# Patient Record
Sex: MALE | Race: WHITE | NOT HISPANIC OR LATINO | Employment: OTHER | URBAN - METROPOLITAN AREA
[De-identification: names, ages, dates, MRNs, and addresses within clinical notes are randomized per-mention and may not be internally consistent; named-entity substitution may affect disease eponyms.]

---

## 2017-01-10 ENCOUNTER — ALLSCRIPTS OFFICE VISIT (OUTPATIENT)
Dept: OTHER | Facility: OTHER | Age: 72
End: 2017-01-10

## 2017-01-10 DIAGNOSIS — E11.9 TYPE 2 DIABETES MELLITUS WITHOUT COMPLICATIONS (HCC): ICD-10-CM

## 2017-01-10 DIAGNOSIS — E78.5 HYPERLIPIDEMIA: ICD-10-CM

## 2017-01-10 DIAGNOSIS — I10 ESSENTIAL (PRIMARY) HYPERTENSION: ICD-10-CM

## 2017-02-07 ENCOUNTER — GENERIC CONVERSION - ENCOUNTER (OUTPATIENT)
Dept: OTHER | Facility: OTHER | Age: 72
End: 2017-02-07

## 2017-03-24 ENCOUNTER — ALLSCRIPTS OFFICE VISIT (OUTPATIENT)
Dept: OTHER | Facility: OTHER | Age: 72
End: 2017-03-24

## 2017-03-24 LAB — HBA1C MFR BLD HPLC: 6.9 %

## 2017-07-21 ENCOUNTER — ALLSCRIPTS OFFICE VISIT (OUTPATIENT)
Dept: OTHER | Facility: OTHER | Age: 72
End: 2017-07-21

## 2017-07-21 DIAGNOSIS — Z00.00 ENCOUNTER FOR GENERAL ADULT MEDICAL EXAMINATION WITHOUT ABNORMAL FINDINGS: ICD-10-CM

## 2017-07-21 DIAGNOSIS — Z86.19 PERSONAL HISTORY OF OTHER INFECTIOUS AND PARASITIC DISEASES: ICD-10-CM

## 2017-07-21 DIAGNOSIS — E78.5 HYPERLIPIDEMIA: ICD-10-CM

## 2017-07-21 DIAGNOSIS — E11.9 TYPE 2 DIABETES MELLITUS WITHOUT COMPLICATIONS (HCC): ICD-10-CM

## 2017-07-21 DIAGNOSIS — R53.83 OTHER FATIGUE: ICD-10-CM

## 2017-07-21 LAB — HBA1C MFR BLD HPLC: 7 %

## 2017-07-24 ENCOUNTER — GENERIC CONVERSION - ENCOUNTER (OUTPATIENT)
Dept: OTHER | Facility: OTHER | Age: 72
End: 2017-07-24

## 2017-07-24 LAB
BASOPHILS # BLD AUTO: 0 X10E3/UL (ref 0–0.2)
BASOPHILS # BLD AUTO: 1 %
DEPRECATED RDW RBC AUTO: 13.6 % (ref 12.3–15.4)
EOSINOPHIL # BLD AUTO: 0.1 X10E3/UL (ref 0–0.4)
EOSINOPHIL # BLD AUTO: 1 %
HCT VFR BLD AUTO: 45.8 % (ref 37.5–51)
HGB BLD-MCNC: 16.2 G/DL (ref 12.6–17.7)
IMM.GRANULOCYTES (CD4/8) (HISTORICAL): 0 %
IMM.GRANULOCYTES (CD4/8) (HISTORICAL): 0 X10E3/UL (ref 0–0.1)
LYMPHOCYTES # BLD AUTO: 1.4 X10E3/UL (ref 0.7–3.1)
LYMPHOCYTES # BLD AUTO: 30 %
MCH RBC QN AUTO: 31.3 PG (ref 26.6–33)
MCHC RBC AUTO-ENTMCNC: 35.4 G/DL (ref 31.5–35.7)
MCV RBC AUTO: 88 FL (ref 79–97)
MONOCYTES # BLD AUTO: 0.4 X10E3/UL (ref 0.1–0.9)
MONOCYTES (HISTORICAL): 9 %
NEUTROPHILS # BLD AUTO: 2.9 X10E3/UL (ref 1.4–7)
NEUTROPHILS # BLD AUTO: 59 %
PLATELET # BLD AUTO: 204 X10E3/UL (ref 150–379)
RBC (HISTORICAL): 5.18 X10E6/UL (ref 4.14–5.8)
WBC # BLD AUTO: 4.9 X10E3/UL (ref 3.4–10.8)

## 2017-07-25 LAB
A/G RATIO (HISTORICAL): 1.6 (ref 1.2–2.2)
ALBUMIN SERPL BCP-MCNC: 4.4 G/DL (ref 3.5–4.8)
ALP SERPL-CCNC: 77 IU/L (ref 39–117)
ALT SERPL W P-5'-P-CCNC: 18 IU/L (ref 0–44)
AMYLASE (HISTORICAL): 33 U/L (ref 31–124)
AST SERPL W P-5'-P-CCNC: 19 IU/L (ref 0–40)
BILIRUB SERPL-MCNC: 0.3 MG/DL (ref 0–1.2)
BUN SERPL-MCNC: 16 MG/DL (ref 8–27)
BUN/CREA RATIO (HISTORICAL): 16 (ref 10–24)
CALCIUM SERPL-MCNC: 9.5 MG/DL (ref 8.6–10.2)
CHLORIDE SERPL-SCNC: 104 MMOL/L (ref 96–106)
CHOLEST SERPL-MCNC: 162 MG/DL (ref 100–199)
CHOLEST/HDLC SERPL: 3.9 RATIO UNITS (ref 0–5)
CO2 SERPL-SCNC: 22 MMOL/L (ref 18–29)
CREAT SERPL-MCNC: 1.02 MG/DL (ref 0.76–1.27)
EGFR AFRICAN AMERICAN (HISTORICAL): 83 ML/MIN/1.73
EGFR-AMERICAN CALC (HISTORICAL): 72 ML/MIN/1.73
GLUCOSE SERPL-MCNC: 106 MG/DL (ref 65–99)
HDLC SERPL-MCNC: 42 MG/DL
INTERPRETATION (HISTORICAL): NORMAL
LDLC SERPL CALC-MCNC: 108 MG/DL (ref 0–99)
LIPASE SERPL-CCNC: 18 U/L (ref 0–59)
LYME IGG/IGM AB (HISTORICAL): <0.91 ISR (ref 0–0.9)
LYME IGM (HISTORICAL): <0.8 INDEX (ref 0–0.79)
POTASSIUM SERPL-SCNC: 4.2 MMOL/L (ref 3.5–5.2)
PROSTATE SPECIFIC ANTIGEN (HISTORICAL): 2.6 NG/ML (ref 0–4)
SODIUM SERPL-SCNC: 144 MMOL/L (ref 134–144)
TOT. GLOBULIN, SERUM (HISTORICAL): 2.8 G/DL (ref 1.5–4.5)
TOTAL PROTEIN (HISTORICAL): 7.2 G/DL (ref 6–8.5)
TRIGL SERPL-MCNC: 58 MG/DL (ref 0–149)
VLDLC SERPL CALC-MCNC: 12 MG/DL (ref 5–40)

## 2017-08-01 ENCOUNTER — GENERIC CONVERSION - ENCOUNTER (OUTPATIENT)
Dept: OTHER | Facility: OTHER | Age: 72
End: 2017-08-01

## 2017-11-01 ENCOUNTER — ALLSCRIPTS OFFICE VISIT (OUTPATIENT)
Dept: OTHER | Facility: OTHER | Age: 72
End: 2017-11-01

## 2017-11-01 ENCOUNTER — GENERIC CONVERSION - ENCOUNTER (OUTPATIENT)
Dept: OTHER | Facility: OTHER | Age: 72
End: 2017-11-01

## 2017-11-01 DIAGNOSIS — R31.9 HEMATURIA: ICD-10-CM

## 2017-11-01 LAB
BILIRUB UR QL STRIP: NORMAL
CLARITY UR: NORMAL
COLOR UR: YELLOW
GLUCOSE (HISTORICAL): NORMAL
HGB UR QL STRIP.AUTO: 50
KETONES UR STRIP-MCNC: NORMAL MG/DL
LEUKOCYTE ESTERASE UR QL STRIP: NORMAL
NITRITE UR QL STRIP: NORMAL
PH UR STRIP.AUTO: 5 [PH]
PROT UR STRIP-MCNC: NORMAL MG/DL
SP GR UR STRIP.AUTO: 1.02
UROBILINOGEN UR QL STRIP.AUTO: NORMAL

## 2017-11-02 LAB — MICROALBUM.,U,RANDOM (HISTORICAL): 10.5 UG/ML

## 2017-11-06 ENCOUNTER — GENERIC CONVERSION - ENCOUNTER (OUTPATIENT)
Dept: OTHER | Facility: OTHER | Age: 72
End: 2017-11-06

## 2017-11-06 LAB
CLINICIAN PROVIDIED ICD 9 OR 10 (HISTORICAL): NORMAL
CULTURE RESULT (HISTORICAL): NORMAL
DIAGNOSIS (HISTORICAL): NORMAL
ELECTRONICALLY SIGNED BY (HISTORICAL): NORMAL
GROSS DESCRIPTION (HISTORICAL): NORMAL
MISCELLANEOUS LAB TEST RESULT (HISTORICAL): NORMAL
PERFORMED BY (HISTORICAL): NORMAL
RECOMMENDATION (HISTORICAL): NORMAL
SOURCE (HISTORICAL): NORMAL

## 2017-11-07 NOTE — PROGRESS NOTES
Assessment  1  Type 2 diabetes mellitus (250 00) (E11 9)   2  Benign essential hypertension (401 1) (I10)   3  Hematuria (599 70) (R31 9)   4  Body mass index 31 0-31 9, adult (V85 31) (Z68 31)   5  Need for influenza vaccination (V04 81) (Z23)    Plan  Benign essential hypertension    · Enalapril Maleate 10 MG Oral Tablet; decrease to 1/2  tablet daily  Benign prostatic hypertrophy with lower urinary tract symptoms (LUTS)    · Tamsulosin HCl - 0 4 MG Oral Capsule; take 1 capsule twice a day  Benign prostatic hypertrophy with lower urinary tract symptoms (LUTS), Fatigue    · (1) URINE CULTURE; Source:Urine, Clean Catch; Status: In Progress - Specimen/Data  Collected;   Done: 73DOG5614 12:00AM  Body mass index 31 0-31 9, adult    · Urine Dip Automated- POC; Status:Complete;   Done: 13GYB2551 09:55AM  Hematuria    · (1) URINE CYTOLOGY; Status:Active; Requested for:01Nov2017;   : Clean catch  Hyperlipidemia    · Simvastatin 40 MG Oral Tablet; TAKE 1 TABLET AT BEDTIME  Need for influenza vaccination    · Fluzone High-Dose 0 5 ML Intramuscular Suspension Prefilled Syringe  Type 2 diabetes mellitus    · (1) HEMOGLOBIN A1C; Status:Complete;   Done: 95SXC7575 09:48AM   · (LC) Microalbumin, Random Urine; Status:Resulted - Requires Verification;   Done:  93FKG4273 12:00AM  Type 2 diabetes mellitus with hyperglycemia    · HumaLOG Mix 75/25 KwikPen (75-25) 100 UNIT/ML Subcutaneous  Suspension Pen-injector; INJECT 40 UNITS UNDER THE SKIN IN THE MORNING  AND 50 UNITS IN THE EVENING    Discussion/Summary  Possible side effects of new medications were reviewed with the patient/guardian today  The treatment plan was reviewed with the patient/guardian  The patient/guardian understands and agrees with the treatment plan      Chief Complaint  pt here for Dm and flu shot  tc/cma      Advance Directives  Advance Directive [facility]:   The patient is not in agreement to receive the Advance Care Planning service--    NO - Patient does not have an advance health care directive  Summary of Advance Directive Conversation  pt was given 5 wishes to read and review  History of Present Illness  urol--Dr Jony Martin      Active Problems  1  Benign colon polyp (211 3) (K63 5)   2  Benign essential hypertension (401 1) (I10)   3  Benign prostatic hypertrophy with lower urinary tract symptoms (LUTS) (600 01) (N40 1)   4  Body mass index 31 0-31 9, adult (V85 31) (Z68 31)   5  Ceruminosis (380 4) (H61 20)   6  Fatigue (780 79) (R53 83)   7  History of Lyme disease (V12 09) (Z86 19)   8  Hypercholesterolemia (272 0) (E78 00)   9  Hyperlipidemia (272 4) (E78 5)   10  Internal derangement of knee, unspecified laterality (717 9) (M23 90)   11  Knee pain, bilateral (719 46) (M25 561,M25 562)   12  Knee pain, left (719 46) (M25 562)   13  Knee pain, right (719 46) (M25 561)   14  Long-term insulin use (V58 67) (Z79 4)   15  Long-term insulin use in type 2 diabetes (250 00,V58 67) (E11 9,Z79 4)   16  Medicare annual wellness visit, subsequent (V70 0) (Z00 00)   17  Need for influenza vaccination (V04 81) (Z23)   18  Poorly controlled type 2 diabetes mellitus (250 00) (E11 65)   19  Primary osteoarthritis of both knees (715 16) (M17 0)   20  Screening for AAA (abdominal aortic aneurysm) (V81 2) (Z13 6)   21  Screening for depression (V79 0) (Z13 89)   22  Screening for genitourinary condition (V81 6) (Z13 89)   23  Screening for neurological condition (V80 09) (Z13 89)   24  Type 2 diabetes mellitus (250 00) (E11 9)   25  Type 2 diabetes mellitus with hyperglycemia (250 00) (E11 65)   26  Vitamin D deficiency (268 9) (E55 9)    Past Medical History  1  History of arthritis (V13 4) (Z87 39)   2  History of Lyme disease (V12 09) (Z86 19)   3  History of viral gastroenteritis (V12 09) (Z86 19)   4  Personal history of kidney stones (V13 01) (Z87 442)   5  History of Rupture, bladder, spontaneous (596 6) (N32 89)    Surgical History  1   History of Anesthesia Lower Abdomen For Cystolithotomy    Family History  Mother    1  Family history of diabetes mellitus (V18 0) (Z83 3)   2  Family history of malignant neoplasm (V16 9) (Z80 9)  Father    3  Family history of malignant neoplasm of prostate (V16 42) (Z80 45)    Social History   · Being A Social Drinker   · Denied: History of Caffeine Use   · Former smoker (V15 82) (P84 544)    Current Meds   1  BD Pen Needle Mini U/F 31G X 5 MM Miscellaneous; use twice a day; Therapy: 88BUX2266 to (Last Rx:69Wve2420)  Requested for: 30Oct2017 Ordered   2  Enalapril Maleate 10 MG Oral Tablet; decrease to 1/2  tablet daily; Therapy: 71ODP6689 to  Requested for: 69PYP1504 Recorded   3  HumaLOG Mix 75/25 KwikPen (75-25) 100 UNIT/ML Subcutaneous Suspension   Pen-injector; INJECT 40 UNITS UNDER THE SKIN IN THE MORNING AND   50 UNITS IN THE EVENING; Therapy: 17CLG3899 to (Evaluate:49Ytl0268)  Requested for: 94Qtc6460; Last   Rx:66Byv6023 Ordered   4  OneTouch Ultra Blue In [employer]; use as directed TID testing; Therapy: 86FWH9303 to (Last Rx:24Mar2017)  Requested for: 24Mar2017 Ordered   5  Simvastatin 40 MG Oral Tablet; TAKE 1 TABLET AT BEDTIME; Therapy: 45YSN1451 to (Last Rx:23Vie9583)  Requested for: 24Oct2017 Ordered   6  Tamsulosin HCl - 0 4 MG Oral Capsule; take 1 capsule twice a day; Therapy: 79TUU7862 to (Last Oskar Olson)  Requested for: 74JIJ8752 Ordered    Allergies  1   AMOXICILLIN    Vitals  Vital Signs    Recorded: 92KKF5096 09:41AM   Temperature 97 9 F, Temporal   Heart Rate 72, R Radial   Pulse Quality Normal, R Radial   Respiration Quality Normal   Respiration 16   Systolic 393, RUE, Sitting   Diastolic 58, RUE, Sitting   Height 5 ft 7 in   Weight 201 lb    BMI Calculated 31 48   BSA Calculated 2 03     Results/Data  Urine Dip Automated- POC 17SIO0873 09:55AM Dickwilly Barley     Test Name Result Flag Reference   Color Yellow     Clarity Transparent     Leukocytes neg     Nitrite neg     Blood 50     Bilirubin neg Urobilinogen norm     Protein neg     Ph 5     Specific Gravity 1 020     Ketone neg     Glucose norm       Falls Risk Assessment (Dx Z13 89 Screen for Neurologic Disorder) 87BOR8211 09:43AM User, Ahs     Test Name Result Flag Reference   Falls Risk      No falls in the past year     ([facility]) Microalbumin, Random Urine 15DWC8709 12:00AM Nona Bradshaw     Test Name Result Flag Reference   Microalbumin, Urine 10 5 ug/mL  Not Estab  Health Management  Benign colon polyp   COLONOSCOPY; every 5 years; Last 45PGW1719; Next Due: 97ZJH9226; Active    Future Appointments    Date/Time Provider Specialty Site   02/07/2018 09:30 AM LEXUS Hidalgo   Family Medicine [address]     Signatures   Electronically signed by : LEXUS Bhardwaj ; Nov 6 2017  7:45AM EST                       (Author)

## 2017-12-04 ENCOUNTER — GENERIC CONVERSION - ENCOUNTER (OUTPATIENT)
Dept: OTHER | Facility: OTHER | Age: 72
End: 2017-12-04

## 2017-12-04 ENCOUNTER — GENERIC CONVERSION - ENCOUNTER (OUTPATIENT)
Dept: FAMILY MEDICINE CLINIC | Facility: CLINIC | Age: 72
End: 2017-12-04

## 2017-12-04 LAB
LEFT EYE DIABETIC RETINOPATHY: NORMAL
RIGHT EYE DIABETIC RETINOPATHY: NORMAL

## 2018-01-09 NOTE — RESULT NOTES
Verified Results  (1) COMPREHENSIVE METABOLIC PANEL 75OUL4205 04:20OD Vanessa Vanessa Order Number: [id number]     Test Name Result Flag Reference   GLUCOSE,RANDM 112 mg/dL     If the patient is fasting, the ADA then defines impaired fasting glucose as > 100 mg/dL and diabetes as > or equal to 123 mg/dL  SODIUM 137 mmol/L  136-145   POTASSIUM 4 1 mmol/L  3 5-5 3   CHLORIDE 103 mmol/L  100-108   CARBON DIOXIDE 26 mmol/L  21-32   ANION GAP (CALC) 8 mmol/L  4-13   BLOOD UREA NITROGEN 13 mg/dL  5-25   CREATININE 0 98 mg/dL  0 60-1 30   Standardized to IDMS reference method   CALCIUM 8 8 mg/dL  8 3-10 1   BILI, TOTAL 0 69 mg/dL  0 20-1 00   ALK PHOSPHATAS 78 U/L     ALT (SGPT) 31 U/L  12-78   AST(SGOT) 19 U/L  5-45   ALBUMIN 3 8 g/dL  3 5-5 0   TOTAL PROTEIN 7 5 g/dL  6 4-8 2   eGFR Non-African American      >60 0 ml/min/1 73sq m   - Patient Instructions: This is a fasting blood test  Water,black tea or black  coffee only after 9:00pm the night before test Drink 2 glasses of water the morning of test - Patient Instructions: This bloodwork is non-fasting  Please drink two glasses of   water morning of bloodwork  National Kidney Disease Education Program recommendations are as follows:  GFR calculation is accurate only with a steady state creatinine  Chronic Kidney disease less than 60 ml/min/1 73 sq  meters  Kidney failure less than 15 ml/min/1 73 sq  meters  (1) AMYLASE 68WBN0139 10:31AM [facility] Order Number: [id number]     Test Name Result Flag Reference   AMYLASE 25 IU/L     - Patient Instructions: This is a fasting blood test  Water,black tea or black  coffee only after 9:00pm the night before test Drink 2 glasses of water the morning of test - Patient Instructions: This bloodwork is non-fasting  Please drink two glasses of   water morning of bloodwork       (1) CBC/PLT/DIFF 64Lpr3959 10:31AM [facility]    Order Number: [id number] Test Name Result Flag Reference   WBC COUNT 5 40 Thousand/uL  4 31-10 16   RBC COUNT 5 03 Million/uL  3 88-5 62   HEMOGLOBIN 15 6 g/dL  12 0-17 0   HEMATOCRIT 45 7 %  36 5-49 3   MCV 91 fL  82-98   MCH 31 0 pg  26 8-34 3   MCHC 34 1 g/dL  31 4-37 4   RDW 13 1 %  11 6-15 1   MPV 9 6 fL  8 9-12 7   PLATELET COUNT 935 Thousands/uL  149-390   nRBC AUTOMATED 0 /100 WBCs     NEUTROPHILS RELATIVE PERCENT 62 %  43-75   LYMPHOCYTES RELATIVE PERCENT 26 %  14-44   MONOCYTES RELATIVE PERCENT 9 %  4-12   EOSINOPHILS RELATIVE PERCENT 2 %  0-6   BASOPHILS RELATIVE PERCENT 1 %  0-1   NEUTROPHILS ABSOLUTE COUNT 3 36 Thousands/?L  1 85-7 62   LYMPHOCYTES ABSOLUTE COUNT 1 41 Thousands/?L  0 60-4 47   MONOCYTES ABSOLUTE COUNT 0 49 Thousand/?L  0 17-1 22   EOSINOPHILS ABSOLUTE COUNT 0 09 Thousand/?L  0 00-0 61   BASOPHILS ABSOLUTE COUNT 0 03 Thousands/?L  0 00-0 10   - Patient Instructions: This bloodwork is non-fasting  Please drink two glasses of water morning of bloodwork  - Patient Instructions: This bloodwork is non-fasting  Please drink two glasses of water morning of bloodwork  (1) HEMOGLOBIN A1C 56Vvz7392 10:31AM Nadya Bone   TW Order Number: [id number]     Test Name Result Flag Reference   HEMOGLOBIN A1C 6 9 % H 4 2-6 3   EST  AVG  GLUCOSE 151 mg/dl       (1) LIPASE 60Ogz2778 10:31AM [facility] Number: [id number]     Test Name Result Flag Reference   LIPASE 78 u/L     - Patient Instructions: This is a fasting blood test  Water,black tea or black  coffee only after 9:00pm the night before test Drink 2 glasses of water the morning of test - Patient Instructions: This bloodwork is non-fasting  Please drink two glasses of   water morning of bloodwork       (1) LIPID PANEL, FASTING 71Saw2671 10:31AM Nadya Bone   TW Order Number: [id number]     Test Name Result Flag Reference   CHOLESTEROL 156 mg/dL     HDL,DIRECT 42 mg/dL  40-60   Specimen collection should occur prior to Metamizole administration due to the potential for falsely depressed results  LDL CHOLESTEROL CALCULATED 99 mg/dL  0-100   - Patient Instructions: This is a fasting blood test  Water,black tea or black  coffee only after 9:00pm the night before test   Drink 2 glasses of water the morning of test     - Patient Instructions: This is a fasting blood test  Water,black tea or black  coffee only after 9:00pm the night before test Drink 2 glasses of water the morning of test - Patient Instructions: This bloodwork is non-fasting  Please drink two glasses of   water morning of bloodwork  Triglyceride:         Normal              <150 mg/dl       Borderline High    150-199 mg/dl       High               200-499 mg/dl       Very High          >499 mg/dl  Cholesterol:         Desirable        <200 mg/dl      Borderline High  200-239 mg/dl      High             >239 mg/dl  HDL Cholesterol:        High    >59 mg/dL      Low     <41 mg/dL  LDL CALCULATED:    This screening LDL is a calculated result  It does not have the accuracy of the Direct Measured LDL in the monitoring of patients with hyperlipidemia and/or statin therapy  Direct Measure LDL (SMZ278) must be ordered separately in these patients  TRIGLYCERIDES 74 mg/dL  <=150   Specimen collection should occur prior to N-Acetylcysteine or Metamizole administration due to the potential for falsely depressed results  (1) TSH 88Tks1637 10:31AM Tayo Valentine    Order Number: [id number]     Test Name Result Flag Reference   TSH 2 220 uIU/mL  0 358-3 740   - Patient Instructions: This bloodwork is non-fasting  Please drink two glasses of water morning of bloodwork  - Patient Instructions: This is a fasting blood test  Water,black tea or black  coffee only after 9:00pm the night before test Drink 2 glasses of water the morning of test - Patient Instructions: This bloodwork is non-fasting  Please drink two glasses of   water morning of bloodwork  Patients undergoing fluorescein dye angiography may retain small amounts of fluorescein in the body for 48-72 hours post procedure  Samples containing fluorescein can produce falsely depressed TSH values  If the patient had this procedure,a specimen should be resubmitted post fluorescein clearance       (1) MICROALBUMIN CREATININE RATIO, RANDOM URINE 32Cku0562 10:31AM Roopa Doyle     Test Name Result Flag Reference   MICROALBUMIN/ CREAT R 26 mg/g creatinine  0-30   MICROALBUMIN,URINE 29 3 mg/L H 0 0-20 0   CREATININE URINE 112 0 mg/dL         Discussion/Summary   please call with further questions/concerns-Best Regards-Dr AMRIO

## 2018-01-10 NOTE — RESULT NOTES
Verified Results  (LC) Microalbumin, Random Urine 69MAV5260 12:00AM Peggi [place]     Test Name Result Flag Reference   Microalbumin, Urine 10 5 ug/mL  Not Estab  (1) URINE CULTURE 71NIA6902 12:00AM Peggi [place]     Test Name Result Flag Reference   Urine Culture,Comprehensive Final report     Result 1      No growth in 36 - 48 hours  No growth in 36 - 48 hours  [facility]) Urine Cytology 32MNL4303 12:00AM Lisha Sheridan   No  of containers  Marissa Cruz 01 Other (Miscellaneous)     Test Name Result Flag Reference   Source: URINE     URINE   Clinician provided ICD10: R31 9     R31 9   DIAGNOSIS:      URINE  NEGATIVE FOR MALIGNANT CELLS  REACTIVE TRANSITIONAL CELLS ARE PRESENT  CELLULAR DEGENERATION IS PRESENT  URINE  NEGATIVE FOR MALIGNANT CELLS  REACTIVE TRANSITIONAL CELLS ARE PRESENT  CELLULAR DEGENERATION IS PRESENT  Recommendation:      Suggest follow up as clinically appropriate  Suggest follow up as clinically appropriate     Signed out by:      Larissa Francois DO, FCAP, Pathologist   Larissa Francois DO, FCAP, Pathologist   Performed by:      Joie Stevenson, Cytotechnologist   Joie Stevenson Cytotechnologist   Gross description:      50CC, YELLOW, FIXED FLD  /LCS   50CC, YELLOW, FIXED FLD  /LCS

## 2018-01-11 NOTE — RESULT NOTES
Verified Results  (1) HEMOGLOBIN A1C 59OQF7375 11:20AM Rosa De Leon Order Number: [id number]      5 7-6 4% impaired fasting glucose  >=6 5% diagnosis of diabetes    Falsely low levels are seen in conditions linked to short RBC life span-  hemolytic anemia, and splenomegaly  Falsely elevated levels are seen in situations where there is an increased production of RBC- receipt of erythropoietin or blood transfusions  Adopted from ADA-Clinical Practice Recommendations     Test Name Result Flag Reference   HEMOGLOBIN A1C 7 2 % H 4 0-5 6   EST  AVG   GLUCOSE 160 mg/dl         Discussion/Summary   good results

## 2018-01-13 VITALS
HEART RATE: 80 BPM | HEIGHT: 67 IN | WEIGHT: 200 LBS | RESPIRATION RATE: 16 BRPM | SYSTOLIC BLOOD PRESSURE: 130 MMHG | OXYGEN SATURATION: 99 % | DIASTOLIC BLOOD PRESSURE: 58 MMHG | BODY MASS INDEX: 31.39 KG/M2 | TEMPERATURE: 97.8 F

## 2018-01-13 VITALS
SYSTOLIC BLOOD PRESSURE: 112 MMHG | BODY MASS INDEX: 31.55 KG/M2 | WEIGHT: 201 LBS | TEMPERATURE: 97.9 F | RESPIRATION RATE: 16 BRPM | HEART RATE: 72 BPM | HEIGHT: 67 IN | DIASTOLIC BLOOD PRESSURE: 58 MMHG

## 2018-01-14 VITALS
WEIGHT: 199 LBS | DIASTOLIC BLOOD PRESSURE: 58 MMHG | HEIGHT: 67 IN | BODY MASS INDEX: 31.23 KG/M2 | TEMPERATURE: 98.4 F | HEART RATE: 62 BPM | RESPIRATION RATE: 16 BRPM | SYSTOLIC BLOOD PRESSURE: 120 MMHG

## 2018-01-14 NOTE — RESULT NOTES
Verified Results  (1) AMYLASE 45Ixy0909 09:27AM Dickie Barley     Test Name Result Flag Reference   Amylase, Serum 33 U/L       (1) COMPREHENSIVE METABOLIC PANEL 96KNU0169 67:39JK Dickie Barley     Test Name Result Flag Reference   Glucose, Serum 106 mg/dL H 65-99   BUN 16 mg/dL  8-27   Creatinine, Serum 1 02 mg/dL  0 76-1 27   BUN/Creatinine Ratio 16  10-24   Sodium, Serum 144 mmol/L  134-144   Potassium, Serum 4 2 mmol/L  3 5-5 2   Chloride, Serum 104 mmol/L     Carbon Dioxide, Total 22 mmol/L  18-29   Calcium, Serum 9 5 mg/dL  8 6-10 2   Protein, Total, Serum 7 2 g/dL  6 0-8 5   Albumin, Serum 4 4 g/dL  3 5-4 8   Globulin, Total 2 8 g/dL  1 5-4 5   A/G Ratio 1 6  1 2-2 2   Bilirubin, Total 0 3 mg/dL  0 0-1 2   Alkaline Phosphatase, S 77 IU/L     AST (SGOT) 19 IU/L  0-40   ALT (SGPT) 18 IU/L  0-44   eGFR If NonAfricn Am 72 mL/min/1 73  >59   eGFR If Africn Am 83 mL/min/1 73  >59     (1) LIPASE [id number] 09:27AM Dickie Barley     Test Name Result Flag Reference   Lipase, Serum 18 U/L  0-59     (1) LIPID PANEL, FASTING 80FSL6381 09:27AM Dickie Barley     Test Name Result Flag Reference   Cholesterol, Total 162 mg/dL  100-199   Triglycerides 58 mg/dL  0-149   HDL Cholesterol 42 mg/dL  >39   VLDL Cholesterol Jasson 12 mg/dL  5-40   LDL Cholesterol Calc 108 mg/dL H 0-99   T  Chol/HDL Ratio 3 9 ratio units  0 0-5 0   T  Chol/HDL Ratio                                                             Men  Women                                               1/2 Avg  Risk  3 4    3 3                                                   Avg Risk  5 0    4 4                                                2X Avg  Risk  9 6    7 1                                                3X Avg  Risk 23 4   11 0     (1) CBC/PLT/DIFF 69SEF3336 09:27AM Dickie Barley     Test Name Result Flag Reference   WBC 4 9 x10E3/uL  3 4-10 8   RBC 5 18 x10E6/uL  4 14-5 80   Hemoglobin 16 2 g/dL  12 6-17 7   Hematocrit 45 8 % 37  5-51 0   MCV 88 fL  79-97   MCH 31 3 pg  26 6-33 0   MCHC 35 4 g/dL  31 5-35 7   RDW 13 6 %  12 3-15 4   Platelets 484 Z30F8/TJ  150-379   Neutrophils 59 %     Lymphs 30 %     Monocytes 9 %     Eos 1 %     Basos 1 %     Neutrophils (Absolute) 2 9 x10E3/uL  1 4-7 0   Lymphs (Absolute) 1 4 x10E3/uL  0 7-3 1   Monocytes(Absolute) 0 4 x10E3/uL  0 1-0 9   Eos (Absolute) 0 1 x10E3/uL  0 0-0 4   Baso (Absolute) 0 0 x10E3/uL  0 0-0 2   Immature Granulocytes 0 %     Immature Grans (Abs) 0 0 x10E3/uL  0 0-0 1     (LC) Lyme Ab/Western Blot Reflex [id number] 09:27AM [facility]     Test Name Result Flag Reference   Lyme IgG/IgM Ab <0 91 ISR  0 00-0 90   Negative         <0 91                                                 Equivocal  0 91 - 1 09                                                 Positive         >1 09   Lyme Disease Ab, Quant, IgM <0 80 index  0 00-0 79   Negative         <0 80                                                 Equivocal  0 80 - 1 19                                                 Positive         >1 19                  IgM levels may peak at 3-6 weeks post infection, then                  gradually decline  [facility]) Cardiovascular Risk Assessment 44Drg6925 09:27AM [facility]     Test Name Result Flag Reference   Interpretation Note     -------------------------------  CARDIOVASCULAR REPORT:  -------------------------------  Current available clinical information suggests the  patient's risk is at least LOW  One major CHD risk factor is  present (age over 39)  If the patient has CHD or a CHD risk  equivalent, the risk category is high  If patient does not  have CHD or a CHD risk equivalent, consider use of the  Pooled Cohort Equations to estimate 10-year CVD risk, as  individuals with greater than 7 5% risk may warrant more  intensive therapy  The calculator can be found at:  http://tools  cardiosource org/ODDCL-Jbch-Dnovfbzfl/  -  Insulin resistance, obesity, excessive alcohol use, smoking,  thyroid disease, nephrotic syndrome, liver disease, and  certain medications are all causes of secondary  dyslipidemia  Consider evaluation if clinically indicated  -  Therapeutic lifestyle changes are always valuable to achieve  optimal blood lipid status (diet, exercise, weight  management)  -------------------------------  LIPID MANAGEMENT  Select one patient risk category based upon medical history  and clinical judgment  Additional risk factors such as  personal or family history of premature CHD, smoking, and  hypertension modify a patient's goals of therapy  In CVD  prevention, the intensity of therapy should be adjusted to  the level of patient risk  MODERATE intensity statin therapy  generally results in an average LDL-C reduction of 30% to  less than 50% from the untreated baseline  Examples include  (daily doses): atorvastatin 10-20 mg, rosuvastatin 5-10 mg,  simvastatin 20-40 mg, pravastatin 40-80 mg, lovastatin 40  mg  HIGH intensity statin therapy generally results in an  average LDL-C reduction of 50% or more from the untreated  baseline  Examples include (daily doses): atorvastatin 40-80  mg and rosuvastatin 20 mg   -------------------------------  LOW RISK ASSESSMENT AND TREATMENT SUGGESTIONS  -------------------------------  LDL-C is acceptable, 108 mg/dL  Non-HDL Cholesterol is  optimal, 120 mg/dL  -  Considerations for use of statin therapy include family  history of premature atherosclerotic disease, elevated  coronary artery calcium score, ankle-brachial index < 0 9,  elevated CRP, or elevated 10-year CVD risk   -------------------------------  INTERMEDIATE RISK ASSESSMENT AND TREATMENT SUGGESTIONS  -------------------------------  LDL-C is acceptable, 108 mg/dL  Non-HDL Cholesterol is  optimal, 120 mg/dL  -  Consider measurement of LDL particle number or Apo B to  adjudicate need for further LDL lowering therapy  Consider  beginning or increasing statin   Factors that may influence  statin use include family history of premature  atherosclerotic disease, elevated coronary artery calcium  score, ankle-brachial index < 0 9, elevated CRP, or elevated  10-year CVD risk  If statin cannot be tolerated or  increased, alternatives include use of an intestinal agent  (ezetimibe or bile acid sequestrant) or niacin   -------------------------------  HIGH RISK ASSESSMENT AND TREATMENT SUGGESTIONS  -------------------------------  LDL-C is borderline high, 108 mg/dL  Non-HDL Cholesterol is  normal, 120 mg/dL  -  Begin statin  If statin already in use, consider increasing  dose to achieve at least a 50% LDL reduction from baseline  Moderate or high intensity statin is preferred  If statin  cannot be tolerated or increased, alternatives include use  of an intestinal agent (ezetimibe or bile acid sequestrant)  or niacin   -------------------------------  DISCLAIMER  These assessments and treatment suggestions are provided as  a convenience in support of the physician-patient  relationship and are not intended to replace the physician's  clinical judgment  They are derived from the national  guidelines in addition to other evidence and expert opinion  The clinician should consider this information within the  context of clinical opinion and the individual patient  SEE GUIDANCE FOR CARDIOVASCULAR REPORT: National Heart,  Lung, and Blood Tavernier's Third Report of the NCEP Expert  Panel on Detection, Evaluation and Treatment of High Blood  Cholesterol in Adults (ATP III) (2002  NIH publication  151600); Marquita et al  Diabetes Care 2008; 31(4):811-82;  Nettie et al  Clin Chem 2009; 55(3):407-419; Win Crenshaw et  al  2013 ACC/AHA guideline on the treatment of blood  cholesterol to reduce atherosclerotic cardiovascular risk in  adults: a report of the [employer] of  Cardiology/American Heart Association Task Force on Practice  Guidelines  Circulation 1216;472(JCIAT 2):S1? S45  PDF Image   (1) PSA (SCREEN) (Dx V76 44 Screen for Prostate Cancer) 93WBV0688 09:27AM Tayo Valentine     Test Name Result Flag Reference   Prostate Specific Ag, Serum 2 6 ng/mL  0 0-4 0   Roche ECLIA methodology  According to the American Urological Association, Serum PSA should  decrease and remain at undetectable levels after radical  prostatectomy  The AUA defines biochemical recurrence as an initial  PSA value 0 2 ng/mL or greater followed by a subsequent confirmatory  PSA value 0 2 ng/mL or greater  Values obtained with different assay methods or kits cannot be used  interchangeably  Results cannot be interpreted as absolute evidence  of the presence or absence of malignant disease

## 2018-01-15 VITALS
HEART RATE: 114 BPM | RESPIRATION RATE: 16 BRPM | TEMPERATURE: 99.5 F | WEIGHT: 191 LBS | SYSTOLIC BLOOD PRESSURE: 132 MMHG | BODY MASS INDEX: 29.98 KG/M2 | OXYGEN SATURATION: 97 % | HEIGHT: 67 IN | DIASTOLIC BLOOD PRESSURE: 62 MMHG

## 2018-01-16 NOTE — RESULT NOTES
Verified Results  (1) HEMOGLOBIN A1C 39PHK3325 09:48AM Azar Blind     Test Name Result Flag Reference   HEMOGLOBIN A1C 7 0         Plan  Type 2 diabetes mellitus    · (1) HEMOGLOBIN A1C; Status:Complete;   Done: 12JKP6479 09:48AM   · (LC) Microalbumin, Random Urine; Status:Active;  Requested for:01Nov2017;

## 2018-01-17 NOTE — RESULT NOTES
Verified Results  Urine Dip Automated- POC [id number] 09:55AM Raymond Ordoñez     Test Name Result Flag Reference   Color Yellow     Clarity Transparent     Leukocytes neg     Nitrite neg     Blood 50     Bilirubin neg     Urobilinogen norm     Protein neg     Ph 5     Specific Gravity 1 020     Ketone neg     Glucose norm       (1) HEMOGLOBIN A1C [id number] 09:48AM Ed Sheridan     Test Name Result Flag Reference   HEMOGLOBIN A1C 7 0         Plan  Benign prostatic hypertrophy with lower urinary tract symptoms (LUTS), Fatigue    · (1) URINE CULTURE; Source:Urine, Clean Catch; Status:Active; Requested  for:01Nov2017; Body mass index 31 0-31 9, adult    · Urine Dip Automated- POC; Status:Complete;   Done: [id number] 09:55AM  Hematuria    · (1) URINE CYTOLOGY; Status:Active; Requested for:01Nov2017;   : Clean catch  Need for influenza vaccination    · Fluzone High-Dose 0 5 ML Intramuscular Suspension Prefilled Syringe  Type 2 diabetes mellitus    · (1) HEMOGLOBIN A1C; Status:Complete;   Done: [id number] 09:48AM   · (LC) Microalbumin, Random Urine; Status:Active;  Requested for:01Nov2017;

## 2018-01-28 DIAGNOSIS — Z79.4 TYPE 2 DIABETES MELLITUS WITH DIABETIC POLYNEUROPATHY, WITH LONG-TERM CURRENT USE OF INSULIN (HCC): Primary | ICD-10-CM

## 2018-01-28 DIAGNOSIS — E11.42 TYPE 2 DIABETES MELLITUS WITH DIABETIC POLYNEUROPATHY, WITH LONG-TERM CURRENT USE OF INSULIN (HCC): Primary | ICD-10-CM

## 2018-01-29 RX ORDER — PEN NEEDLE, DIABETIC 31 GX5/16"
NEEDLE, DISPOSABLE MISCELLANEOUS
Qty: 180 EACH | Refills: 0 | Status: SHIPPED | OUTPATIENT
Start: 2018-01-29 | End: 2018-07-06 | Stop reason: SDUPTHER

## 2018-02-14 ENCOUNTER — OFFICE VISIT (OUTPATIENT)
Dept: FAMILY MEDICINE CLINIC | Facility: CLINIC | Age: 73
End: 2018-02-14
Payer: MEDICARE

## 2018-02-14 VITALS
TEMPERATURE: 98 F | RESPIRATION RATE: 16 BRPM | HEIGHT: 68 IN | DIASTOLIC BLOOD PRESSURE: 72 MMHG | WEIGHT: 203.6 LBS | SYSTOLIC BLOOD PRESSURE: 142 MMHG | HEART RATE: 60 BPM | BODY MASS INDEX: 30.86 KG/M2

## 2018-02-14 DIAGNOSIS — I10 ESSENTIAL HYPERTENSION: ICD-10-CM

## 2018-02-14 DIAGNOSIS — E78.01 FAMILIAL HYPERCHOLESTEROLEMIA: ICD-10-CM

## 2018-02-14 DIAGNOSIS — S40.811A ABRASION OF RIGHT UPPER EXTREMITY, INITIAL ENCOUNTER: ICD-10-CM

## 2018-02-14 DIAGNOSIS — E13.9 DIABETES 1.5, MANAGED AS TYPE 2 (HCC): Primary | ICD-10-CM

## 2018-02-14 LAB — SL AMB POCT HEMOGLOBIN AIC: 6.8

## 2018-02-14 PROCEDURE — 90715 TDAP VACCINE 7 YRS/> IM: CPT

## 2018-02-14 PROCEDURE — 83036 HEMOGLOBIN GLYCOSYLATED A1C: CPT | Performed by: FAMILY MEDICINE

## 2018-02-14 PROCEDURE — 99214 OFFICE O/P EST MOD 30 MIN: CPT | Performed by: FAMILY MEDICINE

## 2018-02-14 PROCEDURE — 90471 IMMUNIZATION ADMIN: CPT

## 2018-02-15 RX ORDER — ENALAPRIL MALEATE 10 MG/1
TABLET ORAL
COMMUNITY
Start: 2011-03-21 | End: 2018-07-06 | Stop reason: SDUPTHER

## 2018-02-15 RX ORDER — INSULIN LISPRO 100 [IU]/ML
INJECTION, SUSPENSION SUBCUTANEOUS
COMMUNITY
Start: 2018-01-08 | End: 2018-05-12 | Stop reason: SDUPTHER

## 2018-02-15 RX ORDER — TAMSULOSIN HYDROCHLORIDE 0.4 MG/1
1 CAPSULE ORAL 2 TIMES DAILY
COMMUNITY
Start: 2015-09-08 | End: 2018-07-06 | Stop reason: SDUPTHER

## 2018-02-16 NOTE — PROGRESS NOTES
Assessment/Plan:           Diagnoses and all orders for this visit:    Diabetes 1 5, managed as type 2 ([facility] )  Comments:  controlled  cont current meds  Yearly eye check  T/C podiatry eval   Orders:  -     POCT hemoglobin A1c  -     Diabetic foot exam; Future  -     Ambulatory referral to Podiatry; Future    Abrasion of right upper extremity, initial encounter  Comments:  tetanus given; topical abx  Orders:  -     Tdap vaccine greater than or equal to 8yo IM    Essential hypertension  Comments:  BP acceptable range-cont current meds, low sodium, caffeine free diet rec  Familial hypercholesterolemia  Comments:  cont current meds plus lo chol diet, exercise as tolerated  Other orders  -     HUMALOG MIX 75/25 KWIKPEN (75-25) 100 UNIT/ML SUPN;   -     enalapril (VASOTEC) 10 mg tablet; Take by mouth  -     tamsulosin (FLOMAX) 0 4 mg; Take 1 capsule by mouth 2 (two) times a day            Subjective:      Patient ID: Everardo Kirk is a 74 y o  male  Chief Complaint   Patient presents with    Diabetes     f/u       Diabetes   He presents for his follow-up diabetic visit  He has type 2 diabetes mellitus  His disease course has been stable  There are no hypoglycemic associated symptoms  Associated symptoms include fatigue and weakness  Pertinent negatives for diabetes include no chest pain  There are no hypoglycemic complications  Symptoms are stable  Risk factors for coronary artery disease include family history, male sex, hypertension, diabetes mellitus and dyslipidemia  Current diabetic treatment includes diet and insulin injections  He is compliant with treatment most of the time  He is following a generally healthy diet  He has not had a previous visit with a dietitian  He participates in exercise three times a week  His home blood glucose trend is increasing steadily  An ACE inhibitor/angiotensin II receptor blocker is being taken  He does not see a podiatrist Eye exam is current         The following portions of the patient's history were reviewed and updated as appropriate: allergies, current medications, past family history, past medical history, past social history, past surgical history and problem list      Review of Systems   Constitutional: Positive for fatigue  Respiratory: Negative  Cardiovascular: Negative  Negative for chest pain  Endocrine: Negative  Neurological: Positive for weakness  Psychiatric/Behavioral: Negative  Objective:    /72 (BP Location: Left arm, Patient Position: Sitting, Cuff Size: Standard)   Pulse 60   Temp 98 °F (36 7 °C)   Resp 16   Ht 5' 7 5" (1 715 m)   Wt 92 4 kg (203 lb 9 6 oz)   BMI 31 42 kg/m²        Physical Exam   Constitutional: He is oriented to person, place, and time  He appears well-developed and well-nourished  No distress  Overweight, chronically ill   Eyes: Conjunctivae are normal    Neck: Normal range of motion  Neck supple  Cardiovascular: Normal rate and regular rhythm  Pulmonary/Chest: Effort normal and breath sounds normal    Abdominal: Soft  Bowel sounds are normal  There is no tenderness  Neurological: He is alert and oriented to person, place, and time  No cranial nerve deficit  Skin: Skin is warm and dry  Deep abrasions w scabbed areas right lateral hand/forearm/elbow  Psychiatric: He has a normal mood and affect  Labs;  Labs in chart were reviewed    Foot exam-+callused areas, +onychyomycosis  Sensation intact to Lt, ROM wnl feet/toes,   D pedis pulses intact +2    Matthias Goldmann, MD

## 2018-02-16 NOTE — PATIENT INSTRUCTIONS
Diabetes in the Older Adult   WHAT YOU NEED TO KNOW:   What do I need to know if I am an older adult with diabetes? Older adults with diabetes are at risk for heart disease, stroke, kidney disease, blindness, and nerve damage  You may also be at risk for any of the following:  · Poor nutrition or low blood sugar levels    · Confusion or problems with memory, attention, or learning new things    · Trouble controlling urination or frequent urinary tract infections    · Trouble with coordination or balance    · Falls and injuries    · Pain    · Depression    · Open sores on your legs or feet  What are the ABCs of diabetes? The ABCs stand for certain things you can do to manage or prevent problems caused by diabetes:  · A  stands for A1c test   This test shows the average amount of sugar in your blood over the past 2 to 3 months  High levels of sugar in your blood can cause damage to your heart, blood vessels, kidneys, feet, and eyes  Most older adults with diabetes should have an A1c level less than 7 5  Ask your healthcare provider if this A1c goal is right for you  Your provider can help you make changes if your A1c is too high  · B  stands for blood pressure   High blood pressure can increase your risk for a heart attack, stroke, or kidney disease  Most older adults with diabetes should have a systolic blood pressure (first number) of 140  Your diastolic blood pressure (second number) should be below 90  Ask your healthcare provider if these blood pressure goals are right for you  · C  stands for cholesterol   High levels of cholesterol can block your arteries and cause a heart attack or stroke  Ask your healthcare provider what your cholesterol levels should be  · S  stands for stop smoking   Nicotine and other chemicals in cigarettes and cigars can cause lung damage and make it more difficult to manage your diabetes  What can I do to manage the ABCs and prevent problems caused by diabetes?    · Check your blood sugar levels as directed  Your healthcare provider will tell you when and how often to check during the day  Your healthcare provider will also tell you what your blood sugar levels should be before and after a meal  You may need to check for ketones in your urine or blood if your level is higher than directed  Write down your results and show them to your healthcare provider  Your provider may use the results to make changes to your medicine, food, or exercise schedules  Ask your healthcare provider for more information about how to treat a high or low blood sugar level  · Follow your meal plan as directed  A dietitian will help you make a meal plan to keep your blood sugar level steady and make sure you get enough nutrition  Do not skip meals  Your blood sugar level may drop too low if you have taken diabetes medicine and do not eat  Ask your healthcare provider about programs in your community that can deliver the meals to your home  · Try to be active for 30 to 60 minutes most days of the week  Exercise can help keep your blood sugar level steady, decrease your risk of heart disease, and help you lose weight  It can also help improve your balance and decrease your risk for falls  Work with your healthcare provider to create an exercise plan  Ask a family member or friend to exercise with you  Start slow and exercise for 5 to 10 minutes at a time  Examples of activities include walking or swimming  Include muscle strengthening activities 2 to 3 days each week  Include balance training 2 to 3 times each week  Activities that help increase balance include yoga and cristi chi      · Maintain a healthy weight  Ask your healthcare provider how much you should weigh  A healthy weight can help you control your diabetes and prevent heart disease  Ask your provider to help you create a weight loss plan if you are overweight  Together you can set manageable weight loss goals  · Do not smoke  Ask your healthcare provider for information if you currently smoke and need help to quit  Do not use e-cigarettes or smokeless tobacco in place of cigarettes or to help you quit  They still contain nicotine  · Manage stress  Stress may increase your blood sugar level  Deep breathing, muscle relaxation, and music may help you relax  Ask your healthcare provider for more information about these practices  What else can I do to manage my diabetes? · Check your feet every day for sores  Look at your whole foot, including the bottom, and between and under your toes  Check for wounds, corns, and calluses  Use a mirror to see the bottom of your feet  The skin on your feet may be shiny, tight, dry, or darker than normal  Your feet may also be cold and pale  Feel your feet by running your hands along the tops, bottoms, sides, and between your toes  Redness, swelling, and warmth are signs of blood flow problems that can lead to a foot ulcer  Do not try to remove corns or calluses yourself  · Wear medical alert identification  Wear medical alert jewelry or carry a card that says you have diabetes  Ask your healthcare provider where to get these items  · Ask about vaccines  You have a higher risk for serious illness if you get the flu, pneumonia, or hepatitis  Ask your healthcare provider if you should get a flu, pneumonia, shingles, or hepatitis B vaccine, and when to get the vaccine  · Keep all appointments  You may need to return to have your A1c checked every 3 months  You will need to return at least once each year to have your feet checked  You will need an eye exam once a year to check for retinopathy  You will also need urine tests every year to check for kidney problems  You may need tests to monitor for heart disease  Write down your questions so you remember to ask them during your visits  · Get help from family and friends    You may need help checking your blood sugar level, giving insulin injections, or preparing your meals  Ask your family and friends to help you with these tasks  Talk to your healthcare provider if you do not have someone at home to help you  A healthcare provider can come to your home to help you with these tasks  Call 911 for any of the following:   · You have any of the following signs of a stroke:      ¨ Numbness or drooping on one side of your face     ¨ Weakness in an arm or leg    ¨ Confusion or difficulty speaking    ¨ Dizziness, a severe headache, or vision loss    · You have any of the following signs of a heart attack:      ¨ Squeezing, pressure, or pain in your chest that lasts longer than 5 minutes or returns    ¨ Discomfort or pain in your back, neck, jaw, stomach, or arm     ¨ Trouble breathing    ¨ Nausea or vomiting    ¨ Lightheadedness or a sudden cold sweat, especially with chest pain or trouble breathing  When should I seek immediate care? · You have severe abdominal pain, or the pain spreads to your back  You may also be vomiting  · You have trouble staying awake or focusing  · You are shaking or sweating  · You have blurred or double vision  · Your breath has a fruity, sweet smell  · Your breathing is deep and labored, or rapid and shallow  · Your heartbeat is fast and weak  · You fall and get hurt  When should I contact my healthcare provider? · You are vomiting or have diarrhea  · You have an upset stomach and cannot eat the foods on your meal plan  · You feel weak or more tired than usual      · You feel dizzy, have headaches, or are easily irritated  · Your skin is red, warm, dry, or swollen  · You have a wound that does not heal      · You have numbness in your arms or legs  · You have trouble coping with your illness, or you feel anxious or depressed  · You have problems with your memory  · You have changes in your vision       · You have questions or concerns about your condition or care  CARE AGREEMENT:   You have the right to help plan your care  Learn about your health condition and how it may be treated  Discuss treatment options with your caregivers to decide what care you want to receive  You always have the right to refuse treatment  The above information is an  only  It is not intended as medical advice for individual conditions or treatments  Talk to your doctor, nurse or pharmacist before following any medical regimen to see if it is safe and effective for you  © 2017 [employer]  Information is for End User's use only and may not be sold, redistributed or otherwise used for commercial purposes  All illustrations and images included in CareNotes® are the copyrighted property of [employer]  or [employer]

## 2018-02-21 ENCOUNTER — TELEPHONE (OUTPATIENT)
Dept: FAMILY MEDICINE CLINIC | Facility: CLINIC | Age: 73
End: 2018-02-21

## 2018-02-21 NOTE — TELEPHONE ENCOUNTER
I called patient and left message on machine for call back to answer Medicare questions [facility]14 visit with Dr Bubba Watson

## 2018-03-03 DIAGNOSIS — N40.0 BENIGN PROSTATIC HYPERPLASIA, UNSPECIFIED WHETHER LOWER URINARY TRACT SYMPTOMS PRESENT: Primary | ICD-10-CM

## 2018-03-03 RX ORDER — TAMSULOSIN HYDROCHLORIDE 0.4 MG/1
CAPSULE ORAL
Qty: 180 CAPSULE | Refills: 3 | Status: SHIPPED | OUTPATIENT
Start: 2018-03-03 | End: 2018-07-06 | Stop reason: SDUPTHER

## 2018-03-21 DIAGNOSIS — N40.0 BENIGN PROSTATIC HYPERPLASIA, UNSPECIFIED WHETHER LOWER URINARY TRACT SYMPTOMS PRESENT: Primary | ICD-10-CM

## 2018-03-21 RX ORDER — TAMSULOSIN HYDROCHLORIDE 0.4 MG/1
CAPSULE ORAL
Qty: 180 CAPSULE | Refills: 0 | Status: SHIPPED | OUTPATIENT
Start: 2018-03-21 | End: 2018-07-06 | Stop reason: SDUPTHER

## 2018-05-12 DIAGNOSIS — E11.9 TYPE 2 DIABETES MELLITUS WITHOUT COMPLICATION, WITH LONG-TERM CURRENT USE OF INSULIN (HCC): Primary | ICD-10-CM

## 2018-05-12 DIAGNOSIS — Z79.4 TYPE 2 DIABETES MELLITUS WITHOUT COMPLICATION, WITH LONG-TERM CURRENT USE OF INSULIN (HCC): Primary | ICD-10-CM

## 2018-05-13 RX ORDER — INSULIN LISPRO 100 [IU]/ML
INJECTION, SUSPENSION SUBCUTANEOUS
Qty: 90 ML | Refills: 3 | Status: SHIPPED | OUTPATIENT
Start: 2018-05-13 | End: 2018-12-17 | Stop reason: SDUPTHER

## 2018-07-06 ENCOUNTER — TRANSCRIBE ORDERS (OUTPATIENT)
Dept: LAB | Facility: CLINIC | Age: 73
End: 2018-07-06

## 2018-07-06 ENCOUNTER — APPOINTMENT (OUTPATIENT)
Dept: LAB | Facility: CLINIC | Age: 73
End: 2018-07-06
Payer: MEDICARE

## 2018-07-06 ENCOUNTER — OFFICE VISIT (OUTPATIENT)
Dept: FAMILY MEDICINE CLINIC | Facility: CLINIC | Age: 73
End: 2018-07-06
Payer: MEDICARE

## 2018-07-06 VITALS
SYSTOLIC BLOOD PRESSURE: 130 MMHG | TEMPERATURE: 98 F | RESPIRATION RATE: 14 BRPM | BODY MASS INDEX: 30.8 KG/M2 | HEIGHT: 68 IN | DIASTOLIC BLOOD PRESSURE: 60 MMHG | HEART RATE: 88 BPM | WEIGHT: 203.2 LBS

## 2018-07-06 DIAGNOSIS — E78.5 HYPERLIPIDEMIA, UNSPECIFIED HYPERLIPIDEMIA TYPE: ICD-10-CM

## 2018-07-06 DIAGNOSIS — I10 ESSENTIAL HYPERTENSION: ICD-10-CM

## 2018-07-06 DIAGNOSIS — E11.42 TYPE 2 DIABETES MELLITUS WITH DIABETIC POLYNEUROPATHY, WITH LONG-TERM CURRENT USE OF INSULIN (HCC): ICD-10-CM

## 2018-07-06 DIAGNOSIS — Z00.00 MEDICARE ANNUAL WELLNESS VISIT, SUBSEQUENT: Primary | ICD-10-CM

## 2018-07-06 DIAGNOSIS — R31.9 HEMATURIA, UNSPECIFIED TYPE: ICD-10-CM

## 2018-07-06 DIAGNOSIS — E13.9 DIABETES 1.5, MANAGED AS TYPE 2 (HCC): ICD-10-CM

## 2018-07-06 DIAGNOSIS — R26.9 GAIT DIFFICULTY: ICD-10-CM

## 2018-07-06 DIAGNOSIS — Z79.4 TYPE 2 DIABETES MELLITUS WITH DIABETIC POLYNEUROPATHY, WITH LONG-TERM CURRENT USE OF INSULIN (HCC): ICD-10-CM

## 2018-07-06 LAB
ALBUMIN SERPL BCP-MCNC: 4.1 G/DL (ref 3.5–5)
ALP SERPL-CCNC: 80 U/L (ref 46–116)
ALT SERPL W P-5'-P-CCNC: 32 U/L (ref 12–78)
AMYLASE SERPL-CCNC: 29 IU/L (ref 25–115)
ANION GAP SERPL CALCULATED.3IONS-SCNC: 6 MMOL/L (ref 4–13)
AST SERPL W P-5'-P-CCNC: 22 U/L (ref 5–45)
BILIRUB SERPL-MCNC: 0.62 MG/DL (ref 0.2–1)
BUN SERPL-MCNC: 12 MG/DL (ref 5–25)
CALCIUM SERPL-MCNC: 8.9 MG/DL (ref 8.3–10.1)
CHLORIDE SERPL-SCNC: 107 MMOL/L (ref 100–108)
CHOLEST SERPL-MCNC: 135 MG/DL (ref 50–200)
CO2 SERPL-SCNC: 26 MMOL/L (ref 21–32)
CREAT SERPL-MCNC: 1.1 MG/DL (ref 0.6–1.3)
ERYTHROCYTE [DISTWIDTH] IN BLOOD BY AUTOMATED COUNT: 13 % (ref 11.6–15.1)
GFR SERPL CREATININE-BSD FRML MDRD: 66 ML/MIN/1.73SQ M
GLUCOSE P FAST SERPL-MCNC: 83 MG/DL (ref 65–99)
HCT VFR BLD AUTO: 47.5 % (ref 36.5–49.3)
HDLC SERPL-MCNC: 42 MG/DL (ref 40–60)
HGB BLD-MCNC: 15.4 G/DL (ref 12–17)
LDLC SERPL CALC-MCNC: 78 MG/DL (ref 0–100)
LIPASE SERPL-CCNC: 74 U/L (ref 73–393)
MCH RBC QN AUTO: 29.7 PG (ref 26.8–34.3)
MCHC RBC AUTO-ENTMCNC: 32.4 G/DL (ref 31.4–37.4)
MCV RBC AUTO: 92 FL (ref 82–98)
NONHDLC SERPL-MCNC: 93 MG/DL
PLATELET # BLD AUTO: 246 THOUSANDS/UL (ref 149–390)
PMV BLD AUTO: 9.3 FL (ref 8.9–12.7)
POTASSIUM SERPL-SCNC: 4.1 MMOL/L (ref 3.5–5.3)
PROT SERPL-MCNC: 7.9 G/DL (ref 6.4–8.2)
RBC # BLD AUTO: 5.18 MILLION/UL (ref 3.88–5.62)
SL AMB  POCT GLUCOSE, UA: ABNORMAL
SL AMB LEUKOCYTE ESTERASE,UA: ABNORMAL
SL AMB POCT BILIRUBIN,UA: ABNORMAL
SL AMB POCT BLOOD,UA: 50
SL AMB POCT CLARITY,UA: ABNORMAL
SL AMB POCT COLOR,UA: YELLOW
SL AMB POCT HEMOGLOBIN AIC: 6.9
SL AMB POCT KETONES,UA: ABNORMAL
SL AMB POCT NITRITE,UA: ABNORMAL
SL AMB POCT PH,UA: 6
SL AMB POCT SPECIFIC GRAVITY,UA: 1.01
SL AMB POCT URINE PROTEIN: ABNORMAL
SL AMB POCT UROBILINOGEN: ABNORMAL
SODIUM SERPL-SCNC: 139 MMOL/L (ref 136–145)
TRIGL SERPL-MCNC: 73 MG/DL
TSH SERPL DL<=0.05 MIU/L-ACNC: 2.94 UIU/ML (ref 0.36–3.74)
WBC # BLD AUTO: 5.85 THOUSAND/UL (ref 4.31–10.16)

## 2018-07-06 PROCEDURE — 84443 ASSAY THYROID STIM HORMONE: CPT

## 2018-07-06 PROCEDURE — 36415 COLL VENOUS BLD VENIPUNCTURE: CPT

## 2018-07-06 PROCEDURE — 80061 LIPID PANEL: CPT

## 2018-07-06 PROCEDURE — 83690 ASSAY OF LIPASE: CPT

## 2018-07-06 PROCEDURE — 81003 URINALYSIS AUTO W/O SCOPE: CPT | Performed by: FAMILY MEDICINE

## 2018-07-06 PROCEDURE — 82150 ASSAY OF AMYLASE: CPT

## 2018-07-06 PROCEDURE — 83036 HEMOGLOBIN GLYCOSYLATED A1C: CPT | Performed by: FAMILY MEDICINE

## 2018-07-06 PROCEDURE — G0439 PPPS, SUBSEQ VISIT: HCPCS | Performed by: FAMILY MEDICINE

## 2018-07-06 PROCEDURE — 85027 COMPLETE CBC AUTOMATED: CPT

## 2018-07-06 PROCEDURE — 99214 OFFICE O/P EST MOD 30 MIN: CPT | Performed by: FAMILY MEDICINE

## 2018-07-06 PROCEDURE — 80053 COMPREHEN METABOLIC PANEL: CPT

## 2018-07-06 RX ORDER — ENALAPRIL MALEATE 5 MG/1
5 TABLET ORAL DAILY
Qty: 90 TABLET | Refills: 3 | Status: SHIPPED | OUTPATIENT
Start: 2018-07-06 | End: 2019-04-25 | Stop reason: SDUPTHER

## 2018-07-06 RX ORDER — LATANOPROST 50 UG/ML
1 SOLUTION/ DROPS OPHTHALMIC
Status: ON HOLD | COMMUNITY
Start: 2018-05-21 | End: 2022-08-02 | Stop reason: CLARIF

## 2018-07-09 NOTE — PROGRESS NOTES
HPI:  Malorie Guerra is a 74 y o  male here for his Subsequent Wellness Visit  Patient Active Problem List   Diagnosis    Bladder stones    Hematuria    Benign colon polyp    Benign essential hypertension    Benign prostatic hyperplasia with lower urinary tract symptoms    Hyperlipidemia    Long-term insulin use in type 2 diabetes ([address] )    Vitamin D deficiency     Past Medical History:   Diagnosis Date    Anesthesia complication 5313    after colonoscopy , pt was awake but could not control his body and kept falling     Arthritis     Bladder calculi     BPH (benign prostatic hyperplasia)     Diabetes mellitus ([address] )     Hearing disorder of both ears     wears bilateral hearing aids    Hyperlipidemia     controlled and maintained d/t diabetes    Hypertension     Kidney stones     Last Assessed:4/3/2017    Lyme disease     Last Assessed:6/29/2015    Rupture, bladder, spontaneous     Last Assessed:4/3/2017     Past Surgical History:   Procedure Laterality Date    BLADDER REPAIR N/A 12/10/2016    Procedure: REPAIR BLADDER/cysto insertion stent;  Surgeon: Cody Mata MD;  Location: [address] [place] [address];  Service:     COLONOSCOPY      CYSTOLITHOTOMY      ANESTHESIA   lower abdomen  ;  Last Assessed:4/3/2017    OTHER SURGICAL HISTORY      thermal dilation of the prostate x 2 ( in the office)    TONSILLECTOMY      TRANSURETHRAL RESECTION OF PROSTATE N/A 12/8/2016    Procedure:  Cysto, Laser Bladder stone,fulgaration of prostates tissue ;  Surgeon: Cody Mata MD;  Location: [facility];  Service:      Family History   Problem Relation Age of Onset    Cancer Mother         breast    Diabetes Mother     Cancer Father         prostate w/ bone mets    Prostate cancer Father     Alzheimer's disease Sister      History   Smoking Status    Former Smoker    Types: Cigars    Quit date: 1983   Smokeless Tobacco    Never Used     History   Alcohol Use    Yes     Comment: occ      History   Drug Use No       Current Outpatient Prescriptions   Medication Sig Dispense Refill    HUMALOG MIX 75/25 KWIKPEN (75-25) 100 UNIT/ML SUPN INJECT SUBCUTANEOUSLY 40  UNITS IN THE MORNING AND 50 UNITS IN THE EVENING 90 mL 3    Insulin Pen Needle (B-D UF III MINI PEN NEEDLES) 31G X 5 MM MISC Use twice daily with insulin pen as directed 180 each 3    simvastatin (ZOCOR) 40 mg tablet Take 40 mg by mouth every morning      tamsulosin (FLOMAX) 0 4 mg Take 0 4 mg by mouth 2 (two) times a day      Vitamin D, Cholecalciferol, 1000 UNITS CAPS Take by mouth 2 times a week      enalapril (VASOTEC) 5 mg tablet Take 1 tablet (5 mg total) by mouth daily 90 tablet 3    latanoprost (XALATAN) 0 005 % ophthalmic solution       pantoprazole (PROTONIX) 40 mg tablet Take 1 tablet by mouth 2 (two) times a day before meals for 30 days 60 tablet 0     No current facility-administered medications for this visit        Allergies   Allergen Reactions    Amoxicillin Rash     Immunization History   Administered Date(s) Administered    Influenza Split High Dose Preservative Free IM 10/11/2013, 09/23/2016, 11/01/2017    Influenza TIV (IM) 11/17/2000, 11/16/2001, 12/06/2002, 10/30/2003, 10/18/2005, 10/20/2006, 10/17/2007, 10/28/2008, 09/15/2009, 09/11/2010, 09/19/2011, 09/26/2012    Pneumococcal Conjugate 13-Valent 08/05/2015    Pneumococcal Polysaccharide PPV23 11/16/2001, 09/25/2012    Tdap 02/14/2018    Zoster 09/04/2015       Patient Care Team:  Jodi Cummins MD as PCP - MD Shen Day MD Zachery Roulette, MD      Medicare Screening Tests and Risk Assessments:  AWV Clinical     ISAR:   Previous hospitalizations?:  No       Once in a Lifetime Medicare Screening:   EKG performed?:  No    AAA screening performed? (if performed, please add date to Health Maintenance):  No       Medicare Screening Tests and Risk Assessment:   AAA Risk Assessment    Age over 72 (males only):  Yes    Osteoporosis Risk Assessment None indicated:  Yes    HIV Risk Assessment    None indicated:  Yes        Drug and Alcohol Use:   Tobacco use    Cigarettes:  never smoker    Tobacco use duration    Tobacco Cessation Readiness    Alcohol use    Alcohol use:  occasional use    Concern about alcohol use:  No    Alcohol Treatment Readiness   Illicit Drug Use    Drug use:  never    Drug type:  no sedative use       Diet & Exercise:   Diet   What is your diet?:  Regular   How many servings a day of the following:   Fruits and Vegetables:  3-4 Meat:  1-2   Whole Grains:  1 Simple Carbs:  1   Dairy:  1 Soda:  0   Coffee:  0 Tea:  0   Exercise    Do you currently exercise?:  currently not exercising       Cognitive Impairment Screening:   Depression screening preformed:  No    Cognitive Impairment Screening    Do you have difficulty learning or retaining new information?:  No Do you have difficulty handling new tasks?:  No   Do you have difficulty with reasoning?:  No Do you have difficulty with spatial ability and orientation?:  No   Do you have difficulty with language?:  No Do you have difficulty with behavior?:  No       Functional Ability/Level of Safety:   Hearing    Hearing difficulties:  No Bilateral:  normal   Left:  normal Right:  normal   Hearing Impairment Assessment    Hearing status:  No impairment   Current Activities    Status:  unlimited ADL's, unlimited IADL's, unlimited social activities   Help needed with the folllowing:    Using the phone:  No Transportation:  No   Shopping:  No Preparing Meals:  No   Doing Housework:  No Doing Laundry:  No   Managing Medications:  No Managing Money:  No   ADL    Feeding:  Independant   Oral hygiene and Facial grooming:  Independant   Bathing:  Independant   Upper Body Dressing:  Independant   Lower Body Dressing:  Independant   Toileting:  Independant   Bed Mobility:  Independant   Fall Risk   Have you fallen in the last 12 months?:  No Are you unsteady on your feet?:  No    Are you taking any medications that may cause fatigue or dizziness?:  No   Do you have any chronic conditions that may contribute to a fall?:  Diabetes Do you rush to the bathroom potentially risking a fall?:  No   Injury History   Polypharmacy:  Yes Antidepressant Use:  No   Sedative Use:  No Antihypertensive Use:  No   Previous Fall:  No    Deconditioning:  No Visual Impairment:  No   Cogitive Impairment:  No Mmobility Impairment:  No   Postural Hypotension:  No Urinary Incontinence:  No       Home Safety:   Are there hazards in your environment?:  No   If you fell, would you need help to get back up from the ground?:  Yes Do you have problems or concerns getting in/out of a bed, chair, tub, or toilet?:  No   Do you feel unsteady when walking?:  No Is your activity limited by pain?:  Yes   Do you have handrails and grab-bars in the home?:  No Are emergency numbers kept by the phone and regularly updated?:  Yes   Are you and/or family members aware of the dangers of smoking in bed?:  Yes Are firearms stored securely?:  Yes   Do you have working smoke alarms and fire extinguisher?:  Yes Do all household members know how to use them?:  Yes   Have you left the stove on unsupervised?:  No    Home Safety Risk Factors   Unfamilar with surroundings:  No Uneven floors:  No   Stairs or handrail saftey risk:  No Loose rugs:  No   Household clutter:  No Poor household lighting:  No   No grab bars in bathroom:  No Further evaluation needed:  No       Advanced Directives:   Advanced Directives    Living Will:  Yes Durable POA for healthcare:   Yes   Advanced directive:  Yes    Patient's End of Life Decisions        Urinary Incontinence:   Do you have urinary incontinence?:  No Do you have incomplete emptying?:  No   Do you urinate frequently?:  No Do you have urinary urgency?:  No   Do you have urinary hesitancy?:  No Do you have dysuria (painful and/or difficult urination)?:  No   Do you have nocturia (waking up to urinate)?:  Yes Do you strain when urinating (have to push to urinate)?:  No   Do you have a weak stream when urinating?:  No Do you have intermittent streaming when urinating?:  No   Do you dribble urine after finishing?:  No        Glaucoma:            Provider Screening     Preventative Screening/Counseling:   Cardiovascular Screening/Counseling:   (Labs Q5 years, EKG optional one-time)   General:  Risks and Benefits Discussed Counseling:  Healthy Diet, Healthy Weight, Improve Cholesterol, Improve Blood Pressure, Improve Exercise Tolerance Due for: Lab Panel/Analytes:  Lipid Panel         Diabetes Screening/Counseling:   (2 tests/year if Pre-Diabetes or 1 test/year if no Diabetes)   General:  Risks and Benefits Discussed Counseling:  Healthy Diet, Healthy Weight, Improve Physical Activity Due for: Labs:  Blood Glucose         Colorectal Cancer Screening/Counseling:   (FOBT Q1 yr; Flex Sig Q4 yrs or Q10 yrs after Screening Colonoscopy; Screening Colonoscpy Q2 yrs High Risk or Q10 yrs Low Risk; Barium Enema Q2 yrs High Risk or Q4 yrs Low Risk)   General:  Risks and Benefits Discussed Counseling:  high fiber diet          Prostate Cancer Screening/Counseling:   (Annual)    General:  Risks and Benefits Discussed          Breast Cancer Screening/Counseling:   (Baseline Age 28 - 43; Annual Age 36+)         Cervical Cancer Screening/Counseling:   (Annual for High Risk or Childbearing Age with Abnormal Pap in Last 3 yrs;  Every 2 all others)         Osteoporosis Screening/Counseling:   (Every 2 Yrs if at risk or more if medically necessary)   General:  Risks and Benefits Discussed Counseling:  Calcium and Vitamin D Intake, Regular Weightbearing Exercise          AAA Screening/Counseling:   (Once per Lifetime with risk factors)     Age over 72 (males only):  Yes    General:  Risks and Benefits Discussed           Glaucoma Screening/Counseling:   (Annual)   General:  Risks and Benefits Discussed, Screening Current          HIV Screening/Counseling: (Voluntary; Once annually for high risk OR 3 times for Pregnancy at diagnosis of IUP; 3rd trimester; and at Labor   General:  Screening Not Indicated           Hepatitis C Screening:             Immunizations:   Influenza (annual):  Risks & Benefits Discussed, Influenza Recommended Annually   Pneumococcal (Once in a Lifetime):  Risks & Benefits Discussed, Lifetime Vaccine Completed   Zostavax (Medicare D Coverage, Pt >66 yo):  Zostavax Vaccine UTD   TD (Non-Medicare Wellness  Visit required):  Risks & Benefits Discussed, Td Vaccine UTD   Tdap (Non-Medicare Wellness Visit required): Tdap Vaccine UTD       Other Preventative Couseling (Non-Medicare Wellness Visit Required):   fall prevention education provided, car/seat belt/driving safety reviewed, Skin self-exam counseling given, Increased physical activity counseling given       Referrals (Non-Medicare Wellness Visit Required):       Medical Equipment/Suppliers:   glucose monitor           No exam data present  Reviewed Updated [facility] Prior Wellness Visits:   Last Medicare wellness visit information was reviewed, patient interviewed , no change since last AWVyes  Last Medicare wellness visit information was reviewed, patient interviewed and updates made to the record today yes    Assessment and Plan:  1  Medicare annual wellness visit, subsequent     2  Diabetes 1 5, managed as type 2 ([facility] )  Diabetic foot exam    POCT hemoglobin A1c    POCT urine dip auto non-scope    enalapril (VASOTEC) 5 mg tablet    CBC and Platelet    Comprehensive metabolic panel   3  Type 2 diabetes mellitus with diabetic polyneuropathy, with long-term current use of insulin ([facility])  Insulin Pen Needle (B-D UF III MINI PEN NEEDLES) 31G X 5 MM MISC   4  Essential hypertension  enalapril (VASOTEC) 5 mg tablet    CBC and Platelet   5   Hyperlipidemia, unspecified hyperlipidemia type  Lipid panel    TSH, 3rd generation    CBC and Platelet    Comprehensive metabolic panel    Amylase    Lipase 6  Hematuria, unspecified type      hx bladder stones/rupture  Urology w/u in chart  7  Gait difficulty      form for handicap placard xompleted during visit         Health Maintenance Due   Topic Date Due    CRC Screening: Colonoscopy  1945    ABDOMINAL AORTIC ANEURYSM (AAA) SCREEN  07/20/2008    GLAUCOMA SCREENING 72 + YR  07/20/2010    Diabetic Foot Exam  03/24/2018

## 2018-07-09 NOTE — PROGRESS NOTES
Assessment/Plan:     Diagnoses and all orders for this visit:    Diabetes 1 5, managed as type 2 ([facility] )  -     Diabetic foot exam; Future  -     POCT hemoglobin A1c  -     POCT urine dip auto non-scope  -     enalapril (VASOTEC) 5 mg tablet; Take 1 tablet (5 mg total) by mouth daily  -     CBC and Platelet; Future  -     Comprehensive metabolic panel; Future    Type 2 diabetes mellitus with diabetic polyneuropathy, with long-term current use of insulin ([facility])  -     Insulin Pen Needle (B-D UF III MINI PEN NEEDLES) 31G X 5 MM MISC; Use twice daily with insulin pen as directed    Essential hypertension  -     enalapril (VASOTEC) 5 mg tablet; Take 1 tablet (5 mg total) by mouth daily  -     CBC and Platelet; Future    Hyperlipidemia, unspecified hyperlipidemia type  -     Lipid panel; Future  -     TSH, 3rd generation; Future  -     CBC and Platelet; Future  -     Comprehensive metabolic panel; Future  -     Amylase; Future  -     Lipase; Future    Other orders  -     latanoprost (XALATAN) 0 005 % ophthalmic solution;             Subjective:      Patient ID: Buddy Lacey is a 74 y o  male  Chief Complaint   Patient presents with    Diabetes     need handicap placard  Medicare Wellness Visit       78-year-old patient in for follow-up on diabetes as well as for blood pressure and cholesterol check  Hemoglobin A1c equal to 6 9% in office today, urine shows positive blood which patient had before  Has seen urologist for history of bladder stones and rupture  Denies any dysuria or difficulty with urination  No change in bowel  Appetite good  Due for other labs -is fasting today and will have drawn after visit  BP within normal   Requesting completion of handicap placard form which he brought into office today  Diabetes   Associated symptoms include fatigue and weakness         The following portions of the patient's history were reviewed and updated as appropriate: allergies, current medications, past family history, past medical history, past social history, past surgical history and problem list      Review of Systems   Constitutional: Positive for fatigue  Negative for fever  Respiratory: Negative  Cardiovascular: Negative  Gastrointestinal: Negative  Endocrine:        DM   Musculoskeletal: Positive for arthralgias and myalgias  Skin: Negative for rash  Allergic/Immunologic: Positive for environmental allergies  Neurological: Positive for weakness  Psychiatric/Behavioral: Positive for sleep disturbance  Objective:    /60 (BP Location: Left arm, Patient Position: Sitting, Cuff Size: Standard)   Pulse 88   Temp 98 °F (36 7 °C)   Resp 14   Ht 5' 7 5" (1 715 m)   Wt 92 2 kg (203 lb 3 2 oz)   BMI 31 36 kg/m²        Physical Exam   Constitutional: He is oriented to person, place, and time  Overweight, chronically ill   HENT:   Mouth/Throat: No oropharyngeal exudate  Cardiovascular: Normal rate and regular rhythm  Pulmonary/Chest: Effort normal and breath sounds normal    Abdominal: Soft  Bowel sounds are normal  There is no tenderness  Musculoskeletal: He exhibits tenderness  Neurological: He is alert and oriented to person, place, and time  No cranial nerve deficit  Psychiatric: He has a normal mood and affect  Nursing note and vitals reviewed  Labs;  Labs in chart were reviewed        Renzo Freeman MD

## 2018-11-14 ENCOUNTER — APPOINTMENT (OUTPATIENT)
Dept: LAB | Facility: CLINIC | Age: 73
End: 2018-11-14
Payer: MEDICARE

## 2018-11-14 ENCOUNTER — TRANSCRIBE ORDERS (OUTPATIENT)
Dept: LAB | Facility: CLINIC | Age: 73
End: 2018-11-14

## 2018-11-14 DIAGNOSIS — R97.20 ELEVATED PROSTATE SPECIFIC ANTIGEN (PSA): Primary | ICD-10-CM

## 2018-11-14 LAB — PSA SERPL-MCNC: 1.7 NG/ML (ref 0–4)

## 2018-11-14 PROCEDURE — 84153 ASSAY OF PSA TOTAL: CPT

## 2018-12-08 DIAGNOSIS — E78.5 HYPERLIPIDEMIA, UNSPECIFIED HYPERLIPIDEMIA TYPE: Primary | ICD-10-CM

## 2018-12-08 RX ORDER — SIMVASTATIN 40 MG
TABLET ORAL
Qty: 90 TABLET | Refills: 3 | Status: SHIPPED | OUTPATIENT
Start: 2018-12-08 | End: 2019-12-12 | Stop reason: SDUPTHER

## 2018-12-17 ENCOUNTER — OFFICE VISIT (OUTPATIENT)
Dept: FAMILY MEDICINE CLINIC | Facility: CLINIC | Age: 73
End: 2018-12-17
Payer: MEDICARE

## 2018-12-17 VITALS
HEIGHT: 68 IN | TEMPERATURE: 97.9 F | HEART RATE: 64 BPM | RESPIRATION RATE: 16 BRPM | SYSTOLIC BLOOD PRESSURE: 124 MMHG | WEIGHT: 205.2 LBS | BODY MASS INDEX: 31.1 KG/M2 | DIASTOLIC BLOOD PRESSURE: 60 MMHG

## 2018-12-17 DIAGNOSIS — Z79.4 TYPE 2 DIABETES MELLITUS WITHOUT COMPLICATION, WITH LONG-TERM CURRENT USE OF INSULIN (HCC): Primary | ICD-10-CM

## 2018-12-17 DIAGNOSIS — Z23 NEED FOR INFLUENZA VACCINATION: ICD-10-CM

## 2018-12-17 DIAGNOSIS — E11.9 TYPE 2 DIABETES MELLITUS WITHOUT COMPLICATION, WITH LONG-TERM CURRENT USE OF INSULIN (HCC): Primary | ICD-10-CM

## 2018-12-17 DIAGNOSIS — I10 BENIGN ESSENTIAL HYPERTENSION: ICD-10-CM

## 2018-12-17 DIAGNOSIS — E78.5 HYPERLIPIDEMIA, UNSPECIFIED HYPERLIPIDEMIA TYPE: ICD-10-CM

## 2018-12-17 LAB
SL AMB  POCT GLUCOSE, UA: ABNORMAL
SL AMB LEUKOCYTE ESTERASE,UA: ABNORMAL
SL AMB POCT BILIRUBIN,UA: ABNORMAL
SL AMB POCT BLOOD,UA: 50
SL AMB POCT CLARITY,UA: ABNORMAL
SL AMB POCT COLOR,UA: YELLOW
SL AMB POCT HEMOGLOBIN AIC: 7.2
SL AMB POCT KETONES,UA: ABNORMAL
SL AMB POCT NITRITE,UA: ABNORMAL
SL AMB POCT PH,UA: 5
SL AMB POCT SPECIFIC GRAVITY,UA: 1.02
SL AMB POCT URINE PROTEIN: ABNORMAL
SL AMB POCT UROBILINOGEN: ABNORMAL

## 2018-12-17 PROCEDURE — 90662 IIV NO PRSV INCREASED AG IM: CPT

## 2018-12-17 PROCEDURE — G0008 ADMIN INFLUENZA VIRUS VAC: HCPCS | Performed by: FAMILY MEDICINE

## 2018-12-17 PROCEDURE — 83036 HEMOGLOBIN GLYCOSYLATED A1C: CPT | Performed by: FAMILY MEDICINE

## 2018-12-17 PROCEDURE — 81003 URINALYSIS AUTO W/O SCOPE: CPT | Performed by: FAMILY MEDICINE

## 2018-12-17 PROCEDURE — 99214 OFFICE O/P EST MOD 30 MIN: CPT | Performed by: FAMILY MEDICINE

## 2018-12-17 RX ORDER — INSULIN LISPRO 100 [IU]/ML
INJECTION, SUSPENSION SUBCUTANEOUS
Qty: 90 ML | Refills: 3 | Status: SHIPPED | OUTPATIENT
Start: 2018-12-17 | End: 2019-04-22 | Stop reason: SDUPTHER

## 2019-01-06 NOTE — PROGRESS NOTES
Assessment/Plan:   Diagnoses and all orders for this visit:    Type 2 diabetes mellitus without complication, with long-term current use of insulin ([facility])  Comments:  controleed  last [id number]=8 9%  cont t monitor on current meds  Orders:  -     POCT urine dip auto non-scope  -     POCT hemoglobin A1c  -     Diabetic foot exam; Future  -     Insulin Lispro Prot & Lispro (HUMALOG MIX 75/25 KWIKPEN) (75-25) 100 units/mL injection pen; Inject SC 40 units in the morning and 50 units in the evening  -     HEMOGLOBIN A1C W/ EAG ESTIMATION; Future  -     Comprehensive metabolic panel; Future  -     CBC and Platelet; Future    Need for influenza vaccination  Comments:  hi dose flu vaccine given  Orders:  -     Cancel: SYRINGE/SINGLE-DOSE VIAL: influenza vaccine, [id number], quadrivalent, 0 5 mL, preservative-free (AFLURIA, FLUARIX, FLULAVAL, FLUZONE)  -     Cancel: SYRINGE/SINGLE-DOSE VIAL: influenza vaccine, [id number], quadrivalent, 0 5 mL, preservative-free (AFLURIA, FLUARIX, FLULAVAL, FLUZONE)  -     PREFERRED: influenza vaccine, [id number], high-dose, PF 0 5 mL, for patients 65 yr+ (FLUZONE HIGH-DOSE)    Hyperlipidemia, unspecified hyperlipidemia type  Comments:  cont statin, cont low chol diet and periodic checks of lipid profile and thyroid fxn  Orders:  -     HEMOGLOBIN A1C W/ EAG ESTIMATION; Future  -     Comprehensive metabolic panel; Future  -     CBC and Platelet; Future  -     TSH, 3rd generation with Free T4 reflex; Future  -     Lipid panel; Future  -     Amylase; Future  -     Lipase; Future    Benign essential hypertension  Comments:  BP controlled on low dose ACE Inhibitor  Subjective:      Patient ID: Hay Connors is a 74 y o  male  Chief Complaint   Patient presents with    Diabetes    Immunizations     flu    Hypertension    Hyperlipidemia       Diabetes   He presents for his follow-up diabetic visit  He has type 2 diabetes mellitus  His disease course has been stable   Associated symptoms include fatigue  Symptoms are stable  There are no diabetic complications  Risk factors for coronary artery disease include diabetes mellitus, dyslipidemia, family history, male sex, obesity, sedentary lifestyle, stress and hypertension  Current diabetic treatment includes diet and insulin injections  He is compliant with treatment most of the time  He is following a diabetic and low fat/cholesterol diet  He participates in exercise intermittently  An ACE inhibitor/angiotensin II receptor blocker is being taken  He sees a podiatrist Eye exam is current  Hypertension   This is a chronic problem  The current episode started more than 1 year ago  The problem is controlled  Associated symptoms include anxiety and malaise/fatigue  Risk factors for coronary artery disease include diabetes mellitus, dyslipidemia, male gender and sedentary lifestyle  Past treatments include ACE inhibitors  The current treatment provides moderate improvement  Compliance problems include exercise and psychosocial issues  Hyperlipidemia   This is a chronic problem  The current episode started more than 1 year ago  The problem is controlled  Recent lipid tests were reviewed and are variable  Exacerbating diseases include diabetes  Current antihyperlipidemic treatment includes statins  The current treatment provides moderate improvement of lipids  Compliance problems include adherence to exercise and psychosocial issues  Risk factors for coronary artery disease include diabetes mellitus, dyslipidemia, family history, hypertension, male sex and a sedentary lifestyle  The following portions of the patient's history were reviewed and updated as appropriate: allergies, current medications, past family history, past medical history, past social history, past surgical history and problem list      Review of Systems   Constitutional: Positive for fatigue and malaise/fatigue  Respiratory: Negative  Cardiovascular: Negative  Gastrointestinal:        Occ GERD   Endocrine:        DM   Musculoskeletal: Positive for arthralgias  Skin: Positive for rash  Neurological: Negative  Psychiatric/Behavioral: Positive for sleep disturbance  Objective:    /60 (BP Location: Left arm, Patient Position: Sitting, Cuff Size: Large)   Pulse 64   Temp 97 9 °F (36 6 °C)   Resp 16   Ht 5' 7 5" (1 715 m)   Wt 93 1 kg (205 lb 3 2 oz)   BMI 31 66 kg/m²        Physical Exam   Constitutional: He is oriented to person, place, and time  OW, NAD   HENT:   Mouth/Throat: No oropharyngeal exudate  Eyes: Conjunctivae are normal  No scleral icterus  Neck: Neck supple  Cardiovascular: Normal rate, regular rhythm and intact distal pulses  Pulses are no weak pulses  Pulses:       Dorsalis pedis pulses are 2+ on the right side, and 2+ on the left side  Posterior tibial pulses are 2+ on the right side, and 2+ on the left side  Pulmonary/Chest: Effort normal and breath sounds normal  No respiratory distress  Abdominal: Soft  Bowel sounds are normal  There is no tenderness  Musculoskeletal: He exhibits tenderness  Feet:   Right Foot:   Skin Integrity: Positive for callus and dry skin  Negative for ulcer, skin breakdown, erythema or warmth  Left Foot:   Skin Integrity: Positive for callus and dry skin  Negative for ulcer, skin breakdown, erythema or warmth  Neurological: He is alert and oriented to person, place, and time  No cranial nerve deficit  Skin: Skin is warm and dry  Psychiatric: He has a normal mood and affect  Nursing note and vitals reviewed      Right Foot/Ankle   Right Foot Inspection  Skin Exam: skin normal, skin intact, dry skin, callus and callus no warmth, no erythema, no maceration, no abnormal color, no pre-ulcer and no ulcer                          Toe Exam: ROM and strength within normal limits  Sensory       Monofilament testing: intact  Vascular  Capillary refills: < 3 seconds  The right DP pulse is 2+  The right PT pulse is 2+  Left Foot/Ankle  Left Foot Inspection  Skin Exam: skin normal, skin intact, dry skin and callusno warmth, no erythema, no maceration, normal color, no pre-ulcer and no ulcer                         Toe Exam: ROM and strength within normal limits                   Sensory       Monofilament: intact  Vascular  Capillary refills: < 3 seconds  The left DP pulse is 2+  The left PT pulse is 2+  Assign Risk Category:  No deformity present; No loss of protective sensation;  No weak pulses       Risk: 0      Oscar Stahl MD

## 2019-02-16 DIAGNOSIS — N40.0 BENIGN PROSTATIC HYPERPLASIA, UNSPECIFIED WHETHER LOWER URINARY TRACT SYMPTOMS PRESENT: Primary | ICD-10-CM

## 2019-02-16 RX ORDER — TAMSULOSIN HYDROCHLORIDE 0.4 MG/1
CAPSULE ORAL
Qty: 180 CAPSULE | Refills: 1 | Status: ON HOLD | OUTPATIENT
Start: 2019-02-16 | End: 2022-08-02 | Stop reason: CLARIF

## 2019-04-09 ENCOUNTER — APPOINTMENT (OUTPATIENT)
Dept: LAB | Facility: CLINIC | Age: 74
End: 2019-04-09
Payer: MEDICARE

## 2019-04-09 ENCOUNTER — TRANSCRIBE ORDERS (OUTPATIENT)
Dept: LAB | Facility: CLINIC | Age: 74
End: 2019-04-09

## 2019-04-09 DIAGNOSIS — Z79.4 TYPE 2 DIABETES MELLITUS WITHOUT COMPLICATION, WITH LONG-TERM CURRENT USE OF INSULIN (HCC): ICD-10-CM

## 2019-04-09 DIAGNOSIS — E78.5 HYPERLIPIDEMIA, UNSPECIFIED HYPERLIPIDEMIA TYPE: ICD-10-CM

## 2019-04-09 DIAGNOSIS — E11.9 TYPE 2 DIABETES MELLITUS WITHOUT COMPLICATION, WITH LONG-TERM CURRENT USE OF INSULIN (HCC): ICD-10-CM

## 2019-04-09 LAB
ALBUMIN SERPL BCP-MCNC: 3.7 G/DL (ref 3.5–5)
ALP SERPL-CCNC: 77 U/L (ref 46–116)
ALT SERPL W P-5'-P-CCNC: 29 U/L (ref 12–78)
AMYLASE SERPL-CCNC: 24 IU/L (ref 25–115)
ANION GAP SERPL CALCULATED.3IONS-SCNC: 6 MMOL/L (ref 4–13)
AST SERPL W P-5'-P-CCNC: 20 U/L (ref 5–45)
BILIRUB SERPL-MCNC: 0.68 MG/DL (ref 0.2–1)
BUN SERPL-MCNC: 15 MG/DL (ref 5–25)
CALCIUM SERPL-MCNC: 8 MG/DL (ref 8.3–10.1)
CHLORIDE SERPL-SCNC: 111 MMOL/L (ref 100–108)
CHOLEST SERPL-MCNC: 134 MG/DL (ref 50–200)
CO2 SERPL-SCNC: 23 MMOL/L (ref 21–32)
CREAT SERPL-MCNC: 1.09 MG/DL (ref 0.6–1.3)
ERYTHROCYTE [DISTWIDTH] IN BLOOD BY AUTOMATED COUNT: 12.7 % (ref 11.6–15.1)
EST. AVERAGE GLUCOSE BLD GHB EST-MCNC: 148 MG/DL
GFR SERPL CREATININE-BSD FRML MDRD: 66 ML/MIN/1.73SQ M
GLUCOSE P FAST SERPL-MCNC: 136 MG/DL (ref 65–99)
HBA1C MFR BLD: 6.8 % (ref 4.2–6.3)
HCT VFR BLD AUTO: 43.7 % (ref 36.5–49.3)
HDLC SERPL-MCNC: 39 MG/DL (ref 40–60)
HGB BLD-MCNC: 14.7 G/DL (ref 12–17)
LDLC SERPL CALC-MCNC: 85 MG/DL (ref 0–100)
MCH RBC QN AUTO: 30.3 PG (ref 26.8–34.3)
MCHC RBC AUTO-ENTMCNC: 33.6 G/DL (ref 31.4–37.4)
MCV RBC AUTO: 90 FL (ref 82–98)
NONHDLC SERPL-MCNC: 95 MG/DL
PLATELET # BLD AUTO: 223 THOUSANDS/UL (ref 149–390)
PMV BLD AUTO: 9.2 FL (ref 8.9–12.7)
POTASSIUM SERPL-SCNC: 4.1 MMOL/L (ref 3.5–5.3)
PROT SERPL-MCNC: 7.1 G/DL (ref 6.4–8.2)
RBC # BLD AUTO: 4.85 MILLION/UL (ref 3.88–5.62)
SODIUM SERPL-SCNC: 140 MMOL/L (ref 136–145)
TRIGL SERPL-MCNC: 48 MG/DL
TSH SERPL DL<=0.05 MIU/L-ACNC: 1.79 UIU/ML (ref 0.36–3.74)
WBC # BLD AUTO: 5.22 THOUSAND/UL (ref 4.31–10.16)

## 2019-04-09 PROCEDURE — 84443 ASSAY THYROID STIM HORMONE: CPT

## 2019-04-09 PROCEDURE — 80053 COMPREHEN METABOLIC PANEL: CPT

## 2019-04-09 PROCEDURE — 85027 COMPLETE CBC AUTOMATED: CPT

## 2019-04-09 PROCEDURE — 82150 ASSAY OF AMYLASE: CPT

## 2019-04-09 PROCEDURE — 80061 LIPID PANEL: CPT

## 2019-04-09 PROCEDURE — 83036 HEMOGLOBIN GLYCOSYLATED A1C: CPT

## 2019-04-09 PROCEDURE — 36415 COLL VENOUS BLD VENIPUNCTURE: CPT

## 2019-04-15 LAB — SEVERITY (EYE EXAM): NORMAL

## 2019-04-22 ENCOUNTER — OFFICE VISIT (OUTPATIENT)
Dept: FAMILY MEDICINE CLINIC | Facility: CLINIC | Age: 74
End: 2019-04-22
Payer: MEDICARE

## 2019-04-22 VITALS
BODY MASS INDEX: 30.98 KG/M2 | HEIGHT: 68 IN | RESPIRATION RATE: 12 BRPM | TEMPERATURE: 98.5 F | DIASTOLIC BLOOD PRESSURE: 58 MMHG | SYSTOLIC BLOOD PRESSURE: 120 MMHG | HEART RATE: 76 BPM | WEIGHT: 204.4 LBS

## 2019-04-22 DIAGNOSIS — Z79.4 TYPE 2 DIABETES MELLITUS WITH HYPERGLYCEMIA, WITH LONG-TERM CURRENT USE OF INSULIN (HCC): Primary | ICD-10-CM

## 2019-04-22 DIAGNOSIS — I10 BENIGN ESSENTIAL HYPERTENSION: ICD-10-CM

## 2019-04-22 DIAGNOSIS — E11.65 TYPE 2 DIABETES MELLITUS WITH HYPERGLYCEMIA, WITH LONG-TERM CURRENT USE OF INSULIN (HCC): Primary | ICD-10-CM

## 2019-04-22 DIAGNOSIS — E78.01 FAMILIAL HYPERCHOLESTEROLEMIA: ICD-10-CM

## 2019-04-22 LAB
SL AMB  POCT GLUCOSE, UA: NORMAL
SL AMB LEUKOCYTE ESTERASE,UA: NORMAL
SL AMB POCT BILIRUBIN,UA: NORMAL
SL AMB POCT BLOOD,UA: NORMAL
SL AMB POCT CLARITY,UA: CLEAR
SL AMB POCT COLOR,UA: YELLOW
SL AMB POCT KETONES,UA: NORMAL
SL AMB POCT NITRITE,UA: NORMAL
SL AMB POCT PH,UA: 6
SL AMB POCT SPECIFIC GRAVITY,UA: 1
SL AMB POCT URINE PROTEIN: NORMAL
SL AMB POCT UROBILINOGEN: NORMAL

## 2019-04-22 PROCEDURE — 99214 OFFICE O/P EST MOD 30 MIN: CPT | Performed by: FAMILY MEDICINE

## 2019-04-22 PROCEDURE — 81003 URINALYSIS AUTO W/O SCOPE: CPT | Performed by: FAMILY MEDICINE

## 2019-04-22 RX ORDER — INSULIN LISPRO 100 [IU]/ML
INJECTION, SUSPENSION SUBCUTANEOUS
Qty: 90 ML | Refills: 3 | Status: SHIPPED | OUTPATIENT
Start: 2019-04-22 | End: 2019-08-05 | Stop reason: SDUPTHER

## 2019-04-23 LAB
ALBUMIN/CREAT UR: <8.4 MG/G CREAT (ref 0–30)
CREAT UR-MCNC: 35.9 MG/DL
MICROALBUMIN UR-MCNC: <3 UG/ML

## 2019-04-25 DIAGNOSIS — I10 ESSENTIAL HYPERTENSION: ICD-10-CM

## 2019-04-25 DIAGNOSIS — E13.9 DIABETES 1.5, MANAGED AS TYPE 2 (HCC): ICD-10-CM

## 2019-04-25 RX ORDER — ENALAPRIL MALEATE 5 MG/1
TABLET ORAL
Qty: 90 TABLET | Refills: 3 | Status: SHIPPED | OUTPATIENT
Start: 2019-04-25 | End: 2020-01-03 | Stop reason: SDUPTHER

## 2019-07-15 NOTE — PROGRESS NOTES
Chief Complaint   Patient presents with    Diabetes    Results     bw        Patient ID: Liberty Castro is a 74 y o  male  73 yo pt in for follow-up on chronic conditions  BP wnl  UA wnl in office today  Labs overall good except low end HDL  YsG0J=5 8%  Eye and foot care UTD  No acute c/o  Immun  UTD  No acute c/o  Past Medical History:   Diagnosis Date    Anesthesia complication 8146    after colonoscopy , pt was awake but could not control his body and kept falling     Arthritis     Bladder calculi     BPH (benign prostatic hyperplasia)     Diabetes mellitus ([address] )     Hearing disorder of both ears     wears bilateral hearing aids    Hyperlipidemia     controlled and maintained d/t diabetes    Hypertension     Kidney stones     Last Assessed:4/3/2017    Lyme disease     Last Assessed:6/29/2015    Rupture, bladder, spontaneous     Last Assessed:4/3/2017       Past Surgical History:   Procedure Laterality Date    BLADDER REPAIR N/A 12/10/2016    Procedure: REPAIR BLADDER/cysto insertion stent;  Surgeon: Cara Vanessa MD;  Location: [address] [place] [address];  Service:     COLONOSCOPY      CYSTOLITHOTOMY      ANESTHESIA   lower abdomen  ;  Last Assessed:4/3/2017    OTHER SURGICAL HISTORY      thermal dilation of the prostate x 2 ( in the office)   [address] RESECTION OF PROSTATE N/A 12/8/2016    Procedure:  Cysto, Laser Bladder stone,fulgaration of prostates tissue ;  Surgeon: Cara Vanessa MD;  Location: [facility];  Service:        Patient Active Problem List   Diagnosis    Bladder stones    Hematuria    Benign colon polyp    Benign essential hypertension    Benign prostatic hyperplasia with lower urinary tract symptoms    Hyperlipidemia    Long-term insulin use in type 2 diabetes ([address] )    Vitamin D deficiency       Family History   Problem Relation Age of Onset    Cancer Mother         breast    Diabetes Mother     Cancer Father         prostate w/ bone mets    Prostate cancer Father     Alzheimer's disease Sister        Immunization History   Administered Date(s) Administered    Influenza Split High Dose Preservative Free IM 10/11/2013, 09/23/2016, 11/01/2017    Influenza TIV (IM) 11/17/2000, 11/16/2001, 12/06/2002, 10/30/2003, 10/18/2005, 10/20/2006, 10/17/2007, 10/28/2008, 09/15/2009, 09/11/2010, 09/19/2011, 09/26/2012    Influenza, high dose seasonal 0 5 mL 12/17/2018    Pneumococcal Conjugate 13-Valent 08/05/2015    Pneumococcal Polysaccharide PPV23 11/16/2001, 09/25/2012    Tdap 02/14/2018    Zoster 09/04/2015       Allergies   Allergen Reactions    Amoxicillin Rash       Current Outpatient Medications   Medication Sig Dispense Refill    Insulin Lispro Prot & Lispro (HUMALOG MIX 75/25 KWIKPEN) (75-25) 100 units/mL injection pen Inject SC 40 units in the morning and 50 units in the evening 90 mL 3    Insulin Pen Needle (B-D UF III MINI PEN NEEDLES) 31G X 5 MM MISC Use twice daily with insulin pen as directed 180 each 3    latanoprost (XALATAN) 0 005 % ophthalmic solution       simvastatin (ZOCOR) 40 mg tablet TAKE 1 TABLET BY MOUTH AT  BEDTIME 90 tablet 3    tamsulosin (FLOMAX) 0 4 mg TAKE 1 CAPSULE BY MOUTH TWO TIMES DAILY 180 capsule 1    Vitamin D, Cholecalciferol, 1000 UNITS CAPS Take by mouth 2 times a week      enalapril (VASOTEC) 5 mg tablet TAKE 1 TABLET BY MOUTH  DAILY 90 tablet 3    glucose blood test strip Use as instructed 1-2x daily 100 each 6    pantoprazole (PROTONIX) 40 mg tablet Take 1 tablet by mouth 2 (two) times a day before meals for 30 days 60 tablet 0     No current facility-administered medications for this visit          Social History     Socioeconomic History    Marital status: /Civil Union     Spouse name: None    Number of children: None    Years of education: None    Highest education level: None   Occupational History    None   Social Needs    Financial resource strain: None    Food insecurity: Worry: None     Inability: None    Transportation needs:     Medical: None     Non-medical: None   Tobacco Use    Smoking status: Former Smoker     Types: Cigars     Last attempt to quit: 1983     Years since quittin 5    Smokeless tobacco: Never Used   Substance and Sexual Activity    Alcohol use: Yes     Comment: occ    Drug use: No    Sexual activity: None   Lifestyle    Physical activity:     Days per week: None     Minutes per session: None    Stress: None   Relationships    Social connections:     Talks on phone: None     Gets together: None     Attends Sabianist service: None     Active member of club or organization: None     Attends meetings of clubs or organizations: None     Relationship status: None    Intimate partner violence:     Fear of current or ex partner: None     Emotionally abused: None     Physically abused: None     Forced sexual activity: None   Other Topics Concern    None   Social History Narrative    None       Review of Systems   Constitutional: Positive for fatigue  Negative for fever  Respiratory: Negative  Cardiovascular: Negative  Endocrine:        DM   Musculoskeletal: Positive for arthralgias  Objective:    /58 (BP Location: Left arm, Patient Position: Sitting, Cuff Size: Large)   Pulse 76   Temp 98 5 °F (36 9 °C)   Resp 12   Ht 5' 7 5" (1 715 m)   Wt 92 7 kg (204 lb 6 4 oz)   BMI 31 54 kg/m²        Physical Exam   Constitutional: He is oriented to person, place, and time  OW, NAD   Eyes: Conjunctivae are normal  No scleral icterus  Neck: Neck supple  Cardiovascular: Normal rate and regular rhythm  Pulmonary/Chest: Effort normal and breath sounds normal  No respiratory distress  Abdominal: Soft  Bowel sounds are normal  There is no tenderness  Neurological: He is alert and oriented to person, place, and time  No cranial nerve deficit  Skin: Skin is warm and dry  Psychiatric: He has a normal mood and affect     Nursing note and vitals reviewed  Assessment/Plan:     Diagnoses and all orders for this visit:    Type 2 diabetes mellitus with hyperglycemia, with long-term current use of insulin ([facility] )  Comments:  WwG8A=2 8%-cont current mgt  Yearly eye exams, reg  foot care  Orders:  -     Cancel: Microalbumin / creatinine urine ratio  -     Microalbumin / creatinine urine ratio  -     POCT urine dip auto non-scope  -     Insulin Pen Needle (B-D UF III MINI PEN NEEDLES) 31G X 5 MM MISC; Use twice daily with insulin pen as directed  -     Insulin Lispro Prot & Lispro (HUMALOG MIX 75/25 KWIKPEN) (75-25) 100 units/mL injection pen; Inject SC 40 units in the morning and 50 units in the evening  -     glucose blood test strip; Use as instructed 1-2x daily  -     Lancets    Familial hypercholesterolemia  Comments:  cont  sttin and low chol diet  Benign essential hypertension  Comments:  BP wnl-cont  same  avoid nsaid use, limit salt in diet                Rudy Hurt MD

## 2019-07-18 ENCOUNTER — OFFICE VISIT (OUTPATIENT)
Dept: FAMILY MEDICINE CLINIC | Facility: CLINIC | Age: 74
End: 2019-07-18
Payer: MEDICARE

## 2019-07-18 VITALS
SYSTOLIC BLOOD PRESSURE: 150 MMHG | WEIGHT: 200 LBS | HEIGHT: 68 IN | DIASTOLIC BLOOD PRESSURE: 60 MMHG | HEART RATE: 94 BPM | RESPIRATION RATE: 12 BRPM | BODY MASS INDEX: 30.31 KG/M2 | TEMPERATURE: 99.5 F

## 2019-07-18 DIAGNOSIS — J01.00 ACUTE NON-RECURRENT MAXILLARY SINUSITIS: Primary | ICD-10-CM

## 2019-07-18 PROCEDURE — 99213 OFFICE O/P EST LOW 20 MIN: CPT | Performed by: NURSE PRACTITIONER

## 2019-07-18 RX ORDER — CIPROFLOXACIN 500 MG/1
500 TABLET, FILM COATED ORAL EVERY 12 HOURS SCHEDULED
Qty: 14 TABLET | Refills: 0 | Status: SHIPPED | OUTPATIENT
Start: 2019-07-18 | End: 2019-07-25

## 2019-07-18 NOTE — PROGRESS NOTES
Assessment:      Acute left maxillary sinusitis      Plan:     The case discussed with patient using patient centered shared decision making  The patient was counseled regarding instructions for management,-- risk factor reductions,-- prognosis,-- impressions,-- risks and benefits of treatment options,-- importance of compliance with treatment  I have reviewed the instructions with the patient, answering all questions to his satisfaction  1  flonase, saline, claritan  2  cipro  3  Nasal saline rinses as needed for congestion  4  Follow-up  in 1 week if symptoms worsen or persist       Subjective:       Karen Jara is a 74 y o  male who presents for evaluation of possible sinus infection  Symptoms include left ear pressure/pain, achiness, facial pain, low grade fever, nasal congestion, sinus pressure and tooth pain  Onset of symptoms was 1 week ago, gradually worsening since that time  He is not drinking much  Past history is significant for history of recent sinusitis  Patient is a non-smoker  Denies sick contacts  Not using his saline or flonase  The following portions of the patient's history were reviewed and updated as appropriate: allergies, current medications, past family history, past medical history, past social history, past surgical history and problem list     Review of Systems  Pertinent items are noted in HPI        Objective:      /60 (BP Location: Left arm, Patient Position: Sitting, Cuff Size: Large)   Pulse 94   Temp 99 5 °F (37 5 °C)   Resp 12   Ht 5' 7 5" (1 715 m)   Wt 90 7 kg (200 lb)   BMI 30 86 kg/m²   General appearance: alert and oriented, in no acute distress  Head: Normocephalic, without obvious abnormality, atraumatic, sinuses tender to percussion  Ears: abnormal TM right ear - dull and abnormal TM left ear - dull  Nose: no discharge, turbinates red, edematous, inflamed  Throat: lips, mucosa, and tongue normal; teeth and gums normal  Lungs: clear to auscultation bilaterally  Heart: regular rate and rhythm, S1, S2 normal, no murmur, click, rub or gallop  Lymph nodes: Cervical, supraclavicular, and axillary nodes normal

## 2019-08-03 ENCOUNTER — OFFICE VISIT (OUTPATIENT)
Dept: URGENT CARE | Facility: CLINIC | Age: 74
End: 2019-08-03
Payer: MEDICARE

## 2019-08-03 VITALS
WEIGHT: 200 LBS | SYSTOLIC BLOOD PRESSURE: 156 MMHG | DIASTOLIC BLOOD PRESSURE: 76 MMHG | HEART RATE: 86 BPM | TEMPERATURE: 99.1 F | HEIGHT: 68 IN | RESPIRATION RATE: 18 BRPM | BODY MASS INDEX: 30.31 KG/M2 | OXYGEN SATURATION: 97 %

## 2019-08-03 DIAGNOSIS — J01.90 ACUTE NON-RECURRENT SINUSITIS, UNSPECIFIED LOCATION: Primary | ICD-10-CM

## 2019-08-03 PROCEDURE — 99213 OFFICE O/P EST LOW 20 MIN: CPT | Performed by: PHYSICIAN ASSISTANT

## 2019-08-03 RX ORDER — BENZONATATE 200 MG/1
200 CAPSULE ORAL 3 TIMES DAILY PRN
Qty: 20 CAPSULE | Refills: 0 | Status: SHIPPED | OUTPATIENT
Start: 2019-08-03 | End: 2019-08-10 | Stop reason: SDUPTHER

## 2019-08-03 RX ORDER — FLUTICASONE PROPIONATE 50 MCG
2 SPRAY, SUSPENSION (ML) NASAL DAILY
Qty: 16 G | Refills: 0 | Status: SHIPPED | OUTPATIENT
Start: 2019-08-03 | End: 2019-08-15 | Stop reason: HOSPADM

## 2019-08-03 RX ORDER — SULFAMETHOXAZOLE AND TRIMETHOPRIM 800; 160 MG/1; MG/1
1 TABLET ORAL EVERY 12 HOURS SCHEDULED
Qty: 20 TABLET | Refills: 0 | Status: SHIPPED | OUTPATIENT
Start: 2019-08-03 | End: 2019-08-13

## 2019-08-03 NOTE — PATIENT INSTRUCTIONS

## 2019-08-03 NOTE — PROGRESS NOTES
[facility]        NAME: Karen Jara is a 66 y o  male  : 1945    MRN: [id number]  DATE: August 10, 2019  TIME: 10:11 AM    Assessment and Plan   Acute non-recurrent sinusitis, unspecified location [J01 90]  1  Acute non-recurrent sinusitis, unspecified location  fluticasone (FLONASE) 50 mcg/act nasal spray    sulfamethoxazole-trimethoprim (BACTRIM DS) 800-160 mg per tablet    DISCONTINUED: benzonatate (TESSALON) 200 MG capsule         Patient Instructions     Discussed condition with pt  He has acute sinusitis which I will treat with combination of Bactrim DS as well as Flonase and Tessalon Perles and recommended hydration, rest, discussed OTC cold meds, and observation  Follow up with PCP in 3-5 days  Proceed to  ER if symptoms worsen  Chief Complaint     Chief Complaint   Patient presents with    Cold Like Symptoms     c/o that his sinus issues has been an on going for the last 2 weeks- clartin that he has been using caused nose bleeds         History of Present Illness       Pt presents with 2 week history of sinus congestion, dry cough, HA, sinus pressure  Denies ST, fever, chills, N/V/D  He has used saline and Claritin  He used to take Flonase which was very helpful  Has allergies but no asthma  Does not smoke  Review of Systems   Review of Systems   Constitutional: Negative  HENT: Positive for congestion and sinus pressure  Negative for sore throat  Respiratory: Positive for cough  Cardiovascular: Negative  Gastrointestinal: Negative  Genitourinary: Negative  Neurological: Positive for headaches           Current Medications       Current Outpatient Medications:     benzonatate (TESSALON) 200 MG capsule, Take 1 capsule (200 mg total) by mouth 3 (three) times a day as needed for cough, Disp: 20 capsule, Rfl: 0    doxycycline monohydrate (MONODOX) 100 mg capsule, Take 1 capsule (100 mg total) by mouth 2 (two) times a day for 10 days Take with food (non-dairy)  , Disp: 20 capsule, Rfl: 0    enalapril (VASOTEC) 5 mg tablet, TAKE 1 TABLET BY MOUTH  DAILY, Disp: 90 tablet, Rfl: 3    fluticasone (FLONASE) 50 mcg/act nasal spray, 2 sprays into each nostril daily, Disp: 16 g, Rfl: 0    glucose blood test strip, Use as instructed 1-2x daily, Disp: 100 each, Rfl: 6    Insulin Lispro Prot & Lispro (HUMALOG MIX 75/25 KWIKPEN) (75-25) 100 units/mL injection pen, Inject SC 40 units in the morning and 50 units in the evening, Disp: 10 pen, Rfl: 2    Insulin Pen Needle (B-D UF III MINI PEN NEEDLES) 31G X 5 MM MISC, Use twice daily with insulin pen as directed, Disp: 180 each, Rfl: 3    latanoprost (XALATAN) 0 005 % ophthalmic solution, , Disp: , Rfl:     pantoprazole (PROTONIX) 40 mg tablet, Take 1 tablet by mouth 2 (two) times a day before meals for 30 days, Disp: 60 tablet, Rfl: 0    predniSONE 10 mg tablet, 6-5-4-3-2-1 taper with food  , Disp: 21 tablet, Rfl: 0    simvastatin (ZOCOR) 40 mg tablet, TAKE 1 TABLET BY MOUTH AT  BEDTIME, Disp: 90 tablet, Rfl: 3    sulfamethoxazole-trimethoprim (BACTRIM DS) 800-160 mg per tablet, Take 1 tablet by mouth every 12 (twelve) hours for 10 days Take with food  , Disp: 20 tablet, Rfl: 0    tamsulosin (FLOMAX) 0 4 mg, TAKE 1 CAPSULE BY MOUTH TWO TIMES DAILY, Disp: 180 capsule, Rfl: 1    Vitamin D, Cholecalciferol, 1000 UNITS CAPS, Take by mouth 2 times a week, Disp: , Rfl:     Current Allergies     Allergies as of 08/03/2019 - Reviewed 08/03/2019   Allergen Reaction Noted    Amoxicillin Rash 10/28/2016            The following portions of the patient's history were reviewed and updated as appropriate: allergies, current medications, past family history, past medical history, past social history, past surgical history and problem list      Past Medical History:   Diagnosis Date    Anesthesia complication 4554    after colonoscopy , pt was awake but could not control his body and kept falling     Arthritis     Bladder calculi     BPH (benign prostatic hyperplasia)     Diabetes mellitus ([address] )     Hearing disorder of both ears     wears bilateral hearing aids    Hyperlipidemia     controlled and maintained d/t diabetes    Hypertension     Kidney stones     Last Assessed:4/3/2017    Lyme disease     Last Assessed:6/29/2015    Rupture, bladder, spontaneous     Last Assessed:4/3/2017       Past Surgical History:   Procedure Laterality Date    BLADDER REPAIR N/A 12/10/2016    Procedure: REPAIR BLADDER/cysto insertion stent;  Surgeon: Gabriel Ball MD;  Location: [address] [place] [address];  Service:     COLONOSCOPY      CYSTOLITHOTOMY      ANESTHESIA   lower abdomen  ; Last Assessed:4/3/2017    OTHER SURGICAL HISTORY      thermal dilation of the prostate x 2 ( in the office)    TONSILLECTOMY      TRANSURETHRAL RESECTION OF PROSTATE N/A 12/8/2016    Procedure:  Cysto, Laser Bladder stone,fulgaration of prostates tissue ;  Surgeon: Gabriel Ball MD;  Location: [facility];  Service:        Family History   Problem Relation Age of Onset    Cancer Mother         breast    Diabetes Mother     Cancer Father         prostate w/ bone mets    Prostate cancer Father     Alzheimer's disease Sister          Medications have been verified  Objective   /76 (BP Location: Left arm, Patient Position: Sitting, Cuff Size: Standard)   Pulse 86   Temp 99 1 °F (37 3 °C) (Oral)   Resp 18   Ht 5' 7 5" (1 715 m)   Wt 90 7 kg (200 lb)   SpO2 97%   BMI 30 86 kg/m²        Physical Exam     Physical Exam   Constitutional: He is oriented to person, place, and time  He appears well-developed and well-nourished  No distress  HENT:   Right Ear: Hearing, tympanic membrane, external ear and ear canal normal    Left Ear: Hearing, tympanic membrane, external ear and ear canal normal    Nose: Mucosal edema (B/L boggy erythematous turbinates) present  Mouth/Throat: Mucous membranes are normal  Posterior oropharyngeal erythema (PND) present   No oropharyngeal exudate  Neck: Neck supple  Cardiovascular: Normal rate, regular rhythm and normal heart sounds  Pulmonary/Chest: Effort normal and breath sounds normal    Lymphadenopathy:     He has no cervical adenopathy  Neurological: He is alert and oriented to person, place, and time  Psychiatric: He has a normal mood and affect  Vitals reviewed

## 2019-08-05 DIAGNOSIS — E11.65 TYPE 2 DIABETES MELLITUS WITH HYPERGLYCEMIA, WITH LONG-TERM CURRENT USE OF INSULIN (HCC): ICD-10-CM

## 2019-08-05 DIAGNOSIS — Z79.4 TYPE 2 DIABETES MELLITUS WITH HYPERGLYCEMIA, WITH LONG-TERM CURRENT USE OF INSULIN (HCC): ICD-10-CM

## 2019-08-05 RX ORDER — INSULIN LISPRO 100 [IU]/ML
INJECTION, SUSPENSION SUBCUTANEOUS
Qty: 10 PEN | Refills: 2 | Status: SHIPPED | OUTPATIENT
Start: 2019-08-05 | End: 2019-08-19 | Stop reason: SDUPTHER

## 2019-08-10 ENCOUNTER — OFFICE VISIT (OUTPATIENT)
Dept: URGENT CARE | Facility: CLINIC | Age: 74
End: 2019-08-10
Payer: MEDICARE

## 2019-08-10 VITALS
HEART RATE: 86 BPM | DIASTOLIC BLOOD PRESSURE: 67 MMHG | TEMPERATURE: 100 F | BODY MASS INDEX: 30.61 KG/M2 | WEIGHT: 195 LBS | RESPIRATION RATE: 16 BRPM | OXYGEN SATURATION: 97 % | SYSTOLIC BLOOD PRESSURE: 141 MMHG | HEIGHT: 67 IN

## 2019-08-10 DIAGNOSIS — J01.90 ACUTE NON-RECURRENT SINUSITIS, UNSPECIFIED LOCATION: Primary | ICD-10-CM

## 2019-08-10 PROCEDURE — 99213 OFFICE O/P EST LOW 20 MIN: CPT | Performed by: PHYSICIAN ASSISTANT

## 2019-08-10 RX ORDER — BENZONATATE 200 MG/1
200 CAPSULE ORAL 3 TIMES DAILY PRN
Qty: 20 CAPSULE | Refills: 0 | Status: SHIPPED | OUTPATIENT
Start: 2019-08-10 | End: 2019-08-31

## 2019-08-10 RX ORDER — DOXYCYCLINE 100 MG/1
100 CAPSULE ORAL 2 TIMES DAILY
Qty: 20 CAPSULE | Refills: 0 | Status: SHIPPED | OUTPATIENT
Start: 2019-08-10 | End: 2019-08-19 | Stop reason: ALTCHOICE

## 2019-08-10 RX ORDER — PREDNISONE 10 MG/1
TABLET ORAL
Qty: 21 TABLET | Refills: 0 | Status: SHIPPED | OUTPATIENT
Start: 2019-08-10 | End: 2019-08-16

## 2019-08-10 NOTE — PROGRESS NOTES
[facility]        NAME: Brayden Gaitan is a 66 y o  male  : 1945    MRN: [id number]  DATE: August 15, 2019  TIME: 9:01 AM    Assessment and Plan   Acute non-recurrent sinusitis, unspecified location [J01 90]  1  Acute non-recurrent sinusitis, unspecified location  doxycycline monohydrate (MONODOX) 100 mg capsule    predniSONE 10 mg tablet    benzonatate (TESSALON) 200 MG capsule         Patient Instructions     Discussed condition with pt  He has persistent sinusitis despite Bactrim DS and symptomatic management  I will switch him to Doxycycline, add an oral Prednisone taper, and refill his Tessalon Perles  To continue with hydration, rest, and conservative management  I instructed him to stop the Flonase due to the nosebleeds  Follow up with PCP in 3-5 days  Proceed to  ER if symptoms worsen  Chief Complaint     Chief Complaint   Patient presents with    Cold Like Symptoms     cough, nasal stuffiness, clogged ears, nose bleed this am         History of Present Illness       Pt presents for reevaluation from previous visit on 8/3/2019  He was diagnosed with acute sinusitis and placed on combination of Bactrim DS, Tessalon Perles, and Flonase  He has been compliant but reports his condition is not improving  Still has congestion, PND, now has worsening cough, and intermittent nosebleeds  His ears also feel clogged  Review of Systems   Review of Systems   Constitutional: Negative  HENT: Positive for congestion, ear pain (Clogged), nosebleeds and postnasal drip  Negative for sore throat  Respiratory: Positive for cough  Cardiovascular: Negative  Gastrointestinal: Negative  Genitourinary: Negative            Current Medications       Current Outpatient Medications:     benzonatate (TESSALON) 200 MG capsule, Take 1 capsule (200 mg total) by mouth 3 (three) times a day as needed for cough, Disp: 20 capsule, Rfl: 0    enalapril (VASOTEC) 5 mg tablet, TAKE 1 TABLET BY MOUTH DAILY, Disp: 90 tablet, Rfl: 3    glucose blood test strip, Use as instructed 1-2x daily, Disp: 100 each, Rfl: 6    Insulin Lispro Prot & Lispro (HUMALOG MIX 75/25 KWIKPEN) (75-25) 100 units/mL injection pen, Inject SC 40 units in the morning and 50 units in the evening, Disp: 10 pen, Rfl: 2    Insulin Pen Needle (B-D UF III MINI PEN NEEDLES) 31G X 5 MM MISC, Use twice daily with insulin pen as directed, Disp: 180 each, Rfl: 3    latanoprost (XALATAN) 0 005 % ophthalmic solution, , Disp: , Rfl:     simvastatin (ZOCOR) 40 mg tablet, TAKE 1 TABLET BY MOUTH AT  BEDTIME, Disp: 90 tablet, Rfl: 3    tamsulosin (FLOMAX) 0 4 mg, TAKE 1 CAPSULE BY MOUTH TWO TIMES DAILY, Disp: 180 capsule, Rfl: 1    Vitamin D, Cholecalciferol, 1000 UNITS CAPS, Take by mouth 2 times a week, Disp: , Rfl:     doxycycline monohydrate (MONODOX) 100 mg capsule, Take 1 capsule (100 mg total) by mouth 2 (two) times a day for 10 days Take with food (non-dairy)  , Disp: 20 capsule, Rfl: 0    pantoprazole (PROTONIX) 40 mg tablet, Take 1 tablet by mouth 2 (two) times a day before meals for 30 days, Disp: 60 tablet, Rfl: 0    predniSONE 10 mg tablet, 6-5-4-3-2-1 taper with food  , Disp: 21 tablet, Rfl: 0    Current Allergies     Allergies as of 08/10/2019 - Reviewed 08/10/2019   Allergen Reaction Noted    Amoxicillin Rash 10/28/2016            The following portions of the patient's history were reviewed and updated as appropriate: allergies, current medications, past family history, past medical history, past social history, past surgical history and problem list      Past Medical History:   Diagnosis Date    Anesthesia complication 6770    after colonoscopy , pt was awake but could not control his body and kept falling     Arthritis     Bladder calculi     BPH (benign prostatic hyperplasia)     Diabetes mellitus ([address] )     Hearing disorder of both ears     wears bilateral hearing aids    Hyperlipidemia     controlled and maintained d/t diabetes    Hypertension     Kidney stones     Last Assessed:4/3/2017    Lyme disease     Last Assessed:6/29/2015    Rupture, bladder, spontaneous     Last Assessed:4/3/2017       Past Surgical History:   Procedure Laterality Date    BLADDER REPAIR N/A 12/10/2016    Procedure: REPAIR BLADDER/cysto insertion stent;  Surgeon: Campos Leonardo MD;  Location: [address] [place] [address];  Service:     COLONOSCOPY      CYSTOLITHOTOMY      ANESTHESIA   lower abdomen  ; Last Assessed:4/3/2017    OTHER SURGICAL HISTORY      thermal dilation of the prostate x 2 ( in the office)    TONSILLECTOMY      TRANSURETHRAL RESECTION OF PROSTATE N/A 12/8/2016    Procedure:  Cysto, Laser Bladder stone,fulgaration of prostates tissue ;  Surgeon: Campos Leonardo MD;  Location: [facility];  Service:        Family History   Problem Relation Age of Onset    Cancer Mother         breast    Diabetes Mother     Cancer Father         prostate w/ bone mets    Prostate cancer Father     Alzheimer's disease Sister          Medications have been verified  Objective   /67   Pulse 86   Temp 100 °F (37 8 °C)   Resp 16   Ht 5' 6 75" (1 695 m)   Wt 88 5 kg (195 lb)   SpO2 97%   BMI 30 77 kg/m²        Physical Exam     Physical Exam   Constitutional: He is oriented to person, place, and time  He appears well-developed and well-nourished  No distress  HENT:   Right Ear: Hearing, external ear and ear canal normal    Left Ear: Hearing, external ear and ear canal normal    Nose: Mucosal edema (B/L boggy turbinates) present  No epistaxis  Mouth/Throat: Mucous membranes are normal  Posterior oropharyngeal erythema (PND) present  No oropharyngeal exudate  B/L dull TMs   Neck: Neck supple  Cardiovascular: Normal rate, regular rhythm and normal heart sounds  Pulmonary/Chest: Effort normal and breath sounds normal    Lymphadenopathy:     He has no cervical adenopathy  Neurological: He is alert and oriented to person, place, and time  Psychiatric: He has a normal mood and affect  Vitals reviewed

## 2019-08-19 ENCOUNTER — APPOINTMENT (OUTPATIENT)
Dept: LAB | Facility: CLINIC | Age: 74
End: 2019-08-19
Payer: MEDICARE

## 2019-08-19 ENCOUNTER — OFFICE VISIT (OUTPATIENT)
Dept: FAMILY MEDICINE CLINIC | Facility: CLINIC | Age: 74
End: 2019-08-19
Payer: MEDICARE

## 2019-08-19 VITALS
TEMPERATURE: 98.7 F | DIASTOLIC BLOOD PRESSURE: 58 MMHG | HEIGHT: 68 IN | RESPIRATION RATE: 16 BRPM | BODY MASS INDEX: 29.4 KG/M2 | SYSTOLIC BLOOD PRESSURE: 116 MMHG | WEIGHT: 194 LBS | HEART RATE: 100 BPM
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
## 2019-08-19 DIAGNOSIS — Z79.4 TYPE 2 DIABETES MELLITUS WITH HYPERGLYCEMIA, WITH LONG-TERM CURRENT USE OF INSULIN (HCC): ICD-10-CM

## 2019-08-19 DIAGNOSIS — J01.00 ACUTE NON-RECURRENT MAXILLARY SINUSITIS: ICD-10-CM

## 2019-08-19 DIAGNOSIS — J01.00 ACUTE NON-RECURRENT MAXILLARY SINUSITIS: Primary | ICD-10-CM

## 2019-08-19 DIAGNOSIS — I10 BENIGN ESSENTIAL HYPERTENSION: ICD-10-CM

## 2019-08-19 DIAGNOSIS — E78.01 FAMILIAL HYPERCHOLESTEROLEMIA: ICD-10-CM

## 2019-08-19 DIAGNOSIS — E11.65 TYPE 2 DIABETES MELLITUS WITH HYPERGLYCEMIA, WITH LONG-TERM CURRENT USE OF INSULIN (HCC): ICD-10-CM

## 2019-08-19 LAB
BASOPHILS # BLD AUTO: 0.05 THOUSANDS/ΜL (ref 0–0.1)
BASOPHILS NFR BLD AUTO: 1 % (ref 0–1)
CHOLEST SERPL-MCNC: 131 MG/DL (ref 50–200)
EOSINOPHIL # BLD AUTO: 0.1 THOUSAND/ΜL (ref 0–0.61)
EOSINOPHIL NFR BLD AUTO: 1 % (ref 0–6)
ERYTHROCYTE [DISTWIDTH] IN BLOOD BY AUTOMATED COUNT: 12.3 % (ref 11.6–15.1)
EST. AVERAGE GLUCOSE BLD GHB EST-MCNC: 160 MG/DL
HBA1C MFR BLD: 7.2 % (ref 4.2–6.3)
HCT VFR BLD AUTO: 41.5 % (ref 36.5–49.3)
HDLC SERPL-MCNC: 41 MG/DL (ref 40–60)
HGB BLD-MCNC: 13.7 G/DL (ref 12–17)
IMM GRANULOCYTES # BLD AUTO: 0.06 THOUSAND/UL (ref 0–0.2)
IMM GRANULOCYTES NFR BLD AUTO: 1 % (ref 0–2)
LDLC SERPL CALC-MCNC: 80 MG/DL (ref 0–100)
LIPASE SERPL-CCNC: 71 U/L (ref 73–393)
LYMPHOCYTES # BLD AUTO: 1.17 THOUSANDS/ΜL (ref 0.6–4.47)
LYMPHOCYTES NFR BLD AUTO: 14 % (ref 14–44)
MAGNESIUM SERPL-MCNC: 2.5 MG/DL (ref 1.6–2.6)
MCH RBC QN AUTO: 29.9 PG (ref 26.8–34.3)
MCHC RBC AUTO-ENTMCNC: 33 G/DL (ref 31.4–37.4)
MCV RBC AUTO: 91 FL (ref 82–98)
MONOCYTES # BLD AUTO: 0.62 THOUSAND/ΜL (ref 0.17–1.22)
MONOCYTES NFR BLD AUTO: 8 % (ref 4–12)
NEUTROPHILS # BLD AUTO: 6.18 THOUSANDS/ΜL (ref 1.85–7.62)
NEUTS SEG NFR BLD AUTO: 75 % (ref 43–75)
NRBC BLD AUTO-RTO: 0 /100 WBCS
PLATELET # BLD AUTO: 274 THOUSANDS/UL (ref 149–390)
PMV BLD AUTO: 8.8 FL (ref 8.9–12.7)
RBC # BLD AUTO: 4.58 MILLION/UL (ref 3.88–5.62)
TRIGL SERPL-MCNC: 51 MG/DL
WBC # BLD AUTO: 8.18 THOUSAND/UL (ref 4.31–10.16)

## 2019-08-19 PROCEDURE — 85025 COMPLETE CBC W/AUTO DIFF WBC: CPT

## 2019-08-19 PROCEDURE — 86753 PROTOZOA ANTIBODY NOS: CPT

## 2019-08-19 PROCEDURE — 83036 HEMOGLOBIN GLYCOSYLATED A1C: CPT

## 2019-08-19 PROCEDURE — 83690 ASSAY OF LIPASE: CPT

## 2019-08-19 PROCEDURE — 86618 LYME DISEASE ANTIBODY: CPT

## 2019-08-19 PROCEDURE — 86617 LYME DISEASE ANTIBODY: CPT

## 2019-08-19 PROCEDURE — 36415 COLL VENOUS BLD VENIPUNCTURE: CPT

## 2019-08-19 PROCEDURE — 83735 ASSAY OF MAGNESIUM: CPT

## 2019-08-19 PROCEDURE — 99214 OFFICE O/P EST MOD 30 MIN: CPT | Performed by: FAMILY MEDICINE

## 2019-08-19 PROCEDURE — 80061 LIPID PANEL: CPT

## 2019-08-19 RX ORDER — INSULIN LISPRO 100 [IU]/ML
INJECTION, SUSPENSION SUBCUTANEOUS
Qty: 10 PEN | Refills: 2 | Status: SHIPPED | OUTPATIENT
Start: 2019-08-19 | End: 2019-10-02

## 2019-08-19 RX ORDER — DOXYCYCLINE HYCLATE 100 MG
100 TABLET ORAL 2 TIMES DAILY
Qty: 28 TABLET | Refills: 0 | Status: SHIPPED | OUTPATIENT
Start: 2019-08-19 | End: 2019-08-31

## 2019-08-22 LAB
B MICROTI IGG TITR SER: NORMAL {TITER}
B MICROTI IGM TITR SER: NORMAL {TITER}

## 2019-08-23 LAB — MISCELLANEOUS LAB TEST RESULT: NORMAL

## 2019-08-28 NOTE — PROGRESS NOTES
Assessment/Plan:     Diagnoses and all orders for this visit:    Acute non-recurrent maxillary sinusitis  Comments:  rx doxycycine, otc nasal saline, vicks vapor,tylenol, coricidin HBP or mucinex prn  Orders:  -     doxycycline hyclate (VIBRA-TABS) 100 mg tablet; Take 1 tablet (100 mg total) by mouth 2 (two) times a day for 14 days  -     CBC and differential; Future    Type 2 diabetes mellitus with hyperglycemia, with long-term current use of insulin (HCC)  Comments:  labs as ordered-sched dm visit once completed  Orders:  -     Hemoglobin A1C; Future  -     CBC and differential; Future  -     Comprehensive metabolic panel; Future  -     Insulin Lispro Prot & Lispro (HUMALOG MIX 75/25 KWIKPEN) (75-25) 100 units/mL injection pen; Inject SC 40 units in the morning and 50 units in the evening  -     Insulin Pen Needle (B-D UF III MINI PEN NEEDLES) 31G X 5 MM MISC; Use twice daily with insulin pen as directed    Benign essential hypertension  Comments:  BP wnl-cont  same  Orders:  -     Magnesium; Future  -     CBC and differential; Future  -     Comprehensive metabolic panel; Future    Familial hypercholesterolemia  Comments:  check lipid prof and thyroid fxn,  chol diet  Orders:  -     Lipid Panel with Direct LDL reflex; Future  -     CBC and differential; Future  -     Comprehensive metabolic panel; Future    Tick bite, sequela  Comments:  rx doxy for above sxs will cover if r/t tick, if lab + will need abx  extended  Orders:  -     Lyme Antibody Profile with reflex to WB; Future  -     Babesia microti antibody, IgG & Igm; Future            Subjective:      Patient ID: Madeleine Barth is a 66 y o  male  Chief Complaint   Patient presents with    Cough    Headache     top of head    Earache     both ears clogged, bloody nose once a day       Cough   This is a new problem  The problem has been gradually worsening  The cough is non-productive   Associated symptoms include ear congestion, ear pain, headaches, nasal congestion, postnasal drip and rhinorrhea  Pertinent negatives include no fever, hemoptysis or rash  He has tried rest and OTC cough suppressant for the symptoms  His past medical history is significant for bronchitis and environmental allergies  Headache    Associated symptoms include coughing, ear pain and rhinorrhea  Pertinent negatives include no fever  Earache    Associated symptoms include coughing, headaches and rhinorrhea  Pertinent negatives include no rash  Hx past tick bites  No rash or neck stiffness    The following portions of the patient's history were reviewed and updated as appropriate: allergies, current medications, past family history, past medical history, past social history, past surgical history and problem list      Review of Systems   Constitutional: Negative for fever  HENT: Positive for ear pain, postnasal drip and rhinorrhea  Respiratory: Positive for cough  Negative for hemoptysis  Skin: Negative for rash  Allergic/Immunologic: Positive for environmental allergies  Neurological: Positive for headaches  Objective:    /58 (BP Location: Left arm, Patient Position: Sitting, Cuff Size: Large)   Pulse 100   Temp 98 7 °F (37 1 °C)   Resp 16   Ht 5' 7 5" (1 715 m)   Wt 88 kg (194 lb)   BMI 29 94 kg/m²        Physical Exam   Constitutional: He is oriented to person, place, and time  He appears well-developed and well-nourished  Acutely ill, looks tired   HENT:   Mouth/Throat: No oropharyngeal exudate  Nasal bogginess, pngtt  B/l facial tenderness   Eyes: Conjunctivae are normal  Right eye exhibits no discharge  Left eye exhibits no discharge  No scleral icterus  Neck: Neck supple  Cardiovascular: Normal rate and regular rhythm  Pulmonary/Chest: Effort normal and breath sounds normal  No respiratory distress  No localized w/r/r  occ coarse rhonchi cleared w cough   Musculoskeletal: Normal range of motion     Neurological: He is alert and oriented to person, place, and time  No cranial nerve deficit  Nursing note and vitals reviewed            Jacques Dean MD

## 2019-08-30 ENCOUNTER — TELEPHONE (OUTPATIENT)
Dept: FAMILY MEDICINE CLINIC | Facility: CLINIC | Age: 74
End: 2019-08-30

## 2019-08-30 NOTE — TELEPHONE ENCOUNTER
Dr Venita Dee pt finsh his abx on Friday and pt still has a bad cough and pt left side hurts   pls advised what to do pt could not get appt until 9/9

## 2019-08-30 NOTE — TELEPHONE ENCOUNTER
Will need re-eval for possible chest -xray-please see if can be added on tomorrow-otherwise [facility]   thanks

## 2019-08-31 ENCOUNTER — OFFICE VISIT (OUTPATIENT)
Dept: FAMILY MEDICINE CLINIC | Facility: CLINIC | Age: 74
End: 2019-08-31
Payer: MEDICARE

## 2019-08-31 VITALS
RESPIRATION RATE: 20 BRPM | HEART RATE: 98 BPM | TEMPERATURE: 98.3 F | SYSTOLIC BLOOD PRESSURE: 130 MMHG | WEIGHT: 196.4 LBS | BODY MASS INDEX: 29.77 KG/M2 | HEIGHT: 68 IN | DIASTOLIC BLOOD PRESSURE: 56 MMHG

## 2019-08-31 DIAGNOSIS — J01.00 ACUTE NON-RECURRENT MAXILLARY SINUSITIS: Primary | ICD-10-CM

## 2019-08-31 DIAGNOSIS — R05.9 COUGH: ICD-10-CM

## 2019-08-31 PROCEDURE — 99213 OFFICE O/P EST LOW 20 MIN: CPT | Performed by: FAMILY MEDICINE

## 2019-08-31 RX ORDER — BENZONATATE 200 MG/1
200 CAPSULE ORAL 3 TIMES DAILY PRN
Qty: 15 CAPSULE | Refills: 0 | Status: SHIPPED | OUTPATIENT
Start: 2019-08-31 | End: 2019-10-02

## 2019-08-31 RX ORDER — PROMETHAZINE HYDROCHLORIDE AND CODEINE PHOSPHATE 6.25; 1 MG/5ML; MG/5ML
5 SYRUP ORAL
Qty: 120 ML | Refills: 0 | Status: SHIPPED | OUTPATIENT
Start: 2019-08-31 | End: 2019-10-02

## 2019-08-31 RX ORDER — LEVOFLOXACIN 500 MG/1
500 TABLET, FILM COATED ORAL EVERY 24 HOURS
Qty: 7 TABLET | Refills: 0 | Status: SHIPPED | OUTPATIENT
Start: 2019-08-31 | End: 2019-09-07

## 2019-08-31 NOTE — PROGRESS NOTES
Chief Complaint   Patient presents with    Cough     chest cold , pain in rib area    Earache     loss of hearing         Patient ID: Montrell Leavitt is a 66 y o  male  HPI  Pt is seeing for f/u sinus infection -  Was seeing 12 days ago  -  On Doxy -  Feeling minimally better-  Cough is not controlled, worsening at night, has hearing aids, more ears pressure recently  -  No SOB or wheezing, no COPD or Asthma, has DM -  FBG this am 135     The following portions of the patient's history were reviewed and updated as appropriate: allergies, current medications, past family history, past medical history, past social history, past surgical history and problem list     Review of Systems   Constitutional: Negative for activity change, appetite change, fatigue, fever and unexpected weight change  HENT: Positive for congestion, hearing loss, postnasal drip, sinus pressure and sinus pain  Negative for ear pain and sore throat  Respiratory: Positive for cough  Negative for shortness of breath and wheezing  Gastrointestinal: Negative          Current Outpatient Medications   Medication Sig Dispense Refill    doxycycline hyclate (VIBRA-TABS) 100 mg tablet Take 1 tablet (100 mg total) by mouth 2 (two) times a day for 14 days 28 tablet 0    enalapril (VASOTEC) 5 mg tablet TAKE 1 TABLET BY MOUTH  DAILY 90 tablet 3    glucose blood test strip Use as instructed 1-2x daily 100 each 6    Insulin Lispro Prot & Lispro (HUMALOG MIX 75/25 KWIKPEN) (75-25) 100 units/mL injection pen Inject SC 40 units in the morning and 50 units in the evening 10 pen 2    Insulin Pen Needle (B-D UF III MINI PEN NEEDLES) 31G X 5 MM MISC Use twice daily with insulin pen as directed 180 each 3    latanoprost (XALATAN) 0 005 % ophthalmic solution       simvastatin (ZOCOR) 40 mg tablet TAKE 1 TABLET BY MOUTH AT  BEDTIME 90 tablet 3    tamsulosin (FLOMAX) 0 4 mg TAKE 1 CAPSULE BY MOUTH TWO TIMES DAILY 180 capsule 1    Vitamin D, Cholecalciferol, 1000 UNITS CAPS Take by mouth 2 times a week      pantoprazole (PROTONIX) 40 mg tablet Take 1 tablet by mouth 2 (two) times a day before meals for 30 days 60 tablet 0     No current facility-administered medications for this visit  Objective:    /56 (BP Location: Right arm, Patient Position: Sitting, Cuff Size: Large)   Pulse 98   Temp 98 3 °F (36 8 °C)   Resp 20   Ht 5' 7 5" (1 715 m)   Wt 89 1 kg (196 lb 6 4 oz)   BMI 30 31 kg/m²        Physical Exam   Constitutional: No distress  HENT:   Right Ear: Tympanic membrane and ear canal normal  Decreased hearing (hearing aids b/l ) is noted  Left Ear: Tympanic membrane and ear canal normal  Decreased hearing is noted  Mouth/Throat: No posterior oropharyngeal edema or posterior oropharyngeal erythema  Pulmonary/Chest: No accessory muscle usage  No respiratory distress  He has no decreased breath sounds  He has no wheezes  He has no rhonchi  He has no rales  Assessment/Plan:         Diagnoses and all orders for this visit:    Acute non-recurrent maxillary sinusitis  -     levofloxacin (LEVAQUIN) 500 mg tablet; Take 1 tablet (500 mg total) by mouth every 24 hours for 7 days    Cough  -     promethazine-codeine (PHENERGAN WITH CODEINE) 6 25-10 mg/5 mL syrup; Take 5 mL by mouth daily at bedtime as needed for cough  -     benzonatate (TESSALON) 200 MG capsule;  Take 1 capsule (200 mg total) by mouth 3 (three) times a day as needed for cough      will d/c Doxycycline     rto prn                       Zenon Arellano MD

## 2019-09-14 ENCOUNTER — HOSPITAL ENCOUNTER (EMERGENCY)
Facility: HOSPITAL | Age: 74
Discharge: HOME | End: 2019-09-14
Attending: EMERGENCY MEDICINE | Admitting: EMERGENCY MEDICINE
Payer: MEDICARE

## 2019-09-14 ENCOUNTER — APPOINTMENT (EMERGENCY)
Dept: RADIOLOGY | Facility: HOSPITAL | Age: 74
End: 2019-09-14
Payer: MEDICARE

## 2019-09-14 VITALS
HEART RATE: 84 BPM | BODY MASS INDEX: 29.04 KG/M2 | SYSTOLIC BLOOD PRESSURE: 140 MMHG | TEMPERATURE: 100 F | OXYGEN SATURATION: 98 % | DIASTOLIC BLOOD PRESSURE: 93 MMHG | WEIGHT: 191.6 LBS | HEIGHT: 68 IN | RESPIRATION RATE: 20 BRPM

## 2019-09-14 DIAGNOSIS — S05.02XA ABRASION OF LEFT CORNEA, INITIAL ENCOUNTER: ICD-10-CM

## 2019-09-14 DIAGNOSIS — R05.9 COUGH: Primary | ICD-10-CM

## 2019-09-14 PROCEDURE — 71045 X-RAY EXAM CHEST 1 VIEW: CPT

## 2019-09-14 PROCEDURE — 99283 EMERGENCY DEPT VISIT LOW MDM: CPT

## 2019-09-14 RX ORDER — TETRACAINE HYDROCHLORIDE 5 MG/ML
1 SOLUTION OPHTHALMIC ONCE
Status: COMPLETED | OUTPATIENT
Start: 2019-09-14 | End: 2019-09-14

## 2019-09-14 RX ORDER — TOBRAMYCIN 3 MG/ML
1 SOLUTION/ DROPS OPHTHALMIC ONCE
Status: COMPLETED | OUTPATIENT
Start: 2019-09-14 | End: 2019-09-14

## 2019-09-14 RX ADMIN — TETRACAINE HYDROCHLORIDE 1 DROP: 5 SOLUTION OPHTHALMIC at 09:30

## 2019-09-14 RX ADMIN — TOBRAMYCIN 1 DROP: 3 SOLUTION/ DROPS OPHTHALMIC at 10:03

## 2019-09-14 RX ADMIN — FLUORESCEIN SODIUM 1 STRIP: 1 STRIP OPHTHALMIC at 09:30

## 2019-09-14 NOTE — ED PROVIDER NOTES
History  Chief Complaint   Patient presents with    Foreign Body in Eye     pt c/o something in his left eye x couple days  & a cough that "wont go away"  cough since july     Cough     58-year-old male presents with complaints of left eye irritation feels he might have something in it for the last couple of days and a cough that will not go away since July  No fevers no chills temp here today is 100  Left eye seems to be red and irritated  History provided by:  Patient   used: No        Prior to Admission Medications   Prescriptions Last Dose Informant Patient Reported? Taking?    Insulin Lispro Prot & Lispro (HUMALOG MIX 75/25 KWIKPEN) (75-25) 100 units/mL injection pen 9/13/2019 at Unknown time  No Yes   Sig: Inject SC 40 units in the morning and 50 units in the evening   Insulin Pen Needle (B-D UF III MINI PEN NEEDLES) 31G X 5 MM MISC   No No   Sig: Use twice daily with insulin pen as directed   Vitamin D, Cholecalciferol, 1000 UNITS CAPS Past Week at Unknown time  Yes Yes   Sig: Take by mouth 2 times a week   benzonatate (TESSALON) 200 MG capsule Not Taking at Unknown time  No No   Sig: Take 1 capsule (200 mg total) by mouth 3 (three) times a day as needed for cough   Patient not taking: Reported on 9/14/2019   enalapril (VASOTEC) 5 mg tablet 9/13/2019 at Unknown time  No Yes   Sig: TAKE 1 TABLET BY MOUTH  DAILY   glucose blood test strip   No No   Sig: Use as instructed 1-2x daily   latanoprost (XALATAN) 0 005 % ophthalmic solution 9/13/2019 at Unknown time  Yes Yes   pantoprazole (PROTONIX) 40 mg tablet   No No   Sig: Take 1 tablet by mouth 2 (two) times a day before meals for 30 days   promethazine-codeine (PHENERGAN WITH CODEINE) 6 25-10 mg/5 mL syrup Past Week at Unknown time  No Yes   Sig: Take 5 mL by mouth daily at bedtime as needed for cough   simvastatin (ZOCOR) 40 mg tablet 9/13/2019 at Unknown time  No Yes   Sig: TAKE 1 TABLET BY MOUTH AT  BEDTIME   tamsulosin (FLOMAX) 0 4 mg 2019 at Unknown time  No Yes   Sig: TAKE 1 CAPSULE BY MOUTH TWO TIMES DAILY      Facility-Administered Medications: None       Past Medical History:   Diagnosis Date    Anesthesia complication 8381    after colonoscopy , pt was awake but could not control his body and kept falling     Arthritis     Bladder calculi     BPH (benign prostatic hyperplasia)     Diabetes mellitus ([address] )     Hearing disorder of both ears     wears bilateral hearing aids    Hyperlipidemia     controlled and maintained d/t diabetes    Hypertension     Kidney stones     Last Assessed:4/3/2017    Lyme disease     Last Assessed:2015    Rupture, bladder, spontaneous     Last Assessed:4/3/2017       Past Surgical History:   Procedure Laterality Date    BLADDER REPAIR N/A 12/10/2016    Procedure: REPAIR BLADDER/cysto insertion stent;  Surgeon: Arben Alfred MD;  Location: [address] [place] [address];  Service:     COLONOSCOPY      CYSTOLITHOTOMY      ANESTHESIA   lower abdomen  ; Last Assessed:4/3/2017    OTHER SURGICAL HISTORY      thermal dilation of the prostate x 2 ( in the office)    TONSILLECTOMY      TRANSURETHRAL RESECTION OF PROSTATE N/A 2016    Procedure:  Cysto, Laser Bladder stone,fulgaration of prostates tissue ;  Surgeon: Arben Alfred MD;  Location: [facility];  Service:        Family History   Problem Relation Age of Onset    Cancer Mother         breast    Diabetes Mother     Cancer Father         prostate w/ bone mets    Prostate cancer Father     Alzheimer's disease Sister      I have reviewed and agree with the history as documented  Social History     Tobacco Use    Smoking status: Former Smoker     Types: Cigars     Last attempt to quit: 1983     Years since quittin 7    Smokeless tobacco: Never Used   Substance Use Topics    Alcohol use: Yes     Comment: occ    Drug use: No        Review of Systems   Constitutional: Negative for activity change, chills, diaphoresis and fever     HENT: Negative for congestion, ear pain, nosebleeds, sore throat, trouble swallowing and voice change  Eyes: Positive for pain, discharge and redness  Respiratory: Positive for cough  Negative for apnea, choking, shortness of breath, wheezing and stridor  Cardiovascular: Negative for chest pain and palpitations  Gastrointestinal: Negative for abdominal distention, abdominal pain, constipation, diarrhea, nausea and vomiting  Endocrine: Negative for polydipsia  Genitourinary: Negative for difficulty urinating, dysuria, flank pain, frequency, hematuria and urgency  Musculoskeletal: Negative for back pain, gait problem, joint swelling, myalgias, neck pain and neck stiffness  Skin: Negative for pallor and rash  Neurological: Negative for dizziness, tremors, syncope, speech difficulty, weakness, numbness and headaches  Hematological: Negative for adenopathy  Psychiatric/Behavioral: Negative for confusion, hallucinations, self-injury and suicidal ideas  The patient is not nervous/anxious  Physical Exam  Physical Exam   Constitutional: He is oriented to person, place, and time  He appears well-developed and well-nourished  No distress  HENT:   Head: Normocephalic and atraumatic  Right Ear: External ear normal    Left Ear: External ear normal    Nose: Nose normal    Mouth/Throat: Oropharynx is clear and moist    Eyes: Pupils are equal, round, and reactive to light  Conjunctivae are normal  Right eye exhibits no discharge  Left eye exhibits discharge  Left eye injected slight discharge  Slight tearing  Patient does have small foreign body with some fluorescein uptake the 2 o'clock position in the left eye  This was removed with a cotton swab and Alcaine  Neck: Normal range of motion  Neck supple  Cardiovascular: Normal rate, regular rhythm, normal heart sounds and intact distal pulses  Pulmonary/Chest: Effort normal and breath sounds normal    Abdominal: Soft   Bowel sounds are normal  Musculoskeletal: Normal range of motion  Neurological: He is alert and oriented to person, place, and time  He has normal reflexes  Skin: Skin is warm and dry  He is not diaphoretic  Psychiatric: He has a normal mood and affect  Nursing note and vitals reviewed  Vital Signs  ED Triage Vitals [09/14/19 0917]   Temperature Pulse Respirations Blood Pressure SpO2   100 °F (37 8 °C) 84 20 140/93 98 %      Temp Source Heart Rate Source Patient Position - Orthostatic VS BP Location FiO2 (%)   Tympanic Monitor Sitting Right arm --      Pain Score       No Pain           Vitals:    09/14/19 0917   BP: 140/93   Pulse: 84   Patient Position - Orthostatic VS: Sitting         Visual Acuity  Visual Acuity      Most Recent Value   Visual acuity R eye is  20/100   Visual acuity Left eye is  20/100   Visual acuity in both eyes is  20/40   Wearing corrective eyewear/lenses? Yes          ED Medications  Medications   tobramycin (TOBREX) 0 3 % ophthalmic solution 1 drop (has no administration in time range)   fluorescein sodium sterile ophthalmic strip 1 strip (1 strip Left Eye Given 9/14/19 0930)   tetracaine 0 5 % ophthalmic solution 1 drop (1 drop Left Eye Given 9/14/19 0930)       Diagnostic Studies  Results Reviewed     None                 XR chest 1 view portable    (Results Pending)              Procedures  Procedures       ED Course                               MDM    Disposition  Final diagnoses:   Cough   Abrasion of left cornea, initial encounter     Time reflects when diagnosis was documented in both MDM as applicable and the Disposition within this note     Time User Action Codes Description Comment    9/14/2019  9:59 AM Alexis MACIEL Add [R05] Cough     9/14/2019  9:59 AM Maribeth Dover Add [S05  02XA] Abrasion of left cornea, initial encounter       ED Disposition     ED Disposition Condition Date/Time Comment    Discharge Stable Sat Sep 14, 2019  9:59 AM Campos Maier discharge to home/self care             Follow-up Information     Follow up With Specialties Details Why Pito Alfaro MD [facility] Medicine Schedule an appointment as soon as possible for a visit  For wound re-check [id number] [facility] [address] [phone]            Patient's Medications   Discharge Prescriptions    No medications on file     No discharge procedures on file      ED Provider  Electronically Signed by           Diaz Lancaster DO  09/14/19 1000

## 2019-09-16 ENCOUNTER — VBI (OUTPATIENT)
Dept: ADMINISTRATIVE | Facility: OTHER | Age: 74
End: 2019-09-16

## 2019-09-16 NOTE — TELEPHONE ENCOUNTER
Patient went to ED on 9/14 for foreign body in eye and cough = patient nedds a f/u appt    2nd attempt - patient went to Dr Manuela Villegas this morning for a f/u appt

## 2019-10-02 ENCOUNTER — OFFICE VISIT (OUTPATIENT)
Dept: FAMILY MEDICINE CLINIC | Facility: CLINIC | Age: 74
End: 2019-10-02
Payer: MEDICARE

## 2019-10-02 VITALS
BODY MASS INDEX: 28.79 KG/M2 | DIASTOLIC BLOOD PRESSURE: 60 MMHG | WEIGHT: 190 LBS | SYSTOLIC BLOOD PRESSURE: 120 MMHG | HEART RATE: 80 BPM | TEMPERATURE: 98.8 F | RESPIRATION RATE: 18 BRPM | HEIGHT: 68 IN

## 2019-10-02 DIAGNOSIS — K63.5 BENIGN COLON POLYP: ICD-10-CM

## 2019-10-02 DIAGNOSIS — E11.9 TYPE 2 DIABETES MELLITUS WITHOUT COMPLICATION, WITH LONG-TERM CURRENT USE OF INSULIN (HCC): Primary | ICD-10-CM

## 2019-10-02 DIAGNOSIS — R05.9 COUGH IN ADULT: ICD-10-CM

## 2019-10-02 DIAGNOSIS — Z79.4 TYPE 2 DIABETES MELLITUS WITHOUT COMPLICATION, WITH LONG-TERM CURRENT USE OF INSULIN (HCC): Primary | ICD-10-CM

## 2019-10-02 DIAGNOSIS — Z00.00 ROUTINE MEDICAL EXAM: ICD-10-CM

## 2019-10-02 DIAGNOSIS — I10 BENIGN ESSENTIAL HYPERTENSION: ICD-10-CM

## 2019-10-02 DIAGNOSIS — Z23 NEED FOR INFLUENZA VACCINATION: ICD-10-CM

## 2019-10-02 PROCEDURE — G0439 PPPS, SUBSEQ VISIT: HCPCS | Performed by: FAMILY MEDICINE

## 2019-10-02 PROCEDURE — G0008 ADMIN INFLUENZA VIRUS VAC: HCPCS | Performed by: FAMILY MEDICINE

## 2019-10-02 PROCEDURE — 99214 OFFICE O/P EST MOD 30 MIN: CPT | Performed by: FAMILY MEDICINE

## 2019-10-02 PROCEDURE — 90662 IIV NO PRSV INCREASED AG IM: CPT | Performed by: FAMILY MEDICINE

## 2019-10-02 RX ORDER — INSULIN LISPRO 100 [IU]/ML
45 INJECTION, SUSPENSION SUBCUTANEOUS 2 TIMES DAILY WITH MEALS
Qty: 27 PEN | Refills: 3 | Status: SHIPPED | OUTPATIENT
Start: 2019-10-02 | End: 2020-05-22 | Stop reason: SDUPTHER

## 2019-10-02 RX ORDER — MONTELUKAST SODIUM 10 MG/1
10 TABLET ORAL
Qty: 30 TABLET | Refills: 0 | Status: SHIPPED | OUTPATIENT
Start: 2019-10-02 | End: 2020-06-04

## 2019-10-02 NOTE — PROGRESS NOTES
Assessment and Plan:     Problem List Items Addressed This Visit        Digestive    Benign colon polyp    Relevant Orders    Ambulatory referral to Gastroenterology       Endocrine    Long-term insulin use in type 2 diabetes ([facility] ) - Primary    Relevant Medications    HUMALOG MIX 75/25 KWIKPEN (75-25) 100 units/mL injection pen    Other Relevant Orders    Comprehensive metabolic panel    Lipid panel    Hemoglobin A1C    Microalbumin / creatinine urine ratio       Cardiovascular and Mediastinum    Benign essential hypertension      Other Visit Diagnoses     Need for influenza vaccination        Relevant Orders    influenza vaccine, [id number], high-dose, PF 0 5 mL (FLUZONE HIGH-DOSE) (Completed)    Routine medical exam        Cough in adult        Relevant Medications    montelukast (SINGULAIR) 10 mg tablet           Preventive health issues were discussed with patient, and age appropriate screening tests were ordered as noted in patient's After Visit Summary  Personalized health advice and appropriate referrals for health education or preventive services given if needed, as noted in patient's After Visit Summary       History of Present Illness:     Patient presents for Medicare Annual Wellness visit    Patient Care Team:  Nas Oliveira MD as PCP - MD Elizabeth Harding MD Idolina Backer, MD     Problem List:     Patient Active Problem List   Diagnosis    Bladder stones    Benign colon polyp    Benign essential hypertension    Benign prostatic hyperplasia with lower urinary tract symptoms    Hyperlipidemia    Long-term insulin use in type 2 diabetes ([facility] )    Vitamin D deficiency      Past Medical and Surgical History:     Past Medical History:   Diagnosis Date    Anesthesia complication 0378    after colonoscopy , pt was awake but could not control his body and kept falling     Arthritis     Bladder calculi     BPH (benign prostatic hyperplasia)     Diabetes mellitus ([facility] )     Hearing disorder of both ears     wears bilateral hearing aids    Hyperlipidemia     controlled and maintained d/t diabetes    Hypertension     Kidney stones     Last Assessed:4/3/2017    Lyme disease     Last Assessed:2015    Rupture, bladder, spontaneous     Last Assessed:4/3/2017     Past Surgical History:   Procedure Laterality Date    BLADDER REPAIR N/A 12/10/2016    Procedure: REPAIR BLADDER/cysto insertion stent;  Surgeon: Monique Anderson MD;  Location: [address] [place] [address];  Service:     COLONOSCOPY      CYSTOLITHOTOMY      ANESTHESIA   lower abdomen  ;  Last Assessed:4/3/2017    OTHER SURGICAL HISTORY      thermal dilation of the prostate x 2 ( in the office)    TONSILLECTOMY      TRANSURETHRAL RESECTION OF PROSTATE N/A 2016    Procedure:  Cysto, Laser Bladder stone,fulgaration of prostates tissue ;  Surgeon: Monique Anderson MD;  Location: [facility];  Service:       Family History:     Family History   Problem Relation Age of Onset   Jhonny Flower Cancer Mother         breast    Diabetes Mother     Cancer Father         prostate w/ bone mets    Prostate cancer Father     Alzheimer's disease Sister       Social History:     Social History     Socioeconomic History    Marital status: /Civil Union     Spouse name: None    Number of children: None    Years of education: None    Highest education level: None   Occupational History    None   Social Needs    Financial resource strain: None    Food insecurity:     Worry: None     Inability: None    Transportation needs:     Medical: None     Non-medical: None   Tobacco Use    Smoking status: Former Smoker     Types: Cigars     Last attempt to quit:      Years since quittin 7    Smokeless tobacco: Never Used   Substance and Sexual Activity    Alcohol use: Yes     Comment: occ    Drug use: No    Sexual activity: None   Lifestyle    Physical activity:     Days per week: None     Minutes per session: None    Stress: None Relationships    Social connections:     Talks on phone: None     Gets together: None     Attends [employer] service: None     Active member of club or organization: None     Attends meetings of clubs or organizations: None     Relationship status: None    Intimate partner violence:     Fear of current or ex partner: None     Emotionally abused: None     Physically abused: None     Forced sexual activity: None   Other Topics Concern    None   Social History Narrative    None       Medications and Allergies:     Current Outpatient Medications   Medication Sig Dispense Refill    enalapril (VASOTEC) 5 mg tablet TAKE 1 TABLET BY MOUTH  DAILY 90 tablet 3    glucose blood test strip Use as instructed 1-2x daily 100 each 6    Insulin Pen Needle (B-D UF III MINI PEN NEEDLES) 31G X 5 MM MISC Use twice daily with insulin pen as directed 180 each 3    latanoprost (XALATAN) 0 005 % ophthalmic solution       simvastatin (ZOCOR) 40 mg tablet TAKE 1 TABLET BY MOUTH AT  BEDTIME 90 tablet 3    tamsulosin (FLOMAX) 0 4 mg TAKE 1 CAPSULE BY MOUTH TWO TIMES DAILY 180 capsule 1    Vitamin D, Cholecalciferol, 1000 UNITS CAPS Take by mouth 2 times a week      HUMALOG MIX 75/25 KWIKPEN (75-25) 100 units/mL injection pen Inject 45 Units under the skin 2 (two) times a day with meals 27 pen 3    montelukast (SINGULAIR) 10 mg tablet Take 1 tablet (10 mg total) by mouth daily at bedtime 30 tablet 0     No current facility-administered medications for this visit        Allergies   Allergen Reactions    Amoxicillin Rash      Immunizations:     Immunization History   Administered Date(s) Administered    Influenza Split High Dose Preservative Free IM 10/11/2013, 09/23/2016, 11/01/2017    Influenza TIV (IM) 11/17/2000, 11/16/2001, 12/06/2002, 10/30/2003, 10/18/2005, 10/20/2006, 10/17/2007, 10/28/2008, 09/15/2009, 09/11/2010, 09/19/2011, 09/26/2012    Influenza, high dose seasonal 0 5 mL 12/17/2018, 10/02/2019    Pneumococcal Conjugate 13-Valent 08/05/2015    Pneumococcal Polysaccharide PPV23 11/16/2001, 09/25/2012    Tdap 02/14/2018    Zoster 09/04/2015      Health Maintenance:         Topic Date Due    CRC Screening: Colonoscopy  11/11/2023         Topic Date Due    INFLUENZA VACCINE  07/01/2019      Medicare Health Risk Assessment:     /60 (BP Location: Right arm, Patient Position: Sitting, Cuff Size: Standard)   Pulse 80   Temp 98 8 °F (37 1 °C) (Tympanic)   Resp 18   Ht 5' 8" (1 727 m)   Wt 86 2 kg (190 lb)   BMI 28 89 kg/m²      Osmar Resendiz is here for his Subsequent Wellness visit  Health Risk Assessment:   Patient rates overall health as very good  Patient feels that their physical health rating is slightly better  Eyesight was rated as same  Hearing was rated as slightly worse  Patient feels that their emotional and mental health rating is same  Pain experienced in the last 7 days has been none  Patient states that he has experienced no weight loss or gain in last 6 months  Depression Screening:   PHQ-2 Score: 0      Fall Risk Screening: In the past year, patient has experienced: no history of falling in past year      Home Safety:  Patient does not have trouble with stairs inside or outside of their home  Patient has working smoke alarms and has working carbon monoxide detector  Home safety hazards include: none  Nutrition:   Current diet is Diabetic, Limited junk food and No Added Salt  Medications:   Patient is not currently taking any over-the-counter supplements  Patient is able to manage medications  Activities of Daily Living (ADLs)/Instrumental Activities of Daily Living (IADLs):   Walk and transfer into and out of bed and chair?: Yes  Dress and groom yourself?: Yes    Bathe or shower yourself?: Yes    Feed yourself?  Yes  Do your laundry/housekeeping?: Yes  Manage your money, pay your bills and track your expenses?: Yes  Make your own meals?: Yes    Do your own shopping?: Yes    Previous Hospitalizations:   Any hospitalizations or ED visits within the last 12 months?: Yes    How many hospitalizations have you had in the last year?: 1-2    Hospitalization Comments: ED visit 2 wks ago for cough and L eye issues     Advance Care Planning:   Living will: No    Durable POA for healthcare: No    Advanced directive: No    Advanced directive counseling given: Yes      Cognitive Screening:   Provider or family/friend/caregiver concerned regarding cognition?: No    PREVENTIVE SCREENINGS      Cardiovascular Screening:    General: Screening Not Indicated, History Lipid Disorder and Screening Current      Diabetes Screening:     General: Screening Not Indicated, History Diabetes and Screening Current      Colorectal Cancer Screening:       Due for: Colonoscopy - High Risk      Prostate Cancer Screening:    General: Screening Current      Osteoporosis Screening:    General: Screening Not Indicated      Abdominal Aortic Aneurysm (AAA) Screening:    Risk factors include: tobacco use        General: Screening Not Indicated      Lung Cancer Screening:     General: Screening Not Indicated      Hepatitis C Screening:    General: Screening Not Indicated      Jacqueline Poritllo MD

## 2019-10-02 NOTE — PROGRESS NOTES
Chief Complaint   Patient presents with    Medicare Wellness Visit   multiple issues      Patient ID: Erik Serna is a 66 y o  male  HPI  Pt is seeing for f/u DM2, HTN -  Stable, taking meds as Rx - has dry cough x 6 wks + after episode of sinus infection -  CXR on 9/14/19 is normal  -  No Sob or wheezing, no h/o Asthma  -  Hg AIC 7 2 in 08/19    The following portions of the patient's history were reviewed and updated as appropriate: allergies, current medications, past family history, past medical history, past social history, past surgical history and problem list     Review of Systems   Constitutional: Negative  HENT: Negative  Respiratory: Positive for cough  Negative for chest tightness, shortness of breath and wheezing  Cardiovascular: Negative  Gastrointestinal: Negative  Genitourinary: Negative  Musculoskeletal: Negative  Skin: Negative  Neurological: Negative  Psychiatric/Behavioral: Negative  Current Outpatient Medications   Medication Sig Dispense Refill    enalapril (VASOTEC) 5 mg tablet TAKE 1 TABLET BY MOUTH  DAILY 90 tablet 3    glucose blood test strip Use as instructed 1-2x daily 100 each 6    Insulin Pen Needle (B-D UF III MINI PEN NEEDLES) 31G X 5 MM MISC Use twice daily with insulin pen as directed 180 each 3    latanoprost (XALATAN) 0 005 % ophthalmic solution       simvastatin (ZOCOR) 40 mg tablet TAKE 1 TABLET BY MOUTH AT  BEDTIME 90 tablet 3    tamsulosin (FLOMAX) 0 4 mg TAKE 1 CAPSULE BY MOUTH TWO TIMES DAILY 180 capsule 1    Vitamin D, Cholecalciferol, 1000 UNITS CAPS Take by mouth 2 times a week      HUMALOG MIX 75/25 KWIKPEN (75-25) 100 units/mL injection pen Inject 45 Units under the skin 2 (two) times a day with meals 27 pen 3     No current facility-administered medications for this visit          Objective:    /60 (BP Location: Right arm, Patient Position: Sitting, Cuff Size: Standard)   Pulse 80   Temp 98 8 °F (37 1 °C) (Tympanic)   Resp 18   Ht 5' 8" (1 727 m)   Wt 86 2 kg (190 lb)   BMI 28 89 kg/m²        Physical Exam   Constitutional: He is oriented to person, place, and time  No distress  HENT:   Mouth/Throat: No oropharyngeal exudate  Cardiovascular: Normal rate and regular rhythm  Exam reveals no gallop  No murmur heard  Pulmonary/Chest: Effort normal and breath sounds normal  No respiratory distress  He has no wheezes  He has no rales  Musculoskeletal: He exhibits no edema  Neurological: He is alert and oriented to person, place, and time  No cranial nerve deficit  Psychiatric: He has a normal mood and affect  His behavior is normal          Labs in chart were reviewed  Assessment/Plan:         Diagnoses and all orders for this visit:    Type 2 diabetes mellitus without complication, with long-term current use of insulin (HCC)  -     HUMALOG MIX 75/25 KWIKPEN (75-25) 100 units/mL injection pen; Inject 45 Units under the skin 2 (two) times a day with meals  -     Comprehensive metabolic panel; Future  -     Lipid panel; Future  -     Hemoglobin A1C; Future  -     Microalbumin / creatinine urine ratio; Future  Cont meds  Stable       Discussed general issues about diabetes pathophysiology and management  Counseling at today's visit: discussed the need for weight loss, focused on the need to adhere to the prescribed ADA diet, discussed the advantages of a diet low in carbohydrates and discussed DASH diet  Addressed ADA diet  Encouraged aerobic exercise  Need for influenza vaccination  -     influenza vaccine, [id number], high-dose, PF 0 5 mL (FLUZONE HIGH-DOSE)    Benign essential hypertension    Benign colon polyp  -     Ambulatory referral to Gastroenterology; Future    Routine medical exam    Cough in adult  -     montelukast (SINGULAIR) 10 mg tablet; Take 1 tablet (10 mg total) by mouth daily at bedtime            BMI Counseling: Body mass index is 28 89 kg/m²  Discussed the patient's BMI with him  The BMI is above normal  Nutrition recommendations include reducing portion sizes, decreasing overall calorie intake, 3-5 servings of fruits/vegetables daily, reducing fast food intake, consuming healthier snacks and decreasing soda and/or juice intake  Exercise recommendations include exercising 3-5 times per week           rto in 3 m for DM check       Marina Liu MD

## 2019-10-02 NOTE — PATIENT INSTRUCTIONS

## 2019-10-17 ENCOUNTER — TELEPHONE (OUTPATIENT)
Dept: FAMILY MEDICINE CLINIC | Facility: CLINIC | Age: 74
End: 2019-10-17

## 2019-10-17 NOTE — TELEPHONE ENCOUNTER
Dr Ana Eldridge    Patient has been taking montelukast med for past 2 weeks and  cough is 75%  Better  ISAIAS

## 2019-12-12 ENCOUNTER — APPOINTMENT (OUTPATIENT)
Dept: LAB | Facility: CLINIC | Age: 74
End: 2019-12-12
Payer: MEDICARE

## 2019-12-12 DIAGNOSIS — Z79.4 TYPE 2 DIABETES MELLITUS WITHOUT COMPLICATION, WITH LONG-TERM CURRENT USE OF INSULIN (HCC): ICD-10-CM

## 2019-12-12 DIAGNOSIS — E78.5 HYPERLIPIDEMIA, UNSPECIFIED HYPERLIPIDEMIA TYPE: ICD-10-CM

## 2019-12-12 DIAGNOSIS — R97.20 ABNORMAL PROSTATE SPECIFIC ANTIGEN (PSA): Primary | ICD-10-CM

## 2019-12-12 DIAGNOSIS — E11.9 TYPE 2 DIABETES MELLITUS WITHOUT COMPLICATION, WITH LONG-TERM CURRENT USE OF INSULIN (HCC): ICD-10-CM

## 2019-12-12 LAB
ALBUMIN SERPL BCP-MCNC: 4 G/DL (ref 3.5–5)
ALP SERPL-CCNC: 88 U/L (ref 46–116)
ALT SERPL W P-5'-P-CCNC: 26 U/L (ref 12–78)
ANION GAP SERPL CALCULATED.3IONS-SCNC: 5 MMOL/L (ref 4–13)
AST SERPL W P-5'-P-CCNC: 14 U/L (ref 5–45)
BILIRUB SERPL-MCNC: 0.64 MG/DL (ref 0.2–1)
BUN SERPL-MCNC: 14 MG/DL (ref 5–25)
CALCIUM SERPL-MCNC: 9.3 MG/DL (ref 8.3–10.1)
CHLORIDE SERPL-SCNC: 105 MMOL/L (ref 100–108)
CHOLEST SERPL-MCNC: 150 MG/DL (ref 50–200)
CO2 SERPL-SCNC: 29 MMOL/L (ref 21–32)
CREAT SERPL-MCNC: 1.05 MG/DL (ref 0.6–1.3)
CREAT UR-MCNC: 101 MG/DL
EST. AVERAGE GLUCOSE BLD GHB EST-MCNC: 169 MG/DL
GFR SERPL CREATININE-BSD FRML MDRD: 69 ML/MIN/1.73SQ M
GLUCOSE P FAST SERPL-MCNC: 105 MG/DL (ref 65–99)
HBA1C MFR BLD: 7.5 % (ref 4.2–6.3)
HDLC SERPL-MCNC: 43 MG/DL
LDLC SERPL CALC-MCNC: 96 MG/DL (ref 0–100)
MICROALBUMIN UR-MCNC: 13.8 MG/L (ref 0–20)
MICROALBUMIN/CREAT 24H UR: 14 MG/G CREATININE (ref 0–30)
NONHDLC SERPL-MCNC: 107 MG/DL
POTASSIUM SERPL-SCNC: 4.3 MMOL/L (ref 3.5–5.3)
PROT SERPL-MCNC: 8 G/DL (ref 6.4–8.2)
PSA SERPL-MCNC: 2.1 NG/ML (ref 0–4)
SODIUM SERPL-SCNC: 139 MMOL/L (ref 136–145)
TRIGL SERPL-MCNC: 56 MG/DL

## 2019-12-12 PROCEDURE — 82570 ASSAY OF URINE CREATININE: CPT

## 2019-12-12 PROCEDURE — 36415 COLL VENOUS BLD VENIPUNCTURE: CPT

## 2019-12-12 PROCEDURE — 82043 UR ALBUMIN QUANTITATIVE: CPT

## 2019-12-12 PROCEDURE — 80053 COMPREHEN METABOLIC PANEL: CPT

## 2019-12-12 PROCEDURE — 83036 HEMOGLOBIN GLYCOSYLATED A1C: CPT

## 2019-12-12 PROCEDURE — 80061 LIPID PANEL: CPT

## 2019-12-12 PROCEDURE — 84153 ASSAY OF PSA TOTAL: CPT

## 2019-12-12 RX ORDER — SIMVASTATIN 40 MG
TABLET ORAL
Qty: 90 TABLET | Refills: 3 | Status: SHIPPED | OUTPATIENT
Start: 2019-12-12 | End: 2020-03-26 | Stop reason: SDUPTHER

## 2019-12-31 RX ORDER — TOBRAMYCIN AND DEXAMETHASONE 3; 1 MG/ML; MG/ML
SUSPENSION/ DROPS OPHTHALMIC
Refills: 1 | COMMUNITY
Start: 2019-10-24 | End: 2020-12-07

## 2020-01-03 ENCOUNTER — OFFICE VISIT (OUTPATIENT)
Dept: FAMILY MEDICINE CLINIC | Facility: CLINIC | Age: 75
End: 2020-01-03
Payer: MEDICARE

## 2020-01-03 VITALS
TEMPERATURE: 98.4 F | RESPIRATION RATE: 16 BRPM | HEART RATE: 72 BPM | BODY MASS INDEX: 29.7 KG/M2 | DIASTOLIC BLOOD PRESSURE: 70 MMHG | WEIGHT: 196 LBS | HEIGHT: 68 IN | SYSTOLIC BLOOD PRESSURE: 144 MMHG

## 2020-01-03 DIAGNOSIS — E11.65 TYPE 2 DIABETES MELLITUS WITH HYPERGLYCEMIA, WITH LONG-TERM CURRENT USE OF INSULIN (HCC): Primary | ICD-10-CM

## 2020-01-03 DIAGNOSIS — Z79.4 TYPE 2 DIABETES MELLITUS WITH HYPERGLYCEMIA, WITH LONG-TERM CURRENT USE OF INSULIN (HCC): Primary | ICD-10-CM

## 2020-01-03 DIAGNOSIS — E78.01 FAMILIAL HYPERCHOLESTEROLEMIA: ICD-10-CM

## 2020-01-03 DIAGNOSIS — I10 BENIGN ESSENTIAL HYPERTENSION: ICD-10-CM

## 2020-01-03 PROCEDURE — 99214 OFFICE O/P EST MOD 30 MIN: CPT | Performed by: FAMILY MEDICINE

## 2020-01-03 RX ORDER — ENALAPRIL MALEATE 10 MG/1
10 TABLET ORAL DAILY
Qty: 90 TABLET | Refills: 1
Start: 2020-01-03 | End: 2020-03-26 | Stop reason: SDUPTHER

## 2020-01-03 RX ORDER — CYCLOSPORINE 0.5 MG/ML
1 EMULSION OPHTHALMIC 2 TIMES DAILY
COMMUNITY
End: 2021-09-07

## 2020-01-03 RX ORDER — BACITRACIN 500 [USP'U]/G
OINTMENT OPHTHALMIC EVERY 4 HOURS
COMMUNITY
End: 2021-06-07

## 2020-01-03 NOTE — PROGRESS NOTES
Chief Complaint   Patient presents with    Follow-up     chronic conditions        Patient ID: Jose E Smalls is a 66 y o  male  HPI  Pt is seeing for f/u DM2, HTN, HLD  -  All stable, does not check BP at home, was on Enalapril 10 mg in the past -  Now on 5 mg - BG is controlled at home recent Hg AIC 7 5     The following portions of the patient's history were reviewed and updated as appropriate: allergies, current medications, past family history, past medical history, past social history, past surgical history and problem list     Review of Systems   Constitutional: Negative  Eyes: Positive for pain (under ophthalmologist care )  Respiratory: Negative  Cardiovascular: Negative  Gastrointestinal: Negative  Genitourinary: Negative  Musculoskeletal: Negative  Skin: Negative  Neurological: Negative          Current Outpatient Medications   Medication Sig Dispense Refill    bacitracin ophthalmic ointment every 4 (four) hours      cycloSPORINE (RESTASIS) 0 05 % ophthalmic emulsion 1 drop 2 (two) times a day      enalapril (VASOTEC) 5 mg tablet TAKE 1 TABLET BY MOUTH  DAILY 90 tablet 3    glucose blood test strip Use as instructed 1-2x daily 100 each 6    HUMALOG MIX 75/25 KWIKPEN (75-25) 100 units/mL injection pen Inject 45 Units under the skin 2 (two) times a day with meals 27 pen 3    Insulin Pen Needle (B-D UF III MINI PEN NEEDLES) 31G X 5 MM MISC Use twice daily with insulin pen as directed 180 each 3    latanoprost (XALATAN) 0 005 % ophthalmic solution       montelukast (SINGULAIR) 10 mg tablet Take 1 tablet (10 mg total) by mouth daily at bedtime 30 tablet 0    simvastatin (ZOCOR) 40 mg tablet TAKE 1 TABLET BY MOUTH AT  BEDTIME 90 tablet 3    tamsulosin (FLOMAX) 0 4 mg TAKE 1 CAPSULE BY MOUTH TWO TIMES DAILY 180 capsule 1    tobramycin-dexamethasone (TOBRADEX) ophthalmic suspension INSTILL 1 DROP INTO LEFT EYE 4 TIMES DAILY AS DIRECTED  1    Vitamin D, Cholecalciferol, 1000 UNITS CAPS Take by mouth 2 times a week       No current facility-administered medications for this visit  Objective:    /70 (BP Location: Left arm, Patient Position: Sitting, Cuff Size: Standard)   Pulse 72   Temp 98 4 °F (36 9 °C) (Tympanic)   Resp 16   Ht 5' 8" (1 727 m)   Wt 88 9 kg (196 lb)   BMI 29 80 kg/m²        Physical Exam   Constitutional: He is oriented to person, place, and time  No distress  Cardiovascular: Normal rate and regular rhythm  Exam reveals no gallop  No murmur heard  Pulmonary/Chest: Effort normal and breath sounds normal  No respiratory distress  He has no wheezes  He has no rales  Musculoskeletal: He exhibits no edema  Neurological: He is alert and oriented to person, place, and time  No cranial nerve deficit  Psychiatric: He has a normal mood and affect  His behavior is normal          Labs in chart were reviewed    Recent Results (from the past 672 hour(s))   Comprehensive metabolic panel    Collection Time: 12/12/19 10:23 AM   Result Value Ref Range    Sodium 139 136 - 145 mmol/L    Potassium 4 3 3 5 - 5 3 mmol/L    Chloride 105 100 - 108 mmol/L    CO2 29 21 - 32 mmol/L    ANION GAP 5 4 - 13 mmol/L    BUN 14 5 - 25 mg/dL    Creatinine 1 05 0 60 - 1 30 mg/dL    Glucose, Fasting 105 (H) 65 - 99 mg/dL    Calcium 9 3 8 3 - 10 1 mg/dL    AST 14 5 - 45 U/L    ALT 26 12 - 78 U/L    Alkaline Phosphatase 88 46 - 116 U/L    Total Protein 8 0 6 4 - 8 2 g/dL    Albumin 4 0 3 5 - 5 0 g/dL    Total Bilirubin 0 64 0 20 - 1 00 mg/dL    eGFR 69 ml/min/1 73sq m   Lipid panel    Collection Time: 12/12/19 10:23 AM   Result Value Ref Range    Cholesterol 150 50 - 200 mg/dL    Triglycerides 56 <=150 mg/dL    HDL, Direct 43 >=40 mg/dL    LDL Calculated 96 0 - 100 mg/dL    Non-HDL-Chol (CHOL-HDL) 107 mg/dl   Hemoglobin A1C    Collection Time: 12/12/19 10:23 AM   Result Value Ref Range    Hemoglobin A1C 7 5 (H) 4 2 - 6 3 %     mg/dl   Microalbumin / creatinine urine ratio    Collection Time: 12/12/19 10:23 AM   Result Value Ref Range    Creatinine, Ur 101 0 mg/dL    Microalbum  ,U,Random 13 8 0 0 - 20 0 mg/L    Microalb Creat Ratio 14 0 - 30 mg/g creatinine   PSA Total, Diagnostic    Collection Time: 12/12/19 10:23 AM   Result Value Ref Range    PSA, Diagnostic 2 1 0 0 - 4 0 ng/mL       Assessment/Plan:         Diagnoses and all orders for this visit:    Type 2 diabetes mellitus with hyperglycemia, with long-term current use of insulin ([facility] )  -     Cancel: Ambulatory referral to Ophthalmology; Future  -     Ambulatory referral to Ophthalmology; Future    Benign essential hypertension  -   Will increase med dose from 5 mg/day to   enalapril (VASOTEC) 10 mg tablet;  Take 1 tablet (10 mg total) by mouth daily    Familial hypercholesterolemia      All above stable except for BP is not controlled   Other orders  -     tobramycin-dexamethasone (TOBRADEX) ophthalmic suspension; INSTILL 1 DROP INTO LEFT EYE 4 TIMES DAILY AS DIRECTED  -     bacitracin ophthalmic ointment; every 4 (four) hours  -     cycloSPORINE (RESTASIS) 0 05 % ophthalmic emulsion; 1 drop 2 (two) times a day        rto in 3 m                   Faby Walter MD

## 2020-01-03 NOTE — PATIENT INSTRUCTIONS
Recent Results (from the past 672 hour(s))   Comprehensive metabolic panel    Collection Time: 12/12/19 10:23 AM   Result Value Ref Range    Sodium 139 136 - 145 mmol/L    Potassium 4 3 3 5 - 5 3 mmol/L    Chloride 105 100 - 108 mmol/L    CO2 29 21 - 32 mmol/L    ANION GAP 5 4 - 13 mmol/L    BUN 14 5 - 25 mg/dL    Creatinine 1 05 0 60 - 1 30 mg/dL    Glucose, Fasting 105 (H) 65 - 99 mg/dL    Calcium 9 3 8 3 - 10 1 mg/dL    AST 14 5 - 45 U/L    ALT 26 12 - 78 U/L    Alkaline Phosphatase 88 46 - 116 U/L    Total Protein 8 0 6 4 - 8 2 g/dL    Albumin 4 0 3 5 - 5 0 g/dL    Total Bilirubin 0 64 0 20 - 1 00 mg/dL    eGFR 69 ml/min/1 73sq m   Lipid panel    Collection Time: 12/12/19 10:23 AM   Result Value Ref Range    Cholesterol 150 50 - 200 mg/dL    Triglycerides 56 <=150 mg/dL    HDL, Direct 43 >=40 mg/dL    LDL Calculated 96 0 - 100 mg/dL    Non-HDL-Chol (CHOL-HDL) 107 mg/dl   Hemoglobin A1C    Collection Time: 12/12/19 10:23 AM   Result Value Ref Range    Hemoglobin A1C 7 5 (H) 4 2 - 6 3 %     mg/dl   Microalbumin / creatinine urine ratio    Collection Time: 12/12/19 10:23 AM   Result Value Ref Range    Creatinine, Ur 101 0 mg/dL    Microalbum  ,U,Random 13 8 0 0 - 20 0 mg/L    Microalb Creat Ratio 14 0 - 30 mg/g creatinine   PSA Total, Diagnostic    Collection Time: 12/12/19 10:23 AM   Result Value Ref Range    PSA, Diagnostic 2 1 0 0 - 4 0 ng/mL

## 2020-03-26 DIAGNOSIS — I10 BENIGN ESSENTIAL HYPERTENSION: ICD-10-CM

## 2020-03-26 DIAGNOSIS — E11.65 TYPE 2 DIABETES MELLITUS WITH HYPERGLYCEMIA, WITH LONG-TERM CURRENT USE OF INSULIN (HCC): ICD-10-CM

## 2020-03-26 DIAGNOSIS — Z79.4 TYPE 2 DIABETES MELLITUS WITH HYPERGLYCEMIA, WITH LONG-TERM CURRENT USE OF INSULIN (HCC): ICD-10-CM

## 2020-03-26 DIAGNOSIS — E78.5 HYPERLIPIDEMIA, UNSPECIFIED HYPERLIPIDEMIA TYPE: ICD-10-CM

## 2020-03-26 NOTE — TELEPHONE ENCOUNTER
Dr Alisa Cedeno:    Patient needs a 90 day refill of enalapril, simvastatin and pen needles sent to [facility]

## 2020-03-27 RX ORDER — SIMVASTATIN 40 MG
40 TABLET ORAL
Qty: 90 TABLET | Refills: 3 | Status: SHIPPED | OUTPATIENT
Start: 2020-03-27 | End: 2021-03-11

## 2020-03-27 RX ORDER — ENALAPRIL MALEATE 10 MG/1
10 TABLET ORAL DAILY
Qty: 90 TABLET | Refills: 0
Start: 2020-03-27 | End: 2020-04-06 | Stop reason: SDUPTHER

## 2020-04-06 DIAGNOSIS — I10 BENIGN ESSENTIAL HYPERTENSION: ICD-10-CM

## 2020-04-06 RX ORDER — ENALAPRIL MALEATE 10 MG/1
10 TABLET ORAL DAILY
Qty: 90 TABLET | Refills: 0
Start: 2020-04-06 | End: 2020-05-22 | Stop reason: SDUPTHER

## 2020-05-22 ENCOUNTER — OFFICE VISIT (OUTPATIENT)
Dept: FAMILY MEDICINE CLINIC | Facility: CLINIC | Age: 75
End: 2020-05-22
Payer: MEDICARE

## 2020-05-22 VITALS
HEIGHT: 68 IN | DIASTOLIC BLOOD PRESSURE: 64 MMHG | TEMPERATURE: 99.2 F | BODY MASS INDEX: 29.55 KG/M2 | RESPIRATION RATE: 18 BRPM | SYSTOLIC BLOOD PRESSURE: 120 MMHG | WEIGHT: 195 LBS | HEART RATE: 78 BPM

## 2020-05-22 DIAGNOSIS — E78.5 HYPERLIPIDEMIA, UNSPECIFIED HYPERLIPIDEMIA TYPE: ICD-10-CM

## 2020-05-22 DIAGNOSIS — E11.9 TYPE 2 DIABETES MELLITUS WITHOUT COMPLICATION, WITH LONG-TERM CURRENT USE OF INSULIN (HCC): Primary | ICD-10-CM

## 2020-05-22 DIAGNOSIS — I10 BENIGN ESSENTIAL HYPERTENSION: ICD-10-CM

## 2020-05-22 DIAGNOSIS — Z79.4 TYPE 2 DIABETES MELLITUS WITHOUT COMPLICATION, WITH LONG-TERM CURRENT USE OF INSULIN (HCC): Primary | ICD-10-CM

## 2020-05-22 LAB — SL AMB POCT HEMOGLOBIN AIC: 7.5 (ref ?–6.5)

## 2020-05-22 PROCEDURE — 3008F BODY MASS INDEX DOCD: CPT | Performed by: FAMILY MEDICINE

## 2020-05-22 PROCEDURE — 99214 OFFICE O/P EST MOD 30 MIN: CPT | Performed by: FAMILY MEDICINE

## 2020-05-22 PROCEDURE — 3078F DIAST BP <80 MM HG: CPT | Performed by: FAMILY MEDICINE

## 2020-05-22 PROCEDURE — 1160F RVW MEDS BY RX/DR IN RCRD: CPT | Performed by: FAMILY MEDICINE

## 2020-05-22 PROCEDURE — 3074F SYST BP LT 130 MM HG: CPT | Performed by: FAMILY MEDICINE

## 2020-05-22 PROCEDURE — 83036 HEMOGLOBIN GLYCOSYLATED A1C: CPT | Performed by: FAMILY MEDICINE

## 2020-05-22 PROCEDURE — 4010F ACE/ARB THERAPY RXD/TAKEN: CPT | Performed by: FAMILY MEDICINE

## 2020-05-22 PROCEDURE — 4040F PNEUMOC VAC/ADMIN/RCVD: CPT | Performed by: FAMILY MEDICINE

## 2020-05-22 PROCEDURE — 3051F HG A1C>EQUAL 7.0%<8.0%: CPT | Performed by: FAMILY MEDICINE

## 2020-05-22 PROCEDURE — 1036F TOBACCO NON-USER: CPT | Performed by: FAMILY MEDICINE

## 2020-05-22 RX ORDER — ENALAPRIL MALEATE 10 MG/1
10 TABLET ORAL DAILY
Qty: 90 TABLET | Refills: 1 | Status: SHIPPED | OUTPATIENT
Start: 2020-05-22 | End: 2020-09-26

## 2020-05-22 RX ORDER — INSULIN LISPRO 100 [IU]/ML
45 INJECTION, SUSPENSION SUBCUTANEOUS 2 TIMES DAILY WITH MEALS
Qty: 27 PEN | Refills: 3 | Status: SHIPPED | OUTPATIENT
Start: 2020-05-22 | End: 2020-12-07 | Stop reason: SDUPTHER

## 2020-06-04 ENCOUNTER — OFFICE VISIT (OUTPATIENT)
Dept: FAMILY MEDICINE CLINIC | Facility: CLINIC | Age: 75
End: 2020-06-04
Payer: MEDICARE

## 2020-06-04 VITALS
SYSTOLIC BLOOD PRESSURE: 110 MMHG | TEMPERATURE: 98.3 F | HEIGHT: 68 IN | BODY MASS INDEX: 29.55 KG/M2 | HEART RATE: 74 BPM | WEIGHT: 195 LBS | DIASTOLIC BLOOD PRESSURE: 58 MMHG | RESPIRATION RATE: 16 BRPM | OXYGEN SATURATION: 98 %

## 2020-06-04 DIAGNOSIS — R51.9 SINUS HEADACHE: ICD-10-CM

## 2020-06-04 DIAGNOSIS — R42 DIZZINESS: Primary | ICD-10-CM

## 2020-06-04 PROCEDURE — 3074F SYST BP LT 130 MM HG: CPT | Performed by: FAMILY MEDICINE

## 2020-06-04 PROCEDURE — 1036F TOBACCO NON-USER: CPT | Performed by: FAMILY MEDICINE

## 2020-06-04 PROCEDURE — 3008F BODY MASS INDEX DOCD: CPT | Performed by: FAMILY MEDICINE

## 2020-06-04 PROCEDURE — 1160F RVW MEDS BY RX/DR IN RCRD: CPT | Performed by: FAMILY MEDICINE

## 2020-06-04 PROCEDURE — 3078F DIAST BP <80 MM HG: CPT | Performed by: FAMILY MEDICINE

## 2020-06-04 PROCEDURE — 99214 OFFICE O/P EST MOD 30 MIN: CPT | Performed by: FAMILY MEDICINE

## 2020-06-04 PROCEDURE — 3051F HG A1C>EQUAL 7.0%<8.0%: CPT | Performed by: FAMILY MEDICINE

## 2020-06-04 PROCEDURE — 4040F PNEUMOC VAC/ADMIN/RCVD: CPT | Performed by: FAMILY MEDICINE

## 2020-06-04 RX ORDER — MECLIZINE HCL 12.5 MG/1
12.5 TABLET ORAL EVERY 8 HOURS SCHEDULED
Qty: 30 TABLET | Refills: 0 | Status: SHIPPED | OUTPATIENT
Start: 2020-06-04 | End: 2021-02-08

## 2020-06-04 RX ORDER — AZITHROMYCIN 250 MG/1
TABLET, FILM COATED ORAL
Qty: 6 TABLET | Refills: 0 | Status: SHIPPED | OUTPATIENT
Start: 2020-06-04 | End: 2020-06-09

## 2020-06-05 ENCOUNTER — TELEPHONE (OUTPATIENT)
Dept: FAMILY MEDICINE CLINIC | Facility: CLINIC | Age: 75
End: 2020-06-05

## 2020-06-05 DIAGNOSIS — R51.9 SINUS HEADACHE: Primary | ICD-10-CM

## 2020-06-05 RX ORDER — LEVOFLOXACIN 500 MG/1
500 TABLET, FILM COATED ORAL EVERY 24 HOURS
Qty: 7 TABLET | Refills: 0 | Status: SHIPPED | OUTPATIENT
Start: 2020-06-05 | End: 2020-06-12

## 2020-09-03 DIAGNOSIS — Z79.4 TYPE 2 DIABETES MELLITUS WITHOUT COMPLICATION, WITH LONG-TERM CURRENT USE OF INSULIN (HCC): ICD-10-CM

## 2020-09-03 DIAGNOSIS — E11.9 TYPE 2 DIABETES MELLITUS WITHOUT COMPLICATION, WITH LONG-TERM CURRENT USE OF INSULIN (HCC): ICD-10-CM

## 2020-09-04 RX ORDER — PEN NEEDLE, DIABETIC 31 GX5/16"
NEEDLE, DISPOSABLE MISCELLANEOUS
Qty: 180 EACH | Refills: 0 | Status: SHIPPED | OUTPATIENT
Start: 2020-09-04 | End: 2020-10-13 | Stop reason: SDUPTHER

## 2020-09-26 DIAGNOSIS — I10 BENIGN ESSENTIAL HYPERTENSION: ICD-10-CM

## 2020-09-26 RX ORDER — ENALAPRIL MALEATE 10 MG/1
TABLET ORAL
Qty: 90 TABLET | Refills: 3 | Status: SHIPPED | OUTPATIENT
Start: 2020-09-26 | End: 2020-10-13 | Stop reason: SDUPTHER

## 2020-10-13 DIAGNOSIS — E11.9 TYPE 2 DIABETES MELLITUS WITHOUT COMPLICATION, WITH LONG-TERM CURRENT USE OF INSULIN (HCC): ICD-10-CM

## 2020-10-13 DIAGNOSIS — I10 BENIGN ESSENTIAL HYPERTENSION: ICD-10-CM

## 2020-10-13 DIAGNOSIS — Z79.4 TYPE 2 DIABETES MELLITUS WITHOUT COMPLICATION, WITH LONG-TERM CURRENT USE OF INSULIN (HCC): ICD-10-CM

## 2020-10-14 RX ORDER — ENALAPRIL MALEATE 10 MG/1
10 TABLET ORAL DAILY
Qty: 90 TABLET | Refills: 0 | Status: SHIPPED | OUTPATIENT
Start: 2020-10-14 | End: 2020-12-07 | Stop reason: SDUPTHER

## 2020-10-14 RX ORDER — PEN NEEDLE, DIABETIC 31 GX5/16"
NEEDLE, DISPOSABLE MISCELLANEOUS
Qty: 180 EACH | Refills: 0 | Status: SHIPPED | OUTPATIENT
Start: 2020-10-14 | End: 2020-12-07 | Stop reason: SDUPTHER

## 2020-11-09 ENCOUNTER — CLINICAL SUPPORT (OUTPATIENT)
Dept: FAMILY MEDICINE CLINIC | Facility: CLINIC | Age: 75
End: 2020-11-09
Payer: MEDICARE

## 2020-11-09 DIAGNOSIS — Z23 NEED FOR INFLUENZA VACCINATION: Primary | ICD-10-CM

## 2020-11-09 PROCEDURE — G0008 ADMIN INFLUENZA VIRUS VAC: HCPCS

## 2020-11-09 PROCEDURE — 90662 IIV NO PRSV INCREASED AG IM: CPT

## 2020-11-20 ENCOUNTER — LAB (OUTPATIENT)
Dept: LAB | Facility: CLINIC | Age: 75
End: 2020-11-20
Payer: MEDICARE

## 2020-11-20 DIAGNOSIS — Z79.4 TYPE 2 DIABETES MELLITUS WITHOUT COMPLICATION, WITH LONG-TERM CURRENT USE OF INSULIN (HCC): ICD-10-CM

## 2020-11-20 DIAGNOSIS — E11.9 TYPE 2 DIABETES MELLITUS WITHOUT COMPLICATION, WITH LONG-TERM CURRENT USE OF INSULIN (HCC): ICD-10-CM

## 2020-11-20 LAB
ALBUMIN SERPL BCP-MCNC: 3.6 G/DL (ref 3.5–5)
ALP SERPL-CCNC: 66 U/L (ref 46–116)
ALT SERPL W P-5'-P-CCNC: 20 U/L (ref 12–78)
ANION GAP SERPL CALCULATED.3IONS-SCNC: 4 MMOL/L (ref 4–13)
AST SERPL W P-5'-P-CCNC: 11 U/L (ref 5–45)
BILIRUB SERPL-MCNC: 0.44 MG/DL (ref 0.2–1)
BUN SERPL-MCNC: 15 MG/DL (ref 5–25)
CALCIUM SERPL-MCNC: 9.2 MG/DL (ref 8.3–10.1)
CHLORIDE SERPL-SCNC: 106 MMOL/L (ref 100–108)
CHOLEST SERPL-MCNC: 144 MG/DL (ref 50–200)
CO2 SERPL-SCNC: 27 MMOL/L (ref 21–32)
CREAT SERPL-MCNC: 1.04 MG/DL (ref 0.6–1.3)
CREAT UR-MCNC: 77.8 MG/DL
EST. AVERAGE GLUCOSE BLD GHB EST-MCNC: 148 MG/DL
GFR SERPL CREATININE-BSD FRML MDRD: 69 ML/MIN/1.73SQ M
GLUCOSE P FAST SERPL-MCNC: 109 MG/DL (ref 65–99)
HBA1C MFR BLD: 6.8 %
HDLC SERPL-MCNC: 43 MG/DL
LDLC SERPL CALC-MCNC: 83 MG/DL (ref 0–100)
MICROALBUMIN UR-MCNC: 28.2 MG/L (ref 0–20)
MICROALBUMIN/CREAT 24H UR: 36 MG/G CREATININE (ref 0–30)
NONHDLC SERPL-MCNC: 101 MG/DL
POTASSIUM SERPL-SCNC: 4.2 MMOL/L (ref 3.5–5.3)
PROT SERPL-MCNC: 7.7 G/DL (ref 6.4–8.2)
SODIUM SERPL-SCNC: 137 MMOL/L (ref 136–145)
TRIGL SERPL-MCNC: 89 MG/DL

## 2020-11-20 PROCEDURE — 36415 COLL VENOUS BLD VENIPUNCTURE: CPT

## 2020-11-20 PROCEDURE — 83036 HEMOGLOBIN GLYCOSYLATED A1C: CPT

## 2020-11-20 PROCEDURE — 82043 UR ALBUMIN QUANTITATIVE: CPT

## 2020-11-20 PROCEDURE — 80053 COMPREHEN METABOLIC PANEL: CPT

## 2020-11-20 PROCEDURE — 80061 LIPID PANEL: CPT

## 2020-11-20 PROCEDURE — 82570 ASSAY OF URINE CREATININE: CPT

## 2020-12-06 DIAGNOSIS — Z79.4 TYPE 2 DIABETES MELLITUS WITHOUT COMPLICATION, WITH LONG-TERM CURRENT USE OF INSULIN (HCC): ICD-10-CM

## 2020-12-06 DIAGNOSIS — E11.9 TYPE 2 DIABETES MELLITUS WITHOUT COMPLICATION, WITH LONG-TERM CURRENT USE OF INSULIN (HCC): ICD-10-CM

## 2020-12-07 ENCOUNTER — OFFICE VISIT (OUTPATIENT)
Dept: FAMILY MEDICINE CLINIC | Facility: CLINIC | Age: 75
End: 2020-12-07
Payer: MEDICARE

## 2020-12-07 ENCOUNTER — APPOINTMENT (OUTPATIENT)
Dept: RADIOLOGY | Facility: CLINIC | Age: 75
End: 2020-12-07
Payer: MEDICARE

## 2020-12-07 VITALS
RESPIRATION RATE: 16 BRPM | WEIGHT: 190 LBS | DIASTOLIC BLOOD PRESSURE: 60 MMHG | BODY MASS INDEX: 28.79 KG/M2 | SYSTOLIC BLOOD PRESSURE: 124 MMHG | HEIGHT: 68 IN | TEMPERATURE: 98.4 F | HEART RATE: 76 BPM

## 2020-12-07 DIAGNOSIS — E78.5 HYPERLIPIDEMIA, UNSPECIFIED HYPERLIPIDEMIA TYPE: ICD-10-CM

## 2020-12-07 DIAGNOSIS — I10 BENIGN ESSENTIAL HYPERTENSION: ICD-10-CM

## 2020-12-07 DIAGNOSIS — Z00.00 MEDICARE ANNUAL WELLNESS VISIT, SUBSEQUENT: ICD-10-CM

## 2020-12-07 DIAGNOSIS — M79.672 FOOT PAIN, LEFT: ICD-10-CM

## 2020-12-07 DIAGNOSIS — Z79.4 TYPE 2 DIABETES MELLITUS WITH MICROALBUMINURIA, WITH LONG-TERM CURRENT USE OF INSULIN (HCC): Primary | ICD-10-CM

## 2020-12-07 DIAGNOSIS — E11.29 TYPE 2 DIABETES MELLITUS WITH MICROALBUMINURIA, WITH LONG-TERM CURRENT USE OF INSULIN (HCC): Primary | ICD-10-CM

## 2020-12-07 DIAGNOSIS — L03.114 CELLULITIS OF LEFT UPPER EXTREMITY: ICD-10-CM

## 2020-12-07 DIAGNOSIS — R80.9 TYPE 2 DIABETES MELLITUS WITH MICROALBUMINURIA, WITH LONG-TERM CURRENT USE OF INSULIN (HCC): Primary | ICD-10-CM

## 2020-12-07 PROCEDURE — 73630 X-RAY EXAM OF FOOT: CPT

## 2020-12-07 PROCEDURE — G0439 PPPS, SUBSEQ VISIT: HCPCS | Performed by: FAMILY MEDICINE

## 2020-12-07 PROCEDURE — 99214 OFFICE O/P EST MOD 30 MIN: CPT | Performed by: FAMILY MEDICINE

## 2020-12-07 RX ORDER — INSULIN LISPRO 100 [IU]/ML
INJECTION, SUSPENSION SUBCUTANEOUS
Qty: 90 ML | Refills: 3 | Status: SHIPPED | OUTPATIENT
Start: 2020-12-07 | End: 2021-09-07 | Stop reason: SDUPTHER

## 2020-12-07 RX ORDER — PEN NEEDLE, DIABETIC 31 GX5/16"
NEEDLE, DISPOSABLE MISCELLANEOUS
Qty: 180 EACH | Refills: 3 | Status: SHIPPED | OUTPATIENT
Start: 2020-12-07 | End: 2021-09-07 | Stop reason: SDUPTHER

## 2020-12-07 RX ORDER — INSULIN LISPRO 100 [IU]/ML
45 INJECTION, SUSPENSION SUBCUTANEOUS 2 TIMES DAILY WITH MEALS
Qty: 27 PEN | Refills: 3 | Status: SHIPPED | OUTPATIENT
Start: 2020-12-07 | End: 2021-06-07 | Stop reason: SDUPTHER

## 2020-12-07 RX ORDER — SULFAMETHOXAZOLE AND TRIMETHOPRIM 800; 160 MG/1; MG/1
1 TABLET ORAL EVERY 12 HOURS SCHEDULED
Qty: 14 TABLET | Refills: 0 | Status: SHIPPED | OUTPATIENT
Start: 2020-12-07 | End: 2020-12-14

## 2020-12-07 RX ORDER — ENALAPRIL MALEATE 10 MG/1
10 TABLET ORAL DAILY
Qty: 90 TABLET | Refills: 3 | Status: SHIPPED | OUTPATIENT
Start: 2020-12-07 | End: 2021-09-07 | Stop reason: SDUPTHER

## 2020-12-09 ENCOUNTER — TELEPHONE (OUTPATIENT)
Dept: FAMILY MEDICINE CLINIC | Facility: CLINIC | Age: 75
End: 2020-12-09

## 2020-12-10 ENCOUNTER — TELEPHONE (OUTPATIENT)
Dept: FAMILY MEDICINE CLINIC | Facility: CLINIC | Age: 75
End: 2020-12-10

## 2020-12-29 ENCOUNTER — TRANSCRIBE ORDERS (OUTPATIENT)
Dept: LAB | Facility: CLINIC | Age: 75
End: 2020-12-29

## 2020-12-29 ENCOUNTER — LAB (OUTPATIENT)
Dept: LAB | Facility: CLINIC | Age: 75
End: 2020-12-29
Payer: MEDICARE

## 2020-12-29 DIAGNOSIS — R97.20 ABNORMAL PSA: ICD-10-CM

## 2020-12-29 DIAGNOSIS — R97.20 ABNORMAL PSA: Primary | ICD-10-CM

## 2020-12-29 LAB — PSA SERPL-MCNC: 2.8 NG/ML (ref 0–4)

## 2020-12-29 PROCEDURE — 84153 ASSAY OF PSA TOTAL: CPT

## 2021-01-04 ENCOUNTER — OFFICE VISIT (OUTPATIENT)
Dept: PODIATRY | Facility: CLINIC | Age: 76
End: 2021-01-04
Payer: MEDICARE

## 2021-01-04 VITALS
DIASTOLIC BLOOD PRESSURE: 72 MMHG | RESPIRATION RATE: 17 BRPM | HEIGHT: 68 IN | SYSTOLIC BLOOD PRESSURE: 147 MMHG | BODY MASS INDEX: 28.79 KG/M2 | HEART RATE: 80 BPM | WEIGHT: 190 LBS

## 2021-01-04 DIAGNOSIS — E11.42 DIABETIC POLYNEUROPATHY ASSOCIATED WITH TYPE 2 DIABETES MELLITUS (HCC): ICD-10-CM

## 2021-01-04 DIAGNOSIS — M76.822 POSTERIOR TIBIAL TENDINITIS OF LEFT LOWER EXTREMITY: Primary | ICD-10-CM

## 2021-01-04 DIAGNOSIS — M79.672 LEFT FOOT PAIN: ICD-10-CM

## 2021-01-04 PROCEDURE — 2028F FOOT EXAM PERFORMED: CPT | Performed by: PODIATRIST

## 2021-01-04 PROCEDURE — 99202 OFFICE O/P NEW SF 15 MIN: CPT | Performed by: PODIATRIST

## 2021-01-04 PROCEDURE — 29540 STRAPPING ANKLE &/FOOT: CPT | Performed by: PODIATRIST

## 2021-01-04 PROCEDURE — 20551 NJX 1 TENDON ORIGIN/INSJ: CPT | Performed by: PODIATRIST

## 2021-01-04 NOTE — PROGRESS NOTES
Assessment/Plan:  Posterior tibial tendinitis left foot  Left foot pain  Pain upon ambulation  Diabetic neuropathy  Pes planus  Plan  Foot exam performed  Patient educated on condition  Diabetic foot exam performed  Patient educated on care of the diabetic foot  Left foot trigger point injection done  1 25 cc of Kenalog 10 injected into left posterior tibial tendon insertion area  In addition left arch bound a low dye arch strapping fashion         Diagnoses and all orders for this visit:    Posterior tibial tendinitis of left lower extremity    Left foot pain    Diabetic polyneuropathy associated with type 2 diabetes mellitus ([facility] [address] )          Subjective:  Patient is diabetic  He has pain in his left ankle  This has been ongoing for months  He believes this is due to the fact he is limping  He has right knee pain              Allergies   Allergen Reactions    Amoxicillin Rash         Current Outpatient Medications:     bacitracin ophthalmic ointment, every 4 (four) hours, Disp: , Rfl:     cycloSPORINE (RESTASIS) 0 05 % ophthalmic emulsion, 1 drop 2 (two) times a day, Disp: , Rfl:     enalapril (VASOTEC) 10 mg tablet, Take 1 tablet (10 mg total) by mouth daily, Disp: 90 tablet, Rfl: 3    glucose blood test strip, 1 each by Other route 3 (three) times a day, Disp: 300 each, Rfl: 3    HumaLOG Mix 75/25 KwikPen (75-25) 100 units/mL injection pen, INJECT SUBCUTANEOUSLY 45  UNITS TWICE DAILY WITH  MEALS, Disp: 90 mL, Rfl: 3    HumaLOG Mix 75/25 KwikPen (75-25) 100 units/mL injection pen, Inject 45 Units under the skin 2 (two) times a day with meals, Disp: 27 pen, Rfl: 3    Insulin Pen Needle (B-D UF III MINI PEN NEEDLES) 31G X 5 MM MISC, Use twice daily with insulin pen as directed, Disp: 180 each, Rfl: 3    latanoprost (XALATAN) 0 005 % ophthalmic solution, , Disp: , Rfl:     meclizine (ANTIVERT) 12 5 MG tablet, Take 1 tablet (12 5 mg total) by mouth every 8 (eight) hours, Disp: 30 tablet, Rfl: 0    simvastatin (ZOCOR) 40 mg tablet, Take 1 tablet (40 mg total) by mouth daily at bedtime, Disp: 90 tablet, Rfl: 3    tamsulosin (FLOMAX) 0 4 mg, TAKE 1 CAPSULE BY MOUTH TWO TIMES DAILY, Disp: 180 capsule, Rfl: 1    Vitamin D, Cholecalciferol, 1000 UNITS CAPS, Take by mouth 2 times a week, Disp: , Rfl:     Patient Active Problem List   Diagnosis    Bladder stones    Benign colon polyp    Benign essential hypertension    Benign prostatic hyperplasia with lower urinary tract symptoms    Hyperlipidemia    Type 2 diabetes mellitus with microalbuminuria, with long-term current use of insulin ([facility])    Vitamin D deficiency          Patient ID: Henry Orozco is a 66 y o  male  HPI    The following portions of the patient's history were reviewed and updated as appropriate: He  has a past medical history of Anesthesia complication (6821), Arthritis, Bladder calculi, BPH (benign prostatic hyperplasia), Diabetes mellitus ([facility] [address] ), Hearing disorder of both ears, Hyperlipidemia, Hypertension, Kidney stones, Lyme disease, and Rupture, bladder, spontaneous  He   Patient Active Problem List    Diagnosis Date Noted    Bladder stones 12/15/2016    Type 2 diabetes mellitus with microalbuminuria, with long-term current use of insulin ([facility] 03/16/2016    Benign colon polyp 04/11/2013    Benign prostatic hyperplasia with lower urinary tract symptoms 01/11/2013    Benign essential hypertension 01/03/2013    Hyperlipidemia 09/05/2012    Vitamin D deficiency 09/05/2012     He  has a past surgical history that includes Other surgical history; Colonoscopy; Tonsillectomy; Bladder repair (N/A, 12/10/2016); Transurethral resection of prostate (N/A, 12/8/2016); and Cystolithotomy  His family history includes Alzheimer's disease in his sister; Cancer in his father and mother; Diabetes in his mother; Prostate cancer in his father  He  reports that he quit smoking about 38 years ago  His smoking use included cigars   He has never used smokeless tobacco  He reports current alcohol use  He reports that he does not use drugs  Current Outpatient Medications   Medication Sig Dispense Refill    bacitracin ophthalmic ointment every 4 (four) hours      cycloSPORINE (RESTASIS) 0 05 % ophthalmic emulsion 1 drop 2 (two) times a day      enalapril (VASOTEC) 10 mg tablet Take 1 tablet (10 mg total) by mouth daily 90 tablet 3    glucose blood test strip 1 each by Other route 3 (three) times a day 300 each 3    HumaLOG Mix 75/25 KwikPen (75-25) 100 units/mL injection pen INJECT SUBCUTANEOUSLY 45  UNITS TWICE DAILY WITH  MEALS 90 mL 3    HumaLOG Mix 75/25 KwikPen (75-25) 100 units/mL injection pen Inject 45 Units under the skin 2 (two) times a day with meals 27 pen 3    Insulin Pen Needle (B-D UF III MINI PEN NEEDLES) 31G X 5 MM MISC Use twice daily with insulin pen as directed 180 each 3    latanoprost (XALATAN) 0 005 % ophthalmic solution       meclizine (ANTIVERT) 12 5 MG tablet Take 1 tablet (12 5 mg total) by mouth every 8 (eight) hours 30 tablet 0    simvastatin (ZOCOR) 40 mg tablet Take 1 tablet (40 mg total) by mouth daily at bedtime 90 tablet 3    tamsulosin (FLOMAX) 0 4 mg TAKE 1 CAPSULE BY MOUTH TWO TIMES DAILY 180 capsule 1    Vitamin D, Cholecalciferol, 1000 UNITS CAPS Take by mouth 2 times a week       No current facility-administered medications for this visit        Current Outpatient Medications on File Prior to Visit   Medication Sig    bacitracin ophthalmic ointment every 4 (four) hours    cycloSPORINE (RESTASIS) 0 05 % ophthalmic emulsion 1 drop 2 (two) times a day    enalapril (VASOTEC) 10 mg tablet Take 1 tablet (10 mg total) by mouth daily    glucose blood test strip 1 each by Other route 3 (three) times a day    HumaLOG Mix 75/25 KwikPen (75-25) 100 units/mL injection pen INJECT SUBCUTANEOUSLY 45  UNITS TWICE DAILY WITH  MEALS    HumaLOG Mix 75/25 KwikPen (75-25) 100 units/mL injection pen Inject 45 Units under the skin 2 (two) times a day with meals    Insulin Pen Needle (B-D UF III MINI PEN NEEDLES) 31G X 5 MM MISC Use twice daily with insulin pen as directed    latanoprost (XALATAN) 0 005 % ophthalmic solution     meclizine (ANTIVERT) 12 5 MG tablet Take 1 tablet (12 5 mg total) by mouth every 8 (eight) hours    simvastatin (ZOCOR) 40 mg tablet Take 1 tablet (40 mg total) by mouth daily at bedtime    tamsulosin (FLOMAX) 0 4 mg TAKE 1 CAPSULE BY MOUTH TWO TIMES DAILY    Vitamin D, Cholecalciferol, 1000 UNITS CAPS Take by mouth 2 times a week     No current facility-administered medications on file prior to visit  He is allergic to amoxicillin       Vitals:    01/04/21 1537   BP: 147/72   Pulse: 80   Resp: 17       Review of Systems      Objective:  Patient's shoes and socks removed  Foot Exam    General  General Appearance: appears stated age and healthy   Orientation: alert and oriented to person, place, and time   Affect: appropriate   Gait: antalgic   Assistance: cane use       Right Foot/Ankle     Inspection and Palpation  Ecchymosis: none  Swelling: dorsum   Arch: pes cavus  Hammertoes: fifth toe  Hallux valgus: no  Hallux limitus: yes  Skin Exam: callus and maceration; Neurovascular  Dorsalis pedis: 2+  Posterior tibial: 2+  Saphenous nerve sensation: diminished  Tibial nerve sensation: diminished  Superficial peroneal nerve sensation: diminished  Deep peroneal nerve sensation: diminished  Sural nerve sensation: diminished      Left Foot/Ankle      Inspection and Palpation  Ecchymosis: none  Tenderness: bony tenderness and metatarsals   Swelling: dorsum   Arch: pes cavus  Hammertoes: fifth toe  Hallux valgus: no  Hallux limitus: yes  Skin Exam: callus and maceration;      Neurovascular  Dorsalis pedis: 2+  Posterior tibial: 2+  Saphenous nerve sensation: diminished  Tibial nerve sensation: diminished  Superficial peroneal nerve sensation: diminished  Deep peroneal nerve sensation: diminished  Sural nerve sensation: diminished        Physical Exam  Vitals signs and nursing note reviewed  Cardiovascular:      Pulses: no weak pulses          Dorsalis pedis pulses are 2+ on the right side and 2+ on the left side  Posterior tibial pulses are 2+ on the right side and 2+ on the left side  Musculoskeletal:         General: Deformity present  Right foot: No bunion  Left foot: Bony tenderness present  No bunion  Comments: Pain with palpation posterior tibial tendon insertion  Negative evidence of rupture  Tendon test 5/5   Feet:      Right foot:      Skin integrity: Skin breakdown and callus present  Left foot:      Skin integrity: Skin breakdown and callus present  Patient's shoes and socks removed  Right Foot/Ankle   Right Foot Inspection  Skin Exam: callus, maceration and callus                              Vascular    The right DP pulse is 2+  The right PT pulse is 2+  Right Toe  - Comprehensive Exam  Ecchymosis: none  Arch: pes cavus  Hammertoes: fifth toe  Hallux valgus: no  Hallux limitus: yes  Swelling: dorsum         Left Foot/Ankle  Left Foot Inspection  Skin Exam: maceration and callus                                           Vascular    The left DP pulse is 2+  The left PT pulse is 2+  Left Toe  - Comprehensive Exam  Ecchymosis: none  Arch: pes cavus  Hammertoes: fifth toe  Hallux valgus: no  Hallux limitus: yes  Swelling: dorsum   Tenderness: bony tenderness and metatarsals       Assign Risk Category:  Deformity present; Loss of protective sensation;  No weak pulses       Risk: 1

## 2021-02-08 ENCOUNTER — OFFICE VISIT (OUTPATIENT)
Dept: FAMILY MEDICINE CLINIC | Facility: CLINIC | Age: 76
End: 2021-02-08
Payer: MEDICARE

## 2021-02-08 VITALS
TEMPERATURE: 99.8 F | BODY MASS INDEX: 29.4 KG/M2 | RESPIRATION RATE: 18 BRPM | WEIGHT: 194 LBS | HEART RATE: 88 BPM | HEIGHT: 68 IN | SYSTOLIC BLOOD PRESSURE: 144 MMHG | DIASTOLIC BLOOD PRESSURE: 60 MMHG

## 2021-02-08 DIAGNOSIS — M25.561 CHRONIC PAIN OF RIGHT KNEE: Primary | ICD-10-CM

## 2021-02-08 DIAGNOSIS — G89.29 CHRONIC PAIN OF RIGHT KNEE: Primary | ICD-10-CM

## 2021-02-08 DIAGNOSIS — M79.671 FOOT PAIN, RIGHT: ICD-10-CM

## 2021-02-08 PROCEDURE — 99214 OFFICE O/P EST MOD 30 MIN: CPT | Performed by: FAMILY MEDICINE

## 2021-02-08 RX ORDER — MELOXICAM 15 MG/1
15 TABLET ORAL DAILY
Qty: 30 TABLET | Refills: 0 | Status: SHIPPED | OUTPATIENT
Start: 2021-02-08 | End: 2021-09-07

## 2021-02-08 NOTE — PROGRESS NOTES
Chief Complaint   Patient presents with    right ankle and knee pain        Patient ID: Andrew Bruce is a 66 y o  male  HPI  Pt is seeing for worsening of R knee pain and stiffness over last 6 months -  Was Dx with severe OA in the past -  Was offered knee replacement  -  Declined  -  using a cane - also noticed R foot pain x 2 wks -  No swelling, no injury     The following portions of the patient's history were reviewed and updated as appropriate: allergies, current medications, past family history, past medical history, past social history, past surgical history and problem list     Review of Systems   Constitutional: Negative  Respiratory: Negative  Cardiovascular: Negative  Gastrointestinal: Negative  Genitourinary: Negative  Neurological: Negative          Current Outpatient Medications   Medication Sig Dispense Refill    bacitracin ophthalmic ointment every 4 (four) hours      cycloSPORINE (RESTASIS) 0 05 % ophthalmic emulsion 1 drop 2 (two) times a day      enalapril (VASOTEC) 10 mg tablet Take 1 tablet (10 mg total) by mouth daily 90 tablet 3    glucose blood test strip 1 each by Other route 3 (three) times a day 300 each 3    HumaLOG Mix 75/25 KwikPen (75-25) 100 units/mL injection pen INJECT SUBCUTANEOUSLY 45  UNITS TWICE DAILY WITH  MEALS 90 mL 3    HumaLOG Mix 75/25 KwikPen (75-25) 100 units/mL injection pen Inject 45 Units under the skin 2 (two) times a day with meals 27 pen 3    Insulin Pen Needle (B-D UF III MINI PEN NEEDLES) 31G X 5 MM MISC Use twice daily with insulin pen as directed 180 each 3    latanoprost (XALATAN) 0 005 % ophthalmic solution       simvastatin (ZOCOR) 40 mg tablet Take 1 tablet (40 mg total) by mouth daily at bedtime 90 tablet 3    tamsulosin (FLOMAX) 0 4 mg TAKE 1 CAPSULE BY MOUTH TWO TIMES DAILY 180 capsule 1    Vitamin D, Cholecalciferol, 1000 UNITS CAPS Take by mouth 2 times a week       No current facility-administered medications for this visit  Objective:    /60   Pulse 88   Temp 99 8 °F (37 7 °C) (Tympanic)   Resp 18   Ht 5' 8" (1 727 m)   Wt 88 kg (194 lb)   BMI 29 50 kg/m²        Physical Exam  Constitutional:       Appearance: He is not ill-appearing  Musculoskeletal:      Right knee: He exhibits decreased range of motion and deformity (DJD changes )  He exhibits no swelling and no effusion  Right lower leg: No edema  Left lower leg: No edema  Right foot: Normal range of motion  No swelling or deformity  Feet:    Neurological:      Mental Status: He is alert  Assessment/Plan:         Diagnoses and all orders for this visit:    Chronic pain of right knee  -     Ambulatory referral to Orthopedic Surgery; Future    Foot pain, right  -     Ambulatory referral to Orthopedic Surgery; Future  -     meloxicam (MOBIC) 15 mg tablet;  Take 1 tablet (15 mg total) by mouth daily            rto prn                 Jodi Brand MD

## 2021-03-01 ENCOUNTER — OFFICE VISIT (OUTPATIENT)
Dept: OBGYN CLINIC | Facility: CLINIC | Age: 76
End: 2021-03-01
Payer: MEDICARE

## 2021-03-01 ENCOUNTER — APPOINTMENT (OUTPATIENT)
Dept: RADIOLOGY | Facility: CLINIC | Age: 76
End: 2021-03-01
Payer: MEDICARE

## 2021-03-01 VITALS
HEART RATE: 70 BPM | DIASTOLIC BLOOD PRESSURE: 61 MMHG | WEIGHT: 193.6 LBS | BODY MASS INDEX: 29.34 KG/M2 | SYSTOLIC BLOOD PRESSURE: 134 MMHG | HEIGHT: 68 IN

## 2021-03-01 DIAGNOSIS — E11.29 TYPE 2 DIABETES MELLITUS WITH MICROALBUMINURIA, WITH LONG-TERM CURRENT USE OF INSULIN (HCC): ICD-10-CM

## 2021-03-01 DIAGNOSIS — Z79.4 TYPE 2 DIABETES MELLITUS WITH MICROALBUMINURIA, WITH LONG-TERM CURRENT USE OF INSULIN (HCC): ICD-10-CM

## 2021-03-01 DIAGNOSIS — R80.9 TYPE 2 DIABETES MELLITUS WITH MICROALBUMINURIA, WITH LONG-TERM CURRENT USE OF INSULIN (HCC): ICD-10-CM

## 2021-03-01 DIAGNOSIS — Z01.89 ENCOUNTER FOR LOWER EXTREMITY COMPARISON IMAGING STUDY: ICD-10-CM

## 2021-03-01 DIAGNOSIS — M25.561 RIGHT KNEE PAIN, UNSPECIFIED CHRONICITY: ICD-10-CM

## 2021-03-01 DIAGNOSIS — M25.561 RIGHT KNEE PAIN, UNSPECIFIED CHRONICITY: Primary | ICD-10-CM

## 2021-03-01 DIAGNOSIS — M17.11 PRIMARY OSTEOARTHRITIS OF RIGHT KNEE: ICD-10-CM

## 2021-03-01 PROCEDURE — 99204 OFFICE O/P NEW MOD 45 MIN: CPT | Performed by: ORTHOPAEDIC SURGERY

## 2021-03-01 PROCEDURE — 73562 X-RAY EXAM OF KNEE 3: CPT

## 2021-03-01 PROCEDURE — 73560 X-RAY EXAM OF KNEE 1 OR 2: CPT

## 2021-03-01 NOTE — PROGRESS NOTES
Assessment/Plan:  1  Right knee pain, unspecified chronicity  XR knee 3 vw right non injury   2  Encounter for lower extremity comparison imaging study  XR knee 1 or 2 vw left   3  Primary osteoarthritis of right knee  Ambulatory referral to Physical Therapy    Injection procedure prior authorization   4  Type 2 diabetes mellitus with microalbuminuria, with long-term current use of insulin ([facility] [address] )         Scribe Attestation    I,:  Gildardo Trevizo Call am acting as a scribe while in the presence of the attending physician :       I,:  Rubén Henry MD personally performed the services described in this documentation    as scribed in my presence :             Mackenzie Cobb has severe osteoarthritis of the right knee that is significantly limiting the knee joint function  We discussed treatment options moving forward such as physical therapy his joint lubricating medications  Mackenzie Cobb is a type 2 diabetic therefore cortisone injection is a risk factor for hyperglycemia  Mackenzie Cobb would like to try the hyaluronic acid injections as well as physical therapy  A prescription and orders were provided for both  Our office will contact him once the Euflexxa injections arrive  I explained that this is 3 injections of 1 per week for 3 weeks  He verbally stated he understood  Ultimately, Mackenzie Cobb may require knee joint replacement  He would like to avoid surgery at all cost     Subjective:   Juana De Oliveira is a 66 y o  male who presents to the office today for evaluation of his right knee  Mackenzie Cobb states approximately 2 months ago he is prescribed Bactrim for infection on his left hand  Upon taking the medication he states all of his joints aching swollen and stiff  He discontinued infection after 4 days  He states all of his joints return to fairly normal function next occluding his right knee  He states he has a known history of osteoarthritis in the knee    His chief complaint is of persistent stiffness and weakness in the right knee   Attempting to flex the knee causes pain posteriorly that will radiate up into the thigh  He notes spasm into his lateral lower leg intermittently  This can be quite painful as well  Walking has been some challenging  He must take stairs 1 at a time  He has not found any alleviating factors  He denies distal paresthesias  Pertinent history includes a history of type 2 diabetes  He states his last A1c reading was 6 8  Review of Systems   Constitutional: Negative for chills, fever and unexpected weight change  HENT: Negative for hearing loss, nosebleeds and sore throat  Eyes: Negative for pain, redness and visual disturbance  Respiratory: Negative for cough, shortness of breath and wheezing  Cardiovascular: Negative for chest pain, palpitations and leg swelling  Gastrointestinal: Negative for abdominal pain, nausea and vomiting  Endocrine: Negative for polyphagia and polyuria  Genitourinary: Negative for dysuria and hematuria  Musculoskeletal: Positive for arthralgias, gait problem, joint swelling and myalgias  See HPI   Skin: Negative for rash and wound  Neurological: Negative for dizziness, numbness and headaches  Psychiatric/Behavioral: Negative for decreased concentration and suicidal ideas  The patient is not nervous/anxious            Past Medical History:   Diagnosis Date    Anesthesia complication 2079    after colonoscopy , pt was awake but could not control his body and kept falling     Arthritis     Bladder calculi     BPH (benign prostatic hyperplasia)     Diabetes mellitus ([address] )     Hearing disorder of both ears     wears bilateral hearing aids    Hyperlipidemia     controlled and maintained d/t diabetes    Hypertension     Kidney stones     Last Assessed:4/3/2017    Lyme disease     Last Assessed:6/29/2015    Rupture, bladder, spontaneous     Last Assessed:4/3/2017       Past Surgical History:   Procedure Laterality Date    BLADDER REPAIR N/A 12/10/2016    Procedure: REPAIR BLADDER/cysto insertion stent;  Surgeon: Matt De Los Santos MD;  Location: [facility];  Service:     COLONOSCOPY      CYSTOLITHOTOMY      ANESTHESIA   lower abdomen  ;  Last Assessed:4/3/2017    OTHER SURGICAL HISTORY      thermal dilation of the prostate x 2 ( in the office)    TONSILLECTOMY      TRANSURETHRAL RESECTION OF PROSTATE N/A 2016    Procedure:  Cysto, Laser Bladder stone,fulgaration of prostates tissue ;  Surgeon: Matt De Los Santos MD;  Location: [facility];  Service:        Family History   Problem Relation Age of Onset    Cancer Mother         breast    Diabetes Mother     Cancer Father         prostate w/ bone mets    Prostate cancer Father     Alzheimer's disease Sister        Social History     Occupational History    Not on file   Tobacco Use    Smoking status: Former Smoker     Types: Cigars     Quit date:      Years since quittin 1    Smokeless tobacco: Never Used   Substance and Sexual Activity    Alcohol use: Yes     Comment: occ    Drug use: No    Sexual activity: Not on file         Current Outpatient Medications:     bacitracin ophthalmic ointment, every 4 (four) hours, Disp: , Rfl:     cycloSPORINE (RESTASIS) 0 05 % ophthalmic emulsion, 1 drop 2 (two) times a day, Disp: , Rfl:     enalapril (VASOTEC) 10 mg tablet, Take 1 tablet (10 mg total) by mouth daily, Disp: 90 tablet, Rfl: 3    glucose blood test strip, 1 each by Other route 3 (three) times a day, Disp: 300 each, Rfl: 3    HumaLOG Mix 75/25 KwikPen (75-25) 100 units/mL injection pen, INJECT SUBCUTANEOUSLY 45  UNITS TWICE DAILY WITH  MEALS, Disp: 90 mL, Rfl: 3    HumaLOG Mix 75/25 KwikPen (75-25) 100 units/mL injection pen, Inject 45 Units under the skin 2 (two) times a day with meals, Disp: 27 pen, Rfl: 3    Insulin Pen Needle (B-D UF III MINI PEN NEEDLES) 31G X 5 MM MISC, Use twice daily with insulin pen as directed, Disp: 180 each, Rfl: 3    latanoprost (XALATAN) 0 005 % ophthalmic solution, , Disp: , Rfl:     meloxicam (MOBIC) 15 mg tablet, Take 1 tablet (15 mg total) by mouth daily, Disp: 30 tablet, Rfl: 0    simvastatin (ZOCOR) 40 mg tablet, Take 1 tablet (40 mg total) by mouth daily at bedtime, Disp: 90 tablet, Rfl: 3    tamsulosin (FLOMAX) 0 4 mg, TAKE 1 CAPSULE BY MOUTH TWO TIMES DAILY, Disp: 180 capsule, Rfl: 1    Vitamin D, Cholecalciferol, 1000 UNITS CAPS, Take by mouth 2 times a week, Disp: , Rfl:     Allergies   Allergen Reactions    Amoxicillin Rash       Objective:  Vitals:    03/01/21 0952   BP: 134/61   Pulse: 70       Right Knee Exam     Tenderness   The patient is experiencing tenderness in the lateral joint line (ITB band)  Range of Motion   Extension: -10   Flexion: 80     Tests   Varus: positive Valgus: negative    Other   Erythema: absent  Sensation: normal  Swelling: mild  Effusion: no effusion present    Comments:  Valgus posture            Observations     Right Knee   Negative for effusion  Physical Exam  Vitals signs reviewed  Constitutional:       Appearance: He is well-developed  HENT:      Head: Normocephalic and atraumatic  Eyes:      General:         Right eye: No discharge  Left eye: No discharge  Conjunctiva/sclera: Conjunctivae normal    Neck:      Musculoskeletal: Normal range of motion and neck supple  Cardiovascular:      Rate and Rhythm: Regular rhythm  Pulmonary:      Effort: Pulmonary effort is normal  No respiratory distress  Musculoskeletal:      Right knee: He exhibits no effusion  Skin:     General: Skin is warm and dry  Neurological:      Mental Status: He is alert and oriented to person, place, and time     Psychiatric:         Behavior: Behavior normal          I have personally reviewed pertinent films in PACS and my interpretation is as follows:  Xrays of the right knee demonstrate severe lateral compartment ostial arthritis as well as moderate to severe patellofemoral osteoarthritis

## 2021-03-10 ENCOUNTER — OFFICE VISIT (OUTPATIENT)
Dept: OBGYN CLINIC | Facility: CLINIC | Age: 76
End: 2021-03-10
Payer: MEDICARE

## 2021-03-10 DIAGNOSIS — M17.11 PRIMARY OSTEOARTHRITIS OF RIGHT KNEE: Primary | ICD-10-CM

## 2021-03-10 PROCEDURE — 20610 DRAIN/INJ JOINT/BURSA W/O US: CPT | Performed by: ORTHOPAEDIC SURGERY

## 2021-03-10 RX ORDER — HYALURONATE SODIUM 10 MG/ML
20 SYRINGE (ML) INTRAARTICULAR
Status: COMPLETED | OUTPATIENT
Start: 2021-03-10 | End: 2021-03-10

## 2021-03-10 RX ADMIN — Medication 20 MG: at 11:36

## 2021-03-10 NOTE — PROGRESS NOTES
Assessment/Plan:  1  Primary osteoarthritis of right knee         Follow-up 1 week  Subjective:   Darius Lemus is a 66 y o  male who presents today for right knee euflexxa #1       Review of Systems      Past Medical History:   Diagnosis Date    Anesthesia complication 6048    after colonoscopy , pt was awake but could not control his body and kept falling     Arthritis     Bladder calculi     BPH (benign prostatic hyperplasia)     Diabetes mellitus ([address] )     Hearing disorder of both ears     wears bilateral hearing aids    Hyperlipidemia     controlled and maintained d/t diabetes    Hypertension     Kidney stones     Last Assessed:4/3/2017    Lyme disease     Last Assessed:2015    Rupture, bladder, spontaneous     Last Assessed:4/3/2017       Past Surgical History:   Procedure Laterality Date    BLADDER REPAIR N/A 12/10/2016    Procedure: REPAIR BLADDER/cysto insertion stent;  Surgeon: Elizabeth Guzmán MD;  Location: [address] [place] [address];  Service:     COLONOSCOPY      CYSTOLITHOTOMY      ANESTHESIA   lower abdomen  ;  Last Assessed:4/3/2017    OTHER SURGICAL HISTORY      thermal dilation of the prostate x 2 ( in the office)    TONSILLECTOMY      TRANSURETHRAL RESECTION OF PROSTATE N/A 2016    Procedure:  Cysto, Laser Bladder stone,fulgaration of prostates tissue ;  Surgeon: Elizabeth Guzmán MD;  Location: [facility];  Service:        Family History   Problem Relation Age of Onset    Cancer Mother         breast    Diabetes Mother     Cancer Father         prostate w/ bone mets    Prostate cancer Father     Alzheimer's disease Sister        Social History     Occupational History    Not on file   Tobacco Use    Smoking status: Former Smoker     Types: Cigars     Quit date:      Years since quittin 2    Smokeless tobacco: Never Used   Substance and Sexual Activity    Alcohol use: Yes     Comment: occ    Drug use: No    Sexual activity: Not on file         Current Outpatient Medications:     bacitracin ophthalmic ointment, every 4 (four) hours, Disp: , Rfl:     cycloSPORINE (RESTASIS) 0 05 % ophthalmic emulsion, 1 drop 2 (two) times a day, Disp: , Rfl:     enalapril (VASOTEC) 10 mg tablet, Take 1 tablet (10 mg total) by mouth daily, Disp: 90 tablet, Rfl: 3    glucose blood test strip, 1 each by Other route 3 (three) times a day, Disp: 300 each, Rfl: 3    HumaLOG Mix 75/25 KwikPen (75-25) 100 units/mL injection pen, INJECT SUBCUTANEOUSLY 45  UNITS TWICE DAILY WITH  MEALS, Disp: 90 mL, Rfl: 3    HumaLOG Mix 75/25 KwikPen (75-25) 100 units/mL injection pen, Inject 45 Units under the skin 2 (two) times a day with meals, Disp: 27 pen, Rfl: 3    Insulin Pen Needle (B-D UF III MINI PEN NEEDLES) 31G X 5 MM MISC, Use twice daily with insulin pen as directed, Disp: 180 each, Rfl: 3    latanoprost (XALATAN) 0 005 % ophthalmic solution, , Disp: , Rfl:     meloxicam (MOBIC) 15 mg tablet, Take 1 tablet (15 mg total) by mouth daily, Disp: 30 tablet, Rfl: 0    simvastatin (ZOCOR) 40 mg tablet, Take 1 tablet (40 mg total) by mouth daily at bedtime, Disp: 90 tablet, Rfl: 3    tamsulosin (FLOMAX) 0 4 mg, TAKE 1 CAPSULE BY MOUTH TWO TIMES DAILY, Disp: 180 capsule, Rfl: 1    Vitamin D, Cholecalciferol, 1000 UNITS CAPS, Take by mouth 2 times a week, Disp: , Rfl:     Allergies   Allergen Reactions    Amoxicillin Rash       Objective: There were no vitals filed for this visit      Ortho Exam    Physical Exam    Large joint arthrocentesis: R knee  Universal Protocol:  Consent given by: patient  Timeout called at: 3/10/2021 11:35 AM   Site marked: the operative site was marked  Supporting Documentation  Indications: pain   Procedure Details  Location: knee - R knee  Preparation: Patient was prepped and draped in the usual sterile fashion (Alcohol prep)  Needle size: 22 G  Ultrasound guidance: no  Approach: anterolateral  Medications administered: 20 mg Sodium Hyaluronate 20 MG/2ML    Patient tolerance: patient tolerated the procedure well with no immediate complications  Dressing:  Sterile dressing applied

## 2021-03-11 DIAGNOSIS — E78.5 HYPERLIPIDEMIA, UNSPECIFIED HYPERLIPIDEMIA TYPE: ICD-10-CM

## 2021-03-11 RX ORDER — SIMVASTATIN 40 MG
TABLET ORAL
Qty: 90 TABLET | Refills: 3 | Status: SHIPPED | OUTPATIENT
Start: 2021-03-11 | End: 2021-09-07 | Stop reason: SDUPTHER

## 2021-03-17 ENCOUNTER — OFFICE VISIT (OUTPATIENT)
Dept: OBGYN CLINIC | Facility: CLINIC | Age: 76
End: 2021-03-17
Payer: MEDICARE

## 2021-03-17 DIAGNOSIS — M17.11 PRIMARY OSTEOARTHRITIS OF RIGHT KNEE: Primary | ICD-10-CM

## 2021-03-17 PROCEDURE — 20610 DRAIN/INJ JOINT/BURSA W/O US: CPT | Performed by: ORTHOPAEDIC SURGERY

## 2021-03-17 RX ORDER — HYALURONATE SODIUM 10 MG/ML
20 SYRINGE (ML) INTRAARTICULAR
Status: COMPLETED | OUTPATIENT
Start: 2021-03-17 | End: 2021-03-17

## 2021-03-17 RX ADMIN — Medication 20 MG: at 11:24

## 2021-03-17 NOTE — PROGRESS NOTES
Assessment/Plan:  1  Primary osteoarthritis of right knee         Follow-up 1 week  Subjective:   Young Schilder is a 66 y o  male who presents today for euflexxa #2 right knee  Review of Systems      Past Medical History:   Diagnosis Date    Anesthesia complication 57    after colonoscopy , pt was awake but could not control his body and kept falling     Arthritis     Bladder calculi     BPH (benign prostatic hyperplasia)     Diabetes mellitus ([address] )     Hearing disorder of both ears     wears bilateral hearing aids    Hyperlipidemia     controlled and maintained d/t diabetes    Hypertension     Kidney stones     Last Assessed:4/3/2017    Lyme disease     Last Assessed:2015    Rupture, bladder, spontaneous     Last Assessed:4/3/2017       Past Surgical History:   Procedure Laterality Date    BLADDER REPAIR N/A 12/10/2016    Procedure: REPAIR BLADDER/cysto insertion stent;  Surgeon: Laura Wilkins MD;  Location: [address] [place] [address];  Service:     COLONOSCOPY      CYSTOLITHOTOMY      ANESTHESIA   lower abdomen  ;  Last Assessed:4/3/2017    OTHER SURGICAL HISTORY      thermal dilation of the prostate x 2 ( in the office)    TONSILLECTOMY      TRANSURETHRAL RESECTION OF PROSTATE N/A 2016    Procedure:  Cysto, Laser Bladder stone,fulgaration of prostates tissue ;  Surgeon: Laura Wilkins MD;  Location: [facility];  Service:        Family History   Problem Relation Age of Onset    Cancer Mother         breast    Diabetes Mother     Cancer Father         prostate w/ bone mets    Prostate cancer Father     Alzheimer's disease Sister        Social History     Occupational History    Not on file   Tobacco Use    Smoking status: Former Smoker     Types: Cigars     Quit date:      Years since quittin 2    Smokeless tobacco: Never Used   Substance and Sexual Activity    Alcohol use: Yes     Comment: occ    Drug use: No    Sexual activity: Not on file         Current Outpatient Medications:     bacitracin ophthalmic ointment, every 4 (four) hours, Disp: , Rfl:     cycloSPORINE (RESTASIS) 0 05 % ophthalmic emulsion, 1 drop 2 (two) times a day, Disp: , Rfl:     enalapril (VASOTEC) 10 mg tablet, Take 1 tablet (10 mg total) by mouth daily, Disp: 90 tablet, Rfl: 3    glucose blood test strip, 1 each by Other route 3 (three) times a day, Disp: 300 each, Rfl: 3    HumaLOG Mix 75/25 KwikPen (75-25) 100 units/mL injection pen, INJECT SUBCUTANEOUSLY 45  UNITS TWICE DAILY WITH  MEALS, Disp: 90 mL, Rfl: 3    HumaLOG Mix 75/25 KwikPen (75-25) 100 units/mL injection pen, Inject 45 Units under the skin 2 (two) times a day with meals, Disp: 27 pen, Rfl: 3    Insulin Pen Needle (B-D UF III MINI PEN NEEDLES) 31G X 5 MM MISC, Use twice daily with insulin pen as directed, Disp: 180 each, Rfl: 3    latanoprost (XALATAN) 0 005 % ophthalmic solution, , Disp: , Rfl:     meloxicam (MOBIC) 15 mg tablet, Take 1 tablet (15 mg total) by mouth daily, Disp: 30 tablet, Rfl: 0    simvastatin (ZOCOR) 40 mg tablet, TAKE 1 TABLET BY MOUTH  DAILY AT BEDTIME, Disp: 90 tablet, Rfl: 3    tamsulosin (FLOMAX) 0 4 mg, TAKE 1 CAPSULE BY MOUTH TWO TIMES DAILY, Disp: 180 capsule, Rfl: 1    Vitamin D, Cholecalciferol, 1000 UNITS CAPS, Take by mouth 2 times a week, Disp: , Rfl:     Allergies   Allergen Reactions    Amoxicillin Rash       Objective: There were no vitals filed for this visit      Ortho Exam    Physical Exam    Large joint arthrocentesis: R knee  Universal Protocol:  Consent given by: patient  Timeout called at: 3/17/2021 11:23 AM   Site marked: the operative site was marked  Supporting Documentation  Indications: pain   Procedure Details  Location: knee - R knee  Preparation: Patient was prepped and draped in the usual sterile fashion (Alcohol prep)  Needle size: 22 G  Ultrasound guidance: no  Approach: anterolateral  Medications administered: 20 mg Sodium Hyaluronate 20 MG/2ML    Patient tolerance: patient tolerated the procedure well with no immediate complications  Dressing:  Sterile dressing applied

## 2021-03-24 ENCOUNTER — OFFICE VISIT (OUTPATIENT)
Dept: OBGYN CLINIC | Facility: CLINIC | Age: 76
End: 2021-03-24
Payer: MEDICARE

## 2021-03-24 DIAGNOSIS — M17.11 PRIMARY OSTEOARTHRITIS OF RIGHT KNEE: Primary | ICD-10-CM

## 2021-03-24 PROCEDURE — 20610 DRAIN/INJ JOINT/BURSA W/O US: CPT | Performed by: ORTHOPAEDIC SURGERY

## 2021-03-24 RX ORDER — HYALURONATE SODIUM 10 MG/ML
20 SYRINGE (ML) INTRAARTICULAR
Status: COMPLETED | OUTPATIENT
Start: 2021-03-24 | End: 2021-03-24

## 2021-03-24 RX ADMIN — Medication 20 MG: at 11:20

## 2021-03-24 NOTE — PROGRESS NOTES
Assessment/Plan:  1  Primary osteoarthritis of right knee         Follow-up prn  Subjective:   Shirley De is a 66 y o  male who presents today for euflexxa #3 right knee  Review of Systems      Past Medical History:   Diagnosis Date    Anesthesia complication 913    after colonoscopy , pt was awake but could not control his body and kept falling     Arthritis     Bladder calculi     BPH (benign prostatic hyperplasia)     Diabetes mellitus ([address] )     Hearing disorder of both ears     wears bilateral hearing aids    Hyperlipidemia     controlled and maintained d/t diabetes    Hypertension     Kidney stones     Last Assessed:4/3/2017    Lyme disease     Last Assessed:2015    Rupture, bladder, spontaneous     Last Assessed:4/3/2017       Past Surgical History:   Procedure Laterality Date    BLADDER REPAIR N/A 12/10/2016    Procedure: REPAIR BLADDER/cysto insertion stent;  Surgeon: Jeannette Brito MD;  Location: [address] [place] [address];  Service:     COLONOSCOPY      CYSTOLITHOTOMY      ANESTHESIA   lower abdomen  ;  Last Assessed:4/3/2017    OTHER SURGICAL HISTORY      thermal dilation of the prostate x 2 ( in the office)    TONSILLECTOMY      TRANSURETHRAL RESECTION OF PROSTATE N/A 2016    Procedure:  Cysto, Laser Bladder stone,fulgaration of prostates tissue ;  Surgeon: Jeannette Brito MD;  Location: [facility];  Service:        Family History   Problem Relation Age of Onset    Cancer Mother         breast    Diabetes Mother     Cancer Father         prostate w/ bone mets    Prostate cancer Father     Alzheimer's disease Sister        Social History     Occupational History    Not on file   Tobacco Use    Smoking status: Former Smoker     Types: Cigars     Quit date:      Years since quittin 2    Smokeless tobacco: Never Used   Substance and Sexual Activity    Alcohol use: Yes     Comment: occ    Drug use: No    Sexual activity: Not on file         Current Outpatient Medications:     bacitracin ophthalmic ointment, every 4 (four) hours, Disp: , Rfl:     cycloSPORINE (RESTASIS) 0 05 % ophthalmic emulsion, 1 drop 2 (two) times a day, Disp: , Rfl:     enalapril (VASOTEC) 10 mg tablet, Take 1 tablet (10 mg total) by mouth daily, Disp: 90 tablet, Rfl: 3    glucose blood test strip, 1 each by Other route 3 (three) times a day, Disp: 300 each, Rfl: 3    HumaLOG Mix 75/25 KwikPen (75-25) 100 units/mL injection pen, INJECT SUBCUTANEOUSLY 45  UNITS TWICE DAILY WITH  MEALS, Disp: 90 mL, Rfl: 3    HumaLOG Mix 75/25 KwikPen (75-25) 100 units/mL injection pen, Inject 45 Units under the skin 2 (two) times a day with meals, Disp: 27 pen, Rfl: 3    Insulin Pen Needle (B-D UF III MINI PEN NEEDLES) 31G X 5 MM MISC, Use twice daily with insulin pen as directed, Disp: 180 each, Rfl: 3    latanoprost (XALATAN) 0 005 % ophthalmic solution, , Disp: , Rfl:     meloxicam (MOBIC) 15 mg tablet, Take 1 tablet (15 mg total) by mouth daily, Disp: 30 tablet, Rfl: 0    simvastatin (ZOCOR) 40 mg tablet, TAKE 1 TABLET BY MOUTH  DAILY AT BEDTIME, Disp: 90 tablet, Rfl: 3    tamsulosin (FLOMAX) 0 4 mg, TAKE 1 CAPSULE BY MOUTH TWO TIMES DAILY, Disp: 180 capsule, Rfl: 1    Vitamin D, Cholecalciferol, 1000 UNITS CAPS, Take by mouth 2 times a week, Disp: , Rfl:     Allergies   Allergen Reactions    Amoxicillin Rash       Objective: There were no vitals filed for this visit      Ortho Exam    Physical Exam    Large joint arthrocentesis: R knee  Universal Protocol:  Risks and benefits: risks, benefits and alternatives were discussed  Consent given by: patient  Timeout called at: 3/24/2021 11:18 AM   Site marked: the operative site was marked  Supporting Documentation  Indications: pain   Procedure Details  Location: knee - R knee  Preparation: Patient was prepped and draped in the usual sterile fashion (Alcohol prep)  Needle size: 22 G  Ultrasound guidance: no  Approach: anterolateral  Medications administered: 20 mg Sodium Hyaluronate 20 MG/2ML    Patient tolerance: patient tolerated the procedure well with no immediate complications  Dressing:  Sterile dressing applied    Injection given by Ruiz ALVARES under direct supervision of Princess Philly VARGAS

## 2021-05-06 ENCOUNTER — IMMUNIZATIONS (OUTPATIENT)
Dept: FAMILY MEDICINE CLINIC | Facility: HOSPITAL | Age: 76
End: 2021-05-06

## 2021-05-06 DIAGNOSIS — Z23 ENCOUNTER FOR IMMUNIZATION: Primary | ICD-10-CM

## 2021-05-06 PROCEDURE — 91300 SARS-COV-2 / COVID-19 MRNA VACCINE (PFIZER-BIONTECH) 30 MCG: CPT

## 2021-05-06 PROCEDURE — 0001A SARS-COV-2 / COVID-19 MRNA VACCINE (PFIZER-BIONTECH) 30 MCG: CPT

## 2021-05-22 ENCOUNTER — HOSPITAL ENCOUNTER (EMERGENCY)
Facility: HOSPITAL | Age: 76
Discharge: HOME | End: 2021-05-22
Attending: EMERGENCY MEDICINE
Payer: MEDICARE

## 2021-05-22 VITALS
OXYGEN SATURATION: 98 % | WEIGHT: 191 LBS | HEART RATE: 86 BPM | BODY MASS INDEX: 29.04 KG/M2 | SYSTOLIC BLOOD PRESSURE: 154 MMHG | TEMPERATURE: 99.5 F | RESPIRATION RATE: 18 BRPM | DIASTOLIC BLOOD PRESSURE: 70 MMHG

## 2021-05-22 DIAGNOSIS — S05.00XA CORNEAL ABRASION: Primary | ICD-10-CM

## 2021-05-22 PROCEDURE — 99283 EMERGENCY DEPT VISIT LOW MDM: CPT

## 2021-05-22 PROCEDURE — 99284 EMERGENCY DEPT VISIT MOD MDM: CPT | Performed by: PHYSICIAN ASSISTANT

## 2021-05-22 RX ORDER — TETRACAINE HYDROCHLORIDE 5 MG/ML
1 SOLUTION OPHTHALMIC ONCE
Status: COMPLETED | OUTPATIENT
Start: 2021-05-22 | End: 2021-05-22

## 2021-05-22 RX ORDER — ERYTHROMYCIN 5 MG/G
OINTMENT OPHTHALMIC
Qty: 1 G | Refills: 0 | Status: SHIPPED | OUTPATIENT
Start: 2021-05-22 | End: 2021-06-07

## 2021-05-22 RX ADMIN — FLUORESCEIN SODIUM 1 STRIP: 1 STRIP OPHTHALMIC at 14:00

## 2021-05-22 RX ADMIN — TETRACAINE HYDROCHLORIDE 1 DROP: 5 SOLUTION OPHTHALMIC at 14:00

## 2021-05-22 NOTE — ED PROVIDER NOTES
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 as well  He states that 2 days ago he was rubbing his eyes after which he had pain with blinking  Had some yellow drainage from eye  He has foreign body sensation  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 Taking?    HumaLOG Mix 75/25 KwikPen (75-25) 100 units/mL injection pen 2021 at 0830  No Yes   Sig: INJECT SUBCUTANEOUSLY 45  UNITS TWICE DAILY WITH  MEALS   HumaLOG Mix 75/25 KwikPen (75-25) 100 units/mL injection pen   No No   Sig: Inject 45 Units under the skin 2 (two) times a day with meals   Insulin Pen Needle (B-D UF III MINI PEN NEEDLES) 31G X 5 MM MISC   No No   Sig: Use twice daily with insulin pen as directed   Vitamin D, Cholecalciferol, 1000 UNITS CAPS 2021 at Unknown time Self Yes Yes   Sig: Take by mouth 2 times a week   bacitracin ophthalmic ointment 2021 at 1200 Self Yes Yes   Sig: every 4 (four) hours   cycloSPORINE (RESTASIS) 0 05 % ophthalmic emulsion 2021 at 0830 Self Yes Yes   Sig: 1 drop 2 (two) times a day   enalapril (VASOTEC) 10 mg tablet 2021 at Unknown time  No Yes   Sig: Take 1 tablet (10 mg total) by mouth daily   glucose blood test strip   No No   Si each by Other route 3 (three) times a day   latanoprost (XALATAN) 0 005 % ophthalmic solution 2021 at Unknown time Self Yes Yes   Sig: Administer 1 drop to both eyes daily at bedtime    meloxicam (MOBIC) 15 mg tablet Unknown at Unknown time  No No   Sig: Take 1 tablet (15 mg total) by mouth daily   simvastatin (ZOCOR) 40 mg tablet 2021 at Unknown time  No Yes   Sig: TAKE 1 TABLET BY MOUTH  DAILY AT BEDTIME   tamsulosin (FLOMAX) 0 4 mg 2021 at 0830 Self No Yes   Sig: TAKE 1 CAPSULE BY MOUTH TWO TIMES DAILY      Facility-Administered Medications: None       Past Medical History:   Diagnosis Date    Anesthesia complication 5543    after colonoscopy , pt was awake but could not control his body and kept falling     Arthritis     Bladder calculi     BPH (benign prostatic hyperplasia)     Diabetes mellitus ([address] )     Hearing disorder of both ears     wears bilateral hearing aids    Hyperlipidemia     controlled and maintained d/t diabetes    Hypertension     Kidney stones     Last Assessed:4/3/2017    Lyme disease     Last Assessed:2015    Rupture, bladder, spontaneous     Last Assessed:4/3/2017       Past Surgical History:   Procedure Laterality Date    BLADDER REPAIR N/A 12/10/2016    Procedure: REPAIR BLADDER/cysto insertion stent;  Surgeon: Denice Lima MD;  Location: [address] [place] [address];  Service:     COLONOSCOPY      CYSTOLITHOTOMY      ANESTHESIA   lower abdomen  ;  Last Assessed:4/3/2017    OTHER SURGICAL HISTORY      thermal dilation of the prostate x 2 ( in the office)    TONSILLECTOMY      TRANSURETHRAL RESECTION OF PROSTATE N/A 2016    Procedure:  Cysto, Laser Bladder stone,fulgaration of prostates tissue ;  Surgeon: Denice Lima MD;  Location: [address] [place] [address];  Service:        Family History Problem Relation Age of Onset    Cancer Mother         breast    Diabetes Mother     Cancer Father         prostate w/ bone mets    Prostate cancer Father     Alzheimer's disease Sister      I have reviewed and agree with the history as documented  E-Cigarette/Vaping    E-Cigarette Use Never User      E-Cigarette/Vaping Substances     Social History     Tobacco Use    Smoking status: Former Smoker     Types: Cigars     Quit date:      Years since quittin 4    Smokeless tobacco: Never Used   Substance Use Topics    Alcohol use: Yes     Frequency: Monthly or less     Drinks per session: 1 or 2     Binge frequency: Never    Drug use: No       Review of Systems   Constitutional: Negative for chills and fever  HENT: Negative for sneezing and sore throat  Eyes: Positive for pain, discharge and redness  Negative for photophobia, itching and visual disturbance  Respiratory: Negative for cough and shortness of breath  Cardiovascular: Negative for chest pain, palpitations and leg swelling  Gastrointestinal: Negative for abdominal pain, constipation, diarrhea, nausea and vomiting  Musculoskeletal: Negative for back pain, gait problem, joint swelling and myalgias  Skin: Negative for color change, pallor, rash and wound  Neurological: Negative for dizziness, syncope, weakness, light-headedness, numbness and headaches  All other systems reviewed and are negative  Physical Exam  Physical Exam  Vitals signs and nursing note reviewed  Constitutional:       General: He is not in acute distress  Appearance: Normal appearance  He is well-developed and normal weight  He is not diaphoretic  HENT:      Head: Normocephalic and atraumatic  Nose: Nose normal    Eyes:      General: Lids are normal  Lids are everted, no foreign bodies appreciated  Vision grossly intact  Gaze aligned appropriately  Extraocular Movements: Extraocular movements intact        Conjunctiva/sclera: Conjunctivae normal      Neck:      Musculoskeletal: Normal range of motion  Cardiovascular:      Rate and Rhythm: Normal rate and regular rhythm  Pulses: Normal pulses  Heart sounds: Normal heart sounds  No murmur  No friction rub  No gallop  Pulmonary:      Effort: Pulmonary effort is normal  No respiratory distress  Breath sounds: Normal breath sounds  No stridor  No wheezing or rales  Comments: Sp02 is 98% indicating adequate oxygenation on room air  Musculoskeletal: Normal range of motion  Skin:     General: Skin is warm and dry  Capillary Refill: Capillary refill takes less than 2 seconds  Coloration: Skin is not pale  Findings: No erythema or rash  Neurological:      Mental Status: He is alert  Mental status is at baseline  Vital Signs  ED Triage Vitals [05/22/21 1348]   Temperature Pulse Respirations Blood Pressure SpO2   99 5 °F (37 5 °C) 86 18 154/70 98 %      Temp Source Heart Rate Source Patient Position - Orthostatic VS BP Location FiO2 (%)   Tympanic Monitor Sitting Left arm --      Pain Score       5           Vitals:    05/22/21 1348   BP: 154/70   Pulse: 86   Patient Position - Orthostatic VS: Sitting         Visual Acuity  Visual Acuity      Most Recent Value   Visual acuity R eye is  20/40   Visual acuity Left eye is  20/50   Unable to obtain visual acuity due to  pt wear prescription glasses he doesn't have them with him          ED Medications  Medications   tetracaine 0 5 % ophthalmic solution 1 drop (1 drop Left Eye Given by Other 5/22/21 1400)   fluorescein sodium sterile ophthalmic strip 1 strip (1 strip Right Eye Given by Other 5/22/21 1400)       Diagnostic Studies  Results Reviewed     None                 No orders to display              Procedures  Procedures         ED Course                                           MDM  Number of Diagnoses or Management Options  Diagnosis management comments:  Will treat with erythromycin ointment  Advised follow up ophthalmologist  Gave patient proper education regarding diagnosis  Answered all questions  Return to ED for any worsening of symptoms otherwise follow up with primary care physician for re-evaluation  Discussed plan with patient who verbalized understanding and agreed to plan  Amount and/or Complexity of Data Reviewed  Review and summarize past medical records: yes  Discuss the patient with other providers: yes        Disposition  Final diagnoses:   Corneal abrasion     Time reflects when diagnosis was documented in both MDM as applicable and the Disposition within this note     Time User Action Codes Description Comment    5/22/2021  2:25 PM Pinkie Sprain Add [S05 00XA] Corneal abrasion       ED Disposition     ED Disposition Condition Date/Time Comment    Discharge Stable Sat May 22, 2021  2:25 PM [address] discharge to home/self care              Follow-up Information     Follow up With Specialties Details Why Contact Info Additional Information    Ophthalmologist  Call in 2 days To make follow-up appointment for corneal abrasion, eye pain      [address] [place] [address] Emergency Department Emergency Medicine Go to  As needed [address] [address] Emergency Department, [facility], [address] [place] [address], [id number]          Discharge Medication List as of 5/22/2021  2:28 PM      START taking these medications    Details   erythromycin (ILOTYCIN) ophthalmic ointment Place a 1/2 inch ribbon of ointment into the lower eyelid q4h or up to 6 times a day x1 week, Normal         CONTINUE these medications which have NOT CHANGED    Details   bacitracin ophthalmic ointment every 4 (four) hours, Historical Med      cycloSPORINE (RESTASIS) 0 05 % ophthalmic emulsion 1 drop 2 (two) times a day, Historical Med      enalapril (VASOTEC) 10 mg tablet Take 1 tablet (10 mg total) by mouth daily, Starting Mon 12/7/2020, Normal      !! HumaLOG Mix 75/25 KwikPen (75-25) 100 units/mL injection pen INJECT SUBCUTANEOUSLY 45  UNITS TWICE DAILY WITH  MEALS, Normal      latanoprost (XALATAN) 0 005 % ophthalmic solution Administer 1 drop to both eyes daily at bedtime , Starting Mon 5/21/2018, Historical Med      simvastatin (ZOCOR) 40 mg tablet TAKE 1 TABLET BY MOUTH  DAILY AT BEDTIME, Normal      tamsulosin (FLOMAX) 0 4 mg TAKE 1 CAPSULE BY MOUTH TWO TIMES DAILY, Normal      Vitamin D, Cholecalciferol, 1000 UNITS CAPS Take by mouth 2 times a week, Historical Med      glucose blood test strip 1 each by Other route 3 (three) times a day, Starting Fri 5/22/2020, Normal      !! HumaLOG Mix 75/25 KwikPen (75-25) 100 units/mL injection pen Inject 45 Units under the skin 2 (two) times a day with meals, Starting Mon 12/7/2020, Normal      Insulin Pen Needle (B-D UF III MINI PEN NEEDLES) 31G X 5 MM MISC Use twice daily with insulin pen as directed, Normal      meloxicam (MOBIC) 15 mg tablet Take 1 tablet (15 mg total) by mouth daily, Starting Mon 2/8/2021, Normal       !! - Potential duplicate medications found  Please discuss with provider  No discharge procedures on file      PDMP Review     None          ED Provider  Electronically Signed by           Santos Parkinson PA-C  05/22/21 7260

## 2021-05-29 ENCOUNTER — IMMUNIZATIONS (OUTPATIENT)
Dept: FAMILY MEDICINE CLINIC | Facility: HOSPITAL | Age: 76
End: 2021-05-29

## 2021-05-29 DIAGNOSIS — Z23 ENCOUNTER FOR IMMUNIZATION: Primary | ICD-10-CM

## 2021-05-29 PROCEDURE — 91300 SARS-COV-2 / COVID-19 MRNA VACCINE (PFIZER-BIONTECH) 30 MCG: CPT

## 2021-05-29 PROCEDURE — 0002A SARS-COV-2 / COVID-19 MRNA VACCINE (PFIZER-BIONTECH) 30 MCG: CPT

## 2021-06-02 ENCOUNTER — APPOINTMENT (OUTPATIENT)
Dept: LAB | Facility: CLINIC | Age: 76
End: 2021-06-02
Payer: MEDICARE

## 2021-06-02 DIAGNOSIS — E11.29 TYPE 2 DIABETES MELLITUS WITH MICROALBUMINURIA, WITH LONG-TERM CURRENT USE OF INSULIN (HCC): ICD-10-CM

## 2021-06-02 DIAGNOSIS — R80.9 TYPE 2 DIABETES MELLITUS WITH MICROALBUMINURIA, WITH LONG-TERM CURRENT USE OF INSULIN (HCC): ICD-10-CM

## 2021-06-02 DIAGNOSIS — Z79.4 TYPE 2 DIABETES MELLITUS WITH MICROALBUMINURIA, WITH LONG-TERM CURRENT USE OF INSULIN (HCC): ICD-10-CM

## 2021-06-02 LAB
ALBUMIN SERPL BCP-MCNC: 3.1 G/DL (ref 3.5–5)
ALP SERPL-CCNC: 76 U/L (ref 46–116)
ALT SERPL W P-5'-P-CCNC: 19 U/L (ref 12–78)
ANION GAP SERPL CALCULATED.3IONS-SCNC: 6 MMOL/L (ref 4–13)
AST SERPL W P-5'-P-CCNC: 12 U/L (ref 5–45)
BILIRUB SERPL-MCNC: 0.53 MG/DL (ref 0.2–1)
BUN SERPL-MCNC: 21 MG/DL (ref 5–25)
CALCIUM ALBUM COR SERPL-MCNC: 9.9 MG/DL (ref 8.3–10.1)
CALCIUM SERPL-MCNC: 9.2 MG/DL (ref 8.3–10.1)
CHLORIDE SERPL-SCNC: 106 MMOL/L (ref 100–108)
CHOLEST SERPL-MCNC: 133 MG/DL (ref 50–200)
CO2 SERPL-SCNC: 26 MMOL/L (ref 21–32)
CREAT SERPL-MCNC: 1.15 MG/DL (ref 0.6–1.3)
EST. AVERAGE GLUCOSE BLD GHB EST-MCNC: 169 MG/DL
GFR SERPL CREATININE-BSD FRML MDRD: 61 ML/MIN/1.73SQ M
GLUCOSE P FAST SERPL-MCNC: 105 MG/DL (ref 65–99)
HBA1C MFR BLD: 7.5 %
HDLC SERPL-MCNC: 48 MG/DL
LDLC SERPL CALC-MCNC: 71 MG/DL (ref 0–100)
NONHDLC SERPL-MCNC: 85 MG/DL
POTASSIUM SERPL-SCNC: 4.4 MMOL/L (ref 3.5–5.3)
PROT SERPL-MCNC: 7.5 G/DL (ref 6.4–8.2)
SODIUM SERPL-SCNC: 138 MMOL/L (ref 136–145)
TRIGL SERPL-MCNC: 69 MG/DL

## 2021-06-02 PROCEDURE — 80053 COMPREHEN METABOLIC PANEL: CPT

## 2021-06-02 PROCEDURE — 36415 COLL VENOUS BLD VENIPUNCTURE: CPT

## 2021-06-02 PROCEDURE — 83036 HEMOGLOBIN GLYCOSYLATED A1C: CPT

## 2021-06-02 PROCEDURE — 80061 LIPID PANEL: CPT

## 2021-06-07 ENCOUNTER — OFFICE VISIT (OUTPATIENT)
Dept: FAMILY MEDICINE CLINIC | Facility: CLINIC | Age: 76
End: 2021-06-07
Payer: MEDICARE

## 2021-06-07 VITALS
WEIGHT: 191 LBS | HEART RATE: 66 BPM | RESPIRATION RATE: 14 BRPM | BODY MASS INDEX: 28.95 KG/M2 | HEIGHT: 68 IN | TEMPERATURE: 98.7 F | DIASTOLIC BLOOD PRESSURE: 70 MMHG | SYSTOLIC BLOOD PRESSURE: 110 MMHG

## 2021-06-07 DIAGNOSIS — Z79.4 TYPE 2 DIABETES MELLITUS WITH MICROALBUMINURIA, WITH LONG-TERM CURRENT USE OF INSULIN (HCC): Primary | ICD-10-CM

## 2021-06-07 DIAGNOSIS — E11.29 TYPE 2 DIABETES MELLITUS WITH MICROALBUMINURIA, WITH LONG-TERM CURRENT USE OF INSULIN (HCC): Primary | ICD-10-CM

## 2021-06-07 DIAGNOSIS — M17.11 PRIMARY OSTEOARTHRITIS OF RIGHT KNEE: ICD-10-CM

## 2021-06-07 DIAGNOSIS — G89.29 CHRONIC PAIN OF RIGHT KNEE: ICD-10-CM

## 2021-06-07 DIAGNOSIS — E78.5 HYPERLIPIDEMIA, UNSPECIFIED HYPERLIPIDEMIA TYPE: ICD-10-CM

## 2021-06-07 DIAGNOSIS — I10 BENIGN ESSENTIAL HYPERTENSION: ICD-10-CM

## 2021-06-07 DIAGNOSIS — M25.561 CHRONIC PAIN OF RIGHT KNEE: ICD-10-CM

## 2021-06-07 DIAGNOSIS — R80.9 TYPE 2 DIABETES MELLITUS WITH MICROALBUMINURIA, WITH LONG-TERM CURRENT USE OF INSULIN (HCC): Primary | ICD-10-CM

## 2021-06-07 PROCEDURE — 99214 OFFICE O/P EST MOD 30 MIN: CPT | Performed by: FAMILY MEDICINE

## 2021-06-07 NOTE — PROGRESS NOTES
Chief Complaint   Patient presents with    Diabetes    Follow-up     chronic conditions         Patient ID: Val Johnson is a 66 y o  male  HPI  Pt is seeing for f/u DM2, HTN, HLD -  All stable  - taking meds as Rx -  Under ortho care for R knee OA related  pain     The following portions of the patient's history were reviewed and updated as appropriate: allergies, current medications, past family history, past medical history, past social history, past surgical history and problem list     Review of Systems   Constitutional: Negative  HENT: Positive for hearing loss  Negative for congestion, rhinorrhea and sinus pressure  Respiratory: Negative  Cardiovascular: Negative  Gastrointestinal: Negative  Genitourinary: Negative  Musculoskeletal: Positive for arthralgias and gait problem  Skin: Negative  Psychiatric/Behavioral: Negative          Current Outpatient Medications   Medication Sig Dispense Refill    cycloSPORINE (RESTASIS) 0 05 % ophthalmic emulsion 1 drop 2 (two) times a day      enalapril (VASOTEC) 10 mg tablet Take 1 tablet (10 mg total) by mouth daily 90 tablet 3    glucose blood test strip 1 each by Other route 3 (three) times a day 300 each 3    HumaLOG Mix 75/25 KwikPen (75-25) 100 units/mL injection pen INJECT SUBCUTANEOUSLY 45  UNITS TWICE DAILY WITH  MEALS 90 mL 3    Insulin Pen Needle (B-D UF III MINI PEN NEEDLES) 31G X 5 MM MISC Use twice daily with insulin pen as directed 180 each 3    latanoprost (XALATAN) 0 005 % ophthalmic solution Administer 1 drop to both eyes daily at bedtime       meloxicam (MOBIC) 15 mg tablet Take 1 tablet (15 mg total) by mouth daily 30 tablet 0    simvastatin (ZOCOR) 40 mg tablet TAKE 1 TABLET BY MOUTH  DAILY AT BEDTIME 90 tablet 3    tamsulosin (FLOMAX) 0 4 mg TAKE 1 CAPSULE BY MOUTH TWO TIMES DAILY 180 capsule 1    Vitamin D, Cholecalciferol, 1000 UNITS CAPS Take by mouth 2 times a week       No current facility-administered medications for this visit  Objective:    /70 (BP Location: Left arm, Patient Position: Sitting, Cuff Size: Standard)   Pulse 66   Temp 98 7 °F (37 1 °C) (Temporal)   Resp 14   Ht 5' 8" (1 727 m)   Wt 86 6 kg (191 lb)   BMI 29 04 kg/m²        Physical Exam  Constitutional:       Appearance: He is not ill-appearing  Cardiovascular:      Rate and Rhythm: Normal rate and regular rhythm  Heart sounds: No murmur  Pulmonary:      Effort: Pulmonary effort is normal  No respiratory distress  Breath sounds: No wheezing, rhonchi or rales  Musculoskeletal:      Right lower leg: No edema  Left lower leg: No edema  Comments: Using a cane    Neurological:      General: No focal deficit present  Mental Status: He is alert and oriented to person, place, and time  Psychiatric:         Mood and Affect: Mood normal            Labs in chart were reviewed  Recent Results (from the past 672 hour(s))   Comprehensive metabolic panel    Collection Time: 06/02/21  9:21 AM   Result Value Ref Range    Sodium 138 136 - 145 mmol/L    Potassium 4 4 3 5 - 5 3 mmol/L    Chloride 106 100 - 108 mmol/L    CO2 26 21 - 32 mmol/L    ANION GAP 6 4 - 13 mmol/L    BUN 21 5 - 25 mg/dL    Creatinine 1 15 0 60 - 1 30 mg/dL    Glucose, Fasting 105 (H) 65 - 99 mg/dL    Calcium 9 2 8 3 - 10 1 mg/dL    Corrected Calcium 9 9 8 3 - 10 1 mg/dL    AST 12 5 - 45 U/L    ALT 19 12 - 78 U/L    Alkaline Phosphatase 76 46 - 116 U/L    Total Protein 7 5 6 4 - 8 2 g/dL    Albumin 3 1 (L) 3 5 - 5 0 g/dL    Total Bilirubin 0 53 0 20 - 1 00 mg/dL    eGFR 61 ml/min/1 73sq m   Hemoglobin A1C    Collection Time: 06/02/21  9:21 AM   Result Value Ref Range    Hemoglobin A1C 7 5 (H) Normal 3 8-5 6%; PreDiabetic 5 7-6 4%;  Diabetic >=6 5%; Glycemic control for adults with diabetes <7 0% %     mg/dl   Lipid panel    Collection Time: 06/02/21  9:21 AM   Result Value Ref Range    Cholesterol 133 50 - 200 mg/dL    Triglycerides 69 <=150 mg/dL    HDL, Direct 48 >=40 mg/dL    LDL Calculated 71 0 - 100 mg/dL    Non-HDL-Chol (CHOL-HDL) 85 mg/dl       Assessment/Plan:         Diagnoses and all orders for this visit:    Type 2 diabetes mellitus with microalbuminuria, with long-term current use of insulin (HCC)  Stable, cont meds  Chronic pain of right knee  F/u with ortho  Benign essential hypertension  Stable  Cont med  Hyperlipidemia, unspecified hyperlipidemia type  Stable  Cont med  Primary osteoarthritis of right knee            BMI Counseling: Body mass index is 29 04 kg/m²  Discussed the patient's BMI with him  The BMI is above normal  Nutrition recommendations include reducing portion sizes, decreasing overall calorie intake, 3-5 servings of fruits/vegetables daily, reducing fast food intake, consuming healthier snacks and decreasing soda and/or juice intake  Exercise recommendations include exercising 3-5 times per week           rto in 3 m         Sarah Sahni MD

## 2021-07-20 ENCOUNTER — APPOINTMENT (OUTPATIENT)
Dept: LAB | Facility: CLINIC | Age: 76
End: 2021-07-20
Payer: MEDICARE

## 2021-07-20 DIAGNOSIS — R97.20 ABNORMAL PSA: ICD-10-CM

## 2021-07-20 PROCEDURE — 84153 ASSAY OF PSA TOTAL: CPT

## 2021-07-21 LAB — PSA SERPL-MCNC: 3.2 NG/ML (ref 0–4)

## 2021-07-27 ENCOUNTER — OFFICE VISIT (OUTPATIENT)
Dept: PODIATRY | Facility: CLINIC | Age: 76
End: 2021-07-27
Payer: MEDICARE

## 2021-07-27 VITALS
RESPIRATION RATE: 117 BRPM | HEART RATE: 76 BPM | HEIGHT: 68 IN | SYSTOLIC BLOOD PRESSURE: 124 MMHG | WEIGHT: 191 LBS | DIASTOLIC BLOOD PRESSURE: 72 MMHG | BODY MASS INDEX: 28.95 KG/M2

## 2021-07-27 DIAGNOSIS — M76.822 POSTERIOR TIBIAL TENDINITIS OF LEFT LOWER EXTREMITY: Primary | ICD-10-CM

## 2021-07-27 DIAGNOSIS — M79.672 LEFT FOOT PAIN: ICD-10-CM

## 2021-07-27 DIAGNOSIS — M21.961 ACQUIRED DEFORMITY OF RIGHT FOOT: ICD-10-CM

## 2021-07-27 PROCEDURE — 29540 STRAPPING ANKLE &/FOOT: CPT | Performed by: PODIATRIST

## 2021-07-27 PROCEDURE — 20551 NJX 1 TENDON ORIGIN/INSJ: CPT | Performed by: PODIATRIST

## 2021-07-27 NOTE — PROGRESS NOTES
Assessment/Plan:Current posterior tibial tendinitis right foot  Right foot pain  Foot deformity  Pronation syndrome  Diabetic neuropathy  Plan  Foot exam performed  Patient educated on condition  Today, 1 25 cc of Kenalog 10 injected into posterior tibial tendon insertion  This was done without pain or complication  Patient declines physical therapy  In addition we applied low dye arch strapping to right foot  Patient will return aleida Contreras Diagnoses and all orders for this visit:    Posterior tibial tendinitis of left lower extremity    Left foot pain    Acquired deformity of right foot          Subjective:   Patient has return of pain in his right foot    No history of trauma    Allergies   Allergen Reactions    Amoxicillin Rash         Current Outpatient Medications:     cycloSPORINE (RESTASIS) 0 05 % ophthalmic emulsion, 1 drop 2 (two) times a day, Disp: , Rfl:     enalapril (VASOTEC) 10 mg tablet, Take 1 tablet (10 mg total) by mouth daily, Disp: 90 tablet, Rfl: 3    glucose blood test strip, 1 each by Other route 3 (three) times a day, Disp: 300 each, Rfl: 3    HumaLOG Mix 75/25 KwikPen (75-25) 100 units/mL injection pen, INJECT SUBCUTANEOUSLY 45  UNITS TWICE DAILY WITH  MEALS, Disp: 90 mL, Rfl: 3    Insulin Pen Needle (B-D UF III MINI PEN NEEDLES) 31G X 5 MM MISC, Use twice daily with insulin pen as directed, Disp: 180 each, Rfl: 3    latanoprost (XALATAN) 0 005 % ophthalmic solution, Administer 1 drop to both eyes daily at bedtime , Disp: , Rfl:     meloxicam (MOBIC) 15 mg tablet, Take 1 tablet (15 mg total) by mouth daily, Disp: 30 tablet, Rfl: 0    simvastatin (ZOCOR) 40 mg tablet, TAKE 1 TABLET BY MOUTH  DAILY AT BEDTIME, Disp: 90 tablet, Rfl: 3    tamsulosin (FLOMAX) 0 4 mg, TAKE 1 CAPSULE BY MOUTH TWO TIMES DAILY, Disp: 180 capsule, Rfl: 1    Vitamin D, Cholecalciferol, 1000 UNITS CAPS, Take by mouth 2 times a week, Disp: , Rfl:     Patient Active Problem List   Diagnosis  Bladder stones    Benign colon polyp    Benign essential hypertension    Benign prostatic hyperplasia with lower urinary tract symptoms    Hyperlipidemia    Type 2 diabetes mellitus with microalbuminuria, with long-term current use of insulin (HCC)    Vitamin D deficiency    Chronic pain of right knee    Primary osteoarthritis of right knee          Patient ID: Cintia Haas is a 64 y o  male  HPI    The following portions of the patient's history were reviewed and updated as appropriate:     family history includes Alzheimer's disease in his sister; Cancer in his father and mother; Diabetes in his mother; Prostate cancer in his father  reports that he quit smoking about 38 years ago  His smoking use included cigars  He has never used smokeless tobacco  He reports current alcohol use  He reports that he does not use drugs  Vitals:    07/27/21 0856   BP: 124/72   Pulse: 76   Resp: (!) 117       Review of Systems      Objective:  Patient's shoes and socks removed     Foot ExamPhysical Exam      General  General Appearance: appears stated age and healthy   Orientation: alert and oriented to person, place, and time   Affect: appropriate   Gait: antalgic   Assistance: cane use         Right Foot/Ankle      Inspection and Palpation  Ecchymosis: none  Swelling: dorsum   Arch: pes cavus  Hammertoes: fifth toe  Hallux valgus: no  Hallux limitus: yes  Skin Exam: callus and maceration;      Neurovascular  Dorsalis pedis: 2+  Posterior tibial: 2+  Saphenous nerve sensation: diminished  Tibial nerve sensation: diminished  Superficial peroneal nerve sensation: diminished  Deep peroneal nerve sensation: diminished  Sural nerve sensation: diminished        Left Foot/Ankle       Inspection and Palpation  Ecchymosis: none  Tenderness: bony tenderness and metatarsals   Swelling: dorsum   Arch: pes cavus  Hammertoes: fifth toe  Hallux valgus: no  Hallux limitus: yes  Skin Exam: callus and maceration;    Neurovascular  Dorsalis pedis: 2+  Posterior tibial: 2+  Saphenous nerve sensation: diminished  Tibial nerve sensation: diminished  Superficial peroneal nerve sensation: diminished  Deep peroneal nerve sensation: diminished  Sural nerve sensation: diminished           Physical Exam  Vitals signs and nursing note reviewed  Cardiovascular:      Pulses: no weak pulses          Dorsalis pedis pulses are 2+ on the right side and 2+ on the left side  Posterior tibial pulses are 2+ on the right side and 2+ on the left side  Musculoskeletal:         General: Deformity present  Right foot: No bunion  Left foot: Bony tenderness present  No bunion  Comments: Pain with palpation posterior tibial tendon insertion  Negative evidence of rupture  Tendon test 5/5   Feet:      Right foot:      Skin integrity: Skin breakdown and callus present  Left foot:      Skin integrity: Skin breakdown and callus present       Patient's shoes and socks removed  Right Foot/Ankle   Right Foot Inspection  Skin Exam: callus, maceration and callus                                 Vascular     The right DP pulse is 2+  The right PT pulse is 2+  Right Toe  - Comprehensive Exam  Ecchymosis: none  Arch: pes cavus  Hammertoes: fifth toe  Hallux valgus: no  Hallux limitus: yes  Swelling: dorsum            Left Foot/Ankle  Left Foot Inspection  Skin Exam: maceration and callus                                             Vascular     The left DP pulse is 2+  The left PT pulse is 2+  Left Toe  - Comprehensive Exam  Ecchymosis: none  Arch: pes cavus  Hammertoes: fifth toe  Hallux valgus: no  Hallux limitus: yes  Swelling: dorsum   Tenderness: bony tenderness and metatarsals         Assign Risk Category:  Deformity present; Loss of protective sensation;  No weak pulses       Risk: 1

## 2021-09-07 ENCOUNTER — OFFICE VISIT (OUTPATIENT)
Dept: FAMILY MEDICINE CLINIC | Facility: CLINIC | Age: 76
End: 2021-09-07
Payer: MEDICARE

## 2021-09-07 VITALS
HEIGHT: 68 IN | WEIGHT: 196 LBS | HEART RATE: 75 BPM | TEMPERATURE: 99 F | DIASTOLIC BLOOD PRESSURE: 70 MMHG | OXYGEN SATURATION: 99 % | RESPIRATION RATE: 17 BRPM | BODY MASS INDEX: 29.7 KG/M2 | SYSTOLIC BLOOD PRESSURE: 130 MMHG

## 2021-09-07 DIAGNOSIS — I10 BENIGN ESSENTIAL HYPERTENSION: ICD-10-CM

## 2021-09-07 DIAGNOSIS — E11.29 TYPE 2 DIABETES MELLITUS WITH MICROALBUMINURIA, WITH LONG-TERM CURRENT USE OF INSULIN (HCC): Primary | ICD-10-CM

## 2021-09-07 DIAGNOSIS — E78.5 HYPERLIPIDEMIA, UNSPECIFIED HYPERLIPIDEMIA TYPE: ICD-10-CM

## 2021-09-07 DIAGNOSIS — Z79.4 TYPE 2 DIABETES MELLITUS WITH MICROALBUMINURIA, WITH LONG-TERM CURRENT USE OF INSULIN (HCC): Primary | ICD-10-CM

## 2021-09-07 DIAGNOSIS — R80.9 TYPE 2 DIABETES MELLITUS WITH MICROALBUMINURIA, WITH LONG-TERM CURRENT USE OF INSULIN (HCC): Primary | ICD-10-CM

## 2021-09-07 PROBLEM — R97.20 ELEVATED PSA: Status: ACTIVE | Noted: 2021-09-07

## 2021-09-07 LAB — SL AMB POCT HEMOGLOBIN AIC: 7.1 (ref ?–6.5)

## 2021-09-07 PROCEDURE — 83036 HEMOGLOBIN GLYCOSYLATED A1C: CPT | Performed by: FAMILY MEDICINE

## 2021-09-07 PROCEDURE — 99214 OFFICE O/P EST MOD 30 MIN: CPT | Performed by: FAMILY MEDICINE

## 2021-09-07 RX ORDER — INSULIN LISPRO 100 [IU]/ML
45 INJECTION, SUSPENSION SUBCUTANEOUS 2 TIMES DAILY WITH MEALS
Qty: 90 ML | Refills: 3 | Status: SHIPPED | OUTPATIENT
Start: 2021-09-07 | End: 2021-09-07 | Stop reason: SDUPTHER

## 2021-09-07 RX ORDER — PEN NEEDLE, DIABETIC 31 GX5/16"
NEEDLE, DISPOSABLE MISCELLANEOUS
Qty: 180 EACH | Refills: 3 | Status: SHIPPED | OUTPATIENT
Start: 2021-09-07 | End: 2021-09-07 | Stop reason: SDUPTHER

## 2021-09-07 RX ORDER — ENALAPRIL MALEATE 10 MG/1
10 TABLET ORAL DAILY
Qty: 90 TABLET | Refills: 3 | Status: SHIPPED | OUTPATIENT
Start: 2021-09-07 | End: 2021-09-07 | Stop reason: SDUPTHER

## 2021-09-07 RX ORDER — SIMVASTATIN 40 MG
40 TABLET ORAL
Qty: 90 TABLET | Refills: 3 | Status: SHIPPED | OUTPATIENT
Start: 2021-09-07 | End: 2021-09-07 | Stop reason: SDUPTHER

## 2021-09-07 NOTE — PROGRESS NOTES
Chief Complaint   Patient presents with    Diabetes    HLD    Hypertension        Patient ID: Hilda Tabares is a 64 y o  male  HPI  Pt is seeing for f/u DM2, HLD, HTN -  All stable  -  Taking meds as Rx     The following portions of the patient's history were reviewed and updated as appropriate: allergies, current medications, past family history, past medical history, past social history, past surgical history and problem list     Review of Systems   Constitutional: Negative  Respiratory: Negative  Cardiovascular: Negative  Gastrointestinal: Negative  Genitourinary: Negative  Musculoskeletal: Positive for arthralgias and gait problem (using a cane )  Skin: Negative  Psychiatric/Behavioral: Negative  Current Outpatient Medications   Medication Sig Dispense Refill    glucose blood test strip 1 each by Other route 3 (three) times a day 300 each 3    HumaLOG Mix 75/25 KwikPen (75-25) 100 units/mL injection pen INJECT SUBCUTANEOUSLY 45  UNITS TWICE DAILY WITH  MEALS 90 mL 3    Insulin Pen Needle (B-D UF III MINI PEN NEEDLES) 31G X 5 MM MISC Use twice daily with insulin pen as directed 180 each 3    latanoprost (XALATAN) 0 005 % ophthalmic solution Administer 1 drop to both eyes daily at bedtime       simvastatin (ZOCOR) 40 mg tablet TAKE 1 TABLET BY MOUTH  DAILY AT BEDTIME 90 tablet 3    tamsulosin (FLOMAX) 0 4 mg TAKE 1 CAPSULE BY MOUTH TWO TIMES DAILY 180 capsule 1    Vitamin D, Cholecalciferol, 1000 UNITS CAPS Take by mouth 2 times a week      cycloSPORINE (RESTASIS) 0 05 % ophthalmic emulsion 1 drop 2 (two) times a day (Patient not taking: Reported on 9/7/2021)      enalapril (VASOTEC) 10 mg tablet Take 1 tablet (10 mg total) by mouth daily  90 tablet 3    meloxicam (MOBIC) 15 mg tablet Take 1 tablet (15 mg total) by mouth daily (Patient not taking: Reported on 9/7/2021) 30 tablet 0     No current facility-administered medications for this visit         Objective:    BP 130/70 (BP Location: Left arm, Patient Position: Sitting, Cuff Size: Standard)   Pulse 75   Temp 99 °F (37 2 °C) (Temporal)   Resp 17   Ht 5' 8" (1 727 m)   Wt 88 9 kg (196 lb)   SpO2 99%   BMI 29 80 kg/m²        Physical Exam  Constitutional:       Appearance: He is not ill-appearing  Cardiovascular:      Rate and Rhythm: Normal rate and regular rhythm  Heart sounds: No murmur heard  Pulmonary:      Effort: Pulmonary effort is normal  No respiratory distress  Breath sounds: No wheezing, rhonchi or rales  Musculoskeletal:      Right lower leg: No edema  Left lower leg: No edema  Comments: Using a cane    Neurological:      General: No focal deficit present  Mental Status: He is alert and oriented to person, place, and time  Cranial Nerves: No cranial nerve deficit  Psychiatric:         Mood and Affect: Mood normal            Labs in chart were reviewed  Recent Results (from the past 672 hour(s))   POCT hemoglobin A1c    Collection Time: 09/07/21  9:47 AM   Result Value Ref Range    Hemoglobin A1C 7 1 (A) 6 5       Assessment/Plan:         Diagnoses and all orders for this visit:    Type 2 diabetes mellitus with microalbuminuria, with long-term current use of insulin ([facility])  -     POCT hemoglobin A1c  -     Insulin Pen Needle (B-D UF III MINI PEN NEEDLES) 31G X 5 MM MISC; Use twice daily with insulin pen as directed    Hyperlipidemia, unspecified hyperlipidemia type  -     simvastatin (ZOCOR) 40 mg tablet; Take 1 tablet (40 mg total) by mouth daily at bedtime    Benign essential hypertension  -     enalapril (VASOTEC) 10 mg tablet; Take 1 tablet (10 mg total) by mouth daily    Type 2 diabetes mellitus without complication, with long-term current use of insulin ([facility])  -     HumaLOG Mix 75/25 KwikPen (75-25) 100 units/mL injection pen;  Inject 45 Units under the skin 2 (two) times a day with meals              rto in 3 m               Mamta Charles MD

## 2021-09-30 ENCOUNTER — CLINICAL SUPPORT (OUTPATIENT)
Dept: FAMILY MEDICINE CLINIC | Facility: CLINIC | Age: 76
End: 2021-09-30
Payer: MEDICARE

## 2021-09-30 DIAGNOSIS — Z23 NEED FOR INFLUENZA VACCINATION: Primary | ICD-10-CM

## 2021-09-30 PROCEDURE — G0008 ADMIN INFLUENZA VIRUS VAC: HCPCS

## 2021-09-30 PROCEDURE — 90662 IIV NO PRSV INCREASED AG IM: CPT

## 2021-10-08 LAB
LEFT EYE DIABETIC RETINOPATHY: NORMAL
RIGHT EYE DIABETIC RETINOPATHY: NORMAL

## 2021-10-11 ENCOUNTER — TRANSCRIBE ORDERS (OUTPATIENT)
Dept: LAB | Facility: CLINIC | Age: 76
End: 2021-10-11

## 2021-10-11 ENCOUNTER — APPOINTMENT (OUTPATIENT)
Dept: LAB | Facility: CLINIC | Age: 76
End: 2021-10-11
Payer: MEDICARE

## 2021-10-11 DIAGNOSIS — R97.20 ELEVATED PROSTATE SPECIFIC ANTIGEN (PSA): ICD-10-CM

## 2021-10-11 DIAGNOSIS — R97.20 ELEVATED PROSTATE SPECIFIC ANTIGEN (PSA): Primary | ICD-10-CM

## 2021-10-11 LAB
BUN SERPL-MCNC: 20 MG/DL (ref 5–25)
CREAT SERPL-MCNC: 1.34 MG/DL (ref 0.6–1.3)
GFR SERPL CREATININE-BSD FRML MDRD: 50 ML/MIN/1.73SQ M

## 2021-10-11 PROCEDURE — 84520 ASSAY OF UREA NITROGEN: CPT

## 2021-10-11 PROCEDURE — 82565 ASSAY OF CREATININE: CPT

## 2021-10-11 PROCEDURE — 36415 COLL VENOUS BLD VENIPUNCTURE: CPT

## 2021-12-03 ENCOUNTER — APPOINTMENT (OUTPATIENT)
Dept: LAB | Facility: CLINIC | Age: 76
End: 2021-12-03
Payer: MEDICARE

## 2021-12-03 ENCOUNTER — IMMUNIZATIONS (OUTPATIENT)
Dept: FAMILY MEDICINE CLINIC | Facility: HOSPITAL | Age: 76
End: 2021-12-03

## 2021-12-03 DIAGNOSIS — R80.9 TYPE 2 DIABETES MELLITUS WITH MICROALBUMINURIA, WITH LONG-TERM CURRENT USE OF INSULIN (HCC): ICD-10-CM

## 2021-12-03 DIAGNOSIS — Z23 ENCOUNTER FOR IMMUNIZATION: Primary | ICD-10-CM

## 2021-12-03 DIAGNOSIS — Z79.4 TYPE 2 DIABETES MELLITUS WITH MICROALBUMINURIA, WITH LONG-TERM CURRENT USE OF INSULIN (HCC): ICD-10-CM

## 2021-12-03 DIAGNOSIS — E11.29 TYPE 2 DIABETES MELLITUS WITH MICROALBUMINURIA, WITH LONG-TERM CURRENT USE OF INSULIN (HCC): ICD-10-CM

## 2021-12-03 LAB
ALBUMIN SERPL BCP-MCNC: 3.2 G/DL (ref 3.5–5)
ALP SERPL-CCNC: 82 U/L (ref 46–116)
ALT SERPL W P-5'-P-CCNC: 17 U/L (ref 12–78)
ANION GAP SERPL CALCULATED.3IONS-SCNC: 8 MMOL/L (ref 4–13)
AST SERPL W P-5'-P-CCNC: 10 U/L (ref 5–45)
BILIRUB SERPL-MCNC: 0.7 MG/DL (ref 0.2–1)
BUN SERPL-MCNC: 23 MG/DL (ref 5–25)
CALCIUM ALBUM COR SERPL-MCNC: 9.5 MG/DL (ref 8.3–10.1)
CALCIUM SERPL-MCNC: 8.9 MG/DL (ref 8.3–10.1)
CHLORIDE SERPL-SCNC: 105 MMOL/L (ref 100–108)
CHOLEST SERPL-MCNC: 153 MG/DL
CO2 SERPL-SCNC: 24 MMOL/L (ref 21–32)
CREAT SERPL-MCNC: 1.34 MG/DL (ref 0.6–1.3)
CREAT UR-MCNC: 92.4 MG/DL
EST. AVERAGE GLUCOSE BLD GHB EST-MCNC: 160 MG/DL
GFR SERPL CREATININE-BSD FRML MDRD: 50 ML/MIN/1.73SQ M
GLUCOSE P FAST SERPL-MCNC: 129 MG/DL (ref 65–99)
HBA1C MFR BLD: 7.2 %
HDLC SERPL-MCNC: 38 MG/DL
LDLC SERPL CALC-MCNC: 100 MG/DL (ref 0–100)
MICROALBUMIN UR-MCNC: 121 MG/L (ref 0–20)
MICROALBUMIN/CREAT 24H UR: 131 MG/G CREATININE (ref 0–30)
NONHDLC SERPL-MCNC: 115 MG/DL
POTASSIUM SERPL-SCNC: 4.7 MMOL/L (ref 3.5–5.3)
PROT SERPL-MCNC: 7.6 G/DL (ref 6.4–8.2)
SODIUM SERPL-SCNC: 137 MMOL/L (ref 136–145)
TRIGL SERPL-MCNC: 75 MG/DL

## 2021-12-03 PROCEDURE — 82043 UR ALBUMIN QUANTITATIVE: CPT

## 2021-12-03 PROCEDURE — 91306 COVID-19 MODERNA VACC 0.25 ML BOOSTER: CPT

## 2021-12-03 PROCEDURE — 83036 HEMOGLOBIN GLYCOSYLATED A1C: CPT

## 2021-12-03 PROCEDURE — 82570 ASSAY OF URINE CREATININE: CPT

## 2021-12-03 PROCEDURE — 80061 LIPID PANEL: CPT

## 2021-12-03 PROCEDURE — 36415 COLL VENOUS BLD VENIPUNCTURE: CPT

## 2021-12-03 PROCEDURE — 80053 COMPREHEN METABOLIC PANEL: CPT

## 2021-12-03 PROCEDURE — 0064A COVID-19 MODERNA VACC 0.25 ML BOOSTER: CPT

## 2021-12-08 ENCOUNTER — HOSPITAL ENCOUNTER (OUTPATIENT)
Dept: RADIOLOGY | Facility: HOSPITAL | Age: 76
Discharge: HOME | End: 2021-12-08
Attending: SPECIALIST
Payer: MEDICARE

## 2021-12-08 DIAGNOSIS — R97.20 ELEVATED PSA: ICD-10-CM

## 2021-12-08 PROCEDURE — A9585 GADOBUTROL INJECTION: HCPCS | Performed by: SPECIALIST

## 2021-12-08 PROCEDURE — 72197 MRI PELVIS W/O & W/DYE: CPT

## 2021-12-08 PROCEDURE — 76377 3D RENDER W/INTRP POSTPROCES: CPT

## 2021-12-08 RX ADMIN — GADOBUTROL 9 ML: 604.72 INJECTION INTRAVENOUS at 09:48

## 2021-12-14 ENCOUNTER — OFFICE VISIT (OUTPATIENT)
Dept: FAMILY MEDICINE CLINIC | Facility: CLINIC | Age: 76
End: 2021-12-14
Payer: MEDICARE

## 2021-12-14 VITALS
BODY MASS INDEX: 29.55 KG/M2 | TEMPERATURE: 97.8 F | HEART RATE: 80 BPM | WEIGHT: 195 LBS | DIASTOLIC BLOOD PRESSURE: 60 MMHG | SYSTOLIC BLOOD PRESSURE: 124 MMHG | RESPIRATION RATE: 16 BRPM | HEIGHT: 68 IN

## 2021-12-14 DIAGNOSIS — E78.5 HYPERLIPIDEMIA, UNSPECIFIED HYPERLIPIDEMIA TYPE: ICD-10-CM

## 2021-12-14 DIAGNOSIS — I10 BENIGN ESSENTIAL HYPERTENSION: ICD-10-CM

## 2021-12-14 DIAGNOSIS — Z00.00 MEDICARE ANNUAL WELLNESS VISIT, SUBSEQUENT: ICD-10-CM

## 2021-12-14 DIAGNOSIS — R80.9 TYPE 2 DIABETES MELLITUS WITH MICROALBUMINURIA, WITH LONG-TERM CURRENT USE OF INSULIN (HCC): Primary | ICD-10-CM

## 2021-12-14 DIAGNOSIS — E11.29 TYPE 2 DIABETES MELLITUS WITH MICROALBUMINURIA, WITH LONG-TERM CURRENT USE OF INSULIN (HCC): Primary | ICD-10-CM

## 2021-12-14 DIAGNOSIS — Z79.4 TYPE 2 DIABETES MELLITUS WITH MICROALBUMINURIA, WITH LONG-TERM CURRENT USE OF INSULIN (HCC): Primary | ICD-10-CM

## 2021-12-14 PROCEDURE — 99214 OFFICE O/P EST MOD 30 MIN: CPT | Performed by: FAMILY MEDICINE

## 2021-12-14 PROCEDURE — G0439 PPPS, SUBSEQ VISIT: HCPCS | Performed by: FAMILY MEDICINE

## 2021-12-14 PROCEDURE — 1123F ACP DISCUSS/DSCN MKR DOCD: CPT | Performed by: FAMILY MEDICINE

## 2022-01-01 ENCOUNTER — APPOINTMENT (INPATIENT)
Dept: RADIOLOGY | Facility: HOSPITAL | Age: 77
End: 2022-01-01

## 2022-01-01 ENCOUNTER — APPOINTMENT (INPATIENT)
Dept: DIALYSIS | Facility: HOSPITAL | Age: 77
End: 2022-01-01
Attending: INTERNAL MEDICINE

## 2022-01-01 ENCOUNTER — APPOINTMENT (OUTPATIENT)
Dept: PERIOP | Facility: HOSPITAL | Age: 77
End: 2022-01-01

## 2022-01-01 ENCOUNTER — APPOINTMENT (INPATIENT)
Dept: DIALYSIS | Facility: HOSPITAL | Age: 77
End: 2022-01-01

## 2022-01-01 ENCOUNTER — APPOINTMENT (OUTPATIENT)
Dept: PERIOP | Facility: HOSPITAL | Age: 77
End: 2022-01-01
Attending: ANESTHESIOLOGY

## 2022-01-01 ENCOUNTER — APPOINTMENT (EMERGENCY)
Dept: RADIOLOGY | Facility: HOSPITAL | Age: 77
End: 2022-01-01

## 2022-01-01 ENCOUNTER — HOSPITAL ENCOUNTER (INPATIENT)
Facility: HOSPITAL | Age: 77
LOS: 15 days | End: 2023-01-09
Attending: EMERGENCY MEDICINE | Admitting: ANESTHESIOLOGY

## 2022-01-01 ENCOUNTER — APPOINTMENT (INPATIENT)
Dept: RADIOLOGY | Facility: HOSPITAL | Age: 77
End: 2022-01-01
Attending: RADIOLOGY

## 2022-01-01 DIAGNOSIS — J96.01 ACUTE RESPIRATORY FAILURE WITH HYPOXIA (HCC): ICD-10-CM

## 2022-01-01 DIAGNOSIS — N18.6 ESRD ON DIALYSIS (HCC): ICD-10-CM

## 2022-01-01 DIAGNOSIS — Z99.2 ESRD ON DIALYSIS (HCC): ICD-10-CM

## 2022-01-01 DIAGNOSIS — I77.82 ANCA-ASSOCIATED VASCULITIS: ICD-10-CM

## 2022-01-01 DIAGNOSIS — E87.1 HYPONATREMIA: ICD-10-CM

## 2022-01-01 DIAGNOSIS — J18.9 PNEUMONIA: ICD-10-CM

## 2022-01-01 DIAGNOSIS — I50.9 CHF (CONGESTIVE HEART FAILURE) (HCC): ICD-10-CM

## 2022-01-01 DIAGNOSIS — R65.10 SIRS (SYSTEMIC INFLAMMATORY RESPONSE SYNDROME) (HCC): ICD-10-CM

## 2022-01-01 DIAGNOSIS — R06.02 SOB (SHORTNESS OF BREATH): ICD-10-CM

## 2022-01-01 DIAGNOSIS — J94.8 HYDROPNEUMOTHORAX: ICD-10-CM

## 2022-01-01 DIAGNOSIS — R06.03 RESPIRATORY DISTRESS: Primary | ICD-10-CM

## 2022-01-01 DIAGNOSIS — R04.89 DIFFUSE PULMONARY ALVEOLAR HEMORRHAGE: ICD-10-CM

## 2022-01-01 DIAGNOSIS — I10 BENIGN ESSENTIAL HYPERTENSION: Primary | ICD-10-CM

## 2022-01-01 DIAGNOSIS — E83.51 HYPOCALCEMIA: ICD-10-CM

## 2022-01-01 DIAGNOSIS — R09.02 HYPOXIA: ICD-10-CM

## 2022-01-01 LAB
4HR DELTA HS TROPONIN: 1 NG/L
ABO GROUP BLD BPU: NORMAL
ABO GROUP BLD: NORMAL
ABO GROUP BLD: NORMAL
ALBUMIN SERPL BCP-MCNC: 1.9 G/DL (ref 3.5–5)
ALP SERPL-CCNC: 112 U/L (ref 46–116)
ALT SERPL W P-5'-P-CCNC: 14 U/L (ref 12–78)
ANION GAP SERPL CALCULATED.3IONS-SCNC: 10 MMOL/L (ref 4–13)
ANION GAP SERPL CALCULATED.3IONS-SCNC: 11 MMOL/L (ref 4–13)
ANION GAP SERPL CALCULATED.3IONS-SCNC: 11 MMOL/L (ref 4–13)
ANION GAP SERPL CALCULATED.3IONS-SCNC: 12 MMOL/L (ref 4–13)
ANION GAP SERPL CALCULATED.3IONS-SCNC: 13 MMOL/L (ref 4–13)
ANION GAP SERPL CALCULATED.3IONS-SCNC: 15 MMOL/L (ref 4–13)
ANION GAP SERPL CALCULATED.3IONS-SCNC: 9 MMOL/L (ref 4–13)
ANISOCYTOSIS BLD QL SMEAR: PRESENT
APTT PPP: 32 SECONDS (ref 23–37)
ARTERIAL PATENCY WRIST A: YES
AST SERPL W P-5'-P-CCNC: 10 U/L (ref 5–45)
ATRIAL RATE: 98 BPM
BACTERIA BLD CULT: NORMAL
BACTERIA BLD CULT: NORMAL
BACTERIA BRONCH AEROBE CULT: ABNORMAL
BACTERIA BRONCH AEROBE CULT: ABNORMAL
BACTERIA SPT RESP CULT: NORMAL
BASE EXCESS BLDA CALC-SCNC: -0.5 MMOL/L
BASE EXCESS BLDA CALC-SCNC: -0.6 MMOL/L
BASE EXCESS BLDA CALC-SCNC: -1.3 MMOL/L
BASE EXCESS BLDA CALC-SCNC: 0.5 MMOL/L
BASE EXCESS BLDA CALC-SCNC: 1.9 MMOL/L
BASOPHILS # BLD AUTO: 0 THOUSANDS/ÂΜL (ref 0–0.1)
BASOPHILS # BLD AUTO: 0.02 THOUSANDS/ÂΜL (ref 0–0.1)
BASOPHILS # BLD AUTO: 0.02 THOUSANDS/ÂΜL (ref 0–0.1)
BASOPHILS # BLD AUTO: 0.03 THOUSANDS/ÂΜL (ref 0–0.1)
BASOPHILS # BLD MANUAL: 0 THOUSAND/UL (ref 0–0.1)
BASOPHILS NFR BLD AUTO: 0 % (ref 0–1)
BASOPHILS NFR MAR MANUAL: 0 % (ref 0–1)
BILIRUB SERPL-MCNC: 0.37 MG/DL (ref 0.2–1)
BLD GP AB SCN SERPL QL: NEGATIVE
BLD GP AB SCN SERPL QL: NEGATIVE
BODY TEMPERATURE: 97.4 DEGREES FEHRENHEIT
BODY TEMPERATURE: 98 DEGREES FEHRENHEIT
BODY TEMPERATURE: 98.4 DEGREES FEHRENHEIT
BPU ID: NORMAL
BUN SERPL-MCNC: 107 MG/DL (ref 5–25)
BUN SERPL-MCNC: 53 MG/DL (ref 5–25)
BUN SERPL-MCNC: 57 MG/DL (ref 5–25)
BUN SERPL-MCNC: 60 MG/DL (ref 5–25)
BUN SERPL-MCNC: 71 MG/DL (ref 5–25)
BUN SERPL-MCNC: 80 MG/DL (ref 5–25)
BUN SERPL-MCNC: 83 MG/DL (ref 5–25)
BUN SERPL-MCNC: 86 MG/DL (ref 5–25)
BUN SERPL-MCNC: 89 MG/DL (ref 5–25)
CA-I BLD-SCNC: 0.99 MMOL/L (ref 1.12–1.32)
CALCIUM ALBUM COR SERPL-MCNC: 9.8 MG/DL (ref 8.3–10.1)
CALCIUM SERPL-MCNC: 7.1 MG/DL (ref 8.3–10.1)
CALCIUM SERPL-MCNC: 7.2 MG/DL (ref 8.3–10.1)
CALCIUM SERPL-MCNC: 7.2 MG/DL (ref 8.3–10.1)
CALCIUM SERPL-MCNC: 7.3 MG/DL (ref 8.3–10.1)
CALCIUM SERPL-MCNC: 7.4 MG/DL (ref 8.3–10.1)
CALCIUM SERPL-MCNC: 7.6 MG/DL (ref 8.3–10.1)
CALCIUM SERPL-MCNC: 7.6 MG/DL (ref 8.3–10.1)
CALCIUM SERPL-MCNC: 7.7 MG/DL (ref 8.3–10.1)
CALCIUM SERPL-MCNC: 8.1 MG/DL (ref 8.3–10.1)
CARDIAC TROPONIN I PNL SERPL HS: 12 NG/L
CARDIAC TROPONIN I PNL SERPL HS: 13 NG/L
CHLORIDE SERPL-SCNC: 85 MMOL/L (ref 96–108)
CHLORIDE SERPL-SCNC: 93 MMOL/L (ref 96–108)
CHLORIDE SERPL-SCNC: 93 MMOL/L (ref 96–108)
CHLORIDE SERPL-SCNC: 94 MMOL/L (ref 96–108)
CHLORIDE SERPL-SCNC: 95 MMOL/L (ref 96–108)
CHLORIDE SERPL-SCNC: 95 MMOL/L (ref 96–108)
CHLORIDE SERPL-SCNC: 96 MMOL/L (ref 96–108)
CHLORIDE SERPL-SCNC: 97 MMOL/L (ref 96–108)
CHLORIDE SERPL-SCNC: 97 MMOL/L (ref 96–108)
CO2 SERPL-SCNC: 22 MMOL/L (ref 21–32)
CO2 SERPL-SCNC: 24 MMOL/L (ref 21–32)
CO2 SERPL-SCNC: 24 MMOL/L (ref 21–32)
CO2 SERPL-SCNC: 25 MMOL/L (ref 21–32)
CO2 SERPL-SCNC: 26 MMOL/L (ref 21–32)
CO2 SERPL-SCNC: 27 MMOL/L (ref 21–32)
CO2 SERPL-SCNC: 28 MMOL/L (ref 21–32)
CREAT SERPL-MCNC: 3.58 MG/DL (ref 0.6–1.3)
CREAT SERPL-MCNC: 3.63 MG/DL (ref 0.6–1.3)
CREAT SERPL-MCNC: 3.96 MG/DL (ref 0.6–1.3)
CREAT SERPL-MCNC: 4.77 MG/DL (ref 0.6–1.3)
CREAT SERPL-MCNC: 5.02 MG/DL (ref 0.6–1.3)
CREAT SERPL-MCNC: 5.37 MG/DL (ref 0.6–1.3)
CREAT SERPL-MCNC: 5.55 MG/DL (ref 0.6–1.3)
CREAT SERPL-MCNC: 5.65 MG/DL (ref 0.6–1.3)
CREAT SERPL-MCNC: 6.5 MG/DL (ref 0.6–1.3)
CROSSMATCH: NORMAL
CRP SERPL QL: 157.4 MG/L
CRP SERPL QL: 189.1 MG/L
EOSINOPHIL # BLD AUTO: 0 THOUSAND/ÂΜL (ref 0–0.61)
EOSINOPHIL # BLD MANUAL: 0 THOUSAND/UL (ref 0–0.4)
EOSINOPHIL NFR BLD AUTO: 0 % (ref 0–6)
EOSINOPHIL NFR BLD MANUAL: 0 % (ref 0–6)
ERYTHROCYTE [DISTWIDTH] IN BLOOD BY AUTOMATED COUNT: 14.4 % (ref 11.6–15.1)
ERYTHROCYTE [DISTWIDTH] IN BLOOD BY AUTOMATED COUNT: 14.5 % (ref 11.6–15.1)
ERYTHROCYTE [DISTWIDTH] IN BLOOD BY AUTOMATED COUNT: 14.6 % (ref 11.6–15.1)
ERYTHROCYTE [DISTWIDTH] IN BLOOD BY AUTOMATED COUNT: 14.7 % (ref 11.6–15.1)
ERYTHROCYTE [DISTWIDTH] IN BLOOD BY AUTOMATED COUNT: 14.7 % (ref 11.6–15.1)
ERYTHROCYTE [DISTWIDTH] IN BLOOD BY AUTOMATED COUNT: 14.8 % (ref 11.6–15.1)
ERYTHROCYTE [DISTWIDTH] IN BLOOD BY AUTOMATED COUNT: 14.9 % (ref 11.6–15.1)
FIBRINOGEN PPP-MCNC: 395 MG/DL (ref 227–495)
FLUAV RNA RESP QL NAA+PROBE: NEGATIVE
FLUBV RNA RESP QL NAA+PROBE: NEGATIVE
GAMMA INTERFERON BACKGROUND BLD IA-ACNC: 0.05 IU/ML
GFR SERPL CREATININE-BSD FRML MDRD: 10 ML/MIN/1.73SQ M
GFR SERPL CREATININE-BSD FRML MDRD: 10 ML/MIN/1.73SQ M
GFR SERPL CREATININE-BSD FRML MDRD: 13 ML/MIN/1.73SQ M
GFR SERPL CREATININE-BSD FRML MDRD: 14 ML/MIN/1.73SQ M
GFR SERPL CREATININE-BSD FRML MDRD: 15 ML/MIN/1.73SQ M
GFR SERPL CREATININE-BSD FRML MDRD: 7 ML/MIN/1.73SQ M
GFR SERPL CREATININE-BSD FRML MDRD: 8 ML/MIN/1.73SQ M
GFR SERPL CREATININE-BSD FRML MDRD: 8 ML/MIN/1.73SQ M
GFR SERPL CREATININE-BSD FRML MDRD: 9 ML/MIN/1.73SQ M
GLUCOSE SERPL-MCNC: 118 MG/DL (ref 65–140)
GLUCOSE SERPL-MCNC: 128 MG/DL (ref 65–140)
GLUCOSE SERPL-MCNC: 131 MG/DL (ref 65–140)
GLUCOSE SERPL-MCNC: 137 MG/DL (ref 65–140)
GLUCOSE SERPL-MCNC: 143 MG/DL (ref 65–140)
GLUCOSE SERPL-MCNC: 146 MG/DL (ref 65–140)
GLUCOSE SERPL-MCNC: 148 MG/DL (ref 65–140)
GLUCOSE SERPL-MCNC: 155 MG/DL (ref 65–140)
GLUCOSE SERPL-MCNC: 156 MG/DL (ref 65–140)
GLUCOSE SERPL-MCNC: 159 MG/DL (ref 65–140)
GLUCOSE SERPL-MCNC: 160 MG/DL (ref 65–140)
GLUCOSE SERPL-MCNC: 163 MG/DL (ref 65–140)
GLUCOSE SERPL-MCNC: 164 MG/DL (ref 65–140)
GLUCOSE SERPL-MCNC: 167 MG/DL (ref 65–140)
GLUCOSE SERPL-MCNC: 168 MG/DL (ref 65–140)
GLUCOSE SERPL-MCNC: 176 MG/DL (ref 65–140)
GLUCOSE SERPL-MCNC: 179 MG/DL (ref 65–140)
GLUCOSE SERPL-MCNC: 182 MG/DL (ref 65–140)
GLUCOSE SERPL-MCNC: 193 MG/DL (ref 65–140)
GLUCOSE SERPL-MCNC: 194 MG/DL (ref 65–140)
GLUCOSE SERPL-MCNC: 197 MG/DL (ref 65–140)
GLUCOSE SERPL-MCNC: 207 MG/DL (ref 65–140)
GLUCOSE SERPL-MCNC: 219 MG/DL (ref 65–140)
GLUCOSE SERPL-MCNC: 225 MG/DL (ref 65–140)
GLUCOSE SERPL-MCNC: 232 MG/DL (ref 65–140)
GLUCOSE SERPL-MCNC: 239 MG/DL (ref 65–140)
GLUCOSE SERPL-MCNC: 254 MG/DL (ref 65–140)
GLUCOSE SERPL-MCNC: 255 MG/DL (ref 65–140)
GLUCOSE SERPL-MCNC: 288 MG/DL (ref 65–140)
GLUCOSE SERPL-MCNC: 344 MG/DL (ref 65–140)
GLUCOSE SERPL-MCNC: 365 MG/DL (ref 65–140)
GRAM STN SPEC: ABNORMAL
GRAM STN SPEC: ABNORMAL
GRAM STN SPEC: NORMAL
GRAM STN SPEC: NORMAL
HBV CORE AB SER QL: NORMAL
HBV CORE IGM SER QL: NORMAL
HBV SURFACE AG SER QL: NORMAL
HCO3 BLDA-SCNC: 25.6 MMOL/L (ref 22–28)
HCO3 BLDA-SCNC: 26.2 MMOL/L (ref 22–28)
HCO3 BLDA-SCNC: 26.5 MMOL/L (ref 22–28)
HCO3 BLDA-SCNC: 26.7 MMOL/L (ref 22–28)
HCO3 BLDA-SCNC: 29.4 MMOL/L (ref 22–28)
HCT VFR BLD AUTO: 21.3 % (ref 36.5–49.3)
HCT VFR BLD AUTO: 23.5 % (ref 36.5–49.3)
HCT VFR BLD AUTO: 24.7 % (ref 36.5–49.3)
HCT VFR BLD AUTO: 25.5 % (ref 36.5–49.3)
HCT VFR BLD AUTO: 27.8 % (ref 36.5–49.3)
HCT VFR BLD AUTO: 28.9 % (ref 36.5–49.3)
HCT VFR BLD AUTO: 29.6 % (ref 36.5–49.3)
HCV AB SER QL: NORMAL
HGB BLD-MCNC: 6.7 G/DL (ref 12–17)
HGB BLD-MCNC: 7.1 G/DL (ref 12–17)
HGB BLD-MCNC: 7.7 G/DL (ref 12–17)
HGB BLD-MCNC: 7.9 G/DL (ref 12–17)
HGB BLD-MCNC: 9 G/DL (ref 12–17)
HGB BLD-MCNC: 9.3 G/DL (ref 12–17)
HGB BLD-MCNC: 9.3 G/DL (ref 12–17)
HOROWITZ INDEX BLDA+IHG-RTO: 100 MM[HG]
HOROWITZ INDEX BLDA+IHG-RTO: 75 MM[HG]
I-TIME: 0.8
I-TIME: 0.8
IMM GRANULOCYTES # BLD AUTO: 0.05 THOUSAND/UL (ref 0–0.2)
IMM GRANULOCYTES # BLD AUTO: 0.07 THOUSAND/UL (ref 0–0.2)
IMM GRANULOCYTES # BLD AUTO: 0.22 THOUSAND/UL (ref 0–0.2)
IMM GRANULOCYTES # BLD AUTO: 0.4 THOUSAND/UL (ref 0–0.2)
IMM GRANULOCYTES NFR BLD AUTO: 1 % (ref 0–2)
IMM GRANULOCYTES NFR BLD AUTO: 2 % (ref 0–2)
INR PPP: 1.33 (ref 0.84–1.19)
INR PPP: 1.35 (ref 0.84–1.19)
LACTATE SERPL-SCNC: 2.2 MMOL/L (ref 0.5–2)
LDH SERPL-CCNC: 266 U/L (ref 81–234)
LIPASE SERPL-CCNC: 51 U/L (ref 73–393)
LYMPHOCYTES # BLD AUTO: 0.12 THOUSANDS/ÂΜL (ref 0.6–4.47)
LYMPHOCYTES # BLD AUTO: 0.14 THOUSANDS/ÂΜL (ref 0.6–4.47)
LYMPHOCYTES # BLD AUTO: 0.16 THOUSAND/UL (ref 0.6–4.47)
LYMPHOCYTES # BLD AUTO: 0.16 THOUSANDS/ÂΜL (ref 0.6–4.47)
LYMPHOCYTES # BLD AUTO: 0.3 THOUSANDS/ÂΜL (ref 0.6–4.47)
LYMPHOCYTES # BLD AUTO: 2 % (ref 14–44)
LYMPHOCYTES NFR BLD AUTO: 1 % (ref 14–44)
LYMPHOCYTES NFR BLD AUTO: 1 % (ref 14–44)
LYMPHOCYTES NFR BLD AUTO: 2 %
LYMPHOCYTES NFR BLD AUTO: 2 % (ref 14–44)
LYMPHOCYTES NFR BLD AUTO: 3 % (ref 14–44)
M TB IFN-G BLD-IMP: ABNORMAL
M TB IFN-G CD4+ BCKGRND COR BLD-ACNC: 0 IU/ML
M TB IFN-G CD4+ BCKGRND COR BLD-ACNC: 0.01 IU/ML
MACROPHAGES NFR FLD: 3 %
MAGNESIUM SERPL-MCNC: 2.1 MG/DL (ref 1.6–2.6)
MAGNESIUM SERPL-MCNC: 2.1 MG/DL (ref 1.6–2.6)
MAGNESIUM SERPL-MCNC: 2.2 MG/DL (ref 1.6–2.6)
MAGNESIUM SERPL-MCNC: 2.2 MG/DL (ref 1.6–2.6)
MAGNESIUM SERPL-MCNC: 2.3 MG/DL (ref 1.6–2.6)
MAGNESIUM SERPL-MCNC: 2.4 MG/DL (ref 1.6–2.6)
MCH RBC QN AUTO: 27.3 PG (ref 26.8–34.3)
MCH RBC QN AUTO: 27.6 PG (ref 26.8–34.3)
MCH RBC QN AUTO: 27.6 PG (ref 26.8–34.3)
MCH RBC QN AUTO: 27.9 PG (ref 26.8–34.3)
MCH RBC QN AUTO: 28.2 PG (ref 26.8–34.3)
MCH RBC QN AUTO: 28.2 PG (ref 26.8–34.3)
MCH RBC QN AUTO: 28.5 PG (ref 26.8–34.3)
MCHC RBC AUTO-ENTMCNC: 30.2 G/DL (ref 31.4–37.4)
MCHC RBC AUTO-ENTMCNC: 31 G/DL (ref 31.4–37.4)
MCHC RBC AUTO-ENTMCNC: 31 G/DL (ref 31.4–37.4)
MCHC RBC AUTO-ENTMCNC: 31.2 G/DL (ref 31.4–37.4)
MCHC RBC AUTO-ENTMCNC: 31.4 G/DL (ref 31.4–37.4)
MCHC RBC AUTO-ENTMCNC: 32.2 G/DL (ref 31.4–37.4)
MCHC RBC AUTO-ENTMCNC: 32.4 G/DL (ref 31.4–37.4)
MCV RBC AUTO: 88 FL (ref 82–98)
MCV RBC AUTO: 88 FL (ref 82–98)
MCV RBC AUTO: 89 FL (ref 82–98)
MCV RBC AUTO: 89 FL (ref 82–98)
MCV RBC AUTO: 90 FL (ref 82–98)
MITOGEN IGNF BCKGRD COR BLD-ACNC: <0.1 IU/ML
MONOCYTES # BLD AUTO: 0.16 THOUSAND/UL (ref 0–1.22)
MONOCYTES # BLD AUTO: 0.16 THOUSAND/ÂΜL (ref 0.17–1.22)
MONOCYTES # BLD AUTO: 0.43 THOUSAND/ÂΜL (ref 0.17–1.22)
MONOCYTES # BLD AUTO: 0.43 THOUSAND/ÂΜL (ref 0.17–1.22)
MONOCYTES # BLD AUTO: 0.68 THOUSAND/ÂΜL (ref 0.17–1.22)
MONOCYTES NFR BLD AUTO: 2 % (ref 4–12)
MONOCYTES NFR BLD AUTO: 2 % (ref 4–12)
MONOCYTES NFR BLD AUTO: 3 % (ref 4–12)
MONOCYTES NFR BLD AUTO: 6 % (ref 4–12)
MONOCYTES NFR BLD: 2 % (ref 4–12)
MRSA NOSE QL CULT: NORMAL
NEUTROPHILS # BLD AUTO: 11.05 THOUSANDS/ÂΜL (ref 1.85–7.62)
NEUTROPHILS # BLD AUTO: 14.5 THOUSANDS/ÂΜL (ref 1.85–7.62)
NEUTROPHILS # BLD AUTO: 20.51 THOUSANDS/ÂΜL (ref 1.85–7.62)
NEUTROPHILS # BLD AUTO: 8.13 THOUSANDS/ÂΜL (ref 1.85–7.62)
NEUTROPHILS # BLD MANUAL: 7.86 THOUSAND/UL (ref 1.85–7.62)
NEUTS BAND NFR BLD MANUAL: 9 % (ref 0–8)
NEUTS SEG NFR BLD AUTO: 87 % (ref 43–75)
NEUTS SEG NFR BLD AUTO: 90 % (ref 43–75)
NEUTS SEG NFR BLD AUTO: 95 %
NEUTS SEG NFR BLD AUTO: 95 % (ref 43–75)
NRBC BLD AUTO-RTO: 0 /100 WBCS
NT-PROBNP SERPL-MCNC: ABNORMAL PG/ML
O2 CT BLDA-SCNC: 11.5 ML/DL (ref 16–23)
O2 CT BLDA-SCNC: 12.2 ML/DL (ref 16–23)
O2 CT BLDA-SCNC: 13.4 ML/DL (ref 16–23)
O2 CT BLDA-SCNC: 13.8 ML/DL (ref 16–23)
O2 CT BLDA-SCNC: 8.4 ML/DL (ref 16–23)
OXYHGB MFR BLDA: 45.1 % (ref 94–97)
OXYHGB MFR BLDA: 90 % (ref 94–97)
OXYHGB MFR BLDA: 93.2 % (ref 94–97)
OXYHGB MFR BLDA: 95 % (ref 94–97)
OXYHGB MFR BLDA: 97.8 % (ref 94–97)
P AXIS: 40 DEGREES
P JIROVECII AG SPEC QL IF: NORMAL
PCO2 BLDA: 47.7 MM HG (ref 36–44)
PCO2 BLDA: 48.4 MM HG (ref 36–44)
PCO2 BLDA: 56.1 MM HG (ref 36–44)
PCO2 BLDA: 62.1 MM HG (ref 36–44)
PCO2 BLDA: 63.9 MM HG (ref 36–44)
PCO2 TEMP ADJ BLDA: 46.9 MM HG (ref 36–44)
PCO2 TEMP ADJ BLDA: 47.1 MM HG (ref 36–44)
PCO2 TEMP ADJ BLDA: 55.9 MM HG (ref 36–44)
PCO2 TEMP ADJ BLDA: 61.8 MM HG (ref 36–44)
PCO2 TEMP ADJ BLDA: 63.6 MM HG (ref 36–44)
PEEP RESPIRATORY: 10 CM[H2O]
PEEP RESPIRATORY: 6 CM[H2O]
PH BLD: 7.25 [PH] (ref 7.35–7.45)
PH BLD: 7.28 [PH] (ref 7.35–7.45)
PH BLD: 7.29 [PH] (ref 7.35–7.45)
PH BLD: 7.35 [PH] (ref 7.35–7.45)
PH BLD: 7.36 [PH] (ref 7.35–7.45)
PH BLDA: 7.25 [PH] (ref 7.35–7.45)
PH BLDA: 7.28 [PH] (ref 7.35–7.45)
PH BLDA: 7.29 [PH] (ref 7.35–7.45)
PH BLDA: 7.35 [PH] (ref 7.35–7.45)
PH BLDA: 7.35 [PH] (ref 7.35–7.45)
PHOSPHATE SERPL-MCNC: 5.8 MG/DL (ref 2.3–4.1)
PHOSPHATE SERPL-MCNC: 5.9 MG/DL (ref 2.3–4.1)
PHOSPHATE SERPL-MCNC: 6 MG/DL (ref 2.3–4.1)
PHOSPHATE SERPL-MCNC: 6.6 MG/DL (ref 2.3–4.1)
PHOSPHATE SERPL-MCNC: 8 MG/DL (ref 2.3–4.1)
PLATELET # BLD AUTO: 244 THOUSANDS/UL (ref 149–390)
PLATELET # BLD AUTO: 269 THOUSANDS/UL (ref 149–390)
PLATELET # BLD AUTO: 275 THOUSANDS/UL (ref 149–390)
PLATELET # BLD AUTO: 290 THOUSANDS/UL (ref 149–390)
PLATELET # BLD AUTO: 297 THOUSANDS/UL (ref 149–390)
PLATELET # BLD AUTO: 312 THOUSANDS/UL (ref 149–390)
PLATELET # BLD AUTO: 406 THOUSANDS/UL (ref 149–390)
PLATELET BLD QL SMEAR: ADEQUATE
PMV BLD AUTO: 9.2 FL (ref 8.9–12.7)
PMV BLD AUTO: 9.2 FL (ref 8.9–12.7)
PMV BLD AUTO: 9.3 FL (ref 8.9–12.7)
PMV BLD AUTO: 9.4 FL (ref 8.9–12.7)
PMV BLD AUTO: 9.5 FL (ref 8.9–12.7)
PMV BLD AUTO: 9.6 FL (ref 8.9–12.7)
PMV BLD AUTO: 9.7 FL (ref 8.9–12.7)
PO2 BLD: 118.7 MM HG (ref 75–129)
PO2 BLD: 26.6 MM HG (ref 75–129)
PO2 BLD: 65.3 MM HG (ref 75–129)
PO2 BLD: 76.7 MM HG (ref 75–129)
PO2 BLD: 89.6 MM HG (ref 75–129)
PO2 BLDA: 123 MM HG (ref 75–129)
PO2 BLDA: 27.2 MM HG (ref 75–129)
PO2 BLDA: 65.7 MM HG (ref 75–129)
PO2 BLDA: 77.2 MM HG (ref 75–129)
PO2 BLDA: 90.2 MM HG (ref 75–129)
POTASSIUM SERPL-SCNC: 4.4 MMOL/L (ref 3.5–5.3)
POTASSIUM SERPL-SCNC: 4.5 MMOL/L (ref 3.5–5.3)
POTASSIUM SERPL-SCNC: 4.8 MMOL/L (ref 3.5–5.3)
POTASSIUM SERPL-SCNC: 4.8 MMOL/L (ref 3.5–5.3)
POTASSIUM SERPL-SCNC: 5.1 MMOL/L (ref 3.5–5.3)
POTASSIUM SERPL-SCNC: 5.3 MMOL/L (ref 3.5–5.3)
POTASSIUM SERPL-SCNC: 5.4 MMOL/L (ref 3.5–5.3)
POTASSIUM SERPL-SCNC: 5.5 MMOL/L (ref 3.5–5.3)
POTASSIUM SERPL-SCNC: 5.8 MMOL/L (ref 3.5–5.3)
PR INTERVAL: 192 MS
PRESSURE CONTROL: 28
PROCALCITONIN SERPL-MCNC: 1.79 NG/ML
PROCALCITONIN SERPL-MCNC: 10.77 NG/ML
PROCALCITONIN SERPL-MCNC: 2.77 NG/ML
PROCALCITONIN SERPL-MCNC: 2.78 NG/ML
PROCALCITONIN SERPL-MCNC: 2.89 NG/ML
PROT SERPL-MCNC: 7.5 G/DL (ref 6.4–8.4)
PROTHROMBIN TIME: 16.6 SECONDS (ref 11.6–14.5)
PROTHROMBIN TIME: 16.8 SECONDS (ref 11.6–14.5)
QRS AXIS: 39 DEGREES
QRSD INTERVAL: 90 MS
QT INTERVAL: 272 MS
QTC INTERVAL: 347 MS
RBC # BLD AUTO: 2.39 MILLION/UL (ref 3.88–5.62)
RBC # BLD AUTO: 2.6 MILLION/UL (ref 3.88–5.62)
RBC # BLD AUTO: 2.79 MILLION/UL (ref 3.88–5.62)
RBC # BLD AUTO: 2.83 MILLION/UL (ref 3.88–5.62)
RBC # BLD AUTO: 3.16 MILLION/UL (ref 3.88–5.62)
RBC # BLD AUTO: 3.3 MILLION/UL (ref 3.88–5.62)
RBC # BLD AUTO: 3.3 MILLION/UL (ref 3.88–5.62)
RBC MORPH BLD: PRESENT
RH BLD: POSITIVE
RH BLD: POSITIVE
RSV RNA RESP QL NAA+PROBE: NEGATIVE
SARS-COV-2 RNA RESP QL NAA+PROBE: NEGATIVE
SODIUM SERPL-SCNC: 124 MMOL/L (ref 135–147)
SODIUM SERPL-SCNC: 129 MMOL/L (ref 135–147)
SODIUM SERPL-SCNC: 130 MMOL/L (ref 135–147)
SODIUM SERPL-SCNC: 131 MMOL/L (ref 135–147)
SODIUM SERPL-SCNC: 132 MMOL/L (ref 135–147)
SODIUM SERPL-SCNC: 134 MMOL/L (ref 135–147)
SODIUM SERPL-SCNC: 136 MMOL/L (ref 135–147)
SPECIMEN EXPIRATION DATE: NORMAL
SPECIMEN EXPIRATION DATE: NORMAL
SPECIMEN SOURCE: ABNORMAL
T WAVE AXIS: 266 DEGREES
TOTAL CELLS COUNTED SPEC: 100
UNIT DISPENSE STATUS: NORMAL
UNIT PRODUCT CODE: NORMAL
UNIT PRODUCT VOLUME: 350 ML
UNIT RH: NORMAL
VANCOMYCIN SERPL-MCNC: 23.8 UG/ML (ref 10–20)
VANCOMYCIN SERPL-MCNC: 27.9 UG/ML (ref 10–20)
VENT AC: 14
VENT- AC: AC
VENTRICULAR RATE: 98 BPM
VT SETTING VENT: 400 ML
WBC # BLD AUTO: 12.12 THOUSAND/UL (ref 4.31–10.16)
WBC # BLD AUTO: 15.29 THOUSAND/UL (ref 4.31–10.16)
WBC # BLD AUTO: 21.51 THOUSAND/UL (ref 4.31–10.16)
WBC # BLD AUTO: 22.55 THOUSAND/UL (ref 4.31–10.16)
WBC # BLD AUTO: 6.49 THOUSAND/UL (ref 4.31–10.16)
WBC # BLD AUTO: 8.19 THOUSAND/UL (ref 4.31–10.16)
WBC # BLD AUTO: 8.5 THOUSAND/UL (ref 4.31–10.16)

## 2022-01-01 PROCEDURE — 5A1D70Z PERFORMANCE OF URINARY FILTRATION, INTERMITTENT, LESS THAN 6 HOURS PER DAY: ICD-10-PCS | Performed by: INTERNAL MEDICINE

## 2022-01-01 PROCEDURE — 5A1955Z RESPIRATORY VENTILATION, GREATER THAN 96 CONSECUTIVE HOURS: ICD-10-PCS | Performed by: ANESTHESIOLOGY

## 2022-01-01 PROCEDURE — 0BH17EZ INSERTION OF ENDOTRACHEAL AIRWAY INTO TRACHEA, VIA NATURAL OR ARTIFICIAL OPENING: ICD-10-PCS | Performed by: ANESTHESIOLOGY

## 2022-01-01 PROCEDURE — 0B958ZZ DRAINAGE OF RIGHT MIDDLE LOBE BRONCHUS, VIA NATURAL OR ARTIFICIAL OPENING ENDOSCOPIC: ICD-10-PCS | Performed by: ANESTHESIOLOGY

## 2022-01-01 PROCEDURE — 30233N1 TRANSFUSION OF NONAUTOLOGOUS RED BLOOD CELLS INTO PERIPHERAL VEIN, PERCUTANEOUS APPROACH: ICD-10-PCS | Performed by: ANESTHESIOLOGY

## 2022-01-01 PROCEDURE — 30233L1 TRANSFUSION OF NONAUTOLOGOUS FRESH PLASMA INTO PERIPHERAL VEIN, PERCUTANEOUS APPROACH: ICD-10-PCS | Performed by: ANESTHESIOLOGY

## 2022-01-01 RX ORDER — METHYLPREDNISOLONE SODIUM SUCCINATE 125 MG/2ML
80 INJECTION, POWDER, LYOPHILIZED, FOR SOLUTION INTRAMUSCULAR; INTRAVENOUS DAILY
Status: DISCONTINUED | OUTPATIENT
Start: 2022-01-01 | End: 2023-01-01

## 2022-01-01 RX ORDER — CALCIUM ACETATE 667 MG/1
667 CAPSULE ORAL
Qty: 60 CAPSULE | Refills: 1 | Status: SHIPPED | OUTPATIENT
Start: 2022-01-01 | End: 2023-01-01

## 2022-01-01 RX ORDER — VECURONIUM BROMIDE 1 MG/ML
10 INJECTION, POWDER, LYOPHILIZED, FOR SOLUTION INTRAVENOUS ONCE
Status: COMPLETED | OUTPATIENT
Start: 2022-01-01 | End: 2022-01-01

## 2022-01-01 RX ORDER — CLONIDINE HYDROCHLORIDE 0.1 MG/1
0.1 TABLET ORAL EVERY 12 HOURS SCHEDULED
Status: DISCONTINUED | OUTPATIENT
Start: 2022-01-01 | End: 2022-01-01

## 2022-01-01 RX ORDER — ONDANSETRON 2 MG/ML
4 INJECTION INTRAMUSCULAR; INTRAVENOUS EVERY 6 HOURS PRN
Status: DISCONTINUED | OUTPATIENT
Start: 2022-01-01 | End: 2023-01-01 | Stop reason: HOSPADM

## 2022-01-01 RX ORDER — FENTANYL CITRATE 50 UG/ML
50 INJECTION, SOLUTION INTRAMUSCULAR; INTRAVENOUS
Status: DISCONTINUED | OUTPATIENT
Start: 2022-01-01 | End: 2023-01-01

## 2022-01-01 RX ORDER — METHYLPREDNISOLONE SODIUM SUCCINATE 125 MG/2ML
80 INJECTION, POWDER, LYOPHILIZED, FOR SOLUTION INTRAMUSCULAR; INTRAVENOUS DAILY
Status: DISCONTINUED | OUTPATIENT
Start: 2022-01-01 | End: 2022-01-01

## 2022-01-01 RX ORDER — LEVALBUTEROL INHALATION SOLUTION 0.63 MG/3ML
0.63 SOLUTION RESPIRATORY (INHALATION)
Status: DISCONTINUED | OUTPATIENT
Start: 2022-01-01 | End: 2022-01-01

## 2022-01-01 RX ORDER — NOREPINEPHRINE BITARTRATE 1 MG/ML
INJECTION, SOLUTION INTRAVENOUS
Status: COMPLETED
Start: 2022-01-01 | End: 2022-01-01

## 2022-01-01 RX ORDER — PROPOFOL 10 MG/ML
INJECTION, EMULSION INTRAVENOUS
Status: COMPLETED
Start: 2022-01-01 | End: 2022-01-01

## 2022-01-01 RX ORDER — PREDNISONE 20 MG/1
40 TABLET ORAL DAILY
Status: DISCONTINUED | OUTPATIENT
Start: 2022-01-01 | End: 2022-01-01

## 2022-01-01 RX ORDER — CEFEPIME HYDROCHLORIDE 1 G/50ML
1000 INJECTION, SOLUTION INTRAVENOUS EVERY 24 HOURS
Status: COMPLETED | OUTPATIENT
Start: 2022-01-01 | End: 2022-01-01

## 2022-01-01 RX ORDER — CALCIUM GLUCONATE 20 MG/ML
1 INJECTION, SOLUTION INTRAVENOUS ONCE
Status: COMPLETED | OUTPATIENT
Start: 2022-01-01 | End: 2022-01-01

## 2022-01-01 RX ORDER — PRAVASTATIN SODIUM 80 MG/1
80 TABLET ORAL
Status: DISCONTINUED | OUTPATIENT
Start: 2022-01-01 | End: 2023-01-01

## 2022-01-01 RX ORDER — ALBUMIN (HUMAN) 12.5 G/50ML
25 SOLUTION INTRAVENOUS ONCE
Status: COMPLETED | OUTPATIENT
Start: 2022-01-01 | End: 2022-01-01

## 2022-01-01 RX ORDER — HEPARIN SODIUM 5000 [USP'U]/ML
5000 INJECTION, SOLUTION INTRAVENOUS; SUBCUTANEOUS EVERY 8 HOURS SCHEDULED
Status: DISCONTINUED | OUTPATIENT
Start: 2022-01-01 | End: 2022-01-01

## 2022-01-01 RX ORDER — LOSARTAN POTASSIUM 50 MG/1
50 TABLET ORAL 2 TIMES DAILY
Qty: 60 TABLET | Refills: 1 | Status: SHIPPED | OUTPATIENT
Start: 2022-01-01 | End: 2022-01-01

## 2022-01-01 RX ORDER — SENNOSIDES 8.8 MG/5ML
8.8 LIQUID ORAL 2 TIMES DAILY
Status: DISCONTINUED | OUTPATIENT
Start: 2022-01-01 | End: 2022-01-01

## 2022-01-01 RX ORDER — FINASTERIDE 5 MG/1
5 TABLET, FILM COATED ORAL DAILY
Status: DISCONTINUED | OUTPATIENT
Start: 2022-01-01 | End: 2022-01-01

## 2022-01-01 RX ORDER — PROPOFOL 10 MG/ML
5-50 INJECTION, EMULSION INTRAVENOUS
Status: DISCONTINUED | OUTPATIENT
Start: 2022-01-01 | End: 2023-01-01

## 2022-01-01 RX ORDER — PROPOFOL 10 MG/ML
5-50 INJECTION, EMULSION INTRAVENOUS
Status: DISCONTINUED | OUTPATIENT
Start: 2022-01-01 | End: 2022-01-01

## 2022-01-01 RX ORDER — PROPOFOL 10 MG/ML
50 INJECTION, EMULSION INTRAVENOUS ONCE
Status: COMPLETED | OUTPATIENT
Start: 2022-01-01 | End: 2022-01-01

## 2022-01-01 RX ORDER — LANOLIN ALCOHOL/MO/W.PET/CERES
2 CREAM (GRAM) TOPICAL 2 TIMES DAILY WITH MEALS
Status: DISCONTINUED | OUTPATIENT
Start: 2022-01-01 | End: 2023-01-01

## 2022-01-01 RX ORDER — INSULIN GLARGINE 100 [IU]/ML
15 INJECTION, SOLUTION SUBCUTANEOUS EVERY MORNING
Status: DISCONTINUED | OUTPATIENT
Start: 2022-01-01 | End: 2023-01-01

## 2022-01-01 RX ORDER — CEFEPIME HYDROCHLORIDE 2 G/50ML
2000 INJECTION, SOLUTION INTRAVENOUS ONCE
Status: COMPLETED | OUTPATIENT
Start: 2022-01-01 | End: 2022-01-01

## 2022-01-01 RX ORDER — MIDAZOLAM HYDROCHLORIDE 2 MG/2ML
2 INJECTION, SOLUTION INTRAMUSCULAR; INTRAVENOUS ONCE
Status: CANCELLED | OUTPATIENT
Start: 2022-01-01 | End: 2022-01-01

## 2022-01-01 RX ORDER — MIDAZOLAM HYDROCHLORIDE 2 MG/2ML
2 INJECTION, SOLUTION INTRAMUSCULAR; INTRAVENOUS ONCE
Status: COMPLETED | OUTPATIENT
Start: 2022-01-01 | End: 2022-01-01

## 2022-01-01 RX ORDER — INSULIN GLARGINE 100 [IU]/ML
15 INJECTION, SOLUTION SUBCUTANEOUS ONCE
Status: COMPLETED | OUTPATIENT
Start: 2022-01-01 | End: 2022-01-01

## 2022-01-01 RX ORDER — LEVALBUTEROL 1.25 MG/.5ML
1.25 SOLUTION, CONCENTRATE RESPIRATORY (INHALATION)
Status: DISCONTINUED | OUTPATIENT
Start: 2022-01-01 | End: 2023-01-01

## 2022-01-01 RX ORDER — CALCIUM ACETATE 667 MG/1
667 CAPSULE ORAL
Status: DISCONTINUED | OUTPATIENT
Start: 2022-01-01 | End: 2023-01-01

## 2022-01-01 RX ORDER — LIDOCAINE 50 MG/G
1 PATCH TOPICAL DAILY
Status: DISCONTINUED | OUTPATIENT
Start: 2022-01-01 | End: 2023-01-01

## 2022-01-01 RX ORDER — TAMSULOSIN HYDROCHLORIDE 0.4 MG/1
0.8 CAPSULE ORAL
Status: DISCONTINUED | OUTPATIENT
Start: 2022-01-01 | End: 2022-01-01

## 2022-01-01 RX ORDER — CHLORHEXIDINE GLUCONATE 0.12 MG/ML
15 RINSE ORAL EVERY 12 HOURS SCHEDULED
Status: DISCONTINUED | OUTPATIENT
Start: 2022-01-01 | End: 2023-01-01

## 2022-01-01 RX ORDER — SUCCINYLCHOLINE/SOD CL,ISO/PF 100 MG/5ML
100 SYRINGE (ML) INTRAVENOUS ONCE
Status: COMPLETED | OUTPATIENT
Start: 2022-01-01 | End: 2022-01-01

## 2022-01-01 RX ORDER — ACETAMINOPHEN 325 MG/1
650 TABLET ORAL ONCE
Status: COMPLETED | OUTPATIENT
Start: 2022-01-01 | End: 2022-01-01

## 2022-01-01 RX ORDER — DIPHENHYDRAMINE HCL 25 MG
25 TABLET ORAL ONCE
Status: COMPLETED | OUTPATIENT
Start: 2022-01-01 | End: 2022-01-01

## 2022-01-01 RX ORDER — AMLODIPINE BESYLATE 10 MG/1
10 TABLET ORAL DAILY
Status: DISCONTINUED | OUTPATIENT
Start: 2022-01-01 | End: 2022-01-01

## 2022-01-01 RX ORDER — INSULIN LISPRO 100 [IU]/ML
1-6 INJECTION, SOLUTION INTRAVENOUS; SUBCUTANEOUS
Status: DISCONTINUED | OUTPATIENT
Start: 2022-01-01 | End: 2022-01-01

## 2022-01-01 RX ORDER — LOSARTAN POTASSIUM 50 MG/1
50 TABLET ORAL 2 TIMES DAILY
Status: DISCONTINUED | OUTPATIENT
Start: 2022-01-01 | End: 2022-01-01

## 2022-01-01 RX ORDER — SODIUM CHLORIDE 9 MG/ML
20 INJECTION, SOLUTION INTRAVENOUS ONCE
Status: COMPLETED | OUTPATIENT
Start: 2022-01-01 | End: 2022-01-01

## 2022-01-01 RX ORDER — INSULIN LISPRO 100 [IU]/ML
1-6 INJECTION, SOLUTION INTRAVENOUS; SUBCUTANEOUS EVERY 6 HOURS
Status: DISCONTINUED | OUTPATIENT
Start: 2022-01-01 | End: 2023-01-01

## 2022-01-01 RX ORDER — ALBUMIN (HUMAN) 12.5 G/50ML
SOLUTION INTRAVENOUS
Status: COMPLETED
Start: 2022-01-01 | End: 2022-01-01

## 2022-01-01 RX ORDER — ROCURONIUM BROMIDE 10 MG/ML
1 INJECTION, SOLUTION INTRAVENOUS ONCE
Status: COMPLETED | OUTPATIENT
Start: 2022-01-01 | End: 2022-01-01

## 2022-01-01 RX ORDER — LIDOCAINE HYDROCHLORIDE 10 MG/ML
INJECTION, SOLUTION EPIDURAL; INFILTRATION; INTRACAUDAL; PERINEURAL AS NEEDED
Status: DISCONTINUED | OUTPATIENT
Start: 2022-01-01 | End: 2023-01-01 | Stop reason: HOSPADM

## 2022-01-01 RX ORDER — POLYETHYLENE GLYCOL 3350 17 G/17G
17 POWDER, FOR SOLUTION ORAL DAILY
Status: DISCONTINUED | OUTPATIENT
Start: 2022-01-01 | End: 2022-01-01

## 2022-01-01 RX ORDER — METHYLPREDNISOLONE SODIUM SUCCINATE 40 MG/ML
40 INJECTION, POWDER, LYOPHILIZED, FOR SOLUTION INTRAMUSCULAR; INTRAVENOUS EVERY 8 HOURS SCHEDULED
Status: DISCONTINUED | OUTPATIENT
Start: 2022-01-01 | End: 2022-01-01

## 2022-01-01 RX ORDER — SODIUM CHLORIDE FOR INHALATION 3 %
4 VIAL, NEBULIZER (ML) INHALATION
Status: DISCONTINUED | OUTPATIENT
Start: 2022-01-01 | End: 2023-01-01

## 2022-01-01 RX ADMIN — PROPOFOL 20 MCG/KG/MIN: 10 INJECTION, EMULSION INTRAVENOUS at 15:16

## 2022-01-01 RX ADMIN — PRAVASTATIN SODIUM 80 MG: 80 TABLET ORAL at 16:21

## 2022-01-01 RX ADMIN — SODIUM CHLORIDE SOLN NEBU 3% 4 ML: 3 NEBU SOLN at 03:23

## 2022-01-01 RX ADMIN — RITUXIMAB 720 MG: 10 INJECTION, SOLUTION INTRAVENOUS at 17:18

## 2022-01-01 RX ADMIN — SODIUM CHLORIDE 500 MG: 0.9 INJECTION, SOLUTION INTRAVENOUS at 23:40

## 2022-01-01 RX ADMIN — DEXMEDETOMIDINE 0.4 MCG/KG/HR: 100 INJECTION, SOLUTION, CONCENTRATE INTRAVENOUS at 13:16

## 2022-01-01 RX ADMIN — FENTANYL CITRATE 50 MCG: 50 INJECTION INTRAMUSCULAR; INTRAVENOUS at 21:36

## 2022-01-01 RX ADMIN — CALCIUM ACETATE 667 MG: 667 CAPSULE ORAL at 12:20

## 2022-01-01 RX ADMIN — INSULIN LISPRO 2 UNITS: 100 INJECTION, SOLUTION INTRAVENOUS; SUBCUTANEOUS at 05:57

## 2022-01-01 RX ADMIN — DEXMEDETOMIDINE 0.4 MCG/KG/HR: 100 INJECTION, SOLUTION, CONCENTRATE INTRAVENOUS at 15:50

## 2022-01-01 RX ADMIN — EPOETIN ALFA 4000 UNITS: 4000 SOLUTION INTRAVENOUS; SUBCUTANEOUS at 15:57

## 2022-01-01 RX ADMIN — METHYLPREDNISOLONE SODIUM SUCCINATE 80 MG: 125 INJECTION, POWDER, FOR SOLUTION INTRAMUSCULAR; INTRAVENOUS at 21:10

## 2022-01-01 RX ADMIN — DEXMEDETOMIDINE 0.4 MCG/KG/HR: 100 INJECTION, SOLUTION, CONCENTRATE INTRAVENOUS at 04:30

## 2022-01-01 RX ADMIN — LEVALBUTEROL HYDROCHLORIDE 0.63 MG: 0.63 SOLUTION RESPIRATORY (INHALATION) at 13:11

## 2022-01-01 RX ADMIN — SODIUM CHLORIDE SOLN NEBU 3% 4 ML: 3 NEBU SOLN at 19:50

## 2022-01-01 RX ADMIN — DEXMEDETOMIDINE 0.6 MCG/KG/HR: 100 INJECTION, SOLUTION, CONCENTRATE INTRAVENOUS at 23:40

## 2022-01-01 RX ADMIN — LIDOCAINE 5% 1 PATCH: 700 PATCH TOPICAL at 21:07

## 2022-01-01 RX ADMIN — PROPOFOL 50 MG: 10 INJECTION, EMULSION INTRAVENOUS at 22:57

## 2022-01-01 RX ADMIN — IPRATROPIUM BROMIDE 0.5 MG: 0.5 SOLUTION RESPIRATORY (INHALATION) at 14:00

## 2022-01-01 RX ADMIN — SODIUM CHLORIDE SOLN NEBU 3% 4 ML: 3 NEBU SOLN at 19:57

## 2022-01-01 RX ADMIN — VANCOMYCIN HYDROCHLORIDE 750 MG: 750 INJECTION, SOLUTION INTRAVENOUS at 15:00

## 2022-01-01 RX ADMIN — DEXMEDETOMIDINE 0.6 MCG/KG/HR: 100 INJECTION, SOLUTION, CONCENTRATE INTRAVENOUS at 17:14

## 2022-01-01 RX ADMIN — INSULIN LISPRO 3 UNITS: 100 INJECTION, SOLUTION INTRAVENOUS; SUBCUTANEOUS at 17:58

## 2022-01-01 RX ADMIN — PROPOFOL 40 MCG/KG/MIN: 10 INJECTION, EMULSION INTRAVENOUS at 21:03

## 2022-01-01 RX ADMIN — LEVALBUTEROL HYDROCHLORIDE 0.63 MG: 0.63 SOLUTION RESPIRATORY (INHALATION) at 19:36

## 2022-01-01 RX ADMIN — CALCIUM ACETATE 667 MG: 667 CAPSULE ORAL at 16:50

## 2022-01-01 RX ADMIN — Medication 2 TABLET: at 06:37

## 2022-01-01 RX ADMIN — PROPOFOL 35 MCG/KG/MIN: 10 INJECTION, EMULSION INTRAVENOUS at 03:28

## 2022-01-01 RX ADMIN — DEXMEDETOMIDINE 0.4 MCG/KG/HR: 100 INJECTION, SOLUTION, CONCENTRATE INTRAVENOUS at 15:06

## 2022-01-01 RX ADMIN — DEXMEDETOMIDINE HYDROCHLORIDE 0.2 MCG/KG/HR: 100 INJECTION, SOLUTION INTRAVENOUS at 21:00

## 2022-01-01 RX ADMIN — PROPOFOL 40 MCG/KG/MIN: 10 INJECTION, EMULSION INTRAVENOUS at 01:55

## 2022-01-01 RX ADMIN — DEXMEDETOMIDINE 0.6 MCG/KG/HR: 100 INJECTION, SOLUTION, CONCENTRATE INTRAVENOUS at 08:30

## 2022-01-01 RX ADMIN — SODIUM CHLORIDE 500 MG: 0.9 INJECTION, SOLUTION INTRAVENOUS at 14:50

## 2022-01-01 RX ADMIN — Medication 2 TABLET: at 16:49

## 2022-01-01 RX ADMIN — INSULIN LISPRO 3 UNITS: 100 INJECTION, SOLUTION INTRAVENOUS; SUBCUTANEOUS at 18:34

## 2022-01-01 RX ADMIN — CALCIUM ACETATE 667 MG: 667 CAPSULE ORAL at 07:39

## 2022-01-01 RX ADMIN — METHYLPREDNISOLONE SODIUM SUCCINATE 80 MG: 125 INJECTION, POWDER, FOR SOLUTION INTRAMUSCULAR; INTRAVENOUS at 21:35

## 2022-01-01 RX ADMIN — LIDOCAINE 5% 1 PATCH: 700 PATCH TOPICAL at 08:24

## 2022-01-01 RX ADMIN — VANCOMYCIN HYDROCHLORIDE 2000 MG: 10 INJECTION, POWDER, LYOPHILIZED, FOR SOLUTION INTRAVENOUS at 00:34

## 2022-01-01 RX ADMIN — INSULIN LISPRO 4 UNITS: 100 INJECTION, SOLUTION INTRAVENOUS; SUBCUTANEOUS at 12:01

## 2022-01-01 RX ADMIN — LEVALBUTEROL HYDROCHLORIDE 1.25 MG: 1.25 SOLUTION, CONCENTRATE RESPIRATORY (INHALATION) at 07:26

## 2022-01-01 RX ADMIN — Medication 2 TABLET: at 07:58

## 2022-01-01 RX ADMIN — PROPOFOL 30 MCG/KG/MIN: 10 INJECTION, EMULSION INTRAVENOUS at 03:27

## 2022-01-01 RX ADMIN — INSULIN LISPRO 3 UNITS: 100 INJECTION, SOLUTION INTRAVENOUS; SUBCUTANEOUS at 06:43

## 2022-01-01 RX ADMIN — CHLORHEXIDINE GLUCONATE 15 ML: 1.2 SOLUTION ORAL at 21:03

## 2022-01-01 RX ADMIN — INSULIN GLARGINE 15 UNITS: 100 INJECTION, SOLUTION SUBCUTANEOUS at 08:13

## 2022-01-01 RX ADMIN — IPRATROPIUM BROMIDE 0.5 MG: 0.5 SOLUTION RESPIRATORY (INHALATION) at 01:06

## 2022-01-01 RX ADMIN — CALCIUM GLUCONATE 1 G: 20 INJECTION, SOLUTION INTRAVENOUS at 23:21

## 2022-01-01 RX ADMIN — LEVALBUTEROL HYDROCHLORIDE 0.63 MG: 0.63 SOLUTION RESPIRATORY (INHALATION) at 13:35

## 2022-01-01 RX ADMIN — LEVALBUTEROL HYDROCHLORIDE 0.63 MG: 0.63 SOLUTION RESPIRATORY (INHALATION) at 13:46

## 2022-01-01 RX ADMIN — METHYLPREDNISOLONE SODIUM SUCCINATE 80 MG: 125 INJECTION, POWDER, FOR SOLUTION INTRAMUSCULAR; INTRAVENOUS at 21:06

## 2022-01-01 RX ADMIN — METHYLPREDNISOLONE SODIUM SUCCINATE 500 MG: 1 INJECTION, POWDER, LYOPHILIZED, FOR SOLUTION INTRAMUSCULAR; INTRAVENOUS at 01:21

## 2022-01-01 RX ADMIN — CHLORHEXIDINE GLUCONATE 15 ML: 1.2 SOLUTION ORAL at 08:26

## 2022-01-01 RX ADMIN — Medication 2 TABLET: at 07:39

## 2022-01-01 RX ADMIN — INSULIN LISPRO 1 UNITS: 100 INJECTION, SOLUTION INTRAVENOUS; SUBCUTANEOUS at 00:23

## 2022-01-01 RX ADMIN — CHLORHEXIDINE GLUCONATE 15 ML: 1.2 SOLUTION ORAL at 20:51

## 2022-01-01 RX ADMIN — SODIUM CHLORIDE SOLN NEBU 3% 4 ML: 3 NEBU SOLN at 13:35

## 2022-01-01 RX ADMIN — SODIUM CHLORIDE SOLN NEBU 3% 4 ML: 3 NEBU SOLN at 08:50

## 2022-01-01 RX ADMIN — LEVALBUTEROL HYDROCHLORIDE 0.63 MG: 0.63 SOLUTION RESPIRATORY (INHALATION) at 21:01

## 2022-01-01 RX ADMIN — SODIUM CHLORIDE SOLN NEBU 3% 4 ML: 3 NEBU SOLN at 21:01

## 2022-01-01 RX ADMIN — INSULIN LISPRO 2 UNITS: 100 INJECTION, SOLUTION INTRAVENOUS; SUBCUTANEOUS at 11:59

## 2022-01-01 RX ADMIN — INSULIN LISPRO 1 UNITS: 100 INJECTION, SOLUTION INTRAVENOUS; SUBCUTANEOUS at 11:43

## 2022-01-01 RX ADMIN — CHLORHEXIDINE GLUCONATE 15 ML: 1.2 SOLUTION ORAL at 21:06

## 2022-01-01 RX ADMIN — SODIUM CHLORIDE SOLN NEBU 3% 4 ML: 3 NEBU SOLN at 21:10

## 2022-01-01 RX ADMIN — INSULIN LISPRO 2 UNITS: 100 INJECTION, SOLUTION INTRAVENOUS; SUBCUTANEOUS at 18:40

## 2022-01-01 RX ADMIN — Medication 2 TABLET: at 16:50

## 2022-01-01 RX ADMIN — SODIUM CHLORIDE SOLN NEBU 3% 4 ML: 3 NEBU SOLN at 01:06

## 2022-01-01 RX ADMIN — PROPOFOL 35 MCG/KG/MIN: 10 INJECTION, EMULSION INTRAVENOUS at 14:25

## 2022-01-01 RX ADMIN — PROPOFOL 35 MCG/KG/MIN: 10 INJECTION, EMULSION INTRAVENOUS at 08:22

## 2022-01-01 RX ADMIN — CEFEPIME HYDROCHLORIDE 1000 MG: 1 INJECTION, SOLUTION INTRAVENOUS at 15:53

## 2022-01-01 RX ADMIN — ALBUMIN (HUMAN) 25 G: 0.25 INJECTION, SOLUTION INTRAVENOUS at 09:01

## 2022-01-01 RX ADMIN — POLYETHYLENE GLYCOL 3350 17 G: 17 POWDER, FOR SOLUTION ORAL at 13:00

## 2022-01-01 RX ADMIN — Medication 20 MG: at 09:45

## 2022-01-01 RX ADMIN — PRAVASTATIN SODIUM 80 MG: 80 TABLET ORAL at 17:04

## 2022-01-01 RX ADMIN — PROPOFOL 20 MCG/KG/MIN: 10 INJECTION, EMULSION INTRAVENOUS at 22:22

## 2022-01-01 RX ADMIN — FENTANYL CITRATE 50 MCG: 50 INJECTION INTRAMUSCULAR; INTRAVENOUS at 06:31

## 2022-01-01 RX ADMIN — CHLORHEXIDINE GLUCONATE 15 ML: 1.2 SOLUTION ORAL at 08:02

## 2022-01-01 RX ADMIN — PROPOFOL 35 MCG/KG/MIN: 10 INJECTION, EMULSION INTRAVENOUS at 21:49

## 2022-01-01 RX ADMIN — Medication 100 MG: at 22:54

## 2022-01-01 RX ADMIN — CALCIUM ACETATE 667 MG: 667 CAPSULE ORAL at 16:22

## 2022-01-01 RX ADMIN — SODIUM CHLORIDE SOLN NEBU 3% 4 ML: 3 NEBU SOLN at 07:43

## 2022-01-01 RX ADMIN — LIDOCAINE 5% 1 PATCH: 700 PATCH TOPICAL at 08:09

## 2022-01-01 RX ADMIN — LEVALBUTEROL HYDROCHLORIDE 0.63 MG: 0.63 SOLUTION RESPIRATORY (INHALATION) at 03:23

## 2022-01-01 RX ADMIN — SENNOSIDES 8.8 MG: 8.8 SYRUP ORAL at 22:14

## 2022-01-01 RX ADMIN — LEVALBUTEROL HYDROCHLORIDE 1.25 MG: 1.25 SOLUTION, CONCENTRATE RESPIRATORY (INHALATION) at 19:57

## 2022-01-01 RX ADMIN — FENTANYL CITRATE 50 MCG: 50 INJECTION INTRAMUSCULAR; INTRAVENOUS at 15:48

## 2022-01-01 RX ADMIN — SODIUM CHLORIDE SOLN NEBU 3% 4 ML: 3 NEBU SOLN at 19:51

## 2022-01-01 RX ADMIN — CALCIUM ACETATE 667 MG: 667 CAPSULE ORAL at 11:51

## 2022-01-01 RX ADMIN — CHLORHEXIDINE GLUCONATE 15 ML: 1.2 SOLUTION ORAL at 23:38

## 2022-01-01 RX ADMIN — PRAVASTATIN SODIUM 80 MG: 80 TABLET ORAL at 16:50

## 2022-01-01 RX ADMIN — CALCIUM ACETATE 667 MG: 667 CAPSULE ORAL at 16:30

## 2022-01-01 RX ADMIN — Medication 20 MG: at 09:01

## 2022-01-01 RX ADMIN — FENTANYL CITRATE 50 MCG: 50 INJECTION INTRAMUSCULAR; INTRAVENOUS at 23:38

## 2022-01-01 RX ADMIN — PROPOFOL 200 MG: 10 INJECTION, EMULSION INTRAVENOUS at 22:53

## 2022-01-01 RX ADMIN — DEXMEDETOMIDINE 0.6 MCG/KG/HR: 100 INJECTION, SOLUTION, CONCENTRATE INTRAVENOUS at 01:29

## 2022-01-01 RX ADMIN — ALBUMIN (HUMAN) 25 G: 12.5 SOLUTION INTRAVENOUS at 23:14

## 2022-01-01 RX ADMIN — CALCIUM ACETATE 667 MG: 667 CAPSULE ORAL at 17:04

## 2022-01-01 RX ADMIN — VANCOMYCIN HYDROCHLORIDE 2000 MG: 1 INJECTION, POWDER, LYOPHILIZED, FOR SOLUTION INTRAVENOUS at 23:50

## 2022-01-01 RX ADMIN — SODIUM CHLORIDE SOLN NEBU 3% 4 ML: 3 NEBU SOLN at 07:51

## 2022-01-01 RX ADMIN — INSULIN LISPRO 1 UNITS: 100 INJECTION, SOLUTION INTRAVENOUS; SUBCUTANEOUS at 12:41

## 2022-01-01 RX ADMIN — CALCIUM ACETATE 667 MG: 667 CAPSULE ORAL at 06:37

## 2022-01-01 RX ADMIN — CHLORHEXIDINE GLUCONATE 15 ML: 1.2 SOLUTION ORAL at 08:11

## 2022-01-01 RX ADMIN — CALCIUM ACETATE 667 MG: 667 CAPSULE ORAL at 07:58

## 2022-01-01 RX ADMIN — PROPOFOL 20 MCG/KG/MIN: 10 INJECTION, EMULSION INTRAVENOUS at 14:56

## 2022-01-01 RX ADMIN — PROPOFOL 5 MCG/KG/MIN: 10 INJECTION, EMULSION INTRAVENOUS at 23:04

## 2022-01-01 RX ADMIN — LEVALBUTEROL HYDROCHLORIDE 0.63 MG: 0.63 SOLUTION RESPIRATORY (INHALATION) at 13:19

## 2022-01-01 RX ADMIN — Medication 2 TABLET: at 16:30

## 2022-01-01 RX ADMIN — SODIUM CHLORIDE 20 ML/HR: 0.9 INJECTION, SOLUTION INTRAVENOUS at 15:00

## 2022-01-01 RX ADMIN — Medication 20 MG: at 08:34

## 2022-01-01 RX ADMIN — INSULIN LISPRO 2 UNITS: 100 INJECTION, SOLUTION INTRAVENOUS; SUBCUTANEOUS at 05:44

## 2022-01-01 RX ADMIN — Medication 2 TABLET: at 16:23

## 2022-01-01 RX ADMIN — HEPARIN SODIUM 5000 UNITS: 5000 INJECTION INTRAVENOUS; SUBCUTANEOUS at 15:00

## 2022-01-01 RX ADMIN — LIDOCAINE 5% 1 PATCH: 700 PATCH TOPICAL at 08:00

## 2022-01-01 RX ADMIN — CALCIUM GLUCONATE 1 G: 20 INJECTION, SOLUTION INTRAVENOUS at 23:23

## 2022-01-01 RX ADMIN — LIDOCAINE 5% 1 PATCH: 700 PATCH TOPICAL at 08:27

## 2022-01-01 RX ADMIN — INSULIN LISPRO 1 UNITS: 100 INJECTION, SOLUTION INTRAVENOUS; SUBCUTANEOUS at 00:10

## 2022-01-01 RX ADMIN — CHLORHEXIDINE GLUCONATE 15 ML: 1.2 SOLUTION ORAL at 21:10

## 2022-01-01 RX ADMIN — CALCIUM ACETATE 667 MG: 667 CAPSULE ORAL at 11:48

## 2022-01-01 RX ADMIN — CHLORHEXIDINE GLUCONATE 15 ML: 1.2 SOLUTION ORAL at 08:54

## 2022-01-01 RX ADMIN — PROPOFOL 35 MCG/KG/MIN: 10 INJECTION, EMULSION INTRAVENOUS at 16:46

## 2022-01-01 RX ADMIN — ACETAMINOPHEN 650 MG: 325 TABLET, FILM COATED ORAL at 16:33

## 2022-01-01 RX ADMIN — DEXMEDETOMIDINE 0.4 MCG/KG/HR: 100 INJECTION, SOLUTION, CONCENTRATE INTRAVENOUS at 08:36

## 2022-01-01 RX ADMIN — SODIUM CHLORIDE SOLN NEBU 3% 4 ML: 3 NEBU SOLN at 14:00

## 2022-01-01 RX ADMIN — LIDOCAINE 5% 1 PATCH: 700 PATCH TOPICAL at 08:55

## 2022-01-01 RX ADMIN — INSULIN GLARGINE 15 UNITS: 100 INJECTION, SOLUTION SUBCUTANEOUS at 09:19

## 2022-01-01 RX ADMIN — LEVALBUTEROL HYDROCHLORIDE 0.63 MG: 0.63 SOLUTION RESPIRATORY (INHALATION) at 02:33

## 2022-01-01 RX ADMIN — LEVALBUTEROL HYDROCHLORIDE 0.63 MG: 0.63 SOLUTION RESPIRATORY (INHALATION) at 07:36

## 2022-01-01 RX ADMIN — PROPOFOL 20 MCG/KG/MIN: 10 INJECTION, EMULSION INTRAVENOUS at 04:30

## 2022-01-01 RX ADMIN — Medication 20 MG: at 11:54

## 2022-01-01 RX ADMIN — SODIUM CHLORIDE SOLN NEBU 3% 4 ML: 3 NEBU SOLN at 01:51

## 2022-01-01 RX ADMIN — INSULIN GLARGINE 15 UNITS: 100 INJECTION, SOLUTION SUBCUTANEOUS at 08:04

## 2022-01-01 RX ADMIN — CEFEPIME HYDROCHLORIDE 1000 MG: 1 INJECTION, SOLUTION INTRAVENOUS at 16:37

## 2022-01-01 RX ADMIN — LEVALBUTEROL HYDROCHLORIDE 0.63 MG: 0.63 SOLUTION RESPIRATORY (INHALATION) at 21:10

## 2022-01-01 RX ADMIN — IPRATROPIUM BROMIDE 0.5 MG: 0.5 SOLUTION RESPIRATORY (INHALATION) at 19:57

## 2022-01-01 RX ADMIN — LEVALBUTEROL HYDROCHLORIDE 0.63 MG: 0.63 SOLUTION RESPIRATORY (INHALATION) at 19:51

## 2022-01-01 RX ADMIN — INSULIN LISPRO 1 UNITS: 100 INJECTION, SOLUTION INTRAVENOUS; SUBCUTANEOUS at 21:15

## 2022-01-01 RX ADMIN — LEVALBUTEROL HYDROCHLORIDE 0.63 MG: 0.63 SOLUTION RESPIRATORY (INHALATION) at 01:52

## 2022-01-01 RX ADMIN — LEVALBUTEROL HYDROCHLORIDE 0.63 MG: 0.63 SOLUTION RESPIRATORY (INHALATION) at 19:50

## 2022-01-01 RX ADMIN — CALCIUM ACETATE 667 MG: 667 CAPSULE ORAL at 09:00

## 2022-01-01 RX ADMIN — DEXMEDETOMIDINE 0.2 MCG/KG/HR: 100 INJECTION, SOLUTION, CONCENTRATE INTRAVENOUS at 07:21

## 2022-01-01 RX ADMIN — SENNOSIDES 8.8 MG: 8.8 SYRUP ORAL at 13:00

## 2022-01-01 RX ADMIN — PRAVASTATIN SODIUM 80 MG: 80 TABLET ORAL at 16:49

## 2022-01-01 RX ADMIN — HEPARIN SODIUM 5000 UNITS: 5000 INJECTION INTRAVENOUS; SUBCUTANEOUS at 21:11

## 2022-01-01 RX ADMIN — ROCURONIUM BROMIDE 79 MG: 10 INJECTION, SOLUTION INTRAVENOUS at 22:40

## 2022-01-01 RX ADMIN — PRAVASTATIN SODIUM 80 MG: 80 TABLET ORAL at 16:36

## 2022-01-01 RX ADMIN — Medication 20 MG: at 08:27

## 2022-01-01 RX ADMIN — IPRATROPIUM BROMIDE 0.5 MG: 0.5 SOLUTION RESPIRATORY (INHALATION) at 07:26

## 2022-01-01 RX ADMIN — CALCIUM ACETATE 667 MG: 667 CAPSULE ORAL at 16:36

## 2022-01-01 RX ADMIN — SODIUM CHLORIDE SOLN NEBU 3% 4 ML: 3 NEBU SOLN at 07:45

## 2022-01-01 RX ADMIN — LEVALBUTEROL HYDROCHLORIDE 1.25 MG: 1.25 SOLUTION, CONCENTRATE RESPIRATORY (INHALATION) at 01:06

## 2022-01-01 RX ADMIN — PROPOFOL 20 MCG/KG/MIN: 10 INJECTION, EMULSION INTRAVENOUS at 10:42

## 2022-01-01 RX ADMIN — PROPOFOL 15 MCG/KG/MIN: 10 INJECTION, EMULSION INTRAVENOUS at 07:39

## 2022-01-01 RX ADMIN — METHYLPREDNISOLONE SODIUM SUCCINATE 40 MG: 40 INJECTION, POWDER, FOR SOLUTION INTRAMUSCULAR; INTRAVENOUS at 23:31

## 2022-01-01 RX ADMIN — EPOETIN ALFA 4000 UNITS: 4000 SOLUTION INTRAVENOUS; SUBCUTANEOUS at 08:55

## 2022-01-01 RX ADMIN — LIDOCAINE 5% 1 PATCH: 700 PATCH TOPICAL at 08:30

## 2022-01-01 RX ADMIN — PROPOFOL 35 MCG/KG/MIN: 10 INJECTION, EMULSION INTRAVENOUS at 11:25

## 2022-01-01 RX ADMIN — SODIUM CHLORIDE SOLN NEBU 3% 4 ML: 3 NEBU SOLN at 07:26

## 2022-01-01 RX ADMIN — SODIUM CHLORIDE SOLN NEBU 3% 4 ML: 3 NEBU SOLN at 13:19

## 2022-01-01 RX ADMIN — INSULIN LISPRO 1 UNITS: 100 INJECTION, SOLUTION INTRAVENOUS; SUBCUTANEOUS at 06:58

## 2022-01-01 RX ADMIN — CEFEPIME HYDROCHLORIDE 1000 MG: 1 INJECTION, SOLUTION INTRAVENOUS at 16:12

## 2022-01-01 RX ADMIN — SODIUM CHLORIDE SOLN NEBU 3% 4 ML: 3 NEBU SOLN at 02:33

## 2022-01-01 RX ADMIN — CEFEPIME HYDROCHLORIDE 2000 MG: 2 INJECTION, SOLUTION INTRAVENOUS at 13:35

## 2022-01-01 RX ADMIN — INSULIN GLARGINE 15 UNITS: 100 INJECTION, SOLUTION SUBCUTANEOUS at 08:27

## 2022-01-01 RX ADMIN — INSULIN LISPRO 1 UNITS: 100 INJECTION, SOLUTION INTRAVENOUS; SUBCUTANEOUS at 12:30

## 2022-01-01 RX ADMIN — CEFEPIME HYDROCHLORIDE 1000 MG: 1 INJECTION, SOLUTION INTRAVENOUS at 15:27

## 2022-01-01 RX ADMIN — PROPOFOL 15 MCG/KG/MIN: 10 INJECTION, EMULSION INTRAVENOUS at 19:30

## 2022-01-01 RX ADMIN — PROPOFOL 40 MCG/KG/MIN: 10 INJECTION, EMULSION INTRAVENOUS at 05:59

## 2022-01-01 RX ADMIN — ALBUMIN (HUMAN) 25 G: 0.25 INJECTION, SOLUTION INTRAVENOUS at 23:14

## 2022-01-01 RX ADMIN — CEFEPIME HYDROCHLORIDE 1000 MG: 1 INJECTION, SOLUTION INTRAVENOUS at 15:04

## 2022-01-01 RX ADMIN — Medication 2 TABLET: at 08:25

## 2022-01-01 RX ADMIN — Medication 20 MG: at 09:19

## 2022-01-01 RX ADMIN — FENTANYL CITRATE 50 MCG: 50 INJECTION INTRAMUSCULAR; INTRAVENOUS at 03:38

## 2022-01-01 RX ADMIN — SODIUM CHLORIDE 1000 MG: 0.9 INJECTION, SOLUTION INTRAVENOUS at 23:48

## 2022-01-01 RX ADMIN — MIDAZOLAM 2 MG: 1 INJECTION INTRAMUSCULAR; INTRAVENOUS at 22:36

## 2022-01-01 RX ADMIN — DEXMEDETOMIDINE 0.6 MCG/KG/HR: 100 INJECTION, SOLUTION, CONCENTRATE INTRAVENOUS at 15:01

## 2022-01-01 RX ADMIN — LEVALBUTEROL HYDROCHLORIDE 1.25 MG: 1.25 SOLUTION, CONCENTRATE RESPIRATORY (INHALATION) at 14:00

## 2022-01-01 RX ADMIN — Medication 2 TABLET: at 09:00

## 2022-01-01 RX ADMIN — SODIUM CHLORIDE 1000 MG: 0.9 INJECTION, SOLUTION INTRAVENOUS at 00:18

## 2022-01-01 RX ADMIN — PROPOFOL 20 MCG/KG/MIN: 10 INJECTION, EMULSION INTRAVENOUS at 08:52

## 2022-01-01 RX ADMIN — CHLORHEXIDINE GLUCONATE 15 ML: 1.2 SOLUTION ORAL at 08:27

## 2022-01-01 RX ADMIN — VANCOMYCIN HYDROCHLORIDE 750 MG: 750 INJECTION, SOLUTION INTRAVENOUS at 12:56

## 2022-01-01 RX ADMIN — PROPOFOL 20 MCG/KG/MIN: 10 INJECTION, EMULSION INTRAVENOUS at 06:10

## 2022-01-01 RX ADMIN — DIPHENHYDRAMINE HYDROCHLORIDE 25 MG: 25 TABLET ORAL at 16:33

## 2022-01-01 RX ADMIN — CEFEPIME HYDROCHLORIDE 1000 MG: 1 INJECTION, SOLUTION INTRAVENOUS at 15:35

## 2022-01-01 RX ADMIN — CHLORHEXIDINE GLUCONATE 15 ML: 1.2 SOLUTION ORAL at 21:35

## 2022-01-01 RX ADMIN — INSULIN LISPRO 1 UNITS: 100 INJECTION, SOLUTION INTRAVENOUS; SUBCUTANEOUS at 11:55

## 2022-01-01 RX ADMIN — SODIUM CHLORIDE SOLN NEBU 3% 4 ML: 3 NEBU SOLN at 13:45

## 2022-01-01 RX ADMIN — LEVALBUTEROL HYDROCHLORIDE 0.63 MG: 0.63 SOLUTION RESPIRATORY (INHALATION) at 03:04

## 2022-01-01 RX ADMIN — INSULIN LISPRO 1 UNITS: 100 INJECTION, SOLUTION INTRAVENOUS; SUBCUTANEOUS at 23:58

## 2022-01-01 RX ADMIN — VECURONIUM BROMIDE 10 MG: 1 INJECTION, POWDER, LYOPHILIZED, FOR SOLUTION INTRAVENOUS at 21:40

## 2022-01-01 RX ADMIN — INSULIN GLARGINE 15 UNITS: 100 INJECTION, SOLUTION SUBCUTANEOUS at 17:46

## 2022-01-01 RX ADMIN — DEXMEDETOMIDINE 0.4 MCG/KG/HR: 100 INJECTION, SOLUTION, CONCENTRATE INTRAVENOUS at 15:16

## 2022-01-01 RX ADMIN — SODIUM CHLORIDE SOLN NEBU 3% 4 ML: 3 NEBU SOLN at 03:04

## 2022-01-01 RX ADMIN — LEVALBUTEROL HYDROCHLORIDE 0.63 MG: 0.63 SOLUTION RESPIRATORY (INHALATION) at 08:48

## 2022-01-01 RX ADMIN — LEVALBUTEROL HYDROCHLORIDE 0.63 MG: 0.63 SOLUTION RESPIRATORY (INHALATION) at 07:43

## 2022-01-01 RX ADMIN — DEXMEDETOMIDINE 0.4 MCG/KG/HR: 100 INJECTION, SOLUTION, CONCENTRATE INTRAVENOUS at 02:38

## 2022-01-01 RX ADMIN — LIDOCAINE HYDROCHLORIDE 5 ML: 10 INJECTION, SOLUTION EPIDURAL; INFILTRATION; INTRACAUDAL; PERINEURAL at 13:49

## 2022-01-01 RX ADMIN — Medication 2 TABLET: at 17:03

## 2022-01-01 RX ADMIN — INSULIN LISPRO 1 UNITS: 100 INJECTION, SOLUTION INTRAVENOUS; SUBCUTANEOUS at 17:32

## 2022-01-01 RX ADMIN — Medication 2 TABLET: at 16:36

## 2022-01-01 RX ADMIN — INSULIN LISPRO 5 UNITS: 100 INJECTION, SOLUTION INTRAVENOUS; SUBCUTANEOUS at 16:24

## 2022-01-01 RX ADMIN — INSULIN GLARGINE 15 UNITS: 100 INJECTION, SOLUTION SUBCUTANEOUS at 08:55

## 2022-01-01 RX ADMIN — CHLORHEXIDINE GLUCONATE 15 ML: 1.2 SOLUTION ORAL at 20:39

## 2022-01-01 RX ADMIN — FENTANYL CITRATE 50 MCG: 50 INJECTION INTRAMUSCULAR; INTRAVENOUS at 22:01

## 2022-01-01 RX ADMIN — CALCIUM ACETATE 667 MG: 667 CAPSULE ORAL at 11:26

## 2022-01-01 RX ADMIN — PRAVASTATIN SODIUM 80 MG: 80 TABLET ORAL at 16:30

## 2022-01-01 RX ADMIN — SODIUM CHLORIDE SOLN NEBU 3% 4 ML: 3 NEBU SOLN at 19:36

## 2022-01-01 RX ADMIN — SODIUM CHLORIDE SOLN NEBU 3% 4 ML: 3 NEBU SOLN at 13:11

## 2022-01-01 RX ADMIN — IOHEXOL 90 ML: 350 INJECTION, SOLUTION INTRAVENOUS at 11:19

## 2022-01-01 RX ADMIN — CALCIUM ACETATE 667 MG: 667 CAPSULE ORAL at 08:25

## 2022-01-01 RX ADMIN — CALCIUM ACETATE 667 MG: 667 CAPSULE ORAL at 11:19

## 2022-01-01 RX ADMIN — LEVALBUTEROL HYDROCHLORIDE 0.63 MG: 0.63 SOLUTION RESPIRATORY (INHALATION) at 07:45

## 2022-01-01 RX ADMIN — PROPOFOL 25 MCG/KG/MIN: 10 INJECTION, EMULSION INTRAVENOUS at 19:49

## 2022-03-07 ENCOUNTER — APPOINTMENT (EMERGENCY)
Dept: RADIOLOGY | Facility: HOSPITAL | Age: 77
DRG: 674 | End: 2022-03-07
Payer: MEDICARE

## 2022-03-07 ENCOUNTER — APPOINTMENT (INPATIENT)
Dept: RADIOLOGY | Facility: HOSPITAL | Age: 77
DRG: 674 | End: 2022-03-07
Payer: MEDICARE

## 2022-03-07 ENCOUNTER — APPOINTMENT (INPATIENT)
Dept: NON INVASIVE DIAGNOSTICS | Facility: HOSPITAL | Age: 77
DRG: 674 | End: 2022-03-07
Payer: MEDICARE

## 2022-03-07 ENCOUNTER — HOSPITAL ENCOUNTER (INPATIENT)
Facility: HOSPITAL | Age: 77
LOS: 12 days | Discharge: HOME HEALTH | DRG: 674 | End: 2022-03-19
Attending: EMERGENCY MEDICINE | Admitting: INTERNAL MEDICINE
Payer: MEDICARE

## 2022-03-07 DIAGNOSIS — I10 BENIGN ESSENTIAL HYPERTENSION: ICD-10-CM

## 2022-03-07 DIAGNOSIS — K92.1 MELENA: ICD-10-CM

## 2022-03-07 DIAGNOSIS — N17.9 ACUTE KIDNEY INJURY (HCC): ICD-10-CM

## 2022-03-07 DIAGNOSIS — R77.8 ELEVATED TROPONIN I LEVEL: ICD-10-CM

## 2022-03-07 DIAGNOSIS — I77.6 ANCA-ASSOCIATED VASCULITIS (HCC): ICD-10-CM

## 2022-03-07 DIAGNOSIS — E87.2 METABOLIC ACIDOSIS: ICD-10-CM

## 2022-03-07 DIAGNOSIS — Z79.4 TYPE 2 DIABETES MELLITUS WITH MICROALBUMINURIA, WITH LONG-TERM CURRENT USE OF INSULIN (HCC): ICD-10-CM

## 2022-03-07 DIAGNOSIS — E87.5 HYPERKALEMIA: ICD-10-CM

## 2022-03-07 DIAGNOSIS — R53.1 GENERALIZED WEAKNESS: ICD-10-CM

## 2022-03-07 DIAGNOSIS — E11.29 TYPE 2 DIABETES MELLITUS WITH MICROALBUMINURIA, WITH LONG-TERM CURRENT USE OF INSULIN (HCC): ICD-10-CM

## 2022-03-07 DIAGNOSIS — E86.0 DEHYDRATION: ICD-10-CM

## 2022-03-07 DIAGNOSIS — R07.9 CHEST PAIN: Primary | ICD-10-CM

## 2022-03-07 DIAGNOSIS — R80.9 TYPE 2 DIABETES MELLITUS WITH MICROALBUMINURIA, WITH LONG-TERM CURRENT USE OF INSULIN (HCC): ICD-10-CM

## 2022-03-07 DIAGNOSIS — N17.9 AKI (ACUTE KIDNEY INJURY) (HCC): ICD-10-CM

## 2022-03-07 DIAGNOSIS — D64.9 SYMPTOMATIC ANEMIA: ICD-10-CM

## 2022-03-07 PROBLEM — E87.29 HIGH ANION GAP METABOLIC ACIDOSIS: Status: ACTIVE | Noted: 2022-03-07

## 2022-03-07 PROBLEM — N39.0 UTI (URINARY TRACT INFECTION): Status: ACTIVE | Noted: 2022-03-07

## 2022-03-07 LAB
2HR DELTA HS TROPONIN: -1 NG/L
ABO GROUP BLD: NORMAL
ABO GROUP BLD: NORMAL
ALBUMIN SERPL BCP-MCNC: 2.2 G/DL (ref 3.5–5)
ALP SERPL-CCNC: 62 U/L (ref 46–116)
ALT SERPL W P-5'-P-CCNC: 55 U/L (ref 12–78)
AMORPH URATE CRY URNS QL MICRO: ABNORMAL /HPF
ANION GAP SERPL CALCULATED.3IONS-SCNC: 21 MMOL/L (ref 4–13)
ANION GAP SERPL CALCULATED.3IONS-SCNC: 22 MMOL/L (ref 4–13)
ANION GAP SERPL CALCULATED.3IONS-SCNC: 22 MMOL/L (ref 4–13)
APTT PPP: 36 SECONDS (ref 23–37)
AST SERPL W P-5'-P-CCNC: 39 U/L (ref 5–45)
ATRIAL RATE: 83 BPM
BACTERIA UR QL AUTO: ABNORMAL /HPF
BASE EX.OXY STD BLDV CALC-SCNC: 58.9 % (ref 60–80)
BASE EXCESS BLDV CALC-SCNC: -13.9 MMOL/L
BASOPHILS # BLD AUTO: 0.02 THOUSANDS/ΜL (ref 0–0.1)
BASOPHILS # BLD MANUAL: 0 THOUSAND/UL (ref 0–0.1)
BASOPHILS NFR BLD AUTO: 0 % (ref 0–1)
BASOPHILS NFR MAR MANUAL: 0 % (ref 0–1)
BETA-HYDROXYBUTYRATE: 1.3 MMOL/L
BILIRUB SERPL-MCNC: 0.33 MG/DL (ref 0.2–1)
BILIRUB UR QL STRIP: NEGATIVE
BLD GP AB SCN SERPL QL: NEGATIVE
BUN SERPL-MCNC: 162 MG/DL (ref 5–25)
BUN SERPL-MCNC: 169 MG/DL (ref 5–25)
BUN SERPL-MCNC: 175 MG/DL (ref 5–25)
C3 SERPL-MCNC: 132 MG/DL (ref 90–180)
C4 SERPL-MCNC: 25 MG/DL (ref 10–40)
CALCIUM ALBUM COR SERPL-MCNC: 9.7 MG/DL (ref 8.3–10.1)
CALCIUM SERPL-MCNC: 7.6 MG/DL (ref 8.3–10.1)
CALCIUM SERPL-MCNC: 7.9 MG/DL (ref 8.3–10.1)
CALCIUM SERPL-MCNC: 8.3 MG/DL (ref 8.3–10.1)
CARDIAC TROPONIN I PNL SERPL HS: 30 NG/L
CARDIAC TROPONIN I PNL SERPL HS: 31 NG/L
CHLORIDE SERPL-SCNC: 96 MMOL/L (ref 100–108)
CHLORIDE SERPL-SCNC: 97 MMOL/L (ref 100–108)
CHLORIDE SERPL-SCNC: 99 MMOL/L (ref 100–108)
CLARITY UR: ABNORMAL
CO2 SERPL-SCNC: 13 MMOL/L (ref 21–32)
CO2 SERPL-SCNC: 14 MMOL/L (ref 21–32)
CO2 SERPL-SCNC: 16 MMOL/L (ref 21–32)
COLOR UR: ABNORMAL
CREAT SERPL-MCNC: 13.64 MG/DL (ref 0.6–1.3)
CREAT SERPL-MCNC: 14.38 MG/DL (ref 0.6–1.3)
CREAT SERPL-MCNC: 14.91 MG/DL (ref 0.6–1.3)
EOSINOPHIL # BLD AUTO: 0.02 THOUSAND/ΜL (ref 0–0.61)
EOSINOPHIL # BLD MANUAL: 0 THOUSAND/UL (ref 0–0.4)
EOSINOPHIL NFR BLD AUTO: 0 % (ref 0–6)
EOSINOPHIL NFR BLD MANUAL: 0 % (ref 0–6)
ERYTHROCYTE [DISTWIDTH] IN BLOOD BY AUTOMATED COUNT: 14.6 % (ref 11.6–15.1)
ERYTHROCYTE [DISTWIDTH] IN BLOOD BY AUTOMATED COUNT: 14.7 % (ref 11.6–15.1)
FERRITIN SERPL-MCNC: 432 NG/ML (ref 8–388)
FLUAV RNA RESP QL NAA+PROBE: NEGATIVE
FLUBV RNA RESP QL NAA+PROBE: NEGATIVE
FOLATE SERPL-MCNC: 6.7 NG/ML (ref 3.1–17.5)
GFR SERPL CREATININE-BSD FRML MDRD: 2 ML/MIN/1.73SQ M
GFR SERPL CREATININE-BSD FRML MDRD: 2 ML/MIN/1.73SQ M
GFR SERPL CREATININE-BSD FRML MDRD: 3 ML/MIN/1.73SQ M
GLUCOSE SERPL-MCNC: 188 MG/DL (ref 65–140)
GLUCOSE SERPL-MCNC: 188 MG/DL (ref 65–140)
GLUCOSE SERPL-MCNC: 240 MG/DL (ref 65–140)
GLUCOSE SERPL-MCNC: 252 MG/DL (ref 65–140)
GLUCOSE SERPL-MCNC: 299 MG/DL (ref 65–140)
GLUCOSE SERPL-MCNC: 330 MG/DL (ref 65–140)
GLUCOSE SERPL-MCNC: 346 MG/DL (ref 65–140)
GLUCOSE UR STRIP-MCNC: ABNORMAL MG/DL
HCO3 BLDV-SCNC: 11.7 MMOL/L (ref 24–30)
HCT VFR BLD AUTO: 19 % (ref 36.5–49.3)
HCT VFR BLD AUTO: 20.7 % (ref 36.5–49.3)
HGB BLD-MCNC: 5.8 G/DL (ref 12–17)
HGB BLD-MCNC: 6.7 G/DL (ref 12–17)
HGB UR QL STRIP.AUTO: ABNORMAL
IMM GRANULOCYTES # BLD AUTO: 0.17 THOUSAND/UL (ref 0–0.2)
IMM GRANULOCYTES NFR BLD AUTO: 1 % (ref 0–2)
INR PPP: 1.41 (ref 0.84–1.19)
IRON SATN MFR SERPL: 30 % (ref 20–50)
IRON SERPL-MCNC: 41 UG/DL (ref 65–175)
KETONES UR STRIP-MCNC: NEGATIVE MG/DL
LACTATE SERPL-SCNC: 0.7 MMOL/L (ref 0.5–2)
LEUKOCYTE ESTERASE UR QL STRIP: ABNORMAL
LG PLATELETS BLD QL SMEAR: PRESENT
LYMPHOCYTES # BLD AUTO: 0.4 THOUSANDS/ΜL (ref 0.6–4.47)
LYMPHOCYTES # BLD AUTO: 0.76 THOUSAND/UL (ref 0.6–4.47)
LYMPHOCYTES # BLD AUTO: 7 % (ref 14–44)
LYMPHOCYTES NFR BLD AUTO: 3 % (ref 14–44)
MAGNESIUM SERPL-MCNC: 2.9 MG/DL (ref 1.6–2.6)
MCH RBC QN AUTO: 26.6 PG (ref 26.8–34.3)
MCH RBC QN AUTO: 27 PG (ref 26.8–34.3)
MCHC RBC AUTO-ENTMCNC: 30.5 G/DL (ref 31.4–37.4)
MCHC RBC AUTO-ENTMCNC: 31.9 G/DL (ref 31.4–37.4)
MCV RBC AUTO: 85 FL (ref 82–98)
MCV RBC AUTO: 87 FL (ref 82–98)
MONOCYTES # BLD AUTO: 0.44 THOUSAND/UL (ref 0–1.22)
MONOCYTES # BLD AUTO: 0.64 THOUSAND/ΜL (ref 0.17–1.22)
MONOCYTES NFR BLD AUTO: 4 % (ref 4–12)
MONOCYTES NFR BLD: 4 % (ref 4–12)
NEUTROPHILS # BLD AUTO: 13.88 THOUSANDS/ΜL (ref 1.85–7.62)
NEUTROPHILS # BLD MANUAL: 9.7 THOUSAND/UL (ref 1.85–7.62)
NEUTS BAND NFR BLD MANUAL: 2 % (ref 0–8)
NEUTS SEG NFR BLD AUTO: 87 % (ref 43–75)
NEUTS SEG NFR BLD AUTO: 92 % (ref 43–75)
NITRITE UR QL STRIP: NEGATIVE
NON-SQ EPI CELLS URNS QL MICRO: ABNORMAL /HPF
NRBC BLD AUTO-RTO: 0 /100 WBCS
NT-PROBNP SERPL-MCNC: ABNORMAL PG/ML
O2 CT BLDV-SCNC: 5 ML/DL
OTHER STN SPEC: ABNORMAL
P AXIS: 67 DEGREES
PCO2 BLDV: 25.8 MM HG (ref 42–50)
PH BLDV: 7.27 [PH] (ref 7.3–7.4)
PH UR STRIP.AUTO: 5.5 [PH]
PLATELET # BLD AUTO: 264 THOUSANDS/UL (ref 149–390)
PLATELET # BLD AUTO: 347 THOUSANDS/UL (ref 149–390)
PLATELET BLD QL SMEAR: ADEQUATE
PMV BLD AUTO: 9 FL (ref 8.9–12.7)
PMV BLD AUTO: 9.4 FL (ref 8.9–12.7)
PO2 BLDV: 37.9 MM HG (ref 35–45)
POTASSIUM SERPL-SCNC: 5 MMOL/L (ref 3.5–5.3)
POTASSIUM SERPL-SCNC: 6.1 MMOL/L (ref 3.5–5.3)
POTASSIUM SERPL-SCNC: 6.9 MMOL/L (ref 3.5–5.3)
PR INTERVAL: 174 MS
PROT SERPL-MCNC: 7.3 G/DL (ref 6.4–8.2)
PROT UR STRIP-MCNC: ABNORMAL MG/DL
PROTHROMBIN TIME: 16.9 SECONDS (ref 11.6–14.5)
QRS AXIS: 40 DEGREES
QRSD INTERVAL: 90 MS
QT INTERVAL: 338 MS
QTC INTERVAL: 397 MS
RBC # BLD AUTO: 2.18 MILLION/UL (ref 3.88–5.62)
RBC # BLD AUTO: 2.44 MILLION/UL (ref 3.88–5.62)
RBC #/AREA URNS AUTO: ABNORMAL /HPF
RBC MORPH BLD: NORMAL
RH BLD: POSITIVE
RH BLD: POSITIVE
RSV RNA RESP QL NAA+PROBE: NEGATIVE
SARS-COV-2 RNA RESP QL NAA+PROBE: NEGATIVE
SODIUM SERPL-SCNC: 132 MMOL/L (ref 136–145)
SODIUM SERPL-SCNC: 133 MMOL/L (ref 136–145)
SODIUM SERPL-SCNC: 135 MMOL/L (ref 136–145)
SP GR UR STRIP.AUTO: 1.02 (ref 1–1.03)
SPECIMEN EXPIRATION DATE: NORMAL
T WAVE AXIS: 44 DEGREES
TIBC SERPL-MCNC: 138 UG/DL (ref 250–450)
TOXIC GRANULES BLD QL SMEAR: PRESENT
UROBILINOGEN UR QL STRIP.AUTO: 0.2 E.U./DL
VENTRICULAR RATE: 83 BPM
VIT B12 SERPL-MCNC: 555 PG/ML (ref 100–900)
WBC # BLD AUTO: 10.9 THOUSAND/UL (ref 4.31–10.16)
WBC # BLD AUTO: 15.13 THOUSAND/UL (ref 4.31–10.16)
WBC #/AREA URNS AUTO: ABNORMAL /HPF

## 2022-03-07 PROCEDURE — 80048 BASIC METABOLIC PNL TOTAL CA: CPT | Performed by: INTERNAL MEDICINE

## 2022-03-07 PROCEDURE — 0241U HB NFCT DS VIR RESP RNA 4 TRGT: CPT | Performed by: EMERGENCY MEDICINE

## 2022-03-07 PROCEDURE — 82805 BLOOD GASES W/O2 SATURATION: CPT | Performed by: EMERGENCY MEDICINE

## 2022-03-07 PROCEDURE — 83605 ASSAY OF LACTIC ACID: CPT | Performed by: EMERGENCY MEDICINE

## 2022-03-07 PROCEDURE — 99223 1ST HOSP IP/OBS HIGH 75: CPT | Performed by: INTERNAL MEDICINE

## 2022-03-07 PROCEDURE — 76770 US EXAM ABDO BACK WALL COMP: CPT

## 2022-03-07 PROCEDURE — 94640 AIRWAY INHALATION TREATMENT: CPT

## 2022-03-07 PROCEDURE — 85730 THROMBOPLASTIN TIME PARTIAL: CPT | Performed by: EMERGENCY MEDICINE

## 2022-03-07 PROCEDURE — 87077 CULTURE AEROBIC IDENTIFY: CPT | Performed by: EMERGENCY MEDICINE

## 2022-03-07 PROCEDURE — 86920 COMPATIBILITY TEST SPIN: CPT

## 2022-03-07 PROCEDURE — 86900 BLOOD TYPING SEROLOGIC ABO: CPT | Performed by: EMERGENCY MEDICINE

## 2022-03-07 PROCEDURE — 93005 ELECTROCARDIOGRAM TRACING: CPT

## 2022-03-07 PROCEDURE — 82948 REAGENT STRIP/BLOOD GLUCOSE: CPT

## 2022-03-07 PROCEDURE — 71045 X-RAY EXAM CHEST 1 VIEW: CPT

## 2022-03-07 PROCEDURE — 83550 IRON BINDING TEST: CPT | Performed by: INTERNAL MEDICINE

## 2022-03-07 PROCEDURE — 85007 BL SMEAR W/DIFF WBC COUNT: CPT | Performed by: INTERNAL MEDICINE

## 2022-03-07 PROCEDURE — C9113 INJ PANTOPRAZOLE SODIUM, VIA: HCPCS | Performed by: INTERNAL MEDICINE

## 2022-03-07 PROCEDURE — 82010 KETONE BODYS QUAN: CPT | Performed by: EMERGENCY MEDICINE

## 2022-03-07 PROCEDURE — 81001 URINALYSIS AUTO W/SCOPE: CPT | Performed by: EMERGENCY MEDICINE

## 2022-03-07 PROCEDURE — 83880 ASSAY OF NATRIURETIC PEPTIDE: CPT | Performed by: EMERGENCY MEDICINE

## 2022-03-07 PROCEDURE — 93010 ELECTROCARDIOGRAM REPORT: CPT | Performed by: INTERNAL MEDICINE

## 2022-03-07 PROCEDURE — 83540 ASSAY OF IRON: CPT | Performed by: INTERNAL MEDICINE

## 2022-03-07 PROCEDURE — 36415 COLL VENOUS BLD VENIPUNCTURE: CPT | Performed by: EMERGENCY MEDICINE

## 2022-03-07 PROCEDURE — P9016 RBC LEUKOCYTES REDUCED: HCPCS

## 2022-03-07 PROCEDURE — 86160 COMPLEMENT ANTIGEN: CPT | Performed by: INTERNAL MEDICINE

## 2022-03-07 PROCEDURE — 82728 ASSAY OF FERRITIN: CPT | Performed by: INTERNAL MEDICINE

## 2022-03-07 PROCEDURE — 99222 1ST HOSP IP/OBS MODERATE 55: CPT | Performed by: INTERNAL MEDICINE

## 2022-03-07 PROCEDURE — 86901 BLOOD TYPING SEROLOGIC RH(D): CPT | Performed by: EMERGENCY MEDICINE

## 2022-03-07 PROCEDURE — 83735 ASSAY OF MAGNESIUM: CPT | Performed by: EMERGENCY MEDICINE

## 2022-03-07 PROCEDURE — 99291 CRITICAL CARE FIRST HOUR: CPT | Performed by: EMERGENCY MEDICINE

## 2022-03-07 PROCEDURE — 30233N1 TRANSFUSION OF NONAUTOLOGOUS RED BLOOD CELLS INTO PERIPHERAL VEIN, PERCUTANEOUS APPROACH: ICD-10-PCS | Performed by: INTERNAL MEDICINE

## 2022-03-07 PROCEDURE — 80048 BASIC METABOLIC PNL TOTAL CA: CPT | Performed by: PHYSICIAN ASSISTANT

## 2022-03-07 PROCEDURE — 87040 BLOOD CULTURE FOR BACTERIA: CPT | Performed by: EMERGENCY MEDICINE

## 2022-03-07 PROCEDURE — 86850 RBC ANTIBODY SCREEN: CPT | Performed by: EMERGENCY MEDICINE

## 2022-03-07 PROCEDURE — 82607 VITAMIN B-12: CPT | Performed by: INTERNAL MEDICINE

## 2022-03-07 PROCEDURE — 82746 ASSAY OF FOLIC ACID SERUM: CPT | Performed by: INTERNAL MEDICINE

## 2022-03-07 PROCEDURE — 87186 SC STD MICRODIL/AGAR DIL: CPT | Performed by: EMERGENCY MEDICINE

## 2022-03-07 PROCEDURE — 36430 TRANSFUSION BLD/BLD COMPNT: CPT

## 2022-03-07 PROCEDURE — 99285 EMERGENCY DEPT VISIT HI MDM: CPT

## 2022-03-07 PROCEDURE — 85025 COMPLETE CBC W/AUTO DIFF WBC: CPT | Performed by: EMERGENCY MEDICINE

## 2022-03-07 PROCEDURE — 94760 N-INVAS EAR/PLS OXIMETRY 1: CPT

## 2022-03-07 PROCEDURE — 80053 COMPREHEN METABOLIC PANEL: CPT | Performed by: EMERGENCY MEDICINE

## 2022-03-07 PROCEDURE — 84484 ASSAY OF TROPONIN QUANT: CPT | Performed by: EMERGENCY MEDICINE

## 2022-03-07 PROCEDURE — 85027 COMPLETE CBC AUTOMATED: CPT | Performed by: INTERNAL MEDICINE

## 2022-03-07 PROCEDURE — 85610 PROTHROMBIN TIME: CPT | Performed by: EMERGENCY MEDICINE

## 2022-03-07 PROCEDURE — 87086 URINE CULTURE/COLONY COUNT: CPT | Performed by: EMERGENCY MEDICINE

## 2022-03-07 RX ORDER — CEFTRIAXONE 1 G/50ML
1000 INJECTION, SOLUTION INTRAVENOUS EVERY 24 HOURS
Status: DISCONTINUED | OUTPATIENT
Start: 2022-03-07 | End: 2022-03-14

## 2022-03-07 RX ORDER — ONDANSETRON 2 MG/ML
4 INJECTION INTRAMUSCULAR; INTRAVENOUS EVERY 6 HOURS PRN
Status: DISCONTINUED | OUTPATIENT
Start: 2022-03-07 | End: 2022-03-16

## 2022-03-07 RX ORDER — CALCIUM GLUCONATE 20 MG/ML
1 INJECTION, SOLUTION INTRAVENOUS ONCE
Status: COMPLETED | OUTPATIENT
Start: 2022-03-07 | End: 2022-03-07

## 2022-03-07 RX ORDER — LATANOPROST 50 UG/ML
1 SOLUTION/ DROPS OPHTHALMIC
Status: DISCONTINUED | OUTPATIENT
Start: 2022-03-07 | End: 2022-03-19 | Stop reason: HOSPADM

## 2022-03-07 RX ORDER — ACETAMINOPHEN 325 MG/1
650 TABLET ORAL EVERY 6 HOURS PRN
Status: DISCONTINUED | OUTPATIENT
Start: 2022-03-07 | End: 2022-03-19 | Stop reason: HOSPADM

## 2022-03-07 RX ORDER — DEXTROSE 10 % IN WATER 10 %
125 INTRAVENOUS SOLUTION INTRAVENOUS ONCE
Status: COMPLETED | OUTPATIENT
Start: 2022-03-07 | End: 2022-03-07

## 2022-03-07 RX ORDER — PRAVASTATIN SODIUM 80 MG/1
80 TABLET ORAL
Status: DISCONTINUED | OUTPATIENT
Start: 2022-03-07 | End: 2022-03-19 | Stop reason: HOSPADM

## 2022-03-07 RX ORDER — SODIUM POLYSTYRENE SULFONATE 4.1 MEQ/G
15 POWDER, FOR SUSPENSION ORAL; RECTAL ONCE
Status: COMPLETED | OUTPATIENT
Start: 2022-03-07 | End: 2022-03-07

## 2022-03-07 RX ORDER — ALBUTEROL SULFATE 2.5 MG/3ML
10 SOLUTION RESPIRATORY (INHALATION) ONCE
Status: COMPLETED | OUTPATIENT
Start: 2022-03-07 | End: 2022-03-07

## 2022-03-07 RX ORDER — PANTOPRAZOLE SODIUM 40 MG/1
40 INJECTION, POWDER, FOR SOLUTION INTRAVENOUS EVERY 12 HOURS SCHEDULED
Status: DISCONTINUED | OUTPATIENT
Start: 2022-03-07 | End: 2022-03-19

## 2022-03-07 RX ORDER — TAMSULOSIN HYDROCHLORIDE 0.4 MG/1
0.4 CAPSULE ORAL
Status: DISCONTINUED | OUTPATIENT
Start: 2022-03-07 | End: 2022-03-19 | Stop reason: HOSPADM

## 2022-03-07 RX ORDER — SODIUM POLYSTYRENE SULFONATE 4.1 MEQ/G
30 POWDER, FOR SUSPENSION ORAL; RECTAL ONCE
Status: COMPLETED | OUTPATIENT
Start: 2022-03-07 | End: 2022-03-07

## 2022-03-07 RX ADMIN — TAMSULOSIN HYDROCHLORIDE 0.4 MG: 0.4 CAPSULE ORAL at 17:00

## 2022-03-07 RX ADMIN — SODIUM BICARBONATE 150 ML/HR: 84 INJECTION, SOLUTION INTRAVENOUS at 21:04

## 2022-03-07 RX ADMIN — SODIUM CHLORIDE 5 UNITS/HR: 9 INJECTION, SOLUTION INTRAVENOUS at 15:51

## 2022-03-07 RX ADMIN — PANTOPRAZOLE SODIUM 40 MG: 40 INJECTION, POWDER, FOR SOLUTION INTRAVENOUS at 23:58

## 2022-03-07 RX ADMIN — SODIUM BICARBONATE 150 ML/HR: 84 INJECTION, SOLUTION INTRAVENOUS at 12:29

## 2022-03-07 RX ADMIN — PRAVASTATIN SODIUM 80 MG: 80 TABLET ORAL at 17:00

## 2022-03-07 RX ADMIN — PANTOPRAZOLE SODIUM 40 MG: 40 INJECTION, POWDER, FOR SOLUTION INTRAVENOUS at 14:23

## 2022-03-07 RX ADMIN — ALBUTEROL SULFATE 10 MG: 2.5 SOLUTION RESPIRATORY (INHALATION) at 12:38

## 2022-03-07 RX ADMIN — ACETAMINOPHEN 650 MG: 325 TABLET, FILM COATED ORAL at 21:09

## 2022-03-07 RX ADMIN — INSULIN HUMAN 10 UNITS: 100 INJECTION, SOLUTION PARENTERAL at 12:13

## 2022-03-07 RX ADMIN — DEXTROSE 125 ML: 10 SOLUTION INTRAVENOUS at 12:12

## 2022-03-07 RX ADMIN — SODIUM POLYSTYRENE SULFONATE 30 G: 1 POWDER ORAL; RECTAL at 11:44

## 2022-03-07 RX ADMIN — CEFTRIAXONE 1000 MG: 1 INJECTION, SOLUTION INTRAVENOUS at 16:21

## 2022-03-07 RX ADMIN — SODIUM CHLORIDE 1000 ML: 0.9 INJECTION, SOLUTION INTRAVENOUS at 11:15

## 2022-03-07 RX ADMIN — CALCIUM GLUCONATE 1 G: 20 INJECTION, SOLUTION INTRAVENOUS at 13:06

## 2022-03-07 RX ADMIN — SODIUM POLYSTYRENE SULFONATE 15 G: 1 POWDER ORAL; RECTAL at 14:23

## 2022-03-07 RX ADMIN — LATANOPROST 1 DROP: 50 SOLUTION OPHTHALMIC at 21:06

## 2022-03-07 NOTE — ASSESSMENT & PLAN NOTE
Patient's baseline hemoglobin about 3 years ago was in the range of 13-50  Hemoglobin  upon admission was 5 8  Patient ordered for 2 units of PRBC transfusion  MCV level was normal-check iron panel vitamin M11 and folic acid level  GI bleeding likely multifactorial in the setting of possible GI bleeding and also CKD

## 2022-03-07 NOTE — ASSESSMENT & PLAN NOTE
Patient reported black stool for couple of days  Check stool occult blood  Patient will be started on Protonix 40 milligram IV q 12 hours  No history of NSAID use  Patient will need EGD  GI was consulted

## 2022-03-07 NOTE — CONSULTS
Consultation - Nephrology   Ilia Bassett 64 y o  male MRN: [id number]  Unit/Bed#: ED CT1 Encounter: [id number]    ASSESSMENT:  1  Acute Kidney Injury- likely secondary to prerenal azotemia/ATN with poor oral intake and volume depletion in the setting of enalapril  - UA: pending  - PVR: 86ml max so far during ER stay  - CXR: no active pulmonary disease  - patient denies NSAID use  - patient agreeable to dialysis if needed  - monitor blood work closely for acute need  - if no improvement in K and bicarbonate level, will need HD  2  Chronic Kidney Disease stage IIIa- Baseline creatinine around 1 3 from late 2021 with a GFR of 50%  No known kidney disease per patient report  3  Hyperkalemia- medical treatment given in the ER (albuterol, insulin/dextrose, kayexalate, calcium gluconate)  - admission K is 6 9  - repeat BMP at 4pm and repeat cocktail if K remains elevated  4  Metabolic Acidosis- agree with bicarbonate drip  - monitor on repeat labs  5  Anemia- check iron panel  - admission hgb is 5 8  - receiving 2 units PRBC  6  Hypertension- BP acceptable  - avoid hypotension  - hold enalapril  - consider amlodipine in the interim if BP elevated    PLAN:  Sodium bicarbonate drip  Hyperkalemia treatment- albuterol, insulin/d10, kayexalate, calcium gluc  Repeat BMP at 4pm  Check UA  Check renal ultrasound      HISTORY OF PRESENT ILLNESS:  Requesting Physician: Nahed Horton DO  Reason for Consult: LORRAINE Bassett is a 64y o  year old male who was admitted to [address] [place] [address] after presenting with chest pain and not feeling well  The patient is very hard of hearing  A renal consultation is requested today for assistance in the management of acute kidney injury  Mr Valerie Cannon tells me he has not been eating well for the past 3 weeks  He only eats 1-2 meals a day  He has chest pain which is why his wife called the ambulance  He denies N/V/D, SOB, urinary complaints    He is mentating appropriately  He currently has a breathing treatment in place  He was treated earlier today for hyperkalemia and is receiving PRBC  PAST MEDICAL HISTORY:  Past Medical History:   Diagnosis Date    Anesthesia complication 9432    after colonoscopy , pt was awake but could not control his body and kept falling     Arthritis     Bladder calculi     BPH (benign prostatic hyperplasia)     Diabetes mellitus ([address] )     Hearing disorder of both ears     wears bilateral hearing aids    Hyperlipidemia     controlled and maintained d/t diabetes    Hypertension     Kidney stones     Last Assessed:4/3/2017    Lyme disease     Last Assessed:2015    Rupture, bladder, spontaneous     Last Assessed:4/3/2017       PAST SURGICAL HISTORY:  Past Surgical History:   Procedure Laterality Date    BLADDER REPAIR N/A 12/10/2016    Procedure: REPAIR BLADDER/cysto insertion stent;  Surgeon: Manju Sparks MD;  Location: [address] [place] [address];  Service:     COLONOSCOPY      CYSTOLITHOTOMY      ANESTHESIA   lower abdomen  ;  Last Assessed:4/3/2017    OTHER SURGICAL HISTORY      thermal dilation of the prostate x 2 ( in the office)   [address] RESECTION OF PROSTATE N/A 2016    Procedure:  Cysto, Laser Bladder stone,fulgaration of prostates tissue ;  Surgeon: Manju Sparks MD;  Location: [facility];  Service:        ALLERGIES:  Allergies   Allergen Reactions    Amoxicillin Rash       SOCIAL HISTORY:  Social History     Substance and Sexual Activity   Alcohol Use Yes     Social History     Substance and Sexual Activity   Drug Use No     Social History     Tobacco Use   Smoking Status Former Smoker    Types: Cigars    Quit date: 24    Years since quittin 2   Smokeless Tobacco Never Used       FAMILY HISTORY:  Family History   Problem Relation Age of Onset    Cancer Mother         breast    Diabetes Mother     Cancer Father         prostate w/ bone mets    Prostate cancer Father    [facility] Alzheimer's disease Sister        MEDICATIONS:    Current Facility-Administered Medications:     calcium gluconate 1 g in sodium chloride 0 9% 50 mL (premix), 1 g, Intravenous, Once, Hernan Bowen DO, Last Rate: 100 mL/hr at 03/07/22 1306, 1 g at 03/07/22 1306    sodium bicarbonate 150 mEq in dextrose 5 % 1,000 mL infusion, 150 mL/hr, Intravenous, Continuous, Hernan Bowen DO, Last Rate: 150 mL/hr at 03/07/22 1229, 150 mL/hr at 03/07/22 1229    Current Outpatient Medications:     enalapril (VASOTEC) 10 mg tablet, Take 1 tablet (10 mg total) by mouth daily, Disp: 90 tablet, Rfl: 3    HumaLOG Mix 75/25 KwikPen (75-25) 100 units/mL injection pen, Inject 45 Units under the skin 2 (two) times a day with meals, Disp: 90 mL, Rfl: 3    simvastatin (ZOCOR) 40 mg tablet, Take 1 tablet (40 mg total) by mouth daily at bedtime, Disp: 90 tablet, Rfl: 3    tamsulosin (FLOMAX) 0 4 mg, TAKE 1 CAPSULE BY MOUTH TWO TIMES DAILY, Disp: 180 capsule, Rfl: 1    glucose blood test strip, 1 each by Other route 3 (three) times a day, Disp: 300 each, Rfl: 3    Insulin Pen Needle (B-D UF III MINI PEN NEEDLES) 31G X 5 MM MISC, Use twice daily with insulin pen as directed, Disp: 180 each, Rfl: 3    latanoprost (XALATAN) 0 005 % ophthalmic solution, Administer 1 drop to both eyes daily at bedtime , Disp: , Rfl:     Vitamin D, Cholecalciferol, 1000 UNITS CAPS, Take by mouth 2 times a week, Disp: , Rfl:     REVIEW OF SYSTEMS:  A complete 10 point review of systems was performed and found to be negative unless otherwise noted in the history of present illness  General: No fevers, chills  Cardiovascular:  + chest pain, No leg edema  Respiratory: No cough, sputum production,  No shortness of breath  Gastrointestinal:  No nausea/vomiting,  No diarrhea,  No abdominal pain  Genitourinary: No hematuria  No foamy urine    No dysuria    PHYSICAL EXAM:  Current Weight:    First Weight:    Vitals:    03/07/22 1233 03/07/22 1241 03/07/22 1245 03/07/22 1253   BP: 161/72  147/65 147/65   BP Location:   Left arm    Pulse: 85  86 83   Resp: 20  20 20   Temp: 97 7 °F (36 5 °C)   98 5 °F (36 9 °C)   TempSrc:    Oral   SpO2:  100% 100%        Intake/Output Summary (Last 24 hours) at 3/7/2022 1311  Last data filed at 3/7/2022 1229  Gross per 24 hour   Intake 375 ml   Output --   Net 375 ml     General: NAD  Skin: no rash  Eyes: anicteric  ENMT: mm moist  Neck: no masses  Respiratory: CTAB  CVS: RRR  Extremities: no edema  GI: soft nt nd  Neuro: alert awake  Psych: mood and affect appropriate    Invasive Devices:   Urethral Catheter Latex; Three way 22 Fr  (Active)     Lab Results:   Results from last 7 days   Lab Units 03/07/22  0954   WBC Thousand/uL 15 13*   HEMOGLOBIN g/dL 5 8*   HEMATOCRIT % 19 0*   PLATELETS Thousands/uL 347   POTASSIUM mmol/L 6 9*   CHLORIDE mmol/L 96*   CO2 mmol/L 14*   BUN mg/dL 169*   CREATININE mg/dL 14 91*   CALCIUM mg/dL 8 3   MAGNESIUM mg/dL 2 9*   ALK PHOS U/L 62   ALT U/L 55   AST U/L 39     I have personally reviewed the blood work as stated above and in my note

## 2022-03-07 NOTE — ED PROVIDER NOTES
History  Chief Complaint   Patient presents with    Chest Pain     CP starting 0500, 'tightness, squeezing' per medic was 85%on RA at home     33-year-old male with past history of diabetes, hypertension, BPH, hyperlipidemia, kidney stones, Lyme disease, presents to the ED for evaluation of left-sided chest pain since 5:00 a m  This morning  Patient states that he has had generalized weakness, shortness of breath, dyspnea on exertion over the past 3 weeks  Chest pain started this morning  Patient called EMS  According to EMS, patient's EKG was normal in the field  Patient given full-dose aspirin and brought to the ED for further evaluation  Medics noted that patient was satting 85% on room air at home  Patient's added 99% in the emergency department  Patient is vaccinated  Patient denies any cold symptoms  Patient denies any previous heart attacks or strokes  History provided by:  Patient  Chest Pain  Associated symptoms: no abdominal pain, no back pain, no cough, no dizziness, no fatigue, no fever, no headache, no nausea, no shortness of breath, not vomiting and no weakness        Prior to Admission Medications   Prescriptions Last Dose Informant Patient Reported? Taking?    HumaLOG Mix 75/25 KwikPen (75-25) 100 units/mL injection pen 3/6/2022 at Unknown time  No Yes   Sig: Inject 45 Units under the skin 2 (two) times a day with meals   Insulin Pen Needle (B-D UF III MINI PEN NEEDLES) 31G X 5 MM MISC   No No   Sig: Use twice daily with insulin pen as directed   Vitamin D, Cholecalciferol, 1000 UNITS CAPS  Self Yes No   Sig: Take by mouth 2 times a week   enalapril (VASOTEC) 10 mg tablet 3/6/2022 at Unknown time  No Yes   Sig: Take 1 tablet (10 mg total) by mouth daily   glucose blood test strip   No No   Si each by Other route 3 (three) times a day   latanoprost (XALATAN) 0 005 % ophthalmic solution  Self Yes No   Sig: Administer 1 drop to both eyes daily at bedtime    simvastatin (ZOCOR) 40 mg tablet 3/6/2022 at Unknown time  No Yes   Sig: Take 1 tablet (40 mg total) by mouth daily at bedtime   tamsulosin (FLOMAX) 0 4 mg 3/6/2022 at Unknown time Self No Yes   Sig: TAKE 1 CAPSULE BY MOUTH TWO TIMES DAILY      Facility-Administered Medications: None       Past Medical History:   Diagnosis Date    Anesthesia complication 5835    after colonoscopy , pt was awake but could not control his body and kept falling     Arthritis     Bladder calculi     BPH (benign prostatic hyperplasia)     Diabetes mellitus ([address] )     Hearing disorder of both ears     wears bilateral hearing aids    Hyperlipidemia     controlled and maintained d/t diabetes    Hypertension     Kidney stones     Last Assessed:4/3/2017    Lyme disease     Last Assessed:2015    Rupture, bladder, spontaneous     Last Assessed:4/3/2017       Past Surgical History:   Procedure Laterality Date    BLADDER REPAIR N/A 12/10/2016    Procedure: REPAIR BLADDER/cysto insertion stent;  Surgeon: Esteban Mahmood MD;  Location: [address] [place] [address];  Service:     COLONOSCOPY      CYSTOLITHOTOMY      ANESTHESIA   lower abdomen  ; Last Assessed:4/3/2017    OTHER SURGICAL HISTORY      thermal dilation of the prostate x 2 ( in the office)    TONSILLECTOMY      TRANSURETHRAL RESECTION OF PROSTATE N/A 2016    Procedure:  Cysto, Laser Bladder stone,fulgaration of prostates tissue ;  Surgeon: Esteban Mahmood MD;  Location: [facility];  Service:        Family History   Problem Relation Age of Onset    Cancer Mother         breast    Diabetes Mother     Cancer Father         prostate w/ bone mets    Prostate cancer Father     Alzheimer's disease Sister      I have reviewed and agree with the history as documented      E-Cigarette/Vaping    E-Cigarette Use Never User      E-Cigarette/Vaping Substances     Social History     Tobacco Use    Smoking status: Former Smoker     Types: Cigars     Quit date:      Years since quittin 2    Smokeless tobacco: Never Used   Vaping Use    Vaping Use: Never used   Substance Use Topics    Alcohol use: Yes    Drug use: No       Review of Systems   Constitutional: Negative for activity change, fatigue and fever  HENT: Negative for congestion, ear discharge and sore throat  Eyes: Negative for pain and redness  Respiratory: Negative for cough, chest tightness, shortness of breath and wheezing  Cardiovascular: Positive for chest pain  Gastrointestinal: Negative for abdominal pain, diarrhea, nausea and vomiting  Endocrine: Negative for cold intolerance  Genitourinary: Negative for dysuria and urgency  Musculoskeletal: Negative for arthralgias and back pain  Neurological: Negative for dizziness, weakness and headaches  Psychiatric/Behavioral: Negative for agitation and behavioral problems  Physical Exam  Physical Exam  Vitals and nursing note reviewed  Constitutional:       Appearance: He is well-developed  HENT:      Head: Normocephalic and atraumatic  Nose: Nose normal    Eyes:      Conjunctiva/sclera: Conjunctivae normal    Cardiovascular:      Rate and Rhythm: Normal rate and regular rhythm  Heart sounds: Normal heart sounds  Pulmonary:      Effort: Pulmonary effort is normal       Breath sounds: Normal breath sounds  Comments: Lungs are clear to auscultation bilateral   Abdominal:      General: Bowel sounds are normal  There is no distension  Palpations: Abdomen is soft  Tenderness: There is no abdominal tenderness  Comments: Abdomen is soft, nondistended, with bowel sounds present all 4 quadrants  No tenderness noted to palpation of abdomen  Genitourinary:     Rectum: Guaiac result positive  Comments: No christos blood noted during digital rectal exam   Patient is Hemoccult positive per rectum  Musculoskeletal:         General: Normal range of motion  Cervical back: Normal range of motion and neck supple  Skin:     General: Skin is warm  Neurological:      General: No focal deficit present  Mental Status: He is alert and oriented to person, place, and time  Comments: Generalized weakness noted without any focal neuro deficits  Psychiatric:         Mood and Affect: Mood normal          Behavior: Behavior normal          Thought Content:  Thought content normal          Judgment: Judgment normal          Vital Signs  ED Triage Vitals   Temperature Pulse Respirations Blood Pressure SpO2   03/07/22 0938 03/07/22 0938 03/07/22 0938 03/07/22 0938 03/07/22 0938   (!) 97 4 °F (36 3 °C) 84 20 (!) 184/78 94 %      Temp Source Heart Rate Source Patient Position - Orthostatic VS BP Location FiO2 (%)   03/07/22 0938 03/07/22 0938 03/07/22 1245 03/07/22 1245 --   Oral Monitor Lying Left arm       Pain Score       --                  Vitals:    03/07/22 1358 03/07/22 1400 03/07/22 1415 03/07/22 1544   BP:  150/68 161/65 149/64   Pulse: 95 94 97 92   Patient Position - Orthostatic VS:             Visual Acuity      ED Medications  Medications   sodium bicarbonate 150 mEq in dextrose 5 % 1,000 mL infusion (150 mL/hr Intravenous New Bag 3/7/22 1229)   latanoprost (XALATAN) 0 005 % ophthalmic solution 1 drop (has no administration in time range)   pravastatin (PRAVACHOL) tablet 80 mg (has no administration in time range)   tamsulosin (FLOMAX) capsule 0 4 mg (has no administration in time range)   acetaminophen (TYLENOL) tablet 650 mg (has no administration in time range)   ondansetron (ZOFRAN) injection 4 mg (has no administration in time range)   pantoprazole (PROTONIX) injection 40 mg (40 mg Intravenous Given 3/7/22 1423)   insulin regular (HumuLIN R,NovoLIN R) 1 Units/mL in sodium chloride 0 9 % 100 mL infusion (5 Units/hr Intravenous New Bag 3/7/22 1551)   cefTRIAXone (ROCEPHIN) IVPB (premix in dextrose) 1,000 mg 50 mL (has no administration in time range)   calcium gluconate 1 g in sodium chloride 0 9% 50 mL (premix) (0 g Intravenous Stopped 3/7/22 1353)   insulin regular (HumuLIN R,NovoLIN R) injection 10 Units (10 Units Intravenous Given 3/7/22 1213)   dextrose infusion 10 % bolus (0 mL Intravenous Stopped 3/7/22 1229)   sodium polystyrene (KAYEXALATE) powder 30 g (30 g Oral Given 3/7/22 1144)   albuterol inhalation solution 10 mg (10 mg Nebulization Given 3/7/22 1238)   sodium polystyrene (KAYEXALATE) powder 15 g (15 g Oral Given 3/7/22 1423)       Diagnostic Studies  Results Reviewed     Procedure Component Value Units Date/Time    Folate [[id number]]  (Normal) Collected: 03/07/22 1235    Lab Status: Final result Specimen: Blood from Arm, Right Updated: 03/07/22 1601     Folate 6 7 ng/mL     Iron Saturation % [[id number]]  (Abnormal) Collected: 03/07/22 1235    Lab Status: Final result Specimen: Blood from Arm, Right Updated: 03/07/22 1601     Iron Saturation 30 %      TIBC 138 ug/dL      Iron 41 ug/dL     Ferritin [[id number]]  (Abnormal) Collected: 03/07/22 1235    Lab Status: Final result Specimen: Blood from Arm, Right Updated: 03/07/22 1601     Ferritin 432 ng/mL     Vitamin B12 [[id number]]  (Normal) Collected: 03/07/22 1235    Lab Status: Final result Specimen: Blood from Arm, Right Updated: 03/07/22 1601     Vitamin B-12 555 pg/mL     Basic metabolic panel [[id number]]     Lab Status: No result Specimen: Blood     Basic metabolic panel [[id number]]     Lab Status: No result Specimen: Blood     C3 complement [[id number]] Collected: 03/07/22 0954    Lab Status: In process Specimen: Blood from Arm, Right Updated: 03/07/22 1356    C4 complement [[id number]] Collected: 03/07/22 0954    Lab Status:  In process Specimen: Blood from Arm, Right Updated: 03/07/22 8095    Basic metabolic panel [[id number]]  (Abnormal) Collected: 03/07/22 1306    Lab Status: Final result Specimen: Blood Updated: 03/07/22 1355     Sodium 133 mmol/L      Potassium 6 1 mmol/L      Chloride 99 mmol/L      CO2 13 mmol/L      ANION GAP 21 mmol/L       mg/dL      Creatinine 14 38 mg/dL Glucose 240 mg/dL      Calcium 7 9 mg/dL      eGFR 2 ml/min/1 73sq m     Narrative:      [place] guidelines for Chronic Kidney Disease (CKD):     Stage 1 with normal or high GFR (GFR > 90 mL/min/1 73 square meters)    Stage 2 Mild CKD (GFR = 60-89 mL/min/1 73 square meters)    Stage 3A Moderate CKD (GFR = 45-59 mL/min/1 73 square meters)    Stage 3B Moderate CKD (GFR = 30-44 mL/min/1 73 square meters)    Stage 4 Severe CKD (GFR = 15-29 mL/min/1 73 square meters)    Stage 5 End Stage CKD (GFR <15 mL/min/1 73 square meters)  Note: GFR calculation is accurate only with a steady state creatinine    Anti-neutrophilic cytoplasmic antibody [[id number]]     Lab Status: No result Specimen: Blood     SAMANTHA Screen w/ Reflex to Titer/Pattern [[id number]]     Lab Status: No result Specimen: Blood     Urine Microscopic [[id number]]  (Abnormal) Collected: 03/07/22 1305    Lab Status: Final result Specimen: Urine, Straight Cath Updated: 03/07/22 1339     RBC, UA Innumerable /hpf      WBC, UA 20-30 /hpf      Epithelial Cells Occasional /hpf      Bacteria, UA Occasional /hpf      AMORPH URATES Occasional /hpf      OTHER OBSERVATIONS Renal Tubule Epithelial Cells Present    Urine culture [[id number]] Collected: 03/07/22 1305    Lab Status:  In process Specimen: Urine, Straight Cath Updated: 03/07/22 1339    UA w Reflex to Microscopic w Reflex to Culture [[id number]]  (Abnormal) Collected: 03/07/22 1305    Lab Status: Final result Specimen: Urine, Straight Cath Updated: 03/07/22 1317     Color, UA Light Yellow     Clarity, UA Cloudy     Specific Ruth, UA 1 020     pH, UA 5 5     Leukocytes, UA Small     Nitrite, UA Negative     Protein,  (2+) mg/dl      Glucose,  (1/10%) mg/dl      Ketones, UA Negative mg/dl      Urobilinogen, UA 0 2 E U /dl      Bilirubin, UA Negative     Blood, UA Large    HS Troponin I 2hr [[id number]]  (Normal) Collected: 03/07/22 1208    Lab Status: Final result Specimen: Blood from Arm, Right Updated: 03/07/22 1240     hs TnI 2hr 30 ng/L      Delta 2hr hsTnI -1 ng/L     Lactic acid [[id number]]  (Normal) Collected: 03/07/22 1124    Lab Status: Final result Specimen: Blood from Arm, Right Updated: 03/07/22 1218     LACTIC ACID 0 7 mmol/L     Narrative:      Result may be elevated if tourniquet was used during collection  HS Troponin I 4hr [[id number]]     Lab Status: No result Specimen: Blood     Beta Hydroxybutyrate [[id number]]  (Abnormal) Collected: 03/07/22 1124    Lab Status: Final result Specimen: Blood from Arm, Right Updated: 03/07/22 1139     BETA-HYDROXYBUTYRATE 1 3 mmol/L     Blood gas, venous [[id number]]  (Abnormal) Collected: 03/07/22 1124    Lab Status: Final result Specimen: Blood from Arm, Right Updated: 03/07/22 1138     pH, Jagdish 7 274     pCO2, Jagdish 25 8 mm Hg      pO2, Jagdish 37 9 mm Hg      HCO3, Jagdish 11 7 mmol/L      Base Excess, Jagdish -13 9 mmol/L      O2 Content, Jagdish 5 0 ml/dL      O2 HGB, VENOUS 58 9 %     Blood culture #1 [[id number]] Collected: 03/07/22 1055    Lab Status: In process Specimen: Blood from Hand, Right Updated: 03/07/22 1134    Blood culture #2 [[id number]] Collected: 03/07/22 1124    Lab Status:  In process Specimen: Blood from Arm, Right Updated: 03/07/22 1134    Comprehensive metabolic panel [[id number]]  (Abnormal) Collected: 03/07/22 0954    Lab Status: Final result Specimen: Blood from Arm, Right Updated: 03/07/22 1058     Sodium 132 mmol/L      Potassium 6 9 mmol/L      Chloride 96 mmol/L      CO2 14 mmol/L      ANION GAP 22 mmol/L       mg/dL      Creatinine 14 91 mg/dL      Glucose 252 mg/dL      Calcium 8 3 mg/dL      Corrected Calcium 9 7 mg/dL      AST 39 U/L      ALT 55 U/L      Alkaline Phosphatase 62 U/L      Total Protein 7 3 g/dL      Albumin 2 2 g/dL      Total Bilirubin 0 33 mg/dL      eGFR 2 ml/min/1 73sq m     Narrative:      [place] guidelines for Chronic Kidney Disease (CKD):     Stage 1 with normal or high GFR (GFR > 90 mL/min/1 73 square meters)    Stage 2 Mild CKD (GFR = 60-89 mL/min/1 73 square meters)    Stage 3A Moderate CKD (GFR = 45-59 mL/min/1 73 square meters)    Stage 3B Moderate CKD (GFR = 30-44 mL/min/1 73 square meters)    Stage 4 Severe CKD (GFR = 15-29 mL/min/1 73 square meters)    Stage 5 End Stage CKD (GFR <15 mL/min/1 73 square meters)  Note: GFR calculation is accurate only with a steady state creatinine    Magnesium [[id number]]  (Abnormal) Collected: 03/07/22 0954    Lab Status: Final result Specimen: Blood from Arm, Right Updated: 03/07/22 1051     Magnesium 2 9 mg/dL     NT-BNP PRO [[id number]]  (Abnormal) Collected: 03/07/22 0954    Lab Status: Final result Specimen: Blood from Arm, Right Updated: 03/07/22 1051     NT-proBNP 15,208 pg/mL     COVID/FLU/RSV - 2 hour TAT [[id number]]  (Normal) Collected: 03/07/22 0954    Lab Status: Final result Specimen: Nares from Nose Updated: 03/07/22 1044     SARS-CoV-2 Negative     INFLUENZA A PCR Negative     INFLUENZA B PCR Negative     RSV PCR Negative    Narrative:      FOR PEDIATRIC PATIENTS - copy/paste COVID Guidelines URL to browser: [email or web address]    SARS-CoV-2 assay is a Nucleic Acid Amplification assay intended for the  qualitative detection of nucleic acid from SARS-CoV-2 in nasopharyngeal  swabs  Results are for the presumptive identification of SARS-CoV-2 RNA  Positive results are indicative of infection with SARS-CoV-2, the virus  causing COVID-19, but do not rule out bacterial infection or co-infection  with other viruses  Laboratories within the [place] and its  territories are required to report all positive results to the appropriate  public health authorities  Negative results do not preclude SARS-CoV-2  infection and should not be used as the sole basis for treatment or other  patient management decisions   Negative results must be combined with  clinical observations, patient history, and epidemiological information  This test has not been FDA cleared or approved  This test has been authorized by FDA under an Emergency Use Authorization  (EUA)  This test is only authorized for the duration of time the  declaration that circumstances exist justifying the authorization of the  emergency use of an in vitro diagnostic tests for detection of SARS-CoV-2  virus and/or diagnosis of COVID-19 infection under section 564(b)(1) of  the Act, 21 U  [place] C  002EQC-7(S)(3), unless the authorization is terminated  or revoked sooner  The test has been validated but independent review by FDA  and CLIA is pending  Test performed using [employer] GeneXpert: This RT-PCR assay targets N2,  a region unique to SARS-CoV-2  A conserved region in the E-gene was chosen  for pan-Sarbecovirus detection which includes SARS-CoV-2      CBC and differential [[id number]]  (Abnormal) Collected: 03/07/22 0954    Lab Status: Final result Specimen: Blood from Arm, Right Updated: 03/07/22 1033     WBC 15 13 Thousand/uL      RBC 2 18 Million/uL      Hemoglobin 5 8 g/dL      Hematocrit 19 0 %      MCV 87 fL      MCH 26 6 pg      MCHC 30 5 g/dL      RDW 14 6 %      MPV 9 0 fL      Platelets 522 Thousands/uL      nRBC 0 /100 WBCs      Neutrophils Relative 92 %      Immat GRANS % 1 %      Lymphocytes Relative 3 %      Monocytes Relative 4 %      Eosinophils Relative 0 %      Basophils Relative 0 %      Neutrophils Absolute 13 88 Thousands/µL      Immature Grans Absolute 0 17 Thousand/uL      Lymphocytes Absolute 0 40 Thousands/µL      Monocytes Absolute 0 64 Thousand/µL      Eosinophils Absolute 0 02 Thousand/µL      Basophils Absolute 0 02 Thousands/µL     APTT [[id number]]  (Normal) Collected: 03/07/22 0954    Lab Status: Final result Specimen: Blood from Arm, Right Updated: 03/07/22 1030     PTT 36 seconds     Protime-INR [[id number]]  (Abnormal) Collected: 03/07/22 0954    Lab Status: Final result Specimen: Blood from Arm, Right Updated: 03/07/22 1030     Protime 16 9 seconds      INR 1 41    HS Troponin 0hr (reflex protocol) [[id number]]  (Normal) Collected: 03/07/22 0954    Lab Status: Final result Specimen: Blood from Arm, Right Updated: 03/07/22 1026     hs TnI 0hr 31 ng/L                  US kidney and bladder   Final Result by Cristiano Bates MD (03/07 1432)      No hydronephrosis  8 mm calculus in the right dependent aspect of the urinary bladder  Workstation performed: [id number]         XR chest 1 view portable   Final Result by Chet Estevez MD (03/07 1250)      No active pulmonary disease  Workstation performed: [id number]                    Procedures  ECG 12 Lead Documentation Only    Date/Time: 3/7/2022 9:29 AM  Performed by: Zachary Sahni DO  Authorized by: Zachary Sahni DO     Indications / Diagnosis:  Chest pain  ECG reviewed by me, the ED Provider: yes    Patient location:  ED  Previous ECG:     Previous ECG:  Compared to current    Similarity:  No change    Comparison to cardiac monitor: Yes    Interpretation:     Interpretation: normal    Comments:      Sinus rhythm, rate 83, normal axis, normal intervals, no acute ST/T-wave abnormalities noted, otherwise unremarkable EKG, unchanged from baseline    CriticalCare Time  Performed by: Zachary Sahni DO  Authorized by: Zachary Sahni DO     Critical care provider statement:     Critical care time (minutes):  60    Critical care time was exclusive of:  Separately billable procedures and treating other patients    Critical care was necessary to treat or prevent imminent or life-threatening deterioration of the following conditions:  Dehydration and metabolic crisis    Critical care was time spent personally by me on the following activities:  Blood draw for specimens, obtaining history from patient or surrogate, development of treatment plan with patient or surrogate, discussions with consultants, evaluation of patient's response to treatment, examination of patient, review of old charts, re-evaluation of patient's condition, ordering and review of laboratory studies, ordering and review of radiographic studies, interpretation of cardiac output measurements and ordering and performing treatments and interventions             ED Course  ED Course as of 03/07/22 1609   Mon Mar 07, 2022   1050 Patient re-examined at bedside  Patient is Hemoccult positive per rectum  Patient states that his chest pain is at a minimum at this time  1132 Case discussed with nephrologist on-call, Dr Carole Hedrick will recommend starting bicarb drip at 150ml per hour as well as Kayexalate  MDM  Number of Diagnoses or Management Options  LORRAINE (acute kidney injury) [facility]): new and requires workup  Chest pain: new and requires workup  Dehydration: new and requires workup  Generalized weakness: new and requires workup  Hyperkalemia: new and requires workup  Metabolic acidosis: new and requires workup  Symptomatic anemia: new and requires workup  Diagnosis management comments: Obtain septic workup, EKG, chest x-ray  Continue to monitor patient for any worsening symptoms  Amount and/or Complexity of Data Reviewed  Clinical lab tests: ordered and reviewed  Tests in the radiology section of CPT®: ordered and reviewed  Tests in the medicine section of CPT®: ordered and reviewed  Review and summarize past medical records: yes  Independent visualization of images, tracings, or specimens: yes    Risk of Complications, Morbidity, and/or Mortality  General comments: Patient's hemoglobin 5 8 in the emergency department  No active bleeding noted  Patient was Hemoccult positive per rectum  Patient had multiple metabolic derangements in his BMP including elevated BUN, creatinine and, potassium level  Hyperkalemia was treated in the emergency department  Nephrology was consulted who saw patient at bedside    At this time patient's electrolyte derangements may be secondary to dehydration versus acute renal pathology  Patient is admitted for further management  Patient agrees with admission plans  Patient's chest pain was at a minimal by the time he arrived to the emergency department  Patient Progress  Patient progress: stable      Disposition  Final diagnoses:   Chest pain   LORRAINE (acute kidney injury) ([facility] [address] )   Dehydration   Metabolic acidosis   Symptomatic anemia   Hyperkalemia   Generalized weakness     Time reflects when diagnosis was documented in both MDM as applicable and the Disposition within this note     Time User Action Codes Description Comment    3/7/2022 11:14 AM Esaw Arthur Add [R07 9] Chest pain     3/7/2022 11:14 AM Ferdous, Cl Shutter Add [N17 9] LORRAINE (acute kidney injury) ([facility] [address] )     3/7/2022 11:14 AM Esaw Arthur Add [E86 0] Dehydration     3/7/2022 11:36 AM Gweneth Capes, Cl Shutter Add [D09 4] Metabolic acidosis     9/1/3976 11:36 AM Gweneth Capes Cl Shutter Add [D64 9] Symptomatic anemia     3/7/2022 11:36 AM Gweneth Capes, Cl Shutter Add [E87 5] Hyperkalemia     3/7/2022 11:38 AM Gweneth Capes Cl Shutter Add [R53 1] Generalized weakness     3/7/2022 12:41 PM Emmanuel Keatingl Add [K92 1] Melena       ED Disposition     ED Disposition Condition Date/Time Comment    Admit Stable Mon Mar 7, 2022 11:38 AM Case was discussed with Dr Mikey Cheng and the patient's admission status was agreed to be Admission Status: inpatient status to the service of Dr Mikey Cheng          Follow-up Information    None         Current Discharge Medication List      CONTINUE these medications which have NOT CHANGED    Details   enalapril (VASOTEC) 10 mg tablet Take 1 tablet (10 mg total) by mouth daily  Qty: 90 tablet, Refills: 3    Comments: Requesting 1 year supply  Associated Diagnoses: Benign essential hypertension      HumaLOG Mix 75/25 KwikPen (75-25) 100 units/mL injection pen Inject 45 Units under the skin 2 (two) times a day with meals  Qty: 90 mL, Refills: 3    Comments: Requesting 1 year supply  Associated Diagnoses: Type 2 diabetes mellitus with microalbuminuria, with long-term current use of insulin ([facility])      simvastatin (ZOCOR) 40 mg tablet Take 1 tablet (40 mg total) by mouth daily at bedtime  Qty: 90 tablet, Refills: 3    Comments: Requesting 1 year supply  Associated Diagnoses: Hyperlipidemia, unspecified hyperlipidemia type      tamsulosin (FLOMAX) 0 4 mg TAKE 1 CAPSULE BY MOUTH TWO TIMES DAILY  Qty: 180 capsule, Refills: 1    Associated Diagnoses: Benign prostatic hyperplasia, unspecified whether lower urinary tract symptoms present      glucose blood test strip 1 each by Other route 3 (three) times a day  Qty: 300 each, Refills: 3    Associated Diagnoses: Type 2 diabetes mellitus without complication, with long-term current use of insulin ([facility])      Insulin Pen Needle (B-D UF III MINI PEN NEEDLES) 31G X 5 MM MISC Use twice daily with insulin pen as directed  Qty: 180 each, Refills: 3    Associated Diagnoses: Type 2 diabetes mellitus with microalbuminuria, with long-term current use of insulin ([facility])      latanoprost (XALATAN) 0 005 % ophthalmic solution Administer 1 drop to both eyes daily at bedtime       Vitamin D, Cholecalciferol, 1000 UNITS CAPS Take by mouth 2 times a week             No discharge procedures on file      PDMP Review     None          ED Provider  Electronically Signed by           Henrry Lyons DO  03/07/22 7523

## 2022-03-07 NOTE — ASSESSMENT & PLAN NOTE
Patient had elevated leukocytosis  UA showed trace leukocyte esterase with 10-20 white cells and innumerable bacteria  Patient will be started on empiric IV Rocephin  Follow-up urine cultures  No clinical signs of sepsis

## 2022-03-07 NOTE — ASSESSMENT & PLAN NOTE
Baseline creatinine 1-1 3  Creatinine upon admission was 14 38  PVR was 66 milliliters  Etiology likely secondary to severe dehydration, ACE-inhibitor use in the setting of possible GI bleeding  Since patient has metabolic acidosis patient will be on bicarbonate drip at 150 mL/hour  Monitor strict I&O and daily weight  Hold Vasotec  Will check renal ultrasound to rule out hydronephrosis  Pending course patient might also need further workup for glomerulonephritis  Nephrology was consulted in the ED  UA showed innumerable red blood cells 10-20 white cells with glucose

## 2022-03-07 NOTE — ASSESSMENT & PLAN NOTE
Likely secondary to renal failure and also hyperglycemic state  Patient will be on bicarbonate drip at 150 mL/hour  Patient also started on low-dose insulin drip all the rhythm 1  Repeat BMP in 4 hours

## 2022-03-07 NOTE — ASSESSMENT & PLAN NOTE
Patient is on Vasotec at home which will be held in the setting of acute kidney injury  Avoid hypotension  Will add Norvasc if blood pressure continues to be high

## 2022-03-07 NOTE — ASSESSMENT & PLAN NOTE
Lab Results   Component Value Date    HGBA1C 7 2 (H) 12/03/2021       No results for input(s): POCGLU in the last 72 hours  Blood Sugar Average: Last 72 hrs:   patient is on Humalog mix 75/25 45 units b i d  Hold Humalog mix  Patient will be started on insulin drip again rhythm 1-beta hydroxybutyrate was elevated at 1 6    Monitor blood sugars

## 2022-03-07 NOTE — H&P
Mita MCKAY  64   H&P- Hay Connors 1945, 64 y o  male MRN: [id number]  Unit/Bed#: ED CT1 Encounter: [id number]  Primary Care Provider: Junito Roberts MD   Date and time admitted to hospital: 3/7/2022  9:37 AM    * Symptomatic anemia  Assessment & Plan  Patient's baseline hemoglobin about 3 years ago was in the range of 13-50  Hemoglobin  upon admission was 5 8  Patient ordered for 2 units of PRBC transfusion  MCV level was normal-check iron panel vitamin A57 and folic acid level  GI bleeding likely multifactorial in the setting of possible GI bleeding and also CKD    Acute kidney injury [facility])  Assessment & Plan  Baseline creatinine 1-1 3  Creatinine upon admission was 14 38  PVR was 66 milliliters  Etiology likely secondary to severe dehydration, ACE-inhibitor use in the setting of possible GI bleeding  Since patient has metabolic acidosis patient will be on bicarbonate drip at 150 mL/hour  Monitor strict I&O and daily weight  Hold Vasotec  Will check renal ultrasound to rule out hydronephrosis  Pending course patient might also need further workup for glomerulonephritis  Nephrology was consulted in the ED  UA showed innumerable red blood cells 10-20 white cells with glucose    Hyperkalemia  Assessment & Plan  Potassium level upon admission was 6 9  Likely in the setting of acute kidney injury and ACE-inhibitor use  Patient was given insulin with D10, albuterol nebulizer treatment and calcium gluconate in the ED  EKG showed no changes  Patient was also given Kayexalate 30 grams in the ED  Repeat BMP in 4 hours and might need repeat dosing of Kayexalate based on further course      Melena  Assessment & Plan  Patient reported black stool for couple of days  Check stool occult blood  Patient will be started on Protonix 40 milligram IV q 12 hours  No history of NSAID use  Patient will need EGD  GI was consulted    UTI (urinary tract infection)  Assessment & Plan  Patient had elevated leukocytosis  UA showed trace leukocyte esterase with 10-20 white cells and innumerable bacteria  Patient will be started on empiric IV Rocephin  Follow-up urine cultures  No clinical signs of sepsis    High anion gap metabolic acidosis  Assessment & Plan  Likely secondary to renal failure and also hyperglycemic state  Patient will be on bicarbonate drip at 150 mL/hour  Patient also started on low-dose insulin drip all the rhythm 1  Repeat BMP in 4 hours    Chest pain  Assessment & Plan  Patient presented with left-sided chest pain  Troponins x2 were negative  EKG was unremarkable  ProBNP was elevated at 15,000  Check 2D echo to rule out wall motion abnormalities  Chest pain likely secondary to symptomatic anemia    Type 2 diabetes mellitus with microalbuminuria, with long-term current use of insulin ([facility])  Assessment & Plan  Lab Results   Component Value Date    HGBA1C 7 2 (H) 12/03/2021       No results for input(s): POCGLU in the last 72 hours  Blood Sugar Average: Last 72 hrs:   patient is on Humalog mix 75/25 45 units b i d  Hold Humalog mix  Patient will be started on insulin drip again rhythm 1-beta hydroxybutyrate was elevated at 1 6  Monitor blood sugars    Hyperlipidemia  Assessment & Plan  Zocor substituted with Pravachol    Benign prostatic hyperplasia with lower urinary tract symptoms  Assessment & Plan  Monitor postvoid residuals    Benign essential hypertension  Assessment & Plan  Patient is on Vasotec at home which will be held in the setting of acute kidney injury  Avoid hypotension  Will add Norvasc if blood pressure continues to be high    VTE Pharmacologic Prophylaxis:   pharmacological VTE prophylaxis contraindicated due to anemia and suspicion of GI bleeding    Sequential compression device was ordered  Code Status: Level 1 - Full Code   Discussion with family: yes    Anticipated Length of Stay: Patient will be admitted on an inpatient basis with an anticipated length of stay of greater than 2 midnights secondary to Symptomatic anemia, GI bleeding, acute kidney injury, metabolic acidosis, hyperkalemia  Total Time for Visit, including Counseling / Coordination of Care: 70 minutes Greater than 50% of this total time spent on direct patient counseling and coordination of care  Chief Complaint:  Chest pain    History of Present Illness:  Val Johnson is a 64 y o  male with a PMH of diabetes mellitus type 2, hypertension, anemia who presents with chest pain since 5:00 a m  This morning  Patient has been not feeling well for the past couple of weeks and reporting some nasal congestion sore throat and postnasal drip  Patient also reported some cough productive of mucoid yellow phlegm  Patient woke up this morning with crushing substernal chest pain radiating across the chest with some shortness of breath on exertion and orthopnea patient had leg swelling previously which has since resolved  On further questioning patient also reported black colored stools for a couple of days and also reported Dr  Diarrhea patient overall is a poor historian  Patient also has poor oral intake and has been drinking only 16 ounce of fluid per day  Patient denies any NSAID use or christos blood in the urine or stool  In the patient was tachypneic with elevated blood pressure  Lab showed hemoglobin level of 5 8 with a BUN creatinine of 169 and 14, sodium level of 132 with a potassium level of 6 9, anion gap of 22 with a bicarbonate level of 14  ProBNP was 47291  Review of Systems:  Review of Systems   Constitutional: Positive for fatigue  Negative for chills, diaphoresis and unexpected weight change  HENT: Negative for congestion, ear discharge, ear pain, facial swelling, hearing loss, mouth sores, nosebleeds, postnasal drip, rhinorrhea, sinus pressure, sneezing, sore throat, tinnitus, trouble swallowing and voice change  Eyes: Negative for photophobia, discharge, redness and visual disturbance  Respiratory: Positive for cough and shortness of breath  Negative for chest tightness, wheezing and stridor  Cardiovascular: Positive for chest pain  Negative for palpitations and leg swelling  Gastrointestinal: Positive for diarrhea  Negative for abdominal distention, abdominal pain, anal bleeding, blood in stool, constipation, nausea and vomiting  Dark stool   Endocrine: Negative for polydipsia, polyphagia and polyuria  Genitourinary: Negative for decreased urine volume, difficulty urinating, dysuria, flank pain, frequency, hematuria and urgency  Musculoskeletal: Negative for arthralgias, back pain and neck stiffness  Skin: Negative for pallor and rash  Neurological: Negative for dizziness, seizures, facial asymmetry, speech difficulty, light-headedness, numbness and headaches  Hematological: Negative for adenopathy  Does not bruise/bleed easily  Psychiatric/Behavioral: Negative for agitation and confusion  Past Medical and Surgical History:   Past Medical History:   Diagnosis Date    Anesthesia complication 9598    after colonoscopy , pt was awake but could not control his body and kept falling     Arthritis     Bladder calculi     BPH (benign prostatic hyperplasia)     Diabetes mellitus ([address] )     Hearing disorder of both ears     wears bilateral hearing aids    Hyperlipidemia     controlled and maintained d/t diabetes    Hypertension     Kidney stones     Last Assessed:4/3/2017    Lyme disease     Last Assessed:6/29/2015    Rupture, bladder, spontaneous     Last Assessed:4/3/2017       Past Surgical History:   Procedure Laterality Date    BLADDER REPAIR N/A 12/10/2016    Procedure: REPAIR BLADDER/cysto insertion stent;  Surgeon: Marcie Fan MD;  Location: [address] [place] [address];  Service:     COLONOSCOPY      CYSTOLITHOTOMY      ANESTHESIA   lower abdomen  ;  Last Assessed:4/3/2017    OTHER SURGICAL HISTORY      thermal dilation of the prostate x 2 ( in the office)    TONSILLECTOMY      TRANSURETHRAL RESECTION OF PROSTATE N/A 2016    Procedure:  Cysto, Laser Bladder stone,fulgaration of prostates tissue ;  Surgeon: Denice Lima MD;  Location: [address] [place] [address];  Service:        Meds/Allergies:  Prior to Admission medications    Medication Sig Start Date End Date Taking? Authorizing Provider   enalapril (VASOTEC) 10 mg tablet Take 1 tablet (10 mg total) by mouth daily 21  Yes Mejia Shine MD   HumaLOG Mix 75/25 KwikPen (75-25) 100 units/mL injection pen Inject 45 Units under the skin 2 (two) times a day with meals 9/7/21 3/7/22 Yes Mejia Shine MD   simvastatin (ZOCOR) 40 mg tablet Take 1 tablet (40 mg total) by mouth daily at bedtime 21  Yes Mejia Shine MD   tamsulosin (FLOMAX) 0 4 mg TAKE 1 CAPSULE BY MOUTH TWO TIMES DAILY 19  Yes Hari Garsia MD   glucose blood test strip 1 each by Other route 3 (three) times a day 20   Mejia Shine MD   Insulin Pen Needle (B-D UF III MINI PEN NEEDLES) 31G X 5 MM MISC Use twice daily with insulin pen as directed 21   Mejia Shine MD   latanoprost (XALATAN) 0 005 % ophthalmic solution Administer 1 drop to both eyes daily at bedtime  18   Historical Provider, MD   Vitamin D, Cholecalciferol, 1000 UNITS CAPS Take by mouth 2 times a week    Historical Provider, MD     I have reviewed home medications using recent Epic encounter  Allergies:    Allergies   Allergen Reactions    Amoxicillin Rash       Social History:  Marital Status: /Civil Union     Substance Use History:   Social History     Substance and Sexual Activity   Alcohol Use Yes     Social History     Tobacco Use   Smoking Status Former Smoker    Types: Cigars    Quit date: 24    Years since quittin 2   Smokeless Tobacco Never Used     Social History     Substance and Sexual Activity   Drug Use No       Family History:  Family History   Problem Relation Age of Onset    Cancer Mother         breast  Diabetes Mother     Cancer Father         prostate w/ bone mets    Prostate cancer Father     Alzheimer's disease Sister        Physical Exam:     Vitals:   Blood Pressure: 161/70 (03/07/22 1345)  Pulse: 95 (03/07/22 1358)  Temperature: 98 5 °F (36 9 °C) (03/07/22 1253)  Temp Source: Oral (03/07/22 1253)  Respirations: 22 (03/07/22 1358)  SpO2: 100 % (03/07/22 1358)    Physical Exam  Constitutional:       Appearance: Normal appearance  HENT:      Head: Normocephalic and atraumatic  Eyes:      Extraocular Movements: Extraocular movements intact  Pupils: Pupils are equal, round, and reactive to light  Cardiovascular:      Rate and Rhythm: Normal rate and regular rhythm  Heart sounds: No murmur heard  No gallop  Pulmonary:      Effort: Pulmonary effort is normal       Breath sounds: Normal breath sounds  Abdominal:      General: Bowel sounds are normal       Palpations: Abdomen is soft  Tenderness: There is no abdominal tenderness  Musculoskeletal:         General: No swelling or deformity  Normal range of motion  Cervical back: Normal range of motion and neck supple  Skin:     General: Skin is warm and dry  Neurological:      General: No focal deficit present  Mental Status: He is alert            Additional Data:     Lab Results:  Results from last 7 days   Lab Units 03/07/22  0954   WBC Thousand/uL 15 13*   HEMOGLOBIN g/dL 5 8*   HEMATOCRIT % 19 0*   PLATELETS Thousands/uL 347   NEUTROS PCT % 92*   LYMPHS PCT % 3*   MONOS PCT % 4   EOS PCT % 0     Results from last 7 days   Lab Units 03/07/22  1306 03/07/22  0954 03/07/22  0954   SODIUM mmol/L 133*   < > 132*   POTASSIUM mmol/L 6 1*   < > 6 9*   CHLORIDE mmol/L 99*   < > 96*   CO2 mmol/L 13*   < > 14*   BUN mg/dL 175*   < > 169*   CREATININE mg/dL 14 38*   < > 14 91*   ANION GAP mmol/L 21*   < > 22*   CALCIUM mg/dL 7 9*   < > 8 3   ALBUMIN g/dL  --   --  2 2*   TOTAL BILIRUBIN mg/dL  --   --  0 33   ALK PHOS U/L  -- --  62   ALT U/L  --   --  55   AST U/L  --   --  39   GLUCOSE RANDOM mg/dL 240*   < > 252*    < > = values in this interval not displayed  Results from last 7 days   Lab Units 03/07/22  0954   INR  1 41*             Results from last 7 days   Lab Units 03/07/22  1124   LACTIC ACID mmol/L 0 7       Imaging: Reviewed radiology reports from this admission including: chest xray  XR chest 1 view portable   Final Result by Anna Shelby MD (03/07 1250)      No active pulmonary disease  Workstation performed: [id number]         US kidney and bladder    (Results Pending)       EKG and Other Studies Reviewed on Admission:   · EKG: NSR  HR Eighty-two without any acute ST-T changes  ** Please Note: This note has been constructed using a voice recognition system   **

## 2022-03-07 NOTE — APP STUDENT NOTE
ELEANOR STUDENT  Inpatient Progress Note for TRAINING ONLY  Not Part of Legal Medical Record       H&P Exam - Jasmine Polanco 64 y o  male MRN: [id number]    Unit/Bed#: ED CT1 Encounter: [id number]    Assessment:  1  Hyperkalemia   2  LORRAINE   3  Melena   4  Anemia   5  Anion Gap Metabolic acidosis   6  Type 2 DM with hyperglycemia   7  Chest pain   8  Hyponatremia   9  Leukocytosis   10  UTI  11  HLD  12  HTN  13  BPH    Plan:  1  Patient noted to have K of 6 9  No EKG changes    -Etiologies include LORRAINE and metabolic acidosis   -Patient was given calcium gluconate, albuterol nebulizer, insulin drip with D10, bicarbonate drip, and Kayexalate 30 grams    -Continue insulin drip and bicarbonate drip     -Recheck BMP in 4 hours  2  Creatinine on admission was 14 91  Baseline is 1 04-1  34  Bun: 169  UA showed large blood, glucose, protein, and small leukocytes  No hyaline casts  PVR: 86mL   -Consult nephrology   -Dehydration vs glomerulonephritis    -Order renal US   -Bicarb drip    -Avoid hypotension    -Avoid nephrotoxic drugs   -Hold enalapril    -Repeat BMP in AM  3  Patient notes having 2 episodes of melena yesterday  Also reports having several episodes of diarrhea that are dark  No NSAID use  Hgb of 5 8  Not on any blood thinners    -Consult GI   -Protonix 40 mg BID   -Clear liquid diet for endoscopy    -Hold any DVT prophylaxis-Do SCDs   -Transfuse 2 PRBCs   -Monitor H and H    -Monitor stools   4  Patient noted to have hemoglobin of 5 8 in the ED  Most recent historic hemoglobin was 13 7 from 2019  MCV 87   -Most likely due to acute upper GI blood loss  Other etiologies include mixed deficiency anemia     -Folate, B12, and iron panel ordered    -Transfuse 2 PRBCs   -Monitor H and Hs  5  Patient noted to have partially compensated increased anion gap metabolic acidosis  Lactic acid WNL  Beta-hydroxybutyrate elevated at 1 3   No ketones in urine     -Most likely secondary to uremia    -Bicarbonate drip and lower K levels    -Repeat BMP in 4 hours   6  Patient presented to ED with chest pain since 5 AM this morning  No prior cardiac history  Troponin 0hr: 31  Tropinin 2 hr: 30  NT-proBNP elevated at 15,208  EKG showed no ST segment elevation or depression  CXR showed no cardiopulmonary abnormality     -Likely symptomatic anemia    -Order ECHO   -Monitor I/O  7  Glucose on admission was 252  Most recent HgbA1C was 7 2  Lactic acid WNL  Beta-hydroxybutyrate 1 3    -Possible HHS   -Insulin drip due to increased anion gap metabolic acidosis and hyperkalemia    -Monitor glucose    -Clear liquid diet for EGD  8  Patient has Na of 132  Corrected Na for glucose is 134  -Repeat BMP  9  WBC on admission is 15 13     -Most likely secondary to stress reaction    -UA showed possible UTI, on Ceftriaxone    -Follow-up blood cultures    -CBC in AM  10  UA showed leukocytes, protein, glucose, and hematuria  -Workup being done for possible glomerulonephritis    -Start Ceftriaxone    -CBC in AM  11  Continue simvastatin   12  Hold ACE due to LORRAINE  Monitor vitals   13  Continue Flomax     History of Present Illness   R H  is a 47-year-old male with PMH significant for Type 2 DM, HLD, and HTN presenting to the ED with a chief complaint of chest pain since 5 AM this morning  States he has not been feeling well for the past couple weeks with nasal congestion, sore throat, and post-nasal drip  Also has had a productive cough with yellow sputum  This morning he woke up with 10/10 crushing substernal chest pain that radiated across his chest  Since this morning has also noted GALVEZ and orthopnea  No PND  Did note swelling of b/l LE several days ago that has since resolved  No recent weight gain  No cardiac history  Also noted have 2 episodes of melena yesterday  ROS was significant for lightheadedness this morning, chills, and decreased appetite  Denies any palpitations, hemoptysis, abdominal pain, BRBPR, muscle aches, weakness,    Reports he hasn't been eating a lot  At most is eating 1 5 meals a day  Reports he used to drink 3-16 oz bottles of water a day, but in the past two weeks has only been drinking 16oz daily  Lives at home with his wife  No NSAID use       ROS: History obtained from the patient  General ROS: positive for  - chills and fatigue  negative for - weight gain or weight loss  Psychological ROS: negative for - anxiety or depression  Ophthalmic ROS: negative for - blurry vision or changes in vision   ENT ROS: positive for - nasal congestion and sore throat  negative for - headaches or visual changes  Hematological and Lymphatic ROS: negative for - bleeding problems, blood clots or bruising  Endocrine ROS: negative for - polydipsia/polyuria  Respiratory ROS: positive for - cough, orthopnea and shortness of breath  negative for - hemoptysis  Cardiovascular ROS: positive for - chest pain and dyspnea on exertion  negative for - palpitations or paroxysmal nocturnal dyspnea  Gastrointestinal ROS: positive for - appetite loss, change in bowel habits, change in stools, diarrhea, and melena  negative for - abdominal pain, gas/bloating, hematemesis or nausea/vomiting  Genito-Urinary ROS: positive for - dysuria  negative for - hematuria or urinary frequency/urgency  Musculoskeletal ROS: positive for - joint pain (Left wrist-chronic)  negative for - muscle pain or muscular weakness  Neurological ROS: negative for - numbness/tingling  Dermatological ROS: negative for rash and skin lesion changes    Historical Information   Past Medical History:   Diagnosis Date    Anesthesia complication 2299    after colonoscopy , pt was awake but could not control his body and kept falling     Arthritis     Bladder calculi     BPH (benign prostatic hyperplasia)     Diabetes mellitus ([address] )     Hearing disorder of both ears     wears bilateral hearing aids    Hyperlipidemia     controlled and maintained d/t diabetes    Hypertension     Kidney stones     Last Assessed:4/3/2017    Lyme disease     Last Assessed:2015    Rupture, bladder, spontaneous     Last Assessed:4/3/2017     Past Surgical History:   Procedure Laterality Date    BLADDER REPAIR N/A 12/10/2016    Procedure: REPAIR BLADDER/cysto insertion stent;  Surgeon: Aga Coronel MD;  Location: [address] [place] [address];  Service:     COLONOSCOPY      CYSTOLITHOTOMY      ANESTHESIA   lower abdomen  ; Last Assessed:4/3/2017    OTHER SURGICAL HISTORY      thermal dilation of the prostate x 2 ( in the office)    TONSILLECTOMY      TRANSURETHRAL RESECTION OF PROSTATE N/A 2016    Procedure:  Cysto, Laser Bladder stone,fulgaration of prostates tissue ;  Surgeon: Aga Coronel MD;  Location: [facility];  Service:      Social History   Social History     Substance and Sexual Activity   Alcohol Use Yes     Social History     Substance and Sexual Activity   Drug Use No     Social History     Tobacco Use   Smoking Status Former Smoker    Types: Cigars    Quit date: 26    Years since quittin 2   Smokeless Tobacco Never Used     Family History:   Family History   Problem Relation Age of Onset    Cancer Mother         breast    Diabetes Mother     Cancer Father         prostate w/ bone mets    Prostate cancer Father     Alzheimer's disease Sister        Meds/Allergies   PTA meds:   Prior to Admission Medications   Prescriptions Last Dose Informant Patient Reported? Taking?    HumaLOG Mix 75/25 KwikPen (75-25) 100 units/mL injection pen 3/6/2022 at Unknown time  No Yes   Sig: Inject 45 Units under the skin 2 (two) times a day with meals   Insulin Pen Needle (B-D UF III MINI PEN NEEDLES) 31G X 5 MM MISC   No No   Sig: Use twice daily with insulin pen as directed   Vitamin D, Cholecalciferol, 1000 UNITS CAPS  Self Yes No   Sig: Take by mouth 2 times a week   enalapril (VASOTEC) 10 mg tablet 3/6/2022 at Unknown time  No Yes   Sig: Take 1 tablet (10 mg total) by mouth daily   glucose blood test strip   No No Si each by Other route 3 (three) times a day   latanoprost (XALATAN) 0 005 % ophthalmic solution  Self Yes No   Sig: Administer 1 drop to both eyes daily at bedtime    simvastatin (ZOCOR) 40 mg tablet 3/6/2022 at Unknown time  No Yes   Sig: Take 1 tablet (40 mg total) by mouth daily at bedtime   tamsulosin (FLOMAX) 0 4 mg 3/6/2022 at Unknown time Self No Yes   Sig: TAKE 1 CAPSULE BY MOUTH TWO TIMES DAILY      Facility-Administered Medications: None     Allergies   Allergen Reactions    Amoxicillin Rash       Objective   First Vitals:   Blood Pressure: (!) 184/78 (22 0938)  Pulse: 84 (22 09)  Temperature: (!) 97 4 °F (36 3 °C) (22 09)  Temp Source: Oral (22 0938)  Respirations: 20 (22 09)  SpO2: 94 % (22 09)    Current Vitals:   Blood Pressure: 160/73 (22 1315)  Pulse: 93 (22 1315)  Temperature: 98 5 °F (36 9 °C) (22 1253)  Temp Source: Oral (22 1253)  Respirations: 20 (22 1315)  SpO2: 100 % (22 1315)      Intake/Output Summary (Last 24 hours) at 3/7/2022 1332  Last data filed at 3/7/2022 1229  Gross per 24 hour   Intake 375 ml   Output --   Net 375 ml       Invasive Devices  Report    Peripheral Intravenous Line            Peripheral IV 22 Left Hand <1 day    Peripheral IV 22 Proximal;Right;Ventral (anterior) Forearm <1 day    Peripheral IV 22 Right Antecubital <1 day          Drain            Urethral Catheter Latex; Three way 22 Fr  1912 days                Physical Exam: /73   Pulse 93   Temp 98 5 °F (36 9 °C) (Oral)   Resp 20   SpO2 100%   General appearance: alert and oriented, in no acute distress  Head: Normocephalic, without obvious abnormality, atraumatic  Eyes: No scleral icterus or conjunctival injection   Ears: External ears without abnormality   Nose: no discharge  Throat: Dry mucous membranes   Lungs: clear to auscultation bilaterally  Heart: regular rate and rhythm  Abdomen: soft, non-tender; bowel sounds normal; no masses,  no organomegaly  Extremities: extremities normal, warm and well-perfused; no cyanosis, clubbing, or edema  Pulses: 2+ and symmetric  Skin: No rashes or lesions   Neurologic: Grossly normal     Lab Results:      Lab Results   Component Value Date/Time    HGBA1C 7 2 (H) 12/03/2021 09:04 AM    HGBA1C 7 0 07/21/2017 10:05 AM    HGB 5 8 (LL) 03/07/2022 09:54 AM    HGB 16 2 07/24/2017 09:27 AM    WBC 15 13 (H) 03/07/2022 09:54 AM    WBC 4 9 07/24/2017 09:27 AM     (H) 03/07/2022 09:54 AM    BUN 16 07/24/2017 09:27 AM    CREATININE 14 91 (H) 03/07/2022 09:54 AM    CREATININE 1 02 07/24/2017 09:27 AM    SODIUM 132 (L) 03/07/2022 09:54 AM    INR 1 41 (H) 03/07/2022 09:54 AM    K 6 9 (HH) 03/07/2022 09:54 AM    K 4 2 07/24/2017 09:27 AM    PHVEN 7 274 (L) 03/07/2022 11:24 AM    WKI8ZHP 11 7 (L) 03/07/2022 11:24 AM     Imaging:   XR chest 1 view portable    Result Date: 3/7/2022  Impression No active pulmonary disease  Workstation performed: [id number]      EKG, Pathology, and Other Studies:  EKG: normal sinus rhythm  No ST segment elevation or depression   No peaked T waves or prolonged QT interval

## 2022-03-07 NOTE — H&P (VIEW-ONLY)
Consultation - [facility]   Hay Connors 64 y o  male MRN: [id number]  Unit/Bed#: ED CT1 Encounter: [id number]        Inpatient consult to gastroenterology  Consult performed by: FERNANDO Mcbride  Consult ordered by: Arturo Coombs MD          Reason for Consult / Principal Problem:     Symptomatic anemia/melena      ASSESSMENT AND PLAN:      This is a 29-year-old male with history of BPH s/p TURP (2016), DM, HLD, HTN, and CKD 3 who presented to the hospital due to chest pain and also noted poor appetite for last 2 week  Cardiac troponins negative and EKG showed NSR  He was noted to have acute renal failure on labs with significant normocytic anemia  Originally reported black stool x 2 days to primary team, but now denies history of black or bloody stool  1  Symptomatic anemia  Symptomatic anemia may be multifactorial due to underlying kidney disease, but cannot exclude occult GI bleeding as a factor  Baseline hemoglobin in 2019 was 13-15, now 5 8 on admission with normal MCV  Pt is a poor historian due to hearing loss, he originally reported black stool x 2 days to admitting team but now denies black/bloody stool at home  Denies any abdominal pain, nausea, or vomiting  He does note poor appetite over the last 2 weeks and this could be related to uremia or GI pathology  Fortunately he is hemodynamically stable and has not had any black or bloody stool in the ED per nursing  He is ordered for 2 units of blood to be transfused  -maintain large-bore IV  -monitor H&H  -transfuse blood products as needed  -continue IV Protonix b i d   -okay for clear diet and observation  -Patient will need EGD for further evaluation, will discuss timing with attending  Will make NPO past midnight    -B12, folate, iron panel, Echo pending     Thank you for the consultation    Case will be discussed with Dr Katina Ramirez  ______________________________________________________________________    HPI:  Aditya Welch Darline Cooper is a 78-year-old male with history of BPH s/p TURP, DM, HLD, HTN, and CKD 3 who presented to [employer] [facility] due to chest pain and also notes a poor appetite x2 weeks  He was noted with acute renal failure, significant normocytic anemia w/ Hgb 5 8, and proBNP on labs and nephrology and GI were consulted for further evaluation  Patient is hard of hearing despite hearing aid in place, but denies any black or bloody stool at home or trouble urinating  However, he previously told internal medicine that he had black stool x2 days  He denies history of GI bleeding in the past or needing any blood transfusions  He takes tylenol for aches/pains, denies any NSAID use  Denies any heartburn or change in bowel habit  Reports he has never had an EGD, and last colonoscopy was around 5 years ago  There is no abdominal pain on exam  Nursing is trying to straight cath him for urine sample and he is currently receiving his 1st unit of blood and is ordered to receive 1 additional unit  Vital signs appear stable  Nursing denies seeing any melena or hematochezia since patient presented  REVIEW OF SYSTEMS:    CONSTITUTIONAL: Denies any fever, chills, rigors, and weight loss  HEENT: No earache or tinnitus  Denies hearing loss or visual disturbances  CARDIOVASCULAR: No chest pain or palpitations  RESPIRATORY: Denies any cough, hemoptysis, shortness of breath or dyspnea on exertion  GASTROINTESTINAL: As noted in the History of Present Illness  GENITOURINARY: No problems with urination  Denies any hematuria or dysuria  NEUROLOGIC: No dizziness or vertigo, denies headaches  MUSCULOSKELETAL: Denies any muscle or joint pain  SKIN: Denies skin rashes or itching  ENDOCRINE: Denies excessive thirst  Denies intolerance to heat or cold  PSYCHOSOCIAL: Denies depression or anxiety  Denies any recent memory loss         Historical Information   Past Medical History:   Diagnosis Date    Anesthesia complication     after colonoscopy , pt was awake but could not control his body and kept falling     Arthritis     Bladder calculi     BPH (benign prostatic hyperplasia)     Diabetes mellitus ([address] )     Hearing disorder of both ears     wears bilateral hearing aids    Hyperlipidemia     controlled and maintained d/t diabetes    Hypertension     Kidney stones     Last Assessed:4/3/2017    Lyme disease     Last Assessed:2015    Rupture, bladder, spontaneous     Last Assessed:4/3/2017     Past Surgical History:   Procedure Laterality Date    BLADDER REPAIR N/A 12/10/2016    Procedure: REPAIR BLADDER/cysto insertion stent;  Surgeon: Abisai Winston MD;  Location: [address] [place] [address];  Service:     COLONOSCOPY      CYSTOLITHOTOMY      ANESTHESIA   lower abdomen  ;  Last Assessed:4/3/2017    OTHER SURGICAL HISTORY      thermal dilation of the prostate x 2 ( in the office)   [address] RESECTION OF PROSTATE N/A 2016    Procedure:  Cysto, Laser Bladder stone,fulgaration of prostates tissue ;  Surgeon: Abisai Winston MD;  Location: [facility];  Service:      Social History   Social History     Substance and Sexual Activity   Alcohol Use Yes     Social History     Substance and Sexual Activity   Drug Use No     Social History     Tobacco Use   Smoking Status Former Smoker    Types: Cigars    Quit date: 26    Years since quittin 2   Smokeless Tobacco Never Used     Family History   Problem Relation Age of Onset    Cancer Mother         breast    Diabetes Mother     Cancer Father         prostate w/ bone mets    Prostate cancer Father     Alzheimer's disease Sister        Meds/Allergies     (Not in a hospital admission)    Current Facility-Administered Medications   Medication Dose Route Frequency    acetaminophen (TYLENOL) tablet 650 mg  650 mg Oral Q6H PRN    insulin regular (HumuLIN R,NovoLIN R) 1 Units/mL in sodium chloride 0 9 % 100 mL infusion  0 3-21 Units/hr Intravenous Titrated    latanoprost (XALATAN) 0 005 % ophthalmic solution 1 drop  1 drop Both Eyes HS    ondansetron (ZOFRAN) injection 4 mg  4 mg Intravenous Q6H PRN    pantoprazole (PROTONIX) injection 40 mg  40 mg Intravenous Q12H [address]    pravastatin (PRAVACHOL) tablet 80 mg  80 mg Oral Daily With Dinner    sodium bicarbonate 150 mEq in dextrose 5 % 1,000 mL infusion  150 mL/hr Intravenous Continuous    tamsulosin (FLOMAX) capsule 0 4 mg  0 4 mg Oral Daily With Dinner       Allergies   Allergen Reactions    Amoxicillin Rash           Objective     Blood pressure 161/70, pulse 95, temperature 98 5 °F (36 9 °C), temperature source Oral, resp  rate 22, SpO2 100 %  There is no height or weight on file to calculate BMI  Intake/Output Summary (Last 24 hours) at 3/7/2022 1403  Last data filed at 3/7/2022 1353  Gross per 24 hour   Intake 453 33 ml   Output 150 ml   Net 303 33 ml         PHYSICAL EXAM:      General Appearance:   Alert, cooperative, no distress   HEENT:   Normocephalic, atraumatic, anicteric  Neck:  Supple, symmetrical, trachea midline   Lungs:   Clear to auscultation bilaterally; no rales, rhonchi or wheezing; respirations unlabored    Heart[de-identified]   Regular rate and rhythm; no murmur, rub, or gallop     Abdomen:   Soft, non-tender, non-distended; normal bowel sounds; no masses, no organomegaly    Genitalia:   Deferred    Rectal:   Deferred    Extremities:  No cyanosis, clubbing or edema    Pulses:  2+ and symmetric all extremities    Skin:  No jaundice, rashes, or lesions; pale     Lymph nodes:  No palpable cervical lymphadenopathy        Lab Results:   Admission on 03/07/2022   Component Date Value    WBC 03/07/2022 15 13*    RBC 03/07/2022 2 18*    Hemoglobin 03/07/2022 5 8*    Hematocrit 03/07/2022 19 0*    MCV 03/07/2022 87     MCH 03/07/2022 26 6*    MCHC 03/07/2022 30 5*    RDW 03/07/2022 14 6     MPV 03/07/2022 9 0     Platelets 88/65/6147 347     nRBC 03/07/2022 0     Neutrophils Relative 03/07/2022 92*    Immat GRANS % 03/07/2022 1     Lymphocytes Relative 03/07/2022 3*    Monocytes Relative 03/07/2022 4     Eosinophils Relative 03/07/2022 0     Basophils Relative 03/07/2022 0     Neutrophils Absolute 03/07/2022 13 88*    Immature Grans Absolute 03/07/2022 0 17     Lymphocytes Absolute 03/07/2022 0 40*    Monocytes Absolute 03/07/2022 0 64     Eosinophils Absolute 03/07/2022 0 02     Basophils Absolute 03/07/2022 0 02     Sodium 03/07/2022 132*    Potassium 03/07/2022 6 9*    Chloride 03/07/2022 96*    CO2 03/07/2022 14*    ANION GAP 03/07/2022 22*    BUN 03/07/2022 169*    Creatinine 03/07/2022 14 91*    Glucose 03/07/2022 252*    Calcium 03/07/2022 8 3     Corrected Calcium 03/07/2022 9 7     AST 03/07/2022 39     ALT 03/07/2022 55     Alkaline Phosphatase 03/07/2022 62     Total Protein 03/07/2022 7 3     Albumin 03/07/2022 2 2*    Total Bilirubin 03/07/2022 0 33     eGFR 03/07/2022 2     Protime 03/07/2022 16 9*    INR 03/07/2022 1 41*    PTT 03/07/2022 36     Magnesium 03/07/2022 2 9*    Color, UA 03/07/2022 Light Yellow     Clarity, UA 03/07/2022 Cloudy     Specific Gravity, UA 03/07/2022 1 020     pH, UA 03/07/2022 5 5     Leukocytes, UA 03/07/2022 Small*    Nitrite, UA 03/07/2022 Negative     Protein, UA 03/07/2022 100 (2+)*    Glucose, UA 03/07/2022 100 (1/10%)*    Ketones, UA 03/07/2022 Negative     Urobilinogen, UA 03/07/2022 0 2     Bilirubin, UA 03/07/2022 Negative     Blood, UA 03/07/2022 Large*    NT-proBNP 03/07/2022 15,208*    hs TnI 0hr 03/07/2022 31     SARS-CoV-2 03/07/2022 Negative     INFLUENZA A PCR 03/07/2022 Negative     INFLUENZA B PCR 03/07/2022 Negative     RSV PCR 03/07/2022 Negative     hs TnI 2hr 03/07/2022 30     Delta 2hr hsTnI 03/07/2022 -1     ABO Grouping 03/07/2022 A     Rh Factor 03/07/2022 Positive     Antibody Screen 03/07/2022 Negative     Specimen Expiration Date 03/07/2022 [id number]     ABO Grouping 03/07/2022 A     Rh Factor 03/07/2022 Positive     BETA-HYDROXYBUTYRATE 03/07/2022 1 3*    pH, Jagdish 03/07/2022 7 274*    pCO2, Jagdish 03/07/2022 25 8*    pO2, Jagdish 03/07/2022 37 9     HCO3, Jagdish 03/07/2022 11 7*    Base Excess, Jagdish 03/07/2022 -13 9     O2 Content, Jagdish 03/07/2022 5 0     O2 HGB, VENOUS 03/07/2022 58 9*    LACTIC ACID 03/07/2022 0 7     Unit Product Code 03/07/2022 O5232S19     Unit Number 03/07/2022 [id number]     Unit ABO 03/07/2022 A     Unit RH 03/07/2022 POS     Crossmatch 03/07/2022 Compatible     Unit Dispense Status 03/07/2022 Crossmatched     Unit Product Volume 03/07/2022 350     Unit Product Code 03/07/2022 A6557O09     Unit Number 03/07/2022 [id number]     Unit ABO 03/07/2022 A     Unit [facility] 03/07/2022 POS     Crossmatch 03/07/2022 Compatible     Unit Dispense Status 03/07/2022 Issued     Unit Product Volume 03/07/2022 350     Sodium 03/07/2022 133*    Potassium 03/07/2022 6 1*    Chloride 03/07/2022 99*    CO2 03/07/2022 13*    ANION GAP 03/07/2022 21*    BUN 03/07/2022 175*    Creatinine 03/07/2022 14 38*    Glucose 03/07/2022 240*    Calcium 03/07/2022 7 9*    eGFR 03/07/2022 2     RBC, UA 03/07/2022 Innumerable*    WBC, UA 03/07/2022 20-30*    Epithelial Cells 03/07/2022 Occasional     Bacteria, UA 03/07/2022 Occasional     AMORPH URATES 03/07/2022 Occasional     OTHER OBSERVATIONS 03/07/2022 Renal Tubule Epithelial Cells Present     Ventricular Rate 03/07/2022 83     Atrial Rate 03/07/2022 83     ME Interval 03/07/2022 174     QRSD Interval 03/07/2022 90     QT Interval 03/07/2022 338     QTC Interval 03/07/2022 397     P Axis 03/07/2022 67     QRS Axis 03/07/2022 40     T Wave Axis 03/07/2022 44        Imaging Studies: I have personally reviewed pertinent imaging studies

## 2022-03-07 NOTE — ASSESSMENT & PLAN NOTE
Potassium level upon admission was 6 9  Likely in the setting of acute kidney injury and ACE-inhibitor use  Patient was given insulin with D10, albuterol nebulizer treatment and calcium gluconate in the ED  EKG showed no changes  Patient was also given Kayexalate 30 grams in the ED  Repeat BMP in 4 hours and might need repeat dosing of Kayexalate based on further course

## 2022-03-07 NOTE — ASSESSMENT & PLAN NOTE
Patient presented with left-sided chest pain  Troponins x2 were negative  EKG was unremarkable  ProBNP was elevated at 15,000  Check 2D echo to rule out wall motion abnormalities  Chest pain likely secondary to symptomatic anemia

## 2022-03-07 NOTE — CONSULTS
Consultation - [facility] Gastroenterology   Lary Camp 64 y o  male MRN: [id number]  Unit/Bed#: ED CT1 Encounter: [id number]        Inpatient consult to gastroenterology  Consult performed by: FERNANDO Montague  Consult ordered by: Carolyne Tomas MD          Reason for Consult / Principal Problem:     Symptomatic anemia/melena      ASSESSMENT AND PLAN:      This is a 75-year-old male with history of BPH s/p TURP (2016), DM, HLD, HTN, and CKD 3 who presented to the hospital due to chest pain and also noted poor appetite for last 2 week  Cardiac troponins negative and EKG showed NSR  He was noted to have acute renal failure on labs with significant normocytic anemia  Originally reported black stool x 2 days to primary team, but now denies history of black or bloody stool  1  Symptomatic anemia  Symptomatic anemia may be multifactorial due to underlying kidney disease, but cannot exclude occult GI bleeding as a factor  Baseline hemoglobin in 2019 was 13-15, now 5 8 on admission with normal MCV  Pt is a poor historian due to hearing loss, he originally reported black stool x 2 days to admitting team but now denies black/bloody stool at home  Denies any abdominal pain, nausea, or vomiting  He does note poor appetite over the last 2 weeks and this could be related to uremia or GI pathology  Fortunately he is hemodynamically stable and has not had any black or bloody stool in the ED per nursing  He is ordered for 2 units of blood to be transfused  -maintain large-bore IV  -monitor H&H  -transfuse blood products as needed  -continue IV Protonix b i d   -okay for clear diet and observation  -Patient will need EGD for further evaluation, will discuss timing with attending  Will make NPO past midnight    -B12, folate, iron panel, Echo pending     Thank you for the consultation    Case will be discussed with Dr Montalvo Sessions  ______________________________________________________________________    HPI:  Rowena Adler Jill Horton is a 73-year-old male with history of BPH s/p TURP, DM, HLD, HTN, and CKD 3 who presented to [facility] due to chest pain and also notes a poor appetite x2 weeks  He was noted with acute renal failure, significant normocytic anemia w/ Hgb 5 8, and proBNP on labs and nephrology and GI were consulted for further evaluation  Patient is hard of hearing despite hearing aid in place, but denies any black or bloody stool at home or trouble urinating  However, he previously told internal medicine that he had black stool x2 days  He denies history of GI bleeding in the past or needing any blood transfusions  He takes tylenol for aches/pains, denies any NSAID use  Denies any heartburn or change in bowel habit  Reports he has never had an EGD, and last colonoscopy was around 5 years ago  There is no abdominal pain on exam  Nursing is trying to straight cath him for urine sample and he is currently receiving his 1st unit of blood and is ordered to receive 1 additional unit  Vital signs appear stable  Nursing denies seeing any melena or hematochezia since patient presented  REVIEW OF SYSTEMS:    CONSTITUTIONAL: Denies any fever, chills, rigors, and weight loss  HEENT: No earache or tinnitus  Denies hearing loss or visual disturbances  CARDIOVASCULAR: No chest pain or palpitations  RESPIRATORY: Denies any cough, hemoptysis, shortness of breath or dyspnea on exertion  GASTROINTESTINAL: As noted in the History of Present Illness  GENITOURINARY: No problems with urination  Denies any hematuria or dysuria  NEUROLOGIC: No dizziness or vertigo, denies headaches  MUSCULOSKELETAL: Denies any muscle or joint pain  SKIN: Denies skin rashes or itching  ENDOCRINE: Denies excessive thirst  Denies intolerance to heat or cold  PSYCHOSOCIAL: Denies depression or anxiety  Denies any recent memory loss         Historical Information   Past Medical History:   Diagnosis Date    Anesthesia complication     after colonoscopy , pt was awake but could not control his body and kept falling     Arthritis     Bladder calculi     BPH (benign prostatic hyperplasia)     Diabetes mellitus ([address] )     Hearing disorder of both ears     wears bilateral hearing aids    Hyperlipidemia     controlled and maintained d/t diabetes    Hypertension     Kidney stones     Last Assessed:4/3/2017    Lyme disease     Last Assessed:2015    Rupture, bladder, spontaneous     Last Assessed:4/3/2017     Past Surgical History:   Procedure Laterality Date    BLADDER REPAIR N/A 12/10/2016    Procedure: REPAIR BLADDER/cysto insertion stent;  Surgeon: Marcie Fan MD;  Location: [address] [place] [address];  Service:     COLONOSCOPY      CYSTOLITHOTOMY      ANESTHESIA   lower abdomen  ;  Last Assessed:4/3/2017    OTHER SURGICAL HISTORY      thermal dilation of the prostate x 2 ( in the office)   [address] RESECTION OF PROSTATE N/A 2016    Procedure:  Cysto, Laser Bladder stone,fulgaration of prostates tissue ;  Surgeon: Marcie Fan MD;  Location: [facility];  Service:      Social History   Social History     Substance and Sexual Activity   Alcohol Use Yes     Social History     Substance and Sexual Activity   Drug Use No     Social History     Tobacco Use   Smoking Status Former Smoker    Types: Cigars    Quit date: 24    Years since quittin 2   Smokeless Tobacco Never Used     Family History   Problem Relation Age of Onset    Cancer Mother         breast    Diabetes Mother     Cancer Father         prostate w/ bone mets    Prostate cancer Father     Alzheimer's disease Sister        Meds/Allergies     (Not in a hospital admission)    Current Facility-Administered Medications   Medication Dose Route Frequency    acetaminophen (TYLENOL) tablet 650 mg  650 mg Oral Q6H PRN    insulin regular (HumuLIN R,NovoLIN R) 1 Units/mL in sodium chloride 0 9 % 100 mL infusion  0 3-21 Units/hr Intravenous Titrated    latanoprost (XALATAN) 0 005 % ophthalmic solution 1 drop  1 drop Both Eyes HS    ondansetron (ZOFRAN) injection 4 mg  4 mg Intravenous Q6H PRN    pantoprazole (PROTONIX) injection 40 mg  40 mg Intravenous Q12H [facility]    pravastatin (PRAVACHOL) tablet 80 mg  80 mg Oral Daily With Dinner    sodium bicarbonate 150 mEq in dextrose 5 % 1,000 mL infusion  150 mL/hr Intravenous Continuous    tamsulosin (FLOMAX) capsule 0 4 mg  0 4 mg Oral Daily With Dinner       Allergies   Allergen Reactions    Amoxicillin Rash           Objective     Blood pressure 161/70, pulse 95, temperature 98 5 °F (36 9 °C), temperature source Oral, resp  rate 22, SpO2 100 %  There is no height or weight on file to calculate BMI  Intake/Output Summary (Last 24 hours) at 3/7/2022 1403  Last data filed at 3/7/2022 1353  Gross per 24 hour   Intake 453 33 ml   Output 150 ml   Net 303 33 ml         PHYSICAL EXAM:      General Appearance:   Alert, cooperative, no distress   HEENT:   Normocephalic, atraumatic, anicteric  Neck:  Supple, symmetrical, trachea midline   Lungs:   Clear to auscultation bilaterally; no rales, rhonchi or wheezing; respirations unlabored    Heart[de-identified]   Regular rate and rhythm; no murmur, rub, or gallop     Abdomen:   Soft, non-tender, non-distended; normal bowel sounds; no masses, no organomegaly    Genitalia:   Deferred    Rectal:   Deferred    Extremities:  No cyanosis, clubbing or edema    Pulses:  2+ and symmetric all extremities    Skin:  No jaundice, rashes, or lesions; pale     Lymph nodes:  No palpable cervical lymphadenopathy        Lab Results:   Admission on 03/07/2022   Component Date Value    WBC 03/07/2022 15 13*    RBC 03/07/2022 2 18*    Hemoglobin 03/07/2022 5 8*    Hematocrit 03/07/2022 19 0*    MCV 03/07/2022 87     MCH 03/07/2022 26 6*    MCHC 03/07/2022 30 5*    RDW 03/07/2022 14 6     MPV 03/07/2022 9 0     Platelets 94/27/0288 347     nRBC 03/07/2022 0     Neutrophils Relative 03/07/2022 92*    Immat GRANS % 03/07/2022 1     Lymphocytes Relative 03/07/2022 3*    Monocytes Relative 03/07/2022 4     Eosinophils Relative 03/07/2022 0     Basophils Relative 03/07/2022 0     Neutrophils Absolute 03/07/2022 13 88*    Immature Grans Absolute 03/07/2022 0 17     Lymphocytes Absolute 03/07/2022 0 40*    Monocytes Absolute 03/07/2022 0 64     Eosinophils Absolute 03/07/2022 0 02     Basophils Absolute 03/07/2022 0 02     Sodium 03/07/2022 132*    Potassium 03/07/2022 6 9*    Chloride 03/07/2022 96*    CO2 03/07/2022 14*    ANION GAP 03/07/2022 22*    BUN 03/07/2022 169*    Creatinine 03/07/2022 14 91*    Glucose 03/07/2022 252*    Calcium 03/07/2022 8 3     Corrected Calcium 03/07/2022 9 7     AST 03/07/2022 39     ALT 03/07/2022 55     Alkaline Phosphatase 03/07/2022 62     Total Protein 03/07/2022 7 3     Albumin 03/07/2022 2 2*    Total Bilirubin 03/07/2022 0 33     eGFR 03/07/2022 2     Protime 03/07/2022 16 9*    INR 03/07/2022 1 41*    PTT 03/07/2022 36     Magnesium 03/07/2022 2 9*    Color, UA 03/07/2022 Light Yellow     Clarity, UA 03/07/2022 Cloudy     Specific Gravity, UA 03/07/2022 1 020     pH, UA 03/07/2022 5 5     Leukocytes, UA 03/07/2022 Small*    Nitrite, UA 03/07/2022 Negative     Protein, UA 03/07/2022 100 (2+)*    Glucose, UA 03/07/2022 100 (1/10%)*    Ketones, UA 03/07/2022 Negative     Urobilinogen, UA 03/07/2022 0 2     Bilirubin, UA 03/07/2022 Negative     Blood, UA 03/07/2022 Large*    NT-proBNP 03/07/2022 15,208*    hs TnI 0hr 03/07/2022 31     SARS-CoV-2 03/07/2022 Negative     INFLUENZA A PCR 03/07/2022 Negative     INFLUENZA B PCR 03/07/2022 Negative     RSV PCR 03/07/2022 Negative     hs TnI 2hr 03/07/2022 30     Delta 2hr hsTnI 03/07/2022 -1     ABO Grouping 03/07/2022 A     Rh Factor 03/07/2022 Positive     Antibody Screen 03/07/2022 Negative     Specimen Expiration Date 03/07/2022 [id number]     ABO Grouping 03/07/2022 A     Rh Factor 03/07/2022 Positive     BETA-HYDROXYBUTYRATE 03/07/2022 1 3*    pH, Jagdish 03/07/2022 7 274*    pCO2, Jagdish 03/07/2022 25 8*    pO2, Jagdish 03/07/2022 37 9     HCO3, Jagdish 03/07/2022 11 7*    Base Excess, Jagdish 03/07/2022 -13 9     O2 Content, Jagdish 03/07/2022 5 0     O2 HGB, VENOUS 03/07/2022 58 9*    LACTIC ACID 03/07/2022 0 7     Unit Product Code 03/07/2022 F6102Z53     Unit Number 03/07/2022 [id number]     Unit ABO 03/07/2022 A     Unit RH 03/07/2022 POS     Crossmatch 03/07/2022 Compatible     Unit Dispense Status 03/07/2022 Crossmatched     Unit Product Volume 03/07/2022 350     Unit Product Code 03/07/2022 A4818F75     Unit Number 03/07/2022 [id number]     Unit ABO 03/07/2022 A     Unit [facility] 03/07/2022 POS     Crossmatch 03/07/2022 Compatible     Unit Dispense Status 03/07/2022 Issued     Unit Product Volume 03/07/2022 350     Sodium 03/07/2022 133*    Potassium 03/07/2022 6 1*    Chloride 03/07/2022 99*    CO2 03/07/2022 13*    ANION GAP 03/07/2022 21*    BUN 03/07/2022 175*    Creatinine 03/07/2022 14 38*    Glucose 03/07/2022 240*    Calcium 03/07/2022 7 9*    eGFR 03/07/2022 2     RBC, UA 03/07/2022 Innumerable*    WBC, UA 03/07/2022 20-30*    Epithelial Cells 03/07/2022 Occasional     Bacteria, UA 03/07/2022 Occasional     AMORPH URATES 03/07/2022 Occasional     OTHER OBSERVATIONS 03/07/2022 Renal Tubule Epithelial Cells Present     Ventricular Rate 03/07/2022 83     Atrial Rate 03/07/2022 83     OH Interval 03/07/2022 174     QRSD Interval 03/07/2022 90     QT Interval 03/07/2022 338     QTC Interval 03/07/2022 397     P Axis 03/07/2022 67     QRS Axis 03/07/2022 40     T Wave Axis 03/07/2022 44        Imaging Studies: I have personally reviewed pertinent imaging studies

## 2022-03-08 ENCOUNTER — ANESTHESIA EVENT (INPATIENT)
Dept: PERIOP | Facility: HOSPITAL | Age: 77
DRG: 674 | End: 2022-03-08
Payer: MEDICARE

## 2022-03-08 ENCOUNTER — APPOINTMENT (INPATIENT)
Dept: NON INVASIVE DIAGNOSTICS | Facility: HOSPITAL | Age: 77
DRG: 674 | End: 2022-03-08
Payer: MEDICARE

## 2022-03-08 ENCOUNTER — ANESTHESIA (INPATIENT)
Dept: PERIOP | Facility: HOSPITAL | Age: 77
DRG: 674 | End: 2022-03-08
Payer: MEDICARE

## 2022-03-08 ENCOUNTER — APPOINTMENT (INPATIENT)
Dept: RADIOLOGY | Facility: HOSPITAL | Age: 77
DRG: 674 | End: 2022-03-08
Payer: MEDICARE

## 2022-03-08 ENCOUNTER — APPOINTMENT (INPATIENT)
Dept: NON INVASIVE DIAGNOSTICS | Facility: HOSPITAL | Age: 77
DRG: 674 | End: 2022-03-08
Attending: INTERNAL MEDICINE
Payer: MEDICARE

## 2022-03-08 PROBLEM — R04.2 HEMOPTYSIS: Status: ACTIVE | Noted: 2022-03-08

## 2022-03-08 LAB
ABO GROUP BLD BPU: NORMAL
ALBUMIN SERPL BCP-MCNC: 1.7 G/DL (ref 3.5–5)
ALP SERPL-CCNC: 50 U/L (ref 46–116)
ALT SERPL W P-5'-P-CCNC: 37 U/L (ref 12–78)
ANION GAP SERPL CALCULATED.3IONS-SCNC: 20 MMOL/L (ref 4–13)
AST SERPL W P-5'-P-CCNC: 22 U/L (ref 5–45)
BASOPHILS # BLD AUTO: 0.01 THOUSANDS/ΜL (ref 0–0.1)
BASOPHILS # BLD AUTO: 0.02 THOUSANDS/ΜL (ref 0–0.1)
BASOPHILS NFR BLD AUTO: 0 % (ref 0–1)
BASOPHILS NFR BLD AUTO: 0 % (ref 0–1)
BILIRUB SERPL-MCNC: 0.52 MG/DL (ref 0.2–1)
BPU ID: NORMAL
BUN SERPL-MCNC: 176 MG/DL (ref 5–25)
CALCIUM ALBUM COR SERPL-MCNC: 9.1 MG/DL (ref 8.3–10.1)
CALCIUM SERPL-MCNC: 7.3 MG/DL (ref 8.3–10.1)
CHLORIDE SERPL-SCNC: 95 MMOL/L (ref 100–108)
CO2 SERPL-SCNC: 20 MMOL/L (ref 21–32)
CREAT SERPL-MCNC: 13.26 MG/DL (ref 0.6–1.3)
CROSSMATCH: NORMAL
EOSINOPHIL # BLD AUTO: 0.02 THOUSAND/ΜL (ref 0–0.61)
EOSINOPHIL # BLD AUTO: 0.02 THOUSAND/ΜL (ref 0–0.61)
EOSINOPHIL NFR BLD AUTO: 0 % (ref 0–6)
EOSINOPHIL NFR BLD AUTO: 0 % (ref 0–6)
ERYTHROCYTE [DISTWIDTH] IN BLOOD BY AUTOMATED COUNT: 14.7 % (ref 11.6–15.1)
ERYTHROCYTE [DISTWIDTH] IN BLOOD BY AUTOMATED COUNT: 14.8 % (ref 11.6–15.1)
GFR SERPL CREATININE-BSD FRML MDRD: 3 ML/MIN/1.73SQ M
GLUCOSE SERPL-MCNC: 222 MG/DL (ref 65–140)
GLUCOSE SERPL-MCNC: 230 MG/DL (ref 65–140)
GLUCOSE SERPL-MCNC: 249 MG/DL (ref 65–140)
GLUCOSE SERPL-MCNC: 250 MG/DL (ref 65–140)
GLUCOSE SERPL-MCNC: 92 MG/DL (ref 65–140)
HCT VFR BLD AUTO: 19.8 % (ref 36.5–49.3)
HCT VFR BLD AUTO: 22.9 % (ref 36.5–49.3)
HCT VFR BLD AUTO: 25.1 % (ref 36.5–49.3)
HGB BLD-MCNC: 6.4 G/DL (ref 12–17)
HGB BLD-MCNC: 7.7 G/DL (ref 12–17)
HGB BLD-MCNC: 8.2 G/DL (ref 12–17)
IMM GRANULOCYTES # BLD AUTO: 0.07 THOUSAND/UL (ref 0–0.2)
IMM GRANULOCYTES # BLD AUTO: 0.08 THOUSAND/UL (ref 0–0.2)
IMM GRANULOCYTES NFR BLD AUTO: 1 % (ref 0–2)
IMM GRANULOCYTES NFR BLD AUTO: 1 % (ref 0–2)
LYMPHOCYTES # BLD AUTO: 0.49 THOUSANDS/ΜL (ref 0.6–4.47)
LYMPHOCYTES # BLD AUTO: 0.64 THOUSANDS/ΜL (ref 0.6–4.47)
LYMPHOCYTES NFR BLD AUTO: 4 % (ref 14–44)
LYMPHOCYTES NFR BLD AUTO: 6 % (ref 14–44)
MCH RBC QN AUTO: 27.7 PG (ref 26.8–34.3)
MCH RBC QN AUTO: 28.4 PG (ref 26.8–34.3)
MCHC RBC AUTO-ENTMCNC: 32.8 G/DL (ref 31.4–37.4)
MCHC RBC AUTO-ENTMCNC: 33.6 G/DL (ref 31.4–37.4)
MCV RBC AUTO: 84 FL (ref 82–98)
MCV RBC AUTO: 85 FL (ref 82–98)
MONOCYTES # BLD AUTO: 0.54 THOUSAND/ΜL (ref 0.17–1.22)
MONOCYTES # BLD AUTO: 0.64 THOUSAND/ΜL (ref 0.17–1.22)
MONOCYTES NFR BLD AUTO: 5 % (ref 4–12)
MONOCYTES NFR BLD AUTO: 6 % (ref 4–12)
NEUTROPHILS # BLD AUTO: 10.07 THOUSANDS/ΜL (ref 1.85–7.62)
NEUTROPHILS # BLD AUTO: 9.72 THOUSANDS/ΜL (ref 1.85–7.62)
NEUTS SEG NFR BLD AUTO: 88 % (ref 43–75)
NEUTS SEG NFR BLD AUTO: 89 % (ref 43–75)
NRBC BLD AUTO-RTO: 0 /100 WBCS
NRBC BLD AUTO-RTO: 0 /100 WBCS
PHOSPHATE SERPL-MCNC: 7.9 MG/DL (ref 2.3–4.1)
PLATELET # BLD AUTO: 244 THOUSANDS/UL (ref 149–390)
PLATELET # BLD AUTO: 257 THOUSANDS/UL (ref 149–390)
PMV BLD AUTO: 9 FL (ref 8.9–12.7)
PMV BLD AUTO: 9.1 FL (ref 8.9–12.7)
POTASSIUM SERPL-SCNC: 4.7 MMOL/L (ref 3.5–5.3)
PROCALCITONIN SERPL-MCNC: 6.73 NG/ML
PROT SERPL-MCNC: 5.9 G/DL (ref 6.4–8.2)
RBC # BLD AUTO: 2.35 MILLION/UL (ref 3.88–5.62)
RBC # BLD AUTO: 2.71 MILLION/UL (ref 3.88–5.62)
SODIUM SERPL-SCNC: 135 MMOL/L (ref 136–145)
UNIT DISPENSE STATUS: NORMAL
UNIT PRODUCT CODE: NORMAL
UNIT PRODUCT VOLUME: 350 ML
UNIT RH: NORMAL
WBC # BLD AUTO: 11.02 THOUSAND/UL (ref 4.31–10.16)
WBC # BLD AUTO: 11.3 THOUSAND/UL (ref 4.31–10.16)

## 2022-03-08 PROCEDURE — 77001 FLUOROGUIDE FOR VEIN DEVICE: CPT | Performed by: RADIOLOGY

## 2022-03-08 PROCEDURE — 36558 INSERT TUNNELED CV CATH: CPT

## 2022-03-08 PROCEDURE — 86038 ANTINUCLEAR ANTIBODIES: CPT | Performed by: INTERNAL MEDICINE

## 2022-03-08 PROCEDURE — 84145 PROCALCITONIN (PCT): CPT | Performed by: FAMILY MEDICINE

## 2022-03-08 PROCEDURE — 30233N1 TRANSFUSION OF NONAUTOLOGOUS RED BLOOD CELLS INTO PERIPHERAL VEIN, PERCUTANEOUS APPROACH: ICD-10-PCS | Performed by: INTERNAL MEDICINE

## 2022-03-08 PROCEDURE — 82948 REAGENT STRIP/BLOOD GLUCOSE: CPT

## 2022-03-08 PROCEDURE — 83520 IMMUNOASSAY QUANT NOS NONAB: CPT | Performed by: INTERNAL MEDICINE

## 2022-03-08 PROCEDURE — 76937 US GUIDE VASCULAR ACCESS: CPT | Performed by: RADIOLOGY

## 2022-03-08 PROCEDURE — 02HV33Z INSERTION OF INFUSION DEVICE INTO SUPERIOR VENA CAVA, PERCUTANEOUS APPROACH: ICD-10-PCS | Performed by: RADIOLOGY

## 2022-03-08 PROCEDURE — 99152 MOD SED SAME PHYS/QHP 5/>YRS: CPT | Performed by: RADIOLOGY

## 2022-03-08 PROCEDURE — 84100 ASSAY OF PHOSPHORUS: CPT | Performed by: INTERNAL MEDICINE

## 2022-03-08 PROCEDURE — 74176 CT ABD & PELVIS W/O CONTRAST: CPT

## 2022-03-08 PROCEDURE — 93306 TTE W/DOPPLER COMPLETE: CPT | Performed by: INTERNAL MEDICINE

## 2022-03-08 PROCEDURE — 77001 FLUOROGUIDE FOR VEIN DEVICE: CPT

## 2022-03-08 PROCEDURE — NC001 PR NO CHARGE: Performed by: RADIOLOGY

## 2022-03-08 PROCEDURE — G1004 CDSM NDSC: HCPCS

## 2022-03-08 PROCEDURE — 82570 ASSAY OF URINE CREATININE: CPT | Performed by: INTERNAL MEDICINE

## 2022-03-08 PROCEDURE — 99232 SBSQ HOSP IP/OBS MODERATE 35: CPT | Performed by: FAMILY MEDICINE

## 2022-03-08 PROCEDURE — 99152 MOD SED SAME PHYS/QHP 5/>YRS: CPT

## 2022-03-08 PROCEDURE — C9113 INJ PANTOPRAZOLE SODIUM, VIA: HCPCS | Performed by: INTERNAL MEDICINE

## 2022-03-08 PROCEDURE — 76937 US GUIDE VASCULAR ACCESS: CPT

## 2022-03-08 PROCEDURE — 85018 HEMOGLOBIN: CPT | Performed by: FAMILY MEDICINE

## 2022-03-08 PROCEDURE — 80053 COMPREHEN METABOLIC PANEL: CPT | Performed by: INTERNAL MEDICINE

## 2022-03-08 PROCEDURE — 86704 HEP B CORE ANTIBODY TOTAL: CPT | Performed by: INTERNAL MEDICINE

## 2022-03-08 PROCEDURE — 93306 TTE W/DOPPLER COMPLETE: CPT

## 2022-03-08 PROCEDURE — 86037 ANCA TITER EACH ANTIBODY: CPT | Performed by: INTERNAL MEDICINE

## 2022-03-08 PROCEDURE — C1894 INTRO/SHEATH, NON-LASER: HCPCS

## 2022-03-08 PROCEDURE — 99232 SBSQ HOSP IP/OBS MODERATE 35: CPT | Performed by: INTERNAL MEDICINE

## 2022-03-08 PROCEDURE — 86706 HEP B SURFACE ANTIBODY: CPT | Performed by: INTERNAL MEDICINE

## 2022-03-08 PROCEDURE — P9016 RBC LEUKOCYTES REDUCED: HCPCS

## 2022-03-08 PROCEDURE — 85014 HEMATOCRIT: CPT | Performed by: FAMILY MEDICINE

## 2022-03-08 PROCEDURE — 36558 INSERT TUNNELED CV CATH: CPT | Performed by: RADIOLOGY

## 2022-03-08 PROCEDURE — 86803 HEPATITIS C AB TEST: CPT | Performed by: INTERNAL MEDICINE

## 2022-03-08 PROCEDURE — 0JH63XZ INSERTION OF TUNNELED VASCULAR ACCESS DEVICE INTO CHEST SUBCUTANEOUS TISSUE AND FASCIA, PERCUTANEOUS APPROACH: ICD-10-PCS | Performed by: RADIOLOGY

## 2022-03-08 PROCEDURE — 86705 HEP B CORE ANTIBODY IGM: CPT | Performed by: INTERNAL MEDICINE

## 2022-03-08 PROCEDURE — C1750 CATH, HEMODIALYSIS,LONG-TERM: HCPCS

## 2022-03-08 PROCEDURE — 87340 HEPATITIS B SURFACE AG IA: CPT | Performed by: INTERNAL MEDICINE

## 2022-03-08 PROCEDURE — 85025 COMPLETE CBC W/AUTO DIFF WBC: CPT | Performed by: INTERNAL MEDICINE

## 2022-03-08 PROCEDURE — 84156 ASSAY OF PROTEIN URINE: CPT | Performed by: INTERNAL MEDICINE

## 2022-03-08 RX ORDER — FENTANYL CITRATE 50 UG/ML
INJECTION, SOLUTION INTRAMUSCULAR; INTRAVENOUS CODE/TRAUMA/SEDATION MEDICATION
Status: COMPLETED | OUTPATIENT
Start: 2022-03-08 | End: 2022-03-08

## 2022-03-08 RX ORDER — FUROSEMIDE 10 MG/ML
40 INJECTION INTRAMUSCULAR; INTRAVENOUS ONCE
Status: COMPLETED | OUTPATIENT
Start: 2022-03-08 | End: 2022-03-08

## 2022-03-08 RX ORDER — MIDAZOLAM HYDROCHLORIDE 2 MG/2ML
INJECTION, SOLUTION INTRAMUSCULAR; INTRAVENOUS CODE/TRAUMA/SEDATION MEDICATION
Status: COMPLETED | OUTPATIENT
Start: 2022-03-08 | End: 2022-03-08

## 2022-03-08 RX ADMIN — FUROSEMIDE 40 MG: 10 INJECTION, SOLUTION INTRAMUSCULAR; INTRAVENOUS at 01:48

## 2022-03-08 RX ADMIN — SODIUM BICARBONATE 70 ML/HR: 84 INJECTION, SOLUTION INTRAVENOUS at 22:39

## 2022-03-08 RX ADMIN — MIDAZOLAM 0.5 MG: 1 INJECTION INTRAMUSCULAR; INTRAVENOUS at 12:00

## 2022-03-08 RX ADMIN — PRAVASTATIN SODIUM 80 MG: 80 TABLET ORAL at 16:56

## 2022-03-08 RX ADMIN — INSULIN LISPRO 3 UNITS: 100 INJECTION, SOLUTION INTRAVENOUS; SUBCUTANEOUS at 18:25

## 2022-03-08 RX ADMIN — FENTANYL CITRATE 25 MCG: 50 INJECTION, SOLUTION INTRAMUSCULAR; INTRAVENOUS at 11:47

## 2022-03-08 RX ADMIN — CEFTRIAXONE 1000 MG: 1 INJECTION, SOLUTION INTRAVENOUS at 14:31

## 2022-03-08 RX ADMIN — PANTOPRAZOLE SODIUM 40 MG: 40 INJECTION, POWDER, FOR SOLUTION INTRAVENOUS at 21:10

## 2022-03-08 RX ADMIN — Medication 20 ML: at 11:56

## 2022-03-08 RX ADMIN — PANTOPRAZOLE SODIUM 40 MG: 40 INJECTION, POWDER, FOR SOLUTION INTRAVENOUS at 09:12

## 2022-03-08 RX ADMIN — SODIUM BICARBONATE 150 ML/HR: 84 INJECTION, SOLUTION INTRAVENOUS at 05:13

## 2022-03-08 RX ADMIN — INSULIN LISPRO 2 UNITS: 100 INJECTION, SOLUTION INTRAVENOUS; SUBCUTANEOUS at 14:32

## 2022-03-08 RX ADMIN — LATANOPROST 1 DROP: 50 SOLUTION OPHTHALMIC at 22:41

## 2022-03-08 RX ADMIN — MIDAZOLAM 0.5 MG: 1 INJECTION INTRAMUSCULAR; INTRAVENOUS at 11:46

## 2022-03-08 RX ADMIN — ONDANSETRON 4 MG: 2 INJECTION INTRAMUSCULAR; INTRAVENOUS at 03:11

## 2022-03-08 RX ADMIN — TAMSULOSIN HYDROCHLORIDE 0.4 MG: 0.4 CAPSULE ORAL at 16:56

## 2022-03-08 RX ADMIN — FENTANYL CITRATE 25 MCG: 50 INJECTION, SOLUTION INTRAMUSCULAR; INTRAVENOUS at 12:00

## 2022-03-08 RX ADMIN — INSULIN LISPRO 3 UNITS: 100 INJECTION, SOLUTION INTRAVENOUS; SUBCUTANEOUS at 07:46

## 2022-03-08 RX ADMIN — ACETAMINOPHEN 650 MG: 325 TABLET, FILM COATED ORAL at 03:10

## 2022-03-08 NOTE — ASSESSMENT & PLAN NOTE
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  Colonoscopy showed polyp in the descending colon which was biopsied, 1 polyp 5-9 millimeter in the sigmoid colon which was removed EN bloc with cold snare and few scattered diverticula with protruding internal hemorrhoids  Follow-up pathology results  Might need capsule endoscopy based on further hemoglobin levels    Results from last 7 days   Lab Units 03/14/22  0512 03/13/22  0514 03/12/22  1220 03/11/22  1601 03/11/22  0451 03/10/22  0506 03/09/22  0519 03/08/22  1723 03/08/22  0743 03/08/22  0001 03/07/22  2209 03/07/22  2209   HEMOGLOBIN g/dL 7 5* 7 6* 6 7* 7 7* 6 1* 7 1* 7 4* 8 2* 7 7* 6 4*   < > 6 7*   HEMATOCRIT % 23 1* 22 6* 21 0* 23 9* 19 3* 22 5* 22 9* 25 1* 22 9* 19 8*   < > 20 7*   MCV fL 89  --  89  --  90 89 86  --  85 84  --  85    < > = values in this interval not displayed

## 2022-03-08 NOTE — PLAN OF CARE
Problem: MOBILITY - ADULT  Goal: Maintain or return to baseline ADL function  Description: INTERVENTIONS:  -  Assess patient's ability to carry out ADLs; assess patient's baseline for ADL function and identify physical deficits which impact ability to perform ADLs (bathing, care of mouth/teeth, toileting, grooming, dressing, etc )  - Assess/evaluate cause of self-care deficits   - Assess range of motion  - Assess patient's mobility; develop plan if impaired  - Assess patient's need for assistive devices and provide as appropriate  - Encourage maximum independence but intervene and supervise when necessary  - Involve family in performance of ADLs  - Assess for home care needs following discharge   - Consider OT consult to assist with ADL evaluation and planning for discharge  - Provide patient education as appropriate  Outcome: Progressing  Goal: Maintains/Returns to pre admission functional level  Description: INTERVENTIONS:  - Perform BMAT or MOVE assessment daily    - Set and communicate daily mobility goal to care team and patient/family/caregiver     - Collaborate with rehabilitation services on mobility goals if consulted  - Out of bed for toileting  - Record patient progress and toleration of activity level   Outcome: Progressing     Problem: HEMATOLOGIC - ADULT  Goal: Maintains hematologic stability  Description: INTERVENTIONS  - Assess for signs and symptoms of bleeding or hemorrhage  - Monitor labs  - Administer supportive blood products/factors as ordered and appropriate  Outcome: Progressing     Problem: Prexisting or High Potential for Compromised Skin Integrity  Goal: Skin integrity is maintained or improved  Description: INTERVENTIONS:  - Identify patients at risk for skin breakdown  - Assess and monitor skin integrity  - Assess and monitor nutrition and hydration status  - Monitor labs   - Assess for incontinence   - Turn and reposition patient  - Assist with mobility/ambulation  - Relieve pressure over bony prominences  - Avoid friction and shearing  - Provide appropriate hygiene as needed including keeping skin clean and dry  - Evaluate need for skin moisturizer/barrier cream  - Collaborate with interdisciplinary team   - Patient/family teaching  - Consider wound care consult   Outcome: Progressing     Problem: Potential for Falls  Goal: Patient will remain free of falls  Description: INTERVENTIONS:  - Educate patient/family on patient safety including physical limitations  - Instruct patient to call for assistance with activity   - Consult OT/PT to assist with strengthening/mobility   - Keep Call bell within reach  - Keep bed low and locked with side rails adjusted as appropriate  - Keep care items and personal belongings within reach  - Initiate and maintain comfort rounds  - Make Fall Risk Sign visible to staff  - Offer Toileting every 2 Hours, in advance of need  - Apply yellow socks and bracelet for high fall risk patients  - Consider moving patient to room near nurses station  Outcome: Progressing

## 2022-03-08 NOTE — PROGRESS NOTES
After 2 units hgb remains below 7     Repeat CBC to confirm   If less than 7 then will transfuse     Clinically no obvious blood loss   D/w RN

## 2022-03-08 NOTE — PROGRESS NOTES
[facility] Progress Note  Patient: Ravi Kumar 64 y o  male   MRN: [id number]  PCP: Juno Hoover MD  Unit/Bed#: [address] [place] [address] Encounter: [id number]  Date Of Visit: 03/08/22    Problem List:    Principal Problem:    Symptomatic anemia  Active Problems:    Acute kidney injury ([address] )    Type 2 diabetes mellitus with microalbuminuria, with long-term current use of insulin (HCC)    High anion gap metabolic acidosis    Hyperkalemia    Melena    UTI (urinary tract infection)    Hemoptysis    Benign essential hypertension    Benign prostatic hyperplasia with lower urinary tract symptoms    Hyperlipidemia    Chest pain      Assessment & Plan:    * Symptomatic anemia  Assessment & Plan  Patient's baseline hemoglobin about 3 years ago was in the range of 13-50  Hemoglobin  upon admission was 5 8  Patient recieved 3 units of PRBC  Hemoglobin in AM was 7 7  MCV level was normal  Vitamin A47 and folic acid level WNL  Iron panel revealed elevated ferritin, normal iron saturation, low TIBC, and low iron  Anemia likely multifactorial in the setting of possible GI bleeding and also CKD  CT abd/pelvis found no evidence of GI bleed  Scheduled for EGD tomorrow     Acute kidney injury [facility])  Assessment & Plan  Baseline creatinine 1-1 3  Creatinine upon admission was 14 38  PVR was 66 milliliters  Etiology likely secondary to severe dehydration, ACE-inhibitor use in the setting of possible GI bleeding  Since patient had metabolic acidosis patient was placed on bicarbonate drip at 150 mL/hour  Renal US showed no evidence of hydronephrosis  8mm calculus was found in bladder  Possible cause of hematuria if not glomerulonephritis   Continue to hold Vasotec  Patient is recieving further workup for glomerulonephritis  C3 and C4 WNL  Other tests pending     Nephrology was consulted in the ED  UA showed innumerable red blood cells 10-20 white cells with glucose  CT of abdomen and pelvis showed 8 mm stone at UVJ with hydroureter but no hydronephrosis and not significant changed since 2016  No peritoneal or retroperitoneal hematoma or evidence of GI bleeding  Mild pulmonary edema and small b/l pleural effusions were noted  CXR ordered   Plan is for dialysis tomorrow  Tunneled dialysis catheter placed today  Reduce bicarb drip to 70cc/hour   Results from last 7 days   Lab Units 03/08/22  0744 03/07/22  2209 03/07/22  1306 03/07/22  0954   BUN mg/dL 176* 162* 175* 169*   CREATININE mg/dL 13 26* 13 64* 14 38* 14 91*       High anion gap metabolic acidosis  Assessment & Plan  Likely secondary to renal failure and also hyperglycemic state  Patient was placed on bicarbonate drip at 150 mL/hour  Patient also started on low-dose insulin drip  Insulin drip was d/c  Continue with SSI until EGD  Continue bicarb drip at 70ml/hour   Repeat BMP tomorrow   Dialysis tomorrow  Type 2 diabetes mellitus with microalbuminuria, with long-term current use of insulin [facility])  Assessment & Plan  Lab Results   Component Value Date    HGBA1C 7 2 (H) 12/03/2021       Recent Labs     03/07/22  2243 03/08/22  0055 03/08/22  0527 03/08/22  0724   POCGLU 188* 92 230* 250*     patient is on Humalog mix 75/25 45 units b i d  Hold Humalog mix  Patient was started on insulin drip as 1-beta hydroxybutyrate was elevated at 1 6  Insulin drip was stopped  Sugars are in 200s  Currently on diabetic clear liquid diet in anticipation for EGD tomorrow  Continue with SSI   Restart home Humalog mix after EGD if regular diet  Monitor blood sugars    Melena  Assessment & Plan  Patient reported black stool for couple of days  Check stool occult blood  Patient started on Protonix 40 milligram IV q 12 hours  No history of NSAID use  GI was consulted  Clear liquid diet for EGD tomorrow     Hyperkalemia  Assessment & Plan  Potassium level upon admission was 6 9  Likely in the setting of acute kidney injury and ACE-inhibitor use  Patient was given insulin with D10, albuterol nebulizer treatment and calcium gluconate in the ED  EKG showed no changes  Patient was also given Kayexalate 30 grams in the ED  K level has normalized at 4 7  Continue to monitor BMP  Plan is for dialysis tomorrow     UTI (urinary tract infection)  Assessment & Plan  Patient had leukocytosis  UA showed trace leukocyte esterase with 10-20 white cells and innumerable bacteria  Continue empiric IV Rocephin  Leukocytosis trended up from 10 9 on admission to 11 30  Follow-up urine cultures   No clinical signs of sepsis    Hemoptysis  Assessment & Plan  Patient states he has had a cough for a couple weeks  Typically yellow in color, but this morning had hemoptysis  CXR ordered  Pneumonia vs bronchitis vs pulmonary edema vs Goodpastures   Obtain sputum culture  Procalcitonin ordered  Consider steroids for possible Goodpastures pending glomerulonephritis workup    Chest pain  Assessment & Plan  Patient presented with left-sided chest pain  Troponins x2 were negative  EKG was unremarkable  ProBNP was elevated at 15,000   2D echo was completed to rule out wall motion abnormalities  Results pending   Chest pain likely secondary to symptomatic anemia  Currently asymptomatic    Hyperlipidemia  Assessment & Plan  Zocor substituted with Pravachol    Benign prostatic hyperplasia with lower urinary tract symptoms  Assessment & Plan  Monitor postvoid residuals  Continue Flomax    Benign essential hypertension  Assessment & Plan  Patient is on Vasotec at home which is being held in the setting of acute kidney injury  Avoid hypotension  Will add Norvasc if blood pressure continues to be high          VTE Pharmacologic Prophylaxis:   VTE contraindicated due to melena and hemoptysis      Patient Centered Rounds: I performed bedside rounds with nursing staff today  Discussions with Specialists or Other Care Team Provider: yes - GI, renal    Education and Discussions with Family / Patient: yes    Time Spent for Care: 40 min   More than 50% of total time spent on counseling and coordination of care as described above  Current Length of Stay: 1 day(s)  Current Patient Status: Inpatient   Certification Statement: The patient will continue to require additional inpatient hospital stay due to Acute kidney injury, symptomatic anemia, melena requiring dialysis an EGD  Discharge Plan: Anticipate discharge in >72 hrs to discharge location to be determined pending rehab evaluations  Code Status: Level 1 - Full Code    Subjective:   History was unable to be accurately assessed due to patient being hard of hearing  Patient denied any overnight events  Reports having a persistent cough and had an episode of hemoptysis this morning, which was new  Unable to accurately assess ROS  Objective:     Vitals:   Temp (24hrs), Av 5 °F (36 9 °C), Min:98 °F (36 7 °C), Max:98 8 °F (37 1 °C)    Temp:  [98 °F (36 7 °C)-98 8 °F (37 1 °C)] 98 4 °F (36 9 °C)  HR:  [76-92] 85  Resp:  [16-22] 22  BP: (124-171)/() 151/70  SpO2:  [91 %-99 %] 97 %  Body mass index is 29 65 kg/m²  Input and Output Summary (last 24 hours): Intake/Output Summary (Last 24 hours) at 3/8/2022 1557  Last data filed at 3/8/2022 1207  Gross per 24 hour   Intake 898 5 ml   Output 850 ml   Net 48 5 ml       Physical Exam:   Physical Exam  Constitutional:       Appearance: Normal appearance  HENT:      Head: Normocephalic and atraumatic  Ears:      Comments: Hard of hearing     Nose: No rhinorrhea  Eyes:      General: No scleral icterus  Comments: Pale conjunctiva    Cardiovascular:      Rate and Rhythm: Normal rate and regular rhythm  Pulmonary:      Effort: Pulmonary effort is normal       Breath sounds: Normal breath sounds  Abdominal:      General: Abdomen is flat  Bowel sounds are normal  There is no distension  Palpations: Abdomen is soft  Tenderness: There is no abdominal tenderness  Neurological:      Mental Status: He is alert  Comments: poor historian         Additional Data:     Labs:  Results from last 7 days   Lab Units 03/08/22  0743 03/08/22  0001 03/07/22  2209   WBC Thousand/uL 11 02*   < > 10 90*   HEMOGLOBIN g/dL 7 7*   < > 6 7*   HEMATOCRIT % 22 9*   < > 20 7*   PLATELETS Thousands/uL 244   < > 264   BANDS PCT %  --   --  2   NEUTROS PCT % 88*   < >  --    LYMPHS PCT % 6*   < >  --    LYMPHO PCT %  --   --  7*   MONOS PCT % 5   < >  --    MONO PCT %  --   --  4   EOS PCT % 0   < > 0    < > = values in this interval not displayed  Results from last 7 days   Lab Units 03/08/22  0744   SODIUM mmol/L 135*   POTASSIUM mmol/L 4 7   CHLORIDE mmol/L 95*   CO2 mmol/L 20*   BUN mg/dL 176*   CREATININE mg/dL 13 26*   ANION GAP mmol/L 20*   CALCIUM mg/dL 7 3*   ALBUMIN g/dL 1 7*   TOTAL BILIRUBIN mg/dL 0 52   ALK PHOS U/L 50   ALT U/L 37   AST U/L 22   GLUCOSE RANDOM mg/dL 222*     Results from last 7 days   Lab Units 03/07/22  0954   INR  1 41*     Results from last 7 days   Lab Units 03/08/22  0724 03/08/22  0527 03/08/22  0055 03/07/22  2243 03/07/22  2045 03/07/22  1825 03/07/22  1532   POC GLUCOSE mg/dl 250* 230* 92 188* 330* 346* 299*         Results from last 7 days   Lab Units 03/08/22  0744 03/07/22  1124   LACTIC ACID mmol/L  --  0 7   PROCALCITONIN ng/ml 6 73*  --        Lines/Drains:  Invasive Devices  Report    Peripheral Intravenous Line            Peripheral IV 03/07/22 Left Hand 1 day    Peripheral IV 03/07/22 Proximal;Right;Ventral (anterior) Forearm 1 day    Peripheral IV 03/07/22 Right Antecubital 1 day          Hemodialysis Catheter            HD Permanent Double Catheter <1 day                      Imaging: Reviewed radiology reports from this admission including: abdominal/pelvic CT and ECHO    Recent Cultures (last 7 days):   Results from last 7 days   Lab Units 03/07/22  1124 03/07/22  1055   BLOOD CULTURE  No Growth at 24 hrs  No Growth at 24 hrs         Last 24 Hours Medication List:   Current Facility-Administered Medications   Medication Dose Route Frequency Provider Last Rate    acetaminophen  650 mg Oral Q6H PRN Daren Lamas MD      cefTRIAXone  1,000 mg Intravenous Q24H Daisy Keating MD 1,000 mg (03/08/22 1431)    insulin lispro  1-6 Units Subcutaneous Q6H Ephrilul Ross MD      latanoprost  1 drop Both Eyes HS Daren Lamas MD      ondansetron  4 mg Intravenous Q6H PRN Daren Lamas MD      pantoprazole  40 mg Intravenous Q12H [address] Daren Lamas MD      pravastatin  80 mg Oral Daily With Denisse Delgado MD      sodium bicarbonate infusion  70 mL/hr Intravenous Continuous Madison Godoy MD 70 mL/hr (03/08/22 0911)    tamsulosin  0 4 mg Oral Daily With Denisse Delgado MD          Today, Patient Was Seen By: Levi Ling DO    ** Please Note: "This note has been constructed using a voice recognition system  Therefore there may be syntax, spelling, and/or grammatical errors   Please call if you have any questions  "**

## 2022-03-08 NOTE — ASSESSMENT & PLAN NOTE
UA showed trace leukocyte esterase with 10-20 white cells and innumerable bacteria  Urine culture grew small colony of E  Coli    Completed 7 days of IV Ceftriaxone   No clinical signs of sepsis

## 2022-03-08 NOTE — ASSESSMENT & PLAN NOTE
Likely secondary to renal failure and also hyperglycemic state  Patient was placed on bicarbonate drip at 150 mL/hour, Bicarb drip was d/c on 3/9

## 2022-03-08 NOTE — ASSESSMENT & PLAN NOTE
Patient is on Vasotec at home which is being held in the setting of acute kidney injury  Avoid hypotension    Blood pressure has been stable

## 2022-03-08 NOTE — PROGRESS NOTES
hgb 6 4 on repeat     1 unit prbc ordered    no lasix given for prior transfusions so will hold off since creatinine elevated  CXR ordered for am  Clinically no sob and saturating well

## 2022-03-08 NOTE — ASSESSMENT & PLAN NOTE
Lab Results   Component Value Date    HGBA1C 7 2 (H) 12/03/2021       Recent Labs     03/13/22  1550 03/13/22 2027 03/14/22  0727 03/14/22  1136   POCGLU 213* 319* 170* 167*     Patient is on Humalog mix 75/25 45 units b i d  Hold Humalog mix  Patient was initially on insulin drip as 1-beta hydroxybutyrate was elevated at 1 6  Continue 70/ 30  20 units b i d

## 2022-03-08 NOTE — PROGRESS NOTES
Progress Note- Alex Nunes 64 y o  male MRN: [id number]    Unit/Bed#: [address] Encounter: [id number]      Assessment and Plan: This is a 64-year-old male with history of BPH s/p TURP (2016), DM, HLD, HTN, and CKD 3 who presented to the hospital due to chest pain and also noted poor appetite for last 2 week  Cardiac troponins negative and EKG showed NSR  He was noted to have acute renal failure on labs with significant normocytic anemia       1  Symptomatic anemia  Symptomatic anemia may be multifactorial due to underlying kidney disease, but cannot exclude occult GI bleeding as a factor  Baseline hemoglobin in 2019 was 13-15, now 5 8 on admission with normal MCV  Pt is a poor historian due to hearing loss, he originally reported black stool x 2 days to admitting team but now denies black/bloody stool at home  Denies any abdominal pain, nausea, or vomiting  He does note poor appetite over the last 2 weeks and this could be related to uremia or GI pathology  He is s/p 3 units of blood and Hgb only 7 7  CT negative for retroperitoneal bleed but did show mild pulmonary edema and he is scheduled to start dialysis tomorrow  Nursing denies pt having any bowel movements yesterday on her shift or today  He has brown stool on rectal exam      -maintain large-bore IV  -monitor H&H  -transfuse blood products as needed  -continue IV Protonix b i d   -okay for clear diet, NPO past midnight  -we will schedule EGD tomorrow for further evaluation  Prep and procedure explained    ______________________________________________________________________    Subjective:     Patient lying in bed appears comfortable on room air, denies any shortness of breath  Denies any abdominal pain, nausea, or heartburn  No recent bowel movements    Sylvia Saver stool on rectal exam    Medication Administration - last 24 hours from 03/07/2022 1548 to 03/08/2022 1548       Date/Time Order Dose Route Action Action by     03/08/2022 0911 sodium bicarbonate 150 mEq in dextrose 5 % 1,000 mL infusion 70 mL/hr Intravenous Rate/Dose Change Rex Tyson, SEAN     03/08/2022 0513 sodium bicarbonate 150 mEq in dextrose 5 % 1,000 mL infusion 150 mL/hr Intravenous New Bag Alirio Aranda, SEAN     03/07/2022 2104 sodium bicarbonate 150 mEq in dextrose 5 % 1,000 mL infusion 150 mL/hr Intravenous New Bag Alirio Aranda, SEAN     03/07/2022 2106 latanoprost (XALATAN) 0 005 % ophthalmic solution 1 drop 1 drop Both Eyes Given Alirio Aranda, SEAN     03/07/2022 1700 pravastatin (PRAVACHOL) tablet 80 mg 80 mg Oral Given Rex Tyson RN     03/07/2022 1700 tamsulosin (FLOMAX) capsule 0 4 mg 0 4 mg Oral Given Rex Tyson RN     03/08/2022 0310 acetaminophen (TYLENOL) tablet 650 mg 650 mg Oral Given Alirio Aranda, SEAN     03/07/2022 2109 acetaminophen (TYLENOL) tablet 650 mg 650 mg Oral Given [facility] Aranda, SEAN     03/08/2022 0311 ondansetron (ZOFRAN) injection 4 mg 4 mg Intravenous Given Alirio Aranda RN     03/08/2022 0912 pantoprazole (PROTONIX) injection 40 mg 40 mg Intravenous Given Rex Tyson, SEAN     03/07/2022 2358 pantoprazole (PROTONIX) injection 40 mg 40 mg Intravenous Given Alirio Aranda RN     03/08/2022 0257 insulin regular (HumuLIN R,NovoLIN R) 1 Units/mL in sodium chloride 0 9 % 100 mL infusion 0 Units/hr Intravenous Stopped Alirio Aranda RN     03/08/2022 0058 insulin regular (HumuLIN R,NovoLIN R) 1 Units/mL in sodium chloride 0 9 % 100 mL infusion 0 Units/hr Intravenous Stopped Alirio Aranda RN     03/07/2022 2245 insulin regular (HumuLIN R,NovoLIN R) 1 Units/mL in sodium chloride 0 9 % 100 mL infusion 4 Units/hr Intravenous Rate/Dose Change Alirio Aranda RN     03/07/2022 2045 insulin regular (HumuLIN R,NovoLIN R) 1 Units/mL in sodium chloride 0 9 % 100 mL infusion 9 Units/hr Intravenous Rate/Dose Change Alirio Aranda RN     03/07/2022 7952 insulin regular (HumuLIN R,NovoLIN R) 1 Units/mL in sodium chloride 0 9 % 100 mL infusion 5 Units/hr Intravenous [address] Nunez [facility]     03/08/2022 1431 cefTRIAXone (ROCEPHIN) IVPB (premix in dextrose) 1,000 mg 50 mL 1,000 mg Intravenous New Bag Enrique [facility]     03/07/2022 1621 cefTRIAXone (ROCEPHIN) IVPB (premix in dextrose) 1,000 mg 50 mL 1,000 mg Intravenous [address] Meagan Wright RN     03/08/2022 0148 furosemide (LASIX) injection 40 mg 40 mg Intravenous Given Alirio Aranda RN     03/08/2022 1432 insulin lispro (HumaLOG) 100 units/mL subcutaneous injection 1-6 Units 2 Units Subcutaneous Given Enrique Thompson RN     03/08/2022 0746 insulin lispro (HumaLOG) 100 units/mL subcutaneous injection 1-6 Units 3 Units Subcutaneous Given Alirio Aranda RN     03/08/2022 1200 midazolam (VERSED) injection 0 5 mg Intravenous Given Shruti Abarca MD     03/08/2022 1146 midazolam (VERSED) injection 0 5 mg Intravenous Given hSruti Abarca MD     03/08/2022 1200 fentanyl citrate (PF) 100 MCG/2ML 25 mcg Intravenous Given Shruti Abarca MD     03/08/2022 1147 fentanyl citrate (PF) 100 MCG/2ML 25 mcg Intravenous Given Shruti Abarca MD     03/08/2022 1156 lidocaine-epinephrine 1%-1:[id number] buffered 20 mL Infiltration Given Shruti Abarca MD          Objective:     Vitals: Blood pressure 151/70, pulse 85, temperature 98 4 °F (36 9 °C), temperature source Oral, resp  rate 22, height 5' 8" (1 727 m), weight 88 5 kg (195 lb), SpO2 97 %  ,Body mass index is 29 65 kg/m²        Intake/Output Summary (Last 24 hours) at 3/8/2022 1548  Last data filed at 3/8/2022 1207  Gross per 24 hour   Intake 898 5 ml   Output 850 ml   Net 48 5 ml       Physical Exam:   General Appearance: Awake and alert, in no acute distress  Abdomen: Soft, non-tender, non-distended; bowel sounds normal; no masses or no organomegaly    Invasive Devices  Report    Peripheral Intravenous Line            Peripheral IV 03/07/22 Left Hand 1 day    Peripheral IV 03/07/22 Proximal;Right;Ventral (anterior) Forearm 1 day    Peripheral IV 03/07/22 Right Antecubital 1 day          Hemodialysis Catheter            HD Permanent Double Catheter <1 day                Lab Results:  No results displayed because visit has over 200 results  Imaging Studies: I have personally reviewed pertinent imaging studies

## 2022-03-08 NOTE — PROGRESS NOTES
NEPHROLOGY PROGRESS NOTE    Bella Dates 64 y o  male MRN: [id number]  Unit/Bed#: [address] [place] [address] Encounter: [id number]  Reason for Consult:  Renal failure    The patient is in bed breathing comfortably was asking when he will be able to go home  Other than that he had no real events overnight stated that he urinated this morning  He does feel hungry  He is not confused  ASSESSMENT/PLAN:  1  Renal    The patient has developed renal failure with last creatinine from 12/03/2021 of 1 3  No history of chronic kidney disease  He presented to the hospital with a creatinine of 14 9 with supportive care overnight its to 13 2  He had significant hyperkalemia that was treated medically and this morning it is down to 4 7  Metabolic acidosis slightly improved as well  The 2 major issues are his severe degree of renal failure and requirement for dialysis  I sat down at length and explained to the patient that at this point I think we should initiate dialysis given the severity of his renal failure while we are trying to figure out the etiology  Electrolytes are more acceptable today  The patient is agreeable so I will consult Interventional Radiology for tunneled dialysis catheter as well as case management for dialysis placement prior to discharge  Will likely initiate dialysis tomorrow once catheter in  The next issue is figure out what happened  Patient's urinalysis had blood and protein present and a comment of renal tubular epithelial cells  The question is whether not the patient has a rapidly progressive process such as vasculitis or some other cause  I explained him to really get the diagnosis we may require renal biopsy but I went to do some preliminary testing first   I have ordered an SAMANTHA, Anca, anti-GBM, urine protein creatinine ratio  Renal ultrasound result was reviewed personally and there was no hydronephrosis seen    C3 and C4 were normal   Historically the patient is increased arthritis but this may be chronic and unrelated  I also spoke to his wife on the phone to explain the issues in detail to her  Consult interventional Radiology for PermCath  Consult case management for dialysis placement prior to discharge  Follow-up on serologies  May end up needing renal biopsy  Hemodialysis once dialysis catheter in likely planning for tomorrow  Can reduce IV fluids with bicarbonate to 70 cc/hour  SUBJECTIVE:  Review of Systems   Constitutional: Positive for malaise/fatigue  Negative for chills, decreased appetite, fever and night sweats  HENT: Negative  Eyes: Negative  Cardiovascular: Negative for chest pain, leg swelling, orthopnea and palpitations  Respiratory: Negative for cough, shortness of breath, sputum production and wheezing  Skin: Negative for rash  Musculoskeletal: Positive for arthritis and joint swelling  Gastrointestinal: Negative for abdominal pain, diarrhea, nausea and vomiting  Genitourinary: Negative for dysuria, flank pain, hematuria and incomplete emptying  Neurological: Negative for dizziness, focal weakness, headaches and weakness  Psychiatric/Behavioral: Negative for altered mental status, depression, hallucinations and hypervigilance  OBJECTIVE:  Current Weight: Weight - Scale: 88 5 kg (195 lb)  Vitals:Temp (24hrs), Av 4 °F (36 9 °C), Min:97 4 °F (36 3 °C), Max:98 8 °F (37 1 °C)  Current: Temperature: 98 4 °F (36 9 °C)   Blood pressure 146/72, pulse 79, temperature 98 4 °F (36 9 °C), temperature source Oral, resp  rate 20, height 5' 8" (1 727 m), weight 88 5 kg (195 lb), SpO2 96 %  , Body mass index is 29 65 kg/m²        Intake/Output Summary (Last 24 hours) at 3/8/2022 0920  Last data filed at 3/8/2022 0525  Gross per 24 hour   Intake 1701 83 ml   Output 450 ml   Net 1251 83 ml       Physical Exam: /72   Pulse 79   Temp 98 4 °F (36 9 °C) (Oral)   Resp 20   Ht 5' 8" (1 727 m)   Wt 88 5 kg (195 lb)   SpO2 96%   BMI 29 65 kg/m²   Physical Exam  Constitutional:       General: He is not in acute distress  Appearance: He is not toxic-appearing or diaphoretic  HENT:      Head: Normocephalic and atraumatic  Mouth/Throat:      Mouth: Mucous membranes are dry  Eyes:      General: No scleral icterus  Extraocular Movements: Extraocular movements intact  Cardiovascular:      Rate and Rhythm: Normal rate and regular rhythm  Heart sounds: No friction rub  No gallop  Comments: No significant edema  Pulmonary:      Effort: Pulmonary effort is normal  No respiratory distress  Breath sounds: Normal breath sounds  No wheezing, rhonchi or rales  Abdominal:      General: Bowel sounds are normal  There is no distension  Palpations: Abdomen is soft  Tenderness: There is no abdominal tenderness  There is no rebound  Musculoskeletal:      Cervical back: Normal range of motion and neck supple  Neurological:      General: No focal deficit present  Mental Status: He is alert and oriented to person, place, and time  Mental status is at baseline  Psychiatric:         Mood and Affect: Mood normal          Behavior: Behavior normal          Thought Content:  Thought content normal          Judgment: Judgment normal          Medications:    Current Facility-Administered Medications:     acetaminophen (TYLENOL) tablet 650 mg, 650 mg, Oral, Q6H PRN, Melisa Galindo MD, 650 mg at 03/08/22 0310    cefTRIAXone (ROCEPHIN) IVPB (premix in dextrose) 1,000 mg 50 mL, 1,000 mg, Intravenous, Q24H, Daisy Keating MD, Last Rate: 100 mL/hr at 03/07/22 1621, 1,000 mg at 03/07/22 1621    insulin lispro (HumaLOG) 100 units/mL subcutaneous injection 1-6 Units, 1-6 Units, Subcutaneous, Q6H [facility] HOME, 3 Units at 03/08/22 0746 **AND** Fingerstick Glucose (POCT), , , Q6H, Torsten Muller MD    latanoprost (XALATAN) 0 005 % ophthalmic solution 1 drop, 1 drop, Both Eyes, HS, Daisy Keating MD, 1 drop at 03/07/22 3173   ondansetron (ZOFRAN) injection 4 mg, 4 mg, Intravenous, Q6H PRN, Oralia Ortiz MD, 4 mg at 03/08/22 0311    pantoprazole (PROTONIX) injection 40 mg, 40 mg, Intravenous, Q12H [facility], Oralia Ortiz MD, 40 mg at 03/08/22 0912    pravastatin (PRAVACHOL) tablet 80 mg, 80 mg, Oral, Daily With Moody Bañuelos MD, 80 mg at 03/07/22 1700    sodium bicarbonate 150 mEq in dextrose 5 % 1,000 mL infusion, 70 mL/hr, Intravenous, Continuous, Arnaldo Mckay MD, Last Rate: 70 mL/hr at 03/08/22 0911, 70 mL/hr at 03/08/22 0911    tamsulosin (FLOMAX) capsule 0 4 mg, 0 4 mg, Oral, Daily With Moody Bañuelos MD, 0 4 mg at 03/07/22 1700    Laboratory Results:  Lab Results   Component Value Date    WBC 11 02 (H) 03/08/2022    HGB 7 7 (L) 03/08/2022    HCT 22 9 (L) 03/08/2022    MCV 85 03/08/2022     03/08/2022     Lab Results   Component Value Date    SODIUM 135 (L) 03/08/2022    K 4 7 03/08/2022    CL 95 (L) 03/08/2022    CO2 20 (L) 03/08/2022     (H) 03/08/2022    CREATININE 13 26 (H) 03/08/2022    GLUC 222 (H) 03/08/2022    CALCIUM 7 3 (L) 03/08/2022     Lab Results   Component Value Date    CALCIUM 7 3 (L) 03/08/2022    PHOS 7 9 (H) 03/08/2022     No results found for: LABPROT

## 2022-03-08 NOTE — ASSESSMENT & PLAN NOTE
Patient presented with left-sided chest pain,Troponins x2 were negative initially  EKG was unremarkable    ProBNP was elevated at 15,000   2D echo showed normal EF with normal diastolic function  Reported chest pain again 3/9 and troponin peaked at 104   Plan for outpatient stress test

## 2022-03-08 NOTE — ASSESSMENT & PLAN NOTE
Potassium level upon admission was 6 9  Likely in the setting of acute kidney injury and ACE-inhibitor use  Patient was given insulin with D10, albuterol nebulizer treatment and calcium gluconate in the ED  EKG showed no changes  Patient was also given Kayexalate 30 grams in the ED  K level has normalized at 4 5   Received Dialysis on 3/9

## 2022-03-08 NOTE — CASE MANAGEMENT
Case Management Assessment & Discharge Planning Note    Patient name Long Ortiz  Location [address] Teresa Michel-* MRN [id number]  : 1945 Date 3/8/2022       Current Admission Date: 3/7/2022  Current Admission Diagnosis:Symptomatic anemia   Patient Active Problem List    Diagnosis Date Noted    Hemoptysis 2022    LORRAINE (acute kidney injury) ([facility] )     Other proteinuria     Symptomatic anemia 2022    Chest pain 2022    Acute kidney injury ([facility] ) 2022    Hyperkalemia 2022    High anion gap metabolic acidosis     UTI (urinary tract infection) 2022    Melena 2022    Elevated PSA 2021    Chronic pain of right knee 2021    Primary osteoarthritis of right knee 2021    Bladder stones 12/15/2016    Type 2 diabetes mellitus with microalbuminuria, with long-term current use of insulin ([facility] ) 2016    Benign colon polyp 2013    Benign prostatic hyperplasia with lower urinary tract symptoms 2013    Benign essential hypertension 2013    Hyperlipidemia 2012    Vitamin D deficiency 2012      LOS (days): 1  Geometric Mean LOS (GMLOS) (days): 3 60  Days to GMLOS:2 4     OBJECTIVE:  Risk of Unplanned Readmission Score: 18   Current admission status: Inpatient    Preferred Pharmacy:   [facility] [address] [facility]  [phone] [place]  [place]  Phone: [phone] Fax: [phone]    [facility] [address] [place], [address]  [address] [facility] [address] [place] [address], [address] [place] [address] [address]  [facility] [facility] [address] [facility] [address]  Phone: [phone] Fax: [phone]    Primary Care Provider: Nisha Wylie MD    Primary Insurance: MEDICARE  Secondary Insurance: BLUE CROSS    ASSESSMENT:  [facility] Representative - Spouse   Primary Phone: [phone] (Mobile)          Advance Directives  Does patient have a [address] [place] [address] Avenue?: No  Was patient offered paperwork?: Yes  Does patient currently have a Health Care decision maker?: Yes, please see Health Care Proxy section  Does patient have Advance Directives?: No  Was patient offered paperwork?: Yes  Primary Contact: Gab Pinon (wife) [phone]    Readmission Root Cause  30 Day Readmission: No    Patient Information  Admitted from[de-identified] Home  Mental Status: Alert  During Assessment patient was accompanied by: Not accompanied during assessment  Assessment information provided by[de-identified] Patient,Spouse  Primary Caregiver: Self  Support Systems: Self,Spouse/significant other  [place] of Residence: [address] [place] [address] do you live in?: [address] [place] [address] Road entry access options   Select all that apply : Stairs  Number of steps to enter home : 5  Do the steps have railings?: Yes  Type of Current Residence: [place]  In the last 12 months, was there a time when you were not able to pay the mortgage or rent on time?: No  In the last 12 months, how many places have you lived?: 1  In the last 12 months, was there a time when you did not have a steady place to sleep or slept in a shelter (including now)?: No  Homeless/housing insecurity resource given?: N/A  Living Arrangements: Lives w/ Spouse/significant other  Is patient a ?: No    Activities of Daily Living Prior to Admission  Functional Status: Independent  Completes ADLs independently?: Yes  Ambulates independently?: Yes  Does patient use assisted devices?: No  Does patient currently own DME?: No  Does patient have a history of Outpatient Therapy (PT/OT)?: No  Does the patient have a history of Short-Term Rehab?: No  Does patient have a history of HHC?: No  Does patient currently have [facility]?: No    Patient Information Continued  Income Source: Pension/FCI  Does patient have prescription coverage?: Yes  Within the past 12 months, you worried that your food would run out before you got the money to buy more : Never true  Within the past 12 months, the food you bought just didnt last and you didnt have money to get more : Never true  Food insecurity resource given?: N/A  Does patient receive dialysis treatments?: No (Patient will be starting hemodialysis this admission and is not yet set up with an OP schedule )  Does patient have a history of substance abuse?: No  Does patient have a history of Mental Health Diagnosis?: No    Means of Transportation  Means of Transport to Appts[de-identified] Drives Self  In the past 12 months, has lack of transportation kept you from medical appointments or from getting medications?: No  In the past 12 months, has lack of transportation kept you from meetings, work, or from getting things needed for daily living?: No  Was application for public transport provided?: N/A    DISCHARGE DETAILS:  Discharge planning discussed with[de-identified] Patient Em Gaffney (wife) [phone]  Freedom of Choice: Yes  Comments - Freedom of Choice: Patient will be starting hemodialysis during this hospital stay  Patient and wife's preference is [facility]  No other needs identified at this time    CM contacted family/caregiver?: Yes  Were Treatment Team discharge recommendations reviewed with patient/caregiver?: Yes  Did patient/caregiver verbalize understanding of patient care needs?: Yes  Were patient/caregiver advised of the risks associated with not following Treatment Team discharge recommendations?: Yes    Contacts  Patient Contacts: Em Gaffney  Relationship to Patient[de-identified] Family (wife)  Contact Method: Phone  Phone Number: [phone]  Reason/Outcome: Emergency Contact,Continuity of Josselyn [address]         Is the patient interested in Marilu Steward at discharge?: No    DME Referral Provided  Referral made for DME?: No    Other Referral/Resources/Interventions Provided:  Interventions: Dialysis  Referral Comments: New start hemodialysis    Would you like to participate in our [address] service program?  : No - Declined    Treatment Team Recommendation: Other (TBD)  Discharge Destination Plan[de-identified] Home  Transport at Discharge : Family,Automobile     CM met with patient and spoke with his wife Julio Dawson over the phone  Discussed dc planning, available services and the role of CM  Patient lives with his wife and is independent of all ADL's and ambulates without assistance  He has never needed any services  He drives himself to all appointments and is very active  He and his wife decline the needs for any OP services at this time  They are however, aware patient will need to be set up for OP dialysis at [place]  Patient will have his first hemodialysis treatment tomorrow as an IP  This was discussed and choice was provided for dialysis clinics  They would like for patient to go to the facility in [place]  Patient plans to drive himself to dialysis  CM discussed the need for an alternative plan in case patient cannot drive himself in the beginning  Patients wife Julio Held says she drives but she hasn't driven since she broke her ankle a few months ago  She will check with her Dr for clearance to drive and she will also speak with family and friends to devise an alternative plan for transportation if needed  [phone] (including demographics w/SS#, H&P, Renal Consult and progress note, CXR, COVID vaccine dates, recent labs and the Permacath report)  CM spoke with Dr Sanjuana Campbell and requested he order hepatitis panel asap  Updated clinical and dialysis flow sheets will need to be faxed once able to obtain  CM to follow

## 2022-03-08 NOTE — BRIEF OP NOTE (RAD/CATH)
INTERVENTIONAL RADIOLOGY PROCEDURE NOTE    Date: 3/8/2022    Procedure: IR TUNNELED DIALYSIS CATHETER PLACEMENT     Preoperative diagnosis:   1  Chest pain    2  LORRAINE (acute kidney injury) ([address] )    3  Dehydration    4  Metabolic acidosis    5  Symptomatic anemia    6  Hyperkalemia    7  Generalized weakness    8  Melena         Postoperative diagnosis: Same  Surgeon: Janiya Springer MD     Assistant: None  No qualified resident was available  Blood loss:  Minimal    Specimens:  Non    Findings:  Successful right IJ tunneled dialysis catheter placement  Complications: None immediate      Anesthesia: conscious sedation

## 2022-03-08 NOTE — TELEMEDICINE
e-Consult (IPC)  - Interventional Radiology  Ravi Kumar 64 y o  male MRN: [id number]  Unit/Bed#: [address] [place] [address] Encounter: [id number]    IR has been consulted to evaluate the patient, determine the appropriate procedure, and whether or not a procedure can and should be performed regarding the care of Ravi Kumar  IP Consult to IR  Consult performed by: Kyra Estes MD  Consult ordered by: Indra Kellogg MD        03/08/22      Assessment/Recommendation:   Patient has acute renal failure requiring urgent dialysis  Interventional Radiology has been consulted for placement of a tunneled dialysis catheter  WBC has been mildly elevated but trending down  Clinically, there is no suspicion for ongoing infection  Tunneled dialysis catheter placement is planned for today  Keep patient NPO  Total time spent in review of data, discussion with requesting provider and rendering advice was 5 minutes       Patient or appropriate family member was verbally informed by Dr Mony Monroe of this consultative service on their behalf to provide more timely access to specialty care in lieu of an in person consultation  Verbal consent was obtained  Thank you for allowing Interventional Radiology to participate in the care of Ravi Kumar  Please don't hesitate to call or TigerText us with any questions       Kyra Estes MD

## 2022-03-08 NOTE — ASSESSMENT & PLAN NOTE
Patient reported black stool for couple of days prior to admission and even reported them after admission  stool occult blood ordered but not done  Patient on Protonix 40 milligram IV q 12 hours  No history of NSAID use  Colonoscopy showed polyps

## 2022-03-08 NOTE — PROGRESS NOTES
Consent obtained   Another unit prbc ordered   fobt ordered and CT abdomen and pelvis wo contrast once stable in order to rule out active bleed   Clinically no pain anywhere   hemodynamically stable     D/w dr Hubert Padilla renal on call - 40 mg iv lasix ordered prior to third unit prbc     Off insulin gtt   Accu check every 4 hours for now - can be changed in am based on clinical course     D/w RN

## 2022-03-08 NOTE — ASSESSMENT & PLAN NOTE
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

## 2022-03-08 NOTE — ASSESSMENT & PLAN NOTE
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  Since patient had metabolic acidosis patient was placed on bicarbonate drip at 150 mL/hour  Renal US showed no evidence of hydronephrosis  8mm calculus was found in bladder  Possible cause of hematuria if not glomerulonephritis   Continue to hold Vasotec  Patient is recieving further workup for glomerulonephritis  SAMANTHA, GBM Ab, C3 and C4 WNL  ANCA and Other tests pending  UA showed innumerable red blood cells 10-20 white cells with glucose  CT of abdomen and pelvis showed 8 mm stone at UVJ with hydroureter but no hydronephrosis and not significant changed since 2016  No peritoneal or retroperitoneal hematoma or evidence of GI bleeding  Mild pulmonary edema and small b/l pleural effusions were noted  CXR pending  Patient started dialysis 3/9 and has been receiving dialysis per Renal  IVF were d/c on 3/9  Patient currently on hemodialysis on Tuesday Thursday Saturday schedule  Status post renal biopsy on 03/10-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  Repeat ANCA, MPO and PR3 pending  Patient was started on methylprednisolone 100 milligram IV daily for 3 doses  Possible transition to p o  Prednisone upon discharge  Patient is on PCP prophylaxis with Bactrim  Patient needed to be started on cyclophosphamide as in-patient before discharge    Outpatient follow-up with Nephrology and patient needs dialysis as outpatient    Results from last 7 days   Lab Units 03/14/22  0513 03/12/22  1220 03/11/22  0451 03/10/22  0506 03/09/22  0519 03/08/22  0744 03/07/22  2209 03/07/22  1306   BUN mg/dL 73* 117* 93* 129* 155* 176* 162* 175*   CREATININE mg/dL 6 94* 9 96* 7 79* 10 08* 13 69* 13 26* 13 64* 14 38*

## 2022-03-09 ENCOUNTER — APPOINTMENT (INPATIENT)
Dept: RADIOLOGY | Facility: HOSPITAL | Age: 77
DRG: 674 | End: 2022-03-09
Payer: MEDICARE

## 2022-03-09 ENCOUNTER — APPOINTMENT (INPATIENT)
Dept: DIALYSIS | Facility: HOSPITAL | Age: 77
DRG: 674 | End: 2022-03-09
Payer: MEDICARE

## 2022-03-09 ENCOUNTER — APPOINTMENT (INPATIENT)
Dept: PERIOP | Facility: HOSPITAL | Age: 77
DRG: 674 | End: 2022-03-09
Payer: MEDICARE

## 2022-03-09 PROBLEM — E87.5 HYPERKALEMIA: Status: RESOLVED | Noted: 2022-03-07 | Resolved: 2022-03-09

## 2022-03-09 LAB
2HR DELTA HS TROPONIN: 1 NG/L
4HR DELTA HS TROPONIN: -14 NG/L
ANION GAP SERPL CALCULATED.3IONS-SCNC: 20 MMOL/L (ref 4–13)
ATRIAL RATE: 80 BPM
BACTERIA UR CULT: ABNORMAL
BUN SERPL-MCNC: 155 MG/DL (ref 5–25)
CALCIUM SERPL-MCNC: 7.1 MG/DL (ref 8.3–10.1)
CARDIAC TROPONIN I PNL SERPL HS: 103 NG/L
CARDIAC TROPONIN I PNL SERPL HS: 104 NG/L
CARDIAC TROPONIN I PNL SERPL HS: 89 NG/L
CHLORIDE SERPL-SCNC: 92 MMOL/L (ref 100–108)
CO2 SERPL-SCNC: 24 MMOL/L (ref 21–32)
CREAT SERPL-MCNC: 13.69 MG/DL (ref 0.6–1.3)
CREAT UR-MCNC: 30.9 MG/DL
ERYTHROCYTE [DISTWIDTH] IN BLOOD BY AUTOMATED COUNT: 14.8 % (ref 11.6–15.1)
GFR SERPL CREATININE-BSD FRML MDRD: 3 ML/MIN/1.73SQ M
GLUCOSE SERPL-MCNC: 171 MG/DL (ref 65–140)
GLUCOSE SERPL-MCNC: 187 MG/DL (ref 65–140)
GLUCOSE SERPL-MCNC: 260 MG/DL (ref 65–140)
GLUCOSE SERPL-MCNC: 262 MG/DL (ref 65–140)
GLUCOSE SERPL-MCNC: 264 MG/DL (ref 65–140)
GLUCOSE SERPL-MCNC: 282 MG/DL (ref 65–140)
GLUCOSE SERPL-MCNC: 310 MG/DL (ref 65–140)
GLUCOSE SERPL-MCNC: 326 MG/DL (ref 65–140)
HBV CORE AB SER QL: NORMAL
HBV CORE IGM SER QL: NORMAL
HBV SURFACE AB SER-ACNC: <3.1 MIU/ML
HBV SURFACE AG SER QL: NORMAL
HCT VFR BLD AUTO: 22.9 % (ref 36.5–49.3)
HCV AB SER QL: NORMAL
HGB BLD-MCNC: 7.4 G/DL (ref 12–17)
MCH RBC QN AUTO: 27.8 PG (ref 26.8–34.3)
MCHC RBC AUTO-ENTMCNC: 32.3 G/DL (ref 31.4–37.4)
MCV RBC AUTO: 86 FL (ref 82–98)
P AXIS: 72 DEGREES
PLATELET # BLD AUTO: 223 THOUSANDS/UL (ref 149–390)
PMV BLD AUTO: 9.2 FL (ref 8.9–12.7)
POTASSIUM SERPL-SCNC: 4.5 MMOL/L (ref 3.5–5.3)
PR INTERVAL: 160 MS
PROCALCITONIN SERPL-MCNC: 5.68 NG/ML
PROT UR-MCNC: 76 MG/DL
PROT/CREAT UR: 2.46 MG/G{CREAT} (ref 0–0.1)
QRS AXIS: 45 DEGREES
QRSD INTERVAL: 90 MS
QT INTERVAL: 376 MS
QTC INTERVAL: 433 MS
RBC # BLD AUTO: 2.66 MILLION/UL (ref 3.88–5.62)
RYE IGE QN: NEGATIVE
SODIUM SERPL-SCNC: 136 MMOL/L (ref 136–145)
T WAVE AXIS: 27 DEGREES
VENTRICULAR RATE: 80 BPM
WBC # BLD AUTO: 13.87 THOUSAND/UL (ref 4.31–10.16)

## 2022-03-09 PROCEDURE — 99232 SBSQ HOSP IP/OBS MODERATE 35: CPT | Performed by: FAMILY MEDICINE

## 2022-03-09 PROCEDURE — 5A1D70Z PERFORMANCE OF URINARY FILTRATION, INTERMITTENT, LESS THAN 6 HOURS PER DAY: ICD-10-PCS | Performed by: INTERNAL MEDICINE

## 2022-03-09 PROCEDURE — 84145 PROCALCITONIN (PCT): CPT | Performed by: FAMILY MEDICINE

## 2022-03-09 PROCEDURE — 99232 SBSQ HOSP IP/OBS MODERATE 35: CPT | Performed by: INTERNAL MEDICINE

## 2022-03-09 PROCEDURE — 82948 REAGENT STRIP/BLOOD GLUCOSE: CPT

## 2022-03-09 PROCEDURE — 90935 HEMODIALYSIS ONE EVALUATION: CPT | Performed by: INTERNAL MEDICINE

## 2022-03-09 PROCEDURE — 80048 BASIC METABOLIC PNL TOTAL CA: CPT | Performed by: INTERNAL MEDICINE

## 2022-03-09 PROCEDURE — NC001 PR NO CHARGE: Performed by: RADIOLOGY

## 2022-03-09 PROCEDURE — 93010 ELECTROCARDIOGRAM REPORT: CPT | Performed by: INTERNAL MEDICINE

## 2022-03-09 PROCEDURE — 85027 COMPLETE CBC AUTOMATED: CPT | Performed by: FAMILY MEDICINE

## 2022-03-09 PROCEDURE — 97163 PT EVAL HIGH COMPLEX 45 MIN: CPT

## 2022-03-09 PROCEDURE — C9113 INJ PANTOPRAZOLE SODIUM, VIA: HCPCS | Performed by: INTERNAL MEDICINE

## 2022-03-09 PROCEDURE — 97530 THERAPEUTIC ACTIVITIES: CPT

## 2022-03-09 PROCEDURE — 84484 ASSAY OF TROPONIN QUANT: CPT | Performed by: FAMILY MEDICINE

## 2022-03-09 PROCEDURE — 93005 ELECTROCARDIOGRAM TRACING: CPT

## 2022-03-09 RX ADMIN — TAMSULOSIN HYDROCHLORIDE 0.4 MG: 0.4 CAPSULE ORAL at 17:34

## 2022-03-09 RX ADMIN — ONDANSETRON 4 MG: 2 INJECTION INTRAMUSCULAR; INTRAVENOUS at 20:34

## 2022-03-09 RX ADMIN — INSULIN LISPRO 6 UNITS: 100 INJECTION, SOLUTION INTRAVENOUS; SUBCUTANEOUS at 00:22

## 2022-03-09 RX ADMIN — INSULIN LISPRO 1 UNITS: 100 INJECTION, SOLUTION INTRAVENOUS; SUBCUTANEOUS at 11:40

## 2022-03-09 RX ADMIN — INSULIN LISPRO 5 UNITS: 100 INJECTION, SOLUTION INTRAVENOUS; SUBCUTANEOUS at 23:53

## 2022-03-09 RX ADMIN — PANTOPRAZOLE SODIUM 40 MG: 40 INJECTION, POWDER, FOR SOLUTION INTRAVENOUS at 11:44

## 2022-03-09 RX ADMIN — PANTOPRAZOLE SODIUM 40 MG: 40 INJECTION, POWDER, FOR SOLUTION INTRAVENOUS at 21:36

## 2022-03-09 RX ADMIN — INSULIN LISPRO 1 UNITS: 100 INJECTION, SOLUTION INTRAVENOUS; SUBCUTANEOUS at 17:35

## 2022-03-09 RX ADMIN — CEFTRIAXONE 1000 MG: 1 INJECTION, SOLUTION INTRAVENOUS at 14:24

## 2022-03-09 RX ADMIN — PRAVASTATIN SODIUM 80 MG: 80 TABLET ORAL at 17:34

## 2022-03-09 RX ADMIN — INSULIN LISPRO 3 UNITS: 100 INJECTION, SOLUTION INTRAVENOUS; SUBCUTANEOUS at 05:57

## 2022-03-09 RX ADMIN — LATANOPROST 1 DROP: 50 SOLUTION OPHTHALMIC at 21:36

## 2022-03-09 NOTE — QUICK NOTE
Notified by RN earlier that patient reported chest pain which resolved on its own and then reported being hungry and requesting to eat  EKG was done and reviewed and it shows no ST elevation but shows nonspecific ST T wave changes  Troponins ordered and 1st troponin is 103  Patient remains chest pain-free  Will trend troponins  Echo is pending    Has never had cardiac workup

## 2022-03-09 NOTE — PROGRESS NOTES
Progress Note- Val Johnson 64 y o  male MRN: [id number]    Unit/Bed#: [address] 408-01 Encounter: [id number]      Assessment and Plan: This is a 57-year-old male with history of BPH s/p TURP (2016), DM, HLD, HTN, and CKD 3 who presented to the hospital due to chest pain and also noted poor appetite for last 2 week  Cardiac troponins negative and EKG showed NSR  He was noted to have acute renal failure on labs with significant normocytic anemia       1  Symptomatic anemia  Symptomatic anemia may be multifactorial due to underlying kidney disease, but cannot exclude occult GI bleeding as a factor  Baseline hemoglobin in 2019 was 13-15, now 5 8 on admission with normal MCV  Pt is a poor historian due to hearing loss, he originally reported black stool x 2 days to admitting team but now denies black/bloody stool at home  There has been no evidence of melena since admission and pt had brown stool on rectal exam  Denies any abdominal pain, nausea, or vomiting  He does note poor appetite over the last 2 weeks and this could be related to [address] pathology  He is s/p 3 units of blood and Hgb 7 4  CT negative for retroperitoneal bleed  HD started today, and going for IR kidney biopsy tomorrow  EGD deferred today due to active chest pain     -maintain large-bore IV  -monitor H&H  -transfuse blood products as needed  -continue IV Protonix b i d   -okay for diet from GI standpoint, will hold off on EGD evaluation given no evidence of active GI bleeding and ongoing cardiac evaluation   -Please notify GI for any evidence of GIB or hemodynamic instability    ______________________________________________________________________    Subjective:     Pt on HD this AM  Per nursing, he has not had any evidence of black/bloody stool  He had chest pain this afternoon and EGD cancelled      Medication Administration - last 24 hours from 03/08/2022 1806 to 03/09/2022 1806       Date/Time Order Dose Route Action Action by 03/08/2022 2239 sodium bicarbonate 150 mEq in dextrose 5 % 1,000 mL infusion 70 mL/hr Intravenous New Bag Alirio Aranda, SEAN     03/08/2022 2241 latanoprost (XALATAN) 0 005 % ophthalmic solution 1 drop 1 drop Both Eyes Given Alirio Aranda, RN     03/09/2022 1734 pravastatin (PRAVACHOL) tablet 80 mg 80 mg Oral Given June  SEAN Davis     03/09/2022 1734 tamsulosin (FLOMAX) capsule 0 4 mg 0 4 mg Oral Given Mallory  Ryan, SEAN     03/09/2022 1144 pantoprazole (PROTONIX) injection 40 mg 40 mg Intravenous Given June  Ryan, SEAN     03/08/2022 2110 pantoprazole (PROTONIX) injection 40 mg 40 mg Intravenous Given [facility]     03/09/2022 1424 cefTRIAXone (ROCEPHIN) IVPB (premix in dextrose) 1,000 mg 50 mL 1,000 mg Intravenous New Bag [facility] Ryan, SEAN     03/09/2022 1735 insulin lispro (HumaLOG) 100 units/mL subcutaneous injection 1-6 Units 1 Units Subcutaneous Given June  Ryan, RN     03/09/2022 1140 insulin lispro (HumaLOG) 100 units/mL subcutaneous injection 1-6 Units 1 Units Subcutaneous Given June  Ryan, RN     03/09/2022 0557 insulin lispro (HumaLOG) 100 units/mL subcutaneous injection 1-6 Units 3 Units Subcutaneous [facility]     03/09/2022 0022 insulin lispro (HumaLOG) 100 units/mL subcutaneous injection 1-6 Units 6 Units Subcutaneous [facility]     03/08/2022 1825 insulin lispro (HumaLOG) 100 units/mL subcutaneous injection 1-6 Units 3 Units Subcutaneous Given Yashira Monteiro RN          Objective:     Vitals: Blood pressure 159/65, pulse 75, temperature 98 2 °F (36 8 °C), resp  rate 19, height 5' 8" (1 727 m), weight 88 5 kg (195 lb), SpO2 94 %  ,Body mass index is 29 65 kg/m²        Intake/Output Summary (Last 24 hours) at 3/9/2022 1806  Last data filed at 3/9/2022 1056  Gross per 24 hour   Intake 1000 ml   Output 1505 ml   Net -505 ml       Physical Exam:   General Appearance: Awake and alert, in no acute distress; very [place]  Abdomen: Soft, non-tender, non-distended; bowel sounds normal; no masses or no organomegaly    Invasive Devices  Report    Peripheral Intravenous Line            Peripheral IV 03/08/22 Left Wrist <1 day          Hemodialysis Catheter            HD Permanent Double Catheter 1 day                Lab Results:  No results displayed because visit has over 200 results  Imaging Studies: I have personally reviewed pertinent imaging studies

## 2022-03-09 NOTE — PLAN OF CARE
Post-Dialysis RN Treatment Note    Blood Pressure:  Pre: 155/62 mm/Hg  Post: 177/67  mmHg   EDW: TBD, first treatment    Weight:  Pre: 83 9 kg   Post: 83 1 kg   Mode of weight measurement: Bed Scale   Volume Removed: 800 ml    Treatment duration: 150 minutes    NS given  No    Treatment shortened?  No   Medications given during Rx None Reported   Estimated Kt/V  Not Applicable   Access type: Permacath/TDC   Access Issues: No    Report called to primary nurse   Yes, June    Problem: HEMATOLOGIC - ADULT  Goal: Maintains hematologic stability  Description: INTERVENTIONS  - Assess for signs and symptoms of bleeding or hemorrhage  - Monitor labs  - Administer supportive blood products/factors as ordered and appropriate  Outcome: Progressing     Problem: METABOLIC, FLUID AND ELECTROLYTES - ADULT  Goal: Electrolytes maintained within normal limits  Description: INTERVENTIONS:  - Monitor labs and assess patient for signs and symptoms of electrolyte imbalances  - Administer electrolyte replacement as ordered  - Monitor response to electrolyte replacements, including repeat lab results as appropriate  - Instruct patient on fluid and nutrition as appropriate  Outcome: Progressing  Goal: Fluid balance maintained  Description: INTERVENTIONS:  - Monitor labs   - Monitor I/O and WT  - Instruct patient on fluid and nutrition as appropriate  - Assess for signs & symptoms of volume excess or deficit  Outcome: Progressing

## 2022-03-09 NOTE — PHYSICAL THERAPY NOTE
PHYSICAL THERAPY EVALUATION/TREATMENT     03/09/22 1540   PT Last Visit   PT Visit Date 03/09/22   Note Type   Note type Evaluation   Pain Assessment   Pain Assessment Tool Brown-Baker FACES   Brown-Baker FACES Pain Rating 0   Restrictions/Precautions   Weight Bearing Precautions Per Order No   Other Precautions Cognitive; Chair Alarm; Bed Alarm; Fall Risk;Hard of hearing   Home Living   Type of [address] [facility] One level  (ranch with basement; [address])   [address] [facility]  (Maybe a RW (unable to get a straight answer))   Additional Comments Pt states that he uses the cane sometimes; +    Prior Function   Level of [place] Independent with ADLs and functional mobility   Lives With Spouse   Receives Help From Family   ADL Assistance Independent   IADLs Independent   Comments Most information is from chart ( spoke to family)   General   Additional Pertinent History admitted with symptomatic anemia   Family/Caregiver Present No   Cognition   Overall Cognitive Status Impaired   Arousal/Participation Cooperative   Orientation Level Oriented to person   Following Commands Follows one step commands with increased time or repetition   Comments pt is having trouble hearing (Hearing aids are in ) and/or pt is confused  Sometimes just does not respond or inapproprate answer   Subjective   Subjective "I will need to call a taxi to go home"   RLE Assessment   RLE Assessment   (limited hip flex: 90* (needed a new one 25 yrs ago")   LLE Assessment   LLE Assessment WFL  (strength grossly 3+/5)   Bed Mobility   Supine to Sit 3  Moderate assistance   Additional items Assist x 1;Verbal cues;LE management; Increased time required   Sit to Supine 3  Moderate assistance   Additional items Increased time required;Verbal cues   Additional Comments When pt returned to bed, first attempt, went onto his back and couldnot get his legs onto bed    Pt sat back up and went into sidelying first then needed just a little assist to get legs in bed  Transfers   Sit to Stand 3  Moderate assistance   Additional items Assist x 2;Verbal cues   Stand to Sit 3  Moderate assistance   Additional items Assist x 2;Verbal cues   Additional Comments Attempted w assist of 1 but pt could not achieve  Was able with assist of 2   Ambulation/Elevation   Gait Assistance 3  Moderate assist   Additional items Assist x 2;Verbal cues   Assistive Device None   Distance 3 feet along bed   Ambulation/Elevation Additional Comments Did not have walker in room and was unsafe to attempt walking at this time  Balance   Static Sitting Fair   Dynamic Sitting Fair -   Static Standing Fair -   Dynamic Standing Fair -   Ambulatory Poor +   Activity Tolerance   Activity Tolerance Patient limited by fatigue  (limited by weakness)   Nurse Made Aware yes: Mallory   Assessment   Prognosis Good   Problem List Decreased strength;Decreased endurance; Impaired balance;Decreased mobility; Decreased coordination;Decreased cognition; Impaired judgement;Decreased safety awareness; Impaired hearing   Assessment Patient seen for Physical Therapy evaluation  Patient admitted with Symptomatic anemia  Comorbidities affecting patient's physical performance include: DM,HTN, anemia, arthritis, [place] bilateral, BPH Personal factors affecting patient at time of initial evaluation include: lives in single story house, ambulating with assistive device, stairs to enter home, inability to ambulate household distances, inability to navigate community distances, inability to navigate level surfaces without external assistance, decreased cognition, limited home support, hearing impairments, inability to perform physical activity, limited insight into impairments, inability to perform ADLS and inability to perform IADLS    Prior to admission, patient was independent with functional mobility with occasional use of a cane, independent with ADLS, independent with IADLS, living with wife in a single level home with 5 steps to enter, ambulating household distance and ambulating community distances  Please find objective findings from Physical Therapy assessment regarding body systems outlined above with impairments and limitations including weakness, impaired balance, decreased endurance, impaired coordination, gait deviations, decreased activity tolerance, decreased functional mobility tolerance, decreased safety awareness, impaired judgement, fall risk and decreased cognition  The Barthel Index was used as a functional outcome tool presenting with a score of Barthel Index Score: 35 today indicating marked limitations of functional mobility and ADLS  Patient's clinical presentation is currently unstable/unpredictable as seen in patient's presentation of varying levels of cognitive performance, increased fall risk, new onset of impairment of functional mobility, decreased endurance and new onset of weakness  Pt would benefit from continued Physical Therapy treatment to address deficits as defined above and maximize level of functional mobility  As demonstrated by objective findings, the assigned level of complexity for this evaluation is high  The patient's AM-PAC Basic Mobility Inpatient Short Form Raw Score is 11  A Raw score of less than or equal to 16 suggests the patient may benefit from discharge to post-acute rehabilitation services  Please also refer to the recommendation of the Physical Therapist for safe discharge planning      Goals   Patient Goals to go home   STG Expiration Date 03/16/22   Short Term Goal #1 Indep with transfers supine <> short sit and sit <> stand with RW and fair balance   Short Term Goal #2 Min A for amb with RW for functional household distances with fair + balance   LTG Expiration Date 03/23/22   Long Term Goal #1 Indep amb  with least Restrictive AD for community distances with good balance   Long Term Goal #2 Indep navigating 5 stairs to allow pt to enter/ exit his home   Plan   Treatment/Interventions ADL retraining;Functional transfer training;LE strengthening/ROM; Elevations; Therapeutic exercise; Endurance training;Cognitive reorientation;Patient/family training;Equipment eval/education; Bed mobility;Gait training;Spoke to nursing;Spoke to case management   PT Frequency Other (Comment)  (5x/wk)   Recommendation   PT Discharge Recommendation Post acute rehabilitation services   Additional Comments At this time, pt would benefit from STR  Per chart pt is completely independent prior to admit and was driving  Pt is way off from his baseline at this assessment  Will continue to follow and progress as per plan  AM-PAC Basic Mobility Inpatient   Turning in Bed Without Bedrails 2   Lying on Back to Sitting on Edge of Flat Bed 2   Moving Bed to Chair 2   Standing Up From Chair 2   Walk in Room 2   Climb 3-5 Stairs 1   Basic Mobility Inpatient Raw Score 11   Basic Mobility Standardized Score 30 25   Highest Level Of Mobility   -[facility] Goal 4: Move to chair/commode   -[facility] Highest Level of Mobility 3: Sit at edge of bed   -[facility] Goal Achieved No   Barthel Index   Feeding 5   Bathing 0   Grooming Score 0   Dressing Score 5   Bladder Score 10   Bowels Score 10   Toilet Use Score 0   Transfers (Bed/Chair) Score 5   Mobility (Level Surface) Score 0   Stairs Score 0   Barthel Index Score 35   Additional Treatment Session   Start Time 1530   End Time 1540   Treatment Assessment Worked on static and dynamic sitting balance at EOB  Progressed to sit<> stand with Mod A of 2 and was able to work on standing balance and taking a few steps along bed with Mod A of 2 and verbal cues  Pt returned to supine with Min A with 2 trials to get his legs in bed on his own  Will benefit from continued PT as per plan,  Exercises   Balance training  at bedside with bilateral UE support, and in sitting on EOB   End of Consult   Patient Position at End of Consult Supine;Bed/Chair alarm activated; All needs within reach   [employer] Insurance Number  Thurston Himanshu Bass PT  [id number]

## 2022-03-09 NOTE — PROGRESS NOTES
[facility] Progress Note  Patient: Galileo Llanos 64 y o  male   MRN: [id number]  PCP: Thuy Castellanos MD  Unit/Bed#: [address] [place] [address] Encounter: [id number]  Date Of Visit: 03/09/22    Problem List:    Principal Problem:    Symptomatic anemia  Active Problems:    Acute kidney injury ([address] )    Type 2 diabetes mellitus with microalbuminuria, with long-term current use of insulin (HCC)    High anion gap metabolic acidosis    Melena    UTI (urinary tract infection)    Hemoptysis    Benign essential hypertension    Benign prostatic hyperplasia with lower urinary tract symptoms    Hyperlipidemia    Chest pain      Assessment & Plan:    * Symptomatic anemia  Assessment & Plan  Patient's baseline hemoglobin about 3 years ago was in the range of 13-50  Hemoglobin upon admission was 5 8  Patient recieved 3 units of PRBC  MCV level was normal  Vitamin R78 and folic acid level WNL  Iron panel revealed elevated ferritin, normal iron saturation, low TIBC, and low iron  Anemia likely multifactorial in the setting of possible GI bleeding and also CKD  CT abd/pelvis found no evidence of GI bleed  Scheduled for EGD today   Monitor Hemoglobin  Transfuse for Hgb less than 7    Results from last 7 days   Lab Units 03/09/22  0519 03/08/22  1723 03/08/22  0743 03/08/22  0001 03/07/22  2209 03/07/22  0954   HEMOGLOBIN g/dL 7 4* 8 2* 7 7* 6 4* 6 7* 5 8*   HEMATOCRIT % 22 9* 25 1* 22 9* 19 8* 20 7* 19 0*   MCV fL 86  --  85 84 85 87       Acute kidney injury (HCC)  Assessment & Plan  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  Since patient had metabolic acidosis patient was placed on bicarbonate drip at 150 mL/hour  Renal US showed no evidence of hydronephrosis  8mm calculus was found in bladder   Possible cause of hematuria if not glomerulonephritis   Continue to hold Vasotec  Patient is recieving further workup for glomerulonephritis  C3 and C4 WNL  Other tests pending  Nephrology was consulted in the ED  UA showed innumerable red blood cells 10-20 white cells with glucose  CT of abdomen and pelvis showed 8 mm stone at UVJ with hydroureter but no hydronephrosis and not significant changed since 2016  No peritoneal or retroperitoneal hematoma or evidence of GI bleeding  Mild pulmonary edema and small b/l pleural effusions were noted  CXR pending  Patient received dialysis 3/9  IVF were d/c on 3/9  IR was consulted for renal biopsy    Results from last 7 days   Lab Units 03/09/22  0519 03/08/22  0744 03/07/22  2209 03/07/22  1306 03/07/22  0954   BUN mg/dL 155* 176* 162* 175* 169*   CREATININE mg/dL 13 69* 13 26* 13 64* 14 38* 14 91*       High anion gap metabolic acidosis  Assessment & Plan  Likely secondary to renal failure and also hyperglycemic state  Patient was placed on bicarbonate drip at 150 mL/hour  Bicarb drip was d/c on 3/9  Repeat BMP tomorrow   Receiving dialysis on 3/9    Type 2 diabetes mellitus with microalbuminuria, with long-term current use of insulin [facility])  Assessment & Plan  Lab Results   Component Value Date    HGBA1C 7 2 (H) 12/03/2021       Recent Labs     03/09/22  0019 03/09/22  0556 03/09/22  0705 03/09/22  1059   POCGLU 326* 264* 282* 171*     Patient is on Humalog mix 75/25 45 units b i d  Hold Humalog mix  Patient was started on insulin drip as 1-beta hydroxybutyrate was elevated at 1 6  Insulin drip was stopped  Sugars are in 200s  Currently NPO in anticipation for EGD today  Continue with SSI   Restart home Humalog mix after EGD if regular diet  Monitor blood sugars    Melena  Assessment & Plan  Patient reported black stool for couple of days  stool occult blood ordered but not done  Patient on Protonix 40 milligram IV q 12 hours  No history of NSAID use  GI was consulted  NPO for EGD today     Hyperkalemia-resolved as of 3/9/2022  Assessment & Plan  Potassium level upon admission was 6 9  Likely in the setting of acute kidney injury and ACE-inhibitor use  Patient was given insulin with D10, albuterol nebulizer treatment and calcium gluconate in the ED  EKG showed no changes  Patient was also given Kayexalate 30 grams in the ED  K level has normalized at 4 5   Received Dialysis on 3/9      UTI (urinary tract infection)  Assessment & Plan  Patient had leukocytosis  Leukocytosis has trended up from 10 9 on admission to 13 87  UA showed trace leukocyte esterase with 10-20 white cells and innumerable bacteria  Urine culture grew E  Coli   Continue Ceftriaxone   No clinical signs of sepsis    Hemoptysis  Assessment & Plan  Patient states he has had a cough for a couple weeks  Typically yellow in color, but this morning and yesterday had hemoptysis  CXR ordered  Pneumonia vs bronchitis vs pulmonary edema vs Goodpastures   Obtain sputum culture  Procalcitonin elevated at 6 73  Consider steroids for possible Goodpastures pending glomerulonephritis workup      Chest pain  Assessment & Plan  Patient presented with left-sided chest pain  Troponins x2 were negative  EKG was unremarkable  ProBNP was elevated at 15,000   2D echo was completed to rule out wall motion abnormalities  Results pending  Chest pain likely secondary to symptomatic anemia  Currently asymptomatic    Hyperlipidemia  Assessment & Plan  Zocor substituted with Pravachol  Benign prostatic hyperplasia with lower urinary tract symptoms  Assessment & Plan  Monitor postvoid residuals  Continue Flomax  Benign essential hypertension  Assessment & Plan  Patient is on Vasotec at home which is being held in the setting of acute kidney injury  Avoid hypotension  Will add Norvasc if blood pressure continues to be high  VTE Pharmacologic Prophylaxis:   Contraindicated due to melena and hemoptysis     Patient Centered Rounds: I performed bedside rounds with nursing staff today    Discussions with Specialists or Other Care Team Provider: yes-nephrology    Education and Discussions with Family / Patient: Attempted to update  (wife) via phone  Left voicemail  Time Spent for Care: 40 min  More than 50% of total time spent on counseling and coordination of care as described above  Current Length of Stay: 2 day(s)  Current Patient Status: Inpatient   Certification Statement: The patient will continue to require additional inpatient hospital stay due to Acute kidney injury requiring hemodialysis, symptomatic anemia requiring EGD  Discharge Plan: Anticipate discharge in 48-72 hrs to discharge location to be determined pending rehab evaluations  Code Status: Level 1 - Full Code    Subjective:   Patient denies any overnight events or pain  Reports having a bowel movement yesterday  Denies any melena or blood in stool  Reports another episode of hemoptysis yesterday  ROS was unable to be accurately assessed due to patient being hard of hearing  Objective:     Vitals:   Temp (24hrs), Av 3 °F (36 8 °C), Min:98 °F (36 7 °C), Max:99 1 °F (37 3 °C)    Temp:  [98 °F (36 7 °C)-99 1 °F (37 3 °C)] 98 2 °F (36 8 °C)  HR:  [73-89] 77  Resp:  [18-20] 18  BP: ()/(62-78) 177/67  SpO2:  [90 %-96 %] 94 %  Body mass index is 29 65 kg/m²  Input and Output Summary (last 24 hours): Intake/Output Summary (Last 24 hours) at 3/9/2022 1234  Last data filed at 3/9/2022 1056  Gross per 24 hour   Intake 1000 ml   Output 1505 ml   Net -505 ml       Physical Exam:   Physical Exam  Constitutional:       Appearance: Normal appearance  He is ill-appearing (Chronically)  HENT:      Head: Normocephalic and atraumatic  Ears:      Comments: Hard of hearing   Eyes:      General: No scleral icterus  Comments: Pale conjunctiva    Cardiovascular:      Rate and Rhythm: Normal rate and regular rhythm  Pulmonary:      Effort: Pulmonary effort is normal  No respiratory distress  Breath sounds:  No wheezing, rhonchi or rales  Comments: Decreased breath sounds bilaterally  Abdominal:      General: Abdomen is flat  Bowel sounds are normal       Palpations: Abdomen is soft  Tenderness: There is no abdominal tenderness  Musculoskeletal:      Right lower leg: No edema  Left lower leg: No edema  Neurological:      Mental Status: He is alert  Additional Data:     Labs:  Results from last 7 days   Lab Units 03/09/22  0519 03/08/22  1723 03/08/22  0743 03/08/22  0001 03/07/22  2209   WBC Thousand/uL 13 87*  --  11 02*   < > 10 90*   HEMOGLOBIN g/dL 7 4*   < > 7 7*   < > 6 7*   HEMATOCRIT % 22 9*   < > 22 9*   < > 20 7*   PLATELETS Thousands/uL 223  --  244   < > 264   BANDS PCT %  --   --   --   --  2   NEUTROS PCT %  --   --  88*   < >  --    LYMPHS PCT %  --   --  6*   < >  --    LYMPHO PCT %  --   --   --   --  7*   MONOS PCT %  --   --  5   < >  --    MONO PCT %  --   --   --   --  4   EOS PCT %  --   --  0   < > 0    < > = values in this interval not displayed  Results from last 7 days   Lab Units 03/09/22  0519 03/08/22  0744 03/08/22  0744   SODIUM mmol/L 136   < > 135*   POTASSIUM mmol/L 4 5   < > 4 7   CHLORIDE mmol/L 92*   < > 95*   CO2 mmol/L 24   < > 20*   BUN mg/dL 155*   < > 176*   CREATININE mg/dL 13 69*   < > 13 26*   ANION GAP mmol/L 20*   < > 20*   CALCIUM mg/dL 7 1*   < > 7 3*   ALBUMIN g/dL  --   --  1 7*   TOTAL BILIRUBIN mg/dL  --   --  0 52   ALK PHOS U/L  --   --  50   ALT U/L  --   --  37   AST U/L  --   --  22   GLUCOSE RANDOM mg/dL 260*   < > 222*    < > = values in this interval not displayed       Results from last 7 days   Lab Units 03/07/22  0954   INR  1 41*     Results from last 7 days   Lab Units 03/09/22  1059 03/09/22  0705 03/09/22  0556 03/09/22  0019 03/08/22  1759 03/08/22  0724 03/08/22  0527 03/08/22  0055 03/07/22  2243 03/07/22  2045 03/07/22  1825 03/07/22  1532   POC GLUCOSE mg/dl 171* 282* 264* 326* 249* 250* 230* 92 188* 330* 346* 299*         Results from last 7 days   Lab Units 03/09/22  0519 03/08/22  0744 03/07/22  1124   LACTIC ACID mmol/L  --   --  0 7   PROCALCITONIN ng/ml 5 68* 6 73*  --        Lines/Drains:  Invasive Devices  Report    Peripheral Intravenous Line            Peripheral IV 03/08/22 Left Wrist <1 day          Hemodialysis Catheter            HD Permanent Double Catheter 1 day                      Imaging: Reviewed radiology reports from this admission including: abdominal/pelvic CT and ECHO    Recent Cultures (last 7 days):   Results from last 7 days   Lab Units 03/07/22  1305 03/07/22  1124 03/07/22  1055   BLOOD CULTURE   --  No Growth at 48 hrs  No Growth at 48 hrs  URINE CULTURE  0947-8165 cfu/ml Escherichia coli*  --   --        Last 24 Hours Medication List:   Current Facility-Administered Medications   Medication Dose Route Frequency Provider Last Rate    acetaminophen  650 mg Oral Q6H PRN Kelsey Fernandez MD      cefTRIAXone  1,000 mg Intravenous Q24H Kelsey Fernandez MD 1,000 mg (03/08/22 1431)    insulin lispro  1-6 Units Subcutaneous Q6H Shantanu Calzada MD      latanoprost  1 drop Both Eyes HS Kelsey Fernandez MD      ondansetron  4 mg Intravenous Q6H PRN Kelsey Fernandez MD      pantoprazole  40 mg Intravenous Q12H [facility] Kelsey Fernandez MD      pravastatin  80 mg Oral Daily With Lauri Morris MD      tamsulosin  0 4 mg Oral Daily With Lauri Morris MD          Today, Patient Was Seen By: Alis Leonard DO    ** Please Note: "This note has been constructed using a voice recognition system  Therefore there may be syntax, spelling, and/or grammatical errors   Please call if you have any questions  "**

## 2022-03-09 NOTE — CONSULTS
e-Consult (IPC)  - Interventional Radiology  Patti Thacker 64 y o  male MRN: [id number]  Unit/Bed#: [address] [place] [address] Encounter: [id number]    IR has been consulted to evaluate the patient, determine the appropriate procedure, and whether or not a procedure can and should be performed regarding the care of Patti Thacker  We were consulted by Dr Raza Dillard concerning LORRAINE, and to possibly perform a kidney random biopsy if medically appropriate for the patient  IP Consult to IR  Consult performed by: Cinthya Christie MD  Consult ordered by: Irene Gudino MD        03/09/22    Assessment/Recommendation:   LORRAINE for Kate Kit renal biopsy tomorrow  NPO past midnight  Total time spent in review of data, discussion with requesting provider and rendering advice was 20 minutes  Patient or appropriate family member was verbally informed by Dr Raza Dillard of this consultative service on their behalf to provide more timely access to specialty care in lieu of an in person consultation  Verbal consent was obtained  Thank you for allowing Interventional Radiology to participate in the care of Patti Thacker  Please don't hesitate to call or TigerText us with any questions       Cinthya Christie MD

## 2022-03-09 NOTE — PROGRESS NOTES
NEPHROLOGY PROGRESS NOTE    Hay Connors 64 y o  male MRN: [id number]  Unit/Bed#: [address] [place] [address] Encounter: [id number]  Reason for Consult:  Acute kidney injury present on admission    The patient was seen while starting dialysis as this was his 1st treatment consent was obtained  Patient states he had a rather uneventful evening just is hungry because he has not been able to eat  A little tender near the [facility] site but no bleeding noted  ASSESSMENT/PLAN:  1  Renal    The patient has developed acute renal failure that appears to have developed over the last 2-3 months as his creatinine was 1 3 in December of last year and he was admitted with a creatinine over 14  He has an active urine sediment with proteinuria which is new as well as blood with microscopic hematuria  Urine protein estimation was 2 4 g  He has a clinical picture of a rapidly progressive nephritis  Serologic workup was sent so for C3 and C4 normal   SAMANTHA, Anca, anti-GBM are pending  The patient is initiated on dialysis for severe renal failure and I have also discussed a kidney biopsy with him to determine the etiology to see whether not it is treatable also to give some prognostic information to tell how chronic it is  Metabolic acidosis and hyperkalemia improved  He also did mention he had some blood to did sputum  With this leads to suspicion for Goodpasture's syndrome verses vasculitis as possibility  I attempted to contact his home to speak with his wife and there was no answer  Will try later  I want to give her an updated his request   I did let the primary service no as well  Hemodialysis today and details of the 1st treatment outline below  Can discontinue IV fluids  Consult interventional Radiology for renal biopsy    Case management to arrange outpatient dialysis prior to discharge  HEMODIALYSIS PROCEDURE NOTE  The patient was seen and examined on hemodialysis    Time: 2 5 hours  Sodium: 138 Blood flow: 250 Dialyzer: F160 Potassium: 2 Dialysate flow: 500   Access: catheter Bicarbonate: 35 Ultrafiltration goal: 1 L as tolerated          2  GI    GI was consulted for anemia planning some endoscopy  On to proton pump inhibitor  SUBJECTIVE:  Review of Systems   Constitutional: Positive for malaise/fatigue  Negative for chills, diaphoresis and fever  HENT: Positive for hearing loss  Chronic hearing loss   Eyes: Negative  Respiratory: Positive for hemoptysis  Negative for cough, shortness of breath, sputum production and wheezing  He did report to another physician he had a little blood tinged sputum the other day  Skin: Negative  Negative for itching and rash  Gastrointestinal: Negative for abdominal pain, anorexia, diarrhea, nausea and vomiting  Feels hungry   Genitourinary: Negative for dysuria, flank pain, hematuria and incomplete emptying  Neurological: Negative for dizziness, focal weakness, headaches, seizures and weakness  Psychiatric/Behavioral: Negative for altered mental status, depression, hallucinations and hypervigilance  OBJECTIVE:  Current Weight: Weight - Scale: 88 5 kg (195 lb)  Vitals:Temp (24hrs), Av 3 °F (36 8 °C), Min:98 °F (36 7 °C), Max:99 1 °F (37 3 °C)  Current: Temperature: 98 °F (36 7 °C)   Blood pressure 167/71, pulse 77, temperature 98 °F (36 7 °C), resp  rate 18, height 5' 8" (1 727 m), weight 88 5 kg (195 lb), SpO2 94 %  , Body mass index is 29 65 kg/m²  Intake/Output Summary (Last 24 hours) at 3/9/2022 0932  Last data filed at 3/9/2022 0815  Gross per 24 hour   Intake 400 ml   Output 200 ml   Net 200 ml       Physical Exam: /71 (BP Location: Right arm)   Pulse 77   Temp 98 °F (36 7 °C)   Resp 18   Ht 5' 8" (1 727 m)   Wt 88 5 kg (195 lb)   SpO2 94%   BMI 29 65 kg/m²   Physical Exam  HENT:      Head: Normocephalic and atraumatic  Mouth/Throat:      Mouth: Mucous membranes are dry     Eyes:      General: No scleral icterus  Extraocular Movements: Extraocular movements intact  Cardiovascular:      Rate and Rhythm: Normal rate and regular rhythm  Heart sounds: No friction rub  No gallop  Comments: Mild edema  No pericardial rub appreciated there was soft heart sounds  Pulmonary:      Effort: Pulmonary effort is normal  No respiratory distress  Breath sounds: Normal breath sounds  No wheezing, rhonchi or rales  Abdominal:      General: Bowel sounds are normal  There is no distension  Palpations: Abdomen is soft  Tenderness: There is no abdominal tenderness  There is no rebound  Musculoskeletal:      Cervical back: Normal range of motion and neck supple  Neurological:      General: No focal deficit present  Mental Status: He is alert and oriented to person, place, and time  Mental status is at baseline  Psychiatric:         Mood and Affect: Mood normal          Behavior: Behavior normal          Thought Content:  Thought content normal          Judgment: Judgment normal          Medications:    Current Facility-Administered Medications:     acetaminophen (TYLENOL) tablet 650 mg, 650 mg, Oral, Q6H PRN, Dhruv Barrow MD, 650 mg at 03/08/22 0310    cefTRIAXone (ROCEPHIN) IVPB (premix in dextrose) 1,000 mg 50 mL, 1,000 mg, Intravenous, Q24H, Dhruv Barrow MD, Last Rate: 100 mL/hr at 03/08/22 1431, 1,000 mg at 03/08/22 1431    insulin lispro (HumaLOG) 100 units/mL subcutaneous injection 1-6 Units, 1-6 Units, Subcutaneous, Q6H [address], 3 Units at 03/09/22 0557 **AND** Fingerstick Glucose (POCT), , , Q6H, Reed Flores MD    latanoprost (XALATAN) 0 005 % ophthalmic solution 1 drop, 1 drop, Both Eyes, HS, Daisy Keating MD, 1 drop at 03/08/22 2241    ondansetron (ZOFRAN) injection 4 mg, 4 mg, Intravenous, Q6H PRN, Dhruv Barrow MD, 4 mg at 03/08/22 0311    pantoprazole (PROTONIX) injection 40 mg, 40 mg, Intravenous, Q12H [address], Jerilyn Keating MD, 40 mg at 03/08/22 2110   pravastatin (PRAVACHOL) tablet 80 mg, 80 mg, Oral, Daily With Leonarda Arnold MD, 80 mg at 03/08/22 1656    sodium bicarbonate 150 mEq in dextrose 5 % 1,000 mL infusion, 70 mL/hr, Intravenous, Continuous, Risa Umana MD, Last Rate: 70 mL/hr at 03/08/22 2239, 70 mL/hr at 03/08/22 2239    tamsulosin (FLOMAX) capsule 0 4 mg, 0 4 mg, Oral, Daily With Leonarda Arnold MD, 0 4 mg at 03/08/22 1656    Laboratory Results:  Lab Results   Component Value Date    WBC 13 87 (H) 03/09/2022    HGB 7 4 (L) 03/09/2022    HCT 22 9 (L) 03/09/2022    MCV 86 03/09/2022     03/09/2022     Lab Results   Component Value Date    SODIUM 136 03/09/2022    K 4 5 03/09/2022    CL 92 (L) 03/09/2022    CO2 24 03/09/2022     (H) 03/09/2022    CREATININE 13 69 (H) 03/09/2022    GLUC 260 (H) 03/09/2022    CALCIUM 7 1 (L) 03/09/2022     Lab Results   Component Value Date    CALCIUM 7 1 (L) 03/09/2022    PHOS 7 9 (H) 03/08/2022     No results found for: LABPROT

## 2022-03-10 ENCOUNTER — APPOINTMENT (INPATIENT)
Dept: RADIOLOGY | Facility: HOSPITAL | Age: 77
DRG: 674 | End: 2022-03-10
Attending: RADIOLOGY
Payer: MEDICARE

## 2022-03-10 ENCOUNTER — APPOINTMENT (INPATIENT)
Dept: DIALYSIS | Facility: HOSPITAL | Age: 77
DRG: 674 | End: 2022-03-10
Payer: MEDICARE

## 2022-03-10 LAB
ANION GAP SERPL CALCULATED.3IONS-SCNC: 15 MMOL/L (ref 4–13)
AORTIC ROOT: 3.2 CM
AORTIC VALVE MEAN VELOCITY: 16 M/S
APICAL FOUR CHAMBER EJECTION FRACTION: 77 %
AV AREA BY CONTINUOUS VTI: 1.8 CM2
AV AREA PEAK VELOCITY: 1.7 CM2
AV LVOT MEAN GRADIENT: 4 MMHG
AV LVOT PEAK GRADIENT: 8 MMHG
AV MEAN GRADIENT: 12 MMHG
AV PEAK GRADIENT: 22 MMHG
AV VALVE AREA: 1.77 CM2
AV VELOCITY RATIO: 0.59
BUN SERPL-MCNC: 129 MG/DL (ref 5–25)
CALCIUM SERPL-MCNC: 7 MG/DL (ref 8.3–10.1)
CHLORIDE SERPL-SCNC: 94 MMOL/L (ref 100–108)
CO2 SERPL-SCNC: 26 MMOL/L (ref 21–32)
CREAT SERPL-MCNC: 10.08 MG/DL (ref 0.6–1.3)
DOP CALC AO PEAK VEL: 2.34 M/S
DOP CALC AO VTI: 46.52 CM
DOP CALC LVOT AREA: 2.83 CM2
DOP CALC LVOT DIAMETER: 1.9 CM
DOP CALC LVOT PEAK VEL VTI: 29.05 CM
DOP CALC LVOT PEAK VEL: 1.38 M/S
DOP CALC LVOT STROKE INDEX: 40.1 ML/M2
DOP CALC LVOT STROKE VOLUME: 82.32 CM3
E WAVE DECELERATION TIME: 208 MS
ERYTHROCYTE [DISTWIDTH] IN BLOOD BY AUTOMATED COUNT: 14.6 % (ref 11.6–15.1)
FRACTIONAL SHORTENING: 31 % (ref 28–44)
GFR SERPL CREATININE-BSD FRML MDRD: 4 ML/MIN/1.73SQ M
GLUCOSE SERPL-MCNC: 184 MG/DL (ref 65–140)
GLUCOSE SERPL-MCNC: 200 MG/DL (ref 65–140)
GLUCOSE SERPL-MCNC: 203 MG/DL (ref 65–140)
GLUCOSE SERPL-MCNC: 260 MG/DL (ref 65–140)
GLUCOSE SERPL-MCNC: 274 MG/DL (ref 65–140)
HCT VFR BLD AUTO: 22.5 % (ref 36.5–49.3)
HGB BLD-MCNC: 7.1 G/DL (ref 12–17)
INR PPP: 1.31 (ref 0.84–1.19)
INTERVENTRICULAR SEPTUM IN DIASTOLE (PARASTERNAL SHORT AXIS VIEW): 1 CM
INTERVENTRICULAR SEPTUM: 1 CM (ref 0.54–1.01)
LAAS-AP2: 21.7 CM2
LAAS-AP4: 22.9 CM2
LEFT ATRIUM SIZE: 4.1 CM
LEFT INTERNAL DIMENSION IN SYSTOLE: 3.5 CM (ref 3.17–4.79)
LEFT VENTRICULAR INTERNAL DIMENSION IN DIASTOLE: 5.1 CM (ref 5.22–7.77)
LEFT VENTRICULAR POSTERIOR WALL IN END DIASTOLE: 1 CM (ref 0.53–1)
LEFT VENTRICULAR STROKE VOLUME: 75 ML
LVSV (TEICH): 75 ML
MCH RBC QN AUTO: 28 PG (ref 26.8–34.3)
MCHC RBC AUTO-ENTMCNC: 31.6 G/DL (ref 31.4–37.4)
MCV RBC AUTO: 89 FL (ref 82–98)
MV E'TISSUE VEL-SEP: 10 CM/S
MV PEAK A VEL: 0.91 M/S
MV PEAK E VEL: 107 CM/S
MV STENOSIS PRESSURE HALF TIME: 60 MS
MV VALVE AREA P 1/2 METHOD: 3.67 CM2
PLATELET # BLD AUTO: 218 THOUSANDS/UL (ref 149–390)
PMV BLD AUTO: 9.7 FL (ref 8.9–12.7)
POTASSIUM SERPL-SCNC: 4.1 MMOL/L (ref 3.5–5.3)
PROTHROMBIN TIME: 16 SECONDS (ref 11.6–14.5)
RBC # BLD AUTO: 2.54 MILLION/UL (ref 3.88–5.62)
RIGHT ATRIUM AREA SYSTOLE A4C: 17.5 CM2
RIGHT VENTRICLE ID DIMENSION: 3.1 CM
SL CV LEFT ATRIUM LENGTH A2C: 5.8 CM
SL CV LV EF: 55
SL CV PED ECHO LEFT VENTRICLE DIASTOLIC VOLUME (MOD BIPLANE) 2D: 126 ML
SL CV PED ECHO LEFT VENTRICLE SYSTOLIC VOLUME (MOD BIPLANE) 2D: 51 ML
SODIUM SERPL-SCNC: 135 MMOL/L (ref 136–145)
TR MAX PG: 49 MMHG
TR PEAK VELOCITY: 3.5 M/S
TRICUSPID VALVE PEAK REGURGITATION VELOCITY: 3.49 M/S
WBC # BLD AUTO: 12.62 THOUSAND/UL (ref 4.31–10.16)
Z-SCORE OF INTERVENTRICULAR SEPTUM IN END DIASTOLE: 1.85
Z-SCORE OF LEFT VENTRICULAR DIMENSION IN END DIASTOLE: -2.22
Z-SCORE OF LEFT VENTRICULAR DIMENSION IN END SYSTOLE: -0.85
Z-SCORE OF LEFT VENTRICULAR POSTERIOR WALL IN END DIASTOLE: 1.96

## 2022-03-10 PROCEDURE — 88305 TISSUE EXAM BY PATHOLOGIST: CPT | Performed by: INTERNAL MEDICINE

## 2022-03-10 PROCEDURE — 99232 SBSQ HOSP IP/OBS MODERATE 35: CPT | Performed by: INTERNAL MEDICINE

## 2022-03-10 PROCEDURE — 50200 RENAL BIOPSY PERQ: CPT | Performed by: RADIOLOGY

## 2022-03-10 PROCEDURE — 88348 ELECTRON MICROSCOPY DX: CPT | Performed by: INTERNAL MEDICINE

## 2022-03-10 PROCEDURE — 77012 CT SCAN FOR NEEDLE BIOPSY: CPT | Performed by: RADIOLOGY

## 2022-03-10 PROCEDURE — 0TB03ZX EXCISION OF RIGHT KIDNEY, PERCUTANEOUS APPROACH, DIAGNOSTIC: ICD-10-PCS | Performed by: RADIOLOGY

## 2022-03-10 PROCEDURE — 82948 REAGENT STRIP/BLOOD GLUCOSE: CPT

## 2022-03-10 PROCEDURE — 50200 RENAL BIOPSY PERQ: CPT

## 2022-03-10 PROCEDURE — 88313 SPECIAL STAINS GROUP 2: CPT | Performed by: INTERNAL MEDICINE

## 2022-03-10 PROCEDURE — 88346 IMFLUOR 1ST 1ANTB STAIN PX: CPT | Performed by: INTERNAL MEDICINE

## 2022-03-10 PROCEDURE — 99232 SBSQ HOSP IP/OBS MODERATE 35: CPT | Performed by: FAMILY MEDICINE

## 2022-03-10 PROCEDURE — 85610 PROTHROMBIN TIME: CPT | Performed by: RADIOLOGY

## 2022-03-10 PROCEDURE — 80048 BASIC METABOLIC PNL TOTAL CA: CPT | Performed by: FAMILY MEDICINE

## 2022-03-10 PROCEDURE — 99222 1ST HOSP IP/OBS MODERATE 55: CPT | Performed by: INTERNAL MEDICINE

## 2022-03-10 PROCEDURE — 85027 COMPLETE CBC AUTOMATED: CPT | Performed by: FAMILY MEDICINE

## 2022-03-10 PROCEDURE — 99153 MOD SED SAME PHYS/QHP EA: CPT

## 2022-03-10 PROCEDURE — 90935 HEMODIALYSIS ONE EVALUATION: CPT | Performed by: INTERNAL MEDICINE

## 2022-03-10 PROCEDURE — 99152 MOD SED SAME PHYS/QHP 5/>YRS: CPT

## 2022-03-10 PROCEDURE — 88300 SURGICAL PATH GROSS: CPT | Performed by: PATHOLOGY

## 2022-03-10 PROCEDURE — 99152 MOD SED SAME PHYS/QHP 5/>YRS: CPT | Performed by: RADIOLOGY

## 2022-03-10 PROCEDURE — 88350 IMFLUOR EA ADDL 1ANTB STN PX: CPT | Performed by: INTERNAL MEDICINE

## 2022-03-10 PROCEDURE — C9113 INJ PANTOPRAZOLE SODIUM, VIA: HCPCS | Performed by: INTERNAL MEDICINE

## 2022-03-10 RX ORDER — MIDAZOLAM HYDROCHLORIDE 2 MG/2ML
INJECTION, SOLUTION INTRAMUSCULAR; INTRAVENOUS CODE/TRAUMA/SEDATION MEDICATION
Status: COMPLETED | OUTPATIENT
Start: 2022-03-10 | End: 2022-03-10

## 2022-03-10 RX ORDER — LIDOCAINE WITH 8.4% SOD BICARB 0.9%(10ML)
SYRINGE (ML) INJECTION CODE/TRAUMA/SEDATION MEDICATION
Status: COMPLETED | OUTPATIENT
Start: 2022-03-10 | End: 2022-03-10

## 2022-03-10 RX ORDER — LABETALOL 20 MG/4 ML (5 MG/ML) INTRAVENOUS SYRINGE
CODE/TRAUMA/SEDATION MEDICATION
Status: COMPLETED | OUTPATIENT
Start: 2022-03-10 | End: 2022-03-10

## 2022-03-10 RX ORDER — FENTANYL CITRATE 50 UG/ML
INJECTION, SOLUTION INTRAMUSCULAR; INTRAVENOUS CODE/TRAUMA/SEDATION MEDICATION
Status: COMPLETED | OUTPATIENT
Start: 2022-03-10 | End: 2022-03-10

## 2022-03-10 RX ADMIN — FENTANYL CITRATE 25 MCG: 50 INJECTION, SOLUTION INTRAMUSCULAR; INTRAVENOUS at 12:17

## 2022-03-10 RX ADMIN — MIDAZOLAM 0.5 MG: 1 INJECTION INTRAMUSCULAR; INTRAVENOUS at 11:54

## 2022-03-10 RX ADMIN — LABETALOL 20 MG/4 ML (5 MG/ML) INTRAVENOUS SYRINGE 10 MG: at 12:17

## 2022-03-10 RX ADMIN — PANTOPRAZOLE SODIUM 40 MG: 40 INJECTION, POWDER, FOR SOLUTION INTRAVENOUS at 21:35

## 2022-03-10 RX ADMIN — TAMSULOSIN HYDROCHLORIDE 0.4 MG: 0.4 CAPSULE ORAL at 16:50

## 2022-03-10 RX ADMIN — LATANOPROST 1 DROP: 50 SOLUTION OPHTHALMIC at 21:34

## 2022-03-10 RX ADMIN — Medication 20 ML: at 12:00

## 2022-03-10 RX ADMIN — INSULIN LISPRO 3 UNITS: 100 INJECTION, SOLUTION INTRAVENOUS; SUBCUTANEOUS at 17:19

## 2022-03-10 RX ADMIN — FENTANYL CITRATE 50 MCG: 50 INJECTION, SOLUTION INTRAMUSCULAR; INTRAVENOUS at 11:54

## 2022-03-10 RX ADMIN — CEFTRIAXONE 1000 MG: 1 INJECTION, SOLUTION INTRAVENOUS at 14:16

## 2022-03-10 RX ADMIN — INSULIN LISPRO 2 UNITS: 100 INJECTION, SOLUTION INTRAVENOUS; SUBCUTANEOUS at 18:00

## 2022-03-10 RX ADMIN — INSULIN LISPRO 2 UNITS: 100 INJECTION, SOLUTION INTRAVENOUS; SUBCUTANEOUS at 06:11

## 2022-03-10 RX ADMIN — PRAVASTATIN SODIUM 80 MG: 80 TABLET ORAL at 16:50

## 2022-03-10 RX ADMIN — INSULIN LISPRO 2 UNITS: 100 INJECTION, SOLUTION INTRAVENOUS; SUBCUTANEOUS at 21:36

## 2022-03-10 NOTE — SEDATION DOCUMENTATION
Pt tolerated kidney biopsy  Labs collected and reviewed  Pt BP slightly high, 10mg labetalol given  Repeat vitals completed  Pt transferred to floor  Report given to IP nurse

## 2022-03-10 NOTE — OCCUPATIONAL THERAPY NOTE
OCCUPATIONAL THERAPY       03/10/22 1201   Note Type   Note type Evaluation; Cancelled Session   Cancel Reasons Patient to operating room   Additional Comments kidney biopsy   Licensure   [place] License Number  Toma Mcmullen MS, OTR/L, #[id number]

## 2022-03-10 NOTE — PROGRESS NOTES
NEPHROLOGY PROGRESS NOTE    Bella Dates 64 y o  male MRN: [id number]  Unit/Bed#: [address] [place] [address] Encounter: [id number]  Reason for Consult:  Renal failure    The patient is awake and alert says not really sleeping well  He is having some hemoptysis and coughed up a clot today  He has no shortness of breath he was seen while on dialysis  ASSESSMENT/PLAN:  1  Renal    The patient has acute renal failure which developed over the last couple months  He presented with severe renal failure has been initiated on hemodialysis details of today's treatment outline below  The patient has 2 4 g of proteinuria microscopic hematuria and he is having some hemoptysis  My concern is that he has a pulmonary hemorrhage glomerular nephritis syndrome  He is going to undergo renal biopsy today  So far serologies that are back show normal complements and a negative SAMANTHA  Anca and anti-GBM are pending  HEMODIALYSIS PROCEDURE NOTE  The patient was seen and examined on hemodialysis  Time: 3 hours  Sodium: 138 Blood flow: 350   Dialyzer: F160 Potassium: 2 Dialysate flow: 600   Access: catheter Bicarbonate: 35 Ultrafiltration goal: 1 - 2 L as tolerated        Case management arranging outpatient dialysis prior to discharge  Renal biopsy today  I did personally give them the paperwork  Next dialysis planned for Saturday  2  Anemia    GI is following plans on endoscopy today to evaluate for GI bleeding  SUBJECTIVE:  Review of Systems   Constitutional: Negative for chills, fever, malaise/fatigue and night sweats  HENT: Negative  Eyes: Negative  Cardiovascular: Negative for chest pain, dyspnea on exertion, leg swelling and orthopnea  Respiratory: Positive for hemoptysis  Negative for cough, sputum production and wheezing  Gastrointestinal: Negative for abdominal pain, diarrhea, nausea and vomiting  Genitourinary: Negative for dysuria, flank pain, hematuria and incomplete emptying     Neurological: Negative for dizziness, focal weakness, headaches and light-headedness  Psychiatric/Behavioral: Negative for altered mental status, depression, hallucinations and hypervigilance  OBJECTIVE:  Current Weight: Weight - Scale: 88 5 kg (195 lb)  Vitals:Temp (24hrs), Av °F (36 7 °C), Min:97 4 °F (36 3 °C), Max:98 2 °F (36 8 °C)  Current: Temperature: 98 1 °F (36 7 °C)   Blood pressure 144/71, pulse 67, temperature 98 1 °F (36 7 °C), temperature source Temporal, resp  rate 18, height 5' 8" (1 727 m), weight 88 5 kg (195 lb), SpO2 95 %  , Body mass index is 29 65 kg/m²  Intake/Output Summary (Last 24 hours) at 3/10/2022 0849  Last data filed at 3/10/2022 0804  Gross per 24 hour   Intake 800 ml   Output 1755 ml   Net -955 ml       Physical Exam: /71   Pulse 67   Temp 98 1 °F (36 7 °C) (Temporal)   Resp 18   Ht 5' 8" (1 727 m)   Wt 88 5 kg (195 lb)   SpO2 95%   BMI 29 65 kg/m²   Physical Exam  Constitutional:       General: He is not in acute distress  Appearance: He is not toxic-appearing or diaphoretic  HENT:      Head: Normocephalic and atraumatic  Mouth/Throat:      Mouth: Mucous membranes are dry  Eyes:      General: No scleral icterus  Extraocular Movements: Extraocular movements intact  Cardiovascular:      Rate and Rhythm: Normal rate and regular rhythm  Heart sounds: No friction rub  No gallop  Comments: Mild edema  Pulmonary:      Effort: Pulmonary effort is normal  No respiratory distress  Breath sounds: Normal breath sounds  No wheezing, rhonchi or rales  Abdominal:      General: Bowel sounds are normal  There is no distension  Palpations: Abdomen is soft  Tenderness: There is no abdominal tenderness  There is no rebound  Musculoskeletal:      Cervical back: Normal range of motion and neck supple  Neurological:      Mental Status: He is alert and oriented to person, place, and time     Psychiatric:         Mood and Affect: Mood normal          Behavior: Behavior normal          Thought Content:  Thought content normal          Judgment: Judgment normal          Medications:    Current Facility-Administered Medications:     acetaminophen (TYLENOL) tablet 650 mg, 650 mg, Oral, Q6H PRN, Clement Smalls MD, 650 mg at 03/08/22 0310    cefTRIAXone (ROCEPHIN) IVPB (premix in dextrose) 1,000 mg 50 mL, 1,000 mg, Intravenous, Q24H, Clement Smalls MD, Last Rate: 100 mL/hr at 03/09/22 1424, 1,000 mg at 03/09/22 1424    insulin lispro (HumaLOG) 100 units/mL subcutaneous injection 1-6 Units, 1-6 Units, Subcutaneous, Q6H [address], 2 Units at 03/10/22 0611 **AND** Fingerstick Glucose (POCT), , , Q6H, Héctor Mathew MD    latanoprost (XALATAN) 0 005 % ophthalmic solution 1 drop, 1 drop, Both Eyes, HS, Daisy Keating MD, 1 drop at 03/09/22 2136    ondansetron (ZOFRAN) injection 4 mg, 4 mg, Intravenous, Q6H PRN, Clement Smalls MD, 4 mg at 03/09/22 2034    pantoprazole (PROTONIX) injection 40 mg, 40 mg, Intravenous, Q12H [address], Cleemnt Smalls MD, 40 mg at 03/09/22 2136    pravastatin (PRAVACHOL) tablet 80 mg, 80 mg, Oral, Daily With Trevor Campoverde MD, 80 mg at 03/09/22 1734    tamsulosin (FLOMAX) capsule 0 4 mg, 0 4 mg, Oral, Daily With Trevor Campoverde MD, 0 4 mg at 03/09/22 1734    Laboratory Results:  Lab Results   Component Value Date    WBC 12 62 (H) 03/10/2022    HGB 7 1 (L) 03/10/2022    HCT 22 5 (L) 03/10/2022    MCV 89 03/10/2022     03/10/2022     Lab Results   Component Value Date    SODIUM 135 (L) 03/10/2022    K 4 1 03/10/2022    CL 94 (L) 03/10/2022    CO2 26 03/10/2022     (H) 03/10/2022    CREATININE 10 08 (H) 03/10/2022    GLUC 184 (H) 03/10/2022    CALCIUM 7 0 (L) 03/10/2022     Lab Results   Component Value Date    CALCIUM 7 0 (L) 03/10/2022    PHOS 7 9 (H) 03/08/2022     No results found for: LABPROT

## 2022-03-10 NOTE — BRIEF OP NOTE (RAD/CATH)
INTERVENTIONAL RADIOLOGY PROCEDURE NOTE    Date: 3/10/2022    Procedure: IR BIOPSY KIDNEY COLUMBIA KIT NO LATERALITY     Preoperative diagnosis:   1  Chest pain    2  LORRAINE (acute kidney injury) ([address] )    3  Dehydration    4  Metabolic acidosis    5  Symptomatic anemia    6  Hyperkalemia    7  Generalized weakness    8  Melena    9  Elevated troponin I level      Postoperative diagnosis: Same  Surgeon: Pina Dumont MD     Assistant: None  No qualified resident was available  Blood loss: minimal    Specimens: 18 gauge cores x 5    Findings: Successful right renal cortex core biopsy for [place] Kit  Complications: None immediate      Anesthesia: conscious sedation

## 2022-03-10 NOTE — PLAN OF CARE
Problem: Potential for Falls  Goal: Patient will remain free of falls  Description: INTERVENTIONS:  - Educate patient/family on patient safety including physical limitations  - Instruct patient to call for assistance with activity   - Consult OT/PT to assist with strengthening/mobility   - Keep Call bell within reach  - Keep bed low and locked with side rails adjusted as appropriate  - Keep care items and personal belongings within reach  - Initiate and maintain comfort rounds  - Make Fall Risk Sign visible to staff  - Offer Toileting every 2 Hours, in advance of need  - Initiate/Maintain bed alarm  - Obtain necessary fall risk management equipment: call bell  Problem: METABOLIC, FLUID AND ELECTROLYTES - ADULT  Goal: Electrolytes maintained within normal limits  Description: INTERVENTIONS:  - Monitor labs and assess patient for signs and symptoms of electrolyte imbalances  - Administer electrolyte replacement as ordered  - Monitor response to electrolyte replacements, including repeat lab results as appropriate  - Instruct patient on fluid and nutrition as appropriate  Outcome: Progressing     Problem: METABOLIC, FLUID AND ELECTROLYTES - ADULT  Goal: Fluid balance maintained  Description: INTERVENTIONS:  - Monitor labs   - Monitor I/O and WT  - Instruct patient on fluid and nutrition as appropriate  - Assess for signs & symptoms of volume excess or deficit  Outcome: Progressing     - Apply yellow socks and bracelet for high fall risk patients  - Consider moving patient to room near nurses station  Outcome: Progressing

## 2022-03-10 NOTE — PLAN OF CARE
Pt  has no complaints  Will have kidney biopsy around 1030  Will have 2 hours of hemodialysis  On 2K bath as ordered  Will attempt UF-1000 ml as tolerated  Problem: METABOLIC, FLUID AND ELECTROLYTES - ADULT  Goal: Electrolytes maintained within normal limits  Description: INTERVENTIONS:  - Monitor labs and assess patient for signs and symptoms of electrolyte imbalances  - Administer electrolyte replacement as ordered  - Monitor response to electrolyte replacements, including repeat lab results as appropriate  - Instruct patient on fluid and nutrition as appropriate  Outcome: Progressing  Goal: Fluid balance maintained  Description: INTERVENTIONS:  - Monitor labs   - Monitor I/O and WT  - Instruct patient on fluid and nutrition as appropriate  - Assess for signs & symptoms of volume excess or deficit  Outcome: Progressing   Post-Dialysis RN Treatment Note    Blood Pressure:  Pre 141/61 mm/Hg  Post 160/60 mmHg   EDW  tbd kg    Weight:  Pre 82 1 kg   Post 81 1 kg   Mode of weight measurement: Bed Scale   Volume Removed  1000 ml    Treatment duration 120 minutes    NS given  No    Treatment shortened?  No   Medications given during Rx Not Applicable   Estimated Kt/V  Not Applicable   Access type: Permacath/TDC   Access Issues: No    Report called to primary nurse   Yes        Saadia Pelaez RN  Tolerated second day of hemodialysis

## 2022-03-10 NOTE — PROGRESS NOTES
Patient had chest pain earlier today which resolved by itself  Could be GI vs symptomatic anemia  Troponin are being trended   0 hr - 103   2 hr 104  delta is 1   4 hr 89 delta is -14     No active chest pain   EKG reviewed by day hospitalitist did not show ST or T wave changes  Elevated creatinine probably also attributing to high levels of troponin  Echo is pending    Cardiology consulted - once cleared by cardio, EGD can be done for GI bleed

## 2022-03-10 NOTE — CASE MANAGEMENT
Case Management Discharge Planning Note    Patient name Chriss Paget  Location [address] [facility] 408/4 559 Teresa Michel-* MRN [id number]  : 1945 Date 3/10/2022       Current Admission Date: 3/7/2022  Current Admission Diagnosis:Symptomatic anemia   Patient Active Problem List    Diagnosis Date Noted    Hemoptysis 2022    [facility]INE [facility]) [facility]     Other proteinuria     Symptomatic anemia 2022    Chest pain 2022    Acute kidney injury ([facility] ) 2022    High anion gap metabolic acidosis     UTI (urinary tract infection) 2022    Melena 2022    Elevated PSA 2021    Chronic pain of right knee 2021    Primary osteoarthritis of right knee 2021    Bladder stones 12/15/2016    Type 2 diabetes mellitus with microalbuminuria, with long-term current use of insulin [facility] ) 2016    Benign colon polyp 2013    Benign prostatic hyperplasia with lower urinary tract symptoms 2013    Benign essential hypertension 2013    Hyperlipidemia 2012    Vitamin D deficiency 2012      LOS (days): 3  Geometric Mean LOS (GMLOS) (days): 3 60  Days to GMLOS:0 7     OBJECTIVE:  Risk of Unplanned Readmission Score: 18   Current admission status: Inpatient   Preferred Pharmacy:   [facility] [address], [phone] [place] [id number]  Phone: [phone] [facility]ax: [facility]   [address] [place] [address], [address] [place] [address] [address]  [facility] [facility], [address]  [facility] [address]  Phone: [phone] Fax: [phone]    Primary Care Provider: Ashish Ma MD    Primary Insurance: MEDICARE  Secondary Insurance: BLUE CROSS    DISCHARGE DETAILS:    CM [facility] [phone] (option 2,3, 1) extension: [phone] regarding referral for new start dialysis   CM attempted to call him back but there was no answer and a message was left     Awaiting call back

## 2022-03-10 NOTE — PROGRESS NOTES
Progress Note- Howie Anand 64 y o  male MRN: [id number]    Unit/Bed#: [address] [facility] [address]01 Encounter: [id number]      Assessment and Plan: This is a 77-year-old male with history of BPH s/p TURP (2016), DM, HLD, HTN, and CKD 3 who presented to the hospital due to chest pain and also noted poor appetite for last 2 week  Cardiac troponins negative and EKG showed NSR  He was noted to have acute renal failure on labs with significant normocytic anemia       1  Symptomatic anemia  Symptomatic anemia may be multifactorial due to underlying kidney disease and cannot exclude occult GI bleeding as a factor  Baseline hemoglobin in 2019 was 13-15, now 5 8 on admission with normal MCV  Pt is a poor historian due to hearing loss, he originally reported black stool x 2 days to admitting team but now denies black/bloody stool at home  There has been no evidence of melena since admission and pt had brown stool on rectal exam  Denies any abdominal pain, nausea, or vomiting  He does note poor appetite over the last 2 weeks and this could be related to [address] pathology  He is s/p 3 units of blood and Hgb slowly trending down to 7 1  CT negative for retroperitoneal bleed  EGD deferred yesterday due to chest pain, he was evaluated by cardiology and cleared for EGD      -maintain large-bore IV  -monitor H&H  -transfuse blood products as needed  -continue IV Protonix b i d   -Will plan for EGD tomorrow after HD  Prep and procedure explained   -Please notify GI for any evidence of GIB or hemodynamic instability       ______________________________________________________________________    Subjective:     Pt just returned from kidney biopsy  Very [place]  Has some soreness at the site of biopsy no other complaints  No recent BMs per nursing      Medication Administration - last 24 hours from 03/09/2022 1453 to 03/10/2022 1453       Date/Time Order Dose Route Action Action by     03/09/2022 7062 latanoprost (XALATAN) 0 005 % ophthalmic solution 1 drop 1 drop Both Eyes Given Venkat Turner RN     03/09/2022 1734 pravastatin (PRAVACHOL) tablet 80 mg 80 mg Oral Given Mallory Davis RN     03/09/2022 1734 tamsulosin [facility]) capsule 0 4 mg 0 4 mg Oral Given Mallory Davis RN     03/09/2022 2034 ondansetron [facility]) injection 4 mg 4 mg Intravenous Given Venkat Turner RN     03/10/2022 0920 pantoprazole (PROTONIX) injection 40 mg 40 mg Intravenous Not Given Xochilt Levi RN     03/09/2022 2136 pantoprazole (PROTONIX) injection 40 mg 40 mg Intravenous Given Venkat Turner RN     03/10/2022 1416 cefTRIAXone (ROCEPHIN) IVPB (premix in dextrose) 1,000 mg 50 mL 1,000 mg Intravenous [address] Xochilt Levi RN     03/10/2022 1203 insulin lispro (HumaLOG) 100 units/mL subcutaneous injection 1-6 Units 1 Units Subcutaneous Not Given Xochilt Levi RN     03/10/2022 1261 insulin lispro (HumaLOG) 100 units/mL subcutaneous injection 1-6 Units 2 Units Subcutaneous Given Venkat Turner RN     03/09/2022 2353 insulin lispro (HumaLOG) 100 units/mL subcutaneous injection 1-6 Units 5 Units Subcutaneous Given Venkat uTrner RN     03/09/2022 1735 insulin lispro (HumaLOG) 100 units/mL subcutaneous injection 1-6 Units 1 Units Subcutaneous Given Mallory Davis RN     03/10/2022 1217 fentanyl citrate (PF) 100 MCG/2ML 25 mcg Intravenous Given Eufemia Cardenas RN     03/10/2022 1154 fentanyl citrate (PF) 100 MCG/2ML 50 mcg Intravenous Given Anju Jasmine MD     03/10/2022 1154 midazolam (VERSED) injection 0 5 mg Intravenous Given Anju Jasmine MD     03/10/2022 1200 lidocaine 1% buffered 20 mL Infiltration Given Anju Jasmine MD     03/10/2022 1217 Labetalol HCl (NORMODYNE) injection 10 mg Intravenous Given Edyth Hammans, RN          Objective:     Vitals: Blood pressure 146/57, pulse 74, temperature 97 8 °F (36 6 °C), temperature source Temporal, resp  rate 19, height 5' 8" (1 727 m), weight 88 5 kg (195 lb), SpO2 91 %  ,Body mass index is 29 65 kg/m²  Intake/Output Summary (Last 24 hours) at 3/10/2022 1453  Last data filed at 3/10/2022 1003  Gross per 24 hour   Intake 500 ml   Output 1750 ml   Net -1250 ml       Physical Exam:   General Appearance: Awake and alert, in no acute distress  Abdomen: Soft, non-tender, non-distended; bowel sounds normal; no masses or no organomegaly    Invasive Devices  Report    Peripheral Intravenous Line            Peripheral IV 03/08/22 Left Wrist 1 day          Hemodialysis Catheter            HD Permanent Double Catheter 2 days                Lab Results:  No results displayed because visit has over 200 results  Imaging Studies: I have personally reviewed pertinent imaging studies

## 2022-03-10 NOTE — CONSULTS
Consultation - Cardiology   Rebecca Garcia 64 y o  male MRN: [id number]  Unit/Bed#: [address] 408-01 Encounter: [id number]    Assessment/Plan     Assessment:  1  Acute kidney injury now on dialysis  2  Chest pain  3  Hypertension  4  Anemia concerning for GI bleed  5  Diabetes      Plan:  Patient is currently admitted to the hospitalist service  1  Renal replacement per Nephrology    2  Continue current medications     3  Twelve lead EKG is normal and patient does not have any further complaints of chest discomfort  Echocardiogram was completely normal in structure and wall motion  May proceed with EGD and renal biopsy is scheduled  4  Lexiscan nuclear stress test once patient is stable from renal standpoint, can be done in the outpatient setting       History of Present Illness   Physician Requesting Consult: Yajaira Elizabeth DO  Reason for Consult / Principal Problem:  Chest pain      HPI: Rebecca Garcia is a 64y o  year old male who initially presented to the hospital on 03/07/2022 to the emergency room with complaints of generalized feeling unwell, chest pain, no appetite for 3 weeks and mild mental status changes  Patient was noted to be in acute renal failure with hyperkalemia  Has been admitted and started on dialysis  Nephrology is following him  Last evening patient complained of chest pain  High sensitivity troponins were 103, 104 and 89  On interview today, patient does not remember complaining of chest discomfort and is unable to elaborate on the episode  He states that he slept well last evening  Notes that he has never been seen by a cardiologist or had any cardiac workup  Patient has a history for arthritis, read care kidney stones and bladder calculi, BPH, diabetes, dyslipidemia, hypertension and Lyme disease  Today he is scheduled for renal biopsy and also EGD  Patient is resting comfortably in bed without any complaints receiving a hemodialysis treatment    He states no when asked if he has chest pain, pressure or palpitations, there is no pain with palpation of his chest   He denies any nausea, vomiting or diarrhea  Inpatient consult to Cardiology  Consult performed by: FERNANDO Henderson  Consult ordered by: Pete Jamison MD          Review of Systems   Unable to perform ROS: Age       Historical Information   Past Medical History:   Diagnosis Date    Anesthesia complication     after colonoscopy , pt was awake but could not control his body and kept falling     Arthritis     Bladder calculi     BPH (benign prostatic hyperplasia)     Diabetes mellitus ([address] )     Hearing disorder of both ears     wears bilateral hearing aids    Hyperlipidemia     controlled and maintained d/t diabetes    Hypertension     Kidney stones     Last Assessed:4/3/2017    Lyme disease     Last Assessed:2015    Rupture, bladder, spontaneous     Last Assessed:4/3/2017     Past Surgical History:   Procedure Laterality Date    BLADDER REPAIR N/A 12/10/2016    Procedure: REPAIR BLADDER/cysto insertion stent;  Surgeon: Chelsi Samuel MD;  Location: [facility];  Service:     COLONOSCOPY      CYSTOLITHOTOMY      ANESTHESIA   lower abdomen  ;  Last Assessed:4/3/2017    IR TUNNELED DIALYSIS CATHETER PLACEMENT  3/8/2022    OTHER SURGICAL HISTORY      thermal dilation of the prostate x 2 ( in the office)   [address] RESECTION OF PROSTATE N/A 2016    Procedure:  Cysto, Laser Bladder stone,fulgaration of prostates tissue ;  Surgeon: Chelsi Samuel MD;  Location: [facility];  Service:      Social History     Substance and Sexual Activity   Alcohol Use Yes     Social History     Substance and Sexual Activity   Drug Use No     E-Cigarette/Vaping    E-Cigarette Use Never User      E-Cigarette/Vaping Substances     Social History     Tobacco Use   Smoking Status Former Smoker    Types: Cigars    Quit date: 24    Years since quittin 2   Smokeless Tobacco Never Used     Family History:   Family History   Problem Relation Age of Onset    Cancer Mother         breast    Diabetes Mother     Cancer Father         prostate w/ bone mets    Prostate cancer Father     Alzheimer's disease Sister        Meds/Allergies   all current active meds have been reviewed, current meds:   Current Facility-Administered Medications   Medication Dose Route Frequency    acetaminophen (TYLENOL) tablet 650 mg  650 mg Oral Q6H PRN    cefTRIAXone (ROCEPHIN) IVPB (premix in dextrose) 1,000 mg 50 mL  1,000 mg Intravenous Q24H    insulin lispro (HumaLOG) 100 units/mL subcutaneous injection 1-6 Units  1-6 Units Subcutaneous Q6H [address]    latanoprost (XALATAN) 0 005 % ophthalmic solution 1 drop  1 drop Both Eyes HS    ondansetron (ZOFRAN) injection 4 mg  4 mg Intravenous Q6H PRN    pantoprazole (PROTONIX) injection 40 mg  40 mg Intravenous Q12H NICKY    pravastatin (PRAVACHOL) tablet 80 mg  80 mg Oral Daily With Dinner    tamsulosin (FLOMAX) capsule 0 4 mg  0 4 mg Oral Daily With Dinner    and PTA meds:   Prior to Admission Medications   Prescriptions Last Dose Informant Patient Reported? Taking?    HumaLOG Mix 75/25 KwikPen (75-25) 100 units/mL injection pen 3/6/2022 at Unknown time  No Yes   Sig: Inject 45 Units under the skin 2 (two) times a day with meals   Insulin Pen Needle (B-D UF III MINI PEN NEEDLES) 31G X 5 MM MISC   No No   Sig: Use twice daily with insulin pen as directed   Vitamin D, Cholecalciferol, 1000 UNITS CAPS  Self Yes No   Sig: Take by mouth 2 times a week   enalapril (VASOTEC) 10 mg tablet 3/6/2022 at Unknown time  No Yes   Sig: Take 1 tablet (10 mg total) by mouth daily   glucose blood test strip   No No   Si each by Other route 3 (three) times a day   latanoprost (XALATAN) 0 005 % ophthalmic solution  Self Yes No   Sig: Administer 1 drop to both eyes daily at bedtime    simvastatin (ZOCOR) 40 mg tablet 3/6/2022 at Unknown time  No Yes   Sig: Take 1 tablet (40 mg total) by mouth daily at bedtime   tamsulosin (FLOMAX) 0 4 mg 3/6/2022 at Unknown time Self No Yes   Sig: TAKE 1 CAPSULE BY MOUTH TWO TIMES DAILY      Facility-Administered Medications: None     Allergies   Allergen Reactions    Amoxicillin Rash       Objective   Vitals: Blood pressure 156/54, pulse 71, temperature 98 1 °F (36 7 °C), temperature source Temporal, resp  rate 18, height 5' 8" (1 727 m), weight 88 5 kg (195 lb), SpO2 96 %  Orthostatic Blood Pressures      Most Recent Value   Blood Pressure 156/54 filed at 03/10/2022 0930   Patient Position - Orthostatic VS Lying filed at 03/10/2022 0804            Intake/Output Summary (Last 24 hours) at 3/10/2022 0950  Last data filed at 3/10/2022 0804  Gross per 24 hour   Intake 800 ml   Output 1755 ml   Net -955 ml       Invasive Devices  Report    Peripheral Intravenous Line            Peripheral IV 03/08/22 Left Wrist 1 day          Hemodialysis Catheter            HD Permanent Double Catheter 1 day                Physical Exam  Vitals and nursing note reviewed  Constitutional:       Appearance: Normal appearance  He is obese  HENT:      Head: Normocephalic  Right Ear: External ear normal       Left Ear: External ear normal    Eyes:      General: No scleral icterus  Right eye: No discharge  Left eye: No discharge  Cardiovascular:      Rate and Rhythm: Normal rate and regular rhythm  Pulses: Normal pulses  Heart sounds: Normal heart sounds  No murmur heard  Pulmonary:      Effort: Pulmonary effort is normal  No respiratory distress  Breath sounds: Normal breath sounds  No wheezing, rhonchi or rales  Abdominal:      General: Bowel sounds are normal  There is no distension  Palpations: Abdomen is soft  Musculoskeletal:      Right lower leg: Edema present  Left lower leg: Edema present  Comments: Trace   Skin:     General: Skin is warm  Capillary Refill: Capillary refill takes less than 2 seconds  Neurological:      General: No focal deficit present  Mental Status: He is alert  Mental status is at baseline  Psychiatric:         Mood and Affect: Mood normal          Lab Results:   I have personally reviewed pertinent lab results  CBC with diff:   Results from last 7 days   Lab Units 03/10/22  0506   WBC Thousand/uL 12 62*   RBC Million/uL 2 54*   HEMOGLOBIN g/dL 7 1*   HEMATOCRIT % 22 5*   MCV fL 89   MCH pg 28 0   MCHC g/dL 31 6   RDW % 14 6   MPV fL 9 7   PLATELETS Thousands/uL 218     CMP:   Results from last 7 days   Lab Units 03/10/22  0506 03/09/22  0519 03/08/22  0744   SODIUM mmol/L 135*   < > 135*   CHLORIDE mmol/L 94*   < > 95*   CO2 mmol/L 26   < > 20*   BUN mg/dL 129*   < > 176*   CREATININE mg/dL 10 08*   < > 13 26*   CALCIUM mg/dL 7 0*   < > 7 3*   AST U/L  --   --  22   ALT U/L  --   --  37   ALK PHOS U/L  --   --  50   EGFR ml/min/1 73sq m 4   < > 3    < > = values in this interval not displayed  HS Troponin:   0   Lab Value Date/Time    HSTNI0 103 (H) 03/09/2022 1439    HSTNI2 104 (H) 03/09/2022 1725    HSTNI4 89 (H) 03/09/2022 1919     BNP:   Results from last 7 days   Lab Units 03/10/22  0506   POTASSIUM mmol/L 4 1   CHLORIDE mmol/L 94*   CO2 mmol/L 26   BUN mg/dL 129*   CREATININE mg/dL 10 08*   CALCIUM mg/dL 7 0*   EGFR ml/min/1 73sq m 4     Coags:   Results from last 7 days   Lab Units 03/10/22  0506 03/07/22  0954 03/07/22  0954   PTT seconds  --   --  36   INR  1 31*   < > 1 41*    < > = values in this interval not displayed  TSH:     Magnesium:   Results from last 7 days   Lab Units 03/07/22  0954   MAGNESIUM mg/dL 2 9*     Lipid Profile:     Imaging: I have personally reviewed pertinent reports      EKG:  Sinus rhythm  VTE Prophylaxis: Sequential compression device Bernida Massing)     Code Status: Level 1 - Full Code  Advance Directive and Living Will:      Power of :    POLST:      Venetta Chai, [facility]

## 2022-03-10 NOTE — PROGRESS NOTES
Sign out from day hospitalist - placed patient on clear liquid diet for now and NPO post midnight     Rn [facility] notified

## 2022-03-10 NOTE — PLAN OF CARE
Problem: MOBILITY - ADULT  Goal: Maintain or return to baseline ADL function  Description: INTERVENTIONS:  -  Assess patient's ability to carry out ADLs; assess patient's baseline for ADL function and identify physical deficits which impact ability to perform ADLs (bathing, care of mouth/teeth, toileting, grooming, dressing, etc )  - Assess/evaluate cause of self-care deficits   - Assess range of motion  - Assess patient's mobility; develop plan if impaired  - Assess patient's need for assistive devices and provide as appropriate  - Encourage maximum independence but intervene and supervise when necessary  - Involve family in performance of ADLs  - Assess for home care needs following discharge   - Consider OT consult to assist with ADL evaluation and planning for discharge  - Provide patient education as appropriate  Outcome: Progressing  Goal: Maintains/Returns to pre admission functional level  Description: INTERVENTIONS:  - Perform BMAT or MOVE assessment daily    - Set and communicate daily mobility goal to care team and patient/family/caregiver     - Collaborate with rehabilitation services on mobility goals if consulted  - Out of bed for toileting  - Record patient progress and toleration of activity level   Outcome: Progressing     Problem: HEMATOLOGIC - ADULT  Goal: Maintains hematologic stability  Description: INTERVENTIONS  - Assess for signs and symptoms of bleeding or hemorrhage  - Monitor labs  - Administer supportive blood products/factors as ordered and appropriate  Outcome: Progressing     Problem: Prexisting or High Potential for Compromised Skin Integrity  Goal: Skin integrity is maintained or improved  Description: INTERVENTIONS:  - Identify patients at risk for skin breakdown  - Assess and monitor skin integrity  - Assess and monitor nutrition and hydration status  - Monitor labs   - Assess for incontinence   - Turn and reposition patient  - Assist with mobility/ambulation  - Relieve pressure over bony prominences  - Avoid friction and shearing  - Provide appropriate hygiene as needed including keeping skin clean and dry  - Evaluate need for skin moisturizer/barrier cream  - Collaborate with interdisciplinary team   - Patient/family teaching  - Consider wound care consult   Outcome: Progressing     Problem: Potential for Falls  Goal: Patient will remain free of falls  Description: INTERVENTIONS:  - Educate patient/family on patient safety including physical limitations  - Instruct patient to call for assistance with activity   - Consult OT/PT to assist with strengthening/mobility   - Keep Call bell within reach  - Keep bed low and locked with side rails adjusted as appropriate  - Keep care items and personal belongings within reach  - Initiate and maintain comfort rounds  - Make Fall Risk Sign visible to staff  - Offer Toileting every 2 Hours, in advance of need  - Initiate/Maintain bed alarm  - Obtain necessary fall risk management equipment: as required   - Apply yellow socks and bracelet for high fall risk patients  - Consider moving patient to room near nurses station  Outcome: Progressing     Problem: METABOLIC, FLUID AND ELECTROLYTES - ADULT  Goal: Electrolytes maintained within normal limits  Description: INTERVENTIONS:  - Monitor labs and assess patient for signs and symptoms of electrolyte imbalances  - Administer electrolyte replacement as ordered  - Monitor response to electrolyte replacements, including repeat lab results as appropriate  - Instruct patient on fluid and nutrition as appropriate  Outcome: Progressing  Goal: Fluid balance maintained  Description: INTERVENTIONS:  - Monitor labs   - Monitor I/O and WT  - Instruct patient on fluid and nutrition as appropriate  - Assess for signs & symptoms of volume excess or deficit  Outcome: Progressing

## 2022-03-10 NOTE — PROGRESS NOTES
[facility] Internal Medicine Progress Note  Patient: Chriss Paget 64 y o  male   MRN: [id number]  PCP: Ashish Ma MD  Unit/Bed#: [address] [place] [address] Encounter: [id number]  Date Of Visit: 03/10/22    Problem List:    Principal Problem:    Symptomatic anemia  Active Problems:    Acute kidney injury ([address] )    Type 2 diabetes mellitus with microalbuminuria, with long-term current use of insulin (HCC)    High anion gap metabolic acidosis    Melena    UTI (urinary tract infection)    Hemoptysis    Benign essential hypertension    Benign prostatic hyperplasia with lower urinary tract symptoms    Hyperlipidemia    Chest pain      Assessment & Plan:    * Symptomatic anemia  Assessment & Plan  Patient's baseline hemoglobin about 3 years ago was in the range of 13-50  Hemoglobin upon admission was 5 8  Patient recieved 3 units of PRBC  MCV level was normal  Vitamin D37 and folic acid level WNL  Iron panel revealed elevated ferritin, normal iron saturation, low TIBC, and low iron  Anemia likely multifactorial in the setting of possible GI bleeding and also CKD  CT abd/pelvis found no evidence of GI bleed  Scheduled for EGD tomorrow  Monitor Hemoglobin  Transfuse for Hgb less than 7    Results from last 7 days   Lab Units 03/10/22  0506 03/09/22  0519 03/08/22  1723 03/08/22  0743 03/08/22  0001 03/07/22  2209 03/07/22  0954   HEMOGLOBIN g/dL 7 1* 7 4* 8 2* 7 7* 6 4* 6 7* 5 8*   HEMATOCRIT % 22 5* 22 9* 25 1* 22 9* 19 8* 20 7* 19 0*   MCV fL 89 86  --  85 84 85 87       Acute kidney injury (HCC)  Assessment & Plan  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  Since patient had metabolic acidosis patient was placed on bicarbonate drip at 150 mL/hour  Renal US showed no evidence of hydronephrosis  8mm calculus was found in bladder   Possible cause of hematuria if not glomerulonephritis   Continue to hold Vasotec  Patient is recieving further workup for glomerulonephritis  C3 and C4 WNL  Other tests pending  UA showed innumerable red blood cells 10-20 white cells with glucose  CT of abdomen and pelvis showed 8 mm stone at UVJ with hydroureter but no hydronephrosis and not significant changed since 2016  No peritoneal or retroperitoneal hematoma or evidence of GI bleeding  Mild pulmonary edema and small b/l pleural effusions were noted  CXR pending  Patient received dialysis 3/9, 3/10  IVF were d/c on 3/9  Status post renal biopsy today    Results from last 7 days   Lab Units 03/10/22  0506 03/09/22  0519 03/08/22  0744 03/07/22  2209 03/07/22  1306 03/07/22  0954   BUN mg/dL 129* 155* 176* 162* 175* 169*   CREATININE mg/dL 10 08* 13 69* 13 26* 13 64* 14 38* 14 91*       High anion gap metabolic acidosis  Assessment & Plan  Likely secondary to renal failure and also hyperglycemic state  Patient was placed on bicarbonate drip at 150 mL/hour, Bicarb drip was d/c on 3/9  Repeat BMP tomorrow     Type 2 diabetes mellitus with microalbuminuria, with long-term current use of insulin [facility])  Assessment & Plan  Lab Results   Component Value Date    HGBA1C 7 2 (H) 12/03/2021       Recent Labs     03/09/22  2351 03/10/22  0609 03/10/22  0716 03/10/22  1629   POCGLU 310* 200* 203* 260*     Patient is on Humalog mix 75/25 45 units b i d  Hold Humalog mix  Patient was started on insulin drip as 1-beta hydroxybutyrate was elevated at 1 6  Insulin drip was stopped  Currently on diabetic diet  NPO after midnight in anticipation for EGD today  Continue with SSI    Monitor blood sugars    Melena  Assessment & Plan  Patient reported black stool for couple of days  stool occult blood ordered but not done  Patient on Protonix 40 milligram IV q 12 hours  No history of NSAID use  GI was consulted  Plan is for EGD tomorrow    Hyperkalemia-resolved as of 3/9/2022  Assessment & Plan  Potassium level upon admission was 6 9  Likely in the setting of acute kidney injury and ACE-inhibitor use  Patient was given insulin with D10, albuterol nebulizer treatment and calcium gluconate in the ED  EKG showed no changes  Patient was also given Kayexalate 30 grams in the ED  K level has normalized at 4 5   Received Dialysis on 3/9      UTI (urinary tract infection)  Assessment & Plan  Patient had leukocytosis  Leukocytosis has trended up from 10 9 on admission to 13 87  UA showed trace leukocyte esterase with 10-20 white cells and innumerable bacteria  Urine culture grew E  Coli   Continue Ceftriaxone   No clinical signs of sepsis  Hemoptysis  Assessment & Plan  Patient states he has had a cough for a couple weeks  Typically yellow in color, but this morning and yesterday had hemoptysis  CXR ordered  Pneumonia vs bronchitis vs pulmonary edema vs Goodpastures   Obtain sputum culture if possible  Procalcitonin elevated at 6 73 --> 5 6, unclear significance in the setting of renal dysfunction      Chest pain  Assessment & Plan  Patient presented with left-sided chest pain,Troponins x2 were negative initially  EKG was unremarkable  ProBNP was elevated at 15,000   2D echo showed normal EF with normal diastolic function  Reported chest pain again yesterday and troponin peaked at 104   Plan for outpatient stress test    Hyperlipidemia  Assessment & Plan  Zocor substituted with Pravachol    Benign prostatic hyperplasia with lower urinary tract symptoms  Assessment & Plan  Monitor postvoid residuals  Continue Flomax    Benign essential hypertension  Assessment & Plan  Patient is on Vasotec at home which is being held in the setting of acute kidney injury  Avoid hypotension  Will add Norvasc if blood pressure continues to be high  VTE Pharmacologic Prophylaxis:   None due to anemia, melena    Patient Centered Rounds: I performed bedside rounds with nursing staff today    Discussions with Specialists or Other Care Team Provider: yes - GI, IR    Education and Discussions with Family / Patient: Attempted to update  (wife) via phone  Unable to contact  Mailbox full    Time Spent for Care: 40 min  More than 50% of total time spent on counseling and coordination of care as described above  Current Length of Stay: 3 day(s)  Current Patient Status: Inpatient   Certification Statement: The patient will continue to require additional inpatient hospital stay due to [facility] requiring hemodialysis, symptomatic anemia/melena requiring EGD  Discharge Plan: Anticipate discharge in 48-72 hrs to discharge location to be determined pending rehab evaluations  Code Status: Level 1 - Full Code    Subjective:   Patient states he is doing better  Denies any shortness of breath, abdominal pain, chest pain    Objective:     Vitals:   Temp (24hrs), Av 8 °F (36 6 °C), Min:97 4 °F (36 3 °C), Max:98 1 °F (36 7 °C)    Temp:  [97 4 °F (36 3 °C)-98 1 °F (36 7 °C)] 97 4 °F (36 3 °C)  HR:  [65-92] 77  Resp:  [18-20] 19  BP: (131-184)/(52-91) 149/60  SpO2:  [91 %-99 %] 94 %  Body mass index is 29 65 kg/m²  Input and Output Summary (last 24 hours): Intake/Output Summary (Last 24 hours) at 3/10/2022 1759  Last data filed at 3/10/2022 1003  Gross per 24 hour   Intake 500 ml   Output 1750 ml   Net -1250 ml       Physical Exam:   Physical Exam  Constitutional:       General: He is not in acute distress  Appearance: He is not diaphoretic  HENT:      Head: Normocephalic and atraumatic  Ears:      Comments: Extremely hard of hearing  Eyes:      General:         Right eye: No discharge  Left eye: No discharge  Cardiovascular:      Rate and Rhythm: Normal rate and regular rhythm  Pulmonary:      Effort: Pulmonary effort is normal  No respiratory distress  Breath sounds: No wheezing or rales  Comments: Decreased breath sounds bilaterally  Abdominal:      General: Bowel sounds are normal  There is no distension        Palpations: Abdomen is soft  Tenderness: There is no abdominal tenderness  Neurological:      Mental Status: He is alert  Additional Data:     Labs:  Results from last 7 days   Lab Units 03/10/22  0506 03/08/22  1723 03/08/22  0743 03/08/22  0001 03/07/22  2209   WBC Thousand/uL 12 62*   < > 11 02*   < > 10 90*   HEMOGLOBIN g/dL 7 1*   < > 7 7*   < > 6 7*   HEMATOCRIT % 22 5*   < > 22 9*   < > 20 7*   PLATELETS Thousands/uL 218   < > 244   < > 264   BANDS PCT %  --   --   --   --  2   NEUTROS PCT %  --   --  88*   < >  --    LYMPHS PCT %  --   --  6*   < >  --    LYMPHO PCT %  --   --   --   --  7*   MONOS PCT %  --   --  5   < >  --    MONO PCT %  --   --   --   --  4   EOS PCT %  --   --  0   < > 0    < > = values in this interval not displayed  Results from last 7 days   Lab Units 03/10/22  0506 03/09/22  0519 03/08/22  0744   SODIUM mmol/L 135*   < > 135*   POTASSIUM mmol/L 4 1   < > 4 7   CHLORIDE mmol/L 94*   < > 95*   CO2 mmol/L 26   < > 20*   BUN mg/dL 129*   < > 176*   CREATININE mg/dL 10 08*   < > 13 26*   ANION GAP mmol/L 15*   < > 20*   CALCIUM mg/dL 7 0*   < > 7 3*   ALBUMIN g/dL  --   --  1 7*   TOTAL BILIRUBIN mg/dL  --   --  0 52   ALK PHOS U/L  --   --  50   ALT U/L  --   --  37   AST U/L  --   --  22   GLUCOSE RANDOM mg/dL 184*   < > 222*    < > = values in this interval not displayed       Results from last 7 days   Lab Units 03/10/22  0506   INR  1 31*     Results from last 7 days   Lab Units 03/10/22  1629 03/10/22  0716 03/10/22  0609 03/09/22  2351 03/09/22  2009 03/09/22  1602 03/09/22  1059 03/09/22  0705 03/09/22  0556 03/09/22  0019 03/08/22  1759 03/08/22  0724   POC GLUCOSE mg/dl 260* 203* 200* 310* 262* 187* 171* 282* 264* 326* 249* 250*         Results from last 7 days   Lab Units 03/09/22  0519 03/08/22  0744 03/07/22  1124   LACTIC ACID mmol/L  --   --  0 7   PROCALCITONIN ng/ml 5 68* 6 73*  --        Lines/Drains:  Invasive Devices  Report    Peripheral Intravenous Line Peripheral IV 03/08/22 Left Wrist 1 day          Hemodialysis Catheter            HD Permanent Double Catheter 2 days                  Telemetry:  Telemetry Orders (From admission, onward)             48 Hour Telemetry Monitoring  Continuous x 48 hours        References:    Telemetry Guidelines   Question:  Reason for 48 Hour Telemetry  Answer:  Acute MI, chest pain - R/O MI, or unstable angina                 Telemetry Reviewed: Normal Sinus Rhythm  Indication for Continued Telemetry Use: Arrthymias requiring medical therapy             Imaging: Reviewed radiology reports from this admission including: ECHO    Recent Cultures (last 7 days):   Results from last 7 days   Lab Units 03/07/22  1305 03/07/22  1124 03/07/22  1055   BLOOD CULTURE   --  No Growth at 72 hrs  No Growth at 72 hrs  URINE CULTURE  3253-5069 cfu/ml Escherichia coli*  --   --        Last 24 Hours Medication List:   Current Facility-Administered Medications   Medication Dose Route Frequency Provider Last Rate    acetaminophen  650 mg Oral Q6H PRN Eileen Kumar MD      cefTRIAXone  1,000 mg Intravenous Q24H Daisy Keating MD 1,000 mg (03/10/22 1416)    insulin lispro  1-5 Units Subcutaneous TID AC Emiliana Jordan DO      insulin lispro  1-5 Units Subcutaneous HS Emiliana Jordan DO      latanoprost  1 drop Both Eyes HS Eileen Kumar MD      ondansetron  4 mg Intravenous Q6H PRN Eileen Kumar MD      pantoprazole  40 mg Intravenous Q12H [address] Eileen Kumar MD      pravastatin  80 mg Oral Daily With Moni Rock MD      tamsulosin  0 4 mg Oral Daily With Moni Rock MD          Today, Patient Was Seen By: Daniella Carlos DO    ** Please Note: "This note has been constructed using a voice recognition system  Therefore there may be syntax, spelling, and/or grammatical errors   Please call if you have any questions  "**

## 2022-03-11 ENCOUNTER — ANESTHESIA (INPATIENT)
Dept: PERIOP | Facility: HOSPITAL | Age: 77
DRG: 674 | End: 2022-03-11
Payer: MEDICARE

## 2022-03-11 ENCOUNTER — ANESTHESIA EVENT (INPATIENT)
Dept: PERIOP | Facility: HOSPITAL | Age: 77
DRG: 674 | End: 2022-03-11
Payer: MEDICARE

## 2022-03-11 ENCOUNTER — APPOINTMENT (INPATIENT)
Dept: PERIOP | Facility: HOSPITAL | Age: 77
DRG: 674 | End: 2022-03-11
Payer: MEDICARE

## 2022-03-11 PROBLEM — Z99.2 DIALYSIS PATIENT (HCC): Status: ACTIVE | Noted: 2022-03-11

## 2022-03-11 LAB
ABO GROUP BLD: NORMAL
ANION GAP SERPL CALCULATED.3IONS-SCNC: 16 MMOL/L (ref 4–13)
BLD GP AB SCN SERPL QL: NEGATIVE
BUN SERPL-MCNC: 93 MG/DL (ref 5–25)
CALCIUM SERPL-MCNC: 6.8 MG/DL (ref 8.3–10.1)
CHLORIDE SERPL-SCNC: 95 MMOL/L (ref 100–108)
CO2 SERPL-SCNC: 24 MMOL/L (ref 21–32)
CREAT SERPL-MCNC: 7.79 MG/DL (ref 0.6–1.3)
ERYTHROCYTE [DISTWIDTH] IN BLOOD BY AUTOMATED COUNT: 14.8 % (ref 11.6–15.1)
GBM AB SER IA-ACNC: 3 UNITS (ref 0–20)
GFR SERPL CREATININE-BSD FRML MDRD: 5 ML/MIN/1.73SQ M
GLUCOSE SERPL-MCNC: 158 MG/DL (ref 65–140)
GLUCOSE SERPL-MCNC: 194 MG/DL (ref 65–140)
GLUCOSE SERPL-MCNC: 213 MG/DL (ref 65–140)
GLUCOSE SERPL-MCNC: 215 MG/DL (ref 65–140)
GLUCOSE SERPL-MCNC: 248 MG/DL (ref 65–140)
GLUCOSE SERPL-MCNC: 262 MG/DL (ref 65–140)
GLUCOSE SERPL-MCNC: 290 MG/DL (ref 65–140)
HCT VFR BLD AUTO: 19.3 % (ref 36.5–49.3)
HCT VFR BLD AUTO: 23.9 % (ref 36.5–49.3)
HGB BLD-MCNC: 6.1 G/DL (ref 12–17)
HGB BLD-MCNC: 7.7 G/DL (ref 12–17)
MCH RBC QN AUTO: 28.5 PG (ref 26.8–34.3)
MCHC RBC AUTO-ENTMCNC: 31.6 G/DL (ref 31.4–37.4)
MCV RBC AUTO: 90 FL (ref 82–98)
PLATELET # BLD AUTO: 217 THOUSANDS/UL (ref 149–390)
PMV BLD AUTO: 9.9 FL (ref 8.9–12.7)
POTASSIUM SERPL-SCNC: 4.4 MMOL/L (ref 3.5–5.3)
RBC # BLD AUTO: 2.14 MILLION/UL (ref 3.88–5.62)
RH BLD: POSITIVE
SODIUM SERPL-SCNC: 135 MMOL/L (ref 136–145)
SPECIMEN EXPIRATION DATE: NORMAL
WBC # BLD AUTO: 11.3 THOUSAND/UL (ref 4.31–10.16)

## 2022-03-11 PROCEDURE — 99232 SBSQ HOSP IP/OBS MODERATE 35: CPT | Performed by: INTERNAL MEDICINE

## 2022-03-11 PROCEDURE — 88305 TISSUE EXAM BY PATHOLOGIST: CPT | Performed by: PATHOLOGY

## 2022-03-11 PROCEDURE — 86900 BLOOD TYPING SEROLOGIC ABO: CPT | Performed by: NURSE PRACTITIONER

## 2022-03-11 PROCEDURE — 85018 HEMOGLOBIN: CPT | Performed by: FAMILY MEDICINE

## 2022-03-11 PROCEDURE — 82948 REAGENT STRIP/BLOOD GLUCOSE: CPT

## 2022-03-11 PROCEDURE — 86850 RBC ANTIBODY SCREEN: CPT | Performed by: NURSE PRACTITIONER

## 2022-03-11 PROCEDURE — 43239 EGD BIOPSY SINGLE/MULTIPLE: CPT | Performed by: INTERNAL MEDICINE

## 2022-03-11 PROCEDURE — 86923 COMPATIBILITY TEST ELECTRIC: CPT

## 2022-03-11 PROCEDURE — 85014 HEMATOCRIT: CPT | Performed by: FAMILY MEDICINE

## 2022-03-11 PROCEDURE — 86901 BLOOD TYPING SEROLOGIC RH(D): CPT | Performed by: NURSE PRACTITIONER

## 2022-03-11 PROCEDURE — 99232 SBSQ HOSP IP/OBS MODERATE 35: CPT | Performed by: FAMILY MEDICINE

## 2022-03-11 PROCEDURE — 30233N1 TRANSFUSION OF NONAUTOLOGOUS RED BLOOD CELLS INTO PERIPHERAL VEIN, PERCUTANEOUS APPROACH: ICD-10-PCS | Performed by: INTERNAL MEDICINE

## 2022-03-11 PROCEDURE — 80048 BASIC METABOLIC PNL TOTAL CA: CPT | Performed by: FAMILY MEDICINE

## 2022-03-11 PROCEDURE — 87205 SMEAR GRAM STAIN: CPT | Performed by: FAMILY MEDICINE

## 2022-03-11 PROCEDURE — 85027 COMPLETE CBC AUTOMATED: CPT | Performed by: FAMILY MEDICINE

## 2022-03-11 PROCEDURE — 97110 THERAPEUTIC EXERCISES: CPT

## 2022-03-11 PROCEDURE — C9113 INJ PANTOPRAZOLE SODIUM, VIA: HCPCS | Performed by: INTERNAL MEDICINE

## 2022-03-11 PROCEDURE — P9016 RBC LEUKOCYTES REDUCED: HCPCS

## 2022-03-11 PROCEDURE — 97167 OT EVAL HIGH COMPLEX 60 MIN: CPT

## 2022-03-11 PROCEDURE — 0DB98ZX EXCISION OF DUODENUM, VIA NATURAL OR ARTIFICIAL OPENING ENDOSCOPIC, DIAGNOSTIC: ICD-10-PCS | Performed by: INTERNAL MEDICINE

## 2022-03-11 RX ORDER — PROPOFOL 10 MG/ML
INJECTION, EMULSION INTRAVENOUS AS NEEDED
Status: DISCONTINUED | OUTPATIENT
Start: 2022-03-11 | End: 2022-03-11

## 2022-03-11 RX ORDER — SODIUM CHLORIDE 9 MG/ML
INJECTION, SOLUTION INTRAVENOUS CONTINUOUS PRN
Status: DISCONTINUED | OUTPATIENT
Start: 2022-03-11 | End: 2022-03-11

## 2022-03-11 RX ORDER — LIDOCAINE HYDROCHLORIDE 10 MG/ML
INJECTION, SOLUTION EPIDURAL; INFILTRATION; INTRACAUDAL; PERINEURAL AS NEEDED
Status: DISCONTINUED | OUTPATIENT
Start: 2022-03-11 | End: 2022-03-11

## 2022-03-11 RX ORDER — SODIUM CHLORIDE 9 MG/ML
75 INJECTION, SOLUTION INTRAVENOUS CONTINUOUS
Status: CANCELLED | OUTPATIENT
Start: 2022-03-11

## 2022-03-11 RX ORDER — ACETAMINOPHEN 325 MG/1
650 TABLET ORAL ONCE
Status: COMPLETED | OUTPATIENT
Start: 2022-03-11 | End: 2022-03-11

## 2022-03-11 RX ORDER — DIPHENHYDRAMINE HCL 25 MG
25 TABLET ORAL ONCE
Status: COMPLETED | OUTPATIENT
Start: 2022-03-11 | End: 2022-03-11

## 2022-03-11 RX ADMIN — PROPOFOL 80 MG: 10 INJECTION, EMULSION INTRAVENOUS at 14:08

## 2022-03-11 RX ADMIN — INSULIN LISPRO 1 UNITS: 100 INJECTION, SOLUTION INTRAVENOUS; SUBCUTANEOUS at 17:13

## 2022-03-11 RX ADMIN — PROPOFOL 80 MG: 10 INJECTION, EMULSION INTRAVENOUS at 14:09

## 2022-03-11 RX ADMIN — SODIUM CHLORIDE: 9 INJECTION, SOLUTION INTRAVENOUS at 14:02

## 2022-03-11 RX ADMIN — DIPHENHYDRAMINE HYDROCHLORIDE 25 MG: 25 TABLET ORAL at 08:51

## 2022-03-11 RX ADMIN — LIDOCAINE HYDROCHLORIDE 50 MG: 10 INJECTION, SOLUTION EPIDURAL; INFILTRATION; INTRACAUDAL; PERINEURAL at 14:08

## 2022-03-11 RX ADMIN — PANTOPRAZOLE SODIUM 40 MG: 40 INJECTION, POWDER, FOR SOLUTION INTRAVENOUS at 08:51

## 2022-03-11 RX ADMIN — ACETAMINOPHEN 650 MG: 325 TABLET, FILM COATED ORAL at 08:51

## 2022-03-11 RX ADMIN — INSULIN LISPRO 2 UNITS: 100 INJECTION, SOLUTION INTRAVENOUS; SUBCUTANEOUS at 07:51

## 2022-03-11 RX ADMIN — LATANOPROST 1 DROP: 50 SOLUTION OPHTHALMIC at 22:27

## 2022-03-11 RX ADMIN — TAMSULOSIN HYDROCHLORIDE 0.4 MG: 0.4 CAPSULE ORAL at 17:12

## 2022-03-11 RX ADMIN — PRAVASTATIN SODIUM 80 MG: 80 TABLET ORAL at 17:12

## 2022-03-11 RX ADMIN — INSULIN LISPRO 2 UNITS: 100 INJECTION, SOLUTION INTRAVENOUS; SUBCUTANEOUS at 11:50

## 2022-03-11 RX ADMIN — INSULIN LISPRO 2 UNITS: 100 INJECTION, SOLUTION INTRAVENOUS; SUBCUTANEOUS at 22:25

## 2022-03-11 RX ADMIN — PANTOPRAZOLE SODIUM 40 MG: 40 INJECTION, POWDER, FOR SOLUTION INTRAVENOUS at 22:00

## 2022-03-11 RX ADMIN — CEFTRIAXONE 1000 MG: 1 INJECTION, SOLUTION INTRAVENOUS at 17:13

## 2022-03-11 NOTE — PROGRESS NOTES
Progress Note - Cardiology   [facility]     Lary Camp 64 y o  male MRN: [id number]  : 1945  Unit/Bed#: [address] [facility]- Encounter: [id number]    Assessment and Plan:   1  Acute kidney injury:  Now on hemodialysis    -  managed per Nephrology    -  patient had biopsy performed on 03/10/2022    2  Chest pain:  Atypical   No further episodes    -  follow-up in the outpatient setting for further evaluation once stable from dialysis setting    3  Hypertension:  Managed per Nephrology    4  Anemia concerning for GI bleed:  Hemoglobin 6 1 today  Patient ordered for transfusion per primary team    -  for EGD today    5  Diabetes:  Managed per primary team    Patient appears to be stable from a cardiac standpoint  We will sign off this time, please do not hesitate to contact if needed again during this admission  Subjective / Objective:   Patient seen and examined  No further complaints of chest discomfort  Patient for EGD today  Hemoglobin now 6 1  Patient ordered for transfusion per primary team     Vitals: Blood pressure 143/56, pulse 78, temperature 98 4 °F (36 9 °C), temperature source Oral, resp  rate 17, height 5' 8" (1 727 m), weight 88 5 kg (195 lb), SpO2 95 %  Vitals:    22 1638 22 0810   Weight: 88 5 kg (195 lb) 88 5 kg (195 lb)     Body mass index is 29 65 kg/m²  BP Readings from Last 3 Encounters:   22 143/56   21 124/60   21 130/70     Orthostatic Blood Pressures      Most Recent Value   Blood Pressure 143/56 filed at 2022 0955   Patient Position - Orthostatic VS Lying filed at 03/10/2022 1003        I/O       / 0701  03/10 0700 03/10 0701   0700  0701   0700    P  O   120     I V  (mL/kg) 500 (5 6) 510 (5 8)     Blood   90    Other 500      Total Intake(mL/kg) 1000 (11 3) 630 (7 1) 90 (1)    Urine (mL/kg/hr) 250 (0 1) 300 (0 1)     Other 1505 1500     Total Output 1755 1800     Net -755 -1170 +90 Invasive Devices  Report    Peripheral Intravenous Line            Peripheral IV 03/08/22 Left Wrist 2 days    Peripheral IV 03/11/22 Right Arm <1 day          Hemodialysis Catheter            HD Permanent Double Catheter 2 days                  Intake/Output Summary (Last 24 hours) at 3/11/2022 1005  Last data filed at 3/11/2022 0955  Gross per 24 hour   Intake 220 ml   Output 300 ml   Net -80 ml         Physical Exam:   Physical Exam  Vitals and nursing note reviewed  Constitutional:       Appearance: Normal appearance  He is well-developed  He is obese  He is ill-appearing  HENT:      Right Ear: External ear normal       Left Ear: External ear normal       Nose: Nose normal    Eyes:      General: No scleral icterus  Right eye: No discharge  Left eye: No discharge  Neck:      Thyroid: No thyromegaly  Cardiovascular:      Rate and Rhythm: Normal rate and regular rhythm  Pulses: Normal pulses  Pulmonary:      Effort: Pulmonary effort is normal  No respiratory distress  Breath sounds: Normal breath sounds  Abdominal:      General: Bowel sounds are normal  There is no distension  Palpations: Abdomen is soft  Musculoskeletal:      Cervical back: Normal range of motion and neck supple  Right lower leg: No edema  Left lower leg: No edema  Skin:     General: Skin is warm and dry  Capillary Refill: Capillary refill takes less than 2 seconds  Neurological:      General: No focal deficit present  Mental Status: He is alert and oriented to person, place, and time  Mental status is at baseline     Psychiatric:         Mood and Affect: Mood normal                    Medications/ Allergies:     Current Facility-Administered Medications   Medication Dose Route Frequency Provider Last Rate    acetaminophen  650 mg Oral Q6H PRN Jacob Paz MD      cefTRIAXone  1,000 mg Intravenous Q24H Daisy Keating MD 1,000 mg (03/10/22 1416)    [START ON 3/12/2022] epoetin ani  10,000 Units Intravenous Once per day on Mon Wed Fri Charolett Frankel, MD      insulin lispro  1-5 Units Subcutaneous TID AC Emiliana Julian, DO      insulin lispro  1-5 Units Subcutaneous HS Emiliana Jordan, DO      latanoprost  1 drop Both Eyes HS Joaquín Morales MD      ondansetron  4 mg Intravenous Q6H PRN Joaquín Morales MD      pantoprazole  40 mg Intravenous Q12H [address] Joaquín Morales MD      pravastatin  80 mg Oral Daily With Rory Castaneda MD      tamsulosin  0 4 mg Oral Daily With Rory Castaneda MD       acetaminophen, 650 mg, Q6H PRN  ondansetron, 4 mg, Q6H PRN      Allergies   Allergen Reactions    Amoxicillin Rash       VTE Pharmacologic Prophylaxis:   Sequential compression device (Venodyne)     Labs:   Troponins:  Results from last 7 days   Lab Units 03/09/22  1919 03/09/22  1725   HSTNI D2 ng/L  --  1   HSTNI D4 ng/L -14  --      CBC with diff:  Results from last 7 days   Lab Units 03/11/22  0451 03/10/22  0506 03/09/22  0519 03/08/22  1723 03/08/22  0743 03/08/22  0001 03/07/22  2209 03/07/22  0954 03/07/22  0954   WBC Thousand/uL 11 30* 12 62* 13 87*  --  11 02* 11 30* 10 90*  --  15 13*   HEMOGLOBIN g/dL 6 1* 7 1* 7 4* 8 2* 7 7* 6 4* 6 7*   < > 5 8*   HEMATOCRIT % 19 3* 22 5* 22 9* 25 1* 22 9* 19 8* 20 7*   < > 19 0*   MCV fL 90 89 86  --  85 84 85  --  87   PLATELETS Thousands/uL 217 218 223  --  244 257 264  --  347   MCH pg 28 5 28 0 27 8  --  28 4 27 7 27 0  --  26 6*   MCHC g/dL 31 6 31 6 32 3  --  33 6 32 8 31 9  --  30 5*   RDW % 14 8 14 6 14 8  --  14 8 14 7 14 7  --  14 6   MPV fL 9 9 9 7 9 2  --  9 0 9 1 9 4  --  9 0   NRBC AUTO /100 WBCs  --   --   --   --  0 0  --   --  0    < > = values in this interval not displayed       CMP:  Results from last 7 days   Lab Units 03/11/22  0451 03/10/22  0506 03/09/22  0519 03/08/22  0744 03/07/22  2209 03/07/22  1306 03/07/22  0954   SODIUM mmol/L 135* 135* 136 135* 135* 133* 132*   POTASSIUM mmol/L 4 4 4 1 4 5 4 7 5 0 6 1* 6 9*   CHLORIDE mmol/L 95* 94* 92* 95* 97* 99* 96*   CO2 mmol/L 24 26 24 20* 16* 13* 14*   ANION GAP mmol/L 16* 15* 20* 20* 22* 21* 22*   BUN mg/dL 93* 129* 155* 176* 162* 175* 169*   CREATININE mg/dL 7 79* 10 08* 13 69* 13 26* 13 64* 14 38* 14 91*   CALCIUM mg/dL 6 8* 7 0* 7 1* 7 3* 7 6* 7 9* 8 3   AST U/L  --   --   --  22  --   --  39   ALT U/L  --   --   --  37  --   --  55   ALK PHOS U/L  --   --   --  50  --   --  62   TOTAL PROTEIN g/dL  --   --   --  5 9*  --   --  7 3   ALBUMIN g/dL  --   --   --  1 7*  --   --  2 2*   TOTAL BILIRUBIN mg/dL  --   --   --  0 52  --   --  0 33   EGFR ml/min/1 73sq m 5 4 3 3 3 2 2     Magnesium:  Results from last 7 days   Lab Units 03/07/22  0954   MAGNESIUM mg/dL 2 9*     Coags:  Results from last 7 days   Lab Units 03/10/22  0506 03/07/22  0954   PTT seconds  --  36   INR  1 31* 1 41*       Imaging & Testing   I have personally reviewed pertinent reports  CT abdomen pelvis wo contrast    Result Date: 3/8/2022  Narrative: CT ABDOMEN AND PELVIS WITHOUT IV CONTRAST INDICATION:   Anemia hgb refractory to transfusion - r/o bleed  COMPARISON:  CT abdomen/pelvis 12/10/2016  TECHNIQUE:  CT examination of the abdomen and pelvis was performed without intravenous contrast   Axial, sagittal, and coronal 2D reformatted images were created from the source data and submitted for interpretation  Radiation dose length product (DLP) for this visit:  541 mGy-cm   This examination, like all CT scans performed in the [place], was performed utilizing techniques to minimize radiation dose exposure, including the use of iterative reconstruction and automated exposure control  Enteric contrast was not administered  FINDINGS: ABDOMEN LOWER CHEST:  Small simple bilateral pleural effusions  Bibasilar intralobular septal thickening and airspace opacities, which may represent a combination of pulmonary edema and atelectasis    CT findings of anemia  LIVER/BILIARY TREE:  Unremarkable  GALLBLADDER:  There are gallstone(s) within the gallbladder, without pericholecystic inflammatory changes  SPLEEN:  Unremarkable  PANCREAS:  Unremarkable  ADRENAL GLANDS:  Unremarkable  KIDNEYS/URETERS:  There is an 8 x 5 mm calculus at the right ureterovesicular junction causing mild upstream hydroureter without significant hydronephrosis  Mild bilateral perinephric stranding, nonspecific, not significantly changed since 2016  STOMACH AND BOWEL:  Unremarkable  APPENDIX:  No findings to suggest appendicitis  ABDOMINOPELVIC CAVITY:  No ascites  No pneumoperitoneum  No lymphadenopathy  VESSELS:  Atherosclerotic changes are present  No evidence of aneurysm  PELVIS REPRODUCTIVE ORGANS:  The prostate is enlarged  URINARY BLADDER:  Unremarkable  ABDOMINAL WALL/INGUINAL REGIONS:  Small bilateral fat-containing inguinal hernias  OSSEOUS STRUCTURES:  No acute fracture or destructive osseous lesion  Spinal degenerative changes are noted  Bilateral glenohumeral joint osteoarthritis  Impression: 1  No peritoneal or retroperitoneal hematoma  2   An 8 mm right UVJ calculus causes mild upstream hydroureter without significant hydronephrosis  3   Mild pulmonary edema, small simple bilateral pleural effusions and bibasilar atelectasis  Workstation performed: [id number]     XR chest 1 view portable    Result Date: 3/7/2022  Narrative: CHEST INDICATION:   pain  COMPARISON:  Chest x-ray 9/14/2019 EXAM PERFORMED/VIEWS:  XR CHEST PORTABLE FINDINGS: Cardiomediastinal silhouette appears unremarkable  Mild elevation of the right hemidiaphragm  The lungs are clear  No pneumothorax or pleural effusion  Osseous structures appear within normal limits for patient age  Impression: No active pulmonary disease  Workstation performed: [id number]     US kidney and bladder    Result Date: 3/7/2022  Narrative: RENAL ULTRASOUND INDICATION:   ray   COMPARISON: None TECHNIQUE:   Ultrasound of the retroperitoneum was performed with a curvilinear transducer utilizing volumetric sweeps and still imaging techniques  FINDINGS: KIDNEYS: Symmetric and within normal limits of size  Right kidney:  11 0 x 6 2 x 5 2 cm  Volume 187 3 mL Left kidney:  11 5 x 6 2 x 5 4 cm  Volume 200 1 mL Right kidney Unremarkable echogenicity and contour  No mass is identified  No hydronephrosis  No shadowing calculi  No perinephric fluid collections  Left kidney Unremarkable echogenicity and contour  No mass is identified  1 0 x 0 7 x 0 8 cm cyst in the upper pole  No hydronephrosis  No shadowing calculi  No perinephric fluid collections  URETERS: Nonvisualized  BLADDER: Normally distended  No focal thickening or mass lesions  8 mm shadowing calculus is demonstrated in the right dependent aspect of the urinary bladder; mobility difficult to be assessed as the patient was unable to be moved  Bilateral ureteral jets not detected  Impression: No hydronephrosis  8 mm calculus in the right dependent aspect of the urinary bladder  Workstation performed: [id number]     IR tunneled dialysis catheter placement    Result Date: 3/8/2022  Narrative: IR TUNNELED DIALYSIS CATHETER PLACEMENT Clinical History: Acute renal failure Fluoroscopy time/radiation dose:  0 6min /5 mGy Moderate sedation: 15 minutes Technique: The patient was brought to the interventional radiology suite and identified verbally and by wristband  The patient was placed supine on the table  The right internal jugular vein was evaluated as a potential access site with ultrasound  The right internal jugular vein was found to be patent and compressible  The right neck and upper chest were prepped and draped in the usual sterile fashion  All elements of maximal sterile barrier technique were followed (cap, mask, sterile gown, sterile gloves, large sterile sheet, hand hygiene, and 2% chlorhexidine for cutaneous antisepsis)   Lidocaine was administered to the skin of the right neck and a small skin incision was made  Under direct ultrasound guidance, a 21 gauge needle was advanced into the right internal jugular vein  A representative image was saved for permanent recording and reporting  An 0 018 wire was then advanced through the needle into the central venous system  The needle was removed, and a 5 [place] coaxial dilator was inserted  The tip of the 018 wire was positioned within the midright atrium and used to estimate  needed length of catheter  Based on this estimate 15 5 [place] by 28 cm Titan dialysis catheter was chosen  One percent lidocaine was infiltrated in the right chest wall along the subcutaneous tunnel  Stab incision was made  The catheter was tunneled under the skin and brought out through the incision at the neck entry site  A heavy duty wire was inserted through the outer dilator and a peel-away sheath was inserted over the wire  The catheter was advanced through the peel-away and the peel-away was removed  The catheter tip was then positioned in the right atrium under fluoroscopic control  The external portion of the catheter was sutured to the skin with 2-0 prolene  The neck incision was then closed with Histoacryl  A sterile dressing was applied and 100 unit heparin/cc solution was administered into each of the lumens  Impression: Impression: Successful placement of a 15-[place] by 28 cm Titan dialysis catheter via the right internal jugular vein  The tip of the catheter is in the right atrium and may be used immediately  Workstation performed: [id number]     IR biopsy kidney [place] kit no laterality    Result Date: 3/10/2022  Narrative: CT-scan guided needle biopsy of Kidney for [place] Kit History: LORRAINE Conscious sedation time: 25 minutes Technique:  This examination, like all CT scans performed in the [facility], was performed utilizing techniques to minimize radiation dose exposure, including the use of iterative reconstruction and automated exposure control  The patient was brought to the CT scanner and placed prone on the table  After axial images were obtained through the region of interest an area of the skin was then marked, prepped, and draped in usual sterile fashion  Lidocaine was administered to the skin and a small skin incision was made  A 17-gauge cannula was advanced up to the right lower pole renal cortex, and through this, an 18-gauge core biopsy specimen was obtained  Four more biopsies were obtained  The specimen was adequate  At the end of the procedure, D-Stat was used for hemostasis through the cannula as it was removed  The patient tolerated the procedure well and suffered no complications  Impression: Impression: Successful percutaneous core biopsy of the right renal cortex for [place] Kit  A full pathology report will follow  Workstation performed: [id number]     Echo complete w/ contrast if indicated    Result Date: 3/10/2022  Narrative: Catie Hsieh  Left Ventricle: Left ventricular cavity size is normal  Wall thickness is normal  The left ventricular ejection fraction is 55%  Systolic function is normal  Wall motion is normal  Diastolic function is normal    The left ventricular wall motion is normal    Left Atrium: The atrium is mildly dilated    Right Atrium: The atrium is mildly dilated    Aortic Valve: The leaflets are mildly thickened  The leaflets are not calcified  The leaflets exhibit normal mobility  There is no evidence of regurgitation  There is no evidence of stenosis  Increased LVOT velocities suspect chordal systolic anterior motion/dynamic obstruction   Tricuspid Valve: There is mild regurgitation  Ember Martino  Cardiology      "This note has been constructed using a voice recognition system  Therefore there may be syntax, spelling, and/or grammatical errors   Please call if you have any questions  "

## 2022-03-11 NOTE — INTERVAL H&P NOTE
H&P reviewed  After examining the patient I find no changes in the patients condition since the H&P had been written      Vitals:    03/11/22 1331   BP: 156/70   Pulse: 75   Resp: 20   Temp: 99 5 °F (37 5 °C)   SpO2: 98%

## 2022-03-11 NOTE — ANESTHESIA PREPROCEDURE EVALUATION
Procedure:  EGD    Relevant Problems   ANESTHESIA  agressive behaviour on wake up      CARDIO   (+) Benign essential hypertension   (+) Chest pain   (+) Hyperlipidemia      ENDO   (+) Type 2 diabetes mellitus with microalbuminuria, with long-term current use of insulin (HCC)      /RENAL   (+) LORRAINE (acute kidney injury) ([facility] [address] )   (+) [facility] ([facility] )   (+) Anemia due to chronic kidney disease, on chronic dialysis ([facility])   (+) Benign prostatic hyperplasia with lower urinary tract symptoms   (+) Dialysis patient ([facility] ) (last HD 3/10/22)      HEMATOLOGY   (+) Anemia due to chronic kidney disease, on chronic dialysis ([facility])   (+) Symptomatic anemia      MUSCULOSKELETAL   (+) Primary osteoarthritis of right knee        Physical Exam    Airway    Mallampati score: III  TM Distance: >3 FB  Neck ROM: full     Dental       Cardiovascular  Rhythm: regular, Rate: normal,     Pulmonary  Breath sounds clear to auscultation,     Other Findings        Anesthesia Plan  ASA Score- 4     Anesthesia Type- IV sedation with anesthesia with ASA Monitors  Additional Monitors:   Airway Plan:           Plan Factors-    Chart reviewed  Patient is not a current smoker  Induction- intravenous  Postoperative Plan-     Informed Consent- Anesthetic plan and risks discussed with patient  I personally reviewed this patient with the CRNA  Discussed and agreed on the Anesthesia Plan with the ERIBERTO Trivedi

## 2022-03-11 NOTE — PHYSICAL THERAPY NOTE
Attempted PT treatment however pt was out of his room for testing  Will follow    Terrell Brown PT  [phone]     03/11/22 1502   Note Type   Note Type Cancelled Session   Cancel Reasons Patient off floor/test

## 2022-03-11 NOTE — PROGRESS NOTES
Pastoral Care Progress Note    3/11/2022  Patient: Ravi Kumar : 1945  Admission Date & Time: 3/7/2022 4200  MRN: [id number] [facility]: [id number]                     Chaplaincy Interventions Utilized:   Relationship Building: Cultivated a relationship of care and support  Patient was friendly and had a "take it as it comes" attitude  He said he had pain in several places but that it was to be expected at his age  He tries to live his life one day at a time and says that when it is his time he is ready to go  Patient didn't completely track with the conversation  Eventhough I explained several times that I was the , he continued to think I was a doctor  He said he has hearing issues, so I'm not sure if his inability to understand my identity was about poor hearing, cognitive challenges or something else   I asked him if he had a relationship with God and he said he did not        22 1100   Clinical Encounter Type   Visited With Patient   Routine Visit Introduction   Referral To   (census/rounds)

## 2022-03-11 NOTE — ANESTHESIA POSTPROCEDURE EVALUATION
Post-Op Assessment Note    CV Status:  Stable  Pain Score: 0    Pain management: adequate     Mental Status:  Sleepy and arousable   Hydration Status:  Stable   PONV Controlled:  None   Airway Patency:  Patent      Post Op Vitals Reviewed: Yes      Staff: CRNA   Comments: spontaneously breathing, protecting airway, b/l hearing aides in place, vss, fully endorsed to recovery w/o AC        No complications documented      BP      Temp      Pulse     Resp      SpO2   99

## 2022-03-11 NOTE — OCCUPATIONAL THERAPY NOTE
Occupational Therapy Evaluation/Treatment       03/11/22 1300   Note Type   Note type Evaluation   Restrictions/Precautions   Other Precautions Chair Alarm; Bed Alarm; Fall Risk   Pain Assessment   Pain Assessment Tool 0-10   Pain Score No Pain   Home Living   Type of [address] One level  ([address], ranch with basement)   Bathroom Shower/Tub Tub/shower unit   Home Equipment Cane   Additional Comments pt drives    Prior Function   Level of [place] Independent with ADLs and functional mobility   Lives With [place] Help From Family   ADL Assistance Independent   IADLs Independent   Comments pt reports cooking and cleaning at home   ADL   Eating Assistance 5  Supervision/Setup   Grooming Assistance 5  Supervision/Setup   UB Bathing Assistance 4  Minimal Assistance   LB Bathing Assistance 3  Moderate Assistance   [address] 4  Minimal Assistance   LB Dressing Assistance 3  Moderate [address]  3  Moderate Assistance   Bed Mobility   Supine to Sit 4  Minimal assistance   Transfers   Sit to Stand 3  Moderate assistance   Stand to Sit 3  Moderate assistance   Functional Mobility   Functional Mobility 3  Moderate assistance   Additional Comments 5 feet   Additional items Hand hold assistance   Balance   Static Sitting Fair +   Dynamic Sitting Fair   Static Standing Fair -   Dynamic Standing Poor +   Activity Tolerance   Activity Tolerance Patient limited by fatigue  (weakness, unsteady gait)   RUE Assessment   RUE Assessment WFL   LUE Assessment   LUE Assessment WFL   Cognition   Overall Cognitive Status Impaired   Arousal/Participation Cooperative   Attention Attends with cues to redirect   Orientation Level Oriented to person;Oriented to place   Following Commands Follows one step commands with increased time or repetition   Assessment   Limitation Decreased ADL status; Decreased UE strength;Decreased Safe judgement during ADL;Decreased cognition;Decreased endurance;Decreased high-level ADLs; Decreased self-care trans  (decreased balance and mobility )   Prognosis Good   Assessment Patient evaluated by Occupational Therapy  Patient admitted with Symptomatic anemia  The patients occupational profile, medical and therapy history includes a extensive additional review of physical, cognitive, or psychosocial history related to current functional performance  Comorbidities affecting functional mobility and ADLS include: arthritis, diabetes and hypertension  Prior to admission, patient was independent with functional mobility with cane, independent with ADLS and independent with IADLS  The evaluation identifies the following performance deficits: weakness, impaired balance, decreased endurance, increased fall risk, new onset of impairment of functional mobility, decreased ADLS, decreased IADLS, decreased activity tolerance, decreased safety awareness, impaired judgement, decreased cognition and decreased strength, that result in activity limitations and/or participation restrictions  This evaluation requires clinical decision making of high complexity, because the patient presents with comorbidites that affect occupational performance and required significant modification of tasks or assistance with consideration of multiple treatment options  The Barthel Index was used as a functional outcome tool presenting with a score of Barthel Index Score: 45, indicating marked limitations of functional mobility and ADLS  The patient's raw score on the -PAC Daily Activity inpatient short form is 16, standardized score is 35 96, less than 39 4  Patients at this level are likely to benefit from DC to post-acute rehabilitation services  Please refer to the recommendation of the Occupational Therapist for safe DC planning  Patient will benefit from skilled Occupational Therapy services to address above deficits and facilitate a safe return to prior level of function     Goals Patient Goals to go home    STG Time Frame   (1-7 days)   Short Term Goal  Goals established to promote Patient Goals: to go home :  Patient will increase standing tolerance to 3 minutes during ADL task to decrease assistance level and decrease fall risk; Patient will increase bed mobility to supervision in preparation for ADLS and transfers; Patient will increase functional mobility to and from bathroom with rolling walker with min assist to increase performance with ADLS and to use a toilet; Patient will tolerate 10 minutes of UE ROM/strengthening to increase general activity tolerance and performance in ADLS/IADLS; Patient will improve functional activity tolerance to 10 minutes of sustained functional tasks to increase participation in basic self-care and decrease assistance level;  Patient will increase dynamic sitting balance to fair+ to improve the ability to sit at edge of bed or on a chair for ADLS;  Patient will increase dynamic standing balance to fair- to improve postural stability and decrease fall risk during standing ADLS and transfers  LTG Time Frame   (8-14 days)   Long Term Goal Patient will increase standing tolerance to 6 minutes during ADL task to decrease assistance level and decrease fall risk; Patient will increase bed mobility to independent in preparation for ADLS and transfers;  Patient will increase functional mobility to and from bathroom with rolling walker with supervision to increase performance with ADLS and to use a toilet; Patient will tolerate 20 minutes of UE ROM/strengthening to increase general activity tolerance and performance in ADLS/IADLS; Patient will improve functional activity tolerance to 20 minutes of sustained functional tasks to increase participation in basic self-care and decrease assistance level;  Patient will increase dynamic sitting balance to good to improve the ability to sit at edge of bed or on a chair for ADLS;  Patient will increase dynamic standing balance to fair to improve postural stability and decrease fall risk during standing ADLS and transfers  Pt will score >/= 20/24 on AM-PAC Daily Activity Inpatient scale to promote safe independence with ADLs and functional mobility; Pt will score >/= 75/100 on Barthel Index in order to decrease caregiver assistance needed and increase ability to perform ADLs and functional mobility  Functional Transfer Goals   Pt Will Perform All Functional Transfers   (STG min assist LTG supervision )   ADL Goals   Pt Will Perform Eating   (STG independent )   Pt Will Perform Grooming   (STG supervision LTG Independent )   Pt Will Perform Bathing   (STG min assist LTG supervision )   Pt Will Perform UE Dressing   (STG supervision LTG Independent )   Pt Will Perform LE Dressing   (STG min assist LTG supervision )   Pt Will Perform Toileting   (STG min assist LTG supervision )   Plan   Treatment Interventions ADL retraining;Functional transfer training;UE strengthening/ROM; Endurance training;Patient/family training;Cognitive reorientation;Equipment evaluation/education; Activityengagement; Compensatory technique education   Goal Expiration Date 03/25/22   OT Frequency 3-5x/wk   Additional Treatment Session   Start Time 1250   End Time 1300   Treatment Assessment S: denies pain Pt reports await for procedure this afternoon  O: Sit to stand mod assist   Functional mobility 5 feet with hand hold assist with mod assist with balance loss  Stand to sit mod assist   Seated on edge of bed completed BUE AROM 10 times each for shoulder flexion, horizontal abduction, elbow flexion and   Sit to supine min assist   A: Tolerated well  Cooperative and pleasant  Limited by weakness and unsteady weight  Patient reports he will be going for a procedure and wanted to go back to bed    P: STR    Recommendation   OT Discharge Recommendation Post acute rehabilitation services   AM-PAC Daily Activity Inpatient   Lower Body Dressing 2   Bathing 2 Toileting 2   Upper Body Dressing 3   Grooming 3   Eating 4   Daily Activity Raw Score 16   Daily Activity Standardized Score (Calc for Raw Score >=11) 35 96   AM-PAC Applied Cognition Inpatient   Following a Speech/Presentation 3   Understanding Ordinary Conversation 3   Taking Medications 2   Remembering Where Things Are Placed or Put Away 2   Remembering List of 4-5 Errands 2   Taking Care of Complicated Tasks 2   Applied Cognition Raw Score 14   Applied Cognition Standardized Score 32 02   Barthel Index   Feeding 10   Bathing 0   Grooming Score 0   Dressing Score 5   Bladder Score 10   Bowels Score 10   Toilet Use Score 5   Transfers (Bed/Chair) Score 5   Mobility (Level Surface) Score 0   Stairs Score 0   Barthel Index Score 45   Licensure   [place] License Number  Chikis Mitchell Missy Hira 87 OTR/L [id number]

## 2022-03-11 NOTE — PROGRESS NOTES
[address] Internal Medicine Progress Note  Patient: Hay Connors 64 y o  male   MRN: [id number]  PCP: Junito Roberts MD  Unit/Bed#: [address] [place] [address] Encounter: [id number]  Date Of Visit: 03/11/22    Problem List:    Principal Problem:    Symptomatic anemia  Active Problems:    Acute kidney injury ([address] )    Type 2 diabetes mellitus with microalbuminuria, with long-term current use of insulin (HCC)    High anion gap metabolic acidosis    Melena    UTI (urinary tract infection)    Hemoptysis    Benign essential hypertension    Benign prostatic hyperplasia with lower urinary tract symptoms    Hyperlipidemia    Chest pain      Assessment & Plan:    * Symptomatic anemia  Assessment & Plan  Patient's baseline hemoglobin about 3 years ago was in the range of 13-50  Hemoglobin upon admission was 5 8  Patient recieved 3 units of PRBC so far  One more unit of PRBC today  MCV level was normal  Vitamin Q92 and folic acid level WNL  Iron panel revealed elevated ferritin, normal iron saturation, low TIBC, and low iron  Anemia likely multifactorial in the setting of possible GI bleeding and also CKD  Started on Epogen with dialysis  CT abd/pelvis found no evidence of GI bleed  Scheduled for EGD today    Results from last 7 days   Lab Units 03/11/22  0451 03/10/22  0506 03/09/22  0519 03/08/22  1723 03/08/22  0743 03/08/22  0001 03/07/22  2209 03/07/22  0954   HEMOGLOBIN g/dL 6 1* 7 1* 7 4* 8 2* 7 7* 6 4* 6 7* 5 8*   HEMATOCRIT % 19 3* 22 5* 22 9* 25 1* 22 9* 19 8* 20 7* 19 0*   MCV fL 90 89 86  --  85 84 85 87       Acute kidney injury (HCC)  Assessment & Plan  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  Since patient had metabolic acidosis patient was placed on bicarbonate drip at 150 mL/hour  Renal US showed no evidence of hydronephrosis  8mm calculus was found in bladder   Possible cause of hematuria if not glomerulonephritis   Continue to hold Vasotec  Patient is recieving further workup for glomerulonephritis  C3 and C4 WNL  Other tests pending  UA showed innumerable red blood cells 10-20 white cells with glucose  CT of abdomen and pelvis showed 8 mm stone at UVJ with hydroureter but no hydronephrosis and not significant changed since 2016  No peritoneal or retroperitoneal hematoma or evidence of GI bleeding  Mild pulmonary edema and small b/l pleural effusions were noted  CXR pending  Patient received dialysis 3/9, 3/10  Plan for dialysis tomorrow  IVF were d/c on 3/9  Status post renal biopsy on 03/10    Results from last 7 days   Lab Units 03/11/22  0451 03/10/22  0506 03/09/22  0519 03/08/22  0744 03/07/22  2209 03/07/22  1306 03/07/22  0954   BUN mg/dL 93* 129* 155* 176* 162* 175* 169*   CREATININE mg/dL 7 79* 10 08* 13 69* 13 26* 13 64* 14 38* 14 91*       High anion gap metabolic acidosis  Assessment & Plan  Likely secondary to renal failure and also hyperglycemic state  Patient was placed on bicarbonate drip at 150 mL/hour, Bicarb drip was d/c on 3/9    Type 2 diabetes mellitus with microalbuminuria, with long-term current use of insulin [facility])  Assessment & Plan  Lab Results   Component Value Date    HGBA1C 7 2 (H) 12/03/2021       Recent Labs     03/10/22  2000 03/11/22  0159 03/11/22  0552 03/11/22  0654   POCGLU 274* 290* 213* 248*     Patient is on Humalog mix 75/25 45 units b i d  Hold Humalog mix  Patient was started on insulin drip as 1-beta hydroxybutyrate was elevated at 1 6  Insulin drip was stopped  Currently NPO for EGD today  Continue with SSI  Monitor blood sugars    Melena  Assessment & Plan  Patient reported black stool for couple of days prior to admission    Reports melena again overnight  stool occult blood ordered but not done  Patient on Protonix 40 milligram IV q 12 hours  No history of NSAID use  Plan is for EGD today    Hyperkalemia-resolved as of 3/9/2022  Assessment & Plan  Potassium level upon admission was 6 9  Likely in the setting of acute kidney injury and ACE-inhibitor use  Patient was given insulin with D10, albuterol nebulizer treatment and calcium gluconate in the ED  EKG showed no changes  Patient was also given Kayexalate 30 grams in the ED  K level has normalized at 4 5   Received Dialysis on 3/9      UTI (urinary tract infection)  Assessment & Plan  Patient has leukocytosis  UA showed trace leukocyte esterase with 10-20 white cells and innumerable bacteria  Urine culture grew E  Coli  Continue Ceftriaxone   No clinical signs of sepsis  Hemoptysis  Assessment & Plan  Patient states he has had a cough for a couple weeks  Typically yellow in color, but this morning and yesterday had hemoptysis  CXR ordered  Pneumonia vs bronchitis vs pulmonary edema vs Goodpastures   Obtain sputum culture if possible  Procalcitonin elevated at 6 73 --> 5 6, unclear significance in the setting of renal dysfunction  Anemia due to chronic kidney disease, on chronic dialysis [facility])  Assessment & Plan  Lab Results   Component Value Date    EGFR 5 03/11/2022    EGFR 4 03/10/2022    EGFR 3 03/09/2022    CREATININE 7 79 (H) 03/11/2022    CREATININE 10 08 (H) 03/10/2022    CREATININE 13 69 (H) 03/09/2022       Chest pain  Assessment & Plan  Patient presented with left-sided chest pain,Troponins x2 were negative initially  EKG was unremarkable  ProBNP was elevated at 15,000   2D echo showed normal EF with normal diastolic function  Reported chest pain again 3/9 and troponin peaked at 104   Plan for outpatient stress test     Hyperlipidemia  Assessment & Plan  Zocor substituted with Pravachol  Benign prostatic hyperplasia with lower urinary tract symptoms  Assessment & Plan  Monitor postvoid residuals  Continue Flomax      Benign essential hypertension  Assessment & Plan  Patient is on Vasotec at home which is being held in the setting of acute kidney injury  Avoid hypotension  Will add Norvasc if blood pressure continues to be high          VTE Pharmacologic Prophylaxis:   None due to melena, anemia    Patient Centered Rounds: I performed bedside rounds with nursing staff today  Discussions with Specialists or Other Care Team Provider: yes     Education and Discussions with Family / Patient: Attempted to update  (wife) via phone  Unable to contact  - mailbox is full    Time Spent for Care: 40 min  More than 50% of total time spent on counseling and coordination of care as described above  Current Length of Stay: 4 day(s)  Current Patient Status: Inpatient   Certification Statement: The patient will continue to require additional inpatient hospital stay due to Melena/anemia requiring EGD/blood transfusion, acute kidney injury requiring dialysis  Discharge Plan: Anticipate discharge in 48-72 hrs to discharge location to be determined pending rehab evaluations  Code Status: Level 1 - Full Code    Subjective:     Patient reports some pain last night by biopsy site which has since resolved  Denies any abdominal pain  Reports melanotic bowel movement overnight    Objective:     Vitals:   Temp (24hrs), Av 1 °F (36 7 °C), Min:97 3 °F (36 3 °C), Max:98 6 °F (37 °C)    Temp:  [97 3 °F (36 3 °C)-98 6 °F (37 °C)] 98 4 °F (36 9 °C)  HR:  [72-92] 78  Resp:  [17-20] 17  BP: (130-184)/(55-91) 143/56  SpO2:  [91 %-99 %] 95 %  Body mass index is 29 65 kg/m²  Input and Output Summary (last 24 hours): Intake/Output Summary (Last 24 hours) at 3/11/2022 1057  Last data filed at 3/11/2022 0955  Gross per 24 hour   Intake 220 ml   Output 300 ml   Net -80 ml       Physical Exam:   Physical Exam  Constitutional:       General: He is not in acute distress  Appearance: He is ill-appearing (Chronically)  He is not diaphoretic  HENT:      Head: Normocephalic and atraumatic        Ears:      Comments: Hard of hearing  Eyes:      General:         Right eye: No discharge  Left eye: No discharge  Cardiovascular:      Rate and Rhythm: Normal rate and regular rhythm  Pulmonary:      Effort: Pulmonary effort is normal  No respiratory distress  Breath sounds: Normal breath sounds  No wheezing or rales  Abdominal:      General: Bowel sounds are normal  There is no distension  Palpations: Abdomen is soft  Tenderness: There is no abdominal tenderness  Musculoskeletal:      Right lower leg: No edema  Left lower leg: No edema  Neurological:      Mental Status: He is alert  Additional Data:     Labs:  Results from last 7 days   Lab Units 03/11/22  0451 03/08/22  1723 03/08/22  0743 03/08/22  0001 03/07/22  2209   WBC Thousand/uL 11 30*   < > 11 02*   < > 10 90*   HEMOGLOBIN g/dL 6 1*   < > 7 7*   < > 6 7*   HEMATOCRIT % 19 3*   < > 22 9*   < > 20 7*   PLATELETS Thousands/uL 217   < > 244   < > 264   BANDS PCT %  --   --   --   --  2   NEUTROS PCT %  --   --  88*   < >  --    LYMPHS PCT %  --   --  6*   < >  --    LYMPHO PCT %  --   --   --   --  7*   MONOS PCT %  --   --  5   < >  --    MONO PCT %  --   --   --   --  4   EOS PCT %  --   --  0   < > 0    < > = values in this interval not displayed  Results from last 7 days   Lab Units 03/11/22  0451 03/09/22  0519 03/08/22  0744   SODIUM mmol/L 135*   < > 135*   POTASSIUM mmol/L 4 4   < > 4 7   CHLORIDE mmol/L 95*   < > 95*   CO2 mmol/L 24   < > 20*   BUN mg/dL 93*   < > 176*   CREATININE mg/dL 7 79*   < > 13 26*   ANION GAP mmol/L 16*   < > 20*   CALCIUM mg/dL 6 8*   < > 7 3*   ALBUMIN g/dL  --   --  1 7*   TOTAL BILIRUBIN mg/dL  --   --  0 52   ALK PHOS U/L  --   --  50   ALT U/L  --   --  37   AST U/L  --   --  22   GLUCOSE RANDOM mg/dL 194*   < > 222*    < > = values in this interval not displayed       Results from last 7 days   Lab Units 03/10/22  0506   INR  1 31*     Results from last 7 days   Lab Units 03/11/22  0654 03/11/22  0552 03/11/22  0159 03/10/22  2000 03/10/22  1629 03/10/22  0716 03/10/22  0609 03/09/22  2351 03/09/22  2009 03/09/22  1602 03/09/22  1059 03/09/22  0705   POC GLUCOSE mg/dl 248* 213* 290* 274* 260* 203* 200* 310* 262* 187* 171* 282*         Results from last 7 days   Lab Units 03/09/22  0519 03/08/22  0744 03/07/22  1124   LACTIC ACID mmol/L  --   --  0 7   PROCALCITONIN ng/ml 5 68* 6 73*  --        Lines/Drains:  Invasive Devices  Report    Peripheral Intravenous Line            Peripheral IV 03/08/22 Left Wrist 2 days    Peripheral IV 03/11/22 Right Arm <1 day          Hemodialysis Catheter            HD Permanent Double Catheter 2 days                  Telemetry:  Telemetry Orders (From admission, onward)             48 Hour Telemetry Monitoring  Continuous x 48 hours        Expiring   References:    Telemetry Guidelines   Question:  Reason for 48 Hour Telemetry  Answer:  Acute MI, chest pain - R/O MI, or unstable angina                 Indication for Continued Telemetry Use: No indication for continued use  Will discontinue  Imaging: No pertinent imaging reviewed  Recent Cultures (last 7 days):   Results from last 7 days   Lab Units 03/07/22  1305 03/07/22  1124 03/07/22  1055   BLOOD CULTURE   --  No Growth After 4 Days  No Growth After 4 Days     URINE CULTURE  [id number] cfu/ml Escherichia coli*  --   --        Last 24 Hours Medication List:   Current Facility-Administered Medications   Medication Dose Route Frequency Provider Last Rate    acetaminophen  650 mg Oral Q6H PRN Joaquín Morales MD      cefTRIAXone  1,000 mg Intravenous Q24H Joaquín Morales MD 1,000 mg (03/10/22 1416)    [START ON 3/12/2022] epoetin ani  10,000 Units Intravenous Once per day on Mon Wed Fri Charolett Frankel, MD      insulin lispro  1-5 Units Subcutaneous TID AC Emiliana Jordan DO      insulin lispro  1-5 Units Subcutaneous HS Emiliana Jordan, DO      latanoprost  1 drop Both Eyes HS Daisy Keating MD      ondansetron  4 mg Intravenous Q6H PRN Daisy MD Rishabh      pantoprazole  40 mg Intravenous Q12H  47-7, MD      pravastatin  80 mg Oral Daily With Joshua Alberto MD      tamsulosin  0 4 mg Oral Daily With Joshua Alberto MD          Today, Patient Was Seen By: Joaquin Medina DO    ** Please Note: "This note has been constructed using a voice recognition system  Therefore there may be syntax, spelling, and/or grammatical errors   Please call if you have any questions  "**

## 2022-03-11 NOTE — PROGRESS NOTES
NEPHROLOGY PROGRESS NOTE    Brenton Moeller 64 y o  male MRN: [id number]  Unit/Bed#: [address] [place] [address] Encounter: [id number]  Reason for Consult:  Renal failure    The patient underwent his renal biopsy yesterday and states that the procedure went very well and he has no pain related to the procedure  He did also have dialysis yesterday and feels that that went well  He is asking when he can go home  He is still producing little bit of blood-tinged sputum although he has no shortness of breath  ASSESSMENT/PLAN:  1  Renal    The patient presented with renal failure clinically that was a rapidly progressive course is over the 3 months he has developed dialysis dependent renal failure  Urinalysis suggested a nephritic sediment he had proteinuria as well  Serologies are sent ANCA and anti-GBM are pending  SAMANTHA and complements are normal   He underwent renal biopsy yesterday and will await definitive diagnosis  I am suspicious there may be an underlying vasculitis but will await biopsy report and serologic results as well  Chest x-ray report was read as no active disease  He underwent hemodialysis yesterday  Next dialysis will be tomorrow  Hemodialysis Saturday  Case management arranging outpatient dialysis prior to discharge  Follow-up on serologies  Await results of renal biopsy to determine if there is any treatable condition  PT/OT  Epogen 93524 units with dialysis for anemia  2  Anemia    GI evaluating and planning some endoscopy  Likely has contributing component related to his renal disease but really out GI bleeding  SUBJECTIVE:  Review of Systems   Constitutional: Negative for chills, diaphoresis, fever and night sweats  HENT: Negative  Eyes: Negative  Cardiovascular: Negative for chest pain, leg swelling, orthopnea and palpitations  Respiratory: Positive for hemoptysis  Negative for shortness of breath, sputum production and wheezing  Musculoskeletal: Positive for arthritis  Gastrointestinal: Negative for abdominal pain, diarrhea, nausea and vomiting  Neurological: Positive for weakness  Negative for dizziness, focal weakness, headaches and light-headedness  Psychiatric/Behavioral: Negative for altered mental status, depression, hallucinations and hypervigilance  OBJECTIVE:  Current Weight: Weight - Scale: 88 5 kg (195 lb)  Vitals:Temp (24hrs), Av 8 °F (36 6 °C), Min:97 3 °F (36 3 °C), Max:98 6 °F (37 °C)  Current: Temperature: 98 6 °F (37 °C)   Blood pressure 145/59, pulse 76, temperature 98 6 °F (37 °C), resp  rate 20, height 5' 8" (1 727 m), weight 88 5 kg (195 lb), SpO2 94 %  , Body mass index is 29 65 kg/m²  Intake/Output Summary (Last 24 hours) at 3/11/2022 0853  Last data filed at 3/11/2022 0201  Gross per 24 hour   Intake 430 ml   Output 1800 ml   Net -1370 ml       Physical Exam: /59   Pulse 76   Temp 98 6 °F (37 °C)   Resp 20   Ht 5' 8" (1 727 m)   Wt 88 5 kg (195 lb)   SpO2 94%   BMI 29 65 kg/m²   Physical Exam  Constitutional:       General: He is not in acute distress  Appearance: He is not toxic-appearing or diaphoretic  HENT:      Head: Normocephalic and atraumatic  Mouth/Throat:      Mouth: Mucous membranes are dry  Eyes:      General: No scleral icterus  Extraocular Movements: Extraocular movements intact  Cardiovascular:      Rate and Rhythm: Normal rate and regular rhythm  Heart sounds: No friction rub  No gallop  Pulmonary:      Effort: Pulmonary effort is normal  No respiratory distress  Breath sounds: Normal breath sounds  No wheezing, rhonchi or rales  Abdominal:      General: Bowel sounds are normal  There is no distension  Palpations: Abdomen is soft  Tenderness: There is no abdominal tenderness  There is no rebound  Musculoskeletal:      Cervical back: Normal range of motion and neck supple  Neurological:      General: No focal deficit present        Mental Status: He is alert and oriented to person, place, and time  Mental status is at baseline  Psychiatric:         Mood and Affect: Mood normal          Behavior: Behavior normal          Thought Content:  Thought content normal          Judgment: Judgment normal          Medications:    Current Facility-Administered Medications:     acetaminophen (TYLENOL) tablet 650 mg, 650 mg, Oral, Q6H PRN, Karlo Hwang MD, 650 mg at 03/08/22 0310    cefTRIAXone (ROCEPHIN) IVPB (premix in dextrose) 1,000 mg 50 mL, 1,000 mg, Intravenous, Q24H, Daisy Keating MD, Last Rate: 100 mL/hr at 03/10/22 1416, 1,000 mg at 03/10/22 1416    insulin lispro (HumaLOG) 100 units/mL subcutaneous injection 1-5 Units, 1-5 Units, Subcutaneous, TID AC, 2 Units at 03/11/22 0751 **AND** Fingerstick Glucose (POCT), , , TID AC, Emiliana Revankar, DO    insulin lispro (HumaLOG) 100 units/mL subcutaneous injection 1-5 Units, 1-5 Units, Subcutaneous, HS, Emiliana Jordan, DO, 2 Units at 03/10/22 2136    latanoprost (XALATAN) 0 005 % ophthalmic solution 1 drop, 1 drop, Both Eyes, HS, Daisy Keating MD, 1 drop at 03/10/22 2134    ondansetron (ZOFRAN) injection 4 mg, 4 mg, Intravenous, Q6H PRN, Karlo Hwang MD, 4 mg at 03/09/22 2034    pantoprazole (PROTONIX) injection 40 mg, 40 mg, Intravenous, Q12H [facility], Karlo Hwang MD, 40 mg at 03/11/22 0851    pravastatin (PRAVACHOL) tablet 80 mg, 80 mg, Oral, Daily With Mikey Chaudhry MD, 80 mg at 03/10/22 1650    tamsulosin (FLOMAX) capsule 0 4 mg, 0 4 mg, Oral, Daily With Mikey Chaudhry MD, 0 4 mg at 03/10/22 1650    Laboratory Results:  Lab Results   Component Value Date    WBC 11 30 (H) 03/11/2022    HGB 6 1 (LL) 03/11/2022    HCT 19 3 (L) 03/11/2022    MCV 90 03/11/2022     03/11/2022     Lab Results   Component Value Date    SODIUM 135 (L) 03/11/2022    K 4 4 03/11/2022    CL 95 (L) 03/11/2022    CO2 24 03/11/2022    BUN 93 (H) 03/11/2022    CREATININE 7 79 (H) 03/11/2022    GLUC 194 (H) 03/11/2022    CALCIUM 6 8 (L) 03/11/2022     Lab Results   Component Value Date    CALCIUM 6 8 (L) 03/11/2022    PHOS 7 9 (H) 03/08/2022     No results found for: LABPROT

## 2022-03-11 NOTE — ASSESSMENT & PLAN NOTE
Lab Results   Component Value Date    EGFR 5 03/11/2022    EGFR 4 03/10/2022    EGFR 3 03/09/2022    CREATININE 7 79 (H) 03/11/2022    CREATININE 10 08 (H) 03/10/2022    CREATININE 13 69 (H) 03/09/2022

## 2022-03-11 NOTE — CASE MANAGEMENT
Case Management Discharge Planning Note    Patient name Hardy Zelaya  Location [address] [facility] 408/[address] Teresa Michel-* MRN [id number]  : 1945 Date 3/11/2022       Current Admission Date: 3/7/2022  Current Admission Diagnosis:Symptomatic anemia   Patient Active Problem List    Diagnosis Date Noted    Anemia due to chronic kidney disease, on chronic dialysis ([facility] )     Hemoptysis 2022    LORRAINE (acute kidney injury) ([facility] )     Other proteinuria     Symptomatic anemia 2022    Chest pain 2022    Acute kidney injury ([facility] ) 2022    High anion gap metabolic acidosis     UTI (urinary tract infection) 2022    Melena 2022    Elevated PSA 2021    Chronic pain of right knee 2021    Primary osteoarthritis of right knee 2021    Bladder stones 12/15/2016    Type 2 diabetes mellitus with microalbuminuria, with long-term current use of insulin ([place] [address] ) 2016    Benign colon polyp 2013    Benign prostatic hyperplasia with lower urinary tract symptoms 2013    Benign essential hypertension 2013    Hyperlipidemia 2012    Vitamin D deficiency 2012      LOS (days): 4  Geometric Mean LOS (GMLOS) (days): 3 60  Days to GMLOS:-0 4     OBJECTIVE:  Risk of Unplanned Readmission Score: 17   Current admission status: Inpatient   Preferred Pharmacy:   [facility] [address] [facility]  [address] [place] [id number]  Phone: [phone] Fax: [phone] [address], [address]  [address] [facility], [address]  [address]  [facility] [address]  Phone: [phone] Fax: [phone]    Primary Care Provider: Jhonatan Goldstein MD  Primary Insurance: MEDICARE  Secondary Insurance: BLUE CROSS    DISCHARGE DETAILS:  Discharge planning discussed with[de-identified] Patient  Freedom of Choice: Yes  Comments - Freedom of Choice: STR is being recommended by PT   Patient provided with choice and says he has no preference  A blanket referral was sent to local STR's  CM contacted family/caregiver?: No- see comments (CM attempted to call patients wife Hever Romeo but there was no answer and mail box was full )  Were Treatment Team discharge recommendations reviewed with patient/caregiver?: Yes  Did patient/caregiver verbalize understanding of patient care needs?: Yes  Were patient/caregiver advised of the risks associated with not following Treatment Team discharge recommendations?: Yes    Contacts  Patient Contacts: El Munoz  Relationship to Patient[de-identified] Family (wife)  Phone Number: [phone]  Reason/Outcome: Emergency Contact,Discharge Planning,Continuity of Care    Other Referral/Resources/Interventions Provided:  Interventions: Short Term Rehab    Would you like to participate in our [address] service program?  : No - Declined    Treatment Team Recommendation: Short Term Rehab  Discharge Destination Plan[de-identified] Home with [address]  Transport at Discharge :  (TBD)    CM called Frank to speak with Care Coordinator Faby  There was no answer and CM spoke with Iris who states we need to speak with him regarding referral he is in the process of it  CM left a message for Faby and awaiting call back  CM spoke with patient and discussed dc planning and the recommendation for STR at discharge  He states understanding  He was provided with choice and he said he has no preference  He agrees to a blanket referral being placed to STR's in the area  He also requests to speak with his wife Hever Romeo regarding plan  If he decides he does not go to STR at [facility] was offered and and patient was provided with choice  He states he has no preference for [place] [address] agency and a referral was sent to [facility]  A blanket referral was also sent in [facility] for STR's  CM attempted to call patients wife Hever Romeo and there was no answer and the mailbox was full

## 2022-03-11 NOTE — PLAN OF CARE
Problem: MOBILITY - ADULT  Goal: Maintain or return to baseline ADL function  Description: INTERVENTIONS:  -  Assess patient's ability to carry out ADLs; assess patient's baseline for ADL function and identify physical deficits which impact ability to perform ADLs (bathing, care of mouth/teeth, toileting, grooming, dressing, etc )  - Assess/evaluate cause of self-care deficits   - Assess range of motion  - Assess patient's mobility; develop plan if impaired  - Assess patient's need for assistive devices and provide as appropriate  - Encourage maximum independence but intervene and supervise when necessary  - Involve family in performance of ADLs  - Assess for home care needs following discharge   - Consider OT consult to assist with ADL evaluation and planning for discharge  - Provide patient education as appropriate  Outcome: Progressing  Goal: Maintains/Returns to pre admission functional level  Description: INTERVENTIONS:  - Perform BMAT or MOVE assessment daily    - Set and communicate daily mobility goal to care team and patient/family/caregiver     - Collaborate with rehabilitation services on mobility goals if consulted  - Record patient progress and toleration of activity level   Outcome: Progressing     Problem: HEMATOLOGIC - ADULT  Goal: Maintains hematologic stability  Description: INTERVENTIONS  - Assess for signs and symptoms of bleeding or hemorrhage  - Monitor labs  - Administer supportive blood products/factors as ordered and appropriate  Outcome: Progressing     Problem: Prexisting or High Potential for Compromised Skin Integrity  Goal: Skin integrity is maintained or improved  Description: INTERVENTIONS:  - Identify patients at risk for skin breakdown  - Assess and monitor skin integrity  - Assess and monitor nutrition and hydration status  - Monitor labs   - Assess for incontinence   - Turn and reposition patient  - Assist with mobility/ambulation  - Relieve pressure over bony prominences  - Avoid friction and shearing  - Provide appropriate hygiene as needed including keeping skin clean and dry  - Evaluate need for skin moisturizer/barrier cream  - Collaborate with interdisciplinary team   - Patient/family teaching  - Consider wound care consult   Outcome: Progressing     Problem: Potential for Falls  Goal: Patient will remain free of falls  Description: INTERVENTIONS:  - Educate patient/family on patient safety including physical limitations  - Instruct patient to call for assistance with activity   - Consult OT/PT to assist with strengthening/mobility   - Keep Call bell within reach  - Keep bed low and locked with side rails adjusted as appropriate  - Keep care items and personal belongings within reach  - Initiate and maintain comfort rounds  - Make Fall Risk Sign visible to staff  - Offer Toileting every 2 Hours, in advance of need  - Initiate/Maintain  bed alarm  - Obtain necessary fall risk management equipment: as required   - Apply yellow socks and bracelet for high fall risk patients  - Consider moving patient to room near nurses station  Outcome: Progressing     Problem: METABOLIC, FLUID AND ELECTROLYTES - ADULT  Goal: Electrolytes maintained within normal limits  Description: INTERVENTIONS:  - Monitor labs and assess patient for signs and symptoms of electrolyte imbalances  - Administer electrolyte replacement as ordered  - Monitor response to electrolyte replacements, including repeat lab results as appropriate  - Instruct patient on fluid and nutrition as appropriate  Outcome: Progressing  Goal: Fluid balance maintained  Description: INTERVENTIONS:  - Monitor labs   - Monitor I/O and WT  - Instruct patient on fluid and nutrition as appropriate  - Assess for signs & symptoms of volume excess or deficit  Outcome: Progressing

## 2022-03-12 ENCOUNTER — APPOINTMENT (INPATIENT)
Dept: DIALYSIS | Facility: HOSPITAL | Age: 77
DRG: 674 | End: 2022-03-12
Attending: INTERNAL MEDICINE
Payer: MEDICARE

## 2022-03-12 LAB
ABO GROUP BLD BPU: NORMAL
ANION GAP SERPL CALCULATED.3IONS-SCNC: 17 MMOL/L (ref 4–13)
BACTERIA BLD CULT: NORMAL
BACTERIA BLD CULT: NORMAL
BACTERIA SPT RESP CULT: ABNORMAL
BPU ID: NORMAL
BUN SERPL-MCNC: 117 MG/DL (ref 5–25)
CALCIUM SERPL-MCNC: 7 MG/DL (ref 8.3–10.1)
CHLORIDE SERPL-SCNC: 94 MMOL/L (ref 100–108)
CO2 SERPL-SCNC: 22 MMOL/L (ref 21–32)
CREAT SERPL-MCNC: 9.96 MG/DL (ref 0.6–1.3)
CROSSMATCH: NORMAL
ERYTHROCYTE [DISTWIDTH] IN BLOOD BY AUTOMATED COUNT: 14.8 % (ref 11.6–15.1)
GFR SERPL CREATININE-BSD FRML MDRD: 4 ML/MIN/1.73SQ M
GLUCOSE SERPL-MCNC: 195 MG/DL (ref 65–140)
GLUCOSE SERPL-MCNC: 200 MG/DL (ref 65–140)
GLUCOSE SERPL-MCNC: 224 MG/DL (ref 65–140)
GLUCOSE SERPL-MCNC: 244 MG/DL (ref 65–140)
GLUCOSE SERPL-MCNC: 245 MG/DL (ref 65–140)
GLUCOSE SERPL-MCNC: 268 MG/DL (ref 65–140)
GLUCOSE SERPL-MCNC: 286 MG/DL (ref 65–140)
GLUCOSE SERPL-MCNC: 300 MG/DL (ref 65–140)
GRAM STN SPEC: ABNORMAL
HCT VFR BLD AUTO: 21 % (ref 36.5–49.3)
HGB BLD-MCNC: 6.7 G/DL (ref 12–17)
MCH RBC QN AUTO: 28.4 PG (ref 26.8–34.3)
MCHC RBC AUTO-ENTMCNC: 31.9 G/DL (ref 31.4–37.4)
MCV RBC AUTO: 89 FL (ref 82–98)
PLATELET # BLD AUTO: 182 THOUSANDS/UL (ref 149–390)
PMV BLD AUTO: 9.5 FL (ref 8.9–12.7)
POTASSIUM SERPL-SCNC: 4.6 MMOL/L (ref 3.5–5.3)
RBC # BLD AUTO: 2.36 MILLION/UL (ref 3.88–5.62)
SODIUM SERPL-SCNC: 133 MMOL/L (ref 136–145)
UNIT DISPENSE STATUS: NORMAL
UNIT PRODUCT CODE: NORMAL
UNIT PRODUCT VOLUME: 350 ML
UNIT RH: NORMAL
WBC # BLD AUTO: 12.11 THOUSAND/UL (ref 4.31–10.16)

## 2022-03-12 PROCEDURE — 85027 COMPLETE CBC AUTOMATED: CPT | Performed by: FAMILY MEDICINE

## 2022-03-12 PROCEDURE — 80048 BASIC METABOLIC PNL TOTAL CA: CPT | Performed by: FAMILY MEDICINE

## 2022-03-12 PROCEDURE — 99232 SBSQ HOSP IP/OBS MODERATE 35: CPT | Performed by: INTERNAL MEDICINE

## 2022-03-12 PROCEDURE — 99232 SBSQ HOSP IP/OBS MODERATE 35: CPT | Performed by: FAMILY MEDICINE

## 2022-03-12 PROCEDURE — 82948 REAGENT STRIP/BLOOD GLUCOSE: CPT

## 2022-03-12 PROCEDURE — 30233N1 TRANSFUSION OF NONAUTOLOGOUS RED BLOOD CELLS INTO PERIPHERAL VEIN, PERCUTANEOUS APPROACH: ICD-10-PCS | Performed by: INTERNAL MEDICINE

## 2022-03-12 PROCEDURE — C9113 INJ PANTOPRAZOLE SODIUM, VIA: HCPCS | Performed by: INTERNAL MEDICINE

## 2022-03-12 PROCEDURE — P9016 RBC LEUKOCYTES REDUCED: HCPCS

## 2022-03-12 RX ORDER — INSULIN GLARGINE 100 [IU]/ML
8 INJECTION, SOLUTION SUBCUTANEOUS
Status: DISCONTINUED | OUTPATIENT
Start: 2022-03-12 | End: 2022-03-13

## 2022-03-12 RX ORDER — ACETAMINOPHEN 325 MG/1
650 TABLET ORAL ONCE
Status: COMPLETED | OUTPATIENT
Start: 2022-03-12 | End: 2022-03-12

## 2022-03-12 RX ADMIN — INSULIN LISPRO 1 UNITS: 100 INJECTION, SOLUTION INTRAVENOUS; SUBCUTANEOUS at 16:56

## 2022-03-12 RX ADMIN — INSULIN LISPRO 3 UNITS: 100 INJECTION, SOLUTION INTRAVENOUS; SUBCUTANEOUS at 12:09

## 2022-03-12 RX ADMIN — INSULIN LISPRO 2 UNITS: 100 INJECTION, SOLUTION INTRAVENOUS; SUBCUTANEOUS at 07:46

## 2022-03-12 RX ADMIN — PANTOPRAZOLE SODIUM 40 MG: 40 INJECTION, POWDER, FOR SOLUTION INTRAVENOUS at 08:15

## 2022-03-12 RX ADMIN — LATANOPROST 1 DROP: 50 SOLUTION OPHTHALMIC at 21:48

## 2022-03-12 RX ADMIN — PANTOPRAZOLE SODIUM 40 MG: 40 INJECTION, POWDER, FOR SOLUTION INTRAVENOUS at 21:48

## 2022-03-12 RX ADMIN — PRAVASTATIN SODIUM 80 MG: 80 TABLET ORAL at 16:54

## 2022-03-12 RX ADMIN — ONDANSETRON 4 MG: 2 INJECTION INTRAMUSCULAR; INTRAVENOUS at 19:27

## 2022-03-12 RX ADMIN — ACETAMINOPHEN 650 MG: 325 TABLET, FILM COATED ORAL at 14:49

## 2022-03-12 RX ADMIN — INSULIN LISPRO 3 UNITS: 100 INJECTION, SOLUTION INTRAVENOUS; SUBCUTANEOUS at 21:46

## 2022-03-12 RX ADMIN — ONDANSETRON 4 MG: 2 INJECTION INTRAMUSCULAR; INTRAVENOUS at 14:49

## 2022-03-12 RX ADMIN — CEFTRIAXONE 1000 MG: 1 INJECTION, SOLUTION INTRAVENOUS at 16:55

## 2022-03-12 RX ADMIN — TAMSULOSIN HYDROCHLORIDE 0.4 MG: 0.4 CAPSULE ORAL at 16:54

## 2022-03-12 RX ADMIN — INSULIN GLARGINE 8 UNITS: 100 INJECTION, SOLUTION SUBCUTANEOUS at 21:45

## 2022-03-12 NOTE — PROGRESS NOTES
[address] Internal Medicine Progress Note  Patient: Laura Cuevas 64 y o  male   MRN: [id number]  PCP: Lexie Vega MD  Unit/Bed#: [address] [place] [address] Encounter: [id number]  Date Of Visit: 03/12/22    Problem List:    Principal Problem:    Symptomatic anemia  Active Problems:    Acute kidney injury ([address] )    Type 2 diabetes mellitus with microalbuminuria, with long-term current use of insulin (HCC)    High anion gap metabolic acidosis    Melena    UTI (urinary tract infection)    Hemoptysis    Benign essential hypertension    Benign prostatic hyperplasia with lower urinary tract symptoms    Hyperlipidemia    Chest pain      Assessment & Plan:    * Symptomatic anemia  Assessment & Plan  Hemoglobin upon admission was 5 8  Patient recieved 4 units of PRBC so far  One more unit of PRBC today  MCV level was normal  Vitamin W87 and folic acid level WNL  Iron panel revealed elevated ferritin, normal iron saturation, low TIBC, and low iron  Anemia likely multifactorial in the setting of possible GI bleeding and also CKD  Started on Epogen with dialysis  CT abd/pelvis found no evidence of GI bleed  S/P EGD on 03/11 which was negative for active bleeding  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  And for inpatient colonoscopy on Monday as hemoglobin has downtrended again today    Results from last 7 days   Lab Units 03/12/22  1220 03/11/22  1601 03/11/22  0451 03/10/22  0506 03/09/22  0519 03/08/22  1723 03/08/22  0743 03/08/22  0001 03/07/22  2209 03/07/22  0954   HEMOGLOBIN g/dL 6 7* 7 7* 6 1* 7 1* 7 4* 8 2* 7 7* 6 4* 6 7* 5 8*   HEMATOCRIT % 21 0* 23 9* 19 3* 22 5* 22 9* 25 1* 22 9* 19 8* 20 7* 19 0*   MCV fL 89  --  90 89 86  --  85 84 85 87       Acute kidney injury (HCC)  Assessment & Plan  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  Since patient had metabolic acidosis patient was placed on bicarbonate drip at 150 mL/hour  Renal US showed no evidence of hydronephrosis  8mm calculus was found in bladder  Possible cause of hematuria if not glomerulonephritis   Continue to hold Vasotec  Patient is recieving further workup for glomerulonephritis  SAMANTHA, C3 and C4 WNL  Other tests pending  UA showed innumerable red blood cells 10-20 white cells with glucose  CT of abdomen and pelvis showed 8 mm stone at UVJ with hydroureter but no hydronephrosis and not significant changed since 2016  No peritoneal or retroperitoneal hematoma or evidence of GI bleeding  Mild pulmonary edema and small b/l pleural effusions were noted  CXR pending  Patient received dialysis 3/9, 3/10 and today  IVF were d/c on 3/9  Status post renal biopsy on 03/10, awaiting results    Results from last 7 days   Lab Units 03/12/22  1220 03/11/22  0451 03/10/22  0506 03/09/22  0519 03/08/22  0744 03/07/22  2209 03/07/22  1306 03/07/22  0954   BUN mg/dL 117* 93* 129* 155* 176* 162* 175* 169*   CREATININE mg/dL 9 96* 7 79* 10 08* 13 69* 13 26* 13 64* 14 38* 14 91*       High anion gap metabolic acidosis  Assessment & Plan  Likely secondary to renal failure and also hyperglycemic state  Patient was placed on bicarbonate drip at 150 mL/hour, Bicarb drip was d/c on 3/9  Type 2 diabetes mellitus with microalbuminuria, with long-term current use of insulin [facility])  Assessment & Plan  Lab Results   Component Value Date    HGBA1C 7 2 (H) 12/03/2021       Recent Labs     03/12/22  0117 03/12/22  0602 03/12/22  0659 03/12/22  1127   POCGLU 268* 200* 224* 286*     Patient is on Humalog mix 75/25 45 units b i d  Hold Humalog mix  Patient was initially on insulin drip as 1-beta hydroxybutyrate was elevated at 1 6  Plan for clear liquid diet tomorrow  Continue with SSI    Started on low-dose Lantus overnight  Monitor blood sugars    Melena  Assessment & Plan  Patient reported black stool for couple of days prior to admission  Reports "black" stool still  overnight  stool occult blood ordered but not done  Patient on Protonix 40 milligram IV q 12 hours  No history of NSAID use  Plan for colonoscopy on Monday per GI    Hyperkalemia-resolved as of 3/9/2022  Assessment & Plan  Potassium level upon admission was 6 9  Likely in the setting of acute kidney injury and ACE-inhibitor use  Patient was given insulin with D10, albuterol nebulizer treatment and calcium gluconate in the ED  EKG showed no changes  Patient was also given Kayexalate 30 grams in the ED  K level has normalized at 4 5   Received Dialysis on 3/9      UTI (urinary tract infection)  Assessment & Plan  Patient has leukocytosis  UA showed trace leukocyte esterase with 10-20 white cells and innumerable bacteria  Urine culture grew E  Coli  Continue Ceftriaxone   No clinical signs of sepsis    Hemoptysis  Assessment & Plan  Patient states he has had a cough for a couple weeks  Typically yellow in color, but this morning and yesterday had hemoptysis  CXR ordered  Obtain sputum culture if possible  Procalcitonin elevated at 6 73 --> 5 6, unclear significance in the setting of renal dysfunction        Anemia due to chronic kidney disease, on chronic dialysis [facility])  Assessment & Plan  Lab Results   Component Value Date    EGFR 5 03/11/2022    EGFR 4 03/10/2022    EGFR 3 03/09/2022    CREATININE 7 79 (H) 03/11/2022    CREATININE 10 08 (H) 03/10/2022    CREATININE 13 69 (H) 03/09/2022       Chest pain  Assessment & Plan  Patient presented with left-sided chest pain,Troponins x2 were negative initially  EKG was unremarkable  ProBNP was elevated at 15,000   2D echo showed normal EF with normal diastolic function  Reported chest pain again 3/9 and troponin peaked at 104   Plan for outpatient stress test    Hyperlipidemia  Assessment & Plan  Zocor substituted with Pravachol    Benign prostatic hyperplasia with lower urinary tract symptoms  Assessment & Plan  Monitor postvoid residuals  Continue Flomax    Benign essential hypertension  Assessment & Plan  Patient is on Vasotec at home which is being held in the setting of acute kidney injury  Avoid hypotension  Will add Norvasc if blood pressure continues to be high            VTE Pharmacologic Prophylaxis:   None due to anemia    Patient Centered Rounds: I performed bedside rounds with nursing staff today  Discussions with Specialists or Other Care Team Provider: yes - GI    Education and Discussions with Family / Patient: yes    Time Spent for Care: 35 min  More than 50% of total time spent on counseling and coordination of care as described above  Current Length of Stay: 5 day(s)  Current Patient Status: Inpatient   Certification Statement: The patient will continue to require additional inpatient hospital stay due to Anemia requiring blood transfusion/colonoscopy, acute kidney injury of awaiting biopsy result  Discharge Plan: Anticipate discharge in >72 hrs to discharge location to be determined pending rehab evaluations  Code Status: Level 1 - Full Code    Subjective:     Patient states he is continuing to slowly improve  Denies any abdominal pain, shortness of breath, chest pain  States his stool still appear black"    Objective:     Vitals:   Temp (24hrs), Av 2 °F (36 8 °C), Min:97 7 °F (36 5 °C), Max:98 5 °F (36 9 °C)    Temp:  [97 7 °F (36 5 °C)-98 5 °F (36 9 °C)] 98 2 °F (36 8 °C)  HR:  [73-86] 86  Resp:  [18-20] 18  BP: (115-148)/(53-60) 133/60  SpO2:  [94 %-97 %] 97 %  Body mass index is 29 65 kg/m²  Input and Output Summary (last 24 hours): Intake/Output Summary (Last 24 hours) at 3/12/2022 1431  Last data filed at 3/12/2022 1222  Gross per 24 hour   Intake 200 ml   Output --   Net 200 ml       Physical Exam:   Physical Exam  Constitutional:       General: He is not in acute distress  Appearance: He is ill-appearing (Chronically)  He is not diaphoretic     HENT:      Head: Normocephalic and atraumatic  Ears:      Comments: Hard of hearing  Eyes:      General:         Right eye: No discharge  Left eye: No discharge  Cardiovascular:      Rate and Rhythm: Normal rate and regular rhythm  Pulmonary:      Effort: Pulmonary effort is normal  No respiratory distress  Breath sounds: Normal breath sounds  No wheezing or rales  Abdominal:      General: Bowel sounds are normal  There is no distension  Palpations: Abdomen is soft  Tenderness: There is no abdominal tenderness  Neurological:      Mental Status: He is alert and oriented to person, place, and time  Additional Data:     Labs:  Results from last 7 days   Lab Units 03/12/22  1220 03/08/22  1723 03/08/22  0743 03/08/22  0001 03/07/22  2209   WBC Thousand/uL 12 11*   < > 11 02*   < > 10 90*   HEMOGLOBIN g/dL 6 7*   < > 7 7*   < > 6 7*   HEMATOCRIT % 21 0*   < > 22 9*   < > 20 7*   PLATELETS Thousands/uL 182   < > 244   < > 264   BANDS PCT %  --   --   --   --  2   NEUTROS PCT %  --   --  88*   < >  --    LYMPHS PCT %  --   --  6*   < >  --    LYMPHO PCT %  --   --   --   --  7*   MONOS PCT %  --   --  5   < >  --    MONO PCT %  --   --   --   --  4   EOS PCT %  --   --  0   < > 0    < > = values in this interval not displayed  Results from last 7 days   Lab Units 03/12/22  1220 03/09/22  0519 03/08/22  0744   SODIUM mmol/L 133*   < > 135*   POTASSIUM mmol/L 4 6   < > 4 7   CHLORIDE mmol/L 94*   < > 95*   CO2 mmol/L 22   < > 20*   BUN mg/dL 117*   < > 176*   CREATININE mg/dL 9 96*   < > 13 26*   ANION GAP mmol/L 17*   < > 20*   CALCIUM mg/dL 7 0*   < > 7 3*   ALBUMIN g/dL  --   --  1 7*   TOTAL BILIRUBIN mg/dL  --   --  0 52   ALK PHOS U/L  --   --  50   ALT U/L  --   --  37   AST U/L  --   --  22   GLUCOSE RANDOM mg/dL 244*   < > 222*    < > = values in this interval not displayed       Results from last 7 days   Lab Units 03/10/22  0506   INR  1 31*     Results from last 7 days   Lab Units 03/12/22  1127 03/12/22  0659 03/12/22  0602 03/12/22  0117 03/11/22  1959 03/11/22  1611 03/11/22  1111 03/11/22  0654 03/11/22  0552 03/11/22  0159 03/10/22  2000 03/10/22  1629   POC GLUCOSE mg/dl 286* 224* 200* 268* 262* 158* 215* 248* 213* 290* 274* 260*         Results from last 7 days   Lab Units 03/09/22  0519 03/08/22  0744 03/07/22  1124   LACTIC ACID mmol/L  --   --  0 7   PROCALCITONIN ng/ml 5 68* 6 73*  --        Lines/Drains:  Invasive Devices  Report    Peripheral Intravenous Line            Peripheral IV 03/08/22 Left Wrist 3 days    Peripheral IV 03/11/22 Right Arm 1 day          Hemodialysis Catheter            HD Permanent Double Catheter 4 days                      Imaging: Reviewed radiology reports from this admission including: procedure reports    Recent Cultures (last 7 days):   Results from last 7 days   Lab Units 03/11/22  0638 03/07/22  1305 03/07/22  1124 03/07/22  1055   BLOOD CULTURE   --   --  No Growth After 5 Days  No Growth After 5 Days  SPUTUM CULTURE  Test not performed  Suggest repeat specimen  --   --   --    GRAM STAIN RESULT  >10 squamous epithelial cells/lpf, indicating orophayngeal contamination  *  3+ Gram positive cocci in pairs, chains and clusters*  3+ Gram negative rods*  1+ Gram positive rods*  No polys seen*  --   --   --    URINE CULTURE   --  0561-9528 cfu/ml Escherichia coli*  --   --        Last 24 Hours Medication List:   Current Facility-Administered Medications   Medication Dose Route Frequency Provider Last Rate    acetaminophen  650 mg Oral Q6H PRN Lissett Albert MD      acetaminophen  650 mg Oral Once Emilaina Jordan DO      cefTRIAXone  1,000 mg Intravenous Q24H Daisy Keating MD 1,000 mg (03/11/22 1713)    epoetin ani  10,000 Units Intravenous Once per day on Mon Wed Fri Khoa Blount MD      insulin glargine  8 Units Subcutaneous HS Emiliana Jordan DO      insulin lispro  1-5 Units Subcutaneous TID AC Emiliana Jordan DO      insulin lispro  1-5 Units Subcutaneous HS Emiliana Jordan DO      latanoprost  1 drop Both Eyes HS Lissett Albert MD      ondansetron  4 mg Intravenous Q6H PRN Lissett Albert MD      pantoprazole  40 mg Intravenous Q12H Florencio 47MD Donnie      pravastatin  80 mg Oral Daily With Roseann Friedman MD      tamsulosin  0 4 mg Oral Daily With Roseann Friedman MD          Today, Patient Was Seen By: Yovana Mcclure DO    ** Please Note: "This note has been constructed using a voice recognition system  Therefore there may be syntax, spelling, and/or grammatical errors   Please call if you have any questions  "**

## 2022-03-12 NOTE — PROGRESS NOTES
NEPHROLOGY PROGRESS NOTE    Hardy Zelaya 64 y o  male MRN: [id number]  Unit/Bed#: [address] [place] [address] Encounter: [id number]  Reason for Consult:  Renal failure    The patient is awake alert said that he is feeling better today a little bit stronger  Did get a little therapy yesterday in the room walked a little bit  Otherwise no acute complaints  ASSESSMENT/PLAN:  1  Renal    The patient has renal failure that is developed over the last 2-3 months so he is behaving like a rapidly progressive case  Urinalysis suggested nephritis as there was red blood cells and protein  So far serologies are negative including anti-GBM SAMANTHA and complements but ANCA are pending  Seems to be having a little bit of blood-tinged sputum as well so 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  He did undergo renal biopsy on Thursday and I am still waiting to hear results  The patient scheduled for dialysis today  Case management continuing to arrange outpatient dialysis prior to discharge but need diagnosis as may need treatment prior to discharge with immunosuppression  Hemodialysis today  Case management arranging outpatient dialysis prior to discharge  Epogen for anemia started  Follow-up on serologies and await renal biopsy results    2  Anemia    Multifactorial as the patient does have advanced kidney disease but he also was having suspicion of GI bleeding  GI is evaluating patient still with black stools  Last hemoglobin 6 1  Transfusion per primary team if felt necessary  SUBJECTIVE:  Review of Systems   Constitutional: Negative for chills, diaphoresis, fever and night sweats  He says he is feels better and stronger  HENT: Negative  Eyes: Negative  Cardiovascular: Negative for chest pain, dyspnea on exertion, leg swelling and orthopnea  Respiratory: Negative for cough, shortness of breath and wheezing  Slight blood-tinged sputum     Musculoskeletal: Positive for arthritis  Gastrointestinal: Negative for abdominal pain, diarrhea, nausea and vomiting  Genitourinary: Negative for bladder incontinence, dysuria, flank pain and incomplete emptying  Neurological: Negative for dizziness, focal weakness, headaches and weakness  Psychiatric/Behavioral: Negative for altered mental status, depression, hallucinations and hypervigilance  OBJECTIVE:  Current Weight: Weight - Scale: 88 5 kg (195 lb)  Vitals:Temp (24hrs), Av 4 °F (36 9 °C), Min:97 7 °F (36 5 °C), Max:99 5 °F (37 5 °C)  Current: Temperature: 98 4 °F (36 9 °C)   Blood pressure 144/58, pulse 83, temperature 98 4 °F (36 9 °C), resp  rate 19, height 5' 8" (1 727 m), weight 88 5 kg (195 lb), SpO2 94 %  , Body mass index is 29 65 kg/m²  Intake/Output Summary (Last 24 hours) at 3/12/2022 0759  Last data filed at 3/11/2022 1413  Gross per 24 hour   Intake 540 ml   Output --   Net 540 ml       Physical Exam: /58   Pulse 83   Temp 98 4 °F (36 9 °C)   Resp 19   Ht 5' 8" (1 727 m)   Wt 88 5 kg (195 lb)   SpO2 94%   BMI 29 65 kg/m²   Physical Exam  Constitutional:       General: He is not in acute distress  Appearance: He is not toxic-appearing or diaphoretic  HENT:      Head: Normocephalic and atraumatic  Mouth/Throat:      Mouth: Mucous membranes are dry  Eyes:      General: No scleral icterus  Extraocular Movements: Extraocular movements intact  Cardiovascular:      Rate and Rhythm: Normal rate and regular rhythm  Heart sounds: No friction rub  No gallop  Pulmonary:      Effort: Pulmonary effort is normal  No respiratory distress  Breath sounds: Normal breath sounds  No wheezing, rhonchi or rales  Abdominal:      General: Bowel sounds are normal  There is no distension  Palpations: Abdomen is soft  Tenderness: There is no abdominal tenderness  There is no rebound  Musculoskeletal:      Cervical back: Normal range of motion and neck supple  Neurological:      General: No focal deficit present  Mental Status: He is alert and oriented to person, place, and time  Mental status is at baseline  Psychiatric:         Mood and Affect: Mood normal          Behavior: Behavior normal          Thought Content:  Thought content normal          Judgment: Judgment normal          Medications:    Current Facility-Administered Medications:     acetaminophen (TYLENOL) tablet 650 mg, 650 mg, Oral, Q6H PRN, Tara Amanda MD, 650 mg at 03/08/22 0310    cefTRIAXone (ROCEPHIN) IVPB (premix in dextrose) 1,000 mg 50 mL, 1,000 mg, Intravenous, Q24H, Daisy Keating MD, Last Rate: 100 mL/hr at 03/11/22 1713, 1,000 mg at 03/11/22 1713    epoetin ani (EPOGEN,PROCRIT) injection 10,000 Units, 10,000 Units, Intravenous, Once per day on Mon Wed Fri, John May MD    insulin lispro (HumaLOG) 100 units/mL subcutaneous injection 1-5 Units, 1-5 Units, Subcutaneous, TID AC, 2 Units at 03/12/22 0746 **AND** Fingerstick Glucose (POCT), , , TID AC, Emiliana Revankar, DO    insulin lispro (HumaLOG) 100 units/mL subcutaneous injection 1-5 Units, 1-5 Units, Subcutaneous, HS, Emiliana Jordan DO, 2 Units at 03/11/22 2225    latanoprost (XALATAN) 0 005 % ophthalmic solution 1 drop, 1 drop, Both Eyes, HS, Daisy Keating MD, 1 drop at 03/11/22 2227    ondansetron (ZOFRAN) injection 4 mg, 4 mg, Intravenous, Q6H PRN, Tara Amanda MD, 4 mg at 03/09/22 2034    pantoprazole (PROTONIX) injection 40 mg, 40 mg, Intravenous, Q12H [facility], Tara Amanda MD, 40 mg at 03/11/22 2200    pravastatin (PRAVACHOL) tablet 80 mg, 80 mg, Oral, Daily With Jaclyn Branch MD, 80 mg at 03/11/22 1712    tamsulosin (FLOMAX) capsule 0 4 mg, 0 4 mg, Oral, Daily With Jaclyn Branch MD, 0 4 mg at 03/11/22 1712    Laboratory Results:  Lab Results   Component Value Date    WBC 11 30 (H) 03/11/2022    HGB 7 7 (L) 03/11/2022    HCT 23 9 (L) 03/11/2022    MCV 90 03/11/2022  03/11/2022     Lab Results   Component Value Date    SODIUM 135 (L) 03/11/2022    K 4 4 03/11/2022    CL 95 (L) 03/11/2022    CO2 24 03/11/2022    BUN 93 (H) 03/11/2022    CREATININE 7 79 (H) 03/11/2022    GLUC 194 (H) 03/11/2022    CALCIUM 6 8 (L) 03/11/2022     Lab Results   Component Value Date    CALCIUM 6 8 (L) 03/11/2022    PHOS 7 9 (H) 03/08/2022     No results found for: LABPROT

## 2022-03-12 NOTE — PLAN OF CARE
Post-Dialysis RN Treatment Note    Blood Pressure:  Pre 146/55 mm/Hg  Post 153/65 mmHg   EDW  TBD kg    Weight:  Pre 84 3 kg   Post 82 3 kg   Mode of weight measurement: Bed Scale   Volume Removed  2400 ml    Treatment duration 210 minutes    NS given  No    Treatment shortened? No   Medications given during Rx None Reported   Estimated Kt/V  None Reported   Access type: Temporary HD catheter   Access Issues: No    Report called to primary nurse   Yes Emily William RN    Given 1 unit PRBC last hour of treatment  Current hemodialysis plan of care is to remove a total of 2500 ml of fluid over a 3 hour treatment for a net of 2 liters as tolerated  Monitor vital signs every 15 minutes while on treatment for patient safety  Utilize a 2 K+ bath as per orders for serum potassium of 4 4 to maintain electrolyte balance  Report received from Emily William RN  Plan reviewed with Dr Marycruz Mina at bedside        Problem: METABOLIC, FLUID AND ELECTROLYTES - ADULT  Goal: Electrolytes maintained within normal limits  Description: INTERVENTIONS:  - Monitor labs and assess patient for signs and symptoms of electrolyte imbalances  - Administer electrolyte replacement as ordered  - Monitor response to electrolyte replacements, including repeat lab results as appropriate  - Instruct patient on fluid and nutrition as appropriate  Outcome: Progressing  Goal: Fluid balance maintained  Description: INTERVENTIONS:  - Monitor labs   - Monitor I/O and WT  - Instruct patient on fluid and nutrition as appropriate  - Assess for signs & symptoms of volume excess or deficit  Outcome: Progressing

## 2022-03-13 LAB
ABO GROUP BLD BPU: NORMAL
BPU ID: NORMAL
CROSSMATCH: NORMAL
GLUCOSE SERPL-MCNC: 165 MG/DL (ref 65–140)
GLUCOSE SERPL-MCNC: 189 MG/DL (ref 65–140)
GLUCOSE SERPL-MCNC: 213 MG/DL (ref 65–140)
GLUCOSE SERPL-MCNC: 247 MG/DL (ref 65–140)
GLUCOSE SERPL-MCNC: 319 MG/DL (ref 65–140)
HCT VFR BLD AUTO: 22.6 % (ref 36.5–49.3)
HGB BLD-MCNC: 7.6 G/DL (ref 12–17)
UNIT DISPENSE STATUS: NORMAL
UNIT PRODUCT CODE: NORMAL
UNIT PRODUCT VOLUME: 350 ML
UNIT RH: NORMAL

## 2022-03-13 PROCEDURE — 99232 SBSQ HOSP IP/OBS MODERATE 35: CPT | Performed by: INTERNAL MEDICINE

## 2022-03-13 PROCEDURE — C9113 INJ PANTOPRAZOLE SODIUM, VIA: HCPCS | Performed by: INTERNAL MEDICINE

## 2022-03-13 PROCEDURE — 99232 SBSQ HOSP IP/OBS MODERATE 35: CPT | Performed by: FAMILY MEDICINE

## 2022-03-13 PROCEDURE — 97530 THERAPEUTIC ACTIVITIES: CPT

## 2022-03-13 PROCEDURE — 85014 HEMATOCRIT: CPT | Performed by: FAMILY MEDICINE

## 2022-03-13 PROCEDURE — 87081 CULTURE SCREEN ONLY: CPT | Performed by: FAMILY MEDICINE

## 2022-03-13 PROCEDURE — 85018 HEMOGLOBIN: CPT | Performed by: FAMILY MEDICINE

## 2022-03-13 PROCEDURE — 82948 REAGENT STRIP/BLOOD GLUCOSE: CPT

## 2022-03-13 RX ORDER — INSULIN GLARGINE 100 [IU]/ML
4 INJECTION, SOLUTION SUBCUTANEOUS
Status: DISCONTINUED | OUTPATIENT
Start: 2022-03-13 | End: 2022-03-14

## 2022-03-13 RX ADMIN — CEFTRIAXONE 1000 MG: 1 INJECTION, SOLUTION INTRAVENOUS at 14:12

## 2022-03-13 RX ADMIN — INSULIN GLARGINE 4 UNITS: 100 INJECTION, SOLUTION SUBCUTANEOUS at 21:17

## 2022-03-13 RX ADMIN — PRAVASTATIN SODIUM 80 MG: 80 TABLET ORAL at 15:56

## 2022-03-13 RX ADMIN — TAMSULOSIN HYDROCHLORIDE 0.4 MG: 0.4 CAPSULE ORAL at 15:56

## 2022-03-13 RX ADMIN — INSULIN LISPRO 1 UNITS: 100 INJECTION, SOLUTION INTRAVENOUS; SUBCUTANEOUS at 08:03

## 2022-03-13 RX ADMIN — LATANOPROST 1 DROP: 50 SOLUTION OPHTHALMIC at 21:18

## 2022-03-13 RX ADMIN — INSULIN LISPRO 2 UNITS: 100 INJECTION, SOLUTION INTRAVENOUS; SUBCUTANEOUS at 11:31

## 2022-03-13 RX ADMIN — BISACODYL 10 MG: 5 TABLET, COATED ORAL at 14:14

## 2022-03-13 RX ADMIN — INSULIN LISPRO 2 UNITS: 100 INJECTION, SOLUTION INTRAVENOUS; SUBCUTANEOUS at 16:00

## 2022-03-13 RX ADMIN — PANTOPRAZOLE SODIUM 40 MG: 40 INJECTION, POWDER, FOR SOLUTION INTRAVENOUS at 08:05

## 2022-03-13 RX ADMIN — POLYETHYLENE GLYCOL 3350, SODIUM SULFATE ANHYDROUS, SODIUM BICARBONATE, SODIUM CHLORIDE, POTASSIUM CHLORIDE 4000 ML: 236; 22.74; 6.74; 5.86; 2.97 POWDER, FOR SOLUTION ORAL at 15:56

## 2022-03-13 RX ADMIN — PANTOPRAZOLE SODIUM 40 MG: 40 INJECTION, POWDER, FOR SOLUTION INTRAVENOUS at 21:17

## 2022-03-13 RX ADMIN — INSULIN LISPRO 3 UNITS: 100 INJECTION, SOLUTION INTRAVENOUS; SUBCUTANEOUS at 21:17

## 2022-03-13 NOTE — PHYSICAL THERAPY NOTE
PT TREATMENT     03/13/22 1015   Note Type   Note Type Treatment   Pain Assessment   Pain Assessment Tool 0-10   Pain Score No Pain   Restrictions/Precautions   Other Precautions Fall Risk;Hard of hearing   General   Chart Reviewed Yes   Cognition   Arousal/Participation Cooperative   Following Commands Follows one step commands without difficulty   Subjective   Subjective "I would like to walk"   Bed Mobility   Supine to Sit 5  Supervision   Transfers   Sit to Stand 4  Minimal assistance   Stand to Sit 4  Minimal assistance   Stand pivot 4  Minimal assistance   Ambulation/Elevation   Gait Assistance 4  Minimal assist   Assistive Device Rolling walker   Distance 2x120 feet   Balance   Static Sitting Fair +   Dynamic Sitting Fair +   Static Standing Fair +   Dynamic Standing Fair +   Ambulatory Fair   Activity Tolerance   Activity Tolerance Patient tolerated treatment well   Assessment   Prognosis Good   Problem List Decreased strength;Decreased endurance; Impaired balance;Decreased mobility   Assessment Pt demonstrates improving cognition and function  Pt was able to ambulate in the hallway with supervision/min assist and a walker for the first time today  Pt tolerated activity well  Pt was initially recommended for STR but appears to be doing well enough now to go home  The patient's AM-PAC Basic Mobility Inpatient Short Form Raw Score is 18  A Raw score of greater than 16 suggests the patient may benefit from discharge to home  Plan   Treatment/Interventions ADL retraining;Functional transfer training;LE strengthening/ROM; Therapeutic exercise; Endurance training;Gait training;Bed mobility; Equipment eval/education;Patient/family training   Progress Progressing toward goals   PT Frequency Other (Comment)  (5w)   Recommendation   PT Discharge Recommendation Home with home health rehabilitation   [address] [place] [address] Mobility Inpatient   Turning in Bed Without Bedrails 3   Lying on Back to Sitting on Edge of Flat Bed 3   Moving Bed to Chair 3   Standing Up From Chair 3   Walk in Room 3   Climb 3-5 Stairs 3   Basic Mobility Inpatient Raw Score 18   Basic Mobility Standardized Score 41 05   Highest Level Of Mobility   JH-HLM Goal 6: Walk 10 steps or more   JH-HLM Highest Level of Mobility 7: Walk 25 feet or more   JH-HLM Goal Achieved Yes   End of Consult   Patient Position at End of Consult All needs within reach; Bedside chair   Licensure   [facility] [employer] License Number  [place] PT  [id number]

## 2022-03-13 NOTE — PLAN OF CARE
Problem: MOBILITY - ADULT  Goal: Maintain or return to baseline ADL function  Description: INTERVENTIONS:  -  Assess patient's ability to carry out ADLs; assess patient's baseline for ADL function and identify physical deficits which impact ability to perform ADLs (bathing, care of mouth/teeth, toileting, grooming, dressing, etc )  - Assess/evaluate cause of self-care deficits   - Assess range of motion  - Assess patient's mobility; develop plan if impaired  - Assess patient's need for assistive devices and provide as appropriate  - Encourage maximum independence but intervene and supervise when necessary  - Involve family in performance of ADLs  - Assess for home care needs following discharge   - Consider OT consult to assist with ADL evaluation and planning for discharge  - Provide patient education as appropriate  Outcome: Progressing  Goal: Maintains/Returns to pre admission functional level  Description: INTERVENTIONS:  - Perform BMAT or MOVE assessment daily    - Set and communicate daily mobility goal to care team and patient/family/caregiver  - Collaborate with rehabilitation services on mobility goals if consulted  - Perform Range of Motion 3 times a day  - Reposition patient every 2 hours    - Dangle patient 3 times a day  - Stand patient 3 times a day  - Ambulate patient 3 times a day  - Out of bed to chair 3 times a day   - Out of bed for meals 3 times a day  - Out of bed for toileting  - Record patient progress and toleration of activity level   Outcome: Progressing     Problem: HEMATOLOGIC - ADULT  Goal: Maintains hematologic stability  Description: INTERVENTIONS  - Assess for signs and symptoms of bleeding or hemorrhage  - Monitor labs  - Administer supportive blood products/factors as ordered and appropriate  Outcome: Progressing     Problem: METABOLIC, FLUID AND ELECTROLYTES - ADULT  Goal: Electrolytes maintained within normal limits  Description: INTERVENTIONS:  - Monitor labs and assess patient for signs and symptoms of electrolyte imbalances  - Administer electrolyte replacement as ordered  - Monitor response to electrolyte replacements, including repeat lab results as appropriate  - Instruct patient on fluid and nutrition as appropriate  Outcome: Progressing  Goal: Fluid balance maintained  Description: INTERVENTIONS:  - Monitor labs   - Monitor I/O and WT  - Instruct patient on fluid and nutrition as appropriate  - Assess for signs & symptoms of volume excess or deficit  Outcome: Progressing     Problem: Prexisting or High Potential for Compromised Skin Integrity  Goal: Skin integrity is maintained or improved  Description: INTERVENTIONS:  - Identify patients at risk for skin breakdown  - Assess and monitor skin integrity  - Assess and monitor nutrition and hydration status  - Monitor labs   - Assess for incontinence   - Turn and reposition patient  - Assist with mobility/ambulation  - Relieve pressure over bony prominences  - Avoid friction and shearing  - Provide appropriate hygiene as needed including keeping skin clean and dry  - Evaluate need for skin moisturizer/barrier cream  - Collaborate with interdisciplinary team   - Patient/family teaching  - Consider wound care consult   Outcome: Progressing     Problem: Potential for Falls  Goal: Patient will remain free of falls  Description: INTERVENTIONS:  - Educate patient/family on patient safety including physical limitations  - Instruct patient to call for assistance with activity   - Consult OT/PT to assist with strengthening/mobility   - Keep Call bell within reach  - Keep bed low and locked with side rails adjusted as appropriate  - Keep care items and personal belongings within reach  - Initiate and maintain comfort rounds  - Make Fall Risk Sign visible to staff  - Offer Toileting every 2 Hours, in advance of need  - Initiate/Maintain bed alarm  - Obtain necessary fall risk management equipment: walker  - Apply yellow socks and bracelet for high fall risk patients  - Consider moving patient to room near nurses station  Outcome: Progressing

## 2022-03-13 NOTE — PLAN OF CARE
Problem: HEMATOLOGIC - ADULT  Goal: Maintains hematologic stability  Description: INTERVENTIONS  - Assess for signs and symptoms of bleeding or hemorrhage  - Monitor labs  - Administer supportive blood products/factors as ordered and appropriate  3/12/2022 2026 by Kayley Mcintosh RN  Outcome: Progressing  3/12/2022 2025 by Kayley Mcintosh RN  Outcome: Progressing

## 2022-03-13 NOTE — PROGRESS NOTES
NEPHROLOGY PROGRESS NOTE    Barbara Hoffman 64 y o  male MRN: [id number]  Unit/Bed#: [address] [place] [address] Encounter: [id number]  Reason for Consult:  Renal failure    Patient is awake alert says he is feeling well still feeling hungry as he is not getting that much to eat  Other than that no acute complaints tolerated dialysis yesterday  ASSESSMENT/PLAN:  1  Renal    The patient presented with renal failure that developed over the last couple months prior to admission  He was initiated on hemodialysis due to severe renal failure  He underwent renal biopsy as I have a suspicion he has a rapidly progressive process  He has been having slight blood-tinged sputum  So far serologies including SAMANTHA complements and anti-GBM were negative  Anca serologies are still pending  He did present with arthritis that was worse and renal failure with an active urine sediment  He had hemodialysis yesterday and I reviewed the note he had no complications with removal of 2 5 L and tolerated procedure hemodynamically  Await renal biopsy to help determine treat ability and cause of renal failure  Biopsy results should be back tomorrow  ANCA serologies pending  Started Epogen on dialysis for anemia  Case management is aware and making arrangements for outpatient dialysis but he should not be discharged until we get a definitive diagnosis to make sure he does not need any immunosuppressive treatment prior to discharge that would be based on the biopsy results  2  Anemia    Multifactorial with part of it related to his renal disease also there was a suspicion of GI bleeding  GI evaluating  3  PT/OT    SUBJECTIVE:  Review of Systems   Constitutional: Negative for chills, decreased appetite, diaphoresis and fever  HENT: Negative  Eyes: Negative  Cardiovascular: Negative  Negative for chest pain, dyspnea on exertion, leg swelling and orthopnea     Respiratory: Negative for shortness of breath, sputum production and wheezing  At times sputum blood tinged  Musculoskeletal: Positive for arthritis  Gastrointestinal: Negative for abdominal pain, diarrhea, nausea and vomiting  Genitourinary: Negative  Neurological: Negative for dizziness, focal weakness, headaches and weakness  Psychiatric/Behavioral: Negative for altered mental status, depression, hallucinations and hypervigilance  OBJECTIVE:  Current Weight: Weight - Scale: 88 5 kg (195 lb)  Vitals:Temp (24hrs), Av 4 °F (36 9 °C), Min:97 5 °F (36 4 °C), Max:99 °F (37 2 °C)  Current: Temperature: 98 6 °F (37 °C)   Blood pressure 143/55, pulse 75, temperature 98 6 °F (37 °C), resp  rate 17, height 5' 8" (1 727 m), weight 88 5 kg (195 lb), SpO2 96 %  , Body mass index is 29 65 kg/m²  Intake/Output Summary (Last 24 hours) at 3/13/2022 0834  Last data filed at 3/12/2022 1554  Gross per 24 hour   Intake 867 5 ml   Output 2900 ml   Net - 5 ml       Physical Exam: /55   Pulse 75   Temp 98 6 °F (37 °C)   Resp 17   Ht 5' 8" (1 727 m)   Wt 88 5 kg (195 lb)   SpO2 96%   BMI 29 65 kg/m²   Physical Exam  Constitutional:       General: He is not in acute distress  Appearance: He is not toxic-appearing or diaphoretic  HENT:      Head: Normocephalic and atraumatic  Mouth/Throat:      Mouth: Mucous membranes are moist    Eyes:      General: No scleral icterus  Extraocular Movements: Extraocular movements intact  Cardiovascular:      Rate and Rhythm: Normal rate and regular rhythm  Heart sounds: No friction rub  No gallop  Pulmonary:      Effort: Pulmonary effort is normal  No respiratory distress  Breath sounds: Normal breath sounds  No wheezing, rhonchi or rales  Abdominal:      General: Bowel sounds are normal  There is no distension  Palpations: Abdomen is soft  Tenderness: There is no abdominal tenderness  There is no rebound     Musculoskeletal:      Cervical back: Normal range of motion and neck supple  Neurological:      General: No focal deficit present  Mental Status: He is alert and oriented to person, place, and time  Mental status is at baseline  Psychiatric:         Mood and Affect: Mood normal          Behavior: Behavior normal          Thought Content:  Thought content normal          Judgment: Judgment normal          Medications:    Current Facility-Administered Medications:     acetaminophen (TYLENOL) tablet 650 mg, 650 mg, Oral, Q6H PRN, Vannesa Peterson MD, 650 mg at 03/08/22 0310    cefTRIAXone (ROCEPHIN) IVPB (premix in dextrose) 1,000 mg 50 mL, 1,000 mg, Intravenous, Q24H, Vannesa Peterson MD, Last Rate: 100 mL/hr at 03/12/22 1655, 1,000 mg at 03/12/22 1655    epoetin ani (EPOGEN,PROCRIT) injection 10,000 Units, 10,000 Units, Intravenous, Once per day on Mon Wed Fri, John May MD    insulin glargine (LANTUS) subcutaneous injection 8 Units 0 08 mL, 8 Units, Subcutaneous, HS, Emiliana Jordan, , 8 Units at 03/12/22 2145    insulin lispro (HumaLOG) 100 units/mL subcutaneous injection 1-5 Units, 1-5 Units, Subcutaneous, TID AC, 1 Units at 03/13/22 0803 **AND** Fingerstick Glucose (POCT), , , TID AC, Emiliana Revalanar, DO    insulin lispro (HumaLOG) 100 units/mL subcutaneous injection 1-5 Units, 1-5 Units, Subcutaneous, HS, Emiliana Jordan, , 3 Units at 03/12/22 2146    latanoprost (XALATAN) 0 005 % ophthalmic solution 1 drop, 1 drop, Both Eyes, HS, Vannesa Peterson MD, 1 drop at 03/12/22 2148    ondansetron (ZOFRAN) injection 4 mg, 4 mg, Intravenous, Q6H PRN, Vannesa Peterson MD, 4 mg at 03/12/22 1927    pantoprazole (PROTONIX) injection 40 mg, 40 mg, Intravenous, Q12H [address], Vannesa Peterson MD, 40 mg at 03/13/22 0805    pravastatin (PRAVACHOL) tablet 80 mg, 80 mg, Oral, Daily With Mita Azevedo MD, 80 mg at 03/12/22 4636    tamsulosin (FLOMAX) capsule 0 4 mg, 0 4 mg, Oral, Daily With Mita Azevedo MD, 0 4 mg at 03/12/22 1654    Laboratory Results:  Lab Results   Component Value Date    WBC 12 11 (H) 03/12/2022    HGB 7 6 (L) 03/13/2022    HCT 22 6 (L) 03/13/2022    MCV 89 03/12/2022     03/12/2022     Lab Results   Component Value Date    SODIUM 133 (L) 03/12/2022    K 4 6 03/12/2022    CL 94 (L) 03/12/2022    CO2 22 03/12/2022     (H) 03/12/2022    CREATININE 9 96 (H) 03/12/2022    GLUC 244 (H) 03/12/2022    CALCIUM 7 0 (L) 03/12/2022     Lab Results   Component Value Date    CALCIUM 7 0 (L) 03/12/2022    PHOS 7 9 (H) 03/08/2022     No results found for: LABPROT

## 2022-03-13 NOTE — PROGRESS NOTES
[address] Internal Medicine Progress Note  Patient: Andres Paz 64 y o  male   MRN: [id number]  PCP: Vadim Cortes MD  Unit/Bed#: [address] [place] [address] Encounter: [id number]  Date Of Visit: 03/13/22    Problem List:    Principal Problem:    Symptomatic anemia  Active Problems:    Acute kidney injury ([address] )    Type 2 diabetes mellitus with microalbuminuria, with long-term current use of insulin (HCC)    High anion gap metabolic acidosis    Melena    UTI (urinary tract infection)    Hemoptysis    Benign essential hypertension    Benign prostatic hyperplasia with lower urinary tract symptoms    Hyperlipidemia    Chest pain      Assessment & Plan:    * Symptomatic anemia  Assessment & Plan  Hemoglobin upon admission was 5 8  Patient recieved 5 units of PRBC so far  MCV level was normal  Vitamin X49 and folic acid level WNL  Iron panel revealed elevated ferritin, normal iron saturation, low TIBC, and low iron  Anemia likely multifactorial in the setting of possible GI bleeding and also CKD  Started on Epogen with dialysis  CT abd/pelvis found no evidence of GI bleed  S/P EGD on 03/11 which was negative for active bleeding  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  And for inpatient colonoscopy on Monday as hemoglobin has downtrended again today    Results from last 7 days   Lab Units 03/13/22  0514 03/12/22  1220 03/11/22  1601 03/11/22  0451 03/10/22  0506 03/09/22  0519 03/08/22  1723 03/08/22  0743 03/08/22  0001 03/07/22  2209 03/07/22  0954 03/07/22  0954   HEMOGLOBIN g/dL 7 6* 6 7* 7 7* 6 1* 7 1* 7 4* 8 2* 7 7* 6 4* 6 7*   < > 5 8*   HEMATOCRIT % 22 6* 21 0* 23 9* 19 3* 22 5* 22 9* 25 1* 22 9* 19 8* 20 7*   < > 19 0*   MCV fL  --  89  --  90 89 86  --  85 84 85  --  87    < > = values in this interval not displayed  Acute kidney injury [facility])  Assessment & Plan  Baseline creatinine 1-1 3  Creatinine upon admission was 14 38   PVR was 66 milliliters  Etiology unknown  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  Since patient had metabolic acidosis patient was placed on bicarbonate drip at 150 mL/hour  Renal US showed no evidence of hydronephrosis  8mm calculus was found in bladder  Possible cause of hematuria if not glomerulonephritis   Continue to hold Vasotec  Patient is recieving further workup for glomerulonephritis  SAMANTHA, C3 and C4 WNL  Other tests pending  UA showed innumerable red blood cells 10-20 white cells with glucose  CT of abdomen and pelvis showed 8 mm stone at UVJ with hydroureter but no hydronephrosis and not significant changed since 2016  No peritoneal or retroperitoneal hematoma or evidence of GI bleeding  Mild pulmonary edema and small b/l pleural effusions were noted  CXR pending  Patient received dialysis 3/9, 3/10 and today  IVF were d/c on 3/9  Status post renal biopsy on 03/10, awaiting results    Results from last 7 days   Lab Units 03/12/22  1220 03/11/22  0451 03/10/22  0506 03/09/22  0519 03/08/22  0744 03/07/22  2209 03/07/22  1306 03/07/22  0954   BUN mg/dL 117* 93* 129* 155* 176* 162* 175* 169*   CREATININE mg/dL 9 96* 7 79* 10 08* 13 69* 13 26* 13 64* 14 38* 14 91*       High anion gap metabolic acidosis  Assessment & Plan  Likely secondary to renal failure and also hyperglycemic state  Patient was placed on bicarbonate drip at 150 mL/hour, Bicarb drip was d/c on 3/9    Type 2 diabetes mellitus with microalbuminuria, with long-term current use of insulin [facility])  Assessment & Plan  Lab Results   Component Value Date    HGBA1C 7 2 (H) 12/03/2021       Recent Labs     03/13/22  0518 03/13/22  0617 03/13/22  1123 03/13/22  1550   POCGLU 165* 189* 247* 213*     Patient is on Humalog mix 75/25 45 units b i d  Hold Humalog mix  Patient was initially on insulin drip as 1-beta hydroxybutyrate was elevated at 1 6  Currently on clears and NPO after midnight  Continue with SSI    Will decrease dose of Lantus to 4 units overnight as patient will be NPO  Monitor blood sugars    Melena  Assessment & Plan  Patient reported black stool for couple of days prior to admission and even reported them after admission  stool occult blood ordered but not done  Patient on Protonix 40 milligram IV q 12 hours  No history of NSAID use  Plan for colonoscopy on Monday per GI    Hyperkalemia-resolved as of 3/9/2022  Assessment & Plan  Potassium level upon admission was 6 9  Likely in the setting of acute kidney injury and ACE-inhibitor use  Patient was given insulin with D10, albuterol nebulizer treatment and calcium gluconate in the ED  EKG showed no changes  Patient was also given Kayexalate 30 grams in the ED  K level has normalized at 4 5   Received Dialysis on 3/9      UTI (urinary tract infection)  Assessment & Plan  Patient has leukocytosis  UA showed trace leukocyte esterase with 10-20 white cells and innumerable bacteria  Urine culture grew E  Coli  Continue Ceftriaxone  No clinical signs of sepsis    Hemoptysis  Assessment & Plan  Patient states he has had a cough for a couple weeks  Typically yellow in color, but this morning and yesterday had hemoptysis  CXR ordered  Obtain sputum culture if possible  Procalcitonin elevated at 6 73 --> 5 6, unclear significance in the setting of renal dysfunction        Anemia due to chronic kidney disease, on chronic dialysis [facility])  Assessment & Plan  Lab Results   Component Value Date    EGFR 5 03/11/2022    EGFR 4 03/10/2022    EGFR 3 03/09/2022    CREATININE 7 79 (H) 03/11/2022    CREATININE 10 08 (H) 03/10/2022    CREATININE 13 69 (H) 03/09/2022       Chest pain  Assessment & Plan  Patient presented with left-sided chest pain,Troponins x2 were negative initially  EKG was unremarkable  ProBNP was elevated at 15,000   2D echo showed normal EF with normal diastolic function  Reported chest pain again 3/9 and troponin peaked at 104   Plan for outpatient stress test     Hyperlipidemia  Assessment & Plan  Zocor substituted with Pravachol  Benign prostatic hyperplasia with lower urinary tract symptoms  Assessment & Plan  Monitor postvoid residuals  Continue Flomax  Benign essential hypertension  Assessment & Plan  Patient is on Vasotec at home which is being held in the setting of acute kidney injury  Avoid hypotension  Will add Norvasc if blood pressure continues to be high  VTE Pharmacologic Prophylaxis:   Anemia, possible GI bleed    Patient Centered Rounds: I performed bedside rounds with nursing staff today  Discussions with Specialists or Other Care Team Provider: yes     Education and Discussions with Family / Patient: Attempted to update  (wife) via phone  Unable to contact  Time Spent for Care: 35 min  More than 50% of total time spent on counseling and coordination of care as described above  Current Length of Stay: 6 day(s)  Current Patient Status: Inpatient   Certification Statement: The patient will continue to require additional inpatient hospital stay due to Anemia requiring colonoscopy tomorrow  Awaiting results of renal biopsy  Discharge Plan: Anticipate discharge in 48-72 hrs to discharge location to be determined pending rehab evaluations  Code Status: Level 1 - Full Code    Subjective:     Patient states he is doing better  Hoping to go home in the next 1-2 days    Objective:     Vitals:   Temp (24hrs), Av 4 °F (36 9 °C), Min:97 5 °F (36 4 °C), Max:99 °F (37 2 °C)    Temp:  [97 5 °F (36 4 °C)-99 °F (37 2 °C)] 98 6 °F (37 °C)  HR:  [68-89] 75  Resp:  [17-18] 18  BP: (118-160)/(54-68) 143/55  SpO2:  [94 %-98 %] 96 %  Body mass index is 29 65 kg/m²  Input and Output Summary (last 24 hours):      Intake/Output Summary (Last 24 hours) at 3/13/2022 1340  Last data filed at 3/12/2022 1554  Gross per 24 hour   Intake 667 5 ml   Output 2900 ml   Net -2232 5 ml       Physical Exam:   Physical Exam  Constitutional: General: He is not in acute distress  Appearance: He is not diaphoretic  HENT:      Head: Normocephalic and atraumatic  Ears:      Comments: Hard of hearing  Eyes:      General:         Right eye: No discharge  Left eye: No discharge  Cardiovascular:      Rate and Rhythm: Normal rate and regular rhythm  Pulmonary:      Effort: Pulmonary effort is normal  No respiratory distress  Breath sounds: Normal breath sounds  No wheezing or rales  Abdominal:      General: Bowel sounds are normal  There is no distension  Palpations: Abdomen is soft  Tenderness: There is no abdominal tenderness  Musculoskeletal:      Right lower leg: No edema  Left lower leg: No edema  Neurological:      Mental Status: He is alert and oriented to person, place, and time  Additional Data:     Labs:  Results from last 7 days   Lab Units 03/13/22  0514 03/12/22  1220 03/12/22  1220 03/08/22  1723 03/08/22  0743 03/08/22  0001 03/07/22  2209   WBC Thousand/uL  --   --  12 11*   < > 11 02*   < > 10 90*   HEMOGLOBIN g/dL 7 6*   < > 6 7*   < > 7 7*   < > 6 7*   HEMATOCRIT % 22 6*   < > 21 0*   < > 22 9*   < > 20 7*   PLATELETS Thousands/uL  --   --  182   < > 244   < > 264   BANDS PCT %  --   --   --   --   --   --  2   NEUTROS PCT %  --   --   --   --  88*   < >  --    LYMPHS PCT %  --   --   --   --  6*   < >  --    LYMPHO PCT %  --   --   --   --   --   --  7*   MONOS PCT %  --   --   --   --  5   < >  --    MONO PCT %  --   --   --   --   --   --  4   EOS PCT %  --   --   --   --  0   < > 0    < > = values in this interval not displayed       Results from last 7 days   Lab Units 03/12/22  1220 03/09/22  0519 03/08/22  0744   SODIUM mmol/L 133*   < > 135*   POTASSIUM mmol/L 4 6   < > 4 7   CHLORIDE mmol/L 94*   < > 95*   CO2 mmol/L 22   < > 20*   BUN mg/dL 117*   < > 176*   CREATININE mg/dL 9 96*   < > 13 26*   ANION GAP mmol/L 17*   < > 20*   CALCIUM mg/dL 7 0*   < > 7 3*   ALBUMIN g/dL --   --  1 7*   TOTAL BILIRUBIN mg/dL  --   --  0 52   ALK PHOS U/L  --   --  50   ALT U/L  --   --  37   AST U/L  --   --  22   GLUCOSE RANDOM mg/dL 244*   < > 222*    < > = values in this interval not displayed  Results from last 7 days   Lab Units 03/10/22  0506   INR  1 31*     Results from last 7 days   Lab Units 03/13/22  1123 03/13/22  0617 03/13/22  0518 03/12/22  2327 03/12/22  2126 03/12/22  1653 03/12/22  1127 03/12/22  0659 03/12/22  0602 03/12/22  0117 03/11/22  1959 03/11/22  1611   POC GLUCOSE mg/dl 247* 189* 165* 245* 300* 195* 286* 224* 200* 268* 262* 158*         Results from last 7 days   Lab Units 03/09/22  0519 03/08/22  0744 03/07/22  1124   LACTIC ACID mmol/L  --   --  0 7   PROCALCITONIN ng/ml 5 68* 6 73*  --        Lines/Drains:  Invasive Devices  Report    Peripheral Intravenous Line            Peripheral IV 03/11/22 Right Arm 2 days          Hemodialysis Catheter            HD Permanent Double Catheter 5 days                      Imaging: No pertinent imaging reviewed  Recent Cultures (last 7 days):   Results from last 7 days   Lab Units 03/11/22  0638 03/07/22  1305 03/07/22  1124 03/07/22  1055   BLOOD CULTURE   --   --  No Growth After 5 Days  No Growth After 5 Days  SPUTUM CULTURE  Test not performed  Suggest repeat specimen  --   --   --    GRAM STAIN RESULT  >10 squamous epithelial cells/lpf, indicating orophayngeal contamination  *  3+ Gram positive cocci in pairs, chains and clusters*  3+ Gram negative rods*  1+ Gram positive rods*  No polys seen*  --   --   --    URINE CULTURE   --  9516-7359 cfu/ml Escherichia coli*  --   --        Last 24 Hours Medication List:   Current Facility-Administered Medications   Medication Dose Route Frequency Provider Last Rate    acetaminophen  650 mg Oral Q6H PRN Nilsa Balbuena MD      bisacodyl  10 mg Oral Once Prem Naik PA-C      cefTRIAXone  1,000 mg Intravenous Q24H Nilsa Balbuena MD 1,000 mg (03/12/22 7806)    epoetin ani  10,000 Units Intravenous Once per day on Mon Wed Fri Carl Vasques MD      insulin glargine  8 Units Subcutaneous HS Emiliana Revankar, DO      insulin lispro  1-5 Units Subcutaneous TID AC Emiliana Revankar, DO      insulin lispro  1-5 Units Subcutaneous HS Emiliana Revankar, DO      latanoprost  1 drop Both Eyes HS Daisy Keating MD      ondansetron  4 mg Intravenous Q6H PRN Daisy Keating MD      pantoprazole  40 mg Intravenous Q12H [address] Daisy Keating MD      polyethylene glycol  4,000 mL Oral Once Dayana Willingham PA-C      pravastatin  80 mg Oral Daily With Clare Lauren MD      tamsulosin  0 4 mg Oral Daily With Clare Lauren MD          Today, Patient Was Seen By: Michelle Swann DO    ** Please Note: "This note has been constructed using a voice recognition system  Therefore there may be syntax, spelling, and/or grammatical errors   Please call if you have any questions  "**

## 2022-03-13 NOTE — PROGRESS NOTES
Progress Note - Howie Anand 64 y o  male MRN: [id number]    Unit/Bed#: [address] 408[address]01 Encounter: [id number]    Assessment and Plan:   Principal Problem:    Symptomatic anemia  Active Problems:    Benign essential hypertension    Benign prostatic hyperplasia with lower urinary tract symptoms    Hyperlipidemia    Type 2 diabetes mellitus with microalbuminuria, with long-term current use of insulin (HCC)    Chest pain    Acute kidney injury ([address] )    High anion gap metabolic acidosis    UTI (urinary tract infection)    Melena    Hemoptysis    #1  Symptomatic anemia: Symptomatic anemia may be multifactorial due to underlying kidney disease, but cannot exclude occult GI bleeding as a factor  Baseline hemoglobin in 2019 was 13-15, now 5 8 on admission with normal MCV  Hemoglobin dropped down to 6 7 yesterday and is currently 7 6 after 1 unit of transfusion  Jasmine Hassannd on Friday was completely normal  -continue to monitor hemoglobin, transfuse as necessary  -discussed with patient, will plan for colonoscopy tomorrow due to continued drop in hemoglobin with normal EGD  -Dulcolax and GoLYTELY prep ordered  -clear liquids today, NPO after midnight    ----------------------------------------------------------------------------------------------------------------    Subjective:     Patient without any active signs of bleeding but had drop in hemoglobin, denies any abdominal pain  Objective:     Vitals: Blood pressure 143/55, pulse 75, temperature 98 6 °F (37 °C), temperature source Oral, resp  rate 18, height 5' 8" (1 727 m), weight 88 5 kg (195 lb), SpO2 96 %  ,Body mass index is 29 65 kg/m²        Intake/Output Summary (Last 24 hours) at 3/13/2022 1139  Last data filed at 3/12/2022 1554  Gross per 24 hour   Intake 867 5 ml   Output 2900 ml   Net -2032 5 ml       Physical Exam:     General Appearance: Alert, appears stated age and cooperative  Lungs: Clear to auscultation bilaterally, no rales or rhonchi, no labored breathing/accessory muscle use  Heart: Regular rate and rhythm, S1, S2 normal, no murmur, click, rub or gallop  Abdomen: Soft, mild epigastric discomfort to palpation, non-distended; bowel sounds normal; no masses or no organomegaly  Extremities: No cyanosis, clubbing, or edema    Invasive Devices  Report    Peripheral Intravenous Line            Peripheral IV 03/11/22 Right Arm 2 days          Hemodialysis Catheter            HD Permanent Double Catheter 4 days                Lab Results:  Results from last 7 days   Lab Units 03/13/22  0514 03/12/22  1220 03/12/22  1220 03/08/22  1723 03/08/22  0743   WBC Thousand/uL  --   --  12 11*   < > 11 02*   HEMOGLOBIN g/dL 7 6*   < > 6 7*   < > 7 7*   HEMATOCRIT % 22 6*   < > 21 0*   < > 22 9*   PLATELETS Thousands/uL  --   --  182   < > 244   NEUTROS PCT %  --   --   --   --  88*   LYMPHS PCT %  --   --   --   --  6*   MONOS PCT %  --   --   --   --  5   EOS PCT %  --   --   --   --  0    < > = values in this interval not displayed  Results from last 7 days   Lab Units 03/12/22  1220 03/09/22  0519 03/08/22  0744   POTASSIUM mmol/L 4 6   < > 4 7   CHLORIDE mmol/L 94*   < > 95*   CO2 mmol/L 22   < > 20*   BUN mg/dL 117*   < > 176*   CREATININE mg/dL 9 96*   < > 13 26*   CALCIUM mg/dL 7 0*   < > 7 3*   ALK PHOS U/L  --   --  50   ALT U/L  --   --  37   AST U/L  --   --  22    < > = values in this interval not displayed  Invalid input(s): BILI  Results from last 7 days   Lab Units 03/10/22  0506   INR  1 31*           Imaging Studies: I have personally reviewed pertinent imaging studies  EGD    Addendum Date: 3/11/2022    [address] [place] [address] Operating Room [address] [place] [address] [phone] DATE OF SERVICE: 3/11/22 PHYSICIAN(S): No Staff Documented INDICATION: Melena, Symptomatic anemia POST-OP DIAGNOSIS: See the impression below  PREPROCEDURE: Informed consent was obtained for the procedure, including sedation    Risks of perforation, hemorrhage, adverse drug reaction and aspiration were discussed  The patient was placed in the left lateral decubitus position  Patient was explained about the risks and benefits of the procedure  Risks including but not limited to bleeding, infection, and perforation were explained in detail  Also explained about less than 100% sensitivity with the exam and other alternatives  DETAILS OF PROCEDURE: Patient was taken to the procedure room where a time out was performed to confirm correct patient and correct procedure  The patient underwent monitored anesthesia care, which was administered by an anesthesia professional  The patient's blood pressure, heart rate, level of consciousness, respirations and oxygen were monitored throughout the procedure  The scope was advanced to the second part of the duodenum  Retroflexion was performed in the fundus  The patient's estimated blood loss was minimal (<5 mL)  The procedure was not difficult  The patient tolerated the procedure well  There were no apparent complications  ANESTHESIA INFORMATION: ASA: IV Anesthesia Type: IV Sedation with Anesthesia MEDICATIONS: No administrations occurring from 1405 to 1415 on 03/11/22 FINDINGS: The esophagus appeared normal  Z-line is 39 cm from the incisors  The stomach appeared normal  Mild nodularity in the duodenal bulb  Biopsies performed  SPECIMENS: ID Type Source Tests Collected by Time Destination 1 : Duodenum biopsy R/o brunners gland  Tissue Duodenum TISSUE EXAM Yobany Alegria MD 3/11/2022  2:13 PM  IMPRESSION: No blood or bleeding source identified  Slight nodularity in the duodenal bulb, 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  Biopsies obtained RECOMMENDATION: Await pathology results Follow up with me in clinic Consider outpatient capsule endoscopy +/- colonoscopy for further evaluation of anemia/melena   No Staff Documented     Result Date: 3/11/2022  Impression: No blood or bleeding source identified   Slight nodularity in the duodenal bulb, 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  Biopsies obtained RECOMMENDATION: Await pathology results   No Staff Documented     CT abdomen pelvis wo contrast    Result Date: 3/8/2022  Impression: 1  No peritoneal or retroperitoneal hematoma  2   An 8 mm right UVJ calculus causes mild upstream hydroureter without significant hydronephrosis  3   Mild pulmonary edema, small simple bilateral pleural effusions and bibasilar atelectasis  Workstation performed: [id number]     XR chest 1 view portable    Result Date: 3/7/2022  Impression: No active pulmonary disease  Workstation performed: [id number]     US kidney and bladder    Result Date: 3/7/2022  Impression: No hydronephrosis  8 mm calculus in the right dependent aspect of the urinary bladder  Workstation performed: [id number]     IR tunneled dialysis catheter placement    Result Date: 3/8/2022  Impression: Impression: Successful placement of a 15-Pakistani by 28 cm Titan dialysis catheter via the right internal jugular vein  The tip of the catheter is in the right atrium and may be used immediately  Workstation performed: [id number]     IR biopsy kidney [place] kit no laterality    Result Date: 3/10/2022  Impression: Impression: Successful percutaneous core biopsy of the right renal cortex for [place] Kit  A full pathology report will follow   Workstation performed: [id number]

## 2022-03-14 ENCOUNTER — APPOINTMENT (INPATIENT)
Dept: RADIOLOGY | Facility: HOSPITAL | Age: 77
DRG: 674 | End: 2022-03-14
Payer: MEDICARE

## 2022-03-14 ENCOUNTER — APPOINTMENT (INPATIENT)
Dept: PERIOP | Facility: HOSPITAL | Age: 77
DRG: 674 | End: 2022-03-14
Attending: INTERNAL MEDICINE
Payer: MEDICARE

## 2022-03-14 PROBLEM — I27.20 PULMONARY HYPERTENSION (HCC): Status: ACTIVE | Noted: 2022-03-14

## 2022-03-14 PROBLEM — N39.0 UTI (URINARY TRACT INFECTION): Status: RESOLVED | Noted: 2022-03-07 | Resolved: 2022-03-14

## 2022-03-14 LAB
ANION GAP SERPL CALCULATED.3IONS-SCNC: 15 MMOL/L (ref 4–13)
BUN SERPL-MCNC: 73 MG/DL (ref 5–25)
C-ANCA TITR SER IF: ABNORMAL TITER
CALCIUM SERPL-MCNC: 7.1 MG/DL (ref 8.3–10.1)
CHLORIDE SERPL-SCNC: 92 MMOL/L (ref 100–108)
CO2 SERPL-SCNC: 24 MMOL/L (ref 21–32)
CREAT SERPL-MCNC: 6.94 MG/DL (ref 0.6–1.3)
ERYTHROCYTE [DISTWIDTH] IN BLOOD BY AUTOMATED COUNT: 14.4 % (ref 11.6–15.1)
GFR SERPL CREATININE-BSD FRML MDRD: 6 ML/MIN/1.73SQ M
GLUCOSE SERPL-MCNC: 167 MG/DL (ref 65–140)
GLUCOSE SERPL-MCNC: 170 MG/DL (ref 65–140)
GLUCOSE SERPL-MCNC: 170 MG/DL (ref 65–140)
GLUCOSE SERPL-MCNC: 174 MG/DL (ref 65–140)
GLUCOSE SERPL-MCNC: 317 MG/DL (ref 65–140)
HCT VFR BLD AUTO: 23.1 % (ref 36.5–49.3)
HGB BLD-MCNC: 7.5 G/DL (ref 12–17)
MCH RBC QN AUTO: 28.7 PG (ref 26.8–34.3)
MCHC RBC AUTO-ENTMCNC: 32.5 G/DL (ref 31.4–37.4)
MCV RBC AUTO: 89 FL (ref 82–98)
MYELOPEROXIDASE AB SER IA-ACNC: <9 U/ML (ref 0–9)
P-ANCA ATYPICAL TITR SER IF: ABNORMAL TITER
P-ANCA TITR SER IF: ABNORMAL TITER
PLATELET # BLD AUTO: 210 THOUSANDS/UL (ref 149–390)
PMV BLD AUTO: 10 FL (ref 8.9–12.7)
POTASSIUM SERPL-SCNC: 4.4 MMOL/L (ref 3.5–5.3)
PROCALCITONIN SERPL-MCNC: 3.4 NG/ML
PROTEINASE3 AB SER IA-ACNC: 34.1 U/ML (ref 0–3.5)
RBC # BLD AUTO: 2.61 MILLION/UL (ref 3.88–5.62)
SODIUM SERPL-SCNC: 131 MMOL/L (ref 136–145)
WBC # BLD AUTO: 13.13 THOUSAND/UL (ref 4.31–10.16)

## 2022-03-14 PROCEDURE — 88305 TISSUE EXAM BY PATHOLOGIST: CPT | Performed by: PATHOLOGY

## 2022-03-14 PROCEDURE — 97535 SELF CARE MNGMENT TRAINING: CPT

## 2022-03-14 PROCEDURE — 99232 SBSQ HOSP IP/OBS MODERATE 35: CPT | Performed by: STUDENT IN AN ORGANIZED HEALTH CARE EDUCATION/TRAINING PROGRAM

## 2022-03-14 PROCEDURE — 0DBM8ZX EXCISION OF DESCENDING COLON, VIA NATURAL OR ARTIFICIAL OPENING ENDOSCOPIC, DIAGNOSTIC: ICD-10-PCS | Performed by: INTERNAL MEDICINE

## 2022-03-14 PROCEDURE — 71045 X-RAY EXAM CHEST 1 VIEW: CPT

## 2022-03-14 PROCEDURE — 99232 SBSQ HOSP IP/OBS MODERATE 35: CPT | Performed by: FAMILY MEDICINE

## 2022-03-14 PROCEDURE — 85027 COMPLETE CBC AUTOMATED: CPT | Performed by: FAMILY MEDICINE

## 2022-03-14 PROCEDURE — C9113 INJ PANTOPRAZOLE SODIUM, VIA: HCPCS | Performed by: INTERNAL MEDICINE

## 2022-03-14 PROCEDURE — 0DBN8ZX EXCISION OF SIGMOID COLON, VIA NATURAL OR ARTIFICIAL OPENING ENDOSCOPIC, DIAGNOSTIC: ICD-10-PCS | Performed by: INTERNAL MEDICINE

## 2022-03-14 PROCEDURE — 80048 BASIC METABOLIC PNL TOTAL CA: CPT | Performed by: INTERNAL MEDICINE

## 2022-03-14 PROCEDURE — 45380 COLONOSCOPY AND BIOPSY: CPT | Performed by: INTERNAL MEDICINE

## 2022-03-14 PROCEDURE — 83520 IMMUNOASSAY QUANT NOS NONAB: CPT | Performed by: STUDENT IN AN ORGANIZED HEALTH CARE EDUCATION/TRAINING PROGRAM

## 2022-03-14 PROCEDURE — 82948 REAGENT STRIP/BLOOD GLUCOSE: CPT

## 2022-03-14 PROCEDURE — 45385 COLONOSCOPY W/LESION REMOVAL: CPT | Performed by: INTERNAL MEDICINE

## 2022-03-14 PROCEDURE — 84145 PROCALCITONIN (PCT): CPT | Performed by: FAMILY MEDICINE

## 2022-03-14 RX ORDER — ATOVAQUONE 750 MG/5ML
1500 SUSPENSION ORAL
Status: DISCONTINUED | OUTPATIENT
Start: 2022-03-15 | End: 2022-03-15

## 2022-03-14 RX ORDER — SODIUM CHLORIDE 9 MG/ML
INJECTION, SOLUTION INTRAVENOUS CONTINUOUS PRN
Status: DISCONTINUED | OUTPATIENT
Start: 2022-03-14 | End: 2022-03-14

## 2022-03-14 RX ORDER — PROPOFOL 10 MG/ML
INJECTION, EMULSION INTRAVENOUS CONTINUOUS PRN
Status: DISCONTINUED | OUTPATIENT
Start: 2022-03-14 | End: 2022-03-14

## 2022-03-14 RX ORDER — CEFTRIAXONE 1 G/50ML
1000 INJECTION, SOLUTION INTRAVENOUS EVERY 24 HOURS
Status: DISCONTINUED | OUTPATIENT
Start: 2022-03-14 | End: 2022-03-14

## 2022-03-14 RX ORDER — PROPOFOL 10 MG/ML
INJECTION, EMULSION INTRAVENOUS AS NEEDED
Status: DISCONTINUED | OUTPATIENT
Start: 2022-03-14 | End: 2022-03-14

## 2022-03-14 RX ORDER — INSULIN ASPART 100 [IU]/ML
20 INJECTION, SUSPENSION SUBCUTANEOUS
Status: DISCONTINUED | OUTPATIENT
Start: 2022-03-14 | End: 2022-03-16

## 2022-03-14 RX ADMIN — INSULIN LISPRO 1 UNITS: 100 INJECTION, SOLUTION INTRAVENOUS; SUBCUTANEOUS at 16:48

## 2022-03-14 RX ADMIN — LATANOPROST 1 DROP: 50 SOLUTION OPHTHALMIC at 21:15

## 2022-03-14 RX ADMIN — ACETAMINOPHEN 650 MG: 325 TABLET, FILM COATED ORAL at 08:22

## 2022-03-14 RX ADMIN — PANTOPRAZOLE SODIUM 40 MG: 40 INJECTION, POWDER, FOR SOLUTION INTRAVENOUS at 08:22

## 2022-03-14 RX ADMIN — INSULIN ASPART 20 UNITS: 100 INJECTION, SUSPENSION SUBCUTANEOUS at 16:48

## 2022-03-14 RX ADMIN — SODIUM CHLORIDE: 0.9 INJECTION, SOLUTION INTRAVENOUS at 14:26

## 2022-03-14 RX ADMIN — PANTOPRAZOLE SODIUM 40 MG: 40 INJECTION, POWDER, FOR SOLUTION INTRAVENOUS at 21:13

## 2022-03-14 RX ADMIN — INSULIN LISPRO 3 UNITS: 100 INJECTION, SOLUTION INTRAVENOUS; SUBCUTANEOUS at 21:14

## 2022-03-14 RX ADMIN — PROPOFOL 100 MG: 10 INJECTION, EMULSION INTRAVENOUS at 14:32

## 2022-03-14 RX ADMIN — SODIUM CHLORIDE 500 MG: 9 INJECTION, SOLUTION INTRAVENOUS at 17:30

## 2022-03-14 RX ADMIN — PROPOFOL 100 MCG/KG/MIN: 10 INJECTION, EMULSION INTRAVENOUS at 14:32

## 2022-03-14 RX ADMIN — PRAVASTATIN SODIUM 80 MG: 80 TABLET ORAL at 16:48

## 2022-03-14 RX ADMIN — TAMSULOSIN HYDROCHLORIDE 0.4 MG: 0.4 CAPSULE ORAL at 16:48

## 2022-03-14 NOTE — PROGRESS NOTES
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     Etiology:  Concern of RPGN (LORRAINE hemoptysis) differential diagnosis:  ANCA vasculitis, anti GBM    Current creatinine:  6 9 mg/dL   UA:  Hematuria, leukocyturia   Renal imaging :  No hydronephrosis   GN workup:  o Normal complements  o Anti GBM negative  o SAMANTHA negative  o Repeat ANCA, no results yet   o SPEP:  Ordered   Treatment:   S/p kidney biopsy on 03/10   Will continue to monitor for kidney recovery   Plan for HD TTS   Monitor urinary output   Pending biopsy results   Maintain MAP:  Over 65 mmHg if possible/avoid hypoperfusion:  Hold parameters on blood pressure medications   Avoid nephrotoxic agents such as NSAIDs, and IV contrast if possible  Avoid opioids    Adjust medications to GFR    # hemoptysis  · 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  · Anti GBM negative  · Anca pending  · CT:  Bilateral pleural effusion    #CKD G3a   Baseline creatinine:  1 3 mg/dL   Etiology:  Likely secondary to nephroangiosclerosis in the settings of hypertension    #Acid-base Disorder   serum HCO3 24mmol/L   At goal    #Volume status/hypertension:   Volume:  Euvolemic   Blood pressure:  Normotensive /92mmhg, goal<140/90   Recommend:   Low-sodium diet    #Anemia:   Current hemoglobin:  7 5 mg/dL, below goal   Status post blood transfusion   Possible GI bleed and kidney disease   Treatment:   EPO 80913 units on HD   Transfuse for hemoglobin less than 7 0 per primary service      SUBJECTIVE:  Patient seen and examined at bedside   No chest pain, shortness of breath, nausea, vomiting, abdominal pain or diarrhea      OBJECTIVE:  Current Weight: Weight - Scale: 88 5 kg (195 lb)  Vitals:    03/14/22 0751   BP: 138/92   Pulse: 78   Resp:    Temp: 98 8 °F (37 1 °C)   SpO2: 96%       Intake/Output Summary (Last 24 hours) at 3/14/2022 0955  Last data filed at 3/13/2022 2016  Gross per 24 hour   Intake 130 ml   Output --   Net 130 ml     Wt Readings from Last 3 Encounters:   03/11/22 88 5 kg (195 lb)   12/14/21 88 5 kg (195 lb)   09/07/21 88 9 kg (196 lb)     Temp Readings from Last 3 Encounters:   03/14/22 98 8 °F (37 1 °C)   12/14/21 97 8 °F (36 6 °C)   09/07/21 99 °F (37 2 °C) (Temporal)     BP Readings from Last 3 Encounters:   03/14/22 138/92   12/14/21 124/60   09/07/21 130/70     Pulse Readings from Last 3 Encounters:   03/14/22 78   12/14/21 80   09/07/21 75        General:  no acute distress at this time  Skin:  No acute rash  Eyes:  No scleral icterus and noninjected  ENT:  mucous membranes moist  Neck: no carotid bruits  Chest:  Clear to auscultation percussion, good respiratory effort, no use of accessory respiratory muscles  CVS:  Regular rate and rhythm without a murmur rub, no jugular venous distention,  Abdomen:  soft and nontender   Extremities:  No lower extremity edema  Neuro:  No gross focality  Psych:  Alert , cooperative  Vascular access:  Right IJ PermCath      Medications:    Current Facility-Administered Medications:     acetaminophen (TYLENOL) tablet 650 mg, 650 mg, Oral, Q6H PRN, Daisy Keating MD, 650 mg at 03/14/22 5903    cefTRIAXone (ROCEPHIN) IVPB (premix in dextrose) 1,000 mg 50 mL, 1,000 mg, Intravenous, Q24H, Daisy Keating MD, Last Rate: 100 mL/hr at 03/13/22 1412, 1,000 mg at 03/13/22 1412    epoetin ani (EPOGEN,PROCRIT) injection 10,000 Units, 10,000 Units, Intravenous, Once per day on Mon Wed Fri, John May MD    insulin glargine (LANTUS) subcutaneous injection 4 Units 0 04 mL, 4 Units, Subcutaneous, HS, Emiliana Jordan DO, 4 Units at 03/13/22 2117    insulin lispro (HumaLOG) 100 units/mL subcutaneous injection 1-5 Units, 1-5 Units, Subcutaneous, TID AC, 2 Units at 03/13/22 1600 **AND** Fingerstick Glucose (POCT), , , TID AC, Emiliana Revankar, DO    insulin lispro (HumaLOG) 100 units/mL subcutaneous injection 1-5 Units, 1-5 Units, Subcutaneous, HS, Emiliana Revalanar, DO, 3 Units at 03/13/22 2117    latanoprost (XALATAN) 0 005 % ophthalmic solution 1 drop, 1 drop, Both Eyes, HS, Heriberto Barba MD, 1 drop at 03/13/22 2118    ondansetron (ZOFRAN) injection 4 mg, 4 mg, Intravenous, Q6H PRN, Heriberto Barba MD, 4 mg at 03/12/22 1927    pantoprazole (PROTONIX) injection 40 mg, 40 mg, Intravenous, Q12H [address], Heriberto Barba MD, 40 mg at 03/14/22 3945    pravastatin (PRAVACHOL) tablet 80 mg, 80 mg, Oral, Daily With Kacy Steele MD, 80 mg at 03/13/22 1556    tamsulosin (FLOMAX) capsule 0 4 mg, 0 4 mg, Oral, Daily With Kacy Steele MD, 0 4 mg at 03/13/22 1556    Laboratory Results:  Results from last 7 days   Lab Units 03/14/22  0513 03/14/22  0512 03/13/22  0514 03/12/22  1220 03/11/22  1601 03/11/22  0451 03/10/22  0506 03/09/22  0519 03/08/22  1723 03/08/22  0744 03/08/22  0743 03/08/22  0001 03/08/22  0001 03/07/22  2209 03/07/22  2209 03/07/22  1306 03/07/22  0954   WBC Thousand/uL  --  13 13*  --  12 11*  --  11 30* 12 62* 13 87*  --   --  11 02*  --  11 30*   < > 10 90*   < > 15 13*   HEMOGLOBIN g/dL  --  7 5* 7 6* 6 7* 7 7* 6 1* 7 1* 7 4*   < >  --  7 7*   < > 6 4*   < > 6 7*   < > 5 8*   HEMATOCRIT %  --  23 1* 22 6* 21 0* 23 9* 19 3* 22 5* 22 9*   < >  --  22 9*   < > 19 8*   < > 20 7*   < > 19 0*   PLATELETS Thousands/uL  --  210  --  182  --  217 218 223  --   --  244  --  257   < > 264   < > 347   SODIUM mmol/L 131*  --   --  133*  --  135* 135* 136  --  135*  --   --   --   --  135*   < > 132*   POTASSIUM mmol/L 4 4  --   --  4 6  --  4 4 4 1 4 5  --  4 7  --   --   --   --  5 0   < > 6 9*   CHLORIDE mmol/L 92*  --   --  94*  --  95* 94* 92*  --  95*  --   --   --   --  97*   < > 96*   CO2 mmol/L 24  --   --  22  --  24 26 24  --  20*  --   --   --   --  16*   < > 14*   BUN mg/dL 73*  --   --  117*  --  93* 129* 155*  --  176*  --   --   --   --  162*   < > 169*   CREATININE mg/dL 6 94*  --   --  9 96*  --  7 79* 10 08* 13 69*  --  13 26*  --   --   --   --  13 64*   < > 14 91*   CALCIUM mg/dL 7 1*  --   --  7 0*  --  6 8* 7 0* 7 1*  --  7 3*  --   --   --   --  7 6*   < > 8 3   MAGNESIUM mg/dL  --   --   --   --   --   --   --   --   --   --   --   --   --   --   --   --  2 9*   PHOSPHORUS mg/dL  --   --   --   --   --   --   --   --   --  7 9*  --   --   --   --   --   --   --     < > = values in this interval not displayed  IR biopsy kidney columbia kit no laterality   Final Result by Carolina Slade MD (03/10 1640)   Impression: Successful percutaneous core biopsy of the right renal cortex for [place] Kit  A full pathology report will follow  Workstation performed: [id number]         IR tunneled dialysis catheter placement   Final Result by Thomas Serna MD (03/08 0263)   Impression: Successful placement of a 15-[place]aur[place] by 28 cm Titan dialysis catheter via the right internal jugular vein  The tip of the catheter is in the right atrium and may be used immediately  Workstation performed: [id number]         CT abdomen pelvis wo contrast   Final Result by Suze Cool MD (03/08 6547)      1  No peritoneal or retroperitoneal hematoma  2   An 8 mm right UVJ calculus causes mild upstream hydroureter without significant hydronephrosis  3   Mild pulmonary edema, small simple bilateral pleural effusions and bibasilar atelectasis  Workstation performed: [id number]         US kidney and bladder   Final Result by Keyona Price MD (03/07 1432)      No hydronephrosis  8 mm calculus in the right dependent aspect of the urinary bladder                    Workstation performed: [id number]         XR chest 1 view portable   Final Result by Robby Barnett MD (03/07 1250)      No active pulmonary disease  Workstation performed: [id number]         XR chest portable    (Results Pending)       Portions of the record may have been created with voice recognition software  Occasional wrong word or "sound a like" substitutions may have occurred due to the inherent limitations of voice recognition software  Read the chart carefully and recognize, using context, where substitutions have occurred

## 2022-03-14 NOTE — PHYSICAL THERAPY NOTE
03/14/22 1455   PT Last Visit   PT Visit Date 03/14/22   Note Type   Note Type Cancelled Session   Cancel Reasons Patient off floor/test  (Colonoscopy)   Licensure   [place] License Number  [address] PT [id number]

## 2022-03-14 NOTE — QUICK NOTE
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

## 2022-03-14 NOTE — CASE MANAGEMENT
Case Management Discharge Planning Note    Patient name Catarina Cornelius  Location [address] [facility] 408/4 [address] Teresa Michel-* MRN [id number]  : 1945 Date 3/14/2022       Current Admission Date: 3/7/2022  Current Admission Diagnosis:Symptomatic anemia   Patient Active Problem List    Diagnosis Date Noted    Dialysis patient ([place] [address] ) 2022    Anemia due to chronic kidney disease, on chronic dialysis ([place] [address] )     Hemoptysis 2022    LORRAINE (acute kidney injury) ([place] [address] )     Other proteinuria     Symptomatic anemia 2022    Chest pain 2022    Acute kidney injury ([place] [address] ) 2022    High anion gap metabolic acidosis     UTI (urinary tract infection) 2022    Melena 2022    Elevated PSA 2021    Chronic pain of right knee 2021    Primary osteoarthritis of right knee 2021    Bladder stones 12/15/2016    Type 2 diabetes mellitus with microalbuminuria, with long-term current use of insulin ([place] [address] ) 2016    Benign colon polyp 2013    Benign prostatic hyperplasia with lower urinary tract symptoms 2013    Benign essential hypertension 2013    Hyperlipidemia 2012    Vitamin D deficiency 2012      LOS (days): 7  Geometric Mean LOS (GMLOS) (days): 3 60  Days to GMLOS:-3 3     OBJECTIVE:  Risk of Unplanned Readmission Score: 17   Current admission status: Inpatient   Preferred Pharmacy:   [facility] [address] [facility] [address] [place] [id number]  Phone: [phone] Fax: [phone] [address]  [address] [place] [address]  [address] [facility], [address] [facility] [address]  Phone: [phone] Fax: [phone]    Primary Care Provider: Bryanna Vizcarra MD    Primary Insurance: MEDICARE  Secondary Insurance: BLUE CROSS    DISCHARGE DETAILS:    CM called [facility] to speak with Faby   Unable to reach his extention so spoke with next available Coordinator Lynsey  She states that patient is medically and financially approved and Faby is going to call the [address] [place] [address],[address] to check on a chair time and call CM back  CM to follow

## 2022-03-14 NOTE — PLAN OF CARE
Problem: HEMATOLOGIC - ADULT  Goal: Maintains hematologic stability  Description: INTERVENTIONS  - Assess for signs and symptoms of bleeding or hemorrhage  - Monitor labs  - Administer supportive blood products/factors as ordered and appropriate  Outcome: Progressing     Problem: METABOLIC, FLUID AND ELECTROLYTES - ADULT  Goal: Electrolytes maintained within normal limits  Description: INTERVENTIONS:  - Monitor labs and assess patient for signs and symptoms of electrolyte imbalances  - Administer electrolyte replacement as ordered  - Monitor response to electrolyte replacements, including repeat lab results as appropriate  - Instruct patient on fluid and nutrition as appropriate  Outcome: Progressing  Goal: Fluid balance maintained  Description: INTERVENTIONS:  - Monitor labs   - Monitor I/O and WT  - Instruct patient on fluid and nutrition as appropriate  - Assess for signs & symptoms of volume excess or deficit  Outcome: Progressing

## 2022-03-14 NOTE — ANESTHESIA PREPROCEDURE EVALUATION
Procedure:  COLONOSCOPY    Relevant Problems   ANESTHESIA (within normal limits)  agressive behaviour on wake up      CARDIO   (+) Benign essential hypertension   (+) Chest pain   (+) Hyperlipidemia      ENDO   (+) Type 2 diabetes mellitus with microalbuminuria, with long-term current use of insulin (HCC)      /RENAL   (+) [facility]INE [facility] )   (+) Acute kidney injury ([facility] )   (+) Anemia due to chronic kidney disease, on chronic dialysis ([facility])   (+) Benign prostatic hyperplasia with lower urinary tract symptoms   (+) Dialysis patient ([place] [address] ) (last HD 3/10/22)      HEMATOLOGY   (+) Anemia due to chronic kidney disease, on chronic dialysis ([facility])   (+) Symptomatic anemia      MUSCULOSKELETAL   (+) Primary osteoarthritis of right knee        Physical Exam    Airway    Mallampati score: II  TM Distance: >3 FB  Neck ROM: full     Dental   Comment: No loose,     Cardiovascular  Rhythm: regular, Rate: normal,     Pulmonary  Breath sounds clear to auscultation,     Other Findings        Anesthesia Plan  ASA Score- 4     Anesthesia Type- IV sedation with anesthesia with ASA Monitors  Additional Monitors:   Airway Plan:           Plan Factors-Exercise tolerance (METS): <4 METS  Chart reviewed  Existing labs reviewed  Patient summary reviewed  Patient is not a current smoker  Induction- intravenous  Postoperative Plan-     Informed Consent- Anesthetic plan and risks discussed with patient  I personally reviewed this patient with the CRNA  Discussed and agreed on the Anesthesia Plan with the CRNA  Bisi Shelby

## 2022-03-14 NOTE — PLAN OF CARE
Problem: MOBILITY - ADULT  Goal: Maintain or return to baseline ADL function  Description: INTERVENTIONS:  -  Assess patient's ability to carry out ADLs; assess patient's baseline for ADL function and identify physical deficits which impact ability to perform ADLs (bathing, care of mouth/teeth, toileting, grooming, dressing, etc )  - Assess/evaluate cause of self-care deficits   - Assess range of motion  - Assess patient's mobility; develop plan if impaired  - Assess patient's need for assistive devices and provide as appropriate  - Encourage maximum independence but intervene and supervise when necessary  - Involve family in performance of ADLs  - Assess for home care needs following discharge   - Consider OT consult to assist with ADL evaluation and planning for discharge  - Provide patient education as appropriate  Outcome: Progressing  Goal: Maintains/Returns to pre admission functional level  Description: INTERVENTIONS:  - Perform BMAT or MOVE assessment daily    - Set and communicate daily mobility goal to care team and patient/family/caregiver  - Collaborate with rehabilitation services on mobility goals if consulted  - Perform Range of Motion 2 times a day  - Reposition patient every 2 hours    - Dangle patient 2 times a day  - Stand patient 2 times a day  - Ambulate patient 2 times a day  - Out of bed to chair 2 times a day   - Out of bed for meals 2 times a day  - Out of bed for toileting  - Record patient progress and toleration of activity level   Outcome: Progressing     Problem: HEMATOLOGIC - ADULT  Goal: Maintains hematologic stability  Description: INTERVENTIONS  - Assess for signs and symptoms of bleeding or hemorrhage  - Monitor labs  - Administer supportive blood products/factors as ordered and appropriate  Outcome: Progressing     Problem: METABOLIC, FLUID AND ELECTROLYTES - ADULT  Goal: Electrolytes maintained within normal limits  Description: INTERVENTIONS:  - Monitor labs and assess patient for signs and symptoms of electrolyte imbalances  - Administer electrolyte replacement as ordered  - Monitor response to electrolyte replacements, including repeat lab results as appropriate  - Instruct patient on fluid and nutrition as appropriate  Outcome: Progressing  Goal: Fluid balance maintained  Description: INTERVENTIONS:  - Monitor labs   - Monitor I/O and WT  - Instruct patient on fluid and nutrition as appropriate  - Assess for signs & symptoms of volume excess or deficit  Outcome: Progressing     Problem: Prexisting or High Potential for Compromised Skin Integrity  Goal: Skin integrity is maintained or improved  Description: INTERVENTIONS:  - Identify patients at risk for skin breakdown  - Assess and monitor skin integrity  - Assess and monitor nutrition and hydration status  - Monitor labs   - Assess for incontinence   - Turn and reposition patient  - Assist with mobility/ambulation  - Relieve pressure over bony prominences  - Avoid friction and shearing  - Provide appropriate hygiene as needed including keeping skin clean and dry  - Evaluate need for skin moisturizer/barrier cream  - Collaborate with interdisciplinary team   - Patient/family teaching  - Consider wound care consult   Outcome: Progressing     Problem: Potential for Falls  Goal: Patient will remain free of falls  Description: INTERVENTIONS:  - Educate patient/family on patient safety including physical limitations  - Instruct patient to call for assistance with activity   - Consult OT/PT to assist with strengthening/mobility   - Keep Call bell within reach  - Keep bed low and locked with side rails adjusted as appropriate  - Keep care items and personal belongings within reach  - Initiate and maintain comfort rounds  - Make Fall Risk Sign visible to staff  - Offer Toileting every 2 Hours, in advance of need  - Initiate/Maintain bed alarm  - Obtain necessary fall risk management equipment: fall risk   - Apply yellow socks and bracelet for high fall risk patients  - Consider moving patient to room near nurses station  Outcome: Progressing

## 2022-03-14 NOTE — OCCUPATIONAL THERAPY NOTE
Occupational Therapy Treatment Note       03/14/22 1135   Note Type   Note Type Treatment   Restrictions/Precautions   Other Precautions Chair Alarm; Bed Alarm; Fall Risk;Hard of hearing   Pain Assessment   Pain Assessment Tool 0-10   Pain Score No Pain   ADL   Grooming Assistance 5  Supervision/Setup   Grooming Comments Hand hygiene standing to sink unsupported   LB Dressing Assistance 4  Minimal Assistance   LB Dressing Comments Don/doff scrub pants: supervision to thread over LEs, min assist to stand to pull up/down over hips   Toileting Assistance  5  Supervision/Setup   Bed Mobility   Supine to Sit 5  Supervision   Transfers   Sit to Stand 4  Minimal assistance   Additional items Assist x 1   Stand to Sit 4  Minimal assistance   Additional items Assist x 1   Additional Comments STS x several trials throughout session   Functional Mobility   Functional Mobility 4  Minimal assistance   Additional Comments Patient ambulated short household distance in room to/from bathroom with RW   Toilet Transfers   Toilet Transfer Type To and from   Toilet Transfer to Standard toilet   Toilet Transfer Technique Ambulating   Toilet Transfers Minimal assistance   Toilet Transfers Comments With RW   Cognition   Overall Cognitive Status [facility]   Arousal/Participation Alert; Cooperative   Attention Attends with cues to redirect   Orientation Level Oriented X4   Following Commands Follows multistep commands with increased time or repetition   Comments Patient limited by [facility] at times   Activity Tolerance   Activity Tolerance Patient tolerated treatment well   Assessment   Assessment Patient seen for OT treatment this AM  Patient is pleasant and agreeable to participation in OT treatment  Patient demonstrates improvement in functional performance including mobility and ADLs as compared to initial evaluation therefore discharge recommendation changed to home with home therapy services   The patient's raw score on the AM-PAC Daily Activity inpatient short form is 20, standardized score is 42 03, greater than 39 4  Patients at this level are likely to benefit from discharge to home  Please refer to the recommendation of the Occupational Therapist for safe discharge planning  At end of session patient seated in bedside chair with all needs met, chair alarm set  Continue OT per POC     Plan   OT Frequency 3-5x/wk   Recommendation   OT Discharge Recommendation Home with home health rehabilitation   AM-PAC Daily Activity Inpatient   Lower Body Dressing 3   Bathing 3   Toileting 3   Upper Body Dressing 3   Grooming 4   Eating 4   Daily Activity Raw Score 20   Daily Activity Standardized Score (Calc for Raw Score >=11) 42 03   AM-PAC Applied Cognition Inpatient   Following a Speech/Presentation 3   Understanding Ordinary Conversation 4   Taking Medications 4   Remembering Where Things Are Placed or Put Away 4   Remembering List of 4-5 Errands 3   Taking Care of Complicated Tasks 3   Applied Cognition Raw Score 21   Applied Cognition Standardized Score 41 9   Licensure   [place] License Number  Prashant HumphreysTIAGOR/L [id number]

## 2022-03-14 NOTE — PROGRESS NOTES
Formerly [facility], [facility] Internal Medicine Progress Note  Patient: Ravi Kumar 64 y o  male   MRN: [id number]  PCP: Juno Hoover MD  Unit/Bed#: [address] [place] [address] Encounter: [id number]  Date Of Visit: 03/14/22    Problem List:    Principal Problem:    Symptomatic anemia  Active Problems:    Acute kidney injury ([address] )    Type 2 diabetes mellitus with microalbuminuria, with long-term current use of insulin (HCC)    High anion gap metabolic acidosis    Melena    UTI (urinary tract infection)    Hemoptysis    Benign essential hypertension    Benign prostatic hyperplasia with lower urinary tract symptoms    Hyperlipidemia    Chest pain      Assessment & Plan:    * Symptomatic anemia  Assessment & Plan  Hemoglobin upon admission was 5 8  Patient recieved 5 units of PRBC so far  MCV level was normal  Vitamin N45 and folic acid level WNL  Iron panel revealed elevated ferritin, normal iron saturation, low TIBC, and low iron  Anemia likely multifactorial in the setting of possible GI bleeding and also CKD  Started on Epogen with dialysis  CT abd/pelvis found no evidence of GI bleed  S/P EGD on 03/11 which was negative for active bleeding  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  Plan for colonoscopy today for further evaluation    Results from last 7 days   Lab Units 03/14/22  0512 03/13/22  0514 03/12/22  1220 03/11/22  1601 03/11/22  0451 03/10/22  0506 03/09/22  0519 03/08/22  1723 03/08/22  0743 03/08/22  0001 03/07/22  2209 03/07/22  2209   HEMOGLOBIN g/dL 7 5* 7 6* 6 7* 7 7* 6 1* 7 1* 7 4* 8 2* 7 7* 6 4*   < > 6 7*   HEMATOCRIT % 23 1* 22 6* 21 0* 23 9* 19 3* 22 5* 22 9* 25 1* 22 9* 19 8*   < > 20 7*   MCV fL 89  --  89  --  90 89 86  --  85 84  --  85    < > = values in this interval not displayed  Acute kidney injury [facility])  Assessment & Plan  Baseline creatinine 1-1 3  Creatinine upon admission was 14 38  PVR was 66 milliliters  Etiology unknown   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  Since patient had metabolic acidosis patient was placed on bicarbonate drip at 150 mL/hour  Renal US showed no evidence of hydronephrosis  8mm calculus was found in bladder  Possible cause of hematuria if not glomerulonephritis   Continue to hold Vasotec  Patient is recieving further workup for glomerulonephritis  SAMANTHA, GBM Ab, C3 and C4 WNL  ANCA and Other tests pending  UA showed innumerable red blood cells 10-20 white cells with glucose  CT of abdomen and pelvis showed 8 mm stone at UVJ with hydroureter but no hydronephrosis and not significant changed since 2016  No peritoneal or retroperitoneal hematoma or evidence of GI bleeding  Mild pulmonary edema and small b/l pleural effusions were noted  CXR pending  Patient started dialysis 3/9 and has been receiving dialysis per Renal  IVF were d/c on 3/9  Status post renal biopsy on 03/10, awaiting results  Per case management, patient has been set up for dialysis as outpatient starting Wednesday    Results from last 7 days   Lab Units 03/14/22  0513 03/12/22  1220 03/11/22  0451 03/10/22  0506 03/09/22  0519 03/08/22  0744 03/07/22  2209 03/07/22  1306   BUN mg/dL 73* 117* 93* 129* 155* 176* 162* 175*   CREATININE mg/dL 6 94* 9 96* 7 79* 10 08* 13 69* 13 26* 13 64* 14 38*       High anion gap metabolic acidosis  Assessment & Plan  Likely secondary to renal failure and also hyperglycemic state  Patient was placed on bicarbonate drip at 150 mL/hour, Bicarb drip was d/c on 3/9  Type 2 diabetes mellitus with microalbuminuria, with long-term current use of insulin [facility])  Assessment & Plan  Lab Results   Component Value Date    HGBA1C 7 2 (H) 12/03/2021       Recent Labs     03/13/22  1550 03/13/22 2027 03/14/22  0727 03/14/22  1136   POCGLU 213* 319* 170* 167*     Patient is on Humalog mix 75/25 45 units b i d   Hold Humalog mix  Patient was initially on insulin drip as 1-beta hydroxybutyrate was elevated at 1 6   Currently on clears and NPO after midnight  Continue with SSI  decreased dose of Lantus to 4 units overnight as patient is NPO  Monitor blood sugars    Melena  Assessment & Plan  Patient reported black stool for couple of days prior to admission and even reported them after admission  stool occult blood ordered but not done  Patient on Protonix 40 milligram IV q 12 hours  No history of NSAID use  Plan for colonoscopy today per GI    Hyperkalemia-resolved as of 3/9/2022  Assessment & Plan  Potassium level upon admission was 6 9  Likely in the setting of acute kidney injury and ACE-inhibitor use  Patient was given insulin with D10, albuterol nebulizer treatment and calcium gluconate in the ED  EKG showed no changes  Patient was also given Kayexalate 30 grams in the ED  K level has normalized at 4 5   Received Dialysis on 3/9      UTI (urinary tract infection)  Assessment & Plan  UA showed trace leukocyte esterase with 10-20 white cells and innumerable bacteria  Urine culture grew small colony of E  Coli  Completed 7 days of IV Ceftriaxone   No clinical signs of sepsis    Hemoptysis  Assessment & Plan  Patient states he has had a cough for a couple weeks  Patient with episodes of hemoptysis here   CXR ordered  Obtain sputum culture if possible  Procalcitonin elevated at 6 73 --> 3 4, unclear significance in the setting of renal dysfunction  Anemia due to chronic kidney disease, on chronic dialysis [facility])  Assessment & Plan  Lab Results   Component Value Date    EGFR 5 03/11/2022    EGFR 4 03/10/2022    EGFR 3 03/09/2022    CREATININE 7 79 (H) 03/11/2022    CREATININE 10 08 (H) 03/10/2022    CREATININE 13 69 (H) 03/09/2022       Chest pain  Assessment & Plan  Patient presented with left-sided chest pain,Troponins x2 were negative initially  EKG was unremarkable    ProBNP was elevated at 15,000   2D echo showed normal EF with normal diastolic function  Reported chest pain again 3/9 and troponin peaked at 104 Plan for outpatient stress test     Hyperlipidemia  Assessment & Plan  Zocor substituted with Pravachol    Benign prostatic hyperplasia with lower urinary tract symptoms  Assessment & Plan  Monitor postvoid residuals  Continue Flomax    Benign essential hypertension  Assessment & Plan  Patient is on Vasotec at home which is being held in the setting of acute kidney injury  Avoid hypotension  Will add Norvasc if blood pressure continues to be high  VTE Pharmacologic Prophylaxis:   none due to anemia    Patient Centered Rounds: I performed bedside rounds with nursing staff today  Discussions with Specialists or Other Care Team Provider: yes     Education and Discussions with Family / Patient: Updated  (wife) via phone  Time Spent for Care: 35 min  More than 50% of total time spent on counseling and coordination of care as described above  Current Length of Stay: 7 day(s)  Current Patient Status: Inpatient   Certification Statement: The patient will continue to require additional inpatient hospital stay due to Anemia requiring colonoscopy, Sue awaiting renal biopsy  Discharge Plan: Anticipate discharge in 48-72 hrs to home with home services  Code Status: Level 1 - Full Code    Subjective:     Patient requesting a shower  States he feels well and wants to get home as soon as possible    Objective:     Vitals:   Temp (24hrs), Av 2 °F (36 8 °C), Min:97 3 °F (36 3 °C), Max:98 8 °F (37 1 °C)    Temp:  [97 3 °F (36 3 °C)-98 8 °F (37 1 °C)] 98 8 °F (37 1 °C)  HR:  [65-82] 78  Resp:  [18] 18  BP: (133-140)/(54-95) 138/92  SpO2:  [92 %-98 %] 96 %  Body mass index is 29 65 kg/m²  Input and Output Summary (last 24 hours): Intake/Output Summary (Last 24 hours) at 3/14/2022 1025  Last data filed at 3/13/2022 2016  Gross per 24 hour   Intake 130 ml   Output --   Net 130 ml       Physical Exam:   Physical Exam  Constitutional:       General: He is not in acute distress  Appearance: He is not diaphoretic  HENT:      Head: Normocephalic and atraumatic  Ears:      Comments: Hard of hearing  Eyes:      General:         Right eye: No discharge  Left eye: No discharge  Cardiovascular:      Rate and Rhythm: Normal rate and regular rhythm  Pulmonary:      Effort: Pulmonary effort is normal  No respiratory distress  Breath sounds: Normal breath sounds  No wheezing or rales  Abdominal:      General: Bowel sounds are normal  There is no distension  Palpations: Abdomen is soft  Tenderness: There is no abdominal tenderness  Musculoskeletal:      Right lower leg: No edema  Left lower leg: No edema  Skin:     Comments: Right IJ PermCath   Neurological:      Mental Status: He is alert and oriented to person, place, and time  Additional Data:     Labs:  Results from last 7 days   Lab Units 03/14/22  0512 03/08/22  1723 03/08/22  0743 03/08/22  0001 03/07/22  2209   WBC Thousand/uL 13 13*   < > 11 02*   < > 10 90*   HEMOGLOBIN g/dL 7 5*   < > 7 7*   < > 6 7*   HEMATOCRIT % 23 1*   < > 22 9*   < > 20 7*   PLATELETS Thousands/uL 210   < > 244   < > 264   BANDS PCT %  --   --   --   --  2   NEUTROS PCT %  --   --  88*   < >  --    LYMPHS PCT %  --   --  6*   < >  --    LYMPHO PCT %  --   --   --   --  7*   MONOS PCT %  --   --  5   < >  --    MONO PCT %  --   --   --   --  4   EOS PCT %  --   --  0   < > 0    < > = values in this interval not displayed       Results from last 7 days   Lab Units 03/14/22  0513 03/09/22  0519 03/08/22  0744   SODIUM mmol/L 131*   < > 135*   POTASSIUM mmol/L 4 4   < > 4 7   CHLORIDE mmol/L 92*   < > 95*   CO2 mmol/L 24   < > 20*   BUN mg/dL 73*   < > 176*   CREATININE mg/dL 6 94*   < > 13 26*   ANION GAP mmol/L 15*   < > 20*   CALCIUM mg/dL 7 1*   < > 7 3*   ALBUMIN g/dL  --   --  1 7*   TOTAL BILIRUBIN mg/dL  --   --  0 52   ALK PHOS U/L  --   --  50   ALT U/L  --   --  37   AST U/L  --   --  22   GLUCOSE RANDOM mg/dL 170*   < > 222*    < > = values in this interval not displayed  Results from last 7 days   Lab Units 03/10/22  0506   INR  1 31*     Results from last 7 days   Lab Units 03/14/22  0727 03/13/22  2027 03/13/22  1550 03/13/22  1123 03/13/22  0617 03/13/22  0518 03/12/22  2327 03/12/22  2126 03/12/22  1653 03/12/22  1127 03/12/22  0659 03/12/22  0602   POC GLUCOSE mg/dl 170* 319* 213* 247* 189* 165* 245* 300* 195* 286* 224* 200*         Results from last 7 days   Lab Units 03/14/22  0513 03/09/22  0519 03/08/22  0744 03/07/22  1124   LACTIC ACID mmol/L  --   --   --  0 7   PROCALCITONIN ng/ml 3 40* 5 68* 6 73*  --        Lines/Drains:  Invasive Devices  Report    Peripheral Intravenous Line            Peripheral IV 03/11/22 Right Arm 3 days          Hemodialysis Catheter            HD Permanent Double Catheter 5 days                      Imaging: No pertinent imaging reviewed  Recent Cultures (last 7 days):   Results from last 7 days   Lab Units 03/11/22  0638 03/07/22  1305 03/07/22  1124 03/07/22  1055   BLOOD CULTURE   --   --  No Growth After 5 Days  No Growth After 5 Days  SPUTUM CULTURE  Test not performed  Suggest repeat specimen  --   --   --    GRAM STAIN RESULT  >10 squamous epithelial cells/lpf, indicating orophayngeal contamination  *  3+ Gram positive cocci in pairs, chains and clusters*  3+ Gram negative rods*  1+ Gram positive rods*  No polys seen*  --   --   --    URINE CULTURE   --  2461-9921 cfu/ml Escherichia coli*  --   --        Last 24 Hours Medication List:   Current Facility-Administered Medications   Medication Dose Route Frequency Provider Last Rate    acetaminophen  650 mg Oral Q6H PRN Oralia Ortiz MD      cefTRIAXone  1,000 mg Intravenous Q24H Daisy Keating MD 1,000 mg (03/13/22 1412)    epoetin ani  10,000 Units Intravenous Once per day on Mon Wed Fri Arnaldo Mckay MD      insulin glargine  4 Units Subcutaneous HS Emiliana Jordan DO      insulin lispro  1-5 Units Subcutaneous TID AC Emiliana Revankar, DO      insulin lispro  1-5 Units Subcutaneous HS Emiliana Revalanar, DO      latanoprost  1 drop Both Eyes HS Belén Bird MD      ondansetron  4 mg Intravenous Q6H PRN Daisy Keating MD      pantoprazole  40 mg Intravenous Q12H  47-7, MD      pravastatin  80 mg Oral Daily With Dunia Arrieta MD      tamsulosin  0 4 mg Oral Daily With Dunia Arrieta MD          Today, Patient Was Seen By: Ej Chavez DO    ** Please Note: "This note has been constructed using a voice recognition system  Therefore there may be syntax, spelling, and/or grammatical errors   Please call if you have any questions  "**

## 2022-03-15 ENCOUNTER — APPOINTMENT (INPATIENT)
Dept: DIALYSIS | Facility: HOSPITAL | Age: 77
DRG: 674 | End: 2022-03-15
Payer: MEDICARE

## 2022-03-15 LAB
ANION GAP SERPL CALCULATED.3IONS-SCNC: 16 MMOL/L (ref 4–13)
BUN SERPL-MCNC: 91 MG/DL (ref 5–25)
CALCIUM SERPL-MCNC: 6.9 MG/DL (ref 8.3–10.1)
CHLORIDE SERPL-SCNC: 89 MMOL/L (ref 100–108)
CO2 SERPL-SCNC: 21 MMOL/L (ref 21–32)
CREAT SERPL-MCNC: 8.63 MG/DL (ref 0.6–1.3)
GFR SERPL CREATININE-BSD FRML MDRD: 5 ML/MIN/1.73SQ M
GLUCOSE SERPL-MCNC: 200 MG/DL (ref 65–140)
GLUCOSE SERPL-MCNC: 327 MG/DL (ref 65–140)
GLUCOSE SERPL-MCNC: 337 MG/DL (ref 65–140)
GLUCOSE SERPL-MCNC: 346 MG/DL (ref 65–140)
GLUCOSE SERPL-MCNC: 358 MG/DL (ref 65–140)
GLUCOSE SERPL-MCNC: 400 MG/DL (ref 65–140)
GLUCOSE SERPL-MCNC: 430 MG/DL (ref 65–140)
HCT VFR BLD AUTO: 23.8 % (ref 36.5–49.3)
HGB BLD-MCNC: 7.8 G/DL (ref 12–17)
MRSA NOSE QL CULT: NORMAL
POTASSIUM SERPL-SCNC: 4.9 MMOL/L (ref 3.5–5.3)
PROCALCITONIN SERPL-MCNC: 3 NG/ML
SODIUM SERPL-SCNC: 126 MMOL/L (ref 136–145)

## 2022-03-15 PROCEDURE — 97530 THERAPEUTIC ACTIVITIES: CPT

## 2022-03-15 PROCEDURE — 84165 PROTEIN E-PHORESIS SERUM: CPT | Performed by: SPECIALIST

## 2022-03-15 PROCEDURE — 99232 SBSQ HOSP IP/OBS MODERATE 35: CPT | Performed by: INTERNAL MEDICINE

## 2022-03-15 PROCEDURE — C9113 INJ PANTOPRAZOLE SODIUM, VIA: HCPCS | Performed by: INTERNAL MEDICINE

## 2022-03-15 PROCEDURE — 86334 IMMUNOFIX E-PHORESIS SERUM: CPT | Performed by: STUDENT IN AN ORGANIZED HEALTH CARE EDUCATION/TRAINING PROGRAM

## 2022-03-15 PROCEDURE — 85014 HEMATOCRIT: CPT | Performed by: FAMILY MEDICINE

## 2022-03-15 PROCEDURE — 82948 REAGENT STRIP/BLOOD GLUCOSE: CPT

## 2022-03-15 PROCEDURE — 86480 TB TEST CELL IMMUN MEASURE: CPT | Performed by: STUDENT IN AN ORGANIZED HEALTH CARE EDUCATION/TRAINING PROGRAM

## 2022-03-15 PROCEDURE — 84145 PROCALCITONIN (PCT): CPT | Performed by: FAMILY MEDICINE

## 2022-03-15 PROCEDURE — 84165 PROTEIN E-PHORESIS SERUM: CPT | Performed by: STUDENT IN AN ORGANIZED HEALTH CARE EDUCATION/TRAINING PROGRAM

## 2022-03-15 PROCEDURE — 86334 IMMUNOFIX E-PHORESIS SERUM: CPT | Performed by: SPECIALIST

## 2022-03-15 PROCEDURE — 80048 BASIC METABOLIC PNL TOTAL CA: CPT | Performed by: FAMILY MEDICINE

## 2022-03-15 PROCEDURE — 99232 SBSQ HOSP IP/OBS MODERATE 35: CPT | Performed by: STUDENT IN AN ORGANIZED HEALTH CARE EDUCATION/TRAINING PROGRAM

## 2022-03-15 PROCEDURE — 85018 HEMOGLOBIN: CPT | Performed by: FAMILY MEDICINE

## 2022-03-15 RX ORDER — B-COMPLEX WITH VITAMIN C
1 TABLET ORAL
Status: DISCONTINUED | OUTPATIENT
Start: 2022-03-15 | End: 2022-03-19 | Stop reason: HOSPADM

## 2022-03-15 RX ORDER — SULFAMETHOXAZOLE AND TRIMETHOPRIM 400; 80 MG/1; MG/1
1 TABLET ORAL 3 TIMES WEEKLY
Status: DISCONTINUED | OUTPATIENT
Start: 2022-03-15 | End: 2022-03-15

## 2022-03-15 RX ORDER — FUROSEMIDE 40 MG/1
40 TABLET ORAL DAILY
Status: DISCONTINUED | OUTPATIENT
Start: 2022-03-16 | End: 2022-03-19 | Stop reason: HOSPADM

## 2022-03-15 RX ORDER — SULFAMETHOXAZOLE AND TRIMETHOPRIM 400; 80 MG/1; MG/1
1 TABLET ORAL 3 TIMES WEEKLY
Status: DISCONTINUED | OUTPATIENT
Start: 2022-03-17 | End: 2022-03-19 | Stop reason: HOSPADM

## 2022-03-15 RX ORDER — LANOLIN ALCOHOL/MO/W.PET/CERES
6 CREAM (GRAM) TOPICAL
Status: DISCONTINUED | OUTPATIENT
Start: 2022-03-15 | End: 2022-03-19 | Stop reason: HOSPADM

## 2022-03-15 RX ADMIN — INSULIN ASPART 20 UNITS: 100 INJECTION, SUSPENSION SUBCUTANEOUS at 08:52

## 2022-03-15 RX ADMIN — INSULIN LISPRO 10 UNITS: 100 INJECTION, SOLUTION INTRAVENOUS; SUBCUTANEOUS at 21:06

## 2022-03-15 RX ADMIN — Medication 6 MG: at 21:06

## 2022-03-15 RX ADMIN — PANTOPRAZOLE SODIUM 40 MG: 40 INJECTION, POWDER, FOR SOLUTION INTRAVENOUS at 12:55

## 2022-03-15 RX ADMIN — TAMSULOSIN HYDROCHLORIDE 0.4 MG: 0.4 CAPSULE ORAL at 16:55

## 2022-03-15 RX ADMIN — INSULIN LISPRO 5 UNITS: 100 INJECTION, SOLUTION INTRAVENOUS; SUBCUTANEOUS at 16:44

## 2022-03-15 RX ADMIN — Medication 1 TABLET: at 14:49

## 2022-03-15 RX ADMIN — INSULIN LISPRO 4 UNITS: 100 INJECTION, SOLUTION INTRAVENOUS; SUBCUTANEOUS at 21:06

## 2022-03-15 RX ADMIN — PANTOPRAZOLE SODIUM 40 MG: 40 INJECTION, POWDER, FOR SOLUTION INTRAVENOUS at 21:06

## 2022-03-15 RX ADMIN — INSULIN LISPRO 4 UNITS: 100 INJECTION, SOLUTION INTRAVENOUS; SUBCUTANEOUS at 08:41

## 2022-03-15 RX ADMIN — ACETAMINOPHEN 650 MG: 325 TABLET, FILM COATED ORAL at 21:06

## 2022-03-15 RX ADMIN — INSULIN ASPART 20 UNITS: 100 INJECTION, SUSPENSION SUBCUTANEOUS at 16:55

## 2022-03-15 RX ADMIN — PRAVASTATIN SODIUM 80 MG: 80 TABLET ORAL at 16:55

## 2022-03-15 RX ADMIN — ERYTHROPOIETIN 10000 UNITS: 10000 INJECTION, SOLUTION INTRAVENOUS; SUBCUTANEOUS at 10:03

## 2022-03-15 RX ADMIN — SODIUM CHLORIDE 500 MG: 9 INJECTION, SOLUTION INTRAVENOUS at 16:55

## 2022-03-15 RX ADMIN — LATANOPROST 1 DROP: 50 SOLUTION OPHTHALMIC at 21:06

## 2022-03-15 RX ADMIN — INSULIN LISPRO 1 UNITS: 100 INJECTION, SOLUTION INTRAVENOUS; SUBCUTANEOUS at 12:55

## 2022-03-15 NOTE — PHYSICAL THERAPY NOTE
PT TREATMENT     03/15/22 5634   PT Last Visit   PT Visit Date 03/15/22   Note Type   Note Type Treatment   Pain Assessment   Pain Assessment Tool 0-10   Pain Score No Pain   Restrictions/Precautions   Other Precautions Fall Risk;Hard of hearing;Cognitive   General   Chart Reviewed Yes   Subjective   Subjective Feeling a little off today   Transfers   Sit to Stand 7  Independent   Stand to Sit 7  Independent   Ambulation/Elevation   Gait Assistance 5  Supervision   Assistive Device Rolling walker   Distance 150 ft with change in direction; when PT entered patient's room, pt was ambulating independently in his room, to and from the bathroom  Previously, PT had stopped by to work with pt and pt was standing at the sink in the bathroom shaving  Balance   Static Sitting Good   Static Standing Good   Dynamic Standing Fair   Ambulatory Fair   Activity Tolerance   Activity Tolerance Patient limited by fatigue  (Generalized weakness)   Assessment   Assessment Pt is making steady progress with bed mobility, transfers, functional mobility/toileting/ADLs, and gait within without the RW as well as balance and endurance  Pt is safe to ambulate with S/independence in his room without an assistive device but will benefit from continued use of the RW for longer distances  Pt remains appropriate for home with home PT when medically stable for discharge  The patient's AM-[facility] Basic Mobility Inpatient Short Form Raw Score is 22  A Raw score of greater than 16 suggests the patient may benefit from discharge to home  Please also refer to the recommendation of the Physical Therapist for safe discharge planning  Plan   Treatment/Interventions ADL retraining;Functional transfer training;LE strengthening/ROM; Therapeutic exercise; Endurance training;Patient/family training;Equipment eval/education; Bed mobility;Gait training; Compensatory technique education;Cognitive reorientation;Elevations   PT Frequency Other (Comment)  (5x/wk)   Recommendation   PT Discharge Recommendation Home with home health rehabilitation   [address] Mobility Inpatient   Turning in Bed Without Bedrails 4   Lying on Back to Sitting on Edge of Flat Bed 4   Moving Bed to Chair 4   Standing Up From Chair 4   Walk in Room 4   Climb 3-5 Stairs 2   Basic Mobility Inpatient Raw Score 22   Basic Mobility Standardized Score 47 4   Highest Level Of Mobility   JH-HLM Goal 7: Walk 25 feet or more   JH-HLM Highest Level of Mobility 7: Walk 25 feet or more   JH-HLM Goal Achieved Yes   Education   Education Provided Mobility training;Assistive device   Patient Demonstrates acceptance/verbal understanding;Explanation/teachback used;Demonstrates verbal understanding;Reinforcement needed   End of Consult   Patient Position at End of Consult Seated edge of bed; All needs within reach   [employer] [place] [employer] Number  José Luis Stauffer PT [id number]     Portions of the documentation may have been created using voice recognition software  Occasional wrong word or sound alike substitutions may have occurred due to the inherent limitation of the voice recognition software  Read the chart carefully and recognize, using context, where substitutions have occurred

## 2022-03-15 NOTE — PLAN OF CARE
Problem: MOBILITY - ADULT  Goal: Maintain or return to baseline ADL function  Description: INTERVENTIONS:  -  Assess patient's ability to carry out ADLs; assess patient's baseline for ADL function and identify physical deficits which impact ability to perform ADLs (bathing, care of mouth/teeth, toileting, grooming, dressing, etc )  - Assess/evaluate cause of self-care deficits   - Assess range of motion  - Assess patient's mobility; develop plan if impaired  - Assess patient's need for assistive devices and provide as appropriate  - Encourage maximum independence but intervene and supervise when necessary  - Involve family in performance of ADLs  - Assess for home care needs following discharge   - Consider OT consult to assist with ADL evaluation and planning for discharge  - Provide patient education as appropriate  Outcome: Progressing  Goal: Maintains/Returns to pre admission functional level  Description: INTERVENTIONS:  - Perform BMAT or MOVE assessment daily    - Set and communicate daily mobility goal to care team and patient/family/caregiver  - Collaborate with rehabilitation services on mobility goals if consulted  - Perform Range of Motion 4 times a day  - Reposition patient every 2 hours    - Dangle patient 3 times a day  - Stand patient 3 times a day  - Ambulate patient 3 times a day  - Out of bed to chair 3 times a day   - Out of bed for meals 3 times a day  - Out of bed for toileting  - Record patient progress and toleration of activity level   Outcome: Progressing     Problem: HEMATOLOGIC - ADULT  Goal: Maintains hematologic stability  Description: INTERVENTIONS  - Assess for signs and symptoms of bleeding or hemorrhage  - Monitor labs  - Administer supportive blood products/factors as ordered and appropriate  Outcome: Progressing     Problem: Prexisting or High Potential for Compromised Skin Integrity  Goal: Skin integrity is maintained or improved  Description: INTERVENTIONS:  - Identify patients at risk for skin breakdown  - Assess and monitor skin integrity  - Assess and monitor nutrition and hydration status  - Monitor labs   - Assess for incontinence   - Turn and reposition patient  - Assist with mobility/ambulation  - Relieve pressure over bony prominences  - Avoid friction and shearing  - Provide appropriate hygiene as needed including keeping skin clean and dry  - Evaluate need for skin moisturizer/barrier cream  - Collaborate with interdisciplinary team   - Patient/family teaching  - Consider wound care consult   Outcome: Progressing     Problem: Potential for Falls  Goal: Patient will remain free of falls  Description: INTERVENTIONS:  - Educate patient/family on patient safety including physical limitations  - Instruct patient to call for assistance with activity   - Consult OT/PT to assist with strengthening/mobility   - Keep Call bell within reach  - Keep bed low and locked with side rails adjusted as appropriate  - Keep care items and personal belongings within reach  - Initiate and maintain comfort rounds  - Make Fall Risk Sign visible to staff  - Offer Toileting every 2 Hours, in advance of need  - Initiate/Maintain bed alarm  - Obtain necessary fall risk management equipment: yellow socks  - Apply yellow socks and bracelet for high fall risk patients  - Consider moving patient to room near nurses station  Outcome: Progressing     Problem: METABOLIC, FLUID AND ELECTROLYTES - ADULT  Goal: Electrolytes maintained within normal limits  Description: INTERVENTIONS:  - Monitor labs and assess patient for signs and symptoms of electrolyte imbalances  - Administer electrolyte replacement as ordered  - Monitor response to electrolyte replacements, including repeat lab results as appropriate  - Instruct patient on fluid and nutrition as appropriate  Outcome: Progressing  Goal: Fluid balance maintained  Description: INTERVENTIONS:  - Monitor labs   - Monitor I/O and WT  - Instruct patient on fluid and nutrition as appropriate  - Assess for signs & symptoms of volume excess or deficit  Outcome: Progressing     Problem: Nutrition/Hydration-ADULT  Goal: Nutrient/Hydration intake appropriate for improving, restoring or maintaining nutritional needs  Description: Monitor and assess patient's nutrition/hydration status for malnutrition  Collaborate with interdisciplinary team and initiate plan and interventions as ordered  Monitor patient's weight and dietary intake as ordered or per policy  Utilize nutrition screening tool and intervene as necessary  Determine patient's food preferences and provide high-protein, high-caloric foods as appropriate       INTERVENTIONS:  - Monitor oral intake, urinary output, labs, and treatment plans  - Assess nutrition and hydration status and recommend course of action  - Evaluate amount of meals eaten  - Assist patient with eating if necessary   - Allow adequate time for meals  - Recommend/ encourage appropriate diets, oral nutritional supplements, and vitamin/mineral supplements  - Order, calculate, and assess calorie counts as needed  - Recommend, monitor, and adjust tube feedings and TPN/PPN based on assessed needs  - Assess need for intravenous fluids  - Provide specific nutrition/hydration education as appropriate  - Include patient/family/caregiver in decisions related to nutrition  Outcome: Progressing

## 2022-03-15 NOTE — PROGRESS NOTES
[facility] 43  Progress Note - Dossie Radha 1945, 64 y o  male MRN: [id number]  Unit/Bed#: [address] [employer] Encounter: [id number]  Primary Care Provider: Sarah Sahni MD   Date and time admitted to hospital: 3/7/2022  9:37 AM    * Symptomatic anemia  Assessment & Plan  Hemoglobin upon admission was 5 8  Patient recieved 5 units of PRBC this admission  MCV level was normal  Vitamin J71 and folic acid level WNL  Iron panel revealed elevated ferritin, normal iron saturation, low TIBC, and low iron  Anemia likely multifactorial in the setting of possible GI bleeding and also CKD  Started on Epogen with dialysis  CT abd/pelvis found no evidence of GI bleed  S/P EGD on 03/11 which was negative for active bleeding  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  Colonoscopy showed polyp in the descending colon which was biopsied, 1 polyp 5-9 millimeter in the sigmoid colon which was removed EN bloc with cold snare and few scattered diverticula with protruding internal hemorrhoids  Follow-up pathology results  Might need capsule endoscopy based on further hemoglobin levels    Results from last 7 days   Lab Units 03/14/22  0512 03/13/22  0514 03/12/22  1220 03/11/22  1601 03/11/22  0451 03/10/22  0506 03/09/22  0519 03/08/22  1723 03/08/22  0743 03/08/22  0001 03/07/22  2209 03/07/22  2209   HEMOGLOBIN g/dL 7 5* 7 6* 6 7* 7 7* 6 1* 7 1* 7 4* 8 2* 7 7* 6 4*   < > 6 7*   HEMATOCRIT % 23 1* 22 6* 21 0* 23 9* 19 3* 22 5* 22 9* 25 1* 22 9* 19 8*   < > 20 7*   MCV fL 89  --  89  --  90 89 86  --  85 84  --  85    < > = values in this interval not displayed  Acute kidney injury [facility])  Assessment & Plan  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     Since patient had metabolic acidosis patient was placed on bicarbonate drip at 150 mL/hour  Renal US showed no evidence of hydronephrosis  8mm calculus was found in bladder  Possible cause of hematuria if not glomerulonephritis   Continue to hold Vasotec  Patient is recieving further workup for glomerulonephritis  SAMANTHA, GBM Ab, C3 and C4 WNL  ANCA and Other tests pending  UA showed innumerable red blood cells 10-20 white cells with glucose  CT of abdomen and pelvis showed 8 mm stone at UVJ with hydroureter but no hydronephrosis and not significant changed since 2016  No peritoneal or retroperitoneal hematoma or evidence of GI bleeding  Mild pulmonary edema and small b/l pleural effusions were noted  CXR pending  Patient started dialysis 3/9 and has been receiving dialysis per Renal  IVF were d/c on 3/9  Patient currently on hemodialysis on Tuesday Thursday Saturday schedule  Status post renal biopsy on 03/10-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  Repeat ANCA, MPO and PR3 pending  Patient was started on methylprednisolone 100 milligram IV daily for 3 doses  Possible transition to p o  Prednisone upon discharge  Patient is on PCP prophylaxis with Bactrim  Patient needed to be started on cyclophosphamide as in-patient before discharge    Outpatient follow-up with Nephrology and patient needs dialysis as outpatient    Results from last 7 days   Lab Units 03/14/22  0513 03/12/22  1220 03/11/22  0451 03/10/22  0506 03/09/22  0519 03/08/22  0744 03/07/22  2209 03/07/22  1306   BUN mg/dL 73* 117* 93* 129* 155* 176* 162* 175*   CREATININE mg/dL 6 94* 9 96* 7 79* 10 08* 13 69* 13 26* 13 64* 14 38*       Hyperkalemia-resolved as of 3/9/2022  Assessment & Plan  Potassium level upon admission was 6 9  Likely in the setting of acute kidney injury and ACE-inhibitor use  Patient was given insulin with D10, albuterol nebulizer treatment and calcium gluconate in the ED  EKG showed no changes  Patient was also given Kayexalate 30 grams in the ED  K level has normalized at 4 5   Received Dialysis on 3/9      Anemia due to chronic kidney disease, on chronic dialysis [facility])  Assessment & Plan  Lab Results   Component Value Date    EGFR 5 03/11/2022    EGFR 4 03/10/2022    EGFR 3 03/09/2022    CREATININE 7 79 (H) 03/11/2022    CREATININE 10 08 (H) 03/10/2022    CREATININE 13 69 (H) 03/09/2022       Hemoptysis  Assessment & Plan  Patient states he has had a cough for a couple weeks  Patient with episodes of hemoptysis here   Chest x-ray was unremarkable  Obtain sputum culture if possible  Procalcitonin elevated at 6 73 --> 3 4, unclear significance in the setting of renal dysfunction  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      Melena  Assessment & Plan  Patient reported black stool for couple of days prior to admission and even reported them after admission  stool occult blood ordered but not done  Patient on Protonix 40 milligram IV q 12 hours  No history of NSAID use  Colonoscopy showed polyps    UTI (urinary tract infection)  Assessment & Plan  UA showed trace leukocyte esterase with 10-20 white cells and innumerable bacteria  Urine culture grew small colony of E  Coli  Completed 7 days of IV Ceftriaxone   No clinical signs of sepsis    High anion gap metabolic acidosis  Assessment & Plan  Likely secondary to renal failure and also hyperglycemic state  Patient was placed on bicarbonate drip at 150 mL/hour, Bicarb drip was d/c on 3/9  Chest pain  Assessment & Plan  Patient presented with left-sided chest pain,Troponins x2 were negative initially  EKG was unremarkable    ProBNP was elevated at 15,000   2D echo showed normal EF with normal diastolic function  Reported chest pain again 3/9 and troponin peaked at 104   Plan for outpatient stress test     Type 2 diabetes mellitus with microalbuminuria, with long-term current use of insulin [facility])  Assessment & Plan  Lab Results   Component Value Date    HGBA1C 7 2 (H) 12/03/2021       Recent Labs 22  1550 22  0727 22  1136   POCGLU 213* 319* 170* 167*     Patient is on Humalog mix 75/25 45 units b i d  Hold Humalog mix  Patient was initially on insulin drip as 1-beta hydroxybutyrate was elevated at 1 6  Continue 70/ 30  20 units b i d  Hyperlipidemia  Assessment & Plan  Zocor substituted with Pravachol    Benign prostatic hyperplasia with lower urinary tract symptoms  Assessment & Plan  Monitor postvoid residuals  Continue Flomax    Benign essential hypertension  Assessment & Plan  Patient is on Vasotec at home which is being held in the setting of acute kidney injury  Avoid hypotension  Blood pressure has been stable          VTE Pharmacologic Prophylaxis:   Moderate Risk (Score 3-4) - Pharmacological DVT Prophylaxis Contraindicated  Sequential Compression Devices Ordered  Patient Centered Rounds: I performed bedside rounds with nursing staff today  Discussions with Specialists or Other Care Team Provider: Dr Jaguar Whitaker    Education and Discussions with Family / Patient: Attempted to update  (wife) via phone  Left voicemail  Time Spent for Care: 45 minutes  More than 50% of total time spent on counseling and coordination of care as described above  Current Length of Stay: 8 day(s)  Current Patient Status: Inpatient   Certification Statement: The patient will continue to require additional inpatient hospital stay due to Acute kidney injury  Discharge Plan: Anticipate discharge in 48-72 hrs to home  Code Status: Level 1 - Full Code    Subjective:   Patient   Denies any shortness on breath, chest pain    Objective:     Vitals:   Temp (24hrs), Av 9 °F (36 1 °C), Min:95 9 °F (35 5 °C), Max:98 3 °F (36 8 °C)    Temp:  [95 9 °F (35 5 °C)-98 3 °F (36 8 °C)] 96 1 °F (35 6 °C)  HR:  [64-85] 85  Resp:  [14-20] 18  BP: (100-158)/(50-93) 118/93  SpO2:  [90 %-96 %] 95 %  Body mass index is 29 65 kg/m²       Input and Output Summary (last 24 hours): Intake/Output Summary (Last 24 hours) at 3/15/2022 1558  Last data filed at 3/15/2022 1220  Gross per 24 hour   Intake 500 ml   Output 2000 ml   Net -1500 ml       Physical Exam:   Physical Exam  Constitutional:       Appearance: Normal appearance  HENT:      Head: Normocephalic and atraumatic  Eyes:      Extraocular Movements: Extraocular movements intact  Pupils: Pupils are equal, round, and reactive to light  Cardiovascular:      Rate and Rhythm: Normal rate and regular rhythm  Heart sounds: No murmur heard  No gallop  Pulmonary:      Effort: Pulmonary effort is normal       Breath sounds: Normal breath sounds  Comments: Right chest wall PermCath  Abdominal:      General: Bowel sounds are normal       Palpations: Abdomen is soft  Tenderness: There is no abdominal tenderness  Musculoskeletal:         General: No swelling or deformity  Normal range of motion  Cervical back: Normal range of motion and neck supple  Skin:     General: Skin is warm and dry  Neurological:      General: No focal deficit present  Mental Status: He is alert  Additional Data:     Labs:  Results from last 7 days   Lab Units 03/15/22  0856 03/14/22  0512 03/14/22  0512   WBC Thousand/uL  --   --  13 13*   HEMOGLOBIN g/dL 7 8*   < > 7 5*   HEMATOCRIT % 23 8*   < > 23 1*   PLATELETS Thousands/uL  --   --  210    < > = values in this interval not displayed       Results from last 7 days   Lab Units 03/15/22  0856   SODIUM mmol/L 126*   POTASSIUM mmol/L 4 9   CHLORIDE mmol/L 89*   CO2 mmol/L 21   BUN mg/dL 91*   CREATININE mg/dL 8 63*   ANION GAP mmol/L 16*   CALCIUM mg/dL 6 9*   GLUCOSE RANDOM mg/dL 327*     Results from last 7 days   Lab Units 03/10/22  0506   INR  1 31*     Results from last 7 days   Lab Units 03/15/22  1151 03/15/22  0709 03/15/22  0149 03/14/22 2001 03/14/22  1551 03/14/22  1136 03/14/22  0727 03/13/22 2027 03/13/22  1550 03/13/22  1123 03/13/22  0617 03/13/22  0518 POC GLUCOSE mg/dl 200* 337* 346* 317* 174* 167* 170* 319* 213* 247* 189* 165*         Results from last 7 days   Lab Units 03/15/22  0856 03/14/22  0513 03/09/22  0519   PROCALCITONIN ng/ml 3 00* 3 40* 5 68*       Lines/Drains:  Invasive Devices  Report    Peripheral Intravenous Line            Peripheral IV 03/14/22 Dorsal (posterior); Left Hand 1 day          Hemodialysis Catheter            HD Permanent Double Catheter 7 days                      Imaging: Reviewed radiology reports from this admission including: chest xray    Recent Cultures (last 7 days):   Results from last 7 days   Lab Units 03/11/22  0638   SPUTUM CULTURE  Test not performed  Suggest repeat specimen  GRAM STAIN RESULT  >10 squamous epithelial cells/lpf, indicating orophayngeal contamination  *  3+ Gram positive cocci in pairs, chains and clusters*  3+ Gram negative rods*  1+ Gram positive rods*  No polys seen*       Last 24 Hours Medication List:   Current Facility-Administered Medications   Medication Dose Route Frequency Provider Last Rate    acetaminophen  650 mg Oral Q6H PRN Dhruv Barrow MD      calcium carbonate-vitamin D  1 tablet Oral Daily With Breakfast Bev Paul MD      epoetin ani  10,000 Units Intravenous Once per day on Tue Thu Sat Bev Paul MD      epoetin ani  5,000 Units Intravenous After Dialysis MD Paty Thomas ON 3/16/2022] furosemide  40 mg Oral Daily Clover Garner MD      insulin aspart protamine-insulin aspart  20 Units Subcutaneous BID AC Emiliana Revankar, DO      insulin lispro  1-5 Units Subcutaneous TID AC Emiliana Revankar, DO      insulin lispro  1-5 Units Subcutaneous HS Emiliana Revankar, DO      latanoprost  1 drop Both Eyes HS Daisy Keating MD      melatonin  6 mg Oral HS Daisy Keating MD      methylPREDNISolone sodium succinate  500 mg Intravenous Daily Bev Paul  mg (03/14/22 7940)   Paty Walker ondansetron  4 mg Intravenous Q6H PRN Ivon Sosa MD      pantoprazole  40 mg Intravenous Q12H [facility] long-term Ivon Sosa MD      pravastatin  80 mg Oral Daily With Oracio Aponte MD     [facility] [START ON 3/17/2022] sulfamethoxazole-trimethoprim  1 tablet Oral Once per day on Tue Thu Sat Ivon Sosa MD      tamsulosin  0 4 mg Oral Daily With Oracio Aponte MD          Today, Patient Was Seen By: Ivon Sosa MD    **Please Note: This note may have been constructed using a voice recognition system  **

## 2022-03-15 NOTE — PROGRESS NOTES
Progress Note - Ilia Bassett 64 y o  male MRN: [id number]    Unit/Bed#: [address] [place] [address] Encounter: [id number]        Assessment/Plan:  Symptomatic anemia: Symptomatic anemia likely multifactorial due to underlying kidney disease, no obvious GI bleeding found on recent EGD and colonoscopy  Baseline hemoglobin in 2019 was 13-15, now 5 8 on admission with normal MCV  He has received 5 units of packed RBCs since admission on 03/07/2022  Hemoglobin currently 7 8 today  Jean-Claude Sarmiento on Friday was completely normal   -colonoscopy yesterday had shown colon polyps as well as few scattered diverticula in the sigmoid colon with protruding internal hemorrhoids  -follow-up pathology  -continue to monitor serial hemoglobin, transfuse as necessary      Subjective:   Patient is lying in bed  He states that he has no GI complaints  States he is eager to go home  Reports he had a bowel move  Went over colonoscopy findings with patient  Currently receiving dialysis      Objective:     Vitals: /78   Pulse 81   Temp (!) 95 9 °F (35 5 °C)   Resp 16   Ht 5' 8" (1 727 m)   Wt 88 5 kg (195 lb)   SpO2 94%   BMI 29 65 kg/m²       Physical Exam:  Gen-alert no acute distress  Abd-positive bowel sounds, nontender, soft, nondistended no rebound rigidity guarding       Lab, Imaging and other studies:   Recent Results (from the past 72 hour(s))   Fingerstick Glucose (POCT)    Collection Time: 03/12/22 11:27 AM   Result Value Ref Range    POC Glucose 286 (H) 65 - 140 mg/dl   Basic metabolic panel    Collection Time: 03/12/22 12:20 PM   Result Value Ref Range    Sodium 133 (L) 136 - 145 mmol/L    Potassium 4 6 3 5 - 5 3 mmol/L    Chloride 94 (L) 100 - 108 mmol/L    CO2 22 21 - 32 mmol/L    ANION GAP 17 (H) 4 - 13 mmol/L     (H) 5 - 25 mg/dL    Creatinine 9 96 (H) 0 60 - 1 30 mg/dL    Glucose 244 (H) 65 - 140 mg/dL    Calcium 7 0 (L) 8 3 - 10 1 mg/dL    eGFR 4 ml/min/1 73sq m   CBC    Collection Time: 03/12/22 12:20 PM   Result Value Ref Range    WBC 12 11 (H) 4 31 - 10 16 Thousand/uL    RBC 2 36 (L) 3 88 - 5 62 Million/uL    Hemoglobin 6 7 (LL) 12 0 - 17 0 g/dL    Hematocrit 21 0 (L) 36 5 - 49 3 %    MCV 89 82 - 98 fL    MCH 28 4 26 8 - 34 3 pg    MCHC 31 9 31 4 - 37 4 g/dL    RDW 14 8 11 6 - 15 1 %    Platelets 570 886 - 954 Thousands/uL    MPV 9 5 8 9 - 12 7 fL   Fingerstick Glucose (POCT)    Collection Time: 03/12/22  4:53 PM   Result Value Ref Range    POC Glucose 195 (H) 65 - 140 mg/dl   Fingerstick Glucose (POCT)    Collection Time: 03/12/22  9:26 PM   Result Value Ref Range    POC Glucose 300 (H) 65 - 140 mg/dl   Fingerstick Glucose (POCT)    Collection Time: 03/12/22 11:27 PM   Result Value Ref Range    POC Glucose 245 (H) 65 - 140 mg/dl   Hemoglobin and hematocrit, blood    Collection Time: 03/13/22  5:14 AM   Result Value Ref Range    Hemoglobin 7 6 (L) 12 0 - 17 0 g/dL    Hematocrit 22 6 (L) 36 5 - 49 3 %   Fingerstick Glucose (POCT)    Collection Time: 03/13/22  5:18 AM   Result Value Ref Range    POC Glucose 165 (H) 65 - 140 mg/dl   Prepare Leukoreduced RBC: 1 Units    Collection Time: 03/13/22  6:15 AM   Result Value Ref Range    Unit Product Code L6580M66     Unit Number [id number]     Unit ABO A     Unit [facility] POS     Crossmatch Compatible     Unit Dispense Status Presumed Trans     Unit Product Volume 350 mL   Fingerstick Glucose (POCT)    Collection Time: 03/13/22  6:17 AM   Result Value Ref Range    POC Glucose 189 (H) 65 - 140 mg/dl   Fingerstick Glucose (POCT)    Collection Time: 03/13/22 11:23 AM   Result Value Ref Range    POC Glucose 247 (H) 65 - 140 mg/dl   Fingerstick Glucose (POCT)    Collection Time: 03/13/22  3:50 PM   Result Value Ref Range    POC Glucose 213 (H) 65 - 140 mg/dl   Fingerstick Glucose (POCT)    Collection Time: 03/13/22  8:27 PM   Result Value Ref Range    POC Glucose 319 (H) 65 - 140 mg/dl   CBC    Collection Time: 03/14/22  5:12 AM   Result Value Ref Range    WBC 13 13 (H) 4 31 - 10 16 Thousand/uL    RBC 2 61 (L) 3 88 - 5 62 Million/uL    Hemoglobin 7 5 (L) 12 0 - 17 0 g/dL    Hematocrit 23 1 (L) 36 5 - 49 3 %    MCV 89 82 - 98 fL    MCH 28 7 26 8 - 34 3 pg    MCHC 32 5 31 4 - 37 4 g/dL    RDW 14 4 11 6 - 15 1 %    Platelets 794 102 - 124 Thousands/uL    MPV 10 0 8 9 - 12 7 fL   Basic metabolic panel    Collection Time: 03/14/22  5:13 AM   Result Value Ref Range    Sodium 131 (L) 136 - 145 mmol/L    Potassium 4 4 3 5 - 5 3 mmol/L    Chloride 92 (L) 100 - 108 mmol/L    CO2 24 21 - 32 mmol/L    ANION GAP 15 (H) 4 - 13 mmol/L    BUN 73 (H) 5 - 25 mg/dL    Creatinine 6 94 (H) 0 60 - 1 30 mg/dL    Glucose 170 (H) 65 - 140 mg/dL    Calcium 7 1 (L) 8 3 - 10 1 mg/dL    eGFR 6 ml/min/1 73sq m   Procalcitonin    Collection Time: 03/14/22  5:13 AM   Result Value Ref Range    Procalcitonin 3 40 (H) <=0 25 ng/ml   Fingerstick Glucose (POCT)    Collection Time: 03/14/22  7:27 AM   Result Value Ref Range    POC Glucose 170 (H) 65 - 140 mg/dl   Fingerstick Glucose (POCT)    Collection Time: 03/14/22 11:36 AM   Result Value Ref Range    POC Glucose 167 (H) 65 - 140 mg/dl   Fingerstick Glucose (POCT)    Collection Time: 03/14/22  3:51 PM   Result Value Ref Range    POC Glucose 174 (H) 65 - 140 mg/dl   Fingerstick Glucose (POCT)    Collection Time: 03/14/22  8:01 PM   Result Value Ref Range    POC Glucose 317 (H) 65 - 140 mg/dl   Fingerstick Glucose (POCT)    Collection Time: 03/15/22  1:49 AM   Result Value Ref Range    POC Glucose 346 (H) 65 - 140 mg/dl   Fingerstick Glucose (POCT)    Collection Time: 03/15/22  7:09 AM   Result Value Ref Range    POC Glucose 337 (H) 65 - 140 mg/dl   Procalcitonin Reflex    Collection Time: 03/15/22  8:56 AM   Result Value Ref Range    Procalcitonin 3 00 (H) <=0 25 ng/ml   Basic metabolic panel    Collection Time: 03/15/22  8:56 AM   Result Value Ref Range    Sodium 126 (L) 136 - 145 mmol/L    Potassium 4 9 3 5 - 5 3 mmol/L    Chloride 89 (L) 100 - 108 mmol/L    CO2 21 21 - 32 mmol/L    ANION GAP 16 (H) 4 - 13 mmol/L    BUN 91 (H) 5 - 25 mg/dL    Creatinine 8 63 (H) 0 60 - 1 30 mg/dL    Glucose 327 (H) 65 - 140 mg/dL    Calcium 6 9 (L) 8 3 - 10 1 mg/dL    eGFR 5 ml/min/1 73sq m   Hemoglobin and hematocrit, blood    Collection Time: 03/15/22  8:56 AM   Result Value Ref Range    Hemoglobin 7 8 (L) 12 0 - 17 0 g/dL    Hematocrit 23 8 (L) 36 5 - 49 3 % negative...

## 2022-03-15 NOTE — PLAN OF CARE
Post-Dialysis RN Treatment Note    Blood Pressure:  Pre 129/61 mm/Hg  Post 152/73 mmHg   EDW  tbd kg    Weight:  Pre 88 5 kg   Post 87 0kg   Mode of weight measurement: Bed Scale   Volume Removed  1500 ml    Treatment duration 210 minutes    NS given  No    Treatment shortened?  No   Medications given during Rx Epogen   Estimated Kt/V  Not Applicable   Access type: Permacath/TDC   Access Issues: No    Report called to primary nurse   Yes / Daniel Tuttle RN        1500 ml as tolerated, 3 5 hrs, 3K bath  Problem: METABOLIC, FLUID AND ELECTROLYTES - ADULT  Goal: Electrolytes maintained within normal limits  Description: INTERVENTIONS:  - Monitor labs and assess patient for signs and symptoms of electrolyte imbalances  - Administer electrolyte replacement as ordered  - Monitor response to electrolyte replacements, including repeat lab results as appropriate  - Instruct patient on fluid and nutrition as appropriate  Outcome: Progressing  Goal: Fluid balance maintained  Description: INTERVENTIONS:  - Monitor labs   - Monitor I/O and WT  - Instruct patient on fluid and nutrition as appropriate  - Assess for signs & symptoms of volume excess or deficit  Outcome: Progressing

## 2022-03-15 NOTE — PLAN OF CARE
Problem: HEMATOLOGIC - ADULT  Goal: Maintains hematologic stability  Description: INTERVENTIONS  - Assess for signs and symptoms of bleeding or hemorrhage  - Monitor labs  - Administer supportive blood products/factors as ordered and appropriate  Outcome: Progressing     Problem: METABOLIC, FLUID AND ELECTROLYTES - ADULT  Goal: Electrolytes maintained within normal limits  Description: INTERVENTIONS:  - Monitor labs and assess patient for signs and symptoms of electrolyte imbalances  - Administer electrolyte replacement as ordered  - Monitor response to electrolyte replacements, including repeat lab results as appropriate  - Instruct patient on fluid and nutrition as appropriate  Outcome: Progressing     Problem: Potential for Falls  Goal: Patient will remain free of falls  Description: INTERVENTIONS:  - Educate patient/family on patient safety including physical limitations  - Instruct patient to call for assistance with activity   - Consult OT/PT to assist with strengthening/mobility   - Keep Call bell within reach  - Keep bed low and locked with side rails adjusted as appropriate  - Keep care items and personal belongings within reach  - Initiate and maintain comfort rounds  - Make Fall Risk Sign visible to staff  - Offer Toileting every 2 Hours, in advance of need  - Initiate/Maintain bed/chair alarm  - Obtain necessary fall risk management equipment: fall risk sign  - Apply yellow socks and bracelet for high fall risk patients  - Consider moving patient to room near nurses station  Outcome: Progressing     Problem: Prexisting or High Potential for Compromised Skin Integrity  Goal: Skin integrity is maintained or improved  Description: INTERVENTIONS:  - Identify patients at risk for skin breakdown  - Assess and monitor skin integrity  - Assess and monitor nutrition and hydration status  - Monitor labs   - Assess for incontinence   - Turn and reposition patient  - Assist with mobility/ambulation  - Relieve pressure over bony prominences  - Avoid friction and shearing  - Provide appropriate hygiene as needed including keeping skin clean and dry  - Evaluate need for skin moisturizer/barrier cream  - Collaborate with interdisciplinary team   - Patient/family teaching  - Consider wound care consult   Outcome: Progressing

## 2022-03-15 NOTE — PROGRESS NOTES
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    · Etiology:   PR3 associated ANCA vasculitis    · Current creatinine:  8 63 mg/dL, no evidence of kidney recovery it  · UA:  Hematuria, leukocyturia  · Renal imaging :  No hydronephrosis  · GN workup:  · Normal complements  · Anti GBM negative  · SAMANTHA negative  · PR3:        · SPEP:   pending  · Treatment:  · S/p kidney biopsy on 03/10  · Will continue to monitor for kidney recovery  · Plan for HD TTS  · Monitor urinary output  · Maintain MAP:  Over 65 mmHg if possible/avoid hypoperfusion:  Hold parameters on blood pressure medications  · Avoid nephrotoxic agents such as NSAIDs, and IV contrast if possible  Avoid opioids   · Adjust medications to GFR    # preliminary biopsy results  · Thirty-one once  · LM:    · 12 glom with global sclerosis  · 19 gloms viable  · For cellular crescents  · Fibrinoid necrosis   · 2 fibro cellular crescents  · 55-60% IFTA  · ATN with red blood cell casts   · IF:   · Minimal staining     # hemoptysis  · 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  · Chest x-ray no consistent with alveolar hemorrhage  · Patient denies hemoptysis  · No sinus symptoms  · CT:  Bilateral pleural effusion    # immunosuppressive therapy  · Methylprednisolone 500 mg started on 03/14  · Plan to give total of 3 doses  · Plan to start prednisone p o   Once IV is completed  · Further immunosuppression discussed with patient (risk and benefits explained), 2 options available rituximab versus cyclophosphamide  · Due to RPGN presentation with severe LORRAINE requiring dialysis, best option for renal recovery would be cyclophosphamide  · Risk of infections explained to the patient  · Will check QuantiFERON gold     #CKD G3a  · Baseline creatinine:  1 3 mg/dL  · Etiology:  Likely secondary to nephroangiosclerosis in the settings of hypertension     #Acid-base Disorder  · serum HCO3 21mmol/L said  · Secondary to LORRAINE  · HD today     #Volume status/hypertension:  · Volume:  Euvolemic  · Blood pressure:   normotensive /55mmhg, goal<140/90  · Recommend:  · Low-sodium diet     #Anemia:  · Current hemoglobin:  7 8 mg/dL, stable  · No further episodes of hemoptysis  · Chest x-ray no consistent with cellular hemorrhage  · Status post blood transfusion  · Possible GI bleed and kidney disease  · Treatment:  · EPO 25532 units on HD  · Transfuse for hemoglobin less than 7 0 per primary service        SUBJECTIVE:  Patient seen and examined at bedside  Patient denies shortness of breath, no chest pain, good appetite  We had a long conversation about new diagnosis of ANCA vasculitis patient is aware of risk and benefits of immunosuppressive therapy and chances of recovery  Patient agree for further immunosuppression  He as we to call his wife to inform her about his current situation    I attempted to call her wife 4 times with no success    OBJECTIVE:  Current Weight: Weight - Scale: 88 5 kg (195 lb)  Vitals:    03/15/22 0900   BP: 120/60   Pulse: 64   Resp: 16   Temp:    SpO2: 94%       Intake/Output Summary (Last 24 hours) at 3/15/2022 0925  Last data filed at 3/15/2022 0850  Gross per 24 hour   Intake 350 ml   Output 0 ml   Net 350 ml     Wt Readings from Last 3 Encounters:   03/11/22 88 5 kg (195 lb)   12/14/21 88 5 kg (195 lb)   09/07/21 88 9 kg (196 lb)     Temp Readings from Last 3 Encounters:   03/15/22 (!) 95 9 °F (35 5 °C)   12/14/21 97 8 °F (36 6 °C)   09/07/21 99 °F (37 2 °C) (Temporal)     BP Readings from Last 3 Encounters:   03/15/22 120/60   12/14/21 124/60   09/07/21 130/70     Pulse Readings from Last 3 Encounters:   03/15/22 64   12/14/21 80   09/07/21 75        General:   no acute distress at this time  Skin:  No acute rash  Eyes:  No scleral icterus and noninjected  ENT:  mucous membranes moist  Neck:   no carotid bruits  Chest:  Clear to auscultation percussion, good respiratory effort, no use of accessory respiratory muscles  CVS:  Regular rate and rhythm without a murmur rub , no JVD  Abdomen:  soft and nontender  Extremities:  No lower extremity edema  Neuro:  No gross focality  Psych:  Alert , cooperative,   vascular access:  Right IJ PermCath     Medications:    Current Facility-Administered Medications:     acetaminophen (TYLENOL) tablet 650 mg, 650 mg, Oral, Q6H PRN, Vannesa Peterson MD, 650 mg at 03/14/22 0822    calcium carbonate-vitamin D (OSCAL-D) 500 mg-200 units per tablet 1 tablet, 1 tablet, Oral, Daily With Breakfast, Carson Preston MD    epoetin ani (EPOGEN,PROCRIT) injection 10,000 Units, 10,000 Units, Intravenous, Once per day on Tue Thu Sat, Carson Preston MD    epoetin ani (EPOGEN,PROCRIT) injection 5,000 Units, 5,000 Units, Intravenous, After Dialysis, Carson Preston MD    insulin aspart protamine-insulin aspart (NovoLOG 70/30) 100 units/mL subcutaneous injection 20 Units, 20 Units, Subcutaneous, BID AC, Emiliana Revankar, DO, 20 Units at 03/15/22 0852    insulin lispro (HumaLOG) 100 units/mL subcutaneous injection 1-5 Units, 1-5 Units, Subcutaneous, TID AC, 4 Units at 03/15/22 0841 **AND** Fingerstick Glucose (POCT), , , TID AC, Emiliana Revankar, DO    insulin lispro (HumaLOG) 100 units/mL subcutaneous injection 1-5 Units, 1-5 Units, Subcutaneous, HS, Emiliana Revankar, DO, 3 Units at 03/14/22 2114    latanoprost (XALATAN) 0 005 % ophthalmic solution 1 drop, 1 drop, Both Eyes, HS, Daisy Keating MD, 1 drop at 03/14/22 2115    methylPREDNISolone sodium succinate (Solu-MEDROL) 500 mg in sodium chloride 0 9 % 250 mL IVPB, 500 mg, Intravenous, Daily, Kenny Gaytan MD, Last Rate: 250 mL/hr at 03/14/22 1730, 500 mg at 03/14/22 1730    ondansetron (ZOFRAN) injection 4 mg, 4 mg, Intravenous, Q6H PRN, Ivon Sosa MD, 4 mg at 03/12/22 1927    pantoprazole (PROTONIX) injection 40 mg, 40 mg, Intravenous, Q12H [address], Ivon Sosa MD, 40 mg at 03/14/22 2113    pravastatin (PRAVACHOL) tablet 80 mg, 80 mg, Oral, Daily With Jabier Mondragon MD, 80 mg at 03/14/22 1648    sulfamethoxazole-trimethoprim (BACTRIM) 400-80 mg per tablet 1 tablet, 1 tablet, Oral, Once per day on Mon Wed Fri, Kenny Gaytan MD    [facility]) capsule 0 4 mg, 0 4 mg, Oral, Daily With Jabier Mondragon MD, 0 4 mg at 03/14/22 1648    Laboratory Results:  Results from last 7 days   Lab Units 03/15/22  0856 03/14/22  0513 03/14/22  0512 03/13/22  0514 03/12/22  1220 03/11/22  1601 03/11/22  0451 03/10/22  0506 03/09/22  0519 03/09/22  0519   WBC Thousand/uL  --   --  13 13*  --  12 11*  --  11 30* 12 62*  --  13 87*   HEMOGLOBIN g/dL 7 8*  --  7 5* 7 6* 6 7* 7 7* 6 1* 7 1*   < > 7 4*   HEMATOCRIT % 23 8*  --  23 1* 22 6* 21 0* 23 9* 19 3* 22 5*   < > 22 9*   PLATELETS Thousands/uL  --   --  210  --  182  --  217 218  --  223   SODIUM mmol/L 126* 131*  --   --  133*  --  135* 135*  --  136   POTASSIUM mmol/L 4 9 4 4  --   --  4 6  --  4 4 4 1  --  4 5   CHLORIDE mmol/L 89* 92*  --   --  94*  --  95* 94*  --  92*   CO2 mmol/L 21 24  --   --  22  --  24 26  --  24   BUN mg/dL 91* 73*  --   --  117*  --  93* 129*  --  155*   CREATININE mg/dL 8 63* 6 94*  --   --  9 96*  --  7 79* 10 08*  --  13 69*   CALCIUM mg/dL 6 9* 7 1*  --   --  7 0*  --  6 8* 7 0*  --  7 1*    < > = values in this interval not displayed         XR chest portable   Final Result by Candi Harrison MD (03/14 7276)      No active disease in the chest                   Workstation performed: [id number]         IR biopsy kidney [place] kit no laterality   Final Result by Irais Keane MD (03/10 1640)   Impression: Successful percutaneous core biopsy of the right renal cortex for [place] Kit  A full pathology report will follow  Workstation performed: [id number]         IR tunneled dialysis catheter placement   Final Result by Janiya Springer MD (03/08 1523)   Impression: Successful placement of a 15-Iranian by 28 cm Titan dialysis catheter via the right internal jugular vein  The tip of the catheter is in the right atrium and may be used immediately  Workstation performed: [id number]         CT abdomen pelvis wo contrast   Final Result by Jay Jay Gongora MD (03/08 1153)      1  No peritoneal or retroperitoneal hematoma  2   An 8 mm right UVJ calculus causes mild upstream hydroureter without significant hydronephrosis  3   Mild pulmonary edema, small simple bilateral pleural effusions and bibasilar atelectasis  Workstation performed: [id number]         US kidney and bladder   Final Result by Gerri Moreland MD (03/07 1432)      No hydronephrosis  8 mm calculus in the right dependent aspect of the urinary bladder  Workstation performed: [id number]         XR chest 1 view portable   Final Result by Trixie Chance MD (03/07 1250)      No active pulmonary disease  Workstation performed: [id number]             Portions of the record may have been created with voice recognition software  Occasional wrong word or "sound a like" substitutions may have occurred due to the inherent limitations of voice recognition software  Read the chart carefully and recognize, using context, where substitutions have occurred

## 2022-03-16 LAB
ABO GROUP BLD: NORMAL
ALBUMIN SERPL BCP-MCNC: 1.8 G/DL (ref 3.5–5)
ALP SERPL-CCNC: 88 U/L (ref 46–116)
ALT SERPL W P-5'-P-CCNC: 34 U/L (ref 12–78)
ANION GAP SERPL CALCULATED.3IONS-SCNC: 18 MMOL/L (ref 4–13)
AST SERPL W P-5'-P-CCNC: 23 U/L (ref 5–45)
BILIRUB SERPL-MCNC: 0.32 MG/DL (ref 0.2–1)
BLD GP AB SCN SERPL QL: NEGATIVE
BUN SERPL-MCNC: 72 MG/DL (ref 5–25)
CALCIUM ALBUM COR SERPL-MCNC: 9 MG/DL (ref 8.3–10.1)
CALCIUM SERPL-MCNC: 7.2 MG/DL (ref 8.3–10.1)
CHLORIDE SERPL-SCNC: 91 MMOL/L (ref 100–108)
CO2 SERPL-SCNC: 20 MMOL/L (ref 21–32)
CREAT SERPL-MCNC: 6.08 MG/DL (ref 0.6–1.3)
ERYTHROCYTE [DISTWIDTH] IN BLOOD BY AUTOMATED COUNT: 14.2 % (ref 11.6–15.1)
GAMMA INTERFERON BACKGROUND BLD IA-ACNC: 0.05 IU/ML
GFR SERPL CREATININE-BSD FRML MDRD: 8 ML/MIN/1.73SQ M
GLUCOSE SERPL-MCNC: 262 MG/DL (ref 65–140)
GLUCOSE SERPL-MCNC: 282 MG/DL (ref 65–140)
GLUCOSE SERPL-MCNC: 326 MG/DL (ref 65–140)
GLUCOSE SERPL-MCNC: 331 MG/DL (ref 65–140)
GLUCOSE SERPL-MCNC: 347 MG/DL (ref 65–140)
GLUCOSE SERPL-MCNC: 433 MG/DL (ref 65–140)
HCT VFR BLD AUTO: 22.1 % (ref 36.5–49.3)
HGB BLD-MCNC: 7.1 G/DL (ref 12–17)
M TB IFN-G BLD-IMP: ABNORMAL
M TB IFN-G CD4+ BCKGRND COR BLD-ACNC: -0.01 IU/ML
M TB IFN-G CD4+ BCKGRND COR BLD-ACNC: 0 IU/ML
MCH RBC QN AUTO: 28.4 PG (ref 26.8–34.3)
MCHC RBC AUTO-ENTMCNC: 32.1 G/DL (ref 31.4–37.4)
MCV RBC AUTO: 88 FL (ref 82–98)
MITOGEN IGNF BCKGRD COR BLD-ACNC: <0.1 IU/ML
MYELOPEROXIDASE AB SER IA-ACNC: <9 U/ML (ref 0–9)
PLATELET # BLD AUTO: 260 THOUSANDS/UL (ref 149–390)
PMV BLD AUTO: 10.1 FL (ref 8.9–12.7)
POTASSIUM SERPL-SCNC: 4.8 MMOL/L (ref 3.5–5.3)
PROT SERPL-MCNC: 6.4 G/DL (ref 6.4–8.2)
PROTEINASE3 AB SER IA-ACNC: 84.4 U/ML (ref 0–3.5)
RBC # BLD AUTO: 2.5 MILLION/UL (ref 3.88–5.62)
RH BLD: POSITIVE
SODIUM SERPL-SCNC: 129 MMOL/L (ref 136–145)
SPECIMEN EXPIRATION DATE: NORMAL
WBC # BLD AUTO: 13.72 THOUSAND/UL (ref 4.31–10.16)

## 2022-03-16 PROCEDURE — 86850 RBC ANTIBODY SCREEN: CPT | Performed by: INTERNAL MEDICINE

## 2022-03-16 PROCEDURE — 30233N1 TRANSFUSION OF NONAUTOLOGOUS RED BLOOD CELLS INTO PERIPHERAL VEIN, PERCUTANEOUS APPROACH: ICD-10-PCS | Performed by: INTERNAL MEDICINE

## 2022-03-16 PROCEDURE — 86901 BLOOD TYPING SEROLOGIC RH(D): CPT | Performed by: INTERNAL MEDICINE

## 2022-03-16 PROCEDURE — 80053 COMPREHEN METABOLIC PANEL: CPT | Performed by: STUDENT IN AN ORGANIZED HEALTH CARE EDUCATION/TRAINING PROGRAM

## 2022-03-16 PROCEDURE — P9016 RBC LEUKOCYTES REDUCED: HCPCS

## 2022-03-16 PROCEDURE — 99232 SBSQ HOSP IP/OBS MODERATE 35: CPT | Performed by: INTERNAL MEDICINE

## 2022-03-16 PROCEDURE — 85027 COMPLETE CBC AUTOMATED: CPT | Performed by: INTERNAL MEDICINE

## 2022-03-16 PROCEDURE — 86900 BLOOD TYPING SEROLOGIC ABO: CPT | Performed by: INTERNAL MEDICINE

## 2022-03-16 PROCEDURE — C9113 INJ PANTOPRAZOLE SODIUM, VIA: HCPCS | Performed by: INTERNAL MEDICINE

## 2022-03-16 PROCEDURE — 82948 REAGENT STRIP/BLOOD GLUCOSE: CPT

## 2022-03-16 PROCEDURE — 86923 COMPATIBILITY TEST ELECTRIC: CPT

## 2022-03-16 PROCEDURE — 99232 SBSQ HOSP IP/OBS MODERATE 35: CPT | Performed by: STUDENT IN AN ORGANIZED HEALTH CARE EDUCATION/TRAINING PROGRAM

## 2022-03-16 RX ORDER — PREDNISONE 20 MG/1
20 TABLET ORAL DAILY
Status: DISCONTINUED | OUTPATIENT
Start: 2022-04-14 | End: 2022-03-19 | Stop reason: HOSPADM

## 2022-03-16 RX ORDER — INSULIN ASPART 100 [IU]/ML
25 INJECTION, SUSPENSION SUBCUTANEOUS
Status: DISCONTINUED | OUTPATIENT
Start: 2022-03-16 | End: 2022-03-17

## 2022-03-16 RX ORDER — SODIUM CHLORIDE 9 MG/ML
20 INJECTION, SOLUTION INTRAVENOUS ONCE
Status: DISCONTINUED | OUTPATIENT
Start: 2022-03-16 | End: 2022-03-16

## 2022-03-16 RX ORDER — PREDNISONE 20 MG/1
60 TABLET ORAL DAILY
Status: DISCONTINUED | OUTPATIENT
Start: 2022-03-17 | End: 2022-03-19 | Stop reason: HOSPADM

## 2022-03-16 RX ORDER — INSULIN ASPART 100 [IU]/ML
25 INJECTION, SUSPENSION SUBCUTANEOUS
Status: DISCONTINUED | OUTPATIENT
Start: 2022-03-17 | End: 2022-03-16

## 2022-03-16 RX ADMIN — INSULIN ASPART 25 UNITS: 100 INJECTION, SUSPENSION SUBCUTANEOUS at 17:31

## 2022-03-16 RX ADMIN — INSULIN ASPART 20 UNITS: 100 INJECTION, SUSPENSION SUBCUTANEOUS at 08:23

## 2022-03-16 RX ADMIN — FUROSEMIDE 40 MG: 40 TABLET ORAL at 08:24

## 2022-03-16 RX ADMIN — INSULIN LISPRO 5 UNITS: 100 INJECTION, SOLUTION INTRAVENOUS; SUBCUTANEOUS at 16:50

## 2022-03-16 RX ADMIN — INSULIN LISPRO 4 UNITS: 100 INJECTION, SOLUTION INTRAVENOUS; SUBCUTANEOUS at 12:00

## 2022-03-16 RX ADMIN — PANTOPRAZOLE SODIUM 40 MG: 40 INJECTION, POWDER, FOR SOLUTION INTRAVENOUS at 08:24

## 2022-03-16 RX ADMIN — Medication 1 TABLET: at 08:24

## 2022-03-16 RX ADMIN — SODIUM CHLORIDE 500 MG: 9 INJECTION, SOLUTION INTRAVENOUS at 18:52

## 2022-03-16 RX ADMIN — INSULIN LISPRO 3 UNITS: 100 INJECTION, SOLUTION INTRAVENOUS; SUBCUTANEOUS at 08:24

## 2022-03-16 RX ADMIN — Medication 6 MG: at 21:44

## 2022-03-16 RX ADMIN — PANTOPRAZOLE SODIUM 40 MG: 40 INJECTION, POWDER, FOR SOLUTION INTRAVENOUS at 21:44

## 2022-03-16 RX ADMIN — INSULIN LISPRO 3 UNITS: 100 INJECTION, SOLUTION INTRAVENOUS; SUBCUTANEOUS at 21:43

## 2022-03-16 RX ADMIN — PRAVASTATIN SODIUM 80 MG: 80 TABLET ORAL at 17:28

## 2022-03-16 RX ADMIN — TAMSULOSIN HYDROCHLORIDE 0.4 MG: 0.4 CAPSULE ORAL at 17:28

## 2022-03-16 RX ADMIN — LATANOPROST 1 DROP: 50 SOLUTION OPHTHALMIC at 21:43

## 2022-03-16 NOTE — ASSESSMENT & PLAN NOTE
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  Since patient had metabolic acidosis patient was placed on bicarbonate drip at 150 mL/hour  Renal US showed no evidence of hydronephrosis  8mm calculus was found in bladder  Possible cause of hematuria if not glomerulonephritis   Continue to hold Vasotec  Patient is recieving further workup for glomerulonephritis  SAMANTHA, GBM Ab, C3 and C4 WNL  ANCA and Other tests pending  UA showed innumerable red blood cells 10-20 white cells with glucose  CT of abdomen and pelvis showed 8 mm stone at UVJ with hydroureter but no hydronephrosis and not significant changed since 2016  No peritoneal or retroperitoneal hematoma or evidence of GI bleeding  Mild pulmonary edema and small b/l pleural effusions were noted  CXR pending  Patient started dialysis 3/9 and has been receiving dialysis per Renal  IVF were d/c on 3/9  Patient currently on hemodialysis on Tuesday Thursday Saturday schedule  Status post renal biopsy on 03/10-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

## 2022-03-16 NOTE — ASSESSMENT & PLAN NOTE
Lab Results   Component Value Date    HGBA1C 7 2 (H) 12/03/2021       Recent Labs     03/16/22  0003 03/16/22  0717 03/16/22  1144 03/16/22  1545   POCGLU 262* 282* 347* 433*     Patient is on Humalog mix 75/25 45 units b i d  Hold Humalog mix  Patient was initially on insulin drip as 1-beta hydroxybutyrate was elevated at 1 6  Patient is is on 70 /30 20 units b i d  Which was increased to 25 units b i d  Uncontrolled blood sugars secondary to steroids

## 2022-03-16 NOTE — CASE MANAGEMENT
Case Management Discharge Planning Note    Patient name Long Ortiz  Location [address] [facility] 408/4 [address] Teresa Michel-* MRN [id number]  : 1945 Date 3/16/2022       Current Admission Date: 3/7/2022  Current Admission Diagnosis:Symptomatic anemia   Patient Active Problem List    Diagnosis Date Noted    Pulmonary hypertension ([facility] ) 2022    Dialysis patient ([place] [address] ) 2022    Anemia due to chronic kidney disease, on chronic dialysis ([facility] )     Hemoptysis 2022    LORRAINE (acute kidney injury) ([place] [address] )     Other proteinuria     Symptomatic anemia 2022    Chest pain 2022    Acute kidney injury ([place] [address] ) 2022    High anion gap metabolic acidosis     UTI (urinary tract infection) 2022    Melena 2022    Elevated PSA 2021    Chronic pain of right knee 2021    Primary osteoarthritis of right knee 2021    Bladder stones 12/15/2016    Type 2 diabetes mellitus with microalbuminuria, with long-term current use of insulin ([place] [address] ) 2016    Benign colon polyp 2013    Benign prostatic hyperplasia with lower urinary tract symptoms 2013    Benign essential hypertension 2013    Hyperlipidemia 2012    Vitamin D deficiency 2012      LOS (days): 9  Geometric Mean LOS (GMLOS) (days): 3 60  Days to GMLOS:-5 4     OBJECTIVE:  Risk of Unplanned Readmission Score: 21      Current admission status: Inpatient   Preferred Pharmacy:   [address] [facility] [address] [place] [address] [address]  Phone: [phone] Fax: [phone] [address]  [address] [place], [address]  [address], [address]  [facility] [address]  Phone: [phone] Fax: [phone]    Primary Care Provider: Nisha Wylie MD  Primary Insurance: MEDICARE  Secondary Insurance: BLUE CROSS    DISCHARGE DETAILS:    Discharge planning discussed with[de-identified] Patient and his wife Justyna Law [phone]  Freedom of Choice: Yes  Comments - [place] of Choice: [address] for dialysis; Accepted at [facility] 455 contacted family/caregiver?: Yes  Were Treatment Team discharge recommendations reviewed with patient/caregiver?: Yes  Did patient/caregiver verbalize understanding of patient care needs?: Yes  Were patient/caregiver advised of the risks associated with not following Treatment Team discharge recommendations?: Yes    Contacts  Patient Contacts: Dirk Liao  Relationship to Patient[de-identified] Family (wife)  Contact Method: Phone  Phone Number: [phone]  Reason/Outcome: Continuity of [address]         Is the patient interested in [facility] at discharge?: Yes    DME Referral Provided  Referral made for DME?: No    Other Referral/Resources/Interventions Provided:  Interventions: Dialysis,Dialysis SW,C    Would you like to participate in our [address] service program?  : No - Declined    Treatment Team Recommendation: Home with [address]  Discharge Destination Plan[de-identified] Home with Maxime at Discharge : [address] [place] [address] Given (Date):: 03/16/22  IMM Given to[de-identified] Patient (IMM discussed with patient and he states understanding )     [facility] Care Coordinator: Ole Ventura [phone]    [facility] SW: Roseline Canseco : [phone]    FLORIN spoke with Ole Ventura at [facility] and she states they have a chair time for patient on T-Th-Sat 10 AM which [facility] can accommodate and provide transport  (Transport is limited to this time slot only)  Spoke with [facility] and confirmed they can accommodate this transport starting on Tuesday 3/22  They request family call them and confirm all personal information  CM to follow

## 2022-03-16 NOTE — ASSESSMENT & PLAN NOTE
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  Colonoscopy showed polyp in the descending colon which was biopsied, 1 polyp 5-9 millimeter in the sigmoid colon which was removed EN bloc with cold snare and few scattered diverticula with protruding internal hemorrhoids  Pathology results are pending at the current time  Patient might need capsule endoscopy    Hemoglobin dropped to 7 1 and patient was ordered for 1 unit of PRBC transfusion    Results from last 7 days   Lab Units 03/16/22  0529 03/15/22  0856 03/14/22  0512 03/13/22  0514 03/12/22  1220 03/11/22  1601 03/11/22  0451 03/10/22  0506   HEMOGLOBIN g/dL 7 1* 7 8* 7 5* 7 6* 6 7* 7 7* 6 1* 7 1*   HEMATOCRIT % 22 1* 23 8* 23 1* 22 6* 21 0* 23 9* 19 3* 22 5*   MCV fL 88  --  89  --  89  --  90 89

## 2022-03-16 NOTE — PROGRESS NOTES
Mahesh 43  Progress Note - Dossie Radha 1945, 64 y o  male MRN: [id number]  Unit/Bed#: [address] [facility] Encounter: [id number]  Primary Care Provider: Sarah Sahni MD   Date and time admitted to hospital: 3/7/2022  9:37 AM    * Symptomatic anemia  Assessment & Plan  Hemoglobin upon admission was 5 8  Patient recieved 5 units of PRBC this admission  MCV level was normal  Vitamin R62 and folic acid level WNL  Iron panel revealed elevated ferritin, normal iron saturation, low TIBC, and low iron  Anemia likely multifactorial in the setting of possible GI bleeding and also CKD  Started on Epogen with dialysis  CT abd/pelvis found no evidence of GI bleed  S/P EGD on 03/11 which was negative for active bleeding  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  Colonoscopy showed polyp in the descending colon which was biopsied, 1 polyp 5-9 millimeter in the sigmoid colon which was removed EN bloc with cold snare and few scattered diverticula with protruding internal hemorrhoids  Pathology results are pending at the current time  Patient might need capsule endoscopy  Hemoglobin dropped to 7 1 and patient was ordered for 1 unit of PRBC transfusion    Results from last 7 days   Lab Units 03/16/22  0529 03/15/22  0856 03/14/22  0512 03/13/22  0514 03/12/22  1220 03/11/22  1601 03/11/22  0451 03/10/22  0506   HEMOGLOBIN g/dL 7 1* 7 8* 7 5* 7 6* 6 7* 7 7* 6 1* 7 1*   HEMATOCRIT % 22 1* 23 8* 23 1* 22 6* 21 0* 23 9* 19 3* 22 5*   MCV fL 88  --  89  --  89  --  90 89       Acute kidney injury (HCC)  Assessment & Plan  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  Since patient had metabolic acidosis patient was placed on bicarbonate drip at 150 mL/hour  Renal US showed no evidence of hydronephrosis   8mm calculus was found in bladder  Possible cause of hematuria if not glomerulonephritis   Continue to hold Vasotec  Patient is recieving further workup for glomerulonephritis  SAMANTHA, GBM Ab, C3 and C4 WNL  ANCA and Other tests pending  UA showed innumerable red blood cells 10-20 white cells with glucose  CT of abdomen and pelvis showed 8 mm stone at UVJ with hydroureter but no hydronephrosis and not significant changed since 2016  No peritoneal or retroperitoneal hematoma or evidence of GI bleeding  Mild pulmonary edema and small b/l pleural effusions were noted  CXR pending  Patient started dialysis 3/9 and has been receiving dialysis per Renal  IVF were d/c on 3/9  Patient currently on hemodialysis on Tuesday Thursday Saturday schedule  Status post renal biopsy on 03/10-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  Repeat ANCA, MPO and PR3 pending  Patient was started on methylprednisolone 100 milligram IV daily for 3 doses  Possible transition to p o  Prednisone upon discharge  Patient is on PCP prophylaxis with Bactrim  Patient needed to be started on cyclophosphamide as in-patient before discharge  Outpatient follow-up with Nephrology and patient needs dialysis as outpatient    Results from last 7 days   Lab Units 03/16/22  1226 03/15/22  0856 03/14/22  0513 03/12/22  1220 03/11/22  0451 03/10/22  0506   BUN mg/dL 72* 91* 73* 117* 93* 129*   CREATININE mg/dL 6 08* 8 63* 6 94* 9 96* 7 79* 10 08*       Hemoptysis  Assessment & Plan  Patient states he has had a cough for a couple weeks  Patient with episodes of hemoptysis here   Chest x-ray was unremarkable  Obtain sputum culture if possible  Procalcitonin elevated at 6 73 --> 3 4, unclear significance in the setting of renal dysfunction    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      Melena  Assessment & Plan  Patient reported black stool for couple of days prior to admission and even reported them after admission  stool occult blood ordered but not done  Patient on Protonix 40 milligram IV q 12 hours  No history of NSAID use  Colonoscopy showed polyps  EGD showed 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    UTI (urinary tract infection)  Assessment & Plan  UA showed trace leukocyte esterase with 10-20 white cells and innumerable bacteria  Urine culture grew small colony of E  Coli  Completed 7 days of IV Ceftriaxone   No clinical signs of sepsis    High anion gap metabolic acidosis  Assessment & Plan  Likely secondary to renal failure and also hyperglycemic state  Patient was placed on bicarbonate drip at 150 mL/hour, Bicarb drip was d/c on 3/9  Chest pain  Assessment & Plan  Patient presented with left-sided chest pain,Troponins x2 were negative initially  EKG was unremarkable  ProBNP was elevated at 15,000   2D echo showed normal EF with normal diastolic function  Reported chest pain again 3/9 and troponin peaked at 104   Plan for outpatient stress test     Type 2 diabetes mellitus with microalbuminuria, with long-term current use of insulin [facility])  Assessment & Plan  Lab Results   Component Value Date    HGBA1C 7 2 (H) 12/03/2021       Recent Labs     03/16/22  0003 03/16/22  0717 03/16/22  1144 03/16/22  1545   POCGLU 262* 282* 347* 433*     Patient is on Humalog mix 75/25 45 units b i d  Hold Humalog mix  Patient was initially on insulin drip as 1-beta hydroxybutyrate was elevated at 1 6  Patient is is on 70 /30 20 units b i d  Which was increased to 25 units b i d  Uncontrolled blood sugars secondary to steroids  Hyperlipidemia  Assessment & Plan  Zocor substituted with Pravachol    Benign prostatic hyperplasia with lower urinary tract symptoms  Assessment & Plan  Monitor postvoid residuals  Continue Flomax    Benign essential hypertension  Assessment & Plan  Patient is on Vasotec at home which is being held in the setting of acute kidney injury  Avoid hypotension    Blood pressure has been stable          VTE Pharmacologic Prophylaxis:   High Risk (Score >/= 5) - Pharmacological DVT Prophylaxis Contraindicated  Sequential Compression Devices Ordered  Patient Centered Rounds: I performed bedside rounds with nursing staff today  Discussions with Specialists or Other Care Team Provider: Dr Sherry Meehan    Education and Discussions with Family / Patient: Updated  (wife) via phone  Time Spent for Care: 45 minutes  More than 50% of total time spent on counseling and coordination of care as described above  Current Length of Stay: 9 day(s)  Current Patient Status: Inpatient   Certification Statement: The patient will continue to require additional inpatient hospital stay due to Acute kidney injury and anemia  Discharge Plan: Anticipate discharge in >72 hrs to home  Code Status: Level 1 - Full Code    Subjective:   Patient is feeling well and anxious to go home    Objective:     Vitals:   Temp (24hrs), Av 3 °F (36 3 °C), Min:96 1 °F (35 6 °C), Max:97 6 °F (36 4 °C)    Temp:  [96 1 °F (35 6 °C)-97 6 °F (36 4 °C)] 97 5 °F (36 4 °C)  HR:  [66-85] 70  Resp:  [18-19] 18  BP: (114-152)/(44-64) 141/64  SpO2:  [93 %-95 %] 93 %  Body mass index is 29 65 kg/m²  Input and Output Summary (last 24 hours):   No intake or output data in the 24 hours ending 22 1657    Physical Exam:   Physical Exam  Constitutional:       Appearance: Normal appearance  HENT:      Head: Normocephalic and atraumatic  Eyes:      Extraocular Movements: Extraocular movements intact  Pupils: Pupils are equal, round, and reactive to light  Cardiovascular:      Rate and Rhythm: Normal rate and regular rhythm  Heart sounds: No murmur heard  No gallop  Pulmonary:      Effort: Pulmonary effort is normal       Breath sounds: Normal breath sounds  Comments: Right chest wall PermCath  Abdominal:      General: Bowel sounds are normal       Palpations: Abdomen is soft  Tenderness:  There is no abdominal tenderness  Musculoskeletal:         General: No swelling or deformity  Normal range of motion  Cervical back: Normal range of motion and neck supple  Skin:     General: Skin is warm and dry  Neurological:      General: No focal deficit present  Mental Status: He is alert  Additional Data:     Labs:  Results from last 7 days   Lab Units 03/16/22  0529   WBC Thousand/uL 13 72*   HEMOGLOBIN g/dL 7 1*   HEMATOCRIT % 22 1*   PLATELETS Thousands/uL 260     Results from last 7 days   Lab Units 03/16/22  1226   SODIUM mmol/L 129*   POTASSIUM mmol/L 4 8   CHLORIDE mmol/L 91*   CO2 mmol/L 20*   BUN mg/dL 72*   CREATININE mg/dL 6 08*   ANION GAP mmol/L 18*   CALCIUM mg/dL 7 2*   ALBUMIN g/dL 1 8*   TOTAL BILIRUBIN mg/dL 0 32   ALK PHOS U/L 88   ALT U/L 34   AST U/L 23   GLUCOSE RANDOM mg/dL 331*     Results from last 7 days   Lab Units 03/10/22  0506   INR  1 31*     Results from last 7 days   Lab Units 03/16/22  1545 03/16/22  1144 03/16/22  0717 03/16/22  0003 03/15/22  2204 03/15/22  2008 03/15/22  1600 03/15/22  1151 03/15/22  0709 03/15/22  0149 03/14/22 2001 03/14/22  1551   POC GLUCOSE mg/dl 433* 347* 282* 262* 358* 400* 430* 200* 337* 346* 317* 174*         Results from last 7 days   Lab Units 03/15/22  0856 03/14/22  0513   PROCALCITONIN ng/ml 3 00* 3 40*       Lines/Drains:  Invasive Devices  Report    Peripheral Intravenous Line            Peripheral IV 03/14/22 Dorsal (posterior); Left Hand 2 days    Peripheral IV 03/16/22 Distal;Right;Ventral (anterior); Dorsal (posterior) Forearm <1 day          Hemodialysis Catheter            HD Permanent Double Catheter 8 days                      Imaging: Reviewed radiology reports from this admission including: chest xray    Recent Cultures (last 7 days):   Results from last 7 days   Lab Units 03/11/22  0638   SPUTUM CULTURE  Test not performed  Suggest repeat specimen     GRAM STAIN RESULT  >10 squamous epithelial cells/lpf, indicating orophayngeal contamination  *  3+ Gram positive cocci in pairs, chains and clusters*  3+ Gram negative rods*  1+ Gram positive rods*  No polys seen*       Last 24 Hours Medication List:   Current Facility-Administered Medications   Medication Dose Route Frequency Provider Last Rate    acetaminophen  650 mg Oral Q6H PRN Lissett Albert MD      calcium carbonate-vitamin D  1 tablet Oral Daily With Breakfast Feng Rogers MD      epoetin ani  10,000 Units Intravenous Once per day on Tue Thu Sat Feng Rogers MD      epoetin ani  5,000 Units Intravenous After Dialysis Feng Rogers MD      furosemide  40 mg Oral Daily Feng Rogers MD      [START ON 3/17/2022] insulin aspart protamine-insulin aspart  25 Units Subcutaneous BID AC Daisy Keating MD      insulin lispro  1-5 Units Subcutaneous TID AC Emiliana Jordan DO      insulin lispro  1-5 Units Subcutaneous HS Emiliana Jordan DO      latanoprost  1 drop Both Eyes HS Lissett Albert MD      melatonin  6 mg Oral HS Lissett Albert MD      methylPREDNISolone sodium succinate  500 mg Intravenous Daily Feng Rogers  mg (03/15/22 1655)    ondansetron (ZOFRAN) 50 mL IVPB  4 mg Intravenous Once Feng Rogers MD      pantoprazole  40 mg Intravenous Q12H [address] Lissett Albert MD      pravastatin  80 mg Oral Daily With Roseann Friedman MD      [START ON 4/28/2022] predniSONE  15 mg Oral Daily Feng Rogers MD      [START ON 4/14/2022] predniSONE  20 mg Oral Daily Feng Rogers MD      [START ON 3/31/2022] predniSONE  25 mg Oral Daily Feng Rogers MD      [START ON 3/24/2022] predniSONE  30 mg Oral Daily Feng Rogers MD      [START ON 3/17/2022] predniSONE  60 mg Oral Daily Feng Rogers MD      sodium chloride  20 mL/hr Intravenous Once Feng Rogers MD      [START ON 3/17/2022] sulfamethoxazole-trimethoprim  1 tablet Oral Once per day on Tue Thu Sat Ivon Sosa MD      tamsulosin  0 4 mg Oral Daily With Oracio Aponte MD          Today, Patient Was Seen By: Ivon Sosa MD    **Please Note: This note may have been constructed using a voice recognition system  **

## 2022-03-16 NOTE — ASSESSMENT & PLAN NOTE
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

## 2022-03-16 NOTE — PROGRESS NOTES
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    · Etiology:   PR3 associated ANCA vasculitis    · Current creatinine:  8 63 mg/dL,   · UA:  Hematuria, leukocyturia   · Renal imaging :  No hydronephrosis  · GN workup:  · Normal complements  · Anti GBM negative  · SAMANTHA negative  · PR3:      34 1  · SPEP:   pending  · Treatment:  · S/p kidney biopsy on 03/10  · Will continue to monitor for kidney recovery  · Plan for HD TTS  · Monitor urinary output  · Maintain MAP:  Over 65 mmHg if possible/avoid hypoperfusion:  Hold parameters on blood pressure medications  · Avoid nephrotoxic agents such as NSAIDs, and IV contrast if possible   Avoid opioids   · Adjust medications to GFR  · Will need a dialysis unit on discharge     # preliminary biopsy results  · 31 gloms  · LM:    · 12 glom with global sclerosis  · 19 gloms viable  · For cellular crescents  · Fibrinoid necrosis   · 2 fibro cellular crescents  · 55-60% IFTA  · ATN with red blood cell casts   · IF:   · Minimal staining     # hemoptysis  · 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  · Chest x-ray no consistent with alveolar hemorrhage  · Patient denies hemoptysis  · No sinus symptoms  · CT:  Bilateral pleural effusion     # immunosuppressive therapy  · Methylprednisolone 500 mg started on 03/14-3/16  · Plan to start prednisone p o as PEXIVAX reduced dose  · Prednisone taper:  ·  60 mg once a day 3/17-3/23  · 30 mg once a day 3/24-3/30  · 25 mg once a day 3/31-4/14  · 20 mg once a day 4/15-4/28  · 15 mg once a day 4/29-5/12  · 12 5 mg once a day 5/13-5/27  · 10 mg once a day 5/28-6/11  · 7 5 mg once a day 6/12-6/26  · Continue with 5 mg once a day 6/27-  · Further immunosuppression discussed with patient (risk and benefits explained), 2 options available rituximab versus cyclophosphamide  · Due to RPGN presentation with severe LORRAINE requiring dialysis, best option for renal recovery would be cyclophosphamide  · Cyclophosphamide 7 5 mg per week ago from order , pending QuantiFERON resolved   · Will require cyclophosphamide on weeks 0, 2, 4,7,10,13  · Immunosuppressive therapy will have to be discussed directly with nephrologist a new dialysis unit     #CKD G3a  · Baseline creatinine:  1 3 mg/dL  · Etiology:  Likely secondary to nephroangiosclerosis in the settings of hypertension     #Acid-base Disorder  · serum HCO3 21mmol/L said  · Secondary to LORRAINE  · HD tomorrow     #Volume status/hypertension:  · Volume:  Euvolemic  · Patient is making urine but has not collected  · Reported urinary of food to 2- 3 times a day  · Blood pressure:    hypertensive /61mmhg, goal<140/90  · Recommend:  · Low-sodium diet     #Anemia:  · Current hemoglobin:  7 1 mg/dL, stable  · No further episodes of hemoptysis  · Chest x-ray no consistent with cellular hemorrhage  · Status post blood transfusion  · Possible GI bleed and kidney disease  · Treatment:  · EPO 83120 units on HD  · Transfuse for hemoglobin less than 7 0 per primary service      # confusion  · Was a little confused this morning  · Possibly secondary to high-dose steroids      SUBJECTIVE:  Patient seen and examined at bedside  Patient appears relatively confused in the morning , with a slightly redirected, No chest pain, shortness of breath, nausea, vomiting, abdominal pain or diarrhea       OBJECTIVE:  Current Weight: Weight - Scale: 88 5 kg (195 lb)  Vitals:    03/16/22 0720   BP: 152/61   Pulse: 85   Resp: 18   Temp: (!) 96 1 °F (35 6 °C)   SpO2: 93%       Intake/Output Summary (Last 24 hours) at 3/16/2022 0947  Last data filed at 3/15/2022 1220  Gross per 24 hour Intake 300 ml   Output 2000 ml   Net -1700 ml     Wt Readings from Last 3 Encounters:   03/11/22 88 5 kg (195 lb)   12/14/21 88 5 kg (195 lb)   09/07/21 88 9 kg (196 lb)     Temp Readings from Last 3 Encounters:   03/16/22 (!) 96 1 °F (35 6 °C) (Axillary)   12/14/21 97 8 °F (36 6 °C)   09/07/21 99 °F (37 2 °C) (Temporal)     BP Readings from Last 3 Encounters:   03/16/22 152/61   12/14/21 124/60   09/07/21 130/70     Pulse Readings from Last 3 Encounters:   03/16/22 85   12/14/21 80   09/07/21 75        General:   no acute distress at this time  Skin:  No acute rash  Eyes:  No scleral icterus and noninjected  ENT:   mucous membranes moist  Neck:  no carotid bruits  Chest:  Clear to auscultation percussion, good respiratory effort, no use of accessory respiratory muscles  CVS:  Regular rate and rhythm without a murmur rub   Abdomen:  soft and nontender   Extremities:  No lower extremity edema  Neuro:  No gross focality  Psych:  Alert , compared  Vascular access:  Right IJ PermCath      Medications:    Current Facility-Administered Medications:     acetaminophen (TYLENOL) tablet 650 mg, 650 mg, Oral, Q6H PRN, Daisy Keating MD, 650 mg at 03/15/22 2106    calcium carbonate-vitamin D (OSCAL-D) 500 mg-200 units per tablet 1 tablet, 1 tablet, Oral, Daily With Breakfast, Bk Weems MD, 1 tablet at 03/16/22 0824    epoetin ani (EPOGEN,PROCRIT) injection 10,000 Units, 10,000 Units, Intravenous, Once per day on Tue Thu Sat, Bk Weems MD, 10,000 Units at 03/15/22 1003    epoetin ani (EPOGEN,PROCRIT) injection 5,000 Units, 5,000 Units, Intravenous, After Dialysis, Bk Weems MD    furosemide (LASIX) tablet 40 mg, 40 mg, Oral, Daily, Bk Weems MD, 40 mg at 03/16/22 0824    insulin aspart protamine-insulin aspart (NovoLOG 70/30) 100 units/mL subcutaneous injection 20 Units, 20 Units, Subcutaneous, BID Emiliana ZAMORANO DO, 20 Units at 03/16/22 0823   insulin lispro (HumaLOG) 100 units/mL subcutaneous injection 1-5 Units, 1-5 Units, Subcutaneous, TID AC, 3 Units at 03/16/22 0824 **AND** Fingerstick Glucose (POCT), , , TID AC, Emiliana Revankar, DO    insulin lispro (HumaLOG) 100 units/mL subcutaneous injection 1-5 Units, 1-5 Units, Subcutaneous, HS, Emiliana Revankar, DO, 4 Units at 03/15/22 2106    latanoprost (XALATAN) 0 005 % ophthalmic solution 1 drop, 1 drop, Both Eyes, HS, Sav Galaviz MD, 1 drop at 03/15/22 2106    melatonin tablet 6 mg, 6 mg, Oral, HS, Sav Galaviz MD, 6 mg at 03/15/22 2106    methylPREDNISolone sodium succinate (Solu-MEDROL) 500 mg in sodium chloride 0 9 % 250 mL IVPB, 500 mg, Intravenous, Daily, Louise Wright MD, Last Rate: 250 mL/hr at 03/15/22 1655, 500 mg at 03/15/22 1655    ondansetron (ZOFRAN) injection 4 mg, 4 mg, Intravenous, Q6H PRN, Sav Galaviz MD, 4 mg at 03/12/22 1927    pantoprazole (PROTONIX) injection 40 mg, 40 mg, Intravenous, Q12H [address], Sav Galaviz MD, 40 mg at 03/16/22 0824    pravastatin (PRAVACHOL) tablet 80 mg, 80 mg, Oral, Daily With Eulalia Draper MD, 80 mg at 03/15/22 1655    [START ON 4/29/2022] predniSONE tablet 15 mg, 15 mg, Oral, Daily, Louise Wright MD    [START ON 4/15/2022] predniSONE tablet 20 mg, 20 mg, Oral, Daily, MD Bebeto Alejandra ON 3/31/2022] predniSONE tablet 25 mg, 25 mg, Oral, Daily, MD Bebeto Alejandra ON 3/24/2022] predniSONE tablet 30 mg, 30 mg, Oral, Daily, MD Bebeto Alejandra  [START ON 3/17/2022] predniSONE tablet 60 mg, 60 mg, Oral, Daily, MD Bebeto Alejandra  Dalia Oviedo ON 3/17/2022] sulfamethoxazole-trimethoprim (BACTRIM) 400-80 mg per tablet 1 tablet, 1 tablet, Oral, Once per day on Tue Thu Sat, Sav Galaviz MD    [facility]) capsule 0 4 mg, 0 4 mg, Oral, Daily With Eulalia Draper MD, 0 4 mg at 03/15/22 1686    Laboratory Results:  Results from last 7 days   Lab Units 03/16/22  0529 03/15/22  0856 03/14/22  0513 03/14/22  0512 03/13/22  0514 03/12/22  1220 03/11/22  1601 03/11/22  0451 03/10/22  0506 03/10/22  0506   WBC Thousand/uL 13 72*  --   --  13 13*  --  12 11*  --  11 30*  --  12 62*   HEMOGLOBIN g/dL 7 1* 7 8*  --  7 5* 7 6* 6 7* 7 7* 6 1*   < > 7 1*   HEMATOCRIT % 22 1* 23 8*  --  23 1* 22 6* 21 0* 23 9* 19 3*   < > 22 5*   PLATELETS Thousands/uL 260  --   --  210  --  182  --  217  --  218   SODIUM mmol/L  --  126* 131*  --   --  133*  --  135*  --  135*   POTASSIUM mmol/L  --  4 9 4 4  --   --  4 6  --  4 4  --  4 1   CHLORIDE mmol/L  --  89* 92*  --   --  94*  --  95*  --  94*   CO2 mmol/L  --  21 24  --   --  22  --  24  --  26   BUN mg/dL  --  91* 73*  --   --  117*  --  93*  --  129*   CREATININE mg/dL  --  8 63* 6 94*  --   --  9 96*  --  7 79*  --  10 08*   CALCIUM mg/dL  --  6 9* 7 1*  --   --  7 0*  --  6 8*  --  7 0*    < > = values in this interval not displayed  XR chest portable   Final Result by Jackie Khoury MD (03/14 1148)      No active disease in the chest                   Workstation performed: [id number]         IR biopsy kidney [place] kit no laterality   Final Result by Brittany De La Cruz MD (03/10 1640)   Impression: Successful percutaneous core biopsy of the right renal cortex for [place] Kit  A full pathology report will follow  Workstation performed: [id number]         IR tunneled dialysis catheter placement   Final Result by Sabrina Salazar MD (03/08 1523)   Impression: Successful placement of a 15-Arabic by 28 cm Titan dialysis catheter via the right internal jugular vein  The tip of the catheter is in the right atrium and may be used immediately  Workstation performed: [id number]         CT abdomen pelvis wo contrast   Final Result by Lasha Santana MD (03/08 1153)      1  No peritoneal or retroperitoneal hematoma        2   An 8 mm right UVJ calculus causes mild upstream hydroureter without significant hydronephrosis  3   Mild pulmonary edema, small simple bilateral pleural effusions and bibasilar atelectasis  Workstation performed: [id number]         US kidney and bladder   Final Result by Yuliana Gutierrez MD (03/07 1432)      No hydronephrosis  8 mm calculus in the right dependent aspect of the urinary bladder  Workstation performed: [id number]         XR chest 1 view portable   Final Result by Kaitlin Richardson MD (03/07 1250)      No active pulmonary disease  Workstation performed: [id number]             Portions of the record may have been created with voice recognition software  Occasional wrong word or "sound a like" substitutions may have occurred due to the inherent limitations of voice recognition software  Read the chart carefully and recognize, using context, where substitutions have occurred

## 2022-03-17 ENCOUNTER — APPOINTMENT (INPATIENT)
Dept: DIALYSIS | Facility: HOSPITAL | Age: 77
DRG: 674 | End: 2022-03-17
Attending: STUDENT IN AN ORGANIZED HEALTH CARE EDUCATION/TRAINING PROGRAM
Payer: MEDICARE

## 2022-03-17 PROBLEM — N39.0 UTI (URINARY TRACT INFECTION): Status: RESOLVED | Noted: 2022-03-07 | Resolved: 2022-03-17

## 2022-03-17 PROBLEM — I77.6 ANCA-ASSOCIATED VASCULITIS (HCC): Status: ACTIVE | Noted: 2022-03-17

## 2022-03-17 PROBLEM — I77.82 ANCA-ASSOCIATED VASCULITIS: Status: ACTIVE | Noted: 2022-03-17

## 2022-03-17 LAB
ABO GROUP BLD BPU: NORMAL
ALBUMIN SERPL ELPH-MCNC: 1.99 G/DL (ref 3.5–5)
ALBUMIN SERPL ELPH-MCNC: 35.6 % (ref 52–65)
ALPHA1 GLOB SERPL ELPH-MCNC: 0.68 G/DL (ref 0.1–0.4)
ALPHA1 GLOB SERPL ELPH-MCNC: 12.2 % (ref 2.5–5)
ALPHA2 GLOB SERPL ELPH-MCNC: 1.11 G/DL (ref 0.4–1.2)
ALPHA2 GLOB SERPL ELPH-MCNC: 19.9 % (ref 7–13)
ANION GAP SERPL CALCULATED.3IONS-SCNC: 15 MMOL/L (ref 4–13)
BETA GLOB ABNORMAL SERPL ELPH-MCNC: 0.35 G/DL (ref 0.4–0.8)
BETA1 GLOB SERPL ELPH-MCNC: 6.2 % (ref 5–13)
BETA2 GLOB SERPL ELPH-MCNC: 8.4 % (ref 2–8)
BETA2+GAMMA GLOB SERPL ELPH-MCNC: 0.47 G/DL (ref 0.2–0.5)
BPU ID: NORMAL
BUN SERPL-MCNC: 98 MG/DL (ref 5–25)
CALCIUM SERPL-MCNC: 7 MG/DL (ref 8.3–10.1)
CHLORIDE SERPL-SCNC: 92 MMOL/L (ref 100–108)
CO2 SERPL-SCNC: 21 MMOL/L (ref 21–32)
CREAT SERPL-MCNC: 7.05 MG/DL (ref 0.6–1.3)
CROSSMATCH: NORMAL
ERYTHROCYTE [DISTWIDTH] IN BLOOD BY AUTOMATED COUNT: 14.6 % (ref 11.6–15.1)
GAMMA GLOB ABNORMAL SERPL ELPH-MCNC: 0.99 G/DL (ref 0.5–1.6)
GAMMA GLOB SERPL ELPH-MCNC: 17.7 % (ref 12–22)
GFR SERPL CREATININE-BSD FRML MDRD: 6 ML/MIN/1.73SQ M
GLUCOSE SERPL-MCNC: 231 MG/DL (ref 65–140)
GLUCOSE SERPL-MCNC: 247 MG/DL (ref 65–140)
GLUCOSE SERPL-MCNC: 269 MG/DL (ref 65–140)
GLUCOSE SERPL-MCNC: 273 MG/DL (ref 65–140)
GLUCOSE SERPL-MCNC: 340 MG/DL (ref 65–140)
HCT VFR BLD AUTO: 24.6 % (ref 36.5–49.3)
HGB BLD-MCNC: 7.7 G/DL (ref 12–17)
IGG/ALB SER: 0.55 {RATIO} (ref 1.1–1.8)
INTERPRETATION UR IFE-IMP: NORMAL
MCH RBC QN AUTO: 27.4 PG (ref 26.8–34.3)
MCHC RBC AUTO-ENTMCNC: 31.3 G/DL (ref 31.4–37.4)
MCV RBC AUTO: 88 FL (ref 82–98)
PLATELET # BLD AUTO: 291 THOUSANDS/UL (ref 149–390)
PMV BLD AUTO: 10.3 FL (ref 8.9–12.7)
POTASSIUM SERPL-SCNC: 5.4 MMOL/L (ref 3.5–5.3)
PROT PATTERN SERPL ELPH-IMP: ABNORMAL
PROT SERPL-MCNC: 5.6 G/DL (ref 6.4–8.2)
RBC # BLD AUTO: 2.81 MILLION/UL (ref 3.88–5.62)
SODIUM SERPL-SCNC: 128 MMOL/L (ref 136–145)
UNIT DISPENSE STATUS: NORMAL
UNIT PRODUCT CODE: NORMAL
UNIT PRODUCT VOLUME: 350 ML
UNIT RH: NORMAL
WBC # BLD AUTO: 12.13 THOUSAND/UL (ref 4.31–10.16)

## 2022-03-17 PROCEDURE — 97535 SELF CARE MNGMENT TRAINING: CPT

## 2022-03-17 PROCEDURE — C9113 INJ PANTOPRAZOLE SODIUM, VIA: HCPCS | Performed by: INTERNAL MEDICINE

## 2022-03-17 PROCEDURE — 80048 BASIC METABOLIC PNL TOTAL CA: CPT | Performed by: INTERNAL MEDICINE

## 2022-03-17 PROCEDURE — 97530 THERAPEUTIC ACTIVITIES: CPT

## 2022-03-17 PROCEDURE — 85027 COMPLETE CBC AUTOMATED: CPT | Performed by: INTERNAL MEDICINE

## 2022-03-17 PROCEDURE — 82948 REAGENT STRIP/BLOOD GLUCOSE: CPT

## 2022-03-17 PROCEDURE — 99232 SBSQ HOSP IP/OBS MODERATE 35: CPT | Performed by: INTERNAL MEDICINE

## 2022-03-17 PROCEDURE — 99232 SBSQ HOSP IP/OBS MODERATE 35: CPT | Performed by: STUDENT IN AN ORGANIZED HEALTH CARE EDUCATION/TRAINING PROGRAM

## 2022-03-17 RX ORDER — ONDANSETRON 2 MG/ML
4 INJECTION INTRAMUSCULAR; INTRAVENOUS ONCE AS NEEDED
Status: DISCONTINUED | OUTPATIENT
Start: 2022-03-17 | End: 2022-03-19 | Stop reason: HOSPADM

## 2022-03-17 RX ORDER — INSULIN ASPART 100 [IU]/ML
30 INJECTION, SUSPENSION SUBCUTANEOUS
Status: DISCONTINUED | OUTPATIENT
Start: 2022-03-17 | End: 2022-03-19

## 2022-03-17 RX ORDER — SODIUM CHLORIDE 9 MG/ML
20 INJECTION, SOLUTION INTRAVENOUS ONCE
Status: CANCELLED | OUTPATIENT
Start: 2022-03-23

## 2022-03-17 RX ADMIN — INSULIN ASPART 30 UNITS: 100 INJECTION, SUSPENSION SUBCUTANEOUS at 16:27

## 2022-03-17 RX ADMIN — PRAVASTATIN SODIUM 80 MG: 80 TABLET ORAL at 16:27

## 2022-03-17 RX ADMIN — ERYTHROPOIETIN 10000 UNITS: 10000 INJECTION, SOLUTION INTRAVENOUS; SUBCUTANEOUS at 15:51

## 2022-03-17 RX ADMIN — INSULIN ASPART 25 UNITS: 100 INJECTION, SUSPENSION SUBCUTANEOUS at 08:17

## 2022-03-17 RX ADMIN — Medication 6 MG: at 21:17

## 2022-03-17 RX ADMIN — PREDNISONE 60 MG: 20 TABLET ORAL at 08:09

## 2022-03-17 RX ADMIN — INSULIN LISPRO 2 UNITS: 100 INJECTION, SOLUTION INTRAVENOUS; SUBCUTANEOUS at 16:27

## 2022-03-17 RX ADMIN — INSULIN LISPRO 4 UNITS: 100 INJECTION, SOLUTION INTRAVENOUS; SUBCUTANEOUS at 11:38

## 2022-03-17 RX ADMIN — LATANOPROST 1 DROP: 50 SOLUTION OPHTHALMIC at 21:17

## 2022-03-17 RX ADMIN — TAMSULOSIN HYDROCHLORIDE 0.4 MG: 0.4 CAPSULE ORAL at 16:27

## 2022-03-17 RX ADMIN — INSULIN LISPRO 3 UNITS: 100 INJECTION, SOLUTION INTRAVENOUS; SUBCUTANEOUS at 08:10

## 2022-03-17 RX ADMIN — FUROSEMIDE 40 MG: 40 TABLET ORAL at 08:10

## 2022-03-17 RX ADMIN — PANTOPRAZOLE SODIUM 40 MG: 40 INJECTION, POWDER, FOR SOLUTION INTRAVENOUS at 08:10

## 2022-03-17 RX ADMIN — PANTOPRAZOLE SODIUM 40 MG: 40 INJECTION, POWDER, FOR SOLUTION INTRAVENOUS at 21:17

## 2022-03-17 RX ADMIN — INSULIN LISPRO 2 UNITS: 100 INJECTION, SOLUTION INTRAVENOUS; SUBCUTANEOUS at 21:17

## 2022-03-17 RX ADMIN — Medication 1 TABLET: at 08:09

## 2022-03-17 RX ADMIN — SULFAMETHOXAZOLE AND TRIMETHOPRIM 1 TABLET: 400; 80 TABLET ORAL at 08:13

## 2022-03-17 NOTE — PLAN OF CARE
Problem: OCCUPATIONAL THERAPY ADULT  Goal: Performs self-care activities at highest level of function for planned discharge setting  See evaluation for individualized goals  Description: Treatment Interventions: ADL retraining,Functional transfer training,UE strengthening/ROM,Endurance training,Patient/family training,Cognitive reorientation,Equipment evaluation/education,Activityengagement,Compensatory technique education          See flowsheet documentation for full assessment, interventions and recommendations  Outcome: Progressing  Note: Limitation: Decreased ADL status,Decreased UE strength,Decreased Safe judgement during ADL,Decreased cognition,Decreased endurance,Decreased high-level ADLs,Decreased self-care trans (decreased balance and mobility )  Prognosis: Good  Assessment: Patient seen for OT treatment  Patient is demonstrating improvements in ADLS and functional mobility (supervision with RW)  Patients endurance/activity tolerance is improving  Patient is pleasant and cooperative and motivated to participate in OT session "can we go for a walk:"  Patient will benefit from continued OT services to maximize functional performance with ADLS         OT Discharge Recommendation: Home with home health rehabilitation

## 2022-03-17 NOTE — ASSESSMENT & PLAN NOTE
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

## 2022-03-17 NOTE — PLAN OF CARE
Problem: METABOLIC, FLUID AND ELECTROLYTES - ADULT  Goal: Electrolytes maintained within normal limits  Description: INTERVENTIONS:  - Monitor labs and assess patient for signs and symptoms of electrolyte imbalances  - Administer electrolyte replacement as ordered  - Monitor response to electrolyte replacements, including repeat lab results as appropriate  - Instruct patient on fluid and nutrition as appropriate  Outcome: Progressing  Goal: Fluid balance maintained  Description: INTERVENTIONS:  - Monitor labs   - Monitor I/O and WT  - Instruct patient on fluid and nutrition as appropriate  - Assess for signs & symptoms of volume excess or deficit  Outcome: Progressing   Post-Dialysis RN Treatment Note    Blood Pressure:  Pre 145/79 mm/Hg  Post 146/92 mmHg   EDW  tbd kg    Weight:  Pre 90 5 kg   Post 88 5 kg   Mode of weight measurement: Standing Scale   Volume Removed  2000 ml    Treatment duration 210 minutes    NS given  No    Treatment shortened?  No   Medications given during Rx Epogen 10,000 units   Estimated Kt/V  Not Applicable   Access type: Permacath/TDC   Access Issues: No    Report called to primary nurse   Yes          Mandy Villar RN

## 2022-03-17 NOTE — ASSESSMENT & PLAN NOTE
Patient is on Vasotec at home which is being held in the setting of acute kidney injury  Avoid hypotension  Blood pressure moderately controlled  If blood pressure continues to remain high to start nifedipine 30 milligram p o   Daily

## 2022-03-17 NOTE — ASSESSMENT & PLAN NOTE
Lab Results   Component Value Date    HGBA1C 7 2 (H) 12/03/2021       Recent Labs     03/16/22  1545 03/16/22  1953 03/17/22  0717 03/17/22  1135   POCGLU 433* 326* 273* 340*     Patient is on Humalog mix 75/25 45 units b i d  Hold Humalog mix  Patient was initially on insulin drip as 1-beta hydroxybutyrate was elevated at 1 6  Patient is is on 70 /30 20 units b i d  which will be increased to 30 units b i d  Uncontrolled blood sugars secondary to steroids

## 2022-03-17 NOTE — PLAN OF CARE
Problem: HEMATOLOGIC - ADULT  Goal: Maintains hematologic stability  Description: INTERVENTIONS  - Assess for signs and symptoms of bleeding or hemorrhage  - Monitor labs  - Administer supportive blood products/factors as ordered and appropriate  Outcome: Progressing     Problem: METABOLIC, FLUID AND ELECTROLYTES - ADULT  Goal: Electrolytes maintained within normal limits  Description: INTERVENTIONS:  - Monitor labs and assess patient for signs and symptoms of electrolyte imbalances  - Administer electrolyte replacement as ordered  - Monitor response to electrolyte replacements, including repeat lab results as appropriate  - Instruct patient on fluid and nutrition as appropriate  Outcome: Progressing     Problem: MOBILITY - ADULT  Goal: Maintain or return to baseline ADL function  Description: INTERVENTIONS:  -  Assess patient's ability to carry out ADLs; assess patient's baseline for ADL function and identify physical deficits which impact ability to perform ADLs (bathing, care of mouth/teeth, toileting, grooming, dressing, etc )  - Assess/evaluate cause of self-care deficits   - Assess range of motion  - Assess patient's mobility; develop plan if impaired  - Assess patient's need for assistive devices and provide as appropriate  - Encourage maximum independence but intervene and supervise when necessary  - Involve family in performance of ADLs  - Assess for home care needs following discharge   - Consider OT consult to assist with ADL evaluation and planning for discharge  - Provide patient education as appropriate  Outcome: Progressing

## 2022-03-17 NOTE — ASSESSMENT & PLAN NOTE
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  Colonoscopy showed polyp in the descending colon which was biopsied, 1 polyp 5-9 millimeter in the sigmoid colon which was removed EN bloc with cold snare and few scattered diverticula with protruding internal hemorrhoids  Pathology results are pending at the current time  Patient might need capsule endoscopy    Improved hemoglobin to 7 7 after 1 unit of PRBC transfusion on 3/17/22    Results from last 7 days   Lab Units 03/17/22  0446 03/16/22  0529 03/15/22  0856 03/14/22  0512 03/13/22  0514 03/12/22  1220 03/11/22  1601 03/11/22  0451   HEMOGLOBIN g/dL 7 7* 7 1* 7 8* 7 5* 7 6* 6 7* 7 7* 6 1*   HEMATOCRIT % 24 6* 22 1* 23 8* 23 1* 22 6* 21 0* 23 9* 19 3*   MCV fL 88 88  --  89  --  89  --  90

## 2022-03-17 NOTE — PROGRESS NOTES
[facility]  Progress Note - Aparna [place] 1945, 64 y o  male MRN: [id number]  Unit/Bed#: [address] [place] [address] Encounter: [id number]  Primary Care Provider: Mejia Shine MD   Date and time admitted to hospital: 3/7/2022  9:37 AM    * Symptomatic anemia  Assessment & Plan  Hemoglobin upon admission was 5 8  Patient recieved 6 units of PRBC this admission  MCV level was normal  Vitamin Z36 and folic acid level WNL  Iron panel revealed elevated ferritin, normal iron saturation, low TIBC, and low iron  Anemia likely multifactorial in the setting of possible GI bleeding and also CKD  Started on Epogen with dialysis  CT abd/pelvis found no evidence of GI bleed  S/P EGD on 03/11 which was negative for active bleeding  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  Colonoscopy showed polyp in the descending colon which was biopsied, 1 polyp 5-9 millimeter in the sigmoid colon which was removed EN bloc with cold snare and few scattered diverticula with protruding internal hemorrhoids  Pathology results are pending at the current time  Patient might need capsule endoscopy  Improved hemoglobin to 7 7 after 1 unit of PRBC transfusion on 3/17/22    Results from last 7 days   Lab Units 03/17/22  0446 03/16/22  0529 03/15/22  0856 03/14/22  0512 03/13/22  0514 03/12/22  1220 03/11/22  1601 03/11/22  0451   HEMOGLOBIN g/dL 7 7* 7 1* 7 8* 7 5* 7 6* 6 7* 7 7* 6 1*   HEMATOCRIT % 24 6* 22 1* 23 8* 23 1* 22 6* 21 0* 23 9* 19 3*   MCV fL 88 88  --  89  --  89  --  90       Acute kidney injury (HCC)  Assessment & Plan  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  Since patient had metabolic acidosis patient was placed on bicarbonate drip at 150 mL/hour  Renal US showed no evidence of hydronephrosis   8mm calculus was found in bladder  Possible cause of hematuria if not glomerulonephritis   Continue to hold Vasotec  Patient is recieving further workup for glomerulonephritis  SAMANTHA, GBM Ab, C3 and C4 WNL  ANCA and Other tests pending  UA showed innumerable red blood cells 10-20 white cells with glucose  CT of abdomen and pelvis showed 8 mm stone at UVJ with hydroureter but no hydronephrosis and not significant changed since 2016  No peritoneal or retroperitoneal hematoma or evidence of GI bleeding  Mild pulmonary edema and small b/l pleural effusions were noted  CXR pending  Patient started dialysis 3/9 and has been receiving dialysis per Renal  IVF were d/c on 3/9  Patient currently on hemodialysis on Tuesday Thursday Saturday schedule  Status post renal biopsy on 03/10-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  PR3 34-84  Patient received methylprednisolone 500 milligram IV daily for 3 doses  Patient to be discharged on prednisone taper  Patient is on PCP prophylaxis with Bactrim  Patient scheduled for outpatient infusion for cyclophosphamide  Patient is set up for outpatient dialysis on March 22, 2022    Results from last 7 days   Lab Units 03/17/22  0446 03/16/22  1226 03/15/22  0856 03/14/22  0513 03/12/22  1220 03/11/22  0451   BUN mg/dL 98* 72* 91* 73* 117* 93*   CREATININE mg/dL 7 05* 6 08* 8 63* 6 94* 9 96* 7 79*       Hemoptysis  Assessment & Plan  Patient states he has had a cough for a couple weeks  Patient with episodes of hemoptysis here   Chest x-ray was unremarkable  Obtain sputum culture if possible  Procalcitonin elevated at 6 73 --> 3 4, unclear significance in the setting of renal dysfunction    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  No fluid noted patient continues to have some mucus production especially in the morning with coughing      Melena  Assessment & Plan  Patient reported black stool for couple of days prior to admission and even reported them after admission  Patient on Protonix 40 milligram IV q 12 hours  No history of NSAID use  Colonoscopy showed polyps  EGD showed 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  Biopsies have been negative  Patient is having regular bowel movements without any black stool currently    High anion gap metabolic acidosis  Assessment & Plan  Likely secondary to renal failure and also hyperglycemic state  Patient was placed on bicarbonate drip at 150 mL/hour, Bicarb drip was d/c on 3/9  Secondary to LORRAINE    Chest pain  Assessment & Plan  Patient presented with left-sided chest pain,Troponins x2 were negative initially  EKG was unremarkable  ProBNP was elevated at 15,000   2D echo showed normal EF with normal diastolic function  Reported chest pain again 3/9 and troponin peaked at 104   Plan for outpatient stress test     Type 2 diabetes mellitus with microalbuminuria, with long-term current use of insulin [facility])  Assessment & Plan  Lab Results   Component Value Date    HGBA1C 7 2 (H) 12/03/2021       Recent Labs     03/16/22  1545 03/16/22  1953 03/17/22  0717 03/17/22  1135   POCGLU 433* 326* 273* 340*     Patient is on Humalog mix 75/25 45 units b i d  Hold Humalog mix  Patient was initially on insulin drip as 1-beta hydroxybutyrate was elevated at 1 6  Patient is is on 70 /30 20 units b i d  which will be increased to 30 units b i d  Uncontrolled blood sugars secondary to steroids  Hyperlipidemia  Assessment & Plan  Zocor substituted with Pravachol    Benign prostatic hyperplasia with lower urinary tract symptoms  Assessment & Plan  Monitor postvoid residuals  Continue Flomax    Benign essential hypertension  Assessment & Plan  Patient is on Vasotec at home which is being held in the setting of acute kidney injury  Avoid hypotension  Blood pressure moderately controlled  If blood pressure continues to remain high to start nifedipine 30 milligram p o   Daily    UTI (urinary tract infection)-resolved as of 3/17/2022  Assessment & Plan  UA showed trace leukocyte esterase with 10-20 white cells and innumerable bacteria  Urine culture grew small colony of E  Coli  Completed 7 days of IV Ceftriaxone   No clinical signs of sepsis          VTE Pharmacologic Prophylaxis:   High Risk (Score >/= 5) - Pharmacological DVT Prophylaxis Contraindicated  Sequential Compression Devices Ordered  Patient Centered Rounds: I performed bedside rounds with nursing staff today  Discussions with Specialists or Other Care Team Provider: Dr Sherry Meehan    Education and Discussions with Family / Patient: yes    Time Spent for Care: 45 minutes  More than 50% of total time spent on counseling and coordination of care as described above  Current Length of Stay: 10 day(s)  Current Patient Status: Inpatient   Certification Statement: The patient will continue to require additional inpatient hospital stay due to Acute kidney injury, anemia  Discharge Plan: Anticipate discharge in 48 hrs to home  Code Status: Level 1 - Full Code    Subjective:   Patient denies any shortness of breath, abdominal pain  Patient reports some cough with mucus production the morning  Patient is having regular bowel movements which were normal colored  Objective:     Vitals:   Temp (24hrs), Av 6 °F (36 4 °C), Min:97 4 °F (36 3 °C), Max:97 9 °F (36 6 °C)    Temp:  [97 4 °F (36 3 °C)-97 9 °F (36 6 °C)] 97 9 °F (36 6 °C)  HR:  [63-72] 63  Resp:  [16-19] 18  BP: (139-153)/(60-79) 147/60  SpO2:  [92 %-97 %] 92 %  Body mass index is 29 65 kg/m²  Input and Output Summary (last 24 hours): Intake/Output Summary (Last 24 hours) at 3/17/2022 1541  Last data filed at 3/17/2022 1252  Gross per 24 hour   Intake 560 ml   Output --   Net 560 ml       Physical Exam:   Physical Exam  Constitutional:       Appearance: Normal appearance  HENT:      Head: Normocephalic and atraumatic     Eyes:      Extraocular Movements: Extraocular movements intact  Pupils: Pupils are equal, round, and reactive to light  Cardiovascular:      Rate and Rhythm: Normal rate and regular rhythm  Heart sounds: No murmur heard  No gallop  Pulmonary:      Effort: Pulmonary effort is normal       Breath sounds: Normal breath sounds  Abdominal:      General: Bowel sounds are normal       Palpations: Abdomen is soft  Tenderness: There is no abdominal tenderness  Musculoskeletal:         General: No swelling or deformity  Normal range of motion  Cervical back: Normal range of motion and neck supple  Skin:     General: Skin is warm and dry  Neurological:      General: No focal deficit present  Mental Status: He is alert  Additional Data:     Labs:  Results from last 7 days   Lab Units 03/17/22  0446   WBC Thousand/uL 12 13*   HEMOGLOBIN g/dL 7 7*   HEMATOCRIT % 24 6*   PLATELETS Thousands/uL 291     Results from last 7 days   Lab Units 03/17/22  0446 03/16/22  1226 03/16/22  1226   SODIUM mmol/L 128*   < > 129*   POTASSIUM mmol/L 5 4*   < > 4 8   CHLORIDE mmol/L 92*   < > 91*   CO2 mmol/L 21   < > 20*   BUN mg/dL 98*   < > 72*   CREATININE mg/dL 7 05*   < > 6 08*   ANION GAP mmol/L 15*   < > 18*   CALCIUM mg/dL 7 0*   < > 7 2*   ALBUMIN g/dL  --   --  1 8*   TOTAL BILIRUBIN mg/dL  --   --  0 32   ALK PHOS U/L  --   --  88   ALT U/L  --   --  34   AST U/L  --   --  23   GLUCOSE RANDOM mg/dL 247*   < > 331*    < > = values in this interval not displayed           Results from last 7 days   Lab Units 03/17/22  1135 03/17/22  0717 03/16/22  1953 03/16/22  1545 03/16/22  1144 03/16/22  0717 03/16/22  0003 03/15/22  2204 03/15/22  2008 03/15/22  1600 03/15/22  1151 03/15/22  0709   POC GLUCOSE mg/dl 340* 273* 326* 433* 347* 282* 262* 358* 400* 430* 200* 337*         Results from last 7 days   Lab Units 03/15/22  0856 03/14/22  0513   PROCALCITONIN ng/ml 3 00* 3 40*       Lines/Drains:  Invasive Devices  Report    Peripheral Intravenous Line Peripheral IV 03/14/22 Dorsal (posterior); Left Hand 3 days          Hemodialysis Catheter            HD Permanent Double Catheter 9 days                      Imaging: Reviewed radiology reports from this admission including: chest xray    Recent Cultures (last 7 days):   Results from last 7 days   Lab Units 03/11/22  0638   SPUTUM CULTURE  Test not performed  Suggest repeat specimen  GRAM STAIN RESULT  >10 squamous epithelial cells/lpf, indicating orophayngeal contamination  *  3+ Gram positive cocci in pairs, chains and clusters*  3+ Gram negative rods*  1+ Gram positive rods*  No polys seen*       Last 24 Hours Medication List:   Current Facility-Administered Medications   Medication Dose Route Frequency Provider Last Rate    acetaminophen  650 mg Oral Q6H PRN Jacob Paz MD      calcium carbonate-vitamin D  1 tablet Oral Daily With Breakfast Elvin Collins MD      epoetin ani  10,000 Units Intravenous Once per day on Tue Thu Sat Elvin Collins MD      furosemide  40 mg Oral Daily Elvin Collins MD      insulin aspart protamine-insulin aspart  30 Units Subcutaneous BID AC Daisy Keating MD      insulin lispro  1-5 Units Subcutaneous TID AC Emiliana Jordan DO      insulin lispro  1-5 Units Subcutaneous HS Emiliana Jordan, DO      latanoprost  1 drop Both Eyes HS Jacob Paz MD      melatonin  6 mg Oral HS Daisy Keating MD      ondansetron  4 mg Intravenous Once PRN Minor Jiménez MD      pantoprazole  40 mg Intravenous Q12H [address] Jacob Paz MD      pravastatin  80 mg Oral Daily With Jean Ortiz MD      [START ON 4/28/2022] predniSONE  15 mg Oral Daily Elvin Collins MD      [START ON 4/14/2022] predniSONE  20 mg Oral Daily Elvin Collins MD      [START ON 3/31/2022] predniSONE  25 mg Oral Daily Elvin Collins MD      [START ON 3/24/2022] predniSONE  30 mg Oral Daily Joey Rod Kelsy De La Vega MD      predniSONE  60 mg Oral Daily Louise Wright MD      sulfamethoxazole-trimethoprim  1 tablet Oral Once per day on Tue Thu Sat Sav Galaviz MD      tamsulosin  0 4 mg Oral Daily With Shandra James MD          Today, Patient Was Seen By: Sav Galaviz MD    **Please Note: This note may have been constructed using a voice recognition system  **

## 2022-03-17 NOTE — ASSESSMENT & PLAN NOTE
Patient states he has had a cough for a couple weeks  Patient with episodes of hemoptysis here   Chest x-ray was unremarkable  Obtain sputum culture if possible  Procalcitonin elevated at 6 73 --> 3 4, unclear significance in the setting of renal dysfunction    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  No fluid noted patient continues to have some mucus production especially in the morning with coughing

## 2022-03-17 NOTE — ASSESSMENT & PLAN NOTE
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  Since patient had metabolic acidosis patient was placed on bicarbonate drip at 150 mL/hour  Renal US showed no evidence of hydronephrosis  8mm calculus was found in bladder  Possible cause of hematuria if not glomerulonephritis   Continue to hold Vasotec  Patient is recieving further workup for glomerulonephritis  SAMANTHA, GBM Ab, C3 and C4 WNL  ANCA and Other tests pending  UA showed innumerable red blood cells 10-20 white cells with glucose  CT of abdomen and pelvis showed 8 mm stone at UVJ with hydroureter but no hydronephrosis and not significant changed since 2016  No peritoneal or retroperitoneal hematoma or evidence of GI bleeding  Mild pulmonary edema and small b/l pleural effusions were noted  CXR pending  Patient started dialysis 3/9 and has been receiving dialysis per Renal  IVF were d/c on 3/9  Patient currently on hemodialysis on Tuesday Thursday Saturday schedule  Status post renal biopsy on 03/10-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

## 2022-03-17 NOTE — OCCUPATIONAL THERAPY NOTE
OT TREATMENT       03/17/22 1047   Note Type   Note Type Treatment   Restrictions/Precautions   Other Precautions Chair Alarm; Bed Alarm; Fall Risk;Hard of hearing;Cognitive   Pain Assessment   Pain Assessment Tool 0-10   Pain Score No Pain   ADL   Grooming Assistance 5  Supervision/Setup   Grooming Deficit Wash/dry hands  (standing at sink )   LB Dressing Assistance 4  Minimal Assistance   LB Dressing Deficit Don/doff R sock   LB Dressing Comments seated in chair    Bed Mobility   Supine to Sit 5  Supervision   Sit to Supine 5  Supervision   Transfers   Sit to Stand 5  Supervision   Stand to Sit 5  Supervision   Toilet transfer 5  Supervision   Additional items Standard toilet   Functional Mobility   Functional Mobility 5  Supervision   Additional Comments 150 feet   Additional items Rolling walker   ROM- Right Upper Extremities   R Shoulder AROM; Flexion; Horizontal ABduction   R Elbow AROM;Elbow flexion;Elbow extension   R Hand AROM; Index finger; Thumb;Ring finger; Long finger;Little finger   R Weight/Reps/Sets 2x10 reps seated on edge of bed    ROM - Left Upper Extremities    L Shoulder AROM; Flexion; Horizontal ABduction   L Elbow AROM;Elbow flexion;Elbow extension   L Hand AROM; Thumb; Index finger; Long finger;Ring finger;Little finger   L Weight/Reps/Sets 2x10 reps seated on edge of bed    Assessment   Assessment Patient seen for OT treatment  Patient is demonstrating improvements in ADLS and functional mobility (supervision with RW)  Patients endurance/activity tolerance is improving  Patient is pleasant and cooperative and motivated to participate in OT session "can we go for a walk:"  Patient will benefit from continued OT services to maximize functional performance with ADLS  The patient's raw score on the AM-PAC Daily Activity inpatient short form is 20, standardized score is 42 03, greater than 39 4  Patients at this level are likely to benefit from DC to home   Please refer to the recommendation of the Occupational Therapist for safe DC planning  Plan   Treatment Interventions ADL retraining;Functional transfer training;UE strengthening/ROM; Endurance training;Patient/family training;Equipment evaluation/education; Activityengagement; Compensatory technique education   OT Frequency 3-5x/wk   Recommendation   OT Discharge Recommendation Home with home health rehabilitation   AM-PAC Daily Activity Inpatient   Lower Body Dressing 3   Bathing 3   Toileting 3   Upper Body Dressing 3   Grooming 4   Eating 4   Daily Activity Raw Score 20   Daily Activity Standardized Score (Calc for Raw Score >=11) 42 03   AM-PAC Applied Cognition Inpatient   Following a Speech/Presentation 4   Understanding Ordinary Conversation 4   Taking Medications 4   Remembering Where Things Are Placed or Put Away 4   Remembering List of 4-5 Errands 4   Taking Care of Complicated Tasks 3   Applied Cognition Raw Score 23   Applied Cognition Standardized Score 03 93   Licensure   [place] License Number  Moni Louis Hira 87 OTR/L [id number]

## 2022-03-17 NOTE — PROGRESS NOTES
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    · Etiology:   PR3 associated ANCA vasculitis    · Current creatinine:  8 63 mg/dL,   · UA:  Hematuria, leukocyturia   · Renal imaging :  No hydronephrosis  · GN workup:  · Normal complements  · Anti GBM negative  · SAMANTHA negative  · PR3:      34 1>>84  · SPEP:  No monoclonal gammopathy  · Treatment:  · S/p kidney biopsy on 03/10  · Will continue to monitor for kidney recovery  · Plan to continue HD TTS  · Monitor urinary output  · Maintain MAP:  Over 65 mmHg if possible/avoid hypoperfusion:  Hold parameters on blood pressure medications  · Avoid nephrotoxic agents such as NSAIDs, and IV contrast if possible   Avoid opioids   · Adjust medications to GFR  · Will continue dialysis at [facility] (will contact nephrologist to inform about further management)     #Final kidney biopsy results  · 31 gloms  · LM:    · 12 glom with global sclerosis prognosis, few global and sclerotic glomerulus show fragmentation of the test suggestive of healed crest (chronic)  · 19 gloms viable  · For cellular crescents with fibrinoid necrosis (acute)  · Remaining myelitis with no significant endocapillary proliferation, GMB appears minimally thickened   · Mild mononuclear interstitial inflammation with moderate to mellitus  · Medullary angiitis  · Severe arteriosclerosis and moderate arteriolosclerosis  · 55-60% IFTA  · ATN with red blood cell casts   · IF:   · Minimal staining   · No EM  · Conclusion:  · Focal necrotizing and resecting glomerulonephritis, 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  · Moderate acute tubular injury  · Moderately severe tubular atrophy and interstitial fibrosis with focal tubulointerstitial inflammation       # hemoptysis  · 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  · Chest x-ray no consistent with alveolar hemorrhage  · Patient denies hemoptysis  · No sinus symptoms  · CT:  Bilateral pleural effusion  · LA 3 trending up, at risk for pulmonary compromise     # immunosuppressive therapy  · Methylprednisolone 500 mg started on 03/14-3/16, completed  · PEXIVAX reduced prednisone dose  · Prednisone taper:  ·   60 mg once a day 3/17-3/23  · 30 mg once a day 3/24-3/30  · 25 mg once a day 3/31-4/14  · 20 mg once a day 4/15-4/28  · 15 mg once a day 4/29-5/12  · 12 5 mg once a day 5/13-5/27  · 10 mg once a day 5/28-6/11  · 7 5 mg once a day 6/12-6/26  · Continue with 5 mg once a day 6/27-  · Further immunosuppression discussed with patient (risk and benefits explained), 2 options available rituximab versus cyclophosphamide  · Due to RPGN presentation with severe LORRAINE requiring dialysis, best option for renal recovery would be cyclophosphamide  · Need to start cycle fossa medicine as possible   · Cyclophosphamide 7 5 mg/kg ordered (will call infusion center to schedule it asap)  · Will require cyclophosphamide on weeks 0, 2, 4,7,10,13  · Immunosuppressive therapy will have to be discussed directly with nephrologist a new dialysis unit     #CKD G3a  · Baseline creatinine:  1 3 mg/dL  · Etiology:  Likely secondary to nephroangiosclerosis in the settings of hypertension     #Acid-base Disorder  · serum HCO3 21mmol/L said  · Secondary to LORRAINE     #Volume status/hypertension:  · Volume:  Euvolemic  · Making urine but no collecting  · Reported urinary of food to 2- 3 times a day  · Blood pressure:     borderline hypertensive /66mmhg, goal<140/90  · Recommend:  · Low-sodium diet  · If remains hypertensive can start nifedipine 30 mg once a day     #Anemia:  · Current hemoglobin:  7 7 mg/dL, stable  · Hemoglobin trending down · Rule out pulmonary hemorrhage if continues to drop hemoglobin  ·  No further episodes of hemoptysis  · Chest x-ray no consistent with cellular hemorrhage  · Status post blood transfusion  · Possible GI bleed and kidney disease  · Treatment:  · EPO 16613 units on HD  · Transfuse for hemoglobin less than 7 0 per primary service      # confusion  · Resolved  · Likely secondary to high-dose steroids      SUBJECTIVE:  Patient seen and examined at bedside  Patient feels a little better this morning, reports urinary output but not collecting urine, no chest pain, no shortness of breath  Patient is aware that he will continue hemodialysis at an outpatient dialysis unit until kidney recovery      OBJECTIVE:  Current Weight: Weight - Scale: 88 5 kg (195 lb)  Vitals:    03/17/22 0755   BP: 145/66   Pulse: 64   Resp: 16   Temp: 97 8 °F (36 6 °C)   SpO2: 94%       Intake/Output Summary (Last 24 hours) at 3/17/2022 0853  Last data filed at 3/17/2022 0801  Gross per 24 hour   Intake 240 ml   Output --   Net 240 ml     Wt Readings from Last 3 Encounters:   03/11/22 88 5 kg (195 lb)   12/14/21 88 5 kg (195 lb)   09/07/21 88 9 kg (196 lb)     Temp Readings from Last 3 Encounters:   03/17/22 97 8 °F (36 6 °C) (Oral)   12/14/21 97 8 °F (36 6 °C)   09/07/21 99 °F (37 2 °C) (Temporal)     BP Readings from Last 3 Encounters:   03/17/22 145/66   12/14/21 124/60   09/07/21 130/70     Pulse Readings from Last 3 Encounters:   03/17/22 64   12/14/21 80   09/07/21 75        General:   no acute distress at this time  Skin:  No acute rash  Eyes:  No scleral icterus and noninjected  ENT:  mucous membranes moist  Neck:  no carotid bruits  Chest:  Clear to auscultation percussion, good respiratory effort, no use of accessory respiratory muscles  CVS:  Regular rate and rhythm without a murmur rub , no jugular venous distention,  Abdomen:  soft and nontender   Extremities:  No clubbing, no cyanosis, no significant lower extremity edema  Neuro: No gross focality  Psych:  Alert , cooperative   Vascular access:  Right IJ PermCath      Medications:    Current Facility-Administered Medications:     acetaminophen (TYLENOL) tablet 650 mg, 650 mg, Oral, Q6H PRN, Talita Cline MD, 650 mg at 03/15/22 2106    calcium carbonate-vitamin D (OSCAL-D) 500 mg-200 units per tablet 1 tablet, 1 tablet, Oral, Daily With Breakfast, Favio Escobar MD, 1 tablet at 03/17/22 0809    epoetin ani (EPOGEN,PROCRIT) injection 10,000 Units, 10,000 Units, Intravenous, Once per day on Tue Thu Sat, Favio Escobar MD, 10,000 Units at 03/15/22 1003    epoetin ani (EPOGEN,PROCRIT) injection 5,000 Units, 5,000 Units, Intravenous, After Dialysis, Favio Escobar MD    furosemide (LASIX) tablet 40 mg, 40 mg, Oral, Daily, Favio Escobar MD, 40 mg at 03/17/22 0810    insulin aspart protamine-insulin aspart (NovoLOG 70/30) 100 units/mL subcutaneous injection 25 Units, 25 Units, Subcutaneous, BID AC, Cindy Keating MD, 25 Units at 03/17/22 0817    insulin lispro (HumaLOG) 100 units/mL subcutaneous injection 1-5 Units, 1-5 Units, Subcutaneous, TID AC, 3 Units at 03/17/22 0810 **AND** Fingerstick Glucose (POCT), , , TID AC, Emiliana Revalanar, DO    insulin lispro (HumaLOG) 100 units/mL subcutaneous injection 1-5 Units, 1-5 Units, Subcutaneous, HS, Emiliana Jordan DO, 3 Units at 03/16/22 2143    latanoprost (XALATAN) 0 005 % ophthalmic solution 1 drop, 1 drop, Both Eyes, HS, Talita Cline MD, 1 drop at 03/16/22 2143    melatonin tablet 6 mg, 6 mg, Oral, HS, Talita Cline MD, 6 mg at 03/16/22 2144    ondansetron (ZOFRAN) injection 4 mg, 4 mg, Intravenous, Once PRN, Magi Mahmood MD    pantoprazole (PROTONIX) injection 40 mg, 40 mg, Intravenous, Q12H [facility], Talita Cilne MD, 40 mg at 03/17/22 0810    pravastatin (PRAVACHOL) tablet 80 mg, 80 mg, Oral, Daily With Evelyn Borrego MD, 80 mg at 03/16/22 1728    [START ON 4/28/2022] predniSONE tablet 15 mg, 15 mg, Oral, Daily, Carson Preston MD    [START ON 4/14/2022] predniSONE tablet 20 mg, 20 mg, Oral, Daily, MD Javier Jacob ON 3/31/2022] predniSONE tablet 25 mg, 25 mg, Oral, Daily, MD Javier Jacob ON 3/24/2022] predniSONE tablet 30 mg, 30 mg, Oral, Daily, Carson Preston MD    predniSONE tablet 60 mg, 60 mg, Oral, Daily, Carson Preston MD, 60 mg at 03/17/22 0809    sulfamethoxazole-trimethoprim (BACTRIM) 400-80 mg per tablet 1 tablet, 1 tablet, Oral, Once per day on Tue Thu Sat, Vannesa Peterson MD, 1 tablet at 03/17/22 0813    tamsulosin [facility]) capsule 0 4 mg, 0 4 mg, Oral, Daily With Mita Azevedo MD, 0 4 mg at 03/16/22 1728    Laboratory Results:  Results from last 7 days   Lab Units 03/17/22  0446 03/16/22  1226 03/16/22  0529 03/15/22  0856 03/14/22  0513 03/14/22  0512 03/13/22  0514 03/12/22  1220 03/11/22  1601 03/11/22  0451 03/11/22  0451   WBC Thousand/uL 12 13*  --  13 72*  --   --  13 13*  --  12 11*  --   --  11 30*   HEMOGLOBIN g/dL 7 7*  --  7 1* 7 8*  --  7 5* 7 6* 6 7* 7 7*   < > 6 1*   HEMATOCRIT % 24 6*  --  22 1* 23 8*  --  23 1* 22 6* 21 0* 23 9*   < > 19 3*   PLATELETS Thousands/uL 291  --  260  --   --  210  --  182  --   --  217   SODIUM mmol/L 128* 129*  --  126* 131*  --   --  133*  --   --  135*   POTASSIUM mmol/L 5 4* 4 8  --  4 9 4 4  --   --  4 6  --   --  4 4   CHLORIDE mmol/L 92* 91*  --  89* 92*  --   --  94*  --   --  95*   CO2 mmol/L 21 20*  --  21 24  --   --  22  --   --  24   BUN mg/dL 98* 72*  --  91* 73*  --   --  117*  --   --  93*   CREATININE mg/dL 7 05* 6 08*  --  8 63* 6 94*  --   --  9 96*  --   --  7 79*   CALCIUM mg/dL 7 0* 7 2*  --  6 9* 7 1*  --   --  7 0*  --   --  6 8*    < > = values in this interval not displayed         XR chest portable   Final Result by Christian Atkinson MD (03/14 2760)      No active disease in the chest  Workstation performed: [id number]         IR biopsy kidney columbia kit no laterality   Final Result by Anju Jasmine MD (03/10 1640)   Impression: Successful percutaneous core biopsy of the right renal cortex for Kate Kit  A full pathology report will follow  Workstation performed: [id number]         IR tunneled dialysis catheter placement   Final Result by Sterling Rm MD (03/08 1523)   Impression: Successful placement of a 15-Kiswahili by 28 cm Titan dialysis catheter via the right internal jugular vein  The tip of the catheter is in the right atrium and may be used immediately  Workstation performed: [id number]         CT abdomen pelvis wo contrast   Final Result by Americo Michael MD (03/08 1153)      1  No peritoneal or retroperitoneal hematoma  2   An 8 mm right UVJ calculus causes mild upstream hydroureter without significant hydronephrosis  3   Mild pulmonary edema, small simple bilateral pleural effusions and bibasilar atelectasis  Workstation performed: [id number]         US kidney and bladder   Final Result by Mariah Monterroso MD (03/07 1432)      No hydronephrosis  8 mm calculus in the right dependent aspect of the urinary bladder  Workstation performed: [id number]         XR chest 1 view portable   Final Result by Sayda Villalpando MD (03/07 1250)      No active pulmonary disease  Workstation performed: [id number]             Portions of the record may have been created with voice recognition software  Occasional wrong word or "sound a like" substitutions may have occurred due to the inherent limitations of voice recognition software  Read the chart carefully and recognize, using context, where substitutions have occurred

## 2022-03-17 NOTE — PHYSICAL THERAPY NOTE
03/17/22 1332   PT Last Visit   PT Visit Date 03/17/22   Note Type   Note Type Cancelled Session   Cancel Reasons Other  (Pt receiving hemodialysis)   Licensure   [place] License Number  Radha Serrano PT [id number]

## 2022-03-17 NOTE — ASSESSMENT & PLAN NOTE
Likely secondary to renal failure and also hyperglycemic state  Patient was placed on bicarbonate drip at 150 mL/hour, Bicarb drip was d/c on 3/9    Secondary to LORRAINE

## 2022-03-18 ENCOUNTER — TELEPHONE (OUTPATIENT)
Dept: GASTROENTEROLOGY | Facility: CLINIC | Age: 77
End: 2022-03-18

## 2022-03-18 LAB
ANION GAP SERPL CALCULATED.3IONS-SCNC: 12 MMOL/L (ref 4–13)
BUN SERPL-MCNC: 63 MG/DL (ref 5–25)
CALCIUM SERPL-MCNC: 7.2 MG/DL (ref 8.3–10.1)
CHLORIDE SERPL-SCNC: 94 MMOL/L (ref 100–108)
CO2 SERPL-SCNC: 27 MMOL/L (ref 21–32)
CREAT SERPL-MCNC: 4.67 MG/DL (ref 0.6–1.3)
ERYTHROCYTE [DISTWIDTH] IN BLOOD BY AUTOMATED COUNT: 14.3 % (ref 11.6–15.1)
GFR SERPL CREATININE-BSD FRML MDRD: 11 ML/MIN/1.73SQ M
GLUCOSE SERPL-MCNC: 106 MG/DL (ref 65–140)
GLUCOSE SERPL-MCNC: 111 MG/DL (ref 65–140)
GLUCOSE SERPL-MCNC: 151 MG/DL (ref 65–140)
GLUCOSE SERPL-MCNC: 274 MG/DL (ref 65–140)
GLUCOSE SERPL-MCNC: 328 MG/DL (ref 65–140)
HCT VFR BLD AUTO: 22.9 % (ref 36.5–49.3)
HGB BLD-MCNC: 7.8 G/DL (ref 12–17)
MCH RBC QN AUTO: 28.3 PG (ref 26.8–34.3)
MCHC RBC AUTO-ENTMCNC: 34.1 G/DL (ref 31.4–37.4)
MCV RBC AUTO: 83 FL (ref 82–98)
PLATELET # BLD AUTO: 316 THOUSANDS/UL (ref 149–390)
PMV BLD AUTO: 9.9 FL (ref 8.9–12.7)
POTASSIUM SERPL-SCNC: 4.5 MMOL/L (ref 3.5–5.3)
RBC # BLD AUTO: 2.76 MILLION/UL (ref 3.88–5.62)
SODIUM SERPL-SCNC: 133 MMOL/L (ref 136–145)
WBC # BLD AUTO: 11.84 THOUSAND/UL (ref 4.31–10.16)

## 2022-03-18 PROCEDURE — 99232 SBSQ HOSP IP/OBS MODERATE 35: CPT | Performed by: STUDENT IN AN ORGANIZED HEALTH CARE EDUCATION/TRAINING PROGRAM

## 2022-03-18 PROCEDURE — 97116 GAIT TRAINING THERAPY: CPT | Performed by: PHYSICAL THERAPIST

## 2022-03-18 PROCEDURE — 99232 SBSQ HOSP IP/OBS MODERATE 35: CPT | Performed by: INTERNAL MEDICINE

## 2022-03-18 PROCEDURE — 82948 REAGENT STRIP/BLOOD GLUCOSE: CPT

## 2022-03-18 PROCEDURE — 85027 COMPLETE CBC AUTOMATED: CPT | Performed by: INTERNAL MEDICINE

## 2022-03-18 PROCEDURE — 80048 BASIC METABOLIC PNL TOTAL CA: CPT | Performed by: INTERNAL MEDICINE

## 2022-03-18 PROCEDURE — C9113 INJ PANTOPRAZOLE SODIUM, VIA: HCPCS | Performed by: INTERNAL MEDICINE

## 2022-03-18 RX ORDER — NIFEDIPINE 30 MG/1
30 TABLET, EXTENDED RELEASE ORAL DAILY
Status: DISCONTINUED | OUTPATIENT
Start: 2022-03-18 | End: 2022-03-19 | Stop reason: HOSPADM

## 2022-03-18 RX ADMIN — INSULIN LISPRO 3 UNITS: 100 INJECTION, SOLUTION INTRAVENOUS; SUBCUTANEOUS at 17:14

## 2022-03-18 RX ADMIN — INSULIN LISPRO 1 UNITS: 100 INJECTION, SOLUTION INTRAVENOUS; SUBCUTANEOUS at 12:48

## 2022-03-18 RX ADMIN — NIFEDIPINE 30 MG: 30 TABLET, EXTENDED RELEASE ORAL at 12:47

## 2022-03-18 RX ADMIN — Medication 6 MG: at 22:36

## 2022-03-18 RX ADMIN — LATANOPROST 1 DROP: 50 SOLUTION OPHTHALMIC at 22:37

## 2022-03-18 RX ADMIN — TAMSULOSIN HYDROCHLORIDE 0.4 MG: 0.4 CAPSULE ORAL at 17:13

## 2022-03-18 RX ADMIN — PANTOPRAZOLE SODIUM 40 MG: 40 INJECTION, POWDER, FOR SOLUTION INTRAVENOUS at 20:38

## 2022-03-18 RX ADMIN — INSULIN ASPART 30 UNITS: 100 INJECTION, SUSPENSION SUBCUTANEOUS at 17:13

## 2022-03-18 RX ADMIN — FUROSEMIDE 40 MG: 40 TABLET ORAL at 08:49

## 2022-03-18 RX ADMIN — PANTOPRAZOLE SODIUM 40 MG: 40 INJECTION, POWDER, FOR SOLUTION INTRAVENOUS at 08:48

## 2022-03-18 RX ADMIN — INSULIN ASPART 30 UNITS: 100 INJECTION, SUSPENSION SUBCUTANEOUS at 08:49

## 2022-03-18 RX ADMIN — INSULIN LISPRO 3 UNITS: 100 INJECTION, SOLUTION INTRAVENOUS; SUBCUTANEOUS at 22:36

## 2022-03-18 RX ADMIN — PREDNISONE 60 MG: 20 TABLET ORAL at 08:48

## 2022-03-18 RX ADMIN — PRAVASTATIN SODIUM 80 MG: 80 TABLET ORAL at 17:13

## 2022-03-18 RX ADMIN — Medication 1 TABLET: at 08:49

## 2022-03-18 NOTE — PROGRESS NOTES
[facility] [address]  Progress Note - Gemini Chance 1945, 64 y o  male MRN: [id number]  Unit/Bed#: [address] [facility] Encounter: [id number]  Primary Care Provider: Vinh Villeda MD   Date and time admitted to hospital: 3/7/2022  9:37 AM    * Symptomatic anemia  Assessment & Plan  Hemoglobin upon admission was 5 8  Patient recieved 6 units of PRBC this admission  MCV level was normal  Vitamin H79 and folic acid level WNL  Iron panel revealed elevated ferritin, normal iron saturation, low TIBC, and low iron  Anemia likely multifactorial in the setting of possible GI bleeding and also CKD  Started on Epogen with dialysis  CT abd/pelvis found no evidence of GI bleed  S/P EGD on 03/11 which was negative for active bleeding  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  Colonoscopy showed polyp in the descending colon which was biopsied, 1 polyp 5-9 millimeter in the sigmoid colon which was removed EN bloc with cold snare and few scattered diverticula with protruding internal hemorrhoids  Pathology results are pending at the current time  Patient might need capsule endoscopy  Improved hemoglobin to 7 7 after 1 unit of PRBC transfusion on 3/17/22 H/H has been stable    Results from last 7 days   Lab Units 03/18/22  0539 03/17/22  0446 03/16/22  0529 03/15/22  0856 03/14/22  0512 03/13/22  0514 03/12/22  1220 03/11/22  1601   HEMOGLOBIN g/dL 7 8* 7 7* 7 1* 7 8* 7 5* 7 6* 6 7* 7 7*   HEMATOCRIT % 22 9* 24 6* 22 1* 23 8* 23 1* 22 6* 21 0* 23 9*   MCV fL 83 88 88  --  89  --  89  --        Acute kidney injury (HCC)  Assessment & Plan  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  Since patient had metabolic acidosis patient was placed on bicarbonate drip at 150 mL/hour  Renal US showed no evidence of hydronephrosis   8mm calculus was found in bladder  Possible cause of hematuria if not glomerulonephritis   Continue to hold Vasotec  Patient is recieving further workup for glomerulonephritis  SAMANTHA, GBM Ab, C3 and C4 WNL  ANCA and Other tests pending  UA showed innumerable red blood cells 10-20 white cells with glucose  CT of abdomen and pelvis showed 8 mm stone at UVJ with hydroureter but no hydronephrosis and not significant changed since 2016  No peritoneal or retroperitoneal hematoma or evidence of GI bleeding  Mild pulmonary edema and small b/l pleural effusions were noted  CXR pending  Patient started dialysis 3/9 and has been receiving dialysis per Renal  IVF were d/c on 3/9  Patient currently on hemodialysis on Tuesday Thursday Saturday schedule  Status post renal biopsy on 03/10-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  PR3 34-84  Patient received methylprednisolone 500 milligram IV daily for 3 doses  Patient to be discharged on prednisone taper  Patient is on PCP prophylaxis with Bactrim  Patient scheduled for outpatient infusion for cyclophosphamide  Patient is set up for outpatient dialysis on March 22, 2022    Results from last 7 days   Lab Units 03/18/22  0539 03/17/22  0446 03/16/22  1226 03/15/22  0856 03/14/22  0513 03/12/22  1220   BUN mg/dL 63* 98* 72* 91* 73* 117*   CREATININE mg/dL 4 67* 7 05* 6 08* 8 63* 6 94* 9 96*       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HCC)  Assessment & Plan  Received Solu-Medrol 500 milligram for 3 days from 03/14 -03/16  Patient currently on prednisone taper  Patient to be scheduled for cyclophosphamide infusion on March 23, 2022 at 11:30 a m  Melena  Assessment & Plan  Patient reported black stool for couple of days prior to admission and even reported them after admission  Patient on Protonix 40 milligram IV q 12 hours  No history of NSAID use  Colonoscopy showed polyps    EGD showed mild nodularity of the duodenal bulb likely [place] Breeding gland hyperplasia  Biopsies have been negative  Patient is having regular bowel movements without any black stool currently    High anion gap metabolic acidosis  Assessment & Plan  Likely secondary to renal failure and also hyperglycemic state  Patient was placed on bicarbonate drip at 150 mL/hour, Bicarb drip was d/c on 3/9  Secondary to LORRAINE    Chest pain  Assessment & Plan  Patient presented with left-sided chest pain,Troponins x2 were negative initially  EKG was unremarkable  ProBNP was elevated at 15,000   2D echo showed normal EF with normal diastolic function  Reported chest pain again 3/9 and troponin peaked at 104   Plan for outpatient stress test     Type 2 diabetes mellitus with microalbuminuria, with long-term current use of insulin [facility])  Assessment & Plan  Lab Results   Component Value Date    HGBA1C 7 2 (H) 12/03/2021       Recent Labs     03/17/22  1611 03/17/22  2115 03/18/22  0720 03/18/22  1125   POCGLU 231* 269* 111 151*     Patient is on Humalog mix 75/25 45 units b i d  Hold Humalog mix  Patient was initially on insulin drip as 1-beta hydroxybutyrate was elevated at 1 6  Patient is is on 70 /30 20 units b i d  which will be increased to 30 units b i d  Uncontrolled blood sugars secondary to steroids  Hyperlipidemia  Assessment & Plan  Zocor substituted with Pravachol    Benign essential hypertension  Assessment & Plan  Patient is on Vasotec at home which is being held in the setting of acute kidney injury  Avoid hypotension  Blood pressure moderately controlled  Start nifedipine 30 milligram p o  Daily          VTE Pharmacologic Prophylaxis:   High Risk (Score >/= 5) - Pharmacological DVT Prophylaxis Contraindicated  Sequential Compression Devices Ordered  Patient Centered Rounds: I performed bedside rounds with nursing staff today    Discussions with Specialists or Other Care Team Provider: Dr Jasmina Romero    Education and Discussions with Family / Patient: Updated  (wife) via phone  Time Spent for Care: 45 minutes  More than 50% of total time spent on counseling and coordination of care as described above  Current Length of Stay: 11 day(s)  Current Patient Status: Inpatient   Certification Statement: The patient will continue to require additional inpatient hospital stay due to [facility]  Discharge Plan: Anticipate discharge tomorrow to home  Code Status: Level 1 - Full Code    Subjective:   Patient is feeling well  No complaints    Objective:     Vitals:   Temp (24hrs), Av 6 °F (36 4 °C), Min:97 4 °F (36 3 °C), Max:97 7 °F (36 5 °C)    Temp:  [97 4 °F (36 3 °C)-97 7 °F (36 5 °C)] 97 4 °F (36 3 °C)  HR:  [63-72] 63  Resp:  [17-20] 20  BP: (132-149)/(53-60) 136/55  SpO2:  [94 %-95 %] 94 %  Body mass index is 29 65 kg/m²  Input and Output Summary (last 24 hours): Intake/Output Summary (Last 24 hours) at 3/18/2022 1709  Last data filed at 3/18/2022 0834  Gross per 24 hour   Intake 210 ml   Output 100 ml   Net 110 ml       Physical Exam:   Physical Exam  Constitutional:       Appearance: Normal appearance  HENT:      Head: Normocephalic and atraumatic  Eyes:      Extraocular Movements: Extraocular movements intact  Pupils: Pupils are equal, round, and reactive to light  Cardiovascular:      Rate and Rhythm: Normal rate and regular rhythm  Heart sounds: No murmur heard  No gallop  Pulmonary:      Effort: Pulmonary effort is normal       Breath sounds: Normal breath sounds  Abdominal:      General: Bowel sounds are normal       Palpations: Abdomen is soft  Tenderness: There is no abdominal tenderness  Musculoskeletal:         General: No swelling or deformity  Normal range of motion  Cervical back: Normal range of motion and neck supple  Skin:     General: Skin is warm and dry  Neurological:      General: No focal deficit present  Mental Status: He is alert            Additional Data:     Labs:  Results from last 7 days Lab Units 03/18/22  0539   WBC Thousand/uL 11 84*   HEMOGLOBIN g/dL 7 8*   HEMATOCRIT % 22 9*   PLATELETS Thousands/uL 316     Results from last 7 days   Lab Units 03/18/22  0539 03/17/22  0446 03/16/22  1226   SODIUM mmol/L 133*   < > 129*   POTASSIUM mmol/L 4 5   < > 4 8   CHLORIDE mmol/L 94*   < > 91*   CO2 mmol/L 27   < > 20*   BUN mg/dL 63*   < > 72*   CREATININE mg/dL 4 67*   < > 6 08*   ANION GAP mmol/L 12   < > 18*   CALCIUM mg/dL 7 2*   < > 7 2*   ALBUMIN g/dL  --   --  1 8*   TOTAL BILIRUBIN mg/dL  --   --  0 32   ALK PHOS U/L  --   --  88   ALT U/L  --   --  34   AST U/L  --   --  23   GLUCOSE RANDOM mg/dL 106   < > 331*    < > = values in this interval not displayed  Results from last 7 days   Lab Units 03/18/22  1624 03/18/22  1125 03/18/22  0720 03/17/22  2115 03/17/22  1611 03/17/22  1135 03/17/22  0717 03/16/22  1953 03/16/22  1545 03/16/22  1144 03/16/22  0717 03/16/22  0003   POC GLUCOSE mg/dl 274* 151* 111 269* 231* 340* 273* 326* 433* 347* 282* 262*         Results from last 7 days   Lab Units 03/15/22  0856 03/14/22  0513   PROCALCITONIN ng/ml 3 00* 3 40*       Lines/Drains:  Invasive Devices  Report    Peripheral Intravenous Line            Peripheral IV 03/14/22 Dorsal (posterior); Left Hand 4 days          Hemodialysis Catheter            HD Permanent Double Catheter 10 days                      Imaging: Reviewed radiology reports from this admission including: chest xray    Recent Cultures (last 7 days):         Last 24 Hours Medication List:   Current Facility-Administered Medications   Medication Dose Route Frequency Provider Last Rate    acetaminophen  650 mg Oral Q6H PRN Norma Springer MD      calcium carbonate-vitamin D  1 tablet Oral Daily With Breakfast Ken Lerma MD      epoetin ani  10,000 Units Intravenous Once per day on Tue Thu Sat Ken Lerma MD      furosemide  40 mg Oral Daily Ken Lerma MD      insulin aspart protamine-insulin aspart  30 Units Subcutaneous BID AC Daisy Keating MD      insulin lispro  1-5 Units Subcutaneous TID AC Emiliana Revalanar, DO      insulin lispro  1-5 Units Subcutaneous HS Emiliana Revankar, DO      latanoprost  1 drop Both Eyes HS Melisa Galindo MD      melatonin  6 mg Oral HS Melisa Galindo MD      NIFEdipine  30 mg Oral Daily Clover Andrews MD      ondansetron  4 mg Intravenous Once PRN Brandy Mcgee MD      pantoprazole  40 mg Intravenous Q12H Florencio Mcgrath MD      pravastatin  80 mg Oral Daily With Kishor Moscoso MD      [START ON 4/28/2022] predniSONE  15 mg Oral Daily Rohan Garner MD      [START ON 4/14/2022] predniSONE  20 mg Oral Daily Rohan Garner MD      [START ON 3/31/2022] predniSONE  25 mg Oral Daily Rohan Garner MD      [START ON 3/24/2022] predniSONE  30 mg Oral Daily Rohan Garner MD      predniSONE  60 mg Oral Daily Rohan Garner MD      sulfamethoxazole-trimethoprim  1 tablet Oral Once per day on Tue Thu Sat Melisa Galindo MD      tamsulosin  0 4 mg Oral Daily With Kishor Moscoso MD          Today, Patient Was Seen By: Melisa Galindo MD    **Please Note: This note may have been constructed using a voice recognition system  **

## 2022-03-18 NOTE — ASSESSMENT & PLAN NOTE
Patient is on Vasotec at home which is being held in the setting of acute kidney injury  Avoid hypotension  Blood pressure moderately controlled  Start nifedipine 30 milligram p o   Daily

## 2022-03-18 NOTE — ASSESSMENT & PLAN NOTE
Lab Results   Component Value Date    HGBA1C 7 2 (H) 12/03/2021       Recent Labs     03/17/22  1611 03/17/22  2115 03/18/22  0720 03/18/22  1125   POCGLU 231* 269* 111 151*     Patient is on Humalog mix 75/25 45 units b i d  Hold Humalog mix  Patient was initially on insulin drip as 1-beta hydroxybutyrate was elevated at 1 6  Patient is is on 70 /30 20 units b i d  which will be increased to 30 units b i d  Uncontrolled blood sugars secondary to steroids

## 2022-03-18 NOTE — PHYSICAL THERAPY NOTE
PT TREATMENT     Time In: 1345  Time Out: 1400       03/18/22 1345   PT Last Visit   PT Visit Date 03/18/22   Note Type   Note Type Treatment   Pain Assessment   Pain Assessment Tool 0-10   Pain Score No Pain   Restrictions/Precautions   Weight Bearing Precautions Per Order No   Other Precautions Hard of hearing;Cognitive   General   Chart Reviewed Yes   Cognition   Overall Cognitive Status WFL   Arousal/Participation Alert; Cooperative   Attention Attends with cues to redirect   Orientation Level Oriented X4   Subjective   Subjective Patient standing in doorway reporting "can I go for a walk, I want to move around "    Transfers   Sit to Stand 5  Supervision   Stand to Sit 5  Supervision   Ambulation/Elevation   Gait pattern Short stride; Shuffling   Gait Assistance 5  Supervision   Assistive Device None   Distance 60 feet x 1, 120 feet x 1   Balance   Static Sitting Good   Dynamic Sitting Fair +   Static Standing Good   Dynamic Standing Fair   Ambulatory Fair   Activity Tolerance   Activity Tolerance Patient limited by fatigue   Nurse Made Aware CNA Nereyda   Assessment   Prognosis Good   Problem List Decreased strength;Decreased range of motion;Decreased endurance; Impaired balance;Decreased mobility   Assessment Pt standing in hospital room doorway upon PT arrival  Pt agreeable to PT session today reporting "I want to walk, I've been walking around in my room by myself "  Patient supervision with sit <> stand  Patient supervision with gait, able to walk without AD today 60 feet x 1 with rest break, 120 feet x 1 with rest break  Patient reporting "okay I'm done" after second round of ambulation  Call bell and phone within reach  All needs met and pt reports no further questions for PT at this time  The patient's AM-PAC Basic Mobility Inpatient Short Form Raw Score is 22  A Raw score of greater than 16 suggests the patient may benefit from discharge to home   Please also refer to the recommendation of the Physical Therapist for safe discharge planning  Goals   STG Expiration Date 03/16/22   LTG Expiration Date 03/23/22   Plan   Treatment/Interventions ADL retraining;Functional transfer training;LE strengthening/ROM; Therapeutic exercise; Endurance training;Bed mobility;Gait training;Spoke to nursing   Progress Progressing toward goals   Recommendation   PT Discharge Recommendation Home with home health rehabilitation   [address] [place] [address] Mobility Inpatient   Turning in Bed Without Bedrails 4   Lying on Back to Sitting on Edge of Flat Bed 4   Moving Bed to Chair 4   Standing Up From Chair 4   Walk in Room 4   Climb 3-5 Stairs 2   Basic Mobility Inpatient Raw Score 22   Basic Mobility Standardized Score 47 4   Highest Level Of Mobility   JH-HLM Goal 7: Walk 25 feet or more   JH-HLM Highest Level of Mobility 7: Walk 25 feet or more   JH-HLM Goal Achieved Yes   End of Consult   Patient Position at End of Consult Seated edge of bed   Licensure   [place] License Number  Tila Norwood PT, DPT [id number]

## 2022-03-18 NOTE — CASE MANAGEMENT
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    Pulmonary hypertension ([place] [address] ) 2022    Dialysis patient ([place] [address] ) 2022    Anemia due to chronic kidney disease, on chronic dialysis ([place] [address] )     Hemoptysis 2022    LORRAINE (acute kidney injury) ([place] [address] )     Other proteinuria     Symptomatic anemia 2022    Chest pain 2022    Acute kidney injury ([place] [address] ) 2022    High anion gap metabolic acidosis     Melena 2022    Elevated PSA 2021    Chronic pain of right knee 2021    Primary osteoarthritis of right knee 2021    Bladder stones 12/15/2016    Type 2 diabetes mellitus with microalbuminuria, with long-term current use of insulin ([place] [address] ) 2016    Benign colon polyp 2013    Benign prostatic hyperplasia with lower urinary tract symptoms 2013    Benign essential hypertension 2013    Hyperlipidemia 2012    Vitamin D deficiency 2012      LOS (days): 11  Geometric Mean LOS (GMLOS) (days): 3 60  Days to GMLOS:-7 5     OBJECTIVE:  Risk of Unplanned Readmission Score: 22   Current admission status: Inpatient   Preferred Pharmacy:   [facility] [address] [place]  [phone] [facility] [address] [facility] [address] [place] [address] [id number]  Phone: [phone] Fax: [phone] [address], [address] [place] [address],[address]  [address], [address]  [facility] [address]  Phone: [phone] Fax: [phone]    Primary Care Provider: Pa Farris MD    Primary Insurance: MEDICARE  Secondary Insurance: BLUE CROSS    DISCHARGE DETAILS:    Discharge planning discussed with[de-identified] Patient and his wife Nathaniel Romeo    Other Referral/Resources/Interventions Provided:  Interventions: Other (Specify)  Referral Comments: [place] [address]    Would you like to participate in our [address] service program?  : No - Declined    Treatment Team Recommendation: Home with [facility]  Discharge Destination Plan[de-identified] Home with Phongherminiozo[place] at Discharge : [facility] with Dr Fazal Mercado and Dr Iona oPllock with nephrology  Patient will need OP IV Cytoxan Infusion every other Wednesday for 6 doses  The first dose will be this Wednesday 3/23  [facility] and spoke with Randy Lemos  Confirmed that they have patient on their schedule to start this Wednesday 3/23 at 11:30 AM start time  She states that a Quantaferin test, a cbc, a cmp and a UA were ordered and need to be done  CM spoke with Dr Iona Pollock and all tests were ordered and will be done prior to dc  CM spoke with both patient and his wife Shelly Balbuena and they are aware of the above and they will have someone to transport him on the [address] [place] [address] Avenue he needs to go  There are family members that can help him out  DCP remains for patient to be discharged home after dialysis tomorrow

## 2022-03-18 NOTE — PLAN OF CARE
Problem: MOBILITY - ADULT  Goal: Maintain or return to baseline ADL function  Description: INTERVENTIONS:  -  Assess patient's ability to carry out ADLs; assess patient's baseline for ADL function and identify physical deficits which impact ability to perform ADLs (bathing, care of mouth/teeth, toileting, grooming, dressing, etc )  - Assess/evaluate cause of self-care deficits   - Assess range of motion  - Assess patient's mobility; develop plan if impaired  - Assess patient's need for assistive devices and provide as appropriate  - Encourage maximum independence but intervene and supervise when necessary  - Involve family in performance of ADLs  - Assess for home care needs following discharge   - Consider OT consult to assist with ADL evaluation and planning for discharge  - Provide patient education as appropriate  Outcome: Progressing  Goal: Maintains/Returns to pre admission functional level  Description: INTERVENTIONS:  - Perform BMAT or MOVE assessment daily    - Set and communicate daily mobility goal to care team and patient/family/caregiver  - Collaborate with rehabilitation services on mobility goals if consulted  - Perform Range of Motion TID times a day  - Reposition patient every 2 hours    - Dangle patient TID  times a day  - Stand patient TID times a day  - Ambulate patient TID  times a day  - Out of bed to chair TID times a day   - Out of bed for meals TID  Problem: METABOLIC, FLUID AND ELECTROLYTES - ADULT  Goal: Electrolytes maintained within normal limits  Description: INTERVENTIONS:  - Monitor labs and assess patient for signs and symptoms of electrolyte imbalances  - Administer electrolyte replacement as ordered  - Monitor response to electrolyte replacements, including repeat lab results as appropriate  - Instruct patient on fluid and nutrition as appropriate  Outcome: Progressing  Goal: Fluid balance maintained  Description: INTERVENTIONS:  - Monitor labs   - Monitor I/O and WT  - Instruct patient on fluid and nutrition as appropriate  - Assess for signs & symptoms of volume excess or deficit  Outcome: Progressing    times a day  - Out of bed for toileting  - Record patient progress and toleration of activity level   Outcome: Progressing

## 2022-03-18 NOTE — ASSESSMENT & PLAN NOTE
Received Solu-Medrol 500 milligram for 3 days from 03/14 -03/16  Patient currently on prednisone taper  Patient to be scheduled for cyclophosphamide infusion on March 23, 2022 at 11:30 a m

## 2022-03-18 NOTE — PLAN OF CARE
Problem: MOBILITY - ADULT  Goal: Maintain or return to baseline ADL function  Description: INTERVENTIONS:  -  Assess patient's ability to carry out ADLs; assess patient's baseline for ADL function and identify physical deficits which impact ability to perform ADLs (bathing, care of mouth/teeth, toileting, grooming, dressing, etc )  - Assess/evaluate cause of self-care deficits   - Assess range of motion  - Assess patient's mobility; develop plan if impaired  - Assess patient's need for assistive devices and provide as appropriate  - Encourage maximum independence but intervene and supervise when necessary  - Involve family in performance of ADLs  - Assess for home care needs following discharge   - Consider OT consult to assist with ADL evaluation and planning for discharge  - Provide patient education as appropriate  Outcome: Progressing  Goal: Maintains/Returns to pre admission functional level  Description: INTERVENTIONS:  - Perform BMAT or MOVE assessment daily    - Set and communicate daily mobility goal to care team and patient/family/caregiver  - Collaborate with rehabilitation services on mobility goals if consulted  - Perform Range of Motion 2 times a day  - Reposition patient every 2 hours    - Dangle patient 2 times a day  - Stand patient 2 times a day  - Ambulate patient 2 times a day  - Out of bed to chair 3 times a day   - Out of bed for meals 3 times a day  - Out of bed for toileting  - Record patient progress and toleration of activity level   Outcome: Progressing     Problem: HEMATOLOGIC - ADULT  Goal: Maintains hematologic stability  Description: INTERVENTIONS  - Assess for signs and symptoms of bleeding or hemorrhage  - Monitor labs  - Administer supportive blood products/factors as ordered and appropriate  Outcome: Progressing     Problem: Prexisting or High Potential for Compromised Skin Integrity  Goal: Skin integrity is maintained or improved  Description: INTERVENTIONS:  - Identify patients at risk for skin breakdown  - Assess and monitor skin integrity  - Assess and monitor nutrition and hydration status  - Monitor labs   - Assess for incontinence   - Turn and reposition patient  - Assist with mobility/ambulation  - Relieve pressure over bony prominences  - Avoid friction and shearing  - Provide appropriate hygiene as needed including keeping skin clean and dry  - Evaluate need for skin moisturizer/barrier cream  - Collaborate with interdisciplinary team   - Patient/family teaching  - Consider wound care consult   Outcome: Progressing     Problem: Potential for Falls  Goal: Patient will remain free of falls  Description: INTERVENTIONS:  - Educate patient/family on patient safety including physical limitations  - Instruct patient to call for assistance with activity   - Consult OT/PT to assist with strengthening/mobility   - Keep Call bell within reach  - Keep bed low and locked with side rails adjusted as appropriate  - Keep care items and personal belongings within reach  - Initiate and maintain comfort rounds  - Make Fall Risk Sign visible to staff  - Offer Toileting every 4 Hours, in advance of need  - Initiate/Maintain bed alarm  - Obtain necessary fall risk management equipment: siderails  - Apply yellow socks and bracelet for high fall risk patients  - Consider moving patient to room near nurses station  Outcome: Progressing     Problem: METABOLIC, FLUID AND ELECTROLYTES - ADULT  Goal: Electrolytes maintained within normal limits  Description: INTERVENTIONS:  - Monitor labs and assess patient for signs and symptoms of electrolyte imbalances  - Administer electrolyte replacement as ordered  - Monitor response to electrolyte replacements, including repeat lab results as appropriate  - Instruct patient on fluid and nutrition as appropriate  Outcome: Progressing  Goal: Fluid balance maintained  Description: INTERVENTIONS:  - Monitor labs   - Monitor I/O and WT  - Instruct patient on fluid and nutrition as appropriate  - Assess for signs & symptoms of volume excess or deficit  Outcome: Progressing     Problem: Nutrition/Hydration-ADULT  Goal: Nutrient/Hydration intake appropriate for improving, restoring or maintaining nutritional needs  Description: Monitor and assess patient's nutrition/hydration status for malnutrition  Collaborate with interdisciplinary team and initiate plan and interventions as ordered  Monitor patient's weight and dietary intake as ordered or per policy  Utilize nutrition screening tool and intervene as necessary  Determine patient's food preferences and provide high-protein, high-caloric foods as appropriate       INTERVENTIONS:  - Monitor oral intake, urinary output, labs, and treatment plans  - Assess nutrition and hydration status and recommend course of action  - Evaluate amount of meals eaten  - Assist patient with eating if necessary   - Allow adequate time for meals  - Recommend/ encourage appropriate diets, oral nutritional supplements, and vitamin/mineral supplements  - Order, calculate, and assess calorie counts as needed  - Recommend, monitor, and adjust tube feedings and TPN/PPN based on assessed needs  - Assess need for intravenous fluids  - Provide specific nutrition/hydration education as appropriate  - Include patient/family/caregiver in decisions related to nutrition  Outcome: Progressing

## 2022-03-18 NOTE — PROGRESS NOTES
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    · Etiology:   PR3 associated ANCA vasculitis    · Current creatinine:  8 63 mg/dL,   · UA:  Hematuria, leukocyturia   · Renal imaging :  No hydronephrosis  · GN workup:  · Normal complements  · Anti GBM negative  · SAMANTHA negative  · PR3:      34 1>>84  · SPEP:  No monoclonal gammopathy  · Treatment:  · S/p kidney biopsy on 03/10  · Will continue to watch for kidney recovery  · Plan to continue HD TTS  · Monitor urinary output  · Maintain MAP:  Over 65 mmHg if possible/avoid hypoperfusion:  Hold parameters on blood pressure medications  · Avoid nephrotoxic agents such as NSAIDs, and IV contrast if possible   Avoid opioids   · Adjust medications to GFR  · Will continue dialysis at [facility] (will contact nephrologist to inform about further management)     #Final kidney biopsy results  · 31 gloms  · LM:    · 12 glom with global sclerosis prognosis, few global and sclerotic glomerulus show fragmentation of the test suggestive of healed crest (chronic)  · 19 gloms viable  · For cellular crescents with fibrinoid necrosis (acute)  · Remaining myelitis with no significant endocapillary proliferation, GMB appears minimally thickened   · Mild mononuclear interstitial inflammation with moderate to mellitus  · Medullary angiitis  · Severe arteriosclerosis and moderate arteriolosclerosis  · 55-60% IFTA  · ATN with red blood cell casts   · IF:   · Minimal staining   · No EM  · Conclusion:  · Focal necrotizing and resecting glomerulonephritis, 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  · Moderate acute tubular injury  · Moderately severe tubular atrophy and interstitial fibrosis with focal tubulointerstitial inflammation        # hemoptysis  · 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  · Chest x-ray no consistent with alveolar hemorrhage  · No further episodes of hemoptysis  · No sinusitis symptoms  · CT:  Bilateral pleural effusion  · WA 3 trending up, at risk for pulmonary compromise     # immunosuppressive therapy  · Methylprednisolone 500 mg started on 03/14-3/16, completed  · Reduced prednisone dose  · Prednisone taper:  · 60 mg once a day 3/17-3/23  · 30 mg once a day 3/24-3/30  · 25 mg once a day 3/31-4/14  · 20 mg once a day 4/15-4/28  · 15 mg once a day 4/29-5/12  · 12 5 mg once a day 5/13-5/27  · 10 mg once a day 5/28-6/11  · 7 5 mg once a day 6/12-6/26  · Continue with 5 mg once a day 6/27-  · Further immunosuppression discussed with patient (risk and benefits explained), 2 options available rituximab versus cyclophosphamide  · Due to RPGN presentation with severe LORRAINE requiring dialysis, best option for renal recovery would be cyclophosphamide  · Need to start cyclophosphamide as soon as  · Unable to receive cyclophosphamide as inpatient, no oncology certified nurse available    · Cyclophosphamide 7 5 mg/kg ordered (will call infusion center to schedule it asap)  · Scheduled for Wednesday 03/23/2022 at 11:30 a m    · Were trying to give cyclophosphamide earlier, if possible during this admission  · Will require cyclophosphamide on weeks 0, 2, 4,7,10,13  · Immunosuppressive therapy will have to be discussed directly with nephrologist a new dialysis unit     #CKD G3a  · Baseline creatinine:  1 3 mg/dL  · Etiology:  Likely secondary to nephroangiosclerosis in the settings of hypertension     #Acid-base Disorder  · serum FKC7 59SRRU/N  · Metabolic alkalosis  · Likely secondary to some degree of volume depletion  · Liberalize fluid restriction     #Volume status/hypertension:  · Volume:  Euvolemic  · Patient is not collecting urine, reports urinary output to 2 3 times a day   · Blood pressure:  Hypertensive 146/60mmhg, goal<140/90  · Recommend:  · Low-sodium diet  · Start nifedipine 30 mg once a day     #Anemia:  · Current hemoglobin:  7 8 mg/dL, stable  · Rule out pulmonary hemorrhage if continues to drop hemoglobin  · No further episodes of hemoptysis  · Chest x-ray no consistent with cellular hemorrhage  · Status post blood transfusion  · Possible GI bleed and kidney disease  · Treatment:  · EPO 51169 units on HD  · Transfuse for hemoglobin less than 7 0 per primary service       # confusion  · Resolved  · Likely secondary to high-dose steroids      SUBJECTIVE:  Patient seen and examined at bedside  Patient is feeling well, denies further episodes of hemoptysis, no chest pain, no shortness of breath    Patient wants to go home to be with his wife was home alone      OBJECTIVE:  Current Weight: Weight - Scale: 88 5 kg (195 lb)  Vitals:    03/18/22 0834   BP: 149/60   Pulse: 71   Resp:    Temp:    SpO2: 95%       Intake/Output Summary (Last 24 hours) at 3/18/2022 0957  Last data filed at 3/17/2022 1625  Gross per 24 hour   Intake 620 ml   Output 2500 ml   Net -1880 ml     Wt Readings from Last 3 Encounters:   03/11/22 88 5 kg (195 lb)   12/14/21 88 5 kg (195 lb)   09/07/21 88 9 kg (196 lb)     Temp Readings from Last 3 Encounters:   03/17/22 97 7 °F (36 5 °C)   12/14/21 97 8 °F (36 6 °C)   09/07/21 99 °F (37 2 °C) (Temporal)     BP Readings from Last 3 Encounters:   03/18/22 149/60   12/14/21 124/60   09/07/21 130/70     Pulse Readings from Last 3 Encounters:   03/18/22 71   12/14/21 80   09/07/21 75        General:  Obese, no acute distress at this time  Skin:  No acute rash  Eyes:  No scleral icterus and noninjected  ENT:  mucous membranes moist  Neck:  no carotid bruits  Chest:  Clear to auscultation percussion, good respiratory effort, no use of accessory respiratory muscles  CVS:  Regular rate and rhythm without a murmur rub , no jugular venous distention,  Abdomen:  soft and nontender   Extremities:  No clubbing, no cyanosis, no significant lower extremity edema  Neuro:  No gross focality  Psych:  Alert , cooperative   Vascular access:  Right IJ PermCath    Medications:    Current Facility-Administered Medications:     acetaminophen (TYLENOL) tablet 650 mg, 650 mg, Oral, Q6H PRN, Tara Amanda MD, 650 mg at 03/15/22 2106    calcium carbonate-vitamin D (OSCAL-D) 500 mg-200 units per tablet 1 tablet, 1 tablet, Oral, Daily With Breakfast, Frederick Ramsey MD, 1 tablet at 03/18/22 0849    epoetin ani (EPOGEN,PROCRIT) injection 10,000 Units, 10,000 Units, Intravenous, Once per day on Tue Thu Sat, Frederick Ramsey MD, 10,000 Units at 03/17/22 1551    furosemide (LASIX) tablet 40 mg, 40 mg, Oral, Daily, Frederick Ramsey MD, 40 mg at 03/18/22 0849    insulin aspart protamine-insulin aspart (NovoLOG 70/30) 100 units/mL subcutaneous injection 30 Units, 30 Units, Subcutaneous, BID AC, Daisy Keating MD, 30 Units at 03/18/22 0849    insulin lispro (HumaLOG) 100 units/mL subcutaneous injection 1-5 Units, 1-5 Units, Subcutaneous, TID AC, 2 Units at 03/17/22 1627 **AND** Fingerstick Glucose (POCT), , , TID AC, Emiliana Revalanar, DO    insulin lispro (HumaLOG) 100 units/mL subcutaneous injection 1-5 Units, 1-5 Units, Subcutaneous, HS, Emiliana Jordan DO, 2 Units at 03/17/22 2117    latanoprost (XALATAN) 0 005 % ophthalmic solution 1 drop, 1 drop, Both Eyes, HS, Daisy Keating MD, 1 drop at 03/17/22 2117    melatonin tablet 6 mg, 6 mg, Oral, HS, Daisy Keating MD, 6 mg at 03/17/22 2117    ondansetron (ZOFRAN) injection 4 mg, 4 mg, Intravenous, Once PRN, Bert Noel MD    pantoprazole (PROTONIX) injection 40 mg, 40 mg, Intravenous, Q12H [facility], Daisy Keating MD, 40 mg at 03/18/22 0848    pravastatin (PRAVACHOL) tablet 80 mg, 80 mg, Oral, Daily With Jaclyn Branch MD, 80 mg at 03/17/22 1627    [START ON 4/28/2022] predniSONE tablet 15 mg, 15 mg, Oral, Daily, Merlene Parker MD    [START ON 4/14/2022] predniSONE tablet 20 mg, 20 mg, Oral, Daily, Merlene Parker MD  [facility] ON 3/31/2022] predniSONE tablet 25 mg, 25 mg, Oral, Daily, Merlene Parker MD  [facility] ON 3/24/2022] predniSONE tablet 30 mg, 30 mg, Oral, Daily, Merlene Parker MD    predniSONE tablet 60 mg, 60 mg, Oral, Daily, Merlene Parker MD, 60 mg at 03/18/22 0848    sulfamethoxazole-trimethoprim (BACTRIM) 400-80 mg per tablet 1 tablet, 1 tablet, Oral, Once per day on Tue Thu Sat, Marcela Giron MD, 1 tablet at 03/17/22 0813    tamsulosin (FLOMAX) capsule 0 4 mg, 0 4 mg, Oral, Daily With Jeevan Katz MD, 0 4 mg at 03/17/22 1627    Laboratory Results:  Results from last 7 days   Lab Units 03/18/22  0539 03/17/22  0446 03/16/22  1226 03/16/22  0529 03/15/22  0856 03/14/22  0513 03/14/22  0512 03/13/22  0514 03/12/22  1220   WBC Thousand/uL 11 84* 12 13*  --  13 72*  --   --  13 13*  --  12 11*   HEMOGLOBIN g/dL 7 8* 7 7*  --  7 1* 7 8*  --  7 5* 7 6* 6 7*   HEMATOCRIT % 22 9* 24 6*  --  22 1* 23 8*  --  23 1* 22 6* 21 0*   PLATELETS Thousands/uL 316 291  --  260  --   --  210  --  182   SODIUM mmol/L 133* 128* 129*  --  126* 131*  --   --  133*   POTASSIUM mmol/L 4 5 5 4* 4 8  --  4 9 4 4  --   --  4 6   CHLORIDE mmol/L 94* 92* 91*  --  89* 92*  --   --  94*   CO2 mmol/L 27 21 20*  --  21 24  --   --  22   BUN mg/dL 63* 98* 72*  --  91* 73*  --   --  117*   CREATININE mg/dL 4 67* 7 05* 6 08*  --  8 63* 6 94*  --   --  9 96*   CALCIUM mg/dL 7 2* 7 0* 7 2*  --  6 9* 7 1*  --   --  7 0*       XR chest portable   Final Result by Kathi Go MD (03/14 1148)      No active disease in the chest                   Workstation performed: [id number]         IR biopsy kidney [place] kit no laterality   Final Result by Juju Montelongo MD (03/10 1640) Impression: Successful percutaneous core biopsy of the right renal cortex for [place] Kit  A full pathology report will follow  Workstation performed: [id number]         IR tunneled dialysis catheter placement   Final Result by Farshad Perez MD (03/08 1523)   Impression: Successful placement of a 15-Kyrgyz by 28 cm Titan dialysis catheter via the right internal jugular vein  The tip of the catheter is in the right atrium and may be used immediately  Workstation performed: [id number]         CT abdomen pelvis wo contrast   Final Result by Brandee Copeland MD (03/08 1153)      1  No peritoneal or retroperitoneal hematoma  2   An 8 mm right UVJ calculus causes mild upstream hydroureter without significant hydronephrosis  3   Mild pulmonary edema, small simple bilateral pleural effusions and bibasilar atelectasis  Workstation performed: [id number]         US kidney and bladder   Final Result by Glory Lima MD (03/07 1432)      No hydronephrosis  8 mm calculus in the right dependent aspect of the urinary bladder  Workstation performed: [id number]         XR chest 1 view portable   Final Result by Bret Suggs MD (03/07 1250)      No active pulmonary disease  Workstation performed: [id number]             Portions of the record may have been created with voice recognition software  Occasional wrong word or "sound a like" substitutions may have occurred due to the inherent limitations of voice recognition software  Read the chart carefully and recognize, using context, where substitutions have occurred

## 2022-03-18 NOTE — ASSESSMENT & PLAN NOTE
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  Colonoscopy showed polyp in the descending colon which was biopsied, 1 polyp 5-9 millimeter in the sigmoid colon which was removed EN bloc with cold snare and few scattered diverticula with protruding internal hemorrhoids  Pathology results are pending at the current time  Patient might need capsule endoscopy    Improved hemoglobin to 7 7 after 1 unit of PRBC transfusion on 3/17/22 H/H has been stable    Results from last 7 days   Lab Units 03/18/22  0539 03/17/22  0446 03/16/22  0529 03/15/22  0856 03/14/22  0512 03/13/22  0514 03/12/22  1220 03/11/22  1601   HEMOGLOBIN g/dL 7 8* 7 7* 7 1* 7 8* 7 5* 7 6* 6 7* 7 7*   HEMATOCRIT % 22 9* 24 6* 22 1* 23 8* 23 1* 22 6* 21 0* 23 9*   MCV fL 83 88 88  --  89  --  89  --

## 2022-03-18 NOTE — CASE MANAGEMENT
Case Management Discharge Planning Note    Patient name Hardy Zelaya  Location [address] [facility] 408/4 [address] Teresa Michel-* MRN [id number]  : 1945 Date 3/18/2022       Current Admission Date: 3/7/2022  Current Admission Diagnosis:Symptomatic anemia   Patient Active Problem List    Diagnosis Date Noted    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[place] [address] ) 2022    Pulmonary hypertension ([place] [address] ) 2022    Dialysis patient ([place] [address] ) 2022    Anemia due to chronic kidney disease, on chronic dialysis ([place] [address] )     Hemoptysis 2022    LORRAINE (acute kidney injury) ([place] [address] )     Other proteinuria     Symptomatic anemia 2022    Chest pain 2022    Acute kidney injury ([place] [address] ) 2022    High anion gap metabolic acidosis     Melena 2022    Elevated PSA 2021    Chronic pain of right knee 2021    Primary osteoarthritis of right knee 2021    Bladder stones 12/15/2016    Type 2 diabetes mellitus with microalbuminuria, with long-term current use of insulin ([place] [address] ) 2016    Benign colon polyp 2013    Benign prostatic hyperplasia with lower urinary tract symptoms 2013    Benign essential hypertension 2013    Hyperlipidemia 2012    Vitamin D deficiency 2012      LOS (days): 11  Geometric Mean LOS (GMLOS) (days): 3 60  Days to GMLOS:-7 3     OBJECTIVE:  Risk of Unplanned Readmission Score: 22   Current admission status: Inpatient   Preferred Pharmacy:   [facility] [address]  [address] [facility] [address] [facility] [address] [place] [address]  Phone: [phone] Fax: [phone] [address], [address] [place] [address] [address]  [address], [address]  [facility] [address]  Phone: [phone] Fax: [phone]    Primary Care Provider: Jhonatan Goldstein MD    Primary Insurance: MEDICARE  Secondary Insurance: BLUE CROSS    DISCHARGE DETAILS:  Discharge planning discussed with[de-identified] Patient and his wife Aiden Weir of Choice: Yes  Comments - [place] of Choice: HH/Community [facility];  Dialysis clinic/[facility]  CM contacted family/caregiver?: Yes  Were Treatment Team discharge recommendations reviewed with patient/caregiver?: Yes  Did patient/caregiver verbalize understanding of patient care needs?: Yes  Were patient/caregiver advised of the risks associated with not following Treatment Team discharge recommendations?: Yes    Contacts  Patient Contacts: Leo Dawnt  Relationship to Patient[de-identified] Family (wife)  Contact Method: Phone  Phone Number: [phone]  Reason/Outcome: Continuity of [address]         Is the patient interested in Marilu Steward at discharge?: Yes  [address] requested[de-identified] [address] Name[de-identified] [address] Provider[de-identified] PCP  [address] Needed[de-identified] Strengthening/Theraputic Exercises to Improve Function,Gait/ADL Training,Evaluate Functional Status and Safety,Diabetes Management,Other (comment) (New start hemodialyis T-Th-Sat 10 AM chair time)  Homebound Criteria Met[de-identified] Uses an Assist Device (i e  cane, walker, etc),Requires the Assistance of Another Person for Safe Ambulation or to Leave the Home  Supporting Clincal Findings[de-identified] Fatigues Easliy in Short Distances,Limited Endurance    DME Referral Provided  Referral made for DME?: Yes  DME referral completed for the following items[de-identified] [place]  DME Supplier Name[de-identified] [employer]    Other Referral/Resources/Interventions Provided:  Interventions: Dialysis,Transportation to treatment,C    Would you like to participate in our [address] service program?  : No - Declined    Treatment Team Recommendation: [address]  Discharge Destination Plan[de-identified] Home with Rashidianeelizabeth at Discharge : [address] Given (Date):: 03/18/22  IMM Given to[de-identified] Patient (IMM was discussed and patient states understanding )     Patient discharge is anticipated for Saturday 3/19 after his hemodialysis treatment  His OP dialysis schedule is at [facility] in [place] T-Th-Sat with a 10 AM chair time  CM called Josselyn Anand and spoke with [address] [phone] and confirmed above chair time and start date  CM then called [facility] and spoke with Ventura Cooper [phone] and confirmed they have patient on the schedule for above transport times  She states patient must call on Monday to confirm transport time for Tuesday  CM met with patient and spoke with his wife Ngoc Span on the phone  They were made aware of the above dialysis schedule and that they must call transport on Monday to confirm his transport time for Tuesday  All contact numbers were provided and were also documented on dc instructions  Patient and wife still would like [facility]  [facility]  [facility] with [facility] in [place] and delivered to patients bedside  [facility]  There is one step to enter  CM to follow

## 2022-03-18 NOTE — ASSESSMENT & PLAN NOTE
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  Since patient had metabolic acidosis patient was placed on bicarbonate drip at 150 mL/hour  Renal US showed no evidence of hydronephrosis  8mm calculus was found in bladder  Possible cause of hematuria if not glomerulonephritis   Continue to hold Vasotec  Patient is recieving further workup for glomerulonephritis  SAMANTHA, GBM Ab, C3 and C4 WNL  ANCA and Other tests pending  UA showed innumerable red blood cells 10-20 white cells with glucose  CT of abdomen and pelvis showed 8 mm stone at UVJ with hydroureter but no hydronephrosis and not significant changed since 2016  No peritoneal or retroperitoneal hematoma or evidence of GI bleeding  Mild pulmonary edema and small b/l pleural effusions were noted  CXR pending  Patient started dialysis 3/9 and has been receiving dialysis per Renal  IVF were d/c on 3/9  Patient currently on hemodialysis on Tuesday Thursday Saturday schedule  Status post renal biopsy on 03/10-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

## 2022-03-19 ENCOUNTER — APPOINTMENT (INPATIENT)
Dept: DIALYSIS | Facility: HOSPITAL | Age: 77
DRG: 674 | End: 2022-03-19
Attending: STUDENT IN AN ORGANIZED HEALTH CARE EDUCATION/TRAINING PROGRAM
Payer: MEDICARE

## 2022-03-19 VITALS
WEIGHT: 195 LBS | BODY MASS INDEX: 29.55 KG/M2 | HEIGHT: 68 IN | HEART RATE: 59 BPM | SYSTOLIC BLOOD PRESSURE: 131 MMHG | OXYGEN SATURATION: 97 % | TEMPERATURE: 98.3 F | DIASTOLIC BLOOD PRESSURE: 54 MMHG | RESPIRATION RATE: 18 BRPM

## 2022-03-19 LAB
ALBUMIN SERPL BCP-MCNC: 1.9 G/DL (ref 3.5–5)
ALP SERPL-CCNC: 64 U/L (ref 46–116)
ALT SERPL W P-5'-P-CCNC: 27 U/L (ref 12–78)
ANION GAP SERPL CALCULATED.3IONS-SCNC: 13 MMOL/L (ref 4–13)
AST SERPL W P-5'-P-CCNC: 13 U/L (ref 5–45)
BASOPHILS # BLD MANUAL: 0 THOUSAND/UL (ref 0–0.1)
BASOPHILS NFR MAR MANUAL: 0 % (ref 0–1)
BILIRUB SERPL-MCNC: 0.43 MG/DL (ref 0.2–1)
BUN SERPL-MCNC: 95 MG/DL (ref 5–25)
CALCIUM ALBUM COR SERPL-MCNC: 8.8 MG/DL (ref 8.3–10.1)
CALCIUM SERPL-MCNC: 7.1 MG/DL (ref 8.3–10.1)
CHLORIDE SERPL-SCNC: 91 MMOL/L (ref 100–108)
CO2 SERPL-SCNC: 25 MMOL/L (ref 21–32)
CREAT SERPL-MCNC: 6.33 MG/DL (ref 0.6–1.3)
EOSINOPHIL # BLD MANUAL: 0 THOUSAND/UL (ref 0–0.4)
EOSINOPHIL NFR BLD MANUAL: 0 % (ref 0–6)
ERYTHROCYTE [DISTWIDTH] IN BLOOD BY AUTOMATED COUNT: 14.6 % (ref 11.6–15.1)
GFR SERPL CREATININE-BSD FRML MDRD: 7 ML/MIN/1.73SQ M
GLUCOSE SERPL-MCNC: 103 MG/DL (ref 65–140)
GLUCOSE SERPL-MCNC: 135 MG/DL (ref 65–140)
GLUCOSE SERPL-MCNC: 96 MG/DL (ref 65–140)
HCT VFR BLD AUTO: 26.2 % (ref 36.5–49.3)
HGB BLD-MCNC: 8.4 G/DL (ref 12–17)
LYMPHOCYTES # BLD AUTO: 14 % (ref 14–44)
LYMPHOCYTES # BLD AUTO: 2.04 THOUSAND/UL (ref 0.6–4.47)
MCH RBC QN AUTO: 28.1 PG (ref 26.8–34.3)
MCHC RBC AUTO-ENTMCNC: 32.1 G/DL (ref 31.4–37.4)
MCV RBC AUTO: 88 FL (ref 82–98)
MONOCYTES # BLD AUTO: 1.16 THOUSAND/UL (ref 0–1.22)
MONOCYTES NFR BLD: 8 % (ref 4–12)
NEUTROPHILS # BLD MANUAL: 11.21 THOUSAND/UL (ref 1.85–7.62)
NEUTS SEG NFR BLD AUTO: 77 % (ref 43–75)
PLATELET # BLD AUTO: 376 THOUSANDS/UL (ref 149–390)
PLATELET BLD QL SMEAR: ADEQUATE
PMV BLD AUTO: 9.9 FL (ref 8.9–12.7)
POLYCHROMASIA BLD QL SMEAR: PRESENT
POTASSIUM SERPL-SCNC: 4.7 MMOL/L (ref 3.5–5.3)
PROT SERPL-MCNC: 5.6 G/DL (ref 6.4–8.2)
RBC # BLD AUTO: 2.99 MILLION/UL (ref 3.88–5.62)
RBC MORPH BLD: PRESENT
SODIUM SERPL-SCNC: 129 MMOL/L (ref 136–145)
VARIANT LYMPHS # BLD AUTO: 1 %
WBC # BLD AUTO: 14.56 THOUSAND/UL (ref 4.31–10.16)

## 2022-03-19 PROCEDURE — 85027 COMPLETE CBC AUTOMATED: CPT | Performed by: STUDENT IN AN ORGANIZED HEALTH CARE EDUCATION/TRAINING PROGRAM

## 2022-03-19 PROCEDURE — 80053 COMPREHEN METABOLIC PANEL: CPT | Performed by: STUDENT IN AN ORGANIZED HEALTH CARE EDUCATION/TRAINING PROGRAM

## 2022-03-19 PROCEDURE — 82948 REAGENT STRIP/BLOOD GLUCOSE: CPT

## 2022-03-19 PROCEDURE — 99232 SBSQ HOSP IP/OBS MODERATE 35: CPT | Performed by: STUDENT IN AN ORGANIZED HEALTH CARE EDUCATION/TRAINING PROGRAM

## 2022-03-19 PROCEDURE — 99239 HOSP IP/OBS DSCHRG MGMT >30: CPT | Performed by: INTERNAL MEDICINE

## 2022-03-19 PROCEDURE — 85007 BL SMEAR W/DIFF WBC COUNT: CPT | Performed by: STUDENT IN AN ORGANIZED HEALTH CARE EDUCATION/TRAINING PROGRAM

## 2022-03-19 PROCEDURE — 86480 TB TEST CELL IMMUN MEASURE: CPT | Performed by: STUDENT IN AN ORGANIZED HEALTH CARE EDUCATION/TRAINING PROGRAM

## 2022-03-19 RX ORDER — PANTOPRAZOLE SODIUM 40 MG/1
40 TABLET, DELAYED RELEASE ORAL
Status: DISCONTINUED | OUTPATIENT
Start: 2022-03-19 | End: 2022-03-19 | Stop reason: HOSPADM

## 2022-03-19 RX ORDER — PANTOPRAZOLE SODIUM 40 MG/1
40 TABLET, DELAYED RELEASE ORAL DAILY
Qty: 30 TABLET | Refills: 0 | Status: SHIPPED | OUTPATIENT
Start: 2022-03-19 | End: 2022-05-09 | Stop reason: HOSPADM

## 2022-03-19 RX ORDER — INSULIN LISPRO 100 [IU]/ML
25 INJECTION, SUSPENSION SUBCUTANEOUS 2 TIMES DAILY WITH MEALS
Qty: 90 ML | Refills: 0 | Status: ON HOLD | OUTPATIENT
Start: 2022-03-19 | End: 2022-06-07 | Stop reason: SDUPTHER

## 2022-03-19 RX ORDER — PREDNISONE 10 MG/1
TABLET ORAL
Qty: 138 TABLET | Refills: 0 | Status: SHIPPED | OUTPATIENT
Start: 2022-03-19 | End: 2022-05-09 | Stop reason: HOSPADM

## 2022-03-19 RX ORDER — NIFEDIPINE 30 MG/1
30 TABLET, EXTENDED RELEASE ORAL DAILY
Qty: 30 TABLET | Refills: 0 | Status: SHIPPED | OUTPATIENT
Start: 2022-03-19 | End: 2022-05-09 | Stop reason: HOSPADM

## 2022-03-19 RX ORDER — B-COMPLEX WITH VITAMIN C
1 TABLET ORAL
Qty: 30 TABLET | Refills: 0 | Status: ON HOLD | OUTPATIENT
Start: 2022-03-20 | End: 2022-08-02 | Stop reason: CLARIF

## 2022-03-19 RX ORDER — PREDNISONE 2.5 MG
TABLET ORAL
Qty: 255 TABLET | Refills: 0 | Status: SHIPPED | OUTPATIENT
Start: 2022-05-13 | End: 2022-03-19 | Stop reason: SDUPTHER

## 2022-03-19 RX ORDER — PREDNISONE 10 MG/1
30 TABLET ORAL DAILY
Qty: 21 TABLET | Refills: 0 | Status: SHIPPED | OUTPATIENT
Start: 2022-03-24 | End: 2022-03-31

## 2022-03-19 RX ORDER — FUROSEMIDE 40 MG/1
40 TABLET ORAL DAILY
Qty: 30 TABLET | Refills: 0 | Status: SHIPPED | OUTPATIENT
Start: 2022-03-19 | End: 2022-05-09 | Stop reason: HOSPADM

## 2022-03-19 RX ORDER — INSULIN ASPART 100 [IU]/ML
25 INJECTION, SUSPENSION SUBCUTANEOUS
Status: DISCONTINUED | OUTPATIENT
Start: 2022-03-19 | End: 2022-03-19 | Stop reason: HOSPADM

## 2022-03-19 RX ORDER — PREDNISONE 1 MG/1
25 TABLET ORAL DAILY
Qty: 70 TABLET | Refills: 0 | Status: SHIPPED | OUTPATIENT
Start: 2022-03-31 | End: 2022-04-14

## 2022-03-19 RX ORDER — PREDNISONE 2.5 MG
TABLET ORAL
Qty: 255 TABLET | Refills: 0 | Status: SHIPPED | OUTPATIENT
Start: 2022-05-13 | End: 2022-05-09 | Stop reason: HOSPADM

## 2022-03-19 RX ORDER — SULFAMETHOXAZOLE AND TRIMETHOPRIM 400; 80 MG/1; MG/1
1 TABLET ORAL 3 TIMES WEEKLY
Qty: 12 TABLET | Refills: 0 | Status: SHIPPED | OUTPATIENT
Start: 2022-03-22 | End: 2022-04-21

## 2022-03-19 RX ADMIN — FUROSEMIDE 40 MG: 40 TABLET ORAL at 12:27

## 2022-03-19 RX ADMIN — PREDNISONE 60 MG: 20 TABLET ORAL at 12:27

## 2022-03-19 RX ADMIN — NIFEDIPINE 30 MG: 30 TABLET, EXTENDED RELEASE ORAL at 12:27

## 2022-03-19 RX ADMIN — ERYTHROPOIETIN 10000 UNITS: 10000 INJECTION, SOLUTION INTRAVENOUS; SUBCUTANEOUS at 10:20

## 2022-03-19 RX ADMIN — Medication 1 TABLET: at 09:13

## 2022-03-19 RX ADMIN — SULFAMETHOXAZOLE AND TRIMETHOPRIM 1 TABLET: 400; 80 TABLET ORAL at 12:28

## 2022-03-19 NOTE — ASSESSMENT & PLAN NOTE
Patient presented with left-sided chest pain,Troponins x2 were negative initially  EKG was unremarkable    ProBNP was elevated at 15,000   2D echo showed normal EF with normal diastolic function  Reported chest pain again 3/9 and troponin peaked at 104   Patient ordered for outpatient stress test

## 2022-03-19 NOTE — DISCHARGE INSTRUCTIONS
? 60 mg once a day 3/17-3/23  ? 30 mg once a day 3/24-3/30  ? 25 mg once a day 3/31-4/14  ? 20 mg once a day 4/15-4/28  ? 15 mg once a day 4/29-5/12  ? 12 5 mg once a day 5/13-5/27  ? 10 mg once a day 5/28-6/11  ? 7 5 mg once a day 6/12-6/26  ? Continue with 5 mg once a day 6/27-      Perma-cath Placement   WHAT YOU NEED TO KNOW:   A perma-cath is a catheter placed through a vein into or near your right atrium  Your right atrium is the right upper chamber of your heart  A perma-cath is used for dialysis in an emergency or until a long-term device is ready to use  After your procedure, you will have some pain and swelling on your chest and neck  You may have some bruises on your chest and neck  You may also have 2 dressings, one on your chest and one on your neck  DISCHARGE INSTRUCTIONS:   Call 911 for any of the following:   · You feel lightheaded, short of breath, and have chest pain  · Your catheter comes out   Contact Interventional Radiology at [phone] Christophe PATIENTS: Contact Interventional Radiology at [phone]) Travissudhakar Cagle PATIENTS: Contact Interventional Radiology at [phone]) if:  · Blood soaks through your bandage  · You have new swelling in your arm, neck, face, or chest on your right side  · Your catheter gets wet  · Your bruises or pain get worse  · You have a fever or chills  · Persistent nausea or vomiting  · Your incision is red, swollen, or draining pus  · You have questions or concerns about your condition or care  Self-care:       · Resume your normal diet  · Keep your dressings dry  Do not take a shower or swim  You may take a tub bath, but do not get your dressings wet  Water in your wound can cause bacteria to grow and cause an infection  If your dressing gets wet, dry it off and cover it with dry sterile gauze  Call your healthcare provider  Do not use soaps or ointments  · Do not change your dressings   Your healthcare provider or dialysis nurse will change your dressings  Your dressings should stay in place until your healthcare provider removes them  The dressing on your chest will stay as long as you have the catheter in place  The dressing prevents infection  · Do not remove the red and blue caps from the end of your catheter  The caps prevent air from getting into your catheter  Follow up with your healthcare provider as directed: Write down your questions so you remember to ask them during your visits  Percutaneous Kidney Biopsy   WHAT YOU NEED TO KNOW:   A percutaneous kidney biopsy is a procedure to remove a small sample of kidney tissue  It may also be done to check for kidney disease or cancer  DISCHARGE INSTRUCTIONS:   Follow up with your healthcare provider as directed:  Write down your questions so you remember to ask them during your visits  Wound care: The Band-Aid may be removed in 24 hours  For more information:   · National Kidney and Urologic Diseases Information Clearinghouse  [address] [place] [address]  Phone: 3- [phone]  Web Address: http://kidney  niddk nih gov/   Care after your procedure:    1  Limit your activities for 36 hours after your biopsy  2  No driving day of biopsy  3  Return to your normal diet  Flat sips of flat soda helps with mild nausea  4  Remove band-aid or dressing 24 hours after procedure  Contact Interventional Radiology at [phone]    Christophe PATIENTS: Contact Interventional Radiology at [phone]) Ej Oropeza PATIENTS: Contact Interventional Radiology at [phone]) if:    1  Difficulty breathing, nausea or vomiting  2  You feel weak or dizzy  3  Chills or fever above 101 degrees F  You have persistent nausea or vomiting    4  You have severe pain in your abdomen or where You feel weak or dizzy  your           procedure was done  5  You have blood in your urine  You urinate small amounts or not at all      4  Develop any redness, swelling, heat, unusual drainage, heavy bruising or bleeding     from biopsy site

## 2022-03-19 NOTE — OCCUPATIONAL THERAPY NOTE
Occupational Therapy Cancel Note       03/19/22 1050   OT Last Visit   OT Visit Date 03/19/22   Note Type   Note Type Cancelled Session   Cancel Reasons Other  (pt receiving bedside dialysis   will reattempt as able)       Sara Armas MS, OTR/L [id number]

## 2022-03-19 NOTE — PLAN OF CARE
Goal 2 5 with treatment  Problem: METABOLIC, FLUID AND ELECTROLYTES - ADULT  Goal: Electrolytes maintained within normal limits  Description: INTERVENTIONS:  - Monitor labs and assess patient for signs and symptoms of electrolyte imbalances  - Administer electrolyte replacement as ordered  - Monitor response to electrolyte replacements, including repeat lab results as appropriate  - Instruct patient on fluid and nutrition as appropriate  Outcome: Progressing  Goal: Fluid balance maintained  Description: INTERVENTIONS:  - Monitor labs   - Monitor I/O and WT  - Instruct patient on fluid and nutrition as appropriate  - Assess for signs & symptoms of volume excess or deficit  Outcome: Progressing

## 2022-03-19 NOTE — ASSESSMENT & PLAN NOTE
Patient states he has had a cough for a couple weeks  Patient with episodes of hemoptysis here   Chest x-ray was unremarkable  Obtain sputum culture if possible  Procalcitonin elevated at 6 73 --> 3 4, unclear significance in the setting of renal dysfunction    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  Patient has been stable without any further hemoptysis

## 2022-03-19 NOTE — PROGRESS NOTES
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    · Etiology:   PR3 associated ANCA vasculitis    · Current creatinine:  8 63 mg/dL,   · UA:  Hematuria, leukocyturia   · Renal imaging :  No hydronephrosis  · GN workup:  · Normal complements  · Anti GBM negative  · SAMANTHA negative  · PR3:      34 1>>84  · SPEP:  No monoclonal gammopathy  · Treatment:  · No evidence of kidney recovery yet  · HD today, patient will continue hemodialysis sats   · Plan to continue HD TTS, patient will continue HD at [address] (TTS)  · Monitor urinary output  · Maintain MAP:  Over 65 mmHg if possible/avoid hypoperfusion:  Hold parameters on blood pressure medications  · Avoid nephrotoxic agents such as NSAIDs, and IV contrast if possible   Avoid opioids   · Adjust medications to GFR  · I will call [address] on Monday to inform about immunosuppressive therapy      #Final kidney biopsy results  · 31 gloms  · LM:    · 12 glom with global sclerosis prognosis, few global and sclerotic glomerulus show fragmentation of the test suggestive of healed crest (chronic)  · 19 gloms viable  · For cellular crescents with fibrinoid necrosis (acute)  · Remaining myelitis with no significant endocapillary proliferation, GMB appears minimally thickened   · Mild mononuclear interstitial inflammation with moderate to mellitus  · Medullary angiitis  · Severe arteriosclerosis and moderate arteriolosclerosis  · 55-60% IFTA  · ATN with red blood cell casts   · IF:   · Minimal staining   · No EM  · Conclusion:  · Focal necrotizing and resecting glomerulonephritis, 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  · Moderate acute tubular injury  · Moderately severe tubular atrophy and interstitial fibrosis with focal tubulointerstitial inflammation        # hemoptysis  · 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  · Chest x-ray no consistent with alveolar hemorrhage  · No further episodes of hemoptysis  · No sinusitis symptoms  · CT:  Bilateral pleural effusion  · SD 3 trending up, at risk for pulmonary compromise     # immunosuppressive therapy  · Methylprednisolone 500 mg started on 03/14-3/16, completed  Reduced prednisone dose  · Prednisone taper:  · 60 mg once a day 3/17-3/23  · 30 mg once a day 3/24-3/30  · 25 mg once a day 3/31-4/14  · 20 mg once a day 4/15-4/28  · 15 mg once a day 4/29-5/12  · 12 5 mg once a day 5/13-5/27  · 10 mg once a day 5/28-6/11  · 7 5 mg once a day 6/12-6/26  · Continue with 5 mg once a day 6/27-  · Further immunosuppression discussed with patient (risk and benefits explained), 2 options available rituximab versus cyclophosphamide  · Due to RPGN presentation with severe LORRAINE requiring dialysis, best option for renal recovery would be cyclophosphamide  · Need to start cyclophosphamide as soon as  · Unable to receive cyclophosphamide as inpatient, no oncology certified nurse available    · Cyclophosphamide 7 5 mg/kg ordered (scheduled for Wednesday 3/23 at 11:30 a m )  · Will require cyclophosphamide on weeks 0, 2, 4,7,10,13       #CKD G3a  · Baseline creatinine:  1 3 mg/dL  · Etiology:  Likely secondary to nephroangiosclerosis in the settings of hypertension     #Acid-base Disorder  · serum EOG4 63FYBH/O  · Metabolic alkalosis  · Likely secondary to some degree of volume depletion  · Liberalize fluid restriction     #Volume status/hypertension:  · Volume:Euvolemic  · Blood pressure:  normotensive 133/57mmhg, goal<140/90  · Recommend:  · Low-sodium diet  · Lasix 40mg daily   · nifedipine 30 mg once a day     #Anemia:  · Current hemoglobin:  7 8 mg/dL, stable  · No further episodes of hemoptysis  · Chest x-ray no consistent with cellular hemorrhage  · Status post blood transfusion  · Possible GI bleed and kidney disease  · Treatment:  · EPO 55421 units on HD  · Transfuse for hemoglobin less than 7 0 per primary service       # confusion  · Resolved  · Likely secondary to high-dose steroids      SUBJECTIVE:  Patient seen and examined at bedside  No chest pain, shortness of breath, nausea, vomiting, abdominal pain or diarrhea   Report 2-3 x  urine       OBJECTIVE:  Current Weight: Weight - Scale: 88 5 kg (195 lb)  Vitals:    03/19/22 0840   BP: 133/57   Pulse: 59   Resp: 18   Temp:    SpO2:      No intake or output data in the 24 hours ending 03/19/22 0857  Wt Readings from Last 3 Encounters:   03/11/22 88 5 kg (195 lb)   12/14/21 88 5 kg (195 lb)   09/07/21 88 9 kg (196 lb)     Temp Readings from Last 3 Encounters:   03/18/22 (!) 97 2 °F (36 2 °C)   12/14/21 97 8 °F (36 6 °C)   09/07/21 99 °F (37 2 °C) (Temporal)     BP Readings from Last 3 Encounters:   03/19/22 133/57   12/14/21 124/60   09/07/21 130/70     Pulse Readings from Last 3 Encounters:   03/19/22 59   12/14/21 80   09/07/21 75      General:   no acute distress at this time  Skin:  No acute rash  Eyes:  No scleral icterus and noninjected  ENT:  mucous membranes moist  Neck:  no carotid bruits  Chest:  Clear to auscultation percussion, good respiratory effort, no use of accessory respiratory muscles  CVS:  Regular rate and rhythm without a murmur rub , no jugular venous distention,  Abdomen:  soft and nontender   Extremities: race LE edema   Neuro:  No gross focality  Psych:  Alert , cooperative   Vascular access: RIJ Permacth       Medications:    Current Facility-Administered Medications:     acetaminophen (TYLENOL) tablet 650 mg, 650 mg, Oral, Q6H PRN, Karel Wahl MD, 650 mg at 03/15/22 2106    calcium carbonate-vitamin D (OSCAL-D) 500 mg-200 units per tablet 1 tablet, 1 tablet, Oral, Daily With Breakfast, Joselyn Reyes Christianne Hudson MD, 1 tablet at 03/18/22 0849    epoetin ani (EPOGEN,PROCRIT) injection 10,000 Units, 10,000 Units, Intravenous, Once per day on Tue Thu Sat, Kenny Gaytan MD, 10,000 Units at 03/17/22 1551    furosemide (LASIX) tablet 40 mg, 40 mg, Oral, Daily, Kenny Gaytan MD, 40 mg at 03/18/22 0849    insulin aspart protamine-insulin aspart (NovoLOG 70/30) 100 units/mL subcutaneous injection 25 Units, 25 Units, Subcutaneous, BID AC, Daisy Keating MD    insulin lispro (HumaLOG) 100 units/mL subcutaneous injection 1-5 Units, 1-5 Units, Subcutaneous, TID AC, 3 Units at 03/18/22 1714 **AND** Fingerstick Glucose (POCT), , , TID AC, Emiliana Revankar, DO    insulin lispro (HumaLOG) 100 units/mL subcutaneous injection 1-5 Units, 1-5 Units, Subcutaneous, HS, Emiliana Espinozaar, DO, 3 Units at 03/18/22 2236    latanoprost (XALATAN) 0 005 % ophthalmic solution 1 drop, 1 drop, Both Eyes, HS, Ivon Sosa MD, 1 drop at 03/18/22 2237    melatonin tablet 6 mg, 6 mg, Oral, HS, Ivon Sosa MD, 6 mg at 03/18/22 2236    NIFEdipine (PROCARDIA XL) 24 hr tablet 30 mg, 30 mg, Oral, Daily, Kenny Gaytan MD, 30 mg at 03/18/22 1247    ondansetron (ZOFRAN) injection 4 mg, 4 mg, Intravenous, Once PRN, Daniel Santos MD    pantoprazole (PROTONIX) injection 40 mg, 40 mg, Intravenous, Q12H [facility], Ivon Sosa MD, 40 mg at 03/18/22 2038    pravastatin (PRAVACHOL) tablet 80 mg, 80 mg, Oral, Daily With Jabier Mondragon MD, 80 mg at 03/18/22 1713    [START ON 4/28/2022] predniSONE tablet 15 mg, 15 mg, Oral, Daily, Kenny Gaytan MD    [START ON 4/14/2022] predniSONE tablet 20 mg, 20 mg, Oral, Daily, MD Holli Goff Courser  Jana Nelson ON 3/31/2022] predniSONE tablet 25 mg, 25 mg, Oral, Daily, MD Holli Goff Courser  Jana Nelson ON 3/24/2022] predniSONE tablet 30 mg, 30 mg, Oral, Daily, Clover Albert MD    predniSONE tablet 60 mg, 60 mg, Oral, Daily, Merlene Parker MD, 60 mg at 03/18/22 0848    sulfamethoxazole-trimethoprim (BACTRIM) 400-80 mg per tablet 1 tablet, 1 tablet, Oral, Once per day on Tue Thu Sat, Marcela Giron MD, 1 tablet at 03/17/22 0813    tamsulosin (FLOMAX) capsule 0 4 mg, 0 4 mg, Oral, Daily With Jeevan Katz MD, 0 4 mg at 03/18/22 1713    Laboratory Results:  Results from last 7 days   Lab Units 03/18/22  0539 03/17/22  0446 03/16/22  1226 03/16/22  0529 03/15/22  0856 03/14/22  0513 03/14/22  0512 03/13/22  0514 03/12/22  1220   WBC Thousand/uL 11 84* 12 13*  --  13 72*  --   --  13 13*  --  12 11*   HEMOGLOBIN g/dL 7 8* 7 7*  --  7 1* 7 8*  --  7 5* 7 6* 6 7*   HEMATOCRIT % 22 9* 24 6*  --  22 1* 23 8*  --  23 1* 22 6* 21 0*   PLATELETS Thousands/uL 316 291  --  260  --   --  210  --  182   SODIUM mmol/L 133* 128* 129*  --  126* 131*  --   --  133*   POTASSIUM mmol/L 4 5 5 4* 4 8  --  4 9 4 4  --   --  4 6   CHLORIDE mmol/L 94* 92* 91*  --  89* 92*  --   --  94*   CO2 mmol/L 27 21 20*  --  21 24  --   --  22   BUN mg/dL 63* 98* 72*  --  91* 73*  --   --  117*   CREATININE mg/dL 4 67* 7 05* 6 08*  --  8 63* 6 94*  --   --  9 96*   CALCIUM mg/dL 7 2* 7 0* 7 2*  --  6 9* 7 1*  --   --  7 0*       XR chest portable   Final Result by Kathi Go MD (03/14 1148)      No active disease in the chest                   Workstation performed: [id number]         IR biopsy kidney [place] kit no laterality   Final Result by Juju Montelongo MD (03/10 1640)   Impression: Successful percutaneous core biopsy of the right renal cortex for [place] Kit  A full pathology report will follow  Workstation performed: [id number]         IR tunneled dialysis catheter placement   Final Result by Keira Matias MD (03/08 1523)   Impression: Successful placement of a 15-[place] by 28 cm Titan dialysis catheter via the right internal jugular vein   The tip of the catheter is in the right atrium and may be used immediately  Workstation performed: [id number]         CT abdomen pelvis wo contrast   Final Result by Jay Jay Gongora MD (03/08 1153)      1  No peritoneal or retroperitoneal hematoma  2   An 8 mm right UVJ calculus causes mild upstream hydroureter without significant hydronephrosis  3   Mild pulmonary edema, small simple bilateral pleural effusions and bibasilar atelectasis  Workstation performed: [id number]         US kidney and bladder   Final Result by Gerri Moreland MD (03/07 1432)      No hydronephrosis  8 mm calculus in the right dependent aspect of the urinary bladder  Workstation performed: [id number]         XR chest 1 view portable   Final Result by Trixie Chance MD (03/07 1250)      No active pulmonary disease  Workstation performed: [id number]             Portions of the record may have been created with voice recognition software  Occasional wrong word or "sound a like" substitutions may have occurred due to the inherent limitations of voice recognition software  Read the chart carefully and recognize, using context, where substitutions have occurred

## 2022-03-19 NOTE — ASSESSMENT & PLAN NOTE
Patient is on Vasotec at home which is being held in the setting of acute kidney injury  Avoid hypotension  Blood pressure moderately controlled  Continue nifedipine 30 milligram p o   Daily

## 2022-03-19 NOTE — PLAN OF CARE
Problem: MOBILITY - ADULT  Goal: Maintain or return to baseline ADL function  Description: INTERVENTIONS:  -  Assess patient's ability to carry out ADLs; assess patient's baseline for ADL function and identify physical deficits which impact ability to perform ADLs (bathing, care of mouth/teeth, toileting, grooming, dressing, etc )  - Assess/evaluate cause of self-care deficits   - Assess range of motion  - Assess patient's mobility; develop plan if impaired  - Assess patient's need for assistive devices and provide as appropriate  - Encourage maximum independence but intervene and supervise when necessary  - Involve family in performance of ADLs  - Assess for home care needs following discharge   - Consider OT consult to assist with ADL evaluation and planning for discharge  - Provide patient education as appropriate  Outcome: Progressing  Goal: Maintains/Returns to pre admission functional level  Description: INTERVENTIONS:  - Perform BMAT or MOVE assessment daily    - Set and communicate daily mobility goal to care team and patient/family/caregiver  - Collaborate with rehabilitation services on mobility goals if consulted  - Perform Range of Motion 2 times a day  - Reposition patient every 2 hours    - Dangle patient 2 times a day  - Stand patient 2 times a day  - Ambulate patient 2 times a day  - Out of bed to chair 3 times a day   - Out of bed for meals 3 times a day  - Out of bed for toileting  - Record patient progress and toleration of activity level   Outcome: Progressing     Problem: HEMATOLOGIC - ADULT  Goal: Maintains hematologic stability  Description: INTERVENTIONS  - Assess for signs and symptoms of bleeding or hemorrhage  - Monitor labs  - Administer supportive blood products/factors as ordered and appropriate  Outcome: Progressing     Problem: Prexisting or High Potential for Compromised Skin Integrity  Goal: Skin integrity is maintained or improved  Description: INTERVENTIONS:  - Identify patients at risk for skin breakdown  - Assess and monitor skin integrity  - Assess and monitor nutrition and hydration status  - Monitor labs   - Assess for incontinence   - Turn and reposition patient  - Assist with mobility/ambulation  - Relieve pressure over bony prominences  - Avoid friction and shearing  - Provide appropriate hygiene as needed including keeping skin clean and dry  - Evaluate need for skin moisturizer/barrier cream  - Collaborate with interdisciplinary team   - Patient/family teaching  - Consider wound care consult   Outcome: Progressing     Problem: Potential for Falls  Goal: Patient will remain free of falls  Description: INTERVENTIONS:  - Educate patient/family on patient safety including physical limitations  - Instruct patient to call for assistance with activity   - Consult OT/PT to assist with strengthening/mobility   - Keep Call bell within reach  - Keep bed low and locked with side rails adjusted as appropriate  - Keep care items and personal belongings within reach  - Initiate and maintain comfort rounds  - Make Fall Risk Sign visible to staff  - Offer Toileting every 4 Hours, in advance of need  - Initiate/Maintain bed alarm  - Obtain necessary fall risk management equipment: side rails  - Apply yellow socks and bracelet for high fall risk patients  - Consider moving patient to room near nurses station  Outcome: Progressing     Problem: METABOLIC, FLUID AND ELECTROLYTES - ADULT  Goal: Electrolytes maintained within normal limits  Description: INTERVENTIONS:  - Monitor labs and assess patient for signs and symptoms of electrolyte imbalances  - Administer electrolyte replacement as ordered  - Monitor response to electrolyte replacements, including repeat lab results as appropriate  - Instruct patient on fluid and nutrition as appropriate  Outcome: Progressing  Goal: Fluid balance maintained  Description: INTERVENTIONS:  - Monitor labs   - Monitor I/O and WT  - Instruct patient on fluid and nutrition as appropriate  - Assess for signs & symptoms of volume excess or deficit  Outcome: Progressing     Problem: Nutrition/Hydration-ADULT  Goal: Nutrient/Hydration intake appropriate for improving, restoring or maintaining nutritional needs  Description: Monitor and assess patient's nutrition/hydration status for malnutrition  Collaborate with interdisciplinary team and initiate plan and interventions as ordered  Monitor patient's weight and dietary intake as ordered or per policy  Utilize nutrition screening tool and intervene as necessary  Determine patient's food preferences and provide high-protein, high-caloric foods as appropriate       INTERVENTIONS:  - Monitor oral intake, urinary output, labs, and treatment plans  - Assess nutrition and hydration status and recommend course of action  - Evaluate amount of meals eaten  - Assist patient with eating if necessary   - Allow adequate time for meals  - Recommend/ encourage appropriate diets, oral nutritional supplements, and vitamin/mineral supplements  - Order, calculate, and assess calorie counts as needed  - Recommend, monitor, and adjust tube feedings and TPN/PPN based on assessed needs  - Assess need for intravenous fluids  - Provide specific nutrition/hydration education as appropriate  - Include patient/family/caregiver in decisions related to nutrition  Outcome: Progressing     Problem: MOBILITY - ADULT  Goal: Maintain or return to baseline ADL function  Description: INTERVENTIONS:  -  Assess patient's ability to carry out ADLs; assess patient's baseline for ADL function and identify physical deficits which impact ability to perform ADLs (bathing, care of mouth/teeth, toileting, grooming, dressing, etc )  - Assess/evaluate cause of self-care deficits   - Assess range of motion  - Assess patient's mobility; develop plan if impaired  - Assess patient's need for assistive devices and provide as appropriate  - Encourage maximum independence but intervene and supervise when necessary  - Involve family in performance of ADLs  - Assess for home care needs following discharge   - Consider OT consult to assist with ADL evaluation and planning for discharge  - Provide patient education as appropriate  Outcome: Progressing  Goal: Maintains/Returns to pre admission functional level  Description: INTERVENTIONS:  - Perform BMAT or MOVE assessment daily    - Set and communicate daily mobility goal to care team and patient/family/caregiver  - Collaborate with rehabilitation services on mobility goals if consulted  - Perform Range of Motion 2 times a day  - Reposition patient every 2 hours    - Dangle patient 2 times a day  - Stand patient 2 times a day  - Ambulate patient 3 times a day  - Out of bed to chair 3 times a day   - Out of bed for meals 3 times a day  - Out of bed for toileting  - Record patient progress and toleration of activity level   Outcome: Progressing     Problem: HEMATOLOGIC - ADULT  Goal: Maintains hematologic stability  Description: INTERVENTIONS  - Assess for signs and symptoms of bleeding or hemorrhage  - Monitor labs  - Administer supportive blood products/factors as ordered and appropriate  Outcome: Progressing     Problem: Prexisting or High Potential for Compromised Skin Integrity  Goal: Skin integrity is maintained or improved  Description: INTERVENTIONS:  - Identify patients at risk for skin breakdown  - Assess and monitor skin integrity  - Assess and monitor nutrition and hydration status  - Monitor labs   - Assess for incontinence   - Turn and reposition patient  - Assist with mobility/ambulation  - Relieve pressure over bony prominences  - Avoid friction and shearing  - Provide appropriate hygiene as needed including keeping skin clean and dry  - Evaluate need for skin moisturizer/barrier cream  - Collaborate with interdisciplinary team   - Patient/family teaching  - Consider wound care consult   Outcome: Progressing     Problem: Potential for Falls  Goal: Patient will remain free of falls  Description: INTERVENTIONS:  - Educate patient/family on patient safety including physical limitations  - Instruct patient to call for assistance with activity   - Consult OT/PT to assist with strengthening/mobility   - Keep Call bell within reach  - Keep bed low and locked with side rails adjusted as appropriate  - Keep care items and personal belongings within reach  - Initiate and maintain comfort rounds  - Make Fall Risk Sign visible to staff  - Offer Toileting every 4 Hours, in advance of need  - Initiate/Maintain bed alarm  - Obtain necessary fall risk management equipment: side rails  - Apply yellow socks and bracelet for high fall risk patients  - Consider moving patient to room near nurses station  Outcome: Progressing     Problem: METABOLIC, FLUID AND ELECTROLYTES - ADULT  Goal: Electrolytes maintained within normal limits  Description: INTERVENTIONS:  - Monitor labs and assess patient for signs and symptoms of electrolyte imbalances  - Administer electrolyte replacement as ordered  - Monitor response to electrolyte replacements, including repeat lab results as appropriate  - Instruct patient on fluid and nutrition as appropriate  Outcome: Progressing  Goal: Fluid balance maintained  Description: INTERVENTIONS:  - Monitor labs   - Monitor I/O and WT  - Instruct patient on fluid and nutrition as appropriate  - Assess for signs & symptoms of volume excess or deficit  Outcome: Progressing     Problem: Nutrition/Hydration-ADULT  Goal: Nutrient/Hydration intake appropriate for improving, restoring or maintaining nutritional needs  Description: Monitor and assess patient's nutrition/hydration status for malnutrition  Collaborate with interdisciplinary team and initiate plan and interventions as ordered  Monitor patient's weight and dietary intake as ordered or per policy  Utilize nutrition screening tool and intervene as necessary   Determine patient's food preferences and provide high-protein, high-caloric foods as appropriate       INTERVENTIONS:  - Monitor oral intake, urinary output, labs, and treatment plans  - Assess nutrition and hydration status and recommend course of action  - Evaluate amount of meals eaten  - Assist patient with eating if necessary   - Allow adequate time for meals  - Recommend/ encourage appropriate diets, oral nutritional supplements, and vitamin/mineral supplements  - Order, calculate, and assess calorie counts as needed  - Recommend, monitor, and adjust tube feedings and TPN/PPN based on assessed needs  - Assess need for intravenous fluids  - Provide specific nutrition/hydration education as appropriate  - Include patient/family/caregiver in decisions related to nutrition  Outcome: Progressing

## 2022-03-19 NOTE — ASSESSMENT & PLAN NOTE
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  Colonoscopy showed polyp in the descending colon which was biopsied, 1 polyp 5-9 millimeter in the sigmoid colon which was removed EN bloc with cold snare and few scattered diverticula with protruding internal hemorrhoids  Pathology results are pending at the current time  Patient might need capsule endoscopy    Improved hemoglobin to 7 7 after 1 unit of PRBC transfusion on 3/17/22 H/H has been stable    Results from last 7 days   Lab Units 03/19/22  0851 03/18/22  0539 03/17/22  0446 03/16/22  0529 03/15/22  0856 03/14/22  0512 03/13/22  0514   HEMOGLOBIN g/dL 8 4* 7 8* 7 7* 7 1* 7 8* 7 5* 7 6*   HEMATOCRIT % 26 2* 22 9* 24 6* 22 1* 23 8* 23 1* 22 6*   MCV fL 88 83 88 88  --  89  --

## 2022-03-19 NOTE — ASSESSMENT & PLAN NOTE
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  Since patient had metabolic acidosis patient was placed on bicarbonate drip at 150 mL/hour  Renal US showed no evidence of hydronephrosis  8mm calculus was found in bladder  Possible cause of hematuria if not glomerulonephritis   Continue to hold Vasotec  Patient is recieving further workup for glomerulonephritis  SAMANTHA, GBM Ab, C3 and C4 WNL  UA showed innumerable red blood cells 10-20 white cells with glucose  CT of abdomen and pelvis showed 8 mm stone at UVJ with hydroureter but no hydronephrosis and not significant changed since 2016  No peritoneal or retroperitoneal hematoma or evidence of GI bleeding  Mild pulmonary edema and small b/l pleural effusions were noted  CXR pending  Patient started dialysis 3/9 and has been receiving dialysis per Renal  IVF were d/c on 3/9  Patient currently on hemodialysis on Tuesday Thursday Saturday schedule  Status post renal biopsy on 03/10-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

## 2022-03-19 NOTE — ASSESSMENT & PLAN NOTE
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

## 2022-03-19 NOTE — ASSESSMENT & PLAN NOTE
Lab Results   Component Value Date    HGBA1C 7 2 (H) 12/03/2021       Recent Labs     03/18/22  1624 03/18/22 2037 03/19/22  0704 03/19/22  1125   POCGLU 274* 328* 96 135     Patient is on Humalog mix 75/25 45 units b i d  Hold Humalog mix  Patient was initially on insulin drip as 1-beta hydroxybutyrate was elevated at 1 6  Patient will continue 75/25 25 units b i d    Blood sugars better with decreased dose of prednisone

## 2022-03-19 NOTE — CASE MANAGEMENT
Case Management Discharge Planning Note    Patient name Manpreet Green  Location [address] [address] Teresa Michel-* MRN [id number]  : 1945 Date 3/19/2022       Current Admission Date: 3/7/2022  Current Admission Diagnosis:Symptomatic anemia   Patient Active Problem List    Diagnosis Date Noted    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[facility] ) 2022    Pulmonary hypertension ([place] [address] ) 2022    Dialysis patient ([place] [address] ) 2022    Anemia due to chronic kidney disease, on chronic dialysis ([place] [address] )     Hemoptysis 2022    LORRAINE (acute kidney injury) ([place] [address] )     Other proteinuria     Symptomatic anemia 2022    Chest pain 2022    Acute kidney injury ([place] [address] ) 2022    High anion gap metabolic acidosis     Melena 2022    Elevated PSA 2021    Chronic pain of right knee 2021    Primary osteoarthritis of right knee 2021    Bladder stones 12/15/2016    Type 2 diabetes mellitus with microalbuminuria, with long-term current use of insulin ([place] [address] ) 2016    Benign colon polyp 2013    Benign prostatic hyperplasia with lower urinary tract symptoms 2013    Benign essential hypertension 2013    Hyperlipidemia 2012    Vitamin D deficiency 2012      LOS (days): 12  Geometric Mean LOS (GMLOS) (days): 3 60  Days to GMLOS:-8 5     OBJECTIVE:  Risk of Unplanned Readmission Score: 22     Current admission status: Inpatient   Preferred Pharmacy:   [facility] [address] [place]  [phone] [place] [address]  Phone: [phone] Fax: [phone] [address]  [address] [address]  [address] [facility], [address]  [facility] [address]  Phone: [phone] Fax: [phone]    Primary Care Provider: Sara Fernandez MD    Primary Insurance: MEDICARE  Secondary Insurance: BLUE CROSS    DISCHARGE DETAILS:    Discharge planning discussed with[de-identified] Hiram  Freedom of Choice: Yes  Comments - Freedom of Choice: Discharge planned  SW met briefly with pt to review plans  Pt will be returning home with wife and [facility]  Be will be going to dialysis and outpatient infusion  CMOlesya, assisted with arranging appointments and transportation for pt  Requested [address]         Is the patient interested in [facility] at discharge?: Yes    Other Referral/Resources/Interventions Provided:  Interventions: HHC,Dialysis,Other (Specify) (Outpatient infusion)  Referral Comments: Message sent to [facility] regarding discharge today  Will fax AVS to agency      Treatment Team Recommendation: Home with [address] "[employer]  Discharge Destination Plan[de-identified] Home with Maxime at Discharge : Evar[address]: Yes  Number/Name of Dispatcher: SLETS  Transported by [employer] and Unit #): Artur  ETA of Transport (Date): 03/19/22  ETA of Transport (Time): 1500

## 2022-03-19 NOTE — DISCHARGE SUMMARY
Mahesh 43  Discharge- Rebecca Garcia 1945, 64 y o  male MRN: [id number]  Unit/Bed#: [address] [address] Encounter: [id number]  Primary Care Provider: Sarah Sahni MD   Date and time admitted to hospital: 3/7/2022  9:37 AM    * Symptomatic anemia  Assessment & Plan  Hemoglobin upon admission was 5 8  Patient recieved 6 units of PRBC this admission  MCV level was normal  Vitamin E29 and folic acid level WNL  Iron panel revealed elevated ferritin, normal iron saturation, low TIBC, and low iron  Anemia likely multifactorial in the setting of possible GI bleeding and also CKD  Started on Epogen with dialysis  CT abd/pelvis found no evidence of GI bleed  S/P EGD on 03/11 which was negative for active bleeding  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  Colonoscopy showed polyp in the descending colon which was biopsied, 1 polyp 5-9 millimeter in the sigmoid colon which was removed EN bloc with cold snare and few scattered diverticula with protruding internal hemorrhoids  Pathology results are pending at the current time  Patient might need capsule endoscopy  Improved hemoglobin to 7 7 after 1 unit of PRBC transfusion on 3/17/22 H/H has been stable    Results from last 7 days   Lab Units 03/19/22  0851 03/18/22  0539 03/17/22  0446 03/16/22  0529 03/15/22  0856 03/14/22  0512 03/13/22  0514   HEMOGLOBIN g/dL 8 4* 7 8* 7 7* 7 1* 7 8* 7 5* 7 6*   HEMATOCRIT % 26 2* 22 9* 24 6* 22 1* 23 8* 23 1* 22 6*   MCV fL 88 83 88 88  --  89  --        Acute kidney injury (HCC)  Assessment & Plan  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  Since patient had metabolic acidosis patient was placed on bicarbonate drip at 150 mL/hour  Renal US showed no evidence of hydronephrosis  8mm calculus was found in bladder   Possible cause of hematuria if not glomerulonephritis   Continue to hold Vasotec  Patient is recieving further workup for glomerulonephritis  SAMANTHA, GBM Ab, C3 and C4 WNL  UA showed innumerable red blood cells 10-20 white cells with glucose  CT of abdomen and pelvis showed 8 mm stone at UVJ with hydroureter but no hydronephrosis and not significant changed since 2016  No peritoneal or retroperitoneal hematoma or evidence of GI bleeding  Mild pulmonary edema and small b/l pleural effusions were noted  CXR pending  Patient started dialysis 3/9 and has been receiving dialysis per Renal  IVF were d/c on 3/9  Patient currently on hemodialysis on Tuesday Thursday Saturday schedule  Status post renal biopsy on 03/10-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  PR3 34-84  Patient received methylprednisolone 500 milligram IV daily for 3 doses  Patient to be discharged on prednisone taper  Patient is on PCP prophylaxis with Bactrim  Patient scheduled for outpatient infusion for cyclophosphamide on March 23, 2022  Patient is set up for outpatient dialysis on March 22, 2022    Results from last 7 days   Lab Units 03/19/22  0851 03/18/22  0539 03/17/22  0446 03/16/22  1226 03/15/22  0856 03/14/22  0513   BUN mg/dL 95* 63* 98* 72* 91* 73*   CREATININE mg/dL 6 33* 4 67* 7 05* 6 08* 8 63* 6 94*       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HCC)  Assessment & Plan  Received Solu-Medrol 500 milligram for 3 days from 03/14 -03/16  Patient currently on prednisone taper  Patient to be scheduled for cyclophosphamide infusion on March 23, 2022 at 11:30 a m  Hemoptysis  Assessment & Plan  Patient states he has had a cough for a couple weeks  Patient with episodes of hemoptysis here   Chest x-ray was unremarkable  Obtain sputum culture if possible  Procalcitonin elevated at 6 73 --> 3 4, unclear significance in the setting of renal dysfunction    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  Patient has been stable without any further hemoptysis      Melena  Assessment & Plan  Patient reported black stool for couple of days prior to admission and even reported them after admission  Patient on Protonix 40 milligram IV q 12 hours  No history of NSAID use  Colonoscopy showed polyps  EGD showed 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  Biopsies have been negative  Patient is having regular bowel movements without any black stool currently  Outpatient follow-up with GI for capsule endoscopy    High anion gap metabolic acidosis  Assessment & Plan  Likely secondary to renal failure and also hyperglycemic state  Patient was placed on bicarbonate drip at 150 mL/hour, Bicarb drip was d/c on 3/9  Secondary to LORRAINE    Chest pain  Assessment & Plan  Patient presented with left-sided chest pain,Troponins x2 were negative initially  EKG was unremarkable  ProBNP was elevated at 15,000   2D echo showed normal EF with normal diastolic function  Reported chest pain again 3/9 and troponin peaked at 104   Patient ordered for outpatient stress test    Type 2 diabetes mellitus with microalbuminuria, with long-term current use of insulin [facility])  Assessment & Plan  Lab Results   Component Value Date    HGBA1C 7 2 (H) 12/03/2021       Recent Labs     03/18/22  1624 03/18/22  2037 03/19/22  0704 03/19/22  1125   POCGLU 274* 328* 96 135     Patient is on Humalog mix 75/25 45 units b i d  Hold Humalog mix  Patient was initially on insulin drip as 1-beta hydroxybutyrate was elevated at 1 6  Patient will continue 75/25 25 units b i d  Blood sugars better with decreased dose of prednisone    Hyperlipidemia  Assessment & Plan  Zocor substituted with Pravachol    Benign prostatic hyperplasia with lower urinary tract symptoms  Assessment & Plan  Monitor postvoid residuals    Continue Flomax    Benign essential hypertension  Assessment & Plan  Patient is on Vasotec at home which is being held in the setting of acute kidney injury  Avoid hypotension  Blood pressure moderately controlled  Continue nifedipine 30 milligram p o  Daily      Medical Problems             Resolved Problems  Date Reviewed: 3/19/2022          Resolved    Hyperkalemia 3/9/2022     Resolved by  Nayeli Mcmullen DO    UTI (urinary tract infection) 3/17/2022     Resolved by  Nancy Urbina MD              Discharging Physician / Practitioner: Nancy Urbina MD  PCP: Cabrera Patel MD  Admission Date:   Admission Orders (From admission, onward)     Ordered        03/07/22 1138  Inpatient Admission  Once                      Discharge Date: 03/19/22    Consultations During Hospital Stay:  · Nephrology    Procedures Performed:   · Echo showed EF 55 % with no wall motion abnormality       Outpatient Tests Requested:  · Patient will need outpatient stress test   Dialysis set up for Tuesday Thursday Saturday schedule  Patient is also set up for cyclophosphamide infusion on March 23, 7512    Complications:  None    Reason for Admission:  Chest pain    Hospital Course:   Ramón Davidson is a 64 y o  male patient diabetes, hypertension, anemia who originally presented to the hospital on 3/7/2022 due to chest pain  Patient also reports nasal congestion sore throat and postnasal drip  Patient also presented with some shortness of breath and orthopnea  In the ED patient's workup showed hemoglobin level of 5 8 with BUN creatinine of 169 and 14  Patient is admitted hospital for the diagnosis of acute kidney injury and severe anemia  Patient received PRBC transfusion with improved hemoglobin  Patient was seen by GI  Patient was also seen by Nephrology for acute kidney injury  Patient was initially treated with bicarbonate drip but creatinine continued to remain high  Patient had PermCath placed and was started on hemodialysis  Patient also had kidney biopsy which was positive for pauci immune glomerulonephritis    Patient was started on very high dose of steroids  Patient also had EGD and colonoscopy which showed no evidence of active bleeding  Patient's hemoglobin remained stable and patient was tolerating dialysis well  Patient to be discharged home with outpatient infusion set up for cyclophosphamide  Patient also set up for dialysis on Tuesday Thursday Saturday schedule  Please see above list of diagnoses and related plan for additional information  Condition at Discharge: stable    Discharge Day Visit / Exam:   Subjective:  Patient is feeling well  Denies any chest pain, shortness of breath, abdominal pain, nausea or vomiting  Vitals: Blood Pressure: 131/54 (03/19/22 1130)  Pulse: 59 (03/19/22 1130)  Temperature: 98 3 °F (36 8 °C) (03/19/22 1230)  Temp Source: Tympanic (03/19/22 1230)  Respirations: 18 (03/19/22 1230)  Height: 5' 8" (172 7 cm) (03/11/22 1331)  Weight - Scale: 88 5 kg (195 lb) (03/11/22 1331)  SpO2: 97 % (03/19/22 0752)  Exam:   Physical Exam  Constitutional:       Appearance: Normal appearance  HENT:      Head: Normocephalic and atraumatic  Eyes:      Extraocular Movements: Extraocular movements intact  Pupils: Pupils are equal, round, and reactive to light  Cardiovascular:      Rate and Rhythm: Normal rate and regular rhythm  Heart sounds: No murmur heard  No gallop  Pulmonary:      Effort: Pulmonary effort is normal       Breath sounds: Normal breath sounds  Abdominal:      General: Bowel sounds are normal       Palpations: Abdomen is soft  Tenderness: There is no abdominal tenderness  Musculoskeletal:         General: No swelling or deformity  Normal range of motion  Cervical back: Normal range of motion and neck supple  Comments: Right chest wall PermCath   Skin:     General: Skin is warm and dry  Neurological:      General: No focal deficit present  Mental Status: He is alert           Discussion with Family: yes    Discharge instructions/Information to patient and family:   See after visit summary for information provided to patient and family  Provisions for Follow-Up Care:  See after visit summary for information related to follow-up care and any pertinent home health orders  Disposition:   Home with [facility] Services (Reminder: Complete face to face encounter)    Planned Readmission: No     Discharge Statement:  I spent 35 minutes discharging the patient  This time was spent on the day of discharge  I had direct contact with the patient on the day of discharge  Greater than 50% of the total time was spent examining patient, answering all patient questions, arranging and discussing plan of care with patient as well as directly providing post-discharge instructions  Additional time then spent on discharge activities  Discharge Medications:  See after visit summary for reconciled discharge medications provided to patient and/or family        **Please Note: This note may have been constructed using a voice recognition system**

## 2022-03-19 NOTE — HEMODIALYSIS
Post-Dialysis RN Treatment Note    Blood Pressure:  Pre  133/57  mm/Hg  Post 136/54  67  mmHg   EDW   kg    Weight:  Pre 91 0 kg   Post 88 8 kg   Mode of weight measurement:    Volume Removed  2200 ml    Treatment duration 210 minutes    NS given   No net fluid given, all flushes removed  Treatment shortened?     Medications given during Rx Epo 10K}   Estimated Kt/V  NA   Access type: CVC   Access Issues: NA    Report called to primary nurse   Getachew Russ RN

## 2022-03-21 ENCOUNTER — TRANSITIONAL CARE MANAGEMENT (OUTPATIENT)
Dept: FAMILY MEDICINE CLINIC | Facility: CLINIC | Age: 77
End: 2022-03-21

## 2022-03-22 ENCOUNTER — TELEPHONE (OUTPATIENT)
Dept: FAMILY MEDICINE CLINIC | Facility: CLINIC | Age: 77
End: 2022-03-22

## 2022-03-22 LAB
GAMMA INTERFERON BACKGROUND BLD IA-ACNC: 0.02 IU/ML
M TB IFN-G BLD-IMP: ABNORMAL
M TB IFN-G CD4+ BCKGRND COR BLD-ACNC: -0.01 IU/ML
M TB IFN-G CD4+ BCKGRND COR BLD-ACNC: -0.01 IU/ML
MITOGEN IGNF BCKGRD COR BLD-ACNC: <0.1 IU/ML

## 2022-03-22 NOTE — TELEPHONE ENCOUNTER
DR Juarez BISHOP says patient was discharged from hospital this past weekend  They called to offer home care visit and patient declined

## 2022-03-23 ENCOUNTER — HOSPITAL ENCOUNTER (OUTPATIENT)
Dept: INFUSION CENTER | Facility: HOSPITAL | Age: 77
Discharge: HOME | End: 2022-03-23
Attending: STUDENT IN AN ORGANIZED HEALTH CARE EDUCATION/TRAINING PROGRAM
Payer: MEDICARE

## 2022-03-23 ENCOUNTER — TELEPHONE (OUTPATIENT)
Dept: GASTROENTEROLOGY | Facility: CLINIC | Age: 77
End: 2022-03-23

## 2022-03-23 VITALS
SYSTOLIC BLOOD PRESSURE: 141 MMHG | DIASTOLIC BLOOD PRESSURE: 66 MMHG | OXYGEN SATURATION: 96 % | TEMPERATURE: 97.9 F | WEIGHT: 195.11 LBS | RESPIRATION RATE: 20 BRPM | HEART RATE: 76 BPM | BODY MASS INDEX: 29.67 KG/M2

## 2022-03-23 DIAGNOSIS — I77.6 ANCA-ASSOCIATED VASCULITIS (HCC): Primary | ICD-10-CM

## 2022-03-23 LAB
ALBUMIN SERPL BCP-MCNC: 2.2 G/DL (ref 3.5–5)
ALP SERPL-CCNC: 64 U/L (ref 46–116)
ALT SERPL W P-5'-P-CCNC: 25 U/L (ref 12–78)
ANION GAP SERPL CALCULATED.3IONS-SCNC: 11 MMOL/L (ref 4–13)
AST SERPL W P-5'-P-CCNC: 15 U/L (ref 5–45)
BASOPHILS # BLD AUTO: 0.01 THOUSANDS/ΜL (ref 0–0.1)
BASOPHILS NFR BLD AUTO: 0 % (ref 0–1)
BILIRUB SERPL-MCNC: 0.37 MG/DL (ref 0.2–1)
BUN SERPL-MCNC: 49 MG/DL (ref 5–25)
CALCIUM ALBUM COR SERPL-MCNC: 8.4 MG/DL (ref 8.3–10.1)
CALCIUM SERPL-MCNC: 7 MG/DL (ref 8.3–10.1)
CHLORIDE SERPL-SCNC: 96 MMOL/L (ref 100–108)
CO2 SERPL-SCNC: 28 MMOL/L (ref 21–32)
CREAT SERPL-MCNC: 5.29 MG/DL (ref 0.6–1.3)
DME PARACHUTE DELIVERY DATE ACTUAL: NORMAL
DME PARACHUTE DELIVERY DATE REQUESTED: NORMAL
DME PARACHUTE ITEM DESCRIPTION: NORMAL
DME PARACHUTE ORDER STATUS: NORMAL
DME PARACHUTE SUPPLIER NAME: NORMAL
DME PARACHUTE SUPPLIER PHONE: NORMAL
EOSINOPHIL # BLD AUTO: 0 THOUSAND/ΜL (ref 0–0.61)
EOSINOPHIL NFR BLD AUTO: 0 % (ref 0–6)
ERYTHROCYTE [DISTWIDTH] IN BLOOD BY AUTOMATED COUNT: 15.9 % (ref 11.6–15.1)
GFR SERPL CREATININE-BSD FRML MDRD: 9 ML/MIN/1.73SQ M
GLUCOSE SERPL-MCNC: 181 MG/DL (ref 65–140)
HCT VFR BLD AUTO: 27.2 % (ref 36.5–49.3)
HGB BLD-MCNC: 8.4 G/DL (ref 12–17)
IMM GRANULOCYTES # BLD AUTO: 0.23 THOUSAND/UL (ref 0–0.2)
IMM GRANULOCYTES NFR BLD AUTO: 2 % (ref 0–2)
LYMPHOCYTES # BLD AUTO: 0.31 THOUSANDS/ΜL (ref 0.6–4.47)
LYMPHOCYTES NFR BLD AUTO: 2 % (ref 14–44)
MCH RBC QN AUTO: 28.3 PG (ref 26.8–34.3)
MCHC RBC AUTO-ENTMCNC: 30.9 G/DL (ref 31.4–37.4)
MCV RBC AUTO: 92 FL (ref 82–98)
MONOCYTES # BLD AUTO: 0.51 THOUSAND/ΜL (ref 0.17–1.22)
MONOCYTES NFR BLD AUTO: 4 % (ref 4–12)
NEUTROPHILS # BLD AUTO: 11.92 THOUSANDS/ΜL (ref 1.85–7.62)
NEUTS SEG NFR BLD AUTO: 92 % (ref 43–75)
NRBC BLD AUTO-RTO: 0 /100 WBCS
PLATELET # BLD AUTO: 263 THOUSANDS/UL (ref 149–390)
PMV BLD AUTO: 9.2 FL (ref 8.9–12.7)
POTASSIUM SERPL-SCNC: 4.2 MMOL/L (ref 3.5–5.3)
PROT SERPL-MCNC: 5.7 G/DL (ref 6.4–8.2)
RBC # BLD AUTO: 2.97 MILLION/UL (ref 3.88–5.62)
SODIUM SERPL-SCNC: 135 MMOL/L (ref 136–145)
WBC # BLD AUTO: 12.98 THOUSAND/UL (ref 4.31–10.16)

## 2022-03-23 PROCEDURE — 80053 COMPREHEN METABOLIC PANEL: CPT | Performed by: STUDENT IN AN ORGANIZED HEALTH CARE EDUCATION/TRAINING PROGRAM

## 2022-03-23 PROCEDURE — 96413 CHEMO IV INFUSION 1 HR: CPT

## 2022-03-23 PROCEDURE — 96367 TX/PROPH/DG ADDL SEQ IV INF: CPT

## 2022-03-23 PROCEDURE — 96415 CHEMO IV INFUSION ADDL HR: CPT

## 2022-03-23 PROCEDURE — 85025 COMPLETE CBC W/AUTO DIFF WBC: CPT | Performed by: STUDENT IN AN ORGANIZED HEALTH CARE EDUCATION/TRAINING PROGRAM

## 2022-03-23 RX ORDER — SODIUM CHLORIDE 9 MG/ML
20 INJECTION, SOLUTION INTRAVENOUS ONCE
Status: CANCELLED | OUTPATIENT
Start: 2022-04-06

## 2022-03-23 RX ORDER — SODIUM CHLORIDE 9 MG/ML
20 INJECTION, SOLUTION INTRAVENOUS ONCE
Status: COMPLETED | OUTPATIENT
Start: 2022-03-23 | End: 2022-03-23

## 2022-03-23 RX ADMIN — SODIUM CHLORIDE 20 ML/HR: 9 INJECTION, SOLUTION INTRAVENOUS at 12:10

## 2022-03-23 RX ADMIN — ONDANSETRON 4 MG: 2 INJECTION INTRAMUSCULAR; INTRAVENOUS at 12:10

## 2022-03-23 RX ADMIN — CYCLOPHOSPHAMIDE 664 MG: 1 INJECTION, POWDER, FOR SOLUTION INTRAVENOUS; ORAL at 12:49

## 2022-03-23 NOTE — PROGRESS NOTES
SEAN Velez MD; Thuy Castellanos MD; Josselyn Tyler 25  Pt given Cytoxan today as ordered by nephrology  Tolerated well  He is very unsteady, ambulating with cane, refused offer to call family for ride home  Took pt to car by wheelchair and pt drove himself home  I see he refused home care  Just concerned about this pt  And it doesn't look like he has a f/u appt with anyone other than his scheduled Cytoxan infusions  (unless he sees someone in a different network) Can someone please f/u with pt? Thank you

## 2022-03-23 NOTE — PROGRESS NOTES
Per Cristine Muñoz to proceed with hgb 8 4 from 3/19/22  OK to draw labs today, do not need to wait for results  No quantiferon needed per Dr Soni Paz

## 2022-03-23 NOTE — TELEPHONE ENCOUNTER
I called and left two messages for patient to call back to review Colonoscopy results, I called again today and mailbox was full  Sent patient letter

## 2022-03-23 NOTE — PROGRESS NOTES
Pt monitored post infusion x 30 min  Vss  Gait slightly unsteady upon ambulation  (pt has not had to stand up during entire appt, states he's "stiff from sitting all day") Pt d/c'd to car via wheelchair  States he is "fine to drive home"  AVS provided and pt aware of next infusion appt

## 2022-03-23 NOTE — PLAN OF CARE
Problem: Potential for Falls  Goal: Patient will remain free of falls  Description: INTERVENTIONS:  - Educate patient/family on patient safety including physical limitations  - Instruct patient to call for assistance with activity   - Keep Call bell within reach  Outcome: Progressing     Problem: Knowledge Deficit  Goal: Patient/family/caregiver demonstrates understanding of disease process, treatment plan, medications, and discharge instructions  Description: Complete learning assessment and assess knowledge base    Interventions:  - Provide teaching at level of understanding  - Provide teaching via preferred learning methods  Outcome: Progressing

## 2022-03-28 ENCOUNTER — APPOINTMENT (EMERGENCY)
Dept: RADIOLOGY | Facility: HOSPITAL | Age: 77
End: 2022-03-28
Payer: MEDICARE

## 2022-03-28 ENCOUNTER — HOSPITAL ENCOUNTER (EMERGENCY)
Facility: HOSPITAL | Age: 77
Discharge: HOME | End: 2022-03-28
Attending: EMERGENCY MEDICINE
Payer: MEDICARE

## 2022-03-28 VITALS
BODY MASS INDEX: 29.65 KG/M2 | OXYGEN SATURATION: 95 % | SYSTOLIC BLOOD PRESSURE: 192 MMHG | WEIGHT: 195 LBS | DIASTOLIC BLOOD PRESSURE: 93 MMHG | TEMPERATURE: 98.2 F | RESPIRATION RATE: 18 BRPM | HEART RATE: 82 BPM

## 2022-03-28 DIAGNOSIS — R60.9 PERIPHERAL EDEMA: Primary | ICD-10-CM

## 2022-03-28 DIAGNOSIS — D64.9 ANEMIA: ICD-10-CM

## 2022-03-28 LAB
ABO GROUP BLD: NORMAL
ALBUMIN SERPL BCP-MCNC: 2.3 G/DL (ref 3.5–5)
ALP SERPL-CCNC: 56 U/L (ref 46–116)
ALT SERPL W P-5'-P-CCNC: 22 U/L (ref 12–78)
ANION GAP SERPL CALCULATED.3IONS-SCNC: 8 MMOL/L (ref 4–13)
APTT PPP: 30 SECONDS (ref 23–37)
AST SERPL W P-5'-P-CCNC: 15 U/L (ref 5–45)
ATRIAL RATE: 75 BPM
BACTERIA UR QL AUTO: ABNORMAL /HPF
BASOPHILS # BLD AUTO: 0.01 THOUSANDS/ΜL (ref 0–0.1)
BASOPHILS NFR BLD AUTO: 0 % (ref 0–1)
BILIRUB SERPL-MCNC: 0.37 MG/DL (ref 0.2–1)
BILIRUB UR QL STRIP: NEGATIVE
BLD GP AB SCN SERPL QL: NEGATIVE
BUN SERPL-MCNC: 48 MG/DL (ref 5–25)
CALCIUM ALBUM COR SERPL-MCNC: 8.4 MG/DL (ref 8.3–10.1)
CALCIUM SERPL-MCNC: 7 MG/DL (ref 8.3–10.1)
CHLORIDE SERPL-SCNC: 103 MMOL/L (ref 100–108)
CLARITY UR: ABNORMAL
CO2 SERPL-SCNC: 29 MMOL/L (ref 21–32)
COLOR UR: ABNORMAL
CREAT SERPL-MCNC: 5.62 MG/DL (ref 0.6–1.3)
EOSINOPHIL # BLD AUTO: 0.02 THOUSAND/ΜL (ref 0–0.61)
EOSINOPHIL NFR BLD AUTO: 0 % (ref 0–6)
ERYTHROCYTE [DISTWIDTH] IN BLOOD BY AUTOMATED COUNT: 15.3 % (ref 11.6–15.1)
GFR SERPL CREATININE-BSD FRML MDRD: 8 ML/MIN/1.73SQ M
GLUCOSE SERPL-MCNC: 174 MG/DL (ref 65–140)
GLUCOSE UR STRIP-MCNC: NEGATIVE MG/DL
HCT VFR BLD AUTO: 24.6 % (ref 36.5–49.3)
HGB BLD-MCNC: 7.4 G/DL (ref 12–17)
HGB UR QL STRIP.AUTO: ABNORMAL
IMM GRANULOCYTES # BLD AUTO: 0.09 THOUSAND/UL (ref 0–0.2)
IMM GRANULOCYTES NFR BLD AUTO: 1 % (ref 0–2)
INR PPP: 1.07 (ref 0.84–1.19)
KETONES UR STRIP-MCNC: NEGATIVE MG/DL
LEUKOCYTE ESTERASE UR QL STRIP: NEGATIVE
LYMPHOCYTES # BLD AUTO: 0.8 THOUSANDS/ΜL (ref 0.6–4.47)
LYMPHOCYTES NFR BLD AUTO: 6 % (ref 14–44)
MAGNESIUM SERPL-MCNC: 2.1 MG/DL (ref 1.6–2.6)
MCH RBC QN AUTO: 28.4 PG (ref 26.8–34.3)
MCHC RBC AUTO-ENTMCNC: 30.1 G/DL (ref 31.4–37.4)
MCV RBC AUTO: 94 FL (ref 82–98)
MONOCYTES # BLD AUTO: 0.53 THOUSAND/ΜL (ref 0.17–1.22)
MONOCYTES NFR BLD AUTO: 4 % (ref 4–12)
NEUTROPHILS # BLD AUTO: 11.58 THOUSANDS/ΜL (ref 1.85–7.62)
NEUTS SEG NFR BLD AUTO: 89 % (ref 43–75)
NITRITE UR QL STRIP: NEGATIVE
NON-SQ EPI CELLS URNS QL MICRO: ABNORMAL /HPF
NRBC BLD AUTO-RTO: 0 /100 WBCS
NT-PROBNP SERPL-MCNC: ABNORMAL PG/ML
P AXIS: 0 DEGREES
PH UR STRIP.AUTO: 7.5 [PH]
PLATELET # BLD AUTO: 201 THOUSANDS/UL (ref 149–390)
PMV BLD AUTO: 8.6 FL (ref 8.9–12.7)
POTASSIUM SERPL-SCNC: 3.3 MMOL/L (ref 3.5–5.3)
PR INTERVAL: 156 MS
PROT SERPL-MCNC: 5.8 G/DL (ref 6.4–8.2)
PROT UR STRIP-MCNC: >=300 MG/DL
PROTHROMBIN TIME: 13.7 SECONDS (ref 11.6–14.5)
QRS AXIS: 29 DEGREES
QRSD INTERVAL: 94 MS
QT INTERVAL: 386 MS
QTC INTERVAL: 431 MS
RBC # BLD AUTO: 2.61 MILLION/UL (ref 3.88–5.62)
RBC #/AREA URNS AUTO: ABNORMAL /HPF
RH BLD: POSITIVE
SODIUM SERPL-SCNC: 140 MMOL/L (ref 136–145)
SP GR UR STRIP.AUTO: 1.02 (ref 1–1.03)
SPECIMEN EXPIRATION DATE: NORMAL
T WAVE AXIS: 35 DEGREES
UROBILINOGEN UR QL STRIP.AUTO: 0.2 E.U./DL
VENTRICULAR RATE: 75 BPM
WBC # BLD AUTO: 13.03 THOUSAND/UL (ref 4.31–10.16)
WBC #/AREA URNS AUTO: ABNORMAL /HPF

## 2022-03-28 PROCEDURE — 86850 RBC ANTIBODY SCREEN: CPT | Performed by: EMERGENCY MEDICINE

## 2022-03-28 PROCEDURE — 86901 BLOOD TYPING SEROLOGIC RH(D): CPT | Performed by: EMERGENCY MEDICINE

## 2022-03-28 PROCEDURE — 81001 URINALYSIS AUTO W/SCOPE: CPT | Performed by: EMERGENCY MEDICINE

## 2022-03-28 PROCEDURE — 36415 COLL VENOUS BLD VENIPUNCTURE: CPT | Performed by: EMERGENCY MEDICINE

## 2022-03-28 PROCEDURE — 86923 COMPATIBILITY TEST ELECTRIC: CPT

## 2022-03-28 PROCEDURE — P9016 RBC LEUKOCYTES REDUCED: HCPCS

## 2022-03-28 PROCEDURE — 85610 PROTHROMBIN TIME: CPT | Performed by: EMERGENCY MEDICINE

## 2022-03-28 PROCEDURE — 99285 EMERGENCY DEPT VISIT HI MDM: CPT | Performed by: EMERGENCY MEDICINE

## 2022-03-28 PROCEDURE — 86900 BLOOD TYPING SEROLOGIC ABO: CPT | Performed by: EMERGENCY MEDICINE

## 2022-03-28 PROCEDURE — 93010 ELECTROCARDIOGRAM REPORT: CPT | Performed by: INTERNAL MEDICINE

## 2022-03-28 PROCEDURE — 36430 TRANSFUSION BLD/BLD COMPNT: CPT

## 2022-03-28 PROCEDURE — 71045 X-RAY EXAM CHEST 1 VIEW: CPT

## 2022-03-28 PROCEDURE — 83880 ASSAY OF NATRIURETIC PEPTIDE: CPT | Performed by: EMERGENCY MEDICINE

## 2022-03-28 PROCEDURE — 93005 ELECTROCARDIOGRAM TRACING: CPT

## 2022-03-28 PROCEDURE — 83735 ASSAY OF MAGNESIUM: CPT | Performed by: EMERGENCY MEDICINE

## 2022-03-28 PROCEDURE — 80053 COMPREHEN METABOLIC PANEL: CPT | Performed by: EMERGENCY MEDICINE

## 2022-03-28 PROCEDURE — 85730 THROMBOPLASTIN TIME PARTIAL: CPT | Performed by: EMERGENCY MEDICINE

## 2022-03-28 PROCEDURE — 85025 COMPLETE CBC W/AUTO DIFF WBC: CPT | Performed by: EMERGENCY MEDICINE

## 2022-03-28 PROCEDURE — 99285 EMERGENCY DEPT VISIT HI MDM: CPT

## 2022-03-28 NOTE — ED NOTES
Pt creat was bumped to 14 in his last admission, has been doing temporary dialysis post discharge, has next dialysis on Saturday, last one was Saturday 3/26     Schuyler Bañuelos RN  03/28/22 3979

## 2022-03-28 NOTE — ED PROCEDURE NOTE
PROCEDURE  ECG 12 Lead Documentation Only    Date/Time: 3/28/2022 10:10 AM  Performed by: Luther Mcdonald DO  Authorized by: Luther Mcdonald DO     ECG reviewed by me, the ED Provider: yes    Patient location:  ED  Interpretation:     Interpretation: abnormal    Rate:     ECG rate:  75    ECG rate assessment: normal    Rhythm:     Rhythm: sinus rhythm    Ectopy:     Ectopy: none    Conduction:     Conduction: abnormal      Abnormal conduction: incomplete RBBB    ST segments:     ST segments:  Normal  T waves:     T waves: normal           Luther Mcdonald DO  03/28/22 1010

## 2022-03-29 LAB
ABO GROUP BLD BPU: NORMAL
BPU ID: NORMAL
CROSSMATCH: NORMAL
UNIT DISPENSE STATUS: NORMAL
UNIT PRODUCT CODE: NORMAL
UNIT PRODUCT VOLUME: 350 ML
UNIT RH: NORMAL

## 2022-03-29 NOTE — ED PROVIDER NOTES
History  Chief Complaint   Patient presents with    Edema     Pt presents with C/O B/L leg edema starting Friday, worse in RLE  Pt also has periorbital edema, worse on R side  Patient recently hospitalized at [facility], melena, and symptomatic anemia  Was given blood products  Patient presents for evaluation of bilateral lower extremity edema  Patient also reports periorbital edema worse on the right  Patient recently hospitalized for acute kidney injury and started on hemodialysis as an outpatient  Last dialysis was on Saturday  Denies any shortness of breath or chest pain  Patient states she has had edema since the hospital stay and has not been improving  History provided by:  Patient   used: No        Prior to Admission Medications   Prescriptions Last Dose Informant Patient Reported? Taking?    HumaLOG Mix 75/25 KwikPen (75-25) 100 units/mL injection pen   No No   Sig: Inject 25 Units under the skin 2 (two) times a day with meals   Insulin Pen Needle (B-D UF III MINI PEN NEEDLES) 31G X 5 MM MISC   No No   Sig: Use twice daily with insulin pen as directed   NIFEdipine (PROCARDIA XL) 30 mg 24 hr tablet   No No   Sig: Take 1 tablet (30 mg total) by mouth daily   Vitamin D, Cholecalciferol, 1000 UNITS CAPS  Self Yes No   Sig: Take by mouth 2 times a week   calcium carbonate-vitamin D (OSCAL-D) 500 mg-200 units per tablet   No No   Sig: Take 1 tablet by mouth daily with breakfast   furosemide (LASIX) 40 mg tablet   No No   Sig: Take 1 tablet (40 mg total) by mouth daily   glucose blood test strip   No No   Si each by Other route 3 (three) times a day   latanoprost (XALATAN) 0 005 % ophthalmic solution  Self Yes No   Sig: Administer 1 drop to both eyes daily at bedtime    pantoprazole (PROTONIX) 40 mg tablet   No No   Sig: Take 1 tablet (40 mg total) by mouth daily   predniSONE 10 mg tablet   No No   Sig: Take 3 tablets (30 mg total) by mouth daily for 7 doses   predniSONE 10 mg tablet   No No   Sig: Take 6 tablets (60 mg total) by mouth daily for 5 days, THEN 3 tablets (30 mg total) daily for 7 days, THEN 2 5 tablets (25 mg total) daily for 15 days, THEN 2 tablets (20 mg total) daily for 14 days, THEN 1 5 tablets (15 mg total) daily for 14 days  predniSONE 2 5 mg tablet   No No   Sig: Take 5 tablets (12 5 mg total) by mouth daily for 15 days, THEN 4 tablets (10 mg total) daily for 15 days, THEN 3 tablets (7 5 mg total) daily for 15 days, THEN 2 tablets (5 mg total) daily  predniSONE 5 mg tablet   No No   Sig: Take 5 tablets (25 mg total) by mouth daily for 14 doses   simvastatin (ZOCOR) 40 mg tablet   No No   Sig: Take 1 tablet (40 mg total) by mouth daily at bedtime   sulfamethoxazole-trimethoprim (BACTRIM) 400-80 mg per tablet   No No   Sig: Take 1 tablet by mouth 3 (three) times a week   tamsulosin (FLOMAX) 0 4 mg  Self No No   Sig: TAKE 1 CAPSULE BY MOUTH TWO TIMES DAILY      Facility-Administered Medications: None       Past Medical History:   Diagnosis Date    Anesthesia complication 5033    after colonoscopy , pt was awake but could not control his body and kept falling     Arthritis     Bladder calculi     BPH (benign prostatic hyperplasia)     Diabetes mellitus ([address] )     Hearing disorder of both ears     wears bilateral hearing aids    Hyperlipidemia     controlled and maintained d/t diabetes    Hypertension     Kidney stones     Last Assessed:4/3/2017    Lyme disease     Last Assessed:6/29/2015    Rupture, bladder, spontaneous     Last Assessed:4/3/2017       Past Surgical History:   Procedure Laterality Date    BLADDER REPAIR N/A 12/10/2016    Procedure: REPAIR BLADDER/cysto insertion stent;  Surgeon: Sona Araiza MD;  Location: [address] [place] [address];  Service:     COLONOSCOPY      CYSTOLITHOTOMY      ANESTHESIA   lower abdomen  ;  Last Assessed:4/3/2017    IR BIOPSY KIDNEY COLUMBIA KIT NO LATERALITY  3/10/2022    IR TUNNELED DIALYSIS CATHETER PLACEMENT  3/8/2022  OTHER SURGICAL HISTORY      thermal dilation of the prostate x 2 ( in the office)    TONSILLECTOMY      TRANSURETHRAL RESECTION OF PROSTATE N/A 2016    Procedure:  Cysto, Laser Bladder stone,fulgaration of prostates tissue ;  Surgeon: Mitch Ocampo MD;  Location: [facility] OR;  Service:        Family History   Problem Relation Age of Onset    Cancer Mother         breast    Diabetes Mother     Cancer Father         prostate w/ bone mets    Prostate cancer Father     Alzheimer's disease Sister      I have reviewed and agree with the history as documented  E-Cigarette/Vaping    E-Cigarette Use Never User      E-Cigarette/Vaping Substances     Social History     Tobacco Use    Smoking status: Former Smoker     Types: Cigars     Quit date:      Years since quittin 2    Smokeless tobacco: Never Used   Vaping Use    Vaping Use: Never used   Substance Use Topics    Alcohol use: Yes    Drug use: No       Review of Systems   Constitutional: Negative for chills and fever  HENT: Negative for ear pain and sore throat  Eyes: Negative for pain and visual disturbance  Respiratory: Negative for cough and shortness of breath  Cardiovascular: Positive for leg swelling  Negative for chest pain and palpitations  Gastrointestinal: Negative for abdominal pain and vomiting  Genitourinary: Negative for dysuria and hematuria  Musculoskeletal: Negative for arthralgias and back pain  Skin: Negative for color change and rash  Neurological: Negative for seizures and syncope  All other systems reviewed and are negative  Physical Exam  Physical Exam  Vitals and nursing note reviewed  Constitutional:       General: He is not in acute distress  HENT:      Head: Atraumatic  Right Ear: External ear normal       Left Ear: External ear normal       Nose: Nose normal       Mouth/Throat:      Mouth: Mucous membranes are moist       Pharynx: Oropharynx is clear     Eyes:      General: No scleral icterus  Conjunctiva/sclera: Conjunctivae normal    Cardiovascular:      Rate and Rhythm: Normal rate and regular rhythm  Pulses: Normal pulses  Pulmonary:      Effort: Pulmonary effort is normal  No respiratory distress  Breath sounds: Normal breath sounds  Abdominal:      General: Abdomen is flat  Bowel sounds are normal  There is no distension  Palpations: Abdomen is soft  Tenderness: There is no abdominal tenderness  There is no guarding or rebound  Musculoskeletal:         General: Normal range of motion  Right lower leg: Edema present  Left lower leg: Edema present  Skin:     Capillary Refill: Capillary refill takes less than 2 seconds  Findings: No rash  Neurological:      General: No focal deficit present  Mental Status: He is alert and oriented to person, place, and time           Vital Signs  ED Triage Vitals   Temperature Pulse Respirations Blood Pressure SpO2   03/28/22 0907 03/28/22 0907 03/28/22 0907 03/28/22 0907 03/28/22 0907   98 1 °F (36 7 °C) 86 18 (!) 172/73 97 %      Temp Source Heart Rate Source Patient Position - Orthostatic VS BP Location FiO2 (%)   03/28/22 0907 03/28/22 0907 03/28/22 0930 03/28/22 0907 --   Temporal Monitor Lying Right arm       Pain Score       03/28/22 0907       4           Vitals:    03/28/22 1650 03/28/22 1715 03/28/22 1745 03/28/22 1815   BP: (!) 183/83 (!) 183/83 (!) 192/93 (!) 192/93   Pulse: 78 80 82 82   Patient Position - Orthostatic VS:             Visual Acuity      ED Medications  Medications - No data to display    Diagnostic Studies  Results Reviewed     Procedure Component Value Units Date/Time    Urine Microscopic [[id number]]  (Abnormal) Collected: 03/28/22 1855    Lab Status: Final result Specimen: Urine, Clean Catch Updated: 03/28/22 1909     RBC, UA Innumerable /hpf      WBC, UA 10-20 /hpf      Epithelial Cells Occasional /hpf      Bacteria, UA Occasional /hpf     UA (URINE) with reflex to Scope [[id number]]  (Abnormal) Collected: 03/28/22 1855    Lab Status: Final result Specimen: Urine, Clean Catch Updated: 03/28/22 1901     Color, UA Light Yellow     Clarity, UA Cloudy     Specific Gravity, UA 1 025     pH, UA 7 5     Leukocytes, UA Negative     Nitrite, UA Negative     Protein, UA >=300 mg/dl      Glucose, UA Negative mg/dl      Ketones, UA Negative mg/dl      Urobilinogen, UA 0 2 E U /dl      Bilirubin, UA Negative     Blood, UA Large    Magnesium [[id number]]  (Normal) Collected: 03/28/22 0941    Lab Status: Final result Specimen: Blood from Arm, Right Updated: 03/28/22 1011     Magnesium 2 1 mg/dL     NT-BNP PRO [[id number]]  (Abnormal) Collected: 03/28/22 0941    Lab Status: Final result Specimen: Blood from Arm, Right Updated: 03/28/22 1011     NT-proBNP 15,866 pg/mL     Comprehensive metabolic panel [[id number]]  (Abnormal) Collected: 03/28/22 0941    Lab Status: Final result Specimen: Blood from Arm, Right Updated: 03/28/22 1004     Sodium 140 mmol/L      Potassium 3 3 mmol/L      Chloride 103 mmol/L      CO2 29 mmol/L      ANION GAP 8 mmol/L      BUN 48 mg/dL      Creatinine 5 62 mg/dL      Glucose 174 mg/dL      Calcium 7 0 mg/dL      Corrected Calcium 8 4 mg/dL      AST 15 U/L      ALT 22 U/L      Alkaline Phosphatase 56 U/L      Total Protein 5 8 g/dL      Albumin 2 3 g/dL      Total Bilirubin 0 37 mg/dL      eGFR 8 ml/min/1 73sq m     Narrative:      National Kidney Disease Foundation guidelines for Chronic Kidney Disease (CKD):     Stage 1 with normal or high GFR (GFR > 90 mL/min/1 73 square meters)    Stage 2 Mild CKD (GFR = 60-89 mL/min/1 73 square meters)    Stage 3A Moderate CKD (GFR = 45-59 mL/min/1 73 square meters)    Stage 3B Moderate CKD (GFR = 30-44 mL/min/1 73 square meters)    Stage 4 Severe CKD (GFR = 15-29 mL/min/1 73 square meters)    Stage 5 End Stage CKD (GFR <15 mL/min/1 73 square meters)  Note: GFR calculation is accurate only with a steady state creatinine Protime-INR [[id number]]  (Normal) Collected: 03/28/22 0941    Lab Status: Final result Specimen: Blood from Arm, Right Updated: 03/28/22 0959     Protime 13 7 seconds      INR 1 07    APTT [[id number]]  (Normal) Collected: 03/28/22 0941    Lab Status: Final result Specimen: Blood from Arm, Right Updated: 03/28/22 0959     PTT 30 seconds     CBC and differential [[id number]]  (Abnormal) Collected: 03/28/22 0941    Lab Status: Final result Specimen: Blood from Arm, Right Updated: 03/28/22 0947     WBC 13 03 Thousand/uL      RBC 2 61 Million/uL      Hemoglobin 7 4 g/dL      Hematocrit 24 6 %      MCV 94 fL      MCH 28 4 pg      MCHC 30 1 g/dL      RDW 15 3 %      MPV 8 6 fL      Platelets 552 Thousands/uL      nRBC 0 /100 WBCs      Neutrophils Relative 89 %      Immat GRANS % 1 %      Lymphocytes Relative 6 %      Monocytes Relative 4 %      Eosinophils Relative 0 %      Basophils Relative 0 %      Neutrophils Absolute 11 58 Thousands/µL      Immature Grans Absolute 0 09 Thousand/uL      Lymphocytes Absolute 0 80 Thousands/µL      Monocytes Absolute 0 53 Thousand/µL      Eosinophils Absolute 0 02 Thousand/µL      Basophils Absolute 0 01 Thousands/µL                  XR chest 1 view portable   Final Result by Kateryna Baker MD (03/28 1417)      Patchy bilateral lower lobe consolidation, either pneumonia or pulmonary edema  Workstation performed: [id number]                    Procedures  Procedures         ED Course                               SBIRT 20yo+      Most Recent Value   SBIRT (24 yo +)    In order to provide better care to our patients, we are screening all of our patients for alcohol and drug use  Would it be okay to ask you these screening questions? No Filed at: 03/28/2022 4950                    [facility]  Number of Diagnoses or Management Options  Anemia  Peripheral edema  Diagnosis management comments: Pulse ox 95% on room air indicating adequate oxygenation    CXR: NAD as read by me    Case discussed with Dr Aaron Mei, nephrology on-call  And patient is in no acute respiratory distress can continue with hemodialysis as an outpatient to removed edema  Patient's blood count had trended down to 7 4  Discussed possibly giving him a transfusion at dialysis however they stated cannot do this at the dialysis center  Therefore will given 1 unit of blood prior to discharge  Amount and/or Complexity of Data Reviewed  Clinical lab tests: ordered and reviewed  Tests in the radiology section of CPT®: ordered and reviewed  Decide to obtain previous medical records or to obtain history from someone other than the patient: yes  Review and summarize past medical records: yes  Discuss the patient with other providers: yes  Independent visualization of images, tracings, or specimens: yes    Patient Progress  Patient progress: stable      Disposition  Final diagnoses:   Peripheral edema   Anemia     Time reflects when diagnosis was documented in both MDM as applicable and the Disposition within this note     Time User Action Codes Description Comment    3/28/2022  4:44 PM Kareen MACIEL Add [R60 9] Peripheral edema     3/28/2022  4:45 PM Antione Rowe Add [D64 9] Anemia       ED Disposition     ED Disposition Condition Date/Time Comment    Discharge Stable Mon Mar 28, 2022  4:33 PM [address] discharge to home/self care              Follow-up Information     Follow up With Specialties Details Why Contact Info    Luke Larose MD Family Medicine In 1 week  [address]  [phone]            Discharge Medication List as of 3/28/2022  4:47 PM      CONTINUE these medications which have NOT CHANGED    Details   calcium carbonate-vitamin D (OSCAL-D) 500 mg-200 units per tablet Take 1 tablet by mouth daily with breakfast, Starting Sun 3/20/2022, Normal      furosemide (LASIX) 40 mg tablet Take 1 tablet (40 mg total) by mouth daily, Starting Sat 3/19/2022, Normal glucose blood test strip 1 each by Other route 3 (three) times a day, Starting Fri 5/22/2020, Normal      HumaLOG Mix 75/25 KwikPen (75-25) 100 units/mL injection pen Inject 25 Units under the skin 2 (two) times a day with meals, Starting Sat 3/19/2022, Until Fri 6/17/2022, Normal      Insulin Pen Needle (B-D UF III MINI PEN NEEDLES) 31G X 5 MM MISC Use twice daily with insulin pen as directed, Normal      latanoprost (XALATAN) 0 005 % ophthalmic solution Administer 1 drop to both eyes daily at bedtime , Starting Mon 5/21/2018, Historical Med      NIFEdipine (PROCARDIA XL) 30 mg 24 hr tablet Take 1 tablet (30 mg total) by mouth daily, Starting Sat 3/19/2022, Normal      pantoprazole (PROTONIX) 40 mg tablet Take 1 tablet (40 mg total) by mouth daily, Starting Sat 3/19/2022, Normal      !! predniSONE 10 mg tablet Take 3 tablets (30 mg total) by mouth daily for 7 doses, Starting Thu 3/24/2022, Until Thu 3/31/2022, Normal      !! predniSONE 10 mg tablet Multiple Dosages:Starting Sat 3/19/2022, Until Wed 3/23/2022 at 2359, THEN Starting Thu 3/24/2022, Until Wed 3/30/2022 at 2359, THEN Starting Thu 3/31/2022, Until Thu 4/14/2022 at 2359, THEN Starting Fri 4/15/2022, Until Thu 4/28/2022 at 2359, THEN  Starting Fri 4/29/2022, Until Thu 5/12/2022 at 2359Take 6 tablets (60 mg total) by mouth daily for 5 days, THEN 3 tablets (30 mg total) daily for 7 days, THEN 2 5 tablets (25 mg total) daily for 15 days, THEN 2 tablets (20 mg total) daily for 14 days, T HEN 1 5 tablets (15 mg total) daily for 14 days  , Normal      !! predniSONE 2 5 mg tablet Multiple Dosages:Starting Fri 5/13/2022, Until Fri 5/27/2022 at 2359, THEN Starting Sat 5/28/2022, Until Sat 6/11/2022 at 2359, THEN Starting Sun 6/12/2022, Until Sun 6/26/2022 at 2359, THEN Starting Mon 6/27/2022, Until Tue 7/26/2022 at 2359Take 5  tablets (12 5 mg total) by mouth daily for 15 days, THEN 4 tablets (10 mg total) daily for 15 days, THEN 3 tablets (7 5 mg total) daily for 15 days, THEN 2 tablets (5 mg total) daily  , Normal      !! predniSONE 5 mg tablet Take 5 tablets (25 mg total) by mouth daily for 14 doses, Starting Thu 3/31/2022, Until Thu 4/14/2022, Normal      simvastatin (ZOCOR) 40 mg tablet Take 1 tablet (40 mg total) by mouth daily at bedtime, Starting Tue 9/7/2021, Normal      sulfamethoxazole-trimethoprim (BACTRIM) 400-80 mg per tablet Take 1 tablet by mouth 3 (three) times a week, Starting Tue 3/22/2022, Until Thu 4/21/2022, Normal      tamsulosin (FLOMAX) 0 4 mg TAKE 1 CAPSULE BY MOUTH TWO TIMES DAILY, Normal      Vitamin D, Cholecalciferol, 1000 UNITS CAPS Take by mouth 2 times a week, Historical Med       !! - Potential duplicate medications found  Please discuss with provider  No discharge procedures on file      PDMP Review     None          ED Provider  Electronically Signed by           Ambar Richards DO  03/29/22 4114

## 2022-04-06 ENCOUNTER — HOSPITAL ENCOUNTER (OUTPATIENT)
Dept: INFUSION CENTER | Facility: HOSPITAL | Age: 77
Discharge: HOME | End: 2022-04-06
Attending: STUDENT IN AN ORGANIZED HEALTH CARE EDUCATION/TRAINING PROGRAM
Payer: MEDICARE

## 2022-04-06 VITALS
HEART RATE: 74 BPM | DIASTOLIC BLOOD PRESSURE: 71 MMHG | RESPIRATION RATE: 18 BRPM | WEIGHT: 188.27 LBS | TEMPERATURE: 98 F | BODY MASS INDEX: 28.63 KG/M2 | SYSTOLIC BLOOD PRESSURE: 168 MMHG | OXYGEN SATURATION: 97 %

## 2022-04-06 DIAGNOSIS — I77.6 ANCA-ASSOCIATED VASCULITIS (HCC): Primary | ICD-10-CM

## 2022-04-06 LAB
ALBUMIN SERPL BCP-MCNC: 2.2 G/DL (ref 3.5–5)
ALP SERPL-CCNC: 82 U/L (ref 46–116)
ALT SERPL W P-5'-P-CCNC: 20 U/L (ref 12–78)
ANION GAP SERPL CALCULATED.3IONS-SCNC: 9 MMOL/L (ref 4–13)
AST SERPL W P-5'-P-CCNC: 22 U/L (ref 5–45)
BASOPHILS # BLD AUTO: 0.02 THOUSANDS/ΜL (ref 0–0.1)
BASOPHILS NFR BLD AUTO: 1 % (ref 0–1)
BILIRUB SERPL-MCNC: 0.42 MG/DL (ref 0.2–1)
BUN SERPL-MCNC: 32 MG/DL (ref 5–25)
CALCIUM ALBUM COR SERPL-MCNC: 8.7 MG/DL (ref 8.3–10.1)
CALCIUM SERPL-MCNC: 7.3 MG/DL (ref 8.3–10.1)
CHLORIDE SERPL-SCNC: 98 MMOL/L (ref 100–108)
CO2 SERPL-SCNC: 31 MMOL/L (ref 21–32)
CREAT SERPL-MCNC: 4.4 MG/DL (ref 0.6–1.3)
EOSINOPHIL # BLD AUTO: 0.07 THOUSAND/ΜL (ref 0–0.61)
EOSINOPHIL NFR BLD AUTO: 2 % (ref 0–6)
ERYTHROCYTE [DISTWIDTH] IN BLOOD BY AUTOMATED COUNT: 16.1 % (ref 11.6–15.1)
GFR SERPL CREATININE-BSD FRML MDRD: 11 ML/MIN/1.73SQ M
GLUCOSE SERPL-MCNC: 197 MG/DL (ref 65–140)
HCT VFR BLD AUTO: 25.5 % (ref 36.5–49.3)
HGB BLD-MCNC: 7.7 G/DL (ref 12–17)
IMM GRANULOCYTES # BLD AUTO: 0.04 THOUSAND/UL (ref 0–0.2)
IMM GRANULOCYTES NFR BLD AUTO: 1 % (ref 0–2)
LYMPHOCYTES # BLD AUTO: 0.89 THOUSANDS/ΜL (ref 0.6–4.47)
LYMPHOCYTES NFR BLD AUTO: 21 % (ref 14–44)
MCH RBC QN AUTO: 28.6 PG (ref 26.8–34.3)
MCHC RBC AUTO-ENTMCNC: 30.2 G/DL (ref 31.4–37.4)
MCV RBC AUTO: 95 FL (ref 82–98)
MONOCYTES # BLD AUTO: 0.45 THOUSAND/ΜL (ref 0.17–1.22)
MONOCYTES NFR BLD AUTO: 10 % (ref 4–12)
NEUTROPHILS # BLD AUTO: 2.86 THOUSANDS/ΜL (ref 1.85–7.62)
NEUTS SEG NFR BLD AUTO: 65 % (ref 43–75)
NRBC BLD AUTO-RTO: 0 /100 WBCS
PLATELET # BLD AUTO: 250 THOUSANDS/UL (ref 149–390)
PMV BLD AUTO: 9.2 FL (ref 8.9–12.7)
POTASSIUM SERPL-SCNC: 4.5 MMOL/L (ref 3.5–5.3)
PROT SERPL-MCNC: 6.5 G/DL (ref 6.4–8.2)
RBC # BLD AUTO: 2.69 MILLION/UL (ref 3.88–5.62)
SODIUM SERPL-SCNC: 138 MMOL/L (ref 136–145)
WBC # BLD AUTO: 4.33 THOUSAND/UL (ref 4.31–10.16)

## 2022-04-06 PROCEDURE — 83520 IMMUNOASSAY QUANT NOS NONAB: CPT | Performed by: STUDENT IN AN ORGANIZED HEALTH CARE EDUCATION/TRAINING PROGRAM

## 2022-04-06 PROCEDURE — 96413 CHEMO IV INFUSION 1 HR: CPT

## 2022-04-06 PROCEDURE — 85025 COMPLETE CBC W/AUTO DIFF WBC: CPT | Performed by: STUDENT IN AN ORGANIZED HEALTH CARE EDUCATION/TRAINING PROGRAM

## 2022-04-06 PROCEDURE — 80053 COMPREHEN METABOLIC PANEL: CPT | Performed by: STUDENT IN AN ORGANIZED HEALTH CARE EDUCATION/TRAINING PROGRAM

## 2022-04-06 PROCEDURE — 96367 TX/PROPH/DG ADDL SEQ IV INF: CPT

## 2022-04-06 PROCEDURE — 96415 CHEMO IV INFUSION ADDL HR: CPT

## 2022-04-06 RX ORDER — SODIUM CHLORIDE 9 MG/ML
20 INJECTION, SOLUTION INTRAVENOUS ONCE
Status: CANCELLED | OUTPATIENT
Start: 2022-04-20

## 2022-04-06 RX ORDER — SODIUM CHLORIDE 9 MG/ML
20 INJECTION, SOLUTION INTRAVENOUS ONCE
Status: COMPLETED | OUTPATIENT
Start: 2022-04-06 | End: 2022-04-06

## 2022-04-06 RX ADMIN — ONDANSETRON 4 MG: 2 INJECTION INTRAMUSCULAR; INTRAVENOUS at 12:05

## 2022-04-06 RX ADMIN — SODIUM CHLORIDE 20 ML/HR: 9 INJECTION, SOLUTION INTRAVENOUS at 12:03

## 2022-04-06 RX ADMIN — CYCLOPHOSPHAMIDE 664 MG: 1 INJECTION, POWDER, FOR SOLUTION INTRAVENOUS; ORAL at 12:37

## 2022-04-06 NOTE — PROGRESS NOTES
Per conversation with Dr Bertha Teixeira on 4/5/22, draw labwork but do not have to wait for results prior to administration of Cytoxan and do not have to follow parameters included in therapy plan for today and for future infusions of Cytoxan

## 2022-04-06 NOTE — TELEPHONE ENCOUNTER
Patient stopped by the office and stated that he received a letter about his results  I gave patient his results as per Dr Troy Dumont

## 2022-04-08 LAB — PROTEINASE3 AB SER IA-ACNC: 12.2 U/ML (ref 0–3.5)

## 2022-04-20 ENCOUNTER — HOSPITAL ENCOUNTER (OUTPATIENT)
Dept: INFUSION CENTER | Facility: HOSPITAL | Age: 77
Discharge: HOME | End: 2022-04-20
Attending: STUDENT IN AN ORGANIZED HEALTH CARE EDUCATION/TRAINING PROGRAM

## 2022-04-20 ENCOUNTER — HOSPITAL ENCOUNTER (EMERGENCY)
Facility: HOSPITAL | Age: 77
Discharge: HOME | End: 2022-04-20
Attending: EMERGENCY MEDICINE
Payer: MEDICARE

## 2022-04-20 VITALS
TEMPERATURE: 98.9 F | RESPIRATION RATE: 20 BRPM | SYSTOLIC BLOOD PRESSURE: 169 MMHG | DIASTOLIC BLOOD PRESSURE: 77 MMHG | HEIGHT: 68 IN | HEART RATE: 79 BPM | OXYGEN SATURATION: 99 % | BODY MASS INDEX: 27.34 KG/M2 | WEIGHT: 180.4 LBS

## 2022-04-20 DIAGNOSIS — D64.9 ANEMIA: Primary | ICD-10-CM

## 2022-04-20 LAB
ABO GROUP BLD: NORMAL
ANION GAP SERPL CALCULATED.3IONS-SCNC: 8 MMOL/L (ref 4–13)
ANISOCYTOSIS BLD QL SMEAR: PRESENT
BASOPHILS # BLD MANUAL: 0 THOUSAND/UL (ref 0–0.1)
BASOPHILS NFR MAR MANUAL: 0 % (ref 0–1)
BLD GP AB SCN SERPL QL: NEGATIVE
BUN SERPL-MCNC: 32 MG/DL (ref 5–25)
CALCIUM SERPL-MCNC: 8.4 MG/DL (ref 8.3–10.1)
CHLORIDE SERPL-SCNC: 98 MMOL/L (ref 100–108)
CO2 SERPL-SCNC: 32 MMOL/L (ref 21–32)
CREAT SERPL-MCNC: 4.63 MG/DL (ref 0.6–1.3)
EOSINOPHIL # BLD MANUAL: 0.18 THOUSAND/UL (ref 0–0.4)
EOSINOPHIL NFR BLD MANUAL: 3 % (ref 0–6)
ERYTHROCYTE [DISTWIDTH] IN BLOOD BY AUTOMATED COUNT: 18 % (ref 11.6–15.1)
GFR SERPL CREATININE-BSD FRML MDRD: 11 ML/MIN/1.73SQ M
GLUCOSE SERPL-MCNC: 111 MG/DL (ref 65–140)
HCT VFR BLD AUTO: 25.1 % (ref 36.5–49.3)
HGB BLD-MCNC: 7.6 G/DL (ref 12–17)
LYMPHOCYTES # BLD AUTO: 1.3 THOUSAND/UL (ref 0.6–4.47)
LYMPHOCYTES # BLD AUTO: 22 % (ref 14–44)
MCH RBC QN AUTO: 29.8 PG (ref 26.8–34.3)
MCHC RBC AUTO-ENTMCNC: 30.3 G/DL (ref 31.4–37.4)
MCV RBC AUTO: 98 FL (ref 82–98)
METAMYELOCYTES NFR BLD MANUAL: 1 % (ref 0–1)
MONOCYTES # BLD AUTO: 0.53 THOUSAND/UL (ref 0–1.22)
MONOCYTES NFR BLD: 9 % (ref 4–12)
NEUTROPHILS # BLD MANUAL: 3.83 THOUSAND/UL (ref 1.85–7.62)
NEUTS BAND NFR BLD MANUAL: 1 % (ref 0–8)
NEUTS SEG NFR BLD AUTO: 64 % (ref 43–75)
PLATELET # BLD AUTO: 348 THOUSANDS/UL (ref 149–390)
PLATELET BLD QL SMEAR: ADEQUATE
PMV BLD AUTO: 9 FL (ref 8.9–12.7)
POLYCHROMASIA BLD QL SMEAR: PRESENT
POTASSIUM SERPL-SCNC: 5.7 MMOL/L (ref 3.5–5.3)
RBC # BLD AUTO: 2.55 MILLION/UL (ref 3.88–5.62)
RBC MORPH BLD: PRESENT
RH BLD: POSITIVE
SODIUM SERPL-SCNC: 138 MMOL/L (ref 136–145)
SPECIMEN EXPIRATION DATE: NORMAL
WBC # BLD AUTO: 5.89 THOUSAND/UL (ref 4.31–10.16)

## 2022-04-20 PROCEDURE — 85007 BL SMEAR W/DIFF WBC COUNT: CPT | Performed by: EMERGENCY MEDICINE

## 2022-04-20 PROCEDURE — 86900 BLOOD TYPING SEROLOGIC ABO: CPT | Performed by: EMERGENCY MEDICINE

## 2022-04-20 PROCEDURE — 36430 TRANSFUSION BLD/BLD COMPNT: CPT

## 2022-04-20 PROCEDURE — P9016 RBC LEUKOCYTES REDUCED: HCPCS

## 2022-04-20 PROCEDURE — 36415 COLL VENOUS BLD VENIPUNCTURE: CPT | Performed by: EMERGENCY MEDICINE

## 2022-04-20 PROCEDURE — 86850 RBC ANTIBODY SCREEN: CPT | Performed by: EMERGENCY MEDICINE

## 2022-04-20 PROCEDURE — 86901 BLOOD TYPING SEROLOGIC RH(D): CPT | Performed by: EMERGENCY MEDICINE

## 2022-04-20 PROCEDURE — 80048 BASIC METABOLIC PNL TOTAL CA: CPT | Performed by: EMERGENCY MEDICINE

## 2022-04-20 PROCEDURE — 86923 COMPATIBILITY TEST ELECTRIC: CPT

## 2022-04-20 PROCEDURE — 99284 EMERGENCY DEPT VISIT MOD MDM: CPT | Performed by: EMERGENCY MEDICINE

## 2022-04-20 PROCEDURE — 99284 EMERGENCY DEPT VISIT MOD MDM: CPT

## 2022-04-20 PROCEDURE — 85027 COMPLETE CBC AUTOMATED: CPT | Performed by: EMERGENCY MEDICINE

## 2022-04-20 NOTE — ED NOTES
Pt d/c home, boxed lunch given  Pt VS WNL  Pt ambulates steady to his car        Kapil Clark RN  04/20/22 4061

## 2022-04-20 NOTE — ED PROVIDER NOTES
History  Chief Complaint   Patient presents with    Abnormal Lab     sent in by   for low hgb (7 1) after lab work  Dialysis patient  Patient has multiple medical problems and has recently been placed on hemodialysis  Patient was in the infusion center today for planned administration of Cytoxan  He had blood test done yesterday which reported hemoglobin of 7 1  The patient was referred from infusion center to the ED for transfusion of a unit of packed cells  Patient states he has not noticed any bleeding  States he feels at baseline and well  Prior to Admission Medications   Prescriptions Last Dose Informant Patient Reported? Taking?    HumaLOG Mix 75/25 KwikPen (75-25) 100 units/mL injection pen 2022 at Unknown time  No Yes   Sig: Inject 25 Units under the skin 2 (two) times a day with meals   Insulin Pen Needle (B-D UF III MINI PEN NEEDLES) 31G X 5 MM MISC 2022 at Unknown time  No Yes   Sig: Use twice daily with insulin pen as directed   NIFEdipine (PROCARDIA XL) 30 mg 24 hr tablet Not Taking at Unknown time  No No   Sig: Take 1 tablet (30 mg total) by mouth daily   Patient not taking: Reported on 2022    calcium carbonate-vitamin D (OSCAL-D) 500 mg-200 units per tablet 2022 at Unknown time  No Yes   Sig: Take 1 tablet by mouth daily with breakfast   furosemide (LASIX) 40 mg tablet Not Taking at Unknown time  No No   Sig: Take 1 tablet (40 mg total) by mouth daily   Patient not taking: Reported on 2022    glucose blood test strip 2022 at Unknown time  No Yes   Si each by Other route 3 (three) times a day   latanoprost (XALATAN) 0 005 % ophthalmic solution Not Taking at Unknown time Self Yes No   Sig: Administer 1 drop to both eyes daily at bedtime    Patient not taking: Reported on 2022    pantoprazole (PROTONIX) 40 mg tablet Not Taking at Unknown time  No No   Sig: Take 1 tablet (40 mg total) by mouth daily   Patient not taking: Reported on 2022 predniSONE 10 mg tablet Not Taking at Unknown time  No No   Sig: Take 6 tablets (60 mg total) by mouth daily for 5 days, THEN 3 tablets (30 mg total) daily for 7 days, THEN 2 5 tablets (25 mg total) daily for 15 days, THEN 2 tablets (20 mg total) daily for 14 days, THEN 1 5 tablets (15 mg total) daily for 14 days  Patient not taking: Reported on 4/20/2022   predniSONE 2 5 mg tablet Not Taking at Unknown time  No No   Sig: Take 5 tablets (12 5 mg total) by mouth daily for 15 days, THEN 4 tablets (10 mg total) daily for 15 days, THEN 3 tablets (7 5 mg total) daily for 15 days, THEN 2 tablets (5 mg total) daily     Patient not taking: Reported on 4/20/2022   simvastatin (ZOCOR) 40 mg tablet 4/20/2022 at Unknown time  No Yes   Sig: Take 1 tablet (40 mg total) by mouth daily at bedtime   sulfamethoxazole-trimethoprim (BACTRIM) 400-80 mg per tablet Not Taking at Unknown time  No No   Sig: Take 1 tablet by mouth 3 (three) times a week   Patient not taking: Reported on 4/20/2022    tamsulosin (FLOMAX) 0 4 mg 4/20/2022 at Unknown time Self No Yes   Sig: TAKE 1 CAPSULE BY MOUTH TWO TIMES DAILY      Facility-Administered Medications: None       Past Medical History:   Diagnosis Date    Anesthesia complication 2936    after colonoscopy , pt was awake but could not control his body and kept falling     Arthritis     Bladder calculi     BPH (benign prostatic hyperplasia)     Diabetes mellitus ([address] )     Hearing disorder of both ears     wears bilateral hearing aids    Hyperlipidemia     controlled and maintained d/t diabetes    Hypertension     Kidney stones     Last Assessed:4/3/2017    Lyme disease     Last Assessed:6/29/2015    Rupture, bladder, spontaneous     Last Assessed:4/3/2017       Past Surgical History:   Procedure Laterality Date    BLADDER REPAIR N/A 12/10/2016    Procedure: REPAIR BLADDER/cysto insertion stent;  Surgeon: Eleazar Sears MD;  Location: [address] [place] [address];  Service:     COLONOSCOPY      CYSTOLITHOTOMY      ANESTHESIA   lower abdomen  ; Last Assessed:4/3/2017    IR BIOPSY KIDNEY RANDOM  3/10/2022    IR TUNNELED DIALYSIS CATHETER PLACEMENT  3/8/2022    OTHER SURGICAL HISTORY      thermal dilation of the prostate x 2 ( in the office)    TONSILLECTOMY      TRANSURETHRAL RESECTION OF PROSTATE N/A 2016    Procedure:  Cysto, Laser Bladder stone,fulgaration of prostates tissue ;  Surgeon: Flo Rojas MD;  Location: [facility];  Service:        Family History   Problem Relation Age of Onset    Cancer Mother         breast    Diabetes Mother     Cancer Father         prostate w/ bone mets    Prostate cancer Father     Alzheimer's disease Sister      I have reviewed and agree with the history as documented  E-Cigarette/Vaping    E-Cigarette Use Never User      E-Cigarette/Vaping Substances    Nicotine No     THC No     CBD No     Flavoring No     Other No     Unknown No      Social History     Tobacco Use    Smoking status: Former Smoker     Types: Cigars     Quit date:      Years since quittin 3    Smokeless tobacco: Never Used   Vaping Use    Vaping Use: Never used   Substance Use Topics    Alcohol use: Yes    Drug use: No       Review of Systems   Constitutional: Negative for chills and fever  HENT: Negative for congestion and sore throat  Eyes: Negative for visual disturbance  Respiratory: Negative for cough and shortness of breath  Cardiovascular: Negative for chest pain  Gastrointestinal: Negative for abdominal pain and vomiting  Genitourinary: Negative for hematuria  Musculoskeletal: Negative for back pain and neck pain  Skin: Negative for rash  Neurological: Negative for dizziness, weakness, light-headedness and headaches  Hematological: Does not bruise/bleed easily  Psychiatric/Behavioral: Negative for confusion  All other systems reviewed and are negative  Physical Exam  Physical Exam  Vitals and nursing note reviewed  Constitutional:       Appearance: Normal appearance  HENT:      Head: Normocephalic  Right Ear: External ear normal       Left Ear: External ear normal       Nose: Nose normal       Mouth/Throat:      Pharynx: Oropharynx is clear  Eyes:      Conjunctiva/sclera: Conjunctivae normal    Cardiovascular:      Rate and Rhythm: Normal rate and regular rhythm  Pulmonary:      Effort: Pulmonary effort is normal       Breath sounds: Normal breath sounds  Abdominal:      Palpations: Abdomen is soft  Tenderness: There is no abdominal tenderness  Musculoskeletal:         General: Normal range of motion  Cervical back: Normal range of motion  Skin:     General: Skin is warm and dry  Capillary Refill: Capillary refill takes less than 2 seconds  Neurological:      General: No focal deficit present  Mental Status: He is alert and oriented to person, place, and time     Psychiatric:         Mood and Affect: Mood normal          Behavior: Behavior normal          Vital Signs  ED Triage Vitals [04/20/22 0845]   Temperature Pulse Respirations Blood Pressure SpO2   97 7 °F (36 5 °C) 77 20 (!) 181/81 100 %      Temp Source Heart Rate Source Patient Position - Orthostatic VS BP Location FiO2 (%)   Tympanic Monitor Lying Right arm --      Pain Score       No Pain           Vitals:    04/20/22 1130 04/20/22 1300 04/20/22 1341 04/20/22 1345   BP: (!) 184/81 168/76 169/77 169/77   Pulse: 74 75 83 79   Patient Position - Orthostatic VS: Lying Lying Lying          Visual Acuity      ED Medications  Medications - No data to display    Diagnostic Studies  Results Reviewed     Procedure Component Value Units Date/Time    CBC and differential [[id number]]  (Abnormal) Collected: 04/20/22 0930    Lab Status: Final result Specimen: Blood from Arm, Left Updated: 04/20/22 1023     WBC 5 89 Thousand/uL      RBC 2 55 Million/uL      Hemoglobin 7 6 g/dL      Hematocrit 25 1 %      MCV 98 fL      MCH 29 8 pg      MCHC 30 3 g/dL      RDW 18 0 %      MPV 9 0 fL      Platelets 494 Thousands/uL     Narrative: This is an appended report  These results have been appended to a previously verified report      Manual Differential(PHLEBS Do Not Order) [[id number]] Collected: 04/20/22 0930    Lab Status: Final result Specimen: Blood from Arm, Left Updated: 04/20/22 1023     Segmented % 64 %      Bands % 1 %      Lymphocytes % 22 %      Monocytes % 9 %      Eosinophils, % 3 %      Basophils % 0 %      Metamyelocytes% 1 %      Absolute Neutrophils 3 83 Thousand/uL      Lymphocytes Absolute 1 30 Thousand/uL      Monocytes Absolute 0 53 Thousand/uL      Eosinophils Absolute 0 18 Thousand/uL      Basophils Absolute 0 00 Thousand/uL      Total Counted --     RBC Morphology Present     Anisocytosis Present     Polychromasia Present     Platelet Estimate Adequate    Basic metabolic panel [[id number]]  (Abnormal) Collected: 04/20/22 0930    Lab Status: Final result Specimen: Blood from Arm, Left Updated: 04/20/22 7977     Sodium 138 mmol/L      Potassium 5 7 mmol/L      Chloride 98 mmol/L      CO2 32 mmol/L      ANION GAP 8 mmol/L      BUN 32 mg/dL      Creatinine 4 63 mg/dL      Glucose 111 mg/dL      Calcium 8 4 mg/dL      eGFR 11 ml/min/1 73sq m     Narrative:      [place] guidelines for Chronic Kidney Disease (CKD):     Stage 1 with normal or high GFR (GFR > 90 mL/min/1 73 square meters)    Stage 2 Mild CKD (GFR = 60-89 mL/min/1 73 square meters)    Stage 3A Moderate CKD (GFR = 45-59 mL/min/1 73 square meters)    Stage 3B Moderate CKD (GFR = 30-44 mL/min/1 73 square meters)    Stage 4 Severe CKD (GFR = 15-29 mL/min/1 73 square meters)    Stage 5 End Stage CKD (GFR <15 mL/min/1 73 square meters)  Note: GFR calculation is accurate only with a steady state creatinine                 No orders to display              Procedures  Procedures         ED Course                               SBIRT 22yo+      Most Recent Value   SBIRT (22 yo +)    In order to provide better care to our patients, we are screening all of our patients for alcohol and drug use  Would it be okay to ask you these screening questions? No Filed at: 04/20/2022 1012                    [facility]  Number of Diagnoses or Management Options  Anemia  Diagnosis management comments: Patient states he did not feel like he had lost any blood and feels normal   I discussed findings with the nephrologist who checked with infusion center and they do in fact want the unit of blood prior to further administration  Patient transfused in the ED      Disposition  Final diagnoses:   Anemia     Time reflects when diagnosis was documented in both MDM as applicable and the Disposition within this note     Time User Action Codes Description Comment    4/20/2022  1:47 PM Bria Dupree Add [D64 9] Anemia       ED Disposition     ED Disposition Condition Date/Time Comment    Discharge Stable Wed Apr 20, 2022  1:47 PM [address] discharge to home/self care  Follow-up Information     Follow up With Specialties Details Why Hector Mcallister MD Nephrology Schedule an appointment as soon as possible for a visit   [facility]   [address] [place] [id number]            Patient's Medications   Discharge Prescriptions    No medications on file       No discharge procedures on file      PDMP Review     None          ED Provider  Electronically Signed by           Deepika Bhakta MD  04/20/22 7870

## 2022-04-20 NOTE — PROGRESS NOTES
Made Dr Renée Barrera - aware that pt was sent to ER for low hgb 7 1 - and that his infusion was cx today -

## 2022-04-20 NOTE — ED NOTES
Patient resting comfortably  No acute signs of distress or s/s of reaction noted  Will continue to monitor       Radha Chawla RN  04/20/22 8734

## 2022-04-21 ENCOUNTER — TELEPHONE (OUTPATIENT)
Dept: OTHER | Facility: HOSPITAL | Age: 77
End: 2022-04-21

## 2022-04-21 NOTE — TELEPHONE ENCOUNTER
I spoke with Dr Tyler Alas from patient's dialysis unit  Patient is not showing any evidence of kidney recovery  He is currently on Cytoxan IV infusion, complicated with leukopenia and anemia  Patient received blood transfusion and based on his hospital today ago  PR3 improved from 84-12 3, definitely immunological remission with no clinical remission at this time    Decision was made to discontinue Cytoxan      Rashard Zee MD  Nephrology Attending

## 2022-05-04 ENCOUNTER — HOSPITAL ENCOUNTER (OUTPATIENT)
Dept: INFUSION CENTER | Facility: HOSPITAL | Age: 77
Discharge: HOME | End: 2022-05-04
Attending: STUDENT IN AN ORGANIZED HEALTH CARE EDUCATION/TRAINING PROGRAM

## 2022-05-07 ENCOUNTER — APPOINTMENT (INPATIENT)
Dept: DIALYSIS | Facility: HOSPITAL | Age: 77
DRG: 871 | End: 2022-05-07
Payer: MEDICARE

## 2022-05-07 ENCOUNTER — HOSPITAL ENCOUNTER (INPATIENT)
Facility: HOSPITAL | Age: 77
LOS: 2 days | Discharge: HOME | DRG: 871 | End: 2022-05-09
Attending: EMERGENCY MEDICINE | Admitting: INTERNAL MEDICINE
Payer: MEDICARE

## 2022-05-07 ENCOUNTER — APPOINTMENT (EMERGENCY)
Dept: RADIOLOGY | Facility: HOSPITAL | Age: 77
DRG: 871 | End: 2022-05-07
Payer: MEDICARE

## 2022-05-07 DIAGNOSIS — A41.9 SEPSIS (HCC): Primary | ICD-10-CM

## 2022-05-07 DIAGNOSIS — N28.89 PERINEPHRIC FLUID COLLECTION: ICD-10-CM

## 2022-05-07 DIAGNOSIS — Z99.2 DIALYSIS PATIENT (HCC): ICD-10-CM

## 2022-05-07 LAB
ALBUMIN SERPL BCP-MCNC: 2.2 G/DL (ref 3.5–5)
ALP SERPL-CCNC: 93 U/L (ref 46–116)
ALT SERPL W P-5'-P-CCNC: 22 U/L (ref 12–78)
ANION GAP SERPL CALCULATED.3IONS-SCNC: 9 MMOL/L (ref 4–13)
ANION GAP SERPL CALCULATED.3IONS-SCNC: 9 MMOL/L (ref 4–13)
APTT PPP: 37 SECONDS (ref 23–37)
AST SERPL W P-5'-P-CCNC: 20 U/L (ref 5–45)
ATRIAL RATE: 85 BPM
BASOPHILS # BLD AUTO: 0.03 THOUSANDS/ΜL (ref 0–0.1)
BASOPHILS NFR BLD AUTO: 0 % (ref 0–1)
BILIRUB SERPL-MCNC: 0.55 MG/DL (ref 0.2–1)
BUN SERPL-MCNC: 56 MG/DL (ref 5–25)
BUN SERPL-MCNC: 56 MG/DL (ref 5–25)
CALCIUM ALBUM COR SERPL-MCNC: 9.5 MG/DL (ref 8.3–10.1)
CALCIUM SERPL-MCNC: 8.1 MG/DL (ref 8.3–10.1)
CALCIUM SERPL-MCNC: 8.1 MG/DL (ref 8.3–10.1)
CHLORIDE SERPL-SCNC: 92 MMOL/L (ref 100–108)
CHLORIDE SERPL-SCNC: 94 MMOL/L (ref 100–108)
CO2 SERPL-SCNC: 31 MMOL/L (ref 21–32)
CO2 SERPL-SCNC: 31 MMOL/L (ref 21–32)
CREAT SERPL-MCNC: 6.62 MG/DL (ref 0.6–1.3)
CREAT SERPL-MCNC: 6.77 MG/DL (ref 0.6–1.3)
EOSINOPHIL # BLD AUTO: 0 THOUSAND/ΜL (ref 0–0.61)
EOSINOPHIL NFR BLD AUTO: 0 % (ref 0–6)
ERYTHROCYTE [DISTWIDTH] IN BLOOD BY AUTOMATED COUNT: 15.1 % (ref 11.6–15.1)
FLUAV RNA RESP QL NAA+PROBE: NEGATIVE
FLUBV RNA RESP QL NAA+PROBE: NEGATIVE
GFR SERPL CREATININE-BSD FRML MDRD: 7 ML/MIN/1.73SQ M
GFR SERPL CREATININE-BSD FRML MDRD: 7 ML/MIN/1.73SQ M
GLUCOSE SERPL-MCNC: 160 MG/DL (ref 65–140)
GLUCOSE SERPL-MCNC: 162 MG/DL (ref 65–140)
GLUCOSE SERPL-MCNC: 291 MG/DL (ref 65–140)
GLUCOSE SERPL-MCNC: 396 MG/DL (ref 65–140)
GLUCOSE SERPL-MCNC: 82 MG/DL (ref 65–140)
HCT VFR BLD AUTO: 26.5 % (ref 36.5–49.3)
HGB BLD-MCNC: 8.4 G/DL (ref 12–17)
IMM GRANULOCYTES # BLD AUTO: 0.09 THOUSAND/UL (ref 0–0.2)
IMM GRANULOCYTES NFR BLD AUTO: 1 % (ref 0–2)
INR PPP: 1.17 (ref 0.84–1.19)
LACTATE SERPL-SCNC: 0.9 MMOL/L (ref 0.5–2)
LYMPHOCYTES # BLD AUTO: 0.54 THOUSANDS/ΜL (ref 0.6–4.47)
LYMPHOCYTES NFR BLD AUTO: 4 % (ref 14–44)
MAGNESIUM SERPL-MCNC: 2.2 MG/DL (ref 1.6–2.6)
MCH RBC QN AUTO: 30.2 PG (ref 26.8–34.3)
MCHC RBC AUTO-ENTMCNC: 31.7 G/DL (ref 31.4–37.4)
MCV RBC AUTO: 95 FL (ref 82–98)
MONOCYTES # BLD AUTO: 0.81 THOUSAND/ΜL (ref 0.17–1.22)
MONOCYTES NFR BLD AUTO: 5 % (ref 4–12)
NEUTROPHILS # BLD AUTO: 13.99 THOUSANDS/ΜL (ref 1.85–7.62)
NEUTS SEG NFR BLD AUTO: 90 % (ref 43–75)
NRBC BLD AUTO-RTO: 0 /100 WBCS
P AXIS: 69 DEGREES
PHOSPHATE SERPL-MCNC: 2.8 MG/DL (ref 2.3–4.1)
PLATELET # BLD AUTO: 277 THOUSANDS/UL (ref 149–390)
PMV BLD AUTO: 8.9 FL (ref 8.9–12.7)
POTASSIUM SERPL-SCNC: 6.4 MMOL/L (ref 3.5–5.3)
POTASSIUM SERPL-SCNC: 6.5 MMOL/L (ref 3.5–5.3)
PR INTERVAL: 168 MS
PROT SERPL-MCNC: 7.1 G/DL (ref 6.4–8.2)
PROTHROMBIN TIME: 14.7 SECONDS (ref 11.6–14.5)
QRS AXIS: 42 DEGREES
QRSD INTERVAL: 80 MS
QT INTERVAL: 334 MS
QTC INTERVAL: 397 MS
RBC # BLD AUTO: 2.78 MILLION/UL (ref 3.88–5.62)
RSV RNA RESP QL NAA+PROBE: NEGATIVE
SARS-COV-2 RNA RESP QL NAA+PROBE: NEGATIVE
SODIUM SERPL-SCNC: 132 MMOL/L (ref 136–145)
SODIUM SERPL-SCNC: 134 MMOL/L (ref 136–145)
T WAVE AXIS: 34 DEGREES
VENTRICULAR RATE: 85 BPM
WBC # BLD AUTO: 15.46 THOUSAND/UL (ref 4.31–10.16)

## 2022-05-07 PROCEDURE — 83605 ASSAY OF LACTIC ACID: CPT | Performed by: PHYSICIAN ASSISTANT

## 2022-05-07 PROCEDURE — 84100 ASSAY OF PHOSPHORUS: CPT | Performed by: PHYSICIAN ASSISTANT

## 2022-05-07 PROCEDURE — 87154 CUL TYP ID BLD PTHGN 6+ TRGT: CPT | Performed by: PHYSICIAN ASSISTANT

## 2022-05-07 PROCEDURE — 85730 THROMBOPLASTIN TIME PARTIAL: CPT | Performed by: PHYSICIAN ASSISTANT

## 2022-05-07 PROCEDURE — 99285 EMERGENCY DEPT VISIT HI MDM: CPT

## 2022-05-07 PROCEDURE — 85610 PROTHROMBIN TIME: CPT | Performed by: PHYSICIAN ASSISTANT

## 2022-05-07 PROCEDURE — 80048 BASIC METABOLIC PNL TOTAL CA: CPT | Performed by: PHYSICIAN ASSISTANT

## 2022-05-07 PROCEDURE — 0241U HB NFCT DS VIR RESP RNA 4 TRGT: CPT | Performed by: PHYSICIAN ASSISTANT

## 2022-05-07 PROCEDURE — 85025 COMPLETE CBC W/AUTO DIFF WBC: CPT | Performed by: PHYSICIAN ASSISTANT

## 2022-05-07 PROCEDURE — 87040 BLOOD CULTURE FOR BACTERIA: CPT | Performed by: PHYSICIAN ASSISTANT

## 2022-05-07 PROCEDURE — 93005 ELECTROCARDIOGRAM TRACING: CPT

## 2022-05-07 PROCEDURE — 74176 CT ABD & PELVIS W/O CONTRAST: CPT

## 2022-05-07 PROCEDURE — 36415 COLL VENOUS BLD VENIPUNCTURE: CPT | Performed by: PHYSICIAN ASSISTANT

## 2022-05-07 PROCEDURE — 0T903ZX DRAINAGE OF RIGHT KIDNEY, PERCUTANEOUS APPROACH, DIAGNOSTIC: ICD-10-PCS | Performed by: INTERNAL MEDICINE

## 2022-05-07 PROCEDURE — 87081 CULTURE SCREEN ONLY: CPT | Performed by: INTERNAL MEDICINE

## 2022-05-07 PROCEDURE — 99285 EMERGENCY DEPT VISIT HI MDM: CPT | Performed by: PHYSICIAN ASSISTANT

## 2022-05-07 PROCEDURE — G1004 CDSM NDSC: HCPCS

## 2022-05-07 PROCEDURE — 93010 ELECTROCARDIOGRAM REPORT: CPT | Performed by: INTERNAL MEDICINE

## 2022-05-07 PROCEDURE — 99223 1ST HOSP IP/OBS HIGH 75: CPT | Performed by: INTERNAL MEDICINE

## 2022-05-07 PROCEDURE — 5A1D70Z PERFORMANCE OF URINARY FILTRATION, INTERMITTENT, LESS THAN 6 HOURS PER DAY: ICD-10-PCS | Performed by: INTERNAL MEDICINE

## 2022-05-07 PROCEDURE — 99233 SBSQ HOSP IP/OBS HIGH 50: CPT | Performed by: INTERNAL MEDICINE

## 2022-05-07 PROCEDURE — NC001 PR NO CHARGE: Performed by: RADIOLOGY

## 2022-05-07 PROCEDURE — 82948 REAGENT STRIP/BLOOD GLUCOSE: CPT

## 2022-05-07 PROCEDURE — 80053 COMPREHEN METABOLIC PANEL: CPT | Performed by: PHYSICIAN ASSISTANT

## 2022-05-07 PROCEDURE — 83735 ASSAY OF MAGNESIUM: CPT | Performed by: PHYSICIAN ASSISTANT

## 2022-05-07 PROCEDURE — 96374 THER/PROPH/DIAG INJ IV PUSH: CPT

## 2022-05-07 RX ORDER — FINASTERIDE 5 MG/1
5 TABLET, FILM COATED ORAL DAILY
Status: DISCONTINUED | OUTPATIENT
Start: 2022-05-08 | End: 2022-05-09 | Stop reason: HOSPADM

## 2022-05-07 RX ORDER — INSULIN LISPRO 100 [IU]/ML
1-6 INJECTION, SOLUTION INTRAVENOUS; SUBCUTANEOUS
Status: DISCONTINUED | OUTPATIENT
Start: 2022-05-07 | End: 2022-05-09 | Stop reason: HOSPADM

## 2022-05-07 RX ORDER — CEFEPIME HYDROCHLORIDE 1 G/50ML
1000 INJECTION, SOLUTION INTRAVENOUS ONCE
Status: COMPLETED | OUTPATIENT
Start: 2022-05-07 | End: 2022-05-07

## 2022-05-07 RX ORDER — INSULIN LISPRO 100 [IU]/ML
1-5 INJECTION, SOLUTION INTRAVENOUS; SUBCUTANEOUS
Status: DISCONTINUED | OUTPATIENT
Start: 2022-05-08 | End: 2022-05-09 | Stop reason: HOSPADM

## 2022-05-07 RX ORDER — CALCIUM ACETATE 667 MG/1
667 CAPSULE ORAL
Status: DISCONTINUED | OUTPATIENT
Start: 2022-05-08 | End: 2022-05-09 | Stop reason: HOSPADM

## 2022-05-07 RX ORDER — INSULIN ASPART 100 [IU]/ML
30 INJECTION, SUSPENSION SUBCUTANEOUS
Status: DISCONTINUED | OUTPATIENT
Start: 2022-05-07 | End: 2022-05-07

## 2022-05-07 RX ORDER — B-COMPLEX WITH VITAMIN C
1 TABLET ORAL
Status: DISCONTINUED | OUTPATIENT
Start: 2022-05-08 | End: 2022-05-09 | Stop reason: HOSPADM

## 2022-05-07 RX ORDER — INSULIN LISPRO 100 [IU]/ML
1-5 INJECTION, SOLUTION INTRAVENOUS; SUBCUTANEOUS
Status: DISCONTINUED | OUTPATIENT
Start: 2022-05-07 | End: 2022-05-09 | Stop reason: HOSPADM

## 2022-05-07 RX ORDER — HEPARIN SODIUM 5000 [USP'U]/ML
5000 INJECTION, SOLUTION INTRAVENOUS; SUBCUTANEOUS EVERY 8 HOURS SCHEDULED
Status: DISCONTINUED | OUTPATIENT
Start: 2022-05-08 | End: 2022-05-07

## 2022-05-07 RX ORDER — CALCIUM GLUCONATE 20 MG/ML
1 INJECTION, SOLUTION INTRAVENOUS ONCE
Status: DISCONTINUED | OUTPATIENT
Start: 2022-05-07 | End: 2022-05-09 | Stop reason: HOSPADM

## 2022-05-07 RX ORDER — ENALAPRIL MALEATE 10 MG/1
10 TABLET ORAL DAILY
COMMUNITY
End: 2022-05-09 | Stop reason: HOSPADM

## 2022-05-07 RX ORDER — CALCIUM ACETATE 667 MG/1
667 CAPSULE ORAL
COMMUNITY

## 2022-05-07 RX ORDER — CEFEPIME HYDROCHLORIDE 1 G/50ML
1000 INJECTION, SOLUTION INTRAVENOUS EVERY 24 HOURS
Status: DISCONTINUED | OUTPATIENT
Start: 2022-05-08 | End: 2022-05-09

## 2022-05-07 RX ORDER — DUTASTERIDE 0.5 MG/1
0.5 CAPSULE, LIQUID FILLED ORAL DAILY
COMMUNITY

## 2022-05-07 RX ORDER — ATORVASTATIN CALCIUM 20 MG/1
20 TABLET, FILM COATED ORAL
Status: DISCONTINUED | OUTPATIENT
Start: 2022-05-07 | End: 2022-05-09 | Stop reason: HOSPADM

## 2022-05-07 RX ORDER — TAMSULOSIN HYDROCHLORIDE 0.4 MG/1
0.4 CAPSULE ORAL
Status: DISCONTINUED | OUTPATIENT
Start: 2022-05-07 | End: 2022-05-09 | Stop reason: HOSPADM

## 2022-05-07 RX ORDER — INSULIN ASPART 100 [IU]/ML
30 INJECTION, SUSPENSION SUBCUTANEOUS
Status: DISCONTINUED | OUTPATIENT
Start: 2022-05-08 | End: 2022-05-09 | Stop reason: HOSPADM

## 2022-05-07 RX ADMIN — INSULIN ASPART 30 UNITS: 100 INJECTION, SUSPENSION SUBCUTANEOUS at 20:46

## 2022-05-07 RX ADMIN — TAMSULOSIN HYDROCHLORIDE 0.4 MG: 0.4 CAPSULE ORAL at 20:47

## 2022-05-07 RX ADMIN — INSULIN LISPRO 6 UNITS: 100 INJECTION, SOLUTION INTRAVENOUS; SUBCUTANEOUS at 16:52

## 2022-05-07 RX ADMIN — CEFEPIME HYDROCHLORIDE 1000 MG: 1 INJECTION, SOLUTION INTRAVENOUS at 13:39

## 2022-05-07 RX ADMIN — ATORVASTATIN CALCIUM 20 MG: 20 TABLET, FILM COATED ORAL at 20:47

## 2022-05-07 NOTE — H&P
History and Physical - [facility] Internal Medicine    Patient Information: Demetrice Lei 64 y o  male MRN: [id number]  Unit/Bed#: [address] [place] [address] Encounter: [id number]  Admitting Physician: Perla Hoffmann MD  PCP: Mami Reyna MD  Date of Admission:  05/07/22        Hospital Problem List:     Principal Problem:    Sepsis [facility])  Active Problems:    Type 2 diabetes mellitus with microalbuminuria, with long-term current use of insulin ([place] [address] )    Acute kidney injury ([place] [address] )    Hyperkalemia    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HCC)    Benign essential hypertension    Anemia due to chronic kidney disease, on chronic dialysis ([place] [address] )    Benign prostatic hyperplasia with lower urinary tract symptoms    Hyperlipidemia      Assessment/Plan:    * Sepsis ([place] [address] )  Assessment & Plan  Presented with right flank pain for 1 day and fever  Noted to have leukocytosis, fever  CT scan revealed new small to moderate size right perinephric subscapular fluid collection likely complex in nature, differential diagnosis included perinephric hematoma, urinoma less likely but possible abscess  History of UVJ calculus in 3/22 which appeared to have passed    Hemodynamically stable, nontoxic  · We initiate broad-spectrum antibiotics IV cefepime  · Follow-up blood cultures  · Follow-up CBC and fever  · Urology evaluation, recommended IR input    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[facility] )  Assessment & Plan  Status post kidney biopsy on 03/09-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)  Assessment & Plan  History of CKD stage 3 with prior baseline creatinine 1 1-1 3  Recent hospitalization in 3/22, presented with creatinine of 14  Diagnosed to have ANCA vasculitis  Required initiation of HD during hospitalization, currently on HD TTS  · Nephrology evaluation  · Continue HD as per Nephrology  · Follow-up BMP and electrolytes    Type 2 diabetes mellitus with microalbuminuria, with long-term current use of insulin [facility])  Assessment & Plan  Lab Results   Component Value Date    HGBA1C 7 2 (H) 12/03/2021       Recent Labs     05/07/22  1632 05/07/22 2018 05/07/22  2234   POCGLU 396* 291* 82       Blood Sugar Average: Last 72 hrs:  (P) 074 [id number]     Reports he takes insulin 75/25, about 35 units in the a m  And 40 units p m  Will start insulin NovoLog 70/30, 30 units with meal b i d  with sliding scale  Recheck hemoglobin A1c  Monitor    Anemia due to chronic kidney disease, on chronic dialysis ([facility] )  Assessment & Plan  Appears in baseline range, below goal  On Micera    Benign essential hypertension  Assessment & Plan  Stable  · Hold enalapril given hyperkalemia  · Reports that he is not taking nifedipine  · Monitor    Hyperlipidemia  Assessment & Plan  On statin    Benign prostatic hyperplasia with lower urinary tract symptoms  Assessment & Plan  On Avodart and Flomax          VTE Prophylaxis: Pharmacologic VTE Prophylaxis contraindicated due to With concerns of hematoma, pending procedure  / sequential compression device   Code Status: Level 1 - Full Code    Anticipated Length of Stay:  Patient will be admitted on an Inpatient basis with an anticipated length of stay of  > 2 midnights  Justification for Hospital Stay:  Sepsis    Total Time for Visit, including Counseling / Coordination of Care: 45 minutes  Greater than 50% of this total time spent on direct patient counseling and coordination of care  Chief Complaint:     Back Pain (Right lower back pain started last night, worse at HD center   HD T-T-S, today's HD not done, last one on Thursday )    History of Present Illness:    Ezequiel Carty is a 64 y o  male ANCA vasculitis status post kidney biopsy, acute kidney injury on hemodialysis, CKD stage 3 with prior baseline creatinine 1 1-1 3, diabetes mellitus type 2, hypertension, nephrolithiasis, BPH, dyslipidemia, history of bladder rupture status post repair, anemia who presents with fever and right-sided flank pain  Patient reported that he was in usual state of health until yesterday when he started with right flank discomfort  Overnight patient had worsening of pain  Patient reported that his pain was right mid back radiating to the groin  Patient went to his routine HD today when he was noted to be febrile with T-max of 101 9° F   Patient was referred to ED for further evaluation  In ED patient was noted to have low-grade fever other vital signs were stable  Workup revealed leukocytosis, hyperkalemia  CT scan of the abdomen revealed new small to moderate-sized right perinephric subscapular fluid collection likely complex in nature differential diagnosis include a perinephric hematoma, urinoma less likely abscess  Previously noted UVJ calculus in 3/22 appeared to have passed  Patient was given IV antibiotics at was subsequently admitted  Patient denied any hematuria  Review of Systems:    Review of Systems   Constitutional: Positive for fever  Negative for activity change  HENT: Negative for sore throat and trouble swallowing  Respiratory: Negative for cough, chest tightness and shortness of breath  Cardiovascular: Negative for chest pain  Gastrointestinal: Negative for abdominal distention, abdominal pain, diarrhea, nausea and vomiting  Genitourinary: Negative for dysuria and hematuria  Neurological: Negative for dizziness, light-headedness and headaches  Psychiatric/Behavioral: Negative for behavioral problems         Past Medical and Surgical History:     Past Medical History:   Diagnosis Date    Anesthesia complication 5628    after colonoscopy , pt was awake but could not control his body and kept falling     Arthritis     Bladder calculi     BPH (benign prostatic hyperplasia)     Diabetes mellitus ([address] )     Hearing disorder of both ears     wears bilateral hearing aids    Hyperlipidemia     controlled and maintained d/t diabetes    Hypertension     Kidney stones     Last Assessed:4/3/2017    Lyme disease     Last Assessed:6/29/2015    Rupture, bladder, spontaneous     Last Assessed:4/3/2017       Past Surgical History:   Procedure Laterality Date    BLADDER REPAIR N/A 12/10/2016    Procedure: REPAIR BLADDER/cysto insertion stent;  Surgeon: Riri Odonnell MD;  Location: [address] [place] [address];  Service:     COLONOSCOPY      CYSTOLITHOTOMY      ANESTHESIA   lower abdomen  ; Last Assessed:4/3/2017    IR BIOPSY KIDNEY RANDOM  3/10/2022    IR TUNNELED DIALYSIS CATHETER PLACEMENT  3/8/2022    OTHER SURGICAL HISTORY      thermal dilation of the prostate x 2 ( in the office)    TONSILLECTOMY      TRANSURETHRAL RESECTION OF PROSTATE N/A 12/8/2016    Procedure:  Cysto, Laser Bladder stone,fulgaration of prostates tissue ;  Surgeon: Riri Odonnell MD;  Location: [facility];  Service:        Meds/Allergies:    PTA meds:   Prior to Admission Medications   Prescriptions Last Dose Informant Patient Reported? Taking?    HumaLOG Mix 75/25 KwikPen (75-25) 100 units/mL injection pen   No No   Sig: Inject 25 Units under the skin 2 (two) times a day with meals   Insulin Pen Needle (B-D UF III MINI PEN NEEDLES) 31G X 5 MM MISC   No No   Sig: Use twice daily with insulin pen as directed   NIFEdipine (PROCARDIA XL) 30 mg 24 hr tablet   No No   Sig: Take 1 tablet (30 mg total) by mouth daily   Patient not taking: Reported on 4/20/2022    calcium acetate (PHOSLO) capsule   Yes Yes   Sig: Take 667 mg by mouth 3 (three) times a day with meals   calcium carbonate-vitamin D (OSCAL-D) 500 mg-200 units per tablet   No No   Sig: Take 1 tablet by mouth daily with breakfast   dutasteride (AVODART) 0 5 mg capsule   Yes Yes Sig: Take 0 5 mg by mouth daily   enalapril (VASOTEC) 10 mg tablet   Yes Yes   Sig: Take 10 mg by mouth daily   furosemide (LASIX) 40 mg tablet   No No   Sig: Take 1 tablet (40 mg total) by mouth daily   Patient not taking: Reported on 2022    glucose blood test strip   No No   Si each by Other route 3 (three) times a day   latanoprost (XALATAN) 0 005 % ophthalmic solution  Self Yes No   Sig: Administer 1 drop to both eyes daily at bedtime    Patient not taking: Reported on 2022    pantoprazole (PROTONIX) 40 mg tablet   No No   Sig: Take 1 tablet (40 mg total) by mouth daily   Patient not taking: Reported on 2022    predniSONE 10 mg tablet   No No   Sig: Take 6 tablets (60 mg total) by mouth daily for 5 days, THEN 3 tablets (30 mg total) daily for 7 days, THEN 2 5 tablets (25 mg total) daily for 15 days, THEN 2 tablets (20 mg total) daily for 14 days, THEN 1 5 tablets (15 mg total) daily for 14 days  Patient not taking: Reported on 2022   predniSONE 2 5 mg tablet   No No   Sig: Take 5 tablets (12 5 mg total) by mouth daily for 15 days, THEN 4 tablets (10 mg total) daily for 15 days, THEN 3 tablets (7 5 mg total) daily for 15 days, THEN 2 tablets (5 mg total) daily  Patient not taking: Reported on 2022   simvastatin (ZOCOR) 40 mg tablet   No No   Sig: Take 1 tablet (40 mg total) by mouth daily at bedtime   tamsulosin (FLOMAX) 0 4 mg  Self No No   Sig: TAKE 1 CAPSULE BY MOUTH TWO TIMES DAILY      Facility-Administered Medications: None       Allergies:    Allergies   Allergen Reactions    Amoxicillin Rash     History:     Marital Status: /Civil Union     Substance Use History:   Social History     Substance and Sexual Activity   Alcohol Use Yes     Social History     Tobacco Use   Smoking Status Former Smoker    Types: Cigars    Quit date: 26    Years since quittin 3   Smokeless Tobacco Never Used     Social History     Substance and Sexual Activity   Drug Use No Family History:    Family History   Problem Relation Age of Onset    Cancer Mother         breast    Diabetes Mother     Cancer Father         prostate w/ bone mets    Prostate cancer Father     Alzheimer's disease Sister        Physical Exam:     Vitals:   Blood Pressure: 151/70 (05/07/22 1451)  Pulse: 81 (05/07/22 1451)  Temperature: 98 2 °F (36 8 °C) (05/07/22 1430)  Temp Source: Oral (05/07/22 1430)  Respirations: (!) 26 (05/07/22 1430)  Weight - Scale: 81 kg (178 lb 9 2 oz) (05/07/22 1040)  SpO2: 97 % (05/07/22 1451)    Physical Exam  Constitutional:       General: He is not in acute distress  HENT:      Head: Normocephalic and atraumatic  Cardiovascular:      Rate and Rhythm: Normal rate  Pulmonary:      Effort: Pulmonary effort is normal  No respiratory distress  Breath sounds: No wheezing, rhonchi or rales  Chest:      Chest wall: No tenderness  Abdominal:      General: Bowel sounds are normal  There is no distension  Palpations: Abdomen is soft  Tenderness: There is abdominal tenderness (Right flank)  There is no guarding or rebound  Musculoskeletal:      Right lower leg: No edema  Left lower leg: No edema  Skin:     General: Skin is warm and dry  Findings: No rash  Neurological:      Mental Status: He is alert  Mental status is at baseline  Cranial Nerves: No cranial nerve deficit  Lab Results: I have personally reviewed pertinent reports        Results from last 7 days   Lab Units 05/07/22  1129   WBC Thousand/uL 15 46*   HEMOGLOBIN g/dL 8 4*   HEMATOCRIT % 26 5*   PLATELETS Thousands/uL 277   NEUTROS PCT % 90*   LYMPHS PCT % 4*   MONOS PCT % 5   EOS PCT % 0     Results from last 7 days   Lab Units 05/07/22  1205 05/07/22  1119 05/07/22  1119   POTASSIUM mmol/L 6 4*   < > 6 5*   CHLORIDE mmol/L 92*   < > 94*   CO2 mmol/L 31   < > 31   BUN mg/dL 56*   < > 56*   CREATININE mg/dL 6 77*   < > 6 62*   CALCIUM mg/dL 8 1*   < > 8 1*   ALK PHOS U/L  --   --  93   ALT U/L  --   --  22   AST U/L  --   --  20    < > = values in this interval not displayed  Results from last 7 days   Lab Units 05/07/22  1129   INR  1 17       Imaging: I have personally reviewed pertinent reports  CT abdomen pelvis wo contrast    Result Date: 5/7/2022  Narrative: CT ABDOMEN AND PELVIS WITHOUT IV CONTRAST INDICATION:   Flank pain, kidney stone suspected Right-sided flank pain, fevers history of stones, mild right lower quadrant pain  COMPARISON:  CT of abdomen and pelvis dated 3/8/2022 TECHNIQUE:  CT examination of the abdomen and pelvis was performed without intravenous contrast  Axial, sagittal, and coronal 2D reformatted images were created from the source data and submitted for interpretation  Radiation dose length product (DLP) for this visit:  509 97 mGy-cm   This examination, like all CT scans performed in the [facility], was performed utilizing techniques to minimize radiation dose exposure, including the use of iterative  reconstruction and automated exposure control  Enteric contrast was administered  FINDINGS: ABDOMEN LOWER CHEST:  Decreasing in size small bilateral pleural effusions with associated atelectasis  Visualized heart is normal in size without pericardial effusion  Atherosclerotic vascular calcifications including those of the coronary arteries are noted  LIVER/BILIARY TREE:  Unremarkable  GALLBLADDER:  There are gallstone(s) within the gallbladder, without pericholecystic inflammatory changes  SPLEEN:  Unremarkable  PANCREAS:  Unremarkable  ADRENAL GLANDS:  Unremarkable  KIDNEYS/URETERS:  New small to moderate sized right perinephric subcapsular fluid collection (for example image 41 of series 2)  It is difficult to determine if the fluid collection is simple or complex in nature but where it measured the fluid appears complex    Differential diagnosis includes perinephric hematoma, urinoma, and less likely but not excluded abscess  There is mild resulting mass effect upon the right kidney  There is no hydroureteronephrosis involving either kidney  Previously noted right UVJ calculus has passed into the urinary bladder  STOMACH AND BOWEL:  A few sigmoid diverticula  There is no dilatation of the bowel suggest bowel obstruction  APPENDIX:  No findings to suggest appendicitis  ABDOMINOPELVIC CAVITY:  No ascites  No pneumoperitoneum  No lymphadenopathy  VESSELS:  Atherosclerotic changes are present  No evidence of aneurysm  PELVIS REPRODUCTIVE ORGANS:  The prostate is enlarged  URINARY BLADDER:  On image 78 of series 2, there is an 8 mm dependent bladder calculus likely reflecting the previously noted obstructing right UVJ calculus which has passed into the urinary bladder  ABDOMINAL WALL/INGUINAL REGIONS:  Fat-containing bilateral inguinal hernias  OSSEOUS STRUCTURES:  Degenerative changes are noted involving the spine  Impression: 1  New small to moderate-sized right perinephric subcapsular fluid collection  It is difficult to determine if the fluid is simple or complex in nature but complex fluid is favored  Differential diagnosis includes perinephric hematoma, urinoma, and less likely but not excluded abscess  There is mild resulting mass effect upon the right kidney  Consider urologic consultation  Correlation and continued follow-up to resolution is recommended  2   Previously noted right UVJ calculus seen on CT dated 3/8/2022 has passed into the urinary bladder  There is no evidence for obstructive uropathy on the current exam  Please see above for details and multiple additional findings  The study was marked in [facility] for immediate notification  Workstation performed: [id number]       CT abdomen pelvis wo contrast   Final Result      1  New small to moderate-sized right perinephric subcapsular fluid collection  It is difficult to determine if the fluid is simple or complex in nature but complex fluid is favored  Differential diagnosis includes perinephric hematoma, urinoma, and    less likely but not excluded abscess  There is mild resulting mass effect upon the right kidney  Consider urologic consultation  Correlation and continued follow-up to resolution is recommended  2   Previously noted right UVJ calculus seen on CT dated 3/8/2022 has passed into the urinary bladder  There is no evidence for obstructive uropathy on the current exam       Please see above for details and multiple additional findings  The study was marked in [facility] for immediate notification  Workstation performed: [id number]         IR aspiration only    (Results Pending)       EKG, Pathology, and Other Studies Reviewed on Admission:   · EKG-normal sinus rhythm at 85 beats per minute    Allscripts/EPIC Records Reviewed: Yes     ** Please Note: "This note has been constructed using a voice recognition system  Therefore there may be syntax, spelling, and/or grammatical errors   Please call if you have any questions  "**

## 2022-05-07 NOTE — PLAN OF CARE
Problem: METABOLIC, FLUID AND ELECTROLYTES - ADULT  Goal: Electrolytes maintained within normal limits  Description: INTERVENTIONS:  - Monitor labs and assess patient for signs and symptoms of electrolyte imbalances  - Administer electrolyte replacement as ordered  - Monitor response to electrolyte replacements, including repeat lab results as appropriate  - Instruct patient on fluid and nutrition as appropriate  Outcome: Progressing  Goal: Fluid balance maintained  Description: INTERVENTIONS:  - Monitor labs   - Monitor I/O and WT  - Instruct patient on fluid and nutrition as appropriate  - Assess for signs & symptoms of volume excess or deficit  Outcome: Progressing  Goal: Glucose maintained within target range  Description: INTERVENTIONS:  - Monitor Blood Glucose as ordered  - Assess for signs and symptoms of hyperglycemia and hypoglycemia  - Administer ordered medications to maintain glucose within target range  - Assess nutritional intake and initiate nutrition service referral as needed  Outcome: Progressing     Problem: GENITOURINARY - ADULT  Goal: Maintains or returns to baseline urinary function  Description: INTERVENTIONS:  - Assess urinary function  - Encourage oral fluids to ensure adequate hydration if ordered  - Administer IV fluids as ordered to ensure adequate hydration  - Administer ordered medications as needed  - Offer frequent toileting  - Follow urinary retention protocol if ordered  Outcome: Progressing  Goal: Absence of urinary retention  Description: INTERVENTIONS:  - Assess patients ability to void and empty bladder  - Monitor I/O  - Bladder scan as needed  - Discuss with physician/AP medications to alleviate retention as needed  - Discuss catheterization for long term situations as appropriate  Outcome: Progressing  Goal: Urinary catheter remains patent  Description: INTERVENTIONS:  - Assess patency of urinary catheter  - If patient has a chronic ruvalcaba, consider changing catheter if non-functioning  - Follow guidelines for intermittent irrigation of non-functioning urinary catheter  Outcome: Progressing

## 2022-05-07 NOTE — PLAN OF CARE
Post-Dialysis RN Treatment Note    Blood Pressure:  Pre 149/60 mm/Hg  Post 143/65 mmHg   EDW  80 5 kg    Weight:  Pre 81 kg   Post 80 5 kg   Mode of weight measurement: Bed Scale   Volume Removed  500 ml    Treatment duration 180 minutes    NS given  No    Treatment shortened? No   Medications given during Rx None Reported   Estimated Kt/V  None Reported   Access type: Permacath/TDC   Access Issues: No    Report called to primary nurse   Yes Gina Valadez RN   Pt on HD treatment for 3 hours with a UF goal of 0 5L as tolerated   Pt on a 2k 2 5 mary bath for 1st 2 hours of treatment and then a1k 2 5 mary bath for last hour of treatment for a potassium of 6 4 on 5/7/22  Problem: METABOLIC, FLUID AND ELECTROLYTES - ADULT  Goal: Electrolytes maintained within normal limits  Description: INTERVENTIONS:  - Monitor labs and assess patient for signs and symptoms of electrolyte imbalances  - Administer electrolyte replacement as ordered  - Monitor response to electrolyte replacements, including repeat lab results as appropriate  - Instruct patient on fluid and nutrition as appropriate  Outcome: Progressing  Goal: Fluid balance maintained  Description: INTERVENTIONS:  - Monitor labs   - Monitor I/O and WT  - Instruct patient on fluid and nutrition as appropriate  - Assess for signs & symptoms of volume excess or deficit  Outcome: Progressing

## 2022-05-07 NOTE — CONSULTS
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)  · Currently hyperkalemic  · Will plan for HD today  2  Access: R IJ permcath  3  Hyperkalemia  · HD x 3 hours today - 2K bath x 2 hours and 1K bath for last hour  · Low K diet  4  ANCA vasculitis:  · PR3 34 1 on 3/8/22 and was up to 84 4 on 3/14/22  · Most recent is 12 2 from 4/6/22  · Renal biopsy on March 9: (1) Focal necrotizing and crescentic GN, pauci-immune, acute and chronic  (2) acute tubular injury  (3) 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 · Agree with Cefepime  7  Hypertension:   · BP acceptable  · Home meds: Enalapril, Nifedipine  · No ACEi or ARB for now  8  Anemia:   · Hgb below goal    · On Mircera 60 mcg q 2 weeks as an outpatient  9  Mineral and bone disease:   · Continue renal diet  · Continue Phoslo for hyperphos  SUMMARY OF RECOMMENDATIONS:   HD today x 3 hours   2K bath x 2 hours followed by 1 K bath   Low K diet   Agree with Cefepime   Will need to establish if further immunosuppression is still warranted given that he had leukopenia and anemia with Cytoxan and his renal biopsy showed mod to severe IFTA      Next HD is likely Tuesday unless hyperkalemia recurs   Hold off on ACEi or ARB for now   Continue Prednisone  The above plan was discussed with Dr Lalo Chappell  Previous records were personally reviewed by me to obtain a baseline creatinine  The images (CT) were personally reviewed by me in PACS    HISTORY OF PRESENT ILLNESS:  Requesting Physician: Nabil Diallo MD  Reason for Consult: LORRAINE on HD    Briana Osuna is a 64 y o  male who was admitted to [facility] on 5/7/22 after being sent in from HD due to fever and R flank pain  A renal consultation is requested today for assistance in the management of LORRAINE on HD  Briana Osuna was recently admitted to [facility] in March 2022 (3/7/22 to 3/19/22) and was diagnosed with LORRAINE due to ANCA vasculitis (PR3) at that time  He was started on dialysis on March 9, 2022 and had a renal biopsy done on March 10, 2022  He was discharged to continue outpatient HD at [facility] on a TTS schedule  He was treated with Cytoxan and appears to have received it on 3/23/22 and 4/6/22  A note by Dr Elvia Ken on 4/21/22 indicates that the Cytoxan was stopped due to leukopenia and anemia  The patient now presents to [facility] after being sent in from dialysis due to fever and R flank pain  The patient reports getting HD at [address] [place] [address] on Thursday, 5/5/22  The flank pain started 1 day prior to admission  He was at the HD unit today but was not dialyzed after he was noted to be febrile  He denied any CP, SOB, or leg swelling  He appears to be oliguric       PAST MEDICAL HISTORY:  Past Medical History:   Diagnosis Date    Anesthesia complication 2680    after colonoscopy , pt was awake but could not control his body and kept falling     Arthritis     Bladder calculi     BPH (benign prostatic hyperplasia)     Diabetes mellitus ([address] )     Hearing disorder of both ears     wears bilateral hearing aids    Hyperlipidemia     controlled and maintained d/t diabetes    Hypertension     Kidney stones     Last Assessed:4/3/2017    Lyme disease     Last Assessed:2015    Rupture, bladder, spontaneous     Last Assessed:4/3/2017     PAST SURGICAL HISTORY:  Past Surgical History:   Procedure Laterality Date    BLADDER REPAIR N/A 12/10/2016    Procedure: REPAIR BLADDER/cysto insertion stent;  Surgeon: Jenn Hankins MD;  Location: [address] [place] [address];  Service:     COLONOSCOPY      CYSTOLITHOTOMY      ANESTHESIA   lower abdomen  ;  Last Assessed:4/3/2017    IR BIOPSY KIDNEY RANDOM  3/10/2022    IR TUNNELED DIALYSIS CATHETER PLACEMENT  3/8/2022    OTHER SURGICAL HISTORY      thermal dilation of the prostate x 2 ( in the office)    TONSILLECTOMY      TRANSURETHRAL RESECTION OF PROSTATE N/A 2016    Procedure:  Cysto, Laser Bladder stone,fulgaration of prostates tissue ;  Surgeon: Jenn Hankins MD;  Location: [facility];  Service:      ALLERGIES:  Allergies   Allergen Reactions    Amoxicillin Rash     SOCIAL HISTORY:  Social History     Substance and Sexual Activity   Alcohol Use Yes     Social History     Substance and Sexual Activity   Drug Use No     Social History     Tobacco Use   Smoking Status Former Smoker    Types: Cigars    Quit date: 24    Years since quittin 3   Smokeless Tobacco Never Used     FAMILY HISTORY:  Family History   Problem Relation Age of Onset    Cancer Mother         breast    Diabetes Mother     Cancer Father         prostate w/ bone mets    Prostate cancer Father     Alzheimer's disease Sister      MEDICATIONS:    Current Facility-Administered Medications:     calcium gluconate 1 g in sodium chloride 0 9% 50 mL (premix), 1 g, Intravenous, Once, [employer], PA-C, Stopped at [id number] [phone]    Current Outpatient Medications:     calcium acetate (PHOSLO) capsule, Take 667 mg by mouth 3 (three) times a day with meals, Disp: , Rfl:     dutasteride (AVODART) 0 5 mg capsule, Take 0 5 mg by mouth daily, Disp: , Rfl:     enalapril (VASOTEC) 10 mg tablet, Take 10 mg by mouth daily, Disp: , Rfl:     calcium carbonate-vitamin D (OSCAL-D) 500 mg-200 units per tablet, Take 1 tablet by mouth daily with breakfast, Disp: 30 tablet, Rfl: 0    furosemide (LASIX) 40 mg tablet, Take 1 tablet (40 mg total) by mouth daily (Patient not taking: Reported on 4/20/2022 ), Disp: 30 tablet, Rfl: 0    glucose blood test strip, 1 each by Other route 3 (three) times a day, Disp: 300 each, Rfl: 3    HumaLOG Mix 75/25 KwikPen (75-25) 100 units/mL injection pen, Inject 25 Units under the skin 2 (two) times a day with meals, Disp: 90 mL, Rfl: 0    Insulin Pen Needle (B-D UF III MINI PEN NEEDLES) 31G X 5 MM MISC, Use twice daily with insulin pen as directed, Disp: 180 each, Rfl: 3    latanoprost (XALATAN) 0 005 % ophthalmic solution, Administer 1 drop to both eyes daily at bedtime  (Patient not taking: Reported on 4/20/2022 ), Disp: , Rfl:     NIFEdipine (PROCARDIA XL) 30 mg 24 hr tablet, Take 1 tablet (30 mg total) by mouth daily (Patient not taking: Reported on 4/20/2022 ), Disp: 30 tablet, Rfl: 0    pantoprazole (PROTONIX) 40 mg tablet, Take 1 tablet (40 mg total) by mouth daily (Patient not taking: Reported on 4/20/2022 ), Disp: 30 tablet, Rfl: 0    predniSONE 10 mg tablet, Take 6 tablets (60 mg total) by mouth daily for 5 days, THEN 3 tablets (30 mg total) daily for 7 days, THEN 2 5 tablets (25 mg total) daily for 15 days, THEN 2 tablets (20 mg total) daily for 14 days, THEN 1 5 tablets (15 mg total) daily for 14 days  (Patient not taking: Reported on 4/20/2022), Disp: 138 tablet, Rfl: 0    [START ON 5/13/2022] predniSONE 2 5 mg tablet, Take 5 tablets (12 5 mg total) by mouth daily for 15 days, THEN 4 tablets (10 mg total) daily for 15 days, THEN 3 tablets (7 5 mg total) daily for 15 days, THEN 2 tablets (5 mg total) daily   (Patient not taking: Reported on 4/20/2022), Disp: 255 tablet, Rfl: 0    simvastatin (ZOCOR) 40 mg tablet, Take 1 tablet (40 mg total) by mouth daily at bedtime, Disp: 90 tablet, Rfl: 3    tamsulosin (FLOMAX) 0 4 mg, TAKE 1 CAPSULE BY MOUTH TWO TIMES DAILY, Disp: 180 capsule, Rfl: 1    REVIEW OF SYSTEMS:  All the systems were reviewed and were negative except as documented on the HPI  PHYSICAL EXAM:  Current Weight: Weight - Scale: 81 kg (178 lb 9 2 oz)  First Weight: Weight - Scale: 81 kg (178 lb 9 2 oz)  Vitals:    05/07/22 1230 05/07/22 1300 05/07/22 1330 05/07/22 1430   BP: 157/69 139/65 147/87 161/70   BP Location:    Right arm   Pulse: 84 83 79 78   Resp: 22 22 (!) 24 (!) 26   Temp:   97 7 °F (36 5 °C) 98 2 °F (36 8 °C)   TempSrc:   Temporal Oral   SpO2: 98% 97% 98% 99%   Weight:         No intake or output data in the 24 hours ending 05/07/22 1445  Physical Exam  General: conscious, coherent, cooperative, not in distress  Skin: warm, dry, good turgor  Eyes: pink conjunctivae, no scleral icterus  ENT: moist lips and mucous membranes  Respiratory: equal chest expansion, clear breath sounds  Cardiovascular: distinct heart sounds, normal rate, regular rhythm, no rub  Abdomen: soft, non-tender, non-distended, normoactive bowel sounds  Extremities: no edema  Genitourinary: no ruvalcaba catheter  (+) R flank tenderness  Neuro: awake, alert, oriented to time, place and person  Psych: appropriate affect        Invasive Devices:      Lab Results:   Results from last 7 days   Lab Units 05/07/22  1205 05/07/22  1129 05/07/22  1119   WBC Thousand/uL  --  15 46*  --    HEMOGLOBIN g/dL  --  8 4*  --    HEMATOCRIT %  --  26 5*  --    PLATELETS Thousands/uL  --  277  --    POTASSIUM mmol/L 6 4*  --  6 5*   CHLORIDE mmol/L 92*  --  94*   CO2 mmol/L 31  --  31   BUN mg/dL 56*  --  56*   CREATININE mg/dL 6 77*  --  6 62*   CALCIUM mg/dL 8 1*  --  8 1*   MAGNESIUM mg/dL  --   --  2 2   PHOSPHORUS mg/dL  --   --  2 8   ALK PHOS U/L  --   --  93   ALT U/L  --   --  22   AST U/L  --   --  20     Lab Results   Component Value Date    CALCIUM 8 1 (L) 05/07/2022    PHOS 2 8 05/07/2022     Other Studies:   CT scan of the abd and pelvis was personally reviewed by me in PACS - (+) R perinephric collection

## 2022-05-07 NOTE — TELEMEDICINE
e-Consult (IPC)  - Interventional Radiology  Ciera Kimball 64 y o  male MRN: [id number]  Unit/Bed#: ED 06 Encounter: [id number]          Interventional Radiology has been consulted to evaluate Ciera Kimball    We were consulted by PRISCILA Maier concerning this patient with right flank pain  IP Consult to IR  Consult performed by: Raquel Tavarez DO  Consult ordered by: Jasmin Rodriguez PA-C        05/07/22    Assessment/Recommendation:   Briefly, 69-year-old male with right flank pain, elevated white blood cell count and CT findings of right perinephric collection which is indeterminate in the absence of IV contrast   Request for possible aspiration/sampling/drain placement  Patient had a right renal non targeted biopsy early March  Review of the CT scan suggest delayed contained perinephric hemorrhage/blood products presumably related to kidney biopsy  There is perinephric stranding  No hydronephrosis or obstructing calculus  I will attempt to perform CT-guided aspiration +/- drain placement depending on the fluid characteristics  This may represent infected hematoma  NPO at midnight  Please hold all blood thinners  Total time spent in review of data, discussion with requesting provider and rendering advice was 15 minutes  Thank you for allowing Interventional Radiology to participate in the care of Ciera Kimball  Please don't hesitate to call or TigerText us with any questions       Raquel Tavarez DO

## 2022-05-07 NOTE — ED PROVIDER NOTES
History  Chief Complaint   Patient presents with    Back Pain     Right lower back pain started last night, worse at HD center  HD T-T-S, today's HD not done, last one on Thursday  64-year-old male, on hemodialysis, presenting today for evaluation of fevers and right-sided flank pain that began last evening  Fevers T-max being 101 9 began 2 days ago, right-sided flank pain began last evening that then radiated to the right lower groin region  States that currently he has no pain  He does have a history of kidney stones  He typically gets dialysis  and Saturday, he is not experiencing any current shortness of breath or edema  He otherwise feels well  States he typically produces very little urine, only a tsp at times  Denies nausea vomiting, diarrhea, cough, congestion, shortness of breath, chest pain, testicular pain or swelling  Prior to Admission Medications   Prescriptions Last Dose Informant Patient Reported? Taking?    HumaLOG Mix 75/25 KwikPen (75-25) 100 units/mL injection pen   No No   Sig: Inject 25 Units under the skin 2 (two) times a day with meals   Insulin Pen Needle (B-D UF III MINI PEN NEEDLES) 31G X 5 MM MISC   No No   Sig: Use twice daily with insulin pen as directed   NIFEdipine (PROCARDIA XL) 30 mg 24 hr tablet   No No   Sig: Take 1 tablet (30 mg total) by mouth daily   Patient not taking: Reported on 2022    calcium carbonate-vitamin D (OSCAL-D) 500 mg-200 units per tablet   No No   Sig: Take 1 tablet by mouth daily with breakfast   furosemide (LASIX) 40 mg tablet   No No   Sig: Take 1 tablet (40 mg total) by mouth daily   Patient not taking: Reported on 2022    glucose blood test strip   No No   Si each by Other route 3 (three) times a day   latanoprost (XALATAN) 0 005 % ophthalmic solution  Self Yes No   Sig: Administer 1 drop to both eyes daily at bedtime    Patient not taking: Reported on 2022    pantoprazole (PROTONIX) 40 mg tablet   No No   Sig: Take 1 tablet (40 mg total) by mouth daily   Patient not taking: Reported on 4/20/2022    predniSONE 10 mg tablet   No No   Sig: Take 6 tablets (60 mg total) by mouth daily for 5 days, THEN 3 tablets (30 mg total) daily for 7 days, THEN 2 5 tablets (25 mg total) daily for 15 days, THEN 2 tablets (20 mg total) daily for 14 days, THEN 1 5 tablets (15 mg total) daily for 14 days  Patient not taking: Reported on 4/20/2022   predniSONE 2 5 mg tablet   No No   Sig: Take 5 tablets (12 5 mg total) by mouth daily for 15 days, THEN 4 tablets (10 mg total) daily for 15 days, THEN 3 tablets (7 5 mg total) daily for 15 days, THEN 2 tablets (5 mg total) daily  Patient not taking: Reported on 4/20/2022   simvastatin (ZOCOR) 40 mg tablet   No No   Sig: Take 1 tablet (40 mg total) by mouth daily at bedtime   tamsulosin (FLOMAX) 0 4 mg  Self No No   Sig: TAKE 1 CAPSULE BY MOUTH TWO TIMES DAILY      Facility-Administered Medications: None       Past Medical History:   Diagnosis Date    Anesthesia complication 4694    after colonoscopy , pt was awake but could not control his body and kept falling     Arthritis     Bladder calculi     BPH (benign prostatic hyperplasia)     Diabetes mellitus ([address] )     Hearing disorder of both ears     wears bilateral hearing aids    Hyperlipidemia     controlled and maintained d/t diabetes    Hypertension     Kidney stones     Last Assessed:4/3/2017    Lyme disease     Last Assessed:6/29/2015    Rupture, bladder, spontaneous     Last Assessed:4/3/2017       Past Surgical History:   Procedure Laterality Date    BLADDER REPAIR N/A 12/10/2016    Procedure: REPAIR BLADDER/cysto insertion stent;  Surgeon: Nelia Guy MD;  Location: [address] [place] [address];  Service:     COLONOSCOPY      CYSTOLITHOTOMY      ANESTHESIA   lower abdomen  ;  Last Assessed:4/3/2017    IR BIOPSY KIDNEY RANDOM  3/10/2022    IR TUNNELED DIALYSIS CATHETER PLACEMENT  3/8/2022    OTHER SURGICAL HISTORY      thermal dilation of the prostate x 2 ( in the office)   [address] TRANSURETHRAL RESECTION OF PROSTATE N/A 2016    Procedure:  Cysto, Laser Bladder stone,fulgaration of prostates tissue ;  Surgeon: Migdalia Christie MD;  Location: [facility];  Service:        Family History   Problem Relation Age of Onset    Cancer Mother         breast    Diabetes Mother     Cancer Father         prostate w/ bone mets    Prostate cancer Father     Alzheimer's disease Sister      I have reviewed and agree with the history as documented  E-Cigarette/Vaping    E-Cigarette Use Never User      E-Cigarette/Vaping Substances    Nicotine No     THC No     CBD No     Flavoring No     Other No     Unknown No      Social History     Tobacco Use    Smoking status: Former Smoker     Types: Cigars     Quit date:      Years since quittin 3    Smokeless tobacco: Never Used   Vaping Use    Vaping Use: Never used   Substance Use Topics    Alcohol use: Yes    Drug use: No       Review of Systems   Constitutional: Negative  Negative for chills, fever and unexpected weight change  Denies IV drug use     HENT: Negative  Eyes: Negative  Respiratory: Negative  Negative for cough, chest tightness, shortness of breath and wheezing  Cardiovascular: Negative  Negative for chest pain and palpitations  Gastrointestinal: Positive for abdominal pain  Negative for abdominal distention, anal bleeding, blood in stool, constipation, diarrhea, nausea, rectal pain and vomiting  Genitourinary: Negative  Negative for difficulty urinating, dysuria, flank pain, frequency, hematuria and urgency  Denies numbness, tingling in the groin  Musculoskeletal: Positive for back pain  Negative for arthralgias, gait problem, joint swelling, myalgias, neck pain and neck stiffness  Skin: Negative  Negative for color change  Neurological: Negative    Negative for dizziness, tremors, weakness, light-headedness, numbness and headaches  All other systems reviewed and are negative  Physical Exam  Physical Exam  Vitals and nursing note reviewed  Constitutional:       General: He is not in acute distress  Appearance: He is well-developed  He is obese  He is not diaphoretic  HENT:      Head: Normocephalic and atraumatic  Right Ear: External ear normal       Left Ear: External ear normal       Nose: Nose normal       Mouth/Throat:      Pharynx: No oropharyngeal exudate  Eyes:      General: No scleral icterus  Right eye: No discharge  Left eye: No discharge  Conjunctiva/sclera: Conjunctivae normal       Pupils: Pupils are equal, round, and reactive to light  Cardiovascular:      Rate and Rhythm: Normal rate and regular rhythm  Pulses: Normal pulses  Heart sounds: Normal heart sounds  No murmur heard  No friction rub  No gallop  Pulmonary:      Effort: Pulmonary effort is normal  No respiratory distress  Breath sounds: Normal breath sounds  No stridor  No wheezing, rhonchi or rales  Comments: S PO2 is 100% indicating adequate oxygenation, clear breath sounds in all lung fields  Chest:      Chest wall: No tenderness  Abdominal:      General: Abdomen is flat  Bowel sounds are normal  There is no distension  Palpations: Abdomen is soft  There is no mass  Tenderness: There is no abdominal tenderness  There is no right CVA tenderness, left CVA tenderness, guarding or rebound  Hernia: No hernia is present  Musculoskeletal:      Cervical back: Normal range of motion and neck supple  Comments: No edema bilaterally   Lymphadenopathy:      Cervical: No cervical adenopathy  Skin:     General: Skin is warm and dry  Capillary Refill: Capillary refill takes less than 2 seconds  Coloration: Skin is not pale  Findings: No erythema or rash  Neurological:      General: No focal deficit present        Mental Status: He is alert and oriented to person, place, and time  Mental status is at baseline  Vital Signs  ED Triage Vitals [05/07/22 1040]   Temperature Pulse Respirations Blood Pressure SpO2   100 2 °F (37 9 °C) 90 18 156/69 100 %      Temp Source Heart Rate Source Patient Position - Orthostatic VS BP Location FiO2 (%)   Oral Monitor Lying Left arm --      Pain Score       5           Vitals:    05/07/22 1145 05/07/22 1230 05/07/22 1300 05/07/22 1330   BP:  157/69 139/65 147/87   Pulse: 83 84 83 79   Patient Position - Orthostatic VS:    Lying         Visual Acuity      ED Medications  Medications   calcium gluconate 1 g in sodium chloride 0 9% 50 mL (premix) (0 g Intravenous Stopped 5/7/22 1428)   cefepime (MAXIPIME) IVPB (premix in dextrose) 1,000 mg 50 mL (0 mg Intravenous Stopped 5/7/22 1428)       Diagnostic Studies  Results Reviewed     Procedure Component Value Units Date/Time    COVID/FLU/RSV - 2 hour TAT [[id number]] Collected: 05/07/22 1341    Lab Status:  In process Specimen: Nares from Nose Updated: 05/07/22 4359    Basic metabolic panel [[id number]]  (Abnormal) Collected: 05/07/22 1205    Lab Status: Final result Specimen: Blood from Arm, Left Updated: 05/07/22 1235     Sodium 132 mmol/L      Potassium 6 4 mmol/L      Chloride 92 mmol/L      CO2 31 mmol/L      ANION GAP 9 mmol/L      BUN 56 mg/dL      Creatinine 6 77 mg/dL      Glucose 162 mg/dL      Calcium 8 1 mg/dL      eGFR 7 ml/min/1 73sq m     Narrative:      [place] guidelines for Chronic Kidney Disease (CKD):     Stage 1 with normal or high GFR (GFR > 90 mL/min/1 73 square meters)    Stage 2 Mild CKD (GFR = 60-89 mL/min/1 73 square meters)    Stage 3A Moderate CKD (GFR = 45-59 mL/min/1 73 square meters)    Stage 3B Moderate CKD (GFR = 30-44 mL/min/1 73 square meters)    Stage 4 Severe CKD (GFR = 15-29 mL/min/1 73 square meters)    Stage 5 End Stage CKD (GFR <15 mL/min/1 73 square meters)  Note: GFR calculation is accurate only with a steady state creatinine    CBC and differential [[id number]]  (Abnormal) Collected: 05/07/22 1129    Lab Status: Final result Specimen: Blood from Arm, Left Updated: 05/07/22 1206     WBC 15 46 Thousand/uL      RBC 2 78 Million/uL      Hemoglobin 8 4 g/dL      Hematocrit 26 5 %      MCV 95 fL      MCH 30 2 pg      MCHC 31 7 g/dL      RDW 15 1 %      MPV 8 9 fL      Platelets 567 Thousands/uL      nRBC 0 /100 WBCs      Neutrophils Relative 90 %      Immat GRANS % 1 %      Lymphocytes Relative 4 %      Monocytes Relative 5 %      Eosinophils Relative 0 %      Basophils Relative 0 %      Neutrophils Absolute 13 99 Thousands/µL      Immature Grans Absolute 0 09 Thousand/uL      Lymphocytes Absolute 0 54 Thousands/µL      Monocytes Absolute 0 81 Thousand/µL      Eosinophils Absolute 0 00 Thousand/µL      Basophils Absolute 0 03 Thousands/µL     Narrative: This is an appended report  These results have been appended to a previously verified report  Lactic acid [[id number]]  (Normal) Collected: 05/07/22 1129    Lab Status: Final result Specimen: Blood from Arm, Left Updated: 05/07/22 1157     LACTIC ACID 0 9 mmol/L     Narrative:      Result may be elevated if tourniquet was used during collection      Comprehensive metabolic panel [[id number]]  (Abnormal) Collected: 05/07/22 1119    Lab Status: Final result Specimen: Blood from Arm, Right Updated: 05/07/22 1152     Sodium 134 mmol/L      Potassium 6 5 mmol/L      Chloride 94 mmol/L      CO2 31 mmol/L      ANION GAP 9 mmol/L      BUN 56 mg/dL      Creatinine 6 62 mg/dL      Glucose 160 mg/dL      Calcium 8 1 mg/dL      Corrected Calcium 9 5 mg/dL      AST 20 U/L      ALT 22 U/L      Alkaline Phosphatase 93 U/L      Total Protein 7 1 g/dL      Albumin 2 2 g/dL      Total Bilirubin 0 55 mg/dL      eGFR 7 ml/min/1 73sq m     Narrative:      [place] guidelines for Chronic Kidney Disease (CKD):     Stage 1 with normal or high GFR (GFR > 90 mL/min/1 73 square meters)    Stage 2 Mild CKD (GFR = 60-89 mL/min/1 73 square meters)    Stage 3A Moderate CKD (GFR = 45-59 mL/min/1 73 square meters)    Stage 3B Moderate CKD (GFR = 30-44 mL/min/1 73 square meters)    Stage 4 Severe CKD (GFR = 15-29 mL/min/1 73 square meters)    Stage 5 End Stage CKD (GFR <15 mL/min/1 73 square meters)  Note: GFR calculation is accurate only with a steady state creatinine    Phosphorus [[id number]]  (Normal) Collected: 05/07/22 1119    Lab Status: Final result Specimen: Blood from Arm, Right Updated: 05/07/22 1150     Phosphorus 2 8 mg/dL     Magnesium [[id number]]  (Normal) Collected: 05/07/22 1119    Lab Status: Final result Specimen: Blood from Arm, Right Updated: 05/07/22 1150     Magnesium 2 2 mg/dL     Protime-INR [[id number]]  (Abnormal) Collected: 05/07/22 1129    Lab Status: Final result Specimen: Blood from Arm, Left Updated: 05/07/22 1150     Protime 14 7 seconds      INR 1 17    APTT [[id number]]  (Normal) Collected: 05/07/22 1129    Lab Status: Final result Specimen: Blood from Arm, Left Updated: 05/07/22 1150     PTT 37 seconds     Blood culture #2 [[id number]] Collected: 05/07/22 1129    Lab Status: In process Specimen: Blood from Arm, Left Updated: 05/07/22 1135    Blood culture #1 [[id number]] Collected: 05/07/22 1119    Lab Status: In process Specimen: Blood from Arm, Right Updated: 05/07/22 1125    UA w Reflex to Microscopic w Reflex to Culture [[id number]]     Lab Status: No result Specimen: Urine                  CT abdomen pelvis wo contrast   Final Result by Ivon Hameed MD (05/07 1240)      1  New small to moderate-sized right perinephric subcapsular fluid collection  It is difficult to determine if the fluid is simple or complex in nature but complex fluid is favored  Differential diagnosis includes perinephric hematoma, urinoma, and    less likely but not excluded abscess  There is mild resulting mass effect upon the right kidney  Consider urologic consultation  Correlation and continued follow-up to resolution is recommended  2   Previously noted right UVJ calculus seen on CT dated 3/8/2022 has passed into the urinary bladder  There is no evidence for obstructive uropathy on the current exam       Please see above for details and multiple additional findings  The study was marked in [facility] for immediate notification  Workstation performed: [id number]         IR aspiration only    (Results Pending)              Procedures  ECG 12 Lead Documentation Only    Date/Time: 5/7/2022 2:28 PM  Performed by: Ruperto Mauro PA-C  Authorized by: Ruperto Mauro PA-C     Indications / Diagnosis:  Missed dialysis, hyperK  ECG reviewed by me, the ED Provider: yes    Patient location:  ED  Interpretation:     Interpretation: normal    Rate:     ECG rate:  85    ECG rate assessment: normal    Rhythm:     Rhythm: sinus rhythm    Ectopy:     Ectopy: none    QRS:     QRS axis:  Normal    QRS intervals:  Normal  Conduction:     Conduction: normal    ST segments:     ST segments:  Normal  T waves:     T waves: normal               ED Course  ED Course as of 05/07/22 1428   Sat May 07, 2022   1102 RN aware of rectal temp needed    1235 Reaching out to Dr Ethel Sparks of nephrology    [phone] Sepsis alert called as creatinine is above baseline   1305 Waiting to head back from Dr Samira Thomas      [phone]  New small to moderate-sized right perinephric subcapsular fluid collection  It is difficult to determine if the fluid is simple or complex in nature but complex fluid is favored  Differential diagnosis includes perinephric hematoma, urinoma, and  less likely but not excluded abscess  There is mild resulting mass effect upon the right kidney  Consider urologic consultation  Correlation and continued follow-up to resolution is recommended     1314 Reaching out to [facility] with Dr Sharon Mendez admitting, will be reaching out IR   1330 Dr Samira Thomas relays patient  "Needs to be admitted for IV antibiotics could possibly be perforation of the calyx from passing stone with hydronephrosis not infected urine should be watched if he continues to spike fevers on antibiotics and IR consult for possible drainage would be discussed most likely on Monday"   1345 Patient seen at bedside by Dr Aidan Vega     Bladder scan 0     1401 I spoke with IR Dr Eva Jefferson who will be coming to see patient possibly today or more likely tomorrow, suggests may more than likely be a delayed perinephric hematoma from a right kidney biopsy back in March or an infected hematoma                            Initial Sepsis Screening     [address] Name 05/07/22 1307                Is the patient's history suggestive of a new or worsening infection? Yes (Proceed)  -BC        Suspected source of infection acute abdominal infection;urinary tract infection  -BC        Are two or more of the following signs & symptoms of infection both present and new to the patient? --        Indicate SIRS criteria Tachycardia > 90 bpm;Leukocytosis (WBC > 154[address] IJL)  -BC        If the answer is yes to both questions, suspicion of sepsis is present --        If severe sepsis is present AND tissue hypoperfusion perists in the hour after fluid resuscitation or lactate > 4, the patient meets criteria for SEPTIC SHOCK --        Are any of the following organ dysfunction criteria present within 6 hours of suspected infection and SIRS criteria that are NOT considered to be chronic conditions?  Yes  -BC        Organ dysfunction Creatinine > 2 0 mg/dl AND > 0 5 mg/dl above baseline  -BC        Date of presentation of severe sepsis 05/07/22  -BC        Time of presentation of severe sepsis 1307  -BC        Tissue hypoperfusion persists in the hour after crystalloid fluid administration, evidenced, by either: --        Was hypotension present within one hour of the conclusion of crystalloid fluid administration? --        Date of presentation of septic shock -- Time of presentation of septic shock --              User Key  (r) = Recorded By, (t) = Taken By, (c) = Cosigned By    [address] Name Provider Type    SUE Ray PA-C Physician Assistant              Default Flowsheet Data (last 720 hours)     Sepsis Reassess     Row Name 05/07/22 1426                   Repeat Volume Status and Tissue Perfusion Assessment Performed    Repeat Volume Status and Tissue Perfusion Assessment Performed Yes  -BC                  Volume Status and Tissue Perfusion Post Fluid Resuscitation * Must Document All *    Vital Signs Reviewed (HR, RR, BP, T) Yes  -BC        Shock Index Reviewed Yes  -BC        Arterial Oxygen Saturation Reviewed (POx, SaO2 or SpO2) Yes (comment %)  -BC        Cardio Normal S1/S2; Regular rate and rhythm; No rub or gallop; No murmor  -BC        Pulmonary Normal effort;Clear to auscultation  -BC        Capillary Refill Brisk  -BC        Peripheral Pulses Radial  -BC        Peripheral Pulse +2  -BC        Skin Warm  -BC        Urine output assessed None  dialysis patient  -BC                  *OR*   Intensive Monitoring- Must Document One of the Following Four *:    Vital Signs Reviewed --        * Central Venous Pressure (CVP or RAP) --        * Central Venous Oxygen (SVO2, ScvO2 or Oxygen saturation via central catheter) --        * Bedside Cardiovascular US in IVC diameter and % collapse --        * Passive Leg Raise OR Crystalloid Challenge --              User Key  (r) = Recorded By, (t) = Taken By, (c) = Cosigned By    Initials Name Provider Type    SUE Ray PA-C Physician Assistant                            MDM  Number of Diagnoses or Management Options  Dialysis patient [facility])  Perinephric fluid collection  Sepsis [facility])  Diagnosis management comments: Spoke with Dr Ricardo Vasques who saw patient in the ED and will get patient his dialysis today  Spoke with Dr Marilu Griffin as well as IR Dr Pricila Perez  Please see ED course for updates and recs  Dr Kayley Brito will be in to see patient today or tomorrow may need drainage to assess for infection source, however may be hematoma  Patient started on antibiotics and will be admitted  Bladder scan 0  Consults placed  Spoke with admitting team, patient started on Cefepime  Patient does meet severe sepsis given elevated creatinine from baseline over 0 5  Amount and/or Complexity of Data Reviewed  Clinical lab tests: ordered and reviewed  Tests in the radiology section of CPT®: reviewed and ordered  Review and summarize past medical records: yes  Discuss the patient with other providers: yes  Independent visualization of images, tracings, or specimens: yes        Disposition  Final diagnoses:   Dialysis patient ([place] [address] )   Sepsis ([place] [address] )   Perinephric fluid collection     Time reflects when diagnosis was documented in both MDM as applicable and the Disposition within this note     Time User Action Codes Description Comment    5/7/2022 12:53 PM Severa Comes Add [Z99 2] Dialysis patient ([place] [address] )     5/7/2022  1:28 PM Severa Comes Add [A41 9] Sepsis ([place] [address] )     5/7/2022  1:28 PM Severa Comes Add [N28 89] Perinephric fluid collection       ED Disposition     ED Disposition Condition Date/Time Comment    Admit Stable Sat May 7, 2022  1:27 PM Case was discussed with Dr Robbie Lindquist and the patient's admission status was agreed to be Admission Status: inpatient status to the service of Dr Robbie Lindquist   Follow-up Information    None         Patient's Medications   Discharge Prescriptions    No medications on file       No discharge procedures on file      PDMP Review     None          ED Provider  Electronically Signed by           Annabel Mcallister PA-C  05/07/22 7185

## 2022-05-07 NOTE — CONSULTS
H&P Exam - Urology       Patient: Ezequiel Carty   : 1945 Sex: male   MRN: [id number]     CSN: [id number]      History of Present Illness   HPI:  Ezequiel Carty is a 64 y o  male known to me with long urologic history now on chronic renal dialysis making 1 tsp of urine a day found to have presents to the ER with temperatures of 1019 beginning 2 days ago right flank pain radiating to the right lower quadrant  ER CT scan confirms 5 mm passed ureteral stone in the bladder and small right lanie renal collection consistent with hematoma or urinoma patient cannot be given contrast in light of chronic renal failure admitted for antibiotics and evaluation seen at the bedside stating pain has now subsided starting 2 days ago patient underwent cysto litholapaxy repair bladder perforation some 6 years ago by myself last seen in the office some 6 months ago        Review of Systems:   Constitutional:  Negative for activity change, fever, chills and diaphoresis  HENT: Negative for hearing loss and trouble swallowing  Eyes: Negative for itching and visual disturbance  Respiratory: Negative for chest tightness and shortness of breath  Cardiovascular: Negative for chest pain, edema  Gastrointestinal: Negative for abdominal distention, na abdominal pain, constipation, diarrhea, Nausea and vomiting  Genitourinary: Negative for decreased urine volume, difficulty urinating, dysuria, enuresis, frequency, hematuria and urgency  Musculoskeletal: Negative for gait problem and myalgias  Neurological: Negative for dizziness and headaches  Hematological: Does not bruise/bleed easily         Historical Information   Past Medical History:   Diagnosis Date    Anesthesia complication     after colonoscopy , pt was awake but could not control his body and kept falling     Arthritis     Bladder calculi     BPH (benign prostatic hyperplasia)     Diabetes mellitus ([address] )     Hearing disorder of both ears wears bilateral hearing aids    Hyperlipidemia     controlled and maintained d/t diabetes    Hypertension     Kidney stones     Last Assessed:4/3/2017    Lyme disease     Last Assessed:2015    Rupture, bladder, spontaneous     Last Assessed:4/3/2017     Past Surgical History:   Procedure Laterality Date    BLADDER REPAIR N/A 12/10/2016    Procedure: REPAIR BLADDER/cysto insertion stent;  Surgeon: Jannie Vincent MD;  Location: [address] [place] [address];  Service:     COLONOSCOPY      CYSTOLITHOTOMY      ANESTHESIA   lower abdomen  ;  Last Assessed:4/3/2017    IR BIOPSY KIDNEY RANDOM  3/10/2022    IR TUNNELED DIALYSIS CATHETER PLACEMENT  3/8/2022    OTHER SURGICAL HISTORY      thermal dilation of the prostate x 2 ( in the office)   [address] RESECTION OF PROSTATE N/A 2016    Procedure:  Cysto, Laser Bladder stone,fulgaration of prostates tissue ;  Surgeon: Jannie Vicnent MD;  Location: [facility];  Service:      Social History   Social History     Substance and Sexual Activity   Alcohol Use Yes     Social History     Substance and Sexual Activity   Drug Use No     Social History     Tobacco Use   Smoking Status Former Smoker    Types: Cigars    Quit date: 26    Years since quittin 3   Smokeless Tobacco Never Used     Family History:   Family History   Problem Relation Age of Onset    Cancer Mother         breast    Diabetes Mother     Cancer Father         prostate w/ bone mets    Prostate cancer Father     Alzheimer's disease Sister        Meds/Allergies   Medications Prior to Admission   Medication    calcium acetate (PHOSLO) capsule    dutasteride (AVODART) 0 5 mg capsule    enalapril (VASOTEC) 10 mg tablet    calcium carbonate-vitamin D (OSCAL-D) 500 mg-200 units per tablet    furosemide (LASIX) 40 mg tablet    glucose blood test strip    HumaLOG Mix 75/25 KwikPen (75-25) 100 units/mL injection pen    Insulin Pen Needle (B-D UF III MINI PEN NEEDLES) 31G X 5 MM MISC    latanoprost (XALATAN) 0 005 % ophthalmic solution    NIFEdipine (PROCARDIA XL) 30 mg 24 hr tablet    pantoprazole (PROTONIX) 40 mg tablet    predniSONE 10 mg tablet    [START ON 5/13/2022] predniSONE 2 5 mg tablet    simvastatin (ZOCOR) 40 mg tablet    tamsulosin (FLOMAX) 0 4 mg     Allergies   Allergen Reactions    Amoxicillin Rash       Objective   Vitals: /70 (BP Location: Right arm)   Pulse 77   Temp 98 4 °F (36 9 °C) (Oral)   Resp 20   Wt 81 kg (178 lb 9 2 oz)   SpO2 97%   BMI 27 15 kg/m²     Physical Exam:  General Alert awake   Normocephalic atraumatic PERRLA  Lungs clear bilaterally  Cardiac normal S1 normal S2  Abdomen soft, flank pain right minimal  Bladder not distended  Extremities no edema    No intake/output data recorded      Invasive Devices  Report    Peripheral Intravenous Line            Peripheral IV 05/07/22 Distal;Left;Upper;Ventral (anterior) Arm <1 day    Peripheral IV 05/07/22 Left Wrist <1 day          Hemodialysis Catheter            HD Permanent Double Catheter 60 days                    Lab Results: CBC:   Lab Results   Component Value Date    WBC 15 46 (H) 05/07/2022    HGB 8 4 (L) 05/07/2022    HCT 26 5 (L) 05/07/2022    MCV 95 05/07/2022     05/07/2022    MCH 30 2 05/07/2022    MCHC 31 7 05/07/2022    RDW 15 1 05/07/2022    MPV 8 9 05/07/2022    NRBC 0 05/07/2022     CMP:   Lab Results   Component Value Date     07/24/2017    CL 92 (L) 05/07/2022     07/24/2017    CO2 31 05/07/2022    CO2 22 07/24/2017    BUN 56 (H) 05/07/2022    BUN 16 07/24/2017    CREATININE 6 77 (H) 05/07/2022    CREATININE 1 02 07/24/2017    GLUCOSE 106 (H) 07/24/2017    CALCIUM 8 1 (L) 05/07/2022    CALCIUM 9 5 07/24/2017    AST 20 05/07/2022    AST 19 07/24/2017    ALT 22 05/07/2022    ALT 18 07/24/2017    ALKPHOS 93 05/07/2022    ALKPHOS 77 07/24/2017    PROT 7 2 07/24/2017    BILITOT 0 3 07/24/2017    EGFR 7 05/07/2022     Urinalysis:   Lab Results   Component Value Date    COLORU Light Yellow 03/28/2022    COLORU Yellow 11/01/2017    CLARITYU Cloudy 03/28/2022    CLARITYU Transparent 11/01/2017    SPECGRAV 1 025 03/28/2022    SPECGRAV 1 020 11/01/2017    PHUR 7 5 03/28/2022    PHUR 5 11/01/2017    LEUKOCYTESUR Negative 03/28/2022    LEUKOCYTESUR neg 11/01/2017    NITRITE Negative 03/28/2022    NITRITE neg 11/01/2017    PROTEINUA neg 11/01/2017    GLUCOSEU Negative 03/28/2022    KETONESU Negative 03/28/2022    KETONESU neg 11/01/2017    BILIRUBINUR Negative 03/28/2022    BILIRUBINUR neg 11/01/2017    BLOODU Large (A) 03/28/2022    BLOODU 50 11/01/2017     Urine Culture:   Lab Results   Component Value Date    URINECX 1814-5161 cfu/ml Escherichia coli (A) 03/07/2022     PSA:   Lab Results   Component Value Date    PSA 3 2 07/20/2021    PSA 2 8 12/29/2020    PSA 2 1 12/12/2019    PSA 1 7 11/14/2018    PSA 2 6 07/24/2017    PSA 1 7 04/04/2016           Assessment/ Plan:  Past right 5 mm ureteral stone possibly causing calyceal perforation and leak which Must of taken weeks to occur in light of chronic renal failure and poor urine output  Would start antibiotics as per Nephrology  Cultures  If patient continues to spike temperatures IR consult for possible aspiration culture/drainage      Elizabeth Amin MD

## 2022-05-08 ENCOUNTER — APPOINTMENT (INPATIENT)
Dept: RADIOLOGY | Facility: HOSPITAL | Age: 77
DRG: 871 | End: 2022-05-08
Attending: INTERNAL MEDICINE
Payer: MEDICARE

## 2022-05-08 PROBLEM — E87.5 HYPERKALEMIA: Status: RESOLVED | Noted: 2022-03-07 | Resolved: 2022-05-08

## 2022-05-08 LAB
ANION GAP SERPL CALCULATED.3IONS-SCNC: 8 MMOL/L (ref 4–13)
BUN SERPL-MCNC: 41 MG/DL (ref 5–25)
CALCIUM SERPL-MCNC: 7.9 MG/DL (ref 8.3–10.1)
CHLORIDE SERPL-SCNC: 95 MMOL/L (ref 100–108)
CO2 SERPL-SCNC: 30 MMOL/L (ref 21–32)
CREAT SERPL-MCNC: 4.66 MG/DL (ref 0.6–1.3)
ERYTHROCYTE [DISTWIDTH] IN BLOOD BY AUTOMATED COUNT: 14.8 % (ref 11.6–15.1)
EST. AVERAGE GLUCOSE BLD GHB EST-MCNC: 100 MG/DL
GFR SERPL CREATININE-BSD FRML MDRD: 11 ML/MIN/1.73SQ M
GLUCOSE SERPL-MCNC: 156 MG/DL (ref 65–140)
GLUCOSE SERPL-MCNC: 226 MG/DL (ref 65–140)
GLUCOSE SERPL-MCNC: 438 MG/DL (ref 65–140)
GLUCOSE SERPL-MCNC: 62 MG/DL (ref 65–140)
GLUCOSE SERPL-MCNC: 64 MG/DL (ref 65–140)
GLUCOSE SERPL-MCNC: 85 MG/DL (ref 65–140)
GLUCOSE SERPL-MCNC: 90 MG/DL (ref 65–140)
HBA1C MFR BLD: 5.1 %
HCT VFR BLD AUTO: 23.7 % (ref 36.5–49.3)
HGB BLD-MCNC: 7.5 G/DL (ref 12–17)
MCH RBC QN AUTO: 30.1 PG (ref 26.8–34.3)
MCHC RBC AUTO-ENTMCNC: 31.6 G/DL (ref 31.4–37.4)
MCV RBC AUTO: 95 FL (ref 82–98)
PLATELET # BLD AUTO: 236 THOUSANDS/UL (ref 149–390)
PMV BLD AUTO: 8.9 FL (ref 8.9–12.7)
POTASSIUM SERPL-SCNC: 4.7 MMOL/L (ref 3.5–5.3)
RBC # BLD AUTO: 2.49 MILLION/UL (ref 3.88–5.62)
SODIUM SERPL-SCNC: 133 MMOL/L (ref 136–145)
WBC # BLD AUTO: 8.47 THOUSAND/UL (ref 4.31–10.16)

## 2022-05-08 PROCEDURE — 82948 REAGENT STRIP/BLOOD GLUCOSE: CPT

## 2022-05-08 PROCEDURE — 77012 CT SCAN FOR NEEDLE BIOPSY: CPT | Performed by: RADIOLOGY

## 2022-05-08 PROCEDURE — 83036 HEMOGLOBIN GLYCOSYLATED A1C: CPT | Performed by: INTERNAL MEDICINE

## 2022-05-08 PROCEDURE — 99232 SBSQ HOSP IP/OBS MODERATE 35: CPT | Performed by: INTERNAL MEDICINE

## 2022-05-08 PROCEDURE — 80048 BASIC METABOLIC PNL TOTAL CA: CPT | Performed by: INTERNAL MEDICINE

## 2022-05-08 PROCEDURE — 10160 PNXR ASPIR ABSC HMTMA BULLA: CPT | Performed by: RADIOLOGY

## 2022-05-08 PROCEDURE — NC001 PR NO CHARGE: Performed by: RADIOLOGY

## 2022-05-08 PROCEDURE — 85027 COMPLETE CBC AUTOMATED: CPT | Performed by: INTERNAL MEDICINE

## 2022-05-08 RX ORDER — FENTANYL CITRATE 50 UG/ML
INJECTION, SOLUTION INTRAMUSCULAR; INTRAVENOUS CODE/TRAUMA/SEDATION MEDICATION
Status: COMPLETED | OUTPATIENT
Start: 2022-05-08 | End: 2022-05-08

## 2022-05-08 RX ORDER — ACETAMINOPHEN 325 MG/1
650 TABLET ORAL EVERY 6 HOURS PRN
Status: DISCONTINUED | OUTPATIENT
Start: 2022-05-08 | End: 2022-05-09 | Stop reason: HOSPADM

## 2022-05-08 RX ORDER — LIDOCAINE WITH 8.4% SOD BICARB 0.9%(10ML)
SYRINGE (ML) INJECTION CODE/TRAUMA/SEDATION MEDICATION
Status: COMPLETED | OUTPATIENT
Start: 2022-05-08 | End: 2022-05-08

## 2022-05-08 RX ADMIN — CEFEPIME HYDROCHLORIDE 1000 MG: 1 INJECTION, SOLUTION INTRAVENOUS at 14:32

## 2022-05-08 RX ADMIN — ATORVASTATIN CALCIUM 20 MG: 20 TABLET, FILM COATED ORAL at 16:06

## 2022-05-08 RX ADMIN — FENTANYL CITRATE 50 MCG: 50 INJECTION, SOLUTION INTRAMUSCULAR; INTRAVENOUS at 08:48

## 2022-05-08 RX ADMIN — INSULIN ASPART 30 UNITS: 100 INJECTION, SUSPENSION SUBCUTANEOUS at 16:06

## 2022-05-08 RX ADMIN — Medication 10 ML: at 08:53

## 2022-05-08 RX ADMIN — TAMSULOSIN HYDROCHLORIDE 0.4 MG: 0.4 CAPSULE ORAL at 16:06

## 2022-05-08 RX ADMIN — ACETAMINOPHEN 650 MG: 325 TABLET, FILM COATED ORAL at 14:47

## 2022-05-08 RX ADMIN — INSULIN LISPRO 2 UNITS: 100 INJECTION, SOLUTION INTRAVENOUS; SUBCUTANEOUS at 11:20

## 2022-05-08 RX ADMIN — INSULIN LISPRO 6 UNITS: 100 INJECTION, SOLUTION INTRAVENOUS; SUBCUTANEOUS at 16:06

## 2022-05-08 NOTE — ASSESSMENT & PLAN NOTE
Lab Results   Component Value Date    HGBA1C 5 1 05/08/2022       Recent Labs     05/08/22  2121 05/09/22  0205 05/09/22  0803 05/09/22  1057   POCGLU 85 106 157* 130       Blood Sugar Average: Last 72 hrs:  (P) 222 [id number]   Tightly controlled  Reports he takes insulin 75/25, about 35 units in the a m  And 40 units p m    Will decrease to 25 units with breakfast and dinner  Follow-up with PCP 0

## 2022-05-08 NOTE — BRIEF OP NOTE (RAD/CATH)
Attempted Aspiration Procedure Note    PATIENT NAME: Junaid Chavez  : 1945  MRN: [id number]     Pre-op Diagnosis:   1  Dialysis patient ([facility] [place] [address] )    2  Sepsis ([facility] [address] )    3  Perinephric fluid collection      Post-op Diagnosis:   1  Dialysis patient ([facility] [place] [address] )    2  Sepsis ([facility] [place] [address] )    3  Perinephric fluid collection        Surgeon:   Caprice Frost DO  Assistants:     No qualified resident was available  Estimated Blood Loss: none  Findings:  Unsuccessful aspiration of right perinephric collection  Attempted multiple spots with 18G needle and 6 Stateless catheter  Presume this is hematoma that cannot be aspirated  Specimens:  None  Complications:  None      Anesthesia: local and IV Fentanyl    Caprice Frost DO     Date: 2022  Time: 9:06 AM

## 2022-05-08 NOTE — ASSESSMENT & PLAN NOTE
History of CKD stage 3 with prior baseline creatinine 1 1-1 3  Recent hospitalization in 3/22, presented with creatinine of 14  Diagnosed to have ANCA vasculitis  Required initiation of HD during hospitalization, currently on HD TTS  Nephrology following, input appreciated  BMP with mild hypokalemia and hyponatremia  · Low-potassium diet  · Continue HD as per schedule, continue outpatient HD after discharge starting tomorrow  · Follow-up with primary Nephrology for further care

## 2022-05-08 NOTE — PROGRESS NOTES
[facility] Internal Medicine Progress Note  Patient: Demetrice Lei 64 y o  male   MRN: [id number]  PCP: Mami Reyna MD  Unit/Bed#: [address] [place] [address] Encounter: [id number]  Date Of Visit: 05/08/22    Problem List:    Principal Problem:    Sepsis ([facility] )  Active Problems:    Type 2 diabetes mellitus with microalbuminuria, with long-term current use of insulin ([place] [address] )    Acute kidney injury ([place] [address] )    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[place] [address] )    Benign essential hypertension    Anemia due to chronic kidney disease, on chronic dialysis ([place] [address] )    Benign prostatic hyperplasia with lower urinary tract symptoms    Hyperlipidemia      Assessment & Plan:    * Sepsis ([facility] )  Assessment & Plan  Presented with right flank pain for 1 day and fever  Noted to have leukocytosis, fever  CT scan revealed new small to moderate size right perinephric subscapular fluid collection likely complex in nature, differential diagnosis included perinephric hematoma, urinoma less likely but possible abscess  History of UVJ calculus in 3/22 which appeared to have passed  Status post attempted IR guided drainage 5/8-unsuccessful despite multiple attempted aspiration, likely hematoma  Hemodynamically stable, nontoxic  Remains afebrile    Leukocytosis downtrending  · Continue IV cefepime  · Follow-up blood cultures-1/2 Staphylococcus species likely contaminant  · Follow-up CBC and fever  · Infectious disease evaluation  · Urology evaluation, recommended IR input    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[facility] )  Assessment & Plan  Status post kidney biopsy on 03/09-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  Previously treated with Cytoxan  Patient was also prescribed prednisone taper but he has not been taking it  · Follow-up nephrology recommendations-input appreciated    Acute kidney injury [facility])  Assessment & Plan  History of CKD stage 3 with prior baseline creatinine 1 1-1 3  Recent hospitalization in 3/22, presented with creatinine of 14  Diagnosed to have ANCA vasculitis  Required initiation of HD during hospitalization, currently on HD TTS  · Nephrology evaluation appreciated  · Continue HD as per Nephrology  · Follow-up BMP and electrolytes    Type 2 diabetes mellitus with microalbuminuria, with long-term current use of insulin [facility])  Assessment & Plan  Lab Results   Component Value Date    HGBA1C 5 1 05/08/2022       Recent Labs     05/08/22  0802 05/08/22  1047 05/08/22  1547 05/08/22  2121   POCGLU 156* 226* 438* 85       Blood Sugar Average: Last 72 hrs:  (P) 417 [id number]     Reports he takes insulin 75/25, about 35 units in the a m  And 40 units p m  Continue insulin NovoLog 70/30, 30 units with meal b i d  with sliding scale  Monitor trend    Hyperkalemia-resolved as of 5/8/2022  Assessment & Plan  Missed HD on day of admission due to hospitalization  Improved post HD  · Nephrology input appreciated, status post HD 5/7  · Renal diet    Anemia due to chronic kidney disease, on chronic dialysis [facility])  Assessment & Plan  Below goal, downtrending   Monitor  On Micera    Benign essential hypertension  Assessment & Plan  Stable  · Hold enalapril given hyperkalemia  · Reports that he is not taking nifedipine  · Monitor    Hyperlipidemia  Assessment & Plan  On statin    Benign prostatic hyperplasia with lower urinary tract symptoms  Assessment & Plan  On Avodart and Flomax          VTE Pharmacologic Prophylaxis: VTE Score: 5 Moderate Risk (Score 3-4) - Pharmacological DVT Prophylaxis Contraindicated  Sequential Compression Devices Ordered  Patient Centered Rounds: I performed bedside rounds with nursing staff today  Discussions with Specialists or Other Care Team Provider:  Yes    Education and Discussions with Family / Patient: Updated  (wife) via phone  Time Spent for Care: 45 minutes  More than 50% of total time spent on counseling and coordination of care as described above      Current Length of Stay: 1 day(s)  Current Patient Status: Inpatient   Certification Statement: The patient will continue to require additional inpatient hospital stay due to Sepsis  Discharge Plan: Anticipate discharge in 24-48 hrs to home  Code Status: Level 1 - Full Code    Subjective:   Underwent IR guided drainage earlier today  Still with intermittent flank pain  Remains afebrile    Reports no other complaints, adamant about going home today but after long discussion agrees to stay till tomorrow     Objective:     Vitals:   Temp (24hrs), Av 6 °F (37 °C), Min:97 7 °F (36 5 °C), Max:100 2 °F (37 9 °C)    Temp:  [97 7 °F (36 5 °C)-100 2 °F (37 9 °C)] 98 8 °F (37 1 °C)  HR:  [77-90] 83  Resp:  [12-26] 18  BP: (139-161)/(59-96) 140/64  SpO2:  [96 %-100 %] 98 %  Body mass index is 25 44 kg/m²  Input and Output Summary (last 24 hours): Intake/Output Summary (Last 24 hours) at 2022 0840  Last data filed at 2022 0001  Gross per 24 hour   Intake 710 ml   Output 1000 ml   Net -290 ml       Physical Exam:   Physical Exam  Constitutional:       General: He is not in acute distress  HENT:      Head: Normocephalic and atraumatic  Cardiovascular:      Rate and Rhythm: Normal rate  Pulmonary:      Effort: Pulmonary effort is normal  No respiratory distress  Breath sounds: No wheezing, rhonchi or rales  Chest:      Chest wall: No tenderness  Abdominal:      General: Bowel sounds are normal  There is no distension  Palpations: Abdomen is soft  Tenderness: There is abdominal tenderness (Right flank)  There is no guarding or rebound  Musculoskeletal:      Right lower leg: No edema  Left lower leg: No edema  Skin:     General: Skin is warm and dry  Findings: No rash  Neurological:      Mental Status: He is alert  Mental status is at baseline  Cranial Nerves: No cranial nerve deficit           Additional Data:     Labs:  Results from last 7 days   Lab Units 22  0543 22  1128 05/07/22  1129   WBC Thousand/uL 8 47   < > 15 46*   HEMOGLOBIN g/dL 7 5*   < > 8 4*   HEMATOCRIT % 23 7*   < > 26 5*   PLATELETS Thousands/uL 236   < > 277   NEUTROS PCT %  --   --  90*   LYMPHS PCT %  --   --  4*   MONOS PCT %  --   --  5   EOS PCT %  --   --  0    < > = values in this interval not displayed  Results from last 7 days   Lab Units 05/08/22  0543 05/07/22  1205 05/07/22  1119   SODIUM mmol/L 133*   < > 134*   POTASSIUM mmol/L 4 7   < > 6 5*   CHLORIDE mmol/L 95*   < > 94*   CO2 mmol/L 30   < > 31   BUN mg/dL 41*   < > 56*   CREATININE mg/dL 4 66*   < > 6 62*   ANION GAP mmol/L 8   < > 9   CALCIUM mg/dL 7 9*   < > 8 1*   ALBUMIN g/dL  --   --  2 2*   TOTAL BILIRUBIN mg/dL  --   --  0 55   ALK PHOS U/L  --   --  93   ALT U/L  --   --  22   AST U/L  --   --  20   GLUCOSE RANDOM mg/dL 62*   < > 160*    < > = values in this interval not displayed  Results from last 7 days   Lab Units 05/07/22  1129   INR  1 17     Results from last 7 days   Lab Units 05/08/22  0802 05/08/22  0725 05/08/22  0213 05/07/22  2234 05/07/22  2018 05/07/22  1632   POC GLUCOSE mg/dl 156* 64* 90 82 291* 396*         Results from last 7 days   Lab Units 05/07/22  1129   LACTIC ACID mmol/L 0 9       Lines/Drains:  Invasive Devices  Report    Peripheral Intravenous Line            Peripheral IV 05/07/22 Distal;Left;Upper;Ventral (anterior) Arm <1 day    Peripheral IV 05/07/22 Left Wrist <1 day          Hemodialysis Catheter            HD Permanent Double Catheter 60 days                      Imaging: Reviewed radiology reports from this admission including: abdominal/pelvic CT    Recent Cultures (last 7 days):   Results from last 7 days   Lab Units 05/07/22  1129 05/07/22  1119   BLOOD CULTURE  Received in Microbiology Lab  Culture in Progress  Received in Microbiology Lab  Culture in Progress         Last 24 Hours Medication List:   Current Facility-Administered Medications   Medication Dose Route Frequency Provider Last Rate  atorvastatin  20 mg Oral Daily With Anamaria Kovacs MD      calcium acetate  667 mg Oral TID With Meals Radha Maravilla MD      calcium carbonate-vitamin D  1 tablet Oral Daily With Breakfast Radha Maravilla MD      calcium gluconate  1 g Intravenous Once Radha Maravilla MD Stopped (05/07/22 0288)    cefepime  1,000 mg Intravenous Q24H Radha Maravilla MD      finasteride  5 mg Oral Daily Radha Maravilla MD      insulin aspart protamine-insulin aspart  30 Units Subcutaneous BID AC Radha Maravilla MD      insulin lispro  1-5 Units Subcutaneous HS Radha Maravilla MD      insulin lispro  1-5 Units Subcutaneous 0200 Radha Maravilla MD      insulin lispro  1-6 Units Subcutaneous TID AC Radha Maravilla MD      tamsulosin  0 4 mg Oral Daily With Anamaria Kovacs MD          Today, Patient Was Seen By: Radha Maravilla MD    ** Please Note: "This note has been constructed using a voice recognition system  Therefore there may be syntax, spelling, and/or grammatical errors   Please call if you have any questions  "**

## 2022-05-08 NOTE — PROGRESS NOTES
[address] [facility] NOTE   Emilee Linton 64 y o  male MRN: [id number]  Unit/Bed#: [place] Encounter: [id number]  Reason for Consult: LORRAINE on HD    ASSESSMENT and PLAN:  1  LORRAINE on HD (POA):   · During previous admission - presented with SCr of 14 91 on 3/7/22  · Started on HD on March 10, 2022 and getting outpatient HD at [address] on TTS  · Etiology is biopsy proven ANCA vasculitis (PR3)  · Currently oligo-anuric and no signs of renal recovery  · Additionally, his renal biopsy showed mod to severe IFTA and he was taken off Cyclophosphamide in April 2022  I believe that he is likely ESRD but will need to confirm with his treating physicians tomorrow  · Completed HD on 5/7/22 for hyperkalemia  · Next HD is Tuesday, May 10, unless indication arises tomorrow       2  Access: R IJ permcath       3  Hyperkalemia  · Admit with K of 6 4 on 5/7/22  · S/p HD on 5/7/22  · Resolved       4  P3R ANCA vasculitis:  · PR3 34 1 on 3/8/22 and was up to 84 4 on 3/14/22  · Most recent is 12 2 from 4/6/22  · Renal biopsy on March 9: (1) Focal necrotizing and crescentic GN, pauci-immune, acute and chronic  (2) acute tubular injury  (3) 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 It may have been tapered faster once the decision was made to stop Cytoxan in April 2022  · I believe it is reasonable to keep him off of it at this time unless signs of adrenal insufficiency arise  · He should have been on Prednisone 15 mg daily at this time per Dr Hair Qureshi note on 3/19/22       5  CKD III  · Baseline creatinine 1 1 to 1 3 from June to Dec 2021       6  Sepsis in the setting of R flank pain, fever:  · CT with small to mod sized R perinephric subcapsular collection  · Infected hematoma from renal biopsy? · For IR drainage today  · Currently on Cefepime       7  Hypertension:   · BP acceptable  · Home meds: Enalapril, Nifedipine  · Unclear if taking home BP meds  · No ACEi or ARB for now  · If BP goes up, then would start Nifedipine  8  Anemia:   · Hgb below goal    · On Mircera 60 mcg q 2 weeks as an outpatient  · Trend Hgb    9  Mineral and bone disease:   · Continue renal diet  · Continue Phoslo for hyperphos       DISPOSITION:  · Stable from renal standpoint  · Next HD is Tuesday, May 10, unless indication for dialysis arises tomorrow  · Continue cefepime  · Okay to maintain off prednisone  · I believe that he is likely ESRD but will need to confirm with his treating physicians tomorrow  SUBJECTIVE / 24H INTERVAL HISTORY:  He reports that right flank pain is down to 2/10  No CP or SOB  Tolerated HD yesterday x 3 hours  No urine output  OBJECTIVE:  Current Weight: Weight - Scale: 75 9 kg (167 lb 5 3 oz)  Vitals:    05/07/22 1942 05/07/22 2236 05/08/22 0216 05/08/22 0554   BP: 146/67 151/59 142/96    BP Location:       Pulse: 82 78 85    Resp: 17 16 18    Temp: 97 8 °F (36 6 °C) 98 7 °F (37 1 °C) 98 8 °F (37 1 °C)    TempSrc:       SpO2: 96% 96% 97%    Weight:    75 9 kg (167 lb 5 3 oz)   Height:           Intake/Output Summary (Last 24 hours) at 5/8/2022 0716  Last data filed at 5/8/2022 0001  Gross per 24 hour   Intake 710 ml   Output 1000 ml   Net -290 ml     General: conscious, cooperative, no distress  Skin: dry  Eyes: pale conjunctivae  ENT: moist mucous membranes  Respiratory: equal chest expansion, clear breath sounds  Cardiovascular: distinct heart sounds, normal rate, regular rhythm, no rub  Abdomen: soft, non tender, non distended, normal bowel sounds  Extremities: no edema  Genitourinary: no ruvalcaba catheter  Neuro: awake, alert     Psych: appropriate affect    Medications:    Current Facility-Administered Medications:     atorvastatin (LIPITOR) tablet 20 mg, 20 mg, Oral, Daily With Thi Catalan MD, 20 mg at 05/07/22 2047    calcium acetate (PHOSLO) capsule 667 mg, 667 mg, Oral, TID With Meals, Paulina Wolf MD    calcium carbonate-vitamin D (OSCAL-D) 500 mg-200 units per tablet 1 tablet, 1 tablet, Oral, Daily With Breakfast, Paulina Wolf MD    calcium gluconate 1 g in sodium chloride 0 9% 50 mL (premix), 1 g, Intravenous, Once, Paulina Wolf MD, Stopped at 05/07/22 1428    cefepime (MAXIPIME) IVPB (premix in dextrose) 1,000 mg 50 mL, 1,000 mg, Intravenous, Q24H, Paulina Wolf MD    finasteride (PROSCAR) tablet 5 mg, 5 mg, Oral, Daily, Paulina Wolf MD    insulin aspart protamine-insulin aspart (NovoLOG 70/30) 100 units/mL subcutaneous injection 30 Units, 30 Units, Subcutaneous, BID AC, Paulina Wolf MD    insulin lispro (HumaLOG) 100 units/mL subcutaneous injection 1-5 Units, 1-5 Units, Subcutaneous, HS, Paulina Wolf MD    insulin lispro (HumaLOG) 100 units/mL subcutaneous injection 1-5 Units, 1-5 Units, Subcutaneous, 0200, Paulina Wolf MD    insulin lispro (HumaLOG) 100 units/mL subcutaneous injection 1-6 Units, 1-6 Units, Subcutaneous, TID AC, 6 Units at 05/07/22 1652 **AND** Fingerstick Glucose (POCT), , , TID AC, Paulina Wolf MD    tamsulosin [facility]) capsule 0 4 mg, 0 4 mg, Oral, Daily With Thi Catalan MD, 0 4 mg at 05/07/22 2047    Laboratory Results:  Results from last 7 days   Lab Units 05/08/22  0543 05/07/22  1205 05/07/22  1129 05/07/22  1119   WBC Thousand/uL 8 47  --  15 46*  --    HEMOGLOBIN g/dL 7 5*  --  8 4*  --    HEMATOCRIT % 23 7*  --  26 5*  --    PLATELETS Thousands/uL 236  --  277  --    POTASSIUM mmol/L 4 7 6 4*  --  6 5*   CHLORIDE mmol/L 95* 92*  --  94*   CO2 mmol/L 30 31  --  31   BUN mg/dL 41* 56*  --  56*   CREATININE mg/dL 4 66* 6 77*  --  6 62*   CALCIUM mg/dL 7 9* 8 1*  --  8 1*   MAGNESIUM mg/dL  --   --   --  2 2   PHOSPHORUS mg/dL  --   --   --  2 8

## 2022-05-08 NOTE — PROGRESS NOTES
Patient arrived to IR for aspiration  /68   Pulse 85   Temp 98 8 °F (37 1 °C)   Resp 18   Ht 5' 8" (1 727 m)   Wt 75 9 kg (167 lb 5 3 oz)   SpO2 97%   BMI 25 44 kg/m²     The procedure and risks were discussed with the patient  All questions were answered  Informed consent was obtained

## 2022-05-08 NOTE — ASSESSMENT & PLAN NOTE
Presented with right flank pain for 1 day and fever  Noted to have leukocytosis, fever  CT scan revealed new small to moderate size right perinephric subscapular fluid collection likely complex in nature, differential diagnosis included perinephric hematoma, urinoma less likely but possible abscess  History of UVJ calculus in 3/22 which appeared to have passed  Status post attempted IR guided drainage 5/8-unsuccessful despite multiple attempted aspiration, likely hematoma  Likely secondary to passed nephrolithiasis with possible urinary tract infection  Hemodynamically stable, nontoxic  Remains afebrile  Leukocytosis downtrending  Seen by infectious disease, input appreciated  Prior urine culture with growth of sensitive E coli  · Initially treated with IV cefepime-subsequently transition to cefazolin, will change to cephalexin on discharge to continue 500 mg b i d   Through 05/13/2022  · Follow-up blood cultures-1/2 Staphylococcus species likely contaminant  · Recommend to monitor symptoms

## 2022-05-08 NOTE — ASSESSMENT & PLAN NOTE
Missed HD on day of admission due to hospitalization  Improved post HD  · Nephrology input appreciated, status post HD 5/7  · Renal diet

## 2022-05-08 NOTE — PROGRESS NOTES
Progress Note - Urology      Patient: Sourav Banks   : 1945 Sex: male   MRN: [id number]     CSN: [id number]  Unit/Bed#: [address] [place] [address]     SUBJECTIVE:   Afebrile  White count trending down  Slight right flank discomfort  IR unsuccessful in aspiration more than likely hematoma       Objective   Vitals: /65   Pulse 81   Temp 98 8 °F (37 1 °C)   Resp 16   Ht 5' 8" (1 727 m)   Wt 75 9 kg (167 lb 5 3 oz)   SpO2 99%   BMI 25 44 kg/m²     I/O last 24 hours: In: 710 [P O :200;  I V :510]  Out: 1000 [Other:1000]      Physical Exam:   General Alert awake   Normocephalic atraumatic PERRLA  Lungs clear bilaterally  Cardiac normal S1 normal S2  Abdomen soft, flank pain  Extremities no edema      Lab Results: CBC:   Lab Results   Component Value Date    WBC 8 47 2022    HGB 7 5 (L) 2022    HCT 23 7 (L) 2022    MCV 95 2022     2022    MCH 30 1 2022    MCHC 31 6 2022    RDW 14 8 2022    MPV 8 9 2022    NRBC 0 2022     CMP:   Lab Results   Component Value Date     2017    CL 95 (L) 2022     2017    CO2 30 2022    CO2 22 2017    BUN 41 (H) 2022    BUN 16 2017    CREATININE 4 66 (H) 2022    CREATININE 1 02 2017    GLUCOSE 106 (H) 2017    CALCIUM 7 9 (L) 2022    CALCIUM 9 5 2017    AST 20 2022    AST 19 2017    ALT 22 2022    ALT 18 2017    ALKPHOS 93 2022    ALKPHOS 77 2017    PROT 7 2 2017    BILITOT 0 3 2017    EGFR 11 2022     Urinalysis:   Lab Results   Component Value Date    COLORU Light Yellow 2022    COLORU Yellow 2017    CLARITYU Cloudy 2022    CLARITYU Transparent 2017    SPECGRAV 1 025 2022    SPECGRAV 1 020 2017    PHUR 7 5 2022    PHUR 5 2017    LEUKOCYTESUR Negative 2022    LEUKOCYTESUR neg 2017    NITRITE Negative 2022    NITRITE neg 11/01/2017    PROTEINUA neg 11/01/2017    GLUCOSEU Negative 03/28/2022    KETONESU Negative 03/28/2022    KETONESU neg 11/01/2017    BILIRUBINUR Negative 03/28/2022    BILIRUBINUR neg 11/01/2017    BLOODU Large (A) 03/28/2022    BLOODU 50 11/01/2017     Urine Culture:   Lab Results   Component Value Date    URINECX 3161-4202 cfu/ml Escherichia coli (A) 03/07/2022     PSA:   Lab Results   Component Value Date    PSA 3 2 07/20/2021    PSA 2 8 12/29/2020    PSA 2 1 12/12/2019    PSA 1 7 11/14/2018    PSA 2 6 07/24/2017    PSA 1 7 04/04/2016         Assessment/ Plan:  Right hematoma  Mild right flank pain  Past right 5 mm stone  No further intervention          Suni Manriquez MD

## 2022-05-08 NOTE — ASSESSMENT & PLAN NOTE
Stable  · Hold enalapril given hyperkalemia  · Reports that he is not taking nifedipine  · Monitor blood pressure for now

## 2022-05-08 NOTE — PLAN OF CARE
Problem: Potential for Falls  Goal: Patient will remain free of falls  Description: INTERVENTIONS:  - Educate patient/family on patient safety including physical limitations  - Instruct patient to call for assistance with activity   - Consult OT/PT to assist with strengthening/mobility   - Keep Call bell within reach  - Keep bed low and locked with side rails adjusted as appropriate  - Keep care items and personal belongings within reach  - Initiate and maintain comfort rounds  - Make Fall Risk Sign visible to staff  - Offer Toileting every 2 Hours, in advance of need  - Initiate/Maintain bed alarm  - Obtain necessary fall risk management equipment: yes  - Apply yellow socks and bracelet for high fall risk patients  - Consider moving patient to room near nurses station  Outcome: Progressing     Problem: GENITOURINARY - ADULT  Goal: Maintains or returns to baseline urinary function  Description: INTERVENTIONS:  - Assess urinary function  - Encourage oral fluids to ensure adequate hydration if ordered  - Administer IV fluids as ordered to ensure adequate hydration  - Administer ordered medications as needed  - Offer frequent toileting  - Follow urinary retention protocol if ordered  Outcome: Progressing  Goal: Absence of urinary retention  Description: INTERVENTIONS:  - Assess patients ability to void and empty bladder  - Monitor I/O  - Bladder scan as needed  - Discuss with physician/AP medications to alleviate retention as needed  - Discuss catheterization for long term situations as appropriate  Outcome: Progressing  Goal: Urinary catheter remains patent  Description: INTERVENTIONS:  - Assess patency of urinary catheter  - If patient has a chronic ruvalcaba, consider changing catheter if non-functioning  - Follow guidelines for intermittent irrigation of non-functioning urinary catheter  Outcome: Progressing     Problem: METABOLIC, FLUID AND ELECTROLYTES - ADULT  Goal: Electrolytes maintained within normal limits  Description: INTERVENTIONS:  - Monitor labs and assess patient for signs and symptoms of electrolyte imbalances  - Administer electrolyte replacement as ordered  - Monitor response to electrolyte replacements, including repeat lab results as appropriate  - Instruct patient on fluid and nutrition as appropriate  Outcome: Progressing  Goal: Fluid balance maintained  Description: INTERVENTIONS:  - Monitor labs   - Monitor I/O and WT  - Instruct patient on fluid and nutrition as appropriate  - Assess for signs & symptoms of volume excess or deficit  Outcome: Progressing  Goal: Glucose maintained within target range  Description: INTERVENTIONS:  - Monitor Blood Glucose as ordered  - Assess for signs and symptoms of hyperglycemia and hypoglycemia  - Administer ordered medications to maintain glucose within target range  - Assess nutritional intake and initiate nutrition service referral as needed  Outcome: Progressing

## 2022-05-08 NOTE — ASSESSMENT & PLAN NOTE
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

## 2022-05-09 VITALS
DIASTOLIC BLOOD PRESSURE: 73 MMHG | HEART RATE: 78 BPM | RESPIRATION RATE: 17 BRPM | HEIGHT: 68 IN | SYSTOLIC BLOOD PRESSURE: 159 MMHG | BODY MASS INDEX: 25.36 KG/M2 | WEIGHT: 167.33 LBS | TEMPERATURE: 98 F | OXYGEN SATURATION: 98 %

## 2022-05-09 PROBLEM — R78.81 BACTEREMIA: Status: ACTIVE | Noted: 2022-05-09

## 2022-05-09 PROBLEM — R78.81 BACTEREMIA: Status: RESOLVED | Noted: 2022-05-09 | Resolved: 2022-05-09

## 2022-05-09 PROBLEM — A41.9 SEPSIS (HCC): Status: RESOLVED | Noted: 2022-05-07 | Resolved: 2022-05-09

## 2022-05-09 LAB
ANION GAP SERPL CALCULATED.3IONS-SCNC: 11 MMOL/L (ref 4–13)
BASOPHILS # BLD AUTO: 0.03 THOUSANDS/ΜL (ref 0–0.1)
BASOPHILS NFR BLD AUTO: 0 % (ref 0–1)
BUN SERPL-MCNC: 62 MG/DL (ref 5–25)
CALCIUM SERPL-MCNC: 7.8 MG/DL (ref 8.3–10.1)
CHLORIDE SERPL-SCNC: 92 MMOL/L (ref 100–108)
CO2 SERPL-SCNC: 26 MMOL/L (ref 21–32)
CREAT SERPL-MCNC: 6.44 MG/DL (ref 0.6–1.3)
EOSINOPHIL # BLD AUTO: 0.05 THOUSAND/ΜL (ref 0–0.61)
EOSINOPHIL NFR BLD AUTO: 1 % (ref 0–6)
ERYTHROCYTE [DISTWIDTH] IN BLOOD BY AUTOMATED COUNT: 14.8 % (ref 11.6–15.1)
GFR SERPL CREATININE-BSD FRML MDRD: 7 ML/MIN/1.73SQ M
GLUCOSE SERPL-MCNC: 106 MG/DL (ref 65–140)
GLUCOSE SERPL-MCNC: 117 MG/DL (ref 65–140)
GLUCOSE SERPL-MCNC: 130 MG/DL (ref 65–140)
GLUCOSE SERPL-MCNC: 157 MG/DL (ref 65–140)
GLUCOSE SERPL-MCNC: 197 MG/DL (ref 65–140)
HCT VFR BLD AUTO: 23.8 % (ref 36.5–49.3)
HGB BLD-MCNC: 7.7 G/DL (ref 12–17)
IMM GRANULOCYTES # BLD AUTO: 0.08 THOUSAND/UL (ref 0–0.2)
IMM GRANULOCYTES NFR BLD AUTO: 1 % (ref 0–2)
LYMPHOCYTES # BLD AUTO: 0.83 THOUSANDS/ΜL (ref 0.6–4.47)
LYMPHOCYTES NFR BLD AUTO: 8 % (ref 14–44)
MCH RBC QN AUTO: 30.1 PG (ref 26.8–34.3)
MCHC RBC AUTO-ENTMCNC: 32.4 G/DL (ref 31.4–37.4)
MCV RBC AUTO: 93 FL (ref 82–98)
MONOCYTES # BLD AUTO: 0.68 THOUSAND/ΜL (ref 0.17–1.22)
MONOCYTES NFR BLD AUTO: 7 % (ref 4–12)
MRSA NOSE QL CULT: NORMAL
NEUTROPHILS # BLD AUTO: 8.26 THOUSANDS/ΜL (ref 1.85–7.62)
NEUTS SEG NFR BLD AUTO: 83 % (ref 43–75)
NRBC BLD AUTO-RTO: 0 /100 WBCS
PLATELET # BLD AUTO: 268 THOUSANDS/UL (ref 149–390)
PMV BLD AUTO: 9.1 FL (ref 8.9–12.7)
POTASSIUM SERPL-SCNC: 5.5 MMOL/L (ref 3.5–5.3)
RBC # BLD AUTO: 2.56 MILLION/UL (ref 3.88–5.62)
SODIUM SERPL-SCNC: 129 MMOL/L (ref 136–145)
WBC # BLD AUTO: 9.93 THOUSAND/UL (ref 4.31–10.16)

## 2022-05-09 PROCEDURE — 85025 COMPLETE CBC W/AUTO DIFF WBC: CPT | Performed by: INTERNAL MEDICINE

## 2022-05-09 PROCEDURE — 99232 SBSQ HOSP IP/OBS MODERATE 35: CPT | Performed by: INTERNAL MEDICINE

## 2022-05-09 PROCEDURE — 82948 REAGENT STRIP/BLOOD GLUCOSE: CPT

## 2022-05-09 PROCEDURE — 99223 1ST HOSP IP/OBS HIGH 75: CPT | Performed by: INTERNAL MEDICINE

## 2022-05-09 PROCEDURE — 80048 BASIC METABOLIC PNL TOTAL CA: CPT | Performed by: INTERNAL MEDICINE

## 2022-05-09 PROCEDURE — 99239 HOSP IP/OBS DSCHRG MGMT >30: CPT | Performed by: INTERNAL MEDICINE

## 2022-05-09 RX ORDER — ACETAMINOPHEN 325 MG/1
650 TABLET ORAL EVERY 6 HOURS PRN
Refills: 0
Start: 2022-05-09

## 2022-05-09 RX ORDER — CEFAZOLIN SODIUM 1 G/50ML
1000 SOLUTION INTRAVENOUS ONCE
Status: COMPLETED | OUTPATIENT
Start: 2022-05-09 | End: 2022-05-09

## 2022-05-09 RX ORDER — CEPHALEXIN 500 MG/1
500 CAPSULE ORAL EVERY 12 HOURS SCHEDULED
Qty: 8 CAPSULE | Refills: 0 | Status: SHIPPED | OUTPATIENT
Start: 2022-05-10 | End: 2022-05-14

## 2022-05-09 RX ORDER — CEPHALEXIN 500 MG/1
500 CAPSULE ORAL EVERY 12 HOURS SCHEDULED
Status: DISCONTINUED | OUTPATIENT
Start: 2022-05-10 | End: 2022-05-09 | Stop reason: HOSPADM

## 2022-05-09 RX ADMIN — TAMSULOSIN HYDROCHLORIDE 0.4 MG: 0.4 CAPSULE ORAL at 16:01

## 2022-05-09 RX ADMIN — INSULIN ASPART 30 UNITS: 100 INJECTION, SUSPENSION SUBCUTANEOUS at 08:30

## 2022-05-09 RX ADMIN — CALCIUM ACETATE 667 MG: 667 CAPSULE ORAL at 16:01

## 2022-05-09 RX ADMIN — Medication 1 TABLET: at 08:30

## 2022-05-09 RX ADMIN — INSULIN LISPRO 1 UNITS: 100 INJECTION, SOLUTION INTRAVENOUS; SUBCUTANEOUS at 08:30

## 2022-05-09 RX ADMIN — INSULIN LISPRO 2 UNITS: 100 INJECTION, SOLUTION INTRAVENOUS; SUBCUTANEOUS at 16:44

## 2022-05-09 RX ADMIN — INSULIN ASPART 30 UNITS: 100 INJECTION, SUSPENSION SUBCUTANEOUS at 16:45

## 2022-05-09 RX ADMIN — FINASTERIDE 5 MG: 5 TABLET, FILM COATED ORAL at 08:30

## 2022-05-09 RX ADMIN — CALCIUM ACETATE 667 MG: 667 CAPSULE ORAL at 08:30

## 2022-05-09 RX ADMIN — ATORVASTATIN CALCIUM 20 MG: 20 TABLET, FILM COATED ORAL at 16:01

## 2022-05-09 RX ADMIN — CEFAZOLIN SODIUM 1000 MG: 1 SOLUTION INTRAVENOUS at 15:20

## 2022-05-09 NOTE — PLAN OF CARE
Problem: Potential for Falls  Goal: Patient will remain free of falls  Description: INTERVENTIONS:  - Educate patient/family on patient safety including physical limitations  - Instruct patient to call for assistance with activity   - Consult OT/PT to assist with strengthening/mobility   - Keep Call bell within reach  - Keep bed low and locked with side rails adjusted as appropriate  - Keep care items and personal belongings within reach  - Initiate and maintain comfort rounds  - Make Fall Risk Sign visible to staff  - Offer Toileting every 2 Hours, in advance of need  - Initiate/Maintain bed alarm  - Obtain necessary fall risk management equipment: yes  - Apply yellow socks and bracelet for high fall risk patients  - Consider moving patient to room near nurses station  5/9/2022 1654 by Abdelrahman Toney RN  Outcome: Adequate for Discharge  5/9/2022 0948 by Abdelrahman Toney RN  Outcome: Progressing     Problem: GENITOURINARY - ADULT  Goal: Maintains or returns to baseline urinary function  Description: INTERVENTIONS:  - Assess urinary function  - Encourage oral fluids to ensure adequate hydration if ordered  - Administer IV fluids as ordered to ensure adequate hydration  - Administer ordered medications as needed  - Offer frequent toileting  - Follow urinary retention protocol if ordered  5/9/2022 1654 by Abdelrahman Toney RN  Outcome: Adequate for Discharge  5/9/2022 2390 by Abdelrahman Toney RN  Outcome: Progressing  Goal: Absence of urinary retention  Description: INTERVENTIONS:  - Assess patients ability to void and empty bladder  - Monitor I/O  - Bladder scan as needed  - Discuss with physician/AP medications to alleviate retention as needed  - Discuss catheterization for long term situations as appropriate  5/9/2022 1654 by Abdelrahman Toney RN  Outcome: Adequate for Discharge  5/9/2022 0948 by Abdelrahman Toney RN  Outcome: Progressing  Goal: Urinary catheter remains patent  Description: INTERVENTIONS:  - Assess patency of urinary catheter  - If patient has a chronic ruvalcaba, consider changing catheter if non-functioning  - Follow guidelines for intermittent irrigation of non-functioning urinary catheter  5/9/2022 1654 by Bisi Dawson RN  Outcome: Adequate for Discharge  5/9/2022 0948 by Bisi Dawson RN  Outcome: Progressing     Problem: METABOLIC, FLUID AND ELECTROLYTES - ADULT  Goal: Electrolytes maintained within normal limits  Description: INTERVENTIONS:  - Monitor labs and assess patient for signs and symptoms of electrolyte imbalances  - Administer electrolyte replacement as ordered  - Monitor response to electrolyte replacements, including repeat lab results as appropriate  - Instruct patient on fluid and nutrition as appropriate  5/9/2022 1654 by Bisi Dawson RN  Outcome: Adequate for Discharge  5/9/2022 3656 by Bisi Dawson RN  Outcome: Progressing  Goal: Fluid balance maintained  Description: INTERVENTIONS:  - Monitor labs   - Monitor I/O and WT  - Instruct patient on fluid and nutrition as appropriate  - Assess for signs & symptoms of volume excess or deficit  5/9/2022 1654 by Bisi Dawson RN  Outcome: Adequate for Discharge  5/9/2022 [id number] by Bisi Dawson RN  Outcome: Progressing  Goal: Glucose maintained within target range  Description: INTERVENTIONS:  - Monitor Blood Glucose as ordered  - Assess for signs and symptoms of hyperglycemia and hypoglycemia  - Administer ordered medications to maintain glucose within target range  - Assess nutritional intake and initiate nutrition service referral as needed  5/9/2022 1654 by Bisi Dawson RN  Outcome: Adequate for Discharge  5/9/2022 0948 by iBsi Dawson RN  Outcome: Progressing

## 2022-05-09 NOTE — DISCHARGE INSTRUCTIONS
Potassium Content of Foods List   WHAT YOU NEED TO KNOW:   Potassium is a mineral that is found in most foods  Potassium helps to balance fluids and minerals in your body  It also helps your body maintain a normal blood pressure  Potassium helps your muscles contract and your nerves function normally  DISCHARGE INSTRUCTIONS:   Why you may need to change the amount of potassium you eat:   · You may need more potassium  if you have hypokalemia (low potassium levels) or high blood pressure  You may also need more potassium if you are taking diuretics  Diuretics and certain medicines cause your body to lose potassium  · You may need less potassium  in your diet if you have hyperkalemia (high potassium levels) or kidney disease  Potassium content of fruit:  The amount of potassium in milligrams (mg) contained in each fruit or serving of fruit is listed beside the item  · High-potassium foods (more than 200 mg per serving):      ? 1 medium banana (425)    ? ½ of a papaya (390)    ? ½ cup of prune juice (370)    ? ¼ cup of raisins (270)    ? 1 medium andrews (325) or kiwi (240)    ? 1 small orange (240) or ½ cup of orange juice (235)    ? ½ cup of cubed cantaloupe (215) or diced honeydew melon (200)    ? 1 medium pear (200)    · Medium-potassium foods (50 to 200 mg per serving):      ? 1 medium peach (185)    ? 1 small apple or ½ cup of apple juice (150)    ? ½ cup of peaches canned in juice (120)    ? ½ cup of canned pineapple (100)    ? ½ cup of fresh, sliced strawberries (403)    ? ½ cup of watermelon (85)    · Low-potassium foods (less than 50 mg per serving):      ? ½ cup of cranberries (45) or cranberry juice cocktail (20)    ? ½ cup of nectar of papaya, andrews, or pear (35)    Potassium content of vegetables:   · High-potassium foods (more than 200 mg per serving):      ? 1 medium baked potato, with skin (925)    ? 1 baked medium sweet potato, with skin (450)    ?  ½ cup of tomato or vegetable juice (275), or 1 medium raw tomato (290)    ? ½ cup of mushrooms (280)    ? ½ cup of fresh brussels sprouts (250)    ? ½ cup of cooked zucchini (220) or winter squash (250)    ? ¼ of a medium avocado (245)    ? ½ cup of broccoli (230)    · Medium-potassium foods (50 to 200 mg per serving):      ? ½ cup of corn (195)    ? ½ cup of fresh or cooked carrots (180)    ? ½ cup of fresh cauliflower (150)    ? ½ cup of asparagus (155)    ? ½ cup of canned peas (90)     ? 1 cup of lettuce, all types (100)    ? ½ cup of fresh green beans (90)    ? ½ cup of frozen green beans (85)    ? ½ cup of cucumber (80)    Potassium content of protein foods:   · High-potassium foods (more than 200 mg per serving):      ? ½ cup of cooked sebastian beans (400) or lentils (365)    ? 1 cup of soy milk (300)    ? 3 ounces of baked or broiled salmon (319)    ? 3 ounces of roasted turkey, dark meat (250)    ? ¼ cup of sunflower seeds (241)    ? 3 ounces of cooked lean beef (224)    ? 2 tablespoons of smooth peanut butter (210)    · Medium-potassium foods (50 to 200 mg per serving):      ? 1 ounce of salted peanuts, almonds, or cashews (200)    ? 1 large egg (60 mg)    Potassium content of dairy foods:   · High-potassium foods (more than 200 mg per serving):      ? 6 ounces of yogurt (260 to 435)    ? 1 cup of nonfat, low-fat, or whole milk (350 to 380)    · Medium-potassium foods (50 to 200 mg per serving):      ? ½ cup of ricotta cheese (154)    ? ½ cup of vanilla ice cream (131)    ?  ½ cup of low-fat (2%) cottage cheese (110)    · Low-potassium foods (less than 50 mg per serving):      ? 1 ounce of cheese (20 to 30)      Potassium content of grains:   · 1 slice of white bread (30)    · ½ cup of white or brown rice (50)    · ½ cup of spaghetti or macaroni (30)    · 1 flour or corn tortilla (50)    · 1 four-inch waffle (50)    Potassium content of other foods:   · 1 tablespoon of molasses (295)    · 1½ ounces of chocolate (165)    · Some salt substitutes may contain a high amount of potassium  Check the food label to find the amount of potassium it contains  © Copyright [employer] 2022 Information is for End User's use only and may not be sold, redistributed or otherwise used for commercial purposes  All illustrations and images included in CareNotes® are the copyrighted property of [employer]  or Toni Carrera  The above information is an  only  It is not intended as medical advice for individual conditions or treatments  Talk to your doctor, nurse or pharmacist before following any medical regimen to see if it is safe and effective for you  Cephalexin (By mouth)   Cephalexin ([id number])  Treats infections  This medicine is a cephalosporin antibiotic  Brand Name(s): Keflex   There may be other brand names for this medicine  When This Medicine Should Not Be Used: This medicine is not right for everyone  Do not use this medicine if you had an allergic reaction to cephalexin or another cephalosporin medicine  How to Use This Medicine:   Capsule, Tablet, Tablet for Suspension, Liquid  · Your doctor will tell you how much medicine to use  Do not use more than directed  · Read and follow the patient instructions that come with this medicine  Talk to your doctor or pharmacist if you have any questions  · You may take your medicine with food or milk to avoid stomach upset  · Oral liquid: Shake well just before use  Measure the oral liquid medicine with a marked measuring spoon, oral syringe, or medicine cup  · Take all of the medicine in your prescription to clear up your infection, even if you feel better after the first few doses  · Missed dose: Take a dose as soon as you remember  If it is almost time for your next dose, wait until then and take a regular dose  Do not take extra medicine to make up for a missed dose  · Capsule or tablet: Store at room temperature away from heat, moisture, and direct light    · Oral liquid: Store in the refrigerator for 14 days  After 14 days, throw away any unused medicine  Do not freeze  Drugs and Foods to Avoid:   Ask your doctor or pharmacist before using any other medicine, including over-the-counter medicines, vitamins, and herbal products  · Some medicines and foods can affect how cephalexin works  Tell your doctor if you are also using  ? Metformin  ? Probenecid  Warnings While Using This Medicine:   · Tell your doctor if you are pregnant or breastfeeding, or if you have kidney disease, liver disease, or a history of digestive problems, such as colitis  Tell your doctor if you are allergic to penicillin  · This medicine can cause diarrhea  Call your doctor if the diarrhea becomes severe, does not stop, or is bloody  Do not take any medicine to stop diarrhea until you have talked to your doctor  Diarrhea can occur 2 months or more after you stop taking this medicine  · Tell any doctor or dentist who treats you that you are using this medicine  This medicine may affect certain medical test results  · Call your doctor if your symptoms do not improve or if they get worse  · Keep all medicine out of the reach of children  Never share your medicine with anyone  Possible Side Effects While Using This Medicine:   Call your doctor right away if you notice any of these side effects:  · Allergic reaction: Itching or hives, swelling in your face or hands, swelling or tingling in your mouth or throat, chest tightness, trouble breathing  · Blistering, peeling, red skin rash  · Severe diarrhea, especially if bloody or ongoing  · Severe stomach pain, vomiting  If you notice these less serious side effects, talk with your doctor:   · Mild diarrhea or nausea  If you notice other side effects that you think are caused by this medicine, tell your doctor  Call your doctor for medical advice about side effects   You may report side effects to FDA at 1-243-FDA-8342    © Copyright [employer] 2022 Information is for End User's use only and may not be sold, redistributed or otherwise used for commercial purposes  The above information is an  only  It is not intended as medical advice for individual conditions or treatments  Talk to your doctor, nurse or pharmacist before following any medical regimen to see if it is safe and effective for you

## 2022-05-09 NOTE — DISCHARGE SUMMARY
Discharge Summary - [facility]    Patient Information: Ezequiel Carty 64 y o  male MRN: [id number]  Unit/Bed#: [address] [place] [address] Encounter: [id number]    Discharging Physician / Practitioner: Killian Johns MD  PCP: Caroline Terrazas MD  Admission Date: 5/7/2022  Discharge Date: 05/09/22    Reason for Admission: Back Pain (Right lower back pain started last night, worse at HD center  HD T-T-S, today's HD not done, last one on Thursday )      Discharge Diagnoses:     Principal Problem (Resolved):    Sepsis ([facility] )  Active Problems:    Type 2 diabetes mellitus with microalbuminuria, with long-term current use of insulin (HCC)    Acute kidney injury ([facility] )    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[facility])    Benign essential hypertension    Anemia due to chronic kidney disease, on chronic dialysis ([facility] )    Benign prostatic hyperplasia with lower urinary tract symptoms    Hyperlipidemia  Resolved Problems:    Hyperkalemia    Bacteremia        * Sepsis (HCC)-resolved as of 5/9/2022  Assessment & Plan  Presented with right flank pain for 1 day and fever  Noted to have leukocytosis, fever  CT scan revealed new small to moderate size right perinephric subscapular fluid collection likely complex in nature, differential diagnosis included perinephric hematoma, urinoma less likely but possible abscess  History of UVJ calculus in 3/22 which appeared to have passed  Status post attempted IR guided drainage 5/8-unsuccessful despite multiple attempted aspiration, likely hematoma  Likely secondary to passed nephrolithiasis with possible urinary tract infection  Hemodynamically stable, nontoxic  Remains afebrile  Leukocytosis downtrending  Seen by infectious disease, input appreciated  Prior urine culture with growth of sensitive E coli  · Initially treated with IV cefepime-subsequently transition to cefazolin, will change to cephalexin on discharge to continue 500 mg b i d   Through 05/13/2022  · Follow-up blood cultures-1/2 Staphylococcus species likely contaminant  · Recommend to monitor symptoms    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[address] )  Assessment & Plan  Status post kidney biopsy on 03/09-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  Previously treated with Cytoxan  Patient was also prescribed prednisone taper but he has not been taking it  · Follow-up nephrology recommendations-input appreciated  Monitor of prednisone  · Follow-up with primary Nephrology after discharge    Acute kidney injury [facility])  Assessment & Plan  History of CKD stage 3 with prior baseline creatinine 1 1-1 3  Recent hospitalization in 3/22, presented with creatinine of 14  Diagnosed to have ANCA vasculitis  Required initiation of HD during hospitalization, currently on HD TTS  Nephrology following, input appreciated  BMP with mild hypokalemia and hyponatremia  · Low-potassium diet  · Continue HD as per schedule, continue outpatient HD after discharge starting tomorrow  · Follow-up with primary Nephrology for further care    Type 2 diabetes mellitus with microalbuminuria, with long-term current use of insulin [facility])  Assessment & Plan  Lab Results   Component Value Date    HGBA1C 5 1 05/08/2022       Recent Labs     05/08/22  2121 05/09/22  0205 05/09/22  0803 05/09/22  1057   POCGLU 85 106 157* 130       Blood Sugar Average: Last 72 hrs:  (P) 958 [id number]   Tightly controlled  Reports he takes insulin 75/25, about 35 units in the a m  And 40 units p m    Will decrease to 25 units with breakfast and dinner  Follow-up with PCP    Hyperkalemia-resolved as of 5/8/2022  Assessment & Plan  Missed HD on day of admission due to hospitalization  Improved post HD  · Nephrology input appreciated, status post HD 5/7  · Renal diet    Anemia due to chronic kidney disease, on chronic dialysis [facility])  Assessment & Plan  Below goal, remains stable after attempted aspiration  No evidence of overt bleeding  On Micera    Benign essential hypertension  Assessment & Plan  Stable  · Hold enalapril given hyperkalemia  · Reports that he is not taking nifedipine  · Monitor blood pressure for now    Hyperlipidemia  Assessment & Plan  On statin    Benign prostatic hyperplasia with lower urinary tract symptoms  Assessment & Plan  On Avodart and Flomax    Bacteremia-resolved as of 5/9/2022  Assessment & Plan  Admission blood culture 1/2 positive for Staphylococcus, coag-negative  Likely contaminant  No further intervention needed      Consultations During Hospital Stay:  [facility] CONSULT TO IR  IP CONSULT TO INFECTIOUS DISEASES    Procedures Performed:     · Attempted IR guided aspiration of right perinephric collection    Significant Findings:     · Refer to hospital course and above listed diagnosis related plan for details    Imaging while in hospital:    CT abdomen pelvis wo contrast    Result Date: 5/7/2022  Narrative: CT ABDOMEN AND PELVIS WITHOUT IV CONTRAST INDICATION:   Flank pain, kidney stone suspected Right-sided flank pain, fevers history of stones, mild right lower quadrant pain  COMPARISON:  CT of abdomen and pelvis dated 3/8/2022 TECHNIQUE:  CT examination of the abdomen and pelvis was performed without intravenous contrast  Axial, sagittal, and coronal 2D reformatted images were created from the source data and submitted for interpretation  Radiation dose length product (DLP) for this visit:  509 97 mGy-cm   This examination, like all CT scans performed in the [facility], was performed utilizing techniques to minimize radiation dose exposure, including the use of iterative  reconstruction and automated exposure control  Enteric contrast was administered  FINDINGS: ABDOMEN LOWER CHEST:  Decreasing in size small bilateral pleural effusions with associated atelectasis  Visualized heart is normal in size without pericardial effusion   Atherosclerotic vascular calcifications including those of the coronary arteries are noted  LIVER/BILIARY TREE:  Unremarkable  GALLBLADDER:  There are gallstone(s) within the gallbladder, without pericholecystic inflammatory changes  SPLEEN:  Unremarkable  PANCREAS:  Unremarkable  ADRENAL GLANDS:  Unremarkable  KIDNEYS/URETERS:  New small to moderate sized right perinephric subcapsular fluid collection (for example image 41 of series 2)  It is difficult to determine if the fluid collection is simple or complex in nature but where it measured the fluid appears complex  Differential diagnosis includes perinephric hematoma, urinoma, and less likely but not excluded abscess  There is mild resulting mass effect upon the right kidney  There is no hydroureteronephrosis involving either kidney  Previously noted right UVJ calculus has passed into the urinary bladder  STOMACH AND BOWEL:  A few sigmoid diverticula  There is no dilatation of the bowel suggest bowel obstruction  APPENDIX:  No findings to suggest appendicitis  ABDOMINOPELVIC CAVITY:  No ascites  No pneumoperitoneum  No lymphadenopathy  VESSELS:  Atherosclerotic changes are present  No evidence of aneurysm  PELVIS REPRODUCTIVE ORGANS:  The prostate is enlarged  URINARY BLADDER:  On image 78 of series 2, there is an 8 mm dependent bladder calculus likely reflecting the previously noted obstructing right UVJ calculus which has passed into the urinary bladder  ABDOMINAL WALL/INGUINAL REGIONS:  Fat-containing bilateral inguinal hernias  OSSEOUS STRUCTURES:  Degenerative changes are noted involving the spine  Impression: 1  New small to moderate-sized right perinephric subcapsular fluid collection  It is difficult to determine if the fluid is simple or complex in nature but complex fluid is favored  Differential diagnosis includes perinephric hematoma, urinoma, and less likely but not excluded abscess  There is mild resulting mass effect upon the right kidney  Consider urologic consultation  Correlation and continued follow-up to resolution is recommended  2   Previously noted right UVJ calculus seen on CT dated 3/8/2022 has passed into the urinary bladder  There is no evidence for obstructive uropathy on the current exam  Please see above for details and multiple additional findings  The study was marked in [facility] for immediate notification  Workstation performed: [id number]     IR aspiration only    Result Date: 5/8/2022  Narrative: INDICATION: Right perinephric fluid collection  PROCEDURE: 1  CT-guided aspiration     Impression: Unsuccessful aspiration of right perinephric collection  This likely represents organized hematoma  _______________________________________________________________ COMPARISON: CT abdomen and pelvis 5/7/2022 PROCEDURE DETAILS: Operators: Dr Conchita Denson Anesthesia: Local anesthesia  Medications: 1% lidocaine, fentanyl Contrast: 0 mL of Omnipaque 320 This examination, like all CT scans performed in the [facility], was performed utilizing techniques to minimize radiation dose exposure, including the use of iterative reconstruction and automated exposure control  COMMENTS: The patient was placed left lateral decubitus on the CT scanner  After axial images were obtained through the region of interest, an area of skin was then marked, prepped and draped in the usual sterile fashion  A preprocedure timeout was then performed per [facility] protocol  18G Chiba needle was advanced using intermittent CT guidance into right perinephric fluid collection  No fluid could be aspirated  The needle was readvanced into a different position  No fluid could be aspirated  Saline was then infused with hope for aspiration  No aspirate could be obtained  A Saleem wire was advanced through the needle  Following needle removal, a 6 [place] drain was positioned within the fluid collection confirmed on CT  No aspiration could be performed   Given that this likely represents organized hematoma, the drain was removed  Sterile dressings were then applied  Workstation performed: [id number]       Incidental Findings:   · Imaging as above    Test Results Pending at Discharge (will require follow up):   · As per After Visit Summary     Outpatient Tests Requested:  · None    Complications:  Refer to hospital course and above listed diagnosis related plan, if any    Hospital Course: As per HPI  Ezequiel Carty is a 64 y o  male patient with history of ANCA vasculitis status post right kidney biopsy in March, right UVJ stone with hydronephrosis in March, acute kidney injury currently on HD, CKD stage 3, diabetes mellitus type 2, hypertension, BPH, dyslipidemia, anemia who originally presented to the hospital on 5/7/2022 due to fever and right-sided flank pain  Patient reported that he was in usual state of health until day prior to admission when he started with right flank discomfort  Overnight patient had worsening of pain  Patient reported that his pain was right mid back radiating to the groin  Patient went to his routine HD  when he was noted to be febrile with T-max of 101 9° F   Patient was referred to ED for further evaluation  In ED patient was noted to have low-grade fever other vital signs were stable  Workup revealed leukocytosis, hyperkalemia  CT scan of the abdomen revealed new small to moderate-sized right perinephric subscapular fluid collection likely complex in nature differential diagnosis include a perinephric hematoma, urinoma less likely abscess  Previously noted UVJ calculus in 3/22 appeared to have passed  Patient was given IV antibiotics and subsequently admitted  Patient denied any hematuria  Patient was given broad-spectrum antibiotics with IV cefepime given possibility of immunocompromised state  Case was discussed with Urology and patient was evaluated by IR  Patient remained afebrile hemodynamically stable  IR guided aspiration was attempted but it was unsuccessful  Patient was seen by Nephrology and continued on HD  Patient is currently remains afebrile , still with right flank pain but appears to have improved has not been requiring any pain medication  Hemoglobin has been stable after attempted aspiration  Patient was seen by ID antibiotics were de-escalated to cefazolin, recommended transition to cephalexin 500 Q 12 to complete 1 week  Patient was also advised low-potassium diet for hyperkalemia  Patient will be continued on HD as per schedule  Patient will require monitoring of the hemoglobin  Blood glucose level was monitored during hospitalization and insulin regimen was adjusted as above  Patient remains clinically stable, anxious to go home patient will be discharged and will follow-up outpatient with PCP, Nephrology and Urology  Please see above list of diagnoses and related plan for additional information  Condition at Discharge: stable     Discharge Day Visit / Exam:     Subjective:  Hard of hearing  Reports mild right flank pain  Denies any fever or chills  Denies any chest pain shortness of breath or abdominal pain  Denies any hematuria    Ambulating in room without any difficulties     Vitals: Blood Pressure: 142/60 (05/09/22 0747)  Pulse: 95 (05/09/22 0747)  Temperature: 98 °F (36 7 °C) (05/09/22 0747)  Temp Source: Oral (05/09/22 0747)  Respirations: 17 (05/09/22 0747)  Height: 5' 8" (172 7 cm) (05/07/22 1500)  Weight - Scale: 75 9 kg (167 lb 5 3 oz) (05/08/22 0554)  SpO2: 97 % (05/09/22 0747)  Exam:   Physical Exam  Constitutional:       General: He is not in acute distress  Appearance: He is ill-appearing (Chronically)  Comments: Pleasant but adamant to go home   HENT:      Head: Normocephalic and atraumatic  Ears:      Comments: Decreased hearing but able to communicate well when spoke loudly in ears  Cardiovascular:      Rate and Rhythm: Normal rate     Pulmonary:      Effort: Pulmonary effort is normal  No respiratory distress  Breath sounds: No wheezing, rhonchi or rales  Chest:      Chest wall: No tenderness  Abdominal:      General: Bowel sounds are normal  There is no distension  Palpations: Abdomen is soft  Tenderness: There is abdominal tenderness (Mild, improved)  There is no guarding or rebound  Musculoskeletal:      Right lower leg: No edema  Left lower leg: No edema  Skin:     General: Skin is warm and dry  Findings: No rash  Neurological:      Mental Status: He is alert  Mental status is at baseline  Cranial Nerves: No cranial nerve deficit  Discharge instructions/Information to patient and family:(Discharge Medications and Follow up):   See after visit summary for information provided to patient and family  Provisions for Follow-Up Care:  See after visit summary for information related to follow-up care and any pertinent home health orders  Disposition: Home    Planned Readmission:  No     Discharge Statement:  I spent 45 minutes discharging the patient  This time was spent on the day of discharge  I had direct contact with the patient on the day of discharge  Greater than 50% of the total time was spent examining patient, answering all patient questions, arranging and discussing plan of care with patient as well as directly providing post-discharge instructions  Additional time then spent on discharge activities  Coordinated with Infectious Disease and Nephrology  Discussed with patient's wife over the phone  Discharge Medications:  See after visit summary for reconciled discharge medications provided to patient and family  ** Please Note: "This note has been constructed using a voice recognition system  Therefore there may be syntax, spelling, and/or grammatical errors   Please call if you have any questions  "**

## 2022-05-09 NOTE — PROGRESS NOTES
NEPHROLOGY PROGRESS NOTE   Chintan Ken 64 y o  male MRN: [id number]  Unit/Bed#: [address] [place] [address] Encounter: [id number]    ASSESSMENT & PLAN:  Acute kidney injury, POA  -during previous hospital admission presented with creatinine 14 91 on 03/07 and was started on HD on March 10, 2022, receiving HD at [address] on TTS schedule   -during last admission underwent kidney biopsy and was proven to have PR3 Anca vasculitis GN  Finding of 50-60% IFTA, ATN and cellular crescents with fibrinoid necrosis  -he was treated with cyclophosphamide but taken off cyclophosphamide in April 2022 in the setting of leukopenia and no clinical renal recovery     -most likely now ESRD but will defer to his outpatient nephrologist  -Plan:   · Next HD tomorrow, potassium is slightly elevated today at 5 5, placed on low-potassium diet  Need to continue low-potassium diet after discharge  · Access:  Right IJ PermCath  · Avoid nephrotoxins and dose all medications per EGFR  · Avoid hypotension  Hyperkalemia:  Admission potassium was 6 4 5/7 improved post HD  Placed on low-potassium diet today    Chronic kidney disease stage 3, baseline creatinine 1 1-1 3 from June to December 2021    PR3 Anca vasculitis  -status post renal biopsy on March 9th:1) Focal necrotizing and crescentic GN, pauci-immune, acute and chronic  (2) acute tubular injury  (3) moderately severe IFTA with focal TI inflammation  (4) mod arteriolosclerosis and severe arteriosclerosis  -Treated with Cytoxan and got 2 doses - 3/23/22 and 4/6/22  -Was supposedly on steroid taper but was apparently not taking it? It may have been tapered faster once the decision was made to stop Cytoxan in April 2022    Primary hypertension:  Blood pressure currently stable    Currently not on any antihypertensive medications, continue to hold ACE inhibitor/ARB for now    Anemia due to chronic kidney disease  -on Mircera 60 mcg every 2 weeks as outpatient  -hemoglobin currently below goal at 7 7 gram/deciliter  Continue to monitor  May need to consider ISAMAR during hospital stay if hemoglobin continues to drop    CKD/MBD/hyperphosphatemia:  Continue PhosLo, recommend renal diet    Sepsis in the setting of right flank pain, fever  -CT with finding a moderate-sized right perinephric subcapsular collection  Suspected to be infected hematoma from renal biopsy  -went for IR drainage on 05/08 but was not successful as it was likely hematoma that could not be aspirated  -blood culture 1/2 positive for Staph spaces suspected contaminant, antibiotics per primary team    BPH on Flomax    Discussed with primary team    SUBJECTIVE:  No new complaints  No chest pain or SOB  OBJECTIVE:  Current Weight: Weight - Scale:  (request wt re check)  Vitals:    05/09/22 0747   BP: 142/60   Pulse: 95   Resp: 17   Temp: 98 °F (36 7 °C)   SpO2: 97%       Intake/Output Summary (Last 24 hours) at 5/9/2022 0950  Last data filed at 5/9/2022 0747  Gross per 24 hour   Intake 575 ml   Output --   Net 575 ml       Physical Exam  General:  Ill looking, awake  Eyes: Conjunctivae pink,  Sclera anicteric  ENT: lips and mucous membranes moist  Neck: supple   Chest: Clear to Auscultation both lungs,  no crackles, ronchus or wheezing  CVS: S1 & S2 present, normal rate, regular rhythm, no murmur    Abdomen: soft, non-tender, non-distended, Bowel sounds normoactive  Extremities: no edema of  legs  Skin: no rash  Neuro: awake, alert, oriented x 3   Psych: Mood and affect appropriate     Medications:    Current Facility-Administered Medications:     acetaminophen (TYLENOL) tablet 650 mg, 650 mg, Oral, Q6H PRN, Jaspal Hammer MD, 650 mg at 05/08/22 1447    atorvastatin (LIPITOR) tablet 20 mg, 20 mg, Oral, Daily With Jasmine Vizcarra MD, 20 mg at 05/08/22 1606    calcium acetate (PHOSLO) capsule 667 mg, 667 mg, Oral, TID With Meals, Jaspal Hammer MD, 667 mg at 05/09/22 0830   calcium carbonate-vitamin D (OSCAL-D) 500 mg-200 units per tablet 1 tablet, 1 tablet, Oral, Daily With Breakfast, Dionicio Condon MD, 1 tablet at 05/09/22 0830    calcium gluconate 1 g in sodium chloride 0 9% 50 mL (premix), 1 g, Intravenous, Once, Dionicio Condon MD, Stopped at 05/07/22 1428    cefepime (MAXIPIME) IVPB (premix in dextrose) 1,000 mg 50 mL, 1,000 mg, Intravenous, Q24H, Dionicio Condon MD, Stopped at 05/08/22 1502    finasteride (PROSCAR) tablet 5 mg, 5 mg, Oral, Daily, Dionicio Condon MD, 5 mg at 05/09/22 0830    insulin aspart protamine-insulin aspart (NovoLOG 70/30) 100 units/mL subcutaneous injection 30 Units, 30 Units, Subcutaneous, BID AC, Dionicio Condon MD, 30 Units at 05/09/22 0830    insulin lispro (HumaLOG) 100 units/mL subcutaneous injection 1-5 Units, 1-5 Units, Subcutaneous, HS, Dionicio Condon MD    insulin lispro (HumaLOG) 100 units/mL subcutaneous injection 1-5 Units, 1-5 Units, Subcutaneous, 0200, Dionicio Condon MD    insulin lispro (HumaLOG) 100 units/mL subcutaneous injection 1-6 Units, 1-6 Units, Subcutaneous, TID AC, 1 Units at 05/09/22 0830 **AND** Fingerstick Glucose (POCT), , , TID AC, Dionicio Condon MD    [facility]) capsule 0 4 mg, 0 4 mg, Oral, Daily With Mariann Wells MD, 0 4 mg at 05/08/22 1606    Invasive Devices:        Lab Results:   Results from last 7 days   Lab Units 05/09/22  0608 05/08/22  0543 05/07/22  1205 05/07/22  1129 05/07/22  1119 05/07/22  1119   WBC Thousand/uL 9 93 8 47  --  15 46*  --   --    HEMOGLOBIN g/dL 7 7* 7 5*  --  8 4*  --   --    HEMATOCRIT % 23 8* 23 7*  --  26 5*  --   --    PLATELETS Thousands/uL 268 236  --  277  --   --    POTASSIUM mmol/L 5 5* 4 7 6 4*  --    < > 6 5*   CHLORIDE mmol/L 92* 95* 92*  --    < > 94*   CO2 mmol/L 26 30 31  --    < > 31   BUN mg/dL 62* 41* 56*  --    < > 56*   CREATININE mg/dL 6 44* 4 66* 6 77*  --    < > 6 62*   CALCIUM mg/dL 7 8* 7 9* 8 1*  --    < > 8 1*   MAGNESIUM mg/dL  --   --   --   --   --  2 2   PHOSPHORUS mg/dL  --   --   --   --   --  2 8   ALK PHOS U/L  --   --   --   --   --  93   ALT U/L  --   --   --   --   --  22   AST U/L  --   --   --   --   --  20    < > = values in this interval not displayed  Previous work up:      Portions of the record may have been created with voice recognition software  Occasional wrong word or "sound a like" substitutions may have occurred due to the inherent limitations of voice recognition software  Read the chart carefully and recognize, using context, where substitutions have occurred  If you have any questions, please contact the dictating provider

## 2022-05-09 NOTE — CASE MANAGEMENT
Case Management Progress Note    Patient name Mikayla Lopez  Location [facility] [place] [facility] 209/2 280 [facility] MRN [id number]  : 1945 Date 2022       LOS (days): 2  Geometric Mean LOS (GMLOS) (days): 4 80  Days to GMLOS:2 6        OBJECTIVE:    Current admission status: Inpatient  Preferred Pharmacy:   [facility] [address] [facility], [address] [place] [place] [address]  Phone: [phone] Fax: [phone] [address]  [address], [address]  [address] [place], [address] [address]  Phone: [phone] Fax: [phone]    Primary Care Provider: Onelia Lopez MD    Primary Insurance: MEDICARE  Secondary Insurance: BLUE CROSS    PROGRESS NOTE:    Pt requesting car ride home  SW contacted [employer] for LYFT ride  Waiver signed by pt  Nurse aware

## 2022-05-09 NOTE — CASE MANAGEMENT
Case Management Assessment & Discharge Planning Note    Patient name Jose E Smalls  Location [address] MRN [id number]  : 1945 Date 2022       Current Admission Date: 2022  Current Admission Diagnosis:Sepsis [facility])   Patient Active Problem List    Diagnosis Date Noted    Sepsis ([place] [address] ) 2022    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[place] [address] ) 2022    Pulmonary hypertension ([place] [address] ) 2022    Dialysis patient ([place] [address] ) 2022    Anemia due to chronic kidney disease, on chronic dialysis ([place] [address] )     Hemoptysis 2022    LORRAINE (acute kidney injury) ([place] [address] )     Other proteinuria     Symptomatic anemia 2022    Chest pain 2022    Acute kidney injury ([place] [address] ) 2022    High anion gap metabolic acidosis     Melena 2022    Elevated PSA 2021    Chronic pain of right knee 2021    Primary osteoarthritis of right knee 2021    Bladder stones 12/15/2016    Type 2 diabetes mellitus with microalbuminuria, with long-term current use of insulin ([place] [address] ) 2016    Benign colon polyp 2013    Benign prostatic hyperplasia with lower urinary tract symptoms 2013    Benign essential hypertension 2013    Hyperlipidemia 2012    Vitamin D deficiency 2012      LOS (days): 2  Geometric Mean LOS (GMLOS) (days): 4 80  Days to GMLOS:2 8     OBJECTIVE:    Risk of Unplanned Readmission Score: 31     Current admission status: Inpatient    Preferred Pharmacy:   [facility] [address] [place] [address]  Phone: [phone] Fax: [phone] [address] [place] [place] [address]  [address], [address]  [facility] [address]  Phone: [phone] Fax: [phone]    Primary Care Provider: Jaimie Hollingsworth MD    Primary Insurance: MEDICARE  Secondary Insurance: BLUE CROSS    ASSESSMENT:  [id number] [facility], [facility] Representative - Spouse   Primary Phone: [phone] (Home)  Mobile Phone: [phone]               Readmission Root Cause  30 Day Readmission: No    Patient Information  Admitted from[de-identified] Home  Mental Status: Alert  During Assessment patient was accompanied by: Not accompanied during assessment  Assessment information provided by[de-identified] Patient,Spouse  Primary Caregiver: Spouse  Caregiver's Name[de-identified] Clem Noe Relationship to Patient[de-identified] Family Member  Caregiver's Telephone Number[de-identified] [phone]  Support Systems: Spouse/significant other  [place] of Residence: [address] [place] [address] do you live in?: [address] entry access options   Select all that apply : Stairs  Number of steps to enter home : 2  Type of Current Residence: [place]  In the last 12 months, was there a time when you were not able to pay the mortgage or rent on time?: No  In the last 12 months, how many places have you lived?: 1  In the last 12 months, was there a time when you did not have a steady place to sleep or slept in a shelter (including now)?: No  Homeless/housing insecurity resource given?: N/A  Living Arrangements: Lives w/ Spouse/significant other    Activities of Daily Living Prior to Admission  Functional Status: Independent  Completes ADLs independently?: Yes  Ambulates independently?: Yes  Does patient use assisted devices?: No  Does patient currently own DME?: Yes  What DME does the patient currently own?: Straight Cane,Walker  Does patient have a history of Outpatient Therapy (PT/OT)?: No  Does the patient have a history of Short-Term Rehab?: No  Does patient have a history of HHC?: No (declined services after return home-[facility])  Does patient currently have [address]?: No    Patient Information Continued  Income Source: Pension/assisted  Does patient have prescription coverage?: Yes (mail order)  Within the past 12 months, you worried that your food would run out before you got the money to buy more : Never true  Within the past 12 months, the food you bought just didnt last and you didnt have money to get more : Never true  Food insecurity resource given?: N/A  Does patient receive dialysis treatments?: Yes ([facility] - [facility])  Does patient have a history of substance abuse?: No  Does patient have a history of Mental Health Diagnosis?: No    Means of Transportation  Means of Transport to Appts[de-identified] [employer]  In the past 12 months, has lack of transportation kept you from medical appointments or from getting medications?: No  In the past 12 months, has lack of transportation kept you from meetings, work, or from getting things needed for daily living?: No  Was application for public transport provided?: N/A      DISCHARGE DETAILS:    Discharge planning discussed with[de-identified] Patient and wife, Gabriel Sarmiento of Choice: Yes  Comments - Freedom of Choice: SW met with pt and spoke with wife over phone to assess needs and discuss plans  Pt's hearing aid batteries are dead so pt did have difficulty hearing  However pt did express that he wants to return home as soon as he can  SW placed call to wife to gather additional information  Pt goes to dialysis via [facility]  Offered home care services however wife said pt is self-sufficient at home  Wife did request transportation home be arranged when ready    CM contacted family/caregiver?: Yes  Were Treatment Team discharge recommendations reviewed with patient/caregiver?: Yes  Did patient/caregiver verbalize understanding of patient care needs?: Yes  Were patient/caregiver advised of the risks associated with not following Treatment Team discharge recommendations?: Yes    Contacts  Patient Contacts: Yonny Foster  Relationship to Patient[de-identified] Family (wife)  Contact Method: Phone  Phone Number: [phone]  Reason/Outcome: Discharge [address] Is the patient interested in [facility] at discharge?: No    DME Referral Provided  Referral made for DME?: No    Other Referral/Resources/Interventions Provided:  Interventions: None Indicated    Treatment Team Recommendation: Home  Discharge Destination Plan[de-identified] Home  Transport at Discharge : Wheelchair Sera Barrera    IMM Given (Date):: 05/07/22 (Initial)

## 2022-05-09 NOTE — CONSULTS
Consultation - Infectious Disease   Holley Godoy 64 y o  male MRN: [id number]  Unit/Bed#: [place] Encounter: [id number]      IMPRESSION & RECOMMENDATIONS:   1  SIRS, POA  With fever at HD prompting patient to be sent to the ER for evaluation and leukocytosis in setting of right flank pain  Admission blood cultures growing 1 of 2 sets coag-negative staph likely representative skin contaminant  Consider SIRS response to passing kidney stone with admission right flank discomfort and suspicious subcapsular hematoma on CT  Fortunately patient had a urine culture from March 7, 2022 that grew low colony count of pansensitive E coli: Will target antibiotic therapy according to most recent urinary culture  -deescalate cefepime to cefazolin 1 g IV today   -Thereafter, can transition to p o  cephalexin 500 mg every 12 hours to complete a total 1 week course through 05/13/2022  -monitor temperature and hemodynamics  -serial exam  -monitor symptoms     2  Coag-negative staph bacteremia  Admission blood cultures growing 1 of 2 sets coag-negative staph likely representative skin contaminant   -no antibiotic for this  -follow-up final blood cultures     3  LORRAINE on CKD III  On HD since recent hospitalization in 03/22   -renal dose adjust antibiotic as needed  -volume management   -continues HD management per Nephrology  -monitor BMP     4  ANCA vasculitis status post kidney biopsy  Treated with cytoxan 3/23/22 and 4/6/22  -continues management per Nephrology    5  Type 2 diabetes mellitus  5/8/22 Hemoglobin A1c 5 1  -blood glucose management per primary care team    6  Amoxicillin listed allergy   Tolerates Cephlosporins  -monitor for antibiotic tolerance    Antibiotics:  Cefepime D2    I have discussed the above management plan in detail with patient, RN, and the primary service    Extensive review of the medical records in epic including review of the notes, radiographs, and laboratory results     HISTORY OF PRESENT ILLNESS:  Reason for Consult: 1  Sepsis 2  Perinephric fluid collection    HPI: Armaan Powers is a 64y o  year old male with ANCA vasculitis status post kidney biopsy, acute kidney injury on hemodialysis, CKD stage 3 with prior baseline creatinine 1 1-1 3, diabetes mellitus type 2, hypertension, nephrolithiasis, BPH, dyslipidemia, prior bladder rupture status post repair 6 years ago, and anemia who presented to the ER 5/7/22 with fever and right-sided flank pain  Patient reported that he was in usual state of health until 5/6/22 when he started with right flank discomfort which worsening overnight  Patient reported that his pain was right mid back radiating to the groin  Patient went to his routine HD Saturday when he was noted to be febrile to T-max of 101 9° F and was referred to ED for further evaluation  In ED patient was noted to have low-grade temp and WBC 15 K  CT scan of the abdomen revealed new small to moderate-sized right perinephric subscapular fluid collection most suspicious for hematoma  Previously noted UVJ calculus in 3/22 appeared to have passed into urinary bladder  Blood cultures were obtained and patient was started on IV cefepime  IR attempted to aspirate collection on 05/08/2022 without any fluid obtainable  One of 2 admission blood cultures has turned positive for coag-negative staph  No other positive cultures thus far  White count has resolved  Infectious regarding evaluation and management of sepsis and perinephric fluid collection management    Patient reports no further fever  No shaking chills, sweats, vomiting, diarrhea, dysuria, or any hematuria  He does report that on Saturday he felt nauseous with dry heaves  He also reports that he has urinated more in the last 24 hours than he has in a long time  He has some discomfort on the right flank side status post aspiration attempt yesterday, but the site discomfort is overall improved since admission    He also reports chronic nighttime cough not out of the ordinary for him  He denies any short breath or chest pain  REVIEW OF SYSTEMS:  A complete review of systems is negative other than that noted in the HPI  PAST MEDICAL HISTORY:  Past Medical History:   Diagnosis Date    Anesthesia complication     after colonoscopy , pt was awake but could not control his body and kept falling     Arthritis     Bladder calculi     BPH (benign prostatic hyperplasia)     Diabetes mellitus ([address] )     Hearing disorder of both ears     wears bilateral hearing aids    Hyperlipidemia     controlled and maintained d/t diabetes    Hypertension     Kidney stones     Last Assessed:4/3/2017    Lyme disease     Last Assessed:2015    Rupture, bladder, spontaneous     Last Assessed:4/3/2017     Past Surgical History:   Procedure Laterality Date    BLADDER REPAIR N/A 12/10/2016    Procedure: REPAIR BLADDER/cysto insertion stent;  Surgeon: Karis Irwin MD;  Location: [address] [place] [address];  Service:     COLONOSCOPY      CYSTOLITHOTOMY      ANESTHESIA   lower abdomen  ;  Last Assessed:4/3/2017    IR ASPIRATION ONLY  2022    IR BIOPSY KIDNEY RANDOM  3/10/2022    IR TUNNELED DIALYSIS CATHETER PLACEMENT  3/8/2022    OTHER SURGICAL HISTORY      thermal dilation of the prostate x 2 ( in the office)    TONSILLECTOMY      TRANSURETHRAL RESECTION OF PROSTATE N/A 2016    Procedure:  Cysto, Laser Bladder stone,fulgaration of prostates tissue ;  Surgeon: Karis Irwin MD;  Location: [facility];  Service:        FAMILY HISTORY:  Non-contributory    SOCIAL HISTORY:  Social History   Social History     Substance and Sexual Activity   Alcohol Use Yes     Social History     Substance and Sexual Activity   Drug Use No     Social History     Tobacco Use   Smoking Status Former Smoker    Types: Cigars    Quit date: 26    Years since quittin 3   Smokeless Tobacco Never Used       ALLERGIES:  Allergies   Allergen Reactions    Amoxicillin Rash       MEDICATIONS:  All current active medications have been reviewed  PHYSICAL EXAM:  Temp:  [98 °F (36 7 °C)-99 6 °F (37 6 °C)] 98 °F (36 7 °C)  HR:  [79-95] 95  Resp:  [17] 17  BP: (142-156)/(60-74) 142/60  SpO2:  [97 %-99 %] 97 %  Temp (24hrs), Av 8 °F (37 1 °C), Min:98 °F (36 7 °C), Max:99 6 °F (37 6 °C)  Current: Temperature: 98 °F (36 7 °C)    Intake/Output Summary (Last 24 hours) at 2022 1419  Last data filed at 2022 0747  Gross per 24 hour   Intake 295 ml   Output --   Net 295 ml       General Appearance:  64 old elderly male, propped fairly comfortably in bed, appearing well, nontoxic, and in no distress, extremely hard of hearing   Head:  Normocephalic, without obvious abnormality, atraumatic   Eyes:  Conjunctiva pink and sclera anicteric, both eyes   Nose: Nares normal, mucosa normal, no drainage   Throat: Oropharynx moist without lesions   Neck: Supple, symmetrical, no adenopathy, no tenderness/mass/nodules   Back:   Symmetric, no curvature, ROM normal, no CVA tenderness, mild right flank discomfort on palpation  Lungs:   Clear to auscultation bilaterally, respirations unlabored   Chest Wall:  No tenderness or deformity   Heart:  RRR; no murmur, rub or gallop   Abdomen:   Soft, non-tender, non-distended, positive bowel sounds    Extremities: No cyanosis, clubbing or edema   : No Wall catheter, no SPT   Skin: No rashes or lesions  No draining wounds noted  Left arm IV site nontender   Lymph nodes: Cervical, supraclavicular nodes normal   Neurologic: Alert and oriented times 3, extremity strength 5/5 and symmetric       LABS, IMAGING, & OTHER STUDIES:  Lab Results:  I have personally reviewed pertinent labs    Results from last 7 days   Lab Units 22  0608 22  0543 22  1129   WBC Thousand/uL 9 93 8 47 15 46*   HEMOGLOBIN g/dL 7 7* 7 5* 8 4*   PLATELETS Thousands/uL 268 236 277     Results from last 7 days   Lab Units 22  0608 22  0543 05/08/22  0543 05/07/22  1205 05/07/22  1205 05/07/22  1119 05/07/22  1119   SODIUM mmol/L 129*  --  133*  --  132*   < > 134*   POTASSIUM mmol/L 5 5*   < > 4 7   < > 6 4*   < > 6 5*   CHLORIDE mmol/L 92*   < > 95*   < > 92*   < > 94*   CO2 mmol/L 26   < > 30   < > 31   < > 31   BUN mg/dL 62*   < > 41*   < > 56*   < > 56*   CREATININE mg/dL 6 44*   < > 4 66*   < > 6 77*   < > 6 62*   EGFR ml/min/1 73sq m 7   < > 11   < > 7   < > 7   CALCIUM mg/dL 7 8*   < > 7 9*   < > 8 1*   < > 8 1*   AST U/L  --   --   --   --   --   --  20   ALT U/L  --   --   --   --   --   --  22   ALK PHOS U/L  --   --   --   --   --   --  93    < > = values in this interval not displayed  Results from last 7 days   Lab Units 05/07/22  1129 05/07/22  1119   BLOOD CULTURE  No Growth at 24 hrs  Staphylococcus coagulase negative*   GRAM STAIN RESULT   --  Gram positive cocci in clusters*                       Imaging Studies:   I have reviewed CT scan personally  05/07/2022 CT abdomen pelvis:  New small to moderate sized right perinephric subcapsular fluid collection with suspicious appearance of hematoma given unsuccessful IR aspiration attempt as below  Previous right UVJ calculus seen on 3/8/22 CT scan has now passed into the urinary bladder  Other Studies:   I have personally reviewed pertinent reports  Two hundred twenty-two IR aspiration report:  CT-guided right perinephric fluid collection aspiration attempted in to multiple locations without any fluid obtainable

## 2022-05-09 NOTE — NURSING NOTE
Pt discharged from [address] [place] [address]  IV removed prior to discharge  Pt left with all their belongings  Pt left via walking accompanied by RN  Discharge instructions gone over with patient  No prescriptions written  Prescription sent over electronically to pt's pharmacy  All questions answered

## 2022-05-09 NOTE — ASSESSMENT & PLAN NOTE
Admission blood culture 1/2 positive for Staphylococcus, coag-negative  Likely contaminant  No further intervention needed

## 2022-05-10 ENCOUNTER — TRANSITIONAL CARE MANAGEMENT (OUTPATIENT)
Dept: FAMILY MEDICINE CLINIC | Facility: CLINIC | Age: 77
End: 2022-05-10

## 2022-05-10 LAB
BACTERIA BLD CULT: ABNORMAL
GRAM STN SPEC: ABNORMAL
S AUREUS+CONS DNA BLD POS NAA+NON-PROBE: DETECTED

## 2022-05-11 RX ORDER — CALCIUM ACETATE 667 MG/1
CAPSULE ORAL 3 TIMES DAILY
COMMUNITY
Start: 2022-04-07 | End: 2022-06-07

## 2022-05-11 RX ORDER — ENALAPRIL MALEATE 20 MG/1
4 TABLET ORAL DAILY
COMMUNITY
Start: 2022-04-07 | End: 2022-05-18

## 2022-05-12 LAB — BACTERIA BLD CULT: NORMAL

## 2022-05-18 ENCOUNTER — APPOINTMENT (OUTPATIENT)
Dept: LAB | Facility: CLINIC | Age: 77
End: 2022-05-18
Payer: MEDICARE

## 2022-05-18 ENCOUNTER — OFFICE VISIT (OUTPATIENT)
Dept: FAMILY MEDICINE CLINIC | Facility: CLINIC | Age: 77
End: 2022-05-18
Payer: MEDICARE

## 2022-05-18 VITALS
RESPIRATION RATE: 18 BRPM | DIASTOLIC BLOOD PRESSURE: 60 MMHG | SYSTOLIC BLOOD PRESSURE: 120 MMHG | HEART RATE: 76 BPM | HEIGHT: 68 IN | BODY MASS INDEX: 27.43 KG/M2 | TEMPERATURE: 98.1 F | WEIGHT: 181 LBS

## 2022-05-18 DIAGNOSIS — Z99.2 ANEMIA DUE TO CHRONIC KIDNEY DISEASE, ON CHRONIC DIALYSIS (HCC): ICD-10-CM

## 2022-05-18 DIAGNOSIS — N18.6 ANEMIA DUE TO CHRONIC KIDNEY DISEASE, ON CHRONIC DIALYSIS (HCC): ICD-10-CM

## 2022-05-18 DIAGNOSIS — N18.6 TYPE 2 DIABETES MELLITUS WITH CHRONIC KIDNEY DISEASE ON CHRONIC DIALYSIS, WITH LONG-TERM CURRENT USE OF INSULIN (HCC): ICD-10-CM

## 2022-05-18 DIAGNOSIS — Z99.2 DIALYSIS PATIENT (HCC): ICD-10-CM

## 2022-05-18 DIAGNOSIS — D63.1 ANEMIA DUE TO CHRONIC KIDNEY DISEASE, ON CHRONIC DIALYSIS (HCC): ICD-10-CM

## 2022-05-18 DIAGNOSIS — Z99.2 TYPE 2 DIABETES MELLITUS WITH CHRONIC KIDNEY DISEASE ON CHRONIC DIALYSIS, WITH LONG-TERM CURRENT USE OF INSULIN (HCC): ICD-10-CM

## 2022-05-18 DIAGNOSIS — E11.22 TYPE 2 DIABETES MELLITUS WITH CHRONIC KIDNEY DISEASE ON CHRONIC DIALYSIS, WITH LONG-TERM CURRENT USE OF INSULIN (HCC): ICD-10-CM

## 2022-05-18 DIAGNOSIS — R53.82 CHRONIC FATIGUE: Primary | ICD-10-CM

## 2022-05-18 DIAGNOSIS — Z79.4 TYPE 2 DIABETES MELLITUS WITH CHRONIC KIDNEY DISEASE ON CHRONIC DIALYSIS, WITH LONG-TERM CURRENT USE OF INSULIN (HCC): ICD-10-CM

## 2022-05-18 PROCEDURE — 99495 TRANSJ CARE MGMT MOD F2F 14D: CPT | Performed by: FAMILY MEDICINE

## 2022-05-18 RX ORDER — ENALAPRIL MALEATE 10 MG/1
10 TABLET ORAL DAILY
COMMUNITY
End: 2022-06-07

## 2022-06-01 ENCOUNTER — APPOINTMENT (EMERGENCY)
Dept: RADIOLOGY | Facility: HOSPITAL | Age: 77
DRG: 871 | End: 2022-06-01
Payer: MEDICARE

## 2022-06-01 ENCOUNTER — APPOINTMENT (INPATIENT)
Dept: DIALYSIS | Facility: HOSPITAL | Age: 77
DRG: 871 | End: 2022-06-01
Payer: MEDICARE

## 2022-06-01 ENCOUNTER — HOSPITAL ENCOUNTER (INPATIENT)
Facility: HOSPITAL | Age: 77
LOS: 6 days | Discharge: NURSING, REHAB OR LONG-TERM CARE | DRG: 871 | End: 2022-06-07
Attending: EMERGENCY MEDICINE | Admitting: INTERNAL MEDICINE
Payer: MEDICARE

## 2022-06-01 DIAGNOSIS — E11.29 TYPE 2 DIABETES MELLITUS WITH MICROALBUMINURIA, WITH LONG-TERM CURRENT USE OF INSULIN (HCC): ICD-10-CM

## 2022-06-01 DIAGNOSIS — G92.8 TOXIC METABOLIC ENCEPHALOPATHY: ICD-10-CM

## 2022-06-01 DIAGNOSIS — A41.9 SEVERE SEPSIS (HCC): ICD-10-CM

## 2022-06-01 DIAGNOSIS — A41.9 SEPSIS (HCC): Primary | ICD-10-CM

## 2022-06-01 DIAGNOSIS — H04.129 DRY EYE: ICD-10-CM

## 2022-06-01 DIAGNOSIS — Z79.4 TYPE 2 DIABETES MELLITUS WITH MICROALBUMINURIA, WITH LONG-TERM CURRENT USE OF INSULIN (HCC): ICD-10-CM

## 2022-06-01 DIAGNOSIS — E11.10 DKA (DIABETIC KETOACIDOSIS) (HCC): ICD-10-CM

## 2022-06-01 DIAGNOSIS — M62.82 RHABDOMYOLYSIS: ICD-10-CM

## 2022-06-01 DIAGNOSIS — R78.81 POSITIVE BLOOD CULTURE: ICD-10-CM

## 2022-06-01 DIAGNOSIS — R39.89 SUSPECTED UTI: ICD-10-CM

## 2022-06-01 DIAGNOSIS — N39.0 UTI (URINARY TRACT INFECTION): ICD-10-CM

## 2022-06-01 DIAGNOSIS — E87.1 HYPONATREMIA: ICD-10-CM

## 2022-06-01 DIAGNOSIS — E87.5 HYPERKALEMIA: ICD-10-CM

## 2022-06-01 DIAGNOSIS — G47.00 INSOMNIA: ICD-10-CM

## 2022-06-01 DIAGNOSIS — R65.20 SEVERE SEPSIS (HCC): ICD-10-CM

## 2022-06-01 DIAGNOSIS — R80.9 TYPE 2 DIABETES MELLITUS WITH MICROALBUMINURIA, WITH LONG-TERM CURRENT USE OF INSULIN (HCC): ICD-10-CM

## 2022-06-01 PROBLEM — E87.2 LACTIC ACIDOSIS: Status: ACTIVE | Noted: 2022-06-01

## 2022-06-01 PROBLEM — E87.20 LACTIC ACIDOSIS: Status: ACTIVE | Noted: 2022-06-01

## 2022-06-01 LAB
2HR DELTA HS TROPONIN: 3 NG/L
4HR DELTA HS TROPONIN: 18 NG/L
ALBUMIN SERPL BCP-MCNC: 2 G/DL (ref 3.5–5)
ALP SERPL-CCNC: 139 U/L (ref 46–116)
ALT SERPL W P-5'-P-CCNC: 56 U/L (ref 12–78)
ANION GAP SERPL CALCULATED.3IONS-SCNC: 13 MMOL/L (ref 4–13)
ANION GAP SERPL CALCULATED.3IONS-SCNC: 20 MMOL/L (ref 4–13)
APTT PPP: 25 SECONDS (ref 23–37)
AST SERPL W P-5'-P-CCNC: 189 U/L (ref 5–45)
ATRIAL RATE: 55 BPM
BACTERIA UR QL AUTO: ABNORMAL /HPF
BASE EX.OXY STD BLDV CALC-SCNC: 33 % (ref 60–80)
BASE EXCESS BLDV CALC-SCNC: -7 MMOL/L
BASOPHILS # BLD AUTO: 0.01 THOUSANDS/ΜL (ref 0–0.1)
BASOPHILS NFR BLD AUTO: 0 % (ref 0–1)
BETA-HYDROXYBUTYRATE: 1.6 MMOL/L
BILIRUB SERPL-MCNC: 0.77 MG/DL (ref 0.2–1)
BILIRUB UR QL STRIP: NEGATIVE
BUN SERPL-MCNC: 49 MG/DL (ref 5–25)
BUN SERPL-MCNC: 81 MG/DL (ref 5–25)
CALCIUM ALBUM COR SERPL-MCNC: 10.2 MG/DL (ref 8.3–10.1)
CALCIUM SERPL-MCNC: 8.1 MG/DL (ref 8.3–10.1)
CALCIUM SERPL-MCNC: 8.6 MG/DL (ref 8.3–10.1)
CARDIAC TROPONIN I PNL SERPL HS: 74 NG/L
CARDIAC TROPONIN I PNL SERPL HS: 77 NG/L
CARDIAC TROPONIN I PNL SERPL HS: 92 NG/L
CHLORIDE SERPL-SCNC: 87 MMOL/L (ref 100–108)
CHLORIDE SERPL-SCNC: 96 MMOL/L (ref 100–108)
CK MB SERPL-MCNC: 1.1 % (ref 0–2.5)
CK MB SERPL-MCNC: 69 NG/ML (ref 0–5)
CK MB SERPL-MCNC: 75.6 NG/ML (ref 0–5)
CK MB SERPL-MCNC: <1 % (ref 0–2.5)
CK SERPL-CCNC: 6333 U/L (ref 39–308)
CK SERPL-CCNC: 8475 U/L (ref 39–308)
CLARITY UR: ABNORMAL
CO2 SERPL-SCNC: 20 MMOL/L (ref 21–32)
CO2 SERPL-SCNC: 23 MMOL/L (ref 21–32)
COLOR UR: YELLOW
CREAT SERPL-MCNC: 6.25 MG/DL (ref 0.6–1.3)
CREAT SERPL-MCNC: 9.65 MG/DL (ref 0.6–1.3)
EOSINOPHIL # BLD AUTO: 0 THOUSAND/ΜL (ref 0–0.61)
EOSINOPHIL NFR BLD AUTO: 0 % (ref 0–6)
ERYTHROCYTE [DISTWIDTH] IN BLOOD BY AUTOMATED COUNT: 15.7 % (ref 11.6–15.1)
FLUAV RNA RESP QL NAA+PROBE: NEGATIVE
FLUBV RNA RESP QL NAA+PROBE: NEGATIVE
GFR SERPL CREATININE-BSD FRML MDRD: 4 ML/MIN/1.73SQ M
GFR SERPL CREATININE-BSD FRML MDRD: 7 ML/MIN/1.73SQ M
GLUCOSE SERPL-MCNC: 176 MG/DL (ref 65–140)
GLUCOSE SERPL-MCNC: 178 MG/DL (ref 65–140)
GLUCOSE SERPL-MCNC: 193 MG/DL (ref 65–140)
GLUCOSE SERPL-MCNC: 333 MG/DL (ref 65–140)
GLUCOSE UR STRIP-MCNC: ABNORMAL MG/DL
HCO3 BLDV-SCNC: 19.5 MMOL/L (ref 24–30)
HCT VFR BLD AUTO: 30.8 % (ref 36.5–49.3)
HGB BLD-MCNC: 9.7 G/DL (ref 12–17)
HGB UR QL STRIP.AUTO: ABNORMAL
IMM GRANULOCYTES # BLD AUTO: 0.14 THOUSAND/UL (ref 0–0.2)
IMM GRANULOCYTES NFR BLD AUTO: 1 % (ref 0–2)
INR PPP: 1.47 (ref 0.84–1.19)
KETONES UR STRIP-MCNC: NEGATIVE MG/DL
LACTATE SERPL-SCNC: 1.5 MMOL/L (ref 0.5–2)
LACTATE SERPL-SCNC: 2.7 MMOL/L (ref 0.5–2)
LEUKOCYTE ESTERASE UR QL STRIP: ABNORMAL
LYMPHOCYTES # BLD AUTO: 0.3 THOUSANDS/ΜL (ref 0.6–4.47)
LYMPHOCYTES NFR BLD AUTO: 2 % (ref 14–44)
MAGNESIUM SERPL-MCNC: 2.8 MG/DL (ref 1.6–2.6)
MCH RBC QN AUTO: 28.7 PG (ref 26.8–34.3)
MCHC RBC AUTO-ENTMCNC: 31.5 G/DL (ref 31.4–37.4)
MCV RBC AUTO: 91 FL (ref 82–98)
MONOCYTES # BLD AUTO: 0.74 THOUSAND/ΜL (ref 0.17–1.22)
MONOCYTES NFR BLD AUTO: 5 % (ref 4–12)
NEUTROPHILS # BLD AUTO: 14.17 THOUSANDS/ΜL (ref 1.85–7.62)
NEUTS SEG NFR BLD AUTO: 92 % (ref 43–75)
NITRITE UR QL STRIP: NEGATIVE
NON-SQ EPI CELLS URNS QL MICRO: ABNORMAL /HPF
NRBC BLD AUTO-RTO: 0 /100 WBCS
O2 CT BLDV-SCNC: 4.5 ML/DL
P AXIS: 69 DEGREES
PCO2 BLDV: 43.4 MM HG (ref 42–50)
PH BLDV: 7.27 [PH] (ref 7.3–7.4)
PH UR STRIP.AUTO: 8 [PH]
PHOSPHATE SERPL-MCNC: 7.5 MG/DL (ref 2.3–4.1)
PLATELET # BLD AUTO: 518 THOUSANDS/UL (ref 149–390)
PMV BLD AUTO: 8.7 FL (ref 8.9–12.7)
PO2 BLDV: 27.3 MM HG (ref 35–45)
POTASSIUM SERPL-SCNC: 4.9 MMOL/L (ref 3.5–5.3)
POTASSIUM SERPL-SCNC: 7.2 MMOL/L (ref 3.5–5.3)
PR INTERVAL: 504 MS
PROCALCITONIN SERPL-MCNC: 4.26 NG/ML
PROT SERPL-MCNC: 7.9 G/DL (ref 6.4–8.2)
PROT UR STRIP-MCNC: >=300 MG/DL
PROTHROMBIN TIME: 17.5 SECONDS (ref 11.6–14.5)
QRS AXIS: 65 DEGREES
QRSD INTERVAL: 90 MS
QT INTERVAL: 494 MS
QTC INTERVAL: 446 MS
RBC # BLD AUTO: 3.38 MILLION/UL (ref 3.88–5.62)
RBC #/AREA URNS AUTO: ABNORMAL /HPF
RSV RNA RESP QL NAA+PROBE: NEGATIVE
SARS-COV-2 RNA RESP QL NAA+PROBE: NEGATIVE
SODIUM SERPL-SCNC: 127 MMOL/L (ref 136–145)
SODIUM SERPL-SCNC: 132 MMOL/L (ref 136–145)
SP GR UR STRIP.AUTO: 1.02 (ref 1–1.03)
T WAVE AXIS: 61 DEGREES
UROBILINOGEN UR QL STRIP.AUTO: 0.2 E.U./DL
VENTRICULAR RATE: 49 BPM
WBC # BLD AUTO: 15.36 THOUSAND/UL (ref 4.31–10.16)
WBC #/AREA URNS AUTO: ABNORMAL /HPF

## 2022-06-01 PROCEDURE — 99291 CRITICAL CARE FIRST HOUR: CPT | Performed by: EMERGENCY MEDICINE

## 2022-06-01 PROCEDURE — 83605 ASSAY OF LACTIC ACID: CPT | Performed by: PHYSICIAN ASSISTANT

## 2022-06-01 PROCEDURE — 87154 CUL TYP ID BLD PTHGN 6+ TRGT: CPT | Performed by: PHYSICIAN ASSISTANT

## 2022-06-01 PROCEDURE — 82550 ASSAY OF CK (CPK): CPT | Performed by: PHYSICIAN ASSISTANT

## 2022-06-01 PROCEDURE — 5A1D70Z PERFORMANCE OF URINARY FILTRATION, INTERMITTENT, LESS THAN 6 HOURS PER DAY: ICD-10-PCS | Performed by: INTERNAL MEDICINE

## 2022-06-01 PROCEDURE — 80053 COMPREHEN METABOLIC PANEL: CPT | Performed by: PHYSICIAN ASSISTANT

## 2022-06-01 PROCEDURE — 85730 THROMBOPLASTIN TIME PARTIAL: CPT | Performed by: PHYSICIAN ASSISTANT

## 2022-06-01 PROCEDURE — 74176 CT ABD & PELVIS W/O CONTRAST: CPT

## 2022-06-01 PROCEDURE — 84100 ASSAY OF PHOSPHORUS: CPT | Performed by: PHYSICIAN ASSISTANT

## 2022-06-01 PROCEDURE — 96375 TX/PRO/DX INJ NEW DRUG ADDON: CPT

## 2022-06-01 PROCEDURE — 82805 BLOOD GASES W/O2 SATURATION: CPT | Performed by: PHYSICIAN ASSISTANT

## 2022-06-01 PROCEDURE — 99292 CRITICAL CARE ADDL 30 MIN: CPT | Performed by: EMERGENCY MEDICINE

## 2022-06-01 PROCEDURE — 71250 CT THORAX DX C-: CPT

## 2022-06-01 PROCEDURE — 0241U HB NFCT DS VIR RESP RNA 4 TRGT: CPT | Performed by: PHYSICIAN ASSISTANT

## 2022-06-01 PROCEDURE — 81001 URINALYSIS AUTO W/SCOPE: CPT | Performed by: PHYSICIAN ASSISTANT

## 2022-06-01 PROCEDURE — 87086 URINE CULTURE/COLONY COUNT: CPT | Performed by: PHYSICIAN ASSISTANT

## 2022-06-01 PROCEDURE — 84145 PROCALCITONIN (PCT): CPT | Performed by: PHYSICIAN ASSISTANT

## 2022-06-01 PROCEDURE — 71045 X-RAY EXAM CHEST 1 VIEW: CPT

## 2022-06-01 PROCEDURE — 93005 ELECTROCARDIOGRAM TRACING: CPT

## 2022-06-01 PROCEDURE — 82553 CREATINE MB FRACTION: CPT | Performed by: PHYSICIAN ASSISTANT

## 2022-06-01 PROCEDURE — 83735 ASSAY OF MAGNESIUM: CPT | Performed by: PHYSICIAN ASSISTANT

## 2022-06-01 PROCEDURE — 99223 1ST HOSP IP/OBS HIGH 75: CPT | Performed by: INTERNAL MEDICINE

## 2022-06-01 PROCEDURE — 82948 REAGENT STRIP/BLOOD GLUCOSE: CPT

## 2022-06-01 PROCEDURE — 87040 BLOOD CULTURE FOR BACTERIA: CPT | Performed by: PHYSICIAN ASSISTANT

## 2022-06-01 PROCEDURE — 99285 EMERGENCY DEPT VISIT HI MDM: CPT

## 2022-06-01 PROCEDURE — 96368 THER/DIAG CONCURRENT INF: CPT

## 2022-06-01 PROCEDURE — 84484 ASSAY OF TROPONIN QUANT: CPT | Performed by: PHYSICIAN ASSISTANT

## 2022-06-01 PROCEDURE — 87081 CULTURE SCREEN ONLY: CPT | Performed by: INTERNAL MEDICINE

## 2022-06-01 PROCEDURE — 85025 COMPLETE CBC W/AUTO DIFF WBC: CPT | Performed by: PHYSICIAN ASSISTANT

## 2022-06-01 PROCEDURE — 82010 KETONE BODYS QUAN: CPT | Performed by: PHYSICIAN ASSISTANT

## 2022-06-01 PROCEDURE — G1004 CDSM NDSC: HCPCS

## 2022-06-01 PROCEDURE — 99291 CRITICAL CARE FIRST HOUR: CPT | Performed by: PHYSICIAN ASSISTANT

## 2022-06-01 PROCEDURE — 96365 THER/PROPH/DIAG IV INF INIT: CPT

## 2022-06-01 PROCEDURE — 85610 PROTHROMBIN TIME: CPT | Performed by: PHYSICIAN ASSISTANT

## 2022-06-01 PROCEDURE — 80048 BASIC METABOLIC PNL TOTAL CA: CPT | Performed by: PHYSICIAN ASSISTANT

## 2022-06-01 PROCEDURE — 93010 ELECTROCARDIOGRAM REPORT: CPT | Performed by: INTERNAL MEDICINE

## 2022-06-01 PROCEDURE — 36415 COLL VENOUS BLD VENIPUNCTURE: CPT | Performed by: PHYSICIAN ASSISTANT

## 2022-06-01 RX ORDER — HEPARIN SODIUM 5000 [USP'U]/ML
5000 INJECTION, SOLUTION INTRAVENOUS; SUBCUTANEOUS EVERY 8 HOURS SCHEDULED
Status: DISCONTINUED | OUTPATIENT
Start: 2022-06-01 | End: 2022-06-07 | Stop reason: HOSPADM

## 2022-06-01 RX ORDER — PRAVASTATIN SODIUM 80 MG/1
80 TABLET ORAL
Refills: 3 | Status: DISCONTINUED | OUTPATIENT
Start: 2022-06-02 | End: 2022-06-07 | Stop reason: HOSPADM

## 2022-06-01 RX ORDER — LATANOPROST 50 UG/ML
1 SOLUTION/ DROPS OPHTHALMIC
Status: DISCONTINUED | OUTPATIENT
Start: 2022-06-01 | End: 2022-06-07 | Stop reason: HOSPADM

## 2022-06-01 RX ORDER — TAMSULOSIN HYDROCHLORIDE 0.4 MG/1
0.4 CAPSULE ORAL
Status: DISCONTINUED | OUTPATIENT
Start: 2022-06-01 | End: 2022-06-07 | Stop reason: HOSPADM

## 2022-06-01 RX ORDER — CALCIUM ACETATE 667 MG/1
667 CAPSULE ORAL
Status: DISCONTINUED | OUTPATIENT
Start: 2022-06-02 | End: 2022-06-07 | Stop reason: HOSPADM

## 2022-06-01 RX ORDER — CEFEPIME HYDROCHLORIDE 1 G/50ML
1000 INJECTION, SOLUTION INTRAVENOUS ONCE
Status: COMPLETED | OUTPATIENT
Start: 2022-06-01 | End: 2022-06-01

## 2022-06-01 RX ORDER — INSULIN LISPRO 100 [IU]/ML
1-6 INJECTION, SOLUTION INTRAVENOUS; SUBCUTANEOUS EVERY 6 HOURS SCHEDULED
Status: DISCONTINUED | OUTPATIENT
Start: 2022-06-01 | End: 2022-06-02

## 2022-06-01 RX ORDER — DEXTROSE MONOHYDRATE 25 G/50ML
25 INJECTION, SOLUTION INTRAVENOUS ONCE
Status: DISCONTINUED | OUTPATIENT
Start: 2022-06-01 | End: 2022-06-01

## 2022-06-01 RX ORDER — CEFEPIME HYDROCHLORIDE 1 G/50ML
1000 INJECTION, SOLUTION INTRAVENOUS EVERY 24 HOURS
Status: DISCONTINUED | OUTPATIENT
Start: 2022-06-02 | End: 2022-06-03

## 2022-06-01 RX ORDER — CALCIUM GLUCONATE 20 MG/ML
1 INJECTION, SOLUTION INTRAVENOUS ONCE
Status: COMPLETED | OUTPATIENT
Start: 2022-06-01 | End: 2022-06-01

## 2022-06-01 RX ADMIN — HEPARIN SODIUM 5000 UNITS: 5000 INJECTION INTRAVENOUS; SUBCUTANEOUS at 21:27

## 2022-06-01 RX ADMIN — INSULIN HUMAN 10 UNITS: 100 INJECTION, SOLUTION PARENTERAL at 12:34

## 2022-06-01 RX ADMIN — SODIUM CHLORIDE 1000 ML: 0.9 INJECTION, SOLUTION INTRAVENOUS at 12:07

## 2022-06-01 RX ADMIN — CEFEPIME HYDROCHLORIDE 1000 MG: 1 INJECTION, SOLUTION INTRAVENOUS at 11:50

## 2022-06-01 RX ADMIN — HEPARIN SODIUM 5000 UNITS: 5000 INJECTION INTRAVENOUS; SUBCUTANEOUS at 15:00

## 2022-06-01 RX ADMIN — LATANOPROST 1 DROP: 50 SOLUTION OPHTHALMIC at 21:27

## 2022-06-01 RX ADMIN — VANCOMYCIN HYDROCHLORIDE 1750 MG: 10 INJECTION, POWDER, LYOPHILIZED, FOR SOLUTION INTRAVENOUS at 12:45

## 2022-06-01 RX ADMIN — INSULIN LISPRO 2 UNITS: 100 INJECTION, SOLUTION INTRAVENOUS; SUBCUTANEOUS at 18:18

## 2022-06-01 RX ADMIN — TAMSULOSIN HYDROCHLORIDE 0.4 MG: 0.4 CAPSULE ORAL at 17:14

## 2022-06-01 RX ADMIN — CALCIUM GLUCONATE 1 G: 20 INJECTION, SOLUTION INTRAVENOUS at 12:36

## 2022-06-01 RX ADMIN — SODIUM CHLORIDE 1000 ML: 0.9 INJECTION, SOLUTION INTRAVENOUS at 13:38

## 2022-06-01 RX ADMIN — INSULIN LISPRO 1 UNITS: 100 INJECTION, SOLUTION INTRAVENOUS; SUBCUTANEOUS at 23:47

## 2022-06-01 NOTE — PROGRESS NOTES
Vancomycin Assessment    Erik Serna is a 64 y o  male who is currently receiving vancomycin 1750mg IV once for bacteremia     Relevant clinical data and objective history reviewed:  Creatinine   Date Value Ref Range Status   06/01/2022 9 65 (H) 0 60 - 1 30 mg/dL Final     Comment:     Standardized to IDMS reference method   05/09/2022 6 44 (H) 0 60 - 1 30 mg/dL Final     Comment:     Standardized to IDMS reference method   05/08/2022 4 66 (H) 0 60 - 1 30 mg/dL Final     Comment:     Standardized to IDMS reference method   07/24/2017 1 02 0 76 - 1 27 mg/dL Final     /60   Pulse 103   Temp 98 °F (36 7 °C)   Resp 22   Ht 5' 8" (1 727 m)   Wt 81 2 kg (179 lb 0 2 oz)   SpO2 99%   BMI 27 22 kg/m²   No intake/output data recorded  Lab Results   Component Value Date/Time    BUN 81 (H) 06/01/2022 10:46 AM    BUN 16 07/24/2017 09:27 AM    WBC 15 36 (H) 06/01/2022 10:46 AM    WBC 4 9 07/24/2017 09:27 AM    HGB 9 7 (L) 06/01/2022 10:46 AM    HGB 16 2 07/24/2017 09:27 AM    HCT 30 8 (L) 06/01/2022 10:46 AM    HCT 45 8 07/24/2017 09:27 AM    MCV 91 06/01/2022 10:46 AM    MCV 88 07/24/2017 09:27 AM     (H) 06/01/2022 10:46 AM     07/24/2017 09:27 AM     Temp Readings from Last 3 Encounters:   06/01/22 98 °F (36 7 °C)   05/18/22 98 1 °F (36 7 °C)   05/09/22 98 °F (36 7 °C) (Oral)     Vancomycin Days of Therapy: 1    Assessment/Plan  The patient is currently on vancomycin utilizing pulse dosing based on actual body weight  Baseline risks associated with therapy include: pre-existing renal impairment, concomitant nephrotoxic medications, and advanced age  The patient is currently receiving 1750mg IV once and after clinical evaluation will be changed to 750mg IV (10mg/kg) after each HD session  Pharmacy will also follow closely for s/sx of nephrotoxicity, infusion reactions, and appropriateness of therapy  BMP and CBC will be ordered per protocol    Plan for trough at approximately 0600 (morning draw) on 6/2/22  Due to infection severity, will target a trough of 15-20 (appropriate for most indications)   Pharmacy will continue to follow the patients culture results and clinical progress daily      Magaly Mclaughlin, Pharmacist

## 2022-06-01 NOTE — ASSESSMENT & PLAN NOTE
Lab Results   Component Value Date    HGBA1C 5 1 05/08/2022       No results for input(s): POCGLU in the last 72 hours      Blood Sugar Average: Last 72 hrs:     · Hyperglycemic in the emergency department, although normally well controlled  · Sliding scale insulin, consideration for insulin infusion if needed

## 2022-06-01 NOTE — ED NOTES
Pt  Received to room with clothing soaked in animal urine - skin caked in feces  Pt  Cleansed, hair shampooed and repositioned  Multiple wounds noted under toes  At various stages of healing  Pt  Has multiple bruises on upper and lower extremities at various stages of healing  Petechia bilat feet  Pt  Has enormous unstagable wound on posterior upper thigh with multiple open blisters noted superior gluteal area  Penile/testicular Edema noted  Wife unable to be reached  Pt  Extremely [place]   A&O x Lynda 11, RN  06/01/22 7558

## 2022-06-01 NOTE — HEMODIALYSIS
Post-Dialysis RN Treatment Note    Blood Pressure:  Pre 181/78  49  mm/Hg  Post 118/58    101  mmHg   EDW  80 0  kg    Weight:  Pre     81 2 kg   Post   kg   Mode of weight measurement: bed   Volume Removed  1600 ml    Treatment duration 120 minutes    NS given  200    Treatment shortened?  no   Medications given during Rx NA   Estimated Kt/V  NA   Access type: CVC   Access Issues:  NA   Report called to primary nurse   Niki Galo RN

## 2022-06-01 NOTE — CONSULTS
[address] 64 y o  male MRN: [id number]  Unit/Bed#: ICU 12 Encounter: [id number]    ASSESSMENT and PLAN:    77-year-old male with a history of end-stage renal disease on hemodialysis, diabetes mellitus type 2, BPH, hypertension who was brought in to [facility] for generalized weakness as his wife found him on the toilet with extreme weakness  Patient consult for ESRD management      Hyperkalemia  --due to poor compliance with outpatient dialysis along with concurrent hyperglycemia  --non hemolyzed  --received medical management with IV insulin  --plan for hemodialysis today, with a low-potassium bath starting with a 2 K followed by a 1 K bath  --concurrent bradycardia    End-stage renal disease  --incenter hemodialysis Tuesday Thursday Saturday at [address]  --access:  Right IJ PermCath, follow-up blood cultures as I am concerned that there may be some contamination  --outpatient prescription includes 137 sodium, 35 bicarbonate, 2 potassium, 2 5 calcium, temperature 35 5°, blood flow rate 400 mL/minute, dry weight 80 kg, 4 hours  --plan for hemodialysis today short treatment  --CT scan shows a right renal subscapular fluid collection representing a likely hematoma which has decreased in size compared to prior imaging   --plan for next dialysis treatment tomorrow to maintain his usual schedule    Hyponatremia  --with concurrent hyperglycemia and renal failure  --corrected sodium proximally 130  --low serum chloride level  --will monitor on hemodialysis    Anion gap metabolic acidosis  --secondary to renal failure and poor compliance with outpatient dialysis  --plan for hemodialysis today  --lactic acid 2 7    Mineral bone disorder-chronic kidney  --hyperphosphatemia  --patient maintained on calcium acetate as an outpatient    Anemia of chronic kidney disease  --Micera as an outpatient  --hemoglobin close to target    Bilateral pleural effusions  --moderate-sized  --may not be amendable to ultrafiltration     Leukocytosis  --elevated procalcitonin level and lactic acid  --check blood cultures  --appears to be no evidence of a infiltrate  --currently has a PermCath in place which due to poor outpatient hygiene at home concern for possible infection  --broad-spectrum antibiotics  --dose vancomycin based on level    SUMMARY OF RECOMMENDATIONS:    Please see above    HISTORY OF PRESENT ILLNESS:  Requesting Physician: Akhil Amos MD  Reason for Consult:  ESRD  Juan Jose Kelly is a 64 y o  male who was admitted to [facility] after presenting with generalized weakness  A renal consultation is requested today for assistance in the management of ESRD  Patient has not been to dialysis, and had not been to dialysis in over 5 days  He is a poor historian  He was reported that he was found covered in CT urine and feces all over his body  His PermCath also appears to be stain in regards the dressing  Was found to meet sepsis criteria on presentation and started on broad-spectrum antibiotics  He gets his dialysis on a Tuesday Thursday Saturday schedule      PAST MEDICAL HISTORY:  Past Medical History:   Diagnosis Date    Anesthesia complication 8155    after colonoscopy , pt was awake but could not control his body and kept falling     Arthritis     Bladder calculi     BPH (benign prostatic hyperplasia)     Diabetes mellitus ([address] )     Hearing disorder of both ears     wears bilateral hearing aids    Hyperlipidemia     controlled and maintained d/t diabetes    Hypertension     Kidney stones     Last Assessed:4/3/2017    Lyme disease     Last Assessed:6/29/2015    Rupture, bladder, spontaneous     Last Assessed:4/3/2017       PAST SURGICAL HISTORY:  Past Surgical History:   Procedure Laterality Date    BLADDER REPAIR N/A 12/10/2016    Procedure: REPAIR BLADDER/cysto insertion stent;  Surgeon: Ciro Anne MD; Location: [facility];  Service:     COLONOSCOPY      CYSTOLITHOTOMY      ANESTHESIA   lower abdomen  ;  Last Assessed:4/3/2017    IR ASPIRATION ONLY  2022    IR BIOPSY KIDNEY RANDOM  3/10/2022    IR TUNNELED DIALYSIS CATHETER PLACEMENT  3/8/2022    OTHER SURGICAL HISTORY      thermal dilation of the prostate x 2 ( in the office)    TONSILLECTOMY      TRANSURETHRAL RESECTION OF PROSTATE N/A 2016    Procedure:  Cysto, Laser Bladder stone,fulgaration of prostates tissue ;  Surgeon: Migdalia Christie MD;  Location: [facility];  Service:        ALLERGIES:  Allergies   Allergen Reactions    Amoxicillin Rash       SOCIAL HISTORY:  Social History     Substance and Sexual Activity   Alcohol Use Yes     Social History     Substance and Sexual Activity   Drug Use No     Social History     Tobacco Use   Smoking Status Former Smoker    Types: Cigars    Quit date: 26    Years since quittin 4   Smokeless Tobacco Never Used       FAMILY HISTORY:  Family History   Problem Relation Age of Onset    Cancer Mother         breast    Diabetes Mother     Cancer Father         prostate w/ bone mets    Prostate cancer Father     Alzheimer's disease Sister        MEDICATIONS:    Current Facility-Administered Medications:     sodium chloride 0 9 % bolus 1,000 mL, 1,000 mL, Intravenous, Once, Alex Ramirez PA-C, Last Rate: 1,000 mL/hr at 22 1338, 1,000 mL at 22 1338    vancomycin (VANCOCIN) 1,750 mg in sodium chloride 0 9 % 500 mL IVPB, 20 mg/kg, Intravenous, Once, Alex Ramirez PA-C, Last Rate: 250 mL/hr at 22 1245, 1,750 mg at 22 1245    REVIEW OF SYSTEMS:  Unable to get a good review of systems as the patient has extreme weakness and not able to give a good history    PHYSICAL EXAM:  Current Weight: Weight - Scale: 82 kg (180 lb 12 4 oz)  First Weight: Weight - Scale: 82 kg (180 lb 12 4 oz)  Vitals:    22 1300 22 1315 22 1330 22 1414   BP: (!) 179/74 168/71 (!) 179/77 (!) 182/74   BP Location:    Right arm   Pulse: 56 (!) 54 (!) 53 (!) 50   Resp: (!) 28 (!) 29 20 14   Temp:    98 °F (36 7 °C)   TempSrc:    Temporal   SpO2: 100% 100% 100% 99%   Weight:           Intake/Output Summary (Last 24 hours) at 6/1/2022 1423  Last data filed at 6/1/2022 1234  Gross per 24 hour   Intake 1000 ml   Output --   Net 1000 ml     Physical Exam  Vitals and nursing note reviewed  Exam conducted with a chaperone present  Constitutional:       General: He is not in acute distress  Appearance: He is ill-appearing  He is not toxic-appearing or diaphoretic  Comments: Disheveled   HENT:      Head: Normocephalic and atraumatic  Mouth/Throat:      Mouth: Mucous membranes are dry  Eyes:      General: No scleral icterus  Right eye: No discharge  Left eye: No discharge  Cardiovascular:      Rate and Rhythm: Normal rate and regular rhythm  Heart sounds: No murmur heard  Pulmonary:      Effort: Pulmonary effort is normal  No respiratory distress  Breath sounds: No stridor  No wheezing, rhonchi or rales  Abdominal:      General: There is no distension  Tenderness: There is no abdominal tenderness  There is no guarding  Musculoskeletal:         General: No swelling  Cervical back: Normal range of motion and neck supple  Skin:     General: Skin is dry  Coloration: Skin is pale  Neurological:      Mental Status: He is alert  He is disoriented             Invasive Devices:      Lab Results:   Results from last 7 days   Lab Units 06/01/22  1046   WBC Thousand/uL 15 36*   HEMOGLOBIN g/dL 9 7*   HEMATOCRIT % 30 8*   PLATELETS Thousands/uL 518*   POTASSIUM mmol/L 7 2*   CHLORIDE mmol/L 87*   CO2 mmol/L 20*   BUN mg/dL 81*   CREATININE mg/dL 9 65*   CALCIUM mg/dL 8 6   MAGNESIUM mg/dL 2 8*   PHOSPHORUS mg/dL 7 5*   ALK PHOS U/L 139*   ALT U/L 56   AST U/L 189*

## 2022-06-01 NOTE — ASSESSMENT & PLAN NOTE
· CK elevated, unclear how long he was on toilet without help  · Receiving fluids and dialysis  · Trend

## 2022-06-01 NOTE — PLAN OF CARE
Goal to correct large electrolyte anomaly with treatment  Problem: METABOLIC, FLUID AND ELECTROLYTES - ADULT  Goal: Electrolytes maintained within normal limits  Description: INTERVENTIONS:  - Monitor labs and assess patient for signs and symptoms of electrolyte imbalances  - Administer electrolyte replacement as ordered  - Monitor response to electrolyte replacements, including repeat lab results as appropriate  - Instruct patient on fluid and nutrition as appropriate  Outcome: Progressing  Goal: Fluid balance maintained  Description: INTERVENTIONS:  - Monitor labs   - Monitor I/O and WT  - Instruct patient on fluid and nutrition as appropriate  - Assess for signs & symptoms of volume excess or deficit  Outcome: Progressing

## 2022-06-01 NOTE — ASSESSMENT & PLAN NOTE
Lab Results   Component Value Date    EGFR 4 06/01/2022    EGFR 7 05/09/2022    EGFR 11 05/08/2022    CREATININE 9 65 (H) 06/01/2022    CREATININE 6 44 (H) 05/09/2022    CREATININE 4 66 (H) 05/08/2022     Hgb currently stable

## 2022-06-01 NOTE — ASSESSMENT & PLAN NOTE
· Patient without focal deficit, however confused  · Likely secondary to sepsis as well as metabolic derangements  · Monitor

## 2022-06-01 NOTE — ED PROCEDURE NOTE
PROCEDURE  CriticalCare Time  Performed by: Demi Holland DO  Authorized by: Demi Holland DO     Critical care provider statement:     Critical care time (minutes):  80    Critical care time was exclusive of:  Separately billable procedures and treating other patients    Critical care was necessary to treat or prevent imminent or life-threatening deterioration of the following conditions:  Sepsis, endocrine crisis, dehydration, circulatory failure and metabolic crisis    Critical care was time spent personally by me on the following activities:  Blood draw for specimens, obtaining history from patient or surrogate, development of treatment plan with patient or surrogate, discussions with consultants, evaluation of patient's response to treatment, examination of patient, review of old charts, re-evaluation of patient's condition, ordering and review of laboratory studies, ordering and review of radiographic studies, interpretation of cardiac output measurements and ordering and performing treatments and interventions         Hernan Bowen DO  06/01/22 115

## 2022-06-01 NOTE — ASSESSMENT & PLAN NOTE
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

## 2022-06-01 NOTE — ASSESSMENT & PLAN NOTE
· Evidenced by leukocytosis, lactic acidosis, tachypnea  · CT scan without obvious source of infection  · Urinalysis concerning for urinary tract infection  Patient was just admitted a few weeks ago with urinary tract infection  There was concern at the time of a perinephric subcapsular fluid collection following a previous renal biopsy  The differential included hematoma versus abscess  IR did attempt to aspirate but was not able to aspirate much, felt to likely be a hematoma  Patient was treated for a urinary tract infection and discharged  Repeat scan today shows interval decrease of collection  Likely resolving hematoma and unlikely source of infection at this time  · Other source could be PermCath  Recently placed, however poor hygiene and possibly can be contaminated    Monitor blood cultures  · Await urine culture  · Continue cefepime and vancomycin  · Await blood cultures

## 2022-06-01 NOTE — H&P
Mahesh 43  H&P- Darius Lemus 1945, 64 y o  male MRN: [id number]  Unit/Bed#: ICU 12 Encounter: [id number]  Primary Care Provider: Heladio Cabrales MD   Date and time admitted to hospital: 6/1/2022  9:46 AM    Severe sepsis [facility])  Assessment & Plan  · Evidenced by leukocytosis, lactic acidosis, tachypnea  · CT scan without obvious source of infection  · Urinalysis concerning for urinary tract infection  Patient was just admitted a few weeks ago with urinary tract infection  There was concern at the time of a perinephric subcapsular fluid collection following a previous renal biopsy  The differential included hematoma versus abscess  IR did attempt to aspirate but was not able to aspirate much, felt to likely be a hematoma  Patient was treated for a urinary tract infection and discharged  Repeat scan today shows interval decrease of collection  Likely resolving hematoma and unlikely source of infection at this time  · Other source could be PermCath  Recently placed, however poor hygiene and possibly can be contaminated  Monitor blood cultures  · Await urine culture  · Continue cefepime and vancomycin  · Await blood cultures    Hyperkalemia  Assessment & Plan  · Patient missed dialysis session yesterday    Will undergo dialysis now    Suspected UTI  Assessment & Plan  · Urinalysis concerning for urinary tract infection  · Urine culture pending    High anion gap metabolic acidosis  Assessment & Plan  · Likely secondary to renal failure, sepsis, mild DKA, lactic acidosis    Toxic metabolic encephalopathy  Assessment & Plan  · Patient without focal deficit, however confused  · Likely secondary to sepsis as well as metabolic derangements  · Monitor    Rhabdomyolysis  Assessment & Plan  · CK elevated, unclear how long he was on toilet without help  · Receiving fluids and dialysis  · Trend    Lactic acidosis  Assessment & Plan  · Likely secondary to sepsis  · Resuscitate and trend    Type 2 diabetes mellitus with chronic kidney disease on chronic dialysis, with long-term current use of insulin [facility])  Assessment & Plan  Lab Results   Component Value Date    HGBA1C 5 1 05/08/2022       No results for input(s): POCGLU in the last 72 hours  Blood Sugar Average: Last 72 hrs:     · Hyperglycemic in the emergency department, although normally well controlled  · Sliding scale insulin, consideration for insulin infusion if needed    Benign essential hypertension  Assessment & Plan  · Resume hypertensive medications likely tomorrow    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[facility] [address] )  Assessment & Plan  · Status post kidney biopsy on 03/09-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  Previously treated with Cytoxan    Anemia due to chronic kidney disease, on chronic dialysis [facility])  Assessment & Plan  Lab Results   Component Value Date    EGFR 4 06/01/2022    EGFR 7 05/09/2022    EGFR 11 05/08/2022    CREATININE 9 65 (H) 06/01/2022    CREATININE 6 44 (H) 05/09/2022    CREATININE 4 66 (H) 05/08/2022     Hgb currently stable    Hyperlipidemia  Assessment & Plan  · Continue statin    -------------------------------------------------------------------------------------------------------------  Chief Complaint:  Unable to assess secondary to altered mental status    History of Present Illness   HX and PE limited by: Altered mental status  Jaqueline Tiwari is a 64 y o  male with past medical history of ANCA vasculitis status post right kidney biopsy in March, history of right UVJ stone with hydronephrosis in March, CKD with recent start on hemodialysis, diabetes type 2, hypertension, hyperlipidemia, anemia, BPH, recent admission for urinary tract infection (patient had a perinephric hematoma however thought to be hematoma) who presents with altered mental status  Wife states he was not as active yesterday and decided he did not want to go to dialysis, which she states is unlike him    Today, he was more altered and found on the toilet by his wife extremely weak  His potassium was 7 2 and there was concern for infection  Patient was given antibiotics and Nephrology was urgently consulted  Patient will undergo dialysis and will be admitted to the step-down unit for further monitoring  History obtained from chart review  -------------------------------------------------------------------------------------------------------------  Dispo: Admit to Stepdown Level 1    Code Status: Prior  --------------------------------------------------------------------------------------------------------------  Review of Systems    A 12-point, complete review of systems was reviewed and negative except as stated above     Physical Exam  Vitals and nursing note reviewed  Constitutional:       General: He is not in acute distress  Appearance: He is not ill-appearing  HENT:      Head: Normocephalic and atraumatic  Mouth/Throat:      Mouth: Mucous membranes are moist    Eyes:      Pupils: Pupils are equal, round, and reactive to light  Cardiovascular:      Rate and Rhythm: Bradycardia present  Pulses: Normal pulses  Heart sounds: Normal heart sounds  Pulmonary:      Effort: Pulmonary effort is normal  No respiratory distress  Breath sounds: Normal breath sounds  Abdominal:      General: Abdomen is flat  There is no distension  Musculoskeletal:         General: No swelling  Comments: Petechiae noticed on right lower extremity   Skin:     General: Skin is warm  Neurological:      General: No focal deficit present  Mental Status: He is alert  He is confused  GCS: GCS eye subscore is 4  GCS verbal subscore is 4  GCS motor subscore is 6  Cranial Nerves: No cranial nerve deficit, dysarthria or facial asymmetry  Comments: Moving all extremities with no focal deficits    Answering questions but confused --------------------------------------------------------------------------------------------------------------  Vitals:   Vitals:    06/01/22 1315 06/01/22 1330 06/01/22 1414 06/01/22 1445   BP: 168/71 (!) 179/77 (!) 182/74 (!) 181/78   BP Location:   Right arm    Pulse: (!) 54 (!) 53 (!) 50 (!) 48   Resp: (!) 29 20 14    Temp:   98 °F (36 7 °C)    TempSrc:   Temporal    SpO2: 100% 100% 99%    Weight:   81 2 kg (179 lb 0 2 oz)    Height:   5' 8" (1 727 m)      Temp  Min: 97 8 °F (36 6 °C)  Max: 98 °F (36 7 °C)  IBW (Ideal Body Weight): 68 4 kg  Height: 5' 8" (172 7 cm)  Body mass index is 27 22 kg/m²  Laboratory and Diagnostics:  Results from last 7 days   Lab Units 06/01/22  1046   WBC Thousand/uL 15 36*   HEMOGLOBIN g/dL 9 7*   HEMATOCRIT % 30 8*   PLATELETS Thousands/uL 518*   NEUTROS PCT % 92*   MONOS PCT % 5     Results from last 7 days   Lab Units 06/01/22  1046   SODIUM mmol/L 127*   POTASSIUM mmol/L 7 2*   CHLORIDE mmol/L 87*   CO2 mmol/L 20*   ANION GAP mmol/L 20*   BUN mg/dL 81*   CREATININE mg/dL 9 65*   CALCIUM mg/dL 8 6   GLUCOSE RANDOM mg/dL 333*   ALT U/L 56   AST U/L 189*   ALK PHOS U/L 139*   ALBUMIN g/dL 2 0*   TOTAL BILIRUBIN mg/dL 0 77     Results from last 7 days   Lab Units 06/01/22  1046   MAGNESIUM mg/dL 2 8*   PHOSPHORUS mg/dL 7 5*      Results from last 7 days   Lab Units 06/01/22  1228   INR  1 47*   PTT seconds 25          Results from last 7 days   Lab Units 06/01/22  1120   LACTIC ACID mmol/L 2 7*     ABG:    VBG:  Results from last 7 days   Lab Units 06/01/22  1228   PH MORELIA  7 271*   PCO2 MORELIA mm Hg 43 4   PO2 MORELIA mm Hg 27 3*   HCO3 MORELIA mmol/L 19 5*   BASE EXC MORELIA mmol/L -7 0     Results from last 7 days   Lab Units 06/01/22  1046   PROCALCITONIN ng/ml 4 26*       Micro:        EKG: sinus bradycardia  Imaging: I have personally reviewed pertinent reports          Historical Information   Past Medical History:   Diagnosis Date    Anesthesia complication 2990    after colonoscopy , pt was awake but could not control his body and kept falling     Arthritis     Bladder calculi     BPH (benign prostatic hyperplasia)     Diabetes mellitus ([address] )     Hearing disorder of both ears     wears bilateral hearing aids    Hyperlipidemia     controlled and maintained d/t diabetes    Hypertension     Kidney stones     Last Assessed:4/3/2017    Lyme disease     Last Assessed:2015    Rupture, bladder, spontaneous     Last Assessed:4/3/2017     Past Surgical History:   Procedure Laterality Date    BLADDER REPAIR N/A 12/10/2016    Procedure: REPAIR BLADDER/cysto insertion stent;  Surgeon: Yamini Wilhelm MD;  Location: [address] [place] [address];  Service:     COLONOSCOPY      CYSTOLITHOTOMY      ANESTHESIA   lower abdomen  ;  Last Assessed:4/3/2017    IR ASPIRATION ONLY  2022    IR BIOPSY KIDNEY RANDOM  3/10/2022    IR TUNNELED DIALYSIS CATHETER PLACEMENT  3/8/2022    OTHER SURGICAL HISTORY      thermal dilation of the prostate x 2 ( in the office)    TONSILLECTOMY      TRANSURETHRAL RESECTION OF PROSTATE N/A 2016    Procedure:  Cysto, Laser Bladder stone,fulgaration of prostates tissue ;  Surgeon: Yamini Wilhelm MD;  Location: [facility];  Service:      Social History   Social History     Substance and Sexual Activity   Alcohol Use Yes     Social History     Substance and Sexual Activity   Drug Use No     Social History     Tobacco Use   Smoking Status Former Smoker    Types: Cigars    Quit date: 26    Years since quittin 4   Smokeless Tobacco Never Used     Exercise History:   Family History:   Family History   Problem Relation Age of Onset    Cancer Mother         breast    Diabetes Mother     Cancer Father         prostate w/ bone mets    Prostate cancer Father     Alzheimer's disease Sister      I have reviewed this patient's family history and commented on sigificant items within the HPI      Medications:  Current Facility-Administered Medications   Medication Dose Route Frequency    [START ON 2022] epoetin ani (EPOGEN,PROCRIT) injection 2,000 Units  2,000 Units Intravenous After Dialysis     Home medications:  Prior to Admission Medications   Prescriptions Last Dose Informant Patient Reported? Taking? HumaLOG Mix 75/25 KwikPen (75-25) 100 units/mL injection pen Unknown at Unknown time  No No   Sig: Inject 25 Units under the skin 2 (two) times a day with meals   Patient taking differently: Inject 35-40 Units under the skin 2 (two) times a day with meals   Insulin Pen Needle (B-D UF III MINI PEN NEEDLES) 31G X 5 MM MISC Unknown at Unknown time  No No   Sig: Use twice daily with insulin pen as directed   acetaminophen (TYLENOL) 325 mg tablet Unknown at Unknown time  No No   Sig: Take 2 tablets (650 mg total) by mouth every 6 (six) hours as needed for mild pain, headaches or fever   calcium acetate (PHOSLO) capsule Unknown at Unknown time  Yes No   Sig: Take 667 mg by mouth in the morning and 667 mg at noon and 667 mg in the evening  Take with meals  calcium acetate (PHOSLO) capsule Unknown at Unknown time  Yes No   Sig: Take by mouth Three times a day   calcium carbonate-vitamin D (OSCAL-D) 500 mg-200 units per tablet Unknown at Unknown time  No No   Sig: Take 1 tablet by mouth daily with breakfast   dutasteride (AVODART) 0 5 mg capsule Unknown at Unknown time  Yes No   Sig: Take 0 5 mg by mouth daily   enalapril (VASOTEC) 10 mg tablet Unknown at Unknown time  Yes No   Sig: Take 10 mg by mouth in the morning     glucose blood test strip Unknown at Unknown time  No No   Si each by Other route 3 (three) times a day   latanoprost (XALATAN) 0 005 % ophthalmic solution Unknown at Unknown time  Yes No   Sig: Administer 1 drop to both eyes daily at bedtime   simvastatin (ZOCOR) 40 mg tablet Unknown at Unknown time  No No   Sig: Take 1 tablet (40 mg total) by mouth daily at bedtime   tamsulosin (FLOMAX) 0 4 mg Unknown at Unknown time Self No No   Sig: TAKE 1 CAPSULE BY MOUTH TWO TIMES DAILY      Facility-Administered Medications: None     Allergies: Allergies   Allergen Reactions    Amoxicillin Rash       ------------------------------------------------------------------------------------------------------------  Advance Directive and Living Will:      Power of :    POLST:    ------------------------------------------------------------------------------------------------------------  Anticipated Length of Stay is > 2 midnights    Care Time Delivered:   Upon my evaluation, this patient had a high probability of imminent or life-threatening deterioration due to Hyperkalemia, sepsis, which required my direct attention, intervention, and personal management  I have personally provided 40 minutes (1320 to 1400) of critical care time, exclusive of procedures, teaching, family meetings, and any prior time recorded by providers other than myself  Jacqueline Garcia PA-C        Portions of the record may have been created with voice recognition software  Occasional wrong word or "sound a like" substitutions may have occurred due to the inherent limitations of voice recognition software    Read the chart carefully and recognize, using context, where substitutions have occurred

## 2022-06-02 ENCOUNTER — APPOINTMENT (INPATIENT)
Dept: DIALYSIS | Facility: HOSPITAL | Age: 77
DRG: 871 | End: 2022-06-02
Payer: MEDICARE

## 2022-06-02 DIAGNOSIS — Z71.89 COMPLEX CARE COORDINATION: Primary | ICD-10-CM

## 2022-06-02 LAB
ALBUMIN SERPL BCP-MCNC: 1.5 G/DL (ref 3.5–5)
ALP SERPL-CCNC: 100 U/L (ref 46–116)
ALT SERPL W P-5'-P-CCNC: 69 U/L (ref 12–78)
ANION GAP SERPL CALCULATED.3IONS-SCNC: 12 MMOL/L (ref 4–13)
AST SERPL W P-5'-P-CCNC: 171 U/L (ref 5–45)
BACTERIA UR CULT: NORMAL
BASOPHILS # BLD AUTO: 0.02 THOUSANDS/ΜL (ref 0–0.1)
BASOPHILS NFR BLD AUTO: 0 % (ref 0–1)
BILIRUB SERPL-MCNC: 0.39 MG/DL (ref 0.2–1)
BUN SERPL-MCNC: 56 MG/DL (ref 5–25)
CA-I BLD-SCNC: 1.03 MMOL/L (ref 1.12–1.32)
CALCIUM ALBUM COR SERPL-MCNC: 10 MG/DL (ref 8.3–10.1)
CALCIUM SERPL-MCNC: 8 MG/DL (ref 8.3–10.1)
CHLORIDE SERPL-SCNC: 97 MMOL/L (ref 100–108)
CK MB SERPL-MCNC: 31.3 NG/ML (ref 0–5)
CK MB SERPL-MCNC: <1 % (ref 0–2.5)
CK SERPL-CCNC: 3863 U/L (ref 39–308)
CO2 SERPL-SCNC: 24 MMOL/L (ref 21–32)
CREAT SERPL-MCNC: 6.97 MG/DL (ref 0.6–1.3)
EOSINOPHIL # BLD AUTO: 0.01 THOUSAND/ΜL (ref 0–0.61)
EOSINOPHIL NFR BLD AUTO: 0 % (ref 0–6)
ERYTHROCYTE [DISTWIDTH] IN BLOOD BY AUTOMATED COUNT: 15.8 % (ref 11.6–15.1)
GFR SERPL CREATININE-BSD FRML MDRD: 6 ML/MIN/1.73SQ M
GLUCOSE SERPL-MCNC: 133 MG/DL (ref 65–140)
GLUCOSE SERPL-MCNC: 139 MG/DL (ref 65–140)
GLUCOSE SERPL-MCNC: 160 MG/DL (ref 65–140)
GLUCOSE SERPL-MCNC: 204 MG/DL (ref 65–140)
GLUCOSE SERPL-MCNC: 237 MG/DL (ref 65–140)
HCT VFR BLD AUTO: 26.6 % (ref 36.5–49.3)
HGB BLD-MCNC: 8.1 G/DL (ref 12–17)
IMM GRANULOCYTES # BLD AUTO: 0.05 THOUSAND/UL (ref 0–0.2)
IMM GRANULOCYTES NFR BLD AUTO: 1 % (ref 0–2)
LYMPHOCYTES # BLD AUTO: 0.74 THOUSANDS/ΜL (ref 0.6–4.47)
LYMPHOCYTES NFR BLD AUTO: 8 % (ref 14–44)
MAGNESIUM SERPL-MCNC: 2.6 MG/DL (ref 1.6–2.6)
MCH RBC QN AUTO: 27.9 PG (ref 26.8–34.3)
MCHC RBC AUTO-ENTMCNC: 30.5 G/DL (ref 31.4–37.4)
MCV RBC AUTO: 92 FL (ref 82–98)
MONOCYTES # BLD AUTO: 0.58 THOUSAND/ΜL (ref 0.17–1.22)
MONOCYTES NFR BLD AUTO: 6 % (ref 4–12)
NEUTROPHILS # BLD AUTO: 8.09 THOUSANDS/ΜL (ref 1.85–7.62)
NEUTS SEG NFR BLD AUTO: 85 % (ref 43–75)
NRBC BLD AUTO-RTO: 0 /100 WBCS
PHOSPHATE SERPL-MCNC: 7.1 MG/DL (ref 2.3–4.1)
PLATELET # BLD AUTO: 389 THOUSANDS/UL (ref 149–390)
PMV BLD AUTO: 8.3 FL (ref 8.9–12.7)
POTASSIUM SERPL-SCNC: 5.3 MMOL/L (ref 3.5–5.3)
PROCALCITONIN SERPL-MCNC: 7.6 NG/ML
PROT SERPL-MCNC: 6.2 G/DL (ref 6.4–8.2)
RBC # BLD AUTO: 2.9 MILLION/UL (ref 3.88–5.62)
SODIUM SERPL-SCNC: 133 MMOL/L (ref 136–145)
VANCOMYCIN TROUGH SERPL-MCNC: 20.2 UG/ML (ref 10–20)
WBC # BLD AUTO: 9.49 THOUSAND/UL (ref 4.31–10.16)

## 2022-06-02 PROCEDURE — 80053 COMPREHEN METABOLIC PANEL: CPT | Performed by: PHYSICIAN ASSISTANT

## 2022-06-02 PROCEDURE — 82948 REAGENT STRIP/BLOOD GLUCOSE: CPT

## 2022-06-02 PROCEDURE — 84100 ASSAY OF PHOSPHORUS: CPT | Performed by: PHYSICIAN ASSISTANT

## 2022-06-02 PROCEDURE — 5A1D70Z PERFORMANCE OF URINARY FILTRATION, INTERMITTENT, LESS THAN 6 HOURS PER DAY: ICD-10-PCS | Performed by: INTERNAL MEDICINE

## 2022-06-02 PROCEDURE — 82553 CREATINE MB FRACTION: CPT | Performed by: PHYSICIAN ASSISTANT

## 2022-06-02 PROCEDURE — 86778 TOXOPLASMA ANTIBODY IGM: CPT | Performed by: INTERNAL MEDICINE

## 2022-06-02 PROCEDURE — 86777 TOXOPLASMA ANTIBODY: CPT | Performed by: INTERNAL MEDICINE

## 2022-06-02 PROCEDURE — 83735 ASSAY OF MAGNESIUM: CPT | Performed by: PHYSICIAN ASSISTANT

## 2022-06-02 PROCEDURE — 99232 SBSQ HOSP IP/OBS MODERATE 35: CPT | Performed by: INTERNAL MEDICINE

## 2022-06-02 PROCEDURE — 80202 ASSAY OF VANCOMYCIN: CPT | Performed by: PHYSICIAN ASSISTANT

## 2022-06-02 PROCEDURE — 84145 PROCALCITONIN (PCT): CPT | Performed by: PHYSICIAN ASSISTANT

## 2022-06-02 PROCEDURE — 82550 ASSAY OF CK (CPK): CPT | Performed by: PHYSICIAN ASSISTANT

## 2022-06-02 PROCEDURE — 82330 ASSAY OF CALCIUM: CPT | Performed by: PHYSICIAN ASSISTANT

## 2022-06-02 PROCEDURE — 85025 COMPLETE CBC W/AUTO DIFF WBC: CPT | Performed by: PHYSICIAN ASSISTANT

## 2022-06-02 RX ORDER — INSULIN LISPRO 100 [IU]/ML
1-6 INJECTION, SOLUTION INTRAVENOUS; SUBCUTANEOUS
Status: DISCONTINUED | OUTPATIENT
Start: 2022-06-02 | End: 2022-06-07 | Stop reason: HOSPADM

## 2022-06-02 RX ADMIN — HEPARIN SODIUM 5000 UNITS: 5000 INJECTION INTRAVENOUS; SUBCUTANEOUS at 22:09

## 2022-06-02 RX ADMIN — INSULIN LISPRO 3 UNITS: 100 INJECTION, SOLUTION INTRAVENOUS; SUBCUTANEOUS at 22:10

## 2022-06-02 RX ADMIN — INSULIN LISPRO 1 UNITS: 100 INJECTION, SOLUTION INTRAVENOUS; SUBCUTANEOUS at 17:13

## 2022-06-02 RX ADMIN — EPOETIN ALFA 2000 UNITS: 2000 SOLUTION INTRAVENOUS; SUBCUTANEOUS at 19:31

## 2022-06-02 RX ADMIN — INSULIN LISPRO 2 UNITS: 100 INJECTION, SOLUTION INTRAVENOUS; SUBCUTANEOUS at 11:31

## 2022-06-02 RX ADMIN — LATANOPROST 1 DROP: 50 SOLUTION OPHTHALMIC at 22:09

## 2022-06-02 RX ADMIN — CEFEPIME HYDROCHLORIDE 1000 MG: 1 INJECTION, SOLUTION INTRAVENOUS at 11:34

## 2022-06-02 RX ADMIN — HEPARIN SODIUM 5000 UNITS: 5000 INJECTION INTRAVENOUS; SUBCUTANEOUS at 13:47

## 2022-06-02 RX ADMIN — CALCIUM ACETATE 667 MG: 667 CAPSULE ORAL at 11:36

## 2022-06-02 RX ADMIN — PRAVASTATIN SODIUM 80 MG: 80 TABLET ORAL at 16:51

## 2022-06-02 RX ADMIN — CALCIUM ACETATE 667 MG: 667 CAPSULE ORAL at 16:51

## 2022-06-02 RX ADMIN — CALCIUM ACETATE 667 MG: 667 CAPSULE ORAL at 07:59

## 2022-06-02 RX ADMIN — TAMSULOSIN HYDROCHLORIDE 0.4 MG: 0.4 CAPSULE ORAL at 16:51

## 2022-06-02 RX ADMIN — HEPARIN SODIUM 5000 UNITS: 5000 INJECTION INTRAVENOUS; SUBCUTANEOUS at 05:30

## 2022-06-02 NOTE — ASSESSMENT & PLAN NOTE
Lab Results   Component Value Date    HGBA1C 5 1 05/08/2022       Recent Labs     06/01/22  1807 06/01/22  2345 06/02/22  0522   POCGLU 193* 176* 139       Blood Sugar Average: Last 72 hrs:  (P) 920 [id number]   · Hyperglycemic in the emergency department, although normally well controlled  · Sliding scale insulin

## 2022-06-02 NOTE — ASSESSMENT & PLAN NOTE
· CK elevated, unclear how long he was on toilet without help  · Receiving fluids and dialysis  · Continuing to downtrend

## 2022-06-02 NOTE — PROGRESS NOTES
Vancomycin IV Pharmacy-to-Dose Consultation    Leatha Saini is a 64 y o  male who is currently receiving Vancomycin IV with management by the Pharmacy Consult service  Assessment/Plan:  The patient was reviewed  Renal function is stable and no signs or symptoms of nephrotoxicity and/or infusion reactions were documented in the chart  Based on todays assessment, continue current vancomycin (day # 2) dosing of 750 mg IV on HD dialysis days T-TH-SAT, with a plan for trough to be drawn at 0600 before HD on 06/04/2022  We will continue to follow the patients culture results and clinical progress daily      Collins Irizarry, Pharmacist

## 2022-06-02 NOTE — CASE MANAGEMENT
Case Management Assessment & Discharge Planning Note    Patient name Catarino Zepeda ICU 12/ICU 12 MRN [id number]  : 1945 Date 2022       Current Admission Date: 2022  Current Admission Diagnosis:Severe sepsis [facility])   Patient Active Problem List    Diagnosis Date Noted    Lactic acidosis 2022    Suspected UTI 2022    Toxic metabolic encephalopathy     Rhabdomyolysis 2022    Severe sepsis ([facility] [address] ) 2022    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 ) 2022    Pulmonary hypertension ([facility] ) 2022    Dialysis patient ([facility] ) 2022    Anemia due to chronic kidney disease, on chronic dialysis ([facility] )     Hemoptysis 2022    LORRAINE (acute kidney injury) ([place] [address] )     Other proteinuria     Symptomatic anemia 2022    Chest pain 2022    Acute kidney injury ([facility] ) 2022    Hyperkalemia 2022    High anion gap metabolic acidosis     Melena 2022    Elevated PSA 2021    Chronic pain of right knee 2021    Primary osteoarthritis of right knee 2021    Bladder stones 12/15/2016    Type 2 diabetes mellitus with chronic kidney disease on chronic dialysis, with long-term current use of insulin ([facility] ) 2016    Benign colon polyp 2013    Benign prostatic hyperplasia with lower urinary tract symptoms 2013    Benign essential hypertension 2013    Hyperlipidemia 2012    Vitamin D deficiency 2012      LOS (days): 1  Geometric Mean LOS (GMLOS) (days): 4 80  Days to GMLOS:3 6     OBJECTIVE:  PATIENT READMITTED TO HOSPITAL  Risk of Unplanned Readmission Score: 36 02        Current admission status: Inpatient     Preferred Pharmacy:   [facility] [address] [place] [address] [place] [address]  Phone: [phone] Fax: [address]   [address], [facility] [address]  [address] [facility] [address] [facility] [address]  Phone: [phone] Fax: [phone]    Primary Care Provider: Jacqueline Portillo MD    Primary Insurance: MEDICARE  Secondary Insurance: BLUE CROSS    ASSESSMENT:  [address] [facility], [address] [place] [address] Quang Representative - Spouse   Primary Phone: [phone] (Home)  Mobile Phone: [phone]            Readmission Root Cause  30 Day Readmission: Yes  Who directed you to return to the hospital?: Family  Did you understand whom to contact if you had questions or problems?: Yes  Did you get your prescriptions before you left the hospital?: Yes  Were you able to get your prescriptions filled when you left the hospital?: Yes  Did you take your medications as prescribed?: Yes  Were you able to get to your follow-up appointments?: Yes  During previous admission, was a post-acute recommendation made?: No  Patient was readmitted due to: Severe Sepsis 2' UTI vs PermCath (poor hygiene at home); Hyperkalamia (missed HD 6/1); Rhabdo  Action Plan: Medical mgmt, PT/OT consults, Referral to [address] [place] [address]? Patient Information  Admitted from[de-identified] Home  Mental Status: Alert  During Assessment patient was accompanied by: Not accompanied during assessment  Assessment information provided by[de-identified] Patient  Primary Caregiver: Spouse  Caregiver's Name[de-identified] Rosalva Guerra Relationship to Patient[de-identified] Significant Other  Caregiver's Telephone Number[de-identified] [phone]  Support Systems: Spouse/significant other  [place] of Residence: [address] [place] [address] do you live in?: [employer] entry access options   Select all that apply : Stairs  Number of steps to enter home : 2  Type of Current Residence: [place]  In the last 12 months, was there a time when you were not able to pay the mortgage or rent on time?: No  In the last 12 months, how many places have you lived?: 1  In the last 12 months, was there a time when you did not have a steady place to sleep or slept in a shelter (including now)?: No  Homeless/housing insecurity resource given?: N/A  Living Arrangements: Lives w/ Spouse/significant other  Is patient a ?: No    Activities of Daily Living Prior to Admission  Functional Status: Independent  Completes ADLs independently?: Yes  Ambulates independently?: Yes  Does patient use assisted devices?: Yes  Does patient currently own DME?: Yes  What DME does the patient currently own?: [employer]  Does patient have a history of Outpatient Therapy (PT/OT)?: No  Does the patient have a history of Short-Term Rehab?: No  Does patient have a history of HHC?: No  Does patient currently have Marilu Steward?: No     Patient Information Continued  Income Source: Pension/assisted  Does patient have prescription coverage?: Yes  Within the past 12 months, you worried that your food would run out before you got the money to buy more : Never true  Within the past 12 months, the food you bought just didn't last and you didn't have money to get more : Never true  Food insecurity resource given?: N/A  Does patient receive dialysis treatments?: Yes ([facility] [employer] of Transport to Appts[de-identified] [employer]  In the past 12 months, has lack of transportation kept you from medical appointments or from getting medications?: No  In the past 12 months, has lack of transportation kept you from meetings, work, or from getting things needed for daily living?: No  Was application for public transport provided?: N/A    DISCHARGE DETAILS:    Discharge planning discussed with[de-identified] Patient        CM contacted family/caregiver?: Yes (VM left requesting callback)  Were Treatment Team discharge recommendations reviewed with patient/caregiver?: Yes  Did patient/caregiver verbalize understanding of patient care needs?: Yes  Were patient/caregiver advised of the risks associated with not following Treatment Team discharge recommendations?: Yes    Contacts  Patient Contacts: Emily Reserve  Relationship to Patient[de-identified] Family  Contact Method: Phone  Phone Number: [phone]  Reason/Outcome: Discharge Planning, Emergency Contact, Continuity of Care    Treatment Team Recommendation: Home with [facility], [address] (pending progress)    Patient presented to [facility] ED for evaluation of generalized weakness  Patient was found by wife being unable to get off of the toilet  He was reportedly sitting there all evening  Patient is noted to have hx of ESRD on HD T-TH-S at [facility]  [facility]  His last HD treatment was 5 days prior  Patient was admitted to ICU for further treatment  VM left with spouse to introduce role and discuss any concerns/needs for discharge planning

## 2022-06-02 NOTE — PROGRESS NOTES
Patient informed of transfer to room 402 post Dialysis  Report called to accepting nurse Gracy Segundo 4-Nunez  Pt has no belongings   Patient's wife called verbal update given and informed of transfer to room 402 after dialysis is completed about 2030

## 2022-06-02 NOTE — QUICK NOTE
Patient seen and evaluated by Critical Care today and deemed to be appropriate for transfer to [facility]   Spoke to Julian from [facility] to accept patient transfer  Patient did not move from ICU on the day of transfer  See progress note from 06/02/2022 for the most up-to-date treatment therapy plans  Critical care can be contacted via [employer] with any questions or concerns

## 2022-06-02 NOTE — QUICK NOTE
Update given to patients wife  All questions answered  She is concerned because he is slow to get around and continues to complain of wrist and knee pain  She confirms he is alert and oriented x 4 at home  Will add PT/OT

## 2022-06-02 NOTE — PROGRESS NOTES
[facility] 43  Progress Note - Chintan Ken 1945, 64 y o  male MRN: [id number]  Unit/Bed#: ICU 12 Encounter: [id number]  Primary Care Provider: Jacqueline Portillo MD   Date and time admitted to hospital: 6/1/2022  9:46 AM    * Severe sepsis ([address] )  Assessment & Plan  · Evidenced by leukocytosis, lactic acidosis, tachypnea  · CT scan without obvious source of infection  · Urinalysis concerning for urinary tract infection  Patient was just admitted a few weeks ago with urinary tract infection  There was concern at the time of a perinephric subcapsular fluid collection following a previous renal biopsy  The differential included hematoma versus abscess  IR did attempt to aspirate but was not able to aspirate much, felt to likely be a hematoma  Patient was treated for a urinary tract infection and discharged  Repeat scan today shows interval decrease of collection  Likely resolving hematoma and unlikely source of infection at this time  · Other source could be PermCath  Recently placed, however poor hygiene and possibly can be contaminated    Monitor blood cultures  · Await urine culture  · Continue cefepime and vancomycin  · Await blood cultures    Hyperkalemia  Assessment & Plan  · Patient missed dialysis session on 6/1  · Received stat HD on admission on 6/1  · Repeat HD today 6/2    High anion gap metabolic acidosis  Assessment & Plan  · Likely secondary to renal failure, sepsis, mild DKA, lactic acidosis    Toxic metabolic encephalopathy  Assessment & Plan  · Patient without focal deficit, however confused  · Likely secondary to sepsis as well as metabolic derangements  · Monitor    Suspected UTI  Assessment & Plan  · Urinalysis concerning for urinary tract infection  · Urine culture pending    Lactic acidosis  Assessment & Plan  · Likely secondary to sepsis  · Resuscitate and trend    Rhabdomyolysis  Assessment & Plan  · CK elevated, unclear how long he was on toilet without help  · Receiving fluids and dialysis  · Continuing to downtrend     Type 2 diabetes mellitus with chronic kidney disease on chronic dialysis, with long-term current use of insulin [facility])  Assessment & Plan  Lab Results   Component Value Date    HGBA1C 5 1 05/08/2022       Recent Labs     06/01/22  1807 06/01/22  2345 06/02/22  0522   POCGLU 193* 176* 139       Blood Sugar Average: Last 72 hrs:  (P) 005 [id number]   · Hyperglycemic in the emergency department, although normally well controlled  · Sliding scale insulin    Benign essential hypertension  Assessment & Plan  · Hold home dose lisinopril     Hyperlipidemia  Assessment & Plan  · Continue statin    Anemia due to chronic kidney disease, on chronic dialysis [facility])  Assessment & Plan  Lab Results   Component Value Date    EGFR 6 06/02/2022    EGFR 7 06/01/2022    EGFR 4 06/01/2022    CREATININE 6 97 (H) 06/02/2022    CREATININE 6 25 (H) 06/01/2022    CREATININE 9 65 (H) 06/01/2022     Hgb currently stable - continue to trend     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  Previously treated with Cytoxan    Benign prostatic hyperplasia with lower urinary tract symptoms  Assessment & Plan  · Continue home dose Flomax      ----------------------------------------------------------------------------------------  HPI/24hr events:  Patient was admitted from home last evening with altered mental status  He was found on the toilet by his wife was extremely weak  It appears he has been noncompliant with his dialysis  He received an emergent treatment last evening and was started on antibiotics for possible UTI  Overnight he remained hemodynamically stable and rest comfortably      Patient appropriate for transfer out of the ICU today?: No  Disposition: Continue Stepdown Level 1 level of care   Code Status: Level 1 - Full Code  ---------------------------------------------------------------------------------------  SUBJECTIVE  "Get me out of here "     Review of Systems   Unable to perform ROS: Mental status change     Review of systems was unable to be performed secondary to Altered mental status  ---------------------------------------------------------------------------------------  OBJECTIVE    Vitals   Vitals:    22 0300 22 0400 22 0500 22 0600   BP: 119/60 124/58 134/63 157/67   Pulse: 85 82 86 91   Resp: 18 17 17 (!) 25   Temp:  97 6 °F (36 4 °C)     TempSrc:  Temporal     SpO2: 99% 96% 98% 97%   Weight:   79 2 kg (174 lb 9 7 oz)    Height:         Temp (24hrs), Av 8 °F (36 6 °C), Min:97 2 °F (36 2 °C), Max:98 4 °F (36 9 °C)  Current: Temperature: 97 6 °F (36 4 °C)          Respiratory:  SpO2: SpO2: 97 %       Invasive/non-invasive ventilation settings   Respiratory  Report   Lab Data (Last 4 hours)    None         O2/Vent Data (Last 4 hours)    None                Physical Exam  Vitals and nursing note reviewed  Constitutional:       Appearance: He is ill-appearing and toxic-appearing  HENT:      Head: Normocephalic and atraumatic  Right Ear: Tympanic membrane, ear canal and external ear normal       Left Ear: Tympanic membrane, ear canal and external ear normal       Nose: Nose normal       Mouth/Throat:      Mouth: Mucous membranes are dry  Pharynx: Oropharynx is clear  Eyes:      Conjunctiva/sclera: Conjunctivae normal       Pupils: Pupils are equal, round, and reactive to light  Cardiovascular:      Rate and Rhythm: Normal rate and regular rhythm  Pulses: Normal pulses  Heart sounds: Normal heart sounds  Pulmonary:      Comments: On room air  B/L breath sounds diminished but clear   Abdominal:      General: Bowel sounds are normal       Palpations: Abdomen is soft  Genitourinary:     Comments: Anuric   Musculoskeletal:         General: Normal range of motion  Cervical back: Normal range of motion and neck supple  Skin:     General: Skin is warm and dry  Capillary Refill: Capillary refill takes less than 2 seconds  Comments: Multiple DTIs POA on sacrum and feet/legs   Neurological:      Comments: Oriented to self only   Psychiatric:      Comments: Restless              Laboratory and Diagnostics:  Results from last 7 days   Lab Units 06/02/22  0525 06/01/22  1046   WBC Thousand/uL 9 49 15 36*   HEMOGLOBIN g/dL 8 1* 9 7*   HEMATOCRIT % 26 6* 30 8*   PLATELETS Thousands/uL 389 518*   NEUTROS PCT % 85* 92*   MONOS PCT % 6 5     Results from last 7 days   Lab Units 06/02/22  0525 06/01/22  1948 06/01/22  1046   SODIUM mmol/L 133* 132* 127*   POTASSIUM mmol/L 5 3 4 9 7 2*   CHLORIDE mmol/L 97* 96* 87*   CO2 mmol/L 24 23 20*   ANION GAP mmol/L 12 13 20*   BUN mg/dL 56* 49* 81*   CREATININE mg/dL 6 97* 6 25* 9 65*   CALCIUM mg/dL 8 0* 8 1* 8 6   GLUCOSE RANDOM mg/dL 133 178* 333*   ALT U/L 69  --  56   AST U/L 171*  --  189*   ALK PHOS U/L 100  --  139*   ALBUMIN g/dL 1 5*  --  2 0*   TOTAL BILIRUBIN mg/dL 0 39  --  0 77     Results from last 7 days   Lab Units 06/02/22  0525 06/01/22  1046   MAGNESIUM mg/dL 2 6 2 8*   PHOSPHORUS mg/dL 7 1* 7 5*      Results from last 7 days   Lab Units 06/01/22  1228   INR  1 47*   PTT seconds 25          Results from last 7 days   Lab Units 06/01/22  1450 06/01/22  1120   LACTIC ACID mmol/L 1 5 2 7*     ABG:    VBG:  Results from last 7 days   Lab Units 06/01/22  1228   PH MORELIA  7 271*   PCO2 MORELIA mm Hg 43 4   PO2 MORELIA mm Hg 27 3*   HCO3 MORELIA mmol/L 19 5*   BASE EXC MORELIA mmol/L -7 0     Results from last 7 days   Lab Units 06/02/22  0525 06/01/22  1046   PROCALCITONIN ng/ml 7 60* 4 26*       Micro  Results from last 7 days   Lab Units 06/01/22  1046   BLOOD CULTURE  Received in Microbiology Lab  Culture in Progress  Received in Microbiology Lab  Culture in Progress  Imaging: I have personally reviewed pertinent reports  CT chest abdomen pelvis wo contrast    Result Date: 6/1/2022  Impression: Since May 7, 2022: 1  Slight decreased size of the right renal subcapsular fluid collection, likely a hematoma  2  Increased size of the moderate bilateral pleural effusions  3  Possible, partially imaged soft tissue swelling within the right scrotum  Direct visualization is recommended  Workstation performed: [id number]     XR chest 1 view portable    Result Date: 6/1/2022  Impression: Development of mild vascular congestion Workstation performed: [id number]     Intake and Output  I/O       05/31 0701 06/01 0700 06/01 0701 06/02 0700 06/02 0701 06/03 0700    I V  (mL/kg)  500 (6 3)     Other  700     IV Piggyback  2550     Total Intake(mL/kg)  3750 (47 3)     Other  1600     Total Output  1600     Net  +2150            Unmeasured Stool Occurrence  1 x           Height and Weights   Height: 5' 8" (172 7 cm)  IBW (Ideal Body Weight): 68 4 kg  Body mass index is 26 55 kg/m²  Weight (last 2 days)     Date/Time Weight    06/02/22 0500 79 2 (174 6)    06/01/22 1414 81 2 (179 01)    06/01/22 0956 82 (180 78)            Nutrition       Diet Orders   (From admission, onward)             Start     Ordered    06/02/22 0702  Diet Jasson/CHO Controlled; Consistent Carbohydrate Diet Level 2 (5 carb servings/75 grams CHO/meal); Dysphagia 2-Mechanical Soft; Thin Liquid  Diet effective now        References:    Nutrtion Support Algorithm Enteral vs  Parenteral   Question Answer Comment   Diet Type Jasson/CHO Controlled    Jasson/CHO Controlled Consistent Carbohydrate Diet Level 2 (5 carb servings/75 grams CHO/meal)    Other Restriction(s): Dysphagia 2-Mechanical Soft    Liquid Modifier Thin Liquid    RD to adjust diet per protocol?  No        06/02/22 0701                  Active Medications  Scheduled Meds:  Current Facility-Administered Medications   Medication Dose Route Frequency Provider Last Rate    calcium acetate  667 mg Oral TID With Meals [place] Elizabeth Cardenas PA-C      cefepime  1,000 mg Intravenous K46D Pritesh Quan, [place]      epoetin ani  2,000 Units Intravenous After Dialysis Douglas Zacarias PA-C      heparin (porcine)  5,000 Units Subcutaneous [facility] Pritesh Quan [place]      insulin lispro  1-6 Units Subcutaneous S6R [address] Corin Candyliz Quan, [place]      latanoprost  1 drop Both Eyes HS Matias Pete      pravastatin  80 mg Oral Daily With [place] Matias Rodriguez      tamsulosin  0 4 mg Oral Daily With Dinner Matias Pete      vancomycin  10 mg/kg Intravenous After Dialysis Douglas Zacarias PA-C       Continuous Infusions:     PRN Meds:   epoetin ani, 2,000 Units, After Dialysis  vancomycin, 10 mg/kg, After Dialysis        Invasive Devices Review  Invasive Devices  Report    Peripheral Intravenous Line  Duration           Peripheral IV 06/01/22 Distal;Left;Upper;Ventral (anterior) Arm <1 day    Peripheral IV 06/01/22 Dorsal (posterior); Right Wrist <1 day          Hemodialysis Catheter  Duration           HD Permanent Double Catheter 85 days              ---------------------------------------------------------------------------------------  Care Time Delivered:   No Critical Care time spent       FERNANDO Armando      Portions of the record may have been created with voice recognition software  Occasional wrong word or "sound a like" substitutions may have occurred due to the inherent limitations of voice recognition software    Read the chart carefully and recognize, using context, where substitutions have occurred

## 2022-06-02 NOTE — ASSESSMENT & PLAN NOTE
· Patient missed dialysis session on 6/1  · Received stat HD on admission on 6/1  · Repeat HD today 6/2

## 2022-06-02 NOTE — PROGRESS NOTES
NEPHROLOGY PROGRESS NOTE   Henry Orozco 64 y o  male MRN: [id number]  Unit/Bed#: ICU 12 Encounter: [id number]  Reason for Consult: ESRD      SUMMARY:    66-year-old male with a history of end-stage renal disease on hemodialysis, diabetes mellitus type 2, BPH, hypertension who was brought in to [facility] for generalized weakness as his wife found him on the toilet with extreme weakness  Patient consult for ESRD management      ASSESSMENT and PLAN:    Hyperkalemia--resolved  --due to poor compliance with outpatient dialysis along with concurrent hyperglycemia  --underwent hemodialysis yesterday  --planning for another dialysis treatment today     End-stage renal disease  --incenter hemodialysis Tuesday Thursday Saturday at [address]  --access:  Right IJ PermCath, follow-up blood cultures as I am concerned that there may be some contamination  --outpatient prescription includes 137 sodium, 35 bicarbonate, 2 potassium, 2 5 calcium, temperature 35 5°, blood flow rate 400 mL/minute, dry weight 80 kg, 4 hours  --underwent hemodialysis yesterday  --CT scan shows a right renal subscapular fluid collection representing a likely hematoma which has decreased in size compared to prior imaging   --plan for dialysis today     Hyponatremia  --low serum chloride concurrent hyperglycemia which is improving  --sodium level slightly improved and stable and mild  --continue to monitor on dialysis     Anion gap metabolic acidosis--resolved  --secondary to renal failure and poor compliance with outpatient dialysis  --lactic acid 2 7 --> 1 5     Mineral bone disorder-chronic kidney  --hyperphosphatemia  --calcium acetate with meals    Hypertension  --controlled  --volume status better  --pleural effusion noted     Anemia of chronic kidney disease  --Micera as an outpatient, Epogen as an inpatient  --hemoglobin close to target     Bilateral pleural effusions  --moderate-sized  --may not be amendable to ultrafiltration    Leukocytosis/sepsis  --elevated procalcitonin level and lactic acid  --follow-up blood cultures  --urine cultures pending  --COVID-19/influenza/RSV was negative  --appears to be no evidence of a infiltrate  --currently has a PermCath in place which due to poor outpatient hygiene at home concern for possible infection  --broad-spectrum antibiotics  --dose vancomycin based on level    Rhabdomyolysis  --improving      SUBJECTIVE / INTERVAL HISTORY:    Patient does not have his hearing aids  But says he has no issues  OBJECTIVE:  Current Weight: Weight - Scale: 79 2 kg (174 lb 9 7 oz)  Vitals:    06/02/22 0500 06/02/22 0600 06/02/22 0700 06/02/22 0721   BP: 134/63 157/67 154/66    Pulse: 86 91 89    Resp: 17 (!) 25 22    Temp:    97 8 °F (36 6 °C)   TempSrc:    Temporal   SpO2: 98% 97% 99%    Weight: 79 2 kg (174 lb 9 7 oz)      Height:           Intake/Output Summary (Last 24 hours) at 6/2/2022 0930  Last data filed at 6/1/2022 1645  Gross per 24 hour   Intake 3750 ml   Output 1600 ml   Net 2150 ml       Review of Systems:    12 point ROS has been reviewed  Physical Exam  Vitals and nursing note reviewed  Constitutional:       General: He is not in acute distress  Appearance: He is well-developed  He is ill-appearing  HENT:      Head: Normocephalic and atraumatic  Mouth/Throat:      Mouth: Mucous membranes are dry  Eyes:      General: No scleral icterus  Conjunctiva/sclera: Conjunctivae normal       Pupils: Pupils are equal, round, and reactive to light  Cardiovascular:      Rate and Rhythm: Normal rate and regular rhythm  Heart sounds: S1 normal and S2 normal  No murmur heard  No friction rub  No gallop  Pulmonary:      Effort: Pulmonary effort is normal  No respiratory distress  Breath sounds: Normal breath sounds  No wheezing or rales  Abdominal:      General: Bowel sounds are normal       Palpations: Abdomen is soft  Tenderness:  There is no abdominal tenderness  There is no rebound  Musculoskeletal:         General: Normal range of motion  Cervical back: Normal range of motion and neck supple  Skin:     General: Skin is dry  Coloration: Skin is pale  Findings: No rash  Neurological:      Mental Status: He is alert  He is disoriented     Psychiatric:         Behavior: Behavior normal          Medications:    Current Facility-Administered Medications:     calcium acetate (PHOSLO) capsule 667 mg, 667 mg, Oral, TID With Meals, Taftomas Cy, PA-C, 099 mg at 06/02/22 0759    cefepime (MAXIPIME) IVPB (premix in dextrose) 1,000 mg 50 mL, 1,000 mg, Intravenous, A92A, Corin Quan PA-C    epoetin ani (EPOGEN,PROCRIT) injection 2,000 Units, 2,000 Units, Intravenous, After Dialysis, Rosi Benoit, PA-C    heparin (porcine) subcutaneous injection 5,000 Units, 5,000 Units, Subcutaneous, Q8H [address], 5,000 Units at 06/02/22 0530 **AND** [CANCELED] Platelet count, , , Once, Corin Quan PA-C    insulin lispro (HumaLOG) 100 units/mL subcutaneous injection 1-6 Units, 1-6 Units, Subcutaneous, Q6H [address], 1 Units at 06/01/22 2347 **AND** Fingerstick Glucose (POCT), , , F7Z, Corin Quan PA-C    latanoprost (XALATAN) 0 005 % ophthalmic solution 1 drop, 1 drop, Both Eyes, HS, Taffguicho Cy, PA-C, 1 drop at 06/01/22 2127    pravastatin (PRAVACHOL) tablet 80 mg, 80 mg, Oral, Daily With Dinner, Martinfy Cy, PA-C    [facility] capsule 0 4 mg, 0 4 mg, Oral, Daily With Dinner, Taffy Cy, PA-C, 0 4 mg at 06/01/22 1714    vancomycin (VANCOCIN) IVPB (premix in dextrose) 750 mg 150 mL, 10 mg/kg, Intravenous, After Dialysis, Taffy Cy, PA-C    Laboratory Results:  Results from last 7 days   Lab Units 06/02/22  0525 06/01/22  1948 06/01/22  1046   WBC Thousand/uL 9 49  --  15 36*   HEMOGLOBIN g/dL 8 1*  --  9 7*   HEMATOCRIT % 26 6*  --  30 8*   PLATELETS Thousands/uL 389  --  518*   POTASSIUM mmol/L 5 3 4 9 7 2*   CHLORIDE mmol/L 97* 96* 87*   CO2 mmol/L 24 23 20*   BUN mg/dL 56* 49* 81*   CREATININE mg/dL 6 97* 6 25* 9 65*   CALCIUM mg/dL 8 0* 8 1* 8 6   MAGNESIUM mg/dL 2 6  --  2 8*   PHOSPHORUS mg/dL 7 1*  --  7 5*       PLEASE NOTE:  This encounter was completed utilizing the [employer] Direct Speech Voice Recognition Software  Grammatical errors, random word insertions, pronoun errors and incomplete sentences are occasional consequences of the system due to software limitations, ambient noise and hardware issues  These may be missed by proof reading prior to affixing electronic signature  Any questions or concerns about the content, text or information contained within the body of this dictation should be directly addressed to the physician for clarification  Please do not hesitate to call me directly if you have any any questions or concerns

## 2022-06-02 NOTE — ASSESSMENT & PLAN NOTE
Lab Results   Component Value Date    EGFR 6 06/02/2022    EGFR 7 06/01/2022    EGFR 4 06/01/2022    CREATININE 6 97 (H) 06/02/2022    CREATININE 6 25 (H) 06/01/2022    CREATININE 9 65 (H) 06/01/2022     Hgb currently stable - continue to trend

## 2022-06-03 ENCOUNTER — APPOINTMENT (INPATIENT)
Dept: RADIOLOGY | Facility: HOSPITAL | Age: 77
DRG: 871 | End: 2022-06-03
Payer: MEDICARE

## 2022-06-03 ENCOUNTER — PATIENT OUTREACH (OUTPATIENT)
Dept: FAMILY MEDICINE CLINIC | Facility: CLINIC | Age: 77
End: 2022-06-03

## 2022-06-03 LAB
ANION GAP SERPL CALCULATED.3IONS-SCNC: 11 MMOL/L (ref 4–13)
BASOPHILS # BLD AUTO: 0.03 THOUSANDS/ΜL (ref 0–0.1)
BASOPHILS NFR BLD AUTO: 0 % (ref 0–1)
BUN SERPL-MCNC: 39 MG/DL (ref 5–25)
CALCIUM SERPL-MCNC: 7.7 MG/DL (ref 8.3–10.1)
CHLORIDE SERPL-SCNC: 94 MMOL/L (ref 100–108)
CK MB SERPL-MCNC: 7.5 NG/ML (ref 0–5)
CK MB SERPL-MCNC: <1 % (ref 0–2.5)
CK SERPL-CCNC: 2502 U/L (ref 39–308)
CLINICAL COMMENT: ABNORMAL
CO2 SERPL-SCNC: 28 MMOL/L (ref 21–32)
CREAT SERPL-MCNC: 4.74 MG/DL (ref 0.6–1.3)
EOSINOPHIL # BLD AUTO: 0.04 THOUSAND/ΜL (ref 0–0.61)
EOSINOPHIL NFR BLD AUTO: 1 % (ref 0–6)
ERYTHROCYTE [DISTWIDTH] IN BLOOD BY AUTOMATED COUNT: 15.8 % (ref 11.6–15.1)
GFR SERPL CREATININE-BSD FRML MDRD: 10 ML/MIN/1.73SQ M
GLUCOSE SERPL-MCNC: 145 MG/DL (ref 65–140)
GLUCOSE SERPL-MCNC: 158 MG/DL (ref 65–140)
GLUCOSE SERPL-MCNC: 164 MG/DL (ref 65–140)
GLUCOSE SERPL-MCNC: 253 MG/DL (ref 65–140)
GLUCOSE SERPL-MCNC: 294 MG/DL (ref 65–140)
HCT VFR BLD AUTO: 25.6 % (ref 36.5–49.3)
HGB BLD-MCNC: 7.9 G/DL (ref 12–17)
IMM GRANULOCYTES # BLD AUTO: 0.08 THOUSAND/UL (ref 0–0.2)
IMM GRANULOCYTES NFR BLD AUTO: 1 % (ref 0–2)
LYMPHOCYTES # BLD AUTO: 0.98 THOUSANDS/ΜL (ref 0.6–4.47)
LYMPHOCYTES NFR BLD AUTO: 12 % (ref 14–44)
MAGNESIUM SERPL-MCNC: 2.5 MG/DL (ref 1.6–2.6)
MCH RBC QN AUTO: 28.3 PG (ref 26.8–34.3)
MCHC RBC AUTO-ENTMCNC: 30.9 G/DL (ref 31.4–37.4)
MCV RBC AUTO: 92 FL (ref 82–98)
MONOCYTES # BLD AUTO: 0.48 THOUSAND/ΜL (ref 0.17–1.22)
MONOCYTES NFR BLD AUTO: 6 % (ref 4–12)
MRSA NOSE QL CULT: NORMAL
NEUTROPHILS # BLD AUTO: 6.54 THOUSANDS/ΜL (ref 1.85–7.62)
NEUTS SEG NFR BLD AUTO: 80 % (ref 43–75)
NRBC BLD AUTO-RTO: 0 /100 WBCS
PHOSPHATE SERPL-MCNC: 4.4 MG/DL (ref 2.3–4.1)
PLATELET # BLD AUTO: 335 THOUSANDS/UL (ref 149–390)
PMV BLD AUTO: 8.7 FL (ref 8.9–12.7)
POTASSIUM SERPL-SCNC: 4.6 MMOL/L (ref 3.5–5.3)
RBC # BLD AUTO: 2.79 MILLION/UL (ref 3.88–5.62)
SODIUM SERPL-SCNC: 133 MMOL/L (ref 136–145)
T GONDII IGG SERPL IA-ACNC: 24.5 IU/ML (ref 0–7.1)
T GONDII IGM SER IA-ACNC: <3 AU/ML (ref 0–7.9)
WBC # BLD AUTO: 8.15 THOUSAND/UL (ref 4.31–10.16)

## 2022-06-03 PROCEDURE — 97163 PT EVAL HIGH COMPLEX 45 MIN: CPT | Performed by: PHYSICAL THERAPIST

## 2022-06-03 PROCEDURE — 93970 EXTREMITY STUDY: CPT | Performed by: SURGERY

## 2022-06-03 PROCEDURE — 93970 EXTREMITY STUDY: CPT

## 2022-06-03 PROCEDURE — 85025 COMPLETE CBC W/AUTO DIFF WBC: CPT | Performed by: NURSE PRACTITIONER

## 2022-06-03 PROCEDURE — 87040 BLOOD CULTURE FOR BACTERIA: CPT | Performed by: INTERNAL MEDICINE

## 2022-06-03 PROCEDURE — 99232 SBSQ HOSP IP/OBS MODERATE 35: CPT | Performed by: INTERNAL MEDICINE

## 2022-06-03 PROCEDURE — 83735 ASSAY OF MAGNESIUM: CPT | Performed by: NURSE PRACTITIONER

## 2022-06-03 PROCEDURE — 82948 REAGENT STRIP/BLOOD GLUCOSE: CPT

## 2022-06-03 PROCEDURE — 99223 1ST HOSP IP/OBS HIGH 75: CPT | Performed by: INTERNAL MEDICINE

## 2022-06-03 PROCEDURE — 82550 ASSAY OF CK (CPK): CPT | Performed by: NURSE PRACTITIONER

## 2022-06-03 PROCEDURE — 80048 BASIC METABOLIC PNL TOTAL CA: CPT | Performed by: NURSE PRACTITIONER

## 2022-06-03 PROCEDURE — 82553 CREATINE MB FRACTION: CPT | Performed by: NURSE PRACTITIONER

## 2022-06-03 PROCEDURE — 97167 OT EVAL HIGH COMPLEX 60 MIN: CPT

## 2022-06-03 PROCEDURE — 84100 ASSAY OF PHOSPHORUS: CPT | Performed by: NURSE PRACTITIONER

## 2022-06-03 RX ORDER — ONDANSETRON 2 MG/ML
4 INJECTION INTRAMUSCULAR; INTRAVENOUS EVERY 6 HOURS PRN
Status: DISCONTINUED | OUTPATIENT
Start: 2022-06-03 | End: 2022-06-07 | Stop reason: HOSPADM

## 2022-06-03 RX ORDER — CALCIUM CARBONATE 200(500)MG
500 TABLET,CHEWABLE ORAL DAILY PRN
Status: DISCONTINUED | OUTPATIENT
Start: 2022-06-03 | End: 2022-06-07 | Stop reason: HOSPADM

## 2022-06-03 RX ORDER — LANOLIN ALCOHOL/MO/W.PET/CERES
3 CREAM (GRAM) TOPICAL
Status: DISCONTINUED | OUTPATIENT
Start: 2022-06-03 | End: 2022-06-07 | Stop reason: HOSPADM

## 2022-06-03 RX ADMIN — HEPARIN SODIUM 5000 UNITS: 5000 INJECTION INTRAVENOUS; SUBCUTANEOUS at 22:33

## 2022-06-03 RX ADMIN — ANTACID TABLETS 500 MG: 500 TABLET, CHEWABLE ORAL at 23:59

## 2022-06-03 RX ADMIN — GLYCERIN 1 DROP: .002; .002; .01 SOLUTION/ DROPS OPHTHALMIC at 16:52

## 2022-06-03 RX ADMIN — CALCIUM ACETATE 667 MG: 667 CAPSULE ORAL at 16:51

## 2022-06-03 RX ADMIN — TAMSULOSIN HYDROCHLORIDE 0.4 MG: 0.4 CAPSULE ORAL at 16:51

## 2022-06-03 RX ADMIN — HEPARIN SODIUM 5000 UNITS: 5000 INJECTION INTRAVENOUS; SUBCUTANEOUS at 05:06

## 2022-06-03 RX ADMIN — CALCIUM ACETATE 667 MG: 667 CAPSULE ORAL at 12:32

## 2022-06-03 RX ADMIN — INSULIN LISPRO 1 UNITS: 100 INJECTION, SOLUTION INTRAVENOUS; SUBCUTANEOUS at 09:03

## 2022-06-03 RX ADMIN — INSULIN LISPRO 4 UNITS: 100 INJECTION, SOLUTION INTRAVENOUS; SUBCUTANEOUS at 22:35

## 2022-06-03 RX ADMIN — INSULIN LISPRO 1 UNITS: 100 INJECTION, SOLUTION INTRAVENOUS; SUBCUTANEOUS at 16:51

## 2022-06-03 RX ADMIN — INSULIN LISPRO 3 UNITS: 100 INJECTION, SOLUTION INTRAVENOUS; SUBCUTANEOUS at 12:32

## 2022-06-03 RX ADMIN — CALCIUM ACETATE 667 MG: 667 CAPSULE ORAL at 09:03

## 2022-06-03 RX ADMIN — LATANOPROST 1 DROP: 50 SOLUTION OPHTHALMIC at 22:35

## 2022-06-03 RX ADMIN — PRAVASTATIN SODIUM 80 MG: 80 TABLET ORAL at 16:51

## 2022-06-03 RX ADMIN — HEPARIN SODIUM 5000 UNITS: 5000 INJECTION INTRAVENOUS; SUBCUTANEOUS at 13:48

## 2022-06-03 NOTE — PROGRESS NOTES
Chart review done after receiving in basket message  S/W Kiet Miriam form [address] dialysis  Confirms that patient takes [facility] San[facility] to get to dialysis appointments  Confirms that home is crowded and patient declines in home services  CM opened complex episode  Will follow patient upon discharge

## 2022-06-03 NOTE — PLAN OF CARE
Problem: PAIN - ADULT  Goal: Verbalizes/displays adequate comfort level or baseline comfort level  Description: Interventions:  - Encourage patient to monitor pain and request assistance  - Assess pain using appropriate pain scale  - Administer analgesics based on type and severity of pain and evaluate response  - Implement non-pharmacological measures as appropriate and evaluate response  - Consider cultural and social influences on pain and pain management  - Notify physician/advanced practitioner if interventions unsuccessful or patient reports new pain  Outcome: Progressing     Problem: INFECTION - ADULT  Goal: Absence or prevention of progression during hospitalization  Description: INTERVENTIONS:  - Assess and monitor for signs and symptoms of infection  - Monitor lab/diagnostic results  - Monitor all insertion sites, i e  indwelling lines, tubes, and drains  - Monitor endotracheal if appropriate and nasal secretions for changes in amount and color  - [place] appropriate cooling/warming therapies per order  - Administer medications as ordered  - Instruct and encourage patient and family to use good hand hygiene technique  - Identify and instruct in appropriate isolation precautions for identified infection/condition  Outcome: Progressing     Problem: SAFETY ADULT  Goal: Patient will remain free of falls  Description: INTERVENTIONS:  - Educate patient/family on patient safety including physical limitations  - Instruct patient to call for assistance with activity   - Consult OT/PT to assist with strengthening/mobility   - Keep Call bell within reach  - Keep bed low and locked with side rails adjusted as appropriate  - Keep care items and personal belongings within reach  - Initiate and maintain comfort rounds  - Make Fall Risk Sign visible to staff  - Offer Toileting every 2 Hours, in advance of need  - Initiate/Maintain bed alarm  - Obtain necessary fall risk management equipment: alarm, antislip socks  - Apply yellow socks and bracelet for high fall risk patients  - Consider moving patient to room near nurses station  Outcome: Progressing  Goal: Maintain or return to baseline ADL function  Description: INTERVENTIONS:  -  Assess patient's ability to carry out ADLs; assess patient's baseline for ADL function and identify physical deficits which impact ability to perform ADLs (bathing, care of mouth/teeth, toileting, grooming, dressing, etc )  - Assess/evaluate cause of self-care deficits   - Assess range of motion  - Assess patient's mobility; develop plan if impaired  - Assess patient's need for assistive devices and provide as appropriate  - Encourage maximum independence but intervene and supervise when necessary  - Involve family in performance of ADLs  - Assess for home care needs following discharge   - Consider OT consult to assist with ADL evaluation and planning for discharge  - Provide patient education as appropriate  Outcome: Progressing  Goal: Maintains/Returns to pre admission functional level  Description: INTERVENTIONS:  - Perform BMAT or MOVE assessment daily    - Set and communicate daily mobility goal to care team and patient/family/caregiver  - Collaborate with rehabilitation services on mobility goals if consulted  - Perform Range of Motion 3 times a day  - Reposition patient every 3 hours    - Dangle patient 3 times a day  - Stand patient 3 times a day  - Ambulate patient 3 times a day  - Out of bed to chair 3 times a day   - Out of bed for meals 3 times a day  - Out of bed for toileting  - Record patient progress and toleration of activity level   Outcome: Progressing     Problem: DISCHARGE PLANNING  Goal: Discharge to home or other facility with appropriate resources  Description: INTERVENTIONS:  - Identify barriers to discharge w/patient and caregiver  - Arrange for needed discharge resources and transportation as appropriate  - Identify discharge learning needs (meds, wound care, etc )  - Arrange for interpretive services to assist at discharge as needed  - Refer to Case Management Department for coordinating discharge planning if the patient needs post-hospital services based on physician/advanced practitioner order or complex needs related to functional status, cognitive ability, or social support system  Outcome: Progressing     Problem: Knowledge Deficit  Goal: Patient/family/caregiver demonstrates understanding of disease process, treatment plan, medications, and discharge instructions  Description: Complete learning assessment and assess knowledge base    Interventions:  - Provide teaching at level of understanding  - Provide teaching via preferred learning methods  Outcome: Progressing     Problem: CARDIOVASCULAR - ADULT  Goal: Maintains optimal cardiac output and hemodynamic stability  Description: INTERVENTIONS:  - Monitor I/O, vital signs and rhythm  - Monitor for S/S and trends of decreased cardiac output  - Administer and titrate ordered vasoactive medications to optimize hemodynamic stability  - Assess quality of pulses, skin color and temperature  - Assess for signs of decreased coronary artery perfusion  - Instruct patient to report change in severity of symptoms  Outcome: Progressing  Goal: Absence of cardiac dysrhythmias or at baseline rhythm  Description: INTERVENTIONS:  - Continuous cardiac monitoring, vital signs, obtain 12 lead EKG if ordered  - Administer antiarrhythmic and heart rate control medications as ordered  - Monitor electrolytes and administer replacement therapy as ordered  Outcome: Progressing     Problem: METABOLIC, FLUID AND ELECTROLYTES - ADULT  Goal: Electrolytes maintained within normal limits  Description: INTERVENTIONS:  - Monitor labs and assess patient for signs and symptoms of electrolyte imbalances  - Administer electrolyte replacement as ordered  - Monitor response to electrolyte replacements, including repeat lab results as appropriate  - Instruct patient on fluid and nutrition as appropriate  Outcome: Progressing  Goal: Fluid balance maintained  Description: INTERVENTIONS:  - Monitor labs   - Monitor I/O and WT  - Instruct patient on fluid and nutrition as appropriate  - Assess for signs & symptoms of volume excess or deficit  Outcome: Progressing  Goal: Glucose maintained within target range  Description: INTERVENTIONS:  - Monitor Blood Glucose as ordered  - Assess for signs and symptoms of hyperglycemia and hypoglycemia  - Administer ordered medications to maintain glucose within target range  - Assess nutritional intake and initiate nutrition service referral as needed  Outcome: Progressing     Problem: MOBILITY - ADULT  Goal: Maintain or return to baseline ADL function  Description: INTERVENTIONS:  -  Assess patient's ability to carry out ADLs; assess patient's baseline for ADL function and identify physical deficits which impact ability to perform ADLs (bathing, care of mouth/teeth, toileting, grooming, dressing, etc )  - Assess/evaluate cause of self-care deficits   - Assess range of motion  - Assess patient's mobility; develop plan if impaired  - Assess patient's need for assistive devices and provide as appropriate  - Encourage maximum independence but intervene and supervise when necessary  - Involve family in performance of ADLs  - Assess for home care needs following discharge   - Consider OT consult to assist with ADL evaluation and planning for discharge  - Provide patient education as appropriate  Outcome: Progressing  Goal: Maintains/Returns to pre admission functional level  Description: INTERVENTIONS:  - Perform BMAT or MOVE assessment daily    - Set and communicate daily mobility goal to care team and patient/family/caregiver  - Collaborate with rehabilitation services on mobility goals if consulted  - Perform Range of Motion 3 times a day  - Reposition patient every 3 hours    - Dangle patient 3 times a day  - Stand patient 3 times a day  - Ambulate patient 3 times a day  - Out of bed to chair 3 times a day   - Out of bed for meals 3 times a day  - Out of bed for toileting  - Record patient progress and toleration of activity level   Outcome: Progressing     Problem: Prexisting or High Potential for Compromised Skin Integrity  Goal: Skin integrity is maintained or improved  Description: INTERVENTIONS:  - Identify patients at risk for skin breakdown  - Assess and monitor skin integrity  - Assess and monitor nutrition and hydration status  - Monitor labs   - Assess for incontinence   - Turn and reposition patient  - Assist with mobility/ambulation  - Relieve pressure over bony prominences  - Avoid friction and shearing  - Provide appropriate hygiene as needed including keeping skin clean and dry  - Evaluate need for skin moisturizer/barrier cream  - Collaborate with interdisciplinary team   - Patient/family teaching  - Consider wound care consult   Outcome: Progressing     Problem: Nutrition/Hydration-ADULT  Goal: Nutrient/Hydration intake appropriate for improving, restoring or maintaining nutritional needs  Description: Monitor and assess patient's nutrition/hydration status for malnutrition  Collaborate with interdisciplinary team and initiate plan and interventions as ordered  Monitor patient's weight and dietary intake as ordered or per policy  Utilize nutrition screening tool and intervene as necessary  Determine patient's food preferences and provide high-protein, high-caloric foods as appropriate       INTERVENTIONS:  - Monitor oral intake, urinary output, labs, and treatment plans  - Assess nutrition and hydration status and recommend course of action  - Evaluate amount of meals eaten  - Assist patient with eating if necessary   - Allow adequate time for meals  - Recommend/ encourage appropriate diets, oral nutritional supplements, and vitamin/mineral supplements  - Order, calculate, and assess calorie counts as needed  - Recommend, monitor, and adjust tube feedings and TPN/PPN based on assessed needs  - Assess need for intravenous fluids  - Provide specific nutrition/hydration education as appropriate  - Include patient/family/caregiver in decisions related to nutrition  Outcome: Progressing     Problem: Potential for Falls  Goal: Patient will remain free of falls  Description: INTERVENTIONS:  - Educate patient/family on patient safety including physical limitations  - Instruct patient to call for assistance with activity   - Consult OT/PT to assist with strengthening/mobility   - Keep Call bell within reach  - Keep bed low and locked with side rails adjusted as appropriate  - Keep care items and personal belongings within reach  - Initiate and maintain comfort rounds  - Make Fall Risk Sign visible to staff  - Offer Toileting every 2 Hours, in advance of need  - Initiate/Maintain bed alarm  - Obtain necessary fall risk management equipment: alarm, antislip socks  - Apply yellow socks and bracelet for high fall risk patients  - Consider moving patient to room near nurses station  Outcome: Progressing

## 2022-06-03 NOTE — DISCHARGE INSTR - OTHER ORDERS
Skin care Plan:  1-Cleanse sacro-buttocks with soap and water  Apply Allevyn foam  [place] with P for prevention  Change every two days and as needed  check skin q-shift  2-Cleanse left hip and left posterior thigh evolving deep tissue pressure injuries with wound cleanser & pat dry  Open small Dermagran square & apply to open areas only in a single layer cut to size of wounds  Cover with Allevyn sacral bordered foam dressing & changed every other day & prn soilage/dislodgement  3-Cleanse right posterior thigh deep tissue pressure injury with foaming cleanser & pat dry  Apply Calazime paste 3x/day & prn soilage/dislodgement  4-Turn/reposition every 2 hrs or when medically stable for pressure re-distribution on skin  5-Bilateral heel protectors to be worn at all times in bed or when out of bed to chair to offload heel pressure  6-Moisturize skin daily with skin nourishing cream  7-Pressure redistribution cushion in chair when out of bed  8-Hydraguard skin barrier to bilateral heels & feet 2x/day and as needed (do not apply in-between toes)    9-Would recommend pressure redistribution mattress

## 2022-06-03 NOTE — PROGRESS NOTES
[facility] Progress Note  Patient: Erik Larar 64 y o  male   MRN: [id number]  PCP: Faby Walter MD  Unit/Bed#: [address] [place] [address] Encounter: [id number]  Date Of Visit: 06/03/22    Problem List:    Principal Problem:    Severe sepsis ([facility] )  Active Problems:    Bacteremia    Toxic metabolic encephalopathy    Type 2 diabetes mellitus with chronic kidney disease on chronic dialysis, with long-term current use of insulin (HCC)    Hyperkalemia    High anion gap metabolic acidosis    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HCC)    Lactic acidosis    Rhabdomyolysis    Left leg pain    Benign essential hypertension    Anemia due to chronic kidney disease, on chronic dialysis ([facility] )    Benign prostatic hyperplasia with lower urinary tract symptoms    Hyperlipidemia      Assessment & Plan:    Toxic metabolic encephalopathy  Assessment & Plan  Likely toxic metabolic  Now improving    Bacteremia  Assessment & Plan  Blood culture 1/2 noted to be positive for Staphylococcus, coagulase-negative   Possibly contaminant but patient risk of true bacteremia  · Continue vancomycin  · Monitor level  · ID evaluation    * Severe sepsis (HCC)  Assessment & Plan  Versus SIRS  As evidenced by leukocytosis, tachypnea, lactic acidosis -patient was started on broad-spectrum antibiotics with vancomycin plus cefepime  No clear evidence of infection at present  Blood culture 1/2 positive for coagulase-negative staph possibly contaminant  Currently afebrile, hemodynamically stable    Leukocytosis improved  · Follow-up blood cultures  · Procalcitonin  · ID evaluation  · Follow-up labs    Left leg pain  Assessment & Plan  Possibly secondary to rhabdomyolysis  Neurovascular exam intake  · PT/OT  · Check venous Doppler    Rhabdomyolysis  Assessment & Plan  CK noted to be elevated   Improving    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HCC)  Assessment & Plan  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)  Assessment & Plan  Lab Results   Component Value Date    HGBA1C 5 1 05/08/2022       Recent Labs     06/03/22  0813 06/03/22  1151 06/03/22  1608 06/03/22  1955   POCGLU 164* 253* 158* 294*       Blood Sugar Average: Last 72 hrs:  (P) 181 [id number]     Start insulin 70/30 25 units b i d  A c  Monitor with sliding scale    Suspected UTI-resolved as of 6/4/2022  Assessment & Plan  Urine culture negative, no evidence of UTI    Anemia due to chronic kidney disease, on chronic dialysis [facility])  Assessment & Plan  Lab Results   Component Value Date    EGFR 10 06/03/2022    EGFR 6 06/02/2022    EGFR 7 06/01/2022    CREATININE 4 74 (H) 06/03/2022    CREATININE 6 97 (H) 06/02/2022    CREATININE 6 25 (H) 06/01/2022       Benign essential hypertension  Assessment & Plan  Holding enalapril due to hyperkalemia    Hyperlipidemia  Assessment & Plan  Continue statin    Benign prostatic hyperplasia with lower urinary tract symptoms  Assessment & Plan  On Flomax          VTE Pharmacologic Prophylaxis: VTE Score: 6 Moderate Risk (Score 3-4) - Pharmacological DVT Prophylaxis Ordered: heparin  Patient Centered Rounds: I performed bedside rounds with nursing staff today  Discussions with Specialists or Other Care Team Provider:  Yes, ID    Education and Discussions with Family / Patient: Attempted to update  (wife) via phone  Unable to contact  Time Spent for Care: 45 minutes   More than 50% of total time spent on counseling and coordination of care as described above  Current Length of Stay: 2 day(s)  Current Patient Status: Inpatient   Certification Statement: The patient will continue to require additional inpatient hospital stay due to Sepsis  Discharge Plan: Anticipate discharge in 48-72 hrs to discharge location to be determined pending rehab evaluations  Code Status: Level 1 - Full Code    Subjective:   Sitting up in chair  Extremely hard-of-hearing, but able to answer question appropriately when heard   Denies chest pain shortness of breath abdominal pain  Denies any urinary problem  Reports left leg discomfort    Objective:     Vitals:   Temp (24hrs), Av 8 °F (37 1 °C), Min:98 °F (36 7 °C), Max:99 8 °F (37 7 °C)    Temp:  [98 °F (36 7 °C)-99 8 °F (37 7 °C)] 98 3 °F (36 8 °C)  HR:  [] 72  Resp:  [16-26] 17  BP: ()/(53-70) 151/62  SpO2:  [97 %-100 %] 100 %  Body mass index is 27 42 kg/m²  Input and Output Summary (last 24 hours): Intake/Output Summary (Last 24 hours) at 6/3/2022 1059  Last data filed at 6/3/2022 0901  Gross per 24 hour   Intake 1860 ml   Output 2700 ml   Net -840 ml       Physical Exam:   Physical Exam  Constitutional:       General: He is not in acute distress  Comments: Chronically ill, deconditioned   HENT:      Head: Normocephalic and atraumatic  Cardiovascular:      Rate and Rhythm: Normal rate  Comments: Right chest PermCath C/D/  Pulmonary:      Effort: Pulmonary effort is normal  No respiratory distress  Breath sounds: No wheezing, rhonchi or rales  Chest:      Chest wall: No tenderness  Abdominal:      General: Bowel sounds are normal  There is no distension  Palpations: Abdomen is soft  Tenderness: There is no abdominal tenderness  There is no guarding or rebound  Musculoskeletal:      Right lower leg: Edema present  Left lower leg: Edema (Left more than right) present  Skin:     General: Skin is warm and dry  Findings: No rash     Neurological:      Mental Status: He is alert  Mental status is at baseline  Cranial Nerves: No cranial nerve deficit  Additional Data:     Labs:  Results from last 7 days   Lab Units 06/03/22  0502   WBC Thousand/uL 8 15   HEMOGLOBIN g/dL 7 9*   HEMATOCRIT % 25 6*   PLATELETS Thousands/uL 335   NEUTROS PCT % 80*   LYMPHS PCT % 12*   MONOS PCT % 6   EOS PCT % 1     Results from last 7 days   Lab Units 06/03/22  0502 06/02/22  0525   SODIUM mmol/L 133* 133*   POTASSIUM mmol/L 4 6 5 3   CHLORIDE mmol/L 94* 97*   CO2 mmol/L 28 24   BUN mg/dL 39* 56*   CREATININE mg/dL 4 74* 6 97*   ANION GAP mmol/L 11 12   CALCIUM mg/dL 7 7* 8 0*   ALBUMIN g/dL  --  1 5*   TOTAL BILIRUBIN mg/dL  --  0 39   ALK PHOS U/L  --  100   ALT U/L  --  69   AST U/L  --  171*   GLUCOSE RANDOM mg/dL 145* 133     Results from last 7 days   Lab Units 06/01/22  1228   INR  1 47*     Results from last 7 days   Lab Units 06/03/22  0813 06/02/22  2147 06/02/22  1647 06/02/22  1130 06/02/22  0522 06/01/22  2345 06/01/22  1807   POC GLUCOSE mg/dl 164* 237* 160* 204* 139 176* 193*         Results from last 7 days   Lab Units 06/02/22  0525 06/01/22  1450 06/01/22  1120 06/01/22  1046   LACTIC ACID mmol/L  --  1 5 2 7*  --    PROCALCITONIN ng/ml 7 60*  --   --  4 26*       Lines/Drains:  Invasive Devices  Report    Peripheral Intravenous Line  Duration           Peripheral IV 06/02/22 Left; Lower Arm 1 day          Hemodialysis Catheter  Duration           HD Permanent Double Catheter 86 days                      Imaging: Reviewed radiology reports from this admission including: chest xray    Recent Cultures (last 7 days):   Results from last 7 days   Lab Units 06/01/22  1101 06/01/22  1046   BLOOD CULTURE   --  No Growth at 24 hrs     GRAM STAIN RESULT   --  Gram positive cocci in clusters*   URINE CULTURE  No Growth <1000 cfu/mL  --        Last 24 Hours Medication List:   Current Facility-Administered Medications   Medication Dose Route Frequency Provider Last Rate    calcium acetate  667 mg Oral TID With Meals Hemantlett Manner Maki, FERNANDO      epoetin ani  2,000 Units Intravenous After Dialysis [facility], CRNP      heparin (porcine)  5,000 Units Subcutaneous [place], [place]      insulin lispro  1-6 Units Subcutaneous TID AC [place], [place]      insulin lispro  1-6 Units Subcutaneous HS Jeannie Manner Maki, CRCARYN      latanoprost  1 drop Both Eyes HS [place], [place]      melatonin  3 mg Oral HS PRN Demario Rowe, FERNANDO      pravastatin  80 mg Oral Daily With Acarix, [place]      tamsulosin  0 4 mg Oral Daily With Acarix, CRNP      vancomycin  10 mg/kg Intravenous After Dialysis [facility], CRNP          Today, Patient Was Seen By: Keira Watson MD    ** Please Note: "This note has been constructed using a voice recognition system  Therefore there may be syntax, spelling, and/or grammatical errors   Please call if you have any questions  "**

## 2022-06-03 NOTE — CONSULTS
Consultation - Infectious Disease   Juan Jose Kelly 64 y o  male MRN: [id number]  Unit/Bed#: [address] [place] [address] Encounter: [id number]      IMPRESSION & RECOMMENDATIONS:   1  SIRS, POA  With tachypnea, leukocytosis and lactic acidosis in patient presenting with altered mental status and weakness  Admission blood cultures growing 1 of 2 sets coag-negative staph likely representative skin contaminant  On admission CT, former right kidney suspected hematoma is slowly decreasing in size  Moderate pleural effusions not unexpected in setting of missed HD session on 05/31/2022  -Discontinue cefepime  -continue vancomycin for now pending final blood cultures and   -recheck repeat blood cultures  -monitor temperature and hemodynamics  -serial exam     2  Coag-negative staph bacteremia  Admission blood cultures growing 1 of 2 sets coag-negative staph likely representative skin contaminant   -continue vancomycin for now  -follow-up final blood cultures  -check repeat blood cultures     3  LORRAINE on CKD III  On HD since recent hospitalization in 03/22   -renal dose adjust antibiotic as needed  -volume management   -continues HD management per Nephrology  -monitor BMP     4  ANCA vasculitis status post kidney biopsy  Treated with cytoxan 3/23/22 and 4/6/22  -continues management per Nephrology     5  Type 2 diabetes mellitus  5/8/22 Hemoglobin A1c 5 1  -blood glucose management per primary care team     6  Amoxicillin listed allergy  Tolerates Cephlosporins  -monitor for antibiotic tolerance     Antibiotics:  Vancomycin/Cefepime D2    I have discussed the above management plan in detail with patient, RN, and the primary service  We will see patient again 6/6/22 if here  Please call ID on call physician in meantime if questions  Extensive review of the medical records in epic including review of the notes, radiographs, and laboratory results     HISTORY OF PRESENT ILLNESS:  Reason for Consult: 1  Sepsis 2  UTI 3   Positive blood culture    HPI: Demetrice Lei is a 64y o  year old male with ANCA vasculitis status post kidney biopsy in March 2022, acute kidney injury on hemodialysis, CKD stage 3 with prior baseline creatinine 1 1-1 3, diabetes mellitus type 2, hypertension, nephrolithiasis, BPH, dyslipidemia, prior bladder rupture status post repair 6 years ago, anemia, and recent E coli UTI 1 month ago who presented to the ER 6/1/22 with altered mental status and extremely weak  Wife reported that he was not acting himself and did not go to dialysis on 05/31/2022  In the ER, patient had a potassium was 7 2, leukocytosis, elevated lactic acid and tachypnea  Blood cultures were obtained and the patient underwent dialysis  Patient was admitted on vancomycin and cefepime  Infectious Disease now being consulted regarding evaluation and management of sepsis, UTI and positive blood culture  One of 2 admission blood cultures has turned positive for coag-negative staph  No other positive cultures thus far including negative urine culture and no urinary symptoms  White count has resolved  Patient reports no fever, shaking chills, sweats, vomiting, diarrhea, dysuria, or any hematuria  He reports his last urination was yesterday without pain  His main complaint is left eye irritation and he is requesting eyedrops  REVIEW OF SYSTEMS:  A complete review of systems is negative other than that noted in the HPI      PAST MEDICAL HISTORY:  Past Medical History:   Diagnosis Date    Anesthesia complication 3955    after colonoscopy , pt was awake but could not control his body and kept falling     Arthritis     Bladder calculi     BPH (benign prostatic hyperplasia)     Diabetes mellitus ([address] )     Hearing disorder of both ears     wears bilateral hearing aids    Hyperlipidemia     controlled and maintained d/t diabetes    Hypertension     Kidney stones     Last Assessed:4/3/2017    Lyme disease     Last Assessed:6/29/2015    Rupture, bladder, spontaneous     Last Assessed:4/3/2017     Past Surgical History:   Procedure Laterality Date    BLADDER REPAIR N/A 12/10/2016    Procedure: REPAIR BLADDER/cysto insertion stent;  Surgeon: Suni Manriquez MD;  Location: [address] [place] [address];  Service:     COLONOSCOPY      CYSTOLITHOTOMY      ANESTHESIA   lower abdomen  ; Last Assessed:4/3/2017    IR ASPIRATION ONLY  2022    IR BIOPSY KIDNEY RANDOM  3/10/2022    IR TUNNELED DIALYSIS CATHETER PLACEMENT  3/8/2022    OTHER SURGICAL HISTORY      thermal dilation of the prostate x 2 ( in the office)    TONSILLECTOMY      TRANSURETHRAL RESECTION OF PROSTATE N/A 2016    Procedure:  Cysto, Laser Bladder stone,fulgaration of prostates tissue ;  Surgeon: Suni Manriquez MD;  Location: [facility];  Service:        FAMILY HISTORY:  Non-contributory    SOCIAL HISTORY:  Social History   Social History     Substance and Sexual Activity   Alcohol Use Yes     Social History     Substance and Sexual Activity   Drug Use No     Social History     Tobacco Use   Smoking Status Former Smoker    Types: Cigars    Quit date: 26    Years since quittin 4   Smokeless Tobacco Never Used       ALLERGIES:  Allergies   Allergen Reactions    Amoxicillin Rash       MEDICATIONS:  All current active medications have been reviewed      PHYSICAL EXAM:  Temp:  [98 3 °F (36 8 °C)-99 8 °F (37 7 °C)] 98 3 °F (36 8 °C)  HR:  [] 73  Resp:  [16-26] 18  BP: ()/(53-70) 153/64  SpO2:  [97 %-100 %] 99 %  Temp (24hrs), Av 8 °F (37 1 °C), Min:98 3 °F (36 8 °C), Max:99 8 °F (37 7 °C)  Current: Temperature: 98 3 °F (36 8 °C)    Intake/Output Summary (Last 24 hours) at 6/3/2022 1517  Last data filed at 6/3/2022 0901  Gross per 24 hour   Intake 1450 ml   Output 2700 ml   Net -1250 ml       General Appearance:  75-year-old elderly male, propped fairly comfortably in bed, appearing well, nontoxic, and in no distress, extremely hard of hearing   Head:  Normocephalic, without obvious abnormality, atraumatic   Eyes:  Conjunctiva pink and sclera anicteric, both eyes   Nose: Nares normal, mucosa normal, no drainage   Throat: Oropharynx moist without lesions   Neck: Supple, symmetrical, no adenopathy, no tenderness/mass/nodules   Back:   Symmetric, no curvature, ROM normal, no CVA tenderness   Lungs:   Clear to auscultation bilaterally, respirations unlabored   Chest Wall:  No tenderness or deformity, right chest wall PermCath site nontender noninflamed   Heart:  RRR; no murmur, rub or gallop   Abdomen:   Soft, non-tender, non-distended, positive bowel sounds    Extremities: No cyanosis, clubbing or edema   : No Wall catheter, no SPT, no scrotal induration or tenderness   Skin: No rashes or lesions  No draining wounds noted  Multiple healed cat scratches of legs, 1 right shin healing scab nontender, without surrounding erythema   Lymph nodes: Cervical, supraclavicular nodes normal   Neurologic: Alert and oriented times 3, extremity strength 5/5 and symmetric       LABS, IMAGING, & OTHER STUDIES:  Lab Results:  I have personally reviewed pertinent labs  Results from last 7 days   Lab Units 06/03/22  0502 06/02/22  0525 06/01/22  1046   WBC Thousand/uL 8 15 9 49 15 36*   HEMOGLOBIN g/dL 7 9* 8 1* 9 7*   PLATELETS Thousands/uL 335 389 518*     Results from last 7 days   Lab Units 06/03/22  0502 06/02/22  0525 06/01/22  1948 06/01/22  1046   SODIUM mmol/L 133* 133* 132* 127*   POTASSIUM mmol/L 4 6 5 3 4 9 7 2*   CHLORIDE mmol/L 94* 97* 96* 87*   CO2 mmol/L 28 24 23 20*   BUN mg/dL 39* 56* 49* 81*   CREATININE mg/dL 4 74* 6 97* 6 25* 9 65*   EGFR ml/min/1 73sq m 10 6 7 4   CALCIUM mg/dL 7 7* 8 0* 8 1* 8 6   AST U/L  --  171*  --  189*   ALT U/L  --  69  --  56   ALK PHOS U/L  --  100  --  139*     Results from last 7 days   Lab Units 06/01/22  1420 06/01/22  1101 06/01/22  1046   BLOOD CULTURE   --   --  No Growth at 48 hrs     GRAM STAIN RESULT   --   --  Gram positive cocci in clusters*   URINE CULTURE   --  No Growth <1000 cfu/mL  --    MRSA CULTURE ONLY  No Methicillin Resistant Staphlyococcus aureus (MRSA) isolated  --   --      Results from last 7 days   Lab Units 06/02/22  0525 06/01/22  1046   PROCALCITONIN ng/ml 7 60* 4 26*                   Imaging Studies:   06/01/2022 portable chest x-ray:  Mild vascular congestion  06/01/2022 CT chest abdomen pelvis:  Slight decrease in size of right renal subcapsular fluid collection likely hematoma  Moderate bilateral pleural effusions  Possible soft tissue swelling of right scrotum  Other Studies:   I have personally reviewed pertinent reports

## 2022-06-03 NOTE — OCCUPATIONAL THERAPY NOTE
OT EVALUATION     06/03/22 1535   Note Type   Note type Evaluation   Pain Assessment   Pain Assessment Tool Brown-Baker FACES   Pain Score 7   Pain Location/Orientation Orientation: Left; Location: Leg;Location: Knee   Home Living   Type of [address] One level; Able to live on main level with bedroom/bathroom   Bathroom Shower/Tub Walk-in shower   Bathroom Toilet Standard   Home Equipment Walker;Cane   Additional Comments Pt is a poor historian and hard of hearing  Information obtained from past medical charts   Prior Function   Level of [place] Independent with ADLs and functional mobility   Lives With Spouse   Receives Help From Family   ADL Assistance Independent   IADLs Independent   Vocational Retired   Comments Patient admitted with generalized weakness and altered mental status  ADL   Eating Assistance 4  Minimal Assistance   Grooming Assistance 4  Minimal Assistance   UB Bathing Assistance 3  Moderate Assistance   LB Bathing Assistance 3  Moderate Assistance   UB Dressing Assistance 3  Moderate Assistance   LB Dressing Assistance 3  Moderate Assistance   Toileting Assistance  3  Moderate Assistance   Functional Assistance 3  Moderate Assistance   Additional Comments Patient was able to drink from a cup with a straw with set/up supervision  Bed Mobility   Supine to Sit 3  Moderate assistance   Additional items Assist x 1; Increased time required;Verbal cues   Sit to Supine 4  Minimal assistance   Additional items Assist x 1; Increased time required   Additional Comments Patient was able to sit edge of bed with increased time to complete, mod verbal cueing and mod assist  Patient reported knee pain with a burning sensation refusing to want to stand   Transfers   Additional Comments Patient was lethargic at the beginning of the session; mod-max cues for arousal  Patient c/o of pain in LLE and numbness refusing to do anymore than sit edge of bed     Balance   Static Sitting Fair   Dynamic Sitting Fair -   Activity Tolerance   Activity Tolerance Patient limited by fatigue;Patient limited by pain   RUE Assessment   RUE Assessment WFL   LUE Assessment   LUE Assessment WFL   Cognition   Overall Cognitive Status Impaired   Arousal/Participation Lethargic;Responsive   Attention Difficulty attending to directions   Orientation Level Oriented to person;Oriented to place; Disoriented to time;Disoriented to situation   Following Commands Follows one step commands with increased time or repetition   Assessment   Limitation Decreased ADL status; Decreased UE ROM; Decreased UE strength;Decreased Safe judgement during ADL;Decreased endurance;Decreased fine motor control;Decreased self-care trans;Decreased high-level ADLs   Prognosis Good   Assessment Patient evaluated by Occupational Therapy  Patient admitted with Severe sepsis ([facility] [address] )  The patients occupational profile, medical and therapy history includes a extensive additional review of physical, cognitive, or psychosocial history related to current functional performance  Comorbidities affecting functional mobility and ADLS include: arthritis, diabetes, hypertension and hyperlipidemia  Prior to admission, patient was independent with ADLS and independent with IADLS  The evaluation identifies the following performance deficits: weakness, decreased ROM, decreased endurance, increased fall risk, new onset of impairment of functional mobility, decreased ADLS, decreased IADLS, pain and decreased activity tolerance, that result in activity limitations and/or participation restrictions  This evaluation requires clinical decision making of high complexity, because the patient presents with comorbidites that affect occupational performance and required significant modification of tasks or assistance with consideration of multiple treatment options    The Barthel Index was used as a functional outcome tool presenting with a score of Barthel Index Score: 40, indicating marked limitations of functional mobility and ADLS  The patient's raw score on the AM-PAC Daily Activity inpatient short form is 14, standardized score is 33 39, less than 39 4  Patients at this level are likely to benefit from DC to post-acute rehabilitation services  Please refer to the recommendation of the Occupational Therapist for safe DC planning  Patient will benefit from skilled Occupational Therapy services to address above deficits and facilitate a safe return to prior level of function  Goals   Patient Goals To go home   STG Time Frame   (1-7 days)   Short Term Goal  Goals established to promote Patient Goals: To go home:  Patient will increase standing tolerance to 5 minutes during ADL task to decrease assistance level and decrease fall risk; Patient will increase bed mobility to min assist in preparation for ADLS and transfers; Patient will increase functional mobility to and from bathroom with rolling walker with min assist to increase performance with ADLS and to use a toilet; Patient will tolerate 10 minutes of UE ROM/strengthening to increase general activity tolerance and performance in ADLS/IADLS; Patient will improve functional activity tolerance to 10 minutes of sustained functional tasks to increase participation in basic self-care and decrease assistance level;  Patient will be able to to verbalize understanding and perform energy conservation/proper body mechanics during ADLS and functional mobility at least 50% of the time with moderate cueing to decrease signs of fatigue and increase stamina to return to prior level of function; Patient will increase dynamic sitting balance to fair to improve the ability to sit at edge of bed or on a chair for ADLS; Patient will increase dynamic standing/sitting balance to poor+ to improve the ability to stand for ADLS     LTG Time Frame   (8-14 days)   Long Term Goal Patient will increase standing tolerance to 10 minutes during ADL task to decrease assistance level and decrease fall risk; Patient will increase bed mobility to supervision in preparation for ADLS and transfers; Patient will increase functional mobility to and from bathroom with rolling walker with supervision to increase performance with ADLS and to use a toilet; Patient will tolerate 20 minutes of UE ROM/strengthening to increase general activity tolerance and performance in ADLS/IADLS; Patient will improve functional activity tolerance to 20 minutes of sustained functional tasks to increase participation in basic self-care and decrease assistance level;  Patient will be able to to verbalize understanding and perform energy conservation/proper body mechanics during ADLS and functional mobility at least 90% of the time with minimal cueing to decrease signs of fatigue and increase stamina to return to prior level of function; Patient will increase dynamic sitting balance to fair+ to improve the ability to sit at edge of bed or on a chair for ADLS; Patient will increase dynamic standing/sitting balance to fair- to improve the ability to stand for ADLS  Pt will score >/= 18/24 on AM-PAC Daily Activity Inpatient scale to promote safe independence with ADLs and functional mobility; Pt will score >/= 70/100 on Barthel Index in order to decrease caregiver assistance needed and increase ability to perform ADLs and functional mobility     Functional Transfer Goals   Pt Will Perform All Functional Transfers   (STG min assist LTG supervision)   ADL Goals   Pt Will Perform Eating   (STG supervision LTG Independent)   Pt Will Perform Grooming   (STG supervision LTG Independent)   Pt Will Perform Bathing   (STG  min assit LTG supervision)   Pt Will Perform UE Dressing   (STG  min assist LTG supervision)   Pt Will Perform LE Dressing   (STG  min assit LTG supervision)   Pt Will Perform Toileting   (STG  min assit LTG supervision)   Plan   Treatment Interventions ADL retraining;Functional transfer training;UE strengthening/ROM; Endurance training;Patient/family training;Fine motor coordination activities; Energy conservation; Activityengagement   Goal Expiration Date 06/17/22   OT Frequency 3-5x/wk   Recommendation   OT Discharge Recommendation Post acute rehabilitation services   AM-PAC Daily Activity Inpatient   Lower Body Dressing 2   Bathing 2   Toileting 2   Upper Body Dressing 2   Grooming 3   Eating 3   Daily Activity Raw Score 14   Daily Activity Standardized Score (Calc for Raw Score >=11) 33 39   AM-PAC Applied Cognition Inpatient   Following a Speech/Presentation 1   Understanding Ordinary Conversation 2   Taking Medications 1   Remembering Where Things Are Placed or Put Away 1   Remembering List of 4-5 Errands 1   Taking Care of Complicated Tasks 1   Applied Cognition Raw Score 7   Applied Cognition Standardized Score 15 17   Barthel Index   Feeding 5   Bathing 0   Grooming Score 0   Dressing Score 5   Bladder Score 10   Bowels Score 10   Toilet Use Score 5   Transfers (Bed/Chair) Score 5   Mobility (Level Surface) Score 0   Stairs Score 0   Barthel Index Score 40   Licensure   [place] License Number  [employer]

## 2022-06-03 NOTE — PROGRESS NOTES
NEPHROLOGY PROGRESS NOTE   Demetrice Lei 64 y o  male MRN: [id number]  Unit/Bed#: [address] [place] [address]- Encounter: [id number]  Reason for Consult: ESRD      SUMMARY:    75-year-old male with a history of end-stage renal disease on hemodialysis, diabetes mellitus type 2, BPH, hypertension who was brought in to [facility] for generalized weakness as his wife found him on the toilet with extreme weakness   Patient consult for ESRD management      ASSESSMENT and PLAN:     Hyperkalemia--resolved  --due to poor compliance with outpatient dialysis along with concurrent hyperglycemia  --underwent hemodialysis yesterday     End-stage renal disease  --incenter hemodialysis Tuesday Thursday Saturday at [address]  --access:  Right IJ PermCath, follow-up blood cultures as I am concerned that there may be some contamination  --outpatient prescription includes 137 sodium, 35 bicarbonate, 2 potassium, 2 5 calcium, temperature 35 5°, blood flow rate 400 mL/minute, dry weight 80 kg, 4 hours  --underwent hemodialysis yesterday  --CT scan shows a right renal subscapular fluid collection representing a likely hematoma which has decreased in size compared to prior imaging  --Plan for next dialysis tomorrow if remains inpatient, no urgent indication for dialysis today  --overall stable from renal standpoint for discharge if medical team being stable as well     Hyponatremia  --low serum chloride concurrent hyperglycemia which is improving  --stable  --monitor on hemodialysis     Anion gap metabolic acidosis--resolved  --secondary to renal failure and poor compliance with outpatient dialysis  --lactic acid 2 7 --> 1 5     Mineral bone disorder-chronic kidney  --hyperphosphatemia  --calcium acetate with meals     Hypertension  --blood pressure overall acceptable, transient episode of hypotension with concurrent tachycardia resolved    --volume status better  --pleural effusion noted     Anemia of chronic kidney disease  --Micera as an outpatient, Epogen as an inpatient  --hemoglobin close to target     Bilateral pleural effusions  --moderate-sized  --may not be amendable to ultrafiltration      Leukocytosis/sepsis  --elevated procalcitonin level and lactic acid  --1 bottle of blood culture was negative while the other bottle show Staph coag-negative likely contaminant  --urine cultures no growth  --COVID-19/influenza/RSV was negative  --appears to be no evidence of a infiltrate  --currently has a PermCath in place which due to poor outpatient hygiene at home  --antibiotics as per the primary team  --dose vancomycin based on level     Rhabdomyolysis  --improving      SUBJECTIVE / INTERVAL HISTORY:    No overnight events    OBJECTIVE:  Current Weight: Weight - Scale: 81 8 kg (180 lb 5 4 oz)  Vitals:    06/02/22 2030 06/02/22 2126 06/03/22 0600 06/03/22 0753   BP: 111/55 132/56  151/62   BP Location:       Pulse: 101 99  72   Resp: 22   17   Temp:  99 8 °F (37 7 °C)  98 3 °F (36 8 °C)   TempSrc:       SpO2:  97%  100%   Weight:   81 8 kg (180 lb 5 4 oz)    Height:           Intake/Output Summary (Last 24 hours) at 6/3/2022 0950  Last data filed at 6/3/2022 0901  Gross per 24 hour   Intake 1860 ml   Output 2700 ml   Net -840 ml       Review of Systems:    12 point ROS has been reviewed  Physical Exam  Vitals and nursing note reviewed  Constitutional:       General: He is not in acute distress  Appearance: He is well-developed  HENT:      Head: Normocephalic and atraumatic  Eyes:      General: No scleral icterus  Conjunctiva/sclera: Conjunctivae normal       Pupils: Pupils are equal, round, and reactive to light  Cardiovascular:      Rate and Rhythm: Normal rate and regular rhythm  Heart sounds: S1 normal and S2 normal  No murmur heard  No friction rub  No gallop  Pulmonary:      Effort: Pulmonary effort is normal  No respiratory distress  Breath sounds: Normal breath sounds  No wheezing or rales     Abdominal: General: Bowel sounds are normal       Palpations: Abdomen is soft  Tenderness: There is no abdominal tenderness  There is no rebound  Musculoskeletal:         General: Normal range of motion  Cervical back: Normal range of motion and neck supple  Skin:     General: Skin is dry  Coloration: Skin is pale  Findings: No rash  Neurological:      Mental Status: He is alert and oriented to person, place, and time     Psychiatric:         Behavior: Behavior normal          Medications:    Current Facility-Administered Medications:     calcium acetate (PHOSLO) capsule 667 mg, 667 mg, Oral, TID With Meals, [employer], CRNP, 667 mg at 06/03/22 9810    epoetin ani (EPOGEN,PROCRIT) injection 2,000 Units, 2,000 Units, Intravenous, After Dialysis, [employer], CRNP, 2,000 Units at 06/02/22 1931    heparin (porcine) subcutaneous injection 5,000 Units, 5,000 Units, Subcutaneous, Q[address], 5,000 Units at 06/03/22 0506 **AND** [CANCELED] Platelet count, , , Once, Corin Quan PA-C    insulin lispro (HumaLOG) 100 units/mL subcutaneous injection 1-6 Units, 1-6 Units, Subcutaneous, TID AC, 1 Units at 06/03/22 0903 **AND** Fingerstick Glucose (POCT), , , TID AC, [employer], FERNANDO    insulin lispro (HumaLOG) 100 units/mL subcutaneous injection 1-6 Units, 1-6 Units, Subcutaneous, HS, [employer], FERNANDO, 3 Units at 06/02/22 2210    latanoprost (XALATAN) 0 005 % ophthalmic solution 1 drop, 1 drop, Both Eyes, HS, [employer], FERNANDO, 1 drop at 06/02/22 2209    melatonin tablet 3 mg, 3 mg, Oral, HS PRN, FERNANDO Perez    pravastatin (PRAVACHOL) tablet 80 mg, 80 mg, Oral, Daily With Ardena Mosier, FERNANDO, 80 mg at 06/02/22 1651    tamsulosin (FLOMAX) capsule 0 4 mg, 0 4 mg, Oral, Daily With Dinner, [employer], CRNP, 0 4 mg at 06/02/22 1651    vancomycin (VANCOCIN) IVPB (premix in dextrose) 750 mg 150 mL, 10 mg/kg, Intravenous, After Dialysis, [employer], CRNP    Laboratory Results:  Results from last 7 days   Lab Units 06/03/22  0502 06/02/22  0525 06/01/22  1948 06/01/22  1046   WBC Thousand/uL 8 15 9 49  --  15 36*   HEMOGLOBIN g/dL 7 9* 8 1*  --  9 7*   HEMATOCRIT % 25 6* 26 6*  --  30 8*   PLATELETS Thousands/uL 335 389  --  518*   POTASSIUM mmol/L 4 6 5 3 4 9 7 2*   CHLORIDE mmol/L 94* 97* 96* 87*   CO2 mmol/L 28 24 23 20*   BUN mg/dL 39* 56* 49* 81*   CREATININE mg/dL 4 74* 6 97* 6 25* 9 65*   CALCIUM mg/dL 7 7* 8 0* 8 1* 8 6   MAGNESIUM mg/dL 2 5 2 6  --  2 8*   PHOSPHORUS mg/dL 4 4* 7 1*  --  7 5*       PLEASE NOTE:  This encounter was completed utilizing the [employer] Direct Speech Voice Recognition Software  Grammatical errors, random word insertions, pronoun errors and incomplete sentences are occasional consequences of the system due to software limitations, ambient noise and hardware issues  These may be missed by proof reading prior to affixing electronic signature  Any questions or concerns about the content, text or information contained within the body of this dictation should be directly addressed to the physician for clarification  Please do not hesitate to call me directly if you have any any questions or concerns

## 2022-06-03 NOTE — WOUND OSTOMY CARE
Progress Note - Wound   Jaqueline Tiwari 64 y o  male MRN: [id number]  Unit/Bed#: [address] [place] [address] Encounter: [id number]      Assessment: This is a 64year old male patient admitted on 6/1/22 with severe sepsis, hyperkalemia and rhabdomyolysis  He has a history of ESRD on chronic hemodialysis, CAD, vasculitis, HTN and DM2  He was found by EMS sitting on toilet seat unable to get up for unknown period of time  He was awake, alert & oriented to person and place - he is extremely hard of hearing  He is dependent upon nursing staff for all of his care and is repositioned in bed with assist of 2 persons  Assessment Findings:  1-The patient has evolving (open) deep tissue injuries (POA) to left hip and left posterior thigh with dark purple, maroon, non-blanchable tissue resembling the shape of a toilet seat  They both have areas of open partial thickness skin damage  These wounds may progress to unstageable or stage 4 pressure injuries due to the nature of this type of wound  Please see all wound assessments below  Orders written for wound care and for prevention  2-The patient has a deep tissue injury (POA) to right posterior thigh which is dark purple, maroon and non-blanching  Orders written for wound care and for prevention  3-There are purpuric closed areas to right anterior and right lateral foot which appear to be vasculitic in nature, especially considering the patient's history of vasculitis  Orders written for skin care and for prevention  Skin care Plan:  1-Cleanse sacro-buttocks with soap and water  Apply Allevyn foam  Masoud with P for prevention  Change every two days and PRN  check skin q-shift  2-Cleanse left hip and left posterior thigh evolving DTPI's with NSS & pat dry  Open small Dermagran square & apply to open areas only in a single layer cut to size of wounds  Cover with Allevyn sacral bordered foam dressing & changed every other day & prn soilage/dislodgement    3-Cleanse right posterior thigh DTPI with foaming cleanser & pat dry  Apply Calazime paste 3x/day & prn soilage/dislodgement  4-Turn/reposition q2h or when medically stable for pressure re-distribution on skin   5-Bilateral heel protectors to be worn at all times in bed or when out of bed to chair to offload heel pressure  6-Moisturize skin daily with skin nourishing cream  7-Ehob cushion in chair when out of bed  8-Hydraguard to bilateral heels & purpuric areas BID and PRN  9-P-500 bed ordered - must still turn and position patient regularly - must still protect heels and other bony prominences from mattress  Wound 06/01/22 Pressure Injury Hip Left;Lateral (Active)   Wound Image   06/03/22 1315   Wound Description Granulation tissue;Pink 06/03/22 1315   Pressure Injury Stage DTPI 06/03/22 1315   Larissa-wound Assessment Fluctuant 06/03/22 1315   Wound Length (cm) 10 cm 06/03/22 1315   Wound Width (cm) 4 cm 06/03/22 1315   Wound Depth (cm) 0 1 cm 06/03/22 1315   Wound Surface Area (cm^2) 40 cm^2 06/03/22 1315   Wound Volume (cm^3) 4 cm^3 06/03/22 1315   Calculated Wound Volume (cm^3) 4 cm^3 06/03/22 1315   Treatments Cleansed 06/03/22 1315   Dressing Foam, Silicon (eg  Allevyn, etc); Dermagran gauze 06/03/22 1315   Dressing Changed New 06/03/22 1315   Dressing Status Clean;Dry; Intact 06/03/22 1315       Wound 06/01/22 Foot Right; Outer (Active)   Wound Image   06/03/22 1327   Wound Description Purpuric closed areas (vasculitic) 06/03/22 1327   Drainage Amount None 06/03/22 1327   Treatments Cleansed 06/03/22 1327   Dressing Moisture barrier 06/03/22 1327   Dressing Status Intact 06/03/22 1327       Wound 06/01/22 Thigh Right;Posterior (Active)   Wound Image   06/03/22 1315   Wound Description Dark purple; maroon; non-blanchable 06/03/22 1315   Pressure Injury Stage DTPI (present on admission) 06/03/22 1315   Wound Length (cm) 2 5 cm 06/03/22 1315   Wound Width (cm) 7 cm 06/03/22 1315   Wound Depth (cm) 0 cm 06/03/22 1315   Wound Surface Area (cm^2) 17 5 cm^2 06/03/22 1315   Wound Volume (cm^3) 0 cm^3 06/03/22 1315   Calculated Wound Volume (cm^3) 0 cm^3 06/03/22 1315   Drainage Amount None 06/03/22 1315   Dressing Moisture barrier 06/03/22 1315   Dressing Status Intact 06/03/22 1315       Wound 06/01/22 Foot Anterior;Right (Active)   Wound Image   06/03/22 1326   Wound Description Closed purpuric areas (vasculitic) 06/03/22 1326   Drainage Amount None 06/03/22 1326   Treatments Cleansed 06/03/22 1326   Dressing Moisture barrier 06/03/22 1326   Dressing Status Intact 06/03/22 1326       Wound 06/03/22 Thigh Left;Posterior (Active)   Wound Image   06/03/22 1312   Wound Description Granulation tissue;Pink;dark purple;maroon;non-blanchable 06/03/22 1312   Pressure Injury Stage Evolving DTPI (open) 06/03/22 1319   Wound Length (cm) 11 cm 06/03/22 1319   Wound Width (cm) 3 cm 06/03/22 1319   Wound Depth (cm) 0 1 cm 06/03/22 1319   Wound Surface Area (cm^2) 33 cm^2 06/03/22 1319   Wound Volume (cm^3) 3 3 cm^3 06/03/22 1319   Calculated Wound Volume (cm^3) 3 3 cm^3 06/03/22 1319   Change in Wound Size % -230 06/03/22 1319   Drainage Amount Scant 06/03/22 1312   Drainage Description Serous 06/03/22 1312   Treatments Cleansed 06/03/22 1312   Dressing Dermagran gauze; Foam, Silicon (eg  Allevyn, etc) 06/03/22 1312   Dressing Status Clean;Dry; Intact 06/03/22 1312     Discussed assessment findings, and plan of care/recommendations with Lucia Matson RN  Wound care will follow along with patient throughout admission, please call or tiger text with questions and concerns    Recommendations written as orders    Cheyenne Delgado RN, BSN, [facility]

## 2022-06-03 NOTE — CASE MANAGEMENT
Case Management Discharge Planning Note    Patient name Hoang Camp  Location [address] Alberto Rhodes2-* MRN [id number]  : 1945 Date 6/3/2022       Current Admission Date: 2022  Current Admission Diagnosis:Severe sepsis [facility])   Patient Active Problem List    Diagnosis Date Noted    Lactic acidosis 2022    Suspected UTI 2022    Toxic metabolic encephalopathy     Rhabdomyolysis 2022    Bacteremia 2022    Severe sepsis ([facility] ) 2022    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[facility] ) 2022    Pulmonary hypertension ([facility] ) 2022    Dialysis patient ([facility] ) 2022    Anemia due to chronic kidney disease, on chronic dialysis ([facility] )     Hemoptysis 2022    LORRAINE (acute kidney injury) ([facility] )     Other proteinuria     Symptomatic anemia 2022    Chest pain 2022    Acute kidney injury ([facility] ) 2022    Hyperkalemia 2022    High anion gap metabolic acidosis 2987    Melena 2022    Elevated PSA 2021    Chronic pain of right knee 2021    Primary osteoarthritis of right knee 2021    Bladder stones 12/15/2016    Type 2 diabetes mellitus with chronic kidney disease on chronic dialysis, with long-term current use of insulin ([facility] ) 2016    Benign colon polyp 2013    Benign prostatic hyperplasia with lower urinary tract symptoms 2013    Benign essential hypertension 2013    Hyperlipidemia 2012    Vitamin D deficiency 2012      LOS (days): 2  Geometric Mean LOS (GMLOS) (days): 4 80  Days to GMLOS:2 7     OBJECTIVE:  Risk of Unplanned Readmission Score: 36 65       Current admission status: Inpatient   Preferred Pharmacy:   [facility] [address]  [address] [address]  Phone: [phone] Fax: [phone]    [facility]  ([address] [facility]  [address], [address]  [address] [facility], [address] [facility] [address]  Phone: [phone] Fax: [phone]    Primary Care Provider: Brisa Gallego MD    Primary Insurance: MEDICARE  Secondary Insurance: BLUE CROSS    DISCHARGE DETAILS:    Discharge planning discussed with[de-identified] Wife Ernie Gifford of Choice: Yes  Comments - Freedom of Choice: FOC was reviewed with wife Aicha Coy  She believes that patient will decline any referrals for STR or HHC, patient sleeping at this time and SW will follow up later  CM contacted family/caregiver?: Yes  Were Treatment Team discharge recommendations reviewed with patient/caregiver?: Yes  Did patient/caregiver verbalize understanding of patient care needs?: Yes  Were patient/caregiver advised of the risks associated with not following Treatment Team discharge recommendations?: Yes    Contacts  Patient Contacts: Hosea Marin (wife)  Relationship to Patient[de-identified] Family  Contact Method: Phone  Phone Number: [phone]  Reason/Outcome: Emergency Contact, Discharge Planning     Treatment Team Recommendation: Home with [address], Short Term Rehab      IMM Given (Date):: 06/03/22  IMM Given to[de-identified] Patient (IMM reviewed with and signed by patient  Copy given to patient and copy placed in scan bin for chart )      SW spoke with patient at bedside in the morning to introduce role of SW and discuss discharge planning  Patient was alert and oriented but focused on getting eye drops and is very hard of hearing  At that time he preferred that SW reach out to his wife Aicha Coy to talk about discharge  SW asked patient about any concerns regarding his home environment or ability to care for himself at home and he denied any concerns        LINDA consulted with Cary Bassett at [facility] to review concerns noted in ED note regarding the condition of patient's home and his state of uncleanliness upon arrival  Patient and his wife are not known to [address] [place] [address] noted that a referral could be placed for services but it is unlikely that services will be willing to go into the home if it is as described  At this time there are no concerns regarding patient's capacity and patient would need to agree to services  LINDA spoke by phone with wife Tam Astudillo  She acknowledged that their home is "crowded" right now as they have been considering a move to downsize and have been pulling things out of storage  Tam Astudillo stated that she had gone to bed when patient became immobilized on the toilet and she was unaware of his predicament until the following morning  She stated that patient reported having some diarrhea and had not been able to clean himself up  Tam Astudillo stated that patient is independent at baseline and drives  She is able to drive but has chosen not to since breaking her ankle some time ago  She did not feel there was a need for a referral for [facility] services and felt that patient will refuse any in home services as he has done so in the past   She suggested that he might be open to outpatient PT/OT  They do not have family in the area as children and grandchildren live in [place]  LINDA talked again with patient later in the day and discussed his multiple admissions over the past few months  Patient does not want to be in the hospital and wants to go home as soon as possible  He did not want to consider STR but after some discussion he was agreeable to a referral for [facility] for nursing and PT/OT  LINDA placed referrals in [place]  SW will continue to follow for discharge planning needs

## 2022-06-03 NOTE — PLAN OF CARE
Problem: PHYSICAL THERAPY ADULT  Goal: Performs mobility at highest level of function for planned discharge setting  See evaluation for individualized goals  Description: Treatment/Interventions: ADL retraining, Functional transfer training, LE strengthening/ROM, Therapeutic exercise, Endurance training, Cognitive reorientation, Patient/family training, Equipment eval/education, Bed mobility, Gait training, Spoke to nursing          See flowsheet documentation for full assessment, interventions and recommendations  Note:    Problem List: Decreased strength, Decreased endurance, Impaired balance, Decreased mobility, Decreased coordination, Decreased cognition, Impaired judgement, Decreased safety awareness, Impaired hearing, Pain, Decreased skin integrity  Assessment: Patient seen for Physical Therapy evaluation  Patient admitted with Severe sepsis ([facility] )  Comorbidities affecting patient's physical performance include: rhabdomyolysis, toxic metabolic encephalopathy, suspected UTI, hyperkalemia, anemia, diabetes, hypertension and hyperlipidemia  Personal factors affecting patient at time of initial evaluation include: inability to ambulate household distances, inability to navigate community distances, decreased cognition, hearing impairments, inability to perform current job functions, inability to perform physical activity, limited insight into impairments, inability to perform ADLS and inability to perform IADLS   Prior to admission, patient was independent with functional mobility without assistive device, independent with ADLS, independent with IADLS, living with wife in a single level home with 5 steps to enter and retired    Please find objective findings from Physical Therapy assessment regarding body systems outlined above with impairments and limitations including weakness, decreased ROM, impaired balance, decreased endurance, gait deviations, pain, decreased activity tolerance, decreased functional mobility tolerance, decreased safety awareness, impaired judgement, fall risk and decreased skin integrity  The Barthel Index was used as a functional outcome tool presenting with a score of Barthel Index Score: 45 today indicating marked limitations of functional mobility and ADLS  Patient's clinical presentation is currently unstable/unpredictable as seen in patient's presentation of changing level of pain, varying levels of cognitive performance, increased fall risk, new onset of impairment of functional mobility, decreased endurance and new onset of weakness  Pt would benefit from continued Physical Therapy treatment to address deficits as defined above and maximize level of functional mobility  As demonstrated by objective findings, the assigned level of complexity for this evaluation is high  The patient's [facility] Basic Mobility Inpatient Short Form Raw Score is 12  A Raw score of less than or equal to 16 suggests the patient may benefit from discharge to post-acute rehabilitation services  Please also refer to the recommendation of the Physical Therapist for safe discharge planning  PT Discharge Recommendation: Post acute rehabilitation services (Pending patient progress in hospital, patient may be able to return home with home health services )          See flowsheet documentation for full assessment

## 2022-06-03 NOTE — PHYSICAL THERAPY NOTE
PT EVALUATION     Time In: 1411  Time Out: 5369       06/03/22 1431   PT Last Visit   PT Visit Date 06/03/22   Note Type   Note type Evaluation   Pain Assessment   Pain Assessment Tool 0-10   Pain Score 7   Pain Location/Orientation Orientation: Left; Location: Knee   Hospital Pain Intervention(s) Repositioned; Ambulation/increased activity   Restrictions/Precautions   Weight Bearing Precautions Per Order No   Other Precautions Cognitive; Chair Alarm; Bed Alarm; Fall Risk;Pain   Home Living   Type of [address] One level; Able to live on main level with bedroom/bathroom  (5 [address])   Bathroom Shower/Tub Walk-in shower   Bathroom Toilet Standard   Home Equipment Walker;Cane  ("I have them, I don't really use them")   Additional Comments Pt poor historian during evaluation, stating "I hear you, but can't understand you" several times despite PT rewording questions and using gestures to aid with explanation  PT utilizing previous PT evaluation when admitted in March 2022 for PLOF and home set-up  Verify with patient's spouse as able  Prior Function   Level of [place] Independent with ADLs and functional mobility   Lives With Spouse   Receives Help From Family   ADL Assistance Independent   IADLs Independent   Falls in the last 6 months 0   Vocational Retired   General   Additional Pertinent History Pt admitted for sepsis, confused at bedside reporting to PT "I can hear you, but I can't understand you well "   Family/Caregiver Present No   Cognition   Overall Cognitive Status Impaired   Arousal/Participation Alert   Orientation Level Oriented to person;Oriented to place; Disoriented to time;Disoriented to situation   Memory Decreased recall of biographical information;Decreased recall of recent events   Following Commands Follows one step commands with increased time or repetition   Subjective   Subjective "My left knee is killing me with this positioning " Pt adamant about PT not touching his L LE due to pain or getting up from bed  RLE Assessment   RLE Assessment   (3/5 all myotomes performed seated EOB)   LLE Assessment   LLE Assessment   (3/5 all myotomes performed seated EOB)   Wound 06/01/22 Pressure Injury Hip Left;Lateral   Date First Assessed/Time First Assessed: 06/01/22 1414   Pre-Existing Wound: Yes  Primary Wound Type: (c) Pressure Injury  Location: Hip  Wound Location Orientation: Left;Lateral   Dressing Status Clean;Dry; Intact   Wound 06/03/22 Thigh Left;Posterior   Date First Assessed/Time First Assessed: 06/03/22 1312   Location: Thigh  Wound Location Orientation: Left;Posterior   Dressing Status Clean;Dry; Intact   Bed Mobility   Supine to Sit 4  Minimal assistance   Additional items Assist x 1; Increased time required;Verbal cues;LE management   Sit to Supine 4  Minimal assistance   Additional items Assist x 1; Increased time required;LE management   Transfers   Additional Comments Pt refusing any OOB activity today secondary to L knee pain patient reporting upon PT arrival  Pt able to be seated EOB with L knee at 90 degrees resting without any facial grimacing  PT encouraging patient to try standing, patient adamantly refusing despite PT stating she would aid with standing  Assess WB as able next visit  Ambulation/Elevation   Gait pattern Not tested   Balance   Static Sitting Poor +   Dynamic Sitting Poor +   Activity Tolerance   Activity Tolerance Patient limited by pain  (Pt self-limiting due to L knee pain, assess WB activity as able next session )   Nurse Made Aware RN Gen   Assessment   Problem List Decreased strength;Decreased endurance; Impaired balance;Decreased mobility; Decreased coordination;Decreased cognition; Impaired judgement;Decreased safety awareness; Impaired hearing;Pain;Decreased skin integrity   Assessment Patient seen for Physical Therapy evaluation  Patient admitted with Severe sepsis ([facility] [address] )    Comorbidities affecting patient's physical performance include: rhabdomyolysis, toxic metabolic encephalopathy, suspected UTI, hyperkalemia, anemia, diabetes, hypertension and hyperlipidemia  Personal factors affecting patient at time of initial evaluation include: inability to ambulate household distances, inability to navigate community distances, decreased cognition, hearing impairments, inability to perform current job functions, inability to perform physical activity, limited insight into impairments, inability to perform ADLS and inability to perform IADLS   Prior to admission, patient was independent with functional mobility without assistive device, independent with ADLS, independent with IADLS, living with wife in a single level home with 5 steps to enter and retired  Please find objective findings from Physical Therapy assessment regarding body systems outlined above with impairments and limitations including weakness, decreased ROM, impaired balance, decreased endurance, gait deviations, pain, decreased activity tolerance, decreased functional mobility tolerance, decreased safety awareness, impaired judgement, fall risk and decreased skin integrity  The Barthel Index was used as a functional outcome tool presenting with a score of Barthel Index Score: 45 today indicating marked limitations of functional mobility and ADLS  Patient's clinical presentation is currently unstable/unpredictable as seen in patient's presentation of changing level of pain, varying levels of cognitive performance, increased fall risk, new onset of impairment of functional mobility, decreased endurance and new onset of weakness  Pt would benefit from continued Physical Therapy treatment to address deficits as defined above and maximize level of functional mobility  As demonstrated by objective findings, the assigned level of complexity for this evaluation is high  The patient's -[facility] Basic Mobility Inpatient Short Form Raw Score is 12   A Raw score of less than or equal to 16 suggests the patient may benefit from discharge to post-acute rehabilitation services  Please also refer to the recommendation of the Physical Therapist for safe discharge planning  Goals   STG Expiration Date 06/10/22   Short Term Goal #1 Pt will improve MMT by 1/2 grade   Short Term Goal #2 Pt will be supervision for all transfers   LTG Expiration Date 06/17/22   Long Term Goal #1 Pt will be min A x 1 with RW and ambulate 50 feet   Long Term Goal #2 Pt will be supervision for all transfers   Plan   Treatment/Interventions ADL retraining;Functional transfer training;LE strengthening/ROM; Therapeutic exercise; Endurance training;Cognitive reorientation;Patient/family training;Equipment eval/education; Bed mobility;Gait training;Spoke to nursing   PT Frequency   (5x/wk)   Recommendation   PT Discharge Recommendation Post acute rehabilitation services  (Pending patient progress in hospital, patient may be able to return home with home health services  )   AM-PAC Basic Mobility Inpatient   Turning in Bed Without Bedrails 2   Lying on Back to Sitting on Edge of Flat Bed 2   Moving Bed to Chair 2   Standing Up From Chair 2   Walk in Room 2   Climb 3-5 Stairs 2   Basic Mobility Inpatient Raw Score 12   Basic Mobility Standardized Score 32 23   Highest Level Of Mobility   -HLM Goal 4: Move to chair/commode   -HL Achieved 2: Bed activities/Dependent transfer  (Pt refusing OOB activity today )   Barthel Index   Feeding 5   Bathing 0   Grooming Score 0   Dressing Score 5   Bladder Score 10   Bowels Score 10   Toilet Use Score 5   Transfers (Bed/Chair) Score 10   Mobility (Level Surface) Score 0   Stairs Score 0   Barthel Index Score 45   End of Consult   Patient Position at End of Consult Supine;Bed/Chair alarm activated; All needs within reach   [address] PT, DPT [id number]

## 2022-06-03 NOTE — CASE MANAGEMENT
Case Management Discharge Planning Note    Patient name Laura Griffin  Location [address] Mario Alberto Rhodes2-* MRN [id number]  : 1945 Date 6/3/2022       Current Admission Date: 2022  Current Admission Diagnosis:Severe sepsis [facility])   Patient Active Problem List    Diagnosis Date Noted    Lactic acidosis 2022    Suspected UTI 2022    Toxic metabolic encephalopathy     Rhabdomyolysis 2022    Bacteremia 2022    Severe sepsis ([facility] [address] ) 2022    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[facility] [address] ) 2022    Pulmonary hypertension ([facility] [address] ) 2022    Dialysis patient ([facility] [address] ) 2022    Anemia due to chronic kidney disease, on chronic dialysis ([facility] [address] )     Hemoptysis 2022    LORRAINE (acute kidney injury) ([facility] [address] )     Other proteinuria     Symptomatic anemia 2022    Chest pain 2022    Acute kidney injury ([facility] [address] ) 2022    Hyperkalemia 2022    High anion gap metabolic acidosis     Melena 2022    Elevated PSA 2021    Chronic pain of right knee 2021    Primary osteoarthritis of right knee 2021    Bladder stones 12/15/2016    Type 2 diabetes mellitus with chronic kidney disease on chronic dialysis, with long-term current use of insulin ([facility] [address] ) 2016    Benign colon polyp 2013    Benign prostatic hyperplasia with lower urinary tract symptoms 2013    Benign essential hypertension 2013    Hyperlipidemia 2012    Vitamin D deficiency 2012      LOS (days): 2  Geometric Mean LOS (GMLOS) (days): 4 80  Days to GMLOS:2 6     OBJECTIVE:  Risk of Unplanned Readmission Score: 37 3       Current admission status: Inpatient   Preferred Pharmacy:   [facility] [address]  [address] [place] [address]  Phone: [phone] Fax: [phone]    [facility]  ([address] [facility], [address]  [address], [address]  [address] [facility] [address] [facility] [address]  Phone: [phone] Fax: [phone]    Primary Care Provider: Meggan Roberto MD    Primary Insurance: MEDICARE  Secondary Insurance: BLUE CROSS    DISCHARGE DETAILS:    Discharge planning discussed with[de-identified] Patient  Freedom of Choice: Yes     Comments - Freedom of Choice: SW discussed treatment team recommendation for STR/HHC with patient    He does not want to consider STR but is willing to consider [address] to avoid further admissions  He acknowledged using a cane to ambulate and said that he has been having difficulty walking due to knee pain  SW placed blanket referrals in [facility]       Were Treatment Team discharge recommendations reviewed with patient/caregiver?: Yes  Did patient/caregiver verbalize understanding of patient care needs?: Yes  Were patient/caregiver advised of the risks associated with not following Treatment Team discharge recommendations?: Yes    [facility]         Is the patient interested in [address] at discharge?: Yes  [address] requested[de-identified] Nursing, Occupational Therapy, Physical [address] [place] [address] Name[de-identified] Other ([place] referrals placed)  [address] [place] [address] Provider[de-identified] PCP  Home Health Services Needed[de-identified] Diabetes Management, Evaluate Functional Status and Safety, Gait/ADL Training, Strengthening/Theraputic Exercises to Improve Function  Homebound Criteria Met[de-identified] Uses an Assist Device (i e  cane, walker, etc)  Supporting Clincal Findings[de-identified] Limited Endurance, Fatigues Easliy in [employer]    DME Referral Provided  Referral made for DME?: No    Other Referral/Resources/Interventions Provided:  Interventions: HHC    Would you like to participate in our [facility] service program?  : No - Declined    Treatment Team Recommendation: Home with [address] [facility]  Discharge Destination Plan[de-identified] Home with [address] [facility]

## 2022-06-04 ENCOUNTER — APPOINTMENT (INPATIENT)
Dept: DIALYSIS | Facility: HOSPITAL | Age: 77
DRG: 871 | End: 2022-06-04
Payer: MEDICARE

## 2022-06-04 PROBLEM — M79.605 LEFT LEG PAIN: Status: ACTIVE | Noted: 2022-06-04

## 2022-06-04 PROBLEM — R39.89 SUSPECTED UTI: Status: RESOLVED | Noted: 2022-06-01 | Resolved: 2022-06-04

## 2022-06-04 LAB
ANION GAP SERPL CALCULATED.3IONS-SCNC: 11 MMOL/L (ref 4–13)
BASOPHILS # BLD AUTO: 0.04 THOUSANDS/ΜL (ref 0–0.1)
BASOPHILS NFR BLD AUTO: 1 % (ref 0–1)
BUN SERPL-MCNC: 62 MG/DL (ref 5–25)
CALCIUM SERPL-MCNC: 7.3 MG/DL (ref 8.3–10.1)
CHLORIDE SERPL-SCNC: 92 MMOL/L (ref 100–108)
CO2 SERPL-SCNC: 26 MMOL/L (ref 21–32)
CREAT SERPL-MCNC: 6.04 MG/DL (ref 0.6–1.3)
EOSINOPHIL # BLD AUTO: 0.08 THOUSAND/ΜL (ref 0–0.61)
EOSINOPHIL NFR BLD AUTO: 1 % (ref 0–6)
ERYTHROCYTE [DISTWIDTH] IN BLOOD BY AUTOMATED COUNT: 15.3 % (ref 11.6–15.1)
GFR SERPL CREATININE-BSD FRML MDRD: 8 ML/MIN/1.73SQ M
GLUCOSE SERPL-MCNC: 125 MG/DL (ref 65–140)
GLUCOSE SERPL-MCNC: 137 MG/DL (ref 65–140)
GLUCOSE SERPL-MCNC: 167 MG/DL (ref 65–140)
GLUCOSE SERPL-MCNC: 172 MG/DL (ref 65–140)
GLUCOSE SERPL-MCNC: 72 MG/DL (ref 65–140)
HCT VFR BLD AUTO: 24.4 % (ref 36.5–49.3)
HGB BLD-MCNC: 7.5 G/DL (ref 12–17)
IMM GRANULOCYTES # BLD AUTO: 0.07 THOUSAND/UL (ref 0–0.2)
IMM GRANULOCYTES NFR BLD AUTO: 1 % (ref 0–2)
LYMPHOCYTES # BLD AUTO: 0.77 THOUSANDS/ΜL (ref 0.6–4.47)
LYMPHOCYTES NFR BLD AUTO: 9 % (ref 14–44)
MCH RBC QN AUTO: 27.9 PG (ref 26.8–34.3)
MCHC RBC AUTO-ENTMCNC: 30.7 G/DL (ref 31.4–37.4)
MCV RBC AUTO: 91 FL (ref 82–98)
MONOCYTES # BLD AUTO: 0.48 THOUSAND/ΜL (ref 0.17–1.22)
MONOCYTES NFR BLD AUTO: 6 % (ref 4–12)
NEUTROPHILS # BLD AUTO: 7.25 THOUSANDS/ΜL (ref 1.85–7.62)
NEUTS SEG NFR BLD AUTO: 82 % (ref 43–75)
NRBC BLD AUTO-RTO: 0 /100 WBCS
PLATELET # BLD AUTO: 344 THOUSANDS/UL (ref 149–390)
PMV BLD AUTO: 9 FL (ref 8.9–12.7)
POTASSIUM SERPL-SCNC: 5.7 MMOL/L (ref 3.5–5.3)
PROCALCITONIN SERPL-MCNC: 6.62 NG/ML
RBC # BLD AUTO: 2.69 MILLION/UL (ref 3.88–5.62)
SODIUM SERPL-SCNC: 129 MMOL/L (ref 136–145)
VANCOMYCIN TROUGH SERPL-MCNC: 11.1 UG/ML (ref 10–20)
WBC # BLD AUTO: 8.69 THOUSAND/UL (ref 4.31–10.16)

## 2022-06-04 PROCEDURE — 80048 BASIC METABOLIC PNL TOTAL CA: CPT | Performed by: INTERNAL MEDICINE

## 2022-06-04 PROCEDURE — 82948 REAGENT STRIP/BLOOD GLUCOSE: CPT

## 2022-06-04 PROCEDURE — 80202 ASSAY OF VANCOMYCIN: CPT | Performed by: NURSE PRACTITIONER

## 2022-06-04 PROCEDURE — 99232 SBSQ HOSP IP/OBS MODERATE 35: CPT | Performed by: INTERNAL MEDICINE

## 2022-06-04 PROCEDURE — 85025 COMPLETE CBC W/AUTO DIFF WBC: CPT | Performed by: INTERNAL MEDICINE

## 2022-06-04 PROCEDURE — 84145 PROCALCITONIN (PCT): CPT | Performed by: INTERNAL MEDICINE

## 2022-06-04 RX ORDER — INSULIN ASPART 100 [IU]/ML
25 INJECTION, SUSPENSION SUBCUTANEOUS
Refills: 0 | Status: DISCONTINUED | OUTPATIENT
Start: 2022-06-04 | End: 2022-06-06

## 2022-06-04 RX ORDER — SIMETHICONE 80 MG
80 TABLET,CHEWABLE ORAL EVERY 6 HOURS PRN
Status: DISCONTINUED | OUTPATIENT
Start: 2022-06-04 | End: 2022-06-07 | Stop reason: HOSPADM

## 2022-06-04 RX ADMIN — SIMETHICONE 80 MG: 80 TABLET, CHEWABLE ORAL at 22:02

## 2022-06-04 RX ADMIN — EPOETIN ALFA 2000 UNITS: 2000 SOLUTION INTRAVENOUS; SUBCUTANEOUS at 10:15

## 2022-06-04 RX ADMIN — GLYCERIN 1 DROP: .002; .002; .01 SOLUTION/ DROPS OPHTHALMIC at 09:10

## 2022-06-04 RX ADMIN — TAMSULOSIN HYDROCHLORIDE 0.4 MG: 0.4 CAPSULE ORAL at 15:53

## 2022-06-04 RX ADMIN — INSULIN LISPRO 1 UNITS: 100 INJECTION, SOLUTION INTRAVENOUS; SUBCUTANEOUS at 09:10

## 2022-06-04 RX ADMIN — INSULIN LISPRO 1 UNITS: 100 INJECTION, SOLUTION INTRAVENOUS; SUBCUTANEOUS at 12:22

## 2022-06-04 RX ADMIN — INSULIN ASPART 25 UNITS: 100 INJECTION, SUSPENSION SUBCUTANEOUS at 09:09

## 2022-06-04 RX ADMIN — HEPARIN SODIUM 5000 UNITS: 5000 INJECTION INTRAVENOUS; SUBCUTANEOUS at 15:55

## 2022-06-04 RX ADMIN — LATANOPROST 1 DROP: 50 SOLUTION OPHTHALMIC at 21:56

## 2022-06-04 RX ADMIN — INSULIN ASPART 25 UNITS: 100 INJECTION, SUSPENSION SUBCUTANEOUS at 15:53

## 2022-06-04 RX ADMIN — CALCIUM ACETATE 667 MG: 667 CAPSULE ORAL at 09:10

## 2022-06-04 RX ADMIN — PRAVASTATIN SODIUM 80 MG: 80 TABLET ORAL at 15:53

## 2022-06-04 RX ADMIN — CALCIUM ACETATE 667 MG: 667 CAPSULE ORAL at 15:53

## 2022-06-04 RX ADMIN — SIMETHICONE 80 MG: 80 TABLET, CHEWABLE ORAL at 03:49

## 2022-06-04 RX ADMIN — HEPARIN SODIUM 5000 UNITS: 5000 INJECTION INTRAVENOUS; SUBCUTANEOUS at 05:09

## 2022-06-04 RX ADMIN — HEPARIN SODIUM 5000 UNITS: 5000 INJECTION INTRAVENOUS; SUBCUTANEOUS at 21:55

## 2022-06-04 RX ADMIN — CALCIUM ACETATE 667 MG: 667 CAPSULE ORAL at 12:21

## 2022-06-04 RX ADMIN — VANCOMYCIN HYDROCHLORIDE 750 MG: 750 INJECTION, SOLUTION INTRAVENOUS at 10:33

## 2022-06-04 RX ADMIN — GLYCERIN 1 DROP: .002; .002; .01 SOLUTION/ DROPS OPHTHALMIC at 17:32

## 2022-06-04 NOTE — CASE MANAGEMENT
Case Management Discharge Planning Note    Patient name Shirley De  Location [address] Mario Alberto Rhodes2-* MRN [id number]  : 1945 Date 2022       Current Admission Date: 2022  Current Admission Diagnosis:Severe sepsis [facility])   Patient Active Problem List    Diagnosis Date Noted    Left leg pain 2022    Lactic acidosis 2022    Toxic metabolic encephalopathy     Rhabdomyolysis 2022    Bacteremia 2022    Severe sepsis ([facility] [address] ) 2022    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[facility] [address] ) 2022    Pulmonary hypertension ([facility] [address] ) 2022    Dialysis patient ([facility] [address] ) 2022    Anemia due to chronic kidney disease, on chronic dialysis ([facility] [address] )     Hemoptysis 2022    LORRAINE (acute kidney injury) ([facility] [address] )     Other proteinuria     Symptomatic anemia 2022    Chest pain 2022    Acute kidney injury ([facility] [address] ) 2022    Hyperkalemia 2022    High anion gap metabolic acidosis     Melena 2022    Elevated PSA 2021    Chronic pain of right knee 2021    Primary osteoarthritis of right knee 2021    Bladder stones 12/15/2016    Type 2 diabetes mellitus with chronic kidney disease on chronic dialysis, with long-term current use of insulin ([facility] [address] ) 2016    Benign colon polyp 2013    Benign prostatic hyperplasia with lower urinary tract symptoms 2013    Benign essential hypertension 2013    Hyperlipidemia 2012    Vitamin D deficiency 2012      LOS (days): 3  Geometric Mean LOS (GMLOS) (days): 4 80  Days to GMLOS:1 8     OBJECTIVE:  Risk of Unplanned Readmission Score: 38 83         Current admission status: Inpatient   Preferred Pharmacy:   [facility] [address]  [address] [place] [address]  Phone: [phone] Fax: [phone]    [facility]  ([address] [facility], [address]  [address], [address]  [address] [facility], [address] [facility] [address]  Phone: [phone] Fax: [phone]    Primary Care Provider: Cristopher Narayanan MD    Primary Insurance: MEDICARE  Secondary Insurance: BLUE CROSS    DISCHARGE DETAILS:    Discharge planning discussed with[de-identified] Patient  Freedom of Choice: Yes  Comments - Freedom of Choice: Patient chose [address] [address] [facility] for [facility] provider  CM contacted family/caregiver?: No- see comments  Were Treatment Team discharge recommendations reviewed with patient/caregiver?: Yes  Did patient/caregiver verbalize understanding of patient care needs?: Yes  Were patient/caregiver advised of the risks associated with not following Treatment Team discharge recommendations?: Yes    [facility] Name[de-identified] [address] Provider[de-identified] PCP  [address] Needed[de-identified] Diabetes Management, Evaluate Functional Status and Safety, Gait/ADL Training, Strengthening/Theraputic Exercises to Improve Function  Homebound Criteria Met[de-identified] Uses an Assist Device (i e  cane, walker, etc), Requires the Assistance of Another Person for Safe Ambulation or to Leave the Home  Supporting Clincal Findings[de-identified] Fatigues Easliy in [employer], Limited Endurance    DME Referral Provided  Referral made for DME?: No    Other Referral/Resources/Interventions Provided:  Interventions: [facility]  Referral Comments: [address] [facility] confirmed able to accept  Patient chose [address] [facility]  AVS updated      Treatment Team Recommendation: Home with [address]  Discharge Destination Plan[de-identified] [facility]  Transport at Discharge : Family

## 2022-06-04 NOTE — PROGRESS NOTES
NEPHROLOGY PROGRESS NOTE   Erik Slider 64 y o  male MRN: [id number]  Unit/Bed#: [address] [place] [address] Encounter: [id number]  Reason for Consult: ESRD      SUMMARY:    75-year-old male with a history of end-stage renal disease on hemodialysis, diabetes mellitus type 2, BPH, hypertension who was brought in to [facility] for generalized weakness as his wife found him on the toilet with extreme weakness   Patient consult for ESRD management      ASSESSMENT and PLAN:     Hyperkalemia  --underwent dialysis this morning  --continue low-potassium diet     End-stage renal disease  --incenter hemodialysis Tuesday Thursday Saturday at [address]  --access:  Right IJ PermCath, follow-up blood cultures as I am concerned that there may be some contamination  --outpatient prescription includes 137 sodium, 35 bicarbonate, 2 potassium, 2 5 calcium, temperature 35 5°, blood flow rate 400 mL/minute, dry weight 80 kg, 4 hours  --underwent hemodialysis yesterday  --CT scan shows a right renal subscapular fluid collection representing a likely hematoma which has decreased in size compared to prior imaging   --underwent hemodialysis this morning removed 2 L, dialyzed on low-potassium bath  --stable from renal standpoint for discharge     Hyponatremia  --underwent hemodialysis this morning with a elevated sodium bath     Anion gap metabolic acidosis--resolved  --secondary to renal failure and poor compliance with outpatient dialysis  --lactic acid 2 7 --> 1 5     Mineral bone disorder-chronic kidney disease  --hyperphosphatemia  --calcium acetate with meals     Hypertension  --blood pressure overall acceptable, transient episode of hypotension with concurrent tachycardia resolved    --volume status better  --pleural effusion noted     Anemia of chronic kidney disease  --Micera as an outpatient, Epogen as an inpatient  --hemoglobin close to target     Bilateral pleural effusions  --moderate-sized  --may not be amendable to ultrafiltration      Leukocytosis/sepsis  --elevated procalcitonin level and lactic acid  --1 bottle of blood culture was negative while the other bottle show Staph coag-negative likely contaminant  --urine cultures no growth  --COVID-19/influenza/RSV was negative  --appears to be no evidence of a infiltrate  --currently has a PermCath in place which due to poor outpatient hygiene at home  --antibiotics as per the primary team  --dose vancomycin based on level     Rhabdomyolysis  --improved      SUBJECTIVE / INTERVAL HISTORY:    No chest pain or shortness of breath    OBJECTIVE:  Current Weight: Weight - Scale: 81 8 kg (180 lb 5 4 oz)  Vitals:    06/04/22 1030 06/04/22 1100 06/04/22 1130 06/04/22 1200   BP: 135/62 135/68 120/62 131/69   BP Location:    Left arm   Pulse: 91 85 85 78   Resp: 20 20 20 20   Temp:    97 8 °F (36 6 °C)   TempSrc:    Oral   SpO2:       Weight:       Height:           Intake/Output Summary (Last 24 hours) at 6/4/2022 1223  Last data filed at 6/4/2022 1155  Gross per 24 hour   Intake 500 ml   Output 2500 ml   Net -2000 ml       Review of Systems:    12 point ROS has been reviewed  Physical Exam  Vitals and nursing note reviewed  Constitutional:       General: He is not in acute distress  Appearance: He is well-developed  HENT:      Head: Normocephalic and atraumatic  Eyes:      General: No scleral icterus  Conjunctiva/sclera: Conjunctivae normal       Pupils: Pupils are equal, round, and reactive to light  Cardiovascular:      Rate and Rhythm: Normal rate and regular rhythm  Heart sounds: S1 normal and S2 normal  No murmur heard  No friction rub  No gallop  Pulmonary:      Effort: Pulmonary effort is normal  No respiratory distress  Breath sounds: Normal breath sounds  No wheezing or rales  Abdominal:      General: Bowel sounds are normal       Palpations: Abdomen is soft  Tenderness: There is no abdominal tenderness  There is no rebound  Musculoskeletal:         General: Normal range of motion  Cervical back: Normal range of motion and neck supple  Skin:     Findings: No rash  Neurological:      Mental Status: He is alert and oriented to person, place, and time     Psychiatric:         Behavior: Behavior normal          Medications:    Current Facility-Administered Medications:     calcium acetate (PHOSLO) capsule 667 mg, 667 mg, Oral, TID With Meals, FERNANDO Price, 667 mg at 06/04/22 1221    calcium carbonate (TUMS) chewable tablet 500 mg, 500 mg, Oral, Daily PRN, Aileen Casas PA-C, 500 mg at 06/03/22 2359    epoetin ani (EPOGEN,PROCRIT) injection 2,000 Units, 2,000 Units, Intravenous, After Dialysis, FERNANDO Price, 2,000 Units at 06/04/22 1015    glycerin-hypromellose- (ARTIFICIAL TEARS) ophthalmic solution 1 drop, 1 drop, Both Eyes, Q4H PRN, Tirso Rousseau MD, 1 drop at 06/03/22 1652    glycerin-hypromellose- (ARTIFICIAL TEARS) ophthalmic solution 1 drop, 1 drop, Left Eye, BID, Magalys Salas PA-C, 1 drop at 06/04/22 0910    heparin (porcine) subcutaneous injection 5,000 Units, 5,000 Units, Subcutaneous, Q8H [facility], 5,000 Units at 06/04/22 0509 **AND** [CANCELED] Platelet count, , , Once, Corin Quan PA-C    insulin aspart protamine-insulin aspart (NovoLOG 70/30) 100 units/mL subcutaneous injection 25 Units, 25 Units, Subcutaneous, BID AC, Tisro Rousseau MD, 25 Units at 06/04/22 0909    insulin lispro (HumaLOG) 100 units/mL subcutaneous injection 1-6 Units, 1-6 Units, Subcutaneous, TID AC, 1 Units at 06/04/22 1222 **AND** Fingerstick Glucose (POCT), , , TID ACTonya CRNP    insulin lispro (HumaLOG) 100 units/mL subcutaneous injection 1-6 Units, 1-6 Units, Subcutaneous, HS, FERNANDO Price, 4 Units at 06/03/22 2235    latanoprost (XALATAN) 0 005 % ophthalmic solution 1 drop, 1 drop, Both Eyes, HS, FERNANDO Price, 1 drop at 06/03/22 2235    melatonin tablet 3 mg, 3 mg, Oral, HS PRN, Demario Rowe, CRNP    ondansetron (ZOFRAN) injection 4 mg, 4 mg, Intravenous, Q6H PRN, Aileen Casas PA-C    pravastatin (PRAVACHOL) tablet 80 mg, 80 mg, Oral, Daily With Dinner, [employer], CRNP, 80 mg at 06/03/22 1651    simethicone (MYLICON) chewable tablet 80 mg, 80 mg, Oral, Q6H PRN, Aileen Casas PA-C, 80 mg at 06/04/22 0349    tamsulosin (FLOMAX) capsule 0 4 mg, 0 4 mg, Oral, Daily With Dinner, [employer], SEYMOURNP, 0 4 mg at 06/03/22 1651    vancomycin (VANCOCIN) IVPB (premix in dextrose) 750 mg 150 mL, 10 mg/kg, Intravenous, After Dialysis, [employer], CRNP, Stopped at 06/04/22 1150    Laboratory Results:  Results from last 7 days   Lab Units 06/04/22  0457 06/03/22  0502 06/02/22  0525 06/01/22  1948 06/01/22  1046   WBC Thousand/uL 8 69 8 15 9 49  --  15 36*   HEMOGLOBIN g/dL 7 5* 7 9* 8 1*  --  9 7*   HEMATOCRIT % 24 4* 25 6* 26 6*  --  30 8*   PLATELETS Thousands/uL 344 335 389  --  518*   POTASSIUM mmol/L 5 7* 4 6 5 3 4 9 7 2*   CHLORIDE mmol/L 92* 94* 97* 96* 87*   CO2 mmol/L 26 28 24 23 20*   BUN mg/dL 62* 39* 56* 49* 81*   CREATININE mg/dL 6 04* 4 74* 6 97* 6 25* 9 65*   CALCIUM mg/dL 7 3* 7 7* 8 0* 8 1* 8 6   MAGNESIUM mg/dL  --  2 5 2 6  --  2 8*   PHOSPHORUS mg/dL  --  4 4* 7 1*  --  7 5*       PLEASE NOTE:  This encounter was completed utilizing the [employer] Direct Speech Voice Recognition Software  Grammatical errors, random word insertions, pronoun errors and incomplete sentences are occasional consequences of the system due to software limitations, ambient noise and hardware issues  These may be missed by proof reading prior to affixing electronic signature  Any questions or concerns about the content, text or information contained within the body of this dictation should be directly addressed to the physician for clarification  Please do not hesitate to call me directly if you have any any questions or concerns

## 2022-06-04 NOTE — ASSESSMENT & PLAN NOTE
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

## 2022-06-04 NOTE — PLAN OF CARE
Post-Dialysis RN Treatment Note    Blood Pressure:  Pre 155/63 mm/Hg  Post 131/69 mmHg   EDW  none listed    Weight:  Pre Not Applicable kg   Post Not Applicable kg   Mode of weight measurement: Other bed scale zeroed incorrectly, unable to weigh pt   Volume Removed  2000 ml    Treatment duration 180 minutes    NS given  No    Treatment shortened? No   Medications given during Rx Epogen 2000 units, Vanco 750mg   Estimated Kt/V  Not Applicable   Access type: Permacath/TDC   Access Issues: No    Report called to primary nurse   Yes Annetta Regalado RN    HD tx plan: 3 hrs removing 2L as pedro, unable to weigh pt due to scale zeroed incorrectly  2K bath for K 5 7 6/4      Problem: METABOLIC, FLUID AND ELECTROLYTES - ADULT  Goal: Electrolytes maintained within normal limits  Description: INTERVENTIONS:  - Monitor labs and assess patient for signs and symptoms of electrolyte imbalances  - Administer electrolyte replacement as ordered  - Monitor response to electrolyte replacements, including repeat lab results as appropriate  - Instruct patient on fluid and nutrition as appropriate  Outcome: Progressing     Problem: METABOLIC, FLUID AND ELECTROLYTES - ADULT  Goal: Fluid balance maintained  Description: INTERVENTIONS:  - Monitor labs   - Monitor I/O and WT  - Instruct patient on fluid and nutrition as appropriate  - Assess for signs & symptoms of volume excess or deficit  Outcome: Progressing

## 2022-06-04 NOTE — PROGRESS NOTES
Vancomycin Assessment    Leatha Saini is a 64 y o  male who is currently receiving vancomycin 750mg for bacteremia     Relevant clinical data and objective history reviewed:  Creatinine   Date Value Ref Range Status   06/04/2022 6 04 (H) 0 60 - 1 30 mg/dL Final     Comment:     Standardized to IDMS reference method   06/03/2022 4 74 (H) 0 60 - 1 30 mg/dL Final     Comment:     Standardized to IDMS reference method   06/02/2022 6 97 (H) 0 60 - 1 30 mg/dL Final     Comment:     Standardized to IDMS reference method   07/24/2017 1 02 0 76 - 1 27 mg/dL Final     /69 (BP Location: Left arm)   Pulse 78   Temp 97 8 °F (36 6 °C) (Oral)   Resp 20   Ht 5' 8" (1 727 m)   Wt 81 8 kg (180 lb 5 4 oz)   SpO2 98%   BMI 27 42 kg/m²   I/O last 3 completed shifts: In: 780 [P O :480; I V :300]  Out: 2700 [Other:2700]  Lab Results   Component Value Date/Time    BUN 62 (H) 06/04/2022 04:57 AM    BUN 16 07/24/2017 09:27 AM    WBC 8 69 06/04/2022 04:57 AM    WBC 4 9 07/24/2017 09:27 AM    HGB 7 5 (L) 06/04/2022 04:57 AM    HGB 16 2 07/24/2017 09:27 AM    HCT 24 4 (L) 06/04/2022 04:57 AM    HCT 45 8 07/24/2017 09:27 AM    MCV 91 06/04/2022 04:57 AM    MCV 88 07/24/2017 09:27 AM     06/04/2022 04:57 AM     07/24/2017 09:27 AM     Temp Readings from Last 3 Encounters:   06/04/22 97 8 °F (36 6 °C) (Oral)   05/18/22 98 1 °F (36 7 °C)   05/09/22 98 °F (36 7 °C) (Oral)     Vancomycin Days of Therapy: 3    Assessment/Plan  The patient is currently on vancomycin utilizing pulse dosing  Baseline risks associated with therapy include: pre-existing renal impairment, concomitant nephrotoxic medications, and advanced age  The patient is receiving 750mg with the most recent vancomycin level being not at steady-state and sub-therapeutic based on a goal of 15-20 (appropriate for most indications) ; therefore, after clinical evaluation will be changed to 1250mg post HD(TTS)     Pharmacy will continue to follow closely for s/sx of nephrotoxicity, infusion reactions, and appropriateness of therapy  BMP and CBC will be ordered per protocol  Plan for trough as patient approaches steady state, prior to the 3rd  dose at approximately 0600 ON 6/7/22 Pharmacy will continue to follow the patients culture results and clinical progress daily      Jasvir Carr, Pharmacist

## 2022-06-04 NOTE — ASSESSMENT & PLAN NOTE
Likely secondary to ESRD and poor dialysis compliance  Patient also had lactic acidosis on presentation now improved  Monitor

## 2022-06-04 NOTE — ASSESSMENT & PLAN NOTE
Blood culture 1/2 noted to be positive for Staphylococcus, coagulase-negative   Possibly contaminant but patient risk of true bacteremia  Seen by ID, input appreciated  · Patient was treated with vancomycin  · Repeat blood cultures have been negative at 72 hours  · Vancomycin was discontinued  · Patient has been afebrile with normal white count

## 2022-06-04 NOTE — ASSESSMENT & PLAN NOTE
Enalapril has been on hold in setting of hyperkalemia    Enalapril has not been listed on the med list

## 2022-06-04 NOTE — ASSESSMENT & PLAN NOTE
Secondary to missed dialysis, patient was also having hyperglycemia presentation  · Improved with hemodialysis

## 2022-06-04 NOTE — PLAN OF CARE
Problem: PAIN - ADULT  Goal: Verbalizes/displays adequate comfort level or baseline comfort level  Description: Interventions:  - Encourage patient to monitor pain and request assistance  - Assess pain using appropriate pain scale  - Administer analgesics based on type and severity of pain and evaluate response  - Implement non-pharmacological measures as appropriate and evaluate response  - Consider cultural and social influences on pain and pain management  - Notify physician/advanced practitioner if interventions unsuccessful or patient reports new pain  Outcome: Progressing     Problem: INFECTION - ADULT  Goal: Absence or prevention of progression during hospitalization  Description: INTERVENTIONS:  - Assess and monitor for signs and symptoms of infection  - Monitor lab/diagnostic results  - Monitor all insertion sites, i e  indwelling lines, tubes, and drains  - Monitor endotracheal if appropriate and nasal secretions for changes in amount and color  - [place] appropriate cooling/warming therapies per order  - Administer medications as ordered  - Instruct and encourage patient and family to use good hand hygiene technique  - Identify and instruct in appropriate isolation precautions for identified infection/condition  Outcome: Progressing     Problem: SAFETY ADULT  Goal: Patient will remain free of falls  Description: INTERVENTIONS:  - Educate patient/family on patient safety including physical limitations  - Instruct patient to call for assistance with activity   - Consult OT/PT to assist with strengthening/mobility   - Keep Call bell within reach  - Keep bed low and locked with side rails adjusted as appropriate  - Keep care items and personal belongings within reach  - Initiate and maintain comfort rounds  - Make Fall Risk Sign visible to staff  - Offer Toileting every 2 Hours, in advance of need  - Initiate/Maintain bed alarm  - Obtain necessary fall risk management equipment: alarm, antislip socks  - Apply yellow socks and bracelet for high fall risk patients  - Consider moving patient to room near nurses station  Outcome: Progressing  Goal: Maintain or return to baseline ADL function  Description: INTERVENTIONS:  -  Assess patient's ability to carry out ADLs; assess patient's baseline for ADL function and identify physical deficits which impact ability to perform ADLs (bathing, care of mouth/teeth, toileting, grooming, dressing, etc )  - Assess/evaluate cause of self-care deficits   - Assess range of motion  - Assess patient's mobility; develop plan if impaired  - Assess patient's need for assistive devices and provide as appropriate  - Encourage maximum independence but intervene and supervise when necessary  - Involve family in performance of ADLs  - Assess for home care needs following discharge   - Consider OT consult to assist with ADL evaluation and planning for discharge  - Provide patient education as appropriate  Outcome: Progressing  Goal: Maintains/Returns to pre admission functional level  Description: INTERVENTIONS:  - Perform BMAT or MOVE assessment daily    - Set and communicate daily mobility goal to care team and patient/family/caregiver  - Collaborate with rehabilitation services on mobility goals if consulted  - Perform Range of Motion 3 times a day  - Reposition patient every 3 hours    - Dangle patient 3 times a day  - Stand patient 3 times a day  - Ambulate patient 3 times a day  - Out of bed to chair 3 times a day   - Out of bed for meals 3 times a day  - Out of bed for toileting  - Record patient progress and toleration of activity level   Outcome: Progressing     Problem: DISCHARGE PLANNING  Goal: Discharge to home or other facility with appropriate resources  Description: INTERVENTIONS:  - Identify barriers to discharge w/patient and caregiver  - Arrange for needed discharge resources and transportation as appropriate  - Identify discharge learning needs (meds, wound care, etc )  - Arrange for interpretive services to assist at discharge as needed  - Refer to Case Management Department for coordinating discharge planning if the patient needs post-hospital services based on physician/advanced practitioner order or complex needs related to functional status, cognitive ability, or social support system  Outcome: Progressing     Problem: Knowledge Deficit  Goal: Patient/family/caregiver demonstrates understanding of disease process, treatment plan, medications, and discharge instructions  Description: Complete learning assessment and assess knowledge base    Interventions:  - Provide teaching at level of understanding  - Provide teaching via preferred learning methods  Outcome: Progressing     Problem: CARDIOVASCULAR - ADULT  Goal: Maintains optimal cardiac output and hemodynamic stability  Description: INTERVENTIONS:  - Monitor I/O, vital signs and rhythm  - Monitor for S/S and trends of decreased cardiac output  - Administer and titrate ordered vasoactive medications to optimize hemodynamic stability  - Assess quality of pulses, skin color and temperature  - Assess for signs of decreased coronary artery perfusion  - Instruct patient to report change in severity of symptoms  Outcome: Progressing  Goal: Absence of cardiac dysrhythmias or at baseline rhythm  Description: INTERVENTIONS:  - Continuous cardiac monitoring, vital signs, obtain 12 lead EKG if ordered  - Administer antiarrhythmic and heart rate control medications as ordered  - Monitor electrolytes and administer replacement therapy as ordered  Outcome: Progressing     Problem: METABOLIC, FLUID AND ELECTROLYTES - ADULT  Goal: Electrolytes maintained within normal limits  Description: INTERVENTIONS:  - Monitor labs and assess patient for signs and symptoms of electrolyte imbalances  - Administer electrolyte replacement as ordered  - Monitor response to electrolyte replacements, including repeat lab results as appropriate  - Instruct patient on fluid and nutrition as appropriate  Outcome: Progressing  Goal: Fluid balance maintained  Description: INTERVENTIONS:  - Monitor labs   - Monitor I/O and WT  - Instruct patient on fluid and nutrition as appropriate  - Assess for signs & symptoms of volume excess or deficit  Outcome: Progressing  Goal: Glucose maintained within target range  Description: INTERVENTIONS:  - Monitor Blood Glucose as ordered  - Assess for signs and symptoms of hyperglycemia and hypoglycemia  - Administer ordered medications to maintain glucose within target range  - Assess nutritional intake and initiate nutrition service referral as needed  Outcome: Progressing     Problem: MOBILITY - ADULT  Goal: Maintain or return to baseline ADL function  Description: INTERVENTIONS:  -  Assess patient's ability to carry out ADLs; assess patient's baseline for ADL function and identify physical deficits which impact ability to perform ADLs (bathing, care of mouth/teeth, toileting, grooming, dressing, etc )  - Assess/evaluate cause of self-care deficits   - Assess range of motion  - Assess patient's mobility; develop plan if impaired  - Assess patient's need for assistive devices and provide as appropriate  - Encourage maximum independence but intervene and supervise when necessary  - Involve family in performance of ADLs  - Assess for home care needs following discharge   - Consider OT consult to assist with ADL evaluation and planning for discharge  - Provide patient education as appropriate  Outcome: Progressing  Goal: Maintains/Returns to pre admission functional level  Description: INTERVENTIONS:  - Perform BMAT or MOVE assessment daily    - Set and communicate daily mobility goal to care team and patient/family/caregiver  - Collaborate with rehabilitation services on mobility goals if consulted  - Perform Range of Motion 3 times a day  - Reposition patient every 3 hours    - Dangle patient 3 times a day  - Stand patient 3 times a day  - Ambulate patient 3 times a day  - Out of bed to chair 3 times a day   - Out of bed for meals 3 times a day  - Out of bed for toileting  - Record patient progress and toleration of activity level   Outcome: Progressing     Problem: Prexisting or High Potential for Compromised Skin Integrity  Goal: Skin integrity is maintained or improved  Description: INTERVENTIONS:  - Identify patients at risk for skin breakdown  - Assess and monitor skin integrity  - Assess and monitor nutrition and hydration status  - Monitor labs   - Assess for incontinence   - Turn and reposition patient  - Assist with mobility/ambulation  - Relieve pressure over bony prominences  - Avoid friction and shearing  - Provide appropriate hygiene as needed including keeping skin clean and dry  - Evaluate need for skin moisturizer/barrier cream  - Collaborate with interdisciplinary team   - Patient/family teaching  - Consider wound care consult   Outcome: Progressing     Problem: Nutrition/Hydration-ADULT  Goal: Nutrient/Hydration intake appropriate for improving, restoring or maintaining nutritional needs  Description: Monitor and assess patient's nutrition/hydration status for malnutrition  Collaborate with interdisciplinary team and initiate plan and interventions as ordered  Monitor patient's weight and dietary intake as ordered or per policy  Utilize nutrition screening tool and intervene as necessary  Determine patient's food preferences and provide high-protein, high-caloric foods as appropriate       INTERVENTIONS:  - Monitor oral intake, urinary output, labs, and treatment plans  - Assess nutrition and hydration status and recommend course of action  - Evaluate amount of meals eaten  - Assist patient with eating if necessary   - Allow adequate time for meals  - Recommend/ encourage appropriate diets, oral nutritional supplements, and vitamin/mineral supplements  - Order, calculate, and assess calorie counts as needed  - Recommend, monitor, and adjust tube feedings and TPN/PPN based on assessed needs  - Assess need for intravenous fluids  - Provide specific nutrition/hydration education as appropriate  - Include patient/family/caregiver in decisions related to nutrition  Outcome: Progressing     Problem: Potential for Falls  Goal: Patient will remain free of falls  Description: INTERVENTIONS:  - Educate patient/family on patient safety including physical limitations  - Instruct patient to call for assistance with activity   - Consult OT/PT to assist with strengthening/mobility   - Keep Call bell within reach  - Keep bed low and locked with side rails adjusted as appropriate  - Keep care items and personal belongings within reach  - Initiate and maintain comfort rounds  - Make Fall Risk Sign visible to staff  - Offer Toileting every 2 Hours, in advance of need  - Initiate/Maintain bed alarm  - Obtain necessary fall risk management equipment: alarm, antislip socks  - Apply yellow socks and bracelet for high fall risk patients  - Consider moving patient to room near nurses station  Outcome: Progressing

## 2022-06-04 NOTE — ASSESSMENT & PLAN NOTE
Likely secondary to rhabdomyolysis  Neurovascular exam intact   Venous Doppler without any evidence of DVT  Reports increasing pain today    · Scan of the left thigh showed some edema with normal musculature  · CK level continues to improve  · Continue PT/OT

## 2022-06-04 NOTE — ASSESSMENT & PLAN NOTE
Versus SIRS  As evidenced by leukocytosis, tachypnea, lactic acidosis -patient was started on broad-spectrum antibiotics with vancomycin plus cefepime  No clear evidence of infection at present  Blood culture 1/2 positive for coagulase-negative staphylococcus possibly contaminant  Repeat blood cultures have been negative at 72 hours  Currently afebrile, hemodynamically stable    Leukocytosis improved  · CT scan of the left leg showed diffuse subcutaneous edema with normal musculature  · Vancomycin was discontinued on June 6, 2022

## 2022-06-04 NOTE — PROGRESS NOTES
[address] Internal Medicine Progress Note  Patient: Vamshi Hsieh 64 y o  male   MRN: [id number]  PCP: Kuldeep Vasquez MD  Unit/Bed#: [address] [place] [address] Encounter: [id number]  Date Of Visit: 06/04/22    Problem List:    Principal Problem:    Severe sepsis ([facility] )  Active Problems:    Bacteremia    Type 2 diabetes mellitus with chronic kidney disease on chronic dialysis, with long-term current use of insulin ([facility])    Hyperkalemia    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HCC)    Rhabdomyolysis    Left leg pain    Benign essential hypertension    Anemia due to chronic kidney disease, on chronic dialysis ([facility] )    Benign prostatic hyperplasia with lower urinary tract symptoms    Hyperlipidemia      Assessment & Plan:    Bacteremia  Assessment & Plan  Blood culture 1/2 noted to be positive for Staphylococcus, coagulase-negative   Possibly contaminant but patient risk of true bacteremia  Seen by ID, input appreciated  · Continue vancomycin  · Monitor level  · Follow up repeat cultures    * Severe sepsis [facility])  Assessment & Plan  Versus SIRS  As evidenced by leukocytosis, tachypnea, lactic acidosis -patient was started on broad-spectrum antibiotics with vancomycin plus cefepime  No clear evidence of infection at present  Blood culture 1/2 positive for coagulase-negative staph possibly contaminant  Currently afebrile, hemodynamically stable    Leukocytosis improved  · Continue vancomycin   · Follow-up repeat blood cultures  · Procalcitonin  · ID evaluation appreciated  · Follow-up labs    Toxic metabolic encephalopathy-resolved as of 6/5/2022  Assessment & Plan  Likely toxic metabolic  Now improving    Left leg pain  Assessment & Plan  Possibly secondary to rhabdomyolysis  Neurovascular exam intact   Venous Doppler without any evidence of DVT  Reports improvement  · PT/OT    Rhabdomyolysis  Assessment & Plan  CK noted to be elevated   Improving    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HCC)  Assessment & Plan  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)  Assessment & Plan  Lab Results   Component Value Date    HGBA1C 5 1 05/08/2022       Recent Labs     06/04/22  0728 06/04/22  1133 06/04/22  1611 06/04/22  2104   POCGLU 167* 172* 125 72       Blood Sugar Average: Last 72 hrs:  (P) 178 75     Continue insulin 70/30 25 units b i d  A c  Monitor with sliding scale  Monitor blood glucose trend    Suspected UTI-resolved as of 6/4/2022  Assessment & Plan  Urine culture negative, no evidence of UTI    High anion gap metabolic acidosis-resolved as of 6/5/2022  Assessment & Plan  Likely secondary to ESRD and poor dialysis compliance  Patient also had lactic acidosis on presentation now improved  Monitor    Anemia due to chronic kidney disease, on chronic dialysis [facility])  Assessment & Plan  Remains low but stable   On Epogen    Benign essential hypertension  Assessment & Plan  Holding enalapril due to hyperkalemia  Monitor blood pressure at present    Hyperlipidemia  Assessment & Plan  Continue statin    Benign prostatic hyperplasia with lower urinary tract symptoms  Assessment & Plan  On Flomax          VTE Pharmacologic Prophylaxis: VTE Score: 6 Moderate Risk (Score 3-4) - Pharmacological DVT Prophylaxis Ordered: heparin  Patient Centered Rounds: I performed bedside rounds with nursing staff today  Discussions with Specialists or Other Care Team Provider:  Yes,    Education and Discussions with Family / Patient: Attempted to update  (wife) via phone  Unable to contact  Time Spent for Care: 45 minutes   More than 50% of total time spent on counseling and coordination of care as described above  Current Length of Stay: 3 day(s)  Current Patient Status: Inpatient   Certification Statement: The patient will continue to require additional inpatient hospital stay due to Sepsis  Discharge Plan: Anticipate discharge in 48-72 hrs to discharge location to be determined pending rehab evaluations  Code Status: Level 1 - Full Code    Subjective:   Sitting up in bed, receiving dialysis   Reports that his left leg/thigh pain is improving and able to move extremity better   Denies any chest pain shortness of breath or cough   Denies any fever or chills     Reported that he did not feel well and was having loose bowel movement that is why he missed dialysis    Objective:     Vitals:   Temp (24hrs), Av 1 °F (36 7 °C), Min:98 °F (36 7 °C), Max:98 3 °F (36 8 °C)    Temp:  [98 °F (36 7 °C)-98 3 °F (36 8 °C)] 98 1 °F (36 7 °C)  HR:  [72-91] 85  Resp:  [18-20] 20  BP: (120-164)/(62-73) 120/62  SpO2:  [96 %-99 %] 98 %  Body mass index is 27 42 kg/m²  Input and Output Summary (last 24 hours): Intake/Output Summary (Last 24 hours) at 2022 1141  Last data filed at 2022 0855  Gross per 24 hour   Intake 200 ml   Output --   Net 200 ml       Physical Exam:   Physical Exam  Constitutional:       General: He is not in acute distress  Appearance: He is ill-appearing (Chronically)  Comments: Deconditioned   HENT:      Head: Normocephalic and atraumatic  Cardiovascular:      Rate and Rhythm: Normal rate  Comments: Right chest PermCath in place  Pulmonary:      Effort: Pulmonary effort is normal  No respiratory distress  Breath sounds: No wheezing, rhonchi or rales  Comments: Diminished but clear  Chest:      Chest wall: No tenderness  Abdominal:      General: Bowel sounds are normal  There is no distension  Palpations: Abdomen is soft  Tenderness: There is no abdominal tenderness  There is no guarding or rebound     Musculoskeletal:      Right lower leg: Edema present  Left lower leg: Edema present  Skin:     General: Skin is warm and dry  Findings: Bruising and lesion present  No rash  Neurological:      Mental Status: He is alert and oriented to person, place, and time  Mental status is at baseline  Cranial Nerves: No cranial nerve deficit  Additional Data:     Labs:  Results from last 7 days   Lab Units 06/04/22  0457   WBC Thousand/uL 8 69   HEMOGLOBIN g/dL 7 5*   HEMATOCRIT % 24 4*   PLATELETS Thousands/uL 344   NEUTROS PCT % 82*   LYMPHS PCT % 9*   MONOS PCT % 6   EOS PCT % 1     Results from last 7 days   Lab Units 06/04/22  0457 06/03/22  0502 06/02/22  0525   SODIUM mmol/L 129*   < > 133*   POTASSIUM mmol/L 5 7*   < > 5 3   CHLORIDE mmol/L 92*   < > 97*   CO2 mmol/L 26   < > 24   BUN mg/dL 62*   < > 56*   CREATININE mg/dL 6 04*   < > 6 97*   ANION GAP mmol/L 11   < > 12   CALCIUM mg/dL 7 3*   < > 8 0*   ALBUMIN g/dL  --   --  1 5*   TOTAL BILIRUBIN mg/dL  --   --  0 39   ALK PHOS U/L  --   --  100   ALT U/L  --   --  69   AST U/L  --   --  171*   GLUCOSE RANDOM mg/dL 137   < > 133    < > = values in this interval not displayed  Results from last 7 days   Lab Units 06/01/22  1228   INR  1 47*     Results from last 7 days   Lab Units 06/04/22  1133 06/04/22  0728 06/03/22  1955 06/03/22  1608 06/03/22  1151 06/03/22  0813 06/02/22  2147 06/02/22  1647 06/02/22  1130 06/02/22  0522 06/01/22  2345 06/01/22  1807   POC GLUCOSE mg/dl 172* 167* 294* 158* 253* 164* 237* 160* 204* 139 176* 193*         Results from last 7 days   Lab Units 06/04/22  0457 06/02/22  0525 06/01/22  1450 06/01/22  1120 06/01/22  1046   LACTIC ACID mmol/L  --   --  1 5 2 7*  --    PROCALCITONIN ng/ml 6 62* 7 60*  --   --  4 26*       Lines/Drains:  Invasive Devices  Report    Peripheral Intravenous Line  Duration           Peripheral IV 06/02/22 Left; Lower Arm 2 days          Hemodialysis Catheter  Duration           HD Permanent Double Catheter 87 days Imaging: Reviewed radiology reports from this admission including: chest xray    Recent Cultures (last 7 days):   Results from last 7 days   Lab Units 06/03/22  1647 06/01/22  1101 06/01/22  1046   BLOOD CULTURE  Received in Microbiology Lab  Culture in Progress  Received in Microbiology Lab  Culture in Progress  --  Staphylococcus coagulase negative*  No Growth at 48 hrs     GRAM STAIN RESULT   --   --  Gram positive cocci in clusters*   URINE CULTURE   --  No Growth <1000 cfu/mL  --        Last 24 Hours Medication List:   Current Facility-Administered Medications   Medication Dose Route Frequency Provider Last Rate    calcium acetate  667 mg Oral TID With Meals [employer], CRNP      calcium carbonate  500 mg Oral Daily PRN Earlis Lina, PA-C      epoetin ani  2,000 Units Intravenous After Dialysis [employer], CRNP      glycerin-hypromellose-  1 drop Both Eyes Q4H PRN Bridget Condon MD      glycerin-hypromellose-  1 drop Left Eye BID Pepe Curiel PA-C      heparin (porcine)  5,000 Units Subcutaneous [facility] [address], [address]      insulin aspart protamine-insulin aspart  25 Units Subcutaneous BID AC Bridget Condon MD      insulin lispro  1-6 Units Subcutaneous TID AC Allyn Voss, [address]      insulin lispro  1-6 Units Subcutaneous HS [employer], CRNP      latanoprost  1 drop Both Eyes HS [address], [address]      melatonin  3 mg Oral HS PRN FERNANDO Perez      ondansetron  4 mg Intravenous Q6H PRN Luis Antoniois Lina, PA-C      pravastatin  80 mg Oral Daily With [employer], [address]      simethicone  80 mg Oral Q6H PRN Earlis Lina, PA-C      tamsulosin  0 4 mg Oral Daily With [employer], [address]      vancomycin  10 mg/kg Intravenous After Dialysis [employer], CRNP 750 mg (06/04/22 1033)        Today, Patient Was Seen By: Bridget Condon MD    ** Please Note: "This note has been constructed using a voice recognition system  Therefore there may be syntax, spelling, and/or grammatical errors   Please call if you have any questions  "**

## 2022-06-05 PROBLEM — E87.29 HIGH ANION GAP METABOLIC ACIDOSIS: Status: RESOLVED | Noted: 2022-03-07 | Resolved: 2022-06-05

## 2022-06-05 PROBLEM — E87.2 HIGH ANION GAP METABOLIC ACIDOSIS: Status: RESOLVED | Noted: 2022-03-07 | Resolved: 2022-06-05

## 2022-06-05 PROBLEM — E87.20 LACTIC ACIDOSIS: Status: RESOLVED | Noted: 2022-06-01 | Resolved: 2022-06-05

## 2022-06-05 PROBLEM — G92.8 TOXIC METABOLIC ENCEPHALOPATHY: Status: RESOLVED | Noted: 2022-06-01 | Resolved: 2022-06-05

## 2022-06-05 PROBLEM — E87.2 LACTIC ACIDOSIS: Status: RESOLVED | Noted: 2022-06-01 | Resolved: 2022-06-05

## 2022-06-05 LAB
ALBUMIN SERPL BCP-MCNC: 1.5 G/DL (ref 3.5–5)
ALP SERPL-CCNC: 76 U/L (ref 46–116)
ALT SERPL W P-5'-P-CCNC: 64 U/L (ref 12–78)
ANION GAP SERPL CALCULATED.3IONS-SCNC: 9 MMOL/L (ref 4–13)
AST SERPL W P-5'-P-CCNC: 99 U/L (ref 5–45)
BACTERIA BLD CULT: ABNORMAL
BILIRUB SERPL-MCNC: 0.28 MG/DL (ref 0.2–1)
BUN SERPL-MCNC: 48 MG/DL (ref 5–25)
CALCIUM ALBUM COR SERPL-MCNC: 9.3 MG/DL (ref 8.3–10.1)
CALCIUM SERPL-MCNC: 7.3 MG/DL (ref 8.3–10.1)
CHLORIDE SERPL-SCNC: 91 MMOL/L (ref 100–108)
CK MB SERPL-MCNC: 2.2 NG/ML (ref 0–5)
CK MB SERPL-MCNC: <1 % (ref 0–2.5)
CK SERPL-CCNC: 1503 U/L (ref 39–308)
CO2 SERPL-SCNC: 29 MMOL/L (ref 21–32)
CREAT SERPL-MCNC: 4.63 MG/DL (ref 0.6–1.3)
ERYTHROCYTE [DISTWIDTH] IN BLOOD BY AUTOMATED COUNT: 15.4 % (ref 11.6–15.1)
GFR SERPL CREATININE-BSD FRML MDRD: 11 ML/MIN/1.73SQ M
GLUCOSE SERPL-MCNC: 103 MG/DL (ref 65–140)
GLUCOSE SERPL-MCNC: 109 MG/DL (ref 65–140)
GLUCOSE SERPL-MCNC: 116 MG/DL (ref 65–140)
GLUCOSE SERPL-MCNC: 145 MG/DL (ref 65–140)
GLUCOSE SERPL-MCNC: 163 MG/DL (ref 65–140)
GRAM STN SPEC: ABNORMAL
HCT VFR BLD AUTO: 25.1 % (ref 36.5–49.3)
HGB BLD-MCNC: 7.8 G/DL (ref 12–17)
MCH RBC QN AUTO: 28.2 PG (ref 26.8–34.3)
MCHC RBC AUTO-ENTMCNC: 31.1 G/DL (ref 31.4–37.4)
MCV RBC AUTO: 91 FL (ref 82–98)
PLATELET # BLD AUTO: 372 THOUSANDS/UL (ref 149–390)
PMV BLD AUTO: 9 FL (ref 8.9–12.7)
POTASSIUM SERPL-SCNC: 4.6 MMOL/L (ref 3.5–5.3)
PROT SERPL-MCNC: 5.8 G/DL (ref 6.4–8.2)
RBC # BLD AUTO: 2.77 MILLION/UL (ref 3.88–5.62)
S AUREUS+CONS DNA BLD POS NAA+NON-PROBE: DETECTED
SODIUM SERPL-SCNC: 129 MMOL/L (ref 136–145)
WBC # BLD AUTO: 8.77 THOUSAND/UL (ref 4.31–10.16)

## 2022-06-05 PROCEDURE — 80053 COMPREHEN METABOLIC PANEL: CPT | Performed by: INTERNAL MEDICINE

## 2022-06-05 PROCEDURE — 99232 SBSQ HOSP IP/OBS MODERATE 35: CPT | Performed by: INTERNAL MEDICINE

## 2022-06-05 PROCEDURE — 97110 THERAPEUTIC EXERCISES: CPT

## 2022-06-05 PROCEDURE — 97530 THERAPEUTIC ACTIVITIES: CPT

## 2022-06-05 PROCEDURE — 82550 ASSAY OF CK (CPK): CPT | Performed by: INTERNAL MEDICINE

## 2022-06-05 PROCEDURE — 82948 REAGENT STRIP/BLOOD GLUCOSE: CPT

## 2022-06-05 PROCEDURE — 82553 CREATINE MB FRACTION: CPT | Performed by: INTERNAL MEDICINE

## 2022-06-05 PROCEDURE — 85027 COMPLETE CBC AUTOMATED: CPT | Performed by: INTERNAL MEDICINE

## 2022-06-05 RX ORDER — ACETAMINOPHEN 325 MG/1
650 TABLET ORAL EVERY 6 HOURS PRN
Status: DISCONTINUED | OUTPATIENT
Start: 2022-06-05 | End: 2022-06-07 | Stop reason: HOSPADM

## 2022-06-05 RX ORDER — INSULIN LISPRO 100 [IU]/ML
1-5 INJECTION, SOLUTION INTRAVENOUS; SUBCUTANEOUS
Status: DISCONTINUED | OUTPATIENT
Start: 2022-06-06 | End: 2022-06-07 | Stop reason: HOSPADM

## 2022-06-05 RX ADMIN — CALCIUM ACETATE 667 MG: 667 CAPSULE ORAL at 16:53

## 2022-06-05 RX ADMIN — HEPARIN SODIUM 5000 UNITS: 5000 INJECTION INTRAVENOUS; SUBCUTANEOUS at 06:41

## 2022-06-05 RX ADMIN — SIMETHICONE 80 MG: 80 TABLET, CHEWABLE ORAL at 22:57

## 2022-06-05 RX ADMIN — ANTACID TABLETS 500 MG: 500 TABLET, CHEWABLE ORAL at 22:57

## 2022-06-05 RX ADMIN — TAMSULOSIN HYDROCHLORIDE 0.4 MG: 0.4 CAPSULE ORAL at 16:54

## 2022-06-05 RX ADMIN — SIMETHICONE 80 MG: 80 TABLET, CHEWABLE ORAL at 03:52

## 2022-06-05 RX ADMIN — INSULIN LISPRO 1 UNITS: 100 INJECTION, SOLUTION INTRAVENOUS; SUBCUTANEOUS at 12:02

## 2022-06-05 RX ADMIN — GLYCERIN 1 DROP: .002; .002; .01 SOLUTION/ DROPS OPHTHALMIC at 16:58

## 2022-06-05 RX ADMIN — GLYCERIN 1 DROP: .002; .002; .01 SOLUTION/ DROPS OPHTHALMIC at 08:46

## 2022-06-05 RX ADMIN — CALCIUM ACETATE 667 MG: 667 CAPSULE ORAL at 12:02

## 2022-06-05 RX ADMIN — INSULIN ASPART 25 UNITS: 100 INJECTION, SUSPENSION SUBCUTANEOUS at 16:53

## 2022-06-05 RX ADMIN — HEPARIN SODIUM 5000 UNITS: 5000 INJECTION INTRAVENOUS; SUBCUTANEOUS at 21:45

## 2022-06-05 RX ADMIN — CALCIUM ACETATE 667 MG: 667 CAPSULE ORAL at 08:45

## 2022-06-05 RX ADMIN — HEPARIN SODIUM 5000 UNITS: 5000 INJECTION INTRAVENOUS; SUBCUTANEOUS at 14:51

## 2022-06-05 RX ADMIN — ACETAMINOPHEN 650 MG: 325 TABLET ORAL at 10:18

## 2022-06-05 RX ADMIN — INSULIN ASPART 25 UNITS: 100 INJECTION, SUSPENSION SUBCUTANEOUS at 08:45

## 2022-06-05 RX ADMIN — Medication 3 MG: at 22:59

## 2022-06-05 RX ADMIN — LATANOPROST 1 DROP: 50 SOLUTION OPHTHALMIC at 21:45

## 2022-06-05 RX ADMIN — PRAVASTATIN SODIUM 80 MG: 80 TABLET ORAL at 16:54

## 2022-06-05 NOTE — PLAN OF CARE
Problem: PAIN - ADULT  Goal: Verbalizes/displays adequate comfort level or baseline comfort level  Description: Interventions:  - Encourage patient to monitor pain and request assistance  - Assess pain using appropriate pain scale  - Administer analgesics based on type and severity of pain and evaluate response  - Implement non-pharmacological measures as appropriate and evaluate response  - Consider cultural and social influences on pain and pain management  - Notify physician/advanced practitioner if interventions unsuccessful or patient reports new pain  Outcome: Progressing     Problem: INFECTION - ADULT  Goal: Absence or prevention of progression during hospitalization  Description: INTERVENTIONS:  - Assess and monitor for signs and symptoms of infection  - Monitor lab/diagnostic results  - Monitor all insertion sites, i e  indwelling lines, tubes, and drains  - Monitor endotracheal if appropriate and nasal secretions for changes in amount and color  - Nelson appropriate cooling/warming therapies per order  - Administer medications as ordered  - Instruct and encourage patient and family to use good hand hygiene technique  - Identify and instruct in appropriate isolation precautions for identified infection/condition  Outcome: Progressing     Problem: SAFETY ADULT  Goal: Patient will remain free of falls  Description: INTERVENTIONS:  - Educate patient/family on patient safety including physical limitations  - Instruct patient to call for assistance with activity   - Consult OT/PT to assist with strengthening/mobility   - Keep Call bell within reach  - Keep bed low and locked with side rails adjusted as appropriate  - Keep care items and personal belongings within reach  - Initiate and maintain comfort rounds  - Make Fall Risk Sign visible to staff  - Offer Toileting every 2 Hours, in advance of need  - Initiate/Maintain bed alarm  - Obtain necessary fall risk management equipment: alarm, antislip socks  - Apply yellow socks and bracelet for high fall risk patients  - Consider moving patient to room near nurses station  Outcome: Progressing  Goal: Maintain or return to baseline ADL function  Description: INTERVENTIONS:  -  Assess patient's ability to carry out ADLs; assess patient's baseline for ADL function and identify physical deficits which impact ability to perform ADLs (bathing, care of mouth/teeth, toileting, grooming, dressing, etc )  - Assess/evaluate cause of self-care deficits   - Assess range of motion  - Assess patient's mobility; develop plan if impaired  - Assess patient's need for assistive devices and provide as appropriate  - Encourage maximum independence but intervene and supervise when necessary  - Involve family in performance of ADLs  - Assess for home care needs following discharge   - Consider OT consult to assist with ADL evaluation and planning for discharge  - Provide patient education as appropriate  Outcome: Progressing  Goal: Maintains/Returns to pre admission functional level  Description: INTERVENTIONS:  - Perform BMAT or MOVE assessment daily    - Set and communicate daily mobility goal to care team and patient/family/caregiver  - Collaborate with rehabilitation services on mobility goals if consulted  - Perform Range of Motion 3 times a day  - Reposition patient every 3 hours    - Dangle patient 3 times a day  - Stand patient 3 times a day  - Ambulate patient 3 times a day  - Out of bed to chair 3 times a day   - Out of bed for meals 3 times a day  - Out of bed for toileting  - Record patient progress and toleration of activity level   Outcome: Progressing     Problem: DISCHARGE PLANNING  Goal: Discharge to home or other facility with appropriate resources  Description: INTERVENTIONS:  - Identify barriers to discharge w/patient and caregiver  - Arrange for needed discharge resources and transportation as appropriate  - Identify discharge learning needs (meds, wound care, etc )  - Arrange for interpretive services to assist at discharge as needed  - Refer to Case Management Department for coordinating discharge planning if the patient needs post-hospital services based on physician/advanced practitioner order or complex needs related to functional status, cognitive ability, or social support system  Outcome: Progressing     Problem: Knowledge Deficit  Goal: Patient/family/caregiver demonstrates understanding of disease process, treatment plan, medications, and discharge instructions  Description: Complete learning assessment and assess knowledge base    Interventions:  - Provide teaching at level of understanding  - Provide teaching via preferred learning methods  Outcome: Progressing     Problem: CARDIOVASCULAR - ADULT  Goal: Maintains optimal cardiac output and hemodynamic stability  Description: INTERVENTIONS:  - Monitor I/O, vital signs and rhythm  - Monitor for S/S and trends of decreased cardiac output  - Administer and titrate ordered vasoactive medications to optimize hemodynamic stability  - Assess quality of pulses, skin color and temperature  - Assess for signs of decreased coronary artery perfusion  - Instruct patient to report change in severity of symptoms  Outcome: Progressing  Goal: Absence of cardiac dysrhythmias or at baseline rhythm  Description: INTERVENTIONS:  - Continuous cardiac monitoring, vital signs, obtain 12 lead EKG if ordered  - Administer antiarrhythmic and heart rate control medications as ordered  - Monitor electrolytes and administer replacement therapy as ordered  Outcome: Progressing     Problem: METABOLIC, FLUID AND ELECTROLYTES - ADULT  Goal: Electrolytes maintained within normal limits  Description: INTERVENTIONS:  - Monitor labs and assess patient for signs and symptoms of electrolyte imbalances  - Administer electrolyte replacement as ordered  - Monitor response to electrolyte replacements, including repeat lab results as appropriate  - Instruct patient on fluid and nutrition as appropriate  Outcome: Progressing  Goal: Fluid balance maintained  Description: INTERVENTIONS:  - Monitor labs   - Monitor I/O and WT  - Instruct patient on fluid and nutrition as appropriate  - Assess for signs & symptoms of volume excess or deficit  Outcome: Progressing  Goal: Glucose maintained within target range  Description: INTERVENTIONS:  - Monitor Blood Glucose as ordered  - Assess for signs and symptoms of hyperglycemia and hypoglycemia  - Administer ordered medications to maintain glucose within target range  - Assess nutritional intake and initiate nutrition service referral as needed  Outcome: Progressing     Problem: MOBILITY - ADULT  Goal: Maintain or return to baseline ADL function  Description: INTERVENTIONS:  -  Assess patient's ability to carry out ADLs; assess patient's baseline for ADL function and identify physical deficits which impact ability to perform ADLs (bathing, care of mouth/teeth, toileting, grooming, dressing, etc )  - Assess/evaluate cause of self-care deficits   - Assess range of motion  - Assess patient's mobility; develop plan if impaired  - Assess patient's need for assistive devices and provide as appropriate  - Encourage maximum independence but intervene and supervise when necessary  - Involve family in performance of ADLs  - Assess for home care needs following discharge   - Consider OT consult to assist with ADL evaluation and planning for discharge  - Provide patient education as appropriate  Outcome: Progressing  Goal: Maintains/Returns to pre admission functional level  Description: INTERVENTIONS:  - Perform BMAT or MOVE assessment daily    - Set and communicate daily mobility goal to care team and patient/family/caregiver  - Collaborate with rehabilitation services on mobility goals if consulted  - Perform Range of Motion 3 times a day  - Reposition patient every 3 hours    - Dangle patient 3 times a day  - Stand patient 3 times a day  - Ambulate patient 3 times a day  - Out of bed to chair 3 times a day   - Out of bed for meals 3 times a day  - Out of bed for toileting  - Record patient progress and toleration of activity level   Outcome: Progressing     Problem: Prexisting or High Potential for Compromised Skin Integrity  Goal: Skin integrity is maintained or improved  Description: INTERVENTIONS:  - Identify patients at risk for skin breakdown  - Assess and monitor skin integrity  - Assess and monitor nutrition and hydration status  - Monitor labs   - Assess for incontinence   - Turn and reposition patient  - Assist with mobility/ambulation  - Relieve pressure over bony prominences  - Avoid friction and shearing  - Provide appropriate hygiene as needed including keeping skin clean and dry  - Evaluate need for skin moisturizer/barrier cream  - Collaborate with interdisciplinary team   - Patient/family teaching  - Consider wound care consult   Outcome: Progressing     Problem: Nutrition/Hydration-ADULT  Goal: Nutrient/Hydration intake appropriate for improving, restoring or maintaining nutritional needs  Description: Monitor and assess patient's nutrition/hydration status for malnutrition  Collaborate with interdisciplinary team and initiate plan and interventions as ordered  Monitor patient's weight and dietary intake as ordered or per policy  Utilize nutrition screening tool and intervene as necessary  Determine patient's food preferences and provide high-protein, high-caloric foods as appropriate       INTERVENTIONS:  - Monitor oral intake, urinary output, labs, and treatment plans  - Assess nutrition and hydration status and recommend course of action  - Evaluate amount of meals eaten  - Assist patient with eating if necessary   - Allow adequate time for meals  - Recommend/ encourage appropriate diets, oral nutritional supplements, and vitamin/mineral supplements  - Order, calculate, and assess calorie counts as needed  - Recommend, monitor, and adjust tube feedings and TPN/PPN based on assessed needs  - Assess need for intravenous fluids  - Provide specific nutrition/hydration education as appropriate  - Include patient/family/caregiver in decisions related to nutrition  Outcome: Progressing     Problem: Potential for Falls  Goal: Patient will remain free of falls  Description: INTERVENTIONS:  - Educate patient/family on patient safety including physical limitations  - Instruct patient to call for assistance with activity   - Consult OT/PT to assist with strengthening/mobility   - Keep Call bell within reach  - Keep bed low and locked with side rails adjusted as appropriate  - Keep care items and personal belongings within reach  - Initiate and maintain comfort rounds  - Make Fall Risk Sign visible to staff  - Offer Toileting every 2 Hours, in advance of need  - Initiate/Maintain bed alarm  - Obtain necessary fall risk management equipment: alarm, antislip socks  - Apply yellow socks and bracelet for high fall risk patients  - Consider moving patient to room near nurses station  Outcome: Progressing

## 2022-06-05 NOTE — PLAN OF CARE
Problem: OCCUPATIONAL THERAPY ADULT  Goal: Performs self-care activities at highest level of function for planned discharge setting  See evaluation for individualized goals  Description: Treatment Interventions: ADL retraining, Functional transfer training, UE strengthening/ROM, Endurance training, Patient/family training, Neuromuscular reeducation, Energy conservation, Activityengagement          See flowsheet documentation for full assessment, interventions and recommendations  Outcome: Progressing  Note: Limitation: Decreased ADL status, Decreased UE ROM, Decreased UE strength, Decreased Safe judgement during ADL, Decreased endurance, Decreased fine motor control, Decreased self-care trans, Decreased high-level ADLs  Prognosis: Good  Assessment: Patient seen by OT this AM  Patient was alert and cooperative, tolerating the session better then previously  Patient is [place] and benefited from answering questions when they were written down  Patient requires mod assist x1 for all functional transfer and functional mobilitly at this time  Patient is generally weak and deconditioned  Patient is limited by pain in LLE  At the end of the session, pt was seated in the recliner with chair alarm on and all needs met       OT Discharge Recommendation: Post acute rehabilitation services

## 2022-06-05 NOTE — OCCUPATIONAL THERAPY NOTE
OT TREATMENT       06/05/22 0907   Note Type   Note Type Treatment   Restrictions/Precautions   Other Precautions Cognitive; Chair Alarm; Bed Alarm; Fall Risk;Pain;Hard of hearing   Pain Assessment   Pain Assessment Tool 0-10   Pain Score 6   Pain Location/Orientation Orientation: Left; Location: Leg;Location: Knee   ADL   Grooming Assistance 5  Supervision/Setup   Grooming Deficit Brushing hair;Wash/dry face   Grooming Comments Patient completed grooming tasks supine in bed with the head of bed raised  LB Dressing Assistance 2  Maximal Assistance   LB Dressing Deficit Don/doff R sock; Don/doff L sock  (Patient attempted to don/doff B socks but was unable to due to decreased balance and poor strength )   Bed Mobility   Supine to Sit 4  Minimal assistance   Additional items Assist x 1; Increased time required;Verbal cues; Bedrails   Transfers   Sit to Stand 3  Moderate assistance   Additional items Assist x 1;Verbal cues   Stand to Sit 3  Moderate assistance   Additional items Assist x 1; Increased time required   Additional Comments Patient sit to stand x2 with moderate assist and RW  Functional Mobility   Functional Mobility 3  Moderate assistance   Additional Comments Patient ambulated short household distance with increased time, min assist and RW    ROM- Right Upper Extremities   R Shoulder AROM; Flexion; Horizontal ABduction   R Elbow AROM;Elbow flexion;Elbow extension   R Hand AROM; Thumb; Index finger;Ring finger; Long finger;Little finger   R Position Seated  (In recliner)   R Weight/Reps/Sets 10 reps x 2 set   ROM - Left Upper Extremities    L Shoulder AROM; Flexion; Horizontal ABduction   L Elbow AROM;Elbow flexion;Elbow extension   L Hand AROM; Index finger;Ring finger; Long finger; Thumb;Little finger   L Position Seated  (recliner)   L Weight/Reps/Sets 10 reps x 2 set   Cognition   Overall Cognitive Status Impaired   Arousal/Participation Responsive; Cooperative; Alert   Attention Attends with cues to redirect Orientation Level Oriented X4   Following Commands Follows one step commands with increased time or repetition   Comments Patient is [place], patient responded to questions when written down on a piece of paper  Activity Tolerance   Activity Tolerance Patient limited by fatigue;Patient limited by pain   Medical Staff Made Aware Yes, Francy   Assessment   Assessment Patient seen by OT this AM  Patient was alert and cooperative, tolerating the session better then previously  Patient is [place] and benefited from answering questions when they were written down  Patient requires mod assist x1 for all functional transfer and functional mobilitly at this time  Patient is generally weak and deconditioned  Patient is limited by pain in LLE  At the end of the session, pt was seated in the recliner with chair alarm on and all needs met  The patient's raw score on the AM-PAC Daily Activity inpatient short form is 14, standardized score is 33 39, less than 39 4  Patients at this level are likely to benefit from DC to post-acute rehabilitation services  Please refer to the recommendation of the Occupational Therapist for safe DC planning  Plan   Treatment Interventions ADL retraining;Functional transfer training;UE strengthening/ROM; Endurance training;Patient/family training;Neuromuscular reeducation; Energy conservation; Activityengagement   Goal Expiration Date 06/19/22   OT Frequency 3-5x/wk   Recommendation   OT Discharge Recommendation Post acute rehabilitation services   AM-[facility] Daily Activity Inpatient   Lower Body Dressing 2   Bathing 2   Toileting 2   Upper Body Dressing 2   Grooming 3   Eating 3   Daily Activity Raw Score 14   Daily Activity Standardized Score (Calc for Raw Score >=11) 33 39   AM-PAC Applied Cognition Inpatient   Following a Speech/Presentation 2   Understanding Ordinary Conversation 3   Taking Medications 2   Remembering Where Things Are Placed or Put Away 3   Remembering List of 4-5 Errands 3   Taking Care of Complicated Tasks 1   Applied Cognition Raw Score 14   Applied Cognition Standardized Score 66 31   Licensure   [place] License Number  Josefine Micky

## 2022-06-05 NOTE — PROGRESS NOTES
[address] Internal Medicine Progress Note  Patient: Viviana Polanco 64 y o  male   MRN: [id number]  PCP: Adam Mcfadden MD  Unit/Bed#: [address] [place] [address] Encounter: [id number]  Date Of Visit: 06/05/22    Problem List:    Principal Problem:    Severe sepsis ([facility] )  Active Problems:    Bacteremia    Type 2 diabetes mellitus with chronic kidney disease on chronic dialysis, with long-term current use of insulin ([facility])    Hyperkalemia    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HCC)    Rhabdomyolysis    Left leg pain    Benign essential hypertension    Anemia due to chronic kidney disease, on chronic dialysis ([facility] )    Benign prostatic hyperplasia with lower urinary tract symptoms    Hyperlipidemia      Assessment & Plan:    Bacteremia  Assessment & Plan  Blood culture 1/2 noted to be positive for Staphylococcus, coagulase-negative   Possibly contaminant but patient risk of true bacteremia  Seen by ID, input appreciated  · Continue vancomycin  · Monitor level  · Follow up repeat cultures-negative so far    * Severe sepsis [facility])  Assessment & Plan  Versus SIRS  As evidenced by leukocytosis, tachypnea, lactic acidosis -patient was started on broad-spectrum antibiotics with vancomycin plus cefepime  No clear evidence of infection at present  Blood culture 1/2 positive for coagulase-negative staph possibly contaminant  Currently afebrile, hemodynamically stable    Leukocytosis improved  · Continue vancomycin   · Follow-up repeat blood cultures-negative so far  · Procalcitonin  · ID evaluation appreciated  · Follow-up labs    Toxic metabolic encephalopathy-resolved as of 6/5/2022  Assessment & Plan  Likely toxic metabolic  Now improving    Left leg pain  Assessment & Plan  Likely secondary to rhabdomyolysis  Neurovascular exam intact   Venous Doppler without any evidence of DVT  Reports improvement  · PT/OT    Rhabdomyolysis  Assessment & Plan  CK noted to be elevated   Improving    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HCC)  Assessment & Plan  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)  Assessment & Plan  Lab Results   Component Value Date    HGBA1C 5 1 05/08/2022       Recent Labs     06/05/22  0725 06/05/22  1124 06/05/22  1602 06/05/22 2055   POCGLU 116 163* 109 145*       Blood Sugar Average: Last 72 hrs:  (P) 167 375     Continue insulin 70/30 25 units b i d  A c  Monitor with sliding scale  Monitor blood glucose trend    Suspected UTI-resolved as of 6/4/2022  Assessment & Plan  Urine culture negative, no evidence of UTI    High anion gap metabolic acidosis-resolved as of 6/5/2022  Assessment & Plan  Likely secondary to ESRD and poor dialysis compliance  Patient also had lactic acidosis on presentation now improved  Monitor    Anemia due to chronic kidney disease, on chronic dialysis [facility])  Assessment & Plan  Remains low but stable   On Epogen    Benign essential hypertension  Assessment & Plan  Holding enalapril due to hyperkalemia  Monitor blood pressure at present    Hyperlipidemia  Assessment & Plan  Continue statin    Benign prostatic hyperplasia with lower urinary tract symptoms  Assessment & Plan  On Flomax          VTE Pharmacologic Prophylaxis: VTE Score: 6 Moderate Risk (Score 3-4) - Pharmacological DVT Prophylaxis Ordered: heparin  Patient Centered Rounds: I performed bedside rounds with nursing staff today  Discussions with Specialists or Other Care Team Provider:  Yes,    Education and Discussions with Family / Patient: Updated  (wife) via phone  Time Spent for Care: 45 minutes  More than 50% of total time spent on counseling and coordination of care as described above      Current Length of Stay: 4 day(s)  Current Patient Status: Inpatient   Certification Statement: The patient will continue to require additional inpatient hospital stay due to Sepsis  Discharge Plan: Anticipate discharge in 48-72 hrs to discharge location to be determined pending rehab evaluations  Code Status: Level 1 - Full Code    Subjective:   Sitting up in chair after working with OT  Reports pain on back of left thigh, understands that he requires ongoing PT/OT and agreeable for rehab  Denies chest pain shortness of breath or cough  Denies any GI symptoms      Objective:     Vitals:   Temp (24hrs), Av 5 °F (36 9 °C), Min:97 8 °F (36 6 °C), Max:99 2 °F (37 3 °C)    Temp:  [97 8 °F (36 6 °C)-99 2 °F (37 3 °C)] 99 °F (37 2 °C)  HR:  [75-91] 78  Resp:  [18-20] 18  BP: (120-164)/(59-73) 159/59  SpO2:  [95 %-98 %] 95 % on room air  Body mass index is 27 42 kg/m²  Input and Output Summary (last 24 hours): Intake/Output Summary (Last 24 hours) at 2022 0856  Last data filed at 2022 1715  Gross per 24 hour   Intake 885 ml   Output 2500 ml   Net -1615 ml       Physical Exam:   Physical Exam  Constitutional:       General: He is not in acute distress  Appearance: He is ill-appearing (Chronically)  Comments: Deconditioned   HENT:      Head: Normocephalic and atraumatic  Cardiovascular:      Rate and Rhythm: Normal rate  Comments: Right chest PermCath C/D/I  Pulmonary:      Effort: Pulmonary effort is normal  No respiratory distress  Breath sounds: No wheezing, rhonchi or rales  Comments: Diminished, clear  Chest:      Chest wall: No tenderness  Abdominal:      General: Bowel sounds are normal  There is no distension  Palpations: Abdomen is soft  Tenderness: There is no abdominal tenderness  There is no guarding or rebound  Musculoskeletal:      Right lower leg: Edema present  Left lower leg: Edema present        Comments: Mild bursal left eye tenderness  Compartments soft, dressing C/D/I no evidence of cellulitis  Distal neurovascular intact, nonpainful range of motion at ankle knee and hip   Skin:     General: Skin is warm and dry  Findings: Bruising and lesion present  No rash  Neurological:      Mental Status: He is alert  Mental status is at baseline  Cranial Nerves: No cranial nerve deficit  Additional Data:     Labs:  Results from last 7 days   Lab Units 06/05/22  0503 06/04/22  0457   WBC Thousand/uL 8 77 8 69   HEMOGLOBIN g/dL 7 8* 7 5*   HEMATOCRIT % 25 1* 24 4*   PLATELETS Thousands/uL 372 344   NEUTROS PCT %  --  82*   LYMPHS PCT %  --  9*   MONOS PCT %  --  6   EOS PCT %  --  1     Results from last 7 days   Lab Units 06/05/22  0503   SODIUM mmol/L 129*   POTASSIUM mmol/L 4 6   CHLORIDE mmol/L 91*   CO2 mmol/L 29   BUN mg/dL 48*   CREATININE mg/dL 4 63*   ANION GAP mmol/L 9   CALCIUM mg/dL 7 3*   ALBUMIN g/dL 1 5*   TOTAL BILIRUBIN mg/dL 0 28   ALK PHOS U/L 76   ALT U/L 64   AST U/L 99*   GLUCOSE RANDOM mg/dL 103     Results from last 7 days   Lab Units 06/01/22  1228   INR  1 47*     Results from last 7 days   Lab Units 06/05/22  0725 06/04/22  2104 06/04/22  1611 06/04/22  1133 06/04/22  0728 06/03/22  1955 06/03/22  1608 06/03/22  1151 06/03/22  0813 06/02/22  2147 06/02/22  1647 06/02/22  1130   POC GLUCOSE mg/dl 116 72 125 172* 167* 294* 158* 253* 164* 237* 160* 204*         Results from last 7 days   Lab Units 06/04/22  0457 06/02/22  0525 06/01/22  1450 06/01/22  1120 06/01/22  1046   LACTIC ACID mmol/L  --   --  1 5 2 7*  --    PROCALCITONIN ng/ml 6 62* 7 60*  --   --  4 26*       Lines/Drains:  Invasive Devices  Report    Peripheral Intravenous Line  Duration           Peripheral IV 06/02/22 Left; Lower Arm 2 days          Hemodialysis Catheter  Duration           HD Permanent Double Catheter 88 days                      Imaging: Reviewed radiology reports from this admission including: chest xray    Recent Cultures (last 7 days):   Results from last 7 days   Lab Units 06/03/22  1647 06/01/22  1101 06/01/22  1046   BLOOD CULTURE  No Growth at 24 hrs  No Growth at 24 hrs   --  No Growth at 72 hrs  Staphylococcus coagulase negative*   GRAM STAIN RESULT   --   --  Gram positive cocci in clusters*   URINE CULTURE   --  No Growth <1000 cfu/mL  --        Last 24 Hours Medication List:   Current Facility-Administered Medications   Medication Dose Route Frequency Provider Last Rate    acetaminophen  650 mg Oral Q6H PRN Noemi Hedrick MD      calcium acetate  667 mg Oral TID With Meals FERNANDO Leonard      calcium carbonate  500 mg Oral Daily PRN Augusto Infield, PA-C      epoetin ani  2,000 Units Intravenous After Dialysis FERNANDO Leonard      glycerin-hypromellose-  1 drop Both Eyes Q4H PRN Noemi Hedrick MD      glycerin-hypromellose-  1 drop Left Eye BID Jared Reeves PA-C      heparin (porcine)  5,000 Units Subcutaneous [facility] [address], [address]      insulin aspart protamine-insulin aspart  25 Units Subcutaneous BID AC Noemi Hedrick MD      insulin lispro  1-6 Units Subcutaneous TID AC Allyn Voss, [address]      insulin lispro  1-6 Units Subcutaneous HS FERNANDO Leonard      latanoprost  1 drop Both Eyes HS [address], [address]      melatonin  3 mg Oral HS PRN FERNANDO Perez      ondansetron  4 mg Intravenous Q6H PRN Augusto Infield, PA-C      pravastatin  80 mg Oral Daily With [employer], [address]      simethicone  80 mg Oral Q6H PRN Augusto Infield, PA-C      tamsulosin  0 4 mg Oral Daily With [employer], [address]      vancomycin  10 mg/kg Intravenous After Dialysis FERNANDO Leonard Stopped (06/04/22 1150)        Today, Patient Was Seen By: Noemi Hedrick MD    ** Please Note: "This note has been constructed using a voice recognition system  Therefore there may be syntax, spelling, and/or grammatical errors   Please call if you have any questions  "**

## 2022-06-05 NOTE — PLAN OF CARE
Problem: PAIN - ADULT  Goal: Verbalizes/displays adequate comfort level or baseline comfort level  Description: Interventions:  - Encourage patient to monitor pain and request assistance  - Assess pain using appropriate pain scale  - Administer analgesics based on type and severity of pain and evaluate response  - Implement non-pharmacological measures as appropriate and evaluate response  - Consider cultural and social influences on pain and pain management  - Notify physician/advanced practitioner if interventions unsuccessful or patient reports new pain  Outcome: Progressing     Problem: INFECTION - ADULT  Goal: Absence or prevention of progression during hospitalization  Description: INTERVENTIONS:  - Assess and monitor for signs and symptoms of infection  - Monitor lab/diagnostic results  - Monitor all insertion sites, i e  indwelling lines, tubes, and drains  - Monitor endotracheal if appropriate and nasal secretions for changes in amount and color  - [place] appropriate cooling/warming therapies per order  - Administer medications as ordered  - Instruct and encourage patient and family to use good hand hygiene technique  - Identify and instruct in appropriate isolation precautions for identified infection/condition  Outcome: Progressing     Problem: SAFETY ADULT  Goal: Patient will remain free of falls  Description: INTERVENTIONS:  - Educate patient/family on patient safety including physical limitations  - Instruct patient to call for assistance with activity   - Consult OT/PT to assist with strengthening/mobility   - Keep Call bell within reach  - Keep bed low and locked with side rails adjusted as appropriate  - Keep care items and personal belongings within reach  - Initiate and maintain comfort rounds  - Make Fall Risk Sign visible to staff  - Offer Toileting every 2 Hours, in advance of need  - Initiate/Maintain bed alarm  - Obtain necessary fall risk management equipment: alarm, antislip socks  - Apply yellow socks and bracelet for high fall risk patients  - Consider moving patient to room near nurses station  Outcome: Progressing     Problem: METABOLIC, FLUID AND ELECTROLYTES - ADULT  Goal: Electrolytes maintained within normal limits  Description: INTERVENTIONS:  - Monitor labs and assess patient for signs and symptoms of electrolyte imbalances  - Administer electrolyte replacement as ordered  - Monitor response to electrolyte replacements, including repeat lab results as appropriate  - Instruct patient on fluid and nutrition as appropriate  Outcome: Progressing

## 2022-06-05 NOTE — PROGRESS NOTES
Vancomycin IV Pharmacy-to-Dose Consultation    Vamshi Hsieh is a 64 y o  male who is currently receiving Vancomycin IV with management by the Pharmacy Consult service  Assessment/Plan:  The patient was reviewed  Renal function is stable and no signs or symptoms of nephrotoxicity and/or infusion reactions were documented in the chart  Based on todays assessment, continue current vancomycin (day # 4) dosing of 1250 MG POST HD (TTS), with a plan for trough to be drawn at 0600 on 6/7/22    We will continue to follow the patients culture results and clinical progress daily      Zackary Granda, Pharmacist

## 2022-06-06 ENCOUNTER — APPOINTMENT (INPATIENT)
Dept: DIALYSIS | Facility: HOSPITAL | Age: 77
DRG: 871 | End: 2022-06-06
Payer: MEDICARE

## 2022-06-06 ENCOUNTER — APPOINTMENT (INPATIENT)
Dept: RADIOLOGY | Facility: HOSPITAL | Age: 77
DRG: 871 | End: 2022-06-06
Payer: MEDICARE

## 2022-06-06 PROBLEM — E87.1 HYPONATREMIA: Status: ACTIVE | Noted: 2022-06-06

## 2022-06-06 PROBLEM — R78.81 BACTEREMIA: Status: RESOLVED | Noted: 2022-05-09 | Resolved: 2022-06-06

## 2022-06-06 LAB
ANION GAP SERPL CALCULATED.3IONS-SCNC: 10 MMOL/L (ref 4–13)
ANION GAP SERPL CALCULATED.3IONS-SCNC: 11 MMOL/L (ref 4–13)
BACTERIA BLD CULT: NORMAL
BUN SERPL-MCNC: 68 MG/DL (ref 5–25)
BUN SERPL-MCNC: 74 MG/DL (ref 5–25)
CALCIUM SERPL-MCNC: 7.3 MG/DL (ref 8.3–10.1)
CALCIUM SERPL-MCNC: 7.5 MG/DL (ref 8.3–10.1)
CHLORIDE SERPL-SCNC: 85 MMOL/L (ref 100–108)
CHLORIDE SERPL-SCNC: 86 MMOL/L (ref 100–108)
CK MB SERPL-MCNC: 2.2 NG/ML (ref 0–5)
CK MB SERPL-MCNC: <1 % (ref 0–2.5)
CK SERPL-CCNC: 934 U/L (ref 39–308)
CO2 SERPL-SCNC: 26 MMOL/L (ref 21–32)
CO2 SERPL-SCNC: 28 MMOL/L (ref 21–32)
CREAT SERPL-MCNC: 5.75 MG/DL (ref 0.6–1.3)
CREAT SERPL-MCNC: 6.26 MG/DL (ref 0.6–1.3)
GFR SERPL CREATININE-BSD FRML MDRD: 7 ML/MIN/1.73SQ M
GFR SERPL CREATININE-BSD FRML MDRD: 8 ML/MIN/1.73SQ M
GLUCOSE SERPL-MCNC: 102 MG/DL (ref 65–140)
GLUCOSE SERPL-MCNC: 140 MG/DL (ref 65–140)
GLUCOSE SERPL-MCNC: 145 MG/DL (ref 65–140)
GLUCOSE SERPL-MCNC: 147 MG/DL (ref 65–140)
GLUCOSE SERPL-MCNC: 231 MG/DL (ref 65–140)
GLUCOSE SERPL-MCNC: 75 MG/DL (ref 65–140)
GLUCOSE SERPL-MCNC: 98 MG/DL (ref 65–140)
OSMOLALITY UR/SERPL-RTO: 282 MMOL/KG (ref 282–298)
POTASSIUM SERPL-SCNC: 5.3 MMOL/L (ref 3.5–5.3)
POTASSIUM SERPL-SCNC: 5.3 MMOL/L (ref 3.5–5.3)
SODIUM SERPL-SCNC: 122 MMOL/L (ref 136–145)
SODIUM SERPL-SCNC: 124 MMOL/L (ref 136–145)
TSH SERPL DL<=0.05 MIU/L-ACNC: 3.24 UIU/ML (ref 0.45–4.5)

## 2022-06-06 PROCEDURE — G1004 CDSM NDSC: HCPCS

## 2022-06-06 PROCEDURE — 83930 ASSAY OF BLOOD OSMOLALITY: CPT | Performed by: INTERNAL MEDICINE

## 2022-06-06 PROCEDURE — 82553 CREATINE MB FRACTION: CPT | Performed by: INTERNAL MEDICINE

## 2022-06-06 PROCEDURE — 73700 CT LOWER EXTREMITY W/O DYE: CPT

## 2022-06-06 PROCEDURE — 84443 ASSAY THYROID STIM HORMONE: CPT | Performed by: INTERNAL MEDICINE

## 2022-06-06 PROCEDURE — 80048 BASIC METABOLIC PNL TOTAL CA: CPT | Performed by: INTERNAL MEDICINE

## 2022-06-06 PROCEDURE — 82550 ASSAY OF CK (CPK): CPT | Performed by: INTERNAL MEDICINE

## 2022-06-06 PROCEDURE — 99232 SBSQ HOSP IP/OBS MODERATE 35: CPT | Performed by: INTERNAL MEDICINE

## 2022-06-06 PROCEDURE — 82948 REAGENT STRIP/BLOOD GLUCOSE: CPT

## 2022-06-06 RX ORDER — INSULIN ASPART 100 [IU]/ML
20 INJECTION, SUSPENSION SUBCUTANEOUS
Status: DISCONTINUED | OUTPATIENT
Start: 2022-06-06 | End: 2022-06-07 | Stop reason: HOSPADM

## 2022-06-06 RX ADMIN — CALCIUM ACETATE 667 MG: 667 CAPSULE ORAL at 13:06

## 2022-06-06 RX ADMIN — INSULIN ASPART 25 UNITS: 100 INJECTION, SUSPENSION SUBCUTANEOUS at 08:43

## 2022-06-06 RX ADMIN — INSULIN ASPART 20 UNITS: 100 INJECTION, SUSPENSION SUBCUTANEOUS at 18:30

## 2022-06-06 RX ADMIN — LATANOPROST 1 DROP: 50 SOLUTION OPHTHALMIC at 21:26

## 2022-06-06 RX ADMIN — TAMSULOSIN HYDROCHLORIDE 0.4 MG: 0.4 CAPSULE ORAL at 18:29

## 2022-06-06 RX ADMIN — CALCIUM ACETATE 667 MG: 667 CAPSULE ORAL at 08:44

## 2022-06-06 RX ADMIN — GLYCERIN 1 DROP: .002; .002; .01 SOLUTION/ DROPS OPHTHALMIC at 18:30

## 2022-06-06 RX ADMIN — HEPARIN SODIUM 5000 UNITS: 5000 INJECTION INTRAVENOUS; SUBCUTANEOUS at 06:28

## 2022-06-06 RX ADMIN — INSULIN LISPRO 3 UNITS: 100 INJECTION, SOLUTION INTRAVENOUS; SUBCUTANEOUS at 21:26

## 2022-06-06 RX ADMIN — HEPARIN SODIUM 5000 UNITS: 5000 INJECTION INTRAVENOUS; SUBCUTANEOUS at 13:06

## 2022-06-06 RX ADMIN — HEPARIN SODIUM 5000 UNITS: 5000 INJECTION INTRAVENOUS; SUBCUTANEOUS at 21:26

## 2022-06-06 RX ADMIN — PRAVASTATIN SODIUM 80 MG: 80 TABLET ORAL at 18:30

## 2022-06-06 RX ADMIN — CALCIUM ACETATE 667 MG: 667 CAPSULE ORAL at 18:29

## 2022-06-06 RX ADMIN — GLYCERIN 1 DROP: .002; .002; .01 SOLUTION/ DROPS OPHTHALMIC at 08:42

## 2022-06-06 NOTE — CONSULTS
The patient's vancomycin therapy has been discontinued  Pharmacy will sign off now, thank you for this consult  Bhupinder BassD

## 2022-06-06 NOTE — PHYSICAL THERAPY NOTE
06/06/22 1016   Note Type   Note Type Cancelled Session   Cancel Reasons Other  (Pt refusing due to significant LLE pain) RN made aware of patient's pain complaints and is going to check if pt can have any kind of pain medication   Licensure   [place] License Number  Berenice Conrad PT [id number]

## 2022-06-06 NOTE — CASE MANAGEMENT
Case Management Discharge Planning Note    Patient name Viviana Polanco  Location [address]2-* MRN [id number]  : 1945 Date 2022       Current Admission Date: 2022  Current Admission Diagnosis:Severe sepsis [facility])   Patient Active Problem List    Diagnosis Date Noted    Left leg pain 2022    Rhabdomyolysis 2022    Bacteremia 2022    Severe sepsis ([facility] ) 2022    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 ) 2022    Pulmonary hypertension ([facility] ) 2022    Dialysis patient ([facility] ) 2022    Anemia due to chronic kidney disease, on chronic dialysis ([facility] )     Hemoptysis 2022    LORRAINE (acute kidney injury) ([facility] )     Other proteinuria     Symptomatic anemia 2022    Chest pain 2022    Acute kidney injury ([facility] ) 2022    Hyperkalemia 2022    Melena 2022    Elevated PSA 2021    Chronic pain of right knee 2021    Primary osteoarthritis of right knee 2021    Bladder stones 12/15/2016    Type 2 diabetes mellitus with chronic kidney disease on chronic dialysis, with long-term current use of insulin ([facility] ) 2016    Benign colon polyp 2013    Benign prostatic hyperplasia with lower urinary tract symptoms 2013    Benign essential hypertension 2013    Hyperlipidemia 2012    Vitamin D deficiency 2012      LOS (days): 5  Geometric Mean LOS (GMLOS) (days): 4 80  Days to GMLOS:-0 3     OBJECTIVE:  Risk of Unplanned Readmission Score: 34 64         Current admission status: Inpatient   Preferred Pharmacy:   [facility] [id number]  Phone: [phone] Fax: [phone]    [facility]  ([address] [place] [address],   [address], [address]  [address] [address]  [facility] [address]  Phone: [phone] Fax: [phone]    Primary Care Provider: Adam Mcfadden, MD    Primary Insurance: MEDICARE  Secondary Insurance: BLUE CROSS    DISCHARGE DETAILS:    Discharge planning discussed with[de-identified] Patient's wife Donovan Blanton  Freedom of Choice: Yes  Comments - Freedom of Choice: Choice is for [facility] @ D/C  CM contacted family/caregiver?: Yes  Were Treatment Team discharge recommendations reviewed with patient/caregiver?: Yes  Did patient/caregiver verbalize understanding of patient care needs?: N/A- going to facility  Were patient/caregiver advised of the risks associated with not following Treatment Team discharge recommendations?: Yes    Contacts  Patient Contacts: You Ribeiro  Relationship to Patient[de-identified] Family  Contact Method: Phone  Phone Number: [phone]  Reason/Outcome: Emergency Contact, Discharge Planning, Continuity of Care    Other Referral/Resources/Interventions Provided:  Interventions: Short Term Rehab  Referral Comments: FLORIN s/w [facility] @ [facility] to confirm bed offer - aware of tentative D/C tomorrow      Discharge Destination Plan[de-identified] Short Term Rehab (COMPLETE CARE @ [address] [place] [address] Ne ([facility]))

## 2022-06-06 NOTE — PROGRESS NOTES
Progress Note - Infectious Disease   Radha Lau 64 y o  male MRN: [id number]  Unit/Bed#: [address] [place] [address] Encounter: [id number]      Impression/Plan:  1  SIRS, POA   With tachypnea, leukocytosis and lactic acidosis in patient presenting with altered mental status and weakness  Admission blood cultures growing 1 of 2 sets coag-negative staph likely representative skin contaminant    On admission CT, former right kidney suspected hematoma is slowly decreasing in size  Moderate pleural effusions not unexpected in setting of missed HD session on 05/31/2022  -discontinue vancomycin and monitor off additional antibiotics here after  -follow up final repeat blood cultures  -monitor temperature and hemodynamics  -serial exam     2  Coag-negative staph bacteremia  Admission blood cultures growing 1 of 2 sets coag-negative staph likely representative skin contaminant  06/03/2022 blood cultures x2 negative at 48 hours thus far   -discontinue vancomycin for now  -monitor off additional antibiotics here after  -follow-up final repeat blood cultures     3  LORRAINE on CKD III   On HD since recent hospitalization in 03/22   -renal dose adjust antibiotic as needed  -volume management   -continues HD management per Nephrology  -monitor BMP     4  ANCA vasculitis status post kidney biopsy   Treated with cytoxan 3/23/22 and 4/6/22  -continues management per Nephrology     5  Type 2 diabetes mellitus  5/8/22 Hemoglobin A1c 5 1  -blood glucose management per primary care team     6  Amoxicillin listed allergy  Tolerates Cephlosporins  -monitor for antibiotic tolerance     Antibiotics:  Vancomycin D5    Subjective:  Patient has no fever, chills, sweats; no nausea, vomiting, diarrhea; no cough, shortness of breath; + left leg pain/weakness with weight bearing  No new symptoms      Objective:  Vitals:  Temp:  [97 9 °F (36 6 °C)-98 1 °F (36 7 °C)] 97 9 °F (36 6 °C)  HR:  [70-78] 72  Resp:  [17-18] 17  BP: ()/(55-79) 156/55  SpO2: [93 %-97 %] 93 %  Temp (24hrs), Av °F (36 7 °C), Min:97 9 °F (36 6 °C), Max:98 1 °F (36 7 °C)  Current: Temperature: 97 9 °F (36 6 °C)    Physical Exam:   General Appearance:  Alert, interactive, nontoxic, no acute distress, extremely hard of hearing  Throat: Oropharynx moist without lesions  Lungs:   Clear to auscultation bilaterally; no wheezes, rhonchi or rales; respirations unlabored   Heart:  RRR; no murmur   Abdomen:   Soft, non-tender, non-distended, positive bowel sounds  Extremities: No clubbing, cyanosis, + ankle edema L>R, + LE venodynes, no erythema/warmth/induration  : No ruvalcaba, no SPT   Skin: No new rashes or lesions  IV site nontender  Right right chest IJ PermCath site nontender, noninflamed       Labs, Imaging, & Other studies:   All pertinent labs and imaging studies were personally reviewed  Results from last 7 days   Lab Units 22  0503 22  0457 22  0502   WBC Thousand/uL 8 77 8 69 8 15   HEMOGLOBIN g/dL 7 8* 7 5* 7 9*   PLATELETS Thousands/uL 372 344 335     Results from last 7 days   Lab Units 22  0459 22  0503 22  0457 22  0502 22  0525 22  1948 22  1046   SODIUM mmol/L 124* 129* 129*   < > 133*   < > 127*   POTASSIUM mmol/L 5 3 4 6 5 7*   < > 5 3   < > 7 2*   CHLORIDE mmol/L 86* 91* 92*   < > 97*   < > 87*   CO2 mmol/L 28 29 26   < > 24   < > 20*   BUN mg/dL 68* 48* 62*   < > 56*   < > 81*   CREATININE mg/dL 5 75* 4 63* 6 04*   < > 6 97*   < > 9 65*   EGFR ml/min/1 73sq m 8 11 8   < > 6   < > 4   CALCIUM mg/dL 7 3* 7 3* 7 3*   < > 8 0*   < > 8 6   AST U/L  --  99*  --   --  171*  --  189*   ALT U/L  --  64  --   --  69  --  56   ALK PHOS U/L  --  76  --   --  100  --  139*    < > = values in this interval not displayed  Results from last 7 days   Lab Units 22  1647 22  1420 22  1101 22  1046   BLOOD CULTURE  No Growth at 48 hrs  No Growth at 48 hrs   --   --  No Growth After 4 Days  Staphylococcus coagulase negative*   GRAM STAIN RESULT   --   --   --  Gram positive cocci in clusters*   URINE CULTURE   --   --  No Growth <1000 cfu/mL  --    MRSA CULTURE ONLY   --  No Methicillin Resistant Staphlyococcus aureus (MRSA) isolated  --   --      Results from last 7 days   Lab Units 06/04/22  0457 06/02/22  0525 06/01/22  1046   PROCALCITONIN ng/ml 6 62* 7 60* 4 26*

## 2022-06-06 NOTE — CASE MANAGEMENT
Case Management Discharge Planning Note    Patient name Briana Osuna  Location [address] Mario Alberto Rhodes2-* MRN [id number]  : 1945 Date 2022       Current Admission Date: 2022  Current Admission Diagnosis:Severe sepsis [facility])   Patient Active Problem List    Diagnosis Date Noted    Left leg pain 2022    Rhabdomyolysis 2022    Bacteremia 2022    Severe sepsis ([facility] ) 2022    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[facility] ) 2022    Pulmonary hypertension ([facility] ) 2022    Dialysis patient ([facility] ) 2022    Anemia due to chronic kidney disease, on chronic dialysis ([facility] )     Hemoptysis 2022    LORRAINE (acute kidney injury) ([facility] )     Other proteinuria     Symptomatic anemia 2022    Chest pain 2022    Acute kidney injury ([facility] ) 2022    Hyperkalemia 2022    Melena 2022    Elevated PSA 2021    Chronic pain of right knee 2021    Primary osteoarthritis of right knee 2021    Bladder stones 12/15/2016    Type 2 diabetes mellitus with chronic kidney disease on chronic dialysis, with long-term current use of insulin ([facility] ) 2016    Benign colon polyp 2013    Benign prostatic hyperplasia with lower urinary tract symptoms 2013    Benign essential hypertension 2013    Hyperlipidemia 2012    Vitamin D deficiency 2012      LOS (days): 5  Geometric Mean LOS (GMLOS) (days): 4 80  Days to GMLOS:-0 2     OBJECTIVE:  Risk of Unplanned Readmission Score: 34 64         Current admission status: Inpatient   Preferred Pharmacy:   [facility] [address] [facility] [address]  Phone: [phone] Fax: [phone]    [facility]  ([address] [place] [address],   [address], [address]  [address] [address]  [facility] [address]  Phone: [phone] Fax: [phone]    Primary Care Provider: Silvestre Chaves, MD    Primary Insurance: MEDICARE  Secondary Insurance: BLUE CROSS    DISCHARGE DETAILS:    Discharge planning discussed with[de-identified] Patient and wife Ayleen Gray  Freedom of Choice: Yes  Comments - Freedom of Choice: Choice is to D/C to STR, agreeable to blanket referrals  CM contacted family/caregiver?: Yes  Were Treatment Team discharge recommendations reviewed with patient/caregiver?: Yes  Did patient/caregiver verbalize understanding of patient care needs?: N/A- going to facility  Were patient/caregiver advised of the risks associated with not following Treatment Team discharge recommendations?: Yes    Contacts  Patient Contacts: Suzette Shin  Relationship to Patient[de-identified] Family  Contact Method: Phone  Phone Number: [phone]  Reason/Outcome: Emergency Contact, Discharge Planning    Other Referral/Resources/Interventions Provided:  Interventions: Short Term Rehab  Referral Comments: STR referrals sent  Would you like to participate in our [address] service program?  : No - Declined    Treatment Team Recommendation: Short Term Rehab  Discharge Destination Plan[de-identified] Short Term Rehab (REFERRALS PENDING)  Additional Comments: CM s/w patient's wife to determine OP HD chair time  Wife confirmed it is [address] [facility] @ [address]  Wife states that patient uses [facility] for transport  Wife stating she will have phone available to discuss STR placement once bed offers are available  Wife confirmed that she s/w [facility] this morning and is aware that patient is requiring too much assistance to come home @ D/C

## 2022-06-06 NOTE — ASSESSMENT & PLAN NOTE
Sodium level dropped to 122  Likely hypoosmolar hyponatremia  Patient declines excessive fluid intake  No significant evidence of volume overload on exam  · TSH level and a m   Cortisol level were normal  · Continue 1500 milliliters Fluid restriction  · Calculated osmolality was 279  · Sodium level improved to 132  · Patient is scheduled for ultra fractionation with hemodialysis

## 2022-06-06 NOTE — PROGRESS NOTES
[address] [facility] NOTE   Debby Garcia 64 y o  male MRN: [id number]  Unit/Bed#: [address] Encounter: [id number]  Reason for Consult: ESRD on HD    ASSESSMENT and PLAN:  1  ESRD on HD [facility]) due to ANCA vasculitis:  · HD dependent since March 10, 2022  · Had HD on Saturday, 6/4/22  · However, Na is down to 124 today with K of 5 3  · Will plan for HD x 2 hours today  2  Access: R IJ permcath  3  CNS bacteremia  · On Vancomycin  · Repeat BC on 6/3/22 is negative at 48 hours  4  Hypertension:  · BP is acceptable  · Meds: None  · MAR has enalapril but this is not listed in [facility] med list    · Monitor off meds for now  5  Hyponatremia:  · Na down to 124 today  · Check serum Osm, TSH and BMP later with HD  · Place on FR of 1500 cc/24 hours  6  Anemia:  · On Mircera as an outpatient  · Continue Epogen  7  Rhabdomyolysis:  · CK improving  8  Mineral and bone disease:  · Continue Phoslo  · Continue renal diet  9  P3R ANCA vasculitis:  · Renal biopsy on March 9: (1) Focal necrotizing and crescentic GN, pauci-immune, acute and chronic  (2) acute tubular injury  (3) moderately severe IFTA with focal TI inflammation  (4) mod arteriolosclerosis and severe arteriosclerosis  · Treated with Cytoxan and got 2 doses - 3/23/22 and 4/6/22  However, he did not tolerate this well and this was stopped in April 2022  DISPOSITION:  · HD x 2 hours today  · Check BMP, serum Osm, TSH today  · Place on FR 1500 cc/24 hours  SUBJECTIVE / 24H INTERVAL HISTORY:  Denies any CP or SOB  Leg is sore  Denies drinking a lot of fluid over past 24 hours       OBJECTIVE:  Current Weight: Weight - Scale: 81 8 kg (180 lb 5 4 oz)  Vitals:    06/05/22 0800 06/05/22 1552 06/05/22 1836 06/06/22 0726   BP:  94/62 167/79 156/55   BP Location:       Pulse:  70 78 72   Resp: 18 18 18 17   Temp: 98 7 °F (37 1 °C) 98 1 °F (36 7 °C) 98 °F (36 7 °C) 97 9 °F (36 6 °C) [facility] Oral      SpO2:  97% 95% 93%   Weight:       Height:           Intake/Output Summary (Last 24 hours) at 6/6/2022 0953  Last data filed at 6/5/2022 1701  Gross per 24 hour   Intake 650 ml   Output --   Net 650 ml     General: conscious, cooperative, no distress  Skin: dry  Eyes: pale conjunctivae  ENT: moist mucous membranes  Respiratory: equal chest expansion, decreased in bases  Cardiovascular: distinct heart sounds, normal rate, regular rhythm  Abdomen: soft, non tender, non distended, normal bowel sounds  Extremities: no edema  Genitourinary: no ruvalcaba catheter  Neuro: awake, alert       Medications:    Current Facility-Administered Medications:     acetaminophen (TYLENOL) tablet 650 mg, 650 mg, Oral, Q6H PRN, Yuri Briones MD, 650 mg at 06/05/22 1018    calcium acetate (PHOSLO) capsule 667 mg, 667 mg, Oral, TID With Meals, [employer], CRNP, 667 mg at 06/06/22 0844    calcium carbonate (TUMS) chewable tablet 500 mg, 500 mg, Oral, Daily PRN, Gila Rodriges PA-C, 500 mg at 06/05/22 2257    epoetin ani (EPOGEN,PROCRIT) injection 2,000 Units, 2,000 Units, Intravenous, After Dialysis, [employer], CRNP, 2,000 Units at 06/04/22 1015    glycerin-hypromellose- (ARTIFICIAL TEARS) ophthalmic solution 1 drop, 1 drop, Both Eyes, Q4H PRN, Yuri Briones MD, 1 drop at 06/05/22 1658    glycerin-hypromellose- (ARTIFICIAL TEARS) ophthalmic solution 1 drop, 1 drop, Left Eye, BID, Oliverio Ybarra PA-C, 1 drop at 06/06/22 6214    heparin (porcine) subcutaneous injection 5,000 Units, 5,000 Units, Subcutaneous, Q8H [address], 5,000 Units at 06/06/22 0628 **AND** [CANCELED] Platelet count, , , Once, Corin Quan PA-C    insulin aspart protamine-insulin aspart (NovoLOG 70/30) 100 units/mL subcutaneous injection 20 Units, 20 Units, Subcutaneous, BID AC, Yuri Briones MD    insulin lispro (HumaLOG) 100 units/mL subcutaneous injection 1-5 Units, 1-5 Units, Subcutaneous, 0200, Nabil Diallo MD    insulin lispro (HumaLOG) 100 units/mL subcutaneous injection 1-6 Units, 1-6 Units, Subcutaneous, TID AC, 1 Units at 06/05/22 1202 **AND** Fingerstick Glucose (POCT), , , TID AC, [employer], CRNP    insulin lispro (HumaLOG) 100 units/mL subcutaneous injection 1-6 Units, 1-6 Units, Subcutaneous, HS, [employer], FERNANDO, 4 Units at 06/03/22 2235    latanoprost (XALATAN) 0 005 % ophthalmic solution 1 drop, 1 drop, Both Eyes, HS, [employer], FERNANDO, 1 drop at 06/05/22 2145    melatonin tablet 3 mg, 3 mg, Oral, HS PRN, FERNANDO Perez, 3 mg at 06/05/22 2259    ondansetron (ZOFRAN) injection 4 mg, 4 mg, Intravenous, Q6H PRN, Aileen Casas PA-C    pravastatin (PRAVACHOL) tablet 80 mg, 80 mg, Oral, Daily With Dinner, [employer], FERNANDO, 80 mg at 06/05/22 1654    simethicone (MYLICON) chewable tablet 80 mg, 80 mg, Oral, Q6H PRN, Aileen Casas PA-C, 80 mg at 06/05/22 2257    tamsulosin (FLOMAX) capsule 0 4 mg, 0 4 mg, Oral, Daily With Dinner, [employer], FERNANDO, 0 4 mg at 06/05/22 1654    vancomycin (VANCOCIN) IVPB (premix in dextrose) 750 mg 150 mL, 10 mg/kg, Intravenous, After Dialysis, [employer], SEYMOURNP, Stopped at 06/04/22 1150    Laboratory Results:  Results from last 7 days   Lab Units 06/06/22  0459 06/05/22  0503 06/04/22  0457 06/03/22  0502 06/02/22  0525 06/01/22  1948 06/01/22  1046   WBC Thousand/uL  --  8 77 8 69 8 15 9 49  --  15 36*   HEMOGLOBIN g/dL  --  7 8* 7 5* 7 9* 8 1*  --  9 7*   HEMATOCRIT %  --  25 1* 24 4* 25 6* 26 6*  --  30 8*   PLATELETS Thousands/uL  --  372 344 335 389  --  518*   POTASSIUM mmol/L 5 3 4 6 5 7* 4 6 5 3 4 9 7 2*   CHLORIDE mmol/L 86* 91* 92* 94* 97* 96* 87*   CO2 mmol/L 28 29 26 28 24 23 20*   BUN mg/dL 68* 48* 62* 39* 56* 49* 81*   CREATININE mg/dL 5 75* 4 63* 6 04* 4 74* 6 97* 6 25* 9 65*   CALCIUM mg/dL 7 3* 7 3* 7 3* 7 7* 8 0* 8 1* 8 6   MAGNESIUM mg/dL  --   --   --  2 5 2 6  --  2 8*   PHOSPHORUS mg/dL  --   -- --  4 4* 7 1*  --  7 5*

## 2022-06-06 NOTE — PLAN OF CARE
Post-Dialysis RN Treatment Note    Blood Pressure:  Pre 174/69 mm/Hg  Post 161/76 mmHg   EDW  50 kg    Weight:  Pre unable to weigh due to bed not being zeroed out  kg   Post Not Applicable kg   Mode of weight measurement: N/A   Volume Removed  1000 ml    Treatment duration 120 minutes    NS given  No    Treatment shortened? No   Medications given during Rx None Reported   Estimated Kt/V  Not Applicable   Access type: Permacath/TDC   Access Issues: No    Report called to primary nurse   Yes    Pt on HD treatment for 2 hours, extra treatment , with a UF goal of 1 L as tolerated   Pt on a 2k 2 5 mary bath for a potassium of 5 3 on 6/6/22  Problem: METABOLIC, FLUID AND ELECTROLYTES - ADULT  Goal: Electrolytes maintained within normal limits  Description: INTERVENTIONS:  - Monitor labs and assess patient for signs and symptoms of electrolyte imbalances  - Administer electrolyte replacement as ordered  - Monitor response to electrolyte replacements, including repeat lab results as appropriate  - Instruct patient on fluid and nutrition as appropriate  Outcome: Progressing  Goal: Fluid balance maintained  Description: INTERVENTIONS:  - Monitor labs   - Monitor I/O and WT  - Instruct patient on fluid and nutrition as appropriate  - Assess for signs & symptoms of volume excess or deficit  Outcome: Progressing

## 2022-06-06 NOTE — PLAN OF CARE
Problem: PAIN - ADULT  Goal: Verbalizes/displays adequate comfort level or baseline comfort level  Description: Interventions:  - Encourage patient to monitor pain and request assistance  - Assess pain using appropriate pain scale  - Administer analgesics based on type and severity of pain and evaluate response  - Implement non-pharmacological measures as appropriate and evaluate response  - Consider cultural and social influences on pain and pain management  - Notify physician/advanced practitioner if interventions unsuccessful or patient reports new pain  Outcome: Progressing     Problem: INFECTION - ADULT  Goal: Absence or prevention of progression during hospitalization  Description: INTERVENTIONS:  - Assess and monitor for signs and symptoms of infection  - Monitor lab/diagnostic results  - Monitor all insertion sites, i e  indwelling lines, tubes, and drains  - Monitor endotracheal if appropriate and nasal secretions for changes in amount and color  - [place] appropriate cooling/warming therapies per order  - Administer medications as ordered  - Instruct and encourage patient and family to use good hand hygiene technique  - Identify and instruct in appropriate isolation precautions for identified infection/condition  Outcome: Progressing     Problem: SAFETY ADULT  Goal: Patient will remain free of falls  Description: INTERVENTIONS:  - Educate patient/family on patient safety including physical limitations  - Instruct patient to call for assistance with activity   - Consult OT/PT to assist with strengthening/mobility   - Keep Call bell within reach  - Keep bed low and locked with side rails adjusted as appropriate  - Keep care items and personal belongings within reach  - Initiate and maintain comfort rounds  - Make Fall Risk Sign visible to staff  - Offer Toileting every 2 Hours, in advance of need  - Initiate/Maintain bed alarm  - Obtain necessary fall risk management equipment: alarm, antislip socks  - Apply yellow socks and bracelet for high fall risk patients  - Consider moving patient to room near nurses station  Outcome: Progressing  Goal: Maintain or return to baseline ADL function  Description: INTERVENTIONS:  -  Assess patient's ability to carry out ADLs; assess patient's baseline for ADL function and identify physical deficits which impact ability to perform ADLs (bathing, care of mouth/teeth, toileting, grooming, dressing, etc )  - Assess/evaluate cause of self-care deficits   - Assess range of motion  - Assess patient's mobility; develop plan if impaired  - Assess patient's need for assistive devices and provide as appropriate  - Encourage maximum independence but intervene and supervise when necessary  - Involve family in performance of ADLs  - Assess for home care needs following discharge   - Consider OT consult to assist with ADL evaluation and planning for discharge  - Provide patient education as appropriate  Outcome: Progressing  Goal: Maintains/Returns to pre admission functional level  Description: INTERVENTIONS:  - Perform BMAT or MOVE assessment daily    - Set and communicate daily mobility goal to care team and patient/family/caregiver  - Collaborate with rehabilitation services on mobility goals if consulted  - Perform Range of Motion 3 times a day  - Reposition patient every 3 hours    - Dangle patient 3 times a day  - Stand patient 3 times a day  - Ambulate patient 3 times a day  - Out of bed to chair 3 times a day   - Out of bed for meals 3 times a day  - Out of bed for toileting  - Record patient progress and toleration of activity level   Outcome: Progressing     Problem: DISCHARGE PLANNING  Goal: Discharge to home or other facility with appropriate resources  Description: INTERVENTIONS:  - Identify barriers to discharge w/patient and caregiver  - Arrange for needed discharge resources and transportation as appropriate  - Identify discharge learning needs (meds, wound care, etc )  - Arrange for interpretive services to assist at discharge as needed  - Refer to Case Management Department for coordinating discharge planning if the patient needs post-hospital services based on physician/advanced practitioner order or complex needs related to functional status, cognitive ability, or social support system  Outcome: Progressing     Problem: Knowledge Deficit  Goal: Patient/family/caregiver demonstrates understanding of disease process, treatment plan, medications, and discharge instructions  Description: Complete learning assessment and assess knowledge base    Interventions:  - Provide teaching at level of understanding  - Provide teaching via preferred learning methods  Outcome: Progressing     Problem: CARDIOVASCULAR - ADULT  Goal: Maintains optimal cardiac output and hemodynamic stability  Description: INTERVENTIONS:  - Monitor I/O, vital signs and rhythm  - Monitor for S/S and trends of decreased cardiac output  - Administer and titrate ordered vasoactive medications to optimize hemodynamic stability  - Assess quality of pulses, skin color and temperature  - Assess for signs of decreased coronary artery perfusion  - Instruct patient to report change in severity of symptoms  Outcome: Progressing  Goal: Absence of cardiac dysrhythmias or at baseline rhythm  Description: INTERVENTIONS:  - Continuous cardiac monitoring, vital signs, obtain 12 lead EKG if ordered  - Administer antiarrhythmic and heart rate control medications as ordered  - Monitor electrolytes and administer replacement therapy as ordered  Outcome: Progressing     Problem: METABOLIC, FLUID AND ELECTROLYTES - ADULT  Goal: Electrolytes maintained within normal limits  Description: INTERVENTIONS:  - Monitor labs and assess patient for signs and symptoms of electrolyte imbalances  - Administer electrolyte replacement as ordered  - Monitor response to electrolyte replacements, including repeat lab results as appropriate  - Instruct patient on fluid and nutrition as appropriate  Outcome: Progressing  Goal: Fluid balance maintained  Description: INTERVENTIONS:  - Monitor labs   - Monitor I/O and WT  - Instruct patient on fluid and nutrition as appropriate  - Assess for signs & symptoms of volume excess or deficit  Outcome: Progressing  Goal: Glucose maintained within target range  Description: INTERVENTIONS:  - Monitor Blood Glucose as ordered  - Assess for signs and symptoms of hyperglycemia and hypoglycemia  - Administer ordered medications to maintain glucose within target range  - Assess nutritional intake and initiate nutrition service referral as needed  Outcome: Progressing     Problem: MOBILITY - ADULT  Goal: Maintain or return to baseline ADL function  Description: INTERVENTIONS:  -  Assess patient's ability to carry out ADLs; assess patient's baseline for ADL function and identify physical deficits which impact ability to perform ADLs (bathing, care of mouth/teeth, toileting, grooming, dressing, etc )  - Assess/evaluate cause of self-care deficits   - Assess range of motion  - Assess patient's mobility; develop plan if impaired  - Assess patient's need for assistive devices and provide as appropriate  - Encourage maximum independence but intervene and supervise when necessary  - Involve family in performance of ADLs  - Assess for home care needs following discharge   - Consider OT consult to assist with ADL evaluation and planning for discharge  - Provide patient education as appropriate  Outcome: Progressing  Goal: Maintains/Returns to pre admission functional level  Description: INTERVENTIONS:  - Perform BMAT or MOVE assessment daily    - Set and communicate daily mobility goal to care team and patient/family/caregiver  - Collaborate with rehabilitation services on mobility goals if consulted  - Perform Range of Motion 3 times a day  - Reposition patient every 3 hours    - Dangle patient 3 times a day  - Stand patient 3 times a day  - Ambulate patient 3 times a day  - Out of bed to chair 3 times a day   - Out of bed for meals 3 times a day  - Out of bed for toileting  - Record patient progress and toleration of activity level   Outcome: Progressing     Problem: Prexisting or High Potential for Compromised Skin Integrity  Goal: Skin integrity is maintained or improved  Description: INTERVENTIONS:  - Identify patients at risk for skin breakdown  - Assess and monitor skin integrity  - Assess and monitor nutrition and hydration status  - Monitor labs   - Assess for incontinence   - Turn and reposition patient  - Assist with mobility/ambulation  - Relieve pressure over bony prominences  - Avoid friction and shearing  - Provide appropriate hygiene as needed including keeping skin clean and dry  - Evaluate need for skin moisturizer/barrier cream  - Collaborate with interdisciplinary team   - Patient/family teaching  - Consider wound care consult   Outcome: Progressing     Problem: Nutrition/Hydration-ADULT  Goal: Nutrient/Hydration intake appropriate for improving, restoring or maintaining nutritional needs  Description: Monitor and assess patient's nutrition/hydration status for malnutrition  Collaborate with interdisciplinary team and initiate plan and interventions as ordered  Monitor patient's weight and dietary intake as ordered or per policy  Utilize nutrition screening tool and intervene as necessary  Determine patient's food preferences and provide high-protein, high-caloric foods as appropriate       INTERVENTIONS:  - Monitor oral intake, urinary output, labs, and treatment plans  - Assess nutrition and hydration status and recommend course of action  - Evaluate amount of meals eaten  - Assist patient with eating if necessary   - Allow adequate time for meals  - Recommend/ encourage appropriate diets, oral nutritional supplements, and vitamin/mineral supplements  - Order, calculate, and assess calorie counts as needed  - Recommend, monitor, and adjust tube feedings and TPN/PPN based on assessed needs  - Assess need for intravenous fluids  - Provide specific nutrition/hydration education as appropriate  - Include patient/family/caregiver in decisions related to nutrition  Outcome: Progressing     Problem: Potential for Falls  Goal: Patient will remain free of falls  Description: INTERVENTIONS:  - Educate patient/family on patient safety including physical limitations  - Instruct patient to call for assistance with activity   - Consult OT/PT to assist with strengthening/mobility   - Keep Call bell within reach  - Keep bed low and locked with side rails adjusted as appropriate  - Keep care items and personal belongings within reach  - Initiate and maintain comfort rounds  - Make Fall Risk Sign visible to staff  - Offer Toileting every 2 Hours, in advance of need  - Initiate/Maintain bed alarm  - Obtain necessary fall risk management equipment: alarm, antislip socks  - Apply yellow socks and bracelet for high fall risk patients  - Consider moving patient to room near nurses station  Outcome: Progressing

## 2022-06-06 NOTE — PROGRESS NOTES
[facility] Progress Note  Patient: Alhaji Chin 64 y o  male   MRN: [id number]  PCP: Daija Earl MD  Unit/Bed#: [address] [place] [address] Encounter: [id number]  Date Of Visit: 06/06/22    Problem List:    Principal Problem:    Severe sepsis ([facility] )  Active Problems:    Left leg pain    Type 2 diabetes mellitus with chronic kidney disease on chronic dialysis, with long-term current use of insulin ([facility])    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[facility])    Rhabdomyolysis    Hyponatremia    Benign essential hypertension    Hyperkalemia    Anemia due to chronic kidney disease, on chronic dialysis ([facility] )    Benign prostatic hyperplasia with lower urinary tract symptoms    Hyperlipidemia      Assessment & Plan:    Left leg pain  Assessment & Plan  Likely secondary to rhabdomyolysis  Neurovascular exam intact   Venous Doppler without any evidence of DVT  Reports increasing pain today  · Will check CT of the left thigh  · CPK improving  ·  PT/OT    * Severe sepsis (HCC)  Assessment & Plan  Versus SIRS  As evidenced by leukocytosis, tachypnea, lactic acidosis -patient was started on broad-spectrum antibiotics with vancomycin plus cefepime  No clear evidence of infection at present  Blood culture 1/2 positive for coagulase-negative staph possibly contaminant  Currently afebrile, hemodynamically stable    Leukocytosis improved  · No further antimicrobial treatment  · Follow-up repeat blood cultures-negative so far  · Follow-up CT lower extremity  · ID evaluation appreciated  · Follow-up labs    Toxic metabolic encephalopathy-resolved as of 6/5/2022  Assessment & Plan  Likely toxic metabolic  Now improving    Bacteremia-resolved as of 6/6/2022  Assessment & Plan  Blood culture 1/2 noted to be positive for Staphylococcus, coagulase-negative   Possibly contaminant but patient risk of true bacteremia  Seen by ID, input appreciated  · Patient was treated with vancomycin  · Repeat blood cultures are negative  · Vancomycin was discontinued  · Monitor clinically    Hyponatremia  Assessment & Plan  Sodium level declined to 124 today  Unclear etiology  Patient declines excessive fluid intake  No significant evidence of volume overload on exam  · Check TSH, cortisol, serum osmolality  · Fluid restriction  · Discussed with Nephrology plan for HD today    Rhabdomyolysis  Assessment & Plan  CK noted to be elevated   Improving    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HCC)  Assessment & Plan  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  Previously started on Cytoxan but subsequently stopped as patient was unable to tolerate    Type 2 diabetes mellitus with chronic kidney disease on chronic dialysis, with long-term current use of insulin [facility])  Assessment & Plan  Lab Results   Component Value Date    HGBA1C 5 1 05/08/2022       Recent Labs     06/06/22  0002 06/06/22  0704 06/06/22  1113 06/06/22  1550   POCGLU 75 102 140 145*       Blood Sugar Average: Last 72 hrs:  (P) 150   Trend noted, appetite fair  Decreased insulin 70/30 20 units b i d  A c    Monitor with sliding scale  Monitor blood glucose trend    Suspected UTI-resolved as of 6/4/2022  Assessment & Plan  Urine culture negative, no evidence of UTI    High anion gap metabolic acidosis-resolved as of 6/5/2022  Assessment & Plan  Likely secondary to ESRD and poor dialysis compliance  Patient also had lactic acidosis on presentation now improved  Monitor    Anemia due to chronic kidney disease, on chronic dialysis [facility])  Assessment & Plan  Remains low but stable   On Epogen    Hyperkalemia  Assessment & Plan  Secondary to missed dialysis, patient was also having hyperglycemia presentation  · Improved with HD  · Renal diet    Benign essential hypertension  Assessment & Plan  Holding enalapril due to hyperkalemia  Monitor blood pressure at present    Hyperlipidemia  Assessment & Plan  Continue statin    Benign prostatic hyperplasia with lower urinary tract symptoms  Assessment & Plan  On Flomax          VTE Pharmacologic Prophylaxis: VTE Score: 6 Moderate Risk (Score 3-4) - Pharmacological DVT Prophylaxis Ordered: heparin  Patient Centered Rounds: I performed bedside rounds with nursing staff today  Discussions with Specialists or Other Care Team Provider:  Yes, nephrology    Education and Discussions with Family / Patient: Attempted to update  (wife) via phone  Unable to contact  Time Spent for Care: 45 minutes  More than 50% of total time spent on counseling and coordination of care as described above  Current Length of Stay: 5 day(s)  Current Patient Status: Inpatient   Certification Statement: The patient will continue to require additional inpatient hospital stay due to Sepsis  Discharge Plan: Anticipate discharge in 24-48 hrs to rehab facility  Code Status: Level 1 - Full Code    Subjective:   Noted to be comfortably lying in bed  Reports ongoing pain in back of left thigh, did not want to participate in physical therapy    Denies chest pain shortness a breath or abdominal pain  Appetite fair    Objective:     Vitals:   Temp (24hrs), Av °F (36 7 °C), Min:97 9 °F (36 6 °C), Max:98 1 °F (36 7 °C)    Temp:  [97 9 °F (36 6 °C)-98 1 °F (36 7 °C)] 97 9 °F (36 6 °C)  HR:  [70-78] 72  Resp:  [17-18] 17  BP: ()/(55-79) 156/55  SpO2:  [93 %-97 %] 93 % on room air  Body mass index is 27 42 kg/m²  Input and Output Summary (last 24 hours): Intake/Output Summary (Last 24 hours) at 2022 1304  Last data filed at 2022 1701  Gross per 24 hour   Intake 400 ml   Output --   Net 400 ml       Physical Exam:   Physical Exam  Constitutional:       General: He is not in acute distress  Comments: Chronically ill, deconditioned   HENT:      Head: Normocephalic and atraumatic  Ears:      Comments: Decreased hearing  Cardiovascular:      Rate and Rhythm: Normal rate  Pulmonary:      Effort: Pulmonary effort is normal  No respiratory distress  Breath sounds: No wheezing, rhonchi or rales  Chest:      Chest wall: No tenderness  Abdominal:      General: Bowel sounds are normal  There is no distension  Palpations: Abdomen is soft  Tenderness: There is no abdominal tenderness  There is no guarding or rebound  Musculoskeletal:      Right lower leg: No edema  Left lower leg: No edema  Comments: Left posterior thigh dressing in place, C/D/I  Mild tenderness over posterior thigh compartment compartment is soft  Patient is able to elevated left lower extremity against gravity  Distal neurovascular intact  Normal passive range of motion at hip and knee but reports pain with stretching of posterior compartment musculature  No fluctuation crepitation or erythema noted   Skin:     General: Skin is warm and dry  Findings: No rash  Neurological:      Mental Status: He is alert and oriented to person, place, and time  Mental status is at baseline  Cranial Nerves: No cranial nerve deficit           Additional Data:     Labs:  Results from last 7 days   Lab Units 06/05/22  0503 06/04/22  0457   WBC Thousand/uL 8 77 8 69   HEMOGLOBIN g/dL 7 8* 7 5*   HEMATOCRIT % 25 1* 24 4*   PLATELETS Thousands/uL 372 344   NEUTROS PCT %  --  82*   LYMPHS PCT %  --  9*   MONOS PCT %  --  6   EOS PCT %  --  1     Results from last 7 days   Lab Units 06/06/22  0459 06/05/22  0503   SODIUM mmol/L 124* 129*   POTASSIUM mmol/L 5 3 4 6   CHLORIDE mmol/L 86* 91*   CO2 mmol/L 28 29   BUN mg/dL 68* 48*   CREATININE mg/dL 5 75* 4 63*   ANION GAP mmol/L 10 9   CALCIUM mg/dL 7 3* 7 3*   ALBUMIN g/dL  --  1 5*   TOTAL BILIRUBIN mg/dL  --  0 28   ALK PHOS U/L  --  76   ALT U/L  --  64   AST U/L  --  99*   GLUCOSE RANDOM mg/dL 98 103     Results from last 7 days   Lab Units 06/01/22  1228   INR  1 47*     Results from last 7 days   Lab Units 06/06/22  1113 06/06/22  0704 06/06/22  0002 06/05/22  2055 06/05/22  1602 06/05/22  1124 06/05/22  0725 06/04/22  2104 06/04/22  1611 06/04/22  1133 06/04/22  0728 06/03/22  1955   POC GLUCOSE mg/dl 140 102 75 145* 109 163* 116 72 125 172* 167* 294*         Results from last 7 days   Lab Units 06/04/22  0457 06/02/22  0525 06/01/22  1450 06/01/22  1120 06/01/22  1046   LACTIC ACID mmol/L  --   --  1 5 2 7*  --    PROCALCITONIN ng/ml 6 62* 7 60*  --   --  4 26*       Lines/Drains:  Invasive Devices  Report    Peripheral Intravenous Line  Duration           Peripheral IV 06/02/22 Left; Lower Arm 4 days          Hemodialysis Catheter  Duration           HD Permanent Double Catheter 89 days                      Imaging: Reviewed radiology reports from this admission including: chest xray    Recent Cultures (last 7 days):   Results from last 7 days   Lab Units 06/03/22  1647 06/01/22  1101 06/01/22  1046   BLOOD CULTURE  No Growth at 48 hrs  No Growth at 48 hrs  --  No Growth After 4 Days    Staphylococcus coagulase negative*   GRAM STAIN RESULT   --   --  Gram positive cocci in clusters*   URINE CULTURE   --  No Growth <1000 cfu/mL  --        Last 24 Hours Medication List:   Current Facility-Administered Medications   Medication Dose Route Frequency Provider Last Rate    acetaminophen  650 mg Oral Q6H PRN Coy Coelho MD      calcium acetate  667 mg Oral TID With Meals [employer], CRNP      calcium carbonate  500 mg Oral Daily PRN Re Lin PA-C      epoetin ani  2,000 Units Intravenous After Dialysis [employer], FERNANDO      glycerin-hypromellose-  1 drop Both Eyes Q4H PRN Coy Coelho MD      glycerin-hypromellose-  1 drop Left Eye BID Debbie Whiting PA-C      heparin (porcine)  5,000 Units Subcutaneous [address]      insulin aspart protamine-insulin aspart  20 Units Subcutaneous BID AC Coy Coelho MD      insulin lispro  1-5 Units Subcutaneous 0200 Coy Coelho MD      insulin lispro  1-6 Units Subcutaneous TID AC PRISCILA [facility] FERNANDO Gambino      insulin lispro 1-6 Units Subcutaneous HS [facility] FERNANDO Gambino      latanoprost  1 drop Both Eyes HS [facility] [place]      melatonin  3 mg Oral HS PRN FERNANDO Perez      ondansetron  4 mg Intravenous Q6H PRN Alisha Bucio PA-C      pravastatin  80 mg Oral Daily With [facility]      simethicone  80 mg Oral Q6H PRN Alisha Bucio PA-C      tamsulosin  0 4 mg Oral Daily [facility]          Today, Patient Was Seen By: Khoa Cuevas MD    ** Please Note: "This note has been constructed using a voice recognition system  Therefore there may be syntax, spelling, and/or grammatical errors   Please call if you have any questions  "**

## 2022-06-06 NOTE — PLAN OF CARE
Problem: PAIN - ADULT  Goal: Verbalizes/displays adequate comfort level or baseline comfort level  Description: Interventions:  - Encourage patient to monitor pain and request assistance  - Assess pain using appropriate pain scale  - Administer analgesics based on type and severity of pain and evaluate response  - Implement non-pharmacological measures as appropriate and evaluate response  - Consider cultural and social influences on pain and pain management  - Notify physician/advanced practitioner if interventions unsuccessful or patient reports new pain  Outcome: Progressing     Problem: INFECTION - ADULT  Goal: Absence or prevention of progression during hospitalization  Description: INTERVENTIONS:  - Assess and monitor for signs and symptoms of infection  - Monitor lab/diagnostic results  - Monitor all insertion sites, i e  indwelling lines, tubes, and drains  - Monitor endotracheal if appropriate and nasal secretions for changes in amount and color  - [place] appropriate cooling/warming therapies per order  - Administer medications as ordered  - Instruct and encourage patient and family to use good hand hygiene technique  - Identify and instruct in appropriate isolation precautions for identified infection/condition  Outcome: Progressing     Problem: SAFETY ADULT  Goal: Patient will remain free of falls  Description: INTERVENTIONS:  - Educate patient/family on patient safety including physical limitations  - Instruct patient to call for assistance with activity   - Consult OT/PT to assist with strengthening/mobility   - Keep Call bell within reach  - Keep bed low and locked with side rails adjusted as appropriate  - Keep care items and personal belongings within reach  - Initiate and maintain comfort rounds  - Make Fall Risk Sign visible to staff  - Offer Toileting every 2 Hours, in advance of need  - Initiate/Maintain bed alarm  - Obtain necessary fall risk management equipment: alarm, antislip socks  - Apply yellow socks and bracelet for high fall risk patients  - Consider moving patient to room near nurses station  Outcome: Progressing  Goal: Maintain or return to baseline ADL function  Description: INTERVENTIONS:  -  Assess patient's ability to carry out ADLs; assess patient's baseline for ADL function and identify physical deficits which impact ability to perform ADLs (bathing, care of mouth/teeth, toileting, grooming, dressing, etc )  - Assess/evaluate cause of self-care deficits   - Assess range of motion  - Assess patient's mobility; develop plan if impaired  - Assess patient's need for assistive devices and provide as appropriate  - Encourage maximum independence but intervene and supervise when necessary  - Involve family in performance of ADLs  - Assess for home care needs following discharge   - Consider OT consult to assist with ADL evaluation and planning for discharge  - Provide patient education as appropriate  Outcome: Progressing  Goal: Maintains/Returns to pre admission functional level  Description: INTERVENTIONS:  - Perform BMAT or MOVE assessment daily    - Set and communicate daily mobility goal to care team and patient/family/caregiver  - Collaborate with rehabilitation services on mobility goals if consulted  - Perform Range of Motion 3 times a day  - Reposition patient every 3 hours    - Dangle patient 3 times a day  - Stand patient 3 times a day  - Ambulate patient 3 times a day  - Out of bed to chair 3 times a day   - Out of bed for meals 3 times a day  - Out of bed for toileting  - Record patient progress and toleration of activity level   Outcome: Progressing     Problem: DISCHARGE PLANNING  Goal: Discharge to home or other facility with appropriate resources  Description: INTERVENTIONS:  - Identify barriers to discharge w/patient and caregiver  - Arrange for needed discharge resources and transportation as appropriate  - Identify discharge learning needs (meds, wound care, etc )  - Arrange for interpretive services to assist at discharge as needed  - Refer to Case Management Department for coordinating discharge planning if the patient needs post-hospital services based on physician/advanced practitioner order or complex needs related to functional status, cognitive ability, or social support system  Outcome: Progressing     Problem: Knowledge Deficit  Goal: Patient/family/caregiver demonstrates understanding of disease process, treatment plan, medications, and discharge instructions  Description: Complete learning assessment and assess knowledge base    Interventions:  - Provide teaching at level of understanding  - Provide teaching via preferred learning methods  Outcome: Progressing     Problem: CARDIOVASCULAR - ADULT  Goal: Maintains optimal cardiac output and hemodynamic stability  Description: INTERVENTIONS:  - Monitor I/O, vital signs and rhythm  - Monitor for S/S and trends of decreased cardiac output  - Administer and titrate ordered vasoactive medications to optimize hemodynamic stability  - Assess quality of pulses, skin color and temperature  - Assess for signs of decreased coronary artery perfusion  - Instruct patient to report change in severity of symptoms  Outcome: Progressing  Goal: Absence of cardiac dysrhythmias or at baseline rhythm  Description: INTERVENTIONS:  - Continuous cardiac monitoring, vital signs, obtain 12 lead EKG if ordered  - Administer antiarrhythmic and heart rate control medications as ordered  - Monitor electrolytes and administer replacement therapy as ordered  Outcome: Progressing     Problem: METABOLIC, FLUID AND ELECTROLYTES - ADULT  Goal: Electrolytes maintained within normal limits  Description: INTERVENTIONS:  - Monitor labs and assess patient for signs and symptoms of electrolyte imbalances  - Administer electrolyte replacement as ordered  - Monitor response to electrolyte replacements, including repeat lab results as appropriate  - Instruct patient on fluid and nutrition as appropriate  Outcome: Progressing  Goal: Fluid balance maintained  Description: INTERVENTIONS:  - Monitor labs   - Monitor I/O and WT  - Instruct patient on fluid and nutrition as appropriate  - Assess for signs & symptoms of volume excess or deficit  Outcome: Progressing  Goal: Glucose maintained within target range  Description: INTERVENTIONS:  - Monitor Blood Glucose as ordered  - Assess for signs and symptoms of hyperglycemia and hypoglycemia  - Administer ordered medications to maintain glucose within target range  - Assess nutritional intake and initiate nutrition service referral as needed  Outcome: Progressing     Problem: MOBILITY - ADULT  Goal: Maintain or return to baseline ADL function  Description: INTERVENTIONS:  -  Assess patient's ability to carry out ADLs; assess patient's baseline for ADL function and identify physical deficits which impact ability to perform ADLs (bathing, care of mouth/teeth, toileting, grooming, dressing, etc )  - Assess/evaluate cause of self-care deficits   - Assess range of motion  - Assess patient's mobility; develop plan if impaired  - Assess patient's need for assistive devices and provide as appropriate  - Encourage maximum independence but intervene and supervise when necessary  - Involve family in performance of ADLs  - Assess for home care needs following discharge   - Consider OT consult to assist with ADL evaluation and planning for discharge  - Provide patient education as appropriate  Outcome: Progressing  Goal: Maintains/Returns to pre admission functional level  Description: INTERVENTIONS:  - Perform BMAT or MOVE assessment daily    - Set and communicate daily mobility goal to care team and patient/family/caregiver  - Collaborate with rehabilitation services on mobility goals if consulted  - Perform Range of Motion 3 times a day  - Reposition patient every 3 hours    - Dangle patient 3 times a day  - Stand patient 3 times a day  - Ambulate patient 3 times a day  - Out of bed to chair 3 times a day   - Out of bed for meals 3 times a day  - Out of bed for toileting  - Record patient progress and toleration of activity level   Outcome: Progressing     Problem: Prexisting or High Potential for Compromised Skin Integrity  Goal: Skin integrity is maintained or improved  Description: INTERVENTIONS:  - Identify patients at risk for skin breakdown  - Assess and monitor skin integrity  - Assess and monitor nutrition and hydration status  - Monitor labs   - Assess for incontinence   - Turn and reposition patient  - Assist with mobility/ambulation  - Relieve pressure over bony prominences  - Avoid friction and shearing  - Provide appropriate hygiene as needed including keeping skin clean and dry  - Evaluate need for skin moisturizer/barrier cream  - Collaborate with interdisciplinary team   - Patient/family teaching  - Consider wound care consult   Outcome: Progressing     Problem: Nutrition/Hydration-ADULT  Goal: Nutrient/Hydration intake appropriate for improving, restoring or maintaining nutritional needs  Description: Monitor and assess patient's nutrition/hydration status for malnutrition  Collaborate with interdisciplinary team and initiate plan and interventions as ordered  Monitor patient's weight and dietary intake as ordered or per policy  Utilize nutrition screening tool and intervene as necessary  Determine patient's food preferences and provide high-protein, high-caloric foods as appropriate       INTERVENTIONS:  - Monitor oral intake, urinary output, labs, and treatment plans  - Assess nutrition and hydration status and recommend course of action  - Evaluate amount of meals eaten  - Assist patient with eating if necessary   - Allow adequate time for meals  - Recommend/ encourage appropriate diets, oral nutritional supplements, and vitamin/mineral supplements  - Order, calculate, and assess calorie counts as needed  - Recommend, monitor, and adjust tube feedings and TPN/PPN based on assessed needs  - Assess need for intravenous fluids  - Provide specific nutrition/hydration education as appropriate  - Include patient/family/caregiver in decisions related to nutrition  Outcome: Progressing     Problem: Potential for Falls  Goal: Patient will remain free of falls  Description: INTERVENTIONS:  - Educate patient/family on patient safety including physical limitations  - Instruct patient to call for assistance with activity   - Consult OT/PT to assist with strengthening/mobility   - Keep Call bell within reach  - Keep bed low and locked with side rails adjusted as appropriate  - Keep care items and personal belongings within reach  - Initiate and maintain comfort rounds  - Make Fall Risk Sign visible to staff  - Offer Toileting every 2 Hours, in advance of need  - Initiate/Maintain bed alarm  - Obtain necessary fall risk management equipment: alarm, antislip socks  - Apply yellow socks and bracelet for high fall risk patients  - Consider moving patient to room near nurses station  Outcome: Progressing

## 2022-06-07 ENCOUNTER — APPOINTMENT (INPATIENT)
Dept: DIALYSIS | Facility: HOSPITAL | Age: 77
DRG: 871 | End: 2022-06-07
Payer: MEDICARE

## 2022-06-07 VITALS
OXYGEN SATURATION: 96 % | WEIGHT: 188.71 LBS | RESPIRATION RATE: 16 BRPM | TEMPERATURE: 97.5 F | BODY MASS INDEX: 28.6 KG/M2 | DIASTOLIC BLOOD PRESSURE: 55 MMHG | SYSTOLIC BLOOD PRESSURE: 133 MMHG | HEART RATE: 96 BPM | HEIGHT: 68 IN

## 2022-06-07 LAB
ANION GAP SERPL CALCULATED.3IONS-SCNC: 14 MMOL/L (ref 4–13)
BUN SERPL-MCNC: 52 MG/DL (ref 5–25)
CALCIUM SERPL-MCNC: 7.5 MG/DL (ref 8.3–10.1)
CHLORIDE SERPL-SCNC: 90 MMOL/L (ref 100–108)
CK MB SERPL-MCNC: 1.7 NG/ML (ref 0–5)
CK MB SERPL-MCNC: <1 % (ref 0–2.5)
CK SERPL-CCNC: 534 U/L (ref 39–308)
CO2 SERPL-SCNC: 28 MMOL/L (ref 21–32)
CORTIS AM PEAK SERPL-MCNC: 14.4 UG/DL (ref 4.2–22.4)
CREAT SERPL-MCNC: 5 MG/DL (ref 0.6–1.3)
ERYTHROCYTE [DISTWIDTH] IN BLOOD BY AUTOMATED COUNT: 15.6 % (ref 11.6–15.1)
GFR SERPL CREATININE-BSD FRML MDRD: 10 ML/MIN/1.73SQ M
GLUCOSE SERPL-MCNC: 107 MG/DL (ref 65–140)
GLUCOSE SERPL-MCNC: 119 MG/DL (ref 65–140)
GLUCOSE SERPL-MCNC: 126 MG/DL (ref 65–140)
GLUCOSE SERPL-MCNC: 158 MG/DL (ref 65–140)
GLUCOSE SERPL-MCNC: 79 MG/DL (ref 65–140)
HCT VFR BLD AUTO: 24.1 % (ref 36.5–49.3)
HGB BLD-MCNC: 7.8 G/DL (ref 12–17)
MCH RBC QN AUTO: 28.8 PG (ref 26.8–34.3)
MCHC RBC AUTO-ENTMCNC: 32.4 G/DL (ref 31.4–37.4)
MCV RBC AUTO: 89 FL (ref 82–98)
PLATELET # BLD AUTO: 364 THOUSANDS/UL (ref 149–390)
PMV BLD AUTO: 8.7 FL (ref 8.9–12.7)
POTASSIUM SERPL-SCNC: 4.6 MMOL/L (ref 3.5–5.3)
RBC # BLD AUTO: 2.71 MILLION/UL (ref 3.88–5.62)
SODIUM SERPL-SCNC: 132 MMOL/L (ref 136–145)
WBC # BLD AUTO: 9.89 THOUSAND/UL (ref 4.31–10.16)

## 2022-06-07 PROCEDURE — 82948 REAGENT STRIP/BLOOD GLUCOSE: CPT

## 2022-06-07 PROCEDURE — 99239 HOSP IP/OBS DSCHRG MGMT >30: CPT | Performed by: INTERNAL MEDICINE

## 2022-06-07 PROCEDURE — 82533 TOTAL CORTISOL: CPT | Performed by: INTERNAL MEDICINE

## 2022-06-07 PROCEDURE — 99232 SBSQ HOSP IP/OBS MODERATE 35: CPT | Performed by: INTERNAL MEDICINE

## 2022-06-07 PROCEDURE — 82550 ASSAY OF CK (CPK): CPT | Performed by: INTERNAL MEDICINE

## 2022-06-07 PROCEDURE — 82553 CREATINE MB FRACTION: CPT | Performed by: INTERNAL MEDICINE

## 2022-06-07 PROCEDURE — 80048 BASIC METABOLIC PNL TOTAL CA: CPT | Performed by: INTERNAL MEDICINE

## 2022-06-07 PROCEDURE — 85027 COMPLETE CBC AUTOMATED: CPT | Performed by: INTERNAL MEDICINE

## 2022-06-07 PROCEDURE — 97116 GAIT TRAINING THERAPY: CPT

## 2022-06-07 PROCEDURE — 97110 THERAPEUTIC EXERCISES: CPT

## 2022-06-07 RX ORDER — INSULIN LISPRO 100 [IU]/ML
20 INJECTION, SUSPENSION SUBCUTANEOUS 2 TIMES DAILY WITH MEALS
Qty: 90 ML | Refills: 0
Start: 2022-06-07 | End: 2022-07-13 | Stop reason: SDUPTHER

## 2022-06-07 RX ORDER — LANOLIN ALCOHOL/MO/W.PET/CERES
3 CREAM (GRAM) TOPICAL
Refills: 0
Start: 2022-06-07

## 2022-06-07 RX ADMIN — INSULIN LISPRO 1 UNITS: 100 INJECTION, SOLUTION INTRAVENOUS; SUBCUTANEOUS at 13:00

## 2022-06-07 RX ADMIN — HEPARIN SODIUM 5000 UNITS: 5000 INJECTION INTRAVENOUS; SUBCUTANEOUS at 05:07

## 2022-06-07 RX ADMIN — GLYCERIN 1 DROP: .002; .002; .01 SOLUTION/ DROPS OPHTHALMIC at 09:17

## 2022-06-07 RX ADMIN — EPOETIN ALFA 4000 UNITS: 4000 SOLUTION INTRAVENOUS; SUBCUTANEOUS at 16:57

## 2022-06-07 RX ADMIN — GLYCERIN 1 DROP: .002; .002; .01 SOLUTION/ DROPS OPHTHALMIC at 18:04

## 2022-06-07 RX ADMIN — CALCIUM ACETATE 667 MG: 667 CAPSULE ORAL at 09:17

## 2022-06-07 RX ADMIN — PRAVASTATIN SODIUM 80 MG: 80 TABLET ORAL at 18:04

## 2022-06-07 RX ADMIN — HEPARIN SODIUM 5000 UNITS: 5000 INJECTION INTRAVENOUS; SUBCUTANEOUS at 13:00

## 2022-06-07 RX ADMIN — TAMSULOSIN HYDROCHLORIDE 0.4 MG: 0.4 CAPSULE ORAL at 18:04

## 2022-06-07 RX ADMIN — CALCIUM ACETATE 667 MG: 667 CAPSULE ORAL at 18:04

## 2022-06-07 RX ADMIN — CALCIUM ACETATE 667 MG: 667 CAPSULE ORAL at 13:00

## 2022-06-07 RX ADMIN — INSULIN ASPART 20 UNITS: 100 INJECTION, SUSPENSION SUBCUTANEOUS at 09:18

## 2022-06-07 RX ADMIN — INSULIN ASPART 20 UNITS: 100 INJECTION, SUSPENSION SUBCUTANEOUS at 18:04

## 2022-06-07 NOTE — CASE MANAGEMENT
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    Pulmonary hypertension ([facility] ) 2022    [facility]    Anemia due to chronic kidney disease, on chronic dialysis ([facility] )     Hemoptysis 2022    LORRAINE (acute kidney injury) ([facility] )     Other proteinuria     Symptomatic anemia 2022    Chest pain 2022    Acute kidney [facility] 2022    Hyperkalemia 2022    Melena 2022    Elevated PSA 2021    Chronic pain of right knee 2021    Primary osteoarthritis of right knee 2021    Bladder stones 12/15/2016    Type 2 diabetes mellitus with chronic kidney disease on chronic dialysis, with long-term current use of insulin ([facility] ) 2016    Benign colon polyp 2013    Benign prostatic hyperplasia with lower urinary tract symptoms 2013    Benign essential hypertension 2013    Hyperlipidemia 2012    Vitamin D deficiency 2012      LOS (days): 6  Geometric Mean LOS (GMLOS) (days): 4 80  Days to GMLOS:-1 2     OBJECTIVE:  Risk of Unplanned Readmission Score: 34 76         Current admission status: Inpatient   Preferred Pharmacy:   [facility] [address] [facility] [address] [address]  Phone: [phone] Fax: [phone]    [facility]  ([address] [place] [address],   [address], [address]  [address] [address]  [facility] [address]  Phone: [phone] Fax: [phone]    Primary Care Provider: Rupinder Rolon, MD    Primary Insurance: MEDICARE  Secondary Insurance: BLUE CROSS    DISCHARGE DETAILS:    Discharge planning discussed with[de-identified] Patient's wife Carlos Salcedo  Freedom of Choice: Yes  Comments - Freedom of Choice: Choice is for [facility] @ D/C  CM contacted family/caregiver?: Yes  Were Treatment Team discharge recommendations reviewed with patient/caregiver?: Yes  Did patient/caregiver verbalize understanding of patient care needs?: N/A- going to facility  Were patient/caregiver advised of the risks associated with not following Treatment Team discharge recommendations?: Yes    Contacts  Patient Contacts: Andres Rodgers  Relationship to Patient[de-identified] Family  Contact Method: Phone  Phone Number: [phone]  Reason/Outcome: Emergency Contact, Discharge Planning, Continuity of Care    DME Referral Provided  Referral made for DME?: No    Other Referral/Resources/Interventions Provided:  Referral Comments: [facility] confirmed able to accept after HD today  Level 1 PASRR sent to [facility] via [facility]  Transport request pending with [facility]  Would you like to participate in our [address] service program?  : No - Declined    Treatment Team Recommendation: Short Term Rehab  Discharge Destination Plan[de-identified] Short Term Rehab ([facility] [address] ([facility]))  Transport at Discharge : [facility]  Dispatcher Contacted: Yes  Number/Name of Dispatcher: Zachariah Garibay by Sara Poon and Unit #): PENDING  ETA of Transport (Date): 06/07/22     IMM Given (Date):: 06/07/22  IMM Given to[de-identified] Family  IMM reviewed with patient's wife Andres Rodgers), patient's wife Andres Rodgers) agrees with discharge determination      Accepting Facility Name, [facility] : [facility] [address]  [place], [place]  Receiving Facility/Agency Phone Number: [phone]  Facility/Agency Fax Number: [address] Number: LELE Fontaine, TORIW, JAUNM-SW  06/07/22 12:34 PM

## 2022-06-07 NOTE — CASE MANAGEMENT
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    [facility] 20[facility]    Dialysis patient ([facility] ) 2022    Anemia due to chronic kidney disease, on chronic dialysis [facility] )     Hemoptysis 2022    LORRAINE [facility]acute kidney injury) ([facility] )     Other proteinuria     Symptomatic anemia 2022    Chest pain 2022    Acute kidney injury [facility] 2022    Hyperkalemia 2022    Melena 2022    Elevated PSA 2021    Chronic pain of right knee 2021    Primary osteoarthritis of right knee 2021    Bladder stones 12/15/2016    Type 2 diabetes mellitus with chronic kidney disease on chronic dialysis, with long-term current use of insulin ([facility] ) 2016    Benign colon polyp 2013    Benign prostatic hyperplasia with lower urinary tract symptoms 2013    Benign essential hypertension 2013    Hyperlipidemia 2012    Vitamin D deficiency 2012      LOS (days): 6  Geometric Mean LOS (GMLOS) (days): 4 80  Days to GMLOS:-1 2     OBJECTIVE:  Risk of Unplanned Readmission Score: 34 76         Current admission status: Inpatient   Preferred Pharmacy:   [facility] [address] CHE[facility] [address] [address]  Phone: [phone] Fax: [phone]    [facility]  ([address] [place] [address],   [address], [address]  [address] [address]  [facility] [address]  Phone: [phone] Fax: [phone]    Primary Care Provider: Michael Coronel, MD    Primary Insurance: MEDICARE  Secondary Insurance: BLUE CROSS    DISCHARGE DETAILS:    Transport confirmed for 1800 with [place] to [facility]  All parties made aware

## 2022-06-07 NOTE — PLAN OF CARE
Problem: PAIN - ADULT  Goal: Verbalizes/displays adequate comfort level or baseline comfort level  Description: Interventions:  - Encourage patient to monitor pain and request assistance  - Assess pain using appropriate pain scale  - Administer analgesics based on type and severity of pain and evaluate response  - Implement non-pharmacological measures as appropriate and evaluate response  - Consider cultural and social influences on pain and pain management  - Notify physician/advanced practitioner if interventions unsuccessful or patient reports new pain  Outcome: Progressing     Problem: INFECTION - ADULT  Goal: Absence or prevention of progression during hospitalization  Description: INTERVENTIONS:  - Assess and monitor for signs and symptoms of infection  - Monitor lab/diagnostic results  - Monitor all insertion sites, i e  indwelling lines, tubes, and drains  - Monitor endotracheal if appropriate and nasal secretions for changes in amount and color  - Beverly appropriate cooling/warming therapies per order  - Administer medications as ordered  - Instruct and encourage patient and family to use good hand hygiene technique  - Identify and instruct in appropriate isolation precautions for identified infection/condition  Outcome: Progressing     Problem: SAFETY ADULT  Goal: Patient will remain free of falls  Description: INTERVENTIONS:  - Educate patient/family on patient safety including physical limitations  - Instruct patient to call for assistance with activity   - Consult OT/PT to assist with strengthening/mobility   - Keep Call bell within reach  - Keep bed low and locked with side rails adjusted as appropriate  - Keep care items and personal belongings within reach  - Initiate and maintain comfort rounds  - Make Fall Risk Sign visible to staff  - Offer Toileting every 2 Hours, in advance of need  - Initiate/Maintain bed alarm  - Obtain necessary fall risk management equipment: alarm, antislip socks  - Apply yellow socks and bracelet for high fall risk patients  - Consider moving patient to room near nurses station  Outcome: Progressing  Goal: Maintain or return to baseline ADL function  Description: INTERVENTIONS:  -  Assess patient's ability to carry out ADLs; assess patient's baseline for ADL function and identify physical deficits which impact ability to perform ADLs (bathing, care of mouth/teeth, toileting, grooming, dressing, etc )  - Assess/evaluate cause of self-care deficits   - Assess range of motion  - Assess patient's mobility; develop plan if impaired  - Assess patient's need for assistive devices and provide as appropriate  - Encourage maximum independence but intervene and supervise when necessary  - Involve family in performance of ADLs  - Assess for home care needs following discharge   - Consider OT consult to assist with ADL evaluation and planning for discharge  - Provide patient education as appropriate  Outcome: Progressing  Goal: Maintains/Returns to pre admission functional level  Description: INTERVENTIONS:  - Perform BMAT or MOVE assessment daily    - Set and communicate daily mobility goal to care team and patient/family/caregiver  - Collaborate with rehabilitation services on mobility goals if consulted  - Perform Range of Motion 3 times a day  - Reposition patient every 3 hours    - Dangle patient 3 times a day  - Stand patient 3 times a day  - Ambulate patient 3 times a day  - Out of bed to chair 3 times a day   - Out of bed for meals 3 times a day  - Out of bed for toileting  - Record patient progress and toleration of activity level   Outcome: Progressing     Problem: DISCHARGE PLANNING  Goal: Discharge to home or other facility with appropriate resources  Description: INTERVENTIONS:  - Identify barriers to discharge w/patient and caregiver  - Arrange for needed discharge resources and transportation as appropriate  - Identify discharge learning needs (meds, wound care, etc )  - Arrange for interpretive services to assist at discharge as needed  - Refer to Case Management Department for coordinating discharge planning if the patient needs post-hospital services based on physician/advanced practitioner order or complex needs related to functional status, cognitive ability, or social support system  Outcome: Progressing     Problem: Knowledge Deficit  Goal: Patient/family/caregiver demonstrates understanding of disease process, treatment plan, medications, and discharge instructions  Description: Complete learning assessment and assess knowledge base    Interventions:  - Provide teaching at level of understanding  - Provide teaching via preferred learning methods  Outcome: Progressing     Problem: CARDIOVASCULAR - ADULT  Goal: Maintains optimal cardiac output and hemodynamic stability  Description: INTERVENTIONS:  - Monitor I/O, vital signs and rhythm  - Monitor for S/S and trends of decreased cardiac output  - Administer and titrate ordered vasoactive medications to optimize hemodynamic stability  - Assess quality of pulses, skin color and temperature  - Assess for signs of decreased coronary artery perfusion  - Instruct patient to report change in severity of symptoms  Outcome: Progressing  Goal: Absence of cardiac dysrhythmias or at baseline rhythm  Description: INTERVENTIONS:  - Continuous cardiac monitoring, vital signs, obtain 12 lead EKG if ordered  - Administer antiarrhythmic and heart rate control medications as ordered  - Monitor electrolytes and administer replacement therapy as ordered  Outcome: Progressing     Problem: METABOLIC, FLUID AND ELECTROLYTES - ADULT  Goal: Electrolytes maintained within normal limits  Description: INTERVENTIONS:  - Monitor labs and assess patient for signs and symptoms of electrolyte imbalances  - Administer electrolyte replacement as ordered  - Monitor response to electrolyte replacements, including repeat lab results as appropriate  - Instruct patient on fluid and nutrition as appropriate  Outcome: Progressing  Goal: Fluid balance maintained  Description: INTERVENTIONS:  - Monitor labs   - Monitor I/O and WT  - Instruct patient on fluid and nutrition as appropriate  - Assess for signs & symptoms of volume excess or deficit  Outcome: Progressing  Goal: Glucose maintained within target range  Description: INTERVENTIONS:  - Monitor Blood Glucose as ordered  - Assess for signs and symptoms of hyperglycemia and hypoglycemia  - Administer ordered medications to maintain glucose within target range  - Assess nutritional intake and initiate nutrition service referral as needed  Outcome: Progressing     Problem: MOBILITY - ADULT  Goal: Maintain or return to baseline ADL function  Description: INTERVENTIONS:  -  Assess patient's ability to carry out ADLs; assess patient's baseline for ADL function and identify physical deficits which impact ability to perform ADLs (bathing, care of mouth/teeth, toileting, grooming, dressing, etc )  - Assess/evaluate cause of self-care deficits   - Assess range of motion  - Assess patient's mobility; develop plan if impaired  - Assess patient's need for assistive devices and provide as appropriate  - Encourage maximum independence but intervene and supervise when necessary  - Involve family in performance of ADLs  - Assess for home care needs following discharge   - Consider OT consult to assist with ADL evaluation and planning for discharge  - Provide patient education as appropriate  Outcome: Progressing  Goal: Maintains/Returns to pre admission functional level  Description: INTERVENTIONS:  - Perform BMAT or MOVE assessment daily    - Set and communicate daily mobility goal to care team and patient/family/caregiver  - Collaborate with rehabilitation services on mobility goals if consulted  - Perform Range of Motion 3 times a day  - Reposition patient every 3 hours    - Dangle patient 3 times a day  - Stand patient 3 times a day  - Ambulate patient 3 times a day  - Out of bed to chair 3 times a day   - Out of bed for meals 3 times a day  - Out of bed for toileting  - Record patient progress and toleration of activity level   Outcome: Progressing     Problem: Prexisting or High Potential for Compromised Skin Integrity  Goal: Skin integrity is maintained or improved  Description: INTERVENTIONS:  - Identify patients at risk for skin breakdown  - Assess and monitor skin integrity  - Assess and monitor nutrition and hydration status  - Monitor labs   - Assess for incontinence   - Turn and reposition patient  - Assist with mobility/ambulation  - Relieve pressure over bony prominences  - Avoid friction and shearing  - Provide appropriate hygiene as needed including keeping skin clean and dry  - Evaluate need for skin moisturizer/barrier cream  - Collaborate with interdisciplinary team   - Patient/family teaching  - Consider wound care consult   Outcome: Progressing     Problem: Nutrition/Hydration-ADULT  Goal: Nutrient/Hydration intake appropriate for improving, restoring or maintaining nutritional needs  Description: Monitor and assess patient's nutrition/hydration status for malnutrition  Collaborate with interdisciplinary team and initiate plan and interventions as ordered  Monitor patient's weight and dietary intake as ordered or per policy  Utilize nutrition screening tool and intervene as necessary  Determine patient's food preferences and provide high-protein, high-caloric foods as appropriate       INTERVENTIONS:  - Monitor oral intake, urinary output, labs, and treatment plans  - Assess nutrition and hydration status and recommend course of action  - Evaluate amount of meals eaten  - Assist patient with eating if necessary   - Allow adequate time for meals  - Recommend/ encourage appropriate diets, oral nutritional supplements, and vitamin/mineral supplements  - Order, calculate, and assess calorie counts as needed  - Recommend, monitor, and adjust tube feedings and TPN/PPN based on assessed needs  - Assess need for intravenous fluids  - Provide specific nutrition/hydration education as appropriate  - Include patient/family/caregiver in decisions related to nutrition  Outcome: Progressing     Problem: Potential for Falls  Goal: Patient will remain free of falls  Description: INTERVENTIONS:  - Educate patient/family on patient safety including physical limitations  - Instruct patient to call for assistance with activity   - Consult OT/PT to assist with strengthening/mobility   - Keep Call bell within reach  - Keep bed low and locked with side rails adjusted as appropriate  - Keep care items and personal belongings within reach  - Initiate and maintain comfort rounds  - Make Fall Risk Sign visible to staff  - Offer Toileting every 2 Hours, in advance of need  - Initiate/Maintain bed alarm  - Obtain necessary fall risk management equipment: alarm, antislip socks  - Apply yellow socks and bracelet for high fall risk patients  - Consider moving patient to room near nurses station  Outcome: Progressing

## 2022-06-07 NOTE — PLAN OF CARE
Post-Dialysis RN Treatment Note    Blood Pressure:  Pre 172/69 mm/Hg  Post 133/55 mmHg   EDW  79 kg    Weight:  Pre 85 6 kg   Post Not Applicable kg   Mode of weight measurement: Other standing pre hd, bed scale is not zeroed correctly and pt has difficulty standing   Volume Removed  3500 ml    Treatment duration 180 minutes    NS given  No    Treatment shortened? No   Medications given during Rx Epogen 4000 units   Estimated Kt/V  Not Applicable   Access type: Permacath/TDC   Access Issues: No    Report called to primary nurse   Yes Fawn Vasquez RN    HD tx plan: 3 hrs removing 3 5L as pedro  2K bath for K 4 6 6/7  Using Right chest wall permacath for hd      Problem: METABOLIC, FLUID AND ELECTROLYTES - ADULT  Goal: Electrolytes maintained within normal limits  Description: INTERVENTIONS:  - Monitor labs and assess patient for signs and symptoms of electrolyte imbalances  - Administer electrolyte replacement as ordered  - Monitor response to electrolyte replacements, including repeat lab results as appropriate  - Instruct patient on fluid and nutrition as appropriate  Outcome: Progressing     Problem: METABOLIC, FLUID AND ELECTROLYTES - ADULT  Goal: Fluid balance maintained  Description: INTERVENTIONS:  - Monitor labs   - Monitor I/O and WT  - Instruct patient on fluid and nutrition as appropriate  - Assess for signs & symptoms of volume excess or deficit  Outcome: Progressing

## 2022-06-07 NOTE — [place] UNIVERSAL TRANSFER FORM
[place] UNIVERSAL TRANSFER FORM  (ALL ITEMS MUST BE COMPLETED)    1  TRANSFER FROM: [address]      TRANSFER TO: Tay Bowie    2  DATE OF TRANSFER: 6/7/2022                        TIME OF TRANSFER: 1800    3  PATIENT NAME: Santos Roldan      YOB: 1945                             GENDER: male    4  LANGUAGE:   English    5  PHYSICIAN NAME:  Dari Nuñez MD                   PHONE: [phone]    6  CODE STATUS: Level 1 - Full Code        Out of Hospital DNR Attached: No    7  :                                      :  Extended Emergency Contact Information  Primary Emergency Contact: [facility] [place]  Address: [address] Phone: [phone]  Mobile Phone: [phone]  Relation: Crystal Harkins [facility] Representative/Proxy:  No           Legal Guardian:  No             NAME OF:           HEALTH CARE REPRESENTATIVE/PROXY:                                         OR           LEGAL GUARDIAN, IF NOT :                                               PHONE:  (Day)           (Night)                        (Cell)    8  REASON FOR TRANSFER: (Must include brief medical history and recent changes in physical function or cognition ) Needs physical therapy and strengthening            V/S: /82   Pulse 78   Temp 97 5 °F (36 4 °C) (Oral)   Resp 18   Ht 5' 8" (1 727 m)   Wt 85 6 kg (188 lb 11 4 oz)   SpO2 97%   BMI 28 69 kg/m²           PAIN: Yes, Rating 5, Site left leg and Treatment tylenol and rest    9  PRIMARY DIAGNOSIS: Severe sepsis ([address] )      Secondary Diagnosis:         Pacemaker: No      Internal Defib: No          Mental Health Diagnosis (if Applicable):    10  RESTRAINTS: No     11  RESPIRATORY NEEDS: None    12  ISOLATION/PRECAUTION: None    13  ALLERGY: Amoxicillin    14   SENSORY:       Vision Good, Hearing Poor and Speech Clear    15  SKIN CONDITION: Yes:  Pressure    16  DIET: Regular    17  IV ACCESS: None    18  PERSONAL ITEMS SENT WITH PATIENT: None    19  ATTACHED DOCUMENTS: MUST ATTACH CURRENT MEDICATION INFORMATION Face Sheet, MAR and Medication Reconciliation    20  AT RISK ALERTS:Falls and Pressure Ulcer        HARM TO: N/A    21  WEIGHT BEARING STATUS:         Left Leg: Limited        Right Leg: Limited    22  MENTAL STATUS:Alert, Oriented and Forgetful    23  FUNCTION:        Walk: With Help        Transfer: With Help        Toilet: With Help        Feed: Self    24  IMMUNIZATIONS/SCREENING:     Immunization History   Administered Date(s) Administered    COVID-19 MODERNA VACC 0 25 ML IM BOOSTER 12/03/2021    COVID-19 PFIZER VACCINE 0 3 ML IM 05/06/2021, 05/29/2021    Hepatitis B Vaccine (Recombinant), Cpg Adjuvanted 04/14/2022    Influenza Split High Dose Preservative Free IM 10/11/2013, 09/23/2016, 11/01/2017    Influenza, high dose seasonal 0 7 mL 12/17/2018, 10/02/2019, 11/09/2020, 09/30/2021    Influenza, seasonal, injectable 11/17/2000, 11/16/2001, 12/06/2002, 10/30/2003, 10/18/2005, 10/20/2006, 10/17/2007, 10/28/2008, 09/15/2009, 09/11/2010, 09/19/2011, 09/26/2012    Pneumococcal Conjugate 13-Valent 08/05/2015    Pneumococcal Polysaccharide PPV23 11/16/2001, 09/25/2012    Tdap 02/14/2018    Zoster 09/04/2015       25  BOWEL: Incontinent     26  BLADDER: Incontinent    27   SENDING FACILITY CONTACT: Verena Teran                  Title: RN        Unit: [address] [place] [address]        Phone: [phone] (if known):        Title:        Unit:         Phone:         FORM PREFILLED BY (if applicable)       Title:       Unit:        Phone:         FORM COMPLETED BY Sophie Wagner RN      Title: RN      Phone: [phone]

## 2022-06-07 NOTE — PROGRESS NOTES
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:  · HD dependent since March 10, 2022  · S/p 2 hour extra treatment on 6/6/22 due to hyponatremia and hyperkalemia  · Plan for HD today - regular tx      2  Access: R IJ permcath  3  CNS bacteremia  · Felt to be contaminant  Now off Vancomycin  · Monitor off all abx  4  Hypertension:  · EDW is 80 kg  · BP is on high side  · Meds: None  · MAR has enalapril but this is not listed in [address] [place] med list    · Monitor with UF on HD today - aim for 2- 3 kg UF today  5  Hyponatremia:  · Na down to 122 yesterday  · Calculated Osm was 279 - close enough to measured Osm of 282  Therefore, hypoosmolar hyponatremia  · TSH okay  Cortisol pending  · Continue FR of 1500 cc/24 hours  · Na up to 132 today  · This may be due to fluid overload  6  Anemia:  · On Mircera as an outpatient  · Increase Epogen to 4000 units with HD      7  Rhabdomyolysis:  · CK improving  8  Mineral and bone disease:  · Continue Phoslo  · Continue renal diet  9  P3R ANCA vasculitis:  · Renal biopsy on March 9: (1) Focal necrotizing and crescentic GN, pauci-immune, acute and chronic  (2) acute tubular injury  (3) moderately severe IFTA with focal TI inflammation  (4) mod arteriolosclerosis and severe arteriosclerosis  · Treated with Cytoxan and got 2 doses - 3/23/22 and 4/6/22  However, he did not tolerate this well and this was stopped in April 2022  DISPOSITION:  · HD today  · Challenge EDW  3 kg UF today  · Increase Epogen to 4000 untis with HD  SUBJECTIVE / 24H INTERVAL HISTORY:  Tolerated HD yesterday  1 kg removed  Complaining of L hip pain today  No CP or SOB       OBJECTIVE:  Current Weight: Weight - Scale: 81 8 kg (180 lb 5 4 oz)  Vitals: 06/06/22 1851 06/06/22 2250 06/07/22 0600 06/07/22 0737   BP: 157/64 158/61  166/64   BP Location:  Left arm     Pulse: 97 85  79   Resp: 18 18  19   Temp: 98 1 °F (36 7 °C) 98 9 °F (37 2 °C)  (!) 97 4 °F (36 3 °C)   TempSrc:  Oral     SpO2: 93% 97%  97%   Weight:   81 8 kg (180 lb 5 4 oz)    Height:           Intake/Output Summary (Last 24 hours) at 6/7/2022 5436  Last data filed at 6/7/2022 0600  Gross per 24 hour   Intake 620 ml   Output 1650 ml   Net -1030 ml     General: conscious, cooperative, no distress  Skin: dry  Eyes: pale conjunctivae  ENT: moist mucous membranes  Respiratory: equal chest expansion, decreased in bases  Cardiovascular: distinct heart sounds, normal rate, regular rhythm, no rub  Abdomen: soft, non tender, non distended, normal bowel sounds  Extremities: no edema  Genitourinary: no ruvalcaba catheter  Neuro: awake, alert     Psych: appropriate affect    Medications:    Current Facility-Administered Medications:     acetaminophen (TYLENOL) tablet 650 mg, 650 mg, Oral, Q6H PRN, Nabil Diallo MD, 650 mg at 06/05/22 1018    calcium acetate (PHOSLO) capsule 667 mg, 667 mg, Oral, TID With Meals, FERNANDO Marks, 667 mg at 06/07/22 2748    calcium carbonate (TUMS) chewable tablet 500 mg, 500 mg, Oral, Daily PRN, Aileen Casas PA-C, 500 mg at 06/05/22 2257    epoetin ani (EPOGEN,PROCRIT) injection 2,000 Units, 2,000 Units, Intravenous, After Dialysis, FERNANDO Marks, 2,000 Units at 06/04/22 1015    glycerin-hypromellose- (ARTIFICIAL TEARS) ophthalmic solution 1 drop, 1 drop, Both Eyes, Q4H PRN, Nabil Diallo MD, 1 drop at 06/05/22 1658    glycerin-hypromellose- (ARTIFICIAL TEARS) ophthalmic solution 1 drop, 1 drop, Left Eye, BID, Natalia Lee PA-C, 1 drop at 06/07/22 0917    heparin (porcine) subcutaneous injection 5,000 Units, 5,000 Units, Subcutaneous, Q8H [facility], 5,000 Units at 06/07/22 0507 **AND** [CANCELED] Platelet count, , , Once, Tierara Dakins ALICIA Quan    insulin aspart protamine-insulin aspart (NovoLOG 70/30) 100 units/mL subcutaneous injection 20 Units, 20 Units, Subcutaneous, BID AC, Tirso Rousseau MD, 20 Units at 06/07/22 0918    insulin lispro (HumaLOG) 100 units/mL subcutaneous injection 1-5 Units, 1-5 Units, Subcutaneous, 0200, Tirso Rousseau MD    insulin lispro (HumaLOG) 100 units/mL subcutaneous injection 1-6 Units, 1-6 Units, Subcutaneous, TID AC, 1 Units at 06/05/22 1202 **AND** Fingerstick Glucose (POCT), , , TID AC, [employer], CRNP    insulin lispro (HumaLOG) 100 units/mL subcutaneous injection 1-6 Units, 1-6 Units, Subcutaneous, HS, [employer], SEYMOURNP, 3 Units at 06/06/22 2126    latanoprost (XALATAN) 0 005 % ophthalmic solution 1 drop, 1 drop, Both Eyes, HS, [employer], FERNANDO, 1 drop at 06/06/22 2126    melatonin tablet 3 mg, 3 mg, Oral, HS PRN, FERNANDO Perez, 3 mg at 06/05/22 2259    ondansetron (ZOFRAN) injection 4 mg, 4 mg, Intravenous, Q6H PRN, Aileen Casas PA-C    pravastatin (PRAVACHOL) tablet 80 mg, 80 mg, Oral, Daily With FERNANDO Obrien, 80 mg at 06/06/22 1830    simethicone (MYLICON) chewable tablet 80 mg, 80 mg, Oral, Q6H PRN, Aileen Casas PA-C, 80 mg at 06/05/22 2257    tamsulosin (FLOMAX) capsule 0 4 mg, 0 4 mg, Oral, Daily With Dinner, [employer], CRNP, 0 4 mg at 06/06/22 1829    Laboratory Results:  Results from last 7 days   Lab Units 06/07/22  0522 06/06/22  1538 06/06/22  0459 06/05/22  0503 06/04/22  0457 06/03/22  0502 06/02/22  0525 06/01/22  1948 06/01/22  1046   WBC Thousand/uL 9 89  --   --  8 77 8 69 8 15 9 49  --  15 36*   HEMOGLOBIN g/dL 7 8*  --   --  7 8* 7 5* 7 9* 8 1*  --  9 7*   HEMATOCRIT % 24 1*  --   --  25 1* 24 4* 25 6* 26 6*  --  30 8*   PLATELETS Thousands/uL 364  --   --  372 344 335 389  --  518*   POTASSIUM mmol/L 4 6 5 3 5 3 4 6 5 7* 4 6 5 3   < > 7 2*   CHLORIDE mmol/L 90* 85* 86* 91* 92* 94* 97*   < > 87*   CO2 mmol/L 28 26 28 29 26 28 24   < > 20*   BUN mg/dL 52* 74* 68* 48* 62* 39* 56*   < > 81*   CREATININE mg/dL 5 00* 6 26* 5 75* 4 63* 6 04* 4 74* 6 97*   < > 9 65*   CALCIUM mg/dL 7 5* 7 5* 7 3* 7 3* 7 3* 7 7* 8 0*   < > 8 6   MAGNESIUM mg/dL  --   --   --   --   --  2 5 2 6  --  2 8*   PHOSPHORUS mg/dL  --   --   --   --   --  4 4* 7 1*  --  7 5*    < > = values in this interval not displayed

## 2022-06-07 NOTE — PHYSICAL THERAPY NOTE
PT TREATMENT     06/07/22 0900   Note Type   Note Type Treatment   Pain Assessment   Pain Assessment Tool Brown-Baker FACES   Brown-Baker FACES Pain Rating 6   Pain Location/Orientation Orientation: Left; Location: Leg  (Thigh; posterior knee)   Restrictions/Precautions   Other Precautions Hard of hearing; Fall Risk;Bed Alarm; Chair Alarm;Cognitive   General   Chart Reviewed Yes   Subjective   Subjective Pt continues to complain of LLE pain specifically at the thigh and posterior knee   Bed Mobility   Supine to Sit 3  Moderate assistance   Additional items Assist x 2;Verbal cues; Increased time required   Transfers   Sit to Stand 3  Moderate assistance   Additional items Assist x 2;Verbal cues; Increased time required  (On 2nd attempt pt able to transfer with mod assist of 1)   Stand to Sit 3  Moderate assistance   Additional items Assist x 1;Verbal cues   Ambulation/Elevation   Gait pattern Forward Flexion; Short stride; Step to; Antalgic   Gait Assistance   (Mod/Min assist)   Additional items Assist x 1;Verbal cues; Tactile cues   Assistive Device Rolling walker   Distance 3-4 feet bed to chair; rest; 12 ft with change in direction   Balance   Static Sitting Fair   Static Standing Fair -   Dynamic Standing Poor +   Ambulatory Poor +   Activity Tolerance   Activity Tolerance Patient limited by fatigue;Patient limited by pain   Exercises   Hip Flexion Right;10 reps; Sitting   Knee AROM Long [place] [employer] reps; Sitting   Ankle Pumps Bilateral;10 reps; Sitting   Assessment   Assessment Despite continued pain complaints to LLE pt cooperative and able to tolerate bed mobility, transfers, and short distance ambulation during physical therapy session  Pt has limited endurance for all functional mobility but was willing to do as much as he could tolerate    Pt will continue to benefit from skilled physical therapy services to further increase his active range of motion, strength, transfers, bed mobility, and ambulation with a RW   Pt remains appropriate for post acute rehab services when medically stable for discharge  The patient's AM-[facility] Basic Mobility Inpatient Short Form Raw Score is 9  A Raw score of less than or equal to 16 suggests the patient may benefit from discharge to post-acute rehabilitation services  Please also refer to the recommendation of the Physical Therapist for safe discharge planning  Plan   Treatment/Interventions ADL retraining;Functional transfer training;LE strengthening/ROM; Therapeutic exercise; Endurance training;Cognitive reorientation;Patient/family training;Equipment eval/education; Bed mobility;Gait training; Compensatory technique education   PT Frequency Other (Comment)  (5x/wk)   Recommendation   PT Discharge Recommendation Post acute rehabilitation services   AM-PAC Basic Mobility Inpatient   Turning in Bed Without Bedrails 2   Lying on Back to Sitting on Edge of Flat Bed 1   Moving Bed to Chair 2   Standing Up From Chair 1   Walk in Room 2   Climb 3-5 Stairs 1   Basic Mobility Inpatient Raw Score 9   Turning Head Towards Sound 2  (Pt is very hard of hearing)   Follow Simple Instructions 2  (Due to hard of hearing)   Low Function Basic Mobility Raw Score 13   Low Function Basic Mobility Standardized Score 20 14   Highest Level Of Mobility   JH-HLM Goal 3: Sit at edge of bed   JH-HLM Achieved 4: Move to chair/commode   Education   Education Provided Mobility training;Assistive device   Patient Explanation/teachback used; Reinforcement needed   End of Consult   Patient Position at End of Consult Bed/Chair alarm activated; Bedside chair; All needs within reach   [facility] Insurance Number  Abram Kemp PT [id number]     Portions of the documentation may have been created using voice recognition software  Occasional wrong word or sound alike substitutions may have occurred due to the inherent limitation of the voice recognition software   Read the chart carefully and recognize, using context, where substitutions have occurred

## 2022-06-07 NOTE — DISCHARGE SUMMARY
Mahesh 43  Discharge- [facility] 1945, 64 y o  male MRN: [id number]  Unit/Bed#: [address] [place] [address] Encounter: [id number]  Primary Care Provider: Deja Calvin MD   Date and time admitted to hospital: 6/1/2022  9:46 AM    * Severe sepsis [facility])  Assessment & Plan  Versus SIRS  As evidenced by leukocytosis, tachypnea, lactic acidosis -patient was started on broad-spectrum antibiotics with vancomycin plus cefepime  No clear evidence of infection at present  Blood culture 1/2 positive for coagulase-negative staphylococcus possibly contaminant  Repeat blood cultures have been negative at 72 hours  Currently afebrile, hemodynamically stable  Leukocytosis improved  · CT scan of the left leg showed diffuse subcutaneous edema with normal musculature  · Vancomycin was discontinued on June 6, 2022    Hyperkalemia  Assessment & Plan  Secondary to missed dialysis, patient was also having hyperglycemia presentation  · Improved with hemodialysis    Hyponatremia  Assessment & Plan  Sodium level dropped to 122  Likely hypoosmolar hyponatremia  Patient declines excessive fluid intake  No significant evidence of volume overload on exam  · TSH level and a m  Cortisol level were normal  · Continue 1500 milliliters Fluid restriction  · Calculated osmolality was 279  · Sodium level improved to 132  · Patient is scheduled for ultra fractionation with hemodialysis    Left leg pain  Assessment & Plan  Likely secondary to rhabdomyolysis  Neurovascular exam intact   Venous Doppler without any evidence of DVT  Reports increasing pain today    · Scan of the left thigh showed some edema with normal musculature  · CK level continues to improve  · Continue PT/OT    Rhabdomyolysis  Assessment & Plan  CK noted to be elevated   Improving    ANCA-associated vasculitis [facility])  Assessment & Plan  Biopsy-proven from March 9th-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  Previously started on Cytoxan but subsequently stopped as patient was unable to tolerate    Anemia due to chronic kidney disease, on chronic dialysis [facility])  Assessment & Plan  Remains low but stable   On Epogen with dialysis    Type 2 diabetes mellitus with chronic kidney disease on chronic dialysis, with long-term current use of insulin [facility])  Assessment & Plan  Lab Results   Component Value Date    HGBA1C 5 1 05/08/2022       Recent Labs     06/06/22  0002 06/06/22  0704 06/06/22  1113 06/06/22  1550   POCGLU 75 102 140 145*       Blood Sugar Average: Last 72 hrs:  (P) 150   Trend noted, appetite fair  Continue insulin 70/30 20 units b i d  Pily Sequin Blood sugars stable  Continue Humalog sliding scale  Monitor blood glucose trend    Hyperlipidemia  Assessment & Plan  Continue Zocor    Benign prostatic hyperplasia with lower urinary tract symptoms  Assessment & Plan  On Flomax    Benign essential hypertension  Assessment & Plan  Enalapril has been on hold in setting of hyperkalemia    Enalapril has not been listed on the med list       Toxic metabolic encephalopathy-resolved as of 6/5/2022  Assessment & Plan  Likely toxic metabolic  Now improving    Suspected UTI-resolved as of 6/4/2022  Assessment & Plan  Urine culture negative, no evidence of UTI    Bacteremia-resolved as of 6/6/2022  Assessment & Plan  Blood culture 1/2 noted to be positive for Staphylococcus, coagulase-negative   Possibly contaminant but patient risk of true bacteremia  Seen by ID, input appreciated  · Patient was treated with vancomycin  · Repeat blood cultures have been negative at 72 hours  · Vancomycin was discontinued  · Patient has been afebrile with normal white count    High anion gap metabolic acidosis-resolved as of 6/5/2022  Assessment & Plan  Likely secondary to ESRD and poor dialysis compliance  Patient also had lactic acidosis on presentation now improved  Monitor        Medical Problems             Resolved Problems  Date Reviewed: 6/7/2022          Resolved    High anion gap metabolic acidosis 3/3/8241     Resolved by  Coy Coleho MD    Bacteremia 6/6/2022     Resolved by  Coy Coelho MD    Lactic acidosis 6/5/2022     Resolved by  Coy Coelho MD    Suspected UTI 6/4/2022     Resolved by  Coy Coelho MD    Toxic metabolic encephalopathy 2/8/7869     Resolved by  Coy Coelho MD              Discharging Physician / Practitioner: Coleman Willingham MD  PCP: Radha Osborne MD  Admission Date:   Admission Orders (From admission, onward)     Ordered        06/01/22 1302  Inpatient Admission  Once                      Discharge Date: 06/07/22    Consultations During Hospital Stay:  · Nephrology, Infectious Diseases     Outpatient Tests Requested:  · Follow-up at the dialysis center    Complications:  None    Reason for Admission:  Altered mental status    Hospital Course:   Laura Griffin is a 64 y o  male patient with past medical history of ANCA vasculitis, CKD currently on dialysis, diabetes mellitus type 2, hypertension, hyperlipidemia, BPH, anemia who originally presented to the hospital on 6/1/2022 due to altered mental status  Patient met severe sepsis criteria initially and was admitted to step-down unit  Patient was started on cefepime and vancomycin  Patient also noted to have hyperkalemia and metabolic acidosis due to missed dialysis  Patient was admitted to ICU and received dialysis with improved lab work  Later patient's blood cultures are positive and patient was seen by ID  Repeat blood cultures continued to remain negative  Patient also noted to have rhabdomyolysis which improved during hospitalization  Patient was seen by physical therapy was recommending rehab  Patient remained stable and afebrile and will be discharged to short-term rehabilitation for continued PT  Please see above list of diagnoses and related plan for additional information       Condition at Discharge: stable    Discharge Day Visit / Exam:   Subjective: Patient is feeling well  Does complain of minimal right leg pain  Denies any chest pain, shortness a breath, abdominal pain  Vitals: Blood Pressure: 166/64 (06/07/22 0737)  Pulse: 79 (06/07/22 0737)  Temperature: (!) 97 4 °F (36 3 °C) (06/07/22 0737)  Temp Source: Oral (06/06/22 2250)  Respirations: 19 (06/07/22 0737)  Height: 5' 8" (172 7 cm) (06/01/22 1414)  Weight - Scale: 81 8 kg (180 lb 5 4 oz) (06/07/22 0600)  SpO2: 97 % (06/07/22 0737)  Exam:   Physical Exam  Constitutional:       Appearance: Normal appearance  HENT:      Head: Normocephalic and atraumatic  Eyes:      Extraocular Movements: Extraocular movements intact  Pupils: Pupils are equal, round, and reactive to light  Cardiovascular:      Rate and Rhythm: Normal rate and regular rhythm  Heart sounds: No murmur heard  No gallop  Pulmonary:      Effort: Pulmonary effort is normal       Breath sounds: Normal breath sounds  Abdominal:      General: Bowel sounds are normal       Palpations: Abdomen is soft  Tenderness: There is no abdominal tenderness  Musculoskeletal:         General: No swelling or deformity  Normal range of motion  Cervical back: Normal range of motion and neck supple  Skin:     General: Skin is warm and dry  Neurological:      General: No focal deficit present  Mental Status: He is alert  Discussion with Family: Updated  (wife) via phone  Discharge instructions/Information to patient and family:   See after visit summary for information provided to patient and family  Provisions for Follow-Up Care:  See after visit summary for information related to follow-up care and any pertinent home health orders  Disposition:   [facility] Readmission: No     Discharge Statement:  I spent 35 minutes discharging the patient  This time was spent on the day of discharge   I had direct contact with the patient on the day of discharge  Greater than 50% of the total time was spent examining patient, answering all patient questions, arranging and discussing plan of care with patient as well as directly providing post-discharge instructions  Additional time then spent on discharge activities  Discharge Medications:  See after visit summary for reconciled discharge medications provided to patient and/or family        **Please Note: This note may have been constructed using a voice recognition system**

## 2022-06-07 NOTE — PROGRESS NOTES
Progress Note - Infectious Disease   Adilene Dennis 64 y o  male MRN: [id number]  Unit/Bed#: [address] [place] [address] Encounter: [id number]      Impression/Plan:  1  SIRS, POA   With tachypnea, leukocytosis and lactic acidosis in patient presenting with altered mental status and weakness  Admission blood cultures growing 1 of 2 sets coag-negative staph likely representative skin contaminant    On admission CT, former right kidney suspected hematoma is slowly decreasing in size   Moderate pleural effusions not unexpected in setting of missed HD session on 05/31/2022  -discontinue vancomycin 06/06/2022  -monitoring off additional antibiotics here after  -follow up final repeat blood cultures  -monitor temperature and hemodynamics  -serial exam     2  Coag-negative staph bacteremia  Admission blood cultures growing 1 of 2 sets coag-negative staph likely representative skin contaminant  06/03/2022 blood cultures x2 negative at 48 hours thus far   -discontinued vancomycin 06/06/2022  -monitor off additional antibiotics here after  -follow-up final repeat blood cultures     3  LORRAINE on CKD III   On HD since recent hospitalization in 03/22   -renal dose adjust antibiotic as needed  -volume management   -continues HD management per Nephrology  -monitor BMP     4  ANCA vasculitis status post kidney biopsy   Treated with cytoxan 3/23/22 and 4/6/22  -continues management per Nephrology     5  Type 2 diabetes mellitus  5/8/22 Hemoglobin A1c 5 1  -blood glucose management per primary care team     6  Amoxicillin listed allergy  Tolerates Cephlosporins  -monitor for antibiotic tolerance     Antibiotics:  D2 off Vancomycin     Subjective:  Patient has no fever, chills, sweats; no nausea, vomiting, diarrhea; no cough, shortness of breath; + left leg thigh pain/edema predominantly posterior  No new symptoms      Objective:  Vitals:  Temp:  [97 4 °F (36 3 °C)-98 9 °F (37 2 °C)] 97 4 °F (36 3 °C)  HR:  [68-97] 79  Resp:  [16-19] 19  BP: (150-175)/(61-76) 166/64  SpO2:  [93 %-97 %] 97 %  Temp (24hrs), Av 1 °F (36 7 °C), Min:97 4 °F (36 3 °C), Max:98 9 °F (37 2 °C)  Current: Temperature: (!) 97 4 °F (36 3 °C)    Physical Exam:   General Appearance:  Alert, interactive, nontoxic, no acute distress, extremely hard of hearing   Throat: Oropharynx moist without lesions  Lungs:   Clear to auscultation bilaterally; no wheezes, rhonchi or rales; respirations unlabored   Heart:  RRR; no murmur   Abdomen:   Soft, non-tender, non-distended, positive bowel sounds  Extremities: No clubbing, cyanosis, + LLE thigh sore edema > RLE  + venodynes  No palpable erythema, warmth, or induration of left thigh  : No Wall, no SPT   Skin: No new rashes or lesions  arm arm IV site nontender  Right chest IJ PermCath site nontender, noninflamed       Labs, Imaging, & Other studies:   All pertinent labs and imaging studies were personally reviewed  Results from last 7 days   Lab Units 22  0522 22  0503 22  0457   WBC Thousand/uL 9 89 8 77 8 69   HEMOGLOBIN g/dL 7 8* 7 8* 7 5*   PLATELETS Thousands/uL 364 372 344     Results from last 7 days   Lab Units 22  0522 22  1538 22  0459 22  0503 22  0502 22  0525 22  1948 22  1046   SODIUM mmol/L 132* 122* 124* 129*   < > 133*   < > 127*   POTASSIUM mmol/L 4 6 5 3 5 3 4 6   < > 5 3   < > 7 2*   CHLORIDE mmol/L 90* 85* 86* 91*   < > 97*   < > 87*   CO2 mmol/L 28 26 28 29   < > 24   < > 20*   BUN mg/dL 52* 74* 68* 48*   < > 56*   < > 81*   CREATININE mg/dL 5 00* 6 26* 5 75* 4 63*   < > 6 97*   < > 9 65*   EGFR ml/min/1 73sq m 10 7 8 11   < > 6   < > 4   CALCIUM mg/dL 7 5* 7 5* 7 3* 7 3*   < > 8 0*   < > 8 6   AST U/L  --   --   --  99*  --  171*  --  189*   ALT U/L  --   --   --  64  --  69  --  56   ALK PHOS U/L  --   --   --  76  --  100  --  139*    < > = values in this interval not displayed       Results from last 7 days   Lab Units 22  4874 06/01/22  1420 06/01/22  1101 06/01/22  1046   BLOOD CULTURE  No Growth at 72 hrs  No Growth at 72 hrs   --   --  No Growth After 5 Days  Staphylococcus coagulase negative*   GRAM STAIN RESULT   --   --   --  Gram positive cocci in clusters*   URINE CULTURE   --   --  No Growth <1000 cfu/mL  --    MRSA CULTURE ONLY   --  No Methicillin Resistant Staphlyococcus aureus (MRSA) isolated  --   --      Results from last 7 days   Lab Units 06/04/22  0457 06/02/22  0525 06/01/22  1046   PROCALCITONIN ng/ml 6 62* 7 60* 4 26*                   06/06/2022 left lower extremity CT:  Positive left thigh subcutaneous edema, no fluid collection or evident muscular/tendon injury

## 2022-06-07 NOTE — PLAN OF CARE
Problem: PAIN - ADULT  Goal: Verbalizes/displays adequate comfort level or baseline comfort level  Description: Interventions:  - Encourage patient to monitor pain and request assistance  - Assess pain using appropriate pain scale  - Administer analgesics based on type and severity of pain and evaluate response  - Implement non-pharmacological measures as appropriate and evaluate response  - Consider cultural and social influences on pain and pain management  - Notify physician/advanced practitioner if interventions unsuccessful or patient reports new pain  Outcome: Progressing     Problem: INFECTION - ADULT  Goal: Absence or prevention of progression during hospitalization  Description: INTERVENTIONS:  - Assess and monitor for signs and symptoms of infection  - Monitor lab/diagnostic results  - Monitor all insertion sites, i e  indwelling lines, tubes, and drains  - Monitor endotracheal if appropriate and nasal secretions for changes in amount and color  - [place] appropriate cooling/warming therapies per order  - Administer medications as ordered  - Instruct and encourage patient and family to use good hand hygiene technique  - Identify and instruct in appropriate isolation precautions for identified infection/condition  Outcome: Progressing     Problem: SAFETY ADULT  Goal: Patient will remain free of falls  Description: INTERVENTIONS:  - Educate patient/family on patient safety including physical limitations  - Instruct patient to call for assistance with activity   - Consult OT/PT to assist with strengthening/mobility   - Keep Call bell within reach  - Keep bed low and locked with side rails adjusted as appropriate  - Keep care items and personal belongings within reach  - Initiate and maintain comfort rounds  - Make Fall Risk Sign visible to staff  - Offer Toileting every 2 Hours, in advance of need  - Initiate/Maintain bed alarm  - Obtain necessary fall risk management equipment: alarm, antislip socks  - Apply yellow socks and bracelet for high fall risk patients  - Consider moving patient to room near nurses station  Outcome: Progressing  Goal: Maintain or return to baseline ADL function  Description: INTERVENTIONS:  -  Assess patient's ability to carry out ADLs; assess patient's baseline for ADL function and identify physical deficits which impact ability to perform ADLs (bathing, care of mouth/teeth, toileting, grooming, dressing, etc )  - Assess/evaluate cause of self-care deficits   - Assess range of motion  - Assess patient's mobility; develop plan if impaired  - Assess patient's need for assistive devices and provide as appropriate  - Encourage maximum independence but intervene and supervise when necessary  - Involve family in performance of ADLs  - Assess for home care needs following discharge   - Consider OT consult to assist with ADL evaluation and planning for discharge  - Provide patient education as appropriate  Outcome: Progressing  Goal: Maintains/Returns to pre admission functional level  Description: INTERVENTIONS:  - Perform BMAT or MOVE assessment daily    - Set and communicate daily mobility goal to care team and patient/family/caregiver  - Collaborate with rehabilitation services on mobility goals if consulted  - Perform Range of Motion 3 times a day  - Reposition patient every 3 hours    - Dangle patient 3 times a day  - Stand patient 3 times a day  - Ambulate patient 3 times a day  - Out of bed to chair 3 times a day   - Out of bed for meals 3 times a day  - Out of bed for toileting  - Record patient progress and toleration of activity level   Outcome: Progressing     Problem: DISCHARGE PLANNING  Goal: Discharge to home or other facility with appropriate resources  Description: INTERVENTIONS:  - Identify barriers to discharge w/patient and caregiver  - Arrange for needed discharge resources and transportation as appropriate  - Identify discharge learning needs (meds, wound care, etc )  - Arrange for interpretive services to assist at discharge as needed  - Refer to Case Management Department for coordinating discharge planning if the patient needs post-hospital services based on physician/advanced practitioner order or complex needs related to functional status, cognitive ability, or social support system  Outcome: Progressing     Problem: Knowledge Deficit  Goal: Patient/family/caregiver demonstrates understanding of disease process, treatment plan, medications, and discharge instructions  Description: Complete learning assessment and assess knowledge base    Interventions:  - Provide teaching at level of understanding  - Provide teaching via preferred learning methods  Outcome: Progressing     Problem: CARDIOVASCULAR - ADULT  Goal: Maintains optimal cardiac output and hemodynamic stability  Description: INTERVENTIONS:  - Monitor I/O, vital signs and rhythm  - Monitor for S/S and trends of decreased cardiac output  - Administer and titrate ordered vasoactive medications to optimize hemodynamic stability  - Assess quality of pulses, skin color and temperature  - Assess for signs of decreased coronary artery perfusion  - Instruct patient to report change in severity of symptoms  Outcome: Progressing  Goal: Absence of cardiac dysrhythmias or at baseline rhythm  Description: INTERVENTIONS:  - Continuous cardiac monitoring, vital signs, obtain 12 lead EKG if ordered  - Administer antiarrhythmic and heart rate control medications as ordered  - Monitor electrolytes and administer replacement therapy as ordered  Outcome: Progressing     Problem: METABOLIC, FLUID AND ELECTROLYTES - ADULT  Goal: Electrolytes maintained within normal limits  Description: INTERVENTIONS:  - Monitor labs and assess patient for signs and symptoms of electrolyte imbalances  - Administer electrolyte replacement as ordered  - Monitor response to electrolyte replacements, including repeat lab results as appropriate  - Instruct patient on fluid and nutrition as appropriate  Outcome: Progressing  Goal: Fluid balance maintained  Description: INTERVENTIONS:  - Monitor labs   - Monitor I/O and WT  - Instruct patient on fluid and nutrition as appropriate  - Assess for signs & symptoms of volume excess or deficit  Outcome: Progressing  Goal: Glucose maintained within target range  Description: INTERVENTIONS:  - Monitor Blood Glucose as ordered  - Assess for signs and symptoms of hyperglycemia and hypoglycemia  - Administer ordered medications to maintain glucose within target range  - Assess nutritional intake and initiate nutrition service referral as needed  Outcome: Progressing     Problem: MOBILITY - ADULT  Goal: Maintain or return to baseline ADL function  Description: INTERVENTIONS:  -  Assess patient's ability to carry out ADLs; assess patient's baseline for ADL function and identify physical deficits which impact ability to perform ADLs (bathing, care of mouth/teeth, toileting, grooming, dressing, etc )  - Assess/evaluate cause of self-care deficits   - Assess range of motion  - Assess patient's mobility; develop plan if impaired  - Assess patient's need for assistive devices and provide as appropriate  - Encourage maximum independence but intervene and supervise when necessary  - Involve family in performance of ADLs  - Assess for home care needs following discharge   - Consider OT consult to assist with ADL evaluation and planning for discharge  - Provide patient education as appropriate  Outcome: Progressing  Goal: Maintains/Returns to pre admission functional level  Description: INTERVENTIONS:  - Perform BMAT or MOVE assessment daily    - Set and communicate daily mobility goal to care team and patient/family/caregiver  - Collaborate with rehabilitation services on mobility goals if consulted  - Perform Range of Motion 3 times a day  - Reposition patient every 3 hours    - Dangle patient 3 times a day  - Stand patient 3 times a day  - Ambulate patient 3 times a day  - Out of bed to chair 3 times a day   - Out of bed for meals 3 times a day  - Out of bed for toileting  - Record patient progress and toleration of activity level   Outcome: Progressing     Problem: Prexisting or High Potential for Compromised Skin Integrity  Goal: Skin integrity is maintained or improved  Description: INTERVENTIONS:  - Identify patients at risk for skin breakdown  - Assess and monitor skin integrity  - Assess and monitor nutrition and hydration status  - Monitor labs   - Assess for incontinence   - Turn and reposition patient  - Assist with mobility/ambulation  - Relieve pressure over bony prominences  - Avoid friction and shearing  - Provide appropriate hygiene as needed including keeping skin clean and dry  - Evaluate need for skin moisturizer/barrier cream  - Collaborate with interdisciplinary team   - Patient/family teaching  - Consider wound care consult   Outcome: Progressing     Problem: Nutrition/Hydration-ADULT  Goal: Nutrient/Hydration intake appropriate for improving, restoring or maintaining nutritional needs  Description: Monitor and assess patient's nutrition/hydration status for malnutrition  Collaborate with interdisciplinary team and initiate plan and interventions as ordered  Monitor patient's weight and dietary intake as ordered or per policy  Utilize nutrition screening tool and intervene as necessary  Determine patient's food preferences and provide high-protein, high-caloric foods as appropriate       INTERVENTIONS:  - Monitor oral intake, urinary output, labs, and treatment plans  - Assess nutrition and hydration status and recommend course of action  - Evaluate amount of meals eaten  - Assist patient with eating if necessary   - Allow adequate time for meals  - Recommend/ encourage appropriate diets, oral nutritional supplements, and vitamin/mineral supplements  - Order, calculate, and assess calorie counts as needed  - Recommend, monitor, and adjust tube feedings and TPN/PPN based on assessed needs  - Assess need for intravenous fluids  - Provide specific nutrition/hydration education as appropriate  - Include patient/family/caregiver in decisions related to nutrition  Outcome: Progressing     Problem: Potential for Falls  Goal: Patient will remain free of falls  Description: INTERVENTIONS:  - Educate patient/family on patient safety including physical limitations  - Instruct patient to call for assistance with activity   - Consult OT/PT to assist with strengthening/mobility   - Keep Call bell within reach  - Keep bed low and locked with side rails adjusted as appropriate  - Keep care items and personal belongings within reach  - Initiate and maintain comfort rounds  - Make Fall Risk Sign visible to staff  - Offer Toileting every 2 Hours, in advance of need  - Initiate/Maintain bed alarm  - Obtain necessary fall risk management equipment: alarm, antislip socks  - Apply yellow socks and bracelet for high fall risk patients  - Consider moving patient to room near nurses station  Outcome: Progressing

## 2022-06-07 NOTE — PLAN OF CARE
Problem: PHYSICAL THERAPY ADULT  Goal: Performs mobility at highest level of function for planned discharge setting  See evaluation for individualized goals  Outcome: Progressing  Note:    Problem List: Decreased strength, Decreased endurance, Impaired balance, Decreased mobility, Decreased coordination, Decreased cognition, Impaired judgement, Decreased safety awareness, Impaired hearing, Pain, Decreased skin integrity  Assessment: Despite continued pain complaints to LLE pt cooperative and able to tolerate bed mobility, transfers, and short distance ambulation during physical therapy session  Pt has limited endurance for all functional mobility but was willing to do as much as he could tolerate  Pt will continue to benefit from skilled physical therapy services to further increase his active range of motion, strength, transfers, bed mobility, and ambulation with a RW  Pt remains appropriate for post acute rehab services when medically stable for discharge  PT Discharge Recommendation: Post acute rehabilitation services          See flowsheet documentation for full assessment

## 2022-06-08 ENCOUNTER — TELEPHONE (OUTPATIENT)
Dept: FAMILY MEDICINE CLINIC | Facility: CLINIC | Age: 77
End: 2022-06-08

## 2022-06-08 ENCOUNTER — TRANSITIONAL CARE MANAGEMENT (OUTPATIENT)
Dept: FAMILY MEDICINE CLINIC | Facility: CLINIC | Age: 77
End: 2022-06-08

## 2022-06-08 ENCOUNTER — PATIENT OUTREACH (OUTPATIENT)
Dept: FAMILY MEDICINE CLINIC | Facility: CLINIC | Age: 77
End: 2022-06-08

## 2022-06-08 LAB
BACTERIA BLD CULT: NORMAL
BACTERIA BLD CULT: NORMAL

## 2022-06-08 NOTE — PROGRESS NOTES
Received ADT alert  Pt discharged to [facility] [place]  CM will  Continue to monitor

## 2022-06-08 NOTE — TELEPHONE ENCOUNTER
Dr Alisa Cedeno    Patient called and cancelled 6/20 follow up appointment as he was transferred to [facility] rehab from [facility] yesterday  FYI

## 2022-06-29 ENCOUNTER — TELEPHONE (OUTPATIENT)
Dept: FAMILY MEDICINE CLINIC | Facility: CLINIC | Age: 77
End: 2022-06-29

## 2022-06-29 ENCOUNTER — TRANSITIONAL CARE MANAGEMENT (OUTPATIENT)
Dept: FAMILY MEDICINE CLINIC | Facility: CLINIC | Age: 77
End: 2022-06-29

## 2022-06-29 NOTE — TELEPHONE ENCOUNTER
Patient being discharged from [facility] on 6/30  DANIKA scheduled 7/13

## 2022-07-07 ENCOUNTER — TELEPHONE (OUTPATIENT)
Dept: FAMILY MEDICINE CLINIC | Facility: CLINIC | Age: 77
End: 2022-07-07

## 2022-07-07 NOTE — TELEPHONE ENCOUNTER
Stefanie Goncalves from [facility] called stating that patient is declining visits  They are not answering door  There is an open file at the [facility] that the neighbor's reported  She is asking for a nurse to give her a call back to discuss further

## 2022-07-11 ENCOUNTER — PATIENT OUTREACH (OUTPATIENT)
Dept: FAMILY MEDICINE CLINIC | Facility: CLINIC | Age: 77
End: 2022-07-11

## 2022-07-11 NOTE — PROGRESS NOTES
Chart review done  RNCM noticed that [facility] has been unable to reach patient or schedule a home visit  Outreach to Shabana Staples at [address]  Manuel Ruiz is unaware that patient has been declining VNA services  Attempted conference [facility] with [facility]  Left voice message  Received return call from Yarelis Benedict reports the following:    [facility] upon patients discharge from [address] [place] [address]  Wife declined home visit on 7/1  Multiple attempts to reach patient unsuccessful  Radha Cam, patients neighbor picked patient up from Rehab and reported "patients is living in an unhealthy situation " David Prieto from [facility] became involved  Placed referral to [facility] on Aging  It is reported that squad had to wear hazmat suites to enter patients home  It reported to be extremely cluttered and dirty  Pt has a wife that resides with him  Pt is caregiver for wife  China MCKEON attempted to assist patient with in home services [facility] [place] program) however patient refused to release any financial information  Above information provided to Shabana Staples [address]  All parties will continue to discuss options to meet patients needs

## 2022-07-13 ENCOUNTER — OFFICE VISIT (OUTPATIENT)
Dept: FAMILY MEDICINE CLINIC | Facility: CLINIC | Age: 77
End: 2022-07-13
Payer: MEDICARE

## 2022-07-13 VITALS
RESPIRATION RATE: 16 BRPM | TEMPERATURE: 97.7 F | SYSTOLIC BLOOD PRESSURE: 160 MMHG | DIASTOLIC BLOOD PRESSURE: 80 MMHG | HEIGHT: 68 IN | WEIGHT: 172 LBS | OXYGEN SATURATION: 96 % | BODY MASS INDEX: 26.07 KG/M2 | HEART RATE: 88 BPM

## 2022-07-13 DIAGNOSIS — M25.562 CHRONIC PAIN OF BOTH KNEES: ICD-10-CM

## 2022-07-13 DIAGNOSIS — R53.81 PHYSICAL DECONDITIONING: ICD-10-CM

## 2022-07-13 DIAGNOSIS — Z79.4 TYPE 2 DIABETES MELLITUS WITH MICROALBUMINURIA, WITH LONG-TERM CURRENT USE OF INSULIN (HCC): ICD-10-CM

## 2022-07-13 DIAGNOSIS — M25.561 CHRONIC PAIN OF BOTH KNEES: ICD-10-CM

## 2022-07-13 DIAGNOSIS — N18.6 ANEMIA DUE TO CHRONIC KIDNEY DISEASE, ON CHRONIC DIALYSIS (HCC): ICD-10-CM

## 2022-07-13 DIAGNOSIS — Z99.2 ANEMIA DUE TO CHRONIC KIDNEY DISEASE, ON CHRONIC DIALYSIS (HCC): ICD-10-CM

## 2022-07-13 DIAGNOSIS — R65.20 SEVERE SEPSIS (HCC): ICD-10-CM

## 2022-07-13 DIAGNOSIS — D64.9 SYMPTOMATIC ANEMIA: ICD-10-CM

## 2022-07-13 DIAGNOSIS — G89.29 CHRONIC PAIN OF BOTH KNEES: ICD-10-CM

## 2022-07-13 DIAGNOSIS — D63.1 ANEMIA DUE TO CHRONIC KIDNEY DISEASE, ON CHRONIC DIALYSIS (HCC): ICD-10-CM

## 2022-07-13 DIAGNOSIS — R80.9 TYPE 2 DIABETES MELLITUS WITH MICROALBUMINURIA, WITH LONG-TERM CURRENT USE OF INSULIN (HCC): ICD-10-CM

## 2022-07-13 DIAGNOSIS — E11.29 TYPE 2 DIABETES MELLITUS WITH MICROALBUMINURIA, WITH LONG-TERM CURRENT USE OF INSULIN (HCC): ICD-10-CM

## 2022-07-13 DIAGNOSIS — Z99.2 DIALYSIS PATIENT (HCC): ICD-10-CM

## 2022-07-13 DIAGNOSIS — A41.9 SEVERE SEPSIS (HCC): ICD-10-CM

## 2022-07-13 DIAGNOSIS — I10 BENIGN ESSENTIAL HYPERTENSION: Primary | ICD-10-CM

## 2022-07-13 PROBLEM — N17.9 ACUTE KIDNEY INJURY (HCC): Status: RESOLVED | Noted: 2022-03-07 | Resolved: 2022-07-13

## 2022-07-13 PROCEDURE — 99214 OFFICE O/P EST MOD 30 MIN: CPT | Performed by: FAMILY MEDICINE

## 2022-07-13 RX ORDER — INSULIN LISPRO 100 [IU]/ML
30 INJECTION, SUSPENSION SUBCUTANEOUS 2 TIMES DAILY WITH MEALS
Qty: 54 ML | Refills: 1 | Status: ON HOLD | OUTPATIENT
Start: 2022-07-13 | End: 2022-08-04 | Stop reason: SDUPTHER

## 2022-07-13 RX ORDER — ENALAPRIL MALEATE 10 MG/1
10 TABLET ORAL DAILY
COMMUNITY
End: 2022-07-19

## 2022-07-13 RX ORDER — AMLODIPINE BESYLATE 5 MG/1
5 TABLET ORAL DAILY
Qty: 90 TABLET | Refills: 3 | Status: SHIPPED | OUTPATIENT
Start: 2022-07-13 | End: 2022-08-27

## 2022-07-13 NOTE — PROGRESS NOTES
Chief Complaint   Patient presents with    Follow-up     D/C from [facility] 06/30/2022  Nipples feel hard         Patient ID: Kisahn Escamilla is a 64 y o  male  HPI  Pt is seeing for f/u recent hospital stay for severe sepsis -  Was sen to rehab for deconditioning -  since being home BP is elevated -  under nephrologist care for ESRD on dialysis     The following portions of the patient's history were reviewed and updated as appropriate: allergies, current medications, past family history, past medical history, past social history, past surgical history and problem list     Review of Systems   Constitutional: Positive for activity change and fatigue  Negative for appetite change and unexpected weight change  HENT: Negative  Respiratory: Negative  Cardiovascular: Negative  Musculoskeletal: Positive for arthralgias (both knees ) and gait problem (using a cane)  Psychiatric/Behavioral: Negative  Current Outpatient Medications   Medication Sig Dispense Refill    acetaminophen (TYLENOL) 325 mg tablet Take 2 tablets (650 mg total) by mouth every 6 (six) hours as needed for mild pain, headaches or fever  0    calcium acetate (PHOSLO) capsule Take 667 mg by mouth in the morning and 667 mg at noon and 667 mg in the evening  Take with meals        dutasteride (AVODART) 0 5 mg capsule Take 0 5 mg by mouth daily      enalapril (VASOTEC) 10 mg tablet Take 10 mg by mouth daily      glucose blood test strip 1 each by Other route 3 (three) times a day 300 each 3    HumaLOG Mix 75/25 KwikPen (75-25) 100 units/mL injection pen Inject 20 Units under the skin 2 (two) times a day with meals (Patient taking differently: Inject 30 Units under the skin 2 (two) times a day with meals) 90 mL 0    Insulin Pen Needle (B-D UF III MINI PEN NEEDLES) 31G X 5 MM MISC Use twice daily with insulin pen as directed 180 each 3    simvastatin (ZOCOR) 40 mg tablet Take 1 tablet (40 mg total) by mouth daily at bedtime 90 tablet 3    tamsulosin (FLOMAX) 0 4 mg TAKE 1 CAPSULE BY MOUTH TWO TIMES DAILY 180 capsule 1    calcium carbonate-vitamin D (OSCAL-D) 500 mg-200 units per tablet Take 1 tablet by mouth daily with breakfast (Patient not taking: Reported on 7/13/2022) 30 tablet 0    glycerin-hypromellose- (ARTIFICIAL TEARS) 0 2-0 2-1 % SOLN Administer 1 drop into the left eye 2 (two) times a day (Patient not taking: Reported on 7/13/2022)  0    latanoprost (XALATAN) 0 005 % ophthalmic solution Administer 1 drop to both eyes daily at bedtime (Patient not taking: Reported on 7/13/2022)      melatonin 3 mg Take 1 tablet (3 mg total) by mouth daily at bedtime as needed (insomnia) (Patient not taking: Reported on 7/13/2022)  0     No current facility-administered medications for this visit  Objective:    /80 (BP Location: Left arm, Patient Position: Sitting, Cuff Size: Large)   Pulse 88   Temp 97 7 °F (36 5 °C)   Resp 16   Ht 5' 8" (1 727 m)   Wt 78 kg (172 lb)   SpO2 96%   BMI 26 15 kg/m²        Physical Exam  Constitutional:       General: He is not in acute distress  Appearance: He is ill-appearing (chronically ill)  Cardiovascular:      Rate and Rhythm: Normal rate and regular rhythm  Pulmonary:      Effort: Pulmonary effort is normal  No respiratory distress  Breath sounds: No wheezing, rhonchi or rales  Musculoskeletal:      Right lower leg: No edema  Left lower leg: No edema  Skin:     Coloration: Skin is pale  Neurological:      General: No focal deficit present  Mental Status: He is alert and oriented to person, place, and time  Labs in chart were reviewed  Assessment/Plan:         Diagnoses and all orders for this visit:    Benign essential hypertension  -  Will add     amLODIPine (NORVASC) 5 mg tablet; Take 1 tablet (5 mg total) by mouth daily  F/u in 1 m   Chronic pain of both knees  -     Ambulatory Referral to Physical Therapy;  Future    Physical deconditioning  -     Ambulatory Referral to Physical Therapy; Future    Symptomatic anemia    Anemia due to chronic kidney disease, on chronic dialysis ([facility])    Type 2 diabetes mellitus with microalbuminuria, with long-term current use of insulin ([facility])  -     HumaLOG Mix 75/25 KwikPen (75-25) 100 units/mL injection pen; Inject 30 Units under the skin 2 (two) times a day with meals    Dialysis patient ([facility] )    Severe sepsis ([facility] )    Other orders  -     enalapril (VASOTEC) 10 mg tablet;  Take 10 mg by mouth daily          rto in 1 m for BP check                   Sarah Sahni MD

## 2022-07-15 ENCOUNTER — PATIENT OUTREACH (OUTPATIENT)
Dept: FAMILY MEDICINE CLINIC | Facility: CLINIC | Age: 77
End: 2022-07-15

## 2022-07-15 NOTE — PROGRESS NOTES
Received phone call from Jeramy Regan [facility]  Questions regarding patients medications  Provider at [facility] requested that patient bring in pill bottles for review, however patient did not  Chart review done  Pt saw provider on 7/13  Most recent AVS faxed to [facility] dialysis  Dialysis inquiring if home care services were started  At last conversation, wife was declining services  Outreach to Cipriano Sandifer at [employer]  She will investigate and return RN CM call

## 2022-07-20 ENCOUNTER — PATIENT OUTREACH (OUTPATIENT)
Dept: FAMILY MEDICINE CLINIC | Facility: CLINIC | Age: 77
End: 2022-07-20

## 2022-07-20 NOTE — PROGRESS NOTES
Declined home care services through [facility]  Pt being followed by  from [facility] dialysis  Complex episode closed

## 2022-07-26 ENCOUNTER — TELEPHONE (OUTPATIENT)
Dept: FAMILY MEDICINE CLINIC | Facility: CLINIC | Age: 77
End: 2022-07-26

## 2022-07-26 NOTE — TELEPHONE ENCOUNTER
Dr Nino Sees:      ROSEANN Rodriguez from [address] Special[facility] called in referenced to patient is refusing to take any medication unless they are prescribed by you  She adjusted his medication to losartan 50mg 2xs per day and amlodipine 10mg  She is very concerned about his health and well-being  Please contact her [phone] to discuss further

## 2022-08-02 ENCOUNTER — RA CDI HCC (OUTPATIENT)
Dept: OTHER | Facility: HOSPITAL | Age: 77
End: 2022-08-02

## 2022-08-02 ENCOUNTER — APPOINTMENT (EMERGENCY)
Dept: RADIOLOGY | Facility: HOSPITAL | Age: 77
DRG: 640 | End: 2022-08-02
Payer: MEDICARE

## 2022-08-02 ENCOUNTER — HOSPITAL ENCOUNTER (INPATIENT)
Facility: HOSPITAL | Age: 77
LOS: 2 days | Discharge: HOME | DRG: 640 | End: 2022-08-04
Attending: EMERGENCY MEDICINE | Admitting: INTERNAL MEDICINE
Payer: MEDICARE

## 2022-08-02 ENCOUNTER — APPOINTMENT (INPATIENT)
Dept: DIALYSIS | Facility: HOSPITAL | Age: 77
DRG: 640 | End: 2022-08-02
Payer: MEDICARE

## 2022-08-02 DIAGNOSIS — Z99.2 DEPENDENCE ON RENAL DIALYSIS (HCC): ICD-10-CM

## 2022-08-02 DIAGNOSIS — E87.1 HYPONATREMIA: ICD-10-CM

## 2022-08-02 DIAGNOSIS — E11.29 TYPE 2 DIABETES MELLITUS WITH MICROALBUMINURIA, WITH LONG-TERM CURRENT USE OF INSULIN (HCC): ICD-10-CM

## 2022-08-02 DIAGNOSIS — R53.1 WEAKNESS: ICD-10-CM

## 2022-08-02 DIAGNOSIS — E87.5 HYPERKALEMIA: Primary | ICD-10-CM

## 2022-08-02 DIAGNOSIS — Z79.4 TYPE 2 DIABETES MELLITUS WITH MICROALBUMINURIA, WITH LONG-TERM CURRENT USE OF INSULIN (HCC): ICD-10-CM

## 2022-08-02 DIAGNOSIS — R80.9 TYPE 2 DIABETES MELLITUS WITH MICROALBUMINURIA, WITH LONG-TERM CURRENT USE OF INSULIN (HCC): ICD-10-CM

## 2022-08-02 PROBLEM — N18.6 ESRD ON DIALYSIS (HCC): Status: ACTIVE | Noted: 2022-08-02

## 2022-08-02 LAB
2HR DELTA HS TROPONIN: 0 NG/L
4HR DELTA HS TROPONIN: 0 NG/L
ALBUMIN SERPL BCP-MCNC: 3.1 G/DL (ref 3.5–5)
ALP SERPL-CCNC: 79 U/L (ref 46–116)
ALT SERPL W P-5'-P-CCNC: 13 U/L (ref 12–78)
ANION GAP SERPL CALCULATED.3IONS-SCNC: 11 MMOL/L (ref 4–13)
AST SERPL W P-5'-P-CCNC: 15 U/L (ref 5–45)
ATRIAL RATE: 73 BPM
BASOPHILS # BLD AUTO: 0.05 THOUSANDS/ΜL (ref 0–0.1)
BASOPHILS NFR BLD AUTO: 1 % (ref 0–1)
BILIRUB SERPL-MCNC: 0.41 MG/DL (ref 0.2–1)
BUN SERPL-MCNC: 74 MG/DL (ref 5–25)
CALCIUM ALBUM COR SERPL-MCNC: 9.8 MG/DL (ref 8.3–10.1)
CALCIUM SERPL-MCNC: 9.1 MG/DL (ref 8.3–10.1)
CARDIAC TROPONIN I PNL SERPL HS: 11 NG/L
CHLORIDE SERPL-SCNC: 94 MMOL/L (ref 96–108)
CO2 SERPL-SCNC: 25 MMOL/L (ref 21–32)
CREAT SERPL-MCNC: 7.85 MG/DL (ref 0.6–1.3)
EOSINOPHIL # BLD AUTO: 0.08 THOUSAND/ΜL (ref 0–0.61)
EOSINOPHIL NFR BLD AUTO: 1 % (ref 0–6)
ERYTHROCYTE [DISTWIDTH] IN BLOOD BY AUTOMATED COUNT: 14.8 % (ref 11.6–15.1)
FLUAV RNA RESP QL NAA+PROBE: NEGATIVE
FLUBV RNA RESP QL NAA+PROBE: NEGATIVE
GFR SERPL CREATININE-BSD FRML MDRD: 5 ML/MIN/1.73SQ M
GLUCOSE SERPL-MCNC: 101 MG/DL (ref 65–140)
GLUCOSE SERPL-MCNC: 162 MG/DL (ref 65–140)
GLUCOSE SERPL-MCNC: 198 MG/DL (ref 65–140)
GLUCOSE SERPL-MCNC: 48 MG/DL (ref 65–140)
GLUCOSE SERPL-MCNC: 78 MG/DL (ref 65–140)
GLUCOSE SERPL-MCNC: 93 MG/DL (ref 65–140)
GLUCOSE SERPL-MCNC: 93 MG/DL (ref 65–140)
HCT VFR BLD AUTO: 38.8 % (ref 36.5–49.3)
HGB BLD-MCNC: 12.2 G/DL (ref 12–17)
IMM GRANULOCYTES # BLD AUTO: 0.05 THOUSAND/UL (ref 0–0.2)
IMM GRANULOCYTES NFR BLD AUTO: 1 % (ref 0–2)
LYMPHOCYTES # BLD AUTO: 0.78 THOUSANDS/ΜL (ref 0.6–4.47)
LYMPHOCYTES NFR BLD AUTO: 11 % (ref 14–44)
MCH RBC QN AUTO: 27.3 PG (ref 26.8–34.3)
MCHC RBC AUTO-ENTMCNC: 31.4 G/DL (ref 31.4–37.4)
MCV RBC AUTO: 87 FL (ref 82–98)
MONOCYTES # BLD AUTO: 0.63 THOUSAND/ΜL (ref 0.17–1.22)
MONOCYTES NFR BLD AUTO: 9 % (ref 4–12)
NEUTROPHILS # BLD AUTO: 5.4 THOUSANDS/ΜL (ref 1.85–7.62)
NEUTS SEG NFR BLD AUTO: 77 % (ref 43–75)
NRBC BLD AUTO-RTO: 0 /100 WBCS
P AXIS: 55 DEGREES
PLATELET # BLD AUTO: 309 THOUSANDS/UL (ref 149–390)
PMV BLD AUTO: 8.6 FL (ref 8.9–12.7)
POTASSIUM SERPL-SCNC: 4.6 MMOL/L (ref 3.5–5.3)
POTASSIUM SERPL-SCNC: 7.7 MMOL/L (ref 3.5–5.3)
PR INTERVAL: 252 MS
PROT SERPL-MCNC: 8.3 G/DL (ref 6.4–8.4)
QRS AXIS: 37 DEGREES
QRSD INTERVAL: 92 MS
QT INTERVAL: 354 MS
QTC INTERVAL: 389 MS
RBC # BLD AUTO: 4.47 MILLION/UL (ref 3.88–5.62)
RSV RNA RESP QL NAA+PROBE: NEGATIVE
SARS-COV-2 RNA RESP QL NAA+PROBE: NEGATIVE
SODIUM SERPL-SCNC: 130 MMOL/L (ref 135–147)
T WAVE AXIS: 36 DEGREES
TSH SERPL DL<=0.05 MIU/L-ACNC: 3.39 UIU/ML (ref 0.45–4.5)
VENTRICULAR RATE: 73 BPM
WBC # BLD AUTO: 6.99 THOUSAND/UL (ref 4.31–10.16)

## 2022-08-02 PROCEDURE — 87081 CULTURE SCREEN ONLY: CPT | Performed by: INTERNAL MEDICINE

## 2022-08-02 PROCEDURE — 90935 HEMODIALYSIS ONE EVALUATION: CPT | Performed by: INTERNAL MEDICINE

## 2022-08-02 PROCEDURE — 96374 THER/PROPH/DIAG INJ IV PUSH: CPT

## 2022-08-02 PROCEDURE — 80053 COMPREHEN METABOLIC PANEL: CPT | Performed by: EMERGENCY MEDICINE

## 2022-08-02 PROCEDURE — 36415 COLL VENOUS BLD VENIPUNCTURE: CPT | Performed by: EMERGENCY MEDICINE

## 2022-08-02 PROCEDURE — 84132 ASSAY OF SERUM POTASSIUM: CPT | Performed by: INTERNAL MEDICINE

## 2022-08-02 PROCEDURE — 84443 ASSAY THYROID STIM HORMONE: CPT | Performed by: EMERGENCY MEDICINE

## 2022-08-02 PROCEDURE — 0241U HB NFCT DS VIR RESP RNA 4 TRGT: CPT | Performed by: INTERNAL MEDICINE

## 2022-08-02 PROCEDURE — 94640 AIRWAY INHALATION TREATMENT: CPT

## 2022-08-02 PROCEDURE — 82948 REAGENT STRIP/BLOOD GLUCOSE: CPT

## 2022-08-02 PROCEDURE — 99222 1ST HOSP IP/OBS MODERATE 55: CPT | Performed by: INTERNAL MEDICINE

## 2022-08-02 PROCEDURE — 93010 ELECTROCARDIOGRAM REPORT: CPT | Performed by: INTERNAL MEDICINE

## 2022-08-02 PROCEDURE — 99223 1ST HOSP IP/OBS HIGH 75: CPT | Performed by: INTERNAL MEDICINE

## 2022-08-02 PROCEDURE — 99285 EMERGENCY DEPT VISIT HI MDM: CPT | Performed by: EMERGENCY MEDICINE

## 2022-08-02 PROCEDURE — 84484 ASSAY OF TROPONIN QUANT: CPT | Performed by: EMERGENCY MEDICINE

## 2022-08-02 PROCEDURE — 85025 COMPLETE CBC W/AUTO DIFF WBC: CPT | Performed by: EMERGENCY MEDICINE

## 2022-08-02 PROCEDURE — 71045 X-RAY EXAM CHEST 1 VIEW: CPT

## 2022-08-02 PROCEDURE — 93005 ELECTROCARDIOGRAM TRACING: CPT

## 2022-08-02 PROCEDURE — 5A1D70Z PERFORMANCE OF URINARY FILTRATION, INTERMITTENT, LESS THAN 6 HOURS PER DAY: ICD-10-PCS | Performed by: INTERNAL MEDICINE

## 2022-08-02 PROCEDURE — 99285 EMERGENCY DEPT VISIT HI MDM: CPT

## 2022-08-02 RX ORDER — FINASTERIDE 5 MG/1
5 TABLET, FILM COATED ORAL DAILY
Status: DISCONTINUED | OUTPATIENT
Start: 2022-08-02 | End: 2022-08-04 | Stop reason: HOSPADM

## 2022-08-02 RX ORDER — INSULIN ASPART 100 [IU]/ML
20 INJECTION, SUSPENSION SUBCUTANEOUS
Status: DISCONTINUED | OUTPATIENT
Start: 2022-08-02 | End: 2022-08-03

## 2022-08-02 RX ORDER — ALBUTEROL SULFATE 2.5 MG/3ML
2.5 SOLUTION RESPIRATORY (INHALATION) ONCE
Status: COMPLETED | OUTPATIENT
Start: 2022-08-02 | End: 2022-08-02

## 2022-08-02 RX ORDER — PRAVASTATIN SODIUM 40 MG
40 TABLET ORAL
Refills: 3 | Status: DISCONTINUED | OUTPATIENT
Start: 2022-08-02 | End: 2022-08-04 | Stop reason: HOSPADM

## 2022-08-02 RX ORDER — TAMSULOSIN HYDROCHLORIDE 0.4 MG/1
0.8 CAPSULE ORAL
COMMUNITY
End: 2022-10-17 | Stop reason: SDUPTHER

## 2022-08-02 RX ORDER — DEXTROSE MONOHYDRATE 100 MG/ML
500 INJECTION, SOLUTION INTRAVENOUS CONTINUOUS
Status: DISCONTINUED | OUTPATIENT
Start: 2022-08-02 | End: 2022-08-02

## 2022-08-02 RX ORDER — TAMSULOSIN HYDROCHLORIDE 0.4 MG/1
0.4 CAPSULE ORAL
Status: DISCONTINUED | OUTPATIENT
Start: 2022-08-02 | End: 2022-08-04 | Stop reason: HOSPADM

## 2022-08-02 RX ORDER — DEXTROSE MONOHYDRATE 25 G/50ML
25 INJECTION, SOLUTION INTRAVENOUS ONCE
Status: COMPLETED | OUTPATIENT
Start: 2022-08-02 | End: 2022-08-02

## 2022-08-02 RX ORDER — LANOLIN ALCOHOL/MO/W.PET/CERES
3 CREAM (GRAM) TOPICAL
Status: DISCONTINUED | OUTPATIENT
Start: 2022-08-02 | End: 2022-08-04 | Stop reason: HOSPADM

## 2022-08-02 RX ORDER — FUROSEMIDE 10 MG/ML
40 INJECTION INTRAMUSCULAR; INTRAVENOUS ONCE
Status: DISCONTINUED | OUTPATIENT
Start: 2022-08-02 | End: 2022-08-02

## 2022-08-02 RX ORDER — INSULIN LISPRO 100 [IU]/ML
1-6 INJECTION, SOLUTION INTRAVENOUS; SUBCUTANEOUS
Status: DISCONTINUED | OUTPATIENT
Start: 2022-08-02 | End: 2022-08-04 | Stop reason: HOSPADM

## 2022-08-02 RX ORDER — INSULIN ASPART 100 [IU]/ML
30 INJECTION, SUSPENSION SUBCUTANEOUS
Refills: 1 | Status: DISCONTINUED | OUTPATIENT
Start: 2022-08-02 | End: 2022-08-02

## 2022-08-02 RX ORDER — ACETAMINOPHEN 325 MG/1
650 TABLET ORAL EVERY 6 HOURS PRN
Status: DISCONTINUED | OUTPATIENT
Start: 2022-08-02 | End: 2022-08-04 | Stop reason: HOSPADM

## 2022-08-02 RX ORDER — ONDANSETRON 2 MG/ML
4 INJECTION INTRAMUSCULAR; INTRAVENOUS EVERY 4 HOURS PRN
Status: DISCONTINUED | OUTPATIENT
Start: 2022-08-02 | End: 2022-08-04 | Stop reason: HOSPADM

## 2022-08-02 RX ORDER — AMLODIPINE BESYLATE 5 MG/1
5 TABLET ORAL DAILY
Status: DISCONTINUED | OUTPATIENT
Start: 2022-08-02 | End: 2022-08-04 | Stop reason: HOSPADM

## 2022-08-02 RX ORDER — CALCIUM ACETATE 667 MG/1
667 CAPSULE ORAL
Status: DISCONTINUED | OUTPATIENT
Start: 2022-08-02 | End: 2022-08-04 | Stop reason: HOSPADM

## 2022-08-02 RX ORDER — CALCIUM GLUCONATE 20 MG/ML
1 INJECTION, SOLUTION INTRAVENOUS ONCE
Status: COMPLETED | OUTPATIENT
Start: 2022-08-02 | End: 2022-08-02

## 2022-08-02 RX ORDER — HEPARIN SODIUM 5000 [USP'U]/ML
5000 INJECTION, SOLUTION INTRAVENOUS; SUBCUTANEOUS EVERY 8 HOURS SCHEDULED
Status: DISCONTINUED | OUTPATIENT
Start: 2022-08-02 | End: 2022-08-04 | Stop reason: HOSPADM

## 2022-08-02 RX ADMIN — HEPARIN SODIUM 5000 UNITS: 5000 INJECTION INTRAVENOUS; SUBCUTANEOUS at 21:47

## 2022-08-02 RX ADMIN — CALCIUM GLUCONATE 1 G: 20 INJECTION, SOLUTION INTRAVENOUS at 08:36

## 2022-08-02 RX ADMIN — INSULIN HUMAN 10 UNITS: 100 INJECTION, SOLUTION PARENTERAL at 10:06

## 2022-08-02 RX ADMIN — FINASTERIDE 5 MG: 5 TABLET, FILM COATED ORAL at 14:29

## 2022-08-02 RX ADMIN — ALBUTEROL SULFATE 2.5 MG: 2.5 SOLUTION RESPIRATORY (INHALATION) at 08:32

## 2022-08-02 RX ADMIN — CALCIUM ACETATE 667 MG: 667 CAPSULE ORAL at 17:04

## 2022-08-02 RX ADMIN — INSULIN ASPART 20 UNITS: 100 INJECTION, SUSPENSION SUBCUTANEOUS at 18:00

## 2022-08-02 RX ADMIN — PRAVASTATIN SODIUM 40 MG: 40 TABLET ORAL at 17:04

## 2022-08-02 RX ADMIN — DEXTROSE MONOHYDRATE 25 ML: 25 INJECTION, SOLUTION INTRAVENOUS at 10:00

## 2022-08-02 RX ADMIN — AMLODIPINE BESYLATE 5 MG: 5 TABLET ORAL at 14:30

## 2022-08-02 RX ADMIN — CALCIUM ACETATE 667 MG: 667 CAPSULE ORAL at 12:00

## 2022-08-02 RX ADMIN — HEPARIN SODIUM 5000 UNITS: 5000 INJECTION INTRAVENOUS; SUBCUTANEOUS at 14:29

## 2022-08-02 RX ADMIN — INSULIN LISPRO 1 UNITS: 100 INJECTION, SOLUTION INTRAVENOUS; SUBCUTANEOUS at 17:20

## 2022-08-02 RX ADMIN — TAMSULOSIN HYDROCHLORIDE 0.4 MG: 0.4 CAPSULE ORAL at 17:04

## 2022-08-02 NOTE — ED PROVIDER NOTES
History  Chief Complaint   Patient presents with    Weakness - Generalized     Per EMS pt  Is unable to walk  Pt found sitting on porch  Very unkept  Pt states he is shaky, weak and nervous  pt due for dialysis this am     Patient states he has been on dialysis for about 5 months  Patient states he has not felt right after the last several dialysis sessions  Patient has been feeling nervous, and weak  States he has been eating and drinking normally  Has not had vomiting or diarrhea  Denies shortness of breath or pain  Patient states today when he awoke he was too weak to get out of bed  States the weakness was not focal but generalized  Arrives awake and alert by ambulance  Prior to Admission Medications   Prescriptions Last Dose Informant Patient Reported? Taking? HumaLOG Mix 75/25 KwikPen (75-25) 100 units/mL injection pen   No No   Sig: Inject 30 Units under the skin 2 (two) times a day with meals   Insulin Pen Needle (B-D UF III MINI PEN NEEDLES) 31G X 5 MM MISC   No No   Sig: Use twice daily with insulin pen as directed   acetaminophen (TYLENOL) 325 mg tablet   No No   Sig: Take 2 tablets (650 mg total) by mouth every 6 (six) hours as needed for mild pain, headaches or fever   amLODIPine (NORVASC) 5 mg tablet   No No   Sig: Take 1 tablet (5 mg total) by mouth daily   calcium acetate (PHOSLO) capsule   Yes No   Sig: Take 667 mg by mouth in the morning and 667 mg at noon and 667 mg in the evening  Take with meals     calcium carbonate-vitamin D (OSCAL-D) 500 mg-200 units per tablet   No No   Sig: Take 1 tablet by mouth daily with breakfast   Patient not taking: Reported on 2022   dutasteride (AVODART) 0 5 mg capsule   Yes No   Sig: Take 0 5 mg by mouth daily   glucose blood test strip   No No   Si each by Other route 3 (three) times a day   glycerin-hypromellose- (ARTIFICIAL TEARS) 0 2-0 2-1 % SOLN   No No   Sig: Administer 1 drop into the left eye 2 (two) times a day   Patient not taking: Reported on 7/13/2022   latanoprost (XALATAN) 0 005 % ophthalmic solution   Yes No   Sig: Administer 1 drop to both eyes daily at bedtime   Patient not taking: Reported on 7/13/2022   losartan (COZAAR) 50 mg tablet   No No   Sig: Take 1 tablet (50 mg total) by mouth 2 (two) times a day   melatonin 3 mg   No No   Sig: Take 1 tablet (3 mg total) by mouth daily at bedtime as needed (insomnia)   Patient not taking: Reported on 7/13/2022   simvastatin (ZOCOR) 40 mg tablet   No No   Sig: Take 1 tablet (40 mg total) by mouth daily at bedtime   tamsulosin (FLOMAX) 0 4 mg  Self No No   Sig: TAKE 1 CAPSULE BY MOUTH TWO TIMES DAILY      Facility-Administered Medications: None       Past Medical History:   Diagnosis Date    Anesthesia complication 0747    after colonoscopy , pt was awake but could not control his body and kept falling     Arthritis     Bladder calculi     BPH (benign prostatic hyperplasia)     Diabetes mellitus ([address] )     Hearing disorder of both ears     wears bilateral hearing aids    Hyperlipidemia     controlled and maintained d/t diabetes    Hypertension     Kidney stones     Last Assessed:4/3/2017    Lyme disease     Last Assessed:6/29/2015    Rupture, bladder, spontaneous     Last Assessed:4/3/2017       Past Surgical History:   Procedure Laterality Date    BLADDER REPAIR N/A 12/10/2016    Procedure: REPAIR BLADDER/cysto insertion stent;  Surgeon: Francy Tiwari MD;  Location: [address] [place] [address];  Service:     COLONOSCOPY      CYSTOLITHOTOMY      ANESTHESIA   lower abdomen  ;  Last Assessed:4/3/2017    IR ASPIRATION ONLY  5/8/2022    IR BIOPSY KIDNEY RANDOM  3/10/2022    IR TUNNELED DIALYSIS CATHETER PLACEMENT  3/8/2022    OTHER SURGICAL HISTORY      thermal dilation of the prostate x 2 ( in the office)    TONSILLECTOMY      TRANSURETHRAL RESECTION OF PROSTATE N/A 12/8/2016    Procedure:  Cysto, Laser Bladder stone,fulgaration of prostates tissue ;  Surgeon: Francy Tiwari MD; Location: [facility];  Service:        Family History   Problem Relation Age of Onset    Cancer Mother         breast    Diabetes Mother     Cancer Father         prostate w/ bone mets    Prostate cancer Father     Alzheimer's disease Sister      I have reviewed and agree with the history as documented  E-Cigarette/Vaping    E-Cigarette Use Never User      E-Cigarette/Vaping Substances    Nicotine No     THC No     CBD No     Flavoring No     Other No     Unknown No      Social History     Tobacco Use    Smoking status: Former Smoker     Types: Cigars     Quit date:      Years since quittin 6    Smokeless tobacco: Never Used   Vaping Use    Vaping Use: Never used   Substance Use Topics    Alcohol use: Yes    Drug use: No       Review of Systems   Constitutional: Positive for fatigue  Negative for chills, diaphoresis and fever  HENT: Negative for congestion and sore throat  Eyes: Negative for visual disturbance  Respiratory: Negative for cough and shortness of breath  Cardiovascular: Negative for chest pain  Gastrointestinal: Negative for abdominal pain, diarrhea and vomiting  Genitourinary: Positive for decreased urine volume  Musculoskeletal: Negative for back pain  Skin: Negative for rash  Neurological: Positive for weakness  Hematological: Does not bruise/bleed easily  Psychiatric/Behavioral: Negative for confusion  All other systems reviewed and are negative  Physical Exam  Physical Exam  Vitals and nursing note reviewed  Constitutional:       Appearance: Normal appearance  HENT:      Head: Normocephalic  Right Ear: External ear normal       Left Ear: External ear normal       Nose: Nose normal       Mouth/Throat:      Pharynx: Oropharynx is clear  Eyes:      Conjunctiva/sclera: Conjunctivae normal    Cardiovascular:      Rate and Rhythm: Normal rate and regular rhythm  Pulses: Normal pulses     Pulmonary:      Effort: Pulmonary effort is normal       Breath sounds: Normal breath sounds  Abdominal:      Palpations: Abdomen is soft  Tenderness: There is no abdominal tenderness  Musculoskeletal:         General: Normal range of motion  Cervical back: Normal range of motion and neck supple  Right lower leg: No edema  Left lower leg: No edema  Skin:     General: Skin is warm and dry  Capillary Refill: Capillary refill takes less than 2 seconds  Neurological:      General: No focal deficit present  Mental Status: He is alert and oriented to person, place, and time     Psychiatric:         Mood and Affect: Mood normal          Behavior: Behavior normal          Vital Signs  ED Triage Vitals [08/02/22 0702]   Temperature Pulse Respirations Blood Pressure SpO2   (!) 97 4 °F (36 3 °C) 77 20 (!) 176/78 100 %      Temp src Heart Rate Source Patient Position - Orthostatic VS BP Location FiO2 (%)   -- -- -- -- --      Pain Score       No Pain           Vitals:    08/02/22 0702   BP: (!) 176/78   Pulse: 77         Visual Acuity      ED Medications  Medications   calcium gluconate 1 g in sodium chloride 0 9% 50 mL (premix) (1 g Intravenous New Bag 8/2/22 0836)   insulin regular (HumuLIN R,NovoLIN R) injection 10 Units (has no administration in time range)   dextrose infusion 10 % (has no administration in time range)   furosemide (LASIX) injection 40 mg (has no administration in time range)   albuterol inhalation solution 2 5 mg (2 5 mg Nebulization Given 8/2/22 0832)       Diagnostic Studies  Results Reviewed     Procedure Component Value Units Date/Time    Comprehensive metabolic panel [[id number]]  (Abnormal) Collected: 08/02/22 0722    Lab Status: Final result Specimen: Blood from Arm, Right Updated: 08/02/22 0818     Sodium 130 mmol/L      Potassium 7 7 mmol/L      Chloride 94 mmol/L      CO2 25 mmol/L      ANION GAP 11 mmol/L      BUN 74 mg/dL      Creatinine 7 85 mg/dL      Glucose 93 mg/dL      Calcium 9 1 mg/dL Corrected Calcium 9 8 mg/dL      AST 15 U/L      ALT 13 U/L      Alkaline Phosphatase 79 U/L      Total Protein 8 3 g/dL      Albumin 3 1 g/dL      Total Bilirubin 0 41 mg/dL      eGFR 5 ml/min/1 73sq m     Narrative:      [place] guidelines for Chronic Kidney Disease (CKD):     Stage 1 with normal or high GFR (GFR > 90 mL/min/1 73 square meters)    Stage 2 Mild CKD (GFR = 60-89 mL/min/1 73 square meters)    Stage 3A Moderate CKD (GFR = 45-59 mL/min/1 73 square meters)    Stage 3B Moderate CKD (GFR = 30-44 mL/min/1 73 square meters)    Stage 4 Severe CKD (GFR = 15-29 mL/min/1 73 square meters)    Stage 5 End Stage CKD (GFR <15 mL/min/1 73 square meters)  Note: GFR calculation is accurate only with a steady state creatinine    TSH [[id number]]  (Normal) Collected: 08/02/22 0722    Lab Status: Final result Specimen: Blood from Arm, Right Updated: 08/02/22 0812     TSH 3RD GENERATON 3 393 uIU/mL     Narrative:      Patients undergoing fluorescein dye angiography may retain small amounts of fluorescein in the body for 48-72 hours post procedure  Samples containing fluorescein can produce falsely depressed TSH values  If the patient had this procedure,a specimen should be resubmitted post fluorescein clearance        HS Troponin 0hr (reflex protocol) [[id number]]  (Normal) Collected: 08/02/22 0722    Lab Status: Final result Specimen: Blood from Arm, Right Updated: 08/02/22 0809     hs TnI 0hr 11 ng/L     HS Troponin I 2hr [[id number]]     Lab Status: No result Specimen: Blood     HS Troponin I 4hr [[id number]]     Lab Status: No result Specimen: Blood     CBC and differential [[id number]]  (Abnormal) Collected: 08/02/22 0722    Lab Status: Final result Specimen: Blood from Arm, Right Updated: 08/02/22 0729     WBC 6 99 Thousand/uL      RBC 4 47 Million/uL      Hemoglobin 12 2 g/dL      Hematocrit 38 8 %      MCV 87 fL      MCH 27 3 pg      MCHC 31 4 g/dL      RDW 14 8 %      MPV 8 6 fL Platelets 979 Thousands/uL      nRBC 0 /100 WBCs      Neutrophils Relative 77 %      Immat GRANS % 1 %      Lymphocytes Relative 11 %      Monocytes Relative 9 %      Eosinophils Relative 1 %      Basophils Relative 1 %      Neutrophils Absolute 5 40 Thousands/µL      Immature Grans Absolute 0 05 Thousand/uL      Lymphocytes Absolute 0 78 Thousands/µL      Monocytes Absolute 0 63 Thousand/µL      Eosinophils Absolute 0 08 Thousand/µL      Basophils Absolute 0 05 Thousands/µL                  XR chest 1 view portable    (Results Pending)              Procedures  ECG 12 Lead Documentation Only    Date/Time: 8/2/2022 7:08 AM  Performed by: Carson Charles MD  Authorized by: Carson Charles MD     Indications / Diagnosis:  Weakness  ECG reviewed by me, the ED Provider: yes    Patient location:  ED  Interpretation:     Interpretation: abnormal    Rate:     ECG rate:  73    ECG rate assessment: normal    Rhythm:     Rhythm: sinus rhythm    Ectopy:     Ectopy: none    QRS:     QRS axis:  Normal    QRS intervals:  Normal  Conduction:     Conduction: normal    ST segments:     ST segments:  Normal  T waves:     T waves: peaked               ED Course                                             MDM  Number of Diagnoses or Management Options  Diagnosis management comments: Patient missed dialysis this morning    Will check metabolic profile, electrolyte panel      Disposition  Final diagnoses:   Hyperkalemia   Weakness   Dependence [facility] ([facility] )   Hyponatremia     Time reflects when diagnosis was documented in both MDM as applicable and the Disposition within this note     Time User Action Codes Description Comment    8/2/2022  8:41 AM Hemant Drape A Add [E87 5] Hyperkalemia     8/2/2022  8:41 AM Hemant Drape A Add [R53 1] Weakness     8/2/2022  8:41 AM Hemant Drape A Add [Z99 2] Dependence on renal dialysis ([facility] )     8/2/2022  8:41 AM Hemant Drape A Add [E87 1] Hyponatremia       ED Disposition     ED Disposition   Admit    Condition   Stable    Date/Time   Tue Aug 2, 2022  8:41 AM    Comment   Case was discussed with hospitalist and the patient's admission status was agreed to be Admission Status: inpatient status to the service of Dr Trinna Moritz   Follow-up Information    None         Patient's Medications   Discharge Prescriptions    No medications on file       No discharge procedures on file      PDMP Review     None          ED Provider  Electronically Signed by           Chidi Connelly MD  08/02/22 8319

## 2022-08-02 NOTE — PROCEDURES
Hemodialysis note:    Patient was transported to his room he was seen while on dialysis which was done urgently and was much appreciated that the nurse came so quickly for severe hyperkalemia  Details of today's treatment outline below and patient was seen while on dialysis and was stable hemodynamically  HEMODIALYSIS PROCEDURE NOTE  The patient was seen and examined on hemodialysis  Time: 3 5 hours  Sodium: 138 Blood flow: 350   Dialyzer: F160 Potassium: 1 Dialysate flow: 600   Access: catheter Bicarbonate: 35 Ultrafiltration goal: 3 5 L as tolerated        Monitor hemodynamically and for repeat potassium this afternoon after dialysis

## 2022-08-02 NOTE — ASSESSMENT & PLAN NOTE
· ESRD on HD TTS  Last dialysis was on Saturday    · Given hyperkalemia patient getting schedule treatment today per nephrology    Results from last 7 days   Lab Units 08/02/22  0722   BUN mg/dL 74*   CREATININE mg/dL 7 85*   EGFR ml/min/1 73sq m 5

## 2022-08-02 NOTE — PROGRESS NOTES
E11 42  [facility]  coding opportunities          Chart Reviewed number of suggestions sent to Provider: 1     Patients Insurance     Medicare Insurance: [employer]

## 2022-08-02 NOTE — PLAN OF CARE
Emergent hemodialysis treatment planned for 210 minutes using a 1 K+ bath for potassium 7 7 this morning  Fluid goal 3500 ml as tolerated  Treatment review with Dr Wayne Dotson via [facility]            Post-Dialysis RN Treatment Note    Blood Pressure:  Pre 155/89 mm/Hg  Post 187/82 mmHg   EDW:  TBD    Weight:  Pre 78 kg kg   Post 76 1 kg   Mode of weight measurement:   Bed scale   Volume Removed:  2504 ml    Treatment duration:   210 minutes    NS given:   none    Treatment shortened:   no   Medications given during Rx:   none   Estimated Kt/V:   none   Access type:    RIJ Permacath   Access Issues:    Maintains 350 bfr   Report called to primary nurse:   Verbal at bedside            Problem: METABOLIC, FLUID AND ELECTROLYTES - ADULT  Goal: Electrolytes maintained within normal limits  Description: INTERVENTIONS:  - Monitor labs and assess patient for signs and symptoms of electrolyte imbalances  - Administer electrolyte replacement as ordered  - Monitor response to electrolyte replacements, including repeat lab results as appropriate  - Instruct patient on fluid and nutrition as appropriate  Outcome: Progressing  Goal: Fluid balance maintained  Description: INTERVENTIONS:  - Monitor labs   - Monitor I/O and WT  - Instruct patient on fluid and nutrition as appropriate  - Assess for signs & symptoms of volume excess or deficit  Outcome: Progressing

## 2022-08-02 NOTE — CONSULTS
2           Consultation - Nephrology   Edvin Callahan 76 y o  male MRN: [id number]  Unit/Bed#: [address]01 Encounter: [id number]      Assessment/Plan     Assessment / Plan:  1  Renal    The patient has end-stage renal disease he has a very poor historian for me and talking to the ER it appears he was admitted to the hospital after being referred over for weakness  The patient had severe hyperkalemia with potassium of 7 7  This is likely the etiology of weakness  Dialysis nurse contacted patient will undergo dialysis on low-potassium bath  Hemoglobin is 12 2  He did receive insulin, dextrose and calcium gluconate in the emergency room  Hemodialysis on 1 potassium bath for 3 hours  Repeat potassium later this afternoon  Continue dialysis Tuesday Thursday Saturday  To resume phosphate binders with meals    2  History of ANCA vasculitis    Patient has end-stage renal disease as a result of this and in the past had hemoptysis  There is none now  From review of his primary nephrologist of record he received Cytoxan for period of time and steroids now is on no immunosuppression  History of Present Illness   Physician Requesting Consult: Danny Bernheim, DO  Reason for Consult / Principal Problem:  End-stage renal disease and hyperkalemia  Hx and PE limited by:   HPI: Edvin Callahan is a 76y o  year old male who was weak and was unable to walk and was found sitting on his porch  He was scheduled to go dialysis but given this EMS brought him into the hospital he was found have severe hyperkalemia with a potassium 7 7  When I asked him he states he had dialysis Saturday and never misses  History obtained from chart review and and the patient  Inpatient consult to Nephrology  Consult performed by: Storm Avila MD  Consult ordered by: Vicky Aaron MD          Review of Systems   Constitutional: Positive for fatigue  Negative for chills, diaphoresis and fever  HENT: Negative      Eyes: Negative  Respiratory: Negative for cough, chest tightness, shortness of breath and wheezing  Cardiovascular: Negative for chest pain, palpitations and leg swelling  Gastrointestinal: Negative for abdominal pain, diarrhea, nausea and vomiting  Endocrine: Negative  Musculoskeletal: Negative for arthralgias, back pain and myalgias  Feels weak in his muscles   Skin: Negative for rash  Neurological: Positive for weakness  Negative for dizziness, seizures, syncope and headaches  Psychiatric/Behavioral: Negative for agitation, confusion and hallucinations  The patient is not nervous/anxious and is not hyperactive          Historical Information   Patient Active Problem List   Diagnosis    Bladder stones    Benign colon polyp    Benign essential hypertension    Benign prostatic hyperplasia with lower urinary tract symptoms    Hyperlipidemia    Type 2 diabetes mellitus with chronic kidney disease on chronic dialysis, with long-term current use of insulin (HCC)    Vitamin D deficiency    Chronic pain of right knee    Primary osteoarthritis of right knee    Elevated PSA    Symptomatic anemia    Chest pain    Hyperkalemia    Melena    Other proteinuria    Hemoptysis    Anemia due to chronic kidney disease, on chronic dialysis ([facility] )    Dialysis patient ([place] [address] )    Pulmonary hypertension ([place] [address] )    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    Severe sepsis ([facility])    Rhabdomyolysis    Left leg pain    Hyponatremia    ESRD on dialysis [facility])     Past Medical History:   Diagnosis Date    Anesthesia complication 1899    after colonoscopy , pt was awake but could not control his body and kept falling     Arthritis     Bladder calculi     BPH (benign prostatic hyperplasia)     Diabetes mellitus ([facility] )     Hearing disorder of both ears     wears bilateral hearing aids    Hyperlipidemia     controlled and maintained d/t diabetes    Hypertension     Kidney stones     Last Assessed:4/3/2017    Lyme disease     Last Assessed:2015    Rupture, bladder, spontaneous     Last Assessed:4/3/2017     Past Surgical History:   Procedure Laterality Date    BLADDER REPAIR N/A 12/10/2016    Procedure: REPAIR BLADDER/cysto insertion stent;  Surgeon: William Tanner MD;  Location: [address] [place] [address];  Service:     COLONOSCOPY      CYSTOLITHOTOMY      ANESTHESIA   lower abdomen  ; Last Assessed:4/3/2017    IR ASPIRATION ONLY  2022    IR BIOPSY KIDNEY RANDOM  3/10/2022    IR TUNNELED DIALYSIS CATHETER PLACEMENT  3/8/2022    OTHER SURGICAL HISTORY      thermal dilation of the prostate x 2 ( in the office)    TONSILLECTOMY      TRANSURETHRAL RESECTION OF PROSTATE N/A 2016    Procedure:  Cysto, Laser Bladder stone,fulgaration of prostates tissue ;  Surgeon: William Tanner MD;  [facility];  Service:      Social History   Social History     Substance and Sexual Activity   Alcohol Use Yes     Social History     Substance and Sexual Activity   Drug Use No     Social History     Tobacco Use   Smoking Status Former Smoker    Types: Cigars    Quit date: 26    Years since quittin 6   Smokeless Tobacco Never Used     Family History   Problem Relation Age of Onset    Cancer Mother         breast    Diabetes Mother     Cancer Father         prostate w/ bone mets    Prostate cancer Father     Alzheimer's disease Sister        Meds/Allergies   current meds:   Current Facility-Administered Medications   Medication Dose Route Frequency    dextrose infusion 10 %  500 mL Intravenous Continuous    furosemide (LASIX) injection 40 mg  40 mg Intravenous Once    insulin regular (HumuLIN R,NovoLIN R) injection 10 Units  10 Units Intravenous Once     Allergies   Allergen Reactions    Amoxicillin Rash       Objective   No intake or output data in the 24 hours ending 22 0934  Body mass index is 25 81 kg/m²      Invasive Devices:        PHYSICAL EXAM:  /74   Pulse 86   Temp (!) 97 4 °F (36 3 °C) Resp 17   Wt 77 kg (169 lb 12 1 oz)   SpO2 98%   BMI 25 81 kg/m²     Physical Exam  Constitutional:       General: He is not in acute distress  Appearance: He is not toxic-appearing or diaphoretic  HENT:      Head: Normocephalic and atraumatic  Mouth/Throat:      Mouth: Mucous membranes are moist    Eyes:      General: No scleral icterus  Extraocular Movements: Extraocular movements intact  Cardiovascular:      Rate and Rhythm: Normal rate  Heart sounds: No friction rub  No gallop  Pulmonary:      Effort: Pulmonary effort is normal  No respiratory distress  Breath sounds: No wheezing, rhonchi or rales  Abdominal:      General: Bowel sounds are normal  There is no distension  Palpations: Abdomen is soft  Tenderness: There is no abdominal tenderness  Musculoskeletal:      Cervical back: Normal range of motion and neck supple  Neurological:      General: No focal deficit present  Mental Status: He is alert and oriented to person, place, and time  Mental status is at baseline  Comments: Confused  Able to move extremities but generally weak     Psychiatric:         Mood and Affect: Mood normal          Behavior: Behavior normal          Judgment: Judgment normal            Current Weight: Weight - Scale: 77 kg (169 lb 12 1 oz)  First Weight: Weight - Scale: 77 kg (169 lb 12 1 oz)    Lab Results:    Results from last 7 days   Lab Units 08/02/22  0722   WBC Thousand/uL 6 99   HEMOGLOBIN g/dL 12 2   HEMATOCRIT % 38 8   PLATELETS Thousands/uL 309     Results from last 7 days   Lab Units 08/02/22  0722   POTASSIUM mmol/L 7 7*   CHLORIDE mmol/L 94*   CO2 mmol/L 25   BUN mg/dL 74*   CREATININE mg/dL 7 85*   CALCIUM mg/dL 9 1     Results from last 7 days   Lab Units 08/02/22  0722   POTASSIUM mmol/L 7 7*   CHLORIDE mmol/L 94*   CO2 mmol/L 25   BUN mg/dL 74*   CREATININE mg/dL 7 85*   CALCIUM mg/dL 9 1   ALK PHOS U/L 79   ALT U/L 13   AST U/L 15

## 2022-08-02 NOTE — ASSESSMENT & PLAN NOTE
66-year-old man with a history of ANCA vasculitis/ESRD, hypertension, diabetes who presents debility found have hyperkalemia in ED  · Patient received albuterol and calcium gluconate  · Will be given IV insulin with D50  Hold losartan  · Scheduled to undergo HD this morning  · Patient reports several episodes of watery diarrhea  No sick contacts  · For completeness will check COVID    Consult PT/OT for discharge needs    Results from last 7 days   Lab Units 08/02/22  0722   POTASSIUM mmol/L 7 7*

## 2022-08-02 NOTE — H&P
[facility] 1945, 76 y o  male MRN: [id number]  Unit/Bed#: [address] [place] [address] Encounter: [id number]  Primary Care Provider: Aidan Devine MD   Date and time admitted to hospital: 8/2/2022  6:49 AM    Assessment and Plan  * Hyperkalemia  Assessment & Plan  52-year-old man with a history of ANCA vasculitis/ESRD, hypertension, diabetes who presents debility found have hyperkalemia in ED  · Patient received albuterol and calcium gluconate  · Will be given IV insulin with D50  Hold losartan  · Scheduled to undergo HD this morning  · Patient reports several episodes of watery diarrhea  No sick contacts  · For completeness will check COVID  Consult PT/OT for discharge needs    Results from last 7 days   Lab Units 08/02/22  0722   POTASSIUM mmol/L 7 7*       ESRD on dialysis [facility])  Assessment & Plan  · ESRD on HD TTS  Last dialysis was on Saturday  · Given hyperkalemia patient getting schedule treatment today per nephrology    Results from last 7 days   Lab Units 08/02/22  0722   BUN mg/dL 74*   CREATININE mg/dL 7 85*   EGFR ml/min/1 73sq m 5       Type 2 diabetes mellitus with chronic kidney disease on chronic dialysis, with long-term current use of insulin [facility])  Assessment & Plan  Lab Results   Component Value Date    HGBA1C 5 1 05/08/2022     Recent Labs     08/02/22  0940   POCGLU 101     · Prior to admission on 70/30 insulin  30 units b i d  Will continue    Hyperlipidemia  Assessment & Plan  · Substitute simvastatin with pravastatin based on hospital formulary    Benign essential hypertension  Assessment & Plan  · Continue amlodipine but hold losartan given hyperkalemia      VTE Prophylaxis: Heparin  Code Status: Level 1 - Full Code  Anticipated Length of Stay:  Patient will be admitted on an Inpatient basis with an anticipated length of stay of  greater than 2 midnights       Justification for Hospital Stay: Hyperkalemia  Total Time for Visit, including Counseling / Coordination of Care: xx mins  Greater than 50% of this total time spent on direct patient counseling and coordination of care  Chief Complaint:     Weakness - Generalized (Per EMS pt  Is unable to walk  Pt found sitting on porch  Very unkept  Pt states he is shaky, weak and nervous  pt due for dialysis this am)    History of Present Illness:    Gemini Chance is a 76 y o  male with a past medical history of ANCA vasculitis/ESRD, diabetes mellitus, and hypertension who presents with generalized debility  The patient is on HD TTS  This morning he was found sitting on his porch very weak  Was brought to the hospital where he was debilitated and found to have hyperkalemia  He denies any fevers or chills but has had multiple episodes of diarrhea  He has been seen by nephrology for management of hyperkalemia  Review of Systems:  Review of Systems   Constitutional: Positive for fatigue  Negative for chills, diaphoresis and fever  HENT: Negative for facial swelling  Eyes: Negative for photophobia, pain and visual disturbance  Respiratory: Negative for shortness of breath  Cardiovascular: Negative for chest pain and palpitations  Gastrointestinal: Positive for diarrhea  Negative for abdominal distention, abdominal pain, nausea and vomiting  Genitourinary: Negative for dysuria and hematuria  Musculoskeletal: Negative for back pain and myalgias  Skin: Negative for rash  Neurological: Positive for weakness  Negative for dizziness, seizures, speech difficulty, numbness and headaches  Psychiatric/Behavioral: Negative for agitation  The patient is not nervous/anxious  All other systems reviewed and are negative          Past Medical and Surgical History:   Past Medical History:   Diagnosis Date    Anesthesia complication 8675    after colonoscopy , pt was awake but could not control his body and kept falling     Arthritis     Bladder calculi     BPH (benign prostatic hyperplasia)     [facility] [address] [facility]     ESRD (end stage renal disease) on dialysis ([facility] [address] )     Hearing disorder of both ears     wears bilateral hearing aids    Hyperlipidemia     controlled and maintained d/t diabetes    Hypertension     Kidney stones     Last Assessed:4/3/2017    Lyme disease     Last Assessed:6/29/2015    Rupture, bladder, spontaneous     Last Assessed:4/3/2017     Past Surgical History:   Procedure Laterality Date    BLADDER REPAIR N/A 12/10/2016    Procedure: REPAIR BLADDER/cysto insertion stent;  Surgeon: Aga Coronel MD;  Location: [address] [place] [address];  Service:     COLONOSCOPY      CYSTOLITHOTOMY      ANESTHESIA   lower abdomen  ; Last Assessed:4/3/2017    IR ASPIRATION ONLY  5/8/2022    IR BIOPSY KIDNEY RANDOM  3/10/2022    IR TUNNELED DIALYSIS CATHETER PLACEMENT  3/8/2022    OTHER SURGICAL HISTORY      thermal dilation of the prostate x 2 ( in the office)    TONSILLECTOMY      TRANSURETHRAL RESECTION OF PROSTATE N/A 12/8/2016    Procedure:  Cysto, Laser Bladder stone,fulgaration of prostates tissue ;  Surgeon: Aga Coronel MD;  Location: [address] [place] [address];  Service:      Meds/Allergies: Allergies: Allergies   Allergen Reactions    Amoxicillin Rash     Prior to Admission Medications   Prescriptions Last Dose Informant Patient Reported? Taking?    HumaLOG Mix 75/25 KwikPen (75-25) 100 units/mL injection pen   No No   Sig: Inject 30 Units under the skin 2 (two) times a day with meals   Insulin Pen Needle (B-D UF III MINI PEN NEEDLES) 31G X 5 MM MISC   No No   Sig: Use twice daily with insulin pen as directed   acetaminophen (TYLENOL) 325 mg tablet   No No   Sig: Take 2 tablets (650 mg total) by mouth every 6 (six) hours as needed for mild pain, headaches or fever   amLODIPine (NORVASC) 5 mg tablet   No No   Sig: Take 1 tablet (5 mg total) by mouth daily   calcium acetate (PHOSLO) capsule   Yes No   Sig: Take 667 mg by mouth in the morning and 667 mg at noon and 667 mg in the evening  Take with meals  dutasteride (AVODART) 0 5 mg capsule   Yes No   Sig: Take 0 5 mg by mouth daily   glucose blood test strip   No No   Si each by Other route 3 (three) times a day   losartan (COZAAR) 50 mg tablet   No No   Sig: Take 1 tablet (50 mg total) by mouth 2 (two) times a day   melatonin 3 mg   No No   Sig: Take 1 tablet (3 mg total) by mouth daily at bedtime as needed (insomnia)   Patient not taking: Reported on 2022   simvastatin (ZOCOR) 40 mg tablet   No No   Sig: Take 1 tablet (40 mg total) by mouth daily at bedtime   tamsulosin (FLOMAX) 0 4 mg   Yes Yes   Sig: Take 0 4 mg by mouth daily with dinner      Facility-Administered Medications: None     Social History:     Social History     Socioeconomic History    Marital status: /Civil Union     Spouse name: Not on file    Number of children: Not on file    Years of education: Not on file    Highest education level: Not on file   Occupational History    Not on file   Tobacco Use    Smoking status: Former Smoker     Types: Cigars     Quit date:      Years since quittin 6    Smokeless tobacco: Never Used   Vaping Use    Vaping Use: Never used   Substance and Sexual Activity    Alcohol use: Yes    Drug use: No    Sexual activity: Not Currently   Other Topics Concern    Not on file   Social History Narrative    Not on file     Social Determinants of Health     Financial Resource Strain: Not on file   Food Insecurity: No Food Insecurity    Worried About Running Out of Food in the Last Year: Never true    João of Food in the Last Year: Never true   Transportation Needs: No Transportation Needs    Lack of Transportation (Medical): No    Lack of Transportation (Non-Medical):  No   Physical Activity: Not on file   Stress: Not on file   Social Connections: Not on file   Intimate Partner Violence: Not on file   Housing Stability: Low Risk     Unable to Pay for Housing in the Last Year: No    Number of Places Lived in the Last Year: 1    Unstable Housing in the Last Year: No     Patient Pre-hospital Living Situation:  Lives with spouse  Patient Pre-hospital Level of Mobility:   Patient Pre-hospital Diet Restrictions:     Family History:  Family History   Problem Relation Age of Onset    Cancer Mother         breast    Diabetes Mother     Cancer Father         prostate w/ bone mets    Prostate cancer Father     Alzheimer's disease Sister      Physical Exam:   Vitals:   Blood Pressure: 165/74 (08/02/22 0922)  Pulse: 86 (08/02/22 0922)  Temperature: (!) 97 4 °F (36 3 °C) (08/02/22 0922)  Respirations: 17 (08/02/22 0922)  Weight - Scale: 77 kg (169 lb 12 1 oz) (08/02/22 0702)  SpO2: 98 % (08/02/22 1013)    Physical Exam  Vitals reviewed  Constitutional:       General: He is not in acute distress  HENT:      Head: Atraumatic  Mouth/Throat:      Mouth: Mucous membranes are dry  Eyes:      General: No scleral icterus  Extraocular Movements: Extraocular movements intact  Cardiovascular:      Rate and Rhythm: Normal rate and regular rhythm  Heart sounds: Normal heart sounds  Pulmonary:      Effort: Pulmonary effort is normal       Breath sounds: Decreased breath sounds present  No wheezing  Abdominal:      Palpations: Abdomen is soft  Tenderness: There is no abdominal tenderness  There is no rebound  Musculoskeletal:         General: No swelling or tenderness  Cervical back: Normal range of motion  Skin:     General: Skin is warm  Neurological:      General: No focal deficit present  Mental Status: He is alert  Cranial Nerves: No cranial nerve deficit  Psychiatric:         Mood and Affect: Mood normal        Lab Results: I have personally reviewed pertinent reports      Results from last 7 days   Lab Units 08/02/22  0722   WBC Thousand/uL 6 99   HEMOGLOBIN g/dL 12 2   HEMATOCRIT % 38 8   PLATELETS Thousands/uL 309   NEUTROS PCT % 77*   LYMPHS PCT % 11*   MONOS PCT % 9 EOS PCT % 1     Results from last 7 days   Lab Units 08/02/22  0722   SODIUM mmol/L 130*   POTASSIUM mmol/L 7 7*   CHLORIDE mmol/L 94*   CO2 mmol/L 25   ANION GAP mmol/L 11   BUN mg/dL 74*   CREATININE mg/dL 7 85*   CALCIUM mg/dL 9 1   ALBUMIN g/dL 3 1*   TOTAL BILIRUBIN mg/dL 0 41   ALK PHOS U/L 79   ALT U/L 13   AST U/L 15   EGFR ml/min/1 73sq m 5   GLUCOSE RANDOM mg/dL 93             Results from last 7 days   Lab Units 08/02/22  0722   HS TNI 0HR ng/L 11       Imaging:   No results found  EKG, Pathology, and Other Studies Reviewed on Admission:   EKG  Result Date: 08/02/22  Personally reviewed strips with impression of:  Sinus rhythm peaked T-waves 73 bpm    Allscripts/ Epic Records Reviewed: Yes    ** Please Note: This note has been constructed using a voice recognition system   **

## 2022-08-02 NOTE — ASSESSMENT & PLAN NOTE
Lab Results   Component Value Date    HGBA1C 5 1 05/08/2022     Recent Labs     08/02/22  0940   POCGLU 101     · Prior to admission on 70/30 insulin  30 units b i d   Will continue

## 2022-08-03 ENCOUNTER — APPOINTMENT (INPATIENT)
Dept: DIALYSIS | Facility: HOSPITAL | Age: 77
DRG: 640 | End: 2022-08-03
Payer: MEDICARE

## 2022-08-03 LAB
ALBUMIN SERPL BCP-MCNC: 2.5 G/DL (ref 3.5–5)
ALP SERPL-CCNC: 65 U/L (ref 46–116)
ALT SERPL W P-5'-P-CCNC: 7 U/L (ref 12–78)
ANION GAP SERPL CALCULATED.3IONS-SCNC: 10 MMOL/L (ref 4–13)
AST SERPL W P-5'-P-CCNC: 14 U/L (ref 5–45)
BILIRUB SERPL-MCNC: 0.31 MG/DL (ref 0.2–1)
BUN SERPL-MCNC: 39 MG/DL (ref 5–25)
CALCIUM ALBUM COR SERPL-MCNC: 9.4 MG/DL (ref 8.3–10.1)
CALCIUM SERPL-MCNC: 8.2 MG/DL (ref 8.3–10.1)
CHLORIDE SERPL-SCNC: 94 MMOL/L (ref 96–108)
CO2 SERPL-SCNC: 25 MMOL/L (ref 21–32)
CREAT SERPL-MCNC: 5.33 MG/DL (ref 0.6–1.3)
ERYTHROCYTE [DISTWIDTH] IN BLOOD BY AUTOMATED COUNT: 14.6 % (ref 11.6–15.1)
GFR SERPL CREATININE-BSD FRML MDRD: 9 ML/MIN/1.73SQ M
GLUCOSE SERPL-MCNC: 119 MG/DL (ref 65–140)
GLUCOSE SERPL-MCNC: 159 MG/DL (ref 65–140)
GLUCOSE SERPL-MCNC: 69 MG/DL (ref 65–140)
GLUCOSE SERPL-MCNC: 75 MG/DL (ref 65–140)
GLUCOSE SERPL-MCNC: 96 MG/DL (ref 65–140)
HCT VFR BLD AUTO: 36.3 % (ref 36.5–49.3)
HGB BLD-MCNC: 11.7 G/DL (ref 12–17)
MCH RBC QN AUTO: 28.2 PG (ref 26.8–34.3)
MCHC RBC AUTO-ENTMCNC: 32.2 G/DL (ref 31.4–37.4)
MCV RBC AUTO: 88 FL (ref 82–98)
PLATELET # BLD AUTO: 254 THOUSANDS/UL (ref 149–390)
PMV BLD AUTO: 9 FL (ref 8.9–12.7)
POTASSIUM SERPL-SCNC: 6 MMOL/L (ref 3.5–5.3)
PROT SERPL-MCNC: 6.8 G/DL (ref 6.4–8.4)
RBC # BLD AUTO: 4.15 MILLION/UL (ref 3.88–5.62)
SODIUM SERPL-SCNC: 129 MMOL/L (ref 135–147)
WBC # BLD AUTO: 4.37 THOUSAND/UL (ref 4.31–10.16)

## 2022-08-03 PROCEDURE — 82948 REAGENT STRIP/BLOOD GLUCOSE: CPT

## 2022-08-03 PROCEDURE — 85027 COMPLETE CBC AUTOMATED: CPT | Performed by: INTERNAL MEDICINE

## 2022-08-03 PROCEDURE — 5A1D70Z PERFORMANCE OF URINARY FILTRATION, INTERMITTENT, LESS THAN 6 HOURS PER DAY: ICD-10-PCS | Performed by: INTERNAL MEDICINE

## 2022-08-03 PROCEDURE — 80053 COMPREHEN METABOLIC PANEL: CPT | Performed by: INTERNAL MEDICINE

## 2022-08-03 PROCEDURE — 99232 SBSQ HOSP IP/OBS MODERATE 35: CPT | Performed by: INTERNAL MEDICINE

## 2022-08-03 PROCEDURE — 90935 HEMODIALYSIS ONE EVALUATION: CPT | Performed by: INTERNAL MEDICINE

## 2022-08-03 PROCEDURE — 97116 GAIT TRAINING THERAPY: CPT

## 2022-08-03 PROCEDURE — 97162 PT EVAL MOD COMPLEX 30 MIN: CPT

## 2022-08-03 RX ADMIN — CALCIUM ACETATE 667 MG: 667 CAPSULE ORAL at 11:18

## 2022-08-03 RX ADMIN — CALCIUM ACETATE 667 MG: 667 CAPSULE ORAL at 17:53

## 2022-08-03 RX ADMIN — CALCIUM ACETATE 667 MG: 667 CAPSULE ORAL at 08:14

## 2022-08-03 RX ADMIN — HEPARIN SODIUM 5000 UNITS: 5000 INJECTION INTRAVENOUS; SUBCUTANEOUS at 06:10

## 2022-08-03 RX ADMIN — PRAVASTATIN SODIUM 40 MG: 40 TABLET ORAL at 17:53

## 2022-08-03 RX ADMIN — HEPARIN SODIUM 5000 UNITS: 5000 INJECTION INTRAVENOUS; SUBCUTANEOUS at 22:59

## 2022-08-03 RX ADMIN — TAMSULOSIN HYDROCHLORIDE 0.4 MG: 0.4 CAPSULE ORAL at 17:53

## 2022-08-03 RX ADMIN — FINASTERIDE 5 MG: 5 TABLET, FILM COATED ORAL at 08:14

## 2022-08-03 RX ADMIN — INSULIN LISPRO 1 UNITS: 100 INJECTION, SOLUTION INTRAVENOUS; SUBCUTANEOUS at 17:53

## 2022-08-03 RX ADMIN — HEPARIN SODIUM 5000 UNITS: 5000 INJECTION INTRAVENOUS; SUBCUTANEOUS at 14:54

## 2022-08-03 NOTE — PROGRESS NOTES
[facility]  Progress Note - Barbara Hoffman 1945, 76 y o  male MRN: [id number]  Unit/Bed#: [address] [address] Encounter: [id number]  Primary Care Provider: Susen Koyanagi, MD   Date and time admitted to hospital: 8/2/2022  6:49 AM    * Hyperkalemia  Assessment & Plan  64-year-old man with a history of ANCA vasculitis/ESRD, hypertension, diabetes who presents debility found have hyperkalemia in ED  · Continue holding losartan  · Did undergo scheduled HD on 08/02 and currently undergoing extra treatment on 8/3  · Patient reports several episodes of watery diarrhea  No sick contacts  · PT and OT ordered for discharge recommendations    Results from last 7 days   Lab Units 08/03/22  0620 08/02/22  1529 08/02/22  0722   POTASSIUM mmol/L 6 0* 4 6 7 7*       ESRD on dialysis [facility])  Assessment & Plan  · ESRD on HD TTS  Dialysis as above    Results from last 7 days   Lab Units 08/03/22  0620 08/02/22  0722   BUN mg/dL 39* 74*   CREATININE mg/dL 5 33* 7 85*   EGFR ml/min/1 73sq m 9 5       Type 2 diabetes mellitus with chronic kidney disease on chronic dialysis, with long-term current use of insulin [facility])  Assessment & Plan  Lab Results   Component Value Date    HGBA1C 5 1 05/08/2022     Recent Labs     08/02/22  2029 08/02/22  2126 08/03/22  0730 08/03/22  1114   POCGLU 48* 93 69 119     · Prior to admission on 70/30 insulin  Was on 30 units will continue to hold in favor sliding scale insulin given hypoglycemia    Hyperlipidemia  Assessment & Plan  · Continue pravastatin    Benign essential hypertension  Assessment & Plan  · Continue amlodipine and continue holding losartan given hyperkalemia      VTE Pharmacologic Prophylaxis: VTE Score: 3 Moderate Risk (Score 3-4) - Pharmacological DVT Prophylaxis Ordered: Heparin  Patient Centered Rounds: I have performed bedside rounds with nursing staff today    Discussions with Specialists or Other Care Team Provider:  Case management    Education and Discussions with Family / Patient:  Left voicemail for spouse    Time Spent for Care: 25 mins  More than 50% of total time spent on counseling and coordination of care as described above  Current Length of Stay: 1 day(s)  Current Patient Status: Inpatient   Certification Statement: The patient will continue to require additional inpatient hospital stay due to therapy and case management evaluations  Discharge Plan / Estimated Discharge Date: 24-48 hours when no longer hyperkalemic or hypoglycemic    Code Status: Level 1 - Full Code      Subjective:   Patient seen and examined  No new complaints  Feeling much better today  Objective:   Vitals: Blood pressure 150/67, pulse 86, temperature 97 6 °F (36 4 °C), temperature source Oral, resp  rate 18, height 5' 8" (1 727 m), weight 77 kg (169 lb 12 1 oz), SpO2 98 %  Physical Exam  Vitals reviewed  Constitutional:       General: He is not in acute distress  HENT:      Head: Atraumatic  Eyes:      Extraocular Movements: Extraocular movements intact  Cardiovascular:      Rate and Rhythm: Regular rhythm  Pulmonary:      Breath sounds: No wheezing  Abdominal:      General: Bowel sounds are normal       Palpations: Abdomen is soft  Tenderness: There is no abdominal tenderness  There is no rebound  Musculoskeletal:         General: No swelling or tenderness  Skin:     General: Skin is warm and dry  Neurological:      General: No focal deficit present  Mental Status: He is alert  Cranial Nerves: No cranial nerve deficit     Psychiatric:         Mood and Affect: Mood normal        Additional Data:   Labs:  Results from last 7 days   Lab Units 08/03/22  0620 08/02/22  0722   WBC Thousand/uL 4 37 6 99   HEMOGLOBIN g/dL 11 7* 12 2   HEMATOCRIT % 36 3* 38 8   MCV fL 88 87   PLATELETS Thousands/uL 254 309     Results from last 7 days   Lab Units 08/03/22  0620 08/02/22  1529 08/02/22  0722   SODIUM mmol/L 129*  -- 130*   POTASSIUM mmol/L 6 0* 4 6 7 7*   CHLORIDE mmol/L 94*  --  94*   CO2 mmol/L 25  --  25   ANION GAP mmol/L 10  --  11   BUN mg/dL 39*  --  74*   CREATININE mg/dL 5 33*  --  7 85*   CALCIUM mg/dL 8 2*  --  9 1   ALBUMIN g/dL 2 5*  --  3 1*   TOTAL BILIRUBIN mg/dL 0 31  --  0 41   ALK PHOS U/L 65  --  79   ALT U/L 7*  --  13   AST U/L 14  --  15   EGFR ml/min/1 73sq m 9  --  5   GLUCOSE RANDOM mg/dL 75  --  93             Results from last 7 days   Lab Units 22  1200 22  1010 22  0722   HS TNI 0HR ng/L  --   --  11   HS TNI 2HR ng/L  --  11  --    HS TNI 4HR ng/L 11  --   --               Results from last 7 days   Lab Units 22  0730 22  2126 22  2029 22  1603 22  1255 22  1059 22  0940   POC GLUCOSE mg/dl 69 93 48* 162* 198* 78 101         Results from last 7 days   Lab Units 22  0722   TSH 3RD GENERATON uIU/mL 3 393     * I Have Reviewed All Lab Data Listed Above      Cultures:   Results from last 7 days   Lab Units 22  1018   INFLUENZA A PCR  Negative       Results from last 7 days   Lab Units 22  1018   SARS-COV-2  Negative   INFLUENZA A PCR  Negative   INFLUENZA B PCR  Negative   RSV PCR  Negative           Lines/Drains:  Invasive Devices  Report    Peripheral Intravenous Line  Duration           Peripheral IV 22 Distal;Right;Upper;Ventral (anterior) Arm 1 day          Hemodialysis Catheter  Duration           HD Permanent Double Catheter 147 days              Telemetry:   Telemetry Orders (From admission, onward)             24 Hour Telemetry Monitoring  Continuous x 24 Hours (Telem)           References:    Telemetry Guidelines   Question:  Reason for 24 Hour Telemetry  Answer:  Metabolic/Electrolyte Disturbance with High Probability of Dysrhythmia (K level <3 or >6, or KCL infusion >=10mEq/hr)                  Imaging:  Imaging Reports Reviewed Today Include:   XR chest 1 view portable    Result Date: 8/2/2022  Impression: Small to moderate-sized bilateral pleural effusions, new  The study was marked in EPIC for significant notification  Workstation performed: [id number]       Scheduled Meds:  Current Facility-Administered Medications   Medication Dose Route Frequency Provider Last Rate    acetaminophen  650 mg Oral Q6H PRN Mariah Dejesus,       amLODIPine  5 mg Oral Daily Kirill Cooper DO      calcium acetate  667 mg Oral TID With Meals Mariah Dejesus,       finasteride  5 mg Oral Daily Kirill Cooper DO      heparin (porcine)  5,000 Units Subcutaneous Q8H [facility] Kirill Cooper DO      insulin aspart protamine-insulin aspart  20 Units Subcutaneous BID AC Kirill Cooper,       insulin lispro  1-6 Units Subcutaneous 4x Daily (AC & HS) Kirill Cooper DO      melatonin  3 mg Oral HS PRN Mariah Dejesus, DO      ondansetron  4 mg Intravenous Q4H PRN Mariah Dejesus, DO      pravastatin  40 mg Oral Daily With Vino Volo, DO      tamsulosin  0 4 mg Oral Daily With Dinner Mariah Dejesus, DO         Today, Patient Was Seen By: Mariah Dejesus DO    ** Please Note: Dictation voice to text software may have been used in the creation of this document   **

## 2022-08-03 NOTE — ASSESSMENT & PLAN NOTE
26-year-old man with a history of ANCA vasculitis/ESRD, hypertension, diabetes who presents debility found have hyperkalemia in ED  · Continue holding losartan  · Did undergo scheduled HD on 08/02 and currently undergoing extra treatment on 8/3  · Patient reports several episodes of watery diarrhea  No sick contacts    · PT and OT ordered for discharge recommendations    Results from last 7 days   Lab Units 08/03/22  0620 08/02/22  1529 08/02/22  0722   POTASSIUM mmol/L 6 0* 4 6 7 7*

## 2022-08-03 NOTE — PROGRESS NOTES
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  He is now been on dialysis Tuesday Thursday Saturday  He states that he is very compliant always goes to his treatment but it sounds like he has been noncompliant with some of his potassium containing foods in his diet  He was admitted with severe hyperkalemia to 7 7 underwent dialysis yesterday on 1 potassium bath  By this morning potassium has rebounded is 6 today so likely has high intracellular stores as potassium had normalized a couple hours after dialysis  Given that his hyperkalemia is morning planning extra dialysis and patient was seen while on treatment details of treatment are outlined below  HEMODIALYSIS PROCEDURE NOTE  The patient was seen and examined on hemodialysis  Time: 3 hours  Sodium: 138 Blood flow: 350   Dialyzer: F160 Potassium: 1 Dialysate flow: 800   Access: catheter Bicarbonate: 35 Ultrafiltration goal: 2 L as tolerated        Patient is to be dialyzed on 1 potassium bath  From a renal standpoint after this dialysis hyperkalemia will be resolved so he is stable for discharge  I did inform that if he sent home he should go to his usual dialysis treatment as scheduled tomorrow  Hemodialysis as above today  Continue hemodialysis Tuesday Thursday Saturday once discharged  If remains in the hospital will dialyze tomorrow  No other changes  2  History of ANCA vasculitis  Follows up with primary nephrology through his dialysis center now on no immunosuppression      SUBJECTIVE:  Review of Systems   Constitutional: Negative for chills, diaphoresis, fever and night sweats  HENT: Negative  Eyes: Negative  Cardiovascular: Negative for chest pain, dyspnea on exertion, leg swelling and orthopnea  Respiratory: Negative for cough, hemoptysis, shortness of breath, sputum production and wheezing  Gastrointestinal: Negative for abdominal pain, diarrhea, nausea and vomiting  Neurological: Negative for dizziness, focal weakness, headaches and weakness  Weakness is improved  Psychiatric/Behavioral: Negative for altered mental status, depression, hallucinations and hypervigilance  OBJECTIVE:  Current Weight: Weight - Scale: 77 kg (169 lb 12 1 oz)  Vitals:Temp (24hrs), Av 5 °F (40 3 °C), Min:97 2 °F (36 2 °C), Max:150 °F (65 6 °C)  Current: Temperature: 97 6 °F (36 4 °C)   Blood pressure 135/72, pulse 90, temperature 97 6 °F (36 4 °C), temperature source Oral, resp  rate 18, height 5' 8" (1 727 m), weight 77 kg (169 lb 12 1 oz), SpO2 97 %  , Body mass index is 25 81 kg/m²  Intake/Output Summary (Last 24 hours) at 8/3/2022 1021  Last data filed at 8/3/2022 0800  Gross per 24 hour   Intake 900 ml   Output 2504 ml   Net -1604 ml       Physical Exam: /72 (BP Location: Right arm)   Pulse 90   Temp 97 6 °F (36 4 °C) (Oral)   Resp 18   Ht 5' 8" (1 727 m)   Wt 77 kg (169 lb 12 1 oz)   SpO2 97%   BMI 25 81 kg/m²   Physical Exam  Constitutional:       General: He is not in acute distress  Appearance: He is not toxic-appearing or diaphoretic  HENT:      Head: Normocephalic and atraumatic  Nose:      Comments: Wearing a mask     Mouth/Throat:      Comments: Wearing a mask  Eyes:      General: No scleral icterus  Extraocular Movements: Extraocular movements intact  Cardiovascular:      Rate and Rhythm: Normal rate and regular rhythm  Heart sounds: No friction rub  No gallop  Comments: No significant edema    Pulmonary:      Effort: Pulmonary effort is normal  No respiratory distress  Breath sounds: No wheezing, rhonchi or rales  Abdominal:      General: Bowel sounds are normal  There is no distension  Palpations: Abdomen is soft  Tenderness: There is no abdominal tenderness  There is no rebound  Musculoskeletal:      Cervical back: Normal range of motion and neck supple  Neurological:      General: No focal deficit present  Mental Status: He is alert and oriented to person, place, and time  Mental status is at baseline  Psychiatric:         Mood and Affect: Mood normal          Behavior: Behavior normal          Thought Content:  Thought content normal          Judgment: Judgment normal          Medications:    Current Facility-Administered Medications:     acetaminophen (TYLENOL) tablet 650 mg, 650 mg, Oral, Q6H PRN, Kirill Cooper DO    amLODIPine (NORVASC) tablet 5 mg, 5 mg, Oral, Daily, Kirill Cooper DO, 5 mg at 08/02/22 1430    calcium acetate (PHOSLO) capsule 667 mg, 667 mg, Oral, TID With Meals, Gina Dennis DO, 667 mg at 08/03/22 0814    finasteride (PROSCAR) tablet 5 mg, 5 mg, Oral, Daily, Kirill Cooper DO, 5 mg at 08/03/22 0814    heparin (porcine) subcutaneous injection 5,000 Units, 5,000 Units, Subcutaneous, Q8H [address], Kirill Cooper, DO, 5,000 Units at 08/03/22 0610    insulin lispro (HumaLOG) 100 units/mL subcutaneous injection 1-6 Units, 1-6 Units, Subcutaneous, 4x Daily (AC & HS), 1 Units at 08/02/22 1720 **AND** Fingerstick Glucose (POCT), , , 4x Daily AC and at bedtime, Kirill Cooper DO    melatonin tablet 3 mg, 3 mg, Oral, HS PRN, Gina Nanas, DO    ondansetron (ZOFRAN) injection 4 mg, 4 mg, Intravenous, Q4H PRN, Kirill Cooper DO    pravastatin (PRAVACHOL) tablet 40 mg, 40 mg, Oral, Daily With Dinner, Kirill Cooper DO, 40 mg at 08/02/22 1704    tamsulosin (FLOMAX) capsule 0 4 mg, 0 4 mg, Oral, Daily With Dinner, Kirill Cooper DO, 0 4 mg at 08/02/22 1704    Laboratory Results:  Lab Results   Component Value Date WBC 4 37 08/03/2022    HGB 11 7 (L) 08/03/2022    HCT 36 3 (L) 08/03/2022    MCV 88 08/03/2022     08/03/2022     Lab Results   Component Value Date    SODIUM 129 (L) 08/03/2022    K 6 0 (H) 08/03/2022    CL 94 (L) 08/03/2022    CO2 25 08/03/2022    BUN 39 (H) 08/03/2022    CREATININE 5 33 (H) 08/03/2022    GLUC 75 08/03/2022    CALCIUM 8 2 (L) 08/03/2022     Lab Results   Component Value Date    CALCIUM 8 2 (L) 08/03/2022    PHOS 4 4 (H) 06/03/2022     No results found for: LABPROT

## 2022-08-03 NOTE — PHYSICAL THERAPY NOTE
PHYSICAL THERAPY EVALUATION/TREATMENT       08/03/22 1400   PT Last Visit   PT Visit Date 08/03/22   Note Type   Note type Evaluation   Pain Assessment   Pain Assessment Tool 0-10   Pain Score No Pain   Patient's Stated Pain Goal No pain   Hospital Pain Intervention(s) Repositioned   Multiple Pain Sites No   Restrictions/Precautions   Weight Bearing Precautions Per Order No   Other Precautions Fall Risk;Pain;Bed Alarm; Chair Alarm   Home Living   Type of [address] One level;Stairs to enter with rails  (3 LAURA with 2 rails)   Home Equipment Cane;Walker   Additional Comments Pt reports he lives with his wife  Is IND with intermittent use of cane for mobility when ambulating community distances   Prior Function   Level of [place] Independent with ADLs and functional mobility   Lives With Spouse   Receives Help From Family   ADL Assistance Independent   IADLs Independent   Falls in the last 6 months 0   Vocational Retired   Comments Per discussion with patient he and his wife both perform IADLs at home including cleaning/cooking/laundry   General   Additional Pertinent History Pt is a 76year-old male who was admitted to the hospital on 8/2/22 due to generalied weakness     Family/Caregiver Present No   Cognition   Overall Cognitive Status WFL   Orientation Level Oriented X4   Following Commands Follows multistep commands with increased time or repetition   Subjective   Subjective "I want to go home "   RLE Assessment   RLE Assessment WFL   LLE Assessment   LLE Assessment WFL   Bed Mobility   Rolling R 5  Supervision   Rolling L 5  Supervision   Supine to Sit 4  Minimal assistance   Sit to Supine 5  Supervision   Transfers   Sit to Stand 5  Supervision  (Close superivision)   Additional items Assist x 1;Verbal cues   Stand to Sit 5  Supervision   Additional items Verbal cues   Stand pivot 4  Minimal assistance  (with cane ; superivision when using walker)   Additional items Verbal cues Ambulation/Elevation   Gait pattern Wide HUBER;Step through pattern   Gait Assistance 4  Minimal assist  (with cane ; close superivision with RW)   Additional items Assist x 1;Verbal cues   Assistive Device Straight cane;Rolling walker   Distance 120 + 75 feet  (120 feet wiht cane with Min A with intermittent unsteadiness ; 75 feet with RW with Superivision, minor impulsivity but improved steadiness of gait)   Stair Management Assistance 5  Supervision   Additional items Assist x 1;Verbal cues   Stair Management Technique Step to pattern   Number of Stairs 3   Balance   Static Sitting Fair +   Dynamic Sitting Fair   Static Standing Fair   Dynamic Standing Fair -   Ambulatory Fair -   Activity Tolerance   Activity Tolerance Patient tolerated treatment well   Medical Staff Made Aware yes   Assessment   Prognosis Good   Problem List Decreased strength;Decreased endurance; Impaired balance;Decreased mobility; Impaired judgement;Decreased safety awareness   Assessment Patient seen for Physical Therapy evaluation  Patient admitted with Hyperkalemia  Comorbidities affecting patient's physical performance include: diabetes, ESRD on hemodialysis and hypertension  Personal factors affecting patient at time of initial evaluation include: lives in 1 story house, ambulating with assistive device and stairs to enter home  Prior to admission, patient was independent with functional mobility with intermittent use of cane for community distances, independent with ADLS, independent with IADLS and living with spouse in a 1 level home with 3 steps to enter  Please find objective findings from Physical Therapy assessment regarding body systems outlined above with impairments and limitations including weakness, impaired balance, decreased endurance, gait deviations, decreased activity tolerance, decreased functional mobility tolerance, decreased safety awareness, impaired judgement and fall risk    The Barthel Index was used as a functional outcome tool presenting with a score of Barthel Index Score: 65 today indicating moderate limitations of functional mobility and ADLS  Patient's clinical presentation is currently evolving as seen in patient's presentation of increased fall risk and decreased endurance  Pt would benefit from continued Physical Therapy treatment to address deficits as defined above and maximize level of functional mobility  As demonstrated by objective findings, the assigned level of complexity for this evaluation is moderate  The patient's AM-PAC Basic Mobility Inpatient Short Form Raw Score is 18  A Raw score of greater than 16 suggests the patient may benefit from discharge to home  Please also refer to the recommendation of the Physical Therapist for safe discharge planning  Goals   Patient Goals to go home   STG Expiration Date 08/10/22   Short Term Goal #1 Pt will perform all bed mobility/transfers IND   Short Term Goal #2 Pt will ambulate x 200 feet with appropriate device + Superivision   LTG Expiration Date 08/17/22   Long Term Goal #1 Pt will ambulate x 300 feet with appropriate device + Superivision   Long Term Goal #2 pt will negotiate x 3 stairs with Supervision using cane/rail in order to demonstrate safe ability to enter/exit home   Plan   Treatment/Interventions ADL retraining;Functional transfer training;LE strengthening/ROM; Therapeutic exercise; Endurance training;Gait training;Spoke to nursing   PT Frequency   (5x/week)   Recommendation   PT Discharge Recommendation Home with home health rehabilitation  (Pt with improved safety using RW - open to home health services)   Additional Comments patient has walker + cane at home   [address] in Bed Without Bedrails 3   Lying on Back to Sitting on Edge of Flat Bed 3   Moving Bed to Chair 3   Standing Up From Chair 3   Walk in Room 3   Climb 3-5 Stairs 3   Basic Mobility Inpatient Raw Score 18   Basic Mobility Standardized Score 41 05   Highest Level Of Mobility   -HLM Goal 6: Walk 10 steps or more   JH-HLM Achieved 7: Walk 25 feet or more   Barthel Index   Feeding 10   Bathing 0   Grooming Score 0   Dressing Score 5   Bladder Score 10   Bowels Score 10   Toilet Use Score 5   Transfers (Bed/Chair) Score 10   Mobility (Level Surface) Score 10   Stairs Score 5   Barthel Index Score 65   Additional Treatment Session   Start Time 1350   End Time 1400   Treatment Assessment S: "I want to go home " O: Pt received laying supine in bed with bed alarm donned  A: Pt agreeable to PT session this afternoon  Pt presents with gait deviations as mentioned above with + falls risk when using straight cane  Once progressed to rolling walker patient demonstrates improved safety with mobility, decreased risk of falls  P: pt will benefit from skilled PT to address deficits to maximize IND for safe d/c,   Equipment Use walker, cane   End of Consult   Patient Position at End of Consult Supine;Bed/Chair alarm activated; All needs within reach   [facility] Insurance Number  Amena Alicia [id number]

## 2022-08-03 NOTE — ASSESSMENT & PLAN NOTE
Lab Results   Component Value Date    HGBA1C 5 1 05/08/2022     Recent Labs     08/02/22 2029 08/02/22 2126 08/03/22  0730 08/03/22  1114   POCGLU 48* 93 69 119     · Prior to admission on 70/30 insulin    Was on 30 units will continue to hold in favor sliding scale insulin given hypoglycemia

## 2022-08-03 NOTE — PLAN OF CARE
Post-Dialysis RN Treatment Note    Blood Pressure:  Pre: 150/67 mm/Hg  Post: 145/75 mmHg   EDW: TBD    Weight:  Pre: 77 1 kg   Post: 76 0 kg   Mode of weight measurement: Bed Scale   Volume Removed: 1100 ml    Treatment duration: 180 minutes    NS given  No    Treatment shortened?  No   Medications given during Rx None Reported   Estimated Kt/V  Not Applicable   Access type: Permacath/TDC   Access Issues: No    Report called to primary nurse   Mare, Jasiel    Problem: METABOLIC, FLUID AND ELECTROLYTES - ADULT  Goal: Electrolytes maintained within normal limits  Description: INTERVENTIONS:  - Monitor labs and assess patient for signs and symptoms of electrolyte imbalances  - Administer electrolyte replacement as ordered  - Monitor response to electrolyte replacements, including repeat lab results as appropriate  - Instruct patient on fluid and nutrition as appropriate  Outcome: Progressing  Goal: Fluid balance maintained  Description: INTERVENTIONS:  - Monitor labs   - Monitor I/O and WT  - Instruct patient on fluid and nutrition as appropriate  - Assess for signs & symptoms of volume excess or deficit  Outcome: Progressing

## 2022-08-03 NOTE — CASE MANAGEMENT
Case Management Assessment & Discharge Planning Note    Patient name She Owens  Location [address] [facility] 408/4 [address] Teresa Michel-* MRN [id number]  : 1945 Date 8/3/2022       Current Admission Date: 2022  Current Admission Diagnosis:Hyperkalemia   Patient Active Problem List    Diagnosis Date Noted    ESRD on dialysis [facility]) 2022    Hyponatremia 2022    Left leg pain 2022    Rhabdomyolysis 2022    Severe sepsis ([facility] ) 2022    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[facility] ) 2022    Pulmonary hypertension ([facility] ) 2022    Dialysis patient ([facility] ) 2022    Anemia due to chronic kidney disease, on chronic dialysis ([facility] )     Hemoptysis 2022    Other proteinuria     Symptomatic anemia 2022    Chest pain 2022    Hyperkalemia 2022    Melena 2022    Elevated PSA 2021    Chronic pain of right knee 2021    Primary osteoarthritis of right knee 2021    Bladder stones 12/15/2016    Type 2 diabetes mellitus with chronic kidney disease on chronic dialysis, with long-term current use of insulin ([facility] ) 2016    Benign colon polyp 2013    Benign prostatic hyperplasia with lower urinary tract symptoms 2013    Benign essential hypertension 2013    Hyperlipidemia 2012    Vitamin D deficiency 2012      LOS (days): 1  Geometric Mean LOS (GMLOS) (days): 3 30  Days to GMLOS:2     OBJECTIVE:    Risk of Unplanned Readmission Score: 34 48       Current admission status: Inpatient  Referral Reason: Other (Discharge planning)    Preferred Pharmacy:   [address]  [place] [address]  [place] [address] [id number]  Phone: [phone] Fax: [phone]    [facility]  ([address] [place] [address]) - Addis Gallagher [phone] [place] [address]  Phone: [phone] Fax: [phone]    Primary Care Provider: Aidan Devine, MD    Primary Insurance: MEDICARE  Secondary Insurance: BLUE CROSS    ASSESSMENT:  [address] [facility], [address] Le[address] Representative - Spouse   Primary Phone: [phone] (Mobile)                Readmission Root Cause  30 Day Readmission: No    Patient Information  Admitted from[de-identified] Home  Mental Status: Alert  During Assessment patient was accompanied by: Not accompanied during assessment  Assessment information provided by[de-identified] Patient  Primary Caregiver: Self  Support Systems: Spouse/significant other  [place] of Residence: [address] [place] [address] do you live in?: [address] entry access options   Select all that apply : Stairs  Number of steps to enter home : 2  Type of Current Residence: [place]  In the last 12 months, was there a time when you were not able to pay the mortgage or rent on time?: No  In the last 12 months, how many places have you lived?: 1  In the last 12 months, was there a time when you did not have a steady place to sleep or slept in a shelter (including now)?: No  Homeless/housing insecurity resource given?: N/A  Living Arrangements: Lives w/ Spouse/significant other  Is patient a ?: No    Activities of Daily Living Prior to Admission  Functional Status: Independent  Completes ADLs independently?: Yes  Ambulates independently?: Yes  Does patient use assisted devices?: Yes  Assisted Devices (DME) used: Straight Cane  Does patient currently own DME?: Yes  What DME does the patient currently own?: Cheryle Blankenship  Does patient have a history of Outpatient Therapy (PT/OT)?: No  Does the patient have a history of Short-Term Rehab?: Yes ([facility])  Does patient have a history of [address]?: No ([facility] to [facility] previously but agency unable to start care due to refusal by patient and wife)  Does patient currently have [address]?: No     Patient Information Continued  Income Source: Pension/longterm  Does patient have prescription coverage?: Yes  Within the past 12 months, you worried that your food would run out before you got the money to buy more : Never true  Within the past 12 months, the food you bought just didn't last and you didn't have money to get more : Never true  Food insecurity resource given?: N/A  Does patient receive dialysis treatments?: Yes ([address])  Does patient have a history of substance abuse?: No  Does patient have a history of Mental Health Diagnosis?: No     Means of Transportation  Means of Transport to Appts[de-identified] [employer]  In the past 12 months, has lack of transportation kept you from medical appointments or from getting medications?: No  In the past 12 months, has lack of transportation kept you from meetings, work, or from getting things needed for daily living?: No  Was application for public transport provided?: N/A    DISCHARGE DETAILS:    Discharge planning discussed with[de-identified] Patient and wife [place] of Choice: Yes  Comments - Freedom of Choice: FOC reviewed with patient and wife  Patient states that he will accept [facility] only if they will work with him outside  [facility] will not agree due to HIPAA, liability concerns, heat concerns, etc   Wife requested orders for outpatient PT and SW relayed same to attending    CM contacted family/caregiver?: Yes  Were Treatment Team discharge recommendations reviewed with patient/caregiver?: Yes  Did patient/caregiver verbalize understanding of patient care needs?: Yes  Were patient/caregiver advised of the risks associated with not following Treatment Team discharge recommendations?: Yes    Contacts  Patient Contacts: Farhad Johnson (wife)  Relationship to Patient[de-identified] Family  Contact Method: Phone  Phone Number: [phone]  Reason/Outcome: Emergency Contact, Discharge Planning, Continuity of [facility]         Is the patient interested in [facility] at discharge?: No (Pt only agrees to [facility] if agency is willing to work with him outside the home  Per Veda Jay at [facility] they are unable to accommodate this due to various concerns )    DME Referral Provided  Referral made for DME?: No (Patient stated that he has a walker but it is not in good condition  SW checked [facility] and saw that he was given a walker in March 2022  Patient and wife made aware that he will need to purchase a walker as it is too soon for Medicare to cover one )    Other Referral/Resources/Interventions Provided:  Interventions: None Indicated    Would you like to participate in our [facility] service program?  : No - Declined    Treatment Team Recommendation: Home with [address]  Discharge Destination Plan[de-identified] Home  Transport at Discharge : [address] Given (Date):: 08/02/22 (IMM given by registration per chart)      Per chart patient is known to the [facility] Office on Aging due to reported concerns about the condition of his home  SW spoke with Shaheen Florez at [facility] this morning and she confirmed that patient is known to the agency  Per outpatient CM notes, patient and his wife have refused all home services  SW spoke with patient at bedside to introduce role of SW and discuss discharge planning  Patient stated that he and his wife do not allow anyone in their home but that he would work with [facility] outside  SW spoke with Veda Jay at [facility] and she stated that the agency is not willing to do this for various reasons including HIPAA, concerns about heat, etc   [facility] has previously been referred for patient but has not been able to initiate care due to patient refusal       Patient confirmed that he does have a walker at home but stated that it is not in good condition  SW checked [facility] and found that patient was given a walker in March 2022 and is not eligible for another one through Medicare  Patient and wife are aware that they will need to purchase a new one      LINDA spoke by phone with wife Charo Latrice [phone] and she reiterated that they do not allow anyone inside the house  She asked about outpatient PT and SW relayed request for OP PT orders to attending  SW updated Memo Cool at [employer] [facility] this afternoon on tentative plan to discharge tomorrow  LINDA also spoke by phone with Millie Crawford,  at [facility] to update her on discharge plan  LINDA will continue to follow

## 2022-08-04 ENCOUNTER — APPOINTMENT (INPATIENT)
Dept: DIALYSIS | Facility: HOSPITAL | Age: 77
DRG: 640 | End: 2022-08-04
Payer: MEDICARE

## 2022-08-04 VITALS
WEIGHT: 169.75 LBS | HEIGHT: 68 IN | TEMPERATURE: 98.2 F | BODY MASS INDEX: 25.73 KG/M2 | SYSTOLIC BLOOD PRESSURE: 161 MMHG | RESPIRATION RATE: 18 BRPM | DIASTOLIC BLOOD PRESSURE: 81 MMHG | HEART RATE: 86 BPM | OXYGEN SATURATION: 96 %

## 2022-08-04 LAB
ALBUMIN SERPL BCP-MCNC: 2.4 G/DL (ref 3.5–5)
ALP SERPL-CCNC: 64 U/L (ref 46–116)
ALT SERPL W P-5'-P-CCNC: 10 U/L (ref 12–78)
ANION GAP SERPL CALCULATED.3IONS-SCNC: 9 MMOL/L (ref 4–13)
AST SERPL W P-5'-P-CCNC: 16 U/L (ref 5–45)
BILIRUB SERPL-MCNC: 0.36 MG/DL (ref 0.2–1)
BUN SERPL-MCNC: 35 MG/DL (ref 5–25)
CALCIUM ALBUM COR SERPL-MCNC: 9.4 MG/DL (ref 8.3–10.1)
CALCIUM SERPL-MCNC: 8.1 MG/DL (ref 8.3–10.1)
CHLORIDE SERPL-SCNC: 93 MMOL/L (ref 96–108)
CO2 SERPL-SCNC: 26 MMOL/L (ref 21–32)
CREAT SERPL-MCNC: 5.01 MG/DL (ref 0.6–1.3)
ERYTHROCYTE [DISTWIDTH] IN BLOOD BY AUTOMATED COUNT: 14.2 % (ref 11.6–15.1)
GFR SERPL CREATININE-BSD FRML MDRD: 10 ML/MIN/1.73SQ M
GLUCOSE SERPL-MCNC: 106 MG/DL (ref 65–140)
GLUCOSE SERPL-MCNC: 136 MG/DL (ref 65–140)
GLUCOSE SERPL-MCNC: 98 MG/DL (ref 65–140)
HCT VFR BLD AUTO: 34.7 % (ref 36.5–49.3)
HGB BLD-MCNC: 11.1 G/DL (ref 12–17)
MCH RBC QN AUTO: 28 PG (ref 26.8–34.3)
MCHC RBC AUTO-ENTMCNC: 32 G/DL (ref 31.4–37.4)
MCV RBC AUTO: 88 FL (ref 82–98)
PLATELET # BLD AUTO: 238 THOUSANDS/UL (ref 149–390)
PMV BLD AUTO: 8.7 FL (ref 8.9–12.7)
POTASSIUM SERPL-SCNC: 5.4 MMOL/L (ref 3.5–5.3)
PROT SERPL-MCNC: 6.8 G/DL (ref 6.4–8.4)
RBC # BLD AUTO: 3.96 MILLION/UL (ref 3.88–5.62)
SODIUM SERPL-SCNC: 128 MMOL/L (ref 135–147)
WBC # BLD AUTO: 4.33 THOUSAND/UL (ref 4.31–10.16)

## 2022-08-04 PROCEDURE — 80053 COMPREHEN METABOLIC PANEL: CPT | Performed by: INTERNAL MEDICINE

## 2022-08-04 PROCEDURE — 82948 REAGENT STRIP/BLOOD GLUCOSE: CPT

## 2022-08-04 PROCEDURE — 85027 COMPLETE CBC AUTOMATED: CPT | Performed by: INTERNAL MEDICINE

## 2022-08-04 PROCEDURE — 99239 HOSP IP/OBS DSCHRG MGMT >30: CPT | Performed by: INTERNAL MEDICINE

## 2022-08-04 PROCEDURE — 97167 OT EVAL HIGH COMPLEX 60 MIN: CPT

## 2022-08-04 PROCEDURE — 90935 HEMODIALYSIS ONE EVALUATION: CPT | Performed by: INTERNAL MEDICINE

## 2022-08-04 RX ORDER — INSULIN LISPRO 100 [IU]/ML
5-10 INJECTION, SUSPENSION SUBCUTANEOUS 2 TIMES DAILY WITH MEALS
Qty: 54 ML | Refills: 0 | COMMUNITY
Start: 2022-08-04 | End: 2022-08-27

## 2022-08-04 RX ADMIN — AMLODIPINE BESYLATE 5 MG: 5 TABLET ORAL at 08:03

## 2022-08-04 RX ADMIN — HEPARIN SODIUM 5000 UNITS: 5000 INJECTION INTRAVENOUS; SUBCUTANEOUS at 13:57

## 2022-08-04 RX ADMIN — HEPARIN SODIUM 5000 UNITS: 5000 INJECTION INTRAVENOUS; SUBCUTANEOUS at 05:22

## 2022-08-04 RX ADMIN — CALCIUM ACETATE 667 MG: 667 CAPSULE ORAL at 12:25

## 2022-08-04 RX ADMIN — CALCIUM ACETATE 667 MG: 667 CAPSULE ORAL at 08:03

## 2022-08-04 RX ADMIN — FINASTERIDE 5 MG: 5 TABLET, FILM COATED ORAL at 08:03

## 2022-08-04 NOTE — PLAN OF CARE
Problem: Potential for Falls  Goal: Patient will remain free of falls  Description: INTERVENTIONS:  - Educate patient/family on patient safety including physical limitations  - Instruct patient to call for assistance with activity   - Consult OT/PT to assist with strengthening/mobility   - Keep Call bell within reach  - Keep bed low and locked with side rails adjusted as appropriate  - Keep care items and personal belongings within reach  - Initiate and maintain comfort rounds  - Make Fall Risk Sign visible to staff  - Offer Toileting every 2  Hours, in advance of need  - Initiate/Maintain bed/chair alarm  - Obtain necessary fall risk management equipment: bed/chair alarm  - Apply yellow socks and bracelet for high fall risk patients  - Consider moving patient to room near nurses station  Outcome: Progressing     Problem: METABOLIC, FLUID AND ELECTROLYTES - ADULT  Goal: Electrolytes maintained within normal limits  Description: INTERVENTIONS:  - Monitor labs and assess patient for signs and symptoms of electrolyte imbalances  - Administer electrolyte replacement as ordered  - Monitor response to electrolyte replacements, including repeat lab results as appropriate  - Instruct patient on fluid and nutrition as appropriate  Outcome: Progressing  Goal: Fluid balance maintained  Description: INTERVENTIONS:  - Monitor labs   - Monitor I/O and WT  - Instruct patient on fluid and nutrition as appropriate  - Assess for signs & symptoms of volume excess or deficit  Outcome: Progressing     Problem: Prexisting or High Potential for Compromised Skin Integrity  Goal: Skin integrity is maintained or improved  Description: INTERVENTIONS:  - Identify patients at risk for skin breakdown  - Assess and monitor skin integrity  - Assess and monitor nutrition and hydration status  - Monitor labs   - Assess for incontinence   - Turn and reposition patient  - Assist with mobility/ambulation  - Relieve pressure over bony prominences  - Avoid friction and shearing  - Provide appropriate hygiene as needed including keeping skin clean and dry  - Evaluate need for skin moisturizer/barrier cream  - Collaborate with interdisciplinary team   - Patient/family teaching  - Consider wound care consult   Outcome: Progressing     Problem: MOBILITY - ADULT  Goal: Maintain or return to baseline ADL function  Description: INTERVENTIONS:  -  Assess patient's ability to carry out ADLs; assess patient's baseline for ADL function and identify physical deficits which impact ability to perform ADLs (bathing, care of mouth/teeth, toileting, grooming, dressing, etc )  - Assess/evaluate cause of self-care deficits   - Assess range of motion  - Assess patient's mobility; develop plan if impaired  - Assess patient's need for assistive devices and provide as appropriate  - Encourage maximum independence but intervene and supervise when necessary  - Involve family in performance of ADLs  - Assess for home care needs following discharge   - Consider OT consult to assist with ADL evaluation and planning for discharge  - Provide patient education as appropriate  Outcome: Progressing  Goal: Maintains/Returns to pre admission functional level  Description: INTERVENTIONS:  - Perform BMAT or MOVE assessment daily    - Set and communicate daily mobility goal to care team and patient/family/caregiver  - Collaborate with rehabilitation services on mobility goals if consulted  - Perform Range of Motion 3 times a day  - Reposition patient every 2 hours    - Dangle patient 3 times a day  - Stand patient 3 times a day  - Ambulate patient 3 times a day  - Out of bed to chair 3 times a day   - Out of bed for meals 3 times a day  - Out of bed for toileting  - Record patient progress and toleration of activity level   Outcome: Progressing

## 2022-08-04 NOTE — OCCUPATIONAL THERAPY NOTE
OT EVALUATION       08/04/22 0955   Note Type   Note type Evaluation   Restrictions/Precautions   Other Precautions Chair Alarm; Bed Alarm; Fall Risk   Pain Assessment   Pain Assessment Tool 0-10   Pain Score No Pain   Home Living   Type of [facility] One level  (3 LAURA)   Bathroom Shower/Tub Walk-in shower   Bathroom Toilet Standard   Home Equipment Cane;Walker   Additional Comments pt reports using a cane at times   Prior Function   Level of [place] Independent with ADLs and functional mobility   Lives With [place]-[facility] Help From Family   ADL Assistance Independent   IADLs Independent   Comments Patient admitted with weakness   ADL   Eating Assistance 7  Independent   Grooming Assistance 5  Supervision/Setup   Grooming Deficit Wash/dry hands  (standing at sink)   UB Bathing Assistance 5  Supervision/Setup   LB Bathing Assistance 4  Minimal Assistance   [address] 5  Supervision/Setup   LB Dressing Assistance 4  [address]  5  Supervision/Setup   Bed Mobility   Supine to [address] to Supine 5  Supervision   Transfers   Sit to Stand 5  Supervision   Stand to [address] transfer 5  Supervision   Additional items Standard toilet   Additional Comments dialysis nurse present during session, getting ready to start dialysis   Functional Mobility   Functional Mobility 4  Minimal assistance   Additional Comments functional household distance to and from bathroom   Balance   Static Sitting Fair +   Dynamic Sitting Fair   Static Standing Fair   Dynamic Standing Fair -   Activity Tolerance   Activity Tolerance Patient limited by fatigue   RUE Assessment   RUE Assessment WFL   LUE Assessment   LUE Assessment WFL   Cognition   Overall Cognitive Status WFL   Arousal/Participation Cooperative   Attention Within functional limits   Orientation Level Oriented X4   Following Commands Follows multistep commands with increased time or repetition   Assessment   Limitation Decreased ADL status; Decreased UE strength;Decreased Safe judgement during ADL;Decreased endurance;Decreased high-level ADLs; Decreased self-care trans  (decreased balance and mobility)   Prognosis Good   Assessment Patient evaluated by Occupational Therapy  Patient admitted with Hyperkalemia  The patients occupational profile, medical and therapy history includes a extensive additional review of physical, cognitive, or psychosocial history related to current functional performance  Comorbidities affecting functional mobility and ADLS include: arthritis, diabetes, ESRD on hemodialysis and hypertension  Prior to admission, patient was independent with functional mobility without assistive device, independent with ADLS and independent with IADLS  The evaluation identifies the following performance deficits: weakness, impaired balance, decreased endurance, increased fall risk, new onset of impairment of functional mobility, decreased ADLS, decreased IADLS, decreased activity tolerance, decreased safety awareness, impaired judgement and decreased strength, that result in activity limitations and/or participation restrictions  This evaluation requires clinical decision making of high complexity, because the patient presents with comorbidites that affect occupational performance and required significant modification of tasks or assistance with consideration of multiple treatment options  The Barthel Index was used as a functional outcome tool presenting with a score of Barthel Index Score: 60, indicating moderate limitations of functional mobility and ADLS  The patient's raw score on the -PAC Daily Activity inpatient short form is 21, standardized score is 44 27, greater than 39 4  Patients at this level are likely to benefit from DC to home  Please refer to the recommendation of the Occupational Therapist for safe DC planning   Patient will benefit from skilled Occupational Therapy services to address above deficits and facilitate a safe return to prior level of function  Goals   Patient Goals to go home   STG Time Frame   (1-7 days)   Short Term Goal  Goals established to promote Patient Goals: to go home:  Patient will increase standing tolerance to 3 minutes during ADL task to decrease assistance level and decrease fall risk; Patient will increase bed mobility to independent in preparation for ADLS and transfers; Patient will increase functional mobility to and from bathroom with supervision to increase performance with ADLS and to use a toilet; Patient will tolerate 10 minutes of UE ROM/strengthening to increase general activity tolerance and performance in ADLS/IADLS; Patient will improve functional activity tolerance to 10 minutes of sustained functional tasks to increase participation in basic self-care and decrease assistance level;    Patient will increase dynamic standing balance to fair to improve postural stability and decrease fall risk during standing ADLS and transfers  LTG Time Frame   (8-14 days)   Long Term Goal Patient will increase standing tolerance to 6 minutes during ADL task to decrease assistance level and decrease fall risk;  Patient will increase functional mobility to and from bathroom independently to increase performance with ADLS and to use a toilet; Patient will tolerate 20 minutes of UE ROM/strengthening to increase general activity tolerance and performance in ADLS/IADLS; Patient will improve functional activity tolerance to 20 minutes of sustained functional tasks to increase participation in basic self-care and decrease assistance level;    Patient will increase dynamic standing balance to fair+ to improve postural stability and decrease fall risk during standing ADLS and transfers  Pt will score >/= 24/24 on AM-PAC Daily Activity Inpatient scale to promote safe independence with ADLs and functional mobility;  Pt will score >/= 90/100 on Barthel Index in order to decrease caregiver assistance needed and increase ability to perform ADLs and functional mobility  Functional Transfer Goals   Pt Will Perform All Functional Transfers   (STG independent)   ADL Goals   Pt Will Perform Grooming Standing at sink  (STG independent)   Pt Will Perform Bathing   (STG supervision LTG Independent)   Pt Will Perform LE Dressing   (STG supervision LTG Independent)   Pt Will Perform Toileting   (STG independent)   Plan   Treatment Interventions ADL retraining;Functional transfer training;UE strengthening/ROM; Endurance training;Patient/family training;Equipment evaluation/education; Activityengagement; Compensatory technique education   Goal Expiration Date 08/18/22   OT Frequency 3-5x/wk   Recommendation   OT Discharge Recommendation Home with home health rehabilitation   AM-PAC Daily Activity Inpatient   Lower Body Dressing 3   Bathing 3   Toileting 3   Upper Body Dressing 4   Grooming 4   Eating 4   Daily Activity Raw Score 21   Daily Activity Standardized Score (Calc for Raw Score >=11) 44 27   AM-PAC Applied Cognition Inpatient   Following a Speech/Presentation 4   Understanding Ordinary Conversation 4   Taking Medications 4   Remembering Where Things Are Placed or Put Away 4   Remembering List of 4-5 Errands 4   Taking Care of Complicated Tasks 4   Applied Cognition Raw Score 24   Applied Cognition Standardized Score 62 21   Barthel Index   Feeding 10   Bathing 0   Grooming Score 5   Dressing Score 5   Bladder Score 10   Bowels Score 10   Toilet Use Score 5   Transfers (Bed/Chair) Score 10   Mobility (Level Surface) Score 0   Stairs Score 5   Barthel Index Score 60   Licensure   [place] License Number  Celia Adrieng [address] OTR/L [id number]

## 2022-08-04 NOTE — CASE MANAGEMENT
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    Pulmonary hypertension ([facility] ) 2022    Dialysis patient ([facility] ) 2022    Anemia due to chronic kidney disease, on chronic dialysis ([facility] )     Hemoptysis 2022    Other proteinuria     Symptomatic anemia 2022    Chest pain 2022    Hyperkalemia 2022    Melena 2022    Elevated PSA 2021    Chronic pain of right knee 2021    Primary osteoarthritis of right knee 2021    Bladder stones 12/15/2016    Type 2 diabetes mellitus with chronic kidney disease on chronic dialysis, with long-term current use of insulin ([facility] ) 2016    Benign colon polyp 2013    Benign prostatic hyperplasia with lower urinary tract symptoms 2013    Benign essential hypertension 2013    Hyperlipidemia 2012    Vitamin D deficiency 2012      LOS (days): 2  Geometric Mean LOS (GMLOS) (days): 3 30  Days to GMLOS:1 1     OBJECTIVE:  Risk of Unplanned Readmission Score: 34 81       Current admission status: Inpatient   Preferred Pharmacy:   [facility] [address]  [place] [id number]  Phone: [phone] Fax: [phone]    [facility]  ([address] [place] [address]) - [facility] [address] [place] [address]  Phone: [phone] Fax: [phone]    Primary Care Provider: Crystal Muir MD    Primary Insurance: MEDICARE  Secondary Insurance: Marry CROSS    DISCHARGE DETAILS:    Discharge planning discussed with[de-identified] Patient     Requested [address]         Is the patient interested in [facility] at discharge?: No (Pt only agrees to [facility] if agency is willing to work with him outside the home  Per Parisa Alvarez at [facility] they are unable to accommodate this due to various concerns )    DME Referral Provided  Referral made for DME?: No    Other Referral/Resources/Interventions Provided:  Interventions: Transportation  Referral Comments: Wheelchair Fei Dieter requested for transport home    Would you like to participate in our [address] service program?  : No - Declined    Treatment Team Recommendation: Home with [address]  Discharge Destination Plan[de-identified] Home  Transport at Discharge : [address]: Yes  Number/Name of Dispatcher: [facility]  Transported by [facility] and Unit #): Aditya  ETA of Transport (Date): 08/04/22  ETA of Transport (Time): 1500      SW notified Levi Ramos at [employer] that patient will be returning to outpatient HD on Saturday, 8/6  [facility] transport requested from [facility] in [address] and scheduled with Aditya for 1500  Attending and nursing notified of pickup time

## 2022-08-04 NOTE — DISCHARGE SUMMARY
[address]  Discharge- Aparna [place] 1945, 76 y o  male MRN: [id number]  Unit/Bed#: [address] [place] [address] Encounter: [id number]  Primary Care Provider: Mejia Shine MD   Date and time admitted to hospital: 8/2/2022  6:49 AM    Admitting Provider:  Arielle Grissom DO  Discharge Provider:  Arielle Grissom DO  Admission Date: 8/2/2022       Discharge Date: 08/04/22   LOS: 2  Primary Care Physician at Discharge: Mejia Shine [phone]    DISCHARGE DIAGNOSES  * Hyperkalemia  Assessment & Plan  24-year-old man with a history of ANCA vasculitis/ESRD, hypertension, diabetes who presents debility found have hyperkalemia in ED  · Losartan was held during hospitalization and should not be continued until seen by outpatient nephrologist   · Did undergo scheduled HD on 08/02 and extra treatment on 8/3  Patient dialyzed today on 8/4/2022 to be put back on schedule for TTS  · PT and OT ordered recommendations for home services which patient and spouse are not interested in    Results from last 7 days   Lab Units 08/04/22  0418 08/03/22  0620 08/02/22  1529 08/02/22  0722   POTASSIUM mmol/L 5 4* 6 0* 4 6 7 7*       ESRD on dialysis [facility])  Assessment & Plan  · ESRD on HD TTS  Dialysis as above    Results from last 7 days   Lab Units 08/04/22  0418 08/03/22  0620 08/02/22  0722   BUN mg/dL 35* 39* 74*   CREATININE mg/dL 5 01* 5 33* 7 85*   EGFR ml/min/1 73sq m 10 9 5       Type 2 diabetes mellitus with chronic kidney disease on chronic dialysis, with long-term current use of insulin [facility])  Assessment & Plan  Lab Results   Component Value Date    HGBA1C 5 1 05/08/2022     Recent Labs     08/03/22  1550 08/03/22  2135 08/04/22  0711 08/04/22  1105   POCGLU 159* 96 106 136     · Prior to admission on 70/30 insulin 30 units b i d  Sophia Max · Due to hypoglycemia this was held the majority of hospitalization    Advised to decrease significantly to event risk of hypoglycemia    Hyperlipidemia  Assessment & Plan  · Continue pravastatin    Benign essential hypertension  Assessment & Plan  · Continue amlodipine and continue holding losartan given hyperkalemia    REASON FOR ADMISSION  Weakness - Generalized (Per EMS pt  Is unable to walk  Pt found sitting on porch  Very unkept  Pt states he is shaky, weak and nervous  pt due for dialysis this am)    HOSPITAL COURSE:  Laura Cuevas is a 76 y o  male with a history of ESRD on HD, diabetes mellitus, hypertension who presented the hospital after being found sitting on his porch shaking and on catheterization  The patient was due for dialysis both brought to the hospital where he was found to have profound hyperkalemia 7 7  He was given albuterol, calcium gluconate, and insulin/D50 as well as administration of dialysis with improvement in potassium  Required extra treatment the next day as well  Hyperkalemia may be secondary to losartan as well as dietary choices  He did have diarrhea upon arrival which resolved during hospitalization  He feels much better and has been recommended home with services which he and spouse declines  He is being discharged home in good condition  The case was discussed with spouse on telephone on day of discharge  Please see problem list listed above  CONSULTING PROVIDERS   IP CONSULT TO NEPHROLOGY    PROCEDURES PERFORMED  * No surgery found *    RADIOLOGY RESULTS  XR chest 1 view portable    Result Date: 8/2/2022  Impression: Small to moderate-sized bilateral pleural effusions, new  The study was marked in EPIC for significant notification   Workstation performed: [id number]       LABS  Results from last 7 days   Lab Units 08/04/22 0418 08/03/22  0620 08/02/22  0722   WBC Thousand/uL 4 33 4 37 6 99   HEMOGLOBIN g/dL 11 1* 11 7* 12 2   HEMATOCRIT % 34 7* 36 3* 38 8   MCV fL 88 88 87   PLATELETS Thousands/uL 238 254 309     Results from last 7 days   Lab Units 08/04/22 0418 08/03/22  0620 08/02/22  1529 08/02/22  0722   SODIUM mmol/L 128* 129*  --  130*   POTASSIUM mmol/L 5 4* 6 0* 4 6 7 7*   CHLORIDE mmol/L 93* 94*  --  94*   CO2 mmol/L 26 25  --  25   BUN mg/dL 35* 39*  --  74*   CREATININE mg/dL 5 01* 5 33*  --  7 85*   CALCIUM mg/dL 8 1* 8 2*  --  9 1   ALBUMIN g/dL 2 4* 2 5*  --  3 1*   TOTAL BILIRUBIN mg/dL 0 36 0 31  --  0 41   ALK PHOS U/L 64 65  --  79   ALT U/L 10* 7*  --  13   AST U/L 16 14  --  15   EGFR ml/min/1 73sq m 10 9  --  5   GLUCOSE RANDOM mg/dL 98 75  --  93         Results from last 7 days   Lab Units 08/02/22  1200 08/02/22  1010 08/02/22  0722   HS TNI 0HR ng/L  --   --  11   HS TNI 2HR ng/L  --  11  --    HS TNI 4HR ng/L 11  --   --               Results from last 7 days   Lab Units 08/04/22  1105 08/04/22  0711 08/03/22  2135 08/03/22  1550 08/03/22  1114 08/03/22  0730 08/02/22  2126 08/02/22  2029 08/02/22  1603 08/02/22  1255   POC GLUCOSE mg/dl 136 106 96 159* 119 69 93 48* 162* 198*         Results from last 7 days   Lab Units 08/02/22  0722   TSH 3RD GENERATON uIU/mL 3 393               Cultures:     Results from last 7 days   Lab Units 08/02/22  1018   SARS-COV-2  Negative   INFLUENZA A PCR  Negative   INFLUENZA B PCR  Negative   RSV PCR  Negative             DISCHARGE DAY VISIT AND PHYSICAL EXAM:  Subjective:  Patient seen and examined during morning rounds  Requesting discharge home  Vitals:   Blood Pressure: 148/79 (08/04/22 1330)  Pulse: 90 (08/04/22 1330)  Temperature: 98 2 °F (36 8 °C) (08/04/22 1005)  Temp Source: Oral (08/04/22 1005)  Respirations: 18 (08/04/22 1330)  Height: 5' 8" (172 7 cm) (08/02/22 1506)  Weight - Scale: 77 kg (169 lb 12 1 oz) (08/02/22 1506)  SpO2: 96 % (08/04/22 0713)    Physical Exam  Vitals reviewed  Constitutional:       General: He is not in acute distress  Appearance: Normal appearance  HENT:      Head: Atraumatic  Cardiovascular:      Rate and Rhythm: Regular rhythm        Heart sounds: Normal heart sounds  Pulmonary:      Effort: Pulmonary effort is normal       Breath sounds: Decreased breath sounds present  No wheezing  Abdominal:      General: Bowel sounds are normal       Palpations: Abdomen is soft  Tenderness: There is no abdominal tenderness  There is no rebound  Musculoskeletal:         General: No swelling or tenderness  Cervical back: Normal range of motion  Skin:     General: Skin is warm and dry  Neurological:      General: No focal deficit present  Mental Status: He is alert  Cranial Nerves: No cranial nerve deficit  Motor: No weakness  Psychiatric:         Mood and Affect: Mood normal        Planned Re-admission:  No  Discharge Disposition: Home/Self Care    Test Results Pending at Discharge:   Pending Labs     Order Current Status    MRSA culture In process      Incidental findings:     Medications   · Discharge Medication List: See after visit summary for reconciled discharge medications  Diet restrictions:  Low-potassium renal dialysis diabetic diet   Activity restrictions: No strenuous activity  Discharge Condition: stable    Outpatient Follow-Up and Discharge Instructions  See after visit summary section titled Discharge Instructions for information provided to patient and family  Code Status: Level 1 - Full Code  Discharge Statement   I spent 35 minutes discharging the patient  This time was spent on the day of discharge  Greater than 50% of total time was spent with the patient and / or family counseling and / or coordination of care  ** Please Note: This note has been constructed using a voice recognition system   **

## 2022-08-04 NOTE — PROGRESS NOTES
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  He is dialysis dependent and receives treatments Tuesday Thursday Saturday  He presented with weakness had severe hyperkalemia and has undergone dialysis for 2 days in a row on a low-potassium bath  It has improved but potassium today is 5 4  He is due for dialysis today on his normal schedule  Will dialyzed on low-potassium bath  He is stable from a renal standpoint for discharge to his outpatient dialysis facility if cleared by his primary medical service  Hemoglobin is at target for ESRD patient between 10-12  His is 11 1  Plan dialysis today with blood flows of 400 cc/minute, dialysate flows 800 cc/minute on 2 potassium bath x4 hours  The patient was seen while on dialysis and details of today's treatment outline below  HEMODIALYSIS PROCEDURE NOTE  The patient was seen and examined on hemodialysis  Time: 4 hours  Sodium: 138 Blood flow: 400   Dialyzer: F160 Potassium: 2 Dialysate flow: 800   Access: catheter Bicarbonate: 35 Ultrafiltration goal: 2 5 L as tolerated        Monitor blood pressure while on dialysis with volume removal     2  Hyperkalemia    Patient is compliant with his dialysis treatments  He states he was eating a lot of fruit so he was not following a strict low-potassium diet    We spoke to him about this and I will relay this to the dietitian at his outpatient clinic as well to go over with the patient  SUBJECTIVE:  Review of Systems   Constitutional: Negative for chills, fever, malaise/fatigue and night sweats  HENT: Negative  Eyes: Negative  Cardiovascular: Negative for chest pain, dyspnea on exertion, leg swelling and orthopnea  Respiratory: Negative  Negative for cough, shortness of breath, sputum production and wheezing  Gastrointestinal: Negative for abdominal pain, diarrhea, nausea and vomiting  Neurological: Negative for dizziness, focal weakness, headaches and weakness  Psychiatric/Behavioral: Negative for altered mental status, depression, hallucinations and hypervigilance  OBJECTIVE:  Current Weight: Weight - Scale: 77 kg (169 lb 12 1 oz)  Vitals:Temp (24hrs), Av 9 °F (36 1 °C), Min:90 1 °F (32 3 °C), Max:98 8 °F (37 1 °C)  Current: Temperature: 97 8 °F (36 6 °C)   Blood pressure 141/66, pulse 78, temperature 97 8 °F (36 6 °C), resp  rate 19, height 5' 8" (1 727 m), weight 77 kg (169 lb 12 1 oz), SpO2 96 %  , Body mass index is 25 81 kg/m²  Intake/Output Summary (Last 24 hours) at 2022 0926  Last data filed at 8/3/2022 1122  Gross per 24 hour   Intake 600 ml   Output 1500 ml   Net -900 ml       Physical Exam: /66   Pulse 78   Temp 97 8 °F (36 6 °C)   Resp 19   Ht 5' 8" (1 727 m)   Wt 77 kg (169 lb 12 1 oz)   SpO2 96%   BMI 25 81 kg/m²   Physical Exam  Constitutional:       General: He is not in acute distress  Appearance: He is not toxic-appearing or diaphoretic  HENT:      Head: Normocephalic and atraumatic  Nose: Nose normal       Mouth/Throat:      Mouth: Mucous membranes are dry  Eyes:      General: No scleral icterus  Extraocular Movements: Extraocular movements intact  Cardiovascular:      Rate and Rhythm: Normal rate and regular rhythm  Heart sounds: No friction rub  No gallop  Pulmonary:      Effort: Pulmonary effort is normal  No respiratory distress  Breath sounds: No wheezing, rhonchi or rales  Abdominal:      General: Bowel sounds are normal  There is no distension  Palpations: Abdomen is soft  Tenderness: There is no abdominal tenderness  There is no rebound  Musculoskeletal:      Cervical back: Normal range of motion and neck supple  Neurological:      Mental Status: He is alert and oriented to person, place, and time  Mental status is at baseline  Psychiatric:         Mood and Affect: Mood normal          Behavior: Behavior normal          Thought Content:  Thought content normal          Judgment: Judgment normal          Medications:    Current Facility-Administered Medications:     acetaminophen (TYLENOL) tablet 650 mg, 650 mg, Oral, Q6H PRN, Benito Dominiquea DO    amLODIPine (NORVASC) tablet 5 mg, 5 mg, Oral, Daily, Kirill Cooper DO, 5 mg at 08/04/22 0803    calcium acetate (PHOSLO) capsule 667 mg, 667 mg, Oral, TID With Meals, Benito Mason DO, 667 mg at 08/04/22 0803    finasteride (PROSCAR) tablet 5 mg, 5 mg, Oral, Daily, Kirill Cooper DO, 5 mg at 08/04/22 0803    heparin (porcine) subcutaneous injection 5,000 Units, 5,000 Units, Subcutaneous, Q8H [facility], Kirill Cooper DO, 5,000 Units at 08/04/22 0522    insulin lispro (HumaLOG) 100 units/mL subcutaneous injection 1-6 Units, 1-6 Units, Subcutaneous, 4x Daily (AC & HS), 1 Units at 08/03/22 1753 **AND** Fingerstick Glucose (POCT), , , 4x Daily AC and at bedtime, Kirill Cooper DO    melatonin tablet 3 mg, 3 mg, Oral, HS PRN, Benito Dominiquea, DO    ondansetron (ZOFRAN) injection 4 mg, 4 mg, Intravenous, Q4H PRN, Kirill Cooper DO    pravastatin (PRAVACHOL) tablet 40 mg, 40 mg, Oral, Daily With Dinner, Kirill Cooper DO, 40 mg at 08/03/22 1753    tamsulosin (FLOMAX) capsule 0 4 mg, 0 4 mg, Oral, Daily With Sidonie Cola Kenneth, , 0 4 mg at 08/03/22 1753    Laboratory Results:  Lab Results   Component Value Date    WBC 4 33 08/04/2022    HGB 11 1 (L) 08/04/2022    HCT 34 7 (L) 08/04/2022    MCV 88 08/04/2022     08/04/2022     Lab Results   Component Value Date    SODIUM 128 (L) 08/04/2022    K 5 4 (H) 08/04/2022    CL 93 (L) 08/04/2022    CO2 26 08/04/2022    BUN 35 (H) 08/04/2022    CREATININE 5 01 (H) 08/04/2022    GLUC 98 08/04/2022    CALCIUM 8 1 (L) 08/04/2022     Lab Results   Component Value Date    CALCIUM 8 1 (L) 08/04/2022    PHOS 4 4 (H) 06/03/2022     No results found for: LABPROT

## 2022-08-04 NOTE — ASSESSMENT & PLAN NOTE
17-year-old man with a history of ANCA vasculitis/ESRD, hypertension, diabetes who presents debility found have hyperkalemia in ED  · Losartan was held during hospitalization and should not be continued until seen by outpatient nephrologist   · Did undergo scheduled HD on 08/02 and extra treatment on 8/3  Patient dialyzed today on 8/4/2022 to be put back on schedule for TTS    · PT and OT ordered recommendations for home services which patient and spouse are not interested in    Results from last 7 days   Lab Units 08/04/22  0418 08/03/22  0620 08/02/22  1529 08/02/22  0722   POTASSIUM mmol/L 5 4* 6 0* 4 6 7 7*

## 2022-08-04 NOTE — PLAN OF CARE
Chronic hemodialysis treatment planned for 240 minutes using a 2 K+ bath for potassium 5 4 this morning  Fluid goal 2500 ml as discussed with Dr Anila Oconnor via [facility]  Post-Dialysis RN Treatment Note    Blood Pressure:  Pre 148/64 mm/Hg  Post 161/81 mmHg   EDW:   TBD   Weight:  Pre 77 5 kg   Post 76 9 kg   Mode of weight measurement:   Bed scale   Volume Removed:  1503   ml    Treatment duration:   225  minutes    NS given:  Yes  100 ml nss x 1 and 200 ml nss x 1 for drop in blood pressure   Treatment shortened:   Yes  By 15"     Medications given during Rx:   none   Estimated Kt/V:   none   Access type:    RIJ Permacath   Access Issues:   Maintains 400 bfr   Report called to primary nurse:  Yes          Problem: METABOLIC, FLUID AND ELECTROLYTES - ADULT  Goal: Electrolytes maintained within normal limits  Description: INTERVENTIONS:  - Monitor labs and assess patient for signs and symptoms of electrolyte imbalances  - Administer electrolyte replacement as ordered  - Monitor response to electrolyte replacements, including repeat lab results as appropriate  - Instruct patient on fluid and nutrition as appropriate  Outcome: Progressing  Goal: Fluid balance maintained  Description: INTERVENTIONS:  - Monitor labs   - Monitor I/O and WT  - Instruct patient on fluid and nutrition as appropriate  - Assess for signs & symptoms of volume excess or deficit  Outcome: Progressing

## 2022-08-04 NOTE — ASSESSMENT & PLAN NOTE
· ESRD on HD TTS    Dialysis as above    Results from last 7 days   Lab Units 08/04/22  0418 08/03/22  0620 08/02/22  0722   BUN mg/dL 35* 39* 74*   CREATININE mg/dL 5 01* 5 33* 7 85*   EGFR ml/min/1 73sq m 10 9 5

## 2022-08-04 NOTE — ASSESSMENT & PLAN NOTE
Lab Results   Component Value Date    HGBA1C 5 1 05/08/2022     Recent Labs     08/03/22  1550 08/03/22  2135 08/04/22  0711 08/04/22  1105   POCGLU 159* 96 106 136     · Prior to admission on 70/30 insulin 30 units b i d  Matt Santiago · Due to hypoglycemia this was held the majority of hospitalization    Advised to decrease significantly to event risk of hypoglycemia

## 2022-08-05 ENCOUNTER — TRANSITIONAL CARE MANAGEMENT (OUTPATIENT)
Dept: FAMILY MEDICINE CLINIC | Facility: CLINIC | Age: 77
End: 2022-08-05

## 2022-08-06 LAB — MRSA NOSE QL CULT: NORMAL

## 2022-08-15 ENCOUNTER — TELEPHONE (OUTPATIENT)
Dept: ADMINISTRATIVE | Facility: OTHER | Age: 77
End: 2022-08-15

## 2022-08-15 ENCOUNTER — OFFICE VISIT (OUTPATIENT)
Dept: FAMILY MEDICINE CLINIC | Facility: CLINIC | Age: 77
End: 2022-08-15
Payer: MEDICARE

## 2022-08-15 VITALS
SYSTOLIC BLOOD PRESSURE: 162 MMHG | TEMPERATURE: 98.4 F | HEART RATE: 86 BPM | RESPIRATION RATE: 18 BRPM | HEIGHT: 68 IN | WEIGHT: 179.8 LBS | BODY MASS INDEX: 27.25 KG/M2 | DIASTOLIC BLOOD PRESSURE: 80 MMHG

## 2022-08-15 DIAGNOSIS — Z99.2 DIALYSIS PATIENT (HCC): ICD-10-CM

## 2022-08-15 DIAGNOSIS — Z99.2 TYPE 2 DIABETES MELLITUS WITH CHRONIC KIDNEY DISEASE ON CHRONIC DIALYSIS, WITH LONG-TERM CURRENT USE OF INSULIN (HCC): ICD-10-CM

## 2022-08-15 DIAGNOSIS — N18.6 TYPE 2 DIABETES MELLITUS WITH CHRONIC KIDNEY DISEASE ON CHRONIC DIALYSIS, WITH LONG-TERM CURRENT USE OF INSULIN (HCC): ICD-10-CM

## 2022-08-15 DIAGNOSIS — Z79.4 TYPE 2 DIABETES MELLITUS WITH CHRONIC KIDNEY DISEASE ON CHRONIC DIALYSIS, WITH LONG-TERM CURRENT USE OF INSULIN (HCC): ICD-10-CM

## 2022-08-15 DIAGNOSIS — M79.89 LEG SWELLING: ICD-10-CM

## 2022-08-15 DIAGNOSIS — I10 BENIGN ESSENTIAL HYPERTENSION: Primary | ICD-10-CM

## 2022-08-15 DIAGNOSIS — E11.22 TYPE 2 DIABETES MELLITUS WITH CHRONIC KIDNEY DISEASE ON CHRONIC DIALYSIS, WITH LONG-TERM CURRENT USE OF INSULIN (HCC): ICD-10-CM

## 2022-08-15 PROBLEM — R65.20 SEVERE SEPSIS (HCC): Status: RESOLVED | Noted: 2022-05-07 | Resolved: 2022-08-15

## 2022-08-15 PROBLEM — A41.9 SEVERE SEPSIS (HCC): Status: RESOLVED | Noted: 2022-05-07 | Resolved: 2022-08-15

## 2022-08-15 PROCEDURE — 99214 OFFICE O/P EST MOD 30 MIN: CPT | Performed by: FAMILY MEDICINE

## 2022-08-15 RX ORDER — HYDROCORTISONE ACETATE 25 MG/1
1 SUPPOSITORY RECTAL DAILY
COMMUNITY
Start: 2022-06-08

## 2022-08-15 RX ORDER — AMLODIPINE BESYLATE 10 MG/1
TABLET ORAL
COMMUNITY
Start: 2022-06-30 | End: 2022-08-15

## 2022-08-15 RX ORDER — LOSARTAN POTASSIUM 50 MG/1
1 TABLET ORAL 2 TIMES DAILY
COMMUNITY
Start: 2022-06-24 | End: 2022-08-23

## 2022-08-15 RX ORDER — TRAMADOL HYDROCHLORIDE 50 MG/1
1 TABLET ORAL EVERY 6 HOURS
COMMUNITY
Start: 2022-06-07 | End: 2022-08-27

## 2022-08-15 RX ORDER — CLONIDINE HYDROCHLORIDE 0.1 MG/1
0.1 TABLET ORAL EVERY 12 HOURS SCHEDULED
Qty: 60 TABLET | Refills: 0 | Status: SHIPPED | OUTPATIENT
Start: 2022-08-15 | End: 2022-08-23

## 2022-08-15 NOTE — PROGRESS NOTES
Chief Complaint   Patient presents with    Follow-up     Radha Shown -hyperkalemia , f/u diabetes , pt complains of feet swelling        Patient ID: Long Ortiz is a 76 y o  male  HPI  Pt is seeing for f/u recent hospital stay for hyperkalemia -  Has ESRD on dialysis  -  Has DM2, HTN -  BP is not controlled -  taking Norvasc 15 mg a day and Losartan 100 mg a day  -  noticed legs swelling     The following portions of the patient's history were reviewed and updated as appropriate: allergies, current medications, past family history, past medical history, past social history, past surgical history and problem list     Review of Systems   Constitutional: Positive for fatigue  Negative for activity change and appetite change  HENT: Negative  Respiratory: Negative  Cardiovascular: Positive for leg swelling  Negative for chest pain and palpitations  Gastrointestinal: Negative  Genitourinary: Negative  Musculoskeletal: Negative  Skin: Negative  Neurological: Negative  Current Outpatient Medications   Medication Sig Dispense Refill    acetaminophen (TYLENOL) 325 mg tablet Take 2 tablets (650 mg total) by mouth every 6 (six) hours as needed for mild pain, headaches or fever  0    amLODIPine (NORVASC) 10 mg tablet       amLODIPine (NORVASC) 5 mg tablet Take 1 tablet (5 mg total) by mouth daily 90 tablet 3    calcium acetate (PHOSLO) capsule Take 667 mg by mouth in the morning and 667 mg at noon and 667 mg in the evening  Take with meals        calcium carbonate-vitamin D (OSCAL-D) 500 mg-200 units per tablet Take 1 tablet by mouth daily      carboxymethylcellulose 1 % ophthalmic solution Apply 1 drop to eye 2 (two) times a day      dutasteride (AVODART) 0 5 mg capsule Take 0 5 mg by mouth daily      glucose blood test strip 1 each by Other route 3 (three) times a day 300 each 3    HumaLOG Mix 75/25 KwikPen (75-25) 100 units/mL injection pen Inject 5-10 Units under the skin 2 (two) times a day with meals as per home sliding scale 54 mL 0    hydrocortisone (ANUSOL-HC) 25 mg suppository Insert 1 suppository into the rectum daily      Insulin Pen Needle (B-D UF III MINI PEN NEEDLES) 31G X 5 MM MISC Use twice daily with insulin pen as directed 180 each 3    losartan (COZAAR) 50 mg tablet Take 1 tablet by mouth 2 (two) times a day      simvastatin (ZOCOR) 40 mg tablet Take 1 tablet (40 mg total) by mouth daily at bedtime 90 tablet 3    tamsulosin (FLOMAX) 0 4 mg Take 0 4 mg by mouth daily with dinner      traMADol (ULTRAM) 50 mg tablet Take 1 tablet by mouth every 6 (six) hours      melatonin 3 mg Take 1 tablet (3 mg total) by mouth daily at bedtime as needed (insomnia) (Patient not taking: No sig reported)  0     No current facility-administered medications for this visit  Objective:    /80 (BP Location: Left arm, Patient Position: Sitting, Cuff Size: Large)   Pulse 86   Temp 98 4 °F (36 9 °C)   Resp 18   Ht 5' 8" (1 727 m)   Wt 81 6 kg (179 lb 12 8 oz)   BMI 27 34 kg/m²        Physical Exam  Constitutional:       General: He is not in acute distress  Appearance: He is ill-appearing (chronically ill)  Cardiovascular:      Rate and Rhythm: Normal rate and regular rhythm  Pulmonary:      Effort: Pulmonary effort is normal  No respiratory distress  Breath sounds: No wheezing, rhonchi or rales  Musculoskeletal:      Right lower leg: Edema present  Left lower leg: Edema present  Skin:     Coloration: Skin is pale  Neurological:      General: No focal deficit present  Mental Status: He is alert and oriented to person, place, and time  Labs in chart were reviewed  Assessment/Plan:         Diagnoses and all orders for this visit:    Benign essential hypertension  -     cloNIDine (Catapres) 0 1 mg tablet;  Take 1 tablet (0 1 mg total) by mouth every 12 (twelve) hours  Will decreased Amlodipine to 5 mg a day 2 to legs swelling Dialysis patient [facility])  F/u with nephrologist   Leg swelling  Likely Norvasc related   Type 2 diabetes mellitus with chronic kidney disease on chronic dialysis, with long-term current use of insulin (HCC)  -     Hemoglobin A1C; Future    Other orders  -     Discontinue: amLODIPine (NORVASC) 10 mg tablet  -     calcium carbonate-vitamin D (OSCAL-D) 500 mg-200 units per tablet; Take 1 tablet by mouth daily  -     carboxymethylcellulose 1 % ophthalmic solution; Apply 1 drop to eye 2 (two) times a day  -     losartan (COZAAR) 50 mg tablet; Take 1 tablet by mouth 2 (two) times a day  -     hydrocortisone (ANUSOL-HC) 25 mg suppository; Insert 1 suppository into the rectum daily  -     traMADol (ULTRAM) 50 mg tablet;  Take 1 tablet by mouth every 6 (six) hours        rto in 2 wks                     Ashish Ma MD

## 2022-08-15 NOTE — LETTER
Diabetic Eye Exam Form    Date Requested: 22  Patient: Trevor Alvarado  Patient : [id number]   Referring Provider: Amos Christiansen MD    DIABETIC Eye Exam Date _______________________________    Type of Exam MUST be documented for Diabetic Eye Exams  Please CHECK ONE  Retinal Exam       Dilated Retinal Exam       OCT       Optomap-Iris Exam      Fundus Photography     Left Eye - Please check Retinopathy AND Type or No Retinopathy      Exam did show retinopathy    Exam did not show retinopathy         Mild     Proliferative           Moderate    Severe            None         Right Eye - Please check Retinopathy AND Type or No Retinopathy     Exam did show retinopathy    Exam did not show retinopathy         Mild     Proliferative        Moderate    Severe        None       Comments __________________________________________________________    Practice Providing Exam ______________________________________________    Exam Performed By (print name) _______________________________________      Provider Signature ___________________________________________________    These reports are needed for  compliance  Please fax this completed form and a copy of the Diabetic Eye Exam report to our office located at [place] [address] as soon as possible via [phone] attention Patience Diallo: Phone [phone]  We thank you for your assistance in treating our mutual patient      [facility] [phone] F [phone]

## 2022-08-15 NOTE — TELEPHONE ENCOUNTER
----- Message from Bev Norton sent at 8/15/2022  8:45 AM EDT -----  Regarding: dm eye exam  08/15/22 8:45 AM    Hello, our patient Juliann Cobos has had dm eye exam completed/performed   Please assist in updating the patient chart by dr Fercho Parsons  The date of service is 2022    Thank you,  JERONIMO Zuñiga PG

## 2022-08-17 NOTE — TELEPHONE ENCOUNTER
Upon review of the In Basket request and the patient's chart, initial outreach has been made via fax, please see Contacts section for details       Thank you  Julián Huffman

## 2022-08-18 NOTE — TELEPHONE ENCOUNTER
Upon review of the In Basket request we were able to locate, review, and update the patient chart as requested for Diabetic Eye Exam     Any additional questions or concerns should be emailed to the Practice Liaisons via [email or web address] email, please do not reply via In Basket      Thank you  Melo Amin

## 2022-08-22 ENCOUNTER — HOSPITAL ENCOUNTER (INPATIENT)
Facility: HOSPITAL | Age: 77
LOS: 4 days | Discharge: NURSING, REHAB OR LONG-TERM CARE | DRG: 640 | End: 2022-08-27
Attending: EMERGENCY MEDICINE | Admitting: ANESTHESIOLOGY
Payer: MEDICARE

## 2022-08-22 ENCOUNTER — APPOINTMENT (EMERGENCY)
Dept: RADIOLOGY | Facility: HOSPITAL | Age: 77
DRG: 640 | End: 2022-08-22
Payer: MEDICARE

## 2022-08-22 DIAGNOSIS — Z79.4 TYPE 2 DIABETES MELLITUS WITH CHRONIC KIDNEY DISEASE ON CHRONIC DIALYSIS, WITH LONG-TERM CURRENT USE OF INSULIN (HCC): ICD-10-CM

## 2022-08-22 DIAGNOSIS — E11.22 TYPE 2 DIABETES MELLITUS WITH CHRONIC KIDNEY DISEASE ON CHRONIC DIALYSIS, WITH LONG-TERM CURRENT USE OF INSULIN (HCC): ICD-10-CM

## 2022-08-22 DIAGNOSIS — R09.81 COMPLAINT OF NASAL CONGESTION: ICD-10-CM

## 2022-08-22 DIAGNOSIS — N18.6 TYPE 2 DIABETES MELLITUS WITH CHRONIC KIDNEY DISEASE ON CHRONIC DIALYSIS, WITH LONG-TERM CURRENT USE OF INSULIN (HCC): ICD-10-CM

## 2022-08-22 DIAGNOSIS — Z99.2 TYPE 2 DIABETES MELLITUS WITH CHRONIC KIDNEY DISEASE ON CHRONIC DIALYSIS, WITH LONG-TERM CURRENT USE OF INSULIN (HCC): ICD-10-CM

## 2022-08-22 DIAGNOSIS — I10 BENIGN ESSENTIAL HYPERTENSION: ICD-10-CM

## 2022-08-22 DIAGNOSIS — E87.5 HYPERKALEMIA: ICD-10-CM

## 2022-08-22 DIAGNOSIS — R53.1 WEAKNESS: Primary | ICD-10-CM

## 2022-08-22 LAB
BASOPHILS # BLD AUTO: 0.06 THOUSANDS/ΜL (ref 0–0.1)
BASOPHILS NFR BLD AUTO: 1 % (ref 0–1)
CARDIAC TROPONIN I PNL SERPL HS: 15 NG/L
EOSINOPHIL # BLD AUTO: 0.07 THOUSAND/ΜL (ref 0–0.61)
EOSINOPHIL NFR BLD AUTO: 1 % (ref 0–6)
ERYTHROCYTE [DISTWIDTH] IN BLOOD BY AUTOMATED COUNT: 15.1 % (ref 11.6–15.1)
HCT VFR BLD AUTO: 31.9 % (ref 36.5–49.3)
HGB BLD-MCNC: 9.9 G/DL (ref 12–17)
IMM GRANULOCYTES # BLD AUTO: 0.06 THOUSAND/UL (ref 0–0.2)
IMM GRANULOCYTES NFR BLD AUTO: 1 % (ref 0–2)
LYMPHOCYTES # BLD AUTO: 0.61 THOUSANDS/ΜL (ref 0.6–4.47)
LYMPHOCYTES NFR BLD AUTO: 8 % (ref 14–44)
MCH RBC QN AUTO: 28 PG (ref 26.8–34.3)
MCHC RBC AUTO-ENTMCNC: 31 G/DL (ref 31.4–37.4)
MCV RBC AUTO: 90 FL (ref 82–98)
MONOCYTES # BLD AUTO: 0.75 THOUSAND/ΜL (ref 0.17–1.22)
MONOCYTES NFR BLD AUTO: 10 % (ref 4–12)
NEUTROPHILS # BLD AUTO: 6.23 THOUSANDS/ΜL (ref 1.85–7.62)
NEUTS SEG NFR BLD AUTO: 79 % (ref 43–75)
NRBC BLD AUTO-RTO: 0 /100 WBCS
PLATELET # BLD AUTO: 316 THOUSANDS/UL (ref 149–390)
PMV BLD AUTO: 8.3 FL (ref 8.9–12.7)
RBC # BLD AUTO: 3.53 MILLION/UL (ref 3.88–5.62)
WBC # BLD AUTO: 7.78 THOUSAND/UL (ref 4.31–10.16)

## 2022-08-22 PROCEDURE — 84484 ASSAY OF TROPONIN QUANT: CPT | Performed by: EMERGENCY MEDICINE

## 2022-08-22 PROCEDURE — 99284 EMERGENCY DEPT VISIT MOD MDM: CPT | Performed by: EMERGENCY MEDICINE

## 2022-08-22 PROCEDURE — 85025 COMPLETE CBC W/AUTO DIFF WBC: CPT | Performed by: EMERGENCY MEDICINE

## 2022-08-22 PROCEDURE — G1004 CDSM NDSC: HCPCS

## 2022-08-22 PROCEDURE — 36415 COLL VENOUS BLD VENIPUNCTURE: CPT | Performed by: EMERGENCY MEDICINE

## 2022-08-22 PROCEDURE — 80053 COMPREHEN METABOLIC PANEL: CPT | Performed by: EMERGENCY MEDICINE

## 2022-08-22 PROCEDURE — 70450 CT HEAD/BRAIN W/O DYE: CPT

## 2022-08-22 PROCEDURE — 99285 EMERGENCY DEPT VISIT HI MDM: CPT

## 2022-08-23 ENCOUNTER — APPOINTMENT (OUTPATIENT)
Dept: DIALYSIS | Facility: HOSPITAL | Age: 77
DRG: 640 | End: 2022-08-23
Attending: INTERNAL MEDICINE
Payer: MEDICARE

## 2022-08-23 PROBLEM — G93.41 ACUTE METABOLIC ENCEPHALOPATHY DUE TO HYPOGLYCEMIA: Status: ACTIVE | Noted: 2022-08-23

## 2022-08-23 PROBLEM — E16.2 ACUTE METABOLIC ENCEPHALOPATHY DUE TO HYPOGLYCEMIA: Status: ACTIVE | Noted: 2022-08-23

## 2022-08-23 LAB
2HR DELTA HS TROPONIN: 1 NG/L
ALBUMIN SERPL BCP-MCNC: 2.6 G/DL (ref 3.5–5)
ALBUMIN SERPL BCP-MCNC: 2.8 G/DL (ref 3.5–5)
ALP SERPL-CCNC: 73 U/L (ref 46–116)
ALP SERPL-CCNC: 82 U/L (ref 46–116)
ALT SERPL W P-5'-P-CCNC: 8 U/L (ref 12–78)
ALT SERPL W P-5'-P-CCNC: 9 U/L (ref 12–78)
AMMONIA PLAS-SCNC: 25 UMOL/L (ref 11–35)
ANION GAP SERPL CALCULATED.3IONS-SCNC: 10 MMOL/L (ref 4–13)
ANION GAP SERPL CALCULATED.3IONS-SCNC: 2 MMOL/L (ref 4–13)
ANION GAP SERPL CALCULATED.3IONS-SCNC: 9 MMOL/L (ref 4–13)
ANION GAP SERPL CALCULATED.3IONS-SCNC: 9 MMOL/L (ref 4–13)
AST SERPL W P-5'-P-CCNC: 13 U/L (ref 5–45)
AST SERPL W P-5'-P-CCNC: 8 U/L (ref 5–45)
ATRIAL RATE: 90 BPM
BASOPHILS # BLD AUTO: 0.03 THOUSANDS/ΜL (ref 0–0.1)
BASOPHILS NFR BLD AUTO: 0 % (ref 0–1)
BILIRUB SERPL-MCNC: 0.36 MG/DL (ref 0.2–1)
BILIRUB SERPL-MCNC: 0.42 MG/DL (ref 0.2–1)
BUN SERPL-MCNC: 16 MG/DL (ref 5–25)
BUN SERPL-MCNC: 42 MG/DL (ref 5–25)
BUN SERPL-MCNC: 44 MG/DL (ref 5–25)
BUN SERPL-MCNC: 46 MG/DL (ref 5–25)
CA-I BLD-SCNC: 0.98 MMOL/L (ref 1.12–1.32)
CALCIUM ALBUM COR SERPL-MCNC: 9.4 MG/DL (ref 8.3–10.1)
CALCIUM ALBUM COR SERPL-MCNC: 9.5 MG/DL (ref 8.3–10.1)
CALCIUM SERPL-MCNC: 8.2 MG/DL (ref 8.3–10.1)
CALCIUM SERPL-MCNC: 8.4 MG/DL (ref 8.3–10.1)
CALCIUM SERPL-MCNC: 8.4 MG/DL (ref 8.3–10.1)
CALCIUM SERPL-MCNC: 8.5 MG/DL (ref 8.3–10.1)
CARDIAC TROPONIN I PNL SERPL HS: 16 NG/L
CHLORIDE SERPL-SCNC: 94 MMOL/L (ref 96–108)
CHLORIDE SERPL-SCNC: 99 MMOL/L (ref 96–108)
CO2 SERPL-SCNC: 28 MMOL/L (ref 21–32)
CO2 SERPL-SCNC: 29 MMOL/L (ref 21–32)
CO2 SERPL-SCNC: 29 MMOL/L (ref 21–32)
CO2 SERPL-SCNC: 33 MMOL/L (ref 21–32)
CREAT SERPL-MCNC: 3.32 MG/DL (ref 0.6–1.3)
CREAT SERPL-MCNC: 6.58 MG/DL (ref 0.6–1.3)
CREAT SERPL-MCNC: 6.69 MG/DL (ref 0.6–1.3)
CREAT SERPL-MCNC: 6.98 MG/DL (ref 0.6–1.3)
EOSINOPHIL # BLD AUTO: 0.05 THOUSAND/ΜL (ref 0–0.61)
EOSINOPHIL NFR BLD AUTO: 1 % (ref 0–6)
ERYTHROCYTE [DISTWIDTH] IN BLOOD BY AUTOMATED COUNT: 15.3 % (ref 11.6–15.1)
GFR SERPL CREATININE-BSD FRML MDRD: 16 ML/MIN/1.73SQ M
GFR SERPL CREATININE-BSD FRML MDRD: 6 ML/MIN/1.73SQ M
GFR SERPL CREATININE-BSD FRML MDRD: 7 ML/MIN/1.73SQ M
GFR SERPL CREATININE-BSD FRML MDRD: 7 ML/MIN/1.73SQ M
GLUCOSE P FAST SERPL-MCNC: 47 MG/DL (ref 65–99)
GLUCOSE SERPL-MCNC: 111 MG/DL (ref 65–140)
GLUCOSE SERPL-MCNC: 112 MG/DL (ref 65–140)
GLUCOSE SERPL-MCNC: 118 MG/DL (ref 65–140)
GLUCOSE SERPL-MCNC: 146 MG/DL (ref 65–140)
GLUCOSE SERPL-MCNC: 149 MG/DL (ref 65–140)
GLUCOSE SERPL-MCNC: 157 MG/DL (ref 65–140)
GLUCOSE SERPL-MCNC: 162 MG/DL (ref 65–140)
GLUCOSE SERPL-MCNC: 175 MG/DL (ref 65–140)
GLUCOSE SERPL-MCNC: 182 MG/DL (ref 65–140)
GLUCOSE SERPL-MCNC: 197 MG/DL (ref 65–140)
GLUCOSE SERPL-MCNC: 46 MG/DL (ref 65–140)
GLUCOSE SERPL-MCNC: 47 MG/DL (ref 65–140)
GLUCOSE SERPL-MCNC: 80 MG/DL (ref 65–140)
GLUCOSE SERPL-MCNC: 84 MG/DL (ref 65–140)
GLUCOSE SERPL-MCNC: <20 MG/DL (ref 65–140)
GLUCOSE SERPL-MCNC: <20 MG/DL (ref 65–140)
HCT VFR BLD AUTO: 30.7 % (ref 36.5–49.3)
HGB BLD-MCNC: 9.6 G/DL (ref 12–17)
IMM GRANULOCYTES # BLD AUTO: 0.08 THOUSAND/UL (ref 0–0.2)
IMM GRANULOCYTES NFR BLD AUTO: 1 % (ref 0–2)
LYMPHOCYTES # BLD AUTO: 0.63 THOUSANDS/ΜL (ref 0.6–4.47)
LYMPHOCYTES NFR BLD AUTO: 7 % (ref 14–44)
MAGNESIUM SERPL-MCNC: 1.8 MG/DL (ref 1.6–2.6)
MAGNESIUM SERPL-MCNC: 2.4 MG/DL (ref 1.6–2.6)
MCH RBC QN AUTO: 27.9 PG (ref 26.8–34.3)
MCHC RBC AUTO-ENTMCNC: 31.3 G/DL (ref 31.4–37.4)
MCV RBC AUTO: 89 FL (ref 82–98)
MONOCYTES # BLD AUTO: 1.11 THOUSAND/ΜL (ref 0.17–1.22)
MONOCYTES NFR BLD AUTO: 13 % (ref 4–12)
NEUTROPHILS # BLD AUTO: 6.85 THOUSANDS/ΜL (ref 1.85–7.62)
NEUTS SEG NFR BLD AUTO: 78 % (ref 43–75)
NRBC BLD AUTO-RTO: 0 /100 WBCS
P AXIS: 54 DEGREES
PHOSPHATE SERPL-MCNC: 1 MG/DL (ref 2.3–4.1)
PHOSPHATE SERPL-MCNC: 1.6 MG/DL (ref 2.3–4.1)
PLATELET # BLD AUTO: 358 THOUSANDS/UL (ref 149–390)
PMV BLD AUTO: 8.8 FL (ref 8.9–12.7)
POTASSIUM SERPL-SCNC: 3.7 MMOL/L (ref 3.5–5.3)
POTASSIUM SERPL-SCNC: 4.5 MMOL/L (ref 3.5–5.3)
POTASSIUM SERPL-SCNC: 5.1 MMOL/L (ref 3.5–5.3)
POTASSIUM SERPL-SCNC: 7 MMOL/L (ref 3.5–5.3)
PR INTERVAL: 186 MS
PROT SERPL-MCNC: 7 G/DL (ref 6.4–8.4)
PROT SERPL-MCNC: 7.4 G/DL (ref 6.4–8.4)
QRS AXIS: 36 DEGREES
QRSD INTERVAL: 80 MS
QT INTERVAL: 330 MS
QTC INTERVAL: 403 MS
RBC # BLD AUTO: 3.44 MILLION/UL (ref 3.88–5.62)
SODIUM SERPL-SCNC: 132 MMOL/L (ref 135–147)
SODIUM SERPL-SCNC: 134 MMOL/L (ref 135–147)
SODIUM SERPL-SCNC: 137 MMOL/L (ref 135–147)
SODIUM SERPL-SCNC: 137 MMOL/L (ref 135–147)
T WAVE AXIS: 37 DEGREES
VENTRICULAR RATE: 90 BPM
WBC # BLD AUTO: 8.75 THOUSAND/UL (ref 4.31–10.16)

## 2022-08-23 PROCEDURE — 80048 BASIC METABOLIC PNL TOTAL CA: CPT

## 2022-08-23 PROCEDURE — 93005 ELECTROCARDIOGRAM TRACING: CPT

## 2022-08-23 PROCEDURE — 99220 PR INITIAL OBSERVATION CARE/DAY 70 MINUTES: CPT

## 2022-08-23 PROCEDURE — 87081 CULTURE SCREEN ONLY: CPT

## 2022-08-23 PROCEDURE — 80048 BASIC METABOLIC PNL TOTAL CA: CPT | Performed by: PHYSICIAN ASSISTANT

## 2022-08-23 PROCEDURE — 93010 ELECTROCARDIOGRAM REPORT: CPT | Performed by: INTERNAL MEDICINE

## 2022-08-23 PROCEDURE — 82140 ASSAY OF AMMONIA: CPT | Performed by: NURSE PRACTITIONER

## 2022-08-23 PROCEDURE — 84100 ASSAY OF PHOSPHORUS: CPT | Performed by: PHYSICIAN ASSISTANT

## 2022-08-23 PROCEDURE — 96375 TX/PRO/DX INJ NEW DRUG ADDON: CPT

## 2022-08-23 PROCEDURE — 82948 REAGENT STRIP/BLOOD GLUCOSE: CPT

## 2022-08-23 PROCEDURE — 5A1D70Z PERFORMANCE OF URINARY FILTRATION, INTERMITTENT, LESS THAN 6 HOURS PER DAY: ICD-10-PCS | Performed by: INTERNAL MEDICINE

## 2022-08-23 PROCEDURE — 80053 COMPREHEN METABOLIC PANEL: CPT | Performed by: FAMILY MEDICINE

## 2022-08-23 PROCEDURE — 99222 1ST HOSP IP/OBS MODERATE 55: CPT | Performed by: INTERNAL MEDICINE

## 2022-08-23 PROCEDURE — 85025 COMPLETE CBC W/AUTO DIFF WBC: CPT

## 2022-08-23 PROCEDURE — 84100 ASSAY OF PHOSPHORUS: CPT

## 2022-08-23 PROCEDURE — 90935 HEMODIALYSIS ONE EVALUATION: CPT | Performed by: INTERNAL MEDICINE

## 2022-08-23 PROCEDURE — 83735 ASSAY OF MAGNESIUM: CPT | Performed by: PHYSICIAN ASSISTANT

## 2022-08-23 PROCEDURE — 83735 ASSAY OF MAGNESIUM: CPT

## 2022-08-23 PROCEDURE — 87040 BLOOD CULTURE FOR BACTERIA: CPT | Performed by: PHYSICIAN ASSISTANT

## 2022-08-23 PROCEDURE — G0257 UNSCHED DIALYSIS ESRD PT HOS: HCPCS

## 2022-08-23 PROCEDURE — 82330 ASSAY OF CALCIUM: CPT

## 2022-08-23 PROCEDURE — NC001 PR NO CHARGE: Performed by: PHYSICIAN ASSISTANT

## 2022-08-23 PROCEDURE — 84484 ASSAY OF TROPONIN QUANT: CPT | Performed by: EMERGENCY MEDICINE

## 2022-08-23 PROCEDURE — 96374 THER/PROPH/DIAG INJ IV PUSH: CPT

## 2022-08-23 PROCEDURE — 99223 1ST HOSP IP/OBS HIGH 75: CPT | Performed by: INTERNAL MEDICINE

## 2022-08-23 PROCEDURE — 36415 COLL VENOUS BLD VENIPUNCTURE: CPT | Performed by: EMERGENCY MEDICINE

## 2022-08-23 RX ORDER — INSULIN ASPART 100 [IU]/ML
5 INJECTION, SUSPENSION SUBCUTANEOUS
Status: DISCONTINUED | OUTPATIENT
Start: 2022-08-23 | End: 2022-08-27 | Stop reason: HOSPADM

## 2022-08-23 RX ORDER — SODIUM POLYSTYRENE SULFONATE 4.1 MEQ/G
30 POWDER, FOR SUSPENSION ORAL; RECTAL ONCE
Status: COMPLETED | OUTPATIENT
Start: 2022-08-23 | End: 2022-08-23

## 2022-08-23 RX ORDER — ACETAMINOPHEN 325 MG/1
650 TABLET ORAL EVERY 6 HOURS PRN
Status: DISCONTINUED | OUTPATIENT
Start: 2022-08-23 | End: 2022-08-27 | Stop reason: HOSPADM

## 2022-08-23 RX ORDER — DEXTROSE MONOHYDRATE 25 G/50ML
25 INJECTION, SOLUTION INTRAVENOUS ONCE
Status: COMPLETED | OUTPATIENT
Start: 2022-08-23 | End: 2022-08-23

## 2022-08-23 RX ORDER — HEPARIN SODIUM 5000 [USP'U]/ML
5000 INJECTION, SOLUTION INTRAVENOUS; SUBCUTANEOUS EVERY 8 HOURS SCHEDULED
Status: DISCONTINUED | OUTPATIENT
Start: 2022-08-23 | End: 2022-08-27 | Stop reason: HOSPADM

## 2022-08-23 RX ORDER — DEXTROSE MONOHYDRATE 25 G/50ML
25 INJECTION, SOLUTION INTRAVENOUS ONCE
Status: DISCONTINUED | OUTPATIENT
Start: 2022-08-23 | End: 2022-08-25

## 2022-08-23 RX ORDER — LANOLIN ALCOHOL/MO/W.PET/CERES
6 CREAM (GRAM) TOPICAL
Status: DISCONTINUED | OUTPATIENT
Start: 2022-08-23 | End: 2022-08-25

## 2022-08-23 RX ORDER — INSULIN LISPRO 100 [IU]/ML
1-6 INJECTION, SOLUTION INTRAVENOUS; SUBCUTANEOUS
Status: DISCONTINUED | OUTPATIENT
Start: 2022-08-23 | End: 2022-08-24

## 2022-08-23 RX ORDER — DEXTROSE MONOHYDRATE 25 G/50ML
INJECTION, SOLUTION INTRAVENOUS
Status: COMPLETED
Start: 2022-08-23 | End: 2022-08-23

## 2022-08-23 RX ORDER — DEXTROSE MONOHYDRATE 100 MG/ML
250 INJECTION, SOLUTION INTRAVENOUS ONCE
Status: COMPLETED | OUTPATIENT
Start: 2022-08-23 | End: 2022-08-23

## 2022-08-23 RX ORDER — CALCIUM ACETATE 667 MG/1
667 CAPSULE ORAL
Status: DISCONTINUED | OUTPATIENT
Start: 2022-08-23 | End: 2022-08-27 | Stop reason: HOSPADM

## 2022-08-23 RX ORDER — LABETALOL HYDROCHLORIDE 5 MG/ML
10 INJECTION, SOLUTION INTRAVENOUS EVERY 6 HOURS PRN
Status: DISCONTINUED | OUTPATIENT
Start: 2022-08-23 | End: 2022-08-27 | Stop reason: HOSPADM

## 2022-08-23 RX ORDER — CALCIUM GLUCONATE 20 MG/ML
1 INJECTION, SOLUTION INTRAVENOUS ONCE
Status: COMPLETED | OUTPATIENT
Start: 2022-08-23 | End: 2022-08-23

## 2022-08-23 RX ORDER — METOLAZONE 5 MG/1
10 TABLET ORAL ONCE
Status: COMPLETED | OUTPATIENT
Start: 2022-08-23 | End: 2022-08-23

## 2022-08-23 RX ORDER — HYDRALAZINE HYDROCHLORIDE 20 MG/ML
10 INJECTION INTRAMUSCULAR; INTRAVENOUS EVERY 6 HOURS PRN
Status: DISCONTINUED | OUTPATIENT
Start: 2022-08-23 | End: 2022-08-25

## 2022-08-23 RX ORDER — TAMSULOSIN HYDROCHLORIDE 0.4 MG/1
0.4 CAPSULE ORAL
Status: DISCONTINUED | OUTPATIENT
Start: 2022-08-23 | End: 2022-08-27 | Stop reason: HOSPADM

## 2022-08-23 RX ORDER — AMLODIPINE BESYLATE 5 MG/1
5 TABLET ORAL DAILY
Status: DISCONTINUED | OUTPATIENT
Start: 2022-08-23 | End: 2022-08-23

## 2022-08-23 RX ORDER — FUROSEMIDE 10 MG/ML
INJECTION INTRAMUSCULAR; INTRAVENOUS
Status: DISPENSED
Start: 2022-08-23 | End: 2022-08-23

## 2022-08-23 RX ORDER — LOSARTAN POTASSIUM 25 MG/1
25 TABLET ORAL DAILY
Status: DISCONTINUED | OUTPATIENT
Start: 2022-08-23 | End: 2022-08-26

## 2022-08-23 RX ORDER — PRAVASTATIN SODIUM 80 MG/1
80 TABLET ORAL
Status: DISCONTINUED | OUTPATIENT
Start: 2022-08-23 | End: 2022-08-27 | Stop reason: HOSPADM

## 2022-08-23 RX ORDER — FINASTERIDE 5 MG/1
5 TABLET, FILM COATED ORAL DAILY
Status: DISCONTINUED | OUTPATIENT
Start: 2022-08-23 | End: 2022-08-27 | Stop reason: HOSPADM

## 2022-08-23 RX ORDER — QUETIAPINE FUMARATE 25 MG/1
25 TABLET, FILM COATED ORAL ONCE
Status: COMPLETED | OUTPATIENT
Start: 2022-08-23 | End: 2022-08-23

## 2022-08-23 RX ORDER — AMLODIPINE BESYLATE 10 MG/1
10 TABLET ORAL DAILY
Status: DISCONTINUED | OUTPATIENT
Start: 2022-08-24 | End: 2022-08-27 | Stop reason: HOSPADM

## 2022-08-23 RX ORDER — DEXTROSE MONOHYDRATE 100 MG/ML
75 INJECTION, SOLUTION INTRAVENOUS CONTINUOUS
Status: DISCONTINUED | OUTPATIENT
Start: 2022-08-23 | End: 2022-08-23

## 2022-08-23 RX ADMIN — INSULIN HUMAN 10 UNITS: 100 INJECTION, SOLUTION PARENTERAL at 00:30

## 2022-08-23 RX ADMIN — DEXTROSE MONOHYDRATE 25 ML: 25 INJECTION, SOLUTION INTRAVENOUS at 03:47

## 2022-08-23 RX ADMIN — HEPARIN SODIUM 5000 UNITS: 5000 INJECTION INTRAVENOUS; SUBCUTANEOUS at 14:25

## 2022-08-23 RX ADMIN — DEXTROSE MONOHYDRATE 25 ML: 25 INJECTION, SOLUTION INTRAVENOUS at 09:57

## 2022-08-23 RX ADMIN — FUROSEMIDE 120 MG: 10 INJECTION, SOLUTION INTRAMUSCULAR; INTRAVENOUS at 03:41

## 2022-08-23 RX ADMIN — DEXTROSE 250 ML: 10 SOLUTION INTRAVENOUS at 07:30

## 2022-08-23 RX ADMIN — DEXTROSE MONOHYDRATE 25 ML: 25 INJECTION, SOLUTION INTRAVENOUS at 00:29

## 2022-08-23 RX ADMIN — CALCIUM GLUCONATE 1 G: 20 INJECTION, SOLUTION INTRAVENOUS at 14:24

## 2022-08-23 RX ADMIN — HYDRALAZINE HYDROCHLORIDE 10 MG: 20 INJECTION, SOLUTION INTRAMUSCULAR; INTRAVENOUS at 14:25

## 2022-08-23 RX ADMIN — LABETALOL HYDROCHLORIDE 10 MG: 5 INJECTION, SOLUTION INTRAVENOUS at 15:53

## 2022-08-23 RX ADMIN — HYDRALAZINE HYDROCHLORIDE 10 MG: 20 INJECTION, SOLUTION INTRAMUSCULAR; INTRAVENOUS at 21:17

## 2022-08-23 RX ADMIN — SODIUM PHOSPHATE, MONOBASIC, MONOHYDRATE 30 MMOL: 276; 142 INJECTION, SOLUTION INTRAVENOUS at 15:53

## 2022-08-23 RX ADMIN — SODIUM POLYSTYRENE SULFONATE 30 G: 1 POWDER ORAL; RECTAL at 02:17

## 2022-08-23 RX ADMIN — CALCIUM ACETATE 667 MG: 667 CAPSULE ORAL at 15:53

## 2022-08-23 RX ADMIN — Medication 6 MG: at 23:39

## 2022-08-23 RX ADMIN — TAMSULOSIN HYDROCHLORIDE 0.4 MG: 0.4 CAPSULE ORAL at 15:53

## 2022-08-23 RX ADMIN — QUETIAPINE FUMARATE 25 MG: 25 TABLET ORAL at 23:38

## 2022-08-23 RX ADMIN — HEPARIN SODIUM 5000 UNITS: 5000 INJECTION INTRAVENOUS; SUBCUTANEOUS at 21:17

## 2022-08-23 RX ADMIN — SODIUM BICARBONATE 25 MEQ: 84 INJECTION INTRAVENOUS at 00:30

## 2022-08-23 RX ADMIN — DEXTROSE 75 ML/HR: 10 SOLUTION INTRAVENOUS at 10:33

## 2022-08-23 RX ADMIN — HEPARIN SODIUM 5000 UNITS: 5000 INJECTION INTRAVENOUS; SUBCUTANEOUS at 05:04

## 2022-08-23 RX ADMIN — PRAVASTATIN SODIUM 80 MG: 80 TABLET ORAL at 15:53

## 2022-08-23 RX ADMIN — LOSARTAN POTASSIUM 25 MG: 25 TABLET, FILM COATED ORAL at 14:25

## 2022-08-23 RX ADMIN — METOLAZONE 10 MG: 5 TABLET ORAL at 02:21

## 2022-08-23 NOTE — H&P
[address] [facility] 1945, 76 y o  male MRN: [id number]  Unit/Bed#: 1990 [facility] Encounter: [id number]  Primary Care Provider: Florencia Hobson MD   Date and time admitted to hospital: 8/22/2022 11:01 PM    * Hyperkalemia  Assessment & Plan  Patient with history of ESRD on HD presents with weakness   · Potassium 7 0  · CT head negative  · EKG NSR, alert and oriented x3  · Given 10 units humulin and sodium bicarb in ED  · Discussed with nephrologist on call  · Will order lasix 120mg, metolazone 10mg, kayexalate 30g per nephrology  · Repeat sodium bicarb and humulin per nephrology  · Repeat BMP in 3 hours  · Monitor on telemetry  · Nephrology following       ESRD on dialysis [facility])  Assessment & Plan  Lab Results   Component Value Date    EGFR 7 08/22/2022    EGFR 10 08/04/2022    EGFR 9 08/03/2022    CREATININE 6 58 (H) 08/22/2022    CREATININE 5 01 (H) 08/04/2022    CREATININE 5 33 (H) 08/03/2022     On dialysis Tuesday, Thursday, Saturday  · Last session 8/20/22  · Nephrology consult     Type 2 diabetes mellitus with chronic kidney disease on chronic dialysis, with long-term current use of insulin ([facility] )  Assessment & Plan  Lab Results   Component Value Date    HGBA1C 5 1 05/08/2022       No results for input(s): POCGLU in the last 72 hours  Blood Sugar Average: Last 72 hrs:     Patient on 70/30 insulin 5 units BID at home   Recent HgA1c 5 1   Diabetic diet   Continue 70/30 insulin BID   Start SSI and accuchecks ac, hs      Hyperlipidemia  Assessment & Plan  Continue statin    Benign essential hypertension  Assessment & Plan  Continue amlodipine    VTE Pharmacologic Prophylaxis: VTE Score: 4 Moderate Risk (Score 3-4) - Pharmacological DVT Prophylaxis Ordered: heparin  Code Status: Full code  Discussion with family: Patient declined call to        Anticipated Length of Stay: Patient will be admitted on an observation basis with an anticipated length of stay of less than 2 midnights secondary to hyperkalemia  Total Time for Visit, including Counseling / Coordination of Care: 45 minutes Greater than 50% of this total time spent on direct patient counseling and coordination of care  Chief Complaint: weakness    History of Present Illness:  Rebecca Garcia is a 76 y o  male with a PMH of ESRD on HD T/Th/S, diabetes type 2, hypertension, hyperlipidemia who presents with weakess  Patient states he went to leave the house this afternoon and felt really weak and slid down the side of the wall  He states he feels like he did last time he was admitted to the hospital  He has been compliant with dialysis, last session was on Saturday  No nausea, vomiting, headache, lightheadedness  Denies any fevers, chills, chest pain, SOB, cough, abdominal pain  Review of Systems:  Review of Systems   Constitutional: Negative for chills and fever  Respiratory: Negative for cough and shortness of breath  Cardiovascular: Negative for chest pain and palpitations  Gastrointestinal: Negative for abdominal pain and vomiting  Genitourinary: Negative for dysuria and hematuria  Musculoskeletal: Negative for arthralgias and back pain  Skin: Negative for color change and rash  Neurological: Positive for weakness  Negative for dizziness and headaches  All other systems reviewed and are negative        Past Medical and Surgical History:   Past Medical History:   Diagnosis Date    Anesthesia complication 2293    after colonoscopy , pt was awake but could not control his body and kept falling     Arthritis     Bladder calculi     BPH (benign prostatic hyperplasia)     Diabetes mellitus ([address] )     ESRD (end stage renal disease) on dialysis ([facility] )     Hearing disorder of both ears     wears bilateral hearing aids    Hyperlipidemia     controlled and maintained d/t diabetes    Hypertension     Kidney stones     Last Assessed:4/3/2017    Lyme disease     Last Assessed:6/29/2015    Rupture, bladder, spontaneous     Last Assessed:4/3/2017       Past Surgical History:   Procedure Laterality Date    BLADDER REPAIR N/A 12/10/2016    Procedure: REPAIR BLADDER/cysto insertion stent;  Surgeon: Anna Rain MD;  Location: [address] [place] [address];  Service:     COLONOSCOPY      CYSTOLITHOTOMY      ANESTHESIA   lower abdomen  ; Last Assessed:4/3/2017    IR ASPIRATION ONLY  5/8/2022    IR BIOPSY KIDNEY RANDOM  3/10/2022    IR TUNNELED DIALYSIS CATHETER PLACEMENT  3/8/2022    OTHER SURGICAL HISTORY      thermal dilation of the prostate x 2 ( in the office)    TONSILLECTOMY      TRANSURETHRAL RESECTION OF PROSTATE N/A 12/8/2016    Procedure:  Cysto, Laser Bladder stone,fulgaration of prostates tissue ;  Surgeon: Anna Rain MD;  Location: [address] [place] [address];  Service:        Meds/Allergies:  Prior to Admission medications    Medication Sig Start Date End Date Taking? Authorizing Provider   acetaminophen (TYLENOL) 325 mg tablet Take 2 tablets (650 mg total) by mouth every 6 (six) hours as needed for mild pain, headaches or fever 5/9/22   Mere Tellez MD   amLODIPine (NORVASC) 5 mg tablet Take 1 tablet (5 mg total) by mouth daily 7/13/22   Juno Hoover MD   calcium acetate (PHOSLO) capsule Take 667 mg by mouth in the morning and 667 mg at noon and 667 mg in the evening  Take with meals      Historical Provider, MD   calcium carbonate-vitamin D (OSCAL-D) 500 mg-200 units per tablet Take 1 tablet by mouth daily 7/28/22   Historical Provider, MD   dutasteride (AVODART) 0 5 mg capsule Take 0 5 mg by mouth daily    Historical Provider, MD   glucose blood test strip 1 each by Other route 3 (three) times a day 5/22/20   Juno Hoover MD   HumaLOG Mix 75/25 KwikPen (75-25) 100 units/mL injection pen Inject 5-10 Units under the skin 2 (two) times a day with meals as per home sliding scale 8/4/22   Victoria Heard DO   hydrocortisone (ANUSOL-HC) 25 mg suppository Insert 1 suppository into the rectum daily 22   Historical Provider, MD   Insulin Pen Needle (B-D UF III MINI PEN NEEDLES) 31G X 5 MM MISC Use twice daily with insulin pen as directed 21   Humble Mantilla MD   simvastatin (ZOCOR) 40 mg tablet Take 1 tablet (40 mg total) by mouth daily at bedtime 21   Humble Mantilla MD   tamsulosin (FLOMAX) 0 4 mg Take 0 4 mg by mouth daily with dinner    Historical Provider, MD   traMADol (ULTRAM) 50 mg tablet Take 1 tablet by mouth every 6 (six) hours 22   Historical Provider, MD   carboxymethylcellulose 1 % ophthalmic solution Apply 1 drop to eye 2 (two) times a day 22  Historical Provider, MD   cloNIDine (Catapres) 0 1 mg tablet Take 1 tablet (0 1 mg total) by mouth every 12 (twelve) hours 8/15/22 8/23/22  Humble Mantilla MD   losartan (COZAAR) 50 mg tablet Take 1 tablet by mouth 2 (two) times a day 22  Historical Provider, MD   melatonin 3 mg Take 1 tablet (3 mg total) by mouth daily at bedtime as needed (insomnia)  Patient not taking: No sig reported 22  Kelsey Fernandez MD     I have reviewed home medications with patient personally  Allergies:    Allergies   Allergen Reactions    Amoxicillin Rash       Social History:  Marital Status: /Civil Union   Occupation:   Patient Pre-hospital Living Situation: Home  Patient Pre-hospital Level of Mobility: walks  Patient Pre-hospital Diet Restrictions:   Substance Use History:   Social History     Substance and Sexual Activity   Alcohol Use Yes     Social History     Tobacco Use   Smoking Status Former Smoker    Types: Cigars    Quit date: 24    Years since quittin 6   Smokeless Tobacco Never Used     Social History     Substance and Sexual Activity   Drug Use No       Family History:  Family History   Problem Relation Age of Onset    Cancer Mother         breast    Diabetes Mother     Cancer Father         prostate w/ bone mets    Prostate cancer Father    [facility] Alzheimer's disease Sister        Physical Exam:     Vitals:   Blood Pressure: (!) 171/86 (08/23/22 0156)  Pulse: 93 (08/23/22 0156)  Temperature: 97 8 °F (36 6 °C) (08/23/22 0156)  Temp Source: Tympanic (08/22/22 2302)  Respirations: 18 (08/23/22 0115)  Weight - Scale: 84 8 kg (186 lb 15 2 oz) (08/22/22 2305)  SpO2: 94 % (08/23/22 0156)    Physical Exam  Vitals reviewed  Constitutional:       Appearance: He is well-developed  HENT:      Head: Normocephalic and atraumatic  Eyes:      Conjunctiva/sclera: Conjunctivae normal    Cardiovascular:      Rate and Rhythm: Normal rate and regular rhythm  Heart sounds: No murmur heard  Pulmonary:      Effort: Pulmonary effort is normal  No respiratory distress  Breath sounds: Normal breath sounds  Abdominal:      Palpations: Abdomen is soft  Tenderness: There is no abdominal tenderness  Skin:     General: Skin is warm and dry  Neurological:      Mental Status: He is alert and oriented to person, place, and time  Additional Data:     Lab Results:  Results from last 7 days   Lab Units 08/22/22  2320   WBC Thousand/uL 7 78   HEMOGLOBIN g/dL 9 9*   HEMATOCRIT % 31 9*   PLATELETS Thousands/uL 316   NEUTROS PCT % 79*   LYMPHS PCT % 8*   MONOS PCT % 10   EOS PCT % 1     Results from last 7 days   Lab Units 08/22/22  2320   SODIUM mmol/L 134*   POTASSIUM mmol/L 7 0*   CHLORIDE mmol/L 99   CO2 mmol/L 33*   BUN mg/dL 42*   CREATININE mg/dL 6 58*   ANION GAP mmol/L 2*   CALCIUM mg/dL 8 4   ALBUMIN g/dL 2 8*   TOTAL BILIRUBIN mg/dL 0 42   ALK PHOS U/L 82   ALT U/L 9*   AST U/L 8   GLUCOSE RANDOM mg/dL 182*                       Imaging: Reviewed radiology reports from this admission including: chest CT scan  CT head without contrast   Final Result by Sameera Yepez MD (08/23 0025)      No acute intracranial abnormality  Workstation performed: [id number]             EKG and Other Studies Reviewed on Admission:   · EKG: NSR   HR 90     ** Please Note: This note has been constructed using a voice recognition system   **

## 2022-08-23 NOTE — CONSULTS
[address] [facility] 1945, 76 y o  male MRN: [id number]  Unit/Bed#: ICU 05 Encounter: [id number]  Primary Care Provider: Alfonso Hernandez MD   Date and time admitted to hospital: 8/22/2022 11:01 PM    Consults    * Acute metabolic encephalopathy due to hypoglycemia  Assessment & Plan  Rapid response called on 8/23 around 7:30 am after finding patient altered with BG < 20    · Initially lethargic and minimally responsive to painful stimuli  · Given infusion and a push of dextrose  · After about 10 min of dextrose administration, pt responded to some verbal commands  · Noted spontaneous limb movements  No seizure like activity or lateralizing focal deficits  · Hemodynamically stable  · Ordered CBC, BMP, Mg, Phos, BG  · Neuro check    Hyperkalemia  Assessment & Plan  Patient with history of ESRD on HD presents with weakness   · Potassium 7 0  · CT head negative  · EKG NSR, alert and oriented x3  · Given 10 units humulin and sodium bicarb in ED  · Discussed with nephrologist on call  ? Will order lasix 120mg, metolazone 10mg, kayexalate 30g per nephrology  ? Repeat sodium bicarb and humulin per nephrology  ? Repeat BMP in 3 hours  ? Monitor on telemetry  ?  Nephrology following    ESRD on dialysis [facility])  Assessment & Plan  Lab Results   Component Value Date    EGFR 7 08/23/2022    EGFR 7 08/22/2022    EGFR 10 08/04/2022    CREATININE 6 69 (H) 08/23/2022    CREATININE 6 58 (H) 08/22/2022    CREATININE 5 01 (H) 08/04/2022     On dialysis Tuesday, Thursday, Saturday  · Last session 8/20/22  · Nephrology consult     Type 2 diabetes mellitus with chronic kidney disease on chronic dialysis, with long-term current use of insulin [facility])  Assessment & Plan  Lab Results   Component Value Date    HGBA1C 5 1 05/08/2022       Recent Labs     08/23/22  0340 08/23/22  0724 08/23/22  0727 08/23/22  0739   POCGLU 112 <20* <20* 157*       Blood Sugar Average: Last 72 hrs:  (P) 134 5 Patient on 70/30 insulin 5 units BID at home  · Recent HgA1c 5 1  · Diabetic diet  · Continue 70/30 insulin BID  · Start SSI and accuchecks ac, hs      Hyperlipidemia  Assessment & Plan  Continue statin    Benign essential hypertension  Assessment & Plan  Continue amlodipine    -------------------------------------------------------------------------------------------------------------  Chief Complaint: altered mental status    History of Present Illness   HX and PE limited by: mental status    Alex Nunes is a 76 y o  male with PMH of ESRD on HD T/Th/S, DMT2, HTN, and HLD presented with weakness and hyperkalemia  For hyperkalemia, he was given regular insulin 10u x2, sodium bicarb x2, and kayexalate  Patient was alert, responsive and ambulating well overnight  His morning labs showed improvement in potassium  Around 7:30 this morning, he was found poorly responsive and rapid response was called  His BG was found to be less than 20  On arrival, he was minimally responsive to painful stimuli  Dextrose infusion along with a push of D50 was given  Patient then started to become responsive to verbal stimuli and spontaneously move the extremities  No focal deficits or lateralizing lesions noted neurologically  He was transferred to [facility] for further monitoring  History obtained from chart review  -------------------------------------------------------------------------------------------------------------  Dispo: Transfer to Stepdown Level 1     Code Status: Level 1 - Full Code  --------------------------------------------------------------------------------------------------------------  Review of Systems    A 12-point, complete review of systems was reviewed and negative except as stated above     Physical Exam  Vitals and nursing note reviewed  Constitutional:       Appearance: He is well-developed        Comments: Responsive to verbal and painful stimuli  Lethargic   HENT:      Head: Normocephalic and atraumatic  Eyes:      Conjunctiva/sclera: Conjunctivae normal    Cardiovascular:      Rate and Rhythm: Normal rate and regular rhythm  Pulses: Normal pulses  Pulmonary:      Effort: Pulmonary effort is normal  No respiratory distress  Breath sounds: Normal breath sounds  Abdominal:      Palpations: Abdomen is soft  Tenderness: There is no abdominal tenderness  Musculoskeletal:      Cervical back: Neck supple  Skin:     General: Skin is warm and dry  Neurological:      Mental Status: He is alert        --------------------------------------------------------------------------------------------------------------  Vitals:   Vitals:    08/23/22 0732 08/23/22 0732 08/23/22 0735 08/23/22 0738   BP: 157/65      BP Location:       Pulse:  91 (!) 107 (!) 110   Resp:       Temp:       TempSrc:       SpO2:  95% 93% 94%   Weight:       Height:         Temp  Min: 97 8 °F (36 6 °C)  Max: 97 9 °F (36 6 °C)  IBW (Ideal Body Weight): 68 4 kg  Height: 5' 8" (172 7 cm)  Body mass index is 28 28 kg/m²      Laboratory and Diagnostics:  Results from last 7 days   Lab Units 08/23/22  0814 08/22/22  2320   WBC Thousand/uL 8 75 7 78   HEMOGLOBIN g/dL 9 6* 9 9*   HEMATOCRIT % 30 7* 31 9*   PLATELETS Thousands/uL 358 316   NEUTROS PCT % 78* 79*   MONOS PCT % 13* 10     Results from last 7 days   Lab Units 08/23/22  0458 08/22/22  2320   SODIUM mmol/L 137 134*   POTASSIUM mmol/L 5 1 7 0*   CHLORIDE mmol/L 99 99   CO2 mmol/L 29 33*   ANION GAP mmol/L 9 2*   BUN mg/dL 44* 42*   CREATININE mg/dL 6 69* 6 58*   CALCIUM mg/dL 8 5 8 4   GLUCOSE RANDOM mg/dL 47* 182*   ALT U/L  --  9*   AST U/L  --  8   ALK PHOS U/L  --  82   ALBUMIN g/dL  --  2 8*   TOTAL BILIRUBIN mg/dL  --  0 42                       ABG:    VBG:          Micro:        EKG:   Narrative & Impression   Normal sinus rhythm  Normal ECG  When compared with ECG of 02-AUG-2022 07:08,  UT interval has decreased  Criteria for Septal infarct are no longer Present Imaging: I have personally reviewed pertinent reports  Historical Information   Past Medical History:   Diagnosis Date    Anesthesia complication 522    after colonoscopy , pt was awake but could not control his body and kept falling     Arthritis     Bladder calculi     BPH (benign prostatic hyperplasia)     [facility]     ESRD (end stage renal disease) on dialysis ([facility]     Hearing disorder of both ears     wears bilateral hearing aids    Hyperlipidemia     controlled and maintained d/t diabetes    Hypertension     Kidney stones     Last Assessed:4/3/2017    Lyme disease     Last Assessed:2015    Rupture, bladder, spontaneous     Last Assessed:4/3/2017     Past Surgical History:   Procedure Laterality Date    BLADDER REPAIR N/A 12/10/2016    Procedure: REPAIR BLADDER/cysto insertion stent;  Surgeon: Breana Sharp MD;  Location: [address] [place] [address];  Service:     COLONOSCOPY      CYSTOLITHOTOMY      ANESTHESIA   lower abdomen  ;  Last Assessed:4/3/2017    IR ASPIRATION ONLY  2022    IR BIOPSY KIDNEY RANDOM  3/10/2022    IR TUNNELED DIALYSIS CATHETER PLACEMENT  3/8/2022    OTHER SURGICAL HISTORY      thermal dilation of the prostate x 2 ( in the office)    TONSILLECTOMY      TRANSURETHRAL RESECTION OF PROSTATE N/A 2016    Procedure:  Cysto, Laser Bladder stone,fulgaration of prostates tissue ;  Surgeon: Breana Sharp MD;  Location: [facility] OR;  Service:      Social History   Social History     Substance and Sexual Activity   Alcohol Use Never     Social History     Substance and Sexual Activity   Drug Use No     Social History     Tobacco Use   Smoking Status Former Smoker    Types: Cigars    Quit date: 24    Years since quittin 6   Smokeless Tobacco Never Used     Exercise History: NA  Family History:   Family History   Problem Relation Age of Onset    Cancer Mother         breast    Diabetes Mother     Cancer Father         prostate w/ bone mets  Prostate cancer Father     Alzheimer's disease Sister      I have reviewed this patient's family history and commented on sigificant items within the HPI      Medications:  Current Facility-Administered Medications   Medication Dose Route Frequency    acetaminophen (TYLENOL) tablet 650 mg  650 mg Oral Q6H PRN    amLODIPine (NORVASC) tablet 5 mg  5 mg Oral Daily    calcium acetate (PHOSLO) capsule 667 mg  667 mg Oral TID With Meals    finasteride (PROSCAR) tablet 5 mg  5 mg Oral Daily    furosemide (LASIX) 10 mg/mL injection **ADS Override Pull**        heparin (porcine) subcutaneous injection 5,000 Units  5,000 Units Subcutaneous Q8H [facility]    insulin aspart protamine-insulin aspart (NovoLOG 70/30) 100 units/mL subcutaneous injection 5 Units  5 Units Subcutaneous BID AC    insulin lispro (HumaLOG) 100 units/mL subcutaneous injection 1-6 Units  1-6 Units Subcutaneous TID AC    insulin lispro (HumaLOG) 100 units/mL subcutaneous injection 1-6 Units  1-6 Units Subcutaneous HS    pravastatin (PRAVACHOL) tablet 80 mg  80 mg Oral Daily With Dinner    tamsulosin (FLOMAX) capsule 0 4 mg  0 4 mg Oral Daily With Dinner     Home medications:  Prior to Admission Medications   Prescriptions Last Dose Informant Patient Reported? Taking?    HumaLOG Mix 75/25 KwikPen (75-25) 100 units/mL injection pen Unknown at Unknown time  Yes No   Sig: Inject 5-10 Units under the skin 2 (two) times a day with meals as per home sliding scale   Insulin Pen Needle (B-D UF III MINI PEN NEEDLES) 31G X 5 MM MISC   No No   Sig: Use twice daily with insulin pen as directed   acetaminophen (TYLENOL) 325 mg tablet Unknown at Unknown time  No No   Sig: Take 2 tablets (650 mg total) by mouth every 6 (six) hours as needed for mild pain, headaches or fever   amLODIPine (NORVASC) 5 mg tablet Unknown at Unknown time  No No   Sig: Take 1 tablet (5 mg total) by mouth daily   calcium acetate (PHOSLO) capsule Unknown at Unknown time  Yes No   Sig: Take 667 mg by mouth in the morning and 667 mg at noon and 667 mg in the evening  Take with meals  calcium carbonate-vitamin D (OSCAL-D) 500 mg-200 units per tablet Unknown at Unknown time  Yes No   Sig: Take 1 tablet by mouth daily   dutasteride (AVODART) 0 5 mg capsule Unknown at Unknown time  Yes No   Sig: Take 0 5 mg by mouth daily   glucose blood test strip   No No   Si each by Other route 3 (three) times a day   hydrocortisone (ANUSOL-HC) 25 mg suppository Unknown at Unknown time  Yes No   Sig: Insert 1 suppository into the rectum daily   simvastatin (ZOCOR) 40 mg tablet Unknown at Unknown time  No No   Sig: Take 1 tablet (40 mg total) by mouth daily at bedtime   tamsulosin (FLOMAX) 0 4 mg Unknown at Unknown time  Yes No   Sig: Take 0 4 mg by mouth daily with dinner   traMADol (ULTRAM) 50 mg tablet Unknown at Unknown time  Yes No   Sig: Take 1 tablet by mouth every 6 (six) hours      Facility-Administered Medications: None     Allergies: Allergies   Allergen Reactions    Amoxicillin Rash     ------------------------------------------------------------------------------------------------------------  Advance Directive and Living Will:      Power of :    POLST:    ------------------------------------------------------------------------------------------------------------  Care Time Delivered:   No Critical Care time spent       [facility] [place], MD        Portions of the record may have been created with voice recognition software  Occasional wrong word or "sound a like" substitutions may have occurred due to the inherent limitations of voice recognition software    Read the chart carefully and recognize, using context, where substitutions have occurred

## 2022-08-23 NOTE — PROCEDURES
Patient seen at the start of dialysis at 9:18 a m  Is still confused but appears slightly more awake  Is now in restraints  Current dialysis prescription-sodium 138, bicarbonate 35, dialyzer F 180, blood flow rate 400, 4 hour treatment, dry weight 74 5 kg, 2 potassium bath    Hypoglycemia treatment progress per primary team and the patient blood sugar currently is 118 per review with nursing team at bedside  Was initially noted to have potassium of 7  Patient is currently seen at bedside early this morning and also now by myself  Appears slightly more awake than prior  64-year-old male who is now deemed ESRD, history of hypertension, diabetes, ANCA vasculitis who initially presented for weakness  Had initial hyperkalemia which was medically managed  Patient is currently seen on dialysis  Please see initial consult note at 5:00 a m  For full consultation note  Patient initially presents the ER at 11:00 p m  On 08/22 with weakness  Last dialysis treatment was on Saturday  Recent admission in August 2nd to August 4th for weakness  Was found to have significant hyperkalemia at that time requiring dialysis multiple treatments  Also admission in June with concern for sepsis without source  Antibiotics were subsequently held  Also had hyperkalemia in the setting of missing dialysis       Reviewed with primary team attending regarding dialysis plan  Etiology of encephalopathy may be in the setting of hypoglycemia  Does appear to be more awake than when patient was seen early this morning by myself  -CT head no acute abnormality    Plan:  - HD as above  - consider checking blood cultures   - repeating ANCA titers in AM  - eval for HD needs 8/24 also

## 2022-08-23 NOTE — CONSULTS
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) and ESRD (TTS) who was admitted for weakness  Nephrology was consulted for assistance in the management of ESRD and hyperkalemia  ESRD:   gets usual dialysis [facility] for on TTS  outpatient dialysis orders:  F 180, dry weight 74 5 kilos, blood flow 400, treatment time 4:00 a m   access:  Right IJ PermCath  estimated dry weight:  74 5 kilos  last dialysis treatment on 08/21/2022  next dialysis treatment on 08/23/2022  Dialysis team informed for first treatment this am early  check BMP, phosphorus with dialysis  renal diet when allowed diet order  avoid nephrotoxins, adjust meds to appropriate GFR    H&H/anemia:  most recent hemoglobin at 9 9 grams/deciliter  maintain hemoglobin 10-12 grams/deciliter  Recommendations:  Continue to monitor for now    Acid-base electrolytes:  Hyperkalemia:  Admission potassium at 7 0 mEq after medical management most recent potassium at 5 1 mEq (likely temporary due to shifts)  Did not have any EKG changes upon initial presentation  Thus far has been treated with Lasix, metolazone, Kayexalate, insulin, dextrose  Will adjust potassium bath  Will recheck BMP again tomorrow and see patient needs additional treatment tomorrow  Advised of low potassium diet  For now will place on 2 K bath as likely normalization of potassium secondary to shifts with insulin    Hyponatremia:  Most recent sodium at 137 mEq  Sodium, phosphorus, potassium, acidosis to be corrected with dialysis    Blood pressure/hypertension:   Current medications: continue Norvasc 5 mg p o  q day  Recommendations:  continue current medications without change    Bone mineral disease/secondary hyperparathyroidism of renal origin:   Currently on: continue PhosLo 1 tab p o  t i d  with meals  Recommendations: continue current medications without change  Follow-up intact PTH as an outpatient    Other medical problems:  Diabetes:  Management primary team, on insulin  ANCA vasculitis:  Management per primary team   Immunosuppression was stopped in April 2022  Thanks for the consult  Will continue to follow  Please call with questions  Above-mentioned orders and Plan in terms of dialysis was discussed with the team in depth via [facility] text    Sumanth Valadez MD, [facility], 8/23/2022, 5:59 AM        HISTORY OF PRESENT ILLNESS:   Everardo Kirk is a 76 y o  male with pmh of hypertension, diabetes, hyperlipidemia, 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) and ESRD (TTS) who was admitted for weakness  Nephrology was consulted for assistance in the management of ESRD and hyperkalemia  Everardo Kirk is a known ESRD patient who undergoes maintenance hemodialysis at [address] on TTS  Last dialysis treatment on 08/21/2022  On admission was noted to have potassium of 7 0 mEq  Patient has had multiple admissions with such presentations  Upon admission medical management was done  Repeat labs are pending at this time  Patient remains poor historian  However following commands  As per chart he has had issues with high potassium foods  And not once checking his diet  Patient seen and examined in his room very hard of hearing her to get much history  Last dialysis treatment had no issues reports  Following commands  History obtained from chart review and the patient    Inpatient consult to Nephrology  Consult performed by: Sumanth Valadez MD  Consult ordered by: Nelson Hagen PA-C          Review of Systems   Constitutional: Positive for fatigue  Negative for chills  HENT: Negative for congestion      Respiratory: Negative for cough, shortness of breath and wheezing  Cardiovascular: Positive for leg swelling  Gastrointestinal: Negative for abdominal pain, constipation, diarrhea and vomiting  Genitourinary: Positive for decreased urine volume  Musculoskeletal: Negative for back pain  Neurological: Negative for headaches  Psychiatric/Behavioral: Negative for agitation  All other systems reviewed and are negative        Historical Information   Patient Active Problem List   Diagnosis    Bladder stones    Benign colon polyp    Benign essential hypertension    Benign prostatic hyperplasia with lower urinary tract symptoms    Hyperlipidemia    Type 2 diabetes mellitus with chronic kidney disease on chronic dialysis, with long-term current use of insulin (HCC)    Vitamin D deficiency    Chronic pain of right knee    Primary osteoarthritis of right knee    Elevated PSA    Symptomatic anemia    Chest pain    Hyperkalemia    Melena    Other proteinuria    Hemoptysis    Anemia due to chronic kidney disease, on chronic dialysis ([place] [address] )    Dialysis patient ([place] [address] )    Pulmonary hypertension ([place] [address] )    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[facility])    Rhabdomyolysis    Left leg pain    Hyponatremia    ESRD on dialysis [facility])     Past Medical History:   Diagnosis Date    Anesthesia complication 8596    after colonoscopy , pt was awake but could not control his body and kept falling     Arthritis     Bladder calculi     BPH (benign prostatic hyperplasia)     Diabetes mellitus ([place] [address] )     ESRD (end stage renal disease) on dialysis ([place] [address] )     Hearing disorder of both ears     wears bilateral hearing aids    Hyperlipidemia     controlled and maintained d/t diabetes    Hypertension     Kidney stones     Last Assessed:4/3/2017    Lyme disease     Last Assessed:6/29/2015    Rupture, bladder, spontaneous     Last Assessed:4/3/2017     Past Surgical History:   Procedure Laterality Date    BLADDER REPAIR N/A 12/10/2016    Procedure: REPAIR BLADDER/cysto insertion stent;  Surgeon: Parmjit France MD;  Location: [address] [place] [address];  Service:     COLONOSCOPY      CYSTOLITHOTOMY      ANESTHESIA   lower abdomen  ;  Last Assessed:4/3/2017    IR ASPIRATION ONLY  2022    IR BIOPSY KIDNEY RANDOM  3/10/2022    IR TUNNELED DIALYSIS CATHETER PLACEMENT  3/8/2022    OTHER SURGICAL HISTORY      thermal dilation of the prostate x 2 ( in the office)    TONSILLECTOMY      TRANSURETHRAL RESECTION OF PROSTATE N/A 2016    Procedure:  Cysto, Laser Bladder stone,fulgaration of prostates tissue ;  Surgeon: Parmjit France MD;  Location: [facility];  Service:      Social History   Social History     Substance and Sexual Activity   Alcohol Use Never     Social History     Substance and Sexual Activity   Drug Use No     Social History     Tobacco Use   Smoking Status Former Smoker    Types: Cigars    Quit date: 26    Years since quittin 6   Smokeless Tobacco Never Used     Family History   Problem Relation Age of Onset    Cancer Mother         breast    Diabetes Mother     Cancer Father         prostate w/ bone mets    Prostate cancer Father     Alzheimer's disease Sister        Meds/Allergies   current meds:   Current Facility-Administered Medications   Medication Dose Route Frequency    acetaminophen (TYLENOL) tablet 650 mg  650 mg Oral Q6H PRN    amLODIPine (NORVASC) tablet 5 mg  5 mg Oral Daily    calcium acetate (PHOSLO) capsule 667 mg  667 mg Oral TID With Meals    finasteride (PROSCAR) tablet 5 mg  5 mg Oral Daily    furosemide (LASIX) 10 mg/mL injection **ADS Override Pull**        heparin (porcine) subcutaneous injection 5,000 Units  5,000 Units Subcutaneous Q8H [address]    insulin aspart protamine-insulin aspart (NovoLOG 70/30) 100 units/mL subcutaneous injection 5 Units  5 Units Subcutaneous BID AC    insulin lispro (HumaLOG) 100 units/mL subcutaneous injection 1-6 Units  1-6 Units Subcutaneous TID AC    insulin lispro (HumaLOG) 100 units/mL subcutaneous injection 1-6 Units  1-6 Units Subcutaneous HS    pravastatin (PRAVACHOL) tablet 80 mg  80 mg Oral Daily With Dinner    tamsulosin (FLOMAX) capsule 0 4 mg  0 4 mg Oral Daily With Dinner    and PTA meds:   Prior to Admission Medications   Prescriptions Last Dose Informant Patient Reported? Taking? HumaLOG Mix 75/25 KwikPen (75-25) 100 units/mL injection pen Unknown at Unknown time  Yes No   Sig: Inject 5-10 Units under the skin 2 (two) times a day with meals as per home sliding scale   Insulin Pen Needle (B-D UF III MINI PEN NEEDLES) 31G X 5 MM MISC   No No   Sig: Use twice daily with insulin pen as directed   acetaminophen (TYLENOL) 325 mg tablet Unknown at Unknown time  No No   Sig: Take 2 tablets (650 mg total) by mouth every 6 (six) hours as needed for mild pain, headaches or fever   amLODIPine (NORVASC) 5 mg tablet Unknown at Unknown time  No No   Sig: Take 1 tablet (5 mg total) by mouth daily   calcium acetate (PHOSLO) capsule Unknown at Unknown time  Yes No   Sig: Take 667 mg by mouth in the morning and 667 mg at noon and 667 mg in the evening  Take with meals     calcium carbonate-vitamin D (OSCAL-D) 500 mg-200 units per tablet Unknown at Unknown time  Yes No   Sig: Take 1 tablet by mouth daily   dutasteride (AVODART) 0 5 mg capsule Unknown at Unknown time  Yes No   Sig: Take 0 5 mg by mouth daily   glucose blood test strip   No No   Si each by Other route 3 (three) times a day   hydrocortisone (ANUSOL-HC) 25 mg suppository Unknown at Unknown time  Yes No   Sig: Insert 1 suppository into the rectum daily   simvastatin (ZOCOR) 40 mg tablet Unknown at Unknown time  No No   Sig: Take 1 tablet (40 mg total) by mouth daily at bedtime   tamsulosin (FLOMAX) 0 4 mg Unknown at Unknown time  Yes No   Sig: Take 0 4 mg by mouth daily with dinner   traMADol (ULTRAM) 50 mg tablet Unknown at Unknown time  Yes No   Sig: Take 1 tablet by mouth every 6 (six) hours      Facility-Administered Medications: None       Scheduled Meds:  Current Facility-Administered Medications   Medication Dose Route Frequency Provider Last Rate    acetaminophen  650 mg Oral Q6H PRN Mekhi Vizcarra PA-C      amLODIPine  5 mg Oral Daily Aileen Casas PA-C      calcium acetate  667 mg Oral TID With Meals Mekhi Vizcarra PA-C      finasteride  5 mg Oral Daily Aileen ALICIA Casas      furosemide           heparin (porcine)  5,000 Units Subcutaneous Q8H [address] Aileen Casas PA-C      insulin aspart protamine-insulin aspart  5 Units Subcutaneous BID AC Aileen VecALICIA gomez      insulin lispro  1-6 Units Subcutaneous TID AC Aileen VecdevikaoALICIA      insulin lispro  1-6 Units Subcutaneous HS Aileen Casas PA-C      pravastatin  80 mg Oral Daily With Monitor My Meds, ALICIA      tamsulosin  0 4 mg Oral Daily With Dinner Mekhi Vizcarra PA-C         PRN Meds:   acetaminophen    Continuous Infusions: Allergies   Allergen Reactions    Amoxicillin Rash           Invasive Devices: Invasive Devices  Report    Peripheral Intravenous Line  Duration           Peripheral IV 22 Right Antecubital <1 day          Hemodialysis Catheter  Duration           HD Permanent Double Catheter 167 days                    PHYSICAL EXAM  BP (!) 171/86   Pulse 93   Temp 97 8 °F (36 6 °C)   Resp 18   Ht 5' 8" (1 727 m)   Wt 84 4 kg (186 lb)   SpO2 94%   BMI 28 28 kg/m²   Temp (24hrs), Av 9 °F (36 6 °C), Min:97 8 °F (36 6 °C), Max:97 9 °F (36 6 °C)      No intake or output data in the 24 hours ending 22 0559    No intake/output data recorded  Current Weight: Weight - Scale: 84 4 kg (186 lb)  First Weight: Weight - Scale: 84 8 kg (186 lb 15 2 oz)  Physical Exam  Vitals and nursing note reviewed  Constitutional:       General: He is not in acute distress  Appearance: Normal appearance  He is normal weight  He is ill-appearing  He is not toxic-appearing or diaphoretic  HENT:      Head: Normocephalic and atraumatic  Mouth/Throat:      Mouth: Mucous membranes are moist       Pharynx: Oropharynx is clear  No oropharyngeal exudate  Eyes:      General: No scleral icterus  Conjunctiva/sclera: Conjunctivae normal    Neck:      Comments: Right IJ PermCath  Cardiovascular:      Rate and Rhythm: Normal rate  Heart sounds: Normal heart sounds  No murmur heard  No friction rub  Pulmonary:      Effort: Pulmonary effort is normal  No respiratory distress  Breath sounds: Normal breath sounds  Abdominal:      General: There is no distension  Palpations: Abdomen is soft  There is no mass  Tenderness: There is no abdominal tenderness  Musculoskeletal:         General: Swelling present  Cervical back: Normal range of motion and neck supple  No rigidity  Comments: Plus one edema bilateral lower extremities   Skin:     General: Skin is warm  Coloration: Skin is not jaundiced  Neurological:      General: No focal deficit present  Mental Status: He is alert  Psychiatric:         Mood and Affect: Mood normal       Comments: Very hard of hearing           LABORATORY:    Results from last 7 days   Lab Units 08/23/22  0458 08/22/22  2320   WBC Thousand/uL  --  7 78   HEMOGLOBIN g/dL  --  9 9*   HEMATOCRIT %  --  31 9*   PLATELETS Thousands/uL  --  316   POTASSIUM mmol/L 5 1 7 0*   CHLORIDE mmol/L 99 99   CO2 mmol/L 29 33*   BUN mg/dL 44* 42*   CREATININE mg/dL 6 69* 6 58*   CALCIUM mg/dL 8 5 8 4      rest all reviewed    RADIOLOGY:  CT head without contrast   Final Result by Koby Weir MD (08/23 0025)      No acute intracranial abnormality  Workstation performed: [id number]           Rest all reviewed    Portions of the record may have been created with voice recognition software  Occasional wrong word or "sound a like" substitutions may have occurred due to the inherent limitations of voice recognition software   Read the chart carefully and recognize, using context, where substitutions have occurred  If you have any questions, please contact the dictating provider

## 2022-08-23 NOTE — PLAN OF CARE
Pt on HD treatment for 4 hours with a UF goal of 3 5 L as tolerated   Pt on a 2 k 2 5 mary bath for a potassium of 5 1 on 08/23/22  Problem: METABOLIC, FLUID AND ELECTROLYTES - ADULT  Goal: Electrolytes maintained within normal limits  Description: INTERVENTIONS:  - Monitor labs and assess patient for signs and symptoms of electrolyte imbalances  - Administer electrolyte replacement as ordered  - Monitor response to electrolyte replacements, including repeat lab results as appropriate  - Instruct patient on fluid and nutrition as appropriate  Outcome: Progressing  Goal: Fluid balance maintained  Description: INTERVENTIONS:  - Monitor labs   - Monitor I/O and WT  - Instruct patient on fluid and nutrition as appropriate  - Assess for signs & symptoms of volume excess or deficit  Outcome: Progressing

## 2022-08-23 NOTE — HEMODIALYSIS
Post-Dialysis RN Treatment Note    Blood Pressure:  Pre 160/72 mm/Hg  Post 184/91 mmHg   EDW  74 5 kg    Weight:  Pre 78 5 kg   Post 76 5 kg   Mode of weight measurement: Bed Scale   Volume Removed  3500 ml    Treatment duration 240 minutes    NS given  No    Treatment shortened?  No   Medications given during Rx None Reported   Estimated Kt/V  Not Applicable   Access type: Permacath/TDC   Access Issues: No    Report called to primary nurse   Yes

## 2022-08-23 NOTE — PHYSICAL THERAPY NOTE
PT Cancellation   08/23/22 0835   PT Last Visit   PT Visit Date 08/23/22   Note Type   Note type Evaluation; Cancelled Session   Cancel Reasons Medical status   Additional Comments Pt transferred to ICU; will follow  Licensure   [place] License Bhavesh Trnocoso PT  [id number]

## 2022-08-23 NOTE — ED PROVIDER NOTES
History  Chief Complaint   Patient presents with    Weakness - Generalized     Pt brought in by EMS for weakness after pt sts he guided himself down in the hallway and could not get back up  Denies any pain  Goes to dialysis T/TH/S, went on Saturday  Also hx of diabetic     This is a 75 yo F with hx of ESRD on dialysis T/Th/S, HTN, DM and other who presents today weakness  patietn states he got out of bed and felt weak and slid down and sat down  No pain  States slight weakness  patietn is hard of hearing  Hearing aids left at home  No fever  No chills  Impression: weakness       Past Medical History:  2011: Anesthesia complication      Comment:  after colonoscopy , pt was awake but could not control                his body and kept falling   No date: Arthritis  No date: Bladder calculi  No date: BPH (benign prostatic hyperplasia)  No date: Diabetes mellitus (HCC)  No date: ESRD (end stage renal disease) on dialysis (HCC)  No date: Hearing disorder of both ears      Comment:  wears bilateral hearing aids  No date: Hyperlipidemia      Comment:  controlled and maintained d/t diabetes  No date: Hypertension  No date: Kidney stones      Comment:  Last Assessed:4/3/2017  No date: Lyme disease      Comment:  Last Assessed:6/29/2015  No date: Rupture, bladder, spontaneous      Comment:  Last Assessed:4/3/2017            Prior to Admission Medications   Prescriptions Last Dose Informant Patient Reported? Taking?    HumaLOG Mix 75/25 KwikPen (75-25) 100 units/mL injection pen Unknown at Unknown time  Yes No   Sig: Inject 5-10 Units under the skin 2 (two) times a day with meals as per home sliding scale   Insulin Pen Needle (B-D UF III MINI PEN NEEDLES) 31G X 5 MM MISC   No No   Sig: Use twice daily with insulin pen as directed   acetaminophen (TYLENOL) 325 mg tablet Unknown at Unknown time  No No   Sig: Take 2 tablets (650 mg total) by mouth every 6 (six) hours as needed for mild pain, headaches or fever   amLODIPine (NORVASC) 5 mg tablet Unknown at Unknown time  No No   Sig: Take 1 tablet (5 mg total) by mouth daily   calcium acetate (PHOSLO) capsule Unknown at Unknown time  Yes No   Sig: Take 667 mg by mouth in the morning and 667 mg at noon and 667 mg in the evening  Take with meals     calcium carbonate-vitamin D (OSCAL-D) 500 mg-200 units per tablet Unknown at Unknown time  Yes No   Sig: Take 1 tablet by mouth daily   dutasteride (AVODART) 0 5 mg capsule Unknown at Unknown time  Yes No   Sig: Take 0 5 mg by mouth daily   glucose blood test strip   No No   Si each by Other route 3 (three) times a day   hydrocortisone (ANUSOL-HC) 25 mg suppository Unknown at Unknown time  Yes No   Sig: Insert 1 suppository into the rectum daily   simvastatin (ZOCOR) 40 mg tablet Unknown at Unknown time  No No   Sig: Take 1 tablet (40 mg total) by mouth daily at bedtime   tamsulosin (FLOMAX) 0 4 mg Unknown at Unknown time  Yes No   Sig: Take 0 4 mg by mouth daily with dinner   traMADol (ULTRAM) 50 mg tablet Unknown at Unknown time  Yes No   Sig: Take 1 tablet by mouth every 6 (six) hours      Facility-Administered Medications: None       Past Medical History:   Diagnosis Date    Anesthesia complication 1446    after colonoscopy , pt was awake but could not control his body and kept falling     Arthritis     Bladder calculi     BPH (benign prostatic hyperplasia)     Diabetes mellitus ([address] )     ESRD (end stage renal disease) on dialysis ([address] )     Hearing disorder of both ears     wears bilateral hearing aids    Hyperlipidemia     controlled and maintained d/t diabetes    Hypertension     Kidney stones     Last Assessed:4/3/2017    Lyme disease     Last Assessed:2015    Rupture, bladder, spontaneous     Last Assessed:4/3/2017       Past Surgical History:   Procedure Laterality Date    BLADDER REPAIR N/A 12/10/2016    Procedure: REPAIR BLADDER/cysto insertion stent;  Surgeon: Franklyn York MD;  Location: [address] [place] [address]; Service:     COLONOSCOPY      CYSTOLITHOTOMY      ANESTHESIA   lower abdomen  ; Last Assessed:4/3/2017    IR ASPIRATION ONLY  2022    IR BIOPSY KIDNEY RANDOM  3/10/2022    IR TUNNELED DIALYSIS CATHETER PLACEMENT  3/8/2022    OTHER SURGICAL HISTORY      thermal dilation of the prostate x 2 ( in the office)    TONSILLECTOMY      TRANSURETHRAL RESECTION OF PROSTATE N/A 2016    Procedure:  Cysto, Laser Bladder stone,fulgaration of prostates tissue ;  Surgeon: Mary Link MD;  Location: [facility] OR;  Service:        Family History   Problem Relation Age of Onset    Cancer Mother         breast    Diabetes Mother     Cancer Father         prostate w/ bone mets    Prostate cancer Father     Alzheimer's disease Sister      I have reviewed and agree with the history as documented  E-Cigarette/Vaping    E-Cigarette Use Never User      E-Cigarette/Vaping Substances    Nicotine No     THC No     CBD No     Flavoring No     Other No     Unknown No      Social History     Tobacco Use    Smoking status: Former Smoker     Types: Cigars     Quit date:      Years since quittin 6    Smokeless tobacco: Never Used   Vaping Use    Vaping Use: Never used   Substance Use Topics    Alcohol use: Never    Drug use: No       Review of Systems   Constitutional: Negative for diaphoresis and fever  HENT: Negative for sneezing and sore throat  Eyes: Negative for photophobia and visual disturbance  Respiratory: Negative for cough, shortness of breath and wheezing  Cardiovascular: Negative for chest pain and palpitations  Gastrointestinal: Negative for abdominal pain, constipation, diarrhea, nausea and vomiting  Genitourinary: Negative for frequency, hematuria and urgency  Musculoskeletal: Negative for back pain  Neurological: Positive for weakness  Negative for dizziness  Physical Exam  Physical Exam  Vitals and nursing note reviewed     Constitutional:       General: He is not in acute distress  Appearance: He is well-developed  He is not diaphoretic  HENT:      Head: Normocephalic and atraumatic  Nose: Nose normal    Eyes:      Conjunctiva/sclera: Conjunctivae normal       Pupils: Pupils are equal, round, and reactive to light  Cardiovascular:      Rate and Rhythm: Normal rate and regular rhythm  Heart sounds: Normal heart sounds  No murmur heard  Comments: Dialysis catheter right chest no signs of infection    Pulmonary:      Effort: Pulmonary effort is normal  No respiratory distress  Breath sounds: Normal breath sounds  No wheezing or rales  Abdominal:      General: Bowel sounds are normal  There is no distension  Palpations: Abdomen is soft  Tenderness: There is no abdominal tenderness  There is no guarding or rebound  Musculoskeletal:         General: No tenderness or deformity  Normal range of motion  Cervical back: Normal range of motion and neck supple  Skin:     General: Skin is warm and dry  Coloration: Skin is not pale  Findings: No rash  Neurological:      Mental Status: He is alert and oriented to person, place, and time  Cranial Nerves: No cranial nerve deficit  Comments: Moving all extremities            Vital Signs  ED Triage Vitals [08/22/22 2302]   Temperature Pulse Respirations Blood Pressure SpO2   97 9 °F (36 6 °C) 91 18 (!) 183/84 97 %      Temp Source Heart Rate Source Patient Position - Orthostatic VS BP Location FiO2 (%)   Tympanic Monitor Sitting Right arm --      Pain Score       No Pain           Vitals:    08/23/22 2000 08/23/22 2100 08/23/22 2117 08/23/22 2200   BP: 168/77 (!) 173/78 168/77 151/65   Pulse: 96 82  96   Patient Position - Orthostatic VS:             Visual Acuity  Visual Acuity    Flowsheet Row Most Recent Value   L Pupil Size (mm) 3   R Pupil Size (mm) 3   L Pupil Shape Round   R Pupil Shape Round          ED Medications  Medications   dextrose 50 % IV solution 25 mL (25 mL Intravenous Not Given 8/23/22 1901)   acetaminophen (TYLENOL) tablet 650 mg (has no administration in time range)   calcium acetate (PHOSLO) capsule 667 mg (667 mg Oral Given 8/23/22 1553)   finasteride (PROSCAR) tablet 5 mg (5 mg Oral Not Given 8/23/22 1105)   insulin aspart protamine-insulin aspart (NovoLOG 70/30) 100 units/mL subcutaneous injection 5 Units (5 Units Subcutaneous Not Given 8/23/22 1547)   pravastatin (PRAVACHOL) tablet 80 mg (80 mg Oral Given 8/23/22 1553)   tamsulosin (FLOMAX) capsule 0 4 mg (0 4 mg Oral Given 8/23/22 1553)   heparin (porcine) subcutaneous injection 5,000 Units (5,000 Units Subcutaneous Given 8/23/22 2117)   insulin lispro (HumaLOG) 100 units/mL subcutaneous injection 1-6 Units (1 Units Subcutaneous Not Given 8/23/22 1547)   insulin lispro (HumaLOG) 100 units/mL subcutaneous injection 1-6 Units (1 Units Subcutaneous Not Given 8/23/22 2222)   furosemide (LASIX) 10 mg/mL injection **ADS Override Pull** (  Not Given 8/23/22 0343)   dextrose 50 % IV solution 25 mL (25 mL Intravenous Not Given 8/23/22 1027)   amLODIPine (NORVASC) tablet 10 mg (has no administration in time range)   losartan (COZAAR) tablet 25 mg (25 mg Oral Given 8/23/22 1425)   hydrALAZINE (APRESOLINE) injection 10 mg (10 mg Intravenous Given 8/23/22 2117)   labetalol (NORMODYNE) injection 10 mg (10 mg Intravenous Given 8/23/22 1553)   insulin regular (HumuLIN R,NovoLIN R) injection 10 Units (10 Units Intravenous Given 8/23/22 0030)   dextrose 50 % IV solution 25 mL (25 mL Intravenous Given 8/23/22 0029)   sodium bicarbonate 8 4 % injection 25 mEq (25 mEq Intravenous Given 8/23/22 0030)   metolazone (ZAROXOLYN) tablet 10 mg (10 mg Oral Given 8/23/22 0221)   sodium polystyrene (KAYEXALATE) powder 30 g (30 g Oral Given 8/23/22 0217)   insulin regular (HumuLIN R,NovoLIN R) injection 10 Units (10 Units Intravenous Given 8/23/22 5534)   furosemide (LASIX) 120 mg in dextrose 5 % 50 mL IVPB (120 mg Intravenous New Bag 8/23/22 0341)   dextrose 50 % IV solution 25 mL (25 mL Intravenous Given 8/23/22 0996)   dextrose infusion 10 % (250 mL Intravenous New Bag 8/23/22 4639)   sodium phosphate 30 mmol in dextrose 5 % 250 mL Infusion (30 mmol Intravenous New Bag 8/23/22 7843)   calcium gluconate 1 g in sodium chloride 0 9% 50 mL (premix) (1 g Intravenous New Bag 8/23/22 1424)       Diagnostic Studies  Results Reviewed     Procedure Component Value Units Date/Time    HS Troponin I 2hr [[id number]]  (Normal) Collected: 08/23/22 0133    Lab Status: Final result Specimen: Blood from Line, Venous Updated: 08/23/22 0208     hs TnI 2hr 16 ng/L      Delta 2hr hsTnI 1 ng/L     Comprehensive metabolic panel [[id number]]  (Abnormal) Collected: 08/22/22 2320    Lab Status: Final result Specimen: Blood from Arm, Right Updated: 08/23/22 0015     Sodium 134 mmol/L      Potassium 7 0 mmol/L      Chloride 99 mmol/L      CO2 33 mmol/L      ANION GAP 2 mmol/L      BUN 42 mg/dL      Creatinine 6 58 mg/dL      Glucose 182 mg/dL      Calcium 8 4 mg/dL      Corrected Calcium 9 4 mg/dL      AST 8 U/L      ALT 9 U/L      Alkaline Phosphatase 82 U/L      Total Protein 7 4 g/dL      Albumin 2 8 g/dL      Total Bilirubin 0 42 mg/dL      eGFR 7 ml/min/1 73sq m     Narrative:      [place] guidelines for Chronic Kidney Disease (CKD):     Stage 1 with normal or high GFR (GFR > 90 mL/min/1 73 square meters)    Stage 2 Mild CKD (GFR = 60-89 mL/min/1 73 square meters)    Stage 3A Moderate CKD (GFR = 45-59 mL/min/1 73 square meters)    Stage 3B Moderate CKD (GFR = 30-44 mL/min/1 73 square meters)    Stage 4 Severe CKD (GFR = 15-29 mL/min/1 73 square meters)    Stage 5 End Stage CKD (GFR <15 mL/min/1 73 square meters)  Note: GFR calculation is accurate only with a steady state creatinine    HS Troponin 0hr (reflex protocol) [[id number]]  (Normal) Collected: 08/22/22 2321    Lab Status: Final result Specimen: Blood from Arm, Right Updated: 08/22/22 2349     hs TnI 0hr 15 ng/L     CBC and differential [[id number]]  (Abnormal) Collected: 08/22/22 2320    Lab Status: Final result Specimen: Blood from Arm, Right Updated: 08/22/22 2326     WBC 7 78 Thousand/uL      RBC 3 53 Million/uL      Hemoglobin 9 9 g/dL      Hematocrit 31 9 %      MCV 90 fL      MCH 28 0 pg      MCHC 31 0 g/dL      RDW 15 1 %      MPV 8 3 fL      Platelets 704 Thousands/uL      nRBC 0 /100 WBCs      Neutrophils Relative 79 %      Immat GRANS % 1 %      Lymphocytes Relative 8 %      Monocytes Relative 10 %      Eosinophils Relative 1 %      Basophils Relative 1 %      Neutrophils Absolute 6 23 Thousands/µL      Immature Grans Absolute 0 06 Thousand/uL      Lymphocytes Absolute 0 61 Thousands/µL      Monocytes Absolute 0 75 Thousand/µL      Eosinophils Absolute 0 07 Thousand/µL      Basophils Absolute 0 06 Thousands/µL                  CT head without contrast   Final Result by Viviana Carballo MD (08/23 0025)      No acute intracranial abnormality  Workstation performed: [id number]                    Procedures  Procedures         ED Course                               SBIRT 22yo+    Flowsheet Row Most Recent Value   SBIRT (23 yo +)    In order to provide better care to our patients, we are screening all of our patients for alcohol and drug use  Would it be okay to ask you these screening questions?  No Filed at: 08/22/2022 2325                    MDM    Disposition  Final diagnoses:   Weakness   Hyperkalemia     Time reflects when diagnosis was documented in both MDM as applicable and the Disposition within this note     Time User Action Codes Description Comment    8/23/2022 12:50 AM Evert Fontana Add [R53 1] Weakness     8/23/2022 12:51 AM Demetris Sharp Add [E87 5] Hyperkalemia       ED Disposition     ED Disposition   Admit    Condition   Stable    Date/Time   Tue Aug 23, 2022 12:50 AM    Comment   Case was discussed with medicine and the patient's admission status was agreed to be Admission Status: observation status to the service of Dr Dorian Silverio   Follow-up Information    None         Current Discharge Medication List      CONTINUE these medications which have NOT CHANGED    Details   acetaminophen (TYLENOL) 325 mg tablet Take 2 tablets (650 mg total) by mouth every 6 (six) hours as needed for mild pain, headaches or fever  Refills: 0    Associated Diagnoses: Perinephric fluid collection      amLODIPine (NORVASC) 5 mg tablet Take 1 tablet (5 mg total) by mouth daily  Qty: 90 tablet, Refills: 3    Associated Diagnoses: Benign essential hypertension      calcium acetate (PHOSLO) capsule Take 667 mg by mouth in the morning and 667 mg at noon and 667 mg in the evening  Take with meals        calcium carbonate-vitamin D (OSCAL-D) 500 mg-200 units per tablet Take 1 tablet by mouth daily      dutasteride (AVODART) 0 5 mg capsule Take 0 5 mg by mouth daily      glucose blood test strip 1 each by Other route 3 (three) times a day  Qty: 300 each, Refills: 3    Associated Diagnoses: Type 2 diabetes mellitus without complication, with long-term current use of insulin ([facility])      HumaLOG Mix 75/25 KwikPen (75-25) 100 units/mL injection pen Inject 5-10 Units under the skin 2 (two) times a day with meals as per home sliding scale  Qty: 54 mL, Refills: 0    Comments: Requesting 1 year supply  Associated Diagnoses: Type 2 diabetes mellitus with microalbuminuria, with long-term current use of insulin ([facility])      hydrocortisone (ANUSOL-HC) 25 mg suppository Insert 1 suppository into the rectum daily      Insulin Pen Needle (B-D UF III MINI PEN NEEDLES) 31G X 5 MM MISC Use twice daily with insulin pen as directed  Qty: 180 each, Refills: 3    Associated Diagnoses: Type 2 diabetes mellitus with microalbuminuria, with long-term current use of insulin ([facility])      simvastatin (ZOCOR) 40 mg tablet Take 1 tablet (40 mg total) by mouth daily at bedtime  Qty: 90 tablet, Refills: 3    Comments: Requesting 1 year supply  Associated Diagnoses: Hyperlipidemia, unspecified hyperlipidemia type      tamsulosin (FLOMAX) 0 4 mg Take 0 4 mg by mouth daily with dinner      traMADol (ULTRAM) 50 mg tablet Take 1 tablet by mouth every 6 (six) hours             No discharge procedures on file      PDMP Review     None          ED Provider  Electronically Signed by           Jordan Jauregui MD  08/23/22 [phone]

## 2022-08-23 NOTE — ASSESSMENT & PLAN NOTE
Lab Results   Component Value Date    HGBA1C 5 1 05/08/2022       Recent Labs     08/23/22  0340 08/23/22  0724 08/23/22  0727 08/23/22  0739   POCGLU 112 <20* <20* 157*       Blood Sugar Average: Last 72 hrs:  (P) 134 5     Patient on 70/30 insulin 5 units BID at home  · Recent HgA1c 5 1  · Diabetic diet  · Continue 70/30 insulin BID  · Start SSI and accuchecks ac, hs

## 2022-08-23 NOTE — NURSING NOTE
Starting shift change report at doorway, PCA is at bedside tells day and night RN glucose is <20  This RN goes to bedside finds patient with white substance coming out of mouth , patient is unresponsive  Vitals and glucose recheck is done while RRT is called and more nursing staff arrives

## 2022-08-23 NOTE — ASSESSMENT & PLAN NOTE
Lab Results   Component Value Date    EGFR 7 08/23/2022    EGFR 7 08/22/2022    EGFR 10 08/04/2022    CREATININE 6 69 (H) 08/23/2022    CREATININE 6 58 (H) 08/22/2022    CREATININE 5 01 (H) 08/04/2022     On dialysis Tuesday, Thursday, Saturday  · Last session 8/20/22  · Nephrology consult

## 2022-08-23 NOTE — CASE MANAGEMENT
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    Pulmonary hypertension ([facility] ) 2022    Dialysis patient ([facility] ) 2022    Anemia due to chronic kidney disease, on chronic dialysis ([facility] )     Hemoptysis 2022    Other proteinuria     Symptomatic anemia 2022    Chest pain 2022    Hyperkalemia 2022    Melena 2022    Elevated PSA 2021    Chronic pain of right knee 2021    Primary osteoarthritis of right knee 2021    Bladder stones 12/15/2016    Type 2 diabetes mellitus with chronic kidney disease on chronic dialysis, with long-term current use of insulin ([facility] ) 2016    Benign colon polyp 2013    Benign prostatic hyperplasia with lower urinary tract symptoms 2013    Benign essential hypertension 2013    Hyperlipidemia 2012    Vitamin D deficiency 2012      LOS (days): 0  Geometric Mean LOS (GMLOS) (days):   Days to GMLOS:     OBJECTIVE:              Current admission status: Observation       Preferred Pharmacy:   [facility] [address] [place]  [phone]  [place] [facility] [address]  Phone: [phone] Fax: [phone]    [facility]  ([facility]) - Addis Gallagher [address] [place] [address]  Phone: [phone] Fax: [phone]    Primary Care Provider: Court Albert MD    Primary Insurance: MEDICARE  Secondary Insurance: BLUE CROSS    ASSESSMENT:  [address] [facility], [address] [place] Representative - Spouse   Primary Phone: [phone] (Mobile)            Readmission Root Cause  30 Day Readmission: No    Patient Information  Admitted from[de-identified] Home  Mental Status: Confused  During Assessment patient was accompanied by: Not accompanied during assessment  Assessment information provided by[de-identified] Spouse  Primary Caregiver: Self  Support Systems: Self, Spouse/significant other  [place] of Residence: [address] [place] [address] do you live in?: [address] [place] [address] Road entry access options   Select all that apply : Stairs  Number of steps to enter home : 2  Do the steps have railings?: Yes  Type of Current Residence: [place]  In the last 12 months, was there a time when you were not able to pay the mortgage or rent on time?: No  In the last 12 months, how many places have you lived?: 1  In the last 12 months, was there a time when you did not have a steady place to sleep or slept in a shelter (including now)?: No  Homeless/housing insecurity resource given?: No  Living Arrangements: Lives w/ Spouse/significant other  Is patient a ?: No    Activities of Daily Living Prior to Admission  Functional Status: Independent  Completes ADLs independently?: Yes  Ambulates independently?: Yes  Does patient use assisted devices?: Yes  Assisted Devices (DME) used: [employer]  Does patient currently own DME?: Yes  What DME does the patient currently own?: [employer]  Does patient have a history of Outpatient Therapy (PT/OT)?: No  Does the patient have a history of Short-Term Rehab?: Yes (@ [facility])  Does patient have a history of HHC?: Yes (w/ [facility])  Does patient currently have [facility]?: No    Patient Information Continued  Income Source: Pension/FCI  Does patient have prescription coverage?: Yes  Within the past 12 months, you worried that your food would run out before you got the money to buy more : Never true  Within the past 12 months, the food you bought just didn't last and you didn't have money to get more : Never true  Food insecurity resource given?: No  Does patient receive dialysis treatments?: Yes (TTS @ [address])  Does patient have a history of substance abuse?: No  Does patient have a history of Mental Health Diagnosis?: No    PHQ 2/9 Screening   Reviewed PHQ 2/9 Depression Screening Score?: No    Means of Transportation  Means of Transport to Appts[de-identified] [employer]  In the past 12 months, has lack of transportation kept you from medical appointments or from getting medications?: No  In the past 12 months, has lack of transportation kept you from meetings, work, or from getting things needed for daily living?: No  Was application for public transport provided?: No    DISCHARGE DETAILS:    Discharge planning discussed with[de-identified] Patient's spouse  Freedom of Choice: Yes     CM contacted family/caregiver?: Yes     Did patient/caregiver verbalize understanding of patient care needs?: Yes  Were patient/caregiver advised of the risks associated with not following Treatment Team discharge recommendations?: Yes    Contacts  Patient Contacts: Lina Pinon  Relationship to Patient[de-identified] Family  Contact Method: Phone  Phone Number: [phone]  Reason/Outcome: Emergency Contact, Discharge [address]         Is the patient interested in [facility] at discharge?: No (Refusing [facility] due to not wanting visitors in the home )    DME Referral Provided  Referral made for DME?: No     Would you like to participate in our [address] service program?  : No - Declined     Discharge Destination Plan[de-identified] Home  Transport at Discharge : Wheelchair [facility]     Additional Comments: CM s/w patient's wife regarding DCP  Wife reports that patient is compliant with OP HD  Wife reports that patient last went for HD on Saturday and felt find until yesterday   Wife denies any issues with getting to OP HD sessions, other outpatient appointments, and denies any barriers to obtaining Rx  Wife stating she plans to come and see patient later today  CM to follow-up with patient pending completion of HD today

## 2022-08-23 NOTE — ASSESSMENT & PLAN NOTE
Lab Results   Component Value Date    HGBA1C 5 1 05/08/2022       No results for input(s): POCGLU in the last 72 hours      Blood Sugar Average: Last 72 hrs:     Patient on 70/30 insulin 5 units BID at home   Recent HgA1c 5 1   Diabetic diet   Continue 70/30 insulin BID   Start SSI and accuchecks ac, hs

## 2022-08-23 NOTE — ASSESSMENT & PLAN NOTE
Rapid response called on 8/23 around 7:30 am after finding patient altered with BG < 20    · Initially lethargic and minimally responsive to painful stimuli  · Given infusion and a push of dextrose  · After about 10 min of dextrose administration, pt responded to some verbal commands  · Noted spontaneous limb movements  No seizure like activity or lateralizing focal deficits    · Hemodynamically stable  · Ordered CBC, BMP, Mg, Phos, BG  · Neuro check

## 2022-08-23 NOTE — RAPID RESPONSE
Rapid Response Note  Hay Connors 76 y o  male MRN: [id number]  Unit/Bed#: ICU 05 Encounter: [id number]    Rapid Response Notification(s):   Response called date/time:  8/23/2022 7:28 AM  Response team arrival date/time:  8/23/2022 7:30 AM  Response end date/time:  8/23/2022 7:45 AM  [facility] of care:  [facility]  Rapid response location:  [facility]  Primary reason for rapid response call:  Acute change in neuro status    Rapid Response Intervention(s):   Airway:  Positioning and suction  Breathing:  Oxygen  Circulation:  None  Fluids administered:  D5W  Medications administered:  D50       Assessment:   · Acute Encephalopathy  · Hypoglycemia  Plan:   · MAP 80, , O2 sat > 95% (no hypotension or hypoxia recorded by monitor)  · Patient's head of bed raised and oral pharnx suctioned  · STAT amp of IV D50  · STAT CBC, CMP, Mg, Phos, Ca  · Transfer to [facility]       Rapid Response Outcome:   Transfer:  Transfer to [facility] 2  Primary service notified of transfer: Yes    Code Status: Level 1 (Full Code)      Family notified of transfer: no     Review of Systems   Unable to perform ROS: Patient unresponsive     Objective:   Vitals:    08/23/22 0732 08/23/22 0732 08/23/22 0735 08/23/22 0738   BP: 157/65      BP Location:       Pulse:  91 (!) 107 (!) 110   Resp:       Temp:       TempSrc:       SpO2:  95% 93% 94%   Weight:       Height:         Physical Exam  Constitutional:       Appearance: He is ill-appearing  HENT:      Head: Normocephalic and atraumatic  Right Ear: Ear canal and external ear normal       Left Ear: Ear canal and external ear normal       Mouth/Throat:      Mouth: Mucous membranes are moist    Eyes:      General:         Right eye: No discharge  Left eye: No discharge  Pupils: Pupils are equal, round, and reactive to light  Comments: No nystagmus   Cardiovascular:      Rate and Rhythm: Normal rate  Pulses: Normal pulses  Heart sounds: Normal heart sounds   No murmur heard  Pulmonary:      Effort: Pulmonary effort is normal       Breath sounds: Normal breath sounds  No wheezing or rhonchi  Abdominal:      General: Abdomen is flat  Palpations: Abdomen is soft  Tenderness: There is no abdominal tenderness  Musculoskeletal:         General: No tenderness or deformity  Cervical back: Neck supple  Skin:     General: Skin is warm and dry  Neurological:      Comments: AAO x 0; GCS 8 (E 2, V 1, M 5), no focal deficits       See consult note from Critical Care    Hawa Mcgee PA-C

## 2022-08-23 NOTE — PROGRESS NOTES
Received pt from [address] [place] [address] only belongings were a pair of jeans in a patient bag

## 2022-08-23 NOTE — ASSESSMENT & PLAN NOTE
Lab Results   Component Value Date    EGFR 7 08/22/2022    EGFR 10 08/04/2022    EGFR 9 08/03/2022    CREATININE 6 58 (H) 08/22/2022    CREATININE 5 01 (H) 08/04/2022    CREATININE 5 33 (H) 08/03/2022     On dialysis Tuesday, Thursday, Saturday  · Last session 8/20/22  · Nephrology consult

## 2022-08-23 NOTE — ASSESSMENT & PLAN NOTE
Patient with history of ESRD on HD presents with weakness   · Potassium 7 0  · CT head negative  · EKG NSR, alert and oriented x3  · Given 10 units humulin and sodium bicarb in ED  · Discussed with nephrologist on call  ? Will order lasix 120mg, metolazone 10mg, kayexalate 30g per nephrology  ? Repeat sodium bicarb and humulin per nephrology  ? Repeat BMP in 3 hours  ? Monitor on telemetry  ?  Nephrology following

## 2022-08-23 NOTE — ASSESSMENT & PLAN NOTE
Patient with history of ESRD on HD presents with weakness   · Potassium 7 0  · CT head negative  · EKG NSR, alert and oriented x3  · Given 10 units humulin and sodium bicarb in ED  · Discussed with nephrologist on call  · Will order lasix 120mg, metolazone 10mg, kayexalate 30g per nephrology  · Repeat sodium bicarb and humulin per nephrology  · Repeat BMP in 3 hours  · Monitor on telemetry  · Nephrology following

## 2022-08-23 NOTE — OCCUPATIONAL THERAPY NOTE
OT EVALUATION       08/23/22 0833   Note Type   Note type Evaluation; Cancelled Session   Cancel Reasons Medical status   Additional Comments transferred to ICU   Licensure   [place] License Number  Pretty Chaudhry Missy Hira 85 OTR/L [id number]

## 2022-08-24 LAB
ANION GAP SERPL CALCULATED.3IONS-SCNC: 7 MMOL/L (ref 4–13)
ANION GAP SERPL CALCULATED.3IONS-SCNC: 9 MMOL/L (ref 4–13)
BUN SERPL-MCNC: 20 MG/DL (ref 5–25)
BUN SERPL-MCNC: 25 MG/DL (ref 5–25)
CALCIUM SERPL-MCNC: 7.8 MG/DL (ref 8.3–10.1)
CALCIUM SERPL-MCNC: 8.1 MG/DL (ref 8.3–10.1)
CHLORIDE SERPL-SCNC: 91 MMOL/L (ref 96–108)
CHLORIDE SERPL-SCNC: 95 MMOL/L (ref 96–108)
CO2 SERPL-SCNC: 29 MMOL/L (ref 21–32)
CO2 SERPL-SCNC: 31 MMOL/L (ref 21–32)
CREAT SERPL-MCNC: 4.3 MG/DL (ref 0.6–1.3)
CREAT SERPL-MCNC: 5.19 MG/DL (ref 0.6–1.3)
GFR SERPL CREATININE-BSD FRML MDRD: 12 ML/MIN/1.73SQ M
GFR SERPL CREATININE-BSD FRML MDRD: 9 ML/MIN/1.73SQ M
GLUCOSE SERPL-MCNC: 117 MG/DL (ref 65–140)
GLUCOSE SERPL-MCNC: 121 MG/DL (ref 65–140)
GLUCOSE SERPL-MCNC: 122 MG/DL (ref 65–140)
GLUCOSE SERPL-MCNC: 124 MG/DL (ref 65–140)
GLUCOSE SERPL-MCNC: 126 MG/DL (ref 65–140)
GLUCOSE SERPL-MCNC: 128 MG/DL (ref 65–140)
GLUCOSE SERPL-MCNC: 133 MG/DL (ref 65–140)
GLUCOSE SERPL-MCNC: 169 MG/DL (ref 65–140)
GLUCOSE SERPL-MCNC: 201 MG/DL (ref 65–140)
PHOSPHATE SERPL-MCNC: 5.3 MG/DL (ref 2.3–4.1)
POTASSIUM SERPL-SCNC: 3.8 MMOL/L (ref 3.5–5.3)
POTASSIUM SERPL-SCNC: 4.4 MMOL/L (ref 3.5–5.3)
PROCALCITONIN SERPL-MCNC: 1.14 NG/ML
SODIUM SERPL-SCNC: 129 MMOL/L (ref 135–147)
SODIUM SERPL-SCNC: 133 MMOL/L (ref 135–147)

## 2022-08-24 PROCEDURE — 99232 SBSQ HOSP IP/OBS MODERATE 35: CPT | Performed by: INTERNAL MEDICINE

## 2022-08-24 PROCEDURE — 84100 ASSAY OF PHOSPHORUS: CPT | Performed by: PHYSICIAN ASSISTANT

## 2022-08-24 PROCEDURE — 84145 PROCALCITONIN (PCT): CPT | Performed by: NURSE PRACTITIONER

## 2022-08-24 PROCEDURE — 82948 REAGENT STRIP/BLOOD GLUCOSE: CPT

## 2022-08-24 PROCEDURE — 99232 SBSQ HOSP IP/OBS MODERATE 35: CPT | Performed by: ANESTHESIOLOGY

## 2022-08-24 PROCEDURE — 86037 ANCA TITER EACH ANTIBODY: CPT | Performed by: PHYSICIAN ASSISTANT

## 2022-08-24 PROCEDURE — 80048 BASIC METABOLIC PNL TOTAL CA: CPT | Performed by: NURSE PRACTITIONER

## 2022-08-24 PROCEDURE — 97110 THERAPEUTIC EXERCISES: CPT

## 2022-08-24 PROCEDURE — 80048 BASIC METABOLIC PNL TOTAL CA: CPT | Performed by: PHYSICIAN ASSISTANT

## 2022-08-24 PROCEDURE — 83520 IMMUNOASSAY QUANT NOS NONAB: CPT | Performed by: PHYSICIAN ASSISTANT

## 2022-08-24 PROCEDURE — 97163 PT EVAL HIGH COMPLEX 45 MIN: CPT

## 2022-08-24 RX ORDER — INSULIN LISPRO 100 [IU]/ML
1-6 INJECTION, SOLUTION INTRAVENOUS; SUBCUTANEOUS
Status: DISCONTINUED | OUTPATIENT
Start: 2022-08-24 | End: 2022-08-27 | Stop reason: HOSPADM

## 2022-08-24 RX ORDER — MAGNESIUM SULFATE HEPTAHYDRATE 40 MG/ML
2 INJECTION, SOLUTION INTRAVENOUS ONCE
Status: COMPLETED | OUTPATIENT
Start: 2022-08-24 | End: 2022-08-24

## 2022-08-24 RX ORDER — QUETIAPINE FUMARATE 25 MG/1
25 TABLET, FILM COATED ORAL
Status: DISCONTINUED | OUTPATIENT
Start: 2022-08-24 | End: 2022-08-27 | Stop reason: HOSPADM

## 2022-08-24 RX ORDER — POTASSIUM CHLORIDE 14.9 MG/ML
20 INJECTION INTRAVENOUS ONCE
Status: DISCONTINUED | OUTPATIENT
Start: 2022-08-24 | End: 2022-08-24

## 2022-08-24 RX ADMIN — HEPARIN SODIUM 5000 UNITS: 5000 INJECTION INTRAVENOUS; SUBCUTANEOUS at 13:27

## 2022-08-24 RX ADMIN — TAMSULOSIN HYDROCHLORIDE 0.4 MG: 0.4 CAPSULE ORAL at 15:52

## 2022-08-24 RX ADMIN — PRAVASTATIN SODIUM 80 MG: 80 TABLET ORAL at 15:52

## 2022-08-24 RX ADMIN — QUETIAPINE FUMARATE 25 MG: 25 TABLET ORAL at 21:22

## 2022-08-24 RX ADMIN — MAGNESIUM SULFATE HEPTAHYDRATE 2 G: 40 INJECTION, SOLUTION INTRAVENOUS at 07:29

## 2022-08-24 RX ADMIN — CALCIUM ACETATE 667 MG: 667 CAPSULE ORAL at 15:52

## 2022-08-24 RX ADMIN — CALCIUM ACETATE 667 MG: 667 CAPSULE ORAL at 11:24

## 2022-08-24 RX ADMIN — INSULIN ASPART 5 UNITS: 100 INJECTION, SUSPENSION SUBCUTANEOUS at 08:56

## 2022-08-24 RX ADMIN — INSULIN ASPART 5 UNITS: 100 INJECTION, SUSPENSION SUBCUTANEOUS at 16:15

## 2022-08-24 RX ADMIN — FINASTERIDE 5 MG: 5 TABLET, FILM COATED ORAL at 08:56

## 2022-08-24 RX ADMIN — Medication 6 MG: at 21:22

## 2022-08-24 RX ADMIN — ACETAMINOPHEN 650 MG: 325 TABLET, FILM COATED ORAL at 20:45

## 2022-08-24 RX ADMIN — HEPARIN SODIUM 5000 UNITS: 5000 INJECTION INTRAVENOUS; SUBCUTANEOUS at 21:22

## 2022-08-24 RX ADMIN — AMLODIPINE BESYLATE 10 MG: 10 TABLET ORAL at 08:56

## 2022-08-24 RX ADMIN — CALCIUM ACETATE 667 MG: 667 CAPSULE ORAL at 08:56

## 2022-08-24 RX ADMIN — LOSARTAN POTASSIUM 25 MG: 25 TABLET, FILM COATED ORAL at 08:56

## 2022-08-24 RX ADMIN — INSULIN LISPRO 1 UNITS: 100 INJECTION, SOLUTION INTRAVENOUS; SUBCUTANEOUS at 16:16

## 2022-08-24 RX ADMIN — POTASSIUM CHLORIDE 20 MEQ: 14.9 INJECTION, SOLUTION INTRAVENOUS at 07:29

## 2022-08-24 RX ADMIN — HEPARIN SODIUM 5000 UNITS: 5000 INJECTION INTRAVENOUS; SUBCUTANEOUS at 05:50

## 2022-08-24 NOTE — PROGRESS NOTES
Pt has not voided  Was told in report that pt is primarily anuric  Did bladder scan to see if pt  Had enough urine in bladder to get urine sample for urine culture  Bladder scan reported 0 after several attempts

## 2022-08-24 NOTE — ASSESSMENT & PLAN NOTE
Rapid response called on 8/23 around 7:30 am after finding patient altered with BG < 20    · Initially lethargic and minimally responsive to painful stimuli  · Given infusion and a push of dextrose  · After about 10 min of dextrose administration, pt responded to some verbal commands  · Noted spontaneous limb movements  No seizure like activity or lateralizing focal deficits    · Hemodynamically stable  · Monitor BMP, Mg, Phos, BG  · Neuro check

## 2022-08-24 NOTE — ASSESSMENT & PLAN NOTE
Lab Results   Component Value Date    EGFR 12 08/24/2022    EGFR 16 08/23/2022    EGFR 6 08/23/2022    CREATININE 4 30 (H) 08/24/2022    CREATININE 3 32 (H) 08/23/2022    CREATININE 6 98 (H) 08/23/2022     On dialysis Tuesday, Thursday, Saturday  · Last session 8/20/22  · Nephrology consult

## 2022-08-24 NOTE — PROGRESS NOTES
[facility] 43  Progress Note - Jasmine Polanco 1945, 76 y o  male MRN: [id number]  Unit/Bed#: ICU 05 Encounter: [id number]  Primary Care Provider: Court Albert MD   Date and time admitted to hospital: 8/22/2022 11:01 PM    * Acute metabolic encephalopathy due to hypoglycemia  Assessment & Plan  Rapid response called on 8/23 around 7:30 am after finding patient altered with BG < 20    · Initially lethargic and minimally responsive to painful stimuli  · Given infusion and a push of dextrose  · After about 10 min of dextrose administration, pt responded to some verbal commands  · Noted spontaneous limb movements  No seizure like activity or lateralizing focal deficits  · Hemodynamically stable  · Monitor BMP, Mg, Phos, BG  · Neuro check    Hyperkalemia  Assessment & Plan  Patient with history of ESRD on HD presents with weakness   · Potassium 7 0  · CT head negative  · EKG NSR, alert and oriented x3 on presentation  · Given 10 units humulin and sodium bicarb in ED  · Consult nephrology  ? Given lasix 120mg, metolazone 10mg, kayexalate 30g per nephrology  ? Repeated sodium bicarb and humulin per nephrology  · K improved within normal ranges   Continue to follow BMP    ESRD on dialysis [facility])  Assessment & Plan  Lab Results   Component Value Date    EGFR 12 08/24/2022    EGFR 16 08/23/2022    EGFR 6 08/23/2022    CREATININE 4 30 (H) 08/24/2022    CREATININE 3 32 (H) 08/23/2022    CREATININE 6 98 (H) 08/23/2022     On dialysis Tuesday, Thursday, Saturday  · Last session 8/20/22  · Nephrology consult     Type 2 diabetes mellitus with chronic kidney disease on chronic dialysis, with long-term current use of insulin [facility])  Assessment & Plan  Lab Results   Component Value Date    HGBA1C 5 1 05/08/2022       Recent Labs     08/23/22  2126 08/23/22  2359 08/24/22  0205 08/24/22  0521   POCGLU 175* 133 124 117       Blood Sugar Average: Last 72 hrs:  (P) 132     Patient on 70/30 insulin 5 units BID at home  · Recent HgA1c 5 1  · Continue 70/30 insulin BID  · Start SSI and accuchecks ac, hs    Hyperlipidemia  Assessment & Plan  Continue statin    Benign essential hypertension  Assessment & Plan  Continue amlodipine and losartan    ----------------------------------------------------------------------------------------  HPI/24hr events: Patient was found agitated and given 25 mg of Seroquel  But he was able to be redirected and answers questions  Patient appropriate for transfer out of the ICU today?: No  Disposition: Continue Stepdown Level 1 level of care   Code Status: Level 1 - Full Code  ---------------------------------------------------------------------------------------  SUBJECTIVE  "I'm feeling ok "    Review of Systems   Unable to perform ROS: Mental status change     Review of systems was reviewed and negative unless stated above in HPI/24-hour events   ---------------------------------------------------------------------------------------  OBJECTIVE    Vitals   Vitals:    22 0500 22 0600 22 0700 22 0730   BP: 162/73 147/63 156/68 153/69   BP Location:       Pulse: 81 74 76 75   Resp: 15 15 16 16   Temp:   (!) 97 °F (36 1 °C) (!) 97 4 °F (36 3 °C)   TempSrc:   Temporal Temporal   SpO2: 98% 99% 98% 99%   Weight:       Height:         Temp (24hrs), Av 5 °F (36 4 °C), Min:97 °F (36 1 °C), Max:97 9 °F (36 6 °C)  Current: Temperature: (!) 97 4 °F (36 3 °C)          Respiratory:  SpO2: SpO2: 99 %       Invasive/non-invasive ventilation settings   Respiratory  Report   Lab Data (Last 4 hours)    None         O2/Vent Data (Last 4 hours)    None                Physical Exam  Vitals and nursing note reviewed  Constitutional:       Appearance: He is well-developed  He is not ill-appearing  HENT:      Head: Normocephalic and atraumatic  Eyes:      Conjunctiva/sclera: Conjunctivae normal    Cardiovascular:      Rate and Rhythm: Normal rate and regular rhythm        Pulses: Normal pulses  Heart sounds: Normal heart sounds  No murmur heard  Pulmonary:      Effort: Pulmonary effort is normal  No respiratory distress  Breath sounds: Normal breath sounds  Abdominal:      General: Bowel sounds are normal       Palpations: Abdomen is soft  Tenderness: There is no abdominal tenderness  Musculoskeletal:      Cervical back: Neck supple  Skin:     General: Skin is warm and dry  Neurological:      Mental Status: He is alert  He is disoriented  Laboratory and Diagnostics:  Results from last 7 days   Lab Units 08/23/22  0814 08/22/22  2320   WBC Thousand/uL 8 75 7 78   HEMOGLOBIN g/dL 9 6* 9 9*   HEMATOCRIT % 30 7* 31 9*   PLATELETS Thousands/uL 358 316   NEUTROS PCT % 78* 79*   MONOS PCT % 13* 10     Results from last 7 days   Lab Units 08/24/22  0458 08/23/22  1547 08/23/22  0911 08/23/22  0458 08/22/22  2320   SODIUM mmol/L 133* 132* 137 137 134*   POTASSIUM mmol/L 3 8 3 7 4 5 5 1 7 0*   CHLORIDE mmol/L 95* 94* 99 99 99   CO2 mmol/L 31 29 28 29 33*   ANION GAP mmol/L 7 9 10 9 2*   BUN mg/dL 20 16 46* 44* 42*   CREATININE mg/dL 4 30* 3 32* 6 98* 6 69* 6 58*   CALCIUM mg/dL 8 1* 8 2* 8 4 8 5 8 4   GLUCOSE RANDOM mg/dL 126 149* 46* 47* 182*   ALT U/L  --   --  8*  --  9*   AST U/L  --   --  13  --  8   ALK PHOS U/L  --   --  73  --  82   ALBUMIN g/dL  --   --  2 6*  --  2 8*   TOTAL BILIRUBIN mg/dL  --   --  0 36  --  0 42     Results from last 7 days   Lab Units 08/24/22  0458 08/23/22  1547 08/23/22  0911   MAGNESIUM mg/dL  --  1 8 2 4   PHOSPHORUS mg/dL 5 3* 1 6* 1 0*                   ABG:    VBG:          Micro  Results from last 7 days   Lab Units 08/23/22  1545   BLOOD CULTURE  Received in Microbiology Lab  Culture in Progress  Received in Microbiology Lab  Culture in Progress  EKG: NSR  Imaging: I have personally reviewed pertinent reports        Intake and Output  I/O       08/22 0701 08/23 0700 08/23 0701 08/24 0700 08/24 0701 08/25 0700 I V  (mL/kg)  1238 8 (14 7)     IV Piggyback  250     Total Intake(mL/kg)  1488 8 (17 6)     Other  3500     Total Output  3500     Net  -2011  3            Unmeasured Urine Occurrence 1 x            Height and Weights   Height: 5' 8" (172 7 cm)  IBW (Ideal Body Weight): 68 4 kg  Body mass index is 28 28 kg/m²  Weight (last 2 days)     Date/Time Weight    08/23/22 0201 84 4 (186)    08/22/22 2305 84 8 (186 95)            Nutrition       Diet Orders   (From admission, onward)             Start     Ordered    08/23/22 1046  Room Service  Once        Question:  Type of Service  Answer:  Room Service - Appropriate with Assistance    08/23/22 1045    08/23/22 0227  Diet Renal; Potassium 2 GM; Yes; Fluid Restriction 2000 ML; No  Diet effective now        References:    Nutrtion Support Algorithm Enteral vs  Parenteral   Question Answer Comment   Diet Type Renal    Renal Potassium 2 GM    Should patient have a fluid restriction? Yes    Fluid Restriction Fluid Restriction 2000 ML    Should patient have a protein modifier? No    RD to adjust diet per protocol?  Yes        08/23/22 0226                  Active Medications  Scheduled Meds:  Current Facility-Administered Medications   Medication Dose Route Frequency Provider Last Rate    acetaminophen  650 mg Oral Q6H PRN Connor Boyd PA-C      amLODIPine  10 mg Oral Daily Danny Fuller PA-C      calcium acetate  667 mg Oral TID With Meals Connor Boyd PA-C      dextrose  25 mL Intravenous Once Connor Boyd PA-C      dextrose  25 mL Intravenous Once Ginger Potter PA-C      finasteride  5 mg Oral Daily Connor Boyd PA-C      heparin (porcine)  5,000 Units Subcutaneous Q8H [facility] Aileen Casas PA-C      hydrALAZINE  10 mg Intravenous Q6H PRN Ginger Potter PA-C      insulin aspart protamine-insulin aspart  5 Units Subcutaneous BID AC Aileen Casas PA-C      insulin lispro  1-6 Units Subcutaneous 4x Daily (AC & HS) [address], [place]      labetalol 10 mg Intravenous Q6H PRN Nikia Thomas PA-C      losartan  25 mg Oral Daily Matias Juarez      melatonin  6 mg Oral HS Jose Guadalupe Stevens, [address]      pravastatin  80 mg Oral Daily With [email or web address]      tamsulosin  0 4 mg Oral Daily With Velma Javier PA-C       Continuous Infusions:     PRN Meds:   acetaminophen, 650 mg, Q6H PRN  hydrALAZINE, 10 mg, Q6H PRN  labetalol, 10 mg, Q6H PRN        Invasive Devices Review  Invasive Devices  Report    Peripheral Intravenous Line  Duration           Peripheral IV 08/23/22 Right Antecubital 1 day    Peripheral IV 08/23/22 Left;Proximal;Ventral (anterior) Forearm <1 day          Hemodialysis Catheter  Duration           HD Permanent Double Catheter 168 days                Rationale for remaining devices: NA  ---------------------------------------------------------------------------------------  Advance Directive and Living Will:      Power of :    POLST:    ---------------------------------------------------------------------------------------  Care Time Delivered:   No Critical Care time spent       Blu Carpio MD      Portions of the record may have been created with voice recognition software  Occasional wrong word or "sound a like" substitutions may have occurred due to the inherent limitations of voice recognition software    Read the chart carefully and recognize, using context, where substitutions have occurred

## 2022-08-24 NOTE — QUICK NOTE
Called and given update to patient's wife, Marcial Rodriguez  Notified her that patient is transferred back to the medical unit after stabilizing the BG  Per wife, patient had been confused for the last few days and walking outside the house  I let her know that we are working up for possible infection  I asked if she could bring his hearing aids here, but wife said she won't be able to come visit him in the hospital  All questions were answered to satisfaction

## 2022-08-24 NOTE — PROGRESS NOTES
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 in the past, initially presents for weakness and was noted to be hyperkalemia  Had dialysis 8/23 in a m  Bennett Baez Also had lethargy, encephalopathy, confusion, hypoglycemia and was transferred after rapid response to ICU on morning of 8/23  Nephrology is on board for ESRD  1) ESRD    - outpt unit [facility] TTS  - EDW 74 5 kg  - Access - TDC R IJ  - had HD on 8/23    Plan  -follow-up ANCA titer  -would avoid potassium supplementation-patient has already received potassium supplement this morning  -monitor potassium closely with ARB starting  -next dialysis tomorrow 8/25  -with check BMP this afternoon  -reviewed case with primary team attending this morning  -dialysis from the renal standpoint will be tomorrow 8/25    2) encephalopathy    -had rapid response a m  with hypoglycemia less than 20 at that time and was transferred to ICU  -CT head initially unrevealing  -follow-up final blood cultures  -follow-up ANCA panel    3) electrolytes-stable potassium 3 8  Sodium borderline low  Monitoring for now  4) acid/base-bicarbonate 31, stable for now    5) hypertension-is on amlodipine as outpatient    -amlodipine increased this admission  -losartan was added this admission per primary team    6) 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 as outpatient    7) 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   Immune suppression held since April 2022      -follow-up vanco levels    SUBJECTIVE / 24H INTERVAL HISTORY:    Blood pressure is 671-277 systolic  Still remains confused  Is awake  Is in restraints  Dialysis completed yesterday with 3 5 L ultrafiltration      OBJECTIVE:  Current Weight: Weight - Scale: 84 4 kg (186 lb)  Vitals:    08/24/22 0500 08/24/22 0600 08/24/22 0700 08/24/22 0730   BP: 162/73 147/63 156/68 153/69   BP Location:       Pulse: 81 74 76 75   Resp: 15 15 16 16   Temp:   (!) 97 °F (36 1 °C) (!) 97 4 °F (36 3 °C)   TempSrc:   Temporal Temporal   SpO2: 98% 99% 98% 99%   Weight:       Height:           Intake/Output Summary (Last 24 hours) at 8/24/2022 1010  Last data filed at 8/23/2022 2301  Gross per 24 hour   Intake 1288 75 ml   Output 3500 ml   Net -2211 25 ml     General: NAD  Skin: no rash  Eyes: anicteric sclera  ENT:  Dry mucous membrane  Neck: supple  Chest: CTA b/l, no ronchii, no wheeze, no rubs, no rales  CVS: s1s2, no murmur, no gallop, no rub  Abdomen: soft, nontender, nl sounds  Extremities:  Trace to 1+ pitting edema bilaterally lower extremities  : no ruvalcaba  Neuro: AAOX2  Psych:  Difficult to assess as the patient is confused    Medications:    Current Facility-Administered Medications:     acetaminophen (TYLENOL) tablet 650 mg, 650 mg, Oral, Q6H PRN, Aileen Casas PA-C    amLODIPine (NORVASC) tablet 10 mg, 10 mg, Oral, Daily, Damon Schilder, PA-C, 10 mg at 08/24/22 0856    calcium acetate (PHOSLO) capsule 667 mg, 667 mg, Oral, TID With Meals, Aileen Casas PA-C, 667 mg at 08/24/22 0856    dextrose 50 % IV solution 25 mL, 25 mL, Intravenous, Once, Aileen Jesseeo, PA-C    dextrose 50 % IV solution 25 mL, 25 mL, Intravenous, Once, Danny Fuller PA-C    finasteride (PROSCAR) tablet 5 mg, 5 mg, Oral, Daily, Aileen Vecdevikao, PA-C, 5 mg at 08/24/22 0856    heparin (porcine) subcutaneous injection 5,000 Units, 5,000 Units, Subcutaneous, Q8H [facility], Aileen Jesseeo, PA-C, 5,000 Units at 08/24/22 0550    hydrALAZINE (APRESOLINE) injection 10 mg, 10 mg, Intravenous, Q6H PRN, Damon Schilder, PA-C, 10 mg at 08/23/22 2117    insulin aspart protamine-insulin aspart (NovoLOG 70/30) 100 units/mL subcutaneous injection 5 Units, 5 Units, Subcutaneous, BID AC, Aileen Casas PA-C, 5 Units at 08/24/22 0856    insulin lispro (HumaLOG) 100 units/mL subcutaneous injection 1-6 Units, 1-6 Units, Subcutaneous, 4x Daily (AC & HS) **AND** Fingerstick Glucose (POCT), , , 4x Daily AC and at bedtime, [address] MD Robert    labetalol (NORMODYNE) injection 10 mg, 10 mg, Intravenous, Q6H PRN, ROSEANN Vazquez-C, 10 mg at 08/23/22 1553    losartan (COZAAR) tablet 25 mg, 25 mg, Oral, Daily, Car Meyer PA-C, 25 mg at 08/24/22 0856    magnesium sulfate 2 g/50 mL IVPB (premix) 2 g, 2 g, Intravenous, Once, [address] MD Robert, 2 g at 08/24/22 0729    melatonin tablet 6 mg, 6 mg, Oral, HS, Kelli Stevens, SEYMOURNP, 6 mg at 08/23/22 2339    potassium chloride 20 mEq IVPB (premix), 20 mEq, Intravenous, Once, [address] MD Robert, Last Rate: 50 mL/hr at 08/24/22 0729, 20 mEq at 08/24/22 0729    pravastatin (PRAVACHOL) tablet 80 mg, 80 mg, Oral, Daily With Denys Vazquez PA-C, 80 mg at 08/23/22 1553    tamsulosin (FLOMAX) capsule 0 4 mg, 0 4 mg, Oral, Daily With Denys Vazquez PA-C, 0 4 mg at 08/23/22 1553    Laboratory Results:  Results from last 7 days   Lab Units 08/24/22  0458 08/23/22  1547 08/23/22  0911 08/23/22  0814 08/23/22  0458 08/22/22  2320   WBC Thousand/uL  --   --   --  8 75  --  7 78   HEMOGLOBIN g/dL  --   --   --  9 6*  --  9 9*   HEMATOCRIT %  --   --   --  30 7*  --  31 9*   PLATELETS Thousands/uL  --   --   --  358  --  316   POTASSIUM mmol/L 3 8 3 7 4 5  --  5 1 7 0*   CHLORIDE mmol/L 95* 94* 99  --  99 99   CO2 mmol/L 31 29 28  --  29 33*   BUN mg/dL 20 16 46*  --  44* 42*   CREATININE mg/dL 4 30* 3 32* 6 98*  --  6 69* 6 58*   CALCIUM mg/dL 8 1* 8 2* 8 4  --  8 5 8 4   MAGNESIUM mg/dL  --  1 8 2 4  --   --   --    PHOSPHORUS mg/dL 5 3* 1 6* 1 0*  --   --   --

## 2022-08-24 NOTE — ASSESSMENT & PLAN NOTE
Lab Results   Component Value Date    HGBA1C 5 1 05/08/2022       Recent Labs     08/23/22  2126 08/23/22  2359 08/24/22  0205 08/24/22  0521   POCGLU 175* 133 124 117       Blood Sugar Average: Last 72 hrs:  (P) 132     Patient on 70/30 insulin 5 units BID at home  · Recent HgA1c 5 1  · Continue 70/30 insulin BID  · Start SSI and accuchecks ac, hs

## 2022-08-24 NOTE — PLAN OF CARE
Problem: Potential for Falls  Goal: Patient will remain free of falls  Description: INTERVENTIONS:  - Educate patient/family on patient safety including physical limitations  - Instruct patient to call for assistance with activity   - Consult OT/PT to assist with strengthening/mobility   - Keep Call bell within reach  - Keep bed low and locked with side rails adjusted as appropriate  - Keep care items and personal belongings within reach  - Initiate and maintain comfort rounds  - Make Fall Risk Sign visible to staff  - Offer Toileting every 2 Hours, in advance of need  - Initiate/Maintain bed/chair alarm  - Obtain necessary fall risk management equipment: bed/chair alarm  - Apply yellow socks and bracelet for high fall risk patients  - Consider moving patient to room near nurses station  Outcome: Progressing     Problem: SAFETY ADULT  Goal: Patient will remain free of falls  Description: INTERVENTIONS:  - Educate patient/family on patient safety including physical limitations  - Instruct patient to call for assistance with activity   - Consult OT/PT to assist with strengthening/mobility   - Keep Call bell within reach  - Keep bed low and locked with side rails adjusted as appropriate  - Keep care items and personal belongings within reach  - Initiate and maintain comfort rounds  - Make Fall Risk Sign visible to staff  - Offer Toileting every 2 Hours, in advance of need  - Initiate/Maintain   - Obtain necessary fall risk management equipment: bed/chair alarm  - Apply yellow socks and bracelet for high fall risk patients  - Consider moving patient to room near nurses station  Outcome: Progressing  Goal: Maintain or return to baseline ADL function  Description: INTERVENTIONS:  -  Assess patient's ability to carry out ADLs; assess patient's baseline for ADL function and identify physical deficits which impact ability to perform ADLs (bathing, care of mouth/teeth, toileting, grooming, dressing, etc )  - Assess/evaluate cause of self-care deficits   - Assess range of motion  - Assess patient's mobility; develop plan if impaired  - Assess patient's need for assistive devices and provide as appropriate  - Encourage maximum independence but intervene and supervise when necessary  - Involve family in performance of ADLs  - Assess for home care needs following discharge   - Consider OT consult to assist with ADL evaluation and planning for discharge  - Provide patient education as appropriate  Outcome: Progressing  Goal: Maintains/Returns to pre admission functional level  Description: INTERVENTIONS:  - Perform BMAT or MOVE assessment daily    - Set and communicate daily mobility goal to care team and patient/family/caregiver  - Collaborate with rehabilitation services on mobility goals if consulted  - Perform Range of Motion 3 times a day  - Reposition patient every 2 hours    - Dangle patient 3  times a day  - Stand patient 3 times a day  - Ambulate patient 3 times a day  - Out of bed to chair 3 times a day   - Out of bed for meals 3 times a day  - Out of bed for toileting  - Record patient progress and toleration of activity level   Outcome: Progressing     Problem: METABOLIC, FLUID AND ELECTROLYTES - ADULT  Goal: Electrolytes maintained within normal limits  Description: INTERVENTIONS:  - Monitor labs and assess patient for signs and symptoms of electrolyte imbalances  - Administer electrolyte replacement as ordered  - Monitor response to electrolyte replacements, including repeat lab results as appropriate  - Instruct patient on fluid and nutrition as appropriate  Outcome: Progressing  Goal: Fluid balance maintained  Description: INTERVENTIONS:  - Monitor labs   - Monitor I/O and WT  - Instruct patient on fluid and nutrition as appropriate  - Assess for signs & symptoms of volume excess or deficit  Outcome: Progressing     Problem: Nutrition/Hydration-ADULT  Goal: Nutrient/Hydration intake appropriate for improving, restoring or maintaining nutritional needs  Description: Monitor and assess patient's nutrition/hydration status for malnutrition  Collaborate with interdisciplinary team and initiate plan and interventions as ordered  Monitor patient's weight and dietary intake as ordered or per policy  Utilize nutrition screening tool and intervene as necessary  Determine patient's food preferences and provide high-protein, high-caloric foods as appropriate       INTERVENTIONS:  - Monitor oral intake, urinary output, labs, and treatment plans  - Assess nutrition and hydration status and recommend course of action  - Evaluate amount of meals eaten  - Assist patient with eating if necessary   - Allow adequate time for meals  - Recommend/ encourage appropriate diets, oral nutritional supplements, and vitamin/mineral supplements  - Order, calculate, and assess calorie counts as needed  - Recommend, monitor, and adjust tube feedings and TPN/PPN based on assessed needs  - Assess need for intravenous fluids  - Provide specific nutrition/hydration education as appropriate  - Include patient/family/caregiver in decisions related to nutrition  Outcome: Progressing     Problem: Prexisting or High Potential for Compromised Skin Integrity  Goal: Skin integrity is maintained or improved  Description: INTERVENTIONS:  - Identify patients at risk for skin breakdown  - Assess and monitor skin integrity  - Assess and monitor nutrition and hydration status  - Monitor labs   - Assess for incontinence   - Turn and reposition patient  - Assist with mobility/ambulation  - Relieve pressure over bony prominences  - Avoid friction and shearing  - Provide appropriate hygiene as needed including keeping skin clean and dry  - Evaluate need for skin moisturizer/barrier cream  - Collaborate with interdisciplinary team   - Patient/family teaching  - Consider wound care consult   Outcome: Progressing     Problem: MOBILITY - ADULT  Goal: Maintain or return to baseline ADL function  Description: INTERVENTIONS:  -  Assess patient's ability to carry out ADLs; assess patient's baseline for ADL function and identify physical deficits which impact ability to perform ADLs (bathing, care of mouth/teeth, toileting, grooming, dressing, etc )  - Assess/evaluate cause of self-care deficits   - Assess range of motion  - Assess patient's mobility; develop plan if impaired  - Assess patient's need for assistive devices and provide as appropriate  - Encourage maximum independence but intervene and supervise when necessary  - Involve family in performance of ADLs  - Assess for home care needs following discharge   - Consider OT consult to assist with ADL evaluation and planning for discharge  - Provide patient education as appropriate  Outcome: Progressing  Goal: Maintains/Returns to pre admission functional level  Description: INTERVENTIONS:  - Perform BMAT or MOVE assessment daily    - Set and communicate daily mobility goal to care team and patient/family/caregiver  - Collaborate with rehabilitation services on mobility goals if consulted  - Perform Range of Motion 3 times a day  - Reposition patient every 2 hours    - Dangle patient 3 times a day  - Stand patient 3 times a day  - Ambulate patient 3 times a day  - Out of bed to chair 3 times a day   - Out of bed for meals 3  Problem: SAFETY,RESTRAINT: NV/NON-SELF DESTRUCTIVE BEHAVIOR  Goal: Remains free of harm/injury (restraint for non violent/non self-detsructive behavior)  Description: INTERVENTIONS:  - Instruct patient/family regarding restraint use   - Assess and monitor physiologic and psychological status   - Provide interventions and comfort measures to meet assessed patient needs   - Identify and implement measures to help patient regain control  - Assess readiness for release of restraint   Outcome: Progressing  Goal: Returns to optimal restraint-free functioning  Description: INTERVENTIONS:  - Assess the patient's behavior and symptoms that indicate continued need for restraint  - Identify and implement measures to help patient regain control  - Assess readiness for release of restraint   Outcome: Progressing    times a day  - Out of bed for toileting  - Record patient progress and toleration of activity level   Outcome: Progressing

## 2022-08-24 NOTE — ASSESSMENT & PLAN NOTE
Patient with history of ESRD on HD presents with weakness   · Potassium 7 0  · CT head negative  · EKG NSR, alert and oriented x3 on presentation  · Given 10 units humulin and sodium bicarb in ED  · Consult nephrology  ? Given lasix 120mg, metolazone 10mg, kayexalate 30g per nephrology  ? Repeated sodium bicarb and humulin per nephrology  · K improved within normal ranges   Continue to follow BMP

## 2022-08-25 ENCOUNTER — APPOINTMENT (INPATIENT)
Dept: DIALYSIS | Facility: HOSPITAL | Age: 77
DRG: 640 | End: 2022-08-25
Payer: MEDICARE

## 2022-08-25 LAB
ANION GAP SERPL CALCULATED.3IONS-SCNC: 10 MMOL/L (ref 4–13)
BUN SERPL-MCNC: 36 MG/DL (ref 5–25)
CALCIUM SERPL-MCNC: 7.4 MG/DL (ref 8.3–10.1)
CHLORIDE SERPL-SCNC: 90 MMOL/L (ref 96–108)
CO2 SERPL-SCNC: 29 MMOL/L (ref 21–32)
CREAT SERPL-MCNC: 6.03 MG/DL (ref 0.6–1.3)
ERYTHROCYTE [DISTWIDTH] IN BLOOD BY AUTOMATED COUNT: 15.2 % (ref 11.6–15.1)
GFR SERPL CREATININE-BSD FRML MDRD: 8 ML/MIN/1.73SQ M
GLUCOSE SERPL-MCNC: 107 MG/DL (ref 65–140)
GLUCOSE SERPL-MCNC: 113 MG/DL (ref 65–140)
GLUCOSE SERPL-MCNC: 121 MG/DL (ref 65–140)
GLUCOSE SERPL-MCNC: 232 MG/DL (ref 65–140)
GLUCOSE SERPL-MCNC: 91 MG/DL (ref 65–140)
HCT VFR BLD AUTO: 27.9 % (ref 36.5–49.3)
HGB BLD-MCNC: 9 G/DL (ref 12–17)
MCH RBC QN AUTO: 28.1 PG (ref 26.8–34.3)
MCHC RBC AUTO-ENTMCNC: 32.3 G/DL (ref 31.4–37.4)
MCV RBC AUTO: 87 FL (ref 82–98)
MRSA NOSE QL CULT: NORMAL
PLATELET # BLD AUTO: 277 THOUSANDS/UL (ref 149–390)
PMV BLD AUTO: 8.3 FL (ref 8.9–12.7)
POTASSIUM SERPL-SCNC: 4.2 MMOL/L (ref 3.5–5.3)
RBC # BLD AUTO: 3.2 MILLION/UL (ref 3.88–5.62)
SODIUM SERPL-SCNC: 129 MMOL/L (ref 135–147)
WBC # BLD AUTO: 5.54 THOUSAND/UL (ref 4.31–10.16)

## 2022-08-25 PROCEDURE — 85027 COMPLETE CBC AUTOMATED: CPT

## 2022-08-25 PROCEDURE — 5A1D70Z PERFORMANCE OF URINARY FILTRATION, INTERMITTENT, LESS THAN 6 HOURS PER DAY: ICD-10-PCS | Performed by: INTERNAL MEDICINE

## 2022-08-25 PROCEDURE — 80048 BASIC METABOLIC PNL TOTAL CA: CPT

## 2022-08-25 PROCEDURE — 99232 SBSQ HOSP IP/OBS MODERATE 35: CPT | Performed by: INTERNAL MEDICINE

## 2022-08-25 PROCEDURE — 97167 OT EVAL HIGH COMPLEX 60 MIN: CPT

## 2022-08-25 PROCEDURE — 82948 REAGENT STRIP/BLOOD GLUCOSE: CPT

## 2022-08-25 RX ORDER — ECHINACEA PURPUREA EXTRACT 125 MG
1 TABLET ORAL
Status: DISCONTINUED | OUTPATIENT
Start: 2022-08-25 | End: 2022-08-27 | Stop reason: HOSPADM

## 2022-08-25 RX ADMIN — QUETIAPINE FUMARATE 25 MG: 25 TABLET ORAL at 22:18

## 2022-08-25 RX ADMIN — HEPARIN SODIUM 5000 UNITS: 5000 INJECTION INTRAVENOUS; SUBCUTANEOUS at 14:57

## 2022-08-25 RX ADMIN — TAMSULOSIN HYDROCHLORIDE 0.4 MG: 0.4 CAPSULE ORAL at 15:43

## 2022-08-25 RX ADMIN — CALCIUM ACETATE 667 MG: 667 CAPSULE ORAL at 08:36

## 2022-08-25 RX ADMIN — INSULIN LISPRO 3 UNITS: 100 INJECTION, SOLUTION INTRAVENOUS; SUBCUTANEOUS at 15:42

## 2022-08-25 RX ADMIN — FINASTERIDE 5 MG: 5 TABLET, FILM COATED ORAL at 08:36

## 2022-08-25 RX ADMIN — LOSARTAN POTASSIUM 25 MG: 25 TABLET, FILM COATED ORAL at 08:36

## 2022-08-25 RX ADMIN — PRAVASTATIN SODIUM 80 MG: 80 TABLET ORAL at 15:43

## 2022-08-25 RX ADMIN — AMLODIPINE BESYLATE 10 MG: 10 TABLET ORAL at 08:36

## 2022-08-25 RX ADMIN — HEPARIN SODIUM 5000 UNITS: 5000 INJECTION INTRAVENOUS; SUBCUTANEOUS at 22:18

## 2022-08-25 RX ADMIN — HEPARIN SODIUM 5000 UNITS: 5000 INJECTION INTRAVENOUS; SUBCUTANEOUS at 05:43

## 2022-08-25 RX ADMIN — INSULIN ASPART 5 UNITS: 100 INJECTION, SUSPENSION SUBCUTANEOUS at 15:42

## 2022-08-25 RX ADMIN — CALCIUM ACETATE 667 MG: 667 CAPSULE ORAL at 15:43

## 2022-08-25 RX ADMIN — INSULIN ASPART 5 UNITS: 100 INJECTION, SUSPENSION SUBCUTANEOUS at 10:19

## 2022-08-25 NOTE — PROGRESS NOTES
[facility]   Bella Dates 76 y o  male MRN: [id number]  Unit/Bed#: [address] [place] [address] Encounter: [id number]  Reason for Consult: ESRD    ASSESSMENT and PLAN:    64-year-old male ESRD, hypertension, diabetes, ANCA vasculitis did not tolerate Cytoxan in the past, initially presents for weakness and was noted to be hyperkalemia  Had dialysis 8/23 in a m  Tang Erazo Also had lethargy, encephalopathy, confusion, hypoglycemia and was transferred after rapid response to ICU on morning of 8/23  Nephrology is on board for ESRD      1) ESRD     - outpt unit [address] [facility] T[facility]  - EDW 74 5 kg  - Access - TDC R IJ  - had HD on 8/23  -8/25-dialysis being planned for today     Plan  -follow-up ANCA titer  -monitor potassium closely with ARB starting  -next dialysis today 8/25  -continue holding clonidine  -low-potassium diet  -will need to attempt standing weight     2) encephalopathy     -had rapid response a m  with hypoglycemia less than 20 at that time and was transferred to ICU  -CT head initially unrevealing  -follow-up final blood cultures  -follow-up ANCA panel  -etiology remains unclear but appears to have improved  -to note patient was started on clonidine on 08/15  Amlodipine was decreased at this time due to edema  It is unclear if this was the cause of encephalopathy in appears less likely as this was the cause     3) electrolytes - stable electrolytes  Sodium borderline low monitor for now with ultrafiltration    4) acid/base-bicarbonate stable     5) hypertension-is on amlodipine as outpatient and was on losartan 50 mg as outpatient     -amlodipine increased this admission  -losartan was restarted at 25 mg daily  -continue holding clonidine     6) 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 as outpatient     7) 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   Immune suppression held since April 2022       -follow-up vanco levels    8) anemia-continue to trend    Avoid ISAMAR for now    SUBJECTIVE / 24H INTERVAL HISTORY:    Blood pressure is 030-413 systolic  Afebrile  Patient is more awake today  Is oriented today      OBJECTIVE:  Current Weight: Weight - Scale: 84 4 kg (186 lb)  Vitals:    08/24/22 2000 08/24/22 2200 08/24/22 2314 08/25/22 0816   BP: 152/67 139/65 159/70 164/76   BP Location:   Left arm Left arm   Pulse: 78 70 77 76   Resp: (!) 28 17 18 20   Temp:   99 °F (37 2 °C) 98 2 °F (36 8 °C)   TempSrc:   Oral Oral   SpO2: 97% 97%  93%   Weight:       Height:         No intake or output data in the 24 hours ending 08/25/22 0923  General: NAD  Skin: no rash  Eyes: anicteric sclera  ENT: moist mucous membrane  Neck: supple  Chest: CTA b/l, no ronchii, no wheeze, no rubs, no rales  CVS: s1s2, no murmur, no gallop, no rub  Abdomen: soft, nontender, nl sounds  Extremities:  1+ edema LE b/l  : no ruvalcaba  Neuro: AAOX3  Psych: normal affect  -tunneled catheter site without drainage    Medications:    Current Facility-Administered Medications:     acetaminophen (TYLENOL) tablet 650 mg, 650 mg, Oral, Q6H PRN, [address], MD, 650 mg at 08/24/22 2045    amLODIPine (NORVASC) tablet 10 mg, 10 mg, Oral, Daily, Lupis Madrigal MD, 10 mg at 08/25/22 0836    calcium acetate (PHOSLO) capsule 667 mg, 667 mg, Oral, TID With Meals, [address] MD, 667 mg at 08/25/22 0836    finasteride (PROSCAR) tablet 5 mg, 5 mg, Oral, Daily, Lupis Madrigal MD, 5 mg at 08/25/22 0836    heparin (porcine) subcutaneous injection 5,000 Units, 5,000 Units, Subcutaneous, Q8H [facility], [address] MD, 5,000 Units at 08/25/22 0543    insulin aspart protamine-insulin aspart (NovoLOG 70/30) 100 units/mL subcutaneous injection 5 Units, 5 Units, Subcutaneous, BID AC, Lupis Madrigal, MD, 5 Units at 08/24/22 1615    insulin lispro (HumaLOG) 100 units/mL subcutaneous injection 1-6 Units, 1-6 Units, Subcutaneous, 4x Daily (AC & HS), 1 Units at 08/24/22 1616 **AND** Fingerstick Glucose (POCT), , , 4x Daily AC and at bedtime, [address] MD Robert    labetalol (NORMODYNE) injection 10 mg, 10 mg, Intravenous, Q6H PRN, Lupis Madrigal MD, 10 mg at 08/23/22 1553    losartan (COZAAR) tablet 25 mg, 25 mg, Oral, Daily, HtLeona Madrigal MD, 25 mg at 08/25/22 0836    pravastatin (PRAVACHOL) tablet 80 mg, 80 mg, Oral, Daily With Dinner, [address] [place] MD Robert, 80 mg at 08/24/22 1552    QUEtiapine (SEROquel) tablet 25 mg, 25 mg, Oral, HS, FERNANDO Perez, 25 mg at 08/24/22 2122    tamsulosin (FLOMAX) capsule 0 4 mg, 0 4 mg, Oral, Daily With Negro Justice MD, 0 4 mg at 08/24/22 1552    Laboratory Results:  Results from last 7 days   Lab Units 08/25/22  0542 08/24/22  1412 08/24/22  0458 08/23/22  1547 08/23/22  0911 08/23/22  0814 08/23/22  0458 08/22/22  2320   WBC Thousand/uL 5 54  --   --   --   --  8 75  --  7 78   HEMOGLOBIN g/dL 9 0*  --   --   --   --  9 6*  --  9 9*   HEMATOCRIT % 27 9*  --   --   --   --  30 7*  --  31 9*   PLATELETS Thousands/uL 277  --   --   --   --  358  --  316   POTASSIUM mmol/L 4 2 4 4 3 8 3 7 4 5  --  5 1 7 0*   CHLORIDE mmol/L 90* 91* 95* 94* 99  --  99 99   CO2 mmol/L 29 29 31 29 28  --  29 33*   BUN mg/dL 36* 25 20 16 46*  --  44* 42*   CREATININE mg/dL 6 03* 5 19* 4 30* 3 32* 6 98*  --  6 69* 6 58*   CALCIUM mg/dL 7 4* 7 8* 8 1* 8 2* 8 4  --  8 5 8 4   MAGNESIUM mg/dL  --   --   --  1 8 2 4  --   --   --    PHOSPHORUS mg/dL  --   --  5 3* 1 6* 1 0*  --   --   --

## 2022-08-25 NOTE — HEMODIALYSIS
Post-Dialysis RN Treatment Note    Blood Pressure:  Pre 160/65 mm/Hg  Post 158/79 mmHg   EDW  74 5kg kg    Weight:  Pre 78 8 kg   Post 75 4 kg   Mode of weight measurement: Bed Scale   Volume Removed  3500 ml    Treatment duration 240 minutes    NS given  No    Treatment shortened?  No   Medications given during Rx None Reported   Estimated Kt/V  Not Applicable   Access type: Permacath/TDC   Access Issues: No    Report called to primary nurse   Yes

## 2022-08-25 NOTE — CASE MANAGEMENT
Case Management Progress Note    Patient name Andres Paz  Location 3 [facility] [address] MRN [id number]  : 1945 Date 2022       LOS (days): 2  Geometric Mean LOS (GMLOS) (days): 3 30  Days to GMLOS:1 2        OBJECTIVE:        Current admission status: Inpatient  Preferred Pharmacy:   [facility] [address] [facility], [address] [place] [address]  Phone: [phone] Fax: [phone]    [facility]  ([address] [facility],   [address] [place] [id number]  Phone: [phone] Fax: [phone]    Primary Care Provider: Vadim Cortes MD    Primary Insurance: MEDICARE  Secondary Insurance: BLUE CROSS    PROGRESS NOTE:    Bed available at [facility] for STR  CM s/w patient's wife to confirm DCP, wife confirmed choice is for [facility] @ D/C  CM will continue to follow

## 2022-08-25 NOTE — CASE MANAGEMENT
Case Management Discharge Planning Note    Patient name Barbara Hoffman  Location [address] [place] [address] [address] MRN [id number]  : 1945 Date 2022       Current Admission Date: 2022  Current Admission Diagnosis:Acute metabolic encephalopathy due to hypoglycemia   Patient Active Problem List    Diagnosis Date Noted    Acute metabolic encephalopathy due to hypoglycemia 2022    ESRD on dialysis [facility]) 2022    Hyponatremia 2022    Left leg pain 2022    Rhabdomyolysis 2022    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[facility] ) 2022    Pulmonary hypertension ([facility] ) 2022    Dialysis patient ([facility] ) 2022    Anemia due to chronic kidney disease, on chronic dialysis ([facility] )     Hemoptysis 2022    Other proteinuria     Symptomatic anemia 2022    Chest pain 2022    Hyperkalemia 2022    Melena 2022    Elevated PSA 2021    Chronic pain of right knee 2021    Primary osteoarthritis of right knee 2021    Bladder stones 12/15/2016    Type 2 diabetes mellitus with chronic kidney disease on chronic dialysis, with long-term current use of insulin ([facility] [address] [facility] ) 2016    Benign colon polyp 2013    Benign prostatic hyperplasia with lower urinary tract symptoms 2013    Benign essential hypertension 2013    Hyperlipidemia 2012    Vitamin D deficiency 2012      LOS (days): 2  Geometric Mean LOS (GMLOS) (days): 3 30  Days to GMLOS:1 4     OBJECTIVE:  Risk of Unplanned Readmission Score: 50 09   Current admission status: Inpatient   Preferred Pharmacy:   [facility] [address] [place] [address] [address]  [facility] [address]  Phone: [phone] Fax: [phone]    [facility]  ([address] [facility]) - Dontrell Grant  [address]  Phone: [phone] Fax: [phone]    Primary Care Provider: Susen Koyanagi, MD    Primary Insurance: MEDICARE  Secondary Insurance: BLUE CROSS    DISCHARGE DETAILS:    Discharge planning discussed with[de-identified] Patient's spouse  Freedom of Choice: Yes  Comments - Freedom of Choice: Choice is for STR placement @ D/C  Preference is for [facility], however agreeable to other referrals to determine available options for D/C  CM contacted family/caregiver?: Yes  Were Treatment Team discharge recommendations reviewed with patient/caregiver?: Yes  Did patient/caregiver verbalize understanding of patient care needs?: N/A- going to facility  Were patient/caregiver advised of the risks associated with not following Treatment Team discharge recommendations?: Yes    Contacts  Patient Contacts: Jorden Chin  Relationship to Patient[de-identified] Family  Contact Method: Phone  Phone Number: [phone]  Reason/Outcome: Emergency Contact, Discharge [address]         Is the patient interested in [facility] at discharge?: No    DME Referral Provided  Referral made for DME?: No    Other Referral/Resources/Interventions Provided:  Interventions: Short Term Rehab  Referral Comments: STR referrals opened via [address]      Would you like to participate in our [address] service program?  : No - Declined    Treatment Team Recommendation: Short Term Rehab  Discharge Destination Plan[de-identified] Short Term Rehab (REFERRALS PENDING)  Transport at Discharge : BLS Ambulance, Wheelchair Ray Alvarez (PENDING CONDITION @ D/C )

## 2022-08-25 NOTE — OCCUPATIONAL THERAPY NOTE
OT EVALUATION       08/25/22 0938   Note Type   Note type Evaluation   Restrictions/Precautions   Other Precautions Chair Alarm; Bed Alarm; Fall Risk;O2;Hard of hearing   Pain Assessment   Pain Assessment Tool 0-10   Pain Score No Pain   Home Living   Type of Home House   Home Layout One level  (3 LAURA)   Bathroom Shower/Tub Walk-in shower   Bathroom Toilet Standard   Home Equipment Walker;Cane   Additional Comments uses cane   Prior Function   Level of [place] Independent with ADLs and functional mobility; Needs assistance with IADLs   Lives With Spouse   Receives Help From Family   ADL Assistance Needs assistance  (assist for socks per patient)   IADLs Needs assistance   ADL   Eating Assistance 5  Supervision/Setup   Grooming Assistance 5  Supervision/Setup   UB Bathing Assistance 4  Minimal Assistance   LB Bathing Assistance 3  Moderate Assistance   UB Dressing Assistance 4  Minimal Assistance   LB Dressing Assistance 3  Moderate Assistance   Toileting Assistance  3  Moderate Assistance   Bed Mobility   Supine to Sit 4  Minimal assistance   Sit to Supine 4  Minimal assistance   Transfers   Sit to Stand 3  Moderate assistance   Additional items Assist x 1;Verbal cues   Stand to Sit 4  Minimal assistance   Additional items Assist x 1;Verbal cues   Functional Mobility   Functional Mobility 4  Minimal assistance   Additional Comments functional household distance to bathroom with RW   Balance   Static Sitting Fair +   Dynamic Sitting Fair   Static Standing Fair   Dynamic Standing Fair -   Activity Tolerance   Activity Tolerance Patient limited by fatigue   Nurse Made Aware yes, [place]   RUE Assessment   RUE Assessment WFL   LUE Assessment   LUE Assessment WFL   Cognition   Overall Cognitive Status WFL   Arousal/Participation Cooperative   Attention Within functional limits   Orientation Level Oriented X4   Following Commands Follows multistep commands with increased time or repetition   Comments pt is very [facility] Assessment   Limitation Decreased ADL status; Decreased UE strength;Decreased Safe judgement during ADL;Decreased endurance;Decreased self-care trans;Decreased high-level ADLs  (decreased balance and mobility)   Prognosis Good   Assessment Patient evaluated by Occupational Therapy  Patient admitted with Acute metabolic encephalopathy due to hypoglycemia  The patients occupational profile, medical and therapy history includes a extensive additional review of physical, cognitive, or psychosocial history related to current functional performance  Comorbidities affecting functional mobility and ADLS include: arthritis, diabetes, ESRD on hemodialysis and hypertension  Prior to admission, patient was independent with functional mobility with walker, requiring assist for ADLS and requiring assist for IADLS  The evaluation identifies the following performance deficits: weakness, impaired balance, decreased endurance, increased fall risk, new onset of impairment of functional mobility, decreased ADLS, decreased IADLS, decreased activity tolerance, decreased safety awareness, impaired judgement and decreased strength, that result in activity limitations and/or participation restrictions  This evaluation requires clinical decision making of high complexity, because the patient presents with comorbidites that affect occupational performance and required significant modification of tasks or assistance with consideration of multiple treatment options  The Barthel Index was used as a functional outcome tool presenting with a score of Barthel Index Score: 45, indicating marked limitations of functional mobility and ADLS  The patient's raw score on the -PAC Daily Activity inpatient short form is 16, standardized score is 35 96, less than 39 4  Patients at this level are likely to benefit from DC to post-acute rehabilitation services  Please refer to the recommendation of the Occupational Therapist for safe DC planning  Patient will benefit from skilled Occupational Therapy services to address above deficits and facilitate a safe return to prior level of function  Goals   Patient Goals to return home   STG Time Frame   (1-7 days)   Short Term Goal  Goals established to promote Patient Goals: to return home:  Patient will increase standing tolerance to 3 minutes during ADL task to decrease assistance level and decrease fall risk; Patient will increase bed mobility to supervision in preparation for ADLS and transfers; Patient will increase functional mobility to and from bathroom with rolling walker with supervision to increase performance with ADLS and to use a toilet; Patient will tolerate 10 minutes of UE ROM/strengthening to increase general activity tolerance and performance in ADLS/IADLS; Patient will improve functional activity tolerance to 10 minutes of sustained functional tasks to increase participation in basic self-care and decrease assistance level;  Patient will be able to to verbalize understanding and perform energy conservation/proper body mechanics during ADLS and functional mobility at least 75% of the time with minimal cueing to decrease signs of fatigue and increase stamina to return to prior level of function; Patient will increase dynamic sitting balance to fair+ to improve the ability to sit at edge of bed or on a chair for ADLS;  Patient will increase dynamic standing balance to fair to improve postural stability and decrease fall risk during standing ADLS and transfers  LTG Time Frame   (8-14 days)   Long Term Goal Patient will increase standing tolerance to 6 minutes during ADL task to decrease assistance level and decrease fall risk; Patient will increase bed mobility to independent in preparation for ADLS and transfers;  Patient will increase functional mobility to and from bathroom with rolling walker independently to increase performance with ADLS and to use a toilet; Patient will tolerate 20 minutes of UE ROM/strengthening to increase general activity tolerance and performance in ADLS/IADLS; Patient will improve functional activity tolerance to 20 minutes of sustained functional tasks to increase participation in basic self-care and decrease assistance level;  Patient will be able to to verbalize understanding and perform energy conservation/proper body mechanics during ADLS and functional mobility at least 90% of the time to decrease signs of fatigue and increase stamina to return to prior level of function; Patient will increase dynamic sitting balance to good to improve the ability to sit at edge of bed or on a chair for ADLS;  Patient will increase dynamic standing balance to fair+ to improve postural stability and decrease fall risk during standing ADLS and transfers  Pt will score >/= 20/24 on AM-PAC Daily Activity Inpatient scale to promote safe independence with ADLs and functional mobility; Pt will score >/= 75/100 on Barthel Index in order to decrease caregiver assistance needed and increase ability to perform ADLs and functional mobility  Functional Transfer Goals   Pt Will Perform All Functional Transfers   (STG min assist LTG supervision)   ADL Goals   Pt Will Perform Eating   (STG independent)   Pt Will Perform Grooming   (STG independent)   Pt Will Perform Bathing   (STG min assist LTG supervision)   Pt Will Perform UE Dressing   (STG supervision LTG Independent)   Pt Will Perform LE Dressing   (STG min assist LTG supervision)   Pt Will Perform Toileting   (STG min assist LTG supervision)   Plan   Treatment Interventions ADL retraining;Functional transfer training;UE strengthening/ROM; Endurance training;Patient/family training;Equipment evaluation/education; Activityengagement; Compensatory technique education   Goal Expiration Date 09/08/22   OT Frequency 3-5x/wk   Recommendation   OT Discharge Recommendation Post acute rehabilitation services   AM-[facility] Daily Activity Inpatient   Lower Body Dressing 2   Bathing 2   Toileting 2   Upper Body Dressing 3   Grooming 3   Eating 4   Daily Activity Raw Score 16   Daily Activity Standardized Score (Calc for Raw Score >=11) 35 96   AM-PAC Applied Cognition Inpatient   Following a Speech/Presentation 4   Understanding Ordinary Conversation 4   Taking Medications 4   Remembering Where Things Are Placed or Put Away 4   Remembering List of 4-5 Errands 4   Taking Care of Complicated Tasks 4   Applied Cognition Raw Score 24   Applied Cognition Standardized Score 62 21   Barthel Index   Feeding 10   Bathing 0   Grooming Score 0   Dressing Score 5   Bladder Score 5   Bowels Score 10   Toilet Use Score 5   Transfers (Bed/Chair) Score 10   Mobility (Level Surface) Score 0   Stairs Score 0   Barthel Index Score 45   Licensure   [place] License Number  Jamesflavio [place] [address] 85 OTR/L [id number]

## 2022-08-25 NOTE — PLAN OF CARE
Pt on HD treatment for 4 hours with a UF goal of 3 5 L as tolerated  Pt on a 3 k 2 5 mary bath for a potassium of 4 2 on 08/25/22    Problem: METABOLIC, FLUID AND ELECTROLYTES - ADULT  Goal: Electrolytes maintained within normal limits  Description: INTERVENTIONS:  - Monitor labs and assess patient for signs and symptoms of electrolyte imbalances  - Administer electrolyte replacement as ordered  - Monitor response to electrolyte replacements, including repeat lab results as appropriate  - Instruct patient on fluid and nutrition as appropriate  Outcome: Progressing  Goal: Fluid balance maintained  Description: INTERVENTIONS:  - Monitor labs   - Monitor I/O and WT  - Instruct patient on fluid and nutrition as appropriate  - Assess for signs & symptoms of volume excess or deficit  Outcome: Progressing

## 2022-08-25 NOTE — PHYSICAL THERAPY NOTE
PHYSICAL THERAPY EVALUATION/TREATMENT     08/24/22 1420   Note Type   Note type Evaluation   Pain Assessment   Pain Assessment Tool 0-10   Pain Score No Pain   Restrictions/Precautions   Other Precautions Chair Alarm; Bed Alarm;Multiple lines; Fall Risk  (Seen in ICU)   Home Living   Type of [address] One level;Stairs to enter with rails  (Three stairs to enter)   Home Equipment Cane;Walker   Additional Comments Patient using cane at times prior to admission   Prior Function   Level of [place] Independent with ADLs and functional mobility   Lives With Spouse   Receives Help From Family   ADL Assistance Independent   IADLs Needs assistance   Comments Patient reports assisting with household tasks and independent with ADLs   General   Additional Pertinent History Chart reviewed, patient admitted with acute metabolic encephalopathy  Patient now seen in ICU having blood drawn and unable to get out of bed at this time  Family/Caregiver Present No   Cognition   Overall Cognitive Status WFL   Arousal/Participation Cooperative   Orientation Level Oriented to person;Oriented to place   Following Commands Follows multistep commands with increased time or repetition   Comments Patient very hard of hearing, seen in ICU   Subjective   Subjective No pain noted   RLE Assessment   RLE Assessment   (ROM WFL, strength 3+5)   LLE Assessment   LLE Assessment   (ROM WFL, strength 3+5)   Coordination   Movements are Fluid and Coordinated 0   Bed Mobility   Additional Comments Unable to assess out of bed and gait activity at this time being seen in ICU   Activity Tolerance   Activity Tolerance Patient limited by fatigue;Treatment limited secondary to medical complications (Comment)   Nurse Made Aware yes   Assessment   Prognosis Good   Problem List Decreased strength;Decreased range of motion;Decreased endurance; Impaired balance;Decreased mobility; Decreased coordination; Impaired hearing   Assessment Patient seen for Physical Therapy evaluation  Patient admitted with Acute metabolic encephalopathy due to hypoglycemia  Comorbidities affecting patient's physical performance include:  Chronic pain right knee, chest pain, diabetes mellitus, hyperlipidemia, encephalopathy, anemia, end-stage renal disease  Personal factors affecting patient at time of initial evaluation include:  Decreased coordination and strength, inability to ambulate household distances inability to complete transfers inability to complete IADLs  Prior to admission, patient was independent with gait with cane at times, independent with ADLs, requiring assist for IADLs, home with family  Please find objective findings from Physical Therapy assessment regarding body systems outlined above with impairments and limitations including decreased balance, decreased coordination, decreased strength, fall history  The Barthel Index was used as a functional outcome tool presenting with a score of Barthel Index Score: 25 today indicating maximal limitations of functional mobility and ADLS  Patient's clinical presentation is currently unstable as seen in patient's presentation of   Pt would benefit from continued Physical Therapy treatment to address deficits as defined above and maximize level of functional mobility  As demonstrated by objective findings, the assigned level of complexity for this evaluation is high  The patient's AM-[facility] Basic Mobility Inpatient Short Form Raw Score is 12  A Raw score of less than or equal to 16 suggests the patient may benefit from discharge to post acute rehab services  Please also refer to the recommendation of the Physical Therapist for safe discharge planning     Goals   Patient Goals To return home   STG Expiration Date 08/24/22   Short Term Goal #1 Transfers and gait with roller walker with Min assist   Short Term Goal #2 Gait endurance to 50 ft   LTG Expiration Date 09/07/22   Long Term Goal #1 Strength bilateral lower extremities 4/5   Long Term Goal #2 Independent gait and transfers with cane for functional household distances   Plan   Treatment/Interventions ADL retraining;Functional transfer training;LE strengthening/ROM; Therapeutic exercise; Endurance training;Patient/family training;Equipment eval/education; Bed mobility;Gait training   Recommendation   PT Discharge Recommendation Post acute rehabilitation services   AM-PAC Basic Mobility Inpatient   Turning in Bed Without Bedrails 3   Lying on Back to Sitting on Edge of Flat Bed 2   Moving Bed to Chair 2   Standing Up From Chair 2   Walk in Room 2   Climb 3-5 Stairs 1   Basic Mobility Inpatient Raw Score 12   Basic Mobility Standardized Score 32 23   Highest Level Of Mobility   [facility] Goal 4: Move to chair/commode   [facility] Achieved 2: Bed activities/Dependent transfer   Barthel Index   Feeding 5   Bathing 0   Grooming Score 0   Dressing Score 5   Bladder Score 0   Bowels Score 10   Toilet Use Score 5   Transfers (Bed/Chair) Score 0   Mobility (Level Surface) Score 0   Stairs Score 0   Barthel Index Score 25   Additional Treatment Session   Start Time 1405   End Time 1420   Treatment Assessment S:  No pain noted O: Bilateral lower extremity exercise completed as listed below a:  Patient will benefit from continued physical therapy with assessment of transfers and gait upon next session as medically able   Exercises   Hamstring Stretch 5 reps;PROM   Quad Sets Supine;10 reps   Heelslides 10 reps   Glute Sets Supine;5 reps   Hip Abduction Supine;5 reps   Knee AROM Short Arc Quad Supine;5 reps   Ankle Pumps Supine;10 reps   Heel Cord Stretch Supine;5 reps;PROM   Licensure   [place] License Number  Keenan Jordin  [id number]

## 2022-08-25 NOTE — PROGRESS NOTES
[facility] Progress Note  Patient: Galileo Llanos 76 y o  male   MRN: [id number]  PCP: Thuy Castellanos MD  Unit/Bed#: [address] [place] [address] Encounter: [id number]  Date Of Visit: 08/25/22    Problem List:    Principal Problem:    Acute metabolic encephalopathy due to hypoglycemia  Active Problems:    Hyperkalemia    ESRD on dialysis [facility])    Type 2 diabetes mellitus with chronic kidney disease on chronic dialysis, with long-term current use of insulin ([facility] )    Anemia due to chronic kidney disease, on chronic dialysis ([facility] )    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HCC)    Benign essential hypertension    Hyperlipidemia      Assessment & Plan:    ESRD on dialysis [facility])  Assessment & Plan  On HD, Tuesday, Thursday, Saturday, ED [address] 5  Last session 8/20/22 prior to admission  Nephrology following, input appreciated  · Continue HD as per Nephrology, planned for today   · Will check chest x-ray post HD  · Taper supplemental oxygen as tolerated   · Follow-up electrolytes       Hyperkalemia  Assessment & Plan  Patient with history of ESRD on HD presents with generalized weakness   Noted to have Potassium 7 0  CT head negative    Mental status was noted to be at baseline with patient  Received medical treatment on admission  · Discussed with nephrologist on call  · Recommended lasix 120mg, metolazone 10mg, kayexalate 30g per nephrology in addition to sodium bicarb, insulin plus dextrose  · Subsequently received urgent HD  Potassium level is improved   Renal/low-potassium diet  Continue monitor       * Acute metabolic encephalopathy due to hypoglycemia  Assessment & Plan  Rapid response on 08/23 early a m  with altered mentation and blood glucose was noted to be less than 20 mg/dL   Patient was noted to be lethargic minimally responsive to pain stimuli home patient was transferred to step-down  Received dextrose bolus and infusion with slow improvement  No seizure episode or focal neurological deficit noted  CT head negative   Currently improved to baseline   · Monitor   · Supportive care    ANCA-associated vasculitis [facility])  Assessment & Plan  Status post kidney biopsy on 3/22-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  Previously treated with Cytoxan and subsequently prescribed prednisone taper, patient has not been taking  Off immunosuppression since 4/22   Nephrology following  · Follow-up ANCA titer      Anemia due to chronic kidney disease, on chronic dialysis [facility])  Assessment & Plan  Will continue to monitor       Type 2 diabetes mellitus with chronic kidney disease on chronic dialysis, with long-term current use of insulin [facility])  Assessment & Plan  Lab Results   Component Value Date    HGBA1C 5 1 05/08/2022       Recent Labs     08/25/22  0814 08/25/22  1203 08/25/22  1536 08/25/22 2009   POCGLU 107 121 232* 113       Blood Sugar Average: Last 72 hrs:  (P) 134 [id number]   Patient on 70/30 insulin 5 units BID at home  No further hypoglycemic episode on current home regimen   Continue 70/30 insulin 5 units BID recurrent    Corrective scale   Monitor blood glucose trend      Hyperlipidemia  Assessment & Plan  Continue statin    Benign essential hypertension  Assessment & Plan  Continue amlodipine, was increased to 10 mg   Losartan was added   Monitor blood pressure and electrolytes      Symptomatic treatment for nasal congestion    VTE Pharmacologic Prophylaxis: VTE Score: 4 Moderate Risk (Score 3-4) - Pharmacological DVT Prophylaxis Ordered: heparin  Patient Centered Rounds: I performed bedside rounds with nursing staff today  Discussions with Specialists or Other Care Team Provider:  Yes    Education and Discussions with Family / Patient: Updated  (wife) via phone  Time Spent for Care: 45 minutes  More than 50% of total time spent on counseling and coordination of care as described above      Current Length of Stay: 2 day(s)  Current Patient Status: Inpatient Certification Statement: The patient will continue to require additional inpatient hospital stay due to Electrolyte abnormalities, encephalopathy   Discharge Plan: Anticipate discharge in 24-48 hrs to rehab facility  Code Status: Level 1 - Full Code    Subjective:   Seen after dialysis  Comfortably lying in bed  Denies any chest pain shortness of breath   Reports congestion in nose and right ear     Wondering that when he can get AV fistula      Objective:     Vitals:   Temp (24hrs), Av 6 °F (37 °C), Min:97 7 °F (36 5 °C), Max:99 °F (37 2 °C)    Temp:  [97 7 °F (36 5 °C)-99 °F (37 2 °C)] 99 °F (37 2 °C)  HR:  [70-91] 81  Resp:  [16-30] 16  BP: (139-175)/(65-91) 158/79  SpO2:  [92 %-97 %] 93 % on 3 L  Body mass index is 28 28 kg/m²  Input and Output Summary (last 24 hours): Intake/Output Summary (Last 24 hours) at 2022 1819  Last data filed at 2022 1530  Gross per 24 hour   Intake 500 ml   Output 4000 ml   Net -3500 ml       Physical Exam:   Physical Exam  Constitutional:       General: He is not in acute distress  Appearance: He is ill-appearing (Chronically)  HENT:      Head: Normocephalic and atraumatic  Ears:      Comments: Hard of hearing  Cardiovascular:      Rate and Rhythm: Normal rate  Pulmonary:      Effort: Pulmonary effort is normal  No respiratory distress  Breath sounds: No wheezing, rhonchi or rales  Comments: Diminished bilaterally  Chest:      Chest wall: No tenderness  Abdominal:      General: Bowel sounds are normal  There is no distension  Palpations: Abdomen is soft  Tenderness: There is no abdominal tenderness  There is no guarding or rebound  Musculoskeletal:      Right lower leg: Edema present  Left lower leg: Edema present  Skin:     General: Skin is warm and dry  Findings: No rash  Neurological:      Mental Status: He is alert  Mental status is at baseline  Cranial Nerves: No cranial nerve deficit  Additional Data:     Labs:  Results from last 7 days   Lab Units 08/25/22  0542 08/23/22  0814   WBC Thousand/uL 5 54 8 75   HEMOGLOBIN g/dL 9 0* 9 6*   HEMATOCRIT % 27 9* 30 7*   PLATELETS Thousands/uL 277 358   NEUTROS PCT %  --  78*   LYMPHS PCT %  --  7*   MONOS PCT %  --  13*   EOS PCT %  --  1     Results from last 7 days   Lab Units 08/25/22  0542 08/23/22  1547 08/23/22  0911   SODIUM mmol/L 129*   < > 137   POTASSIUM mmol/L 4 2   < > 4 5   CHLORIDE mmol/L 90*   < > 99   CO2 mmol/L 29   < > 28   BUN mg/dL 36*   < > 46*   CREATININE mg/dL 6 03*   < > 6 98*   ANION GAP mmol/L 10   < > 10   CALCIUM mg/dL 7 4*   < > 8 4   ALBUMIN g/dL  --   --  2 6*   TOTAL BILIRUBIN mg/dL  --   --  0 36   ALK PHOS U/L  --   --  73   ALT U/L  --   --  8*   AST U/L  --   --  13   GLUCOSE RANDOM mg/dL 91   < > 46*    < > = values in this interval not displayed  Results from last 7 days   Lab Units 08/25/22  1536 08/25/22  1203 08/25/22  0814 08/24/22  2111 08/24/22  1606 08/24/22  1052 08/24/22  0756 08/24/22  0521 08/24/22  0205 08/23/22  2359 08/23/22  2126 08/23/22 2009   POC GLUCOSE mg/dl 232* 121 107 122 169* 128 121 117 124 133 175* 197*         Results from last 7 days   Lab Units 08/24/22  0458   PROCALCITONIN ng/ml 1 14*       Lines/Drains:  Invasive Devices  Report    Peripheral Intravenous Line  Duration           Peripheral IV 08/23/22 Left;Proximal;Ventral (anterior) Forearm 2 days    Peripheral IV 08/23/22 Right Antecubital 2 days          Hemodialysis Catheter  Duration           HD Permanent Double Catheter 170 days                      Imaging: Reviewed radiology reports from this admission including: CT head    Recent Cultures (last 7 days):   Results from last 7 days   Lab Units 08/23/22  1545   BLOOD CULTURE  No Growth at 24 hrs  No Growth at 24 hrs         Last 24 Hours Medication List:   Current Facility-Administered Medications   Medication Dose Route Frequency Provider Last Rate    acetaminophen  650 mg Oral Q6H PRN Lupis Bermudez MD      amLODIPine  10 mg Oral Daily Donald Guadarrama MD      calcium acetate  667 mg Oral TID With Meals Lupis Bermudez MD      finasteride  5 mg Oral Daily Donald Guadarrama MD      heparin (porcine)  5,000 Units Subcutaneous Q8H [address], MD      insulin aspart protamine-insulin aspart  5 Units Subcutaneous BID AC Lupis Bermudez MD      insulin lispro  1-6 Units Subcutaneous 4x Daily (AC & HS) Donald Guadarrama MD      labetalol  10 mg Intravenous Q6H PRN Donald Guadarrama MD      losartan  25 mg Oral Daily Donald Guadarrama MD      pravastatin  80 mg Oral Daily With MD Richard      QUEtiapine  25 mg Oral HS FERNANDO Perez      tamsulosin  0 4 mg Oral Daily With MD Richard          Today, Patient Was Seen By: Isaura Weir MD    ** Please Note: "This note has been constructed using a voice recognition system  Therefore there may be syntax, spelling, and/or grammatical errors   Please call if you have any questions  "**

## 2022-08-25 NOTE — PLAN OF CARE
Problem: Potential for Falls  Goal: Patient will remain free of falls  Description: INTERVENTIONS:  - Educate patient/family on patient safety including physical limitations  - Instruct patient to call for assistance with activity   - Consult OT/PT to assist with strengthening/mobility   - Keep Call bell within reach  - Keep bed low and locked with side rails adjusted as appropriate  - Keep care items and personal belongings within reach  - Initiate and maintain comfort rounds  - Make Fall Risk Sign visible to staff  - Offer Toileting every 2 Hours, in advance of need  - Initiate/Maintain bed alarm  - Obtain necessary fall risk management equipment: fall risk   - Apply yellow socks and bracelet for high fall risk patients  - Consider moving patient to room near nurses station  Outcome: Progressing     Problem: SAFETY ADULT  Goal: Patient will remain free of falls  Description: INTERVENTIONS:  - Educate patient/family on patient safety including physical limitations  - Instruct patient to call for assistance with activity   - Consult OT/PT to assist with strengthening/mobility   - Keep Call bell within reach  - Keep bed low and locked with side rails adjusted as appropriate  - Keep care items and personal belongings within reach  - Initiate and maintain comfort rounds  - Make Fall Risk Sign visible to staff  - Offer Toileting every 2 Hours, in advance of need  - Initiate/Maintain bed alarm  - Obtain necessary fall risk management equipment: fall risk   - Apply yellow socks and bracelet for high fall risk patients  - Consider moving patient to room near nurses station  Outcome: Progressing  Goal: Maintain or return to baseline ADL function  Description: INTERVENTIONS:  -  Assess patient's ability to carry out ADLs; assess patient's baseline for ADL function and identify physical deficits which impact ability to perform ADLs (bathing, care of mouth/teeth, toileting, grooming, dressing, etc )  - Assess/evaluate cause of self-care deficits   - Assess range of motion  - Assess patient's mobility; develop plan if impaired  - Assess patient's need for assistive devices and provide as appropriate  - Encourage maximum independence but intervene and supervise when necessary  - Involve family in performance of ADLs  - Assess for home care needs following discharge   - Consider OT consult to assist with ADL evaluation and planning for discharge  - Provide patient education as appropriate  Outcome: Progressing  Goal: Maintains/Returns to pre admission functional level  Description: INTERVENTIONS:  - Perform BMAT or MOVE assessment daily    - Set and communicate daily mobility goal to care team and patient/family/caregiver  - Collaborate with rehabilitation services on mobility goals if consulted  - Perform Range of Motion 2 times a day  - Reposition patient every 2 hours    - Dangle patient 2 times a day  - Stand patient 2 times a day  - Ambulate patient 2 times a day  - Out of bed to chair 2 times a day   - Out of bed for meals 2 times a day  - Out of bed for toileting  - Record patient progress and toleration of activity level   Outcome: Progressing     Problem: METABOLIC, FLUID AND ELECTROLYTES - ADULT  Goal: Electrolytes maintained within normal limits  Description: INTERVENTIONS:  - Monitor labs and assess patient for signs and symptoms of electrolyte imbalances  - Administer electrolyte replacement as ordered  - Monitor response to electrolyte replacements, including repeat lab results as appropriate  - Instruct patient on fluid and nutrition as appropriate  Outcome: Progressing  Goal: Fluid balance maintained  Description: INTERVENTIONS:  - Monitor labs   - Monitor I/O and WT  - Instruct patient on fluid and nutrition as appropriate  - Assess for signs & symptoms of volume excess or deficit  Outcome: Progressing

## 2022-08-26 ENCOUNTER — APPOINTMENT (INPATIENT)
Dept: DIALYSIS | Facility: HOSPITAL | Age: 77
DRG: 640 | End: 2022-08-26
Attending: INTERNAL MEDICINE
Payer: MEDICARE

## 2022-08-26 ENCOUNTER — APPOINTMENT (OUTPATIENT)
Dept: RADIOLOGY | Facility: HOSPITAL | Age: 77
DRG: 640 | End: 2022-08-26
Payer: MEDICARE

## 2022-08-26 LAB
ANION GAP SERPL CALCULATED.3IONS-SCNC: 10 MMOL/L (ref 4–13)
BUN SERPL-MCNC: 21 MG/DL (ref 5–25)
CALCIUM SERPL-MCNC: 7.8 MG/DL (ref 8.3–10.1)
CHLORIDE SERPL-SCNC: 92 MMOL/L (ref 96–108)
CO2 SERPL-SCNC: 29 MMOL/L (ref 21–32)
CREAT SERPL-MCNC: 4.44 MG/DL (ref 0.6–1.3)
ERYTHROCYTE [DISTWIDTH] IN BLOOD BY AUTOMATED COUNT: 15.5 % (ref 11.6–15.1)
GFR SERPL CREATININE-BSD FRML MDRD: 11 ML/MIN/1.73SQ M
GLUCOSE SERPL-MCNC: 104 MG/DL (ref 65–140)
GLUCOSE SERPL-MCNC: 112 MG/DL (ref 65–140)
GLUCOSE SERPL-MCNC: 145 MG/DL (ref 65–140)
GLUCOSE SERPL-MCNC: 177 MG/DL (ref 65–140)
GLUCOSE SERPL-MCNC: 188 MG/DL (ref 65–140)
HCT VFR BLD AUTO: 32.4 % (ref 36.5–49.3)
HGB BLD-MCNC: 10.5 G/DL (ref 12–17)
MCH RBC QN AUTO: 28.3 PG (ref 26.8–34.3)
MCHC RBC AUTO-ENTMCNC: 32.4 G/DL (ref 31.4–37.4)
MCV RBC AUTO: 87 FL (ref 82–98)
PLATELET # BLD AUTO: 245 THOUSANDS/UL (ref 149–390)
PMV BLD AUTO: 8 FL (ref 8.9–12.7)
POTASSIUM SERPL-SCNC: 4 MMOL/L (ref 3.5–5.3)
RBC # BLD AUTO: 3.71 MILLION/UL (ref 3.88–5.62)
SODIUM SERPL-SCNC: 131 MMOL/L (ref 135–147)
WBC # BLD AUTO: 4.79 THOUSAND/UL (ref 4.31–10.16)

## 2022-08-26 PROCEDURE — 82948 REAGENT STRIP/BLOOD GLUCOSE: CPT

## 2022-08-26 PROCEDURE — 99232 SBSQ HOSP IP/OBS MODERATE 35: CPT | Performed by: INTERNAL MEDICINE

## 2022-08-26 PROCEDURE — 85027 COMPLETE CBC AUTOMATED: CPT | Performed by: INTERNAL MEDICINE

## 2022-08-26 PROCEDURE — 97110 THERAPEUTIC EXERCISES: CPT

## 2022-08-26 PROCEDURE — 97116 GAIT TRAINING THERAPY: CPT

## 2022-08-26 PROCEDURE — 71045 X-RAY EXAM CHEST 1 VIEW: CPT

## 2022-08-26 PROCEDURE — 80048 BASIC METABOLIC PNL TOTAL CA: CPT | Performed by: INTERNAL MEDICINE

## 2022-08-26 RX ORDER — LOSARTAN POTASSIUM 50 MG/1
50 TABLET ORAL DAILY
Status: DISCONTINUED | OUTPATIENT
Start: 2022-08-27 | End: 2022-08-27 | Stop reason: HOSPADM

## 2022-08-26 RX ORDER — LOSARTAN POTASSIUM 25 MG/1
25 TABLET ORAL ONCE
Status: COMPLETED | OUTPATIENT
Start: 2022-08-26 | End: 2022-08-26

## 2022-08-26 RX ADMIN — INSULIN ASPART 5 UNITS: 100 INJECTION, SUSPENSION SUBCUTANEOUS at 07:47

## 2022-08-26 RX ADMIN — CALCIUM ACETATE 667 MG: 667 CAPSULE ORAL at 08:00

## 2022-08-26 RX ADMIN — CALCIUM ACETATE 667 MG: 667 CAPSULE ORAL at 17:48

## 2022-08-26 RX ADMIN — LOSARTAN POTASSIUM 25 MG: 25 TABLET, FILM COATED ORAL at 08:57

## 2022-08-26 RX ADMIN — INSULIN LISPRO 1 UNITS: 100 INJECTION, SOLUTION INTRAVENOUS; SUBCUTANEOUS at 17:49

## 2022-08-26 RX ADMIN — HEPARIN SODIUM 5000 UNITS: 5000 INJECTION INTRAVENOUS; SUBCUTANEOUS at 13:49

## 2022-08-26 RX ADMIN — INSULIN LISPRO 1 UNITS: 100 INJECTION, SOLUTION INTRAVENOUS; SUBCUTANEOUS at 21:20

## 2022-08-26 RX ADMIN — FINASTERIDE 5 MG: 5 TABLET, FILM COATED ORAL at 08:00

## 2022-08-26 RX ADMIN — INSULIN ASPART 5 UNITS: 100 INJECTION, SUSPENSION SUBCUTANEOUS at 17:49

## 2022-08-26 RX ADMIN — HEPARIN SODIUM 5000 UNITS: 5000 INJECTION INTRAVENOUS; SUBCUTANEOUS at 21:19

## 2022-08-26 RX ADMIN — AMLODIPINE BESYLATE 10 MG: 10 TABLET ORAL at 08:00

## 2022-08-26 RX ADMIN — LOSARTAN POTASSIUM 25 MG: 25 TABLET, FILM COATED ORAL at 08:00

## 2022-08-26 RX ADMIN — CALCIUM ACETATE 667 MG: 667 CAPSULE ORAL at 12:17

## 2022-08-26 RX ADMIN — PRAVASTATIN SODIUM 80 MG: 80 TABLET ORAL at 17:48

## 2022-08-26 RX ADMIN — TAMSULOSIN HYDROCHLORIDE 0.4 MG: 0.4 CAPSULE ORAL at 17:48

## 2022-08-26 RX ADMIN — QUETIAPINE FUMARATE 25 MG: 25 TABLET ORAL at 21:19

## 2022-08-26 RX ADMIN — HEPARIN SODIUM 5000 UNITS: 5000 INJECTION INTRAVENOUS; SUBCUTANEOUS at 06:08

## 2022-08-26 NOTE — ASSESSMENT & PLAN NOTE
Remains elevated, but trend is improving with dialysis and adjustment in antihypertensive  ·  Continue amlodipine, was increased to 10 mg   · Losartan was added , increase to 50 mg  · Monitor blood pressure and electrolytes/potassium  · Follow-up as outpatient  · BMP in 2 days

## 2022-08-26 NOTE — PHYSICAL THERAPY NOTE
PT TREATMENT       08/26/22 1443   PT Last Visit   PT Visit Date 08/26/22   Note Type   Note Type Treatment   Pain Assessment   Pain Assessment Tool 0-10   Pain Score No Pain   Pain Location/Orientation Location: Head   Patient's Stated Pain Goal No pain   Hospital Pain Intervention(s) Repositioned   Multiple Pain Sites No   Restrictions/Precautions   Weight Bearing Precautions Per Order No   Other Precautions Chair Alarm; Bed Alarm; Fall Risk;Pain   General   Chart Reviewed Yes   Family/Caregiver Present No   Cognition   Overall Cognitive Status WFL   Arousal/Participation Cooperative   Attention Within functional limits   Orientation Level Oriented to person;Oriented to place; Disoriented to time;Disoriented to situation   Following Commands Follows multistep commands with increased time or repetition   Subjective   Subjective Pt with no complaints during session   Bed Mobility   Rolling R 5  Supervision   Rolling L 5  Supervision   Supine to Sit 4  Minimal assistance   Additional items Verbal cues   Sit to Supine 4  Minimal assistance   Additional items Verbal cues   Transfers   Sit to Stand 4  Minimal assistance   Additional items Assist x 1;Verbal cues   Stand to Sit 4  Minimal assistance   Additional items Assist x 1;Verbal cues   Stand pivot 4  Minimal assistance   Additional items Verbal cues   Ambulation/Elevation   Gait pattern Narrow HUBER;Step through pattern; Short stride   Gait Assistance 4  Minimal assist   Additional items Assist x 1;Verbal cues   Assistive Device Rolling walker   Distance 100 feet   Stair Management Assistance Not tested   Balance   Static Sitting Fair +   Dynamic Sitting Fair   Static Standing Fair   Dynamic Standing Fair -   Ambulatory Fair -   Activity Tolerance   Activity Tolerance Patient tolerated treatment well;Patient limited by fatigue   Nurse Made Aware yes   Exercises   Neuro re-ed Pt able to perform supine/seated exercise including ankle pumps, LAQ, quad/glute sets, standing marches x 10 reps bilat   Assessment   Prognosis Good   Problem List Decreased strength;Decreased range of motion;Decreased endurance; Impaired balance;Decreased mobility; Decreased safety awareness;Pain   Assessment Pt agreeable to PT session this afternoon  Pt able to progress to bed mobility/supine <> sit with Min A with good tolerance to sitting with Supervision  Able to progress ambulation with Min A using RW - requires intermittent verbal cues for proper hand/foot placement to avoid falls  The patient's AM-PAC Basic Mobility Inpatient Short Form Raw Score is 16  A Raw score of less than or equal to 16 suggests the patient may benefit from discharge to post-acute rehabilitation services  Please also refer to the recommendation of the Physical Therapist for safe discharge planning  Plan   Treatment/Interventions ADL retraining;Functional transfer training;LE strengthening/ROM; Therapeutic exercise; Endurance training;Bed mobility;Gait training;Spoke to nursing   Progress Progressing toward goals   PT Frequency Other (Comment)  (5x/week)   Recommendation   PT Discharge Recommendation Post acute rehabilitation services   AM-PAC Basic Mobility Inpatient   Turning in Bed Without Bedrails 3   Lying on Back to Sitting on Edge of Flat Bed 3   Moving Bed to Chair 3   Standing Up From Chair 3   Walk in Room 3   Climb 3-5 Stairs 1   Basic Mobility Inpatient Raw Score 16   Basic Mobility Standardized Score 38 32   Highest Level Of Mobility   JH-HLM Goal 5: Stand one or more mins   JH-HLM Achieved 7: Walk 25 feet or more   Education   Education Provided Mobility training;Home exercise program;Assistive device   Patient Explanation/teachback used; Reinforcement needed   End of Consult   Patient Position at End of Consult Supine;Bed/Chair alarm activated; All needs within reach   [employer] License Number  Amena CasperScurry [id number]

## 2022-08-26 NOTE — ASSESSMENT & PLAN NOTE
Lab Results   Component Value Date    HGBA1C 5 1 05/08/2022       Recent Labs     08/26/22 2007 08/27/22  0236 08/27/22  0657 08/27/22  1102   POCGLU 177* 135 132 116       Blood Sugar Average: Last 72 hrs:  (P) 138 04   Tightly controlled  No further hypoglycemic episode on current home regimen   Continue 70/30 insulin 5 units BID currently, blood sugar trend is acceptable without any hypoglycemia   Monitor

## 2022-08-26 NOTE — PLAN OF CARE
Post-Dialysis RN Treatment Note    Blood Pressure:  Pre: 163/69 mm/Hg  Post: 152/72 mmHg   EDW: 74 5 kg    Weight:  Pre: 77 2 kg   Post: 75 0 kg   Mode of weight measurement: Bed Scale   Volume Removed:  2200 ml    Treatment duration: 120 minutes    NS given  No    Treatment shortened?  No   Medications given during Rx None Reported   Estimated Kt/V  Not Applicable   Access type: Permacath/TDC   Access Issues: No    Report called to primary nurse   Thuy Garvey    Problem: METABOLIC, FLUID AND ELECTROLYTES - ADULT  Goal: Electrolytes maintained within normal limits  Description: INTERVENTIONS:  - Monitor labs and assess patient for signs and symptoms of electrolyte imbalances  - Administer electrolyte replacement as ordered  - Monitor response to electrolyte replacements, including repeat lab results as appropriate  - Instruct patient on fluid and nutrition as appropriate  Outcome: Progressing  Goal: Fluid balance maintained  Description: INTERVENTIONS:  - Monitor labs   - Monitor I/O and WT  - Instruct patient on fluid and nutrition as appropriate  - Assess for signs & symptoms of volume excess or deficit  Outcome: Progressing

## 2022-08-26 NOTE — PROGRESS NOTES
[facility] Progress Note  Patient: Ed Diana 76 y o  male   MRN: [id number]  PCP: Nisha Wylie MD  Unit/Bed#: [address] [place] [address] Encounter: [id number]  Date Of Visit: 08/26/22    Problem List:    Principal Problem:    Acute metabolic encephalopathy due to hypoglycemia  Active Problems:    Hyperkalemia    ESRD on dialysis [facility])    Type 2 diabetes mellitus with chronic kidney disease on chronic dialysis, with long-term current use of insulin ([facility] )    Anemia due to chronic kidney disease, on chronic dialysis ([facility] )    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HCC)    Benign essential hypertension    Hyperlipidemia      Assessment & Plan:    ESRD on dialysis [facility])  Assessment & Plan  On HD, Tuesday, Thursday, Saturday, ED W 74 5  Last session 8/20/22 prior to admission  Nephrology following, input appreciated  Complains of congestion  Oxygenation and weight is improving  Weight remains above goal  Blood pressure is elevated  Chest x-ray with evidence of congestion and pleural effusion  · Continue HD as per Nephrology, planned for additional HD today   · Monitor respiratory status,  · Taper supplemental oxygen as tolerated   · Follow-up electrolytes   · May require thoracentesis      Hyperkalemia  Assessment & Plan  Patient with history of ESRD on HD presents with generalized weakness   Noted to have Potassium 7 0  CT head negative    Mental status was noted to be at baseline with patient  Received medical treatment on admission  · Discussed with nephrologist on call  · Recommended lasix 120mg, metolazone 10mg, kayexalate 30g per nephrology in addition to sodium bicarb, insulin plus dextrose  · Subsequently received urgent HD  Potassium level is improved   Renal/low-potassium diet  Continue monitor after addition of ARB       * Acute metabolic encephalopathy due to hypoglycemia  Assessment & Plan  Rapid response on 08/23 early a m  with altered mentation and blood glucose was noted to be less than 20 mg/dL   Patient was noted to be lethargic minimally responsive to pain stimuli home patient was transferred to step-down  Received dextrose bolus and infusion with slow improvement  No seizure episode or focal neurological deficit noted  CT head negative   Currently improved to baseline   · Monitor   · Supportive care    ANCA-associated vasculitis [facility])  Assessment & Plan  Status post kidney biopsy on 3/22-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  Previously treated with Cytoxan and subsequently prescribed prednisone taper, patient has not been taking  Off immunosuppression since 4/22   Nephrology following  · Follow-up ANCA titer      Anemia due to chronic kidney disease, on chronic dialysis [facility])  Assessment & Plan  Will continue to monitor       Type 2 diabetes mellitus with chronic kidney disease on chronic dialysis, with long-term current use of insulin [facility])  Assessment & Plan  Lab Results   Component Value Date    HGBA1C 5 1 05/08/2022       Recent Labs     08/26/22  0709 08/26/22  1059 08/26/22  1608 08/26/22 2007   POCGLU 112 145* 188* 177*       Blood Sugar Average: Last 72 hrs:  (P) 138 04   Patient on 70/30 insulin 5 units BID at home  No further hypoglycemic episode on current home regimen   Continue 70/30 insulin 5 units BID currently   Corrective scale   Monitor blood glucose trend      Hyperlipidemia  Assessment & Plan  Continue statin    Benign essential hypertension  Assessment & Plan  Remains elevated  ·  Continue amlodipine, was increased to 10 mg   · Losartan was added , increase to 50 mg  · Monitor blood pressure and electrolytes      Symptomatic treatment for nasal congestion    VTE Pharmacologic Prophylaxis: VTE Score: 4 Moderate Risk (Score 3-4) - Pharmacological DVT Prophylaxis Ordered: heparin  Patient Centered Rounds: I performed bedside rounds with nursing staff today    Discussions with Specialists or Other Care Team Provider:  Yes, Nephrology    Education and Discussions with Family / Patient: Updated  (wife) via phone  Yesterday    Time Spent for Care: 45 minutes  More than 50% of total time spent on counseling and coordination of care as described above  Current Length of Stay: 3 day(s)  Current Patient Status: Inpatient   Certification Statement: The patient will continue to require additional inpatient hospital stay due to Electrolyte abnormalities, encephalopathy   Discharge Plan: Anticipate discharge in 24-48 hrs to rehab facility  Code Status: Level 1 - Full Code    Subjective:   Comfortably sitting in chair  Pleasant but hard of hearing  Still feels congestion in chest  Reports his nasal and ear congestion is better  Denies any chest pain or dizziness      Objective:     Vitals:   Temp (24hrs), Av 4 °F (36 9 °C), Min:97 7 °F (36 5 °C), Max:99 °F (37 2 °C)    Temp:  [97 7 °F (36 5 °C)-99 °F (37 2 °C)] 97 7 °F (36 5 °C)  HR:  [74-89] 87  Resp:  [16-20] 20  BP: (143-190)/(65-91) 143/67  SpO2:  [90 %-95 %] 95 % on 2 L  Body mass index is 26 3 kg/m²  Input and Output Summary (last 24 hours): Intake/Output Summary (Last 24 hours) at 2022 0930  Last data filed at 2022 2300  Gross per 24 hour   Intake 610 ml   Output 4150 ml   Net -3540 ml       Physical Exam:   Physical Exam  Constitutional:       General: He is not in acute distress  Appearance: He is ill-appearing (Chronically)  HENT:      Head: Normocephalic and atraumatic  Ears:      Comments: Decreased hearing  Cardiovascular:      Rate and Rhythm: Normal rate  Comments: Right chest dialysis catheter  Pulmonary:      Effort: Pulmonary effort is normal  No respiratory distress  Breath sounds: No wheezing, rhonchi or rales  Comments: Diminished, clear  Chest:      Chest wall: No tenderness  Abdominal:      General: Bowel sounds are normal  There is no distension  Palpations: Abdomen is soft  Tenderness:  There is no abdominal tenderness  There is no guarding or rebound  Musculoskeletal:      Right lower leg: No edema  Left lower leg: No edema  Skin:     General: Skin is warm and dry  Findings: No rash  Neurological:      Mental Status: He is alert  Mental status is at baseline  Cranial Nerves: No cranial nerve deficit  Additional Data:     Labs:  Results from last 7 days   Lab Units 08/26/22  0616 08/25/22  0542 08/23/22  0814   WBC Thousand/uL 4 79   < > 8 75   HEMOGLOBIN g/dL 10 5*   < > 9 6*   HEMATOCRIT % 32 4*   < > 30 7*   PLATELETS Thousands/uL 245   < > 358   NEUTROS PCT %  --   --  78*   LYMPHS PCT %  --   --  7*   MONOS PCT %  --   --  13*   EOS PCT %  --   --  1    < > = values in this interval not displayed  Results from last 7 days   Lab Units 08/26/22  0616 08/23/22  1547 08/23/22  0911   SODIUM mmol/L 131*   < > 137   POTASSIUM mmol/L 4 0   < > 4 5   CHLORIDE mmol/L 92*   < > 99   CO2 mmol/L 29   < > 28   BUN mg/dL 21   < > 46*   CREATININE mg/dL 4 44*   < > 6 98*   ANION GAP mmol/L 10   < > 10   CALCIUM mg/dL 7 8*   < > 8 4   ALBUMIN g/dL  --   --  2 6*   TOTAL BILIRUBIN mg/dL  --   --  0 36   ALK PHOS U/L  --   --  73   ALT U/L  --   --  8*   AST U/L  --   --  13   GLUCOSE RANDOM mg/dL 104   < > 46*    < > = values in this interval not displayed           Results from last 7 days   Lab Units 08/26/22  0709 08/25/22  2009 08/25/22  1536 08/25/22  1203 08/25/22  0814 08/24/22  2111 08/24/22  1606 08/24/22  1052 08/24/22  0756 08/24/22  0521 08/24/22  0205 08/23/22  2359   POC GLUCOSE mg/dl 112 113 232* 121 107 122 169* 128 121 117 124 133         Results from last 7 days   Lab Units 08/24/22  0458   PROCALCITONIN ng/ml 1 14*       Lines/Drains:  Invasive Devices  Report    Peripheral Intravenous Line  Duration           Peripheral IV 08/23/22 Right Antecubital 3 days    Peripheral IV 08/23/22 Left;Proximal;Ventral (anterior) Forearm 2 days          Hemodialysis Catheter  Duration HD Permanent Double Catheter 170 days                      Imaging: Reviewed radiology reports from this admission including: CT head    Recent Cultures (last 7 days):   Results from last 7 days   Lab Units 08/23/22  1545   BLOOD CULTURE  No Growth at 48 hrs  No Growth at 48 hrs  Last 24 Hours Medication List:   Current Facility-Administered Medications   Medication Dose Route Frequency Provider Last Rate    acetaminophen  650 mg Oral Q6H PRN Lupis Fowler MD      amLODIPine  10 mg Oral Daily [address], MD      calcium acetate  667 mg Oral TID With Meals Lupis Fowler MD      finasteride  5 mg Oral Daily [address], MD      heparin (porcine)  5,000 Units Subcutaneous Q8H [address], MD      insulin aspart protamine-insulin aspart  5 Units Subcutaneous BID AC Lupis Fowler MD      insulin lispro  1-6 Units Subcutaneous 4x Daily (AC & HS) [address], MD      labetalol  10 mg Intravenous Q6H  [place] MD Ganga Jones ON 8/27/2022] losartan  50 mg Oral Daily Madonna Bo MD      pravastatin  80 mg Oral Daily With MD Richard      QUEtiapine  25 mg Oral HS FERNANDO Perez      sodium chloride  1 spray Each Nare Q1H PRN Erin Quispe MD      tamsulosin  0 4 mg Oral Daily With MD Richard          Today, Patient Was Seen By: Erin Quispe MD    ** Please Note: "This note has been constructed using a voice recognition system  Therefore there may be syntax, spelling, and/or grammatical errors   Please call if you have any questions  "**

## 2022-08-26 NOTE — ASSESSMENT & PLAN NOTE
On HD, Tuesday, Thursday, Saturday, ED W 74 5  Last session 8/20/22 prior to admission  Chest x-ray with evidence of congestion and pleural effusion  Echocardiogram 3/22-normal EF 47%, no diastolic dysfunction  Normal RV function  No pericardial effusion  Nephrology following, input appreciated  Patient was noted to be significantly above his EDW, patient was challenged to dry weight with improvement in volume status  Received extra HD on 08/26  Currently reports improvement in symptoms    Received his scheduled HD today, post HD weight today is lower than his prior EDW  Currently saturating adequately on room air  · Continue HD as per Nephrology, continue TTS post discharge  · Incentive spirometry, supplemental oxygen as needed  · Follow-up BMP and electrolytes  · Monitor weight   · Renal low-potassium diet, fluid restriction  · Repeat chest x-ray as outpatient in 1 week, consider outpatient diagnostic and therapeutic thoracentesis if persistent pleural effusion  · Follow-up ANCA titer

## 2022-08-26 NOTE — CASE MANAGEMENT
Case Management Progress Note    Patient name Gino Davis  Location 3 [facility] 311/3 [address] MRN [id number]  : 1945 Date 2022       LOS (days): 3  Geometric Mean LOS (GMLOS) (days): 3 30  Days to GMLOS:0 3        OBJECTIVE:        Current admission status: Inpatient  Preferred Pharmacy:   [facility] [address] [facility], [address] [address] [id number]  Phone: [phone] Fax: [phone]    [facility]  ([address],   [address] [address]  Phone: [phone] Fax: [phone]    Primary Care Provider: Mary Beckford MD    Primary Insurance: MEDICARE  Secondary Insurance: BLUE CROSS    PROGRESS NOTE:    Per rounds, patient still with fluid overload  Plan for HD today and tomorrow  Plan to D/C to [facility] for STR after HD tomorrow  [facility] notified via Nancy Morrell @ [facility] to arrange for transport to OP HD on Tuesday  CM to follow for D/C to [facility]

## 2022-08-26 NOTE — ASSESSMENT & PLAN NOTE
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

## 2022-08-26 NOTE — PROGRESS NOTES
[facility] PROGRESS NOTE   Jasmine Polanco 76 y o  male MRN: [id number]  Unit/Bed#: 3 [place] 311-01 Encounter: [id number]  Reason for Consult: ESRD    ASSESSMENT and PLAN:    33-year-old male ESRD, hypertension, diabetes, ANCA vasculitis did not tolerate Cytoxan in the past, initially presents for weakness and was noted to be hyperkalemia   Had dialysis 8/23 in a m  Renea Hunt had lethargy, encephalopathy, confusion, hypoglycemia and was transferred after rapid response to ICU on morning of 8/23   Nephrology is on board for ESRD      1) ESRD     - outpt unit [facility] TTS  - EDW 74 5 kg  - Access - TDC R IJ  - had HD on 8/23  -8/25-dialysis completed and post weight 75 4 kg with 3 5 L ultrafiltration  -8/26-monitoring off dialysis for now     Plan  -follow-up ANCA titer  -monitor potassium closely with increasing of losartan  -next dialysis Saturday 8/27  -check chest x-ray  Had pleural effusions on prior chest x-ray  Lungs appear relatively clear today on exam with the exception of diminished intake at bases  Patient is still above dry weight also  States is having congestion still  Further decision once chest x-ray completed  Overall, oxygenation is improving   -continue holding clonidine  -low-potassium diet    Update - will plan for HD 2 hour today with 1-2 L UF as pedro  CXR personally reviewed  Still with sign of vasc congestion and effusions bases     2) encephalopathy     -had rapid response a m  with hypoglycemia less than 20 at that time and was transferred to ICU  -CT head initially unrevealing  -follow-up final blood cultures  -follow-up ANCA panel  -etiology remains unclear but appears to have improved  -to note patient was started on clonidine on 08/15  Amlodipine was decreased at this time due to edema  It is unclear if this was the cause of encephalopathy in appears less likely as this was the cause     3) electrolytes - stable electrolytes    Sodium level improving with ultrafiltration     4) acid/base-bicarbonate stable     5) hypertension-is on amlodipine as outpatient and was on losartan 50 mg as outpatient     -amlodipine increased this admission  -losartan was restarted at 25 mg daily--> increased to 50 mg daily on 08/26  -continue holding clonidine     6) 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 as outpatient     7) 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   Immune suppression held since April 2022       -follow-up vanco levels     8) anemia-continue to trend  Avoid ISAMAR for now    SUBJECTIVE / 24H INTERVAL HISTORY:    Oxygen requirement improving 2 L nasal cannula  Blood pressure is rising 689 systolic  Afebrile  Patient still has complaint of congestion  Standing weight is 78 5 kg  OBJECTIVE:  Current Weight: Weight - Scale: 78 5 kg (173 lb)  Vitals:    08/26/22 0104 08/26/22 0455 08/26/22 0600 08/26/22 0742   BP:  (!) 179/81  (!) 190/84   BP Location:  Right arm  Right arm   Pulse:  78  78   Resp:  20  20   Temp:  97 7 °F (36 5 °C)  97 7 °F (36 5 °C)   TempSrc:  Tympanic  Oral   SpO2:  90%  95%   Weight: 78 5 kg (173 lb)  78 5 kg (173 lb)    Height:           Intake/Output Summary (Last 24 hours) at 8/26/2022 0854  Last data filed at 8/25/2022 2300  Gross per 24 hour   Intake 610 ml   Output 4150 ml   Net -3540 ml     General: NAD  Skin: no rash  Eyes: anicteric sclera  ENT: moist mucous membrane  Neck: supple  Chest:  No rales  But diminished intake bases  No wheezes  No rhonchi    CVS: s1s2, no murmur, no gallop, no rub  Abdomen: soft, nontender, nl sounds  Extremities:  Trace pitting edema LE b/l, trace upper extremity edema  : no ruvalcaba  Neuro: AAOX3  Psych: normal affect  Tunneled dialysis catheter without drainage or erythema    Medications:    Current Facility-Administered Medications:     acetaminophen (TYLENOL) tablet 650 mg, 650 mg, Oral, Q6H PRN, [address] MD Robert, 650 mg at 08/24/22 2045    amLODIPine (NORVASC) tablet 10 mg, 10 mg, Oral, Daily, Josette Estevez MD, 10 mg at 08/26/22 0800    calcium acetate (PHOSLO) capsule 667 mg, 667 mg, Oral, TID With Meals, Josette Estevez MD, 667 mg at 08/26/22 0800    finasteride (PROSCAR) tablet 5 mg, 5 mg, Oral, Daily, Htet Namita Breaux MD, 5 mg at 08/26/22 0800    heparin (porcine) subcutaneous injection 5,000 Units, 5,000 Units, Subcutaneous, Q8H [address], Josette Estevez MD, 5,000 Units at 08/26/22 0608    insulin aspart protamine-insulin aspart (NovoLOG 70/30) 100 units/mL subcutaneous injection 5 Units, 5 Units, Subcutaneous, BID AC, Josette Estevez MD, 5 Units at 08/26/22 0747    insulin lispro (HumaLOG) 100 units/mL subcutaneous injection 1-6 Units, 1-6 Units, Subcutaneous, 4x Daily (AC & HS), 3 Units at 08/25/22 1542 **AND** Fingerstick Glucose (POCT), , , 4x Daily AC and at bedtime, Josette Estevez MD    labetalol (NORMODYNE) injection 10 mg, 10 mg, Intravenous, Q6H PRN, Josette Estevez MD, 10 mg at 08/23/22 1553    losartan (COZAAR) tablet 25 mg, 25 mg, Oral, Once, MD Tal Krause ON 8/27/2022] losartan (COZAAR) tablet 50 mg, 50 mg, Oral, Daily, Justyna Chacon MD    pravastatin (PRAVACHOL) tablet 80 mg, 80 mg, Oral, Daily With Dinner, Josette Estevez MD, 80 mg at 08/25/22 1543    QUEtiapine (SEROquel) tablet 25 mg, 25 mg, Oral, HS, FERNANDO Perez, 25 mg at 08/25/22 2218    sodium chloride (OCEAN) 0 65 % nasal spray 1 spray, 1 spray, Each Nare, Q1H PRN, Sara Gill MD    tamsulosin (FLOMAX) capsule 0 4 mg, 0 4 mg, Oral, Daily With Dinner, Josette Estevez MD, 0 4 mg at 08/25/22 1543    Laboratory Results:  Results from last 7 days   Lab Units 08/26/22  0616 08/25/22  0542 08/24/22  1412 08/24/22  0458 08/23/22  1547 08/23/22  0911 08/23/22  0814 08/23/22  0458 08/22/22  2320   WBC Thousand/uL 4 79 5 54  --   --   --   --  8 75  --  7 78   HEMOGLOBIN g/dL 10 5* 9 0*  --   --   --   --  9 6*  --  9 9*   HEMATOCRIT % 32 4* 27 9*  --   --   --   --  30 7*  --  31 9*   PLATELETS Thousands/uL 245 277  -- --   --   --  358  --  316   POTASSIUM mmol/L 4 0 4 2 4 4 3 8 3 7 4 5  --  5 1 7 0*   CHLORIDE mmol/L 92* 90* 91* 95* 94* 99  --  99 99   CO2 mmol/L 29 29 29 31 29 28  --  29 33*   BUN mg/dL 21 36* 25 20 16 46*  --  44* 42*   CREATININE mg/dL 4 44* 6 03* 5 19* 4 30* 3 32* 6 98*  --  6 69* 6 58*   CALCIUM mg/dL 7 8* 7 4* 7 8* 8 1* 8 2* 8 4  --  8 5 8 4   MAGNESIUM mg/dL  --   --   --   --  1 8 2 4  --   --   --    PHOSPHORUS mg/dL  --   --   --  5 3* 1 6* 1 0*  --   --   --

## 2022-08-26 NOTE — ASSESSMENT & PLAN NOTE
Rapid response on 08/23 early a m  with altered mentation and blood glucose was noted to be less than 20 mg/dL   Patient was noted to be lethargic minimally responsive to pain stimuli home patient was transferred to step-down  Received dextrose bolus and infusion with slow improvement  No seizure episode or focal neurological deficit noted  CT head negative  No evidence of infection   Wife also reports that patient was having episodes of confusion for few days prior to admission  Unclear if this was related to hypoglycemic episode at home  Currently improved to baseline     No further hypoglycemia  · Monitor blood glucose and electrolytes  · Supportive care

## 2022-08-26 NOTE — PLAN OF CARE
Problem: Potential for Falls  Goal: Patient will remain free of falls  Description: INTERVENTIONS:  - Educate patient/family on patient safety including physical limitations  - Instruct patient to call for assistance with activity   - Consult OT/PT to assist with strengthening/mobility   - Keep Call bell within reach  - Keep bed low and locked with side rails adjusted as appropriate  - Keep care items and personal belongings within reach  - Initiate and maintain comfort rounds  - Make Fall Risk Sign visible to staff  - Offer Toileting every 2 Hours, in advance of need  - Initiate/Maintain bed alarm  - Obtain necessary fall risk management equipment: yes  - Apply yellow socks and bracelet for high fall risk patients  - Consider moving patient to room near nurses station  Outcome: Progressing     Problem: SAFETY ADULT  Goal: Patient will remain free of falls  Description: INTERVENTIONS:  - Educate patient/family on patient safety including physical limitations  - Instruct patient to call for assistance with activity   - Consult OT/PT to assist with strengthening/mobility   - Keep Call bell within reach  - Keep bed low and locked with side rails adjusted as appropriate  - Keep care items and personal belongings within reach  - Initiate and maintain comfort rounds  - Make Fall Risk Sign visible to staff  - Offer Toileting every 2 Hours, in advance of need  - Initiate/Maintain bed alarm  - Obtain necessary fall risk management equipment: yes  - Apply yellow socks and bracelet for high fall risk patients  - Consider moving patient to room near nurses station  Outcome: Progressing  Goal: Maintain or return to baseline ADL function  Description: INTERVENTIONS:  -  Assess patient's ability to carry out ADLs; assess patient's baseline for ADL function and identify physical deficits which impact ability to perform ADLs (bathing, care of mouth/teeth, toileting, grooming, dressing, etc )  - Assess/evaluate cause of self-care deficits   - Assess range of motion  - Assess patient's mobility; develop plan if impaired  - Assess patient's need for assistive devices and provide as appropriate  - Encourage maximum independence but intervene and supervise when necessary  - Involve family in performance of ADLs  - Assess for home care needs following discharge   - Consider OT consult to assist with ADL evaluation and planning for discharge  - Provide patient education as appropriate  Outcome: Progressing  Goal: Maintains/Returns to pre admission functional level  Description: INTERVENTIONS:  - Perform BMAT or MOVE assessment daily    - Set and communicate daily mobility goal to care team and patient/family/caregiver  - Collaborate with rehabilitation services on mobility goals if consulted  - Perform Range of Motion 3 times a day  - Reposition patient every 2 hours    - Dangle patient 3 times a day  - Stand patient 3 times a day  - Ambulate patient 3 times a day  - Out of bed to chair 3 times a day   - Out of bed for meals 3 times a day  - Out of bed for toileting  - Record patient progress and toleration of activity level   Outcome: Progressing     Problem: METABOLIC, FLUID AND ELECTROLYTES - ADULT  Goal: Electrolytes maintained within normal limits  Description: INTERVENTIONS:  - Monitor labs and assess patient for signs and symptoms of electrolyte imbalances  - Administer electrolyte replacement as ordered  - Monitor response to electrolyte replacements, including repeat lab results as appropriate  - Instruct patient on fluid and nutrition as appropriate  Outcome: Progressing  Goal: Fluid balance maintained  Description: INTERVENTIONS:  - Monitor labs   - Monitor I/O and WT  - Instruct patient on fluid and nutrition as appropriate  - Assess for signs & symptoms of volume excess or deficit  Outcome: Progressing     Problem: Nutrition/Hydration-ADULT  Goal: Nutrient/Hydration intake appropriate for improving, restoring or maintaining nutritional needs  Description: Monitor and assess patient's nutrition/hydration status for malnutrition  Collaborate with interdisciplinary team and initiate plan and interventions as ordered  Monitor patient's weight and dietary intake as ordered or per policy  Utilize nutrition screening tool and intervene as necessary  Determine patient's food preferences and provide high-protein, high-caloric foods as appropriate       INTERVENTIONS:  - Monitor oral intake, urinary output, labs, and treatment plans  - Assess nutrition and hydration status and recommend course of action  - Evaluate amount of meals eaten  - Assist patient with eating if necessary   - Allow adequate time for meals  - Recommend/ encourage appropriate diets, oral nutritional supplements, and vitamin/mineral supplements  - Order, calculate, and assess calorie counts as needed  - Recommend, monitor, and adjust tube feedings and TPN/PPN based on assessed needs  - Assess need for intravenous fluids  - Provide specific nutrition/hydration education as appropriate  - Include patient/family/caregiver in decisions related to nutrition  Outcome: Progressing     Problem: Prexisting or High Potential for Compromised Skin Integrity  Goal: Skin integrity is maintained or improved  Description: INTERVENTIONS:  - Identify patients at risk for skin breakdown  - Assess and monitor skin integrity  - Assess and monitor nutrition and hydration status  - Monitor labs   - Assess for incontinence   - Turn and reposition patient  - Assist with mobility/ambulation  - Relieve pressure over bony prominences  - Avoid friction and shearing  - Provide appropriate hygiene as needed including keeping skin clean and dry  - Evaluate need for skin moisturizer/barrier cream  - Collaborate with interdisciplinary team   - Patient/family teaching  - Consider wound care consult   Outcome: Progressing     Problem: MOBILITY - ADULT  Goal: Maintain or return to baseline ADL function  Description: INTERVENTIONS:  -  Assess patient's ability to carry out ADLs; assess patient's baseline for ADL function and identify physical deficits which impact ability to perform ADLs (bathing, care of mouth/teeth, toileting, grooming, dressing, etc )  - Assess/evaluate cause of self-care deficits   - Assess range of motion  - Assess patient's mobility; develop plan if impaired  - Assess patient's need for assistive devices and provide as appropriate  - Encourage maximum independence but intervene and supervise when necessary  - Involve family in performance of ADLs  - Assess for home care needs following discharge   - Consider OT consult to assist with ADL evaluation and planning for discharge  - Provide patient education as appropriate  Outcome: Progressing  Goal: Maintains/Returns to pre admission functional level  Description: INTERVENTIONS:  - Perform BMAT or MOVE assessment daily    - Set and communicate daily mobility goal to care team and patient/family/caregiver  - Collaborate with rehabilitation services on mobility goals if consulted  - Perform Range of Motion 3 times a day  - Reposition patient every 2 hours    - Dangle patient 3 times a day  - Stand patient 3 times a day  - Ambulate patient 3 times a day  - Out of bed to chair 3 times a day   - Out of bed for meals 3 times a day  - Out of bed for toileting  - Record patient progress and toleration of activity level   Outcome: Progressing

## 2022-08-26 NOTE — ASSESSMENT & PLAN NOTE
Patient with history of ESRD on HD presents with generalized weakness   Noted to have Potassium 7 0  Similar presentation and hospitalization in the past  CT head negative    Mental status was noted to be at baseline with patient  Received medical treatment on admission as per nephrologist recommendation, status post lasix 120mg, metolazone 10mg, kayexalate 30g per nephrology in addition to sodium bicarb, insulin plus dextrose on admission  · Subsequently received urgent HD  Potassium level is improved , and currently remains stable with dialysis and despite addition of ARB  · Renal/low-potassium diet  · Continue monitor after addition of ARB

## 2022-08-27 ENCOUNTER — APPOINTMENT (INPATIENT)
Dept: DIALYSIS | Facility: HOSPITAL | Age: 77
DRG: 640 | End: 2022-08-27
Attending: INTERNAL MEDICINE
Payer: MEDICARE

## 2022-08-27 VITALS
TEMPERATURE: 98.3 F | OXYGEN SATURATION: 96 % | BODY MASS INDEX: 26.25 KG/M2 | RESPIRATION RATE: 16 BRPM | SYSTOLIC BLOOD PRESSURE: 147 MMHG | HEIGHT: 68 IN | HEART RATE: 90 BPM | WEIGHT: 173.2 LBS | DIASTOLIC BLOOD PRESSURE: 72 MMHG

## 2022-08-27 PROBLEM — G93.41 ACUTE METABOLIC ENCEPHALOPATHY DUE TO HYPOGLYCEMIA: Status: RESOLVED | Noted: 2022-08-23 | Resolved: 2022-08-27

## 2022-08-27 PROBLEM — E16.2 ACUTE METABOLIC ENCEPHALOPATHY DUE TO HYPOGLYCEMIA: Status: RESOLVED | Noted: 2022-08-23 | Resolved: 2022-08-27

## 2022-08-27 PROBLEM — E87.5 HYPERKALEMIA: Status: RESOLVED | Noted: 2022-03-07 | Resolved: 2022-08-27

## 2022-08-27 LAB
ALBUMIN SERPL BCP-MCNC: 2.4 G/DL (ref 3.5–5)
ALP SERPL-CCNC: 68 U/L (ref 46–116)
ALT SERPL W P-5'-P-CCNC: <6 U/L (ref 12–78)
ANION GAP SERPL CALCULATED.3IONS-SCNC: 7 MMOL/L (ref 4–13)
AST SERPL W P-5'-P-CCNC: 10 U/L (ref 5–45)
BILIRUB SERPL-MCNC: 0.36 MG/DL (ref 0.2–1)
BUN SERPL-MCNC: 29 MG/DL (ref 5–25)
CALCIUM ALBUM COR SERPL-MCNC: 9 MG/DL (ref 8.3–10.1)
CALCIUM SERPL-MCNC: 7.7 MG/DL (ref 8.3–10.1)
CHLORIDE SERPL-SCNC: 93 MMOL/L (ref 96–108)
CO2 SERPL-SCNC: 30 MMOL/L (ref 21–32)
CREAT SERPL-MCNC: 4.63 MG/DL (ref 0.6–1.3)
GFR SERPL CREATININE-BSD FRML MDRD: 11 ML/MIN/1.73SQ M
GLUCOSE SERPL-MCNC: 116 MG/DL (ref 65–140)
GLUCOSE SERPL-MCNC: 132 MG/DL (ref 65–140)
GLUCOSE SERPL-MCNC: 135 MG/DL (ref 65–140)
GLUCOSE SERPL-MCNC: 135 MG/DL (ref 65–140)
GLUCOSE SERPL-MCNC: 164 MG/DL (ref 65–140)
POTASSIUM SERPL-SCNC: 4.1 MMOL/L (ref 3.5–5.3)
PROT SERPL-MCNC: 6.7 G/DL (ref 6.4–8.4)
SODIUM SERPL-SCNC: 130 MMOL/L (ref 135–147)

## 2022-08-27 PROCEDURE — 99239 HOSP IP/OBS DSCHRG MGMT >30: CPT | Performed by: INTERNAL MEDICINE

## 2022-08-27 PROCEDURE — 99232 SBSQ HOSP IP/OBS MODERATE 35: CPT | Performed by: INTERNAL MEDICINE

## 2022-08-27 PROCEDURE — 80053 COMPREHEN METABOLIC PANEL: CPT | Performed by: INTERNAL MEDICINE

## 2022-08-27 PROCEDURE — 82948 REAGENT STRIP/BLOOD GLUCOSE: CPT

## 2022-08-27 RX ORDER — LOSARTAN POTASSIUM 50 MG/1
50 TABLET ORAL DAILY
Refills: 0
Start: 2022-08-28 | End: 2022-10-17 | Stop reason: SDUPTHER

## 2022-08-27 RX ORDER — AMLODIPINE BESYLATE 10 MG/1
10 TABLET ORAL DAILY
Refills: 0
Start: 2022-08-28 | End: 2022-10-17 | Stop reason: SDUPTHER

## 2022-08-27 RX ORDER — INSULIN ASPART 100 [IU]/ML
5 INJECTION, SUSPENSION SUBCUTANEOUS
Refills: 0
Start: 2022-08-27 | End: 2022-10-17 | Stop reason: SDUPTHER

## 2022-08-27 RX ORDER — ECHINACEA PURPUREA EXTRACT 125 MG
1 TABLET ORAL AS NEEDED
Refills: 0
Start: 2022-08-27

## 2022-08-27 RX ADMIN — INSULIN LISPRO 1 UNITS: 100 INJECTION, SOLUTION INTRAVENOUS; SUBCUTANEOUS at 16:33

## 2022-08-27 RX ADMIN — CALCIUM ACETATE 667 MG: 667 CAPSULE ORAL at 08:04

## 2022-08-27 RX ADMIN — CALCIUM ACETATE 667 MG: 667 CAPSULE ORAL at 12:06

## 2022-08-27 RX ADMIN — TAMSULOSIN HYDROCHLORIDE 0.4 MG: 0.4 CAPSULE ORAL at 16:32

## 2022-08-27 RX ADMIN — HEPARIN SODIUM 5000 UNITS: 5000 INJECTION INTRAVENOUS; SUBCUTANEOUS at 06:02

## 2022-08-27 RX ADMIN — PRAVASTATIN SODIUM 80 MG: 80 TABLET ORAL at 16:32

## 2022-08-27 RX ADMIN — INSULIN ASPART 5 UNITS: 100 INJECTION, SUSPENSION SUBCUTANEOUS at 08:04

## 2022-08-27 RX ADMIN — CALCIUM ACETATE 667 MG: 667 CAPSULE ORAL at 16:32

## 2022-08-27 RX ADMIN — INSULIN ASPART 5 UNITS: 100 INJECTION, SUSPENSION SUBCUTANEOUS at 16:33

## 2022-08-27 RX ADMIN — AMLODIPINE BESYLATE 10 MG: 10 TABLET ORAL at 08:05

## 2022-08-27 RX ADMIN — FINASTERIDE 5 MG: 5 TABLET, FILM COATED ORAL at 08:04

## 2022-08-27 RX ADMIN — LOSARTAN POTASSIUM 50 MG: 50 TABLET, FILM COATED ORAL at 12:08

## 2022-08-27 NOTE — NURSING NOTE
Pt discharged from [address]  IV removed prior to discharge  Pt left with all their belongings  Pt left via stretcher accompanied by transport team  Discharge instructions gone over with patient's receiving facility  No prescriptions written  All questions answered

## 2022-08-27 NOTE — DISCHARGE INSTRUCTIONS
Low-potassium diet, fluid and salt restriction  Monitor weight  Monitor BMP and blood glucose closely  Repeat BMP in 2 days  Repeat chest x-ray in 1 week to evaluate for pleural effusion, consider diagnostic and therapeutic thoracentesis if persistent  Continue HD as per schedule

## 2022-08-27 NOTE — PLAN OF CARE
Problem: Potential for Falls  Goal: Patient will remain free of falls  Description: INTERVENTIONS:  - Educate patient/family on patient safety including physical limitations  - Instruct patient to call for assistance with activity   - Consult OT/PT to assist with strengthening/mobility   - Keep Call bell within reach  - Keep bed low and locked with side rails adjusted as appropriate  - Keep care items and personal belongings within reach  - Initiate and maintain comfort rounds  - Make Fall Risk Sign visible to staff  - Offer Toileting every 2 Hours, in advance of need  - Initiate/Maintain bed alarm  - Obtain necessary fall risk management equipment: yes  - Apply yellow socks and bracelet for high fall risk patients  - Consider moving patient to room near nurses station  8/27/2022 1558 by Lizett Gautam RN  Outcome: Adequate for Discharge  8/27/2022 [id number] by Lizett Gautam RN  Outcome: Progressing     Problem: SAFETY ADULT  Goal: Patient will remain free of falls  Description: INTERVENTIONS:  - Educate patient/family on patient safety including physical limitations  - Instruct patient to call for assistance with activity   - Consult OT/PT to assist with strengthening/mobility   - Keep Call bell within reach  - Keep bed low and locked with side rails adjusted as appropriate  - Keep care items and personal belongings within reach  - Initiate and maintain comfort rounds  - Make Fall Risk Sign visible to staff  - Offer Toileting every 2 Hours, in advance of need  - Initiate/Maintain bed alarm  - Obtain necessary fall risk management equipment: yes  - Apply yellow socks and bracelet for high fall risk patients  - Consider moving patient to room near nurses station  8/27/2022 1558 by Lizett Gautam RN  Outcome: Adequate for Discharge  8/27/2022 5861 by Lizett Gautam RN  Outcome: Progressing  Goal: Maintain or return to baseline ADL function  Description: INTERVENTIONS:  - Assess patient's ability to carry out ADLs; assess patient's baseline for ADL function and identify physical deficits which impact ability to perform ADLs (bathing, care of mouth/teeth, toileting, grooming, dressing, etc )  - Assess/evaluate cause of self-care deficits   - Assess range of motion  - Assess patient's mobility; develop plan if impaired  - Assess patient's need for assistive devices and provide as appropriate  - Encourage maximum independence but intervene and supervise when necessary  - Involve family in performance of ADLs  - Assess for home care needs following discharge   - Consider OT consult to assist with ADL evaluation and planning for discharge  - Provide patient education as appropriate  8/27/2022 1558 by Kaveh Figueroa RN  Outcome: Adequate for Discharge  8/27/2022 1133 by Kaveh Figueroa RN  Outcome: Progressing  Goal: Maintains/Returns to pre admission functional level  Description: INTERVENTIONS:  - Perform BMAT or MOVE assessment daily    - Set and communicate daily mobility goal to care team and patient/family/caregiver  - Collaborate with rehabilitation services on mobility goals if consulted  - Perform Range of Motion 3 times a day  - Reposition patient every 2 hours    - Dangle patient 3 times a day  - Stand patient 3 times a day  - Ambulate patient 3 times a day  - Out of bed to chair 3 times a day   - Out of bed for meals 3 times a day  - Out of bed for toileting  - Record patient progress and toleration of activity level   8/27/2022 1558 by Kaveh Figueroa RN  Outcome: Adequate for Discharge  8/27/2022 9350 by Kaveh Figueroa RN  Outcome: Progressing     Problem: METABOLIC, FLUID AND ELECTROLYTES - ADULT  Goal: Electrolytes maintained within normal limits  Description: INTERVENTIONS:  - Monitor labs and assess patient for signs and symptoms of electrolyte imbalances  - Administer electrolyte replacement as ordered  - Monitor response to electrolyte replacements, including repeat lab results as appropriate  - Instruct patient on fluid and nutrition as appropriate  8/27/2022 1558 by Johnathan Rivers RN  Outcome: Adequate for Discharge  8/27/2022 8987 by Johnathan Rivers RN  Outcome: Progressing  Goal: Fluid balance maintained  Description: INTERVENTIONS:  - Monitor labs   - Monitor I/O and WT  - Instruct patient on fluid and nutrition as appropriate  - Assess for signs & symptoms of volume excess or deficit  8/27/2022 1558 by Johnathan Rivers RN  Outcome: Adequate for Discharge  8/27/2022 8033 by Johnathan Rivers RN  Outcome: Progressing     Problem: Nutrition/Hydration-ADULT  Goal: Nutrient/Hydration intake appropriate for improving, restoring or maintaining nutritional needs  Description: Monitor and assess patient's nutrition/hydration status for malnutrition  Collaborate with interdisciplinary team and initiate plan and interventions as ordered  Monitor patient's weight and dietary intake as ordered or per policy  Utilize nutrition screening tool and intervene as necessary  Determine patient's food preferences and provide high-protein, high-caloric foods as appropriate       INTERVENTIONS:  - Monitor oral intake, urinary output, labs, and treatment plans  - Assess nutrition and hydration status and recommend course of action  - Evaluate amount of meals eaten  - Assist patient with eating if necessary   - Allow adequate time for meals  - Recommend/ encourage appropriate diets, oral nutritional supplements, and vitamin/mineral supplements  - Order, calculate, and assess calorie counts as needed  - Recommend, monitor, and adjust tube feedings and TPN/PPN based on assessed needs  - Assess need for intravenous fluids  - Provide specific nutrition/hydration education as appropriate  - Include patient/family/caregiver in decisions related to nutrition  8/27/2022 1558 by Johnathan Rivers RN  Outcome: Adequate for Discharge  8/27/2022 1900 by Tiago Elizondo RN  Outcome: Progressing     Problem: Prexisting or High Potential for Compromised Skin Integrity  Goal: Skin integrity is maintained or improved  Description: INTERVENTIONS:  - Identify patients at risk for skin breakdown  - Assess and monitor skin integrity  - Assess and monitor nutrition and hydration status  - Monitor labs   - Assess for incontinence   - Turn and reposition patient  - Assist with mobility/ambulation  - Relieve pressure over bony prominences  - Avoid friction and shearing  - Provide appropriate hygiene as needed including keeping skin clean and dry  - Evaluate need for skin moisturizer/barrier cream  - Collaborate with interdisciplinary team   - Patient/family teaching  - Consider wound care consult   8/27/2022 1558 by Tiago Elizondo RN  Outcome: Adequate for Discharge  8/27/2022 8325 by Tiago Elizondo RN  Outcome: Progressing     Problem: MOBILITY - ADULT  Goal: Maintain or return to baseline ADL function  Description: INTERVENTIONS:  -  Assess patient's ability to carry out ADLs; assess patient's baseline for ADL function and identify physical deficits which impact ability to perform ADLs (bathing, care of mouth/teeth, toileting, grooming, dressing, etc )  - Assess/evaluate cause of self-care deficits   - Assess range of motion  - Assess patient's mobility; develop plan if impaired  - Assess patient's need for assistive devices and provide as appropriate  - Encourage maximum independence but intervene and supervise when necessary  - Involve family in performance of ADLs  - Assess for home care needs following discharge   - Consider OT consult to assist with ADL evaluation and planning for discharge  - Provide patient education as appropriate  8/27/2022 1558 by Tiago Elizondo RN  Outcome: Adequate for Discharge  8/27/2022 9651 by Tiago Elizondo RN  Outcome: Progressing  Goal: Maintains/Returns to pre admission functional level  Description: INTERVENTIONS:  - Perform BMAT or MOVE assessment daily    - Set and communicate daily mobility goal to care team and patient/family/caregiver  - Collaborate with rehabilitation services on mobility goals if consulted  - Perform Range of Motion 3 times a day  - Reposition patient every 2 hours    - Dangle patient 3 times a day  - Stand patient 3 times a day  - Ambulate patient 3 times a day  - Out of bed to chair 3 times a day   - Out of bed for meals 3 times a day  - Out of bed for toileting  - Record patient progress and toleration of activity level   8/27/2022 1558 by Cody Romero, RN  Outcome: Adequate for Discharge  8/27/2022 7309 by Cody Romero RN  Outcome: Progressing

## 2022-08-27 NOTE — INCIDENTAL FINDINGS
The following findings require follow up:  Radiographic finding   Finding:  Chest x-ray revealed Moderate bilateral pleural effusions, right greater than left, similar to prior exam    Follow up required:   Yes repeat x-ray in about a week, diagnostic and therapeutic thoracentesis if persistent   Follow up should be done within 1 week(s)    Please notify the following clinician to assist with the follow up:   Dr Sara Fernandez MD

## 2022-08-27 NOTE — PROGRESS NOTES
[facility] PROGRESS NOTE   Patti Thacker 76 y o  male MRN: [id number]  Unit/Bed#: [address] [address] Encounter: [id number]  Reason for Consult: ESRD    ASSESSMENT and PLAN:       70-year-old male ESRD, hypertension, diabetes, ANCA vasculitis did not tolerate Cytoxan in the past, initially presents for weakness and was noted to be hyperkalemia   Had dialysis 8/23 in a m  Anny Ponce had lethargy, encephalopathy, confusion, hypoglycemia and was transferred after rapid response to ICU on morning of 8/23   Nephrology is on board for ESRD      1) ESRD     - outpt unit [address] [facility] T[facility]  - EDW 74 5 kg  - Access - TDC R IJ  - had HD on 8/23  -8/25-dialysis completed and post weight 75 4 kg with 3 5 L ultrafiltration  -8/26-given extra 2 hour HD due to concern for vol overload  - 8/27 - HD      Plan  -follow-up ANCA titer  -monitor potassium closely with increasing of losartan  -seen on dialysis  Na 138, bicarb 35, , Time 4 hour, EDW 74 5 kg    - HD TTS  - low K diet  -reviewed case with primary team attending; agree with BMP on Monday at rehab center  Agree with low-potassium diet  May need further evaluation of pleural effusions as outpatient  2) encephalopathy-improved     -had rapid response a m  with hypoglycemia less than 20 at that time and was transferred to ICU  -CT head initially unrevealing  -follow-up final blood cultures  -follow-up ANCA panel  -etiology remains unclear but appears to have improved  -to note patient was started on clonidine on 08/15   Amlodipine was decreased at this time due to edema   It is unclear if this was the cause of encephalopathy in appears less likely as this was the cause      3) electrolytes - stable electrolytes  monitor hyponatremia with ultrafiltration       4) acid/base-bicarbonate stable     5) hypertension-is on amlodipine as outpatient and was on losartan 50 mg as outpatient     -amlodipine increased this admission  -losartan was restarted at 25 mg daily--> increased to 50 mg daily on 08/26  -continue holding clonidine  -monitor with ultrafiltration also     6) 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 as outpatient     7) 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   Immune suppression held since April 2022       8) anemia-continue to trend   Avoid ISAMAR for now due to encephalopathy  And hemoglobin above goal    SUBJECTIVE / 24H INTERVAL HISTORY:    Blood pressure is elevated 674 systolic  Patient denies complaints  OBJECTIVE:  Current Weight: Weight - Scale: 78 6 kg (173 lb 3 2 oz)  Vitals:    08/27/22 0930 08/27/22 1000 08/27/22 1030 08/27/22 1100   BP: (!) 175/77 (!) 179/89 (!) 180/89 167/82   BP Location: Right arm Right arm Right arm Right arm   Pulse: 88 88 81 86   Resp: 18 18 18 18   Temp:       TempSrc:       SpO2:       Weight:       Height:           Intake/Output Summary (Last 24 hours) at 8/27/2022 1108  Last data filed at 8/27/2022 0800  Gross per 24 hour   Intake 1845 ml   Output 2500 ml   Net -655 ml     General: NAD  Skin: no rash  Eyes: anicteric sclera  ENT: moist mucous membrane  Neck: supple  Chest: CTA b/l, no ronchii, no wheeze, no rubs, no rales  CVS: s1s2, no murmur, no gallop, no rub  Abdomen: soft, nontender, nl sounds  Extremities: no edema LE b/l, improved    Upper extremity edema improving  : no ruvalcaba  Neuro: AAOX3  Psych: normal affect    Medications:    Current Facility-Administered Medications:     acetaminophen (TYLENOL) tablet 650 mg, 650 mg, Oral, Q6H PRN, Htet Vlad Zarate MD, 650 mg at 08/24/22 2045    amLODIPine (NORVASC) tablet 10 mg, 10 mg, Oral, Daily, Lupis Jean MD, 10 mg at 08/26/22 0800    calcium acetate (PHOSLO) capsule 667 mg, 667 mg, Oral, TID With Meals, [address] Zain Jones MD, 667 mg at 08/27/22 0804    finasteride (PROSCAR) tablet 5 mg, 5 mg, Oral, Daily, [address] MD Robert, 5 mg at 08/27/22 0804    heparin (porcine) subcutaneous injection 5,000 Units, 5,000 Units, Subcutaneous, Q8H [address], [address] Zain Jones MD, 5,000 Units at 08/27/22 0602    insulin aspart protamine-insulin aspart (NovoLOG 70/30) 100 units/mL subcutaneous injection 5 Units, 5 Units, Subcutaneous, BID AC, Htet Porsha Madrigal MD, 5 Units at 08/27/22 0804    insulin lispro (HumaLOG) 100 units/mL subcutaneous injection 1-6 Units, 1-6 Units, Subcutaneous, 4x Daily (AC & HS), 1 Units at 08/26/22 2120 **AND** Fingerstick Glucose (POCT), , , 4x Daily AC and at bedtime, [address], MD    labetalol (NORMODYNE) injection 10 mg, 10 mg, Intravenous, Q6H PRN, [address] MD Robert, 10 mg at 08/23/22 1553    losartan (COZAAR) tablet 50 mg, 50 mg, Oral, Daily, Parish Tucker MD    pravastatin (PRAVACHOL) tablet 80 mg, 80 mg, Oral, Daily With Dinner, [address] Zain Jones MD, 80 mg at 08/26/22 1748    QUEtiapine (SEROquel) tablet 25 mg, 25 mg, Oral, HS, FERNANDO Perez, 25 mg at 08/26/22 2119    sodium chloride (OCEAN) 0 65 % nasal spray 1 spray, 1 spray, Each Nare, Q1H PRN, Vandana Diaz MD    tamsulosin (FLOMAX) capsule 0 4 mg, 0 4 mg, Oral, Daily With Dinner, [address] Zain Jones MD, 0 4 mg at 08/26/22 1748    Laboratory Results:  Results from last 7 days   Lab Units 08/27/22  0603 08/26/22  0616 08/25/22  0542 08/24/22  1412 08/24/22  0458 08/23/22  1547 08/23/22  0911 08/23/22  0814 08/23/22  0458 08/22/22  2320   WBC Thousand/uL  --  4 79 5 54  --   --   --   --  8 75  --  7 78   HEMOGLOBIN g/dL  --  10 5* 9 0*  --   --   --   --  9 6*  --  9 9*   HEMATOCRIT %  --  32 4* 27 9*  --   --   --   --  30 7*  --  31 9*   PLATELETS Thousands/uL  --  245 277  --   --   --   --  358  --  316   POTASSIUM mmol/L 4 1 4 0 4 2 4 4 3 8 3 7 4 5  --    < > 7 0*   CHLORIDE mmol/L 93* 92* 90* 91* 95* 94* 99  --    < > 99   CO2 mmol/L 30 29 29 29 31 29 28  --    < > 33*   BUN mg/dL 29* 21 36* 25 20 16 46*  --    < > 42*   CREATININE mg/dL 4 63* 4 44* 6 03* 5 19* 4 30* 3 32* 6 98*  --    < > 6 58*   CALCIUM mg/dL 7 7* 7 8* 7 4* 7 8* 8 1* 8 2* 8 4  --    < > 8 4   MAGNESIUM mg/dL  --   --   --   -- --  1 8 2 4  --   --   --    PHOSPHORUS mg/dL  --   --   --   --  5 3* 1 6* 1 0*  --   --   --     < > = values in this interval not displayed

## 2022-08-27 NOTE — CASE MANAGEMENT
Case Management Discharge Planning Note    Patient name Jasmine Poalnco  Location [address] [place] [address] [address] MRN [id number]  : 1945 Date 2022       Current Admission Date: 2022  Current Admission Diagnosis:Acute metabolic encephalopathy due to hypoglycemia   Patient Active Problem List    Diagnosis Date Noted    Acute metabolic encephalopathy due to hypoglycemia 2022    ESRD on dialysis [facility]) 2022    Hyponatremia 2022    Left leg pain 2022    Rhabdomyolysis 2022    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[facility] ) 2022    Pulmonary hypertension ([facility] ) 2022    Dialysis patient ([facility] ) 2022    Anemia due to chronic kidney disease, on chronic dialysis ([facility] )     Hemoptysis 2022    Other proteinuria     Symptomatic anemia 2022    Chest pain 2022    Hyperkalemia 2022    Melena 2022    Elevated PSA 2021    Chronic pain of right knee 2021    Primary osteoarthritis of right knee 2021    Bladder stones 12/15/2016    Type 2 diabetes mellitus with chronic kidney disease on chronic dialysis, with long-term current use of insulin ([facility] ) 2016    Benign colon polyp 2013    Benign prostatic hyperplasia with lower urinary tract symptoms 2013    Benign essential hypertension 2013    Hyperlipidemia 2012    Vitamin D deficiency 2012      LOS (days): 4  Geometric Mean LOS (GMLOS) (days): 3 30  Days to GMLOS:-0 7     OBJECTIVE:  Risk of Unplanned Readmission Score: 49 52      Current admission status: Inpatient   Preferred Pharmacy:   [address]  [place] [address] [address]  [address] [address] [address]  Phone: [phone] Fax: [phone]    [facility]  ([facility]) - [place]rell Grant  [address] [facility]  [address] [place] [address]  Phone: [phone] Fax: [phone]    Primary Care Provider: Yamilet Stallworth Monse Tripp MD    Primary Insurance: MEDICARE  Secondary Insurance: BLUE CROSS    DISCHARGE DETAILS:    Discharge planning discussed with[de-identified] Justyna Mast (spouse)     CM contacted family/caregiver?:  (Left VM)  Were Treatment Team discharge recommendations reviewed with patient/caregiver?: Yes  Did patient/caregiver verbalize understanding of patient care needs?: N/A- going to facility  Were patient/caregiver advised of the risks associated with not following Treatment Team discharge recommendations?: Yes    Contacts  Patient Contacts: Justyna Law  Relationship to Patient[de-identified] Family  Contact Method: Phone  Phone Number: [phone]  Reason/Outcome: Emergency Contact, Discharge Planning    Treatment Team Recommendation: Short Term Rehab  Discharge Destination Plan[de-identified] Short Term Rehab  Transport at Discharge : BLS Ambulance     Number/Name of Dispatcher: Shamar Melchor and Unit #): TBD  ETA of Transport (Date): 08/27/22  ETA of Transport (Time):  (TBD)    [facility]  Receiving Facility/Agency Phone Number: [phone]      SW notified by Attending that pt is medically cleared for discharge today to SNF  SW called facility and spoke with Teressa Holter  She confirmed pt can admit today and requested SW call the  and speak to Melvi once pickup time is known  SW left VM with spouse Fercho Zheng advising of above and that she will be notified with pickup time  BLS transport requested in [employer]  Medical necessity form completed and provided to nursing

## 2022-08-27 NOTE — DISCHARGE INSTR - SUPPLEMENTARY INSTRUCTIONS
Instructions            Low-potassium diet, fluid and salt restriction    Monitor weight  Monitor BMP and blood glucose closely  Repeat BMP in 2 days  Repeat chest x-ray in 1 week to evaluate for pleural effusion, consider diagnostic and therapeutic thoracentesis if persistent  Continue HD as per schedule

## 2022-08-27 NOTE — [place] UNIVERSAL TRANSFER FORM
[place] UNIVERS[place] TRANSFER FORM  (ALL ITEMS MUST BE COMPLETED)    1  TRANSFER FROM: [address]      TRANSFER To: [facility]    2  DATE OF TRANSFER: 8/27/2022                        TIME OF TRANSFER: 1800    3  PATIENT NAME: Alise Beltrán      YOB: 1945                             GENDER: male    4  LANGUAGE:   English    5  PHYSICIAN NAME:  Jose Maria Franklin MD                   PHONE: [phone]    6  CODE STATUS: Level 1 - Full Code        Out of Hospital DNR Attached: No    7  :                                      :  Extended Emergency Contact Information  Primary Emergency Contact: Sanjuana Maier  Address: [address]  [place] Phone: [phone]  Relation: Crystal Harkins [facility] Representative/Proxy:  Yes           Legal Guardian:  No             NAME OF:           HEALTH CARE REPRESENTATIVE/PROXY:                                         OR           LEGAL GUARDIAN, IF NOT :                                               PHONE:  (Day)           (Night)                        (Cell)    8  REASON FOR TRANSFER: (Must include brief medical history and recent changes in physical function or cognition ) STR            V/S: /85 (BP Location: Right arm)   Pulse 85   Temp 98 2 °F (36 8 °C) (Tympanic)   Resp 18   Ht 5' 8" (1 727 m)   Wt 78 6 kg (173 lb 3 2 oz)   SpO2 95%   BMI 26 33 kg/m²           PAIN: None    9  PRIMARY DIAGNOSIS: Acute metabolic encephalopathy due to hypoglycemia      Secondary Diagnosis:         Pacemaker: No      Internal Defib: No          Mental Health Diagnosis (if Applicable):    10  RESTRAINTS: No     11  RESPIRATORY NEEDS: Oxygen Device 2L NC  With transport ,    12  ISOLATION/PRECAUTION: None    13  ALLERGY: Amoxicillin    14   SENSORY:       Vision Good, Hearing Poor and Speech Clear    15  SKIN CONDITION: No Wounds    16  DIET: Special (describe)Renal     17  IV ACCESS: OtherHD cath     18  PERSONAL ITEMS SENT WITH PATIENT: OtherClothing     23  ATTACHED DOCUMENTS: MUST ATTACH CURRENT MEDICATION INFORMATION Face Sheet, MAR, Medication Reconciliation, Diagnostic Studies, Labs and Discharge Summary    20  AT RISK ALERTS:Falls        HARM TO: N/A    21  WEIGHT BEARING STATUS:         Left Leg: None        Right Leg: None    22  MENTAL STATUS:Alert and Forgetful    23  FUNCTION:        Walk: With Help        Transfer: With Help        Toilet: With Help        Feed: Self    24  IMMUNIZATIONS/SCREENING:     Immunization History   Administered Date(s) Administered    COVID-19 MODERNA VACC 0 25 ML IM BOOSTER 12/03/2021    COVID-19 PFIZER VACCINE 0 3 ML IM 05/06/2021, 05/29/2021    Hepatitis B Vaccine (Recombinant), Cpg Adjuvanted 04/14/2022, 05/12/2022, 07/14/2022    Influenza Split High Dose Preservative Free IM 10/11/2013, 09/23/2016, 11/01/2017    Influenza, high dose seasonal 0 7 mL 12/17/2018, 10/02/2019, 11/09/2020, 09/30/2021    Influenza, seasonal, injectable 11/17/2000, 11/16/2001, 12/06/2002, 10/30/2003, 10/18/2005, 10/20/2006, 10/17/2007, 10/28/2008, 09/15/2009, 09/11/2010, 09/19/2011, 09/26/2012    Pneumococcal Conjugate 13-Valent 08/05/2015    Pneumococcal Polysaccharide PPV23 11/16/2001, 09/25/2012    Tdap 02/14/2018    Zoster 09/04/2015       25  BOWEL: Continent    26  BLADDER: Continent    27   SENDING FACILITY CONTACT: [address] [place] [address]         [address]: [address]        Phone: [phone] [address] (if known):        Title:        Unit:         Phone:         FORM PREFILLED BY (if applicable)       Title:       Unit:        Phone:         FORM COMPLETED BY Veronica Ga RN      Title: Registered Nurse      Phone: [phone]

## 2022-08-27 NOTE — PLAN OF CARE
Post-Dialysis RN Treatment Note    Blood Pressure:  Pre: 181/88 mm/Hg  Post: 165/85 mmHg   EDW: 74 5 kg    Weight:  Pre: 77 8 kg   Post: 74 3 kg   Mode of weight measurement: Bed Scale   Volume Removed: 3500 ml    Treatment duration: 240 minutes    NS given  No    Treatment shortened?  No   Medications given during Rx None Reported   Estimated Kt/V  Not Applicable   Access type: Permacath/TDC   Access Issues: No    Report called to primary nurse   Yes, Edward Iyer    Problem: METABOLIC, FLUID AND ELECTROLYTES - ADULT  Goal: Electrolytes maintained within normal limits  Description: INTERVENTIONS:  - Monitor labs and assess patient for signs and symptoms of electrolyte imbalances  - Administer electrolyte replacement as ordered  - Monitor response to electrolyte replacements, including repeat lab results as appropriate  - Instruct patient on fluid and nutrition as appropriate  Outcome: Progressing  Goal: Fluid balance maintained  Description: INTERVENTIONS:  - Monitor labs   - Monitor I/O and WT  - Instruct patient on fluid and nutrition as appropriate  - Assess for signs & symptoms of volume excess or deficit  Outcome: Progressing

## 2022-08-27 NOTE — CASE MANAGEMENT
Case Management Discharge Planning Note    Patient name Alex Zarate  Location [address] [place], [place] [address] [address] [facility] [address] MRN [id number]  : 1945 Date 2022       Current Admission Date: 2022  Current Admission Diagnosis:Benign essential hypertension   Patient Active Problem List    Diagnosis Date Noted    ESRD on dialysis [facility]) 2022    Hyponatremia 2022    Left leg pain 2022    Rhabdomyolysis 2022    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[facility] ) 2022    Pulmonary hypertension ([facility] ) 2022    Dialysis patient ([facility] ) 2022    Anemia due to chronic kidney disease, on chronic dialysis ([facility] )     Hemoptysis 2022    Other proteinuria     Symptomatic anemia 2022    Chest pain 2022    Melena 2022    Elevated PSA 2021    Chronic pain of right knee 2021    Primary osteoarthritis of right knee 2021    Bladder stones 12/15/2016    Type 2 diabetes mellitus with chronic kidney disease on chronic dialysis, with long-term current use of insulin ([facility] ) 2016    Benign colon polyp 2013    Benign prostatic hyperplasia with lower urinary tract symptoms 2013    Benign essential hypertension 2013    Hyperlipidemia 2012    Vitamin D deficiency 2012      LOS (days): 4  Geometric Mean LOS (GMLOS) (days): 3 30  Days to GMLOS:-0 8     OBJECTIVE:  Risk of Unplanned Readmission Score: 49 52      Current admission status: Inpatient   Preferred Pharmacy:   [facility] [address]  [place] [address] [address]  Phone: [phone] Fax: [phone]    [facility]  ([facility]) - [facility] [address] [address]  Phone: [phone] Fax: [phone]    Primary Care Provider: Veronica Mayorga MD    Primary Insurance: MEDICARE  Secondary Insurance: BLUE CROSS    DISCHARGE DETAILS:    CM contacted family/caregiver?: Yes    Contacts  Patient Contacts: Madison Franks  Relationship to Patient[de-identified] Family  Contact Method: Phone  Phone Number: [phone]  Reason/Outcome: Emergency Contact, Discharge Planning    Number/Name of Dispatcher: [facility]  Transported by [employer] and Unit #): [facility]  ETA of Transport (Date): 08/27/22  ETA of Transport (Time): 1800      [facility] has accepted BLS transport to [address] [place] [address] at 1800  SW called facility and notified Melvi  Spouse Patrick Garcia contacted and notified as well  Attending and unit nurse aware

## 2022-08-27 NOTE — DISCHARGE SUMMARY
Discharge Summary - [facility]    Patient Information: Gemini Chance 76 y o  male MRN: [id number]  Unit/Bed#: [address] [place] [address] Encounter: [id number]    Discharging Physician / Practitioner: Tereso Serna MD  PCP: Vinh Villeda MD  Admission Date: 8/22/2022  Discharge Date: 08/27/22    Reason for Admission: Weakness - Generalized (Pt brought in by EMS for weakness after pt sts he guided himself down in the hallway and could not get back up  Denies any pain  Goes to dialysis T/TH/S, went on Saturday  Also hx of diabetic)      Discharge Diagnoses:     Principal Problem (Resolved):    Acute metabolic encephalopathy due to hypoglycemia  Active Problems:    ESRD on dialysis [facility])    Type 2 diabetes mellitus with chronic kidney disease on chronic dialysis, with long-term current use of insulin (HCC)    Anemia due to chronic kidney disease, on chronic dialysis ([facility] [address] )    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HCC)    Benign essential hypertension    Hyperlipidemia  Resolved Problems:    Hyperkalemia        ESRD on dialysis [facility])  Assessment & Plan  On HD, Tuesday, Thursday, Saturday, ED [address]  Last session 8/20/22 prior to admission  Chest x-ray with evidence of congestion and pleural effusion  Echocardiogram 3/22-normal EF 47%, no diastolic dysfunction  Normal RV function  No pericardial effusion  Nephrology following, input appreciated  Patient was noted to be significantly above his EDW, patient was challenged to dry weight with improvement in volume status  Received extra HD on 08/26  Currently reports improvement in symptoms    Received his scheduled HD today, post HD weight today is lower than his prior EDW  Currently saturating adequately on room air  · Continue HD as per Nephrology, continue TTS post discharge  · Incentive spirometry, supplemental oxygen as needed  · Follow-up BMP and electrolytes  · Monitor weight   · Renal low-potassium diet, fluid restriction  · Repeat chest x-ray as outpatient in 1 week, consider outpatient diagnostic and therapeutic thoracentesis if persistent pleural effusion  · Follow-up ANCA titer      Hyperkalemia-resolved as of 8/27/2022  Assessment & Plan  Patient with history of ESRD on HD presents with generalized weakness   Noted to have Potassium 7 0  Similar presentation and hospitalization in the past  CT head negative  Mental status was noted to be at baseline with patient  Received medical treatment on admission as per nephrologist recommendation, status post lasix 120mg, metolazone 10mg, kayexalate 30g per nephrology in addition to sodium bicarb, insulin plus dextrose on admission  · Subsequently received urgent HD  Potassium level is improved , and currently remains stable with dialysis and despite addition of ARB  · Renal/low-potassium diet  · Continue monitor after addition of ARB       * Acute metabolic encephalopathy due to hypoglycemia-resolved as of 8/27/2022  Assessment & Plan  Rapid response on 08/23 early a m  with altered mentation and blood glucose was noted to be less than 20 mg/dL   Patient was noted to be lethargic minimally responsive to pain stimuli home patient was transferred to step-down  Received dextrose bolus and infusion with slow improvement  No seizure episode or focal neurological deficit noted  CT head negative  No evidence of infection   Wife also reports that patient was having episodes of confusion for few days prior to admission  Unclear if this was related to hypoglycemic episode at home  Currently improved to baseline   No further hypoglycemia  · Monitor blood glucose and electrolytes  · Supportive care    ANCA-associated vasculitis [facility])  Assessment & Plan  Status post kidney biopsy on 3/22-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  Previously treated with Cytoxan and subsequently prescribed prednisone taper, patient has not been taking    Off immunosuppression since 4/22   Nephrology following  · Follow-up pending ANCA titer  · Further workup and treatment as per Nephrology      Anemia due to chronic kidney disease, on chronic dialysis [facility])  Assessment & Plan  Stable  Will continue to monitor       Type 2 diabetes mellitus with chronic kidney disease on chronic dialysis, with long-term current use of insulin [facility])  Assessment & Plan  Lab Results   Component Value Date    HGBA1C 5 1 05/08/2022       Recent Labs     08/26/22 2007 08/27/22  0236 08/27/22  0657 08/27/22  1102   POCGLU 177* 135 132 116       Blood Sugar Average: Last 72 hrs:  (P) 138 04   Tightly controlled  No further hypoglycemic episode on current home regimen   Continue 70/30 insulin 5 units BID currently, blood sugar trend is acceptable without any hypoglycemia   Monitor      Hyperlipidemia  Assessment & Plan  Continue statin    Benign essential hypertension  Assessment & Plan  Remains elevated, but trend is improving with dialysis and adjustment in antihypertensive  ·  Continue amlodipine, was increased to 10 mg   · Losartan was added , increase to 50 mg  · Monitor blood pressure and electrolytes/potassium  · Follow-up as outpatient  · BMP in 2 days      Consultations During Hospital Stay:  [facility]    Procedures Performed:     · Dialysis    Significant Findings:     · Refer to hospital course and above listed diagnosis related plan for details    Imaging while in hospital:    XR chest portable    Result Date: 8/26/2022  Narrative: CHEST INDICATION:   congestion  COMPARISON:  August 2, 2022  EXAM PERFORMED/VIEWS:  XR CHEST PORTABLE FINDINGS:  Right IJ dialysis catheter with tip at the cavoatrial junction  Unchanged mild enlargement of the cardiac silhouette  Mediastinal contours are within normal limits  Moderate bilateral pleural effusions with bibasilar atelectasis obscuring the contours of hemidiaphragms and blunting costophrenic sulci, right side greater than left, similar to prior exam   No pneumothorax   Osseous structures appear within normal limits for patient age  Impression: Moderate bilateral pleural effusions, right greater than left, similar to prior exam  Workstation performed: [id number]     XR chest 1 view portable    Result Date: 8/2/2022  Narrative: CHEST INDICATION:   Weakness  COMPARISON:  Chest x-ray 6/1/2022 EXAM PERFORMED/VIEWS:  XR CHEST PORTABLE FINDINGS:  Right-sided dialysis catheter tip overlies the SVC  Heart size is not well evaluated on this single portable AP view  Small to moderate-sized bilateral pleural effusions, new  Upper lung fields are clear  Osseous structures appear within normal limits for patient age  Impression: Small to moderate-sized bilateral pleural effusions, new  The study was marked in EPIC for significant notification  Workstation performed: [id number]     CT head without contrast    Result Date: 8/23/2022  Narrative: CT BRAIN - WITHOUT CONTRAST INDICATION:   Head trauma, minor (Age >= 65y) fall  COMPARISON:  None  TECHNIQUE:  CT examination of the brain was performed  In addition to axial images, sagittal and coronal 2D reformatted images were created and submitted for interpretation  Radiation dose length product (DLP) for this visit:  931 58 mGy-cm   This examination, like all CT scans performed in the [facility], was performed utilizing techniques to minimize radiation dose exposure, including the use of iterative  reconstruction and automated exposure control  IMAGE QUALITY:  Diagnostic  FINDINGS: PARENCHYMA: Decreased attenuation is noted in periventricular white matter demonstrating an appearance that is statistically most likely to represent mild microangiopathic change  No CT signs of acute infarction  No intracranial mass, mass effect or midline shift  No acute parenchymal hemorrhage  VENTRICLES AND EXTRA-AXIAL SPACES:  Ventricles and extra-axial CSF spaces are prominent commensurate with the degree of volume loss  No hydrocephalus    No acute extra-axial hemorrhage  VISUALIZED ORBITS AND PARANASAL SINUSES:  Unremarkable  CALVARIUM AND EXTRACRANIAL SOFT TISSUES:  Normal      Impression: No acute intracranial abnormality  Workstation performed: [id number]       Incidental Findings:   · Pleural effusion    Test Results Pending at Discharge (will require follow up):   · As per After Visit Summary     Outpatient Tests Requested:  · BMP on 08/29  · Chest x-ray in 1 week  · Blood glucose monitoring    Complications:  Refer to hospital course and above listed diagnosis related plan, if any    Hospital Course: As per HPI  Trevor Alvarado is a 76 y o  male patient with multiple comorbidities including ESRD, anemia, diabetes mellitus type 2, hypertension, ANCA vasculitis who originally presented to the hospital on 8/22/2022 due to generalized weakness  Patient reported that he went to leave the house  and felt really weak and slid down the side of the wall  He stated he felt like he did last time he was admitted to the hospital  He has been compliant with dialysis, last session was on Saturday  No nausea, vomiting, headache, lightheadedness  Denies any fevers, chills, chest pain, SOB, cough, abdominal pain  Patient was recently hospitalized from 8 /2-8/ 4 for hyperkalemia  On current presentation, Patient was also noted to have hyperkalemia of 7 which was treated medically and subsequent potassium was 5 1  There are no EKG changes  Patient was subsequently admitted to telemetry  Patient was seen by Nephrology and scheduled for dialysis  On 08/23 morning patient was noted to have blood glucose of less than 20 and patient was noted to be unresponsive RRT was called and patient was subsequently transferred to ICU for further treatment and management  Patient was given dextrose with improvement but remained confused  Family/wife also reported that patient was appeared to be confuse last few days prior to presentation    Patient was evaluated by infectious etiology which was negative  Mental status gradually improved with correction of hyperglycemia  Patient was continued on dialysis with improvement in electrolytes and patient was also noted to have significantly above dry weight which also improved with dialysis  Patient subsequently remained afebrile otherwise hemodynamically stable  Mental status gradually improved to baseline, confusion improved though communication was limited at times due to significant hearing impairment     Blood pressure was elevated and antihypertensives were adjusted  Patient subsequently was transferred to medical-surgical 4  Patient was continued on dialysis as he remained above dry weight and volume status gradually improved  Currently patient is afebrile hemodynamically stable at baseline  Reports good appetite and blood glucose is stable on current regimen without recurrent hypoglycemia  Blood pressure trend is improving and electrolytes are stable  Potassium remains stable despite resuming ARB  Patient was evaluated by Physical therapy and recommended rehab, now being discharged to rehab will require close monitoring of volume status and electrolytes  Patient will need BMP in 2 days  Patient will be maintained on strict potassium diet  Patient will require repeat chest x-ray in 1 week and may require outpatient diagnostic/therapeutic thoracentesis if remains to have persistent pleural effusion  Please see above list of diagnoses and related plan for additional information         Condition at Discharge: stable     Discharge Day Visit / Exam:     Subjective:  Physician dialysis  Reports that his breathing is better  Denies any chest pain or cough  Remains afebrile  Congestion is improving  Good appetite as per nursing    Vitals: Blood Pressure: 165/85 (08/27/22 1215)  Pulse: 85 (08/27/22 1215)  Temperature: 98 2 °F (36 8 °C) (08/27/22 1215)  Temp Source: Tympanic (08/27/22 1215)  Respirations: 18 (08/27/22 1215)  Height: 5' 8" (172 7 cm) (08/23/22 0201)  Weight - Scale: 78 6 kg (173 lb 3 2 oz) (08/27/22 0600)  SpO2: 95 % (08/27/22 1215)  Exam:   Physical Exam  Constitutional:       General: He is not in acute distress  Appearance: He is ill-appearing (Chronically)  Comments: Pleasant   HENT:      Head: Normocephalic and atraumatic  Cardiovascular:      Rate and Rhythm: Normal rate  Comments: Right chest HD catheter  Pulmonary:      Effort: Pulmonary effort is normal  No respiratory distress  Breath sounds: No wheezing, rhonchi or rales  Comments: Diminished, clear  Chest:      Chest wall: No tenderness  Abdominal:      General: Bowel sounds are normal  There is no distension  Palpations: Abdomen is soft  Tenderness: There is no abdominal tenderness  There is no guarding or rebound  Musculoskeletal:      Right lower leg: No edema  Left lower leg: No edema  Skin:     General: Skin is warm and dry  Findings: No rash  Neurological:      Mental Status: He is alert  Mental status is at baseline  Cranial Nerves: No cranial nerve deficit  Sensory: Sensory deficit (Decreased hearing) present  Psychiatric:         Mood and Affect: Mood normal          Discharge instructions/Information to patient and family:(Discharge Medications and Follow up):   See after visit summary for information provided to patient and family  Provisions for Follow-Up Care:  See after visit summary for information related to follow-up care and any pertinent home health orders  Disposition: Short-term rehab at [facility]    Planned Readmission:  No     Discharge Statement:  I spent 45 minutes discharging the patient  This time was spent on the day of discharge  I had direct contact with the patient on the day of discharge   Greater than 50% of the total time was spent examining patient, answering all patient questions, arranging and discussing plan of care with patient as well as directly providing post-discharge instructions  Additional time then spent on discharge activities  Coordinated with Nephrology regarding discharge and follow-up plan  Discussed with patient's wife on the phone  Discharge Medications:  See after visit summary for reconciled discharge medications provided to patient and family  ** Please Note: "This note has been constructed using a voice recognition system  Therefore there may be syntax, spelling, and/or grammatical errors   Please call if you have any questions  "**

## 2022-08-28 LAB
BACTERIA BLD CULT: NORMAL
BACTERIA BLD CULT: NORMAL

## 2022-08-29 ENCOUNTER — TRANSITIONAL CARE MANAGEMENT (OUTPATIENT)
Dept: FAMILY MEDICINE CLINIC | Facility: CLINIC | Age: 77
End: 2022-08-29

## 2022-08-29 LAB
C-ANCA TITR SER IF: ABNORMAL TITER
MYELOPEROXIDASE AB SER IA-ACNC: <0.2 UNITS (ref 0–0.9)
P-ANCA ATYPICAL TITR SER IF: ABNORMAL TITER
P-ANCA TITR SER IF: ABNORMAL TITER
PROTEINASE3 AB SER IA-ACNC: 7 UNITS (ref 0–0.9)

## 2022-09-08 ENCOUNTER — RA CDI HCC (OUTPATIENT)
Dept: OTHER | Facility: HOSPITAL | Age: 77
End: 2022-09-08

## 2022-09-08 NOTE — PROGRESS NOTES
[id number]  [facility]  coding opportunities          Chart Reviewed number of suggestions sent to Provider: 1     Patients Insurance     Medicare Insurance: [employer] EOAE (evoked otoacoustic emission)

## 2022-09-20 ENCOUNTER — TRANSCRIBE ORDERS (OUTPATIENT)
Dept: VASCULAR SURGERY | Facility: CLINIC | Age: 77
End: 2022-09-20

## 2022-09-20 ENCOUNTER — TELEPHONE (OUTPATIENT)
Dept: VASCULAR SURGERY | Facility: CLINIC | Age: 77
End: 2022-09-20

## 2022-09-20 DIAGNOSIS — Z99.2 ESRD ON DIALYSIS (HCC): Primary | ICD-10-CM

## 2022-09-20 DIAGNOSIS — N18.6 ENCOUNTER REGARDING VASCULAR ACCESS FOR DIALYSIS FOR ESRD (HCC): Primary | ICD-10-CM

## 2022-09-20 DIAGNOSIS — N18.6 END STAGE RENAL DISEASE (HCC): ICD-10-CM

## 2022-09-20 DIAGNOSIS — Z99.2 ENCOUNTER REGARDING VASCULAR ACCESS FOR DIALYSIS FOR ESRD (HCC): Primary | ICD-10-CM

## 2022-09-20 DIAGNOSIS — N18.6 ESRD ON DIALYSIS (HCC): Primary | ICD-10-CM

## 2022-09-20 NOTE — TELEPHONE ENCOUNTER
Patient wife called to schedule VM and OV  I contacted [facility] at [phone] and requested she fax over the script and referral to see Dr Milady Momin   Provided the fax number [phone]

## 2022-09-20 NOTE — TELEPHONE ENCOUNTER
Received referral and script   Transcribed orders- called and unable to leave message to set up VM and OV

## 2022-10-14 ENCOUNTER — HOSPITAL ENCOUNTER (OUTPATIENT)
Dept: RADIOLOGY | Facility: HOSPITAL | Age: 77
Discharge: HOME | End: 2022-10-14
Payer: MEDICARE

## 2022-10-14 DIAGNOSIS — Z99.2 ESRD ON DIALYSIS (HCC): ICD-10-CM

## 2022-10-14 DIAGNOSIS — N18.6 END STAGE RENAL DISEASE (HCC): ICD-10-CM

## 2022-10-14 DIAGNOSIS — N18.6 ESRD ON DIALYSIS (HCC): ICD-10-CM

## 2022-10-14 PROCEDURE — 93985 DUP-SCAN HEMO COMPL BI STD: CPT

## 2022-10-15 PROCEDURE — 93985 DUP-SCAN HEMO COMPL BI STD: CPT | Performed by: SURGERY

## 2022-10-17 ENCOUNTER — OFFICE VISIT (OUTPATIENT)
Dept: FAMILY MEDICINE CLINIC | Facility: CLINIC | Age: 77
End: 2022-10-17
Payer: MEDICARE

## 2022-10-17 VITALS
RESPIRATION RATE: 16 BRPM | BODY MASS INDEX: 24.55 KG/M2 | WEIGHT: 162 LBS | HEIGHT: 68 IN | SYSTOLIC BLOOD PRESSURE: 152 MMHG | DIASTOLIC BLOOD PRESSURE: 68 MMHG | HEART RATE: 87 BPM | OXYGEN SATURATION: 98 %

## 2022-10-17 DIAGNOSIS — B30.9 VIRAL CONJUNCTIVITIS OF LEFT EYE: ICD-10-CM

## 2022-10-17 DIAGNOSIS — E78.5 HYPERLIPIDEMIA, UNSPECIFIED HYPERLIPIDEMIA TYPE: ICD-10-CM

## 2022-10-17 DIAGNOSIS — Z99.2 TYPE 2 DIABETES MELLITUS WITH CHRONIC KIDNEY DISEASE ON CHRONIC DIALYSIS, WITH LONG-TERM CURRENT USE OF INSULIN (HCC): Primary | ICD-10-CM

## 2022-10-17 DIAGNOSIS — E11.22 TYPE 2 DIABETES MELLITUS WITH CHRONIC KIDNEY DISEASE ON CHRONIC DIALYSIS, WITH LONG-TERM CURRENT USE OF INSULIN (HCC): Primary | ICD-10-CM

## 2022-10-17 DIAGNOSIS — N40.1 BENIGN PROSTATIC HYPERPLASIA WITH LOWER URINARY TRACT SYMPTOMS, SYMPTOM DETAILS UNSPECIFIED: ICD-10-CM

## 2022-10-17 DIAGNOSIS — N18.6 ANEMIA DUE TO CHRONIC KIDNEY DISEASE, ON CHRONIC DIALYSIS (HCC): ICD-10-CM

## 2022-10-17 DIAGNOSIS — I10 BENIGN ESSENTIAL HYPERTENSION: ICD-10-CM

## 2022-10-17 DIAGNOSIS — Z23 ENCOUNTER FOR IMMUNIZATION: ICD-10-CM

## 2022-10-17 DIAGNOSIS — D63.1 ANEMIA DUE TO CHRONIC KIDNEY DISEASE, ON CHRONIC DIALYSIS (HCC): ICD-10-CM

## 2022-10-17 DIAGNOSIS — N18.6 TYPE 2 DIABETES MELLITUS WITH CHRONIC KIDNEY DISEASE ON CHRONIC DIALYSIS, WITH LONG-TERM CURRENT USE OF INSULIN (HCC): Primary | ICD-10-CM

## 2022-10-17 DIAGNOSIS — E87.1 HYPONATREMIA: ICD-10-CM

## 2022-10-17 DIAGNOSIS — Z79.4 TYPE 2 DIABETES MELLITUS WITH CHRONIC KIDNEY DISEASE ON CHRONIC DIALYSIS, WITH LONG-TERM CURRENT USE OF INSULIN (HCC): Primary | ICD-10-CM

## 2022-10-17 DIAGNOSIS — Z99.2 ANEMIA DUE TO CHRONIC KIDNEY DISEASE, ON CHRONIC DIALYSIS (HCC): ICD-10-CM

## 2022-10-17 DIAGNOSIS — E83.51 HYPOCALCEMIA: ICD-10-CM

## 2022-10-17 PROBLEM — E87.5 HYPERKALEMIA: Status: RESOLVED | Noted: 2022-03-07 | Resolved: 2022-10-17

## 2022-10-17 PROBLEM — M62.82 RHABDOMYOLYSIS: Status: RESOLVED | Noted: 2022-06-01 | Resolved: 2022-10-17

## 2022-10-17 PROBLEM — R33.9 URINARY RETENTION: Status: ACTIVE | Noted: 2022-10-17

## 2022-10-17 PROBLEM — M79.605 LEFT LEG PAIN: Status: RESOLVED | Noted: 2022-06-04 | Resolved: 2022-10-17

## 2022-10-17 LAB
SL AMB POCT GLUCOSE BLD: 133
SL AMB POCT HEMOGLOBIN AIC: 5.6 (ref ?–6.5)

## 2022-10-17 PROCEDURE — 83036 HEMOGLOBIN GLYCOSYLATED A1C: CPT | Performed by: FAMILY MEDICINE

## 2022-10-17 PROCEDURE — 82948 REAGENT STRIP/BLOOD GLUCOSE: CPT | Performed by: FAMILY MEDICINE

## 2022-10-17 PROCEDURE — G0439 PPPS, SUBSEQ VISIT: HCPCS | Performed by: FAMILY MEDICINE

## 2022-10-17 PROCEDURE — 90662 IIV NO PRSV INCREASED AG IM: CPT

## 2022-10-17 PROCEDURE — G0008 ADMIN INFLUENZA VIRUS VAC: HCPCS

## 2022-10-17 RX ORDER — AMLODIPINE BESYLATE 10 MG/1
10 TABLET ORAL DAILY
Qty: 90 TABLET | Refills: 1 | Status: SHIPPED | OUTPATIENT
Start: 2022-10-17

## 2022-10-17 RX ORDER — HYDROCORTISONE ACETATE 25 MG/1
25 SUPPOSITORY RECTAL DAILY
Qty: 12 SUPPOSITORY | Refills: 1 | Status: CANCELLED | OUTPATIENT
Start: 2022-10-17

## 2022-10-17 RX ORDER — CLONIDINE HYDROCHLORIDE 0.1 MG/1
0.1 TABLET ORAL EVERY 12 HOURS SCHEDULED
COMMUNITY
End: 2022-10-17 | Stop reason: SDUPTHER

## 2022-10-17 RX ORDER — CALCIUM ACETATE 667 MG/1
667 CAPSULE ORAL
Qty: 60 CAPSULE | Refills: 1 | Status: SHIPPED | OUTPATIENT
Start: 2022-10-17 | End: 2022-10-19 | Stop reason: SDUPTHER

## 2022-10-17 RX ORDER — INSULIN ASPART 100 [IU]/ML
15 INJECTION, SUSPENSION SUBCUTANEOUS
Qty: 10 ML | Refills: 1 | Status: SHIPPED | OUTPATIENT
Start: 2022-10-17 | End: 2022-10-25 | Stop reason: ALTCHOICE

## 2022-10-17 RX ORDER — PEN NEEDLE, DIABETIC 31 GX5/16"
NEEDLE, DISPOSABLE MISCELLANEOUS
Qty: 180 EACH | Refills: 3 | Status: SHIPPED | OUTPATIENT
Start: 2022-10-17

## 2022-10-17 RX ORDER — SIMVASTATIN 40 MG
40 TABLET ORAL
Qty: 90 TABLET | Refills: 3 | Status: SHIPPED | OUTPATIENT
Start: 2022-10-17

## 2022-10-17 RX ORDER — LOSARTAN POTASSIUM 50 MG/1
50 TABLET ORAL 2 TIMES DAILY
Qty: 180 TABLET | Refills: 1 | Status: SHIPPED | OUTPATIENT
Start: 2022-10-17

## 2022-10-17 RX ORDER — TAMSULOSIN HYDROCHLORIDE 0.4 MG/1
0.8 CAPSULE ORAL
Qty: 180 CAPSULE | Refills: 1 | Status: SHIPPED | OUTPATIENT
Start: 2022-10-17

## 2022-10-17 RX ORDER — CLONIDINE HYDROCHLORIDE 0.1 MG/1
0.1 TABLET ORAL EVERY 12 HOURS SCHEDULED
Qty: 180 TABLET | Refills: 1 | Status: SHIPPED | OUTPATIENT
Start: 2022-10-17

## 2022-10-17 RX ORDER — DUTASTERIDE 0.5 MG/1
0.5 CAPSULE, LIQUID FILLED ORAL DAILY
Qty: 90 CAPSULE | Refills: 1 | Status: SHIPPED | OUTPATIENT
Start: 2022-10-17

## 2022-10-18 NOTE — ASSESSMENT & PLAN NOTE
Lab Results   Component Value Date    EGFR 11 08/27/2022    EGFR 11 08/26/2022    EGFR 8 08/25/2022    CREATININE 4 63 (H) 08/27/2022    CREATININE 4 44 (H) 08/26/2022    CREATININE 6 03 (H) 08/25/2022     Last hemoglobin on 8/26/22 was 10 5   Denies symptoms of dizziness, chest pain, weakness or any other complaints at this time    -Ordered CBC

## 2022-10-18 NOTE — ASSESSMENT & PLAN NOTE
BP in office today was 152/68  -Continue losartan, amlodipine and clonidine  -Continue to monitor BP at home

## 2022-10-18 NOTE — ASSESSMENT & PLAN NOTE
Lab Results   Component Value Date    HGBA1C 5 6 10/17/2022     -POCT HGBA1C in office today was 5 6  -POCT glucose was 134  -Continue sliding scale Novalog   -Continue hemodialysis on Tuesday/Thursday and Saturday   -Ordered BMP   -Continue to monitor glucose levels 3x a day, especially since he was recently hospitalized with hypoglycemia

## 2022-10-18 NOTE — ASSESSMENT & PLAN NOTE
States that he urinates about a tablespoon a day     -Continue Tamsulosin and Dutasteride   -Continue fluid restrction

## 2022-10-18 NOTE — PROGRESS NOTES
[facility] Office visit    Assessment/Plan:     1  Type 2 diabetes mellitus with chronic kidney disease on chronic dialysis, with long-term current use of insulin [facility])  Assessment & Plan:    Lab Results   Component Value Date    HGBA1C 5 6 10/17/2022     -POCT HGBA1C in office today was 5 6  -POCT glucose was 134  -Continue sliding scale Novalog   -Continue hemodialysis on Tuesday/Thursday and Saturday   -Ordered BMP   -Continue to monitor glucose levels 3x a day, especially since he was recently hospitalized with hypoglycemia  Orders:  -     glucose blood test strip; Use 1 each 3 (three) times a day  -     insulin aspart protamine-insulin aspart (NovoLOG 70/30) 100 units/mL injection; Inject 15 Units under the skin 2 (two) times a day before meals States has sliding scale  -     Insulin Pen Needle (B-D UF III MINI PEN NEEDLES) 31G X 5 MM MISC; Use twice daily with insulin pen as directed  -     POCT hemoglobin A1c  -     POCT blood glucose    2  Anemia due to chronic kidney disease, on chronic dialysis [facility])  Assessment & Plan:  Lab Results   Component Value Date    EGFR 11 08/27/2022    EGFR 11 08/26/2022    EGFR 8 08/25/2022    CREATININE 4 63 (H) 08/27/2022    CREATININE 4 44 (H) 08/26/2022    CREATININE 6 03 (H) 08/25/2022     Last hemoglobin on 8/26/22 was 10 5  Denies symptoms of dizziness, chest pain, weakness or any other complaints at this time    -Ordered CBC     Orders:  -     CBC and Platelet; Future    3  Viral conjunctivitis of left eye  Comments:  States that his left eye was red for the past 2 days  Denies discharge, itching or pain  States that there is a lot of watering    -Self-limiting    4  Benign essential hypertension  Assessment & Plan:  BP in office today was 152/68  -Continue losartan, amlodipine and clonidine  -Continue to monitor BP at home    Orders:  -     amLODIPine (NORVASC) 10 mg tablet;  Take 1 tablet (10 mg total) by mouth daily  -     cloNIDine (CATAPRES) 0 1 mg tablet; Take 1 tablet (0 1 mg total) by mouth every 12 (twelve) hours  -     losartan (COZAAR) 50 mg tablet; Take 1 tablet (50 mg total) by mouth 2 (two) times a day    5  Hyperlipidemia, unspecified hyperlipidemia type  Assessment & Plan:  -Continue SImvastatin    Orders:  -     simvastatin (ZOCOR) 40 mg tablet; Take 1 tablet (40 mg total) by mouth daily at bedtime    6  Hypocalcemia  Assessment & Plan:  Last calcium on 8/26 was 7 8  -Continue to take calcium acetate    Orders:  -     calcium acetate (PHOSLO) capsule; Take 1 capsule (667 mg total) by mouth 3 (three) times a day with meals Most days only takes BID    7  Hyponatremia  Assessment & Plan:  Last Na was 131 on 8/26  -Ordered BMP     Orders:  -     Comprehensive metabolic panel; Future    8  Benign prostatic hyperplasia with lower urinary tract symptoms, symptom details unspecified  Assessment & Plan:  States that he urinates about a tablespoon a day  -Continue Tamsulosin and Dutasteride   -Continue fluid restrction    Orders:  -     dutasteride (AVODART) 0 5 mg capsule; Take 1 capsule (0 5 mg total) by mouth daily  -     tamsulosin (FLOMAX) 0 4 mg; Take 2 capsules (0 8 mg total) by mouth daily with dinner    9  Encounter for immunization  -     influenza vaccine, high-dose, PF 0 7 mL (FLUZONE HIGH-DOSE)        Return in about 1 month (around 11/17/2022)  Subjective:   REVIN Llanos is a 76 y o  male presents today to establish care  Review of Systems   Constitutional: Negative for chills and fever  HENT: Negative for ear pain and sore throat  Eyes: Negative for pain and visual disturbance  Respiratory: Negative for cough and shortness of breath  Cardiovascular: Negative for chest pain and palpitations  Gastrointestinal: Negative for abdominal pain and vomiting  Genitourinary: Positive for difficulty urinating  Negative for dysuria and hematuria  Musculoskeletal: Negative for arthralgias and back pain     Skin: Negative for color change and rash  Neurological: Negative for seizures and syncope  All other systems reviewed and are negative  Objective:     /68 (BP Location: Left arm, Patient Position: Sitting, Cuff Size: Adult)   Pulse 87   Resp 16   Ht 5' 8" (1 727 m)   Wt 73 5 kg (162 lb)   SpO2 98%   BMI 24 63 kg/m²      Physical Exam  Constitutional:       Appearance: He is well-developed  HENT:      Head: Atraumatic  Eyes:      Pupils: Pupils are equal, round, and reactive to light  Comments: Erythema of conjunctiva on left eye   Cardiovascular:      Rate and Rhythm: Normal rate and regular rhythm  Pulmonary:      Effort: Pulmonary effort is normal    Abdominal:      General: Bowel sounds are normal       Palpations: Abdomen is soft  Tenderness: There is no abdominal tenderness  Musculoskeletal:      Cervical back: Neck supple  Skin:     General: Skin is warm and dry  Capillary Refill: Capillary refill takes less than 2 seconds  Neurological:      Mental Status: He is alert            ** Please Note: This note has been constructed using a voice recognition system Veena Sanchez  10/17/22  8:29 PM

## 2022-10-19 ENCOUNTER — APPOINTMENT (OUTPATIENT)
Dept: LAB | Facility: CLINIC | Age: 77
End: 2022-10-19
Payer: MEDICARE

## 2022-10-19 DIAGNOSIS — E87.1 HYPONATREMIA: ICD-10-CM

## 2022-10-19 DIAGNOSIS — Z79.4 TYPE 2 DIABETES MELLITUS WITH CHRONIC KIDNEY DISEASE ON CHRONIC DIALYSIS, WITH LONG-TERM CURRENT USE OF INSULIN (HCC): ICD-10-CM

## 2022-10-19 DIAGNOSIS — N18.6 ANEMIA DUE TO CHRONIC KIDNEY DISEASE, ON CHRONIC DIALYSIS (HCC): ICD-10-CM

## 2022-10-19 DIAGNOSIS — Z99.2 TYPE 2 DIABETES MELLITUS WITH CHRONIC KIDNEY DISEASE ON CHRONIC DIALYSIS, WITH LONG-TERM CURRENT USE OF INSULIN (HCC): ICD-10-CM

## 2022-10-19 DIAGNOSIS — E11.22 TYPE 2 DIABETES MELLITUS WITH CHRONIC KIDNEY DISEASE ON CHRONIC DIALYSIS, WITH LONG-TERM CURRENT USE OF INSULIN (HCC): ICD-10-CM

## 2022-10-19 DIAGNOSIS — D63.1 ANEMIA DUE TO CHRONIC KIDNEY DISEASE, ON CHRONIC DIALYSIS (HCC): ICD-10-CM

## 2022-10-19 DIAGNOSIS — Z99.2 ANEMIA DUE TO CHRONIC KIDNEY DISEASE, ON CHRONIC DIALYSIS (HCC): ICD-10-CM

## 2022-10-19 DIAGNOSIS — N18.6 TYPE 2 DIABETES MELLITUS WITH CHRONIC KIDNEY DISEASE ON CHRONIC DIALYSIS, WITH LONG-TERM CURRENT USE OF INSULIN (HCC): ICD-10-CM

## 2022-10-19 PROBLEM — N19 HYPERPHOSPHATEMIA ASSOCIATED WITH RENAL FAILURE: Status: ACTIVE | Noted: 2022-10-19

## 2022-10-19 PROBLEM — E83.39 HYPERPHOSPHATEMIA ASSOCIATED WITH RENAL FAILURE: Status: ACTIVE | Noted: 2022-10-19

## 2022-10-19 PROBLEM — E83.51 HYPOCALCEMIA: Status: RESOLVED | Noted: 2022-10-17 | Resolved: 2022-10-19

## 2022-10-19 LAB
ALBUMIN SERPL BCP-MCNC: 3.1 G/DL (ref 3.5–5)
ALP SERPL-CCNC: 73 U/L (ref 46–116)
ALT SERPL W P-5'-P-CCNC: 15 U/L (ref 12–78)
ANION GAP SERPL CALCULATED.3IONS-SCNC: 8 MMOL/L (ref 4–13)
AST SERPL W P-5'-P-CCNC: 7 U/L (ref 5–45)
BILIRUB SERPL-MCNC: 0.39 MG/DL (ref 0.2–1)
BUN SERPL-MCNC: 47 MG/DL (ref 5–25)
CALCIUM ALBUM COR SERPL-MCNC: 9.7 MG/DL (ref 8.3–10.1)
CALCIUM SERPL-MCNC: 9 MG/DL (ref 8.3–10.1)
CHLORIDE SERPL-SCNC: 95 MMOL/L (ref 96–108)
CO2 SERPL-SCNC: 29 MMOL/L (ref 21–32)
CREAT SERPL-MCNC: 5.17 MG/DL (ref 0.6–1.3)
ERYTHROCYTE [DISTWIDTH] IN BLOOD BY AUTOMATED COUNT: 14.9 % (ref 11.6–15.1)
EST. AVERAGE GLUCOSE BLD GHB EST-MCNC: 117 MG/DL
GFR SERPL CREATININE-BSD FRML MDRD: 9 ML/MIN/1.73SQ M
GLUCOSE P FAST SERPL-MCNC: 179 MG/DL (ref 65–99)
HBA1C MFR BLD: 5.7 %
HCT VFR BLD AUTO: 40.7 % (ref 36.5–49.3)
HGB BLD-MCNC: 12.1 G/DL (ref 12–17)
MCH RBC QN AUTO: 27.4 PG (ref 26.8–34.3)
MCHC RBC AUTO-ENTMCNC: 29.7 G/DL (ref 31.4–37.4)
MCV RBC AUTO: 92 FL (ref 82–98)
PLATELET # BLD AUTO: 290 THOUSANDS/UL (ref 149–390)
PMV BLD AUTO: 9.3 FL (ref 8.9–12.7)
POTASSIUM SERPL-SCNC: 5.5 MMOL/L (ref 3.5–5.3)
PROT SERPL-MCNC: 8.5 G/DL (ref 6.4–8.4)
RBC # BLD AUTO: 4.41 MILLION/UL (ref 3.88–5.62)
SODIUM SERPL-SCNC: 132 MMOL/L (ref 135–147)
WBC # BLD AUTO: 8.13 THOUSAND/UL (ref 4.31–10.16)

## 2022-10-19 PROCEDURE — 80053 COMPREHEN METABOLIC PANEL: CPT

## 2022-10-19 PROCEDURE — 83036 HEMOGLOBIN GLYCOSYLATED A1C: CPT

## 2022-10-19 PROCEDURE — 36415 COLL VENOUS BLD VENIPUNCTURE: CPT

## 2022-10-19 PROCEDURE — 85027 COMPLETE CBC AUTOMATED: CPT

## 2022-10-19 NOTE — TELEPHONE ENCOUNTER
Bernadette Barajas! Thank you for the message  It is 3 times a day with meals  Do I need to call them?

## 2022-10-21 ENCOUNTER — TELEPHONE (OUTPATIENT)
Dept: FAMILY MEDICINE CLINIC | Facility: CLINIC | Age: 77
End: 2022-10-21

## 2022-10-21 NOTE — TELEPHONE ENCOUNTER
Patient called and stated that he was spitting up blood    Notified patient that he needs to go to the emergency room   Patient hung up the phone abruptly

## 2022-10-24 ENCOUNTER — APPOINTMENT (EMERGENCY)
Dept: RADIOLOGY | Facility: HOSPITAL | Age: 77
End: 2022-10-24
Payer: MEDICARE

## 2022-10-24 ENCOUNTER — HOSPITAL ENCOUNTER (EMERGENCY)
Facility: HOSPITAL | Age: 77
Discharge: HOME | End: 2022-10-24
Attending: EMERGENCY MEDICINE
Payer: MEDICARE

## 2022-10-24 VITALS
HEART RATE: 87 BPM | OXYGEN SATURATION: 97 % | HEIGHT: 68 IN | BODY MASS INDEX: 24.86 KG/M2 | TEMPERATURE: 98.4 F | DIASTOLIC BLOOD PRESSURE: 86 MMHG | WEIGHT: 164 LBS | SYSTOLIC BLOOD PRESSURE: 200 MMHG | RESPIRATION RATE: 18 BRPM

## 2022-10-24 DIAGNOSIS — R05.9 COUGH: Primary | ICD-10-CM

## 2022-10-24 PROCEDURE — 71045 X-RAY EXAM CHEST 1 VIEW: CPT

## 2022-10-24 PROCEDURE — 99282 EMERGENCY DEPT VISIT SF MDM: CPT | Performed by: EMERGENCY MEDICINE

## 2022-10-24 NOTE — DISCHARGE INSTRUCTIONS
Your xray looks similar to what it is normally and since you only had the one small episode and otherwise no symptoms, just continue usual care and medicines at home and go to dialysis tomorrow  If you have more coughing up blood or bloody mucus or clots, or if you have chest pain, breathing distress, lightheadedness or vomiting, you should return to the ER

## 2022-10-24 NOTE — ED PROVIDER NOTES
History  Chief Complaint   Patient presents with   • Cough     Pt states he is coughing up dark blood since this morning, called PCP and was told to come in     77 yo male dialysis patient c/o one episode of coughing of small amount of mucus with blood in it at 1:30 pm   No other episodes  no clots  He has had a cough for 2 years  He is not on any oral blood thinners  No chest pain or sob  No vomiting or nosebleeds  He has dialysis again tomorrow  History provided by:  Patient   used: No    Cough  Associated symptoms: no chest pain, no fever and no shortness of breath        Prior to Admission Medications   Prescriptions Last Dose Informant Patient Reported? Taking?    Insulin Pen Needle (B-D UF III MINI PEN NEEDLES) 31G X 5 MM MISC   No No   Sig: Use twice daily with insulin pen as directed   acetaminophen (TYLENOL) 325 mg tablet   No No   Sig: Take 2 tablets (650 mg total) by mouth every 6 (six) hours as needed for mild pain, headaches or fever   amLODIPine (NORVASC) 10 mg tablet   No No   Sig: Take 1 tablet (10 mg total) by mouth daily   calcium acetate (PHOSLO) capsule   No No   Sig: Take 1 capsule (667 mg total) by mouth 3 (three) times a day with meals Most days only takes BID   calcium carbonate-vitamin D (OSCAL-D) 500 mg-200 units per tablet   Yes No   Sig: Take 1 tablet by mouth daily   Patient not taking: Reported on 10/17/2022   cloNIDine (CATAPRES) 0 1 mg tablet   No No   Sig: Take 1 tablet (0 1 mg total) by mouth every 12 (twelve) hours   dutasteride (AVODART) 0 5 mg capsule   No No   Sig: Take 1 capsule (0 5 mg total) by mouth daily   glucose blood test strip   No No   Sig: Use 1 each 3 (three) times a day   hydrocortisone (ANUSOL-HC) 25 mg suppository   Yes No   Sig: Insert 1 suppository into the rectum daily   insulin aspart protamine-insulin aspart (NovoLOG 70/30) 100 units/mL injection   No No   Sig: Inject 15 Units under the skin 2 (two) times a day before meals States has sliding scale   losartan (COZAAR) 50 mg tablet   No No   Sig: Take 1 tablet (50 mg total) by mouth 2 (two) times a day   simvastatin (ZOCOR) 40 mg tablet   No No   Sig: Take 1 tablet (40 mg total) by mouth daily at bedtime   sodium chloride (OCEAN) 0 65 % nasal spray   No No   Si spray into each nostril as needed for congestion   tamsulosin (FLOMAX) 0 4 mg   No No   Sig: Take 2 capsules (0 8 mg total) by mouth daily with dinner      Facility-Administered Medications: None       Past Medical History:   Diagnosis Date   • Anesthesia complication     after colonoscopy , pt was awake but could not control his body and kept falling    • Arthritis    • Bladder calculi    • BPH (benign prostatic hyperplasia)    • Diabetes mellitus ([facility] [address] )    • ESRD (end stage renal disease) [facility]    • Hearing disorder of both ears     wears bilateral hearing aids   • Hyperlipidemia     controlled and maintained d/t diabetes   • Hypertension    • Kidney stones     Last Assessed:4/3/2017   • Lyme disease     Last Assessed:2015   • Rupture, bladder, spontaneous     Last Assessed:4/3/2017       Past Surgical History:   Procedure Laterality Date   • BLADDER REPAIR N/A 12/10/2016    Procedure: REPAIR BLADDER/cysto insertion stent;  Surgeon: Braulio Underwood MD;  Location: [facility];  Service:    • COLONOSCOPY     • CYSTOLITHOTOMY      ANESTHESIA   lower abdomen  ;  Last Assessed:4/3/2017   • IR ASPIRATION ONLY  2022   • IR BIOPSY KIDNEY RANDOM  3/10/2022   • IR TUNNELED DIALYSIS CATHETER PLACEMENT  3/8/2022   • OTHER SURGICAL HISTORY      thermal dilation of the prostate x 2 ( in the office)   • TONSILLECTOMY     • TRANSURETHRAL RESECTION OF PROSTATE N/A 2016    Procedure:  Cysto, Laser Bladder stone,fulgaration of prostates tissue ;  Surgeon: Braulio Underwood MD;  Location: [facility];  Service:        Family History   Problem Relation Age of Onset   • Cancer Mother         breast   • Diabetes Mother    • Cancer Father         prostate w/ bone mets   • Prostate cancer Father    • Alzheimer's disease Sister      I have reviewed and agree with the history as documented  E-Cigarette/Vaping   • E-Cigarette Use Never User      E-Cigarette/Vaping Substances   • Nicotine No    • THC No    • CBD No    • Flavoring No    • Other No    • Unknown No      Social History     Tobacco Use   • Smoking status: Former Smoker     Types: Cigars     Quit date:      Years since quittin 8   • Smokeless tobacco: Never Used   Vaping Use   • Vaping Use: Never used   Substance Use Topics   • Alcohol use: Never   • Drug use: No       Review of Systems   Constitutional: Negative for fever  HENT: Negative for nosebleeds  Respiratory: Positive for cough  Negative for shortness of breath  Cardiovascular: Negative for chest pain  Gastrointestinal: Negative for diarrhea and vomiting  Physical Exam  Physical Exam  Vitals and nursing note reviewed  Constitutional:       General: He is not in acute distress  Appearance: He is well-developed  He is not ill-appearing or diaphoretic  HENT:      Head: Normocephalic and atraumatic  Eyes:      General: No scleral icterus  Conjunctiva/sclera: Conjunctivae normal    Cardiovascular:      Rate and Rhythm: Normal rate and regular rhythm  Heart sounds: Normal heart sounds  No murmur heard  Pulmonary:      Effort: Pulmonary effort is normal  No respiratory distress  Breath sounds: Decreased breath sounds present  Comments: Dialysis catheter right upper chest  Abdominal:      General: Bowel sounds are normal  There is no distension  Palpations: Abdomen is soft  Tenderness: There is no abdominal tenderness  Musculoskeletal:         General: No tenderness or deformity  Normal range of motion  Cervical back: Normal range of motion and neck supple  Right lower leg: No edema  Left lower leg: No edema     Skin:     General: Skin is warm and dry       Coloration: Skin is not pale  Findings: No erythema or rash  Neurological:      General: No focal deficit present  Mental Status: He is alert and oriented to person, place, and time  Cranial Nerves: No cranial nerve deficit  Motor: No weakness  Psychiatric:         Mood and Affect: Mood normal          Behavior: Behavior normal          Vital Signs  ED Triage Vitals   Temperature Pulse Respirations Blood Pressure SpO2   10/24/22 1617 10/24/22 1617 10/24/22 1617 10/24/22 1620 10/24/22 1617   98 4 °F (36 9 °C) 86 18 (!) 199/89 97 %      Temp src Heart Rate Source Patient Position - Orthostatic VS BP Location FiO2 (%)   -- 10/24/22 1908 10/24/22 1908 10/24/22 1908 --    Monitor Lying Left arm       Pain Score       10/24/22 1617       No Pain           Vitals:    10/24/22 1617 10/24/22 1620 10/24/22 1908   BP:  (!) 199/89 (!) 200/86   Pulse: 86  87   Patient Position - Orthostatic VS:   Lying         Visual Acuity      ED Medications  Medications - No data to display    Diagnostic Studies  Results Reviewed     None                 XR chest 1 view portable    (Results Pending)              Procedures  Procedures         ED Course                                             MDM  Number of Diagnoses or Management Options  Cough  Diagnosis management comments: Chest xray with effusion similar to previous xrays  Pulse ox good and pt  Asymptomatic  Had only one episode of bloody mucus hours ago and none since  I don't feel we need to do further emergent work up at this time  They can check hemoglobin during dialysis tomorrow if needed  Pt  Advised to return if recurrent bleeding, lightheadedness, chest pain or shortness of breath        Disposition  Final diagnoses:   Cough     Time reflects when diagnosis was documented in both MDM as applicable and the Disposition within this note     Time User Action Codes Description Comment    87/11/1063  3:55 PM Geoffrey PEREZ Add [W87 4] Cough ED Disposition     ED Disposition   Discharge    Condition   Stable    Date/Time   Mon Oct 24, 2022  7:04 PM    Comment   Moira Eduardo [facility] discharge to home/self care                 Follow-up Information    None         Discharge Medication List as of 10/24/2022  7:05 PM      CONTINUE these medications which have NOT CHANGED    Details   acetaminophen (TYLENOL) 325 mg tablet Take 2 tablets (650 mg total) by mouth every 6 (six) hours as needed for mild pain, headaches or fever, Starting Mon 5/9/2022, No Print      amLODIPine (NORVASC) 10 mg tablet Take 1 tablet (10 mg total) by mouth daily, Starting Mon 10/17/2022, Normal      calcium acetate (PHOSLO) capsule Take 1 capsule (667 mg total) by mouth 3 (three) times a day with meals Most days only takes BID, Starting Mon 10/17/2022, Normal      calcium carbonate-vitamin D (OSCAL-D) 500 mg-200 units per tablet Take 1 tablet by mouth daily, Starting u 7/28/2022, Historical Med      cloNIDine (CATAPRES) 0 1 mg tablet Take 1 tablet (0 1 mg total) by mouth every 12 (twelve) hours, Starting Mon 10/17/2022, Normal      dutasteride (AVODART) 0 5 mg capsule Take 1 capsule (0 5 mg total) by mouth daily, Starting Mon 10/17/2022, Normal      glucose blood test strip Use 1 each 3 (three) times a day, Starting Mon 10/17/2022, Normal      hydrocortisone (ANUSOL-HC) 25 mg suppository Insert 1 suppository into the rectum daily, Starting Wed 6/8/2022, Historical Med      insulin aspart protamine-insulin aspart (NovoLOG 70/30) 100 units/mL injection Inject 15 Units under the skin 2 (two) times a day before meals States has sliding scale, Starting Mon 10/17/2022, Normal      Insulin Pen Needle (B-D UF III MINI PEN NEEDLES) 31G X 5 MM MISC Use twice daily with insulin pen as directed, Normal      losartan (COZAAR) 50 mg tablet Take 1 tablet (50 mg total) by mouth 2 (two) times a day, Starting Mon 10/17/2022, Normal      simvastatin (ZOCOR) 40 mg tablet Take 1 tablet (40 mg total) by mouth daily at bedtime, Starting Mon 10/17/2022, Normal      sodium chloride (OCEAN) 0 65 % nasal spray 1 spray into each nostril as needed for congestion, Starting Sat 8/27/2022, No Print      tamsulosin (FLOMAX) 0 4 mg Take 2 capsules (0 8 mg total) by mouth daily with dinner, Starting Mon 10/17/2022, Normal             No discharge procedures on file      PDMP Review     None          ED Provider  Electronically Signed by           Ruben Boggs MD  10/24/19 1249

## 2022-10-25 ENCOUNTER — TELEPHONE (OUTPATIENT)
Dept: FAMILY MEDICINE CLINIC | Facility: CLINIC | Age: 77
End: 2022-10-25

## 2022-10-25 DIAGNOSIS — Z99.2 TYPE 2 DIABETES MELLITUS WITH CHRONIC KIDNEY DISEASE ON CHRONIC DIALYSIS, WITH LONG-TERM CURRENT USE OF INSULIN (HCC): ICD-10-CM

## 2022-10-25 DIAGNOSIS — E11.22 TYPE 2 DIABETES MELLITUS WITH CHRONIC KIDNEY DISEASE ON CHRONIC DIALYSIS, WITH LONG-TERM CURRENT USE OF INSULIN (HCC): ICD-10-CM

## 2022-10-25 DIAGNOSIS — Z79.4 TYPE 2 DIABETES MELLITUS WITH CHRONIC KIDNEY DISEASE ON CHRONIC DIALYSIS, WITH LONG-TERM CURRENT USE OF INSULIN (HCC): ICD-10-CM

## 2022-10-25 DIAGNOSIS — M85.89 OSTEOPENIA OF MULTIPLE SITES: Primary | ICD-10-CM

## 2022-10-25 DIAGNOSIS — N18.6 TYPE 2 DIABETES MELLITUS WITH CHRONIC KIDNEY DISEASE ON CHRONIC DIALYSIS, WITH LONG-TERM CURRENT USE OF INSULIN (HCC): ICD-10-CM

## 2022-10-25 RX ORDER — INSULIN ASPART 100 [IU]/ML
15 INJECTION, SUSPENSION SUBCUTANEOUS
Qty: 15 ML | Refills: 0 | Status: SHIPPED | OUTPATIENT
Start: 2022-10-25 | End: 2022-10-27 | Stop reason: SDUPTHER

## 2022-10-25 NOTE — TELEPHONE ENCOUNTER
Updated the medications  Please fax the novolog script as it has exceeded the e-prescribing character limits   Thank you

## 2022-10-27 DIAGNOSIS — E11.22 TYPE 2 DIABETES MELLITUS WITH CHRONIC KIDNEY DISEASE ON CHRONIC DIALYSIS, WITH LONG-TERM CURRENT USE OF INSULIN (HCC): ICD-10-CM

## 2022-10-27 DIAGNOSIS — N18.6 TYPE 2 DIABETES MELLITUS WITH CHRONIC KIDNEY DISEASE ON CHRONIC DIALYSIS, WITH LONG-TERM CURRENT USE OF INSULIN (HCC): ICD-10-CM

## 2022-10-27 DIAGNOSIS — Z79.4 TYPE 2 DIABETES MELLITUS WITH CHRONIC KIDNEY DISEASE ON CHRONIC DIALYSIS, WITH LONG-TERM CURRENT USE OF INSULIN (HCC): ICD-10-CM

## 2022-10-27 DIAGNOSIS — Z99.2 TYPE 2 DIABETES MELLITUS WITH CHRONIC KIDNEY DISEASE ON CHRONIC DIALYSIS, WITH LONG-TERM CURRENT USE OF INSULIN (HCC): ICD-10-CM

## 2022-10-27 RX ORDER — INSULIN ASPART 100 [IU]/ML
15 INJECTION, SUSPENSION SUBCUTANEOUS
Qty: 15 ML | Refills: 0 | Status: SHIPPED | OUTPATIENT
Start: 2022-10-27

## 2022-11-02 ENCOUNTER — TELEPHONE (OUTPATIENT)
Dept: OTHER | Facility: HOSPITAL | Age: 77
End: 2022-11-02

## 2022-11-08 ENCOUNTER — TELEPHONE (OUTPATIENT)
Dept: OTHER | Facility: HOSPITAL | Age: 77
End: 2022-11-08

## 2022-11-08 NOTE — TELEPHONE ENCOUNTER
Attempted to call patient twice about potassium of 5 5  He is on dialysis  Has appointment on 11/18 Dr Marcus Galindo  Will follow-up on then

## 2022-11-11 ENCOUNTER — CONSULT (OUTPATIENT)
Dept: VASCULAR SURGERY | Facility: CLINIC | Age: 77
End: 2022-11-11

## 2022-11-11 ENCOUNTER — TELEPHONE (OUTPATIENT)
Dept: VASCULAR SURGERY | Facility: CLINIC | Age: 77
End: 2022-11-11

## 2022-11-11 VITALS
DIASTOLIC BLOOD PRESSURE: 66 MMHG | SYSTOLIC BLOOD PRESSURE: 122 MMHG | BODY MASS INDEX: 24.55 KG/M2 | HEIGHT: 68 IN | HEART RATE: 68 BPM | WEIGHT: 162 LBS

## 2022-11-11 DIAGNOSIS — Z99.2 TYPE 2 DIABETES MELLITUS WITH CHRONIC KIDNEY DISEASE ON CHRONIC DIALYSIS, WITH LONG-TERM CURRENT USE OF INSULIN (HCC): Primary | ICD-10-CM

## 2022-11-11 DIAGNOSIS — I10 BENIGN ESSENTIAL HYPERTENSION: ICD-10-CM

## 2022-11-11 DIAGNOSIS — E11.22 TYPE 2 DIABETES MELLITUS WITH CHRONIC KIDNEY DISEASE ON CHRONIC DIALYSIS, WITH LONG-TERM CURRENT USE OF INSULIN (HCC): Primary | ICD-10-CM

## 2022-11-11 DIAGNOSIS — N18.6 ESRD ON DIALYSIS (HCC): ICD-10-CM

## 2022-11-11 DIAGNOSIS — Z79.4 TYPE 2 DIABETES MELLITUS WITH CHRONIC KIDNEY DISEASE ON CHRONIC DIALYSIS, WITH LONG-TERM CURRENT USE OF INSULIN (HCC): Primary | ICD-10-CM

## 2022-11-11 DIAGNOSIS — N18.6 ENCOUNTER REGARDING VASCULAR ACCESS FOR DIALYSIS FOR ESRD (HCC): ICD-10-CM

## 2022-11-11 DIAGNOSIS — I27.20 PULMONARY HYPERTENSION (HCC): ICD-10-CM

## 2022-11-11 DIAGNOSIS — Z99.2 ENCOUNTER REGARDING VASCULAR ACCESS FOR DIALYSIS FOR ESRD (HCC): ICD-10-CM

## 2022-11-11 DIAGNOSIS — N18.6 TYPE 2 DIABETES MELLITUS WITH CHRONIC KIDNEY DISEASE ON CHRONIC DIALYSIS, WITH LONG-TERM CURRENT USE OF INSULIN (HCC): Primary | ICD-10-CM

## 2022-11-11 DIAGNOSIS — Z99.2 ESRD ON DIALYSIS (HCC): ICD-10-CM

## 2022-11-11 NOTE — TELEPHONE ENCOUNTER
REMINDER: Under Reason For Call, comments MUST be formatted as:   (Surgeon's Initials) / (Procedure)      Special Instructions / FYI: Aspirin: Continue (do not hold)    Procedure: Left arm avf or graft    Level: 2 - Route clearance(s) to [facility] Clearance Pool     Allergies: Amoxicillin    Instructions Given: NO Bowel Prep General Instructions     Dialysis: Yes  Where: [facility] // Days: Tuesday, Thursday, and Saturday    Return Visit Required Prior to Procedure: No     Consent: I certify that patient has signed, printed, timed, and dated their surgery consent  I certify that the patient's LEGAL NAME and DATE OF BIRTH are written in the upper left corner on BOTH sides of the consent  I certify that BOTH sides of the completed surgery consent have been scanned into the patient's Epic chart by myself on 11/11/2022  Yes, I have LABELED the consent in Epic as Consent for Vascular Procedure  For Surgical Clearances     Levels   1-3   ROUTE this encounter to [facility] Clearance Pool (AND)   [facility] Surgery Coordinator Pool     Level   4   ROUTE this encounter to [facility] Surgery Coordinator Pool       HYDRATION CLEARANCES   ONLY ROUTE TO  [facility] Clearance Pool     Patient does not require any pre operative clearance  Yes, I have ROUTED this encounter to [facility] Surgery Coordinator and/or [facility] Clearance Pool

## 2022-11-11 NOTE — TELEPHONE ENCOUNTER
HUSSAINOM for patient to call us back to schedule his surgery on 11-[facility] with Dr Reynolds Nurse

## 2022-11-11 NOTE — TELEPHONE ENCOUNTER
Patient called back to let us know that they are unable to do 11-16-22 I told patient that we will call hem back with a new date   LLF

## 2022-11-11 NOTE — PROGRESS NOTES
Assessment/Plan:    ESRD on dialysis [facility])  Lab Results   Component Value Date    EGFR 9 10/19/2022    EGFR 11 08/27/2022    EGFR 11 08/26/2022    CREATININE 5 17 (H) 10/19/2022    CREATININE 4 63 (H) 08/27/2022    CREATININE 4 44 (H) 08/26/2022     Will proceed with a left upper extremity arteriovenous graft so that we can more quickly get the perm cath removed  Type 2 diabetes mellitus with chronic kidney disease on chronic dialysis, with long-term current use of insulin ([facility])    Lab Results   Component Value Date    HGBA1C 5 7 (H) 10/19/2022        Diagnoses and all orders for this visit:    Type 2 diabetes mellitus with chronic kidney disease on chronic dialysis, with long-term current use of insulin ([facility] [address] )    Encounter regarding vascular access for dialysis for ESRD [facility])  -     Ambulatory Referral to Vascular Surgery    Pulmonary hypertension ([facility] )    Benign essential hypertension    ESRD on dialysis [facility])          Subjective:      Patient ID: Missy Dukes is a 76 y o  male  Pt is new and was ref by Mimi Mayorga PA-C to discuss HD access AVG vs AVF  Patient is right-hand dominant  Pt had VEIN mapping 10/14/22 which shows inadequate cephalic/basilic vein in the left upper extremity for creation of AV fistula  The right upper extremity shows adequate basilic vein however the basilic vein measures 6 6-2 4 requiring a 2 stage operation  Patient has had a PermCath in place since May of this year  We discussed the options of arteriovenous fistula and arteriovenous graft  I have recommended we proceed with a left upper extremity arteriovenous graft so that we can more quickly get the perm cath removed  Patient is in agreement  Patient tells me is able to walk several flights of stairs without chest pain or shortness of breath  He denies cardiac history  Pt [facility] to [facility]  Pt is using permcath on R side of chest  Pt is R handed  Pt is taking Simvastatin   Pt is a former smoker  The following portions of the patient's history were reviewed and updated as appropriate: allergies, current medications, past family history, past medical history, past social history, past surgical history and problem list     Review of Systems   Constitutional: Negative  HENT: Negative  Eyes: Negative  Respiratory: Negative  Cardiovascular: Negative  Gastrointestinal: Negative  Endocrine: Negative  Genitourinary: Negative  Musculoskeletal: Negative  Skin: Negative  Allergic/Immunologic: Negative  Neurological: Negative  Hematological: Negative  Psychiatric/Behavioral: Negative  Objective:      /66 (BP Location: Right arm, Patient Position: Sitting, Cuff Size: Standard)   Pulse 68   Ht 5' 8" (1 727 m)   Wt 73 5 kg (162 lb)   BMI 24 63 kg/m²          Physical Exam  Vitals and nursing note reviewed  Constitutional:       Appearance: He is well-developed  HENT:      Head: Normocephalic and atraumatic  Eyes:      Conjunctiva/sclera: Conjunctivae normal       Pupils: Pupils are equal, round, and reactive to light  Neck:      Vascular: No JVD  Cardiovascular:      Rate and Rhythm: Normal rate and regular rhythm  Heart sounds: Normal heart sounds  Pulmonary:      Effort: Pulmonary effort is normal  No respiratory distress  Breath sounds: Normal breath sounds  No stridor  No wheezing or rales  Chest:      Chest wall: No tenderness  Abdominal:      General: There is no distension  Palpations: Abdomen is soft  There is no mass  Tenderness: There is no abdominal tenderness  There is no guarding or rebound  Musculoskeletal:         General: No tenderness or deformity  Normal range of motion  Cervical back: Normal range of motion and neck supple  Skin:     General: Skin is warm and dry  Findings: No erythema or rash  Neurological:      Mental Status: He is alert and oriented to person, place, and time  Psychiatric:         Behavior: Behavior normal          Thought Content:  Thought content normal          Operative Scheduling Information:    Hospital:  [facility]    Physician:  Me    Surgery: VIVIANE PEREZ    Urgency:  Urgent: w/i 2 wks    Case Length:  Normal    Post-op Bed:  Outpatient    OR Table:  Standard    Equipment Needs:   4-7mm mm Propaten    Medication Instructions:  Aspirin:   Continue (do not hold)    Hydration:  No

## 2022-11-11 NOTE — PROGRESS NOTES
Assessment/Plan:    No problem-specific Assessment & Plan notes found for this encounter  {Assess/PlanSmartLinks:85383}      Subjective:      Patient ID: Lorri Jacobsen is a 76 y o  male  New patient, presents today to discuss HD access  Vein mapping done 10/14  Patient is on dialysis via R chest wall permcath, [facility] @ [address]  HPI    {Common ambulatory SmartLinks:42094}    Review of Systems   Constitutional: Negative  HENT: Negative  Eyes: Negative  Respiratory: Negative  Cardiovascular: Negative  Gastrointestinal: Negative  Endocrine: Negative  Genitourinary: Negative  Musculoskeletal: Negative  Skin: Negative  Allergic/Immunologic: Negative  Neurological: Negative  Hematological: Negative  Psychiatric/Behavioral: Negative  Objective: There were no vitals taken for this visit           Physical Exam

## 2022-11-11 NOTE — LETTER
November 11, 2022     [address] [id number]    Patient: Adilene Dennis   YOB: 1945   Date of Visit: 11/11/2022       Dear Dr Elle Menendez:    Thank you for referring Mimi Catarina to me for evaluation  Below are the relevant portions of my assessment and plan of care  Patient has had a PermCath in place since May of this year  We discussed the options of arteriovenous fistula and arteriovenous graft  I have recommended we proceed with a left upper extremity arteriovenous graft so that we can more quickly get the perm cath removed  Patient is in agreement  If you have questions, please do not hesitate to call me  I look forward to following Jayjay Taylor along with you           Sincerely,        Néstor Alberto MD        CC: ALICIA Washington MD

## 2022-11-11 NOTE — ASSESSMENT & PLAN NOTE
Lab Results   Component Value Date    EGFR 9 10/19/2022    EGFR 11 08/27/2022    EGFR 11 08/26/2022    CREATININE 5 17 (H) 10/19/2022    CREATININE 4 63 (H) 08/27/2022    CREATININE 4 44 (H) 08/26/2022     Will proceed with a left upper extremity arteriovenous graft so that we can more quickly get the perm cath removed

## 2022-11-14 NOTE — TELEPHONE ENCOUNTER
Left message for patient to call us back so that we can schedule his surgery for 12-7-22 at [facility] with Dr Jeremy Callahan

## 2022-11-15 ENCOUNTER — PREP FOR PROCEDURE (OUTPATIENT)
Dept: VASCULAR SURGERY | Facility: CLINIC | Age: 77
End: 2022-11-15

## 2022-11-15 NOTE — TELEPHONE ENCOUNTER
Verified patient's insurance   CONFIRMED - Patient's insurance is Medicare  Is patient requesting a call when authorization has been obtained? Patient did not request a call  Surgery Date: 12/7/22  Primary Surgeon: ELLEN // Ilia Dumont (NPI: [id number])  Assisting Surgeon: Not Applicable (N/A)  [facility]ility: [facility] (Tax: [id number] / NPI: [id number])  Inpatient / Outpatient: Outpatient  Level: 2    Clearance Received: No clearance ordered  Consent Received: Yes, scanned into Epic on 11/11/22  Medication Hold / Last Dose: Not Applicable (N/A)  VQI Spreadsheet: 11/15/22  IR Notified: Not Applicable (N/A)  Rep   Notified: Not Applicable (N/A)  Equipment Needs: Not Applicable (N/A)  Vas Lab Requested: Not Applicable (N/A)  Patient Contacted: 11/15/22 Julia Jean-Baptiste s/w patient     Diagnosis: E11 22, N18 6, Z99 2, Z79 4  Procedure/ CPT Code(s): (AV) Arteriovenous Fistula // CPT: 92099    For varicose vein related procedures:   Last LEVDR: Not Applicable (N/A)  CEAP Classification: Not Applicable (N/A)  VCSS: Not Applicable (N/A)    Post Operative Date/ Time: 12/16/22 , 10:00 [facility] ([place]) with Kaia Buckley (NPI: [id number])     Will get blood work from [employer] is vaccinated for covid

## 2022-11-16 NOTE — TELEPHONE ENCOUNTER
Authorization requirements reviewed  Please refer to Louise Mckinnon / Terry Jean Claude number [id number] for case updates      No authorization required

## 2022-11-23 ENCOUNTER — HOSPITAL ENCOUNTER (INPATIENT)
Facility: HOSPITAL | Age: 77
LOS: 3 days | Discharge: HOME | End: 2022-11-26
Attending: EMERGENCY MEDICINE | Admitting: INTERNAL MEDICINE

## 2022-11-23 ENCOUNTER — APPOINTMENT (INPATIENT)
Dept: DIALYSIS | Facility: HOSPITAL | Age: 77
End: 2022-11-23

## 2022-11-23 ENCOUNTER — APPOINTMENT (EMERGENCY)
Dept: RADIOLOGY | Facility: HOSPITAL | Age: 77
End: 2022-11-23

## 2022-11-23 DIAGNOSIS — R05.9 COUGH: ICD-10-CM

## 2022-11-23 DIAGNOSIS — J81.1 PULMONARY EDEMA: Primary | ICD-10-CM

## 2022-11-23 DIAGNOSIS — N18.6 ESRD ON DIALYSIS (HCC): ICD-10-CM

## 2022-11-23 DIAGNOSIS — Z99.2 ESRD ON DIALYSIS (HCC): ICD-10-CM

## 2022-11-23 DIAGNOSIS — J40 BRONCHITIS: ICD-10-CM

## 2022-11-23 PROBLEM — E87.70 FLUID OVERLOAD: Status: ACTIVE | Noted: 2022-01-01

## 2022-11-23 LAB
ALBUMIN SERPL BCP-MCNC: 1.9 G/DL (ref 3.5–5)
ALP SERPL-CCNC: 76 U/L (ref 46–116)
ALT SERPL W P-5'-P-CCNC: 10 U/L (ref 12–78)
ANION GAP SERPL CALCULATED.3IONS-SCNC: 10 MMOL/L (ref 4–13)
BASOPHILS # BLD AUTO: 0.04 THOUSANDS/ÂΜL (ref 0–0.1)
BASOPHILS NFR BLD AUTO: 0 % (ref 0–1)
BILIRUB SERPL-MCNC: 0.44 MG/DL (ref 0.2–1)
BUN SERPL-MCNC: 53 MG/DL (ref 5–25)
CALCIUM ALBUM COR SERPL-MCNC: 9.7 MG/DL (ref 8.3–10.1)
CALCIUM SERPL-MCNC: 8 MG/DL (ref 8.3–10.1)
CHLORIDE SERPL-SCNC: 96 MMOL/L (ref 96–108)
CO2 SERPL-SCNC: 32 MMOL/L (ref 21–32)
CREAT SERPL-MCNC: 5.55 MG/DL (ref 0.6–1.3)
EOSINOPHIL # BLD AUTO: 0.21 THOUSAND/ÂΜL (ref 0–0.61)
EOSINOPHIL NFR BLD AUTO: 2 % (ref 0–6)
ERYTHROCYTE [DISTWIDTH] IN BLOOD BY AUTOMATED COUNT: 15.1 % (ref 11.6–15.1)
FLUAV RNA RESP QL NAA+PROBE: NEGATIVE
FLUBV RNA RESP QL NAA+PROBE: NEGATIVE
GFR SERPL CREATININE-BSD FRML MDRD: 8 ML/MIN/1.73SQ M
GLUCOSE SERPL-MCNC: 123 MG/DL (ref 65–140)
GLUCOSE SERPL-MCNC: 176 MG/DL (ref 65–140)
GLUCOSE SERPL-MCNC: 224 MG/DL (ref 65–140)
HCT VFR BLD AUTO: 26.8 % (ref 36.5–49.3)
HGB BLD-MCNC: 8.2 G/DL (ref 12–17)
IMM GRANULOCYTES # BLD AUTO: 0.12 THOUSAND/UL (ref 0–0.2)
IMM GRANULOCYTES NFR BLD AUTO: 1 % (ref 0–2)
LACTATE SERPL-SCNC: 0.9 MMOL/L (ref 0.5–2)
LYMPHOCYTES # BLD AUTO: 0.62 THOUSANDS/ÂΜL (ref 0.6–4.47)
LYMPHOCYTES NFR BLD AUTO: 7 % (ref 14–44)
MCH RBC QN AUTO: 27.8 PG (ref 26.8–34.3)
MCHC RBC AUTO-ENTMCNC: 30.6 G/DL (ref 31.4–37.4)
MCV RBC AUTO: 91 FL (ref 82–98)
MONOCYTES # BLD AUTO: 0.45 THOUSAND/ÂΜL (ref 0.17–1.22)
MONOCYTES NFR BLD AUTO: 5 % (ref 4–12)
NEUTROPHILS # BLD AUTO: 7.94 THOUSANDS/ÂΜL (ref 1.85–7.62)
NEUTS SEG NFR BLD AUTO: 85 % (ref 43–75)
NRBC BLD AUTO-RTO: 0 /100 WBCS
NT-PROBNP SERPL-MCNC: ABNORMAL PG/ML
PLATELET # BLD AUTO: 374 THOUSANDS/UL (ref 149–390)
PMV BLD AUTO: 8.9 FL (ref 8.9–12.7)
POTASSIUM SERPL-SCNC: 5.1 MMOL/L (ref 3.5–5.3)
PROT SERPL-MCNC: 7.6 G/DL (ref 6.4–8.4)
RBC # BLD AUTO: 2.95 MILLION/UL (ref 3.88–5.62)
RSV RNA RESP QL NAA+PROBE: NEGATIVE
SARS-COV-2 RNA RESP QL NAA+PROBE: NEGATIVE
SODIUM SERPL-SCNC: 138 MMOL/L (ref 135–147)
WBC # BLD AUTO: 9.38 THOUSAND/UL (ref 4.31–10.16)

## 2022-11-23 PROCEDURE — 5A1D70Z PERFORMANCE OF URINARY FILTRATION, INTERMITTENT, LESS THAN 6 HOURS PER DAY: ICD-10-PCS | Performed by: INTERNAL MEDICINE

## 2022-11-23 RX ORDER — CALCIUM CARBONATE 500(1250)
1 TABLET ORAL 2 TIMES DAILY WITH MEALS
Status: DISCONTINUED | OUTPATIENT
Start: 2022-11-23 | End: 2022-11-26 | Stop reason: HOSPADM

## 2022-11-23 RX ORDER — TAMSULOSIN HYDROCHLORIDE 0.4 MG/1
0.8 CAPSULE ORAL
Status: DISCONTINUED | OUTPATIENT
Start: 2022-11-23 | End: 2022-11-26 | Stop reason: HOSPADM

## 2022-11-23 RX ORDER — HEPARIN SODIUM 5000 [USP'U]/ML
5000 INJECTION, SOLUTION INTRAVENOUS; SUBCUTANEOUS EVERY 8 HOURS SCHEDULED
Status: DISCONTINUED | OUTPATIENT
Start: 2022-11-23 | End: 2022-11-26 | Stop reason: HOSPADM

## 2022-11-23 RX ORDER — ACETAMINOPHEN 325 MG/1
650 TABLET ORAL EVERY 6 HOURS PRN
Status: DISCONTINUED | OUTPATIENT
Start: 2022-11-23 | End: 2022-11-26 | Stop reason: HOSPADM

## 2022-11-23 RX ORDER — CALCIUM ACETATE 667 MG/1
667 CAPSULE ORAL
Status: DISCONTINUED | OUTPATIENT
Start: 2022-11-23 | End: 2022-11-26 | Stop reason: HOSPADM

## 2022-11-23 RX ORDER — AMLODIPINE BESYLATE 10 MG/1
10 TABLET ORAL DAILY
Status: DISCONTINUED | OUTPATIENT
Start: 2022-11-23 | End: 2022-11-26 | Stop reason: HOSPADM

## 2022-11-23 RX ORDER — LOSARTAN POTASSIUM 50 MG/1
50 TABLET ORAL 2 TIMES DAILY
Status: DISCONTINUED | OUTPATIENT
Start: 2022-11-23 | End: 2022-11-26 | Stop reason: HOSPADM

## 2022-11-23 RX ORDER — PRAVASTATIN SODIUM 80 MG/1
80 TABLET ORAL
Status: DISCONTINUED | OUTPATIENT
Start: 2022-11-23 | End: 2022-11-26 | Stop reason: HOSPADM

## 2022-11-23 RX ORDER — GUAIFENESIN/DEXTROMETHORPHAN 100-10MG/5
10 SYRUP ORAL EVERY 4 HOURS PRN
Status: DISCONTINUED | OUTPATIENT
Start: 2022-11-23 | End: 2022-11-26 | Stop reason: HOSPADM

## 2022-11-23 RX ORDER — FINASTERIDE 5 MG/1
5 TABLET, FILM COATED ORAL DAILY
Status: DISCONTINUED | OUTPATIENT
Start: 2022-11-23 | End: 2022-11-26 | Stop reason: HOSPADM

## 2022-11-23 RX ORDER — INSULIN ASPART 100 [IU]/ML
15 INJECTION, SUSPENSION SUBCUTANEOUS
Status: DISCONTINUED | OUTPATIENT
Start: 2022-11-23 | End: 2022-11-26 | Stop reason: HOSPADM

## 2022-11-23 RX ORDER — INSULIN LISPRO 100 [IU]/ML
1-6 INJECTION, SOLUTION INTRAVENOUS; SUBCUTANEOUS
Status: DISCONTINUED | OUTPATIENT
Start: 2022-11-23 | End: 2022-11-26 | Stop reason: HOSPADM

## 2022-11-23 RX ORDER — CLONIDINE HYDROCHLORIDE 0.1 MG/1
0.1 TABLET ORAL EVERY 12 HOURS SCHEDULED
Status: DISCONTINUED | OUTPATIENT
Start: 2022-11-23 | End: 2022-11-26 | Stop reason: HOSPADM

## 2022-11-23 RX ORDER — INSULIN LISPRO 100 [IU]/ML
1-5 INJECTION, SOLUTION INTRAVENOUS; SUBCUTANEOUS
Status: DISCONTINUED | OUTPATIENT
Start: 2022-11-23 | End: 2022-11-26 | Stop reason: HOSPADM

## 2022-11-23 RX ADMIN — CALCIUM ACETATE 667 MG: 667 CAPSULE ORAL at 13:42

## 2022-11-23 RX ADMIN — HEPARIN SODIUM 5000 UNITS: 5000 INJECTION INTRAVENOUS; SUBCUTANEOUS at 21:50

## 2022-11-23 RX ADMIN — AMLODIPINE BESYLATE 10 MG: 10 TABLET ORAL at 18:05

## 2022-11-23 RX ADMIN — GUAIFENESIN AND DEXTROMETHORPHAN 10 ML: 100; 10 SYRUP ORAL at 17:52

## 2022-11-23 RX ADMIN — INSULIN ASPART 15 UNITS: 100 INJECTION, SUSPENSION SUBCUTANEOUS at 18:04

## 2022-11-23 RX ADMIN — CALCIUM 1 TABLET: 500 TABLET ORAL at 18:05

## 2022-11-23 RX ADMIN — TAMSULOSIN HYDROCHLORIDE 0.8 MG: 0.4 CAPSULE ORAL at 18:05

## 2022-11-23 RX ADMIN — PRAVASTATIN SODIUM 80 MG: 80 TABLET ORAL at 18:05

## 2022-11-23 RX ADMIN — FINASTERIDE 5 MG: 5 TABLET, FILM COATED ORAL at 13:42

## 2022-11-23 RX ADMIN — ACETAMINOPHEN 650 MG: 325 TABLET, FILM COATED ORAL at 21:50

## 2022-11-23 RX ADMIN — LOSARTAN POTASSIUM 50 MG: 50 TABLET, FILM COATED ORAL at 21:51

## 2022-11-23 RX ADMIN — INSULIN LISPRO 2 UNITS: 100 INJECTION, SOLUTION INTRAVENOUS; SUBCUTANEOUS at 18:04

## 2022-11-23 RX ADMIN — HEPARIN SODIUM 5000 UNITS: 5000 INJECTION INTRAVENOUS; SUBCUTANEOUS at 13:42

## 2022-11-23 RX ADMIN — CALCIUM ACETATE 667 MG: 667 CAPSULE ORAL at 18:05

## 2022-11-23 RX ADMIN — CLONIDINE HYDROCHLORIDE 0.1 MG: 0.1 TABLET ORAL at 21:51

## 2022-11-23 NOTE — CONSULTS
[address] [facility] [address] 76 y o  male MRN: [id number]  Unit/Bed#: 2 South 217 Encounter: [id number]    ASSESSMENT and PLAN:  1  ESRD on HD [facility] [place]):   · HD dependent since March 10, 2022  · This week, TTS patients are being dialyzed on Mon, Wed and Sat due to Thanksgiving  · Will plan for dialysis today     2  Access: R IJ permcath  3  Hypertension:   · EDW is 72 kg  · BP is above goal    · Home medications per MAR: Amlodipine 10 mg daily, clonidine 0 1 mg twice a day, losartan 50 mg twice a day  · Of note, Clonidine is not in the [facility] records  · Would continue home BP meds  · Monitor BP with UF on HD  4  Anemia:   · Hgb is below goal    · He is on Mircera as an outpatient  5  Mineral and bone disease:   · Follow up phos and PTH as an outpatient  6  ANCA vasculitis:  · Did not tolerate CYC in April 2022  · Currently not on treatment  SUMMARY OF RECOMMENDATIONS:  • Plan for HD today  • Will try to challenge EDW  • Monitor respiratory symptoms with fluid removal    • Continue home BP medications  The above plan was discussed with ER physician  Previous HD records were personally reviewed by me  The images (CXR) were personally reviewed by me in PACS    HISTORY OF PRESENT ILLNESS:  Requesting Physician: Aleida Friedman MD  Reason for Consult: ESRD on HD    Catarino Hylton is a 76 y o  male who was admitted to [facility] on 11/23/22 after presenting with SOB  A renal consultation is requested today for assistance in the management of ESRD  Catarino Hylton is a known ESRD patient who undergoes maintenance hemodialysis at [facility] on a TTS schedule  Maria Elena Rivera has a history of ESRD on HD due to ANCA vasculitis, hypertension, BPH  He now presents to [facility] after noting shortness of breath which started a few hours prior to admission  His symptoms were associated with cough and orthopnea  He denied any fever, chills   (+) dry heaving but no vomiting, abdominal pain or diarrhea  Due to his symptoms, he came to the ER  He usually gets dialysis on a TTS schedule but was dialyzed on Monday due to the holiday schedule  Based on my discussion with the outpatient HD nurse, he was at his dry weight after completion of dialysis on Monday, November 21  We are now being consulted for assistance with management of ESRD on HD  PAST MEDICAL HISTORY:  Past Medical History:   Diagnosis Date   • Anesthesia complication 7491    after colonoscopy , pt was awake but could not control his body and kept falling    • Arthritis    • Bladder calculi    • BPH (benign prostatic hyperplasia)    • Diabetes mellitus ([facility] [address] )    • ESRD (end stage renal disease) on dialysis ([facility] [address] )    • Hearing disorder of both ears     wears bilateral hearing aids   • Hyperlipidemia     controlled and maintained d/t diabetes   • Hypertension    • Kidney stones     Last Assessed:4/3/2017   • Lyme disease     Last Assessed:6/29/2015   • Rupture, bladder, spontaneous     Last Assessed:4/3/2017     PAST SURGICAL HISTORY:  Past Surgical History:   Procedure Laterality Date   • BLADDER REPAIR N/A 12/10/2016    Procedure: REPAIR BLADDER/cysto insertion stent;  Surgeon: Ciro Anne MD;  Location: [address] [place] [address];  Service:    • COLONOSCOPY     • CYSTOLITHOTOMY      ANESTHESIA   lower abdomen  ;  Last Assessed:4/3/2017   • IR ASPIRATION ONLY  5/8/2022   • IR BIOPSY KIDNEY RANDOM  3/10/2022   • IR TUNNELED DIALYSIS CATHETER PLACEMENT  3/8/2022   • OTHER SURGICAL HISTORY      thermal dilation of the prostate x 2 ( in the office)   • TONSILLECTOMY     • TRANSURETHRAL RESECTION OF PROSTATE N/A 12/8/2016    Procedure:  Cysto, Laser Bladder stone,fulgaration of prostates tissue ;  Surgeon: Ciro Anne MD;  Location: [facility];  Service:      ALLERGIES:  Allergies   Allergen Reactions   • Amoxicillin Rash     SOCIAL HISTORY:  Social History     Substance and Sexual Activity   Alcohol Use Never     Social History     Substance and Sexual Activity   Drug Use No     Social History     Tobacco Use   Smoking Status Former   • Types: Cigars   • Quit date: 26   • Years since quittin 9   Smokeless Tobacco Never     FAMILY HISTORY:  Family History   Problem Relation Age of Onset   • Cancer Mother         breast   • Diabetes Mother    • Cancer Father         prostate w/ bone mets   • Prostate cancer Father    • Alzheimer's disease Sister      MEDICATIONS:  No current facility-administered medications for this encounter  REVIEW OF SYSTEMS:  All the systems were reviewed and were negative except as documented on the HPI  PHYSICAL EXAM:  Current Weight: Weight - Scale: 74 8 kg (164 lb 14 5 oz)  First Weight: Weight - Scale: 74 8 kg (164 lb 14 5 oz)  Vitals:    22 0915 22 1100 22 1115 22 1209   BP: 153/68 160/72 156/73 165/65   BP Location: Left arm Left arm Left arm Right arm   Pulse: 86 82 82 82   Resp:    Temp: 98 5 °F (36 9 °C)   98 3 °F (36 8 °C)   TempSrc: Oral   Oral   SpO2: 97% 95% 95% 93%   Weight: 74 8 kg (164 lb 14 5 oz)      Height: 5' 8" (1 727 m)        No intake or output data in the 24 hours ending 22 1225  Physical Exam  General: conscious, coherent, cooperative, not in distress  Skin: warm, dry, good turgor  Eyes: pink conjunctivae, no scleral icterus  ENT: moist lips and mucous membranes  Respiratory: equal chest expansion, some crackles in the lower lung fields  Cardiovascular: distinct heart sounds, normal rate, regular rhythm  Abdomen: soft, non-tender, non-distended, normoactive bowel sounds  Extremities: no edema  Genitourinary: no ruvalcaba catheter  Neuro: awake, alert, oriented to time, place and person  Psych: appropriate affect  Invasive Devices: R IJ permcath        Lab Results:   Results from last 7 days   Lab Units 22  0957   WBC Thousand/uL 9 38   HEMOGLOBIN g/dL 8 2*   HEMATOCRIT % 26 8*   PLATELETS Thousands/uL 374   POTASSIUM mmol/L 5 1   CHLORIDE mmol/L 96   CO2 mmol/L 32   BUN mg/dL 53*   CREATININE mg/dL 5 55*   CALCIUM mg/dL 8 0*   ALK PHOS U/L 76   ALT U/L 10*     Lab Results   Component Value Date    CALCIUM 8 0 (L) 11/23/2022    PHOS 5 3 (H) 08/24/2022     Other Studies:   Chest x-ray personally reviewed by me showed mild vascular congestion and small bilateral pleural effusions

## 2022-11-23 NOTE — PLAN OF CARE
Serum potassium is 5 1 on 2 k bath  Will attempt for UF-1 5 kg as tolerated  Problem: METABOLIC, FLUID AND ELECTROLYTES - ADULT  Goal: Fluid balance maintained  Description: INTERVENTIONS:  - Monitor labs   - Monitor I/O and WT  - Instruct patient on fluid and nutrition as appropriate  - Assess for signs & symptoms of volume excess or deficit  Outcome: Progressing  Goal: Electrolytes maintained within normal limits  Description: INTERVENTIONS:  - Monitor labs and assess patient for signs and symptoms of electrolyte imbalances  - Administer electrolyte replacement as ordered  - Monitor response to electrolyte replacements, including repeat lab results as appropriate  - Instruct patient on fluid and nutrition as appropriate  Outcome: Progressing   Post-Dialysis RN Treatment Note    Blood Pressure:  Pre 149/71 mm/Hg                                  Post 163/78 mmHg   EDW  72 kg    Weight:  Pre 72 2 kg   Post 70 7 kg   Mode of weight measurement: Standing Scale   Volume Removed  1500 ml    Treatment duration 240 minutes    NS given  No    Treatment shortened?  No   Medications given during Rx None Reported   Estimated Kt/V  None Reported   Access type: Permacath/TDC   Access Issues: Yes, describe: needed to reversed line due to arterial pressure    Report called to primary nurse   Yes                           Doug Anderson RN

## 2022-11-23 NOTE — ASSESSMENT & PLAN NOTE
Lab Results   Component Value Date    EGFR 8 11/23/2022    EGFR 9 10/19/2022    EGFR 11 08/27/2022    CREATININE 5 55 (H) 11/23/2022    CREATININE 5 17 (H) 10/19/2022    CREATININE 4 63 (H) 08/27/2022   Patient has been hemodialysis dependent since March 2022  Usually on Tuesday Thursday Saturday schedule    This patient was getting dialysis on Monday Wednesday Saturday schedule due to Thanksgiving  Patient scheduled for dialysis today due to fluid overload

## 2022-11-23 NOTE — H&P
[address] [facility] 1945, 76 y o  male MRN: [id number]  Unit/Bed#: Lali Encounter: [id number]  Primary Care Provider: Catracho Haywood MD   Date and time admitted to hospital: 11/23/2022  9:08 AM    * Fluid overload  Assessment & Plan  Patient present with shortness of breath orthopnea and pinkish frothy phlegm  Chest x-ray showed bilateral lower space consolidation with small bilateral pleural effusion  Patient to be scheduled for dialysis with also fractionation today  Patient already evaluated by Nephrology in the ED     ESRD on dialysis [facility])  Assessment & Plan  Lab Results   Component Value Date    EGFR 8 11/23/2022    EGFR 9 10/19/2022    EGFR 11 08/27/2022    CREATININE 5 55 (H) 11/23/2022    CREATININE 5 17 (H) 10/19/2022    CREATININE 4 63 (H) 08/27/2022   Patient has been hemodialysis dependent since March 2022  Usually on Tuesday Thursday Saturday schedule    This patient was getting dialysis on Monday Wednesday Saturday schedule due to Thanksgiving  Patient scheduled for dialysis today due to fluid overload    ANCA-associated vasculitis  Assessment & Plan  Status post kidney biopsy on 03/22 which is 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)  Assessment & Plan  Lab Results   Component Value Date    EGFR 8 11/23/2022    EGFR 9 10/19/2022    EGFR 11 08/27/2022    CREATININE 5 55 (H) 11/23/2022    CREATININE 5 17 (H) 10/19/2022    CREATININE 4 63 (H) 08/27/2022   Hemoglobin stable    Type 2 diabetes mellitus with chronic kidney disease on chronic dialysis, with long-term current use of insulin (HCC)  Assessment & Plan  Lab Results   Component Value Date    HGBA1C 5 7 (H) 10/19/2022       No results for input(s): POCGLU in the last 72 hours  Blood Sugar Average: Last 72 hrs:   continue NovoLog 70/30 15 units b i d  Patient will be on Humalog sliding scale with Accu-Cheks q a c  And HS and diabetic diet    Benign essential hypertension  Assessment & Plan  Continue Norvasc 10 milligram p o  Daily, clonidine 0 1 milligram p o  B i d , losartan 50 milligram p o  B i d     VTE Pharmacologic Prophylaxis: VTE Score: 5 High Risk (Score >/= 5) - Pharmacological DVT Prophylaxis Ordered: heparin  Sequential Compression Devices Ordered  Code Status: Level 1 - Full Code     Anticipated Length of Stay: Patient will be admitted on an inpatient basis with an anticipated length of stay of greater than 2 midnights secondary to Fluid overload, end-stage renal disease on hemodialysis  Total Time for Visit, including Counseling / Coordination of Care: 60 minutes Greater than 50% of this total time spent on direct patient counseling and coordination of care  Chief Complaint:  Shortness of breath    History of Present Illness:  Armaan Powers is a 76 y o  male with a PMH of end-stage renal disease on hemodialysis, hypertension, anemia, diabetes, Anca vasculitis who presents with shortness of breath  Patient reported mild shortness of breath for the past couple of days  Patient also reported cough with pinkish frothy phlegm 2 days ago which have improved after that  Patient had difficulty lying down flat last night with some trouble breathing  Patient denies any chest pain, leg swelling or recent weight gain  Patient was due for his dialysis today  In the ED patient's O2 saturation was stable in mid 90s  Labs showed a proBNP of 11952 with BUN creatinine of 53 and 5 5  Chest x-ray showed small bilateral pleural effusions    Review of Systems:  Review of Systems   Constitutional: Negative for chills, diaphoresis, fatigue and unexpected weight change     HENT: Negative for congestion, ear discharge, ear pain, facial swelling, hearing loss, mouth sores, nosebleeds, postnasal drip, rhinorrhea, sinus pressure, sneezing, sore throat, tinnitus, trouble swallowing and voice change  Eyes: Negative for photophobia, discharge, redness and visual disturbance  Respiratory: Positive for cough and shortness of breath  Negative for chest tightness, wheezing and stridor  Cardiovascular: Negative for chest pain, palpitations and leg swelling  Gastrointestinal: Negative for abdominal distention, abdominal pain, anal bleeding, blood in stool, constipation, diarrhea, nausea and vomiting  Endocrine: Negative for polydipsia, polyphagia and polyuria  Genitourinary: Negative for decreased urine volume, difficulty urinating, dysuria, flank pain, frequency, hematuria and urgency  Musculoskeletal: Negative for arthralgias, back pain and neck stiffness  Skin: Negative for pallor and rash  Neurological: Negative for dizziness, seizures, facial asymmetry, speech difficulty, light-headedness, numbness and headaches  Hematological: Negative for adenopathy  Does not bruise/bleed easily  Psychiatric/Behavioral: Negative for agitation and confusion         Past Medical and Surgical History:   Past Medical History:   Diagnosis Date   • Anesthesia complication 9368    after colonoscopy , pt was awake but could not control his body and kept falling    • Arthritis    • Bladder calculi    • BPH (benign prostatic hyperplasia)    • Diabetes mellitus ([facility] [address] )    • ESRD (end stage renal disease) [facility]    • Hearing disorder of both ears     wears bilateral hearing aids   • Hyperlipidemia     controlled and maintained d/t diabetes   • Hypertension    • Kidney stones     Last Assessed:4/3/2017   • Lyme disease     Last Assessed:6/29/2015   • Rupture, bladder, spontaneous     Last Assessed:4/3/2017       Past Surgical History:   Procedure Laterality Date   • BLADDER REPAIR N/A 12/10/2016    Procedure: REPAIR BLADDER/cysto insertion stent;  Surgeon: Elizabeth Amin MD;  Location: [facility];  Service:    • COLONOSCOPY     • CYSTOLITHOTOMY      ANESTHESIA   lower abdomen  ; Last Assessed:4/3/2017   • IR ASPIRATION ONLY  5/8/2022   • IR BIOPSY KIDNEY RANDOM  3/10/2022   • IR TUNNELED DIALYSIS CATHETER PLACEMENT  3/8/2022   • OTHER SURGICAL HISTORY      thermal dilation of the prostate x 2 ( in the office)   • TONSILLECTOMY     • TRANSURETHRAL RESECTION OF PROSTATE N/A 12/8/2016    Procedure:  Cysto, Laser Bladder stone,fulgaration of prostates tissue ;  Surgeon: Lindy Green MD;  Location: [address] [place] [address];  Service:        Meds/Allergies:  Prior to Admission medications    Medication Sig Start Date End Date Taking?  Authorizing Provider   amLODIPine (NORVASC) 10 mg tablet Take 1 tablet (10 mg total) by mouth daily 10/17/22  Yes Kimberly Dunaway MD   calcium acetate (PHOSLO) capsule Take 1 capsule (667 mg total) by mouth 3 (three) times a day with meals Most days only takes BID 10/25/22  Yes Kimberly Dunaway MD   calcium carbonate-vitamin D (OSCAL-D) 500 mg-200 units per tablet Take 1 tablet by mouth 2 (two) times a day with meals 10/25/22  Yes David Singh MD   cloNIDine (CATAPRES) 0 1 mg tablet Take 1 tablet (0 1 mg total) by mouth every 12 (twelve) hours 10/17/22  Yes Kimberly Dunaway MD   dutasteride (AVODART) 0 5 mg capsule Take 1 capsule (0 5 mg total) by mouth daily 10/17/22  Yes Kimberly Dunaway MD   glucose blood test strip Use 1 each 3 (three) times a day 10/17/22  Yes Kimberly Dunaway MD   insulin aspart protamine-insulin aspart (NovoLOG 70/30 FlexPen ReliOn) 100 Units/mL injection pen Inject 15 Units under the skin 2 (two) times a day before meals 10/27/22  Yes David Singh MD   Insulin Pen Needle (B-D UF III MINI PEN NEEDLES) 31G X 5 MM MISC Use twice daily with insulin pen as directed 10/17/22  Yes Kimberly Dunaway MD   losartan (COZAAR) 50 mg tablet Take 1 tablet (50 mg total) by mouth 2 (two) times a day 10/17/22  Yes Kimberly Dunaway MD   simvastatin (ZOCOR) 40 mg tablet Take 1 tablet (40 mg total) by mouth daily at bedtime 10/17/22  Yes Clint Quinones MD   sodium chloride (OCEAN) 0 65 % nasal spray 1 spray into each nostril as needed for congestion 22  Yes Nabil Diallo MD   tamsulosin (FLOMAX) 0 4 mg Take 2 capsules (0 8 mg total) by mouth daily with dinner 10/17/22  Yes Clint Quinones MD   acetaminophen (TYLENOL) 325 mg tablet Take 2 tablets (650 mg total) by mouth every 6 (six) hours as needed for mild pain, headaches or fever  Patient not taking: Reported on 2022   Nabil Diallo MD   hydrocortisone (ANUSOL-HC) 25 mg suppository Insert 1 suppository into the rectum daily  Patient not taking: Reported on 2022   Historical Provider, MD   carboxymethylcellulose 1 % ophthalmic solution Apply 1 drop to eye 2 (two) times a day 22  Historical Provider, MD     I have reviewed home medications using recent Epic encounter  Allergies:    Allergies   Allergen Reactions   • Amoxicillin Rash       Social History:  Marital Status: /Civil Union     Substance Use History:   Social History     Substance and Sexual Activity   Alcohol Use Never     Social History     Tobacco Use   Smoking Status Former   • Types: Cigars   • Quit date:    • Years since quittin 9   Smokeless Tobacco Never     Social History     Substance and Sexual Activity   Drug Use No       Family History:  Family History   Problem Relation Age of Onset   • Cancer Mother         breast   • Diabetes Mother    • Cancer Father         prostate w/ bone mets   • Prostate cancer Father    • Alzheimer's disease Sister        Physical Exam:     Vitals:   Blood Pressure: 164/75 (22 1730)  Pulse: 87 (22 1730)  Temperature: 98 2 °F (36 8 °C) (22 1354)  Temp Source: Oral (22 1354)  Respirations: 20 (22 1730)  Height: 5' 8" (172 7 cm) (22 0915)  Weight - Scale: 74 8 kg (164 lb 14 5 oz) (22 0915)  SpO2: 94 % (22 1730)    Physical Exam  Constitutional:       Appearance: Normal appearance  HENT:      Head: Normocephalic and atraumatic  Eyes:      Extraocular Movements: Extraocular movements intact  Pupils: Pupils are equal, round, and reactive to light  Cardiovascular:      Rate and Rhythm: Normal rate and regular rhythm  Heart sounds: No murmur heard  No gallop  Pulmonary:      Effort: Pulmonary effort is normal       Breath sounds: Rales present  Comments: Bibasilar crackles  Abdominal:      General: Bowel sounds are normal       Palpations: Abdomen is soft  Tenderness: There is no abdominal tenderness  Musculoskeletal:         General: No swelling or deformity  Normal range of motion  Cervical back: Normal range of motion and neck supple  Skin:     General: Skin is warm and dry  Neurological:      General: No focal deficit present  Mental Status: He is alert            Additional Data:     Lab Results:  Results from last 7 days   Lab Units 11/23/22  0957   WBC Thousand/uL 9 38   HEMOGLOBIN g/dL 8 2*   HEMATOCRIT % 26 8*   PLATELETS Thousands/uL 374   NEUTROS PCT % 85*   LYMPHS PCT % 7*   MONOS PCT % 5   EOS PCT % 2     Results from last 7 days   Lab Units 11/23/22  0957   SODIUM mmol/L 138   POTASSIUM mmol/L 5 1   CHLORIDE mmol/L 96   CO2 mmol/L 32   BUN mg/dL 53*   CREATININE mg/dL 5 55*   ANION GAP mmol/L 10   CALCIUM mg/dL 8 0*   ALBUMIN g/dL 1 9*   TOTAL BILIRUBIN mg/dL 0 44   ALK PHOS U/L 76   ALT U/L 10*   GLUCOSE RANDOM mg/dL 176*                       Lines/Drains:  Invasive Devices     Peripheral Intravenous Line  Duration           Peripheral IV Right Antecubital -- days          Hemodialysis Catheter  Duration           HD Permanent Double Catheter 260 days                    Imaging: Reviewed radiology reports from this admission including: chest xray  XR chest 1 view portable   Final Result by Milena Duncan MD (11/23 1033)      Bilateral lower lobe airspace consolidation and small bilateral pleural effusions, not significantly changed  Workstation performed: [id number]             EKG and Other Studies Reviewed on Admission:   · EKG: NSR  HR 82 with incomplete right bundle-branch block and left axis deviation  ** Please Note: This note has been constructed using a voice recognition system   **

## 2022-11-23 NOTE — ASSESSMENT & PLAN NOTE
Patient present with shortness of breath orthopnea and pinkish frothy phlegm  Chest x-ray showed bilateral lower space consolidation with small bilateral pleural effusion  Patient to be scheduled for dialysis with also fractionation today  Patient already evaluated by Nephrology in the ED

## 2022-11-23 NOTE — ASSESSMENT & PLAN NOTE
Lab Results   Component Value Date    HGBA1C 5 7 (H) 10/19/2022       No results for input(s): POCGLU in the last 72 hours  Blood Sugar Average: Last 72 hrs:   continue NovoLog 70/30 15 units b i d  Patient will be on Humalog sliding scale with Accu-Cheks q a c   And HS and diabetic diet

## 2022-11-23 NOTE — ASSESSMENT & PLAN NOTE
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

## 2022-11-23 NOTE — ED PROVIDER NOTES
History  Chief Complaint   Patient presents with   • Shortness of Breath     Pt experienced SOB last night but didn't want to disturb anyone  Waited until this morning to let neighbor know  Pt states coughing up blood  Sputum is pink  EMS reports O2 sat at 95% on RA  Pt c/o blurry vision  HPI  Patient is a 20-year-old male history of diabetes, end-stage renal disease on dialysis presenting for evaluation approximately 3 days of cough, shortness of breath  Patient states that cough was initially thin sputum intermixed with blood with some occasional pinkness but feels that this has been resolving  Patient states the cough worse at night when he was lying flat and states that this has been affecting his sleep  Patient denies fevers, chills  Patient states that he has sore throat, denies headache, myalgia, recent travel or sick contacts  Patient denies any recent weight gain, leg swelling  Patient has baseline chronic bilateral pleural effusions  Prior to Admission Medications   Prescriptions Last Dose Informant Patient Reported? Taking?    Insulin Pen Needle (B-D UF III MINI PEN NEEDLES) 31G X 5 MM MISC   No No   Sig: Use twice daily with insulin pen as directed   acetaminophen (TYLENOL) 325 mg tablet   No No   Sig: Take 2 tablets (650 mg total) by mouth every 6 (six) hours as needed for mild pain, headaches or fever   Patient not taking: Reported on 11/11/2022   amLODIPine (NORVASC) 10 mg tablet   No No   Sig: Take 1 tablet (10 mg total) by mouth daily   calcium acetate (PHOSLO) capsule   No No   Sig: Take 1 capsule (667 mg total) by mouth 3 (three) times a day with meals Most days only takes BID   calcium carbonate-vitamin D (OSCAL-D) 500 mg-200 units per tablet   No No   Sig: Take 1 tablet by mouth 2 (two) times a day with meals   cloNIDine (CATAPRES) 0 1 mg tablet   No No   Sig: Take 1 tablet (0 1 mg total) by mouth every 12 (twelve) hours   dutasteride (AVODART) 0 5 mg capsule   No No   Sig: Take 1 capsule (0 5 mg total) by mouth daily   glucose blood test strip   No No   Sig: Use 1 each 3 (three) times a day   hydrocortisone (ANUSOL-HC) 25 mg suppository   Yes No   Sig: Insert 1 suppository into the rectum daily   Patient not taking: Reported on 2022   insulin aspart protamine-insulin aspart (NovoLOG 70/30 FlexPen ReliOn) 100 Units/mL injection pen   No No   Sig: Inject 15 Units under the skin 2 (two) times a day before meals   losartan (COZAAR) 50 mg tablet   No No   Sig: Take 1 tablet (50 mg total) by mouth 2 (two) times a day   simvastatin (ZOCOR) 40 mg tablet   No No   Sig: Take 1 tablet (40 mg total) by mouth daily at bedtime   sodium chloride (OCEAN) 0 65 % nasal spray   No No   Si spray into each nostril as needed for congestion   Patient not taking: Reported on 2022   tamsulosin (FLOMAX) 0 4 mg   No No   Sig: Take 2 capsules (0 8 mg total) by mouth daily with dinner      Facility-Administered Medications: None       Past Medical History:   Diagnosis Date   • Anesthesia complication     after colonoscopy , pt was awake but could not control his body and kept falling    • Arthritis    • Bladder calculi    • BPH (benign prostatic hyperplasia)    • Diabetes mellitus ([facility] )    • ESRD (end stage renal disease) on dialysis ([facility] )    • Hearing disorder of both ears     wears bilateral hearing aids   • Hyperlipidemia     controlled and maintained d/t diabetes   • Hypertension    • Kidney stones     Last Assessed:4/3/2017   • Lyme disease     Last Assessed:2015   • Rupture, bladder, spontaneous     Last Assessed:4/3/2017       Past Surgical History:   Procedure Laterality Date   • BLADDER REPAIR N/A 12/10/2016    Procedure: REPAIR BLADDER/cysto insertion stent;  Surgeon: Carolina David MD;  Location: [facility];  Service:    • COLONOSCOPY     • CYSTOLITHOTOMY      ANESTHESIA   lower abdomen  ;  Last Assessed:4/3/2017   • IR ASPIRATION ONLY  2022   • IR BIOPSY KIDNEY RANDOM  3/10/2022 • IR TUNNELED DIALYSIS CATHETER PLACEMENT  3/8/2022   • OTHER SURGICAL HISTORY      thermal dilation of the prostate x 2 ( in the office)   • TONSILLECTOMY     • TRANSURETHRAL RESECTION OF PROSTATE N/A 2016    Procedure:  Cysto, Laser Bladder stone,fulgaration of prostates tissue ;  Surgeon: Mariann Duffy MD;  Location: [facility];  Service:        Family History   Problem Relation Age of Onset   • Cancer Mother         breast   • Diabetes Mother    • Cancer Father         prostate w/ bone mets   • Prostate cancer Father    • Alzheimer's disease Sister      I have reviewed and agree with the history as documented  E-Cigarette/Vaping   • E-Cigarette Use Never User      E-Cigarette/Vaping Substances   • Nicotine No    • THC No    • CBD No    • Flavoring No    • Other No    • Unknown No      Social History     Tobacco Use   • Smoking status: Former     Types: Cigars     Quit date:      Years since quittin 9   • Smokeless tobacco: Never   Vaping Use   • Vaping Use: Never used   Substance Use Topics   • Alcohol use: Never   • Drug use: No       Review of Systems   Constitutional: Negative for chills, fatigue and fever  HENT: Positive for sore throat  Negative for congestion and rhinorrhea  Eyes: Negative for photophobia and visual disturbance  Respiratory: Positive for cough and shortness of breath  Negative for chest tightness  Cardiovascular: Negative for chest pain, palpitations and leg swelling  Gastrointestinal: Negative for abdominal distention, abdominal pain, diarrhea, nausea and vomiting  Endocrine: Negative for polydipsia and polyuria  Genitourinary: Negative for dysuria and hematuria  Musculoskeletal: Negative for arthralgias and myalgias  Skin: Negative for color change, pallor, rash and wound  Neurological: Negative for weakness, numbness and headaches  Psychiatric/Behavioral: Negative for confusion         Physical Exam  Physical Exam  Vitals and nursing note reviewed  Constitutional:       General: He is not in acute distress  Appearance: He is well-developed and well-nourished  He is not diaphoretic  Comments: Well-appearing, nontoxic, nondistressed   HENT:      Head: Normocephalic and atraumatic  Comments: Moist mucous membranes     Right Ear: External ear normal       Left Ear: External ear normal       Nose: Nose normal       Mouth/Throat:      Mouth: Oropharynx is clear and moist       Pharynx: No oropharyngeal exudate  Eyes:      Conjunctiva/sclera: Conjunctivae normal       Pupils: Pupils are equal, round, and reactive to light  Cardiovascular:      Rate and Rhythm: Normal rate and regular rhythm  Pulses: Intact distal pulses  Heart sounds: Normal heart sounds  No murmur heard  No friction rub  No gallop  Comments: Regular rate and rhythm, no murmurs rubs or gallops  Extremities warm and well perfused without mottling  Pulmonary:      Effort: Pulmonary effort is normal  No respiratory distress  Breath sounds: Normal breath sounds  No wheezing  Comments: No increased work of breathing  Speaking complete sentences  Satting 93% on room air indicating borderline oxygenation  Rales bilaterally in the lung bases  Chest:      Chest wall: No tenderness  Abdominal:      General: Bowel sounds are normal  There is no distension  Palpations: Abdomen is soft  There is no mass  Tenderness: There is no abdominal tenderness  There is no guarding or rebound  Musculoskeletal:         General: No deformity or edema  Comments: No lower extremity edema  No calf erythema or tenderness  Skin:     General: Skin is warm and dry  Capillary Refill: Capillary refill takes less than 2 seconds  Neurological:      Mental Status: He is alert and oriented to person, place, and time        Comments: AAO x3   Psychiatric:         Mood and Affect: Mood and affect normal          Behavior: Behavior normal  Vital Signs  ED Triage Vitals   Temperature Pulse Respirations Blood Pressure SpO2   11/23/22 0915 11/23/22 0915 11/23/22 0915 11/23/22 0915 11/23/22 0914   98 5 °F (36 9 °C) 86 20 153/68 96 %      Temp Source Heart Rate Source Patient Position - Orthostatic VS BP Location FiO2 (%)   11/23/22 0915 11/23/22 0915 11/23/22 0915 11/23/22 0915 --   Oral Monitor Lying Left arm       Pain Score       11/23/22 0915       No Pain           Vitals:    11/23/22 0915 11/23/22 1100 11/23/22 1115 11/23/22 1209   BP: 153/68 160/72 156/73 165/65   Pulse: 86 82 82 82   Patient Position - Orthostatic VS: Lying Lying Lying Lying         Visual Acuity      ED Medications  Medications   amLODIPine (NORVASC) tablet 10 mg (has no administration in time range)   calcium acetate (PHOSLO) capsule 667 mg (667 mg Oral Given 11/23/22 1342)   calcium carbonate (OYSTER SHELL,OSCAL) 500 mg tablet 1 tablet (has no administration in time range)   cloNIDine (CATAPRES) tablet 0 1 mg (has no administration in time range)   finasteride (PROSCAR) tablet 5 mg (5 mg Oral Given 11/23/22 1342)   insulin aspart protamine-insulin aspart (NovoLOG 70/30) 100 units/mL subcutaneous injection 15 Units (has no administration in time range)   losartan (COZAAR) tablet 50 mg (has no administration in time range)   pravastatin (PRAVACHOL) tablet 80 mg (has no administration in time range)   tamsulosin (FLOMAX) capsule 0 8 mg (has no administration in time range)   heparin (porcine) subcutaneous injection 5,000 Units (5,000 Units Subcutaneous Given 11/23/22 1342)       Diagnostic Studies  Results Reviewed     Procedure Component Value Units Date/Time    Comprehensive metabolic panel [[id number]]  (Abnormal) Collected: 11/23/22 0957    Lab Status: Final result Specimen: Blood from Arm, Right Updated: 11/23/22 1048     Sodium 138 mmol/L      Potassium 5 1 mmol/L      Chloride 96 mmol/L      CO2 32 mmol/L      ANION GAP 10 mmol/L      BUN 53 mg/dL      Creatinine 5 55 mg/dL      Glucose 176 mg/dL      Calcium 8 0 mg/dL      Corrected Calcium 9 7 mg/dL      AST --     ALT 10 U/L      Alkaline Phosphatase 76 U/L      Total Protein 7 6 g/dL      Albumin 1 9 g/dL      Total Bilirubin 0 44 mg/dL      eGFR 8 ml/min/1 73sq m     Narrative:      [place] guidelines for Chronic Kidney Disease (CKD):   •  Stage 1 with normal or high GFR (GFR > 90 mL/min/1 73 square meters)  •  Stage 2 Mild CKD (GFR = 60-89 mL/min/1 73 square meters)  •  Stage 3A Moderate CKD (GFR = 45-59 mL/min/1 73 square meters)  •  Stage 3B Moderate CKD (GFR = 30-44 mL/min/1 73 square meters)  •  Stage 4 Severe CKD (GFR = 15-29 mL/min/1 73 square meters)  •  Stage 5 End Stage CKD (GFR <15 mL/min/1 73 square meters)  Note: GFR calculation is accurate only with a steady state creatinine    NT-BNP PRO [[id number]] Collected: 11/23/22 0957    Lab Status:  In process Specimen: Blood from Arm, Right Updated: 11/23/22 1030    CBC and differential [[id number]]  (Abnormal) Collected: 11/23/22 0957    Lab Status: Final result Specimen: Blood from Arm, Right Updated: 11/23/22 1005     WBC 9 38 Thousand/uL      RBC 2 95 Million/uL      Hemoglobin 8 2 g/dL      Hematocrit 26 8 %      MCV 91 fL      MCH 27 8 pg      MCHC 30 6 g/dL      RDW 15 1 %      MPV 8 9 fL      Platelets 719 Thousands/uL      nRBC 0 /100 WBCs      Neutrophils Relative 85 %      Immat GRANS % 1 %      Lymphocytes Relative 7 %      Monocytes Relative 5 %      Eosinophils Relative 2 %      Basophils Relative 0 %      Neutrophils Absolute 7 94 Thousands/µL      Immature Grans Absolute 0 12 Thousand/uL      Lymphocytes Absolute 0 62 Thousands/µL      Monocytes Absolute 0 45 Thousand/µL      Eosinophils Absolute 0 21 Thousand/µL      Basophils Absolute 0 04 Thousands/µL                  XR chest 1 view portable   Final Result by Germán Thomas MD (11/23 1033)      Bilateral lower lobe airspace consolidation and small bilateral pleural effusions, not significantly changed  Workstation performed: [id number]                    Procedures  Procedures         ED Course                               SBIRT 22yo+    Flowsheet Row Most Recent Value   SBIRT (23 yo +)    In order to provide better care to our patients, we are screening all of our patients for alcohol and drug use  Would it be okay to ask you these screening questions? No Filed at: 11/23/2022 0950                    MDM  Number of Diagnoses or Management Options  Cough  ESRD on dialysis [facility])  Pulmonary edema  Diagnosis management comments: Patient with low normal oxygen saturations, rales bilaterally in the lung bases  Patient with a minimally productive cough, denying fevers, chills, rigors, or additional symptoms suggestive of bacterial pneumonia  Patient does however have rales bilaterally in the lung bases  No additional evidence of fluid overload on physical exam   Lab evaluation including CBC, CMP, BNP as well as chest x-ray and EKG performed  Chest x-ray redemonstrating bilateral pleural effusions and possible component of pulmonary edema  EKG demonstrating normal sinus rhythm without ST or T-wave abnormalities  Discussed patient with nephrology team with plan for admission for dialysis  Patient agreeable with this plan  Remaining in stable condition in the emergency department in no respiratory distress        Disposition  Final diagnoses:   Pulmonary edema   ESRD on dialysis ([address] )   Cough     Time reflects when diagnosis was documented in both MDM as applicable and the Disposition within this note     Time User Action Codes Description Comment    11/23/2022 11:08 AM [place] Fabrizio Add [J81 1] Pulmonary edema     11/23/2022 11:09 AM [place] Fabrizio Add [N18 6,  Z99 2] ESRD on dialysis [facility])     11/23/2022 11:09 AM [place] Fabrizio Add [R05 9] Cough       ED Disposition     ED Disposition   Admit    Condition   Stable    Date/Time   Wed Nov 23, 2022 11:09 AM    Comment   Case was discussed with MARY and the patient's admission status was agreed to be Admission Status: observation status to the service of Dr Елена Danile              Follow-up Information    None         Current Discharge Medication List      CONTINUE these medications which have NOT CHANGED    Details   acetaminophen (TYLENOL) 325 mg tablet Take 2 tablets (650 mg total) by mouth every 6 (six) hours as needed for mild pain, headaches or fever  Refills: 0    Associated Diagnoses: Perinephric fluid collection      amLODIPine (NORVASC) 10 mg tablet Take 1 tablet (10 mg total) by mouth daily  Qty: 90 tablet, Refills: 1    Associated Diagnoses: Benign essential hypertension      calcium acetate (PHOSLO) capsule Take 1 capsule (667 mg total) by mouth 3 (three) times a day with meals Most days only takes BID  Qty: 60 capsule, Refills: 1    Associated Diagnoses: Hypocalcemia      calcium carbonate-vitamin D (OSCAL-D) 500 mg-200 units per tablet Take 1 tablet by mouth 2 (two) times a day with meals  Qty: 180 tablet, Refills: 1    Associated Diagnoses: Osteopenia of multiple sites      cloNIDine (CATAPRES) 0 1 mg tablet Take 1 tablet (0 1 mg total) by mouth every 12 (twelve) hours  Qty: 180 tablet, Refills: 1    Associated Diagnoses: Benign essential hypertension      dutasteride (AVODART) 0 5 mg capsule Take 1 capsule (0 5 mg total) by mouth daily  Qty: 90 capsule, Refills: 1    Associated Diagnoses: Benign prostatic hyperplasia with lower urinary tract symptoms, symptom details unspecified      glucose blood test strip Use 1 each 3 (three) times a day  Qty: 300 each, Refills: 3    Associated Diagnoses: Type 2 diabetes mellitus with chronic kidney disease on chronic dialysis, with long-term current use of insulin (HCC)      hydrocortisone (ANUSOL-HC) 25 mg suppository Insert 1 suppository into the rectum daily      insulin aspart protamine-insulin aspart (NovoLOG 70/30 FlexPen ReliOn) 100 Units/mL injection pen Inject 15 Units under the skin 2 (two) times a day before meals  Qty: 15 mL, Refills: 0    Associated Diagnoses: Type 2 diabetes mellitus with chronic kidney disease on chronic dialysis, with long-term current use of insulin ([facility])      Insulin Pen Needle (B-D UF III MINI PEN NEEDLES) 31G X 5 MM MISC Use twice daily with insulin pen as directed  Qty: 180 each, Refills: 3    Associated Diagnoses: Type 2 diabetes mellitus with chronic kidney disease on chronic dialysis, with long-term current use of insulin ([facility])      losartan (COZAAR) 50 mg tablet Take 1 tablet (50 mg total) by mouth 2 (two) times a day  Qty: 180 tablet, Refills: 1    Associated Diagnoses: Benign essential hypertension      simvastatin (ZOCOR) 40 mg tablet Take 1 tablet (40 mg total) by mouth daily at bedtime  Qty: 90 tablet, Refills: 3    Comments: Requesting 1 year supply  Associated Diagnoses: Hyperlipidemia, unspecified hyperlipidemia type      sodium chloride (OCEAN) 0 65 % nasal spray 1 spray into each nostril as needed for congestion  Refills: 0    Associated Diagnoses: Complaint of nasal congestion      tamsulosin (FLOMAX) 0 4 mg Take 2 capsules (0 8 mg total) by mouth daily with dinner  Qty: 180 capsule, Refills: 1    Associated Diagnoses: Benign prostatic hyperplasia with lower urinary tract symptoms, symptom details unspecified             No discharge procedures on file      PDMP Review     None          ED Provider  Electronically Signed by           Glenny Jha MD  11/23/22 [phone]

## 2022-11-23 NOTE — ASSESSMENT & PLAN NOTE
Lab Results   Component Value Date    EGFR 8 11/23/2022    EGFR 9 10/19/2022    EGFR 11 08/27/2022    CREATININE 5 55 (H) 11/23/2022    CREATININE 5 17 (H) 10/19/2022    CREATININE 4 63 (H) 08/27/2022   Hemoglobin stable

## 2022-11-23 NOTE — ASSESSMENT & PLAN NOTE
Continue Norvasc 10 milligram p o   Daily, clonidine 0 1 milligram p o  B i d , losartan 50 milligram p o  B i d

## 2022-11-23 NOTE — PLAN OF CARE
Problem: Potential for Falls  Goal: Patient will remain free of falls  Description: INTERVENTIONS:  - Educate patient/family on patient safety including physical limitations  - Instruct patient to call for assistance with activity   - Consult OT/PT to assist with strengthening/mobility   - Keep Call bell within reach  - Keep bed low and locked with side rails adjusted as appropriate  - Keep care items and personal belongings within reach  - Initiate and maintain comfort rounds  - Make Fall Risk Sign visible to staff  - Offer Toileting every 2 Hours, in advance of need  - Initiate/Maintain bed alarm  - Obtain necessary fall risk management equipment: alarms  - Apply yellow socks and bracelet for high fall risk patients  - Consider moving patient to room near nurses station  Outcome: Progressing     Problem: MOBILITY - ADULT  Goal: Maintain or return to baseline ADL function  Description: INTERVENTIONS:  -  Assess patient's ability to carry out ADLs; assess patient's baseline for ADL function and identify physical deficits which impact ability to perform ADLs (bathing, care of mouth/teeth, toileting, grooming, dressing, etc )  - Assess/evaluate cause of self-care deficits   - Assess range of motion  - Assess patient's mobility; develop plan if impaired  - Assess patient's need for assistive devices and provide as appropriate  - Encourage maximum independence but intervene and supervise when necessary  - Involve family in performance of ADLs  - Assess for home care needs following discharge   - Consider OT consult to assist with ADL evaluation and planning for discharge  - Provide patient education as appropriate  Outcome: Progressing  Goal: Maintains/Returns to pre admission functional level  Description: INTERVENTIONS:  - Perform BMAT or MOVE assessment daily    - Set and communicate daily mobility goal to care team and patient/family/caregiver     - Collaborate with rehabilitation services on mobility goals if consulted  - Perform Range of Motion 5 times a day  - Reposition patient every 2 hours    - Dangle patient 3 times a day  - Stand patient 3 times a day  - Ambulate patient 3 times a day  - Out of bed to chair 3 times a day   - Out of bed for meals 3 times a day  - Out of bed for toileting  - Record patient progress and toleration of activity level   Outcome: Progressing     Problem: PAIN - ADULT  Goal: Verbalizes/displays adequate comfort level or baseline comfort level  Description: Interventions:  - Encourage patient to monitor pain and request assistance  - Assess pain using appropriate pain scale  - Administer analgesics based on type and severity of pain and evaluate response  - Implement non-pharmacological measures as appropriate and evaluate response  - Consider cultural and social influences on pain and pain management  - Notify physician/advanced practitioner if interventions unsuccessful or patient reports new pain  Outcome: Progressing     Problem: INFECTION - ADULT  Goal: Absence or prevention of progression during hospitalization  Description: INTERVENTIONS:  - Assess and monitor for signs and symptoms of infection  - Monitor lab/diagnostic results  - Monitor all insertion sites, i e  indwelling lines, tubes, and drains  - Monitor endotracheal if appropriate and nasal secretions for changes in amount and color  - [place] appropriate cooling/warming therapies per order  - Administer medications as ordered  - Instruct and encourage patient and family to use good hand hygiene technique  - Identify and instruct in appropriate isolation precautions for identified infection/condition  Outcome: Progressing  Goal: Absence of fever/infection during neutropenic period  Description: INTERVENTIONS:  - Monitor WBC    Outcome: Progressing     Problem: SAFETY ADULT  Goal: Patient will remain free of falls  Description: INTERVENTIONS:  - Educate patient/family on patient safety including physical limitations  - Instruct patient to call for assistance with activity   - Consult OT/PT to assist with strengthening/mobility   - Keep Call bell within reach  - Keep bed low and locked with side rails adjusted as appropriate  - Keep care items and personal belongings within reach  - Initiate and maintain comfort rounds  - Make Fall Risk Sign visible to staff  - Offer Toileting every 2 Hours, in advance of need  - Initiate/Maintain bed alarm  - Obtain necessary fall risk management equipment: alarms  - Apply yellow socks and bracelet for high fall risk patients  - Consider moving patient to room near nurses station  Outcome: Progressing  Goal: Maintain or return to baseline ADL function  Description: INTERVENTIONS:  -  Assess patient's ability to carry out ADLs; assess patient's baseline for ADL function and identify physical deficits which impact ability to perform ADLs (bathing, care of mouth/teeth, toileting, grooming, dressing, etc )  - Assess/evaluate cause of self-care deficits   - Assess range of motion  - Assess patient's mobility; develop plan if impaired  - Assess patient's need for assistive devices and provide as appropriate  - Encourage maximum independence but intervene and supervise when necessary  - Involve family in performance of ADLs  - Assess for home care needs following discharge   - Consider OT consult to assist with ADL evaluation and planning for discharge  - Provide patient education as appropriate  Outcome: Progressing  Goal: Maintains/Returns to pre admission functional level  Description: INTERVENTIONS:  - Perform BMAT or MOVE assessment daily    - Set and communicate daily mobility goal to care team and patient/family/caregiver  - Collaborate with rehabilitation services on mobility goals if consulted  - Perform Range of Motion 5 times a day  - Reposition patient every 2 hours    - Dangle patient 3 times a day  - Stand patient 3 times a day  - Ambulate patient 3 times a day  - Out of bed to chair 3 times a day   - Out of bed for meals 3 times a day  - Out of bed for toileting  - Record patient progress and toleration of activity level   Outcome: Progressing     Problem: DISCHARGE PLANNING  Goal: Discharge to home or other facility with appropriate resources  Description: INTERVENTIONS:  - Identify barriers to discharge w/patient and caregiver  - Arrange for needed discharge resources and transportation as appropriate  - Identify discharge learning needs (meds, wound care, etc )  - Arrange for interpretive services to assist at discharge as needed  - Refer to Case Management Department for coordinating discharge planning if the patient needs post-hospital services based on physician/advanced practitioner order or complex needs related to functional status, cognitive ability, or social support system  Outcome: Progressing     Problem: Knowledge Deficit  Goal: Patient/family/caregiver demonstrates understanding of disease process, treatment plan, medications, and discharge instructions  Description: Complete learning assessment and assess knowledge base    Interventions:  - Provide teaching at level of understanding  - Provide teaching via preferred learning methods  Outcome: Progressing     Problem: RESPIRATORY - ADULT  Goal: Achieves optimal ventilation and oxygenation  Description: INTERVENTIONS:  - Assess for changes in respiratory status  - Assess for changes in mentation and behavior  - Position to facilitate oxygenation and minimize respiratory effort  - Oxygen administered by appropriate delivery if ordered  - Initiate smoking cessation education as indicated  - Encourage broncho-pulmonary hygiene including cough, deep breathe, Incentive Spirometry  - Assess the need for suctioning and aspirate as needed  - Assess and instruct to report SOB or any respiratory difficulty  - Respiratory Therapy support as indicated  Outcome: Progressing     Problem: METABOLIC, FLUID AND ELECTROLYTES - ADULT  Goal: Fluid balance maintained  Description: INTERVENTIONS:  - Monitor labs   - Monitor I/O and WT  - Instruct patient on fluid and nutrition as appropriate  - Assess for signs & symptoms of volume excess or deficit  Outcome: Progressing

## 2022-11-24 PROBLEM — R50.9 FEVER: Status: ACTIVE | Noted: 2022-11-24

## 2022-11-24 LAB
ANION GAP SERPL CALCULATED.3IONS-SCNC: 7 MMOL/L (ref 4–13)
BUN SERPL-MCNC: 32 MG/DL (ref 5–25)
CALCIUM SERPL-MCNC: 7.9 MG/DL (ref 8.3–10.1)
CHLORIDE SERPL-SCNC: 97 MMOL/L (ref 96–108)
CO2 SERPL-SCNC: 32 MMOL/L (ref 21–32)
CREAT SERPL-MCNC: 3.69 MG/DL (ref 0.6–1.3)
ERYTHROCYTE [DISTWIDTH] IN BLOOD BY AUTOMATED COUNT: 15 % (ref 11.6–15.1)
GFR SERPL CREATININE-BSD FRML MDRD: 14 ML/MIN/1.73SQ M
GLUCOSE SERPL-MCNC: 131 MG/DL (ref 65–140)
GLUCOSE SERPL-MCNC: 132 MG/DL (ref 65–140)
GLUCOSE SERPL-MCNC: 133 MG/DL (ref 65–140)
GLUCOSE SERPL-MCNC: 141 MG/DL (ref 65–140)
GLUCOSE SERPL-MCNC: 144 MG/DL (ref 65–140)
HCT VFR BLD AUTO: 24.3 % (ref 36.5–49.3)
HGB BLD-MCNC: 7.5 G/DL (ref 12–17)
MCH RBC QN AUTO: 27.8 PG (ref 26.8–34.3)
MCHC RBC AUTO-ENTMCNC: 30.9 G/DL (ref 31.4–37.4)
MCV RBC AUTO: 90 FL (ref 82–98)
PLATELET # BLD AUTO: 364 THOUSANDS/UL (ref 149–390)
PMV BLD AUTO: 9 FL (ref 8.9–12.7)
POTASSIUM SERPL-SCNC: 4.3 MMOL/L (ref 3.5–5.3)
RBC # BLD AUTO: 2.7 MILLION/UL (ref 3.88–5.62)
SODIUM SERPL-SCNC: 136 MMOL/L (ref 135–147)
WBC # BLD AUTO: 8.66 THOUSAND/UL (ref 4.31–10.16)

## 2022-11-24 RX ORDER — CEFTRIAXONE 1 G/50ML
1000 INJECTION, SOLUTION INTRAVENOUS EVERY 24 HOURS
Status: DISCONTINUED | OUTPATIENT
Start: 2022-11-24 | End: 2022-11-26 | Stop reason: HOSPADM

## 2022-11-24 RX ORDER — AZITHROMYCIN 250 MG/1
500 TABLET, FILM COATED ORAL EVERY 24 HOURS
Status: DISCONTINUED | OUTPATIENT
Start: 2022-11-24 | End: 2022-11-26 | Stop reason: HOSPADM

## 2022-11-24 RX ADMIN — LOSARTAN POTASSIUM 50 MG: 50 TABLET, FILM COATED ORAL at 08:25

## 2022-11-24 RX ADMIN — CLONIDINE HYDROCHLORIDE 0.1 MG: 0.1 TABLET ORAL at 21:08

## 2022-11-24 RX ADMIN — HEPARIN SODIUM 5000 UNITS: 5000 INJECTION INTRAVENOUS; SUBCUTANEOUS at 06:31

## 2022-11-24 RX ADMIN — GUAIFENESIN AND DEXTROMETHORPHAN 10 ML: 100; 10 SYRUP ORAL at 08:25

## 2022-11-24 RX ADMIN — CEFTRIAXONE 1000 MG: 1 INJECTION, SOLUTION INTRAVENOUS at 09:43

## 2022-11-24 RX ADMIN — AMLODIPINE BESYLATE 10 MG: 10 TABLET ORAL at 08:25

## 2022-11-24 RX ADMIN — HEPARIN SODIUM 5000 UNITS: 5000 INJECTION INTRAVENOUS; SUBCUTANEOUS at 21:08

## 2022-11-24 RX ADMIN — CLONIDINE HYDROCHLORIDE 0.1 MG: 0.1 TABLET ORAL at 08:25

## 2022-11-24 RX ADMIN — GUAIFENESIN AND DEXTROMETHORPHAN 10 ML: 100; 10 SYRUP ORAL at 19:01

## 2022-11-24 RX ADMIN — INSULIN ASPART 15 UNITS: 100 INJECTION, SUSPENSION SUBCUTANEOUS at 18:00

## 2022-11-24 RX ADMIN — AZITHROMYCIN MONOHYDRATE 500 MG: 250 TABLET ORAL at 09:45

## 2022-11-24 RX ADMIN — CALCIUM 1 TABLET: 500 TABLET ORAL at 08:25

## 2022-11-24 RX ADMIN — FINASTERIDE 5 MG: 5 TABLET, FILM COATED ORAL at 08:25

## 2022-11-24 RX ADMIN — TAMSULOSIN HYDROCHLORIDE 0.8 MG: 0.4 CAPSULE ORAL at 18:00

## 2022-11-24 RX ADMIN — PRAVASTATIN SODIUM 80 MG: 80 TABLET ORAL at 18:00

## 2022-11-24 RX ADMIN — GUAIFENESIN AND DEXTROMETHORPHAN 10 ML: 100; 10 SYRUP ORAL at 03:13

## 2022-11-24 RX ADMIN — CALCIUM ACETATE 667 MG: 667 CAPSULE ORAL at 18:00

## 2022-11-24 RX ADMIN — CALCIUM ACETATE 667 MG: 667 CAPSULE ORAL at 11:42

## 2022-11-24 RX ADMIN — CALCIUM ACETATE 667 MG: 667 CAPSULE ORAL at 08:25

## 2022-11-24 RX ADMIN — CALCIUM 1 TABLET: 500 TABLET ORAL at 18:00

## 2022-11-24 RX ADMIN — HEPARIN SODIUM 5000 UNITS: 5000 INJECTION INTRAVENOUS; SUBCUTANEOUS at 14:59

## 2022-11-24 RX ADMIN — LOSARTAN POTASSIUM 50 MG: 50 TABLET, FILM COATED ORAL at 21:08

## 2022-11-24 RX ADMIN — INSULIN ASPART 15 UNITS: 100 INJECTION, SUSPENSION SUBCUTANEOUS at 08:24

## 2022-11-24 NOTE — ASSESSMENT & PLAN NOTE
Patient present with shortness of breath orthopnea and pinkish frothy phlegm  Chest x-ray showed bilateral lower space consolidation with small bilateral pleural effusion  Patient underwent dialysis with fluid removal on November 23, 2022  Patient already evaluated by Nephrology in the ED

## 2022-11-24 NOTE — PROGRESS NOTES
Mita U  64   Progress Note - Clifm Rist 1945, 76 y o  male MRN: [id number]  Unit/Bed#: [facility] Encounter: [id number]  Primary Care Provider: Rudi Lewis MD   Date and time admitted to hospital: 11/23/2022  9:08 AM    * Fluid overload  Assessment & Plan  Patient present with shortness of breath orthopnea and pinkish frothy phlegm  Chest x-ray showed bilateral lower space consolidation with small bilateral pleural effusion  Patient underwent dialysis with fluid removal on November 23, 2022  Patient already evaluated by Nephrology in the ED  Fever  Assessment & Plan  Patient developed a fever last night  Patient's COVID flu and RSV was negative  Follow-up blood cultures x2 as patient has a PermCath  Also check urine for Legionella pneumococcal antigens  Chest x-ray showed bibasilar consolidation  Patient will be started on Rocephin 1 gram IV daily and azithromycin 500 milligram p o  Daily to cover for possible bronchitis/pneumonia  Check procalcitonin  Monitor fever curve and white count    ESRD on dialysis [facility])  Assessment & Plan  Lab Results   Component Value Date    EGFR 14 11/24/2022    EGFR 8 11/23/2022    EGFR 9 10/19/2022    CREATININE 3 69 (H) 11/24/2022    CREATININE 5 55 (H) 11/23/2022    CREATININE 5 17 (H) 10/19/2022   Patient has been hemodialysis dependent since March 2022  Usually on Tuesday Thursday Saturday schedule    This patient was getting dialysis on Monday Wednesday Saturday schedule due to Thanksgiving  Patient underwent hemodialysis with ultra fractionation yesterday with fluid removal    ANCA-associated vasculitis  Assessment & Plan  Status post kidney biopsy on 03/22 which is 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)  Assessment & Plan  Lab Results   Component Value Date    EGFR 14 11/24/2022    EGFR 8 11/23/2022    EGFR 9 10/19/2022    CREATININE 3 69 (H) 11/24/2022    CREATININE 5 55 (H) 11/23/2022    CREATININE 5 17 (H) 10/19/2022   Patient's hemoglobin has been in the range of 9-12 and upon admission was 8 2 which dropped to 7 5  Check iron panel   Patient did have EGD and colonoscopy in March of this year which showed no evidence of bleeding and polyps were removed at that time  Type 2 diabetes mellitus with chronic kidney disease on chronic dialysis, with long-term current use of insulin [facility])  Assessment & Plan  Lab Results   Component Value Date    HGBA1C 5 7 (H) 10/19/2022       Recent Labs     11/23/22  1736 11/23/22  2049 11/24/22  0710 11/24/22  1122   POCGLU 224* 123 131 141*       Blood Sugar Average: Last 72 hrs:  (P) 154 75 continue NovoLog 70/30 15 units b i d  Continue Humalog sliding scale with Accu-Cheks q a c  And HS  Blood sugars acceptable    Benign essential hypertension  Assessment & Plan  Continue Norvasc 10 milligram p o  Daily, clonidine 0 1 milligram p o  B i d , losartan 50 milligram p o  B i d           VTE Pharmacologic Prophylaxis: VTE Score: 5 High Risk (Score >/= 5) - Pharmacological DVT Prophylaxis Ordered: heparin  Sequential Compression Devices Ordered  Patient Centered Rounds: I performed bedside rounds with nursing staff today  Discussions with Specialists or Other Care Team Provider: No    Education and Discussions with Family / Patient: Updated  (wife) via phone  Time Spent for Care: 45 minutes  More than 50% of total time spent on counseling and coordination of care as described above  Current Length of Stay: 1 day(s)  Current Patient Status: Inpatient   Certification Statement: The patient will continue to require additional inpatient hospital stay due to Fever  Discharge Plan: Anticipate discharge in 24-48 hrs to home      Code Status: Level 1 - Full Code    Subjective:   Patient has improved shortness of breath  Continues to have some cough  Patient also developed a fever overnight    Objective:     Vitals:   Temp (24hrs), Av 6 °F (37 6 °C), Min:98 5 °F (36 9 °C), Max:101 3 °F (38 5 °C)    Temp:  [98 5 °F (36 9 °C)-101 3 °F (38 5 °C)] 98 7 °F (37 1 °C)  HR:  [69-96] 69  Resp:  [18-20] 18  BP: (110-167)/() 134/60  SpO2:  [91 %-96 %] 93 %  Body mass index is 25 07 kg/m²  Input and Output Summary (last 24 hours): Intake/Output Summary (Last 24 hours) at 2022 1522  Last data filed at 2022 0724  Gross per 24 hour   Intake 520 ml   Output 2000 ml   Net -1480 ml       Physical Exam:   Physical Exam  Constitutional:       Appearance: Normal appearance  HENT:      Head: Normocephalic and atraumatic  Eyes:      Extraocular Movements: Extraocular movements intact  Pupils: Pupils are equal, round, and reactive to light  Cardiovascular:      Rate and Rhythm: Normal rate and regular rhythm  Heart sounds: No murmur heard  No gallop  Pulmonary:      Effort: Pulmonary effort is normal       Breath sounds: Rales present  Comments: Few basilar rales  Abdominal:      General: Bowel sounds are normal       Palpations: Abdomen is soft  Tenderness: There is no abdominal tenderness  Musculoskeletal:         General: No swelling or deformity  Normal range of motion  Cervical back: Normal range of motion and neck supple  Skin:     General: Skin is warm and dry  Neurological:      General: No focal deficit present  Mental Status: He is alert            Additional Data:     Labs:  Results from last 7 days   Lab Units 22  0643 22  0957   WBC Thousand/uL 8 66 9 38   HEMOGLOBIN g/dL 7 5* 8 2*   HEMATOCRIT % 24 3* 26 8*   PLATELETS Thousands/uL 364 374   NEUTROS PCT %  --  85*   LYMPHS PCT %  --  7*   MONOS PCT %  --  5   EOS PCT %  --  2     Results from last 7 days   Lab Units 22  0011 11/23/22  0957   SODIUM mmol/L 136 138   POTASSIUM mmol/L 4 3 5 1   CHLORIDE mmol/L 97 96   CO2 mmol/L 32 32   BUN mg/dL 32* 53*   CREATININE mg/dL 3 69* 5 55*   ANION GAP mmol/L 7 10   CALCIUM mg/dL 7 9* 8 0*   ALBUMIN g/dL  --  1 9*   TOTAL BILIRUBIN mg/dL  --  0 44   ALK PHOS U/L  --  76   ALT U/L  --  10*   GLUCOSE RANDOM mg/dL 133 176*         Results from last 7 days   Lab Units 11/24/22  1122 11/24/22  0710 11/23/22  2049 11/23/22  1736   POC GLUCOSE mg/dl 141* 131 123 224*         Results from last 7 days   Lab Units 11/23/22  2221   LACTIC ACID mmol/L 0 9       Lines/Drains:  Invasive Devices     Peripheral Intravenous Line  Duration           Peripheral IV Right Antecubital -- days          Hemodialysis Catheter  Duration           HD Permanent Double Catheter 261 days                      Imaging: Reviewed radiology reports from this admission including: chest xray    Recent Cultures (last 7 days):   Results from last 7 days   Lab Units 11/23/22  2227 11/23/22  2221   BLOOD CULTURE  Received in Microbiology Lab  Culture in Progress  Received in Microbiology Lab  Culture in Progress         Last 24 Hours Medication List:   Current Facility-Administered Medications   Medication Dose Route Frequency Provider Last Rate   • acetaminophen  650 mg Oral Q6H PRN Aileen Casas PA-C     • amLODIPine  10 mg Oral Daily Jeffrey Mcgovern MD     • azithromycin  500 mg Oral Q24H Jeffrey Mcgovern MD     • calcium acetate  667 mg Oral TID With Meals Jeffrey Mcgovern MD     • calcium carbonate  1 tablet Oral BID With Meals Jeffrey Mcgovern MD     • cefTRIAXone  1,000 mg Intravenous Q24H Jeffrey Mcgovern MD Stopped (11/24/22 1100)   • cloNIDine  0 1 mg Oral Q12H [address] Jeffrey Mcgovern MD     • dextromethorphan-guaiFENesin  10 mL Oral Q4H PRN Jeffrey Mcgovern MD     • finasteride  5 mg Oral Daily Jeffrey Mcgovern MD     • heparin (porcine)  5,000 Units Subcutaneous Q8H Florencio Mcgrath MD     • insulin aspart protamine-insulin aspart  15 Units Subcutaneous BID AC Magali Eugene MD     • insulin lispro  1-5 Units Subcutaneous HS Magali Eugene MD     • insulin lispro  1-6 Units Subcutaneous TID AC Magali Eugene MD     • losartan  50 mg Oral BID Magali Eugene MD     • pravastatin  80 mg Oral Daily With Mervat Hook MD     • tamsulosin  0 8 mg Oral Daily With Mervat Hook MD          Today, Patient Was Seen By: Magali Eugene MD    **Please Note: This note may have been constructed using a voice recognition system  **

## 2022-11-24 NOTE — PLAN OF CARE
Problem: Potential for Falls  Goal: Patient will remain free of falls  Description: INTERVENTIONS:  - Educate patient/family on patient safety including physical limitations  - Instruct patient to call for assistance with activity   - Consult OT/PT to assist with strengthening/mobility   - Keep Call bell within reach  - Keep bed low and locked with side rails adjusted as appropriate  - Keep care items and personal belongings within reach  - Initiate and maintain comfort rounds  - Make Fall Risk Sign visible to staff  - Offer Toileting every 2 Hours, in advance of need  - Initiate/Maintain bed alarm  - Apply yellow socks and bracelet for high fall risk patients  - Consider moving patient to room near nurses station  Outcome: Progressing     Problem: MOBILITY - ADULT  Goal: Maintain or return to baseline ADL function  Description: INTERVENTIONS:  - Educate patient/family on patient safety including physical limitations  - Instruct patient to call for assistance with activity   - Consult OT/PT to assist with strengthening/mobility   - Keep Call bell within reach  - Keep bed low and locked with side rails adjusted as appropriate  - Keep care items and personal belongings within reach  - Initiate and maintain comfort rounds  - Make Fall Risk Sign visible to staff  - Offer Toileting every 2 Hours, in advance of need  - Initiate/Maintain bed alarm  - Apply yellow socks and bracelet for high fall risk patients  - Consider moving patient to room near nurses station  Outcome: Progressing  Goal: Maintains/Returns to pre admission functional level  Description: INTERVENTIONS:  - Perform BMAT or MOVE assessment daily    - Set and communicate daily mobility goal to care team and patient/family/caregiver  - Collaborate with rehabilitation services on mobility goals if consulted  - Perform Range of Motion 2 times a day  - Reposition patient every 2 hours    - Dangle patient 2 times a day  - Stand patient 2 times a day  - Ambulate patient 2 times a day  - Out of bed to chair 2 times a day   - Out of bed for meals 2 times a day  - Out of bed for toileting  - Record patient progress and toleration of activity level   Outcome: Progressing     Problem: PAIN - ADULT  Goal: Verbalizes/displays adequate comfort level or baseline comfort level  Description: Interventions:  - Encourage patient to monitor pain and request assistance  - Assess pain using appropriate pain scale  - Administer analgesics based on type and severity of pain and evaluate response  - Implement non-pharmacological measures as appropriate and evaluate response  - Consider cultural and social influences on pain and pain management  - Notify physician/advanced practitioner if interventions unsuccessful or patient reports new pain  Outcome: Progressing     Problem: INFECTION - ADULT  Goal: Absence or prevention of progression during hospitalization  Description: INTERVENTIONS:  - Assess and monitor for signs and symptoms of infection  - Monitor lab/diagnostic results  - Monitor all insertion sites, i e  indwelling lines, tubes, and drains  - Monitor endotracheal if appropriate and nasal secretions for changes in amount and color  - [place] appropriate cooling/warming therapies per order  - Administer medications as ordered  - Instruct and encourage patient and family to use good hand hygiene technique  - Identify and instruct in appropriate isolation precautions for identified infection/condition  Outcome: Progressing     Problem: SAFETY ADULT  Goal: Patient will remain free of falls  Description: INTERVENTIONS:  - Educate patient/family on patient safety including physical limitations  - Instruct patient to call for assistance with activity   - Consult OT/PT to assist with strengthening/mobility   - Keep Call bell within reach  - Keep bed low and locked with side rails adjusted as appropriate  - Keep care items and personal belongings within reach  - Initiate and maintain comfort rounds  - Make Fall Risk Sign visible to staff  - Offer Toileting every 2 Hours, in advance of need  - Initiate/Maintain bed alarm  - Apply yellow socks and bracelet for high fall risk patients  - Consider moving patient to room near nurses station  Outcome: Progressing  Goal: Maintain or return to baseline ADL function  Description: INTERVENTIONS:  - Educate patient/family on patient safety including physical limitations  - Instruct patient to call for assistance with activity   - Consult OT/PT to assist with strengthening/mobility   - Keep Call bell within reach  - Keep bed low and locked with side rails adjusted as appropriate  - Keep care items and personal belongings within reach  - Initiate and maintain comfort rounds  - Make Fall Risk Sign visible to staff  - Offer Toileting every 2 Hours, in advance of need  - Initiate/Maintain bed alarm  - Apply yellow socks and bracelet for high fall risk patients  - Consider moving patient to room near nurses station  Outcome: Progressing  Goal: Maintains/Returns to pre admission functional level  Description: INTERVENTIONS:  - Perform BMAT or MOVE assessment daily    - Set and communicate daily mobility goal to care team and patient/family/caregiver  - Collaborate with rehabilitation services on mobility goals if consulted  - Perform Range of Motion 2 times a day  - Reposition patient every 2 hours    - Dangle patient 2 times a day  - Stand patient 2 times a day  - Ambulate patient 2 times a day  - Out of bed to chair 2 times a day   - Out of bed for meals 2 times a day  - Out of bed for toileting  - Record patient progress and toleration of activity level   Outcome: Progressing     Problem: DISCHARGE PLANNING  Goal: Discharge to home or other facility with appropriate resources  Description: INTERVENTIONS:  - Identify barriers to discharge w/patient and caregiver  - Arrange for needed discharge resources and transportation as appropriate  - Identify discharge learning needs (meds, wound care, etc )  - Arrange for interpretive services to assist at discharge as needed  - Refer to Case Management Department for coordinating discharge planning if the patient needs post-hospital services based on physician/advanced practitioner order or complex needs related to functional status, cognitive ability, or social support system  Outcome: Progressing     Problem: Knowledge Deficit  Goal: Patient/family/caregiver demonstrates understanding of disease process, treatment plan, medications, and discharge instructions  Description: Complete learning assessment and assess knowledge base    Interventions:  - Provide teaching at level of understanding  - Provide teaching via preferred learning methods  Outcome: Progressing     Problem: RESPIRATORY - ADULT  Goal: Achieves optimal ventilation and oxygenation  Description: INTERVENTIONS:  - Assess for changes in respiratory status  - Assess for changes in mentation and behavior  - Position to facilitate oxygenation and minimize respiratory effort  - Oxygen administered by appropriate delivery if ordered  - Initiate smoking cessation education as indicated  - Encourage broncho-pulmonary hygiene including cough, deep breathe, Incentive Spirometry  - Assess the need for suctioning and aspirate as needed  - Assess and instruct to report SOB or any respiratory difficulty  - Respiratory Therapy support as indicated  Outcome: Progressing     Problem: METABOLIC, FLUID AND ELECTROLYTES - ADULT  Goal: Fluid balance maintained  Description: INTERVENTIONS:  - Monitor labs   - Monitor I/O and WT  - Instruct patient on fluid and nutrition as appropriate  - Assess for signs & symptoms of volume excess or deficit  Outcome: Progressing  Goal: Electrolytes maintained within normal limits  Description: INTERVENTIONS:  - Monitor labs and assess patient for signs and symptoms of electrolyte imbalances  - Administer electrolyte replacement as ordered  - Monitor response to electrolyte replacements, including repeat lab results as appropriate  - Instruct patient on fluid and nutrition as appropriate  Outcome: Progressing     Problem: HEMATOLOGIC - ADULT  Goal: Maintains hematologic stability  Description: INTERVENTIONS  - Assess for signs and symptoms of bleeding or hemorrhage  - Monitor labs  - Administer supportive blood products/factors as ordered and appropriate  Outcome: Progressing     Problem: Prexisting or High Potential for Compromised Skin Integrity  Goal: Skin integrity is maintained or improved  Description: INTERVENTIONS:  - Identify patients at risk for skin breakdown  - Assess and monitor skin integrity  - Assess and monitor nutrition and hydration status  - Monitor labs   - Assess for incontinence   - Turn and reposition patient  - Assist with mobility/ambulation  - Relieve pressure over bony prominences  - Avoid friction and shearing  - Provide appropriate hygiene as needed including keeping skin clean and dry  - Evaluate need for skin moisturizer/barrier cream  - Collaborate with interdisciplinary team   - Patient/family teaching  - Consider wound care consult   Outcome: Progressing     Problem: Nutrition/Hydration-ADULT  Goal: Nutrient/Hydration intake appropriate for improving, restoring or maintaining nutritional needs  Description: Monitor and assess patient's nutrition/hydration status for malnutrition  Collaborate with interdisciplinary team and initiate plan and interventions as ordered  Monitor patient's weight and dietary intake as ordered or per policy  Utilize nutrition screening tool and intervene as necessary  Determine patient's food preferences and provide high-protein, high-caloric foods as appropriate       INTERVENTIONS:  - Monitor oral intake, urinary output, labs, and treatment plans  - Assess nutrition and hydration status and recommend course of action  - Evaluate amount of meals eaten  - Assist patient with eating if necessary   - Allow adequate time for meals  - Recommend/ encourage appropriate diets, oral nutritional supplements, and vitamin/mineral supplements  - Order, calculate, and assess calorie counts as needed  - Recommend, monitor, and adjust tube feedings and TPN/PPN based on assessed needs  - Assess need for intravenous fluids  - Provide specific nutrition/hydration education as appropriate  - Include patient/family/caregiver in decisions related to nutrition  Outcome: Progressing

## 2022-11-24 NOTE — ASSESSMENT & PLAN NOTE
Lab Results   Component Value Date    HGBA1C 5 7 (H) 10/19/2022       Recent Labs     11/23/22  1736 11/23/22  2049 11/24/22  0710 11/24/22  1122   POCGLU 224* 123 131 141*       Blood Sugar Average: Last 72 hrs:  (P) 154 75 continue NovoLog 70/30 15 units b i d  Continue Humalog sliding scale with Accu-Cheks q a c  And HS    Blood sugars acceptable

## 2022-11-24 NOTE — PROGRESS NOTES
NEPHROLOGY PROGRESS NOTE    Mikayla Lopez 76 y o  male MRN: [id number]  Unit/Bed#: [address] [place] [address] Encounter: [id number]  Reason for Consult:  End-stage renal disease    Patient is awake alert feeling better says he is comfortable breathing better after dialysis yesterday  No other acute changes and patient is stable clinically  ASSESSMENT/PLAN:  1  Renal    The patient has end-stage renal disease as stated normally on a  dialysis but given holiday schedule dialyzes  this week  He underwent dialysis yesterday and nurse's note was reviewed from dialysis he was hemodynamically stable had removal of 1 5 L of fluid  Labs today show potassium of 4 3 which is improved from mild hyperkalemia  Hemoglobin is 7 5  Target for ESRD is 10-12  Hemodialysis next planned for Saturday  Continue erythrocyte stimulating agent on dialysis target hemoglobin 10-12    2  History of ANCA vasculitis  SUBJECTIVE:  Review of Systems   Constitutional: Positive for malaise/fatigue  Negative for chills, diaphoresis and fever  HENT: Negative  Eyes: Negative  Cardiovascular: Negative for chest pain, dyspnea on exertion, orthopnea and palpitations  Respiratory: Negative for cough, hemoptysis, sputum production and wheezing  Gastrointestinal: Negative for abdominal pain, diarrhea, nausea and vomiting  Neurological: Negative for dizziness, focal weakness, headaches and light-headedness  Psychiatric/Behavioral: Negative for altered mental status, depression, hallucinations and hypervigilance  OBJECTIVE:  Current Weight: Weight - Scale: 74 8 kg (164 lb 14 5 oz)  Vitals:Temp (24hrs), Av 4 °F (37 4 °C), Min:98 2 °F (36 8 °C), Max:101 3 °F (38 5 °C)  Current: Temperature: 98 5 °F (36 9 °C)   Blood pressure 137/59, pulse 87, temperature 98 5 °F (36 9 °C), temperature source Oral, resp  rate 18, height 5' 8" (1 727 m), weight 74 8 kg (164 lb 14 5 oz), SpO2 91 %   , Body mass index is 25 07 kg/m²  Intake/Output Summary (Last 24 hours) at 11/24/2022 0828  Last data filed at 11/23/2022 1800  Gross per 24 hour   Intake 500 ml   Output 2000 ml   Net -1500 ml       Physical Exam: /59 (BP Location: Right arm)   Pulse 87   Temp 98 5 °F (36 9 °C) (Oral)   Resp 18   Ht 5' 8" (1 727 m)   Wt 74 8 kg (164 lb 14 5 oz)   SpO2 91%   BMI 25 07 kg/m²   Physical Exam  Constitutional:       General: He is not in acute distress  Appearance: He is not toxic-appearing or diaphoretic  HENT:      Head: Normocephalic and atraumatic  Nose: Nose normal       Mouth/Throat:      Mouth: Mucous membranes are moist    Eyes:      General: No scleral icterus  Extraocular Movements: Extraocular movements intact  Cardiovascular:      Rate and Rhythm: Normal rate and regular rhythm  Heart sounds: No friction rub  No gallop  Comments: No significant edema  Pulmonary:      Effort: Pulmonary effort is normal  No respiratory distress  Breath sounds: No wheezing, rhonchi or rales  Abdominal:      General: Bowel sounds are normal  There is no distension  Palpations: Abdomen is soft  Tenderness: There is no abdominal tenderness  There is no rebound  Musculoskeletal:      Cervical back: Normal range of motion and neck supple  Neurological:      Mental Status: He is alert and oriented to person, place, and time  Mental status is at baseline  Psychiatric:         Mood and Affect: Mood normal          Behavior: Behavior normal          Thought Content:  Thought content normal          Judgment: Judgment normal          Medications:    Current Facility-Administered Medications:   •  acetaminophen (TYLENOL) tablet 650 mg, 650 mg, Oral, Q6H PRN, Aileen Casas PA-C, 650 mg at 11/23/22 2150  •  amLODIPine (NORVASC) tablet 10 mg, 10 mg, Oral, Daily, Daisy Keating MD, 10 mg at 11/24/22 0825  •  calcium acetate (PHOSLO) capsule 667 mg, 667 mg, Oral, TID With Meals, Jamie Velarde MD, 667 mg at 11/24/22 0825  •  calcium carbonate (OYSTER SHELL,OSCAL) 500 mg tablet 1 tablet, 1 tablet, Oral, BID With Meals, Jamie Velarde MD, 1 tablet at 11/24/22 0825  •  cloNIDine (CATAPRES) tablet 0 1 mg, 0 1 mg, Oral, Q12H [address], Jamie Velarde MD, 0 1 mg at 11/24/22 0825  •  dextromethorphan-guaiFENesin (ROBITUSSIN DM) oral syrup 10 mL, 10 mL, Oral, Q4H PRN, Jamie Velarde MD, 10 mL at 11/24/22 0825  •  finasteride (PROSCAR) tablet 5 mg, 5 mg, Oral, Daily, Jamie Velarde MD, 5 mg at 11/24/22 0825  •  heparin (porcine) subcutaneous injection 5,000 Units, 5,000 Units, Subcutaneous, Q8H [address], 5,000 Units at 11/24/22 0631 **AND** Platelet count, , , Once, Jamie Velarde MD  •  insulin aspart protamine-insulin aspart (NovoLOG 70/30) 100 units/mL subcutaneous injection 15 Units, 15 Units, Subcutaneous, BID AC, Jamie Velarde MD, 15 Units at 11/24/22 0824  •  insulin lispro (HumaLOG) 100 units/mL subcutaneous injection 1-5 Units, 1-5 Units, Subcutaneous, HS, Jamie Velarde MD  •  insulin lispro (HumaLOG) 100 units/mL subcutaneous injection 1-6 Units, 1-6 Units, Subcutaneous, TID AC, 2 Units at 11/23/22 1804 **AND** Fingerstick Glucose (POCT), , , TID AC, Jamie Velarde MD  •  losartan (COZAAR) tablet 50 mg, 50 mg, Oral, BID, Jamie Velarde MD, 50 mg at 11/24/22 0825  •  pravastatin (PRAVACHOL) tablet 80 mg, 80 mg, Oral, Daily With Keven Qureshi MD, 80 mg at 11/23/22 1805  •  tamsulosin (FLOMAX) capsule 0 8 mg, 0 8 mg, Oral, Daily With Keven Qureshi MD, 0 8 mg at 11/23/22 1805    Laboratory Results:  Lab Results   Component Value Date    WBC 8 66 11/24/2022    HGB 7 5 (L) 11/24/2022    HCT 24 3 (L) 11/24/2022    MCV 90 11/24/2022     11/24/2022     Lab Results   Component Value Date    SODIUM 136 11/24/2022    K 4 3 11/24/2022    CL 97 11/24/2022    CO2 32 11/24/2022    BUN 32 (H) 11/24/2022    CREATININE 3 69 (H) 11/24/2022    GLUC 133 11/24/2022    CALCIUM 7 9 (L) 11/24/2022     Lab Results   Component Value Date    CALCIUM 7 9 (L) 11/24/2022    PHOS 5 3 (H) 08/24/2022     No results found for: LABPROT

## 2022-11-24 NOTE — PLAN OF CARE
Problem: Potential for Falls  Goal: Patient will remain free of falls  Description: INTERVENTIONS:  - Educate patient/family on patient safety including physical limitations  - Instruct patient to call for assistance with activity   - Consult OT/PT to assist with strengthening/mobility   - Keep Call bell within reach  - Keep bed low and locked with side rails adjusted as appropriate  - Keep care items and personal belongings within reach  - Initiate and maintain comfort rounds  - Make Fall Risk Sign visible to staff  - Offer Toileting every 2 Hours, in advance of need  - Initiate/Maintain bed alarm  - Apply yellow socks and bracelet for high fall risk patients  - Consider moving patient to room near nurses station  Outcome: Progressing     Problem: MOBILITY - ADULT  Goal: Maintain or return to baseline ADL function  Description: INTERVENTIONS:  - Educate patient/family on patient safety including physical limitations  - Instruct patient to call for assistance with activity   - Consult OT/PT to assist with strengthening/mobility   - Keep Call bell within reach  - Keep bed low and locked with side rails adjusted as appropriate  - Keep care items and personal belongings within reach  - Initiate and maintain comfort rounds  - Make Fall Risk Sign visible to staff  - Offer Toileting every 2 Hours, in advance of need  - Initiate/Maintain bed alarm  - Apply yellow socks and bracelet for high fall risk patients  - Consider moving patient to room near nurses station  Outcome: Progressing  Goal: Maintains/Returns to pre admission functional level  Description: INTERVENTIONS:  - Perform BMAT or MOVE assessment daily    - Set and communicate daily mobility goal to care team and patient/family/caregiver  - Collaborate with rehabilitation services on mobility goals if consulted  - Perform Range of Motion 2 times a day  - Reposition patient every 2 hours    - Dangle patient 2 times a day  - Stand patient 2 times a day  - Ambulate patient 2 times a day  - Out of bed to chair 2 times a day   - Out of bed for meals 2  Problem: INFECTION - ADULT  Goal: Absence or prevention of progression during hospitalization  Description: INTERVENTIONS:  - Assess and monitor for signs and symptoms of infection  - Monitor lab/diagnostic results  - Monitor all insertion sites, i e  indwelling lines, tubes, and drains  - Monitor endotracheal if appropriate and nasal secretions for changes in amount and color  - [place] appropriate cooling/warming therapies per order  - Administer medications as ordered  - Instruct and encourage patient and family to use good hand hygiene technique  - Identify and instruct in appropriate isolation precautions for identified infection/condition  Outcome: Progressing     Problem: SAFETY ADULT  Goal: Patient will remain free of falls  Description: INTERVENTIONS:  - Educate patient/family on patient safety including physical limitations  - Instruct patient to call for assistance with activity   - Consult OT/PT to assist with strengthening/mobility   - Keep Call bell within reach  - Keep bed low and locked with side rails adjusted as appropriate  - Keep care items and personal belongings within reach  - Initiate and maintain comfort rounds  - Make Fall Risk Sign visible to staff  - Offer Toileting every 2 Hours, in advance of need  - Initiate/Maintain bed alarm  - Apply yellow socks and bracelet for high fall risk patients  - Consider moving patient to room near nurses station  Outcome: Progressing  Goal: Maintain or return to baseline ADL function  Description: INTERVENTIONS:  - Educate patient/family on patient safety including physical limitations  - Instruct patient to call for assistance with activity   - Consult OT/PT to assist with strengthening/mobility   - Keep Call bell within reach  - Keep bed low and locked with side rails adjusted as appropriate  - Keep care items and personal belongings within reach  - Initiate and maintain comfort rounds  - Make Fall Risk Sign visible to staff  - Offer Toileting every 2 Hours, in advance of need  - Initiate/Maintain bed alarm  - Apply yellow socks and bracelet for high fall risk patients  - Consider moving patient to room near nurses station  Outcome: Progressing  Goal: Maintains/Returns to pre admission functional level  Description: INTERVENTIONS:  - Perform BMAT or MOVE assessment daily    - Set and communicate daily mobility goal to care team and patient/family/caregiver  - Collaborate with rehabilitation services on mobility goals if consulted  - Perform Range of Motion 2 times a day  - Reposition patient every 2 hours    - Dangle patient 2 times a day  - Stand patient 2 times a day  - Ambulate patient 2 times a day  - Out of bed to chair 2 times a day   - Out of bed for meals 2 times a day  - Out of bed for toileting  - Record patient progress and toleration of activity level   Outcome: Progressing     Problem: PAIN - ADULT  Goal: Verbalizes/displays adequate comfort level or baseline comfort level  Description: Interventions:  - Encourage patient to monitor pain and request assistance  - Assess pain using appropriate pain scale  - Administer analgesics based on type and severity of pain and evaluate response  - Implement non-pharmacological measures as appropriate and evaluate response  - Consider cultural and social influences on pain and pain management  - Notify physician/advanced practitioner if interventions unsuccessful or patient reports new pain  Outcome: Progressing     Problem: METABOLIC, FLUID AND ELECTROLYTES - ADULT  Goal: Fluid balance maintained  Description: INTERVENTIONS:  - Monitor labs   - Monitor I/O and WT  - Instruct patient on fluid and nutrition as appropriate  - Assess for signs & symptoms of volume excess or deficit  Outcome: Progressing  Goal: Electrolytes maintained within normal limits  Description: INTERVENTIONS:  - Monitor labs and assess patient for signs and symptoms of electrolyte imbalances  - Administer electrolyte replacement as ordered  - Monitor response to electrolyte replacements, including repeat lab results as appropriate  - Instruct patient on fluid and nutrition as appropriate  Outcome: Progressing     Problem: RESPIRATORY - ADULT  Goal: Achieves optimal ventilation and oxygenation  Description: INTERVENTIONS:  - Assess for changes in respiratory status  - Assess for changes in mentation and behavior  - Position to facilitate oxygenation and minimize respiratory effort  - Oxygen administered by appropriate delivery if ordered  - Initiate smoking cessation education as indicated  - Encourage broncho-pulmonary hygiene including cough, deep breathe, Incentive Spirometry  - Assess the need for suctioning and aspirate as needed  - Assess and instruct to report SOB or any respiratory difficulty  - Respiratory Therapy support as indicated  Outcome: Progressing    times a day  - Out of bed for toileting  - Record patient progress and toleration of activity level   Outcome: Progressing

## 2022-11-24 NOTE — ASSESSMENT & PLAN NOTE
Lab Results   Component Value Date    EGFR 14 11/24/2022    EGFR 8 11/23/2022    EGFR 9 10/19/2022    CREATININE 3 69 (H) 11/24/2022    CREATININE 5 55 (H) 11/23/2022    CREATININE 5 17 (H) 10/19/2022   Patient's hemoglobin has been in the range of 9-12 and upon admission was 8 2 which dropped to 7 5  Check iron panel   Patient did have EGD and colonoscopy in March of this year which showed no evidence of bleeding and polyps were removed at that time

## 2022-11-24 NOTE — ASSESSMENT & PLAN NOTE
Patient developed a fever last night  Patient's COVID flu and RSV was negative  Follow-up blood cultures x2 as patient has a PermCath  Also check urine for Legionella pneumococcal antigens  Chest x-ray showed bibasilar consolidation  Patient will be started on Rocephin 1 gram IV daily and azithromycin 500 milligram p o   Daily to cover for possible bronchitis/pneumonia  Check procalcitonin  Monitor fever curve and white count

## 2022-11-24 NOTE — ASSESSMENT & PLAN NOTE
Lab Results   Component Value Date    EGFR 14 11/24/2022    EGFR 8 11/23/2022    EGFR 9 10/19/2022    CREATININE 3 69 (H) 11/24/2022    CREATININE 5 55 (H) 11/23/2022    CREATININE 5 17 (H) 10/19/2022   Patient has been hemodialysis dependent since March 2022  Usually on Tuesday Thursday Saturday schedule    This patient was getting dialysis on Monday Wednesday Saturday schedule due to Thanksgiving  Patient underwent hemodialysis with ultra fractionation yesterday with fluid removal

## 2022-11-25 PROBLEM — J40 BRONCHITIS: Status: ACTIVE | Noted: 2022-11-24

## 2022-11-25 LAB
ATRIAL RATE: 82 BPM
BASOPHILS # BLD AUTO: 0.04 THOUSANDS/ÂΜL (ref 0–0.1)
BASOPHILS NFR BLD AUTO: 0 % (ref 0–1)
EOSINOPHIL # BLD AUTO: 0.27 THOUSAND/ÂΜL (ref 0–0.61)
EOSINOPHIL NFR BLD AUTO: 3 % (ref 0–6)
ERYTHROCYTE [DISTWIDTH] IN BLOOD BY AUTOMATED COUNT: 15.1 % (ref 11.6–15.1)
FERRITIN SERPL-MCNC: 1529 NG/ML (ref 8–388)
GLUCOSE SERPL-MCNC: 106 MG/DL (ref 65–140)
GLUCOSE SERPL-MCNC: 123 MG/DL (ref 65–140)
GLUCOSE SERPL-MCNC: 196 MG/DL (ref 65–140)
GLUCOSE SERPL-MCNC: 82 MG/DL (ref 65–140)
HCT VFR BLD AUTO: 23.7 % (ref 36.5–49.3)
HGB BLD-MCNC: 7.5 G/DL (ref 12–17)
IMM GRANULOCYTES # BLD AUTO: 0.15 THOUSAND/UL (ref 0–0.2)
IMM GRANULOCYTES NFR BLD AUTO: 2 % (ref 0–2)
IRON SATN MFR SERPL: 22 % (ref 20–50)
IRON SERPL-MCNC: 32 UG/DL (ref 65–175)
LYMPHOCYTES # BLD AUTO: 0.93 THOUSANDS/ÂΜL (ref 0.6–4.47)
LYMPHOCYTES NFR BLD AUTO: 10 % (ref 14–44)
MCH RBC QN AUTO: 27.9 PG (ref 26.8–34.3)
MCHC RBC AUTO-ENTMCNC: 31.6 G/DL (ref 31.4–37.4)
MCV RBC AUTO: 88 FL (ref 82–98)
MONOCYTES # BLD AUTO: 0.44 THOUSAND/ÂΜL (ref 0.17–1.22)
MONOCYTES NFR BLD AUTO: 5 % (ref 4–12)
MRSA NOSE QL CULT: NORMAL
NEUTROPHILS # BLD AUTO: 7.34 THOUSANDS/ÂΜL (ref 1.85–7.62)
NEUTS SEG NFR BLD AUTO: 80 % (ref 43–75)
NRBC BLD AUTO-RTO: 0 /100 WBCS
P AXIS: 50 DEGREES
PLATELET # BLD AUTO: 395 THOUSANDS/UL (ref 149–390)
PMV BLD AUTO: 9 FL (ref 8.9–12.7)
PR INTERVAL: 188 MS
PROCALCITONIN SERPL-MCNC: 2.72 NG/ML
QRS AXIS: 31 DEGREES
QRSD INTERVAL: 92 MS
QT INTERVAL: 370 MS
QTC INTERVAL: 432 MS
RBC # BLD AUTO: 2.69 MILLION/UL (ref 3.88–5.62)
T WAVE AXIS: 32 DEGREES
TIBC SERPL-MCNC: 144 UG/DL (ref 250–450)
VENTRICULAR RATE: 82 BPM
WBC # BLD AUTO: 9.17 THOUSAND/UL (ref 4.31–10.16)

## 2022-11-25 RX ADMIN — HEPARIN SODIUM 5000 UNITS: 5000 INJECTION INTRAVENOUS; SUBCUTANEOUS at 06:14

## 2022-11-25 RX ADMIN — CALCIUM 1 TABLET: 500 TABLET ORAL at 16:30

## 2022-11-25 RX ADMIN — CALCIUM ACETATE 667 MG: 667 CAPSULE ORAL at 07:30

## 2022-11-25 RX ADMIN — LOSARTAN POTASSIUM 50 MG: 50 TABLET, FILM COATED ORAL at 21:20

## 2022-11-25 RX ADMIN — CLONIDINE HYDROCHLORIDE 0.1 MG: 0.1 TABLET ORAL at 21:20

## 2022-11-25 RX ADMIN — INSULIN LISPRO 2 UNITS: 100 INJECTION, SOLUTION INTRAVENOUS; SUBCUTANEOUS at 16:30

## 2022-11-25 RX ADMIN — TAMSULOSIN HYDROCHLORIDE 0.8 MG: 0.4 CAPSULE ORAL at 16:30

## 2022-11-25 RX ADMIN — AMLODIPINE BESYLATE 10 MG: 10 TABLET ORAL at 08:56

## 2022-11-25 RX ADMIN — CALCIUM ACETATE 667 MG: 667 CAPSULE ORAL at 12:36

## 2022-11-25 RX ADMIN — INSULIN ASPART 15 UNITS: 100 INJECTION, SUSPENSION SUBCUTANEOUS at 16:38

## 2022-11-25 RX ADMIN — CLONIDINE HYDROCHLORIDE 0.1 MG: 0.1 TABLET ORAL at 08:56

## 2022-11-25 RX ADMIN — PRAVASTATIN SODIUM 80 MG: 80 TABLET ORAL at 16:30

## 2022-11-25 RX ADMIN — FINASTERIDE 5 MG: 5 TABLET, FILM COATED ORAL at 08:56

## 2022-11-25 RX ADMIN — CALCIUM ACETATE 667 MG: 667 CAPSULE ORAL at 16:30

## 2022-11-25 RX ADMIN — CALCIUM 1 TABLET: 500 TABLET ORAL at 07:30

## 2022-11-25 RX ADMIN — HEPARIN SODIUM 5000 UNITS: 5000 INJECTION INTRAVENOUS; SUBCUTANEOUS at 21:20

## 2022-11-25 RX ADMIN — AZITHROMYCIN MONOHYDRATE 500 MG: 250 TABLET ORAL at 08:56

## 2022-11-25 RX ADMIN — LOSARTAN POTASSIUM 50 MG: 50 TABLET, FILM COATED ORAL at 08:56

## 2022-11-25 RX ADMIN — INSULIN ASPART 15 UNITS: 100 INJECTION, SUSPENSION SUBCUTANEOUS at 09:39

## 2022-11-25 RX ADMIN — CEFTRIAXONE 1000 MG: 1 INJECTION, SOLUTION INTRAVENOUS at 09:38

## 2022-11-25 RX ADMIN — HEPARIN SODIUM 5000 UNITS: 5000 INJECTION INTRAVENOUS; SUBCUTANEOUS at 14:00

## 2022-11-25 NOTE — ASSESSMENT & PLAN NOTE
Patient developed a fever on the night of November 23, 2022  Patient's COVID flu and RSV was negative  Blood cultures are negative at 24 hours  Urine for Legionella pneumococcal antigens pending  Chest x-ray showed bibasilar consolidation  Patient will be started on Rocephin 1 gram IV daily and azithromycin 500 milligram p o  daily  Procalcitonin level is elevated  Fever has improved

## 2022-11-25 NOTE — CASE MANAGEMENT
Case Management Discharge Planning Note    Patient name Armaan Powers  Location [address] [place] [address]/2 [address] [place] [address] MRN [id number]  : 1945 Date 2022       Current Admission Date: 2022  Current Admission Diagnosis:Fluid overload   Patient Active Problem List    Diagnosis Date Noted   • Fever 2022   • Fluid overload 2022   • Hyperphosphatemia associated with renal failure 10/19/2022   • Urinary retention 10/17/2022   • ESRD on dialysis [facility]) 2022   • Hyponatremia 2022   • ANCA-associated vasculitis 2022   • Pulmonary hypertension ([facility] [address] ) 2022   • Dialysis patient ([facility] [address] ) 2022   • Anemia due to chronic kidney disease, on chronic dialysis ([facility] [address] )    • Hemoptysis 2022   • Other proteinuria    • Symptomatic anemia 2022   • Chest pain 2022   • Melena 2022   • Elevated PSA 2021   • Chronic pain of right knee 2021   • Primary osteoarthritis of right knee 2021   • Bladder stones 12/15/2016   • Type 2 diabetes mellitus with chronic kidney disease on chronic dialysis, with long-term current use of insulin ([facility] [address] ) 2016   • Benign colon polyp 2013   • Benign prostatic hyperplasia with lower urinary tract symptoms 2013   • Benign essential hypertension 2013   • Hyperlipidemia 2012   • Vitamin D deficiency 2012      LOS (days): 2  Geometric Mean LOS (GMLOS) (days): 3 50  Days to GMLOS:1 5     OBJECTIVE:  Risk of Unplanned Readmission Score: 44 74         Current admission status: Inpatient   Preferred Pharmacy:   [facility] [address]  [place] [address]  Phone: [phone] Fax: [phone]    [facility] ([address] [place] [address],   [address]  [address] [place] [address]  Phone: [phone] Fax: [phone]    Primary Care Provider: Kelley Torres MD    Primary Insurance: MEDICARE  Secondary Insurance: BLUE CROSS    DISCHARGE DETAILS:    Contacts  Patient Contacts: Patient  Contact Method: In Person  Reason/Outcome: Continuity of Care, Discharge Planning    Other Referral/Resources/Interventions Provided:  Interventions: None Indicated  Referral Comments: CM confirmed with pt that his plan is to DC Saturday after HD, to resume at [facility] on Tuesday  Pt confirmed he has transport to HD, and will likely have a ride home   No current CM needs    Treatment Team Recommendation: Home  Discharge Destination Plan[de-identified] Home  Transport at Discharge : Family     IMM Given (Date):: 11/25/22  IMM Given to[de-identified] Patient     Additional Comments: CM provided pt with a copy of IMM at bedside

## 2022-11-25 NOTE — ASSESSMENT & PLAN NOTE
Patient present with shortness of breath orthopnea and pinkish frothy phlegm  Chest x-ray showed bilateral lower space consolidation with small bilateral pleural effusion  Patient underwent dialysis with fluid removal on November 23, 2022  Patient was seen by Nephrology in the ED  Next hemodialysis scheduled for tomorrow

## 2022-11-25 NOTE — ASSESSMENT & PLAN NOTE
Lab Results   Component Value Date    EGFR 14 11/24/2022    EGFR 8 11/23/2022    EGFR 9 10/19/2022    CREATININE 3 69 (H) 11/24/2022    CREATININE 5 55 (H) 11/23/2022    CREATININE 5 17 (H) 10/19/2022   Patient's hemoglobin has been in the range of 9-12 and upon admission was 8 2 which dropped to 7 5  Hemoglobin is below goal but stable  Patient is on Laymond Bhagat as outpatient  Patient did have EGD and colonoscopy in March of this year which showed no evidence of bleeding and polyps were removed at that time

## 2022-11-25 NOTE — ASSESSMENT & PLAN NOTE
Lab Results   Component Value Date    HGBA1C 5 7 (H) 10/19/2022       Recent Labs     11/24/22 2053 11/25/22  0714 11/25/22  1119 11/25/22  1618   POCGLU 132 106 123 196*       Blood Sugar Average: Last 72 hrs:  (P) 552 [id number] continue NovoLog 70/30 15 units b i d  Continue Humalog sliding scale with Accu-Cheks q a c  And HS    Blood sugars acceptable

## 2022-11-25 NOTE — PLAN OF CARE
Problem: Potential for Falls  Goal: Patient will remain free of falls  Description: INTERVENTIONS:  - Educate patient/family on patient safety including physical limitations  - Instruct patient to call for assistance with activity   - Consult OT/PT to assist with strengthening/mobility   - Keep Call bell within reach  - Keep bed low and locked with side rails adjusted as appropriate  - Keep care items and personal belongings within reach  - Initiate and maintain comfort rounds  - Make Fall Risk Sign visible to staff  - Offer Toileting every 2 Hours, in advance of need  - Initiate/Maintain bed alarm  - Apply yellow socks and bracelet for high fall risk patients  - Consider moving patient to room near nurses station  Outcome: Progressing     Problem: MOBILITY - ADULT  Goal: Maintain or return to baseline ADL function  Description: INTERVENTIONS:  - Educate patient/family on patient safety including physical limitations  - Instruct patient to call for assistance with activity   - Consult OT/PT to assist with strengthening/mobility   - Keep Call bell within reach  - Keep bed low and locked with side rails adjusted as appropriate  - Keep care items and personal belongings within reach  - Initiate and maintain comfort rounds  - Make Fall Risk Sign visible to staff  - Offer Toileting every 2 Hours, in advance of need  - Initiate/Maintain bed alarm  - Apply yellow socks and bracelet for high fall risk patients  - Consider moving patient to room near nurses station  Outcome: Progressing  Goal: Maintains/Returns to pre admission functional level  Description: INTERVENTIONS:  - Perform BMAT or MOVE assessment daily    - Set and communicate daily mobility goal to care team and patient/family/caregiver  - Collaborate with rehabilitation services on mobility goals if consulted  - Perform Range of Motion 2 times a day  - Reposition patient every 2 hours    - Dangle patient 2 times a day  - Stand patient 2 times a day  - Ambulate patient 2 times a day  - Out of bed to chair 2 times a day   - Out of bed for meals 2 times a day  - Out of bed for toileting  - Record patient progress and toleration of activity level   Outcome: Progressing     Problem: PAIN - ADULT  Goal: Verbalizes/displays adequate comfort level or baseline comfort level  Description: Interventions:  - Encourage patient to monitor pain and request assistance  - Assess pain using appropriate pain scale  - Administer analgesics based on type and severity of pain and evaluate response  - Implement non-pharmacological measures as appropriate and evaluate response  - Consider cultural and social influences on pain and pain management  - Notify physician/advanced practitioner if interventions unsuccessful or patient reports new pain  Outcome: Progressing     Problem: INFECTION - ADULT  Goal: Absence or prevention of progression during hospitalization  Description: INTERVENTIONS:  - Assess and monitor for signs and symptoms of infection  - Monitor lab/diagnostic results  - Monitor all insertion sites, i e  indwelling lines, tubes, and drains  - Monitor endotracheal if appropriate and nasal secretions for changes in amount and color  - [place] appropriate cooling/warming therapies per order  - Administer medications as ordered  - Instruct and encourage patient and family to use good hand hygiene technique  - Identify and instruct in appropriate isolation precautions for identified infection/condition  Outcome: Progressing     Problem: SAFETY ADULT  Goal: Patient will remain free of falls  Description: INTERVENTIONS:  - Educate patient/family on patient safety including physical limitations  - Instruct patient to call for assistance with activity   - Consult OT/PT to assist with strengthening/mobility   - Keep Call bell within reach  - Keep bed low and locked with side rails adjusted as appropriate  - Keep care items and personal belongings within reach  - Initiate and maintain comfort rounds  - Make Fall Risk Sign visible to staff  - Offer Toileting every 2 Hours, in advance of need  - Initiate/Maintain bed alarm  - Apply yellow socks and bracelet for high fall risk patients  - Consider moving patient to room near nurses station  Outcome: Progressing  Goal: Maintain or return to baseline ADL function  Description: INTERVENTIONS:  - Educate patient/family on patient safety including physical limitations  - Instruct patient to call for assistance with activity   - Consult OT/PT to assist with strengthening/mobility   - Keep Call bell within reach  - Keep bed low and locked with side rails adjusted as appropriate  - Keep care items and personal belongings within reach  - Initiate and maintain comfort rounds  - Make Fall Risk Sign visible to staff  - Offer Toileting every 2 Hours, in advance of need  - Initiate/Maintain bed alarm  - Apply yellow socks and bracelet for high fall risk patients  - Consider moving patient to room near nurses station  Outcome: Progressing  Goal: Maintains/Returns to pre admission functional level  Description: INTERVENTIONS:  - Perform BMAT or MOVE assessment daily    - Set and communicate daily mobility goal to care team and patient/family/caregiver  - Collaborate with rehabilitation services on mobility goals if consulted  - Perform Range of Motion 2 times a day  - Reposition patient every 2 hours    - Dangle patient 2 times a day  - Stand patient 2 times a day  - Ambulate patient 2 times a day  - Out of bed to chair 2 times a day   - Out of bed for meals 2 times a day  - Out of bed for toileting  - Record patient progress and toleration of activity level   Outcome: Progressing     Problem: DISCHARGE PLANNING  Goal: Discharge to home or other facility with appropriate resources  Description: INTERVENTIONS:  - Identify barriers to discharge w/patient and caregiver  - Arrange for needed discharge resources and transportation as appropriate  - Identify discharge learning needs (meds, wound care, etc )  - Arrange for interpretive services to assist at discharge as needed  - Refer to Case Management Department for coordinating discharge planning if the patient needs post-hospital services based on physician/advanced practitioner order or complex needs related to functional status, cognitive ability, or social support system  Outcome: Progressing     Problem: Knowledge Deficit  Goal: Patient/family/caregiver demonstrates understanding of disease process, treatment plan, medications, and discharge instructions  Description: Complete learning assessment and assess knowledge base    Interventions:  - Provide teaching at level of understanding  - Provide teaching via preferred learning methods  Outcome: Progressing     Problem: RESPIRATORY - ADULT  Goal: Achieves optimal ventilation and oxygenation  Description: INTERVENTIONS:  - Assess for changes in respiratory status  - Assess for changes in mentation and behavior  - Position to facilitate oxygenation and minimize respiratory effort  - Oxygen administered by appropriate delivery if ordered  - Initiate smoking cessation education as indicated  - Encourage broncho-pulmonary hygiene including cough, deep breathe, Incentive Spirometry  - Assess the need for suctioning and aspirate as needed  - Assess and instruct to report SOB or any respiratory difficulty  - Respiratory Therapy support as indicated  Outcome: Progressing     Problem: METABOLIC, FLUID AND ELECTROLYTES - ADULT  Goal: Fluid balance maintained  Description: INTERVENTIONS:  - Monitor labs   - Monitor I/O and WT  - Instruct patient on fluid and nutrition as appropriate  - Assess for signs & symptoms of volume excess or deficit  Outcome: Progressing  Goal: Electrolytes maintained within normal limits  Description: INTERVENTIONS:  - Monitor labs and assess patient for signs and symptoms of electrolyte imbalances  - Administer electrolyte replacement as ordered  - Monitor response to electrolyte replacements, including repeat lab results as appropriate  - Instruct patient on fluid and nutrition as appropriate  Outcome: Progressing     Problem: HEMATOLOGIC - ADULT  Goal: Maintains hematologic stability  Description: INTERVENTIONS  - Assess for signs and symptoms of bleeding or hemorrhage  - Monitor labs  - Administer supportive blood products/factors as ordered and appropriate  Outcome: Progressing     Problem: Prexisting or High Potential for Compromised Skin Integrity  Goal: Skin integrity is maintained or improved  Description: INTERVENTIONS:  - Identify patients at risk for skin breakdown  - Assess and monitor skin integrity  - Assess and monitor nutrition and hydration status  - Monitor labs   - Assess for incontinence   - Turn and reposition patient  - Assist with mobility/ambulation  - Relieve pressure over bony prominences  - Avoid friction and shearing  - Provide appropriate hygiene as needed including keeping skin clean and dry  - Evaluate need for skin moisturizer/barrier cream  - Collaborate with interdisciplinary team   - Patient/family teaching  - Consider wound care consult   Outcome: Progressing     Problem: Nutrition/Hydration-ADULT  Goal: Nutrient/Hydration intake appropriate for improving, restoring or maintaining nutritional needs  Description: Monitor and assess patient's nutrition/hydration status for malnutrition  Collaborate with interdisciplinary team and initiate plan and interventions as ordered  Monitor patient's weight and dietary intake as ordered or per policy  Utilize nutrition screening tool and intervene as necessary  Determine patient's food preferences and provide high-protein, high-caloric foods as appropriate       INTERVENTIONS:  - Monitor oral intake, urinary output, labs, and treatment plans  - Assess nutrition and hydration status and recommend course of action  - Evaluate amount of meals eaten  - Assist patient with eating if necessary   - Allow adequate time for meals  - Recommend/ encourage appropriate diets, oral nutritional supplements, and vitamin/mineral supplements  - Order, calculate, and assess calorie counts as needed  - Recommend, monitor, and adjust tube feedings and TPN/PPN based on assessed needs  - Assess need for intravenous fluids  - Provide specific nutrition/hydration education as appropriate  - Include patient/family/caregiver in decisions related to nutrition  Outcome: Progressing

## 2022-11-25 NOTE — PROGRESS NOTES
[address] [facility] NOTE   Jody Varma 76 y o  male MRN: [id number]  Unit/Bed#: [address] [place] [address] Encounter: [id number]  Reason for Consult: ESRD on HD    ASSESSMENT and PLAN:  1  ESRD on HD [facility]):   • HD dependent since March 10, 2022  • Completed HD on Wednesday due to holiday sched  • Next HD is tomorrow  2  Access: R IJ permcath       3  Hypertension:   • EDW is 72 kg - was challenged to 70 7 kg on Wednesday  • BP is close to goal    • Home medications per [facility]: Amlodipine 10 mg daily, clonidine 0 1 mg twice a day, losartan 50 mg twice a day  • Of note, Clonidine is not in the [facility] records  • No changes today  • New EDW is probably 71 kg      4  Anemia:   • Hgb is below goal but stable  • He is on Mircera as an outpatient       5  Mineral and bone disease:   • Follow up phos and PTH as an outpatient       6  Fever:  · On abx per SLIM  · Blood cultures 11/23/22 are pending  7  ANCA vasculitis:  • Did not tolerate CYC in April 2022  • Currently not on treatment  DISPOSITION:  · Next HD is tomorrow  · May be discharged from renal standpoint pending clearance from medical team      SUBJECTIVE / 24H INTERVAL HISTORY:  Reports that his breathing is back to normal    Feels okay  No CP       OBJECTIVE:  Current Weight: Weight - Scale: 74 8 kg (164 lb 14 5 oz)  Vitals:    11/24/22 1907 11/24/22 2107 11/24/22 2246 11/25/22 0753   BP: 154/64 144/60 144/55 138/57   BP Location: Right arm      Pulse: 81 72 69    Resp: 16  19 19   Temp: 98 8 °F (37 1 °C)  99 4 °F (37 4 °C) 98 2 °F (36 8 °C)   TempSrc: Oral      SpO2: 90% 94% 92%    Weight:       Height:           Intake/Output Summary (Last 24 hours) at 11/25/2022 0856  Last data filed at 11/24/2022 2039  Gross per 24 hour   Intake 110 ml   Output --   Net 110 ml     General: conscious, cooperative, no distress  Skin: dry  Eyes: pink conjunctivae  ENT: moist mucous membranes  Respiratory: equal chest expansion, decreased in bases  Cardiovascular: distinct heart sounds, normal rate, regular rhythm, no rub  Abdomen: soft, non tender, non distended, normal bowel sounds  Extremities: no edema  Genitourinary: no ruvalcaba catheter  Neuro: awake, alert     Psych: appropriate affect    Medications:    Current Facility-Administered Medications:   •  acetaminophen (TYLENOL) tablet 650 mg, 650 mg, Oral, Q6H PRN, Aileen Casas PA-C, 650 mg at 11/23/22 2150  •  amLODIPine (NORVASC) tablet 10 mg, 10 mg, Oral, Daily, Jeffrey Mcgovern MD, 10 mg at 11/24/22 0825  •  azithromycin (ZITHROMAX) tablet 500 mg, 500 mg, Oral, Q24H, Jeffrey Mcgovern MD, 500 mg at 11/24/22 0945  •  calcium acetate (PHOSLO) capsule 667 mg, 667 mg, Oral, TID With Meals, Jeffrey Mcgovern MD, 667 mg at 11/24/22 1800  •  calcium carbonate (OYSTER SHELL,OSCAL) 500 mg tablet 1 tablet, 1 tablet, Oral, BID With Meals, Jeffrey Mcgovern MD, 1 tablet at 11/24/22 1800  •  cefTRIAXone (ROCEPHIN) IVPB (premix in dextrose) 1,000 mg 50 mL, 1,000 mg, Intravenous, Q24H, Jeffrey Mcgovern MD, Stopped at 11/24/22 1100  •  cloNIDine (CATAPRES) tablet 0 1 mg, 0 1 mg, Oral, Q12H [facility], Katherin Keating MD, 0 1 mg at 11/24/22 2108  •  dextromethorphan-guaiFENesin (ROBITUSSIN DM) oral syrup 10 mL, 10 mL, Oral, Q4H PRN, Jeffrey Mcgovern MD, 10 mL at 11/24/22 1901  •  finasteride (PROSCAR) tablet 5 mg, 5 mg, Oral, Daily, Jeffrey Mcgovern MD, 5 mg at 11/24/22 0825  •  heparin (porcine) subcutaneous injection 5,000 Units, 5,000 Units, Subcutaneous, Q8H [facility], 5,000 Units at 11/25/22 0614 **AND** Platelet count, , , Once, Jeffrey Mcgovern MD  •  insulin aspart protamine-insulin aspart (NovoLOG 70/30) 100 units/mL subcutaneous injection 15 Units, 15 Units, Subcutaneous, BID AC, Jeffrey Mcgovern MD, 15 Units at 11/24/22 1800  •  insulin lispro (HumaLOG) 100 units/mL subcutaneous injection 1-5 Units, 1-5 Units, Subcutaneous, HS, Jeffrey Mcgovern MD  •  insulin lispro (HumaLOG) 100 units/mL subcutaneous injection 1-6 Units, 1-6 Units, Subcutaneous, TID AC, 2 Units at 11/23/22 1804 **AND** Fingerstick Glucose (POCT), , , TID AC, Daisy Keating MD  •  losartan (COZAAR) tablet 50 mg, 50 mg, Oral, BID, Daisy Keating MD, 50 mg at 11/24/22 2108  •  pravastatin (PRAVACHOL) tablet 80 mg, 80 mg, Oral, Daily With Tuan Strickland MD, 80 mg at 11/24/22 1800  •  tamsulosin (FLOMAX) capsule 0 8 mg, 0 8 mg, Oral, Daily With Tuan Strickland MD, 0 8 mg at 11/24/22 1800    Laboratory Results:  Results from last 7 days   Lab Units 11/25/22  0501 11/24/22  0643 11/23/22  0957   WBC Thousand/uL 9 17 8 66 9 38   HEMOGLOBIN g/dL 7 5* 7 5* 8 2*   HEMATOCRIT % 23 7* 24 3* 26 8*   PLATELETS Thousands/uL 395* 364 374   POTASSIUM mmol/L  --  4 3 5 1   CHLORIDE mmol/L  --  97 96   CO2 mmol/L  --  32 32   BUN mg/dL  --  32* 53*   CREATININE mg/dL  --  3 69* 5 55*   CALCIUM mg/dL  --  7 9* 8 0*

## 2022-11-25 NOTE — PROGRESS NOTES
[facility] 43  Progress Note - Henry Orozco 1945, 76 y o  male MRN: [id number]  Unit/Bed#: [facility] Encounter: [id number]  Primary Care Provider: Andrew Martínez MD   Date and time admitted to hospital: 11/23/2022  9:08 AM    * Fluid overload  Assessment & Plan  Patient present with shortness of breath orthopnea and pinkish frothy phlegm  Chest x-ray showed bilateral lower space consolidation with small bilateral pleural effusion  Patient underwent dialysis with fluid removal on November 23, 2022  Patient was seen by Nephrology in the ED  Next hemodialysis scheduled for tomorrow    Bronchitis  Assessment & Plan  Patient developed a fever on the night of November 23, 2022  Patient's COVID flu and RSV was negative  Blood cultures are negative at 24 hours  Urine for Legionella pneumococcal antigens pending  Chest x-ray showed bibasilar consolidation  Patient will be started on Rocephin 1 gram IV daily and azithromycin 500 milligram p o  daily  Procalcitonin level is elevated  Fever has improved  ESRD on dialysis [facility])  Assessment & Plan  Lab Results   Component Value Date    EGFR 14 11/24/2022    EGFR 8 11/23/2022    EGFR 9 10/19/2022    CREATININE 3 69 (H) 11/24/2022    CREATININE 5 55 (H) 11/23/2022    CREATININE 5 17 (H) 10/19/2022   Patient has been hemodialysis dependent since March 2022  Usually on Tuesday Thursday Saturday schedule    This patient was getting dialysis on Monday Wednesday Saturday schedule due to Thanksgiving  Patient underwent hemodialysis with ultra fractionation  with fluid removal  Next hemodialysis scheduled for tomorrow    ANCA-associated vasculitis  Assessment & Plan  Status post kidney biopsy on 03/22 which is 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  Previously treated with Cytoxan and subsequently prescribed prednisone taper  Outpatient follow-up with Nephrology    Anemia due to chronic kidney disease, on chronic dialysis [facility])  Assessment & Plan  Lab Results   Component Value Date    EGFR 14 11/24/2022    EGFR 8 11/23/2022    EGFR 9 10/19/2022    CREATININE 3 69 (H) 11/24/2022    CREATININE 5 55 (H) 11/23/2022    CREATININE 5 17 (H) 10/19/2022   Patient's hemoglobin has been in the range of 9-12 and upon admission was 8 2 which dropped to 7 5  Hemoglobin is below goal but stable  Patient is on [place] as outpatient  Patient did have EGD and colonoscopy in March of this year which showed no evidence of bleeding and polyps were removed at that time  Type 2 diabetes mellitus with chronic kidney disease on chronic dialysis, with long-term current use of insulin [facility])  Assessment & Plan  Lab Results   Component Value Date    HGBA1C 5 7 (H) 10/19/2022       Recent Labs     11/24/22 2053 11/25/22  0714 11/25/22  1119 11/25/22  1618   POCGLU 132 106 123 196*       Blood Sugar Average: Last 72 hrs:  (P) 312 [id number] continue NovoLog 70/30 15 units b i d  Continue Humalog sliding scale with Accu-Cheks q a c  And HS  Blood sugars acceptable    Benign essential hypertension  Assessment & Plan  Continue Norvasc 10 milligram p o  Daily, clonidine 0 1 milligram p o  B i d , losartan 50 milligram p o  B i d           VTE Pharmacologic Prophylaxis: VTE Score: 5 High Risk (Score >/= 5) - Pharmacological DVT Prophylaxis Ordered: heparin  Sequential Compression Devices Ordered  Patient Centered Rounds: I performed bedside rounds with nursing staff today  Discussions with Specialists or Other Care Team Provider:  Nephrology    Education and Discussions with Family / Patient: Updated  (wife) via phone  Time Spent for Care: 45 minutes  More than 50% of total time spent on counseling and coordination of care as described above      Current Length of Stay: 2 day(s)  Current Patient Status: Inpatient   Certification Statement: The patient will continue to require additional inpatient hospital stay due to Bronchitis  Discharge Plan: Anticipate discharge tomorrow to home  Code Status: Level 1 - Full Code    Subjective:   Patient is feeling much better  Improved cough  Denies any shortness of breath  Objective:     Vitals:   Temp (24hrs), Av 7 °F (37 1 °C), Min:98 2 °F (36 8 °C), Max:99 4 °F (37 4 °C)    Temp:  [98 2 °F (36 8 °C)-99 4 °F (37 4 °C)] 98 3 °F (36 8 °C)  HR:  [69-81] 69  Resp:  [16-20] 20  BP: (138-154)/(53-64) 150/53  SpO2:  [90 %-94 %] 94 %  Body mass index is 25 07 kg/m²  Input and Output Summary (last 24 hours): Intake/Output Summary (Last 24 hours) at 2022 1748  Last data filed at 2022 1414  Gross per 24 hour   Intake 980 ml   Output --   Net 980 ml       Physical Exam:   Physical Exam  Constitutional:       Appearance: Normal appearance  HENT:      Head: Normocephalic and atraumatic  Eyes:      Extraocular Movements: Extraocular movements intact  Pupils: Pupils are equal, round, and reactive to light  Cardiovascular:      Rate and Rhythm: Normal rate and regular rhythm  Heart sounds: Murmur heard  No gallop  Pulmonary:      Effort: Pulmonary effort is normal       Breath sounds: Normal breath sounds  Abdominal:      General: Bowel sounds are normal       Palpations: Abdomen is soft  Tenderness: There is no abdominal tenderness  Musculoskeletal:         General: No swelling or deformity  Normal range of motion  Cervical back: Normal range of motion and neck supple  Skin:     General: Skin is warm and dry  Neurological:      General: No focal deficit present  Mental Status: He is alert            Additional Data:     Labs:  Results from last 7 days   Lab Units 22  0501   WBC Thousand/uL 9 17   HEMOGLOBIN g/dL 7 5*   HEMATOCRIT % 23 7*   PLATELETS Thousands/uL 395*   NEUTROS PCT % 80*   LYMPHS PCT % 10*   MONOS PCT % 5   EOS PCT % 3     Results from last 7 days   Lab Units 22  0643 22  0957   SODIUM mmol/L 136 138   POTASSIUM mmol/L 4 3 5 1   CHLORIDE mmol/L 97 96   CO2 mmol/L 32 32   BUN mg/dL 32* 53*   CREATININE mg/dL 3 69* 5 55*   ANION GAP mmol/L 7 10   CALCIUM mg/dL 7 9* 8 0*   ALBUMIN g/dL  --  1 9*   TOTAL BILIRUBIN mg/dL  --  0 44   ALK PHOS U/L  --  76   ALT U/L  --  10*   GLUCOSE RANDOM mg/dL 133 176*         Results from last 7 days   Lab Units 11/25/22  1618 11/25/22  1119 11/25/22  0714 11/24/22  2053 11/24/22  1617 11/24/22  1122 11/24/22  0710 11/23/22  2049 11/23/22  1736   POC GLUCOSE mg/dl 196* 123 106 132 144* 141* 131 123 224*         Results from last 7 days   Lab Units 11/25/22  0501 11/23/22  2221   LACTIC ACID mmol/L  --  0 9   PROCALCITONIN ng/ml 2 72*  --        Lines/Drains:  Invasive Devices     Peripheral Intravenous Line  Duration           Peripheral IV Right Antecubital -- days          Hemodialysis Catheter  Duration           HD Permanent Double Catheter 262 days                      Imaging: Reviewed radiology reports from this admission including: chest xray    Recent Cultures (last 7 days):   Results from last 7 days   Lab Units 11/23/22  2227 11/23/22  2221   BLOOD CULTURE  No Growth at 24 hrs  No Growth at 24 hrs         Last 24 Hours Medication List:   Current Facility-Administered Medications   Medication Dose Route Frequency Provider Last Rate   • acetaminophen  650 mg Oral Q6H PRN Aileen Casas PA-C     • amLODIPine  10 mg Oral Daily Magali Eugene MD     • azithromycin  500 mg Oral Q24H Magali Eugene MD     • calcium acetate  667 mg Oral TID With Meals Magali Eugene MD     • calcium carbonate  1 tablet Oral BID With Meals Magali Eugene MD     • cefTRIAXone  1,000 mg Intravenous Q24H Magali Eugene MD 1,000 mg (11/25/22 1310)   • cloNIDine  0 1 mg Oral Q12H [address] Magali Eugene MD     • dextromethorphan-guaiFENesin  10 mL Oral Q4H PRN Magali Eugene MD     • finasteride  5 mg Oral Daily Magali Eugene MD     • heparin (porcine)  5,000 Units Subcutaneous Q8H [facility] Melo Ruiz MD     • insulin aspart protamine-insulin aspart  15 Units Subcutaneous BID AC Melo Ruiz MD     • insulin lispro  1-5 Units Subcutaneous HS Melo Ruiz MD     • insulin lispro  1-6 Units Subcutaneous TID AC Melo Ruiz MD     • losartan  50 mg Oral BID Melo Ruiz MD     • pravastatin  80 mg Oral Daily With Caroline Meneses MD     • tamsulosin  0 8 mg Oral Daily With Caroline Meneses MD          Today, Patient Was Seen By: Melo Ruiz MD    **Please Note: This note may have been constructed using a voice recognition system  **

## 2022-11-25 NOTE — ASSESSMENT & PLAN NOTE
Lab Results   Component Value Date    EGFR 14 11/24/2022    EGFR 8 11/23/2022    EGFR 9 10/19/2022    CREATININE 3 69 (H) 11/24/2022    CREATININE 5 55 (H) 11/23/2022    CREATININE 5 17 (H) 10/19/2022   Patient has been hemodialysis dependent since March 2022  Usually on Tuesday Thursday Saturday schedule    This patient was getting dialysis on Monday Wednesday Saturday schedule due to Thanksgiving  Patient underwent hemodialysis with ultra fractionation  with fluid removal  Next hemodialysis scheduled for tomorrow

## 2022-11-26 ENCOUNTER — APPOINTMENT (INPATIENT)
Dept: DIALYSIS | Facility: HOSPITAL | Age: 77
End: 2022-11-26

## 2022-11-26 VITALS
DIASTOLIC BLOOD PRESSURE: 75 MMHG | BODY MASS INDEX: 24.99 KG/M2 | TEMPERATURE: 97.4 F | WEIGHT: 164.9 LBS | HEART RATE: 76 BPM | OXYGEN SATURATION: 94 % | HEIGHT: 68 IN | RESPIRATION RATE: 16 BRPM | SYSTOLIC BLOOD PRESSURE: 145 MMHG

## 2022-11-26 LAB
ERYTHROCYTE [DISTWIDTH] IN BLOOD BY AUTOMATED COUNT: 15.1 % (ref 11.6–15.1)
GLUCOSE SERPL-MCNC: 120 MG/DL (ref 65–140)
GLUCOSE SERPL-MCNC: 129 MG/DL (ref 65–140)
HCT VFR BLD AUTO: 23.5 % (ref 36.5–49.3)
HGB BLD-MCNC: 7.4 G/DL (ref 12–17)
MCH RBC QN AUTO: 27.6 PG (ref 26.8–34.3)
MCHC RBC AUTO-ENTMCNC: 31.5 G/DL (ref 31.4–37.4)
MCV RBC AUTO: 88 FL (ref 82–98)
PLATELET # BLD AUTO: 330 THOUSANDS/UL (ref 149–390)
PMV BLD AUTO: 9.1 FL (ref 8.9–12.7)
PROCALCITONIN SERPL-MCNC: 1.73 NG/ML
RBC # BLD AUTO: 2.68 MILLION/UL (ref 3.88–5.62)
WBC # BLD AUTO: 6.3 THOUSAND/UL (ref 4.31–10.16)

## 2022-11-26 PROCEDURE — 5A1D70Z PERFORMANCE OF URINARY FILTRATION, INTERMITTENT, LESS THAN 6 HOURS PER DAY: ICD-10-PCS | Performed by: INTERNAL MEDICINE

## 2022-11-26 RX ORDER — GUAIFENESIN/DEXTROMETHORPHAN 100-10MG/5
10 SYRUP ORAL EVERY 4 HOURS PRN
Qty: 118 ML | Refills: 0 | Status: SHIPPED | OUTPATIENT
Start: 2022-11-26 | End: 2022-12-06

## 2022-11-26 RX ORDER — CEFDINIR 300 MG/1
300 CAPSULE ORAL EVERY 12 HOURS SCHEDULED
Qty: 8 CAPSULE | Refills: 0 | Status: ON HOLD | OUTPATIENT
Start: 2022-11-27 | End: 2022-11-30

## 2022-11-26 RX ADMIN — INSULIN ASPART 15 UNITS: 100 INJECTION, SUSPENSION SUBCUTANEOUS at 08:18

## 2022-11-26 RX ADMIN — AZITHROMYCIN MONOHYDRATE 500 MG: 250 TABLET ORAL at 08:14

## 2022-11-26 RX ADMIN — FINASTERIDE 5 MG: 5 TABLET, FILM COATED ORAL at 08:13

## 2022-11-26 RX ADMIN — HEPARIN SODIUM 5000 UNITS: 5000 INJECTION INTRAVENOUS; SUBCUTANEOUS at 06:12

## 2022-11-26 RX ADMIN — LOSARTAN POTASSIUM 50 MG: 50 TABLET, FILM COATED ORAL at 08:13

## 2022-11-26 RX ADMIN — CEFTRIAXONE 1000 MG: 1 INJECTION, SOLUTION INTRAVENOUS at 12:15

## 2022-11-26 RX ADMIN — ACETAMINOPHEN 650 MG: 325 TABLET, FILM COATED ORAL at 01:58

## 2022-11-26 RX ADMIN — CALCIUM ACETATE 667 MG: 667 CAPSULE ORAL at 08:13

## 2022-11-26 RX ADMIN — CALCIUM 1 TABLET: 500 TABLET ORAL at 08:13

## 2022-11-26 RX ADMIN — CALCIUM ACETATE 667 MG: 667 CAPSULE ORAL at 12:15

## 2022-11-26 NOTE — HEMODIALYSIS
Post-Dialysis RN Treatment Note    Blood Pressure:  Pre: 161/59  mm/Hg  Post: 145/75 mmHg   EDW: 72 0kg    Weight:  Pre: 72 5 kg   Post: 70 5 kg   Mode of weight measurement: Bed Scale   Volume Removed: 2000 ml    Treatment duration: 240 minutes    NS given  No    Treatment shortened?  No   Medications given during Rx None Reported   Estimated Kt/V  Not Applicable   Access type: Permacath/TDC   Access Issues: No    Report called to primary nurse   Yes, Krista Gallegos

## 2022-11-26 NOTE — PROGRESS NOTES
[address] [facility] NOTE   Juana De Oliveira 76 y o  male MRN: [id number]  Unit/Bed#: [address] [place] [address] Encounter: [id number]  Reason for Consult: ESRD on HD    ASSESSMENT and PLAN:  1  ESRD on HD [facility]):   • HD dependent since March 10, 2022  • Completed HD on Wednesday due to holiday sched  • HD today  2  Access: R IJ permcath       3  Hypertension:   • EDW is 72 kg - was challenged to 70 7 kg on Wednesday  • BP is close to goal    • Home medications per [facility]: Amlodipine 10 mg daily, clonidine 0 1 mg twice a day, losartan 50 mg twice a day  • Of note, Clonidine is not in the [facility] records  • No changes today  • 2 kg UF on HD today - this should get him to new EDW of 71 kg      4  Anemia:   • Hgb is below goal but stable  • He is on Mircera as an outpatient       5  Mineral and bone disease:   • Follow up phos and PTH as an outpatient       6  Fever:  · On abx per SLIM - ceftriaxone and azithromycin  · No new fever  · Blood cultures 11/23/22 are negative at 24 hours  7  ANCA vasculitis:  • Did not tolerate CYC in April 2022  • Currently not on treatment  DISPOSITION:  · HD today  · Stable for discharge from renal standpoint after completion of HD today  · New EDW is 71 kg  The above plan was discussed with SLIM  SUBJECTIVE / 24H INTERVAL HISTORY:  Breathing is normal    No CP or SOB  Anxious to go home  HEMODIALYSIS PROCEDURE NOTE  The patient was seen and examined on hemodialysis  Time: 4 hours  Sodium: 138 Blood flow: 400   Dialyzer: F160 Potassium: 3 Dialysate flow: 500   Access: R IJ permcath Bicarbonate: 35 Ultrafiltration goal: 2 kg   Medications on HD: none        OBJECTIVE:  Current Weight: Weight - Scale: 74 8 kg (164 lb 14 5 oz)  Vitals:    11/26/22 0815 11/26/22 0830 11/26/22 0900 11/26/22 0930   BP: 161/59 145/62 142/52 135/50   BP Location: Right arm Right arm Right arm Right arm   Pulse: 79 78 76 67   Resp: 16 16 16 16   Temp: 97 6 °F (36 4 °C)      TempSrc: Oral      SpO2:       Weight:       Height:           Intake/Output Summary (Last 24 hours) at 11/26/2022 0956  Last data filed at 11/26/2022 0815  Gross per 24 hour   Intake 1020 ml   Output --   Net 1020 ml     General: conscious, cooperative, no distress  Skin: dry  Eyes: pink conjunctivae  ENT: moist mucous membranes  Respiratory: equal chest expansion, decreased in bases  Cardiovascular: distinct heart sounds, normal rate, regular rhythm, no rub  Abdomen: soft, non tender, non distended, normal bowel sounds  Extremities: no edema  Genitourinary: no ruvalcaba catheter  Neuro: awake, alert     Psych: appropriate affect    Medications:    Current Facility-Administered Medications:   •  acetaminophen (TYLENOL) tablet 650 mg, 650 mg, Oral, Q6H PRN, Aileen Casas PA-C, 650 mg at 11/26/22 0158  •  amLODIPine (NORVASC) tablet 10 mg, 10 mg, Oral, Daily, Nati Noble MD, 10 mg at 11/25/22 0856  •  azithromycin (ZITHROMAX) tablet 500 mg, 500 mg, Oral, Q24H, Nati Noble MD, 500 mg at 11/26/22 4927  •  calcium acetate (PHOSLO) capsule 667 mg, 667 mg, Oral, TID With Meals, Nati Noble MD, 667 mg at 11/26/22 0813  •  calcium carbonate (OYSTER SHELL,OSCAL) 500 mg tablet 1 tablet, 1 tablet, Oral, BID With Meals, Nati Noble MD, 1 tablet at 11/26/22 0813  •  cefTRIAXone (ROCEPHIN) IVPB (premix in dextrose) 1,000 mg 50 mL, 1,000 mg, Intravenous, Q24H, Daisy Keating MD, Last Rate: 100 mL/hr at 11/25/22 0938, 1,000 mg at 11/25/22 0047  •  cloNIDine (CATAPRES) tablet 0 1 mg, 0 1 mg, Oral, Q12H [facility], Daisy Keating MD, 0 1 mg at 11/25/22 2120  •  dextromethorphan-guaiFENesin (ROBITUSSIN DM) oral syrup 10 mL, 10 mL, Oral, Q4H PRN, Daisy Keating MD, 10 mL at 11/24/22 1901  •  finasteride (PROSCAR) tablet 5 mg, 5 mg, Oral, Daily, Nati Noble MD, 5 mg at 11/26/22 0813  •  heparin (porcine) subcutaneous injection 5,000 Units, 5,000 Units, Subcutaneous, Q8H [facility], 5,000 Units at 11/26/22 0612 **AND** Platelet count, , , Once, Steven Allen MD  •  insulin aspart protamine-insulin aspart (NovoLOG 70/30) 100 units/mL subcutaneous injection 15 Units, 15 Units, Subcutaneous, BID AC, Daisy Keating MD, 15 Units at 11/26/22 0818  •  insulin lispro (HumaLOG) 100 units/mL subcutaneous injection 1-5 Units, 1-5 Units, Subcutaneous, HS, Steven Allen MD  •  insulin lispro (HumaLOG) 100 units/mL subcutaneous injection 1-6 Units, 1-6 Units, Subcutaneous, TID AC, 2 Units at 11/25/22 1630 **AND** Fingerstick Glucose (POCT), , , TID AC, Daisy Keating MD  •  losartan (COZAAR) tablet 50 mg, 50 mg, Oral, BID, Daisy Keating MD, 50 mg at 11/26/22 0813  •  pravastatin (PRAVACHOL) tablet 80 mg, 80 mg, Oral, Daily With Jono Simon MD, 80 mg at 11/25/22 1630  •  tamsulosin (FLOMAX) capsule 0 8 mg, 0 8 mg, Oral, Daily With Jono Simon MD, 0 8 mg at 11/25/22 1630    Laboratory Results:  Results from last 7 days   Lab Units 11/26/22  0857 11/25/22  0501 11/24/22  0643 11/23/22  0957   WBC Thousand/uL 6 30 9 17 8 66 9 38   HEMOGLOBIN g/dL 7 4* 7 5* 7 5* 8 2*   HEMATOCRIT % 23 5* 23 7* 24 3* 26 8*   PLATELETS Thousands/uL 330 395* 364 374   POTASSIUM mmol/L  --   --  4 3 5 1   CHLORIDE mmol/L  --   --  97 96   CO2 mmol/L  --   --  32 32   BUN mg/dL  --   --  32* 53*   CREATININE mg/dL  --   --  3 69* 5 55*   CALCIUM mg/dL  --   --  7 9* 8 0*

## 2022-11-26 NOTE — ASSESSMENT & PLAN NOTE
Patient developed a fever on the night of November 23, 2022  Patient's COVID flu and RSV was negative  Blood cultures are negative at 48 hours  Chest x-ray showed bibasilar consolidation  Patient has been on IV Rocephin and azithromycin    Patient to be discharged on cefdinir for 4 more days to finish a 1 week course  Procalcitonin level is elevated 2 7-1 7  No further fever for the past 24 hours

## 2022-11-26 NOTE — ASSESSMENT & PLAN NOTE
Lab Results   Component Value Date    EGFR 14 11/24/2022    EGFR 8 11/23/2022    EGFR 9 10/19/2022    CREATININE 3 69 (H) 11/24/2022    CREATININE 5 55 (H) 11/23/2022    CREATININE 5 17 (H) 10/19/2022   Patient's hemoglobin has been in the range of 9-12 and upon admission was 8 2 which dropped to 7 5  Hemoglobin is below goal but stable  Patient is on [place] as outpatient  Patient did have EGD and colonoscopy in March of this year which showed no evidence of bleeding and polyps were removed at that time

## 2022-11-26 NOTE — ASSESSMENT & PLAN NOTE
Patient present with shortness of breath orthopnea and pinkish frothy phlegm  Chest x-ray showed bilateral lower space consolidation with small bilateral pleural effusion  Patient underwent dialysis with fluid removal on November 23, 2022  Patient was seen by Nephrology in the ED  Patient underwent hemodialysis before discharge

## 2022-11-26 NOTE — PLAN OF CARE
Problem: Potential for Falls  Goal: Patient will remain free of falls  Description: INTERVENTIONS:  - Educate patient/family on patient safety including physical limitations  - Instruct patient to call for assistance with activity   - Consult OT/PT to assist with strengthening/mobility   - Keep Call bell within reach  - Keep bed low and locked with side rails adjusted as appropriate  - Keep care items and personal belongings within reach  - Initiate and maintain comfort rounds  - Make Fall Risk Sign visible to staff  - Offer Toileting every 2 Hours, in advance of need  - Initiate/Maintain bed alarm  - Apply yellow socks and bracelet for high fall risk patients  - Consider moving patient to room near nurses station  Outcome: Progressing     Problem: MOBILITY - ADULT  Goal: Maintain or return to baseline ADL function  Description: INTERVENTIONS:  - Educate patient/family on patient safety including physical limitations  - Instruct patient to call for assistance with activity   - Consult OT/PT to assist with strengthening/mobility   - Keep Call bell within reach  - Keep bed low and locked with side rails adjusted as appropriate  - Keep care items and personal belongings within reach  - Initiate and maintain comfort rounds  - Make Fall Risk Sign visible to staff  - Offer Toileting every 2 Hours, in advance of need  - Initiate/Maintain bed alarm  - Apply yellow socks and bracelet for high fall risk patients  - Consider moving patient to room near nurses station  Outcome: Progressing  Goal: Maintains/Returns to pre admission functional level  Description: INTERVENTIONS:  - Perform BMAT or MOVE assessment daily    - Set and communicate daily mobility goal to care team and patient/family/caregiver  - Collaborate with rehabilitation services on mobility goals if consulted  - Perform Range of Motion 3 times a day  - Reposition patient every 2 hours    - Dangle patient 3 times a day  - Stand patient 3 times a day  - Ambulate patient 3 times a day  - Out of bed to chair 3 times a day   - Out of bed for meals 3 times a day  - Out of bed for toileting  - Record patient progress and toleration of activity level   Outcome: Progressing     Problem: PAIN - ADULT  Goal: Verbalizes/displays adequate comfort level or baseline comfort level  Description: Interventions:  - Encourage patient to monitor pain and request assistance  - Assess pain using appropriate pain scale  - Administer analgesics based on type and severity of pain and evaluate response  - Implement non-pharmacological measures as appropriate and evaluate response  - Consider cultural and social influences on pain and pain management  - Notify physician/advanced practitioner if interventions unsuccessful or patient reports new pain  Outcome: Progressing     Problem: INFECTION - ADULT  Goal: Absence or prevention of progression during hospitalization  Description: INTERVENTIONS:  - Assess and monitor for signs and symptoms of infection  - Monitor lab/diagnostic results  - Monitor all insertion sites, i e  indwelling lines, tubes, and drains  - Monitor endotracheal if appropriate and nasal secretions for changes in amount and color  - [place] appropriate cooling/warming therapies per order  - Administer medications as ordered  - Instruct and encourage patient and family to use good hand hygiene technique  - Identify and instruct in appropriate isolation precautions for identified infection/condition  Outcome: Progressing     Problem: SAFETY ADULT  Goal: Patient will remain free of falls  Description: INTERVENTIONS:  - Educate patient/family on patient safety including physical limitations  - Instruct patient to call for assistance with activity   - Consult OT/PT to assist with strengthening/mobility   - Keep Call bell within reach  - Keep bed low and locked with side rails adjusted as appropriate  - Keep care items and personal belongings within reach  - Initiate and maintain comfort rounds  - Make Fall Risk Sign visible to staff  - Offer Toileting every 2 Hours, in advance of need  - Initiate/Maintain bed alarm  - Apply yellow socks and bracelet for high fall risk patients  - Consider moving patient to room near nurses station  Outcome: Progressing  Goal: Maintain or return to baseline ADL function  Description: INTERVENTIONS:  - Educate patient/family on patient safety including physical limitations  - Instruct patient to call for assistance with activity   - Consult OT/PT to assist with strengthening/mobility   - Keep Call bell within reach  - Keep bed low and locked with side rails adjusted as appropriate  - Keep care items and personal belongings within reach  - Initiate and maintain comfort rounds  - Make Fall Risk Sign visible to staff  - Offer Toileting every 2 Hours, in advance of need  - Initiate/Maintain bed alarm  - Apply yellow socks and bracelet for high fall risk patients  - Consider moving patient to room near nurses station  Outcome: Progressing  Goal: Maintains/Returns to pre admission functional level  Description: INTERVENTIONS:  - Perform BMAT or MOVE assessment daily    - Set and communicate daily mobility goal to care team and patient/family/caregiver  - Collaborate with rehabilitation services on mobility goals if consulted  - Perform Range of Motion 3 times a day  - Reposition patient every 2 hours    - Dangle patient 3 times a day  - Stand patient 3 times a day  - Ambulate patient 3 times a day  - Out of bed to chair 3 times a day   - Out of bed for meals 3 times a day  - Out of bed for toileting  - Record patient progress and toleration of activity level   Outcome: Progressing     Problem: DISCHARGE PLANNING  Goal: Discharge to home or other facility with appropriate resources  Description: INTERVENTIONS:  - Identify barriers to discharge w/patient and caregiver  - Arrange for needed discharge resources and transportation as appropriate  - Identify discharge learning needs (meds, wound care, etc )  - Arrange for interpretive services to assist at discharge as needed  - Refer to Case Management Department for coordinating discharge planning if the patient needs post-hospital services based on physician/advanced practitioner order or complex needs related to functional status, cognitive ability, or social support system  Outcome: Progressing     Problem: Knowledge Deficit  Goal: Patient/family/caregiver demonstrates understanding of disease process, treatment plan, medications, and discharge instructions  Description: Complete learning assessment and assess knowledge base    Interventions:  - Provide teaching at level of understanding  - Provide teaching via preferred learning methods  Outcome: Progressing     Problem: RESPIRATORY - ADULT  Goal: Achieves optimal ventilation and oxygenation  Description: INTERVENTIONS:  - Assess for changes in respiratory status  - Assess for changes in mentation and behavior  - Position to facilitate oxygenation and minimize respiratory effort  - Oxygen administered by appropriate delivery if ordered  - Initiate smoking cessation education as indicated  - Encourage broncho-pulmonary hygiene including cough, deep breathe, Incentive Spirometry  - Assess the need for suctioning and aspirate as needed  - Assess and instruct to report SOB or any respiratory difficulty  - Respiratory Therapy support as indicated  Outcome: Progressing     Problem: METABOLIC, FLUID AND ELECTROLYTES - ADULT  Goal: Fluid balance maintained  Description: INTERVENTIONS:  - Monitor labs   - Monitor I/O and WT  - Instruct patient on fluid and nutrition as appropriate  - Assess for signs & symptoms of volume excess or deficit  Outcome: Progressing  Goal: Electrolytes maintained within normal limits  Description: INTERVENTIONS:  - Monitor labs and assess patient for signs and symptoms of electrolyte imbalances  - Administer electrolyte replacement as ordered  - Monitor response to electrolyte replacements, including repeat lab results as appropriate  - Instruct patient on fluid and nutrition as appropriate  Outcome: Progressing     Problem: HEMATOLOGIC - ADULT  Goal: Maintains hematologic stability  Description: INTERVENTIONS  - Assess for signs and symptoms of bleeding or hemorrhage  - Monitor labs  - Administer supportive blood products/factors as ordered and appropriate  Outcome: Progressing     Problem: Prexisting or High Potential for Compromised Skin Integrity  Goal: Skin integrity is maintained or improved  Description: INTERVENTIONS:  - Identify patients at risk for skin breakdown  - Assess and monitor skin integrity  - Assess and monitor nutrition and hydration status  - Monitor labs   - Assess for incontinence   - Turn and reposition patient  - Assist with mobility/ambulation  - Relieve pressure over bony prominences  - Avoid friction and shearing  - Provide appropriate hygiene as needed including keeping skin clean and dry  - Evaluate need for skin moisturizer/barrier cream  - Collaborate with interdisciplinary team   - Patient/family teaching  - Consider wound care consult   Outcome: Progressing     Problem: Nutrition/Hydration-ADULT  Goal: Nutrient/Hydration intake appropriate for improving, restoring or maintaining nutritional needs  Description: Monitor and assess patient's nutrition/hydration status for malnutrition  Collaborate with interdisciplinary team and initiate plan and interventions as ordered  Monitor patient's weight and dietary intake as ordered or per policy  Utilize nutrition screening tool and intervene as necessary  Determine patient's food preferences and provide high-protein, high-caloric foods as appropriate       INTERVENTIONS:  - Monitor oral intake, urinary output, labs, and treatment plans  - Assess nutrition and hydration status and recommend course of action  - Evaluate amount of meals eaten  - Assist patient with eating if necessary   - Allow adequate time for meals  - Recommend/ encourage appropriate diets, oral nutritional supplements, and vitamin/mineral supplements  - Order, calculate, and assess calorie counts as needed  - Recommend, monitor, and adjust tube feedings and TPN/PPN based on assessed needs  - Assess need for intravenous fluids  - Provide specific nutrition/hydration education as appropriate  - Include patient/family/caregiver in decisions related to nutrition  Outcome: Progressing

## 2022-11-26 NOTE — ASSESSMENT & PLAN NOTE
Lab Results   Component Value Date    HGBA1C 5 7 (H) 10/19/2022       Recent Labs     11/25/22  1618 11/25/22 2052 11/26/22  0729 11/26/22  1114   POCGLU 196* 82 120 129       Blood Sugar Average: Last 72 hrs:  (P) 792 [id number] continue NovoLog 70/30 15 units b i d    Blood sugar stable

## 2022-11-26 NOTE — NURSING NOTE
Patient requested [facility]  Patient transported via w/c with RN to main entrance and pt ambulated to [facility] car

## 2022-11-26 NOTE — DISCHARGE SUMMARY
[address]  Discharge- Debby Garcia 1945, 76 y o  male MRN: [id number]  Unit/Bed#: [facility] Encounter: [id number]  Primary Care Provider: Jenn Cardoso MD   Date and time admitted to hospital: 11/23/2022  9:08 AM    * Fluid overload  Assessment & Plan  Patient present with shortness of breath orthopnea and pinkish frothy phlegm  Chest x-ray showed bilateral lower space consolidation with small bilateral pleural effusion  Patient underwent dialysis with fluid removal on November 23, 2022  Patient was seen by Nephrology in the ED  Patient underwent hemodialysis before discharge    Bronchitis  Assessment & Plan  Patient developed a fever on the night of November 23, 2022  Patient's COVID flu and RSV was negative  Blood cultures are negative at 48 hours  Chest x-ray showed bibasilar consolidation  Patient has been on IV Rocephin and azithromycin  Patient to be discharged on cefdinir for 4 more days to finish a 1 week course  Procalcitonin level is elevated 2 7-1 7  No further fever for the past 24 hours    ESRD on dialysis [facility])  Assessment & Plan  Lab Results   Component Value Date    EGFR 14 11/24/2022    EGFR 8 11/23/2022    EGFR 9 10/19/2022    CREATININE 3 69 (H) 11/24/2022    CREATININE 5 55 (H) 11/23/2022    CREATININE 5 17 (H) 10/19/2022   Patient has been hemodialysis dependent since March 2022  Usually on Tuesday Thursday Saturday schedule    This patient was getting dialysis on Monday Wednesday Saturday schedule due to Thanksgiving  Patient underwent hemodialysis with ultra fractionation  with fluid removal  Patient underwent hemodialysis before discharge    ANCA-associated vasculitis  Assessment & Plan  Status post kidney biopsy on 03/22 which is 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  Previously treated with Cytoxan and subsequently prescribed prednisone taper  Outpatient follow-up with Nephrology    Anemia due to chronic kidney disease, on chronic dialysis [facility])  Assessment & Plan  Lab Results   Component Value Date    EGFR 14 11/24/2022    EGFR 8 11/23/2022    EGFR 9 10/19/2022    CREATININE 3 69 (H) 11/24/2022    CREATININE 5 55 (H) 11/23/2022    CREATININE 5 17 (H) 10/19/2022   Patient's hemoglobin has been in the range of 9-12 and upon admission was 8 2 which dropped to 7 5  Hemoglobin is below goal but stable  Patient is on Shaheen Mcardle as outpatient  Patient did have EGD and colonoscopy in March of this year which showed no evidence of bleeding and polyps were removed at that time  Type 2 diabetes mellitus with chronic kidney disease on chronic dialysis, with long-term current use of insulin [facility])  Assessment & Plan  Lab Results   Component Value Date    HGBA1C 5 7 (H) 10/19/2022       Recent Labs     11/25/22  1618 11/25/22  2052 11/26/22  0729 11/26/22  1114   POCGLU 196* 82 120 129       Blood Sugar Average: Last 72 hrs:  (P) 974 [id number] continue NovoLog 70/30 15 units b i d  Blood sugar stable    Benign essential hypertension  Assessment & Plan  Continue Norvasc 10 milligram p o  Daily, clonidine 0 1 milligram p o  B i d , losartan 50 milligram p o  B i d         Medical Problems     Resolved Problems  Date Reviewed: 11/26/2022   None       Discharging Physician / Practitioner: Peng Gibson MD  PCP: Sunitha Enriquez MD  Admission Date:   Admission Orders (From admission, onward)     Ordered        11/23/22 1109  INPATIENT ADMISSION  Once                      Discharge Date: 11/26/22    Consultations During Hospital Stay:  · Nephrology    Procedures Performed:   · Hemodialysis with ultra fractionation    Outpatient Tests Requested:  · Follow-up at the dialysis center    Follow-up with PCP    Complications:  None    Reason for Admission:  Cough and shortness of breath    Hospital Course:   Sourav Banks is a 76 y o  male patient with past medical history ESRD on hemodialysis, hypertension, diabetes, Anca vasculitis who originally presented to the hospital on 11/23/2022 due to shortness of breath and cough with frothy phlegm  In the ED patient noted have elevated proBNP with chest x-ray showing small bilateral pleural effusions  Patient is admitted hospital for fluid overload and had hemodialysis with fluid removal   Later patient started spiking fever and was started on IV Rocephin and Zithromax for suspected pneumonia  Blood cultures continued remained negative  Patient was afebrile with improving procalcitonin level  Patient to be discharged on p o  Cefdinir to finish 1 week course  Patient also received hemodialysis on the day of discharge  Please see above list of diagnoses and related plan for additional information  Condition at Discharge: stable    Discharge Day Visit / Exam:   Subjective:  Patient is feeling well and anxious to go home  Occasional cough  Denies any shortness of breath, chest pain or abdominal pain  Vitals: Blood Pressure: 145/75 (11/26/22 1210)  Pulse: 76 (11/26/22 1210)  Temperature: (!) 97 4 °F (36 3 °C) (11/26/22 1210)  Temp Source: Oral (11/26/22 1210)  Respirations: 16 (11/26/22 1210)  Height: 5' 8" (172 7 cm) (11/23/22 0915)  Weight - Scale: 74 8 kg (164 lb 14 5 oz) (11/23/22 0915)  SpO2: 94 % (11/25/22 2254)  Exam:   Physical Exam  Constitutional:       Appearance: Normal appearance  HENT:      Head: Normocephalic and atraumatic  Eyes:      Extraocular Movements: Extraocular movements intact  Pupils: Pupils are equal, round, and reactive to light  Cardiovascular:      Rate and Rhythm: Normal rate and regular rhythm  Heart sounds: No murmur heard  No gallop  Pulmonary:      Effort: Pulmonary effort is normal       Breath sounds: Normal breath sounds  Abdominal:      General: Bowel sounds are normal       Palpations: Abdomen is soft  Tenderness: There is no abdominal tenderness  Musculoskeletal:         General: No swelling or deformity  Normal range of motion  Cervical back: Normal range of motion and neck supple  Skin:     General: Skin is warm and dry  Neurological:      General: No focal deficit present  Mental Status: He is alert  Discharge instructions/Information to patient and family:   See after visit summary for information provided to patient and family  Provisions for Follow-Up Care:  See after visit summary for information related to follow-up care and any pertinent home health orders  Disposition:   Home    Planned Readmission: No     Discharge Statement:  I spent 35 minutes discharging the patient  This time was spent on the day of discharge  I had direct contact with the patient on the day of discharge  Greater than 50% of the total time was spent examining patient, answering all patient questions, arranging and discussing plan of care with patient as well as directly providing post-discharge instructions  Additional time then spent on discharge activities  Discharge Medications:  See after visit summary for reconciled discharge medications provided to patient and/or family        **Please Note: This note may have been constructed using a voice recognition system**

## 2022-11-26 NOTE — ASSESSMENT & PLAN NOTE
Lab Results   Component Value Date    EGFR 14 11/24/2022    EGFR 8 11/23/2022    EGFR 9 10/19/2022    CREATININE 3 69 (H) 11/24/2022    CREATININE 5 55 (H) 11/23/2022    CREATININE 5 17 (H) 10/19/2022   Patient has been hemodialysis dependent since March 2022  Usually on Tuesday Thursday Saturday schedule    This patient was getting dialysis on Monday Wednesday Saturday schedule due to Thanksgiving  Patient underwent hemodialysis with ultra fractionation  with fluid removal  Patient underwent hemodialysis before discharge

## 2022-11-28 ENCOUNTER — HOSPITAL ENCOUNTER (INPATIENT)
Facility: HOSPITAL | Age: 77
LOS: 2 days | Discharge: NURSING, REHAB OR LONG-TERM CARE | End: 2022-11-30
Attending: EMERGENCY MEDICINE | Admitting: FAMILY MEDICINE

## 2022-11-28 ENCOUNTER — APPOINTMENT (EMERGENCY)
Dept: RADIOLOGY | Facility: HOSPITAL | Age: 77
End: 2022-11-28

## 2022-11-28 DIAGNOSIS — J90 BILATERAL PLEURAL EFFUSION: ICD-10-CM

## 2022-11-28 DIAGNOSIS — U07.1 COVID-19 VIRUS INFECTION: Primary | ICD-10-CM

## 2022-11-28 DIAGNOSIS — Z99.2 ESRD ON HEMODIALYSIS (HCC): ICD-10-CM

## 2022-11-28 DIAGNOSIS — R06.02 SHORTNESS OF BREATH: ICD-10-CM

## 2022-11-28 DIAGNOSIS — D64.9 CHRONIC ANEMIA: ICD-10-CM

## 2022-11-28 DIAGNOSIS — Z99.2 ESRD ON DIALYSIS (HCC): ICD-10-CM

## 2022-11-28 DIAGNOSIS — N18.6 ESRD ON HEMODIALYSIS (HCC): ICD-10-CM

## 2022-11-28 DIAGNOSIS — N18.6 ESRD ON DIALYSIS (HCC): ICD-10-CM

## 2022-11-28 LAB
2HR DELTA HS TROPONIN: 0 NG/L
ALBUMIN SERPL BCP-MCNC: 1.9 G/DL (ref 3.5–5)
ALP SERPL-CCNC: 78 U/L (ref 46–116)
ALT SERPL W P-5'-P-CCNC: 10 U/L (ref 12–78)
ANION GAP SERPL CALCULATED.3IONS-SCNC: 12 MMOL/L (ref 4–13)
ATRIAL RATE: 88 BPM
BACTERIA SPT RESP CULT: ABNORMAL
BACTERIA SPT RESP CULT: ABNORMAL
BASOPHILS # BLD MANUAL: 0.11 THOUSAND/UL (ref 0–0.1)
BASOPHILS NFR MAR MANUAL: 1 % (ref 0–1)
BILIRUB SERPL-MCNC: 0.47 MG/DL (ref 0.2–1)
BUN SERPL-MCNC: 65 MG/DL (ref 5–25)
CALCIUM ALBUM COR SERPL-MCNC: 10 MG/DL (ref 8.3–10.1)
CALCIUM SERPL-MCNC: 8.3 MG/DL (ref 8.3–10.1)
CARDIAC TROPONIN I PNL SERPL HS: 15 NG/L
CARDIAC TROPONIN I PNL SERPL HS: 15 NG/L
CHLORIDE SERPL-SCNC: 95 MMOL/L (ref 96–108)
CK SERPL-CCNC: 30 U/L (ref 39–308)
CO2 SERPL-SCNC: 27 MMOL/L (ref 21–32)
CREAT SERPL-MCNC: 7.06 MG/DL (ref 0.6–1.3)
EOSINOPHIL # BLD MANUAL: 0.34 THOUSAND/UL (ref 0–0.4)
EOSINOPHIL NFR BLD MANUAL: 3 % (ref 0–6)
ERYTHROCYTE [DISTWIDTH] IN BLOOD BY AUTOMATED COUNT: 15.5 % (ref 11.6–15.1)
FLUAV RNA RESP QL NAA+PROBE: NEGATIVE
FLUBV RNA RESP QL NAA+PROBE: NEGATIVE
GFR SERPL CREATININE-BSD FRML MDRD: 6 ML/MIN/1.73SQ M
GIANT PLATELETS BLD QL SMEAR: PRESENT
GLUCOSE SERPL-MCNC: 189 MG/DL (ref 65–140)
GLUCOSE SERPL-MCNC: 226 MG/DL (ref 65–140)
GLUCOSE SERPL-MCNC: 430 MG/DL (ref 65–140)
GLUCOSE SERPL-MCNC: >500 MG/DL (ref 65–140)
GRAM STN SPEC: ABNORMAL
HCT VFR BLD AUTO: 25.4 % (ref 36.5–49.3)
HGB BLD-MCNC: 8 G/DL (ref 12–17)
LG PLATELETS BLD QL SMEAR: PRESENT
LYMPHOCYTES # BLD AUTO: 0.23 THOUSAND/UL (ref 0.6–4.47)
LYMPHOCYTES # BLD AUTO: 2 % (ref 14–44)
MAGNESIUM SERPL-MCNC: 2.3 MG/DL (ref 1.6–2.6)
MCH RBC QN AUTO: 28 PG (ref 26.8–34.3)
MCHC RBC AUTO-ENTMCNC: 31.5 G/DL (ref 31.4–37.4)
MCV RBC AUTO: 89 FL (ref 82–98)
MONOCYTES # BLD AUTO: 0.46 THOUSAND/UL (ref 0–1.22)
MONOCYTES NFR BLD: 4 % (ref 4–12)
NEUTROPHILS # BLD MANUAL: 10.24 THOUSAND/UL (ref 1.85–7.62)
NEUTS BAND NFR BLD MANUAL: 3 % (ref 0–8)
NEUTS SEG NFR BLD AUTO: 87 % (ref 43–75)
NT-PROBNP SERPL-MCNC: ABNORMAL PG/ML
P AXIS: 51 DEGREES
PLATELET # BLD AUTO: 362 THOUSANDS/UL (ref 149–390)
PLATELET BLD QL SMEAR: ADEQUATE
PMV BLD AUTO: 8.9 FL (ref 8.9–12.7)
POLYCHROMASIA BLD QL SMEAR: PRESENT
POTASSIUM SERPL-SCNC: 5.2 MMOL/L (ref 3.5–5.3)
PR INTERVAL: 192 MS
PROT SERPL-MCNC: 8 G/DL (ref 6.4–8.4)
QRS AXIS: 25 DEGREES
QRSD INTERVAL: 84 MS
QT INTERVAL: 356 MS
QTC INTERVAL: 430 MS
RBC # BLD AUTO: 2.86 MILLION/UL (ref 3.88–5.62)
RBC MORPH BLD: PRESENT
RSV RNA RESP QL NAA+PROBE: NEGATIVE
SARS-COV-2 RNA RESP QL NAA+PROBE: POSITIVE
SODIUM SERPL-SCNC: 134 MMOL/L (ref 135–147)
T WAVE AXIS: 34 DEGREES
VENTRICULAR RATE: 88 BPM
WBC # BLD AUTO: 11.38 THOUSAND/UL (ref 4.31–10.16)

## 2022-11-28 PROCEDURE — XW033E5 INTRODUCTION OF REMDESIVIR ANTI-INFECTIVE INTO PERIPHERAL VEIN, PERCUTANEOUS APPROACH, NEW TECHNOLOGY GROUP 5: ICD-10-PCS | Performed by: FAMILY MEDICINE

## 2022-11-28 PROCEDURE — 5A1D70Z PERFORMANCE OF URINARY FILTRATION, INTERMITTENT, LESS THAN 6 HOURS PER DAY: ICD-10-PCS | Performed by: INTERNAL MEDICINE

## 2022-11-28 PROCEDURE — 0W993ZX DRAINAGE OF RIGHT PLEURAL CAVITY, PERCUTANEOUS APPROACH, DIAGNOSTIC: ICD-10-PCS | Performed by: RADIOLOGY

## 2022-11-28 RX ORDER — LOSARTAN POTASSIUM 50 MG/1
50 TABLET ORAL 2 TIMES DAILY
Status: DISCONTINUED | OUTPATIENT
Start: 2022-11-28 | End: 2022-11-30 | Stop reason: HOSPADM

## 2022-11-28 RX ORDER — PRAVASTATIN SODIUM 40 MG
40 TABLET ORAL
Status: DISCONTINUED | OUTPATIENT
Start: 2022-11-28 | End: 2022-11-30 | Stop reason: HOSPADM

## 2022-11-28 RX ORDER — CALCIUM CARBONATE 200(500)MG
1000 TABLET,CHEWABLE ORAL DAILY PRN
Status: DISCONTINUED | OUTPATIENT
Start: 2022-11-28 | End: 2022-11-30 | Stop reason: HOSPADM

## 2022-11-28 RX ORDER — INSULIN ASPART 100 [IU]/ML
15 INJECTION, SUSPENSION SUBCUTANEOUS
Status: DISCONTINUED | OUTPATIENT
Start: 2022-11-28 | End: 2022-11-28

## 2022-11-28 RX ORDER — ECHINACEA PURPUREA EXTRACT 125 MG
1 TABLET ORAL DAILY PRN
Status: DISCONTINUED | OUTPATIENT
Start: 2022-11-28 | End: 2022-11-30 | Stop reason: HOSPADM

## 2022-11-28 RX ORDER — TAMSULOSIN HYDROCHLORIDE 0.4 MG/1
0.8 CAPSULE ORAL
Status: DISCONTINUED | OUTPATIENT
Start: 2022-11-28 | End: 2022-11-30 | Stop reason: HOSPADM

## 2022-11-28 RX ORDER — AMLODIPINE BESYLATE 10 MG/1
10 TABLET ORAL DAILY
Status: DISCONTINUED | OUTPATIENT
Start: 2022-11-28 | End: 2022-11-30 | Stop reason: HOSPADM

## 2022-11-28 RX ORDER — GUAIFENESIN/DEXTROMETHORPHAN 100-10MG/5
10 SYRUP ORAL EVERY 4 HOURS PRN
Status: DISCONTINUED | OUTPATIENT
Start: 2022-11-28 | End: 2022-11-30 | Stop reason: HOSPADM

## 2022-11-28 RX ORDER — INSULIN GLARGINE 100 [IU]/ML
15 INJECTION, SOLUTION SUBCUTANEOUS
Status: DISCONTINUED | OUTPATIENT
Start: 2022-11-28 | End: 2022-11-29

## 2022-11-28 RX ORDER — FINASTERIDE 5 MG/1
5 TABLET, FILM COATED ORAL DAILY
Status: DISCONTINUED | OUTPATIENT
Start: 2022-11-28 | End: 2022-11-30 | Stop reason: HOSPADM

## 2022-11-28 RX ORDER — LANOLIN ALCOHOL/MO/W.PET/CERES
1 CREAM (GRAM) TOPICAL
Status: DISCONTINUED | OUTPATIENT
Start: 2022-11-29 | End: 2022-11-30 | Stop reason: HOSPADM

## 2022-11-28 RX ORDER — HEPARIN SODIUM 5000 [USP'U]/ML
5000 INJECTION, SOLUTION INTRAVENOUS; SUBCUTANEOUS EVERY 8 HOURS SCHEDULED
Status: DISCONTINUED | OUTPATIENT
Start: 2022-11-28 | End: 2022-11-30 | Stop reason: HOSPADM

## 2022-11-28 RX ORDER — POLYETHYLENE GLYCOL 3350 17 G/17G
17 POWDER, FOR SOLUTION ORAL DAILY
Status: DISCONTINUED | OUTPATIENT
Start: 2022-11-28 | End: 2022-11-30 | Stop reason: HOSPADM

## 2022-11-28 RX ORDER — TOBRAMYCIN 3 MG/ML
2 SOLUTION/ DROPS OPHTHALMIC ONCE
Status: COMPLETED | OUTPATIENT
Start: 2022-11-28 | End: 2022-11-28

## 2022-11-28 RX ORDER — ONDANSETRON 2 MG/ML
4 INJECTION INTRAMUSCULAR; INTRAVENOUS EVERY 6 HOURS PRN
Status: DISCONTINUED | OUTPATIENT
Start: 2022-11-28 | End: 2022-11-30 | Stop reason: HOSPADM

## 2022-11-28 RX ORDER — DEXAMETHASONE SODIUM PHOSPHATE 4 MG/ML
10 INJECTION, SOLUTION INTRA-ARTICULAR; INTRALESIONAL; INTRAMUSCULAR; INTRAVENOUS; SOFT TISSUE ONCE
Status: COMPLETED | OUTPATIENT
Start: 2022-11-28 | End: 2022-11-28

## 2022-11-28 RX ORDER — INSULIN LISPRO 100 [IU]/ML
1-5 INJECTION, SOLUTION INTRAVENOUS; SUBCUTANEOUS EVERY 6 HOURS SCHEDULED
Status: DISCONTINUED | OUTPATIENT
Start: 2022-11-29 | End: 2022-11-29

## 2022-11-28 RX ORDER — INSULIN LISPRO 100 [IU]/ML
5 INJECTION, SOLUTION INTRAVENOUS; SUBCUTANEOUS
Status: DISCONTINUED | OUTPATIENT
Start: 2022-11-29 | End: 2022-11-29

## 2022-11-28 RX ORDER — CLONIDINE HYDROCHLORIDE 0.1 MG/1
0.1 TABLET ORAL EVERY 12 HOURS SCHEDULED
Status: DISCONTINUED | OUTPATIENT
Start: 2022-11-28 | End: 2022-11-30 | Stop reason: HOSPADM

## 2022-11-28 RX ORDER — INSULIN ASPART 100 [IU]/ML
10 INJECTION, SUSPENSION SUBCUTANEOUS
Status: DISCONTINUED | OUTPATIENT
Start: 2022-11-28 | End: 2022-11-28

## 2022-11-28 RX ORDER — ACETAMINOPHEN 325 MG/1
650 TABLET ORAL EVERY 6 HOURS PRN
Status: DISCONTINUED | OUTPATIENT
Start: 2022-11-28 | End: 2022-11-30 | Stop reason: HOSPADM

## 2022-11-28 RX ADMIN — INSULIN GLARGINE 15 UNITS: 100 INJECTION, SOLUTION SUBCUTANEOUS at 21:51

## 2022-11-28 RX ADMIN — FINASTERIDE 5 MG: 5 TABLET, FILM COATED ORAL at 15:04

## 2022-11-28 RX ADMIN — CLONIDINE HYDROCHLORIDE 0.1 MG: 0.1 TABLET ORAL at 20:59

## 2022-11-28 RX ADMIN — DEXAMETHASONE SODIUM PHOSPHATE 10 MG: 4 INJECTION, SOLUTION INTRAMUSCULAR; INTRAVENOUS at 10:08

## 2022-11-28 RX ADMIN — LOSARTAN POTASSIUM 50 MG: 50 TABLET, FILM COATED ORAL at 20:59

## 2022-11-28 RX ADMIN — TOBRAMYCIN 2 DROP: 3 SOLUTION/ DROPS OPHTHALMIC at 11:21

## 2022-11-28 RX ADMIN — HEPARIN SODIUM 5000 UNITS: 5000 INJECTION INTRAVENOUS; SUBCUTANEOUS at 15:06

## 2022-11-28 RX ADMIN — PRAVASTATIN SODIUM 40 MG: 40 TABLET ORAL at 16:30

## 2022-11-28 RX ADMIN — AMLODIPINE BESYLATE 10 MG: 10 TABLET ORAL at 15:05

## 2022-11-28 RX ADMIN — IOHEXOL 85 ML: 350 INJECTION, SOLUTION INTRAVENOUS at 10:44

## 2022-11-28 RX ADMIN — TAMSULOSIN HYDROCHLORIDE 0.8 MG: 0.4 CAPSULE ORAL at 15:52

## 2022-11-28 RX ADMIN — CLONIDINE HYDROCHLORIDE 0.1 MG: 0.1 TABLET ORAL at 15:06

## 2022-11-28 RX ADMIN — INSULIN ASPART 10 UNITS: 100 INJECTION, SUSPENSION SUBCUTANEOUS at 15:51

## 2022-11-28 RX ADMIN — HEPARIN SODIUM 5000 UNITS: 5000 INJECTION INTRAVENOUS; SUBCUTANEOUS at 21:26

## 2022-11-28 RX ADMIN — LOSARTAN POTASSIUM 50 MG: 50 TABLET, FILM COATED ORAL at 15:06

## 2022-11-28 RX ADMIN — POLYETHYLENE GLYCOL 3350 17 G: 17 POWDER, FOR SOLUTION ORAL at 16:30

## 2022-11-28 RX ADMIN — REMDESIVIR 200 MG: 100 INJECTION, POWDER, LYOPHILIZED, FOR SOLUTION INTRAVENOUS at 14:37

## 2022-11-28 NOTE — ASSESSMENT & PLAN NOTE
Acute; not requiring oxygen secondary to pleural effusion vs  COVID pneumonia as evident by chest x-ray findings and being positive for COVID on admission  Slight improvement      WBC 11 38>8 59  Continue Robitussin DM  Incentive spirometry  COVID treatment as per mild pathway as noted below  Pleural effusion management discussed below  PT/OT: STR

## 2022-11-28 NOTE — ASSESSMENT & PLAN NOTE
As evidence by cough, shortness of breath, abdominal pain, nausea and vomiting with a SARS-CoV-2 positive on 11/28 (negative on 11/23)    WBC 11 38; troponin 15>15, BNP:  20,732, CK: 30  Vitals and exam overall unremarkable    Remdesivir x3 days in the setting mild COVID with ESRD  CBC: WBC:8 59, Hgb:7 2  CMP: abnormal in setting of ESDR  pro Jasson 1 83  BNP: 19,925  CK 22  Incentive spirometry, vital signs, monitor I/Os, PT/OT as tolerated

## 2022-11-28 NOTE — H&P
H&P Exam - Henry Orozco 76 y o  male MRN: [id number]    Unit/Bed#: ED CT1 Encounter: [id number]      Assessment/Plan     * SOB (shortness of breath)  Assessment & Plan  Acute; not requiring oxygen secondary to pleural effusion vs  COVID pneumonia as evident by chest x-ray findings and being positive for COVID on admission   -was treated with azithromycin x3 days and ceftriaxone x3 days during previous admission    WBC 11 38  Continue Robitussin DM  Incentive spirometry  COVID treatment as per mild pathway as noted below  Pleural effusion management discussed below    Pleural effusion  Assessment & Plan  Acute; unresolved with hemodialysis since last admission  Chest x-ray:  Bilateral pleural effusions with adjacent atelectasis; unchanged since 11/23/2022  BNP 20,732 (previous BNPs:  11/23: 32,027; 3/28:  83,381; 3/7:  49,450)  IR consulted for thoracentesis  Nephrology consulted for HD      ESRD on dialysis [facility])  Assessment & Plan  Lab Results   Component Value Date    EGFR 6 11/28/2022    EGFR 14 11/24/2022    EGFR 8 11/23/2022    CREATININE 7 06 (H) 11/28/2022    CREATININE 3 69 (H) 11/24/2022    CREATININE 5 55 (H) 11/23/2022     Oligo-anuric since March 2022 which has caused him to be HD dependent  HD schedule: Tu/Th/Sat    COVID-19  Assessment & Plan  As evidence by cough, shortness of breath, abdominal pain, nausea and vomiting with a SARS-CoV-2 positive on 11/28 (negative on 11/23)    WBC 11 38; troponin 15>15, BNP:  20,732, CK: 30  Vitals and exam overall unremarkable    Remdesivir x3 days in the setting mild COVID with ESRD  Recheck: CBC, BMP, pro Jasson, BNP, CK in the morning  Incentive spirometry, vital signs, monitor I/Os, PT/OT as tolerated    ANCA-associated vasculitis  Assessment & Plan  Chronic, stable  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  -previously treated with Cytoxan and subsequently prescribed prednisone taper  -followed outpatient by Nephrology    Anemia due to chronic kidney disease, on chronic dialysis [facility])  Assessment & Plan  Admission hemoglobin:  8 0 which is stable in the presence of ESRD      Type 2 diabetes mellitus with chronic kidney disease on chronic dialysis, with long-term current use of insulin [facility])  Assessment & Plan  Lab Results   Component Value Date    HGBA1C 5 7 (H) 10/19/2022       Recent Labs     11/25/22  1618 11/25/22  2052 11/26/22  0729 11/26/22  1114   POCGLU 196* 82 120 129     ISS  Continue HD on Tu/Th/Sat  Morning BMP      Hyperlipidemia  Assessment & Plan  Chronic, stable  pravastatin 40 mg HS since simvastatin is hospital non formulary    Benign essential hypertension  Assessment & Plan  Chronic, stable  Continue:  Amlodipine 10 mg daily, clonidine 0 1 mg q 12, losartan 50 mg q 12        History of Present Illness     HPI:  Cameron Fletcher is a 76 y o  male who presents with cough, shortness of breath, blurred vision, and vomiting  He notes that the shortness of breath he currently and is similar to that from his last admission, which had not improved  He notes that the blurry vision has been going on for quite some time  Coughing and vomiting began last night into this morning  He is a dialysis patient on the following schedule: Tu/Th/Sat  He received hemodialysis on Saturday  Additionally he was incidentally positive for COVID-19 this admission  PCP: Karishma Mcmullen MD    Review of Systems   Constitutional: Positive for chills, fatigue and fever  Negative for diaphoresis and unexpected weight change  HENT: Negative  Eyes: Positive for visual disturbance (History of cataracts, blurry vision for several weeks)  Negative for photophobia, pain, discharge, redness and itching  Respiratory: Positive for cough and shortness of breath  Negative for apnea, choking, chest tightness, wheezing and stridor  Cardiovascular: Negative  Gastrointestinal: Positive for abdominal pain, nausea and vomiting  Negative for abdominal distention, anal bleeding, blood in stool, constipation, diarrhea and rectal pain  Endocrine: Negative  Genitourinary: Negative  Musculoskeletal: Negative  Skin: Negative  Allergic/Immunologic: Negative  Neurological: Negative  Hematological: Negative  Psychiatric/Behavioral: Negative  Historical Information   Past Medical History:   Diagnosis Date   • Anesthesia complication 6752    after colonoscopy , pt was awake but could not control his body and kept falling    • Arthritis    • Bladder calculi    • BPH (benign prostatic hyperplasia)    • Diabetes mellitus ([facility] )    • ESRD (end stage renal disease) on dialysis ([facility] )    • Hearing disorder of both ears     wears bilateral hearing aids   • Hyperlipidemia     controlled and maintained d/t diabetes   • Hypertension    • Kidney stones     Last Assessed:4/3/2017   • Lyme disease     Last Assessed:6/29/2015   • Rupture, bladder, spontaneous     Last Assessed:4/3/2017     Past Surgical History:   Procedure Laterality Date   • BLADDER REPAIR N/A 12/10/2016    Procedure: REPAIR BLADDER/cysto insertion stent;  Surgeon: Hair Rebolledo MD;  Location: [address] [place] [address];  Service:    • COLONOSCOPY     • CYSTOLITHOTOMY      ANESTHESIA   lower abdomen  ;  Last Assessed:4/3/2017   • IR ASPIRATION ONLY  5/8/2022   • IR BIOPSY KIDNEY RANDOM  3/10/2022   • IR TUNNELED DIALYSIS CATHETER PLACEMENT  3/8/2022   • OTHER SURGICAL HISTORY      thermal dilation of the prostate x 2 ( in the office)   • TONSILLECTOMY     • TRANSURETHRAL RESECTION OF PROSTATE N/A 12/8/2016    Procedure:  Cysto, Laser Bladder stone,fulgaration of prostates tissue ;  Surgeon: Hair Rebolledo MD;  Location: [facility];  Service:      Social History   Social History     Substance and Sexual Activity   Alcohol Use Never     Social History     Substance and Sexual Activity   Drug Use No     Social History     Tobacco Use   Smoking Status Former   • Types: Cigars   • Quit date:    • Years since quittin 9   Smokeless Tobacco Never     E-Cigarette/Vaping   • E-Cigarette Use Never User       E-Cigarette/Vaping Substances   • Nicotine No    • THC No    • CBD No    • Flavoring No    • Other No    • Unknown No      Family History:   Family History   Problem Relation Age of Onset   • Cancer Mother         breast   • Diabetes Mother    • Cancer Father         prostate w/ bone mets   • Prostate cancer Father    • Alzheimer's disease Sister        Meds/Allergies   PTA meds:   Prior to Admission Medications   Prescriptions Last Dose Informant Patient Reported? Taking? Insulin Pen Needle (B-D UF III MINI PEN NEEDLES) 31G X 5 MM MISC 2022  No Yes   Sig: Use twice daily with insulin pen as directed   amLODIPine (NORVASC) 10 mg tablet 2022  No Yes   Sig: Take 1 tablet (10 mg total) by mouth daily   calcium acetate (PHOSLO) capsule 2022  No Yes   Sig: Take 1 capsule (667 mg total) by mouth 3 (three) times a day with meals Most days only takes BID   calcium carbonate-vitamin D (OSCAL-D) 500 mg-200 units per tablet 2022  No Yes   Sig: Take 1 tablet by mouth 2 (two) times a day with meals   cefdinir (OMNICEF) 300 mg capsule 2022  No Yes   Sig: Take 1 capsule (300 mg total) by mouth every 12 (twelve) hours for 4 days Do not start before 2022     cloNIDine (CATAPRES) 0 1 mg tablet 2022  No Yes   Sig: Take 1 tablet (0 1 mg total) by mouth every 12 (twelve) hours   dextromethorphan-guaiFENesin (ROBITUSSIN DM)  mg/5 mL syrup 2022  No Yes   Sig: Take 10 mL by mouth every 4 (four) hours as needed for cough for up to 10 days   dutasteride (AVODART) 0 5 mg capsule 2022  No Yes   Sig: Take 1 capsule (0 5 mg total) by mouth daily   glucose blood test strip 2022  No Yes   Sig: Use 1 each 3 (three) times a day   insulin aspart protamine-insulin aspart (NovoLOG 70/30 FlexPen ReliOn) 100 Units/mL injection pen 2022  No Yes   Sig: Inject 15 Units under the skin 2 (two) times a day before meals   losartan (COZAAR) 50 mg tablet 2022  No Yes   Sig: Take 1 tablet (50 mg total) by mouth 2 (two) times a day   simvastatin (ZOCOR) 40 mg tablet 2022  No Yes   Sig: Take 1 tablet (40 mg total) by mouth daily at bedtime   sodium chloride (OCEAN) 0 65 % nasal spray 2022  No Yes   Si spray into each nostril as needed for congestion   tamsulosin (FLOMAX) 0 4 mg 2022  No Yes   Sig: Take 2 capsules (0 8 mg total) by mouth daily with dinner      Facility-Administered Medications: None     Allergies   Allergen Reactions   • Amoxicillin Rash       Objective   Vitals: Blood pressure 157/71, pulse 92, temperature 98 2 °F (36 8 °C), temperature source Oral, resp  rate 22, height 5' 8" (1 727 m), weight 72 7 kg (160 lb 4 4 oz), SpO2 95 %  No intake or output data in the 24 hours ending 22 1537    Invasive Devices     Peripheral Intravenous Line  Duration           Peripheral IV 22 Distal;Right;Upper;Ventral (anterior) Arm <1 day          Hemodialysis Catheter  Duration           HD Permanent Double Catheter 265 days                Physical Exam  Constitutional:       Appearance: Normal appearance  He is normal weight  HENT:      Head: Normocephalic and atraumatic  Right Ear: External ear normal       Left Ear: External ear normal       Nose: Nose normal       Mouth/Throat:      Mouth: Mucous membranes are moist       Pharynx: Oropharynx is clear  Eyes:      Extraocular Movements: Extraocular movements intact  Conjunctiva/sclera: Conjunctivae normal       Comments: Cataracts   Cardiovascular:      Rate and Rhythm: Normal rate and regular rhythm  Pulses: Normal pulses  Heart sounds: Normal heart sounds  Pulmonary:      Effort: Pulmonary effort is normal       Breath sounds: Examination of the right-lower field reveals decreased breath sounds  Decreased breath sounds present        Comments: Decreased breath sounds lower right lobe in sitting position  Abdominal:      General: Abdomen is flat  Bowel sounds are normal       Palpations: Abdomen is soft  Tenderness: There is abdominal tenderness (on palpation of the right upper quadrant and left lower quadrant)  Musculoskeletal:         General: Normal range of motion  Cervical back: Normal range of motion  Right lower leg: No edema  Left lower leg: No edema  Skin:     General: Skin is warm  Capillary Refill: Capillary refill takes less than 2 seconds  Neurological:      General: No focal deficit present  Mental Status: He is alert and oriented to person, place, and time  Mental status is at baseline  Psychiatric:         Mood and Affect: Mood normal          Behavior: Behavior normal          Thought Content: Thought content normal          Judgment: Judgment normal          Lab Results: I have personally reviewed pertinent reports  Imaging: I have personally reviewed pertinent reports  EKG, Pathology, and Other Studies: I have personally reviewed pertinent reports  Code Status: Level 1 - Full Code  Advance Directive and Living Will:      Power of :    POLST:      Counseling / Coordination of Care  Total floor / unit time spent today 30 minutes  Greater than 50% of total time was spent with the patient and / or family counseling and / or coordination of care

## 2022-11-28 NOTE — ED PROVIDER NOTES
History  Chief Complaint   Patient presents with   • Cough   • Shortness of Breath   • Blurred Vision   • Vomiting     PT reports having SOB that has never gone away since last admission  Pt reports coughing last night and vomiting last night as well  Pt reports blurred vision that started during last admission (last week)  Pt is dialysis pt and had full dialysis on Sautrday  Pt is Tu/Th/Sat dialysis  24-year-old male with past history ESRD Tue/Thur/Sat dialysis, CHF, chronic anemia, BPH, Lyme disease, kidney stones, presents to the ED for evaluation of worsening generalized weakness and shortness of breath as well as dyspnea on exertion over the past few days  Patient was recently admitted for CHF and fluid overload  Patient states that he is compliant with all his medications at home however he continues to have worsening shortness of breath  Patient came to the ED for further evaluation  His neighbor dropped him off in the emergency department  Patient otherwise denies any fevers or chills  History provided by:  Patient  Cough  Associated symptoms: shortness of breath    Associated symptoms: no chest pain, no fever, no headaches, no sore throat and no wheezing    Shortness of Breath  Associated symptoms: cough and vomiting    Associated symptoms: no abdominal pain, no chest pain, no fever, no headaches, no sore throat and no wheezing    Vomiting  Associated symptoms: cough    Associated symptoms: no abdominal pain, no arthralgias, no diarrhea, no fever, no headaches and no sore throat        Prior to Admission Medications   Prescriptions Last Dose Informant Patient Reported? Taking?    Insulin Pen Needle (B-D UF III MINI PEN NEEDLES) 31G X 5 MM MISC   No No   Sig: Use twice daily with insulin pen as directed   amLODIPine (NORVASC) 10 mg tablet   No No   Sig: Take 1 tablet (10 mg total) by mouth daily   calcium acetate (PHOSLO) capsule   No No   Sig: Take 1 capsule (667 mg total) by mouth 3 (three) times a day with meals Most days only takes BID   calcium carbonate-vitamin D (OSCAL-D) 500 mg-200 units per tablet   No No   Sig: Take 1 tablet by mouth 2 (two) times a day with meals   cefdinir (OMNICEF) 300 mg capsule   No No   Sig: Take 1 capsule (300 mg total) by mouth every 12 (twelve) hours for 4 days Do not start before 2022     cloNIDine (CATAPRES) 0 1 mg tablet   No No   Sig: Take 1 tablet (0 1 mg total) by mouth every 12 (twelve) hours   dextromethorphan-guaiFENesin (ROBITUSSIN DM)  mg/5 mL syrup   No No   Sig: Take 10 mL by mouth every 4 (four) hours as needed for cough for up to 10 days   dutasteride (AVODART) 0 5 mg capsule   No No   Sig: Take 1 capsule (0 5 mg total) by mouth daily   glucose blood test strip   No No   Sig: Use 1 each 3 (three) times a day   insulin aspart protamine-insulin aspart (NovoLOG 70/30 FlexPen ReliOn) 100 Units/mL injection pen   No No   Sig: Inject 15 Units under the skin 2 (two) times a day before meals   losartan (COZAAR) 50 mg tablet   No No   Sig: Take 1 tablet (50 mg total) by mouth 2 (two) times a day   simvastatin (ZOCOR) 40 mg tablet   No No   Sig: Take 1 tablet (40 mg total) by mouth daily at bedtime   sodium chloride (OCEAN) 0 65 % nasal spray   No No   Si spray into each nostril as needed for congestion   tamsulosin (FLOMAX) 0 4 mg   No No   Sig: Take 2 capsules (0 8 mg total) by mouth daily with dinner      Facility-Administered Medications: None       Past Medical History:   Diagnosis Date   • Anesthesia complication     after colonoscopy , pt was awake but could not control his body and kept falling    • Arthritis    • Bladder calculi    • BPH (benign prostatic hyperplasia)    • Diabetes mellitus ([address] )    • ESRD (end stage renal disease) on dialysis (HCC)    • Hearing disorder of both ears     wears bilateral hearing aids   • Hyperlipidemia     controlled and maintained d/t diabetes   • Hypertension    • Kidney stones     Last Assessed:4/3/2017   • Lyme disease     Last Assessed:2015   • Rupture, bladder, spontaneous     Last Assessed:4/3/2017       Past Surgical History:   Procedure Laterality Date   • BLADDER REPAIR N/A 12/10/2016    Procedure: REPAIR BLADDER/cysto insertion stent;  Surgeon: Maria Elena Tripp MD;  Location: [address] [place] [address];  Service:    • COLONOSCOPY     • CYSTOLITHOTOMY      ANESTHESIA   lower abdomen  ; Last Assessed:4/3/2017   • IR ASPIRATION ONLY  2022   • IR BIOPSY KIDNEY RANDOM  3/10/2022   • IR TUNNELED DIALYSIS CATHETER PLACEMENT  3/8/2022   • OTHER SURGICAL HISTORY      thermal dilation of the prostate x 2 ( in the office)   • TONSILLECTOMY     • TRANSURETHRAL RESECTION OF PROSTATE N/A 2016    Procedure:  Cysto, Laser Bladder stone,fulgaration of prostates tissue ;  Surgeon: Maria Elena Tripp MD;  [place];  Service:        Family History   Problem Relation Age of Onset   • Cancer Mother         breast   • Diabetes Mother    • Cancer Father         prostate w/ bone mets   • Prostate cancer Father    • Alzheimer's disease Sister      I have reviewed and agree with the history as documented  E-Cigarette/Vaping   • E-Cigarette Use Never User      E-Cigarette/Vaping Substances   • Nicotine No    • THC No    • CBD No    • Flavoring No    • Other No    • Unknown No      Social History     Tobacco Use   • Smoking status: Former     Types: Cigars     Quit date:      Years since quittin 9   • Smokeless tobacco: Never   Vaping Use   • Vaping Use: Never used   Substance Use Topics   • Alcohol use: Never   • Drug use: No       Review of Systems   Constitutional: Negative for activity change, fatigue and fever  HENT: Negative for congestion, ear discharge and sore throat  Eyes: Negative for pain and redness  Respiratory: Positive for cough and shortness of breath  Negative for chest tightness and wheezing  Cardiovascular: Negative for chest pain     Gastrointestinal: Positive for vomiting  Negative for abdominal pain, diarrhea and nausea  Endocrine: Negative for cold intolerance  Genitourinary: Negative for dysuria and urgency  Musculoskeletal: Negative for arthralgias and back pain  Neurological: Negative for dizziness, weakness and headaches  Psychiatric/Behavioral: Negative for agitation and behavioral problems  Physical Exam  Physical Exam  Vitals and nursing note reviewed  Constitutional:       General: He is not in acute distress  Appearance: He is well-developed  HENT:      Head: Normocephalic and atraumatic  Eyes:      Conjunctiva/sclera: Conjunctivae normal    Cardiovascular:      Rate and Rhythm: Normal rate and regular rhythm  Heart sounds: No murmur heard  Pulmonary:      Effort: Pulmonary effort is normal  No respiratory distress  Breath sounds: Normal breath sounds  Comments: Fine rales noted to bibasilar lungs  Abdominal:      Palpations: Abdomen is soft  Tenderness: There is no abdominal tenderness  Musculoskeletal:         General: No swelling  Cervical back: Neck supple  Skin:     General: Skin is warm and dry  Capillary Refill: Capillary refill takes less than 2 seconds  Neurological:      Mental Status: He is alert     Psychiatric:         Mood and Affect: Mood normal          Vital Signs  ED Triage Vitals [11/28/22 0912]   Temperature Pulse Respirations Blood Pressure SpO2   98 2 °F (36 8 °C) 91 20 (!) 172/74 96 %      Temp Source Heart Rate Source Patient Position - Orthostatic VS BP Location FiO2 (%)   Tympanic Monitor Lying Right arm --      Pain Score       No Pain           Vitals:    11/28/22 0912 11/28/22 1010   BP: (!) 172/74 (!) 171/70   Pulse: 91 85   Patient Position - Orthostatic VS: Lying          Visual Acuity      ED Medications  Medications   dexamethasone (DECADRON) injection 10 mg (10 mg Intravenous Given 11/28/22 1008)   tobramycin (TOBREX) 0 3 % ophthalmic solution 2 drop (2 drops Both Eyes Given 11/28/22 1121)   iohexol (OMNIPAQUE) 350 MG/ML injection (SINGLE-DOSE) 85 mL (85 mL Intravenous Given 11/28/22 1044)       Diagnostic Studies  Results Reviewed     Procedure Component Value Units Date/Time    HS Troponin I 4hr [[id number]]     Lab Status: No result Specimen: Blood     FLU/RSV/COVID - if FLU/RSV clinically relevant [[id number]]  (Abnormal) Collected: 11/28/22 1006    Lab Status: Final result Specimen: Nares from Nose Updated: 11/28/22 1056     SARS-CoV-2 Positive     INFLUENZA A PCR Negative     INFLUENZA B PCR Negative     RSV PCR Negative    Narrative:      FOR PEDIATRIC PATIENTS - copy/paste COVID Guidelines URL to browser: [email or web address]  [employer]    SARS-CoV-2 assay is a Nucleic Acid Amplification assay intended for the  qualitative detection of nucleic acid from SARS-CoV-2 in nasopharyngeal  swabs  Results are for the presumptive identification of SARS-CoV-2 RNA  Positive results are indicative of infection with SARS-CoV-2, the virus  causing COVID-19, but do not rule out bacterial infection or co-infection  with other viruses  Laboratories within the [place] and its  territories are required to report all positive results to the appropriate  public health authorities  Negative results do not preclude SARS-CoV-2  infection and should not be used as the sole basis for treatment or other  patient management decisions  Negative results must be combined with  clinical observations, patient history, and epidemiological information  This test has not been FDA cleared or approved  This test has been authorized by FDA under an Emergency Use Authorization  (EUA)   This test is only authorized for the duration of time the  declaration that circumstances exist justifying the authorization of the  emergency use of an in vitro diagnostic tests for detection of SARS-CoV-2  virus and/or diagnosis of COVID-19 infection under section 564(b)(1) of  the Act, 21 U  S C  998VBF-4(A)(8), unless the authorization is terminated  or revoked sooner  The test has been validated but independent review by FDA  and CLIA is pending  Test performed using [employer] Gene[employer]pert: This RT-PCR assay targets N2,  a region unique to SARS-CoV-2  A conserved region in the E-gene was chosen  for pan-Sarbecovirus detection which includes SARS-CoV-2  According to CMS-2020-01-R, this platform meets the definition of high-throughput technology  CBC and differential [[id number]]  (Abnormal) Collected: 11/28/22 1006    Lab Status: Final result Specimen: Blood from Arm, Right Updated: 11/28/22 1050     WBC 11 38 Thousand/uL      RBC 2 86 Million/uL      Hemoglobin 8 0 g/dL      Hematocrit 25 4 %      MCV 89 fL      MCH 28 0 pg      MCHC 31 5 g/dL      RDW 15 5 %      MPV 8 9 fL      Platelets 828 Thousands/uL     Narrative: This is an appended report  These results have been appended to a previously verified report      Manual Differential(PHLEBS Do Not Order) [[id number]]  (Abnormal) Collected: 11/28/22 1006    Lab Status: Final result Specimen: Blood from Arm, Right Updated: 11/28/22 1050     Segmented % 87 %      Bands % 3 %      Lymphocytes % 2 %      Monocytes % 4 %      Eosinophils, % 3 %      Basophils % 1 %      Absolute Neutrophils 10 24 Thousand/uL      Lymphocytes Absolute 0 23 Thousand/uL      Monocytes Absolute 0 46 Thousand/uL      Eosinophils Absolute 0 34 Thousand/uL      Basophils Absolute 0 11 Thousand/uL      Total Counted --     RBC Morphology Present     Polychromasia Present     Platelet Estimate Adequate     Giant PLTs Present     Large Platelet Present    Comprehensive metabolic panel [[id number]]  (Abnormal) Collected: 11/28/22 1006    Lab Status: Final result Specimen: Blood from Arm, Right Updated: 11/28/22 1045     Sodium 134 mmol/L      Potassium 5 2 mmol/L      Chloride 95 mmol/L      CO2 27 mmol/L      ANION GAP 12 mmol/L      BUN 65 mg/dL Creatinine 7 06 mg/dL      Glucose 189 mg/dL      Calcium 8 3 mg/dL      Corrected Calcium 10 0 mg/dL      AST --     ALT 10 U/L      Alkaline Phosphatase 78 U/L      Total Protein 8 0 g/dL      Albumin 1 9 g/dL      Total Bilirubin 0 47 mg/dL      eGFR 6 ml/min/1 73sq m     Narrative:      [place] guidelines for Chronic Kidney Disease (CKD):   •  Stage 1 with normal or high GFR (GFR > 90 mL/min/1 73 square meters)  •  Stage 2 Mild CKD (GFR = 60-89 mL/min/1 73 square meters)  •  Stage 3A Moderate CKD (GFR = 45-59 mL/min/1 73 square meters)  •  Stage 3B Moderate CKD (GFR = 30-44 mL/min/1 73 square meters)  •  Stage 4 Severe CKD (GFR = 15-29 mL/min/1 73 square meters)  •  Stage 5 End Stage CKD (GFR <15 mL/min/1 73 square meters)  Note: GFR calculation is accurate only with a steady state creatinine    HS Troponin 0hr (reflex protocol) [[id number]]  (Normal) Collected: 11/28/22 1006    Lab Status: Final result Specimen: Blood from Arm, Right Updated: 11/28/22 1041     hs TnI 0hr 15 ng/L     NT-BNP PRO [[id number]]  (Abnormal) Collected: 11/28/22 1006    Lab Status: Final result Specimen: Blood from Arm, Right Updated: 11/28/22 1041     NT-proBNP 20,732 pg/mL     Magnesium [[id number]]  (Normal) Collected: 11/28/22 1006    Lab Status: Final result Specimen: Blood from Arm, Right Updated: 11/28/22 1041     Magnesium 2 3 mg/dL     HS Troponin I 2hr [[id number]]     Lab Status: No result Specimen: Blood     UA w Reflex to Microscopic w Reflex to Culture [[id number]]     Lab Status: No result Specimen: Urine                  XR chest 1 view portable   Final Result by Lisandra Colon MD (11/28 1136)      Bilateral pleural effusions with adjacent atelectasis    Infection is to be excluded on clinical grounds                  Workstation performed: [id number]         CTA head and neck with and without contrast   Final Result by Garo Jolly DO (11/28 1113)      CT brain: No acute intracranial abnormality  CT angiography: Minor atherosclerotic change of the right proximal cervical internal carotid artery  Otherwise unremarkable intracranial and cervical vasculature  Other findings: Large pleural effusions are seen layering posteriorly within the upper chest                   Workstation performed: [id number]                    Procedures  ECG 12 Lead Documentation Only    Date/Time: 11/28/2022 9:15 AM  Performed by: Kimmie Montano DO  Authorized by: Kimmie Montano DO     Indications / Diagnosis:  Shortness of breath  ECG reviewed by me, the ED Provider: yes    Patient location:  ED  Previous ECG:     Previous ECG:  Compared to current    Similarity:  No change    Comparison to cardiac monitor: Yes    Interpretation:     Interpretation: normal    Comments:      Sinus rhythm, rate 88, normal axis normal intervals, no acute ST/2 [place], white P waves noted suggesting possible left atrial enlargement, essentially unchanged EKG from baseline  ED Course                                             MDM  Number of Diagnoses or Management Options  Bilateral pleural effusion: new and requires workup  Chronic anemia: new and requires workup  COVID-19 virus infection: new and requires workup  ESRD on hemodialysis [facility]): new and requires workup  Shortness of breath: new and requires workup  Diagnosis management comments: Some blood work, EKG, chest x-ray, viral swabs  Continue to monitor patient for any worsening symptoms       Amount and/or Complexity of Data Reviewed  Clinical lab tests: ordered and reviewed  Tests in the radiology section of CPT®: ordered and reviewed  Tests in the medicine section of CPT®: reviewed and ordered  Review and summarize past medical records: yes  Independent visualization of images, tracings, or specimens: yes    Risk of Complications, Morbidity, and/or Mortality  General comments: Patient was positive for COVID-19 infection    Chest x-ray shows bilateral persistent pleural effusion that is worse on the right side compared to left  At this time patient will be admitted for further evaluation and management  Patient agrees with admission plans  Patient Progress  Patient progress: stable      Disposition  Final diagnoses:   [id number] virus infection   Shortness of breath   Bilateral pleural effusion   ESRD on hemodialysis ([facility] )   Chronic anemia     Time reflects when diagnosis was documented in both MDM as applicable and the Disposition within this note     Time User Action Codes Description Comment    11/28/2022 12:22 PM Valders Form Add [U07 1] COVID-19 virus infection     11/28/2022 12:22 PM FerRaquel harper Parrot Add [R06 02] Shortness of breath     11/28/2022 12:22 PM Valders Form Add [J90] Bilateral pleural effusion     11/28/2022 12:22 PM Valders Form Add [N18 6,  Z99 2] ESRD on hemodialysis ([facility] )     11/28/2022 12:22 PM Valders Form Add [D64 9] Chronic anemia       ED Disposition     ED Disposition   Admit    Condition   Stable    Date/Time   Mon Nov 28, 2022 12:23 PM    Comment   Case was discussed with Dr Lucas Beyer and the patient's admission status was agreed to be Admission Status: inpatient status to the service of Dr Lucas Beyer  Follow-up Information    None         Patient's Medications   Discharge Prescriptions    No medications on file       No discharge procedures on file      PDMP Review     None          ED Provider  Electronically Signed by           Susan Leal DO  11/28/22 1437

## 2022-11-28 NOTE — ASSESSMENT & PLAN NOTE
Admission hemoglobin:  8 0 which is stable in the presence of ESRD  Hgb 11/29: 7 2  Epo 4000 U with HD as per Nephro

## 2022-11-28 NOTE — ASSESSMENT & PLAN NOTE
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

## 2022-11-28 NOTE — ASSESSMENT & PLAN NOTE
Acute; unresolved with hemodialysis since last admission  Chest x-ray:  Bilateral pleural effusions with adjacent atelectasis; unchanged since 11/23/2022  BNP 20,732>19,925 (previous BNPs:  11/23: 32,027; 3/28:  80,748; 3/7:  15,208)  IR consulted for thoracentesis today @ 2pm  Nephrology consulted for HD today @ 8am

## 2022-11-28 NOTE — ASSESSMENT & PLAN NOTE
Lab Results   Component Value Date    EGFR 5 11/29/2022    EGFR 6 11/28/2022    EGFR 14 11/24/2022    CREATININE 8 26 (H) 11/29/2022    CREATININE 7 06 (H) 11/28/2022    CREATININE 3 69 (H) 11/24/2022     Oligo-anuric since March 2022 which has caused him to be HD dependent  HD schedule: Tu/Th/Sat  Epo: 4000U with HD

## 2022-11-28 NOTE — ED NOTES
Unable to obtain urine  specimen   Pt  States he does not urinate anymore     Augustus Benitez RN  11/28/22 7831

## 2022-11-28 NOTE — ED NOTES
IR made aware of order for thoracentesis  Per IR will be done tomorrow        Hue Bucio RN  11/28/22 5236

## 2022-11-28 NOTE — ASSESSMENT & PLAN NOTE
Lab Results   Component Value Date    HGBA1C 5 7 (H) 10/19/2022       Recent Labs     11/25/22  1618 11/25/22 2052 11/26/22  0729 11/26/22  1114   POCGLU 196* 82 120 129     ISS & Home aspartate 15 U  Continue HD on Tu/Th/Sat

## 2022-11-29 ENCOUNTER — APPOINTMENT (INPATIENT)
Dept: DIALYSIS | Facility: HOSPITAL | Age: 77
End: 2022-11-29

## 2022-11-29 LAB
ALBUMIN SERPL BCP-MCNC: 1.7 G/DL (ref 3.5–5)
ALP SERPL-CCNC: 70 U/L (ref 46–116)
ALT SERPL W P-5'-P-CCNC: 13 U/L (ref 12–78)
ANION GAP SERPL CALCULATED.3IONS-SCNC: 13 MMOL/L (ref 4–13)
AST SERPL W P-5'-P-CCNC: 11 U/L (ref 5–45)
BACTERIA BLD CULT: NORMAL
BACTERIA BLD CULT: NORMAL
BASOPHILS # BLD AUTO: 0.01 THOUSANDS/ÂΜL (ref 0–0.1)
BASOPHILS NFR BLD AUTO: 0 % (ref 0–1)
BILIRUB SERPL-MCNC: 0.41 MG/DL (ref 0.2–1)
BUN SERPL-MCNC: 84 MG/DL (ref 5–25)
CALCIUM ALBUM COR SERPL-MCNC: 9.7 MG/DL (ref 8.3–10.1)
CALCIUM SERPL-MCNC: 7.9 MG/DL (ref 8.3–10.1)
CHLORIDE SERPL-SCNC: 94 MMOL/L (ref 96–108)
CK SERPL-CCNC: 22 U/L (ref 39–308)
CO2 SERPL-SCNC: 25 MMOL/L (ref 21–32)
CREAT SERPL-MCNC: 8.26 MG/DL (ref 0.6–1.3)
CRP SERPL QL: 173.8 MG/L
EOSINOPHIL # BLD AUTO: 0 THOUSAND/ÂΜL (ref 0–0.61)
EOSINOPHIL NFR BLD AUTO: 0 % (ref 0–6)
ERYTHROCYTE [DISTWIDTH] IN BLOOD BY AUTOMATED COUNT: 15.7 % (ref 11.6–15.1)
GFR SERPL CREATININE-BSD FRML MDRD: 5 ML/MIN/1.73SQ M
GLUCOSE SERPL-MCNC: 183 MG/DL (ref 65–140)
GLUCOSE SERPL-MCNC: 189 MG/DL (ref 65–140)
GLUCOSE SERPL-MCNC: 205 MG/DL (ref 65–140)
GLUCOSE SERPL-MCNC: 229 MG/DL (ref 65–140)
GLUCOSE SERPL-MCNC: 251 MG/DL (ref 65–140)
GLUCOSE SERPL-MCNC: 316 MG/DL (ref 65–140)
GLUCOSE SERPL-MCNC: 386 MG/DL (ref 65–140)
HCT VFR BLD AUTO: 23.1 % (ref 36.5–49.3)
HGB BLD-MCNC: 7.2 G/DL (ref 12–17)
IMM GRANULOCYTES # BLD AUTO: 0.11 THOUSAND/UL (ref 0–0.2)
IMM GRANULOCYTES NFR BLD AUTO: 1 % (ref 0–2)
LYMPHOCYTES # BLD AUTO: 0.49 THOUSANDS/ÂΜL (ref 0.6–4.47)
LYMPHOCYTES NFR BLD AUTO: 6 % (ref 14–44)
MCH RBC QN AUTO: 27.3 PG (ref 26.8–34.3)
MCHC RBC AUTO-ENTMCNC: 31.2 G/DL (ref 31.4–37.4)
MCV RBC AUTO: 88 FL (ref 82–98)
MONOCYTES # BLD AUTO: 0.38 THOUSAND/ÂΜL (ref 0.17–1.22)
MONOCYTES NFR BLD AUTO: 4 % (ref 4–12)
NEUTROPHILS # BLD AUTO: 7.6 THOUSANDS/ÂΜL (ref 1.85–7.62)
NEUTS SEG NFR BLD AUTO: 89 % (ref 43–75)
NRBC BLD AUTO-RTO: 0 /100 WBCS
NT-PROBNP SERPL-MCNC: ABNORMAL PG/ML
PLATELET # BLD AUTO: 371 THOUSANDS/UL (ref 149–390)
PMV BLD AUTO: 9 FL (ref 8.9–12.7)
POTASSIUM SERPL-SCNC: 6.1 MMOL/L (ref 3.5–5.3)
PROCALCITONIN SERPL-MCNC: 1.83 NG/ML
PROT SERPL-MCNC: 7 G/DL (ref 6.4–8.4)
RBC # BLD AUTO: 2.64 MILLION/UL (ref 3.88–5.62)
SODIUM SERPL-SCNC: 132 MMOL/L (ref 135–147)
WBC # BLD AUTO: 8.59 THOUSAND/UL (ref 4.31–10.16)

## 2022-11-29 RX ORDER — INSULIN LISPRO 100 [IU]/ML
1-5 INJECTION, SOLUTION INTRAVENOUS; SUBCUTANEOUS
Status: DISCONTINUED | OUTPATIENT
Start: 2022-11-29 | End: 2022-11-30 | Stop reason: HOSPADM

## 2022-11-29 RX ORDER — CALCIUM ACETATE 667 MG/1
667 CAPSULE ORAL
Status: DISCONTINUED | OUTPATIENT
Start: 2022-11-29 | End: 2022-11-30 | Stop reason: HOSPADM

## 2022-11-29 RX ORDER — INSULIN ASPART 100 [IU]/ML
15 INJECTION, SUSPENSION SUBCUTANEOUS
Status: DISCONTINUED | OUTPATIENT
Start: 2022-11-29 | End: 2022-11-29

## 2022-11-29 RX ORDER — INSULIN LISPRO 100 [IU]/ML
5 INJECTION, SOLUTION INTRAVENOUS; SUBCUTANEOUS
Status: DISCONTINUED | OUTPATIENT
Start: 2022-11-29 | End: 2022-11-30 | Stop reason: HOSPADM

## 2022-11-29 RX ORDER — INSULIN GLARGINE 100 [IU]/ML
20 INJECTION, SOLUTION SUBCUTANEOUS
Status: DISCONTINUED | OUTPATIENT
Start: 2022-11-29 | End: 2022-11-30 | Stop reason: HOSPADM

## 2022-11-29 RX ADMIN — Medication 1 TABLET: at 09:42

## 2022-11-29 RX ADMIN — INSULIN LISPRO 2 UNITS: 100 INJECTION, SOLUTION INTRAVENOUS; SUBCUTANEOUS at 17:40

## 2022-11-29 RX ADMIN — LOSARTAN POTASSIUM 50 MG: 50 TABLET, FILM COATED ORAL at 20:05

## 2022-11-29 RX ADMIN — HEPARIN SODIUM 5000 UNITS: 5000 INJECTION INTRAVENOUS; SUBCUTANEOUS at 06:38

## 2022-11-29 RX ADMIN — INSULIN LISPRO 3 UNITS: 100 INJECTION, SOLUTION INTRAVENOUS; SUBCUTANEOUS at 11:49

## 2022-11-29 RX ADMIN — AMLODIPINE BESYLATE 10 MG: 10 TABLET ORAL at 09:36

## 2022-11-29 RX ADMIN — CALCIUM ACETATE 667 MG: 667 CAPSULE ORAL at 11:48

## 2022-11-29 RX ADMIN — TAMSULOSIN HYDROCHLORIDE 0.8 MG: 0.4 CAPSULE ORAL at 17:42

## 2022-11-29 RX ADMIN — HEPARIN SODIUM 5000 UNITS: 5000 INJECTION INTRAVENOUS; SUBCUTANEOUS at 22:15

## 2022-11-29 RX ADMIN — INSULIN GLARGINE 20 UNITS: 100 INJECTION, SOLUTION SUBCUTANEOUS at 22:16

## 2022-11-29 RX ADMIN — INSULIN LISPRO 4 UNITS: 100 INJECTION, SOLUTION INTRAVENOUS; SUBCUTANEOUS at 01:20

## 2022-11-29 RX ADMIN — PRAVASTATIN SODIUM 40 MG: 40 TABLET ORAL at 17:42

## 2022-11-29 RX ADMIN — INSULIN LISPRO 5 UNITS: 100 INJECTION, SOLUTION INTRAVENOUS; SUBCUTANEOUS at 13:30

## 2022-11-29 RX ADMIN — CALCIUM ACETATE 667 MG: 667 CAPSULE ORAL at 17:42

## 2022-11-29 RX ADMIN — CLONIDINE HYDROCHLORIDE 0.1 MG: 0.1 TABLET ORAL at 20:05

## 2022-11-29 RX ADMIN — CLONIDINE HYDROCHLORIDE 0.1 MG: 0.1 TABLET ORAL at 09:36

## 2022-11-29 RX ADMIN — REMDESIVIR 100 MG: 100 INJECTION, POWDER, LYOPHILIZED, FOR SOLUTION INTRAVENOUS at 15:32

## 2022-11-29 RX ADMIN — INSULIN LISPRO 1 UNITS: 100 INJECTION, SOLUTION INTRAVENOUS; SUBCUTANEOUS at 06:44

## 2022-11-29 RX ADMIN — HEPARIN SODIUM 5000 UNITS: 5000 INJECTION INTRAVENOUS; SUBCUTANEOUS at 15:25

## 2022-11-29 RX ADMIN — INSULIN ASPART 15 UNITS: 100 INJECTION, SUSPENSION SUBCUTANEOUS at 09:39

## 2022-11-29 RX ADMIN — INSULIN LISPRO 2 UNITS: 100 INJECTION, SOLUTION INTRAVENOUS; SUBCUTANEOUS at 22:16

## 2022-11-29 RX ADMIN — FINASTERIDE 5 MG: 5 TABLET, FILM COATED ORAL at 09:36

## 2022-11-29 RX ADMIN — POLYETHYLENE GLYCOL 3350 17 G: 17 POWDER, FOR SOLUTION ORAL at 09:36

## 2022-11-29 RX ADMIN — CALCIUM ACETATE 667 MG: 667 CAPSULE ORAL at 09:36

## 2022-11-29 RX ADMIN — LOSARTAN POTASSIUM 50 MG: 50 TABLET, FILM COATED ORAL at 09:36

## 2022-11-29 RX ADMIN — INSULIN LISPRO 5 UNITS: 100 INJECTION, SOLUTION INTRAVENOUS; SUBCUTANEOUS at 17:40

## 2022-11-29 NOTE — CASE MANAGEMENT
Case Management Discharge Planning Note    Patient name Cameron Fletcher  Location 2 [facility] [address] [facility]-* MRN [id number]  : 1945 Date 2022       Current Admission Date: 2022  Current Admission Diagnosis:SOB (shortness of breath)   Patient Active Problem List    Diagnosis Date Noted   • COVID-19 2022   • Pleural effusion 2022   • SOB (shortness of breath) 2022   • Bronchitis 2022   • Fluid overload 2022   • Hyperphosphatemia associated with renal failure 10/19/2022   • Urinary retention 10/17/2022   • ESRD on dialysis [facility]) 2022   • Hyponatremia 2022   • ANCA-associated vasculitis 2022   • Pulmonary hypertension ([facility] ) 2022   • Dialysis patient ([facility] ) 2022   • Anemia due to chronic kidney disease, on chronic dialysis ([facility] )    • Hemoptysis 2022   • Other proteinuria    • Symptomatic anemia 2022   • Chest pain 2022   • Melena 2022   • Elevated PSA 2021   • Chronic pain of right knee 2021   • Primary osteoarthritis of right knee 2021   • Bladder stones 12/15/2016   • Type 2 diabetes mellitus with chronic kidney disease on chronic dialysis, with long-term current use of insulin ([facility] ) 2016   • Benign colon polyp 2013   • Benign prostatic hyperplasia with lower urinary tract symptoms 2013   • Benign essential hypertension 2013   • Hyperlipidemia 2012   • Vitamin D deficiency 2012      LOS (days): 1  Geometric Mean LOS (GMLOS) (days): 5 20  Days to GMLOS:4     OBJECTIVE:  Risk of Unplanned Readmission Score: 50 38         Current admission status: Inpatient   Preferred Pharmacy:   [facility] [address] [place]  [phone] [address]  Phone: [phone] Fax: [phone]    [facility] ([address] [facility] [address]  [address] [address]  Phone: [phone] Fax: [phone]    Primary Care Provider: Davion Dinh MD    Primary Insurance: MEDICARE  Secondary Insurance: BLUE CROSS    DISCHARGE DETAILS:    Other Referral/Resources/Interventions Provided:  Interventions: Short Term Rehab  Referral Comments: CM called and spoke with wife Niko Benitez to review available str beds at [email or web address], and [facility]  [facility] via [facility] per wife Sanjuana's preference and she also stated that patient would like to be in [address] [place]         Treatment Team Recommendation: Short Term Rehab  Discharge Destination Plan[de-identified] Short Term Rehab

## 2022-11-29 NOTE — PROGRESS NOTES
Daily Progress Note - [facility] [address] J Darrion 76 y o  male MRN: [id number]  Unit/Bed#: [address] [place] [address] Encounter: [id number]  Admitting Physician: Faby Burroughs MD   PCP: Gil Slade MD  Date of Admission:  11/28/2022  9:08 AM    Assessment and Plan    * SOB (shortness of breath)  Assessment & Plan  Acute; not requiring oxygen secondary to pleural effusion vs  COVID pneumonia as evident by chest x-ray findings and being positive for COVID on admission  Slight improvement      WBC 11 38>8 59  Continue Robitussin DM  Incentive spirometry  COVID treatment as per mild pathway as noted below  Pleural effusion management discussed below  PT/OT: STR    Pleural effusion  Assessment & Plan  Acute; unresolved with hemodialysis since last admission  Chest x-ray:  Bilateral pleural effusions with adjacent atelectasis; unchanged since 11/23/2022  BNP 20,732>19,925 (previous BNPs:  11/23: 32,027; 3/28:  62,970; 3/7:  90,012)  IR consulted for thoracentesis today @ 2pm  Nephrology consulted for HD today @ 8am      ESRD on dialysis [facility])  Assessment & Plan  Lab Results   Component Value Date    EGFR 5 11/29/2022    EGFR 6 11/28/2022    EGFR 14 11/24/2022    CREATININE 8 26 (H) 11/29/2022    CREATININE 7 06 (H) 11/28/2022    CREATININE 3 69 (H) 11/24/2022     Oligo-anuric since March 2022 which has caused him to be HD dependent  HD schedule: Tu/Th/Sat  Epo: 4000U with HD    COVID-19  Assessment & Plan  As evidence by cough, shortness of breath, abdominal pain, nausea and vomiting with a SARS-CoV-2 positive on 11/28 (negative on 11/23)    WBC 11 38; troponin 15>15, BNP:  20,732, CK: 30  Vitals and exam overall unremarkable    Remdesivir x3 days in the setting mild COVID with ESRD  CBC: WBC:8 59, Hgb:7 2  CMP: abnormal in setting of ESDR  pro Jasson 1 83  BNP: 19,925  CK 22  Incentive spirometry, vital signs, monitor I/Os, PT/OT as tolerated    ANCA-associated vasculitis  Assessment & Plan  Chronic, stable  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  -previously treated with Cytoxan and subsequently prescribed prednisone taper  -followed outpatient by Nephrology    Anemia due to chronic kidney disease, on chronic dialysis [facility])  Assessment & Plan  Admission hemoglobin:  8 0 which is stable in the presence of ESRD  Hgb 11/29: 7 2  Epo 4000 U with HD as per Nephro    Type 2 diabetes mellitus with chronic kidney disease on chronic dialysis, with long-term current use of insulin [facility])  Assessment & Plan  Lab Results   Component Value Date    HGBA1C 5 7 (H) 10/19/2022       Recent Labs     11/25/22  1618 11/25/22  2052 11/26/22  0729 11/26/22  1114   POCGLU 196* 82 120 129     ISS & Home aspartate 15 U  Continue HD on Tu/Th/Sat        Hyperlipidemia  Assessment & Plan  Chronic, stable  pravastatin 40 mg HS since simvastatin is hospital non formulary    Benign essential hypertension  Assessment & Plan  Chronic, stable  Continue:  Amlodipine 10 mg daily, clonidine 0 1 mg q 12, losartan 50 mg q 12      VTE Pharmacologic Prophylaxis:   Pharmacologic: Heparin  Mechanical VTE Prophylaxis in Place: Yes    Patient Centered Rounds: I have performed bedside rounds with nursing staff today  Discussions with Specialists or Other Care Team Provider: Nephrology, IR     Education and Discussions with Family / Patient:   Patient Information Sharing: With the consent of Darius Lemus , their loved ones Franny Zamorano, wife) were notified today by inpatient team of the patient’s condition and current plan  All questions answered  Time Spent for Care: 30 minutes  More than 50% of total time spent on counseling and coordination of care as described above      Current Length of Stay: 1 day(s)    Current Patient Status: Inpatient   Certification Statement: The patient will continue to require additional inpatient hospital stay due to IR thorocentesis    Discharge Plan: STR    Code Status: Level 1 - Full Code    Subjective:   Patient resting comfortably on room air with occasional cough  Notes weakness, especially with ambulation and is amenable for PT/OT  Denies any nausea, vomiting, diarrhea, constipation or pain  He notes that he thinks he would benefit from some STR in the setting of weakness  Objective     Objective:   Vitals:   Temp (24hrs), Av 1 °F (36 7 °C), Min:97 5 °F (36 4 °C), Max:99 6 °F (37 6 °C)    Temp:  [97 5 °F (36 4 °C)-99 6 °F (37 6 °C)] 98 1 °F (36 7 °C)  HR:  [66-94] 66  Resp:  [13-22] 18  BP: (118-168)/(57-89) 123/58  SpO2:  [93 %-97 %] 94 %  Body mass index is 24 37 kg/m²  Input and Output Summary (last 24 hours):     No intake or output data in the 24 hours ending 22 1221    Physical Exam:   Physical Exam  Constitutional:       General: He is not in acute distress  Appearance: Normal appearance  He is normal weight  He is not ill-appearing  HENT:      Head: Normocephalic  Right Ear: External ear normal       Left Ear: External ear normal       Nose: Nose normal       Mouth/Throat:      Mouth: Mucous membranes are moist       Pharynx: Oropharynx is clear  Eyes:      Extraocular Movements: Extraocular movements intact  Conjunctiva/sclera: Conjunctivae normal    Cardiovascular:      Rate and Rhythm: Normal rate and regular rhythm  Pulses: Normal pulses  Heart sounds: Normal heart sounds  Pulmonary:      Effort: Pulmonary effort is normal       Breath sounds: Decreased air movement present  Examination of the right-lower field reveals decreased breath sounds  Decreased breath sounds present  Abdominal:      General: Abdomen is flat  Bowel sounds are normal       Tenderness: There is abdominal tenderness (RUQ & LLQ (not acute))  Musculoskeletal:         General: Normal range of motion  Skin:     General: Skin is warm  Capillary Refill: Capillary refill takes less than 2 seconds     Neurological: General: No focal deficit present  Mental Status: He is alert and oriented to person, place, and time  Mental status is at baseline  Additional Data:     Labs:  Results from last 7 days   Lab Units 11/29/22  0553   WBC Thousand/uL 8 59   HEMOGLOBIN g/dL 7 2*   HEMATOCRIT % 23 1*   PLATELETS Thousands/uL 371   NEUTROS PCT % 89*   LYMPHS PCT % 6*   MONOS PCT % 4   EOS PCT % 0     Results from last 7 days   Lab Units 11/29/22  0553   POTASSIUM mmol/L 6 1*   CHLORIDE mmol/L 94*   CO2 mmol/L 25   BUN mg/dL 84*   CREATININE mg/dL 8 26*   CALCIUM mg/dL 7 9*   ALK PHOS U/L 70   ALT U/L 13   AST U/L 11         Results from last 7 days   Lab Units 11/29/22  1129 11/29/22  0748 11/29/22  0641 11/29/22  0025 11/28/22  2104   POC GLUCOSE mg/dl 316* 205* 183* 386* >500*           * I Have Reviewed All Lab Data Listed Above  * Additional Pertinent Lab Tests Reviewed: All Labs For Current Hospital Admission Reviewed    Imaging:    Imaging Reports Reviewed Today Include: All imaging   Imaging Personally Reviewed by Myself Includes: All imaging    Recent Cultures (last 7 days):     Results from last 7 days   Lab Units 11/25/22 2121 11/23/22 2227 11/23/22 2221   BLOOD CULTURE   --  No Growth After 4 Days  No Growth After 4 Days     SPUTUM CULTURE  2+ Growth of Candida albicans*  1+ Growth of  --   --    GRAM STAIN RESULT  2+ Epithelial cells per low power field*  1+ Polys*  1+ Yeast*  --   --        Last 24 Hours Medication List:   Current Facility-Administered Medications   Medication Dose Route Frequency Provider Last Rate   • acetaminophen  650 mg Oral Q6H PRN Bryon Daniel MD     • amLODIPine  10 mg Oral Daily Bryon Daniel MD     • calcium acetate  667 mg Oral TID With Neida Bess MD     • calcium carbonate  1,000 mg Oral Daily PRN Bryon Daniel MD     • calcium carbonate-vitamin D  1 tablet Oral Daily With Breakfast Bryon Daniel MD     • cloNIDine  0 1 mg Oral Q12H [address] Bryon Daniel MD     • dextromethorphan-guaiFENesin  10 mL Oral Q4H PRN Bryon Daniel MD     • epoetin ani  4,000 Units Intravenous After Dialysis Lou Contreras MD     • finasteride  5 mg Oral Daily Bryon Daniel MD     • heparin (porcine)  5,000 Units Subcutaneous Q8H [address] Bryon Daniel MD     • insulin aspart protamine-insulin aspart  15 Units Subcutaneous BID AC Bryon Daniel MD     • insulin lispro  1-5 Units Subcutaneous 4x Daily (AC & HS) Bryon Daniel MD     • losartan  50 mg Oral BID Bryon Daniel MD     • ondansetron  4 mg Intravenous Q6H PRN Bryon Daniel MD     • polyethylene glycol  17 g Oral Daily Bryon Daniel MD     • pravastatin  40 mg Oral Daily With Hermelinda Resendiz MD     • remdesivir  100 mg Intravenous Q24H Bryon Daniel MD     • sodium chloride  1 spray Each Nare Daily PRN Bryon Daniel MD     • tamsulosin  0 8 mg Oral Daily With Hermelinda Resendiz MD               ** Please Note: Dictation voice to text software may have been used in the creation of this document   **    Bryon Daniel MD  11/29/22  12:21 PM

## 2022-11-29 NOTE — PLAN OF CARE
Post-Dialysis RN Treatment Note    Blood Pressure:  Pre  117/50    mm/Hg  Post  112/54   61    mmHg   EDW   kg    Weight:  Pre kg 76  Post 73 kg   Mode of weight measurement:   bed   Volume Removed 3000  ml    Treatment duration 180 minutes    NS given      Treatment shortened? Medications given during Rx NA   Estimated Kt/V  NA   Access type: CVC   Access Issues:  NA   Report called to primary nurse   Mare Cotton RN            Goal for today"s treatment will be the removal of 3-4 kg fluid during a 3 hr treatment       Electrolyte normalization using a 2k bath to bring high levels to a normal state      Problem: METABOLIC, FLUID AND ELECTROLYTES - ADULT  Goal: Electrolytes maintained within normal limits  Description: INTERVENTIONS:  - Monitor labs and assess patient for signs and symptoms of electrolyte imbalances  - Administer electrolyte replacement as ordered  - Monitor response to electrolyte replacements, including repeat lab results as appropriate  - Instruct patient on fluid and nutrition as appropriate  Outcome: Progressing  Goal: Fluid balance maintained  Description: INTERVENTIONS:  - Monitor labs   - Monitor I/O and WT  - Instruct patient on fluid and nutrition as appropriate  - Assess for signs & symptoms of volume excess or deficit  Outcome: Progressing

## 2022-11-29 NOTE — CONSULTS
Consultation - Nephrology   Kamila Issac Maier 76 y o  male MRN: [id number]  Unit/Bed#: [address] [place] [address] Encounter: [id number]    ASSESSMENT and PLAN:  End-stage renal disease on dialysis  -Outpatient unit:  [address]  -Schedule:  TTS  -recent hospital admission and was discharged on 11/26 after hemodialysis treatment  -Access:  Right IJ Adrian  Has appointment for  AV fistula on 12/07  -Plan:  Hemodialysis treatment today using 2 K bath for potassium of 6 1  Plan for challenging target weight with more ultrafiltration  -if plan for discharge will need to check with [facility] regarding the COVID 19 policy    Volume status/fluid overload  -chest x-ray with finding of bilateral pleural effusion, challenging target weight  During previous hospital admission was challenged to 70 7 kg    CKD/MBD  -hyperphosphatemia-as per Care everywhere on PhosLo t i d  With meal   Restarted on PhosLo, check phosphorus level    Blood pressure/Primary hypertension  -estimated dry weight 72 kg but during previous hospital admission was challenged to 70 7 kg  -continue current treatment with amlodipine 10 mg, clonidine 0 1 mg twice daily, losartan 50 mg twice daily  Electrolytes:   -hyperkalemia, plan for HD with 2 K bath today  -hyponatremia:  Sodium 132 in the setting of ESRD  Recommend fluid restriction 1 5 L per day    Anemia due to ESRD  -Goal hemoglobin:  10-11 grams/deciliter  -Current hemoglobin:  Below goal at 7 2 g/dl  -Plan:  Patient is on Mircera 150 mcg every 2 weeks as outpatient  Ordered for ISAMAR 4000 units with HD during hospital stay  Shortness of breath likely due to fluid overload/pleural effusion-noted plan for IR consult for thoracentesis  Also challenging target weight which may help    COVID 19 infection:  Management per primary team being treated with remdesivir    History of ANCA vasculitis, did not tolerate cyclophosphamide in April 2022 as per previous note    Currently not on treatment    Discussed with primary team    HISTORY OF PRESENT ILLNESS:  Requesting Physician: Faby Burroughs MD  Reason for Consult: ESRD on HD     Junaid Chavez is a 76 y o  male history of ESRD on HD, diabetes mellitus type 2, hypertension, history of ANCA vasculitis who was admitted to [facility] after presenting with shortness of breath  Nephrology consulted ESRD HD  Recent hospital admission for similar presentation for complain of shortness of breath and underwent HD treatment  Dry weight was challenged previous dry weight of 72 kg  As per previous note patient has history of ANCA vasculitis did not tolerate cyclophosphamide in April 2022 and currently not on any treatment  -this hospital admission was found to have COVID positive, receiving remdesivir   -chest x-ray suggestive of bilateral pleural effusion and IR has been consulted for thoracentesis    PAST MEDICAL HISTORY:  Past Medical History:   Diagnosis Date   • Anesthesia complication 3006    after colonoscopy , pt was awake but could not control his body and kept falling    • Arthritis    • Bladder calculi    • BPH (benign prostatic hyperplasia)    • Diabetes mellitus ([facility] [address] )    • ESRD (end stage renal disease) on dialysis ([facility] [address] )    • Hearing disorder of both ears     wears bilateral hearing aids   • Hyperlipidemia     controlled and maintained d/t diabetes   • Hypertension    • Kidney stones     Last Assessed:4/3/2017   • Lyme disease     Last Assessed:6/29/2015   • Rupture, bladder, spontaneous     Last Assessed:4/3/2017       PAST SURGICAL HISTORY:  Past Surgical History:   Procedure Laterality Date   • BLADDER REPAIR N/A 12/10/2016    Procedure: REPAIR BLADDER/cysto insertion stent;  Surgeon: Raysa Emdondson MD;  Location: [facility];  Service:    • COLONOSCOPY     • CYSTOLITHOTOMY      ANESTHESIA   lower abdomen  ;  Last Assessed:4/3/2017   • IR ASPIRATION ONLY  5/8/2022   • IR BIOPSY KIDNEY RANDOM  3/10/2022   • IR TUNNELED DIALYSIS CATHETER PLACEMENT  3/8/2022   • OTHER SURGICAL HISTORY      thermal dilation of the prostate x 2 ( in the office)   • TONSILLECTOMY     • TRANSURETHRAL RESECTION OF PROSTATE N/A 2016    Procedure:  Cysto, Laser Bladder stone,fulgaration of prostates tissue ;  Surgeon: Mira Orozco MD;  Location: [facility];  Service:        SOCIAL HISTORY:  Social History     Substance and Sexual Activity   Alcohol Use Never     Social History     Substance and Sexual Activity   Drug Use No     Social History     Tobacco Use   Smoking Status Former   • Types: Cigars   • Quit date:    • Years since quittin 9   Smokeless Tobacco Never       FAMILY HISTORY:  Family History   Problem Relation Age of Onset   • Cancer Mother         breast   • Diabetes Mother    • Cancer Father         prostate w/ bone mets   • Prostate cancer Father    • Alzheimer's disease Sister        ALLERGIES:  Allergies   Allergen Reactions   • Amoxicillin Rash       MEDICATIONS:    Current Facility-Administered Medications:   •  acetaminophen (TYLENOL) tablet 650 mg, 650 mg, Oral, Q6H PRN, Joelle Vidal MD  •  amLODIPine (NORVASC) tablet 10 mg, 10 mg, Oral, Daily, Joelle Vidal MD, 10 mg at 22 1505  •  calcium carbonate (TUMS) chewable tablet 1,000 mg, 1,000 mg, Oral, Daily PRN, Joelle Vidal MD  •  calcium carbonate-vitamin D 500 mg-5 mcg tablet 1 tablet, 1 tablet, Oral, Daily With Breakfast, Joelle Vidal MD  •  cloNIDine (CATAPRES) tablet 0 1 mg, 0 1 mg, Oral, Q12H [address], Joelle Vidal MD, 0 1 mg at 22 2059  •  dextromethorphan-guaiFENesin (ROBITUSSIN DM) oral syrup 10 mL, 10 mL, Oral, Q4H PRN, Joelle Vidal MD  •  finasteride (PROSCAR) tablet 5 mg, 5 mg, Oral, Daily, Joelle Vidal MD, 5 mg at 22 1504  •  heparin (porcine) subcutaneous injection 5,000 Units, 5,000 Units, Subcutaneous, Q8H [address], Joelle Vidal MD, 5,000 Units at 22 2428  •  insulin aspart protamine-insulin aspart (NovoLOG 70/30) 100 units/mL subcutaneous injection 15 Units, 15 Units, Subcutaneous, BID AC, Georgiana Fylnn MD  •  insulin lispro (HumaLOG) 100 units/mL subcutaneous injection 1-5 Units, 1-5 Units, Subcutaneous, 4x Daily (AC & HS) **AND** Fingerstick Glucose (POCT), , , 4x Daily AC and at bedtime, Georgiana Flynn MD  •  losartan (COZAAR) tablet 50 mg, 50 mg, Oral, BID, Georgiana Flynn MD, 50 mg at 11/28/22 2059  •  ondansetron (ZOFRAN) injection 4 mg, 4 mg, Intravenous, Q6H PRN, Georgiana Flynn MD  •  polyethylene glycol (MIRALAX) packet 17 g, 17 g, Oral, Daily, Georgiana Flynn MD, 17 g at 11/28/22 1630  •  pravastatin (PRAVACHOL) tablet 40 mg, 40 mg, Oral, Daily With Estuardo Rivas MD, 40 mg at 11/28/22 1630  •  [COMPLETED] remdesivir (Veklury) 200 mg in sodium chloride 0 9 % 290 mL IVPB, 200 mg, Intravenous, Q24H, 200 mg at 11/28/22 1437 **FOLLOWED BY** remdesivir (Veklury) 100 mg in sodium chloride 0 9 % 270 mL IVPB, 100 mg, Intravenous, Q24H, Georgiana Flynn MD  •  sodium chloride (OCEAN) 0 65 % nasal spray 1 spray, 1 spray, Each Nare, Daily PRN, Georgiana Flynn MD  •  tamsulosin (FLOMAX) capsule 0 8 mg, 0 8 mg, Oral, Daily With Estuardo Rivas MD, 0 8 mg at 11/28/22 1552    REVIEW OF SYSTEMS:   Review of Systems   Constitutional: Positive for fatigue  Negative for activity change, appetite change, chills, diaphoresis and fever  HENT: Negative for congestion, facial swelling and nosebleeds  Eyes: Negative for pain and visual disturbance  Respiratory: Positive for shortness of breath  Negative for cough and chest tightness  Cardiovascular: Negative for chest pain and palpitations  Gastrointestinal: Negative for abdominal distention, abdominal pain, diarrhea, nausea and vomiting  Genitourinary: Negative for difficulty urinating, dysuria, flank pain, frequency and hematuria     Musculoskeletal: Negative for arthralgias, back pain and joint swelling  Skin: Negative for rash  Neurological: Negative for dizziness, seizures, syncope, weakness and headaches  Psychiatric/Behavioral: Negative for agitation and confusion  The patient is not nervous/anxious  All the systems were reviewed and were negative except as documented on the HPI  PHYSICAL EXAM:  Current Weight: Weight - Scale: 72 7 kg (160 lb 4 4 oz)  First Weight: Weight - Scale: 72 7 kg (160 lb 4 4 oz)  Vitals:    11/28/22 1632 11/28/22 2006 11/28/22 2236 11/29/22 0407   BP: 137/59 123/57 118/59 130/60   BP Location: Left arm Left arm Left arm    Pulse: 94 82 66 72   Resp: 18 18 16    Temp: 97 6 °F (36 4 °C) 97 9 °F (36 6 °C) 97 8 °F (36 6 °C) 97 5 °F (36 4 °C)   TempSrc: Oral Oral Oral Oral   SpO2: 95% 93% 96% 95%   Weight:       Height:         No intake or output data in the 24 hours ending 11/29/22 0843  Physical Exam  Constitutional:       General: He is not in acute distress  Appearance: He is well-developed  He is not diaphoretic  HENT:      Head: Normocephalic and atraumatic  Mouth/Throat:      Mouth: Mucous membranes are moist    Eyes:      General: No scleral icterus  Conjunctiva/sclera: Conjunctivae normal       Pupils: Pupils are equal, round, and reactive to light  Neck:      Thyroid: No thyromegaly  Cardiovascular:      Rate and Rhythm: Normal rate and regular rhythm  Heart sounds: Normal heart sounds  No murmur heard  No friction rub  Pulmonary:      Effort: Pulmonary effort is normal  No respiratory distress  Breath sounds: Normal breath sounds  No wheezing or rales  Abdominal:      General: Bowel sounds are normal  There is no distension  Palpations: Abdomen is soft  Tenderness: There is no abdominal tenderness  Musculoskeletal:         General: No deformity  Cervical back: Neck supple  Right lower leg: No edema  Left lower leg: No edema     Lymphadenopathy: Cervical: No cervical adenopathy  Skin:     Coloration: Skin is not pale  Nails: There is no clubbing  Neurological:      Mental Status: He is alert and oriented to person, place, and time  He is not disoriented  Psychiatric:         Mood and Affect: Mood normal  Mood is not anxious  Affect is not inappropriate  Behavior: Behavior normal          Thought Content: Thought content normal             Invasive Devices:        Lab Results:   Results from last 7 days   Lab Units 11/29/22  0553 11/28/22  1006 11/26/22  0857 11/25/22  0501 11/24/22  0643 11/23/22  0957   WBC Thousand/uL 8 59 11 38* 6 30   < > 8 66 9 38   HEMOGLOBIN g/dL 7 2* 8 0* 7 4*   < > 7 5* 8 2*   HEMATOCRIT % 23 1* 25 4* 23 5*   < > 24 3* 26 8*   PLATELETS Thousands/uL 371 362 330   < > 364 374   POTASSIUM mmol/L 6 1* 5 2  --   --  4 3 5 1   CHLORIDE mmol/L 94* 95*  --   --  97 96   CO2 mmol/L 25 27  --   --  32 32   BUN mg/dL 84* 65*  --   --  32* 53*   CREATININE mg/dL 8 26* 7 06*  --   --  3 69* 5 55*   CALCIUM mg/dL 7 9* 8 3  --   --  7 9* 8 0*   MAGNESIUM mg/dL  --  2 3  --   --   --   --    ALK PHOS U/L 70 78  --   --   --  76   ALT U/L 13 10*  --   --   --  10*   AST U/L 11  --   --   --   --   --     < > = values in this interval not displayed  Other Studies:   CT brain no acute intracranial abnormality  CT angiographic minor atherosclerotic changes of right proximal cervical internal carotid artery  Large pleural effusions    CXR- IMPRESSION:     Bilateral pleural effusions with adjacent atelectasis  Infection is to be excluded on clinical grounds    Portions of the record may have been created with voice recognition software  Occasional wrong word or "sound a like" substitutions may have occurred due to the inherent limitations of voice recognition software  Read the chart carefully and recognize, using context, where substitutions have occurred  If you have any questions, please contact the dictating provider

## 2022-11-29 NOTE — PLAN OF CARE
Problem: Potential for Falls  Goal: Patient will remain free of falls  Description: INTERVENTIONS:  - Educate patient/family on patient safety including physical limitations  - Instruct patient to call for assistance with activity   - Consult OT/PT to assist with strengthening/mobility   - Keep Call bell within reach  - Keep bed low and locked with side rails adjusted as appropriate  - Keep care items and personal belongings within reach  - Initiate and maintain comfort rounds  - Make Fall Risk Sign visible to staff  - Offer Toileting every 2 Hours, in advance of need  - Initiate/Maintain bed alarm  - Obtain necessary fall risk management equipment: call bell   - Apply yellow socks and bracelet for high fall risk patients  - Consider moving patient to room near nurses station  Outcome: Progressing     Problem: PAIN - ADULT  Goal: Verbalizes/displays adequate comfort level or baseline comfort level  Description: Interventions:  - Encourage patient to monitor pain and request assistance  - Assess pain using appropriate pain scale  - Administer analgesics based on type and severity of pain and evaluate response  - Implement non-pharmacological measures as appropriate and evaluate response  - Consider cultural and social influences on pain and pain management  - Notify physician/advanced practitioner if interventions unsuccessful or patient reports new pain  Outcome: Progressing     Problem: INFECTION - ADULT  Goal: Absence or prevention of progression during hospitalization  Description: INTERVENTIONS:  - Assess and monitor for signs and symptoms of infection  - Monitor lab/diagnostic results  - Monitor all insertion sites, i e  indwelling lines, tubes, and drains  - Monitor endotracheal if appropriate and nasal secretions for changes in amount and color  - [place] appropriate cooling/warming therapies per order  - Administer medications as ordered  - Instruct and encourage patient and family to use good hand hygiene technique  - Identify and instruct in appropriate isolation precautions for identified infection/condition  Outcome: Progressing  Goal: Absence of fever/infection during neutropenic period  Description: INTERVENTIONS:  - Monitor WBC    Outcome: Progressing     Problem: SAFETY ADULT  Goal: Patient will remain free of falls  Description: INTERVENTIONS:  - Educate patient/family on patient safety including physical limitations  - Instruct patient to call for assistance with activity   - Consult OT/PT to assist with strengthening/mobility   - Keep Call bell within reach  - Keep bed low and locked with side rails adjusted as appropriate  - Keep care items and personal belongings within reach  - Initiate and maintain comfort rounds  - Make Fall Risk Sign visible to staff  - Offer Toileting every 2 Hours, in advance of need  - Initiate/Maintain bed alarm  - Obtain necessary fall risk management equipment: call bell   - Apply yellow socks and bracelet for high fall risk patients  - Consider moving patient to room near nurses station  Outcome: Progressing  Goal: Maintain or return to baseline ADL function  Description: INTERVENTIONS:  -  Assess patient's ability to carry out ADLs; assess patient's baseline for ADL function and identify physical deficits which impact ability to perform ADLs (bathing, care of mouth/teeth, toileting, grooming, dressing, etc )  - Assess/evaluate cause of self-care deficits   - Assess range of motion  - Assess patient's mobility; develop plan if impaired  - Assess patient's need for assistive devices and provide as appropriate  - Encourage maximum independence but intervene and supervise when necessary  - Involve family in performance of ADLs  - Assess for home care needs following discharge   - Consider OT consult to assist with ADL evaluation and planning for discharge  - Provide patient education as appropriate  Outcome: Progressing  Goal: Maintains/Returns to pre admission functional level  Description: INTERVENTIONS:  - Perform BMAT or MOVE assessment daily    - Set and communicate daily mobility goal to care team and patient/family/caregiver  - Collaborate with rehabilitation services on mobility goals if consulted  - Perform Range of Motion 2 times a day  - Reposition patient every 2 hours  - Dangle patient 2 times a day  - Stand patient 2 times a day  - Ambulate patient 2 times a day  - Out of bed to chair 2 times a day   - Out of bed for meals 2 times a day  - Out of bed for toileting  - Record patient progress and toleration of activity level   Outcome: Progressing     Problem: DISCHARGE PLANNING  Goal: Discharge to home or other facility with appropriate resources  Description: INTERVENTIONS:  - Identify barriers to discharge w/patient and caregiver  - Arrange for needed discharge resources and transportation as appropriate  - Identify discharge learning needs (meds, wound care, etc )  - Arrange for interpretive services to assist at discharge as needed  - Refer to Case Management Department for coordinating discharge planning if the patient needs post-hospital services based on physician/advanced practitioner order or complex needs related to functional status, cognitive ability, or social support system  Outcome: Progressing     Problem: Knowledge Deficit  Goal: Patient/family/caregiver demonstrates understanding of disease process, treatment plan, medications, and discharge instructions  Description: Complete learning assessment and assess knowledge base    Interventions:  - Provide teaching at level of understanding  - Provide teaching via preferred learning methods  Outcome: Progressing

## 2022-11-29 NOTE — CASE MANAGEMENT
Case Management Assessment & Discharge Planning Note    Patient name Ari Butcher  [facility] MRN [id number]  : 1945 Date 2022       Current Admission Date: 2022  Current Admission Diagnosis:SOB (shortness of breath)   Patient Active Problem List    Diagnosis Date Noted   • COVID-19 2022   • Pleural effusion 2022   • SOB (shortness of breath) 2022   • Bronchitis 2022   • Fluid overload 2022   • Hyperphosphatemia associated with renal failure 10/19/2022   • Urinary retention 10/17/2022   • ESRD on dialysis [facility]) 2022   • Hyponatremia 2022   • ANCA-associated vasculitis 2022   • Pulmonary hypertension ([facility] [address] ) 2022   • Dialysis patient ([facility] [address] ) 2022   • Anemia due to chronic kidney disease, on chronic dialysis ([facility] [address] )    • Hemoptysis 2022   • Other proteinuria    • Symptomatic anemia 2022   • Chest pain 2022   • Melena 2022   • Elevated PSA 2021   • Chronic pain of right knee 2021   • Primary osteoarthritis of right knee 2021   • Bladder stones 12/15/2016   • Type 2 diabetes mellitus with chronic kidney disease on chronic dialysis, with long-term current use of insulin ([facility] [address] ) 2016   • Benign colon polyp 2013   • Benign prostatic hyperplasia with lower urinary tract symptoms 2013   • Benign essential hypertension 2013   • Hyperlipidemia 2012   • Vitamin D deficiency 2012      LOS (days): 1  Geometric Mean LOS (GMLOS) (days):   Days to GMLOS:     OBJECTIVE:  PATIENT READMITTED TO HOSPITAL  Risk of Unplanned Readmission Score: 49 61       Current admission status: Inpatient     Preferred Pharmacy:   [facility] [address] IRV[address] [place]  [address] [address]  Phone: [phone] Fax: [phone]    [facility] ([address] [facility],   [address] [facility] [address] [facility] [address]  Phone: [phone] Fax: [phone]    Primary Care Provider: Solo Green MD    Primary Insurance: MEDICARE  Secondary Insurance: BLUE CROSS    ASSESSMENT:  [address] [facility], [facility] Leila Del Rio Representative - Spouse   Primary Phone: [phone] (Mobile)                       Readmission Root Cause  30 Day Readmission: Yes  Who directed you to return to the hospital?: Self  Did you understand whom to contact if you had questions or problems?: Yes  During previous admission, was a post-acute recommendation made?: No  Patient was readmitted due to: shortness of breath, covid +    Patient Information  Admitted from[de-identified] Home  Mental Status: Alert  During Assessment patient was accompanied by: Not accompanied during assessment  Assessment information provided by[de-identified] Spouse  Primary Caregiver: Self  Support Systems: Spouse/significant other, Self, Other (Comment) (Neighbor)  [place] of Residence: [address] [place] [address] do you live in?: [address] [place] [address] Road entry access options  Select all that apply  :  (4 steps to enter home, with railings)  Type of Current Residence: [place], [place] (Comment) (Private Residence)  In the last 12 months, was there a time when you were not able to pay the mortgage or rent on time?: No  In the last 12 months, how many places have you lived?: 1  In the last 12 months, was there a time when you did not have a steady place to sleep or slept in a shelter (including now)?: No  Homeless/housing insecurity resource given?: N/A  Living Arrangements: Lives w/ Spouse/significant other    Activities of Daily Living Prior to Admission  Functional Status: Independent  Completes ADLs independently?: Yes  Ambulates independently?: Yes  Does patient use assisted devices?: Yes  Assisted Devices (DME) used: Straight Cane  Does patient currently own DME?: Yes  What DME does the patient currently own?: Straight [place]  Does patient have a history of Outpatient Therapy (PT/OT)?: No  Does the patient have a history of Short-Term Rehab?: Yes (CC@ [facility])  Does patient have a history of HHC?: No  Does patient currently have Marilu Steward?: No       Patient Information Continued  Does patient have prescription coverage?: Yes  Within the past 12 months, you worried that your food would run out before you got the money to buy more : Never true  Within the past 12 months, the food you bought just didn't last and you didn't have money to get more : Never true  Food insecurity resource given?: N/A  Does patient receive dialysis treatments?: Yes ([address] @ [place])       Means of Transportation  Means of Transport to Appts[de-identified] Drives Self (Neighbor provides assistance with transportation if needed)  In the past 12 months, has lack of transportation kept you from medical appointments or from getting medications?: No  In the past 12 months, has lack of transportation kept you from meetings, work, or from getting things needed for daily living?: No  Was application for public transport provided?: N/A      DISCHARGE DETAILS:    Discharge planning discussed with[de-identified] patient and spouse PRISCILA Breen 51 of Choice: Yes  Comments - Freedom of Choice: Discussed with spouse Tenzin Cisneros about recs for [facility] per physician  Tenzin Cisneros does not remember the name of the facility he was was admitted previously,  but open to sending referrals    CM contacted family/caregiver?: Yes  Were Treatment Team discharge recommendations reviewed with patient/caregiver?: Yes        Contacts  Patient Contacts: Shanel Wu  Relationship to Patient[de-identified] Family  Contact Method: Phone  Phone Number: [phone]  Reason/Outcome: Continuity of Care, Discharge Planning       DME Referral Provided  Referral made for DME?: No    Other Referral/Resources/Interventions Provided:  Interventions: Short Term Rehab  Referral Comments: Cm called patient via room phone, introduced self and role, patient unable to hear well and agreeable for cm to call spouse Ahsan Denson  CM called and spoke with spouse Ahsan Denson, introduced self, role and purpose of phone call  Discussed with Martir Fuller per physician recs, Ahsan Denson agreeable for cm to place referrals via [address] for acceptance, especially with Covid and will require transportation to [address] [place] [address] for dialysis  [place] referral placed via [address]  Awaiting response         Treatment Team Recommendation: Short Term Rehab  Discharge Destination Plan[de-identified] Short Term Rehab

## 2022-11-29 NOTE — PLAN OF CARE
Problem: Potential for Falls  Goal: Patient will remain free of falls  Description: INTERVENTIONS:  - Educate patient/family on patient safety including physical limitations  - Instruct patient to call for assistance with activity   - Consult OT/PT to assist with strengthening/mobility   - Keep Call bell within reach  - Keep bed low and locked with side rails adjusted as appropriate  - Keep care items and personal belongings within reach  - Initiate and maintain comfort rounds  - Make Fall Risk Sign visible to staff  - Offer Toileting every 2 Hours, in advance of need  - Initiate/Maintain bed alarm  - Obtain necessary fall risk management equipment:   Problem: PAIN - ADULT  Goal: Verbalizes/displays adequate comfort level or baseline comfort level  Description: Interventions:  - Encourage patient to monitor pain and request assistance  - Assess pain using appropriate pain scale  - Administer analgesics based on type and severity of pain and evaluate response  - Implement non-pharmacological measures as appropriate and evaluate response  - Consider cultural and social influences on pain and pain management  - Notify physician/advanced practitioner if interventions unsuccessful or patient reports new pain  Outcome: Progressing     Problem: INFECTION - ADULT  Goal: Absence or prevention of progression during hospitalization  Description: INTERVENTIONS:  - Assess and monitor for signs and symptoms of infection  - Monitor lab/diagnostic results  - Monitor all insertion sites, i e  indwelling lines, tubes, and drains  - Monitor endotracheal if appropriate and nasal secretions for changes in amount and color  - [place] appropriate cooling/warming therapies per order  - Administer medications as ordered  - Instruct and encourage patient and family to use good hand hygiene technique  - Identify and instruct in appropriate isolation precautions for identified infection/condition  Outcome: Progressing  Goal: Absence of fever/infection during neutropenic period  Description: INTERVENTIONS:  - Monitor WBC    Outcome: Progressing     Problem: SAFETY ADULT  Goal: Patient will remain free of falls  Description: INTERVENTIONS:  - Educate patient/family on patient safety including physical limitations  - Instruct patient to call for assistance with activity   - Consult OT/PT to assist with strengthening/mobility   - Keep Call bell within reach  - Keep bed low and locked with side rails adjusted as appropriate  - Keep care items and personal belongings within reach  - Initiate and maintain comfort rounds  - Make Fall Risk Sign visible to staff  - Offer Toileting every 2 Hours, in advance of need  - Initiate/Maintain bed alarm  - Obtain necessary fall risk management equipment:   - Apply yellow socks and bracelet for high fall risk patients  - Consider moving patient to room near nurses station  Outcome: Progressing  Goal: Maintain or return to baseline ADL function  Description: INTERVENTIONS:  -  Assess patient's ability to carry out ADLs; assess patient's baseline for ADL function and identify physical deficits which impact ability to perform ADLs (bathing, care of mouth/teeth, toileting, grooming, dressing, etc )  - Assess/evaluate cause of self-care deficits   - Assess range of motion  - Assess patient's mobility; develop plan if impaired  - Assess patient's need for assistive devices and provide as appropriate  - Encourage maximum independence but intervene and supervise when necessary  - Involve family in performance of ADLs  - Assess for home care needs following discharge   - Consider OT consult to assist with ADL evaluation and planning for discharge  - Provide patient education as appropriate  Outcome: Progressing  Goal: Maintains/Returns to pre admission functional level  Description: INTERVENTIONS:  - Perform BMAT or MOVE assessment daily    - Set and communicate daily mobility goal to care team and patient/family/caregiver  - Collaborate with rehabilitation services on mobility goals if consulted  - Perform Range of Motion 3 times a day  - Reposition patient every 2 hours    - Dangle patient 3 times a day  - Stand patient 3 times a day  - Ambulate patient 3 times a day  - Out of bed to chair 3 times a day   - Out of bed for meals 3  Problem: SAFETY ADULT  Goal: Patient will remain free of falls  Description: INTERVENTIONS:  - Educate patient/family on patient safety including physical limitations  - Instruct patient to call for assistance with activity   - Consult OT/PT to assist with strengthening/mobility   - Keep Call bell within reach  - Keep bed low and locked with side rails adjusted as appropriate  - Keep care items and personal belongings within reach  - Initiate and maintain comfort rounds  - Make Fall Risk Sign visible to staff  - Offer Toileting every 2 Hours, in advance of need  - Initiate/Maintain bed  alarm  - Obtain necessary fall risk management equipment:   - Apply yellow socks and bracelet for high fall risk patients  - Consider moving patient to room near nurses station  Outcome: Progressing  Goal: Maintain or return to baseline ADL function  Description: INTERVENTIONS:  -  Assess patient's ability to carry out ADLs; assess patient's baseline for ADL function and identify physical deficits which impact ability to perform ADLs (bathing, care of mouth/teeth, toileting, grooming, dressing, etc )  - Assess/evaluate cause of self-care deficits   - Assess range of motion  - Assess patient's mobility; develop plan if impaired  - Assess patient's need for assistive devices and provide as appropriate  - Encourage maximum independence but intervene and supervise when necessary  - Involve family in performance of ADLs  - Assess for home care needs following discharge   - Consider OT consult to assist with ADL evaluation and planning for discharge  - Provide patient education as appropriate  Outcome: Progress  Problem: DISCHARGE PLANNING  Goal: Discharge to home or other facility with appropriate resources  Description: INTERVENTIONS:  - Identify barriers to discharge w/patient and caregiver  - Arrange for needed discharge resources and transportation as appropriate  - Identify discharge learning needs (meds, wound care, etc )  - Arrange for interpretive services to assist at discharge as needed  - Refer to Case Management Department for coordinating discharge planning if the patient needs post-hospital services based on physician/advanced practitioner order or complex needs related to functional status, cognitive ability, or social support system  Outcome: Progressing     Problem: Knowledge Deficit  Goal: Patient/family/caregiver demonstrates understanding of disease process, treatment plan, medications, and discharge instructions  Description: Complete learning assessment and assess knowledge base    Interventions:  - Provide teaching at level of understanding  - Provide teaching via preferred learning methods  Outcome: Progressing     Problem: Prexisting or High Potential for Compromised Skin Integrity  Goal: Skin integrity is maintained or improved  Description: INTERVENTIONS:  - Identify patients at risk for skin breakdown  - Assess and monitor skin integrity  - Assess and monitor nutrition and hydration status  - Monitor labs   - Assess for incontinence   - Turn and reposition patient  - Assist with mobility/ambulation  - Relieve pressure over bony prominences  - Avoid friction and shearing  - Provide appropriate hygiene as needed including keeping skin clean and dry  - Evaluate need for skin moisturizer/barrier cream  - Collaborate with interdisciplinary team   - Patient/family teaching  - Consider wound care consult   Outcome: Progressing        - Apply yellow socks and bracelet for high fall risk patients  - Consider moving patient to room near nurses station  Outcome: Progressing

## 2022-11-29 NOTE — CASE MANAGEMENT
Case Management Discharge Planning Note    Patient name Jaqueline Tiwari  Location [address]  [facility] MRN [id number]  : 1945 Date 2022       Current Admission Date: 2022  Current Admission Diagnosis:SOB (shortness of breath)   Patient Active Problem List    Diagnosis Date Noted   • COVID-19 2022   • Pleural effusion 2022   • SOB (shortness of breath) 2022   • Bronchitis 2022   • Fluid overload 2022   • Hyperphosphatemia associated with renal failure 10/19/2022   • Urinary retention 10/17/2022   • ESRD on dialysis [facility]) 2022   • Hyponatremia 2022   • ANCA-associated vasculitis 2022   • Pulmonary hypertension ([facility] [address] ) 2022   • Dialysis patient ([facility] [address] ) 2022   • Anemia due to chronic kidney disease, on chronic dialysis ([facility] [address] )    • Hemoptysis 2022   • Other proteinuria    • Symptomatic anemia 2022   • Chest pain 2022   • Melena 2022   • Elevated PSA 2021   • Chronic pain of right knee 2021   • Primary osteoarthritis of right knee 2021   • Bladder stones 12/15/2016   • Type 2 diabetes mellitus with chronic kidney disease on chronic dialysis, with long-term current use of insulin ([facility] [address] ) 2016   • Benign colon polyp 2013   • Benign prostatic hyperplasia with lower urinary tract symptoms 2013   • Benign essential hypertension 2013   • Hyperlipidemia 2012   • Vitamin D deficiency 2012      LOS (days): 1  Geometric Mean LOS (GMLOS) (days):   Days to GMLOS:     OBJECTIVE:  Risk of Unplanned Readmission Score: 50         Current admission status: Inpatient   Preferred Pharmacy:   [facility] [address]  [address] [place] [address]  Phone: [phone] Fax: [phone]    [facility] ([address]  [facility]  [facility]  [address] [facility] [address]  Phone: [phone] Fax: [phone]    Primary Care Provider: Sejal Colon MD    Primary Insurance: MEDICARE  Secondary Insurance: BLUE CROSS    DISCHARGE DETAILS:    Discharge planning discussed with[de-identified] patient and spouse Yashira Jung  Freedom of Choice: Yes  Comments - Freedom of Choice: Discussed with spouse Yashira Jung about recs for STR per physician  Yashira Jung does not remember the name of the facility he was was admitted previously,  but open to sending referrals  CM contacted family/caregiver?: Yes  Were Treatment Team discharge recommendations reviewed with patient/caregiver?: Yes        Contacts  Patient Contacts: Stephen Barajas  Relationship to Patient[de-identified] Family  Contact Method: Phone  Phone Number: [phone]  Reason/Outcome: Continuity of Care, Discharge Planning       DME Referral Provided  Referral made for DME?: No    Other Referral/Resources/Interventions Provided:  Interventions: Short Term Rehab  Referral Comments: [phone] mainly due to patient now being covid+  CM was informed that patient's chair time to remain same but will require transportation as David Vizcaino was providing transportation but with covid, he will have to test covid - to ride with David Vizcaino         Treatment Team Recommendation: Short Term Rehab  Discharge Destination Plan[de-identified] Short Term Rehab

## 2022-11-29 NOTE — PHYSICAL THERAPY NOTE
PHYSICAL THERAPY EVALUATION/TREATMENT     11/29/22 1340   Note Type   Note type Evaluation   Pain Assessment   Pain Assessment Tool Brown-Baker FACES   Brown-Baker FACES Pain Rating 0   Restrictions/Precautions   Other Precautions Contact/isolation; Airborne/isolation; Chair Alarm; Bed Alarm; Fall Risk;Hard of hearing   Home Living   Type of [address] [place] Two level; Able to live on main level with bedroom/bathroom;Stairs to enter with rails  (Three stairs to enter)   Home Equipment Walker;Cane   Additional Comments Patient using cane in home and roller walker out of home   Prior Function   Level of [place] Independent with ADLs   Lives With Spouse   Receives Help From Family; Neighbor  (Tomasa France comes into the house daily to assist patient and wife as needed due to wife's medical issues as well)   Comments Patient states declined function and weakness since last admission, 11/26/2022   General   Additional Pertinent History Chart reviewed, patient admitted with shortness of breath/COVID positive    Patient now presents as generally weak and deconditioned and will benefit from short-term rehab prior to return home   Family/Caregiver Present No   Cognition   Overall Cognitive Status WFL   Arousal/Participation Cooperative   Orientation Level Oriented to person;Oriented to place;Oriented to situation   Following Commands Follows multistep commands with increased time or repetition   Subjective   Subjective Patient states feeling weak and unable to return home at this time, "my wife has a bad heart"   RLE Assessment   RLE Assessment   (ROM WFL, strength 3+/5)   LLE Assessment   LLE Assessment   (ROM WFL, strength 3+/5)   Coordination   Movements are Fluid and Coordinated 0   Bed Mobility   Supine to Sit 4  Minimal assistance   Additional items Assist x 1   Sit to Supine 4  Minimal assistance   Additional items Assist x 1   Transfers   Sit to Stand 3  Moderate assistance   Additional items Assist x 1 Stand to Sit 3  Moderate assistance   Additional items Assist x 1   Ambulation/Elevation   Gait Assistance 3  Moderate assist   Additional items Assist x 1;Verbal cues; Tactile cues   Assistive Device Rolling walker   Distance 10 ft with narrow base of support, unsteady gait patterning and loss of balance posteriorly at times  Also with shortness of breath limiting activity   Balance   Static Sitting Fair   Dynamic Sitting Fair -   Static Standing Poor +   Dynamic Standing Poor   Ambulatory Poor   Activity Tolerance   Activity Tolerance Patient limited by fatigue;Treatment limited secondary to medical complications (Comment)   Nurse Made Aware Yes   Assessment   Prognosis Good   Problem List Decreased strength;Decreased range of motion;Decreased endurance; Impaired balance;Decreased mobility; Decreased coordination; Impaired hearing  (Shortness of breath)   Assessment Patient seen for Physical Therapy evaluation  Patient admitted with SOB (shortness of breath)  Comorbidities affecting patient's physical performance include: Anemia, gait dysfunction, diabetes mellitus, end-stage renal disease, pleural effusion, pulmonary hypertension, dialysis  Personal factors affecting patient at time of initial evaluation include: lives in two story house, inability to ambulate household distances, inability to navigate community distances, inability to navigate level surfaces without external assistance, inability to perform dynamic tasks in community, inability to perform physical activity, inability to perform ADLS and inability to perform IADLS    Prior to admission, patient was requiring assist for functional mobility with cne/walker, independent with ADLS, requiring assist for IADLS, living in a multi-level home, ambulating household distance and home with family assist   Please find objective findings from Physical Therapy assessment regarding body systems outlined above with impairments and limitations including weakness, decreased ROM, impaired balance, decreased endurance, impaired coordination, gait deviations, decreased activity tolerance, decreased functional mobility tolerance, fall risk and SOB upon exertion  The Barthel Index was used as a functional outcome tool presenting with a score of Barthel Index Score: 40 today indicating no limitations of functional mobility and ADLS  Patient's clinical presentation is currently unstable/unpredictable as seen in patient's presentation of vital sign response, changing level of pain, increased fall risk, new onset of impairment of functional mobility, decreased endurance and new onset of weakness  Pt would benefit from continued Physical Therapy treatment to address deficits as defined above and maximize level of functional mobility  As demonstrated by objective findings, the assigned level of complexity for this evaluation is high  The patient's AM-PAC Basic Mobility Inpatient Short Form Raw Score is 14  A Raw score of less than or equal to 16 suggests the patient may benefit from discharge to post-acute rehabilitation services  Please also refer to the recommendation of the Physical Therapist for safe discharge planning  Goals   Patient Goals To get stronger   STG Expiration Date 12/06/22   Short Term Goal #1 Transfers and gait with roller walker with Min assist   Short Term Goal #2 Gait endurance 50 ft   LTG Expiration Date 12/13/22   Long Term Goal #1 Strength bilateral lower extremities 4-/5   Long Term Goal #2 Independent transfers and gait with roller walker for functional household distances   Plan   Treatment/Interventions ADL retraining;Functional transfer training;LE strengthening/ROM; Elevations; Therapeutic exercise; Endurance training;Patient/family training;Equipment eval/education; Bed mobility;Gait training; Compensatory technique education   PT Frequency Other (Comment)  (5 times per week)   Recommendation   PT Discharge Recommendation Post acute rehabilitation services AM-PAC Basic Mobility Inpatient   Turning in Bed Without Bedrails 4   Lying on Back to Sitting on Edge of Flat Bed 3   Moving Bed to Chair 2   Standing Up From Chair 2   Walk in Room 2   Climb 3-5 Stairs 1   Basic Mobility Inpatient Raw Score 14   Basic Mobility Standardized Score 35 55   Highest Level Of Mobility   -[facility] Goal 4: Move to chair/commode   -HLM Achieved 6: Walk 10 steps or more   Barthel Index   Feeding 5   Bathing 0   Grooming Score 0   Dressing Score 5   Bladder Score 10   Bowels Score 10   Toilet Use Score 5   Transfers (Bed/Chair) Score 5   Mobility (Level Surface) Score 0   Stairs Score 0   Barthel Index Score 40   Additional Treatment Session   Start Time 1325   End Time 1340   Treatment Assessment S:  Patient without pain O:  Bilateral lower extremity exercise completed as listed below    Gait training with roller walker with Min to mod assist completed for 15 ft a: Patient will benefit from continued physical therapy with progression as tolerated and short-term rehab prior to return home to regain independent function   Exercises   Heelslides Supine;10 reps   Glute Sets Supine;5 reps   Hip Flexion Sitting;10 reps   Hip Abduction Sitting;10 reps   Knee AROM Short Arc Quad Supine;5 reps   Knee AROM Long Arc Quad Sitting;10 reps   Ankle Pumps Sitting;20 reps   Marching Standing;5 reps   Balance training  Sidestepping completed for balance and coordination   Licensure   [place] License Number  Faizan Dorsey [id number]

## 2022-11-30 ENCOUNTER — APPOINTMENT (OUTPATIENT)
Dept: NON INVASIVE DIAGNOSTICS | Facility: HOSPITAL | Age: 77
End: 2022-11-30

## 2022-11-30 VITALS
RESPIRATION RATE: 18 BRPM | DIASTOLIC BLOOD PRESSURE: 58 MMHG | HEIGHT: 68 IN | OXYGEN SATURATION: 95 % | BODY MASS INDEX: 24.29 KG/M2 | HEART RATE: 64 BPM | TEMPERATURE: 98.1 F | WEIGHT: 160.27 LBS | SYSTOLIC BLOOD PRESSURE: 113 MMHG

## 2022-11-30 LAB
ALBUMIN SERPL BCP-MCNC: 1.7 G/DL (ref 3.5–5)
ALP SERPL-CCNC: 67 U/L (ref 46–116)
ALT SERPL W P-5'-P-CCNC: 12 U/L (ref 12–78)
ANION GAP SERPL CALCULATED.3IONS-SCNC: 11 MMOL/L (ref 4–13)
APPEARANCE FLD: ABNORMAL
AST SERPL W P-5'-P-CCNC: 22 U/L (ref 5–45)
BASOPHILS # BLD AUTO: 0.02 THOUSANDS/ÂΜL (ref 0–0.1)
BASOPHILS NFR BLD AUTO: 0 % (ref 0–1)
BILIRUB SERPL-MCNC: 0.32 MG/DL (ref 0.2–1)
BUN SERPL-MCNC: 52 MG/DL (ref 5–25)
CALCIUM ALBUM COR SERPL-MCNC: 9.6 MG/DL (ref 8.3–10.1)
CALCIUM SERPL-MCNC: 7.8 MG/DL (ref 8.3–10.1)
CHLORIDE SERPL-SCNC: 96 MMOL/L (ref 96–108)
CO2 SERPL-SCNC: 29 MMOL/L (ref 21–32)
COLOR FLD: ABNORMAL
CREAT SERPL-MCNC: 5.2 MG/DL (ref 0.6–1.3)
EOSINOPHIL # BLD AUTO: 0.01 THOUSAND/ÂΜL (ref 0–0.61)
EOSINOPHIL NFR BLD AUTO: 0 % (ref 0–6)
ERYTHROCYTE [DISTWIDTH] IN BLOOD BY AUTOMATED COUNT: 16.2 % (ref 11.6–15.1)
GFR SERPL CREATININE-BSD FRML MDRD: 9 ML/MIN/1.73SQ M
GLUCOSE FLD-MCNC: 185 MG/DL
GLUCOSE SERPL-MCNC: 152 MG/DL (ref 65–140)
GLUCOSE SERPL-MCNC: 183 MG/DL (ref 65–140)
GLUCOSE SERPL-MCNC: 232 MG/DL (ref 65–140)
GLUCOSE SERPL-MCNC: 51 MG/DL (ref 65–140)
GLUCOSE SERPL-MCNC: 58 MG/DL (ref 65–140)
GLUCOSE SERPL-MCNC: 74 MG/DL (ref 65–140)
HCT VFR BLD AUTO: 24.8 % (ref 36.5–49.3)
HGB BLD-MCNC: 7.7 G/DL (ref 12–17)
IMM GRANULOCYTES # BLD AUTO: 0.15 THOUSAND/UL (ref 0–0.2)
IMM GRANULOCYTES NFR BLD AUTO: 1 % (ref 0–2)
LDH FLD L TO P-CCNC: 146 U/L
LYMPHOCYTES # BLD AUTO: 2.45 THOUSANDS/ÂΜL (ref 0.6–4.47)
LYMPHOCYTES NFR BLD AUTO: 21 % (ref 14–44)
LYMPHOCYTES NFR BLD AUTO: 89 %
MCH RBC QN AUTO: 27.1 PG (ref 26.8–34.3)
MCHC RBC AUTO-ENTMCNC: 31 G/DL (ref 31.4–37.4)
MCV RBC AUTO: 87 FL (ref 82–98)
MONOCYTES # BLD AUTO: 0.68 THOUSAND/ÂΜL (ref 0.17–1.22)
MONOCYTES NFR BLD AUTO: 6 % (ref 4–12)
MONOCYTES NFR BLD AUTO: 7 %
MRSA NOSE QL CULT: NORMAL
NEUTROPHILS # BLD AUTO: 8.25 THOUSANDS/ÂΜL (ref 1.85–7.62)
NEUTS SEG NFR BLD AUTO: 4 %
NEUTS SEG NFR BLD AUTO: 72 % (ref 43–75)
NRBC BLD AUTO-RTO: 0 /100 WBCS
PH BODY FLUID: 7.7
PHOSPHATE SERPL-MCNC: 4.5 MG/DL (ref 2.3–4.1)
PLATELET # BLD AUTO: 457 THOUSANDS/UL (ref 149–390)
PMV BLD AUTO: 9.1 FL (ref 8.9–12.7)
POTASSIUM SERPL-SCNC: 5.1 MMOL/L (ref 3.5–5.3)
PROT FLD-MCNC: 2.9 G/DL
PROT SERPL-MCNC: 7.3 G/DL (ref 6.4–8.4)
RBC # BLD AUTO: 2.84 MILLION/UL (ref 3.88–5.62)
SITE: ABNORMAL
SODIUM SERPL-SCNC: 136 MMOL/L (ref 135–147)
TOTAL CELLS COUNTED SPEC: 100
WBC # BLD AUTO: 11.56 THOUSAND/UL (ref 4.31–10.16)
WBC # FLD MANUAL: 254 /UL

## 2022-11-30 RX ORDER — LIDOCAINE HYDROCHLORIDE 10 MG/ML
INJECTION, SOLUTION EPIDURAL; INFILTRATION; INTRACAUDAL; PERINEURAL AS NEEDED
Status: COMPLETED | OUTPATIENT
Start: 2022-11-30 | End: 2022-11-30

## 2022-11-30 RX ADMIN — POLYETHYLENE GLYCOL 3350 17 G: 17 POWDER, FOR SOLUTION ORAL at 09:06

## 2022-11-30 RX ADMIN — REMDESIVIR 100 MG: 100 INJECTION, POWDER, LYOPHILIZED, FOR SOLUTION INTRAVENOUS at 14:14

## 2022-11-30 RX ADMIN — INSULIN LISPRO 5 UNITS: 100 INJECTION, SOLUTION INTRAVENOUS; SUBCUTANEOUS at 12:58

## 2022-11-30 RX ADMIN — TAMSULOSIN HYDROCHLORIDE 0.8 MG: 0.4 CAPSULE ORAL at 18:28

## 2022-11-30 RX ADMIN — INSULIN LISPRO 2 UNITS: 100 INJECTION, SOLUTION INTRAVENOUS; SUBCUTANEOUS at 12:58

## 2022-11-30 RX ADMIN — INSULIN LISPRO 5 UNITS: 100 INJECTION, SOLUTION INTRAVENOUS; SUBCUTANEOUS at 18:27

## 2022-11-30 RX ADMIN — CALCIUM ACETATE 667 MG: 667 CAPSULE ORAL at 09:02

## 2022-11-30 RX ADMIN — AMLODIPINE BESYLATE 10 MG: 10 TABLET ORAL at 09:02

## 2022-11-30 RX ADMIN — LOSARTAN POTASSIUM 50 MG: 50 TABLET, FILM COATED ORAL at 09:02

## 2022-11-30 RX ADMIN — FINASTERIDE 5 MG: 5 TABLET, FILM COATED ORAL at 09:02

## 2022-11-30 RX ADMIN — CALCIUM ACETATE 667 MG: 667 CAPSULE ORAL at 13:01

## 2022-11-30 RX ADMIN — SALINE NASAL SPRAY 1 SPRAY: 1.5 SOLUTION NASAL at 00:26

## 2022-11-30 RX ADMIN — CALCIUM ACETATE 667 MG: 667 CAPSULE ORAL at 18:28

## 2022-11-30 RX ADMIN — Medication 1 TABLET: at 09:02

## 2022-11-30 RX ADMIN — INSULIN LISPRO 1 UNITS: 100 INJECTION, SOLUTION INTRAVENOUS; SUBCUTANEOUS at 18:28

## 2022-11-30 RX ADMIN — LIDOCAINE HYDROCHLORIDE 10 ML: 10 INJECTION, SOLUTION EPIDURAL; INFILTRATION; INTRACAUDAL; PERINEURAL at 14:53

## 2022-11-30 RX ADMIN — GUAIFENESIN AND DEXTROMETHORPHAN 10 ML: 100; 10 SYRUP ORAL at 00:26

## 2022-11-30 RX ADMIN — GUAIFENESIN AND DEXTROMETHORPHAN 10 ML: 100; 10 SYRUP ORAL at 05:57

## 2022-11-30 RX ADMIN — PRAVASTATIN SODIUM 40 MG: 40 TABLET ORAL at 18:28

## 2022-11-30 RX ADMIN — HEPARIN SODIUM 5000 UNITS: 5000 INJECTION INTRAVENOUS; SUBCUTANEOUS at 05:32

## 2022-11-30 RX ADMIN — CLONIDINE HYDROCHLORIDE 0.1 MG: 0.1 TABLET ORAL at 09:02

## 2022-11-30 RX ADMIN — HEPARIN SODIUM 5000 UNITS: 5000 INJECTION INTRAVENOUS; SUBCUTANEOUS at 15:40

## 2022-11-30 RX ADMIN — GUAIFENESIN AND DEXTROMETHORPHAN 10 ML: 100; 10 SYRUP ORAL at 10:09

## 2022-11-30 NOTE — OCCUPATIONAL THERAPY NOTE
OT EVALUATION       11/30/22 1135   Note Type   Note type Evaluation   Pain Assessment   Pain Assessment Tool 0-10   Pain Score No Pain   Restrictions/Precautions   Other Precautions Contact/isolation; Airborne/isolation; Chair Alarm; Bed Alarm;Hard of hearing; Fall Risk   Home Living   Type of [address] Two level; Able to live on main level with bedroom/bathroom  (3 [address])   Home Equipment Cane;Walker   Additional Comments Patient using cane in home and roller walker out of home   Prior Function   Level of [place] Independent with ADLs; Independent with functional mobility   Lives With Spouse   Receives Help From Family; Neighbor  (Millie Mcleod comes into the house daily to assist patient and wife as needed due to wife's medical issues as well)   ADL   Eating Assistance 5  Supervision/Setup   Grooming Assistance 5  Supervision/Setup   UB Bathing Assistance 4  Minimal Assistance   LB [address] 3  Moderate Assistance   UB Dressing Assistance 4  Minimal Assistance    [address] 3  Moderate Assistance   [address]  3  Moderate Assistance   Bed Mobility   Supine to Sit 4  Minimal assistance   Sit to Supine 4  Minimal assistance   Transfers   Sit to Stand 3  Moderate assistance   Stand to Sit 3  Moderate assistance   Functional Mobility   Functional Mobility 4  Minimal assistance   Additional Comments few steps to head of bed   Additional items Rolling walker   Balance   Static Sitting Fair   Dynamic Sitting Fair -   Static Standing Fair -   Dynamic Standing Poor +   Activity Tolerance   Activity Tolerance Patient limited by fatigue   RUE Assessment   RUE Assessment WFL   LUE Assessment   LUE Assessment WFL   Cognition   Overall Cognitive Status WFL   Arousal/Participation Cooperative   Attention Within functional limits   Orientation Level Oriented to person;Oriented to place   Following Commands Follows multistep commands with increased time or repetition   Assessment   Limitation Decreased ADL status; Decreased Safe judgement during ADL;Decreased endurance;Decreased self-care trans;Decreased high-level ADLs; Decreased UE strength  (decreased balance and mobility)   Prognosis Good   Assessment Patient evaluated by Occupational Therapy  Patient admitted with SOB (shortness of breath)  The patients occupational profile, medical and therapy history includes a extensive additional review of physical, cognitive, or psychosocial history related to current functional performance  Comorbidities affecting functional mobility and ADLS include: arthritis, diabetes, ESRD on hemodialysis and hypertension  Prior to admission, patient was independent with functional mobility with cane/walker, independent with ADLS and requiring assist for IADLS  The evaluation identifies the following performance deficits: weakness, impaired balance, decreased endurance, increased fall risk, new onset of impairment of functional mobility, decreased ADLS, decreased IADLS, decreased activity tolerance, decreased safety awareness, impaired judgement and decreased strength, that result in activity limitations and/or participation restrictions  This evaluation requires clinical decision making of high complexity, because the patient presents with comorbidites that affect occupational performance and required significant modification of tasks or assistance with consideration of multiple treatment options  The Barthel Index was used as a functional outcome tool presenting with a score of Barthel Index Score: 45, indicating marked limitations of functional mobility and ADLS  The patient's raw score on the -PAC Daily Activity inpatient short form is 16, standardized score is 35 96, less than 39 4  Patients at this level are likely to benefit from DC to post-acute rehabilitation services  Please refer to the recommendation of the Occupational Therapist for safe DC planning   Patient will benefit from skilled Occupational Therapy services to address above deficits and facilitate a safe return to prior level of function  Goals   Patient Goals to get stronger   STG Time Frame   (1-7 days)   Short Term Goal  Goals established to promote Patient Goals: to get stronger:  Grooming: supervision standing at sink; Bathing: min assist; Upper Body Dressing supervision; Lower Body Dressing: min assist; Toileting: min assist; Patient will increase transfers to min assist to increase performance and safety with ADLS and functional mobility; Patient will increase standing tolerance to 3 minutes during ADL task to decrease assistance level and decrease fall risk; Patient will increase bed mobility to supervision in preparation for ADLS and transfers; Patient will increase functional mobility to and from bathroom with rolling walker with min assist to increase performance with ADLS and to use a toilet; Patient will tolerate 10 minutes of UE ROM/strengthening to increase general activity tolerance and performance in ADLS/IADLS; Patient will improve functional activity tolerance to 10 minutes of sustained functional tasks to increase participation in basic self-care and decrease assistance level;  Patient will increase dynamic sitting balance to fair to improve the ability to sit at edge of bed or on a chair for ADLS;  Patient will increase dynamic standing balance to fair- to improve postural stability and decrease fall risk during standing ADLS and transfers  LTG Time Frame   (8-14 days)   Long Term Goal Grooming: independent standing at sink; Bathing: supervision; Upper Body Dressing independent; Lower Body Dressing: supervision; Toileting: supervision; Patient will increase transfers to supervision to increase performance and safety with ADLS and functional mobility;  Patient will increase standing tolerance to 6 minutes during ADL task to decrease assistance level and decrease fall risk; Patient will increase bed mobility to independent in preparation for ADLS and transfers; Patient will increase functional mobility to and from bathroom with rolling walker with supervision to increase performance with ADLS and to use a toilet; Patient will tolerate 20 minutes of UE ROM/strengthening to increase general activity tolerance and performance in ADLS/IADLS; Patient will improve functional activity tolerance to 20 minutes of sustained functional tasks to increase participation in basic self-care and decrease assistance level;  Patient will increase dynamic sitting balance to fair+ to improve the ability to sit at edge of bed or on a chair for ADLS;  Patient will increase dynamic standing balance to fair to improve postural stability and decrease fall risk during standing ADLS and transfers  Pt will score >/= 20/24 on AM-PAC Daily Activity Inpatient scale to promote safe independence with ADLs and functional mobility; Pt will score >/= 75/100 on Barthel Index in order to decrease caregiver assistance needed and increase ability to perform ADLs and functional mobility  Plan   Treatment Interventions ADL retraining;Functional transfer training;UE strengthening/ROM; Endurance training;Patient/family training;Equipment evaluation/education; Activityengagement; Compensatory technique education   Goal Expiration Date 12/14/22   OT Frequency 3-5x/wk   Recommendation   OT Discharge Recommendation Post acute rehabilitation services   AM-PAC Daily Activity Inpatient   Lower Body Dressing 2   Bathing 2   Toileting 2   Upper Body Dressing 3   Grooming 3   Eating 4   Daily Activity Raw Score 16   Daily Activity Standardized Score (Calc for Raw Score >=11) 35 96   AM-PAC Applied Cognition Inpatient   Following a Speech/Presentation 4   Understanding Ordinary Conversation 4   Taking Medications 4   Remembering Where Things Are Placed or Put Away 4   Remembering List of 4-5 Errands 4   Taking Care of Complicated Tasks 3   Applied Cognition Raw Score 23   Applied Cognition Standardized Score 53 08   Barthel Index   Feeding 10   Bathing 0   Grooming Score 0   Dressing Score 5   Bladder Score 10   Bowels Score 10   Toilet Use Score 5   Transfers (Bed/Chair) Score 5   Mobility (Level Surface) Score 0   Stairs Score 0   Barthel Index Score 45   Licensure   [place] License Number  Angie Louis Hira [id number]

## 2022-11-30 NOTE — PROGRESS NOTES
NEPHROLOGY PROGRESS NOTE   Cameron Fletcher 76 y o  male MRN: [id number]  Unit/Bed#: [address] [place] [address] Encounter: [id number]    ASSESSMENT & PLAN:  76 y o  male history of ESRD on HD, diabetes mellitus type 2, hypertension, history of ANCA vasculitis who was admitted to [facility] after presenting with shortness of breath  End-stage renal disease on dialysis  -Outpatient unit:  [facility]  -Schedule:  TTS  -recent hospital admission and was discharged on 11/26 after hemodialysis treatment  -Access:  Right IJ Adrian  Has appointment for  AV fistula on 12/07  -Plan:   status post hemodialysis treatment on 11/29, post dialysis weight was 73 kg  As per outpatient records estimated dry weight 72 kg but during last treatment estimated dry weight was challenged  Next hemodialysis treatment tomorrow  Clinically volume status is acceptable  Will plan for more ultrafiltration tomorrow, recommend fluid restriction 1 5 L per day   -if plan for discharge will need to check with [facility] regarding the [address] policy  Message sent to case management     Volume status/fluid overload  -chest x-ray with finding of bilateral pleural effusion, will challenge target weight if plan for HD patient tomorrow   -During previous hospital admission was challenged to 70 7 kg     CKD/MBD  -hyperphosphatemia-as per Care everywhere on PhosLo t i d  With meal   Restarted on PhosLo on 11/29, phosphorus level currently at goal at 4 5     Blood pressure/Primary hypertension  -estimated dry weight 72 kg but during previous hospital admission was challenged to 70 7 kg  -continue current treatment with amlodipine 10 mg, clonidine 0 1 mg twice daily, losartan 50 mg twice daily   -blood pressure currently stable and is at goal     Electrolytes:   -hyperkalemia, resolved status post HD  -hyponatremia:   resolved     Recommend fluid restriction 1 5 L per day     Anemia due to ESRD  -Goal hemoglobin:  10-11 grams/deciliter  -Current hemoglobin:  Below goal but improving to 7 7  g/dl  -Plan:  Patient is on Mircera 150 mcg every 2 weeks as outpatient  Ordered for ISAMAR 4000 units with HD during hospital stay      Shortness of breath likely due to fluid overload/pleural effusion-noted plan for IR consult for thoracentesis  Also challenging target weight which may help     COVID 19 infection:  Management per primary team being treated with remdesivir     History of ANCA vasculitis, did not tolerate cyclophosphamide in April 2022 as per previous note  Currently not on treatment     Discussed with primary team    SUBJECTIVE:  C/o cough   SOB better     OBJECTIVE:  Current Weight: Weight - Scale: 72 7 kg (160 lb 4 4 oz)  Vitals:    11/30/22 0902   BP: 112/55   Pulse: 71   Resp: 20   Temp: 98 °F (36 7 °C)   SpO2: 97%       Intake/Output Summary (Last 24 hours) at 11/30/2022 1020  Last data filed at 11/29/2022 1300  Gross per 24 hour   Intake 600 ml   Output 3500 ml   Net -2900 ml       Physical Exam  General:  Ill looking, awake  Eyes: Conjunctivae pink,  Sclera anicteric  ENT: lips and mucous membranes moist  Neck: supple   Chest: Clear to Auscultation both lungs,  no crackles, ronchus or wheezing  CVS: S1 & S2 present, normal rate, regular rhythm, no murmur    Abdomen: soft, non-tender, non-distended, Bowel sounds normoactive  Extremities: no edema of  legs  Skin: no rash  Neuro: awake, alert, oriented  Psych: Mood and affect appropriate     Medications:    Current Facility-Administered Medications:   •  acetaminophen (TYLENOL) tablet 650 mg, 650 mg, Oral, Q6H PRN, Terrie Bailon MD  •  amLODIPine (NORVASC) tablet 10 mg, 10 mg, Oral, Daily, Terrie Bailon MD, 10 mg at 11/30/22 0902  •  calcium acetate (PHOSLO) capsule 667 mg, 667 mg, Oral, TID With Meals, Juan Linton MD, 667 mg at 11/30/22 0902  •  calcium carbonate (TUMS) chewable tablet 1,000 mg, 1,000 mg, Oral, Daily PRN, Terrie Bailon MD  • calcium carbonate-vitamin D 500 mg-5 mcg tablet 1 tablet, 1 tablet, Oral, Daily With Breakfast, Bryon Daniel MD, 1 tablet at 11/30/22 0902  •  cloNIDine (CATAPRES) tablet 0 1 mg, 0 1 mg, Oral, Q12H [facility], Bryon Daniel MD, 0 1 mg at 11/30/22 0902  •  dextromethorphan-guaiFENesin (ROBITUSSIN DM) oral syrup 10 mL, 10 mL, Oral, Q4H PRN, Bryon Daniel MD, 10 mL at 11/30/22 1009  •  epoetin ani (EPOGEN,PROCRIT) injection 4,000 Units, 4,000 Units, Intravenous, After Dialysis, Lou Contreras MD  •  finasteride (PROSCAR) tablet 5 mg, 5 mg, Oral, Daily, Bryon Daniel MD, 5 mg at 11/30/22 0902  •  heparin (porcine) subcutaneous injection 5,000 Units, 5,000 Units, Subcutaneous, Q8H [facility], Bryon Daniel MD, 5,000 Units at 11/30/22 0532  •  insulin glargine (LANTUS) subcutaneous injection 20 Units 0 2 mL, 20 Units, Subcutaneous, HS, Beatrice Romero, DO, 20 Units at 11/29/22 2216  •  insulin lispro (HumaLOG) 100 units/mL subcutaneous injection 1-5 Units, 1-5 Units, Subcutaneous, 4x Daily (AC & HS), 2 Units at 11/29/22 2216 **AND** Fingerstick Glucose (POCT), , , 4x Daily AC and at bedtime, Bryon Daniel MD  •  insulin lispro (HumaLOG) 100 units/mL subcutaneous injection 5 Units, 5 Units, Subcutaneous, TID With Meals, Marcel Ball, DO, 5 Units at 11/29/22 1740  •  losartan (COZAAR) tablet 50 mg, 50 mg, Oral, BID, Bryon Daniel MD, 50 mg at 11/30/22 0902  •  ondansetron (ZOFRAN) injection 4 mg, 4 mg, Intravenous, Q6H PRN, Bryon Daniel MD  •  polyethylene glycol (MIRALAX) packet 17 g, 17 g, Oral, Daily, Bryon Daniel MD, 17 g at 11/30/22 5649  •  pravastatin (PRAVACHOL) tablet 40 mg, 40 mg, Oral, Daily With Ronal Zarate MD, 40 mg at 11/29/22 1742  •  [COMPLETED] remdesivir [place]) 200 mg in sodium chloride 0 9 % 290 mL IVPB, 200 mg, Intravenous, Q24H, 200 mg at 11/28/22 1437 **FOLLOWED BY** remdesivir (Veklury) 100 mg in sodium chloride 0 9 % 270 mL IVPB, 100 mg, Intravenous, Q24H, Jen Hopkins MD, 100 mg at 11/29/22 1532  •  sodium chloride (OCEAN) 0 65 % nasal spray 1 spray, 1 spray, Each Nare, Daily PRN, eJn Hopkins MD, 1 spray at 11/30/22 0026  •  tamsulosin (FLOMAX) capsule 0 8 mg, 0 8 mg, Oral, Daily With Troy Lemons MD, 0 8 mg at 11/29/22 1742    Invasive Devices:        Lab Results:   Results from last 7 days   Lab Units 11/30/22  0544 11/29/22  0553 11/28/22  1006   WBC Thousand/uL 11 56* 8 59 11 38*   HEMOGLOBIN g/dL 7 7* 7 2* 8 0*   HEMATOCRIT % 24 8* 23 1* 25 4*   PLATELETS Thousands/uL 457* 371 362   POTASSIUM mmol/L 5 1 6 1* 5 2   CHLORIDE mmol/L 96 94* 95*   CO2 mmol/L 29 25 27   BUN mg/dL 52* 84* 65*   CREATININE mg/dL 5 20* 8 26* 7 06*   CALCIUM mg/dL 7 8* 7 9* 8 3   MAGNESIUM mg/dL  --   --  2 3   PHOSPHORUS mg/dL 4 5*  --   --    ALK PHOS U/L 67 70 78   ALT U/L 12 13 10*   AST U/L 22 11  --        Previous work up:         Portions of the record may have been created with voice recognition software  Occasional wrong word or "sound a like" substitutions may have occurred due to the inherent limitations of voice recognition software  Read the chart carefully and recognize, using context, where substitutions have occurred  If you have any questions, please contact the dictating provider

## 2022-11-30 NOTE — CASE MANAGEMENT
Case Management Progress Note    Patient name Jaqueline Tiwari  Location 3 [facility] 315/3  [facility]-* MRN [id number]  : 1945 Date 2022       LOS (days): 2  Geometric Mean LOS (GMLOS) (days): 5 20  Days to GMLOS:3 1        OBJECTIVE:        Current admission status: Inpatient  Preferred Pharmacy:   [facility] [address] [facility]  [address] [address]  Phone: [phone] Fax: [phone]    [facility] ([address],   [address] [facility]  [address] [place] [address]  Phone: [phone] Fax: [phone]    Primary Care Provider: Davion Dinh MD    Primary Insurance: MEDICARE  Secondary Insurance: BLUE CROSS    PROGRESS NOTE:     CM made aware by Amy Ball at [employer] that they will not be able to provide transportation for patient to dialysis due to being covid+ and [facility] informed that patient cannot ride the [employer] for now due to being covid +  CM reached out to spouse Niko Benitez to check if she or any family member can provide a ride  Niko Benitez mentioned she cannot drive due to her surgery and does not have any family in the area to provide transportation  Reached out to [facility] and [facility] to see if transportation can be set up for dialysis for now  Arthurine Ano again to confirm if transportation was an issue and she mentioned that [facility] told her patient cannot be seen at [facility] due to covid  Informed Amy Ball that Cm spoke with Narinder Allen yesterday and today regarding the covid issue and was told the same thing that chair time for [facility] will not be affected and the only problem is that patient cannot ride [employer]  Amy Ball to call [facility] back regarding this issue  CM to follow up

## 2022-11-30 NOTE — DISCHARGE SUMMARY
Discharge Summary - [facility] Family Medicine Residency     Patient Information: Pablo Blanton 76 y o  male MRN: [id number]  Unit/Bed#: [address] [place] [address] Encounter: [id number]     Admitting Physician: Beverly Urbina MD  Discharging Physician/Practitioner: Beverly Urbina MD   PCP: Nicolas Zheng MD  Admission Date:   Admission Orders (From admission, onward)     Ordered        11/28/22 1224  [address] [facility]  Once                      Discharge Date: 11/30/22      Reason for Admission: Shortness of breath [R06 02]  Cough [R05 9]  Blurred vision [H53 8]  Vomiting [R11 10]  SOB (shortness of breath) [R06 02]  ESRD on dialysis ([facility] [facility] ) [N18 6, Z99 2]  Bilateral pleural effusion [J90]  Chronic anemia [D64 9]  ESRD on hemodialysis ([facility] ) [N18 6, Z99 2]  COVID-19 virus infection [U07 1]     Discharge Diagnoses:      Principal Problem:    SOB (shortness of breath)  Active Problems:    ESRD on dialysis ([facility] )    Pleural effusion    COVID-19    Benign essential hypertension    Hyperlipidemia    Type 2 diabetes mellitus with chronic kidney disease on chronic dialysis, with long-term current use of insulin [facility] )    Anemia due to chronic kidney disease, on chronic dialysis ([facility] )    ANCA-associated vasculitis  Resolved Problems:    * No resolved hospital problems  *       Consultations During Hospital Stay:  ·       nephrology, Interventional Radiology     Procedures Performed:   ·       hemodialysis, thoracentesis     Significant Findings / Test Results:   11/30: Hgb 7 7; WBC: 11 56;  11/29: Hgb 7 2;  CMP abnormal (ESRD on HD); BNP: 85302; Procal 1 83  11/28: COVID positive, WBC 11 3, BNP 23181 troponin 15>15 CK 30>22, sodium 134  Chest x-ray:  Bilateral pleural effusions with adjacent atelectasis  11/25:Sputum culture 2+ Candida albicans; 1+ poly, 1+ yeast     Incidental Findings:   ·       None     Test Results Pending at Discharge (will require follow up):    ·       None     Outpatient Tests Requested:  · None     Outpatient follow-up Requested:  ·       PCP    Complications:  none    Things to address at first visit after hospitalization   How is your breathing? Do you feel like the STR helped with you ADLs? Have you been going to HD? Did you follow up with Dr Ally Thorpe for AV fistula? Hospital Course:      Lorri Jacobsen is a 76 y o  male patient who originally presented to the hospital on 11/28/2022 due to Cough, SOB and vomiting  Admission HPI, "Lorri Jacobsen is a 76 y o  male who presents with cough, shortness of breath, blurred vision, and vomiting  He notes that the shortness of breath he currently and is similar to that from his last admission, which had not improved  He notes that the blurry vision has been going on for quite some time  Coughing and vomiting began last night into this morning  He is a dialysis patient on the following schedule: Tu/Th/Sat  He received hemodialysis on Saturday  Additionally he was incidentally positive for COVID-19 this admission"    SOB:  Acute, and did not require oxygen  Likely secondary to pleural effusion and COVID19 pneumonia  Which was managed as mentioned below  Pleural Effusion:  Acute, was unresolved from previous admission  Received a thoracentesis (1 Liter evacuated)  COVID 19:  Cough, shortness of breath abdominal pain nausea and vomiting in the setting COVID-19 with an elevated BNP and was treated on the mild COVID pathway with remdesivir  Was given Robitussin DM, incentive spirometry for symptomatic management  ESRD on dialysis:  Chronic, anuric since March 22 and continued on admission    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, requiring chronic HD  Anemia due to chronic kidney disease, on chronic dialysis: Chronic, started receiving EPO 4000U with HD during hospitalization      Type 2 diabetes mellitus with chronic kidney disease on chronic dialysis, with long-term current use of insulin: Switched from home regimen to Lantus and ISS in the setting of steroids and hospitalization    Hyperlipidemia: Chronic, stable and treated with pravastatin home dose  Benign essential hypertension: Chronic, stable and treated with home medications including amlodipine, clonidine and losartan       Condition at Discharge: stable      Discharge Day Visit / Exam:      Vitals: Blood Pressure: 113/58 (11/30/22 1500)  Pulse: 64 (11/30/22 1500)  Temperature: 98 1 °F (36 7 °C) (11/30/22 1500)  Temp Source: Oral (11/30/22 1500)  Respirations: 18 (11/30/22 1500)  Height: 5' 8" (172 7 cm) (11/28/22 0912)  Weight - Scale: 72 7 kg (160 lb 4 4 oz) (11/28/22 0912)  SpO2: 95 % (11/30/22 1500)  Exam:   Physical Exam  Constitutional:       Appearance: Normal appearance  He is normal weight  HENT:      Head: Normocephalic and atraumatic  Right Ear: External ear normal       Left Ear: External ear normal       Ears:      Comments: Hearing aids present     Nose: Nose normal       Mouth/Throat:      Mouth: Mucous membranes are moist       Pharynx: Oropharynx is clear  Eyes:      Extraocular Movements: Extraocular movements intact  Conjunctiva/sclera: Conjunctivae normal    Cardiovascular:      Rate and Rhythm: Normal rate and regular rhythm  Pulses: Normal pulses  Heart sounds: Normal heart sounds  Pulmonary:      Effort: Pulmonary effort is normal       Breath sounds: Decreased air movement present  Examination of the right-lower field reveals decreased breath sounds  Decreased breath sounds present  Abdominal:      General: Abdomen is flat  Bowel sounds are normal    Genitourinary:     Comments: No Wall  Musculoskeletal:         General: Normal range of motion  Cervical back: Normal range of motion  Skin:     General: Skin is warm  Capillary Refill: Capillary refill takes less than 2 seconds     Neurological:      General: No focal deficit present  Mental Status: He is alert and oriented to person, place, and time  Mental status is at baseline  Psychiatric:         Mood and Affect: Mood normal          Behavior: Behavior normal          Thought Content: Thought content normal          Judgment: Judgment normal          Discussion with Family:   Patient Information Sharing: With the consent of Lorri Jacobsen, their loved ones Christiano Singh, spouse) were notified today by inpatient team of the patient’s condition and current plan  All questions answered  Discharge instructions/Information to patient and family:   See after visit summary for information provided to patient and family  Discharge Medications:     Medication List      CONTINUE taking these medications    • amLODIPine 10 mg tablet; Commonly known as: \[facility] [place] [facility]\""; Take 1 tablet (10   mg total) by mouth daily  • B-D UF III MINI PEN NEEDLES 31G X 5 MM Misc; Generic drug: Insulin Pen   Needle; Use twice daily with insulin pen as directed  • calcium acetate capsule; Commonly known as: PHOSLO; Take 1 capsule (667   mg total) by mouth 3 (three) times a day with meals Most days only takes   BID  • calcium carbonate-vitamin D 500 mg-200 units per tablet; Commonly known   as: OSCAL-D; Take 1 tablet by mouth 2 (two) times a day with meals  • cloNIDine 0 1 mg tablet; Commonly known as: CATAPRES; Take 1 tablet (0 1   mg total) by mouth every 12 (twelve) hours  • dextromethorphan-guaiFENesin  mg/5 mL syrup; Commonly known as:   ROBITUSSIN DM; Take 10 mL by mouth every 4 (four) hours as needed for   cough for up to 10 days  • dutasteride 0 5 mg capsule; Commonly known as: AVODART; Take 1 capsule   (0 5 mg total) by mouth daily  • glucose blood test strip; Use 1 each 3 (three) times a day  • losartan 50 mg tablet; Commonly known as: COZAAR;  Take 1 tablet (50 mg   total) by mouth 2 (two) times a day  • NovoLOG 70/30 FlexPen ReliOn 100 Units/mL injection pen; Generic drug:   insulin aspart protamine-insulin aspart; Inject 15 Units under the skin 2   (two) times a day before meals  • simvastatin 40 mg tablet; Commonly known as: ZOCOR; Take 1 tablet (40 mg   total) by mouth daily at bedtime  • sodium chloride 0 65 % nasal spray; Commonly known as: OCEAN; 1 spray   into each nostril as needed for congestion  • tamsulosin 0 4 mg; Commonly known as: FLOMAX; Take 2 capsules (0 8 mg   total) by mouth daily with dinner        Disposition:   Acute Rehab at [facility]        Discharge Statement:  I spent 30 minutes discharging the patient  This time was spent on the day of discharge  I had direct contact with the patient on the day of discharge  Greater than 50% of the total time was spent examining patient, answering all patient questions, arranging and discussing plan of care with patient as well as directly providing post-discharge instructions  Additional time then spent on discharge activities       ** Please Note: This note has been constructed using a voice recognition system **     Latricia Oppenheim, MD   11/30/22  4:06 PM

## 2022-11-30 NOTE — PLAN OF CARE
Problem: Potential for Falls  Goal: Patient will remain free of falls  Description: INTERVENTIONS:  - Educate patient/family on patient safety including physical limitations  - Instruct patient to call for assistance with activity   - Consult OT/PT to assist with strengthening/mobility   - Keep Call bell within reach  - Keep bed low and locked with side rails adjusted as appropriate  - Keep care items and personal belongings within reach  - Initiate and maintain comfort rounds  - Make Fall Risk Sign visible to staff  - Offer Toileting every 2 Hours, in advance of need  - Initiate/Maintain bed alarm  - Obtain necessary fall risk management equipment: alarms  - Apply yellow socks and bracelet for high fall risk patients  - Consider moving patient to room near nurses station  Outcome: Progressing     Problem: PAIN - ADULT  Goal: Verbalizes/displays adequate comfort level or baseline comfort level  Description: Interventions:  - Encourage patient to monitor pain and request assistance  - Assess pain using appropriate pain scale  - Administer analgesics based on type and severity of pain and evaluate response  - Implement non-pharmacological measures as appropriate and evaluate response  - Consider cultural and social influences on pain and pain management  - Notify physician/advanced practitioner if interventions unsuccessful or patient reports new pain  Outcome: Progressing     Problem: INFECTION - ADULT  Goal: Absence or prevention of progression during hospitalization  Description: INTERVENTIONS:  - Assess and monitor for signs and symptoms of infection  - Monitor lab/diagnostic results  - Monitor all insertion sites, i e  indwelling lines, tubes, and drains  - Monitor endotracheal if appropriate and nasal secretions for changes in amount and color  - [place] appropriate cooling/warming therapies per order  - Administer medications as ordered  - Instruct and encourage patient and family to use good hand hygiene technique  - Identify and instruct in appropriate isolation precautions for identified infection/condition  Outcome: Progressing  Goal: Absence of fever/infection during neutropenic period  Description: INTERVENTIONS:  - Monitor WBC    Outcome: Progressing     Problem: SAFETY ADULT  Goal: Patient will remain free of falls  Description: INTERVENTIONS:  - Educate patient/family on patient safety including physical limitations  - Instruct patient to call for assistance with activity   - Consult OT/PT to assist with strengthening/mobility   - Keep Call bell within reach  - Keep bed low and locked with side rails adjusted as appropriate  - Keep care items and personal belongings within reach  - Initiate and maintain comfort rounds  - Make Fall Risk Sign visible to staff  - Offer Toileting every 2 Hours, in advance of need  - Initiate/Maintain bed alarm  - Obtain necessary fall risk management equipment: alarms  - Apply yellow socks and bracelet for high fall risk patients  - Consider moving patient to room near nurses station  Outcome: Progressing  Goal: Maintain or return to baseline ADL function  Description: INTERVENTIONS:  -  Assess patient's ability to carry out ADLs; assess patient's baseline for ADL function and identify physical deficits which impact ability to perform ADLs (bathing, care of mouth/teeth, toileting, grooming, dressing, etc )  - Assess/evaluate cause of self-care deficits   - Assess range of motion  - Assess patient's mobility; develop plan if impaired  - Assess patient's need for assistive devices and provide as appropriate  - Encourage maximum independence but intervene and supervise when necessary  - Involve family in performance of ADLs  - Assess for home care needs following discharge   - Consider OT consult to assist with ADL evaluation and planning for discharge  - Provide patient education as appropriate  Outcome: Progressing  Goal: Maintains/Returns to pre admission functional level  Description: INTERVENTIONS:  - Perform BMAT or MOVE assessment daily    - Set and communicate daily mobility goal to care team and patient/family/caregiver  - Collaborate with rehabilitation services on mobility goals if consulted  - Perform Range of Motion 2 times a day  - Reposition patient every 2 hours  - Dangle patient 2 times a day  - Stand patient 2 times a day  - Ambulate patient 2 times a day  - Out of bed to chair 2 times a day   - Out of bed for meals 2 times a day  - Out of bed for toileting  - Record patient progress and toleration of activity level   Outcome: Progressing     Problem: DISCHARGE PLANNING  Goal: Discharge to home or other facility with appropriate resources  Description: INTERVENTIONS:  - Identify barriers to discharge w/patient and caregiver  - Arrange for needed discharge resources and transportation as appropriate  - Identify discharge learning needs (meds, wound care, etc )  - Arrange for interpretive services to assist at discharge as needed  - Refer to Case Management Department for coordinating discharge planning if the patient needs post-hospital services based on physician/advanced practitioner order or complex needs related to functional status, cognitive ability, or social support system  Outcome: Progressing     Problem: Knowledge Deficit  Goal: Patient/family/caregiver demonstrates understanding of disease process, treatment plan, medications, and discharge instructions  Description: Complete learning assessment and assess knowledge base    Interventions:  - Provide teaching at level of understanding  - Provide teaching via preferred learning methods  Outcome: Progressing     Problem: Prexisting or High Potential for Compromised Skin Integrity  Goal: Skin integrity is maintained or improved  Description: INTERVENTIONS:  - Identify patients at risk for skin breakdown  - Assess and monitor skin integrity  - Assess and monitor nutrition and hydration status  - Monitor labs   - Assess for incontinence   - Turn and reposition patient  - Assist with mobility/ambulation  - Relieve pressure over bony prominences  - Avoid friction and shearing  - Provide appropriate hygiene as needed including keeping skin clean and dry  - Evaluate need for skin moisturizer/barrier cream  - Collaborate with interdisciplinary team   - Patient/family teaching  - Consider wound care consult   Outcome: Progressing     Problem: METABOLIC, FLUID AND ELECTROLYTES - ADULT  Goal: Electrolytes maintained within normal limits  Description: INTERVENTIONS:  - Monitor labs and assess patient for signs and symptoms of electrolyte imbalances  - Administer electrolyte replacement as ordered  - Monitor response to electrolyte replacements, including repeat lab results as appropriate  - Instruct patient on fluid and nutrition as appropriate  Outcome: Progressing  Goal: Fluid balance maintained  Description: INTERVENTIONS:  - Monitor labs   - Monitor I/O and WT  - Instruct patient on fluid and nutrition as appropriate  - Assess for signs & symptoms of volume excess or deficit  Outcome: Progressing     Problem: Nutrition/Hydration-ADULT  Goal: Nutrient/Hydration intake appropriate for improving, restoring or maintaining nutritional needs  Description: Monitor and assess patient's nutrition/hydration status for malnutrition  Collaborate with interdisciplinary team and initiate plan and interventions as ordered  Monitor patient's weight and dietary intake as ordered or per policy  Utilize nutrition screening tool and intervene as necessary  Determine patient's food preferences and provide high-protein, high-caloric foods as appropriate       INTERVENTIONS:  - Monitor oral intake, urinary output, labs, and treatment plans  - Assess nutrition and hydration status and recommend course of action  - Evaluate amount of meals eaten  - Assist patient with eating if necessary   - Allow adequate time for meals  - Recommend/ encourage appropriate diets, oral nutritional supplements, and vitamin/mineral supplements  - Order, calculate, and assess calorie counts as needed  - Recommend, monitor, and adjust tube feedings and TPN/PPN based on assessed needs  - Assess need for intravenous fluids  - Provide specific nutrition/hydration education as appropriate  - Include patient/family/caregiver in decisions related to nutrition  Outcome: Progressing

## 2022-11-30 NOTE — CASE MANAGEMENT
Case Management Discharge Planning Note    Patient name Adilene Dennis  Location 2 [facility] [address]-* MRN [id number]  : 1945 Date 2022       Current Admission Date: 2022  Current Admission Diagnosis:SOB (shortness of breath)   Patient Active Problem List    Diagnosis Date Noted   • COVID-19 2022   • Pleural effusion 2022   • SOB (shortness of breath) 2022   • Bronchitis 2022   • Fluid overload 2022   • Hyperphosphatemia associated with renal failure 10/19/2022   • Urinary retention 10/17/2022   • ESRD on dialysis [facility]) 2022   • Hyponatremia 2022   • ANCA-associated vasculitis 2022   • Pulmonary hypertension ([facility] ) 2022   • Dialysis patient ([facility] ) 2022   • Anemia due to chronic kidney disease, on chronic dialysis ([facility] )    • Hemoptysis 2022   • Other proteinuria    • Symptomatic anemia 2022   • Chest pain 2022   • Melena 2022   • Elevated PSA 2021   • Chronic pain of right knee 2021   • Primary osteoarthritis of right knee 2021   • Bladder stones 12/15/2016   • Type 2 diabetes mellitus with chronic kidney disease on chronic dialysis, with long-term current use of insulin ([facility] ) 2016   • Benign colon polyp 2013   • Benign prostatic hyperplasia with lower urinary tract symptoms 2013   • Benign essential hypertension 2013   • Hyperlipidemia 2012   • Vitamin D deficiency 2012      LOS (days): 2  Geometric Mean LOS (GMLOS) (days): 5 20  Days to GMLOS:3 3     OBJECTIVE:  Risk of Unplanned Readmission Score: 53 38       Current admission status: Inpatient   Preferred Pharmacy:   [facility] [address]  Phone: [phone] Fax: [phone]    [facility] ([address] [place] [place]  [address] [address]  Phone: [phone] Fax: [phone]    Primary Care Provider: Alexis Persaud MD    Primary Insurance: MEDICARE  Secondary Insurance: BLUE CROSS    DISCHARGE DETAILS:        Other Referral/Resources/Interventions Provided:  Interventions: Transportation  Referral Comments: BLS requested via [employer] for today 1700, Artur confirmed  for 1700  Dr Mignon Floyd and RN Juli Christianson made aware  Jenna Speaks at [employer]   WCV requested      Transport at Discharge : BLS Ambulance [employer]   Dispatcher Contacted: Yes  Number/Name of Dispatcher: [employer]  [facility] Artur  ETA of Transport (Date): 11/30/22  ETA of Transport (Time): 1700

## 2022-11-30 NOTE — SEDATION DOCUMENTATION
Right thora done 1000 ml dark lester fluid removed, pt had some pain after fluid removal, vss  Report given to [facility], RN  Band aid to site  Fluid sent to lab

## 2022-12-01 NOTE — [place] UNIVERSAL TRANSFER FORM
[place] UN[place] FORM  (ALL ITEMS MUST BE COMPLETED)    1  TRANSFER FROM: [address]      TRANSFER TO: Emily Delgado    2  DATE OF TRANSFER: 11/30/2022                        TIME OF TRANSFER: 1900    3  PATIENT NAME: Rachael Payton      YOB: 1945                             GENDER: male    4  LANGUAGE:   English    5  PHYSICIAN NAME:  Hans Katz MD                   PHONE: [phone]    6  CODE STATUS: Level 1 - Full Code        Out of Hospital DNR Attached: No    7  :                                      :  Extended Emergency Contact Information  Primary Emergency Contact: [facility]  Address: [address]  Mobile Phone: [phone]  Relation: Spouse  Secondary Emergency Contact: Alexa Angeles  Mobile Phone: [phone]  Relation: Joshua Daily Representative/Proxy:  No           Legal Guardian:  No             NAME OF:           HEALTH CARE REPRESENTATIVE/PROXY:                                         OR           LEGAL GUARDIAN, IF NOT :                                               PHONE:  (Day)           (Night)                        (Cell)    8  REASON FOR TRANSFER: (Must include brief medical history and recent changes in physical function or cognition ) rehab            V/S: /58   Pulse 64   Temp 98 1 °F (36 7 °C) (Oral)   Resp 18   Ht 5' 8" (1 727 m)   Wt 72 7 kg (160 lb 4 4 oz)   SpO2 95%   BMI 24 37 kg/m²           PAIN: None    9  PRIMARY DIAGNOSIS: SOB (shortness of breath)      Secondary Diagnosis:         Pacemaker: No      Internal Defib: No          Mental Health Diagnosis (if Applicable):    10  RESTRAINTS: No     11  RESPIRATORY NEEDS: None    12  ISOLATION/PRECAUTION: Othercovid    13  ALLERGY: Amoxicillin    14  SENSORY:       Vision Good, Hearing Good  and Speech Clear    15  SKIN CONDITION: No Wounds    16  DIET: Regular    17  IV ACCESS: Otherhd double cath for dialysis    18  PERSONAL ITEMS SENT WITH PATIENT: Walker    23  ATTACHED DOCUMENTS: MUST ATTACH CURRENT MEDICATION INFORMATION Face Sheet, MAR and Discharge Summary    20  AT RISK ALERTS:Falls        HARM TO: N/A    21  WEIGHT BEARING STATUS:         Left Leg: Limited        Right Leg: Limited    22  MENTAL STATUS:Alert and Oriented    23  FUNCTION:        Walk: With Help        Transfer: With Help        Toilet: Self        Feed: Self    24  IMMUNIZATIONS/SCREENING:     Immunization History   Administered Date(s) Administered   • COVID-19 MODERNA VACC 0 25 ML IM BOOSTER 12/03/2021   • COVID-19 PFIZER VACCINE 0 3 ML IM 05/06/2021, 05/29/2021   • Hepatitis B Vaccine (Recombinant), Cpg Adjuvanted 04/14/2022, 05/12/2022, 07/14/2022, 09/15/2022   • Influenza Split High Dose Preservative Free IM 10/11/2013, 09/23/2016, 11/01/2017   • Influenza, high dose seasonal 0 7 mL 12/17/2018, 10/02/2019, 11/09/2020, 09/30/2021, 10/17/2022   • Influenza, seasonal, injectable 11/17/2000, 11/16/2001, 12/06/2002, 10/30/2003, 10/18/2005, 10/20/2006, 10/17/2007, 10/28/2008, 09/15/2009, 09/11/2010, 09/19/2011, 09/26/2012   • Pneumococcal Conjugate 13-Valent 08/05/2015   • Pneumococcal Polysaccharide PPV23 11/16/2001, 09/25/2012   • Tdap 02/14/2018   • Zoster 09/04/2015       25  BOWEL: Continent    26  BLADDER: Continent    27   SENDING FACILITY CONTACT: svetlana                  Title: rn        Unit: 3n        Phone: [phone] [address] (if known):        Title:        Unit:         Phone:         FORM PREFILLED BY (if applicable)       Title:       Unit:        Phone:         FORM COMPLETED BY Ankit Fuentes RN      Title: lyudmila      Phone: [phone]

## 2022-12-01 NOTE — NURSING NOTE
Patient left unit via wheelchair in stable condition with all belongings accompanied by transport team   AVS and all discharge paperwork sent to receiving facility via transport team and full report given by this RN to Olesya Ross RN, at receiving facility

## 2022-12-03 LAB
BACTERIA SPEC BFLD CULT: NO GROWTH
GRAM STN SPEC: NORMAL

## 2022-12-07 LAB
BACTERIA SPT RESP CULT: ABNORMAL
BACTERIA SPT RESP CULT: ABNORMAL
GRAM STN SPEC: ABNORMAL

## 2022-12-14 ENCOUNTER — DOCUMENTATION (OUTPATIENT)
Dept: FAMILY MEDICINE CLINIC | Facility: CLINIC | Age: 77
End: 2022-12-14

## 2022-12-14 DIAGNOSIS — R91.8 LUNG MASS: Primary | ICD-10-CM

## 2022-12-20 ENCOUNTER — APPOINTMENT (OUTPATIENT)
Dept: RADIOLOGY | Facility: HOSPITAL | Age: 77
End: 2022-12-20

## 2022-12-20 ENCOUNTER — APPOINTMENT (OUTPATIENT)
Dept: DIALYSIS | Facility: HOSPITAL | Age: 77
End: 2022-12-20

## 2022-12-20 ENCOUNTER — APPOINTMENT (EMERGENCY)
Dept: RADIOLOGY | Facility: HOSPITAL | Age: 77
End: 2022-12-20

## 2022-12-20 ENCOUNTER — HOSPITAL ENCOUNTER (INPATIENT)
Facility: HOSPITAL | Age: 77
LOS: 2 days | Discharge: NURSING, REHAB OR LONG-TERM CARE | End: 2022-12-23
Attending: EMERGENCY MEDICINE | Admitting: FAMILY MEDICINE

## 2022-12-20 DIAGNOSIS — J40 BRONCHITIS: ICD-10-CM

## 2022-12-20 DIAGNOSIS — J90 PLEURAL EFFUSION: ICD-10-CM

## 2022-12-20 DIAGNOSIS — R06.02 SOB (SHORTNESS OF BREATH): ICD-10-CM

## 2022-12-20 DIAGNOSIS — I77.82 ANCA-ASSOCIATED VASCULITIS: ICD-10-CM

## 2022-12-20 DIAGNOSIS — D64.9 SYMPTOMATIC ANEMIA: Primary | ICD-10-CM

## 2022-12-20 DIAGNOSIS — D64.9 ANEMIA, UNSPECIFIED TYPE: ICD-10-CM

## 2022-12-20 DIAGNOSIS — R91.1 LUNG NODULE SEEN ON IMAGING STUDY: ICD-10-CM

## 2022-12-20 PROBLEM — Z99.2 DIALYSIS PATIENT (HCC): Status: RESOLVED | Noted: 2022-01-01 | Resolved: 2022-01-01

## 2022-12-20 PROBLEM — R60.1 ANASARCA: Status: ACTIVE | Noted: 2022-01-01

## 2022-12-20 PROBLEM — E87.70 VOLUME OVERLOAD: Status: ACTIVE | Noted: 2022-12-20

## 2022-12-20 LAB
2HR DELTA HS TROPONIN: 1 NG/L
4HR DELTA HS TROPONIN: 3 NG/L
ABO GROUP BLD: NORMAL
ALBUMIN SERPL BCP-MCNC: 1.6 G/DL (ref 3.5–5)
ALP SERPL-CCNC: 76 U/L (ref 46–116)
ALT SERPL W P-5'-P-CCNC: 11 U/L (ref 12–78)
ANION GAP SERPL CALCULATED.3IONS-SCNC: 8 MMOL/L (ref 4–13)
APTT PPP: 44 SECONDS (ref 23–37)
AST SERPL W P-5'-P-CCNC: 13 U/L (ref 5–45)
BILIRUB SERPL-MCNC: 0.36 MG/DL (ref 0.2–1)
BLD GP AB SCN SERPL QL: NEGATIVE
BUN SERPL-MCNC: 37 MG/DL (ref 5–25)
CALCIUM ALBUM COR SERPL-MCNC: 9.8 MG/DL (ref 8.3–10.1)
CALCIUM SERPL-MCNC: 7.9 MG/DL (ref 8.3–10.1)
CARDIAC TROPONIN I PNL SERPL HS: 11 NG/L
CARDIAC TROPONIN I PNL SERPL HS: 12 NG/L
CARDIAC TROPONIN I PNL SERPL HS: 14 NG/L
CHLORIDE SERPL-SCNC: 98 MMOL/L (ref 96–108)
CO2 SERPL-SCNC: 32 MMOL/L (ref 21–32)
CREAT SERPL-MCNC: 3.9 MG/DL (ref 0.6–1.3)
ERYTHROCYTE [DISTWIDTH] IN BLOOD BY AUTOMATED COUNT: 15.5 % (ref 11.6–15.1)
FLUAV RNA RESP QL NAA+PROBE: NEGATIVE
FLUBV RNA RESP QL NAA+PROBE: NEGATIVE
GFR SERPL CREATININE-BSD FRML MDRD: 13 ML/MIN/1.73SQ M
GLUCOSE SERPL-MCNC: 140 MG/DL (ref 65–140)
HCT VFR BLD AUTO: 19.2 % (ref 36.5–49.3)
HCT VFR BLD AUTO: 23.9 % (ref 36.5–49.3)
HGB BLD-MCNC: 5.7 G/DL (ref 12–17)
HGB BLD-MCNC: 7.4 G/DL (ref 12–17)
INR PPP: 1.2 (ref 0.84–1.19)
MAGNESIUM SERPL-MCNC: 1.9 MG/DL (ref 1.6–2.6)
MCH RBC QN AUTO: 27.3 PG (ref 26.8–34.3)
MCHC RBC AUTO-ENTMCNC: 29.7 G/DL (ref 31.4–37.4)
MCV RBC AUTO: 92 FL (ref 82–98)
NT-PROBNP SERPL-MCNC: 9329 PG/ML
PLATELET # BLD AUTO: 330 THOUSANDS/UL (ref 149–390)
PMV BLD AUTO: 9.3 FL (ref 8.9–12.7)
POTASSIUM SERPL-SCNC: 3.9 MMOL/L (ref 3.5–5.3)
PROT SERPL-MCNC: 7 G/DL (ref 6.4–8.4)
PROTHROMBIN TIME: 15.4 SECONDS (ref 11.6–14.5)
RBC # BLD AUTO: 2.09 MILLION/UL (ref 3.88–5.62)
RH BLD: POSITIVE
RSV RNA RESP QL NAA+PROBE: NEGATIVE
SARS-COV-2 RNA RESP QL NAA+PROBE: POSITIVE
SODIUM SERPL-SCNC: 138 MMOL/L (ref 135–147)
SPECIMEN EXPIRATION DATE: NORMAL
WBC # BLD AUTO: 7.38 THOUSAND/UL (ref 4.31–10.16)

## 2022-12-20 PROCEDURE — 0W993ZZ DRAINAGE OF RIGHT PLEURAL CAVITY, PERCUTANEOUS APPROACH: ICD-10-PCS | Performed by: FAMILY MEDICINE

## 2022-12-20 RX ORDER — TAMSULOSIN HYDROCHLORIDE 0.4 MG/1
0.8 CAPSULE ORAL
Status: DISCONTINUED | OUTPATIENT
Start: 2022-12-20 | End: 2022-12-23 | Stop reason: HOSPADM

## 2022-12-20 RX ORDER — FINASTERIDE 5 MG/1
5 TABLET, FILM COATED ORAL DAILY
Status: DISCONTINUED | OUTPATIENT
Start: 2022-12-20 | End: 2022-12-23 | Stop reason: HOSPADM

## 2022-12-20 RX ORDER — PRAVASTATIN SODIUM 80 MG/1
80 TABLET ORAL
Status: DISCONTINUED | OUTPATIENT
Start: 2022-12-20 | End: 2022-12-23 | Stop reason: HOSPADM

## 2022-12-20 RX ORDER — ECHINACEA PURPUREA EXTRACT 125 MG
1 TABLET ORAL
Status: DISCONTINUED | OUTPATIENT
Start: 2022-12-20 | End: 2022-12-23 | Stop reason: HOSPADM

## 2022-12-20 RX ORDER — AMLODIPINE BESYLATE 10 MG/1
10 TABLET ORAL DAILY
Status: DISCONTINUED | OUTPATIENT
Start: 2022-12-20 | End: 2022-12-23 | Stop reason: HOSPADM

## 2022-12-20 RX ORDER — ALBUMIN (HUMAN) 12.5 G/50ML
12.5 SOLUTION INTRAVENOUS ONCE
Status: COMPLETED | OUTPATIENT
Start: 2022-12-20 | End: 2022-12-20

## 2022-12-20 RX ORDER — CLONIDINE HYDROCHLORIDE 0.1 MG/1
0.1 TABLET ORAL EVERY 12 HOURS SCHEDULED
Status: DISCONTINUED | OUTPATIENT
Start: 2022-12-20 | End: 2022-12-23 | Stop reason: HOSPADM

## 2022-12-20 RX ORDER — CALCIUM ACETATE 667 MG/1
667 CAPSULE ORAL
Status: DISCONTINUED | OUTPATIENT
Start: 2022-12-20 | End: 2022-12-23 | Stop reason: HOSPADM

## 2022-12-20 RX ORDER — LOSARTAN POTASSIUM 50 MG/1
50 TABLET ORAL 2 TIMES DAILY
Status: DISCONTINUED | OUTPATIENT
Start: 2022-12-20 | End: 2022-12-23 | Stop reason: HOSPADM

## 2022-12-20 RX ADMIN — AMLODIPINE BESYLATE 10 MG: 10 TABLET ORAL at 18:39

## 2022-12-20 RX ADMIN — ALBUMIN (HUMAN) 12.5 G: 0.25 INJECTION, SOLUTION INTRAVENOUS at 18:38

## 2022-12-20 RX ADMIN — PRAVASTATIN SODIUM 80 MG: 80 TABLET ORAL at 18:39

## 2022-12-20 RX ADMIN — CLONIDINE HYDROCHLORIDE 0.1 MG: 0.1 TABLET ORAL at 18:39

## 2022-12-20 RX ADMIN — FINASTERIDE 5 MG: 5 TABLET, FILM COATED ORAL at 18:39

## 2022-12-20 RX ADMIN — ERYTHROPOIETIN 8000 UNITS: 10000 INJECTION, SOLUTION INTRAVENOUS; SUBCUTANEOUS at 17:19

## 2022-12-20 RX ADMIN — CALCIUM ACETATE 667 MG: 667 CAPSULE ORAL at 18:39

## 2022-12-20 RX ADMIN — LOSARTAN POTASSIUM 50 MG: 50 TABLET, FILM COATED ORAL at 18:39

## 2022-12-20 RX ADMIN — TAMSULOSIN HYDROCHLORIDE 0.8 MG: 0.4 CAPSULE ORAL at 18:39

## 2022-12-20 NOTE — H&P
H&P Exam - Jerilyn Darling 76 y o  male MRN: [id number]    Unit/Bed#: [address] [place] [address] Encounter: [id number]      Assessment/Plan     * Symptomatic anemia  Assessment & Plan  Acute on chronic anemia  Hgb on admission was 5 9, he was typed & crossed and transfused  Transfuse if hgb < 7  Plan   · Repeat H/H 2 hr s/p transfusion  · Monitor vitals & labs  · Monitor for active signs of bleeding  · FOBT  · EPOGEN 8000 U with HD    Volume overload  Assessment & Plan  Acute on chronic, worsening  On previous admission, he had bilateral pleural effusion for which he received a thoracentesis, in addition to pitting edema of bilateral feet  On admission physical exam revealed pitting edema extending to bilateral knees R>L, and chest x-ray revealed reaccumulation of fluid in the lungs  Plan:   · CT C/A/P  · HD w/ ultrafiltration   · Monitor vitals, I/O's, daily weights  · Renal diet    Pleural effusion  Assessment & Plan  Acute on chronic, worsening since last thoracentesis  CT on admission revealed moderate to large right effusion and large left effusion  Hemodialysis is scheduled, and BNP on admission was 9329 which is improved since last admission  Plan  · Hemodialysis  · IR consulted for thoracentesis (12/21/22)  · Pulmonology consulted    ESRD on dialysis [facility])  Assessment & Plan  Lab Results   Component Value Date    EGFR 13 12/20/2022    EGFR 9 11/30/2022    EGFR 5 11/29/2022    CREATININE 3 90 (H) 12/20/2022    CREATININE 5 20 (H) 11/30/2022    CREATININE 8 26 (H) 11/29/2022     Chronic, stable  Pt is anuric; and has HD on Tu/Th/Sat  EPO 8000 U w/ HD    Plan  Continue HD as per nephrology schedule  COVID-19  Assessment & Plan  Chronic, initially presented on 11/29/22 with cough, SOB, abdominal pain, n/v and was diagnosed with COVID  This positive PCR likely secondary to viral shedding without acute active infection  Anasarca  Assessment & Plan  Acute on chronic; worsening   On previous admission, he had pitting edema of bilateral feet which now extends to bilateral knees  Cannot diuresis with lasix, as patient is anuric  Plan  · HD Tu/Th/Sat as per nephrology   · IV Albumin 25%   · Follow up with am labs       Lung nodule seen on imaging study  Assessment & Plan  New scattered areas a few irregular nodule densities in the right middle lobe, right upper lobe with additional heterogenous groundglass opacities the left upper lobe  This may be inflammatory infectious  Plan  · Pulmonology consult  · 3-month CT follow-up recommended    ANCA-associated vasculitis  Assessment & Plan  Chronic, stable  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 for which he was previously treated with Cytoxan and subsequently prescribed prednisone taper  Plan  · Continue HD schedule as per nephrology    Anemia due to chronic kidney disease, on chronic dialysis [facility])  Assessment & Plan  Acute on chronic anemia  Hgb on admission was 5 9, he was typed & crossed and transfused  Transfuse if hgb < 7  Plan   · Repeat H/H 2 hr s/p transfusion  · Monitor vitals & labs  · Monitor for active signs of bleeding  · FOBT  · EPOGEN 8000 U with HD    Benign essential hypertension  Assessment & Plan  Chronic, stable continue home medications  Plan  · Continue home medications as follows  · Amlodipine 10 mg daily  · Clonidine 0 1 mg every 12  · Losartan 50 mg, twice daily        History of Present Illness     HPI:  Evette Del Cid is a 76 y o  male who presents with a hemoglobin of 5 7  PMH of ESRD on HD, recent COVID infection on 11/28/2022, chronic anemia, BPH, DM  Nephrology consultation was requested in the ED for assistance in the management of ESRD on HD with low hemoglobin  He denies any symptoms including nausea, vomiting, dizziness, lightheadedness, diarrhea, or pain    On prior admission he had bilateral pleural effusions in addition to anasarca, for which he was treated with a thoracentesis  Vitals are acceptable, however it is noted that he is third spacing and has volume overload  PCP: Kishor Norris MD    Review of Systems   Constitutional: Negative  HENT: Negative  Eyes: Positive for visual disturbance (chronic, cataracts)  Respiratory: Negative  Cardiovascular: Negative  Gastrointestinal: Negative  Endocrine: Negative  Genitourinary: Negative  Musculoskeletal: Negative  Skin: Negative  Allergic/Immunologic: Negative  Neurological: Negative  Hematological: Negative  Psychiatric/Behavioral: Negative  All other systems reviewed and are negative  Historical Information   Past Medical History:   Diagnosis Date   • Anesthesia complication 6027    after colonoscopy , pt was awake but could not control his body and kept falling    • Arthritis    • Bladder calculi    • BPH (benign prostatic hyperplasia)    • Diabetes mellitus ([facility] [address] )    • [facility]   • ESRD (end stage renal disease) on dialysis [facility])    • Hearing disorder of both ears     wears bilateral hearing aids   • Hyperlipidemia     controlled and maintained d/t diabetes   • Hypertension    • Kidney stones     Last Assessed:4/3/2017   • Lyme disease     Last Assessed:6/29/2015   • Rupture, bladder, spontaneous     Last Assessed:4/3/2017     Past Surgical History:   Procedure Laterality Date   • BLADDER REPAIR N/A 12/10/2016    Procedure: REPAIR BLADDER/cysto insertion stent;  Surgeon: Alisha Nunes MD;  Location: [address] [place] [address];  Service:    • COLONOSCOPY     • CYSTOLITHOTOMY      ANESTHESIA   lower abdomen  ;  Last Assessed:4/3/2017   • IR ASPIRATION ONLY  5/8/2022   • IR BIOPSY KIDNEY RANDOM  3/10/2022   • IR THORACENTESIS  11/30/2022   • IR TUNNELED DIALYSIS CATHETER PLACEMENT  3/8/2022   • OTHER SURGICAL HISTORY      thermal dilation of the prostate x 2 ( in the office)   • TONSILLECTOMY     • TRANSURETHRAL RESECTION OF PROSTATE N/A 12/8/2016    Procedure:  Cysto, Laser Bladder stone,fulgaration of prostates tissue ;  Surgeon: Kaleb Bird MD;  Location: [facility];  Service:      Social History   Social History     Substance and Sexual Activity   Alcohol Use Never     Social History     Substance and Sexual Activity   Drug Use No     Social History     Tobacco Use   Smoking Status Former   • Types: Cigars   • Quit date:    • Years since quittin 9   Smokeless Tobacco Never     E-Cigarette/Vaping   • E-Cigarette Use Never User       E-Cigarette/Vaping Substances   • Nicotine No    • THC No    • CBD No    • Flavoring No    • Other No    • Unknown No      Family History:   Family History   Problem Relation Age of Onset   • Cancer Mother         breast   • Diabetes Mother    • Cancer Father         prostate w/ bone mets   • Prostate cancer Father    • Alzheimer's disease Sister        Meds/Allergies   PTA meds:   Prior to Admission Medications   Prescriptions Last Dose Informant Patient Reported? Taking?    Insulin Pen Needle (B-D UF III MINI PEN NEEDLES) 31G X 5 MM MISC 2022  No Yes   Sig: Use twice daily with insulin pen as directed   amLODIPine (NORVASC) 10 mg tablet 2022  No Yes   Sig: Take 1 tablet (10 mg total) by mouth daily   calcium acetate (PHOSLO) capsule 2022  No Yes   Sig: Take 1 capsule (667 mg total) by mouth 3 (three) times a day with meals Most days only takes BID   calcium carbonate-vitamin D (OSCAL-D) 500 mg-200 units per tablet 2022  No Yes   Sig: Take 1 tablet by mouth 2 (two) times a day with meals   cloNIDine (CATAPRES) 0 1 mg tablet 2022  No Yes   Sig: Take 1 tablet (0 1 mg total) by mouth every 12 (twelve) hours   dutasteride (AVODART) 0 5 mg capsule 2022  No Yes   Sig: Take 1 capsule (0 5 mg total) by mouth daily   glucose blood test strip 2022  No Yes   Sig: Use 1 each 3 (three) times a day   insulin aspart protamine-insulin aspart (NovoLOG 70/30 FlexPen ReliOn) 100 Units/mL injection pen 2022 No Yes   Sig: Inject 15 Units under the skin 2 (two) times a day before meals   losartan (COZAAR) 50 mg tablet 2022  No Yes   Sig: Take 1 tablet (50 mg total) by mouth 2 (two) times a day   simvastatin (ZOCOR) 40 mg tablet 2022  No Yes   Sig: Take 1 tablet (40 mg total) by mouth daily at bedtime   sodium chloride (OCEAN) 0 65 % nasal spray Past Month  No Yes   Si spray into each nostril as needed for congestion   tamsulosin (FLOMAX) 0 4 mg 2022  No Yes   Sig: Take 2 capsules (0 8 mg total) by mouth daily with dinner      Facility-Administered Medications: None     Allergies   Allergen Reactions   • Amoxicillin Rash       Objective   Vitals: Blood pressure 148/68, pulse (!) 111, temperature 99 °F (37 2 °C), temperature source Oral, resp  rate 20, weight 84 kg (185 lb 3 oz), SpO2 98 %  Intake/Output Summary (Last 24 hours) at 2022 1657  Last data filed at 2022 1647  Gross per 24 hour   Intake 495 ml   Output --   Net 495 ml       Invasive Devices     Peripheral Intravenous Line  Duration           Peripheral IV 22 Right Antecubital <1 day          Hemodialysis Catheter  Duration           HD Permanent Double Catheter 287 days                Physical Exam  Constitutional:       General: He is not in acute distress  Appearance: Normal appearance  He is normal weight  He is not ill-appearing  HENT:      Head: Normocephalic and atraumatic  Right Ear: External ear normal       Left Ear: External ear normal       Nose: Nose normal       Mouth/Throat:      Mouth: Mucous membranes are dry  Pharynx: Oropharynx is clear  Eyes:      Extraocular Movements: Extraocular movements intact  Cardiovascular:      Rate and Rhythm: Normal rate and regular rhythm  Pulses: Normal pulses  Heart sounds: Normal heart sounds  Pulmonary:      Effort: Pulmonary effort is normal       Breath sounds: Decreased air movement present  Rhonchi present        Comments: No additional oxygen requirements  Abdominal:      General: Abdomen is flat  Bowel sounds are normal  There is no distension  Palpations: Abdomen is soft  Tenderness: There is no abdominal tenderness  There is no guarding or rebound  Genitourinary:     Penis: Normal        Comments: No ruvalcaba  Musculoskeletal:         General: Swelling present  Normal range of motion  Cervical back: Normal range of motion  Right lower leg: Edema present  Left lower leg: Edema present  Skin:     General: Skin is warm  Capillary Refill: Capillary refill takes less than 2 seconds  Neurological:      General: No focal deficit present  Mental Status: He is alert and oriented to person, place, and time  Mental status is at baseline  Lab Results: I have personally reviewed pertinent reports  and I have personally reviewed pertinent films in PACS  Imaging: I have personally reviewed pertinent reports  and I have personally reviewed pertinent films in PACS  EKG, Pathology, and Other Studies: I have personally reviewed pertinent reports  and I have personally reviewed pertinent films in PACS    Code Status: Level 1 - Full Code    Counseling / Coordination of Care  Total floor / unit time spent today 30 minutes  Greater than 50% of total time was spent with the patient and / or family counseling and / or coordination of care

## 2022-12-20 NOTE — ASSESSMENT & PLAN NOTE
Chronic, stable continue home medications      Plan  · Continue home medications as follows  · Amlodipine 10 mg daily  · Clonidine 0 1 mg every 12  · Losartan 50 mg, twice daily

## 2022-12-20 NOTE — PLAN OF CARE
Patient is for a 3 hour HD session on a 4K2 25Ca bath for a serum potassium of 3 9 mmol/L drawn today with a net UF goal of 3-3 5L as tolerated  Post-Dialysis RN Treatment Note    Blood Pressure:  Pre 164/77 mm/Hg  Post 145/66 mmHg   EDW  70 5 kg    Weight:  Pre 82 0 kg   Post 79 0 kg   Mode of weight measurement: Bed Scale   Volume Removed  3000 ml    Treatment duration 180 minutes    NS given  No    Treatment shortened?  No   Medications given during Rx 8,000 units Epogen   Estimated Kt/V  Not Applicable   Access type: Permacath/TDC   Access Issues: No    Report called to primary nurse   Mare, Patrica    Problem: METABOLIC, FLUID AND ELECTROLYTES - ADULT  Goal: Electrolytes maintained within normal limits  Description: INTERVENTIONS:  - Monitor labs and assess patient for signs and symptoms of electrolyte imbalances  - Administer electrolyte replacement as ordered  - Monitor response to electrolyte replacements, including repeat lab results as appropriate  - Instruct patient on fluid and nutrition as appropriate  Outcome: Progressing  Goal: Fluid balance maintained  Description: INTERVENTIONS:  - Monitor labs   - Monitor I/O and WT  - Instruct patient on fluid and nutrition as appropriate  - Assess for signs & symptoms of volume excess or deficit  Outcome: Progressing

## 2022-12-20 NOTE — ASSESSMENT & PLAN NOTE
Stable, albumin still 1 6      Plan:   · HD w/ ultrafiltration (Tu/ThSat)  · Monitor vitals, I/O's, daily weights  · Renal diet   · Bilateral thoracentesis

## 2022-12-20 NOTE — ED NOTES
OK to feed per DR Irineo Cardona sandwich and juice given per pt request       [place], RN  12/20/22 [id number]

## 2022-12-20 NOTE — ED NOTES
Pt transported to Room 327 via CT by this RN  Verbal report given to [facility] RN at bedside, all personal belongings are sent with the pt        Doug Coughlin RN  12/20/22 [id number]

## 2022-12-20 NOTE — ED PROVIDER NOTES
History  Chief Complaint   Patient presents with   • Abnormal Lab     Patient brought in from routine HD treatment with HGB 6 4  Got an hour of treatment  States cp started just now " from excitement"     68-year-old male presents to the ED after he was hemodialysis and they found his hemoglobin to be 6 4 patient states he got an hour of treatment and then they took him off and told me to come the emergency department  Patient had a slight episode of chest discomfort during that time  No shortness of breath nausea vomiting or diarrhea at this point however he states she just feels weak all over  Prior to Admission Medications   Prescriptions Last Dose Informant Patient Reported? Taking?    Insulin Pen Needle (B-D UF III MINI PEN NEEDLES) 31G X 5 MM MISC 12/19/2022  No Yes   Sig: Use twice daily with insulin pen as directed   amLODIPine (NORVASC) 10 mg tablet 12/19/2022  No Yes   Sig: Take 1 tablet (10 mg total) by mouth daily   calcium acetate (PHOSLO) capsule 12/20/2022  No Yes   Sig: Take 1 capsule (667 mg total) by mouth 3 (three) times a day with meals Most days only takes BID   calcium carbonate-vitamin D (OSCAL-D) 500 mg-200 units per tablet 12/20/2022  No Yes   Sig: Take 1 tablet by mouth 2 (two) times a day with meals   cloNIDine (CATAPRES) 0 1 mg tablet 12/19/2022  No Yes   Sig: Take 1 tablet (0 1 mg total) by mouth every 12 (twelve) hours   dutasteride (AVODART) 0 5 mg capsule 12/19/2022  No Yes   Sig: Take 1 capsule (0 5 mg total) by mouth daily   glucose blood test strip 12/19/2022  No Yes   Sig: Use 1 each 3 (three) times a day   insulin aspart protamine-insulin aspart (NovoLOG 70/30 FlexPen ReliOn) 100 Units/mL injection pen 12/19/2022  No Yes   Sig: Inject 15 Units under the skin 2 (two) times a day before meals   losartan (COZAAR) 50 mg tablet 12/19/2022  No Yes   Sig: Take 1 tablet (50 mg total) by mouth 2 (two) times a day   simvastatin (ZOCOR) 40 mg tablet 12/19/2022  No Yes   Sig: Take 1 tablet (40 mg total) by mouth daily at bedtime   sodium chloride (OCEAN) 0 65 % nasal spray Past Month  No Yes   Si spray into each nostril as needed for congestion   tamsulosin (FLOMAX) 0 4 mg 2022  No Yes   Sig: Take 2 capsules (0 8 mg total) by mouth daily with dinner      Facility-Administered Medications: None       Past Medical History:   Diagnosis Date   • Anesthesia complication     after colonoscopy , pt was awake but could not control his body and kept falling    • Arthritis    • Bladder calculi    • BPH (benign prostatic hyperplasia)    • Diabetes mellitus ([facility] [address] )    • [facility] 3/11/2022   • ESRD (end stage renal disease) on dialysis [facility])    • Hearing disorder of both ears     wears bilateral hearing aids   • Hyperlipidemia     controlled and maintained d/t diabetes   • Hypertension    • Kidney stones     Last Assessed:4/3/2017   • Lyme disease     Last Assessed:2015   • Rupture, bladder, spontaneous     Last Assessed:4/3/2017       Past Surgical History:   Procedure Laterality Date   • BLADDER REPAIR N/A 12/10/2016    Procedure: REPAIR BLADDER/cysto insertion stent;  Surgeon: Ulysses Adam MD;  Location: [address] [place] [address];  Service:    • COLONOSCOPY     • CYSTOLITHOTOMY      ANESTHESIA   lower abdomen  ;  Last Assessed:4/3/2017   • IR ASPIRATION ONLY  2022   • IR BIOPSY KIDNEY RANDOM  3/10/2022   • IR THORACENTESIS  2022   • IR TUNNELED DIALYSIS CATHETER PLACEMENT  3/8/2022   • OTHER SURGICAL HISTORY      thermal dilation of the prostate x 2 ( in the office)   • TONSILLECTOMY     • TRANSURETHRAL RESECTION OF PROSTATE N/A 2016    Procedure:  Cysto, Laser Bladder stone,fulgaration of prostates tissue ;  Surgeon: Ulysses Adam MD;  Location: [facility];  Service:        Family History   Problem Relation Age of Onset   • Cancer Mother         breast   • Diabetes Mother    • Cancer Father         prostate w/ bone mets   • Prostate cancer Father    • Alzheimer's disease Sister      I have reviewed and agree with the history as documented  E-Cigarette/Vaping   • E-Cigarette Use Never User      E-Cigarette/Vaping Substances   • Nicotine No    • THC No    • CBD No    • Flavoring No    • Other No    • Unknown No      Social History     Tobacco Use   • Smoking status: Former     Types: Cigars     Quit date:      Years since quittin 9   • Smokeless tobacco: Never   Vaping Use   • Vaping Use: Never used   Substance Use Topics   • Alcohol use: Never   • Drug use: No       Review of Systems   Constitutional: Negative for activity change, chills, diaphoresis and fever  HENT: Negative for congestion, ear pain, nosebleeds, sore throat, trouble swallowing and voice change  Eyes: Negative for pain, discharge and redness  Respiratory: Negative for apnea, cough, choking, shortness of breath, wheezing and stridor  Cardiovascular: Positive for chest pain  Negative for palpitations  Gastrointestinal: Negative for abdominal distention, abdominal pain, constipation, diarrhea, nausea and vomiting  Endocrine: Negative for polydipsia  Genitourinary: Negative for difficulty urinating, dysuria, flank pain, frequency, hematuria and urgency  Musculoskeletal: Negative for back pain, gait problem, joint swelling, myalgias, neck pain and neck stiffness  Skin: Negative for pallor and rash  Neurological: Positive for weakness  Negative for dizziness, tremors, syncope, speech difficulty, numbness and headaches  Hematological: Negative for adenopathy  Psychiatric/Behavioral: Negative for confusion, hallucinations, self-injury and suicidal ideas  The patient is not nervous/anxious  Physical Exam  Physical Exam  Vitals and nursing note reviewed  Constitutional:       General: He is not in acute distress  Appearance: He is well-developed  He is not diaphoretic  HENT:      Head: Normocephalic and atraumatic        Right Ear: External ear normal       Left Ear: External ear normal       Nose: Nose normal    Eyes:      Conjunctiva/sclera: Conjunctivae normal       Pupils: Pupils are equal, round, and reactive to light  Cardiovascular:      Rate and Rhythm: Normal rate and regular rhythm  Heart sounds: Normal heart sounds  Pulmonary:      Effort: Pulmonary effort is normal       Breath sounds: Normal breath sounds  Abdominal:      General: Bowel sounds are normal       Palpations: Abdomen is soft  Musculoskeletal:         General: Normal range of motion  Cervical back: Normal range of motion and neck supple  Skin:     General: Skin is warm and dry  Neurological:      Mental Status: He is alert and oriented to person, place, and time  Deep Tendon Reflexes: Reflexes are normal and symmetric  Psychiatric:         Behavior: Behavior is cooperative           Vital Signs  ED Triage Vitals   Temperature Pulse Respirations Blood Pressure SpO2   12/20/22 1113 12/20/22 1113 12/20/22 1113 12/20/22 1113 12/20/22 1113   98 2 °F (36 8 °C) 104 20 143/64 95 %      Temp Source Heart Rate Source Patient Position - Orthostatic VS BP Location FiO2 (%)   12/20/22 1113 12/20/22 1113 12/20/22 1113 12/20/22 1113 --   Oral Monitor Lying Right arm       Pain Score       12/20/22 1315       No Pain           Vitals:    12/20/22 1530 12/20/22 1545 12/20/22 1557 12/20/22 1600   BP: 165/68 169/68 (!) 174/73 (!) 174/73   Pulse: 100 98 (!) 109 (!) 107   Patient Position - Orthostatic VS:   Lying          Visual Acuity      ED Medications  Medications   epoetin ain (EPOGEN,PROCRIT) injection 8,000 Units (has no administration in time range)   amLODIPine (NORVASC) tablet 10 mg (has no administration in time range)   calcium acetate (PHOSLO) capsule 667 mg (has no administration in time range)   cloNIDine (CATAPRES) tablet 0 1 mg (has no administration in time range)   finasteride (PROSCAR) tablet 5 mg (has no administration in time range)   losartan (COZAAR) tablet 50 mg (has no administration in time range)   pravastatin (PRAVACHOL) tablet 80 mg (has no administration in time range)   sodium chloride (OCEAN) 0 65 % nasal spray 1 spray (has no administration in time range)   tamsulosin (FLOMAX) capsule 0 8 mg (has no administration in time range)   albumin human (FLEXBUMIN) 25 % injection 12 5 g (has no administration in time range)       Diagnostic Studies  Results Reviewed     Procedure Component Value Units Date/Time    HS Troponin I 2hr [[id number]]  (Normal) Collected: 12/20/22 1458    Lab Status: Final result Specimen: Blood from Central Venous Line Updated: 12/20/22 1543     hs TnI 2hr 12 ng/L      Delta 2hr hsTnI 1 ng/L     Hemoglobin and hematocrit, blood [[id number]]     Lab Status: No result Specimen: Blood     Occult blood 1-3, stool [[id number]]     Lab Status: No result Specimen: Stool     HS Troponin I 4hr [[id number]]     Lab Status: No result Specimen: Blood     FLU/RSV/COVID - if FLU/RSV clinically relevant [[id number]]  (Abnormal) Collected: 12/20/22 1217    Lab Status: Final result Specimen: Nares from Nose Updated: 12/20/22 1306     SARS-CoV-2 Positive     INFLUENZA A PCR Negative     INFLUENZA B PCR Negative     RSV PCR Negative    Narrative:      FOR PEDIATRIC PATIENTS - copy/paste COVID Guidelines URL to browser: [email or web address]    SARS-CoV-2 assay is a Nucleic Acid Amplification assay intended for the  qualitative detection of nucleic acid from SARS-CoV-2 in nasopharyngeal  swabs  Results are for the presumptive identification of SARS-CoV-2 RNA  Positive results are indicative of infection with SARS-CoV-2, the virus  causing COVID-19, but do not rule out bacterial infection or co-infection  with other viruses  Laboratories within the [place] and its  territories are required to report all positive results to the appropriate  public health authorities   Negative results do not preclude SARS-CoV-2  infection and should not be used as the sole basis for treatment or other  patient management decisions  Negative results must be combined with  clinical observations, patient history, and epidemiological information  This test has not been FDA cleared or approved  This test has been authorized by FDA under an Emergency Use Authorization  (EUA)  This test is only authorized for the duration of time the  declaration that circumstances exist justifying the authorization of the  emergency use of an in vitro diagnostic tests for detection of SARS-CoV-2  virus and/or diagnosis of COVID-19 infection under section 564(b)(1) of  the Act, 21 U  S C  320KHH-2(T)(6), unless the authorization is terminated  or revoked sooner  The test has been validated but independent review by FDA  and CLIA is pending  Test performed using [employer] GeneXpert: This RT-PCR assay targets N2,  a region unique to SARS-CoV-2  A conserved region in the E-gene was chosen  for pan-Sarbecovirus detection which includes SARS-CoV-2  According to CMS-2020-01-R, this platform meets the definition of high-throughput technology      HS Troponin 0hr (reflex protocol) [[id number]]  (Normal) Collected: 12/20/22 1217    Lab Status: Final result Specimen: Blood from Arm, Right Updated: 12/20/22 1301     hs TnI 0hr 11 ng/L     Magnesium [[id number]]  (Normal) Collected: 12/20/22 1217    Lab Status: Final result Specimen: Blood from Arm, Right Updated: 12/20/22 1259     Magnesium 1 9 mg/dL     NT-BNP PRO [[id number]]  (Abnormal) Collected: 12/20/22 1217    Lab Status: Final result Specimen: Blood from Arm, Right Updated: 12/20/22 1259     NT-proBNP 9,329 pg/mL     Comprehensive metabolic panel [[id number]]  (Abnormal) Collected: 12/20/22 1217    Lab Status: Final result Specimen: Blood from Arm, Right Updated: 12/20/22 1256     Sodium 138 mmol/L      Potassium 3 9 mmol/L      Chloride 98 mmol/L      CO2 32 mmol/L      ANION GAP 8 mmol/L      BUN 37 mg/dL      Creatinine 3 90 mg/dL      Glucose 140 mg/dL      Calcium 7 9 mg/dL      Corrected Calcium 9 8 mg/dL      AST 13 U/L      ALT 11 U/L      Alkaline Phosphatase 76 U/L      Total Protein 7 0 g/dL      Albumin 1 6 g/dL      Total Bilirubin 0 36 mg/dL      eGFR 13 ml/min/1 73sq m     Narrative:      [place] guidelines for Chronic Kidney Disease (CKD):   •  Stage 1 with normal or high GFR (GFR > 90 mL/min/1 73 square meters)  •  Stage 2 Mild CKD (GFR = 60-89 mL/min/1 73 square meters)  •  Stage 3A Moderate CKD (GFR = 45-59 mL/min/1 73 square meters)  •  Stage 3B Moderate CKD (GFR = 30-44 mL/min/1 73 square meters)  •  Stage 4 Severe CKD (GFR = 15-29 mL/min/1 73 square meters)  •  Stage 5 End Stage CKD (GFR <15 mL/min/1 73 square meters)  Note: GFR calculation is accurate only with a steady state creatinine    Protime-INR [[id number]]  (Abnormal) Collected: 12/20/22 1217    Lab Status: Final result Specimen: Blood from Arm, Right Updated: 12/20/22 1252     Protime 15 4 seconds      INR 1 20    APTT [[id number]]  (Abnormal) Collected: 12/20/22 1217    Lab Status: Final result Specimen: Blood from Arm, Right Updated: 12/20/22 1252     PTT 44 seconds     CBC and differential [[id number]]  (Abnormal) Collected: 12/20/22 1217    Lab Status: Final result Specimen: Blood from Arm, Right Updated: 12/20/22 1249     WBC 7 38 Thousand/uL      RBC 2 09 Million/uL      Hemoglobin 5 7 g/dL      Hematocrit 19 2 %      MCV 92 fL      MCH 27 3 pg      MCHC 29 7 g/dL      RDW 15 5 %      MPV 9 3 fL      Platelets 156 Thousands/uL     Narrative: This is an appended report  These results have been appended to a previously verified report  CT chest abdomen pelvis wo contrast   Final Result by Brigid Moreno MD (12/20 7722)      Moderate to large right effusion      Large left effusion        There are new scattered areas of few irregular nodular densities in the right middle lobe, right upper lobe with additional heterogenous groundglass opacity left upper lobe  The may be inflammatory or infectious  Short interval follow-up suggested at 3    months      Bibasilar consolidations with overlying effusion may be due to compressive atelectasis  Bilateral inguinal hernias without obstruction      Small amount of ascites and anasarca, correlate with volume overload      Small to moderate pericardial effusion, increased from the previous study      The study was marked in EPIC for immediate notification  Workstation performed: [id number]         XR chest 1 view portable    (Results Pending)   IR IN-Patient Thoracentesis    (Results Pending)              Procedures  Procedures         ED Course                                             MDM    Disposition  Final diagnoses:   Symptomatic anemia   Anemia, unspecified type     Time reflects when diagnosis was documented in both MDM as applicable and the Disposition within this note     Time User Action Codes Description Comment    12/20/2022  1:00 PM Shani Boston Add [D64 9] Symptomatic anemia     12/20/2022  1:04 PM Shani Boston Add [D64 9] Anemia, unspecified type     12/20/2022  3:21 PM Chaparrita Tang Add [J90] Pleural effusion       ED Disposition     ED Disposition   Admit    Condition   Stable    Date/Time   Tue Dec 20, 2022  1:03 PM    Comment   Case was discussed with  Dr Scott Willett and the patient's admission status was agreed to be Admission Status: observation status to the service of Dr Scott Willett              Follow-up Information    None         Current Discharge Medication List      CONTINUE these medications which have NOT CHANGED    Details   amLODIPine (NORVASC) 10 mg tablet Take 1 tablet (10 mg total) by mouth daily  Qty: 90 tablet, Refills: 1    Associated Diagnoses: Benign essential hypertension      calcium acetate (PHOSLO) capsule Take 1 capsule (667 mg total) by mouth 3 (three) times a day with meals Most days only takes BID  Qty: 60 capsule, Refills: 1    Associated Diagnoses: Hypocalcemia      calcium carbonate-vitamin D (OSCAL-D) 500 mg-200 units per tablet Take 1 tablet by mouth 2 (two) times a day with meals  Qty: 180 tablet, Refills: 1    Associated Diagnoses: Osteopenia of multiple sites      cloNIDine (CATAPRES) 0 1 mg tablet Take 1 tablet (0 1 mg total) by mouth every 12 (twelve) hours  Qty: 180 tablet, Refills: 1    Associated Diagnoses: Benign essential hypertension      dutasteride (AVODART) 0 5 mg capsule Take 1 capsule (0 5 mg total) by mouth daily  Qty: 90 capsule, Refills: 1    Associated Diagnoses: Benign prostatic hyperplasia with lower urinary tract symptoms, symptom details unspecified      glucose blood test strip Use 1 each 3 (three) times a day  Qty: 300 each, Refills: 3    Associated Diagnoses: Type 2 diabetes mellitus with chronic kidney disease on chronic dialysis, with long-term current use of insulin ([facility])      insulin aspart protamine-insulin aspart (NovoLOG 70/30 FlexPen ReliOn) 100 Units/mL injection pen Inject 15 Units under the skin 2 (two) times a day before meals  Qty: 15 mL, Refills: 0    Associated Diagnoses: Type 2 diabetes mellitus with chronic kidney disease on chronic dialysis, with long-term current use of insulin ([facility])      Insulin Pen Needle (B-D UF III MINI PEN NEEDLES) 31G X 5 MM MISC Use twice daily with insulin pen as directed  Qty: 180 each, Refills: 3    Associated Diagnoses: Type 2 diabetes mellitus with chronic kidney disease on chronic dialysis, with long-term current use of insulin ([facility])      losartan (COZAAR) 50 mg tablet Take 1 tablet (50 mg total) by mouth 2 (two) times a day  Qty: 180 tablet, Refills: 1    Associated Diagnoses: Benign essential hypertension      simvastatin (ZOCOR) 40 mg tablet Take 1 tablet (40 mg total) by mouth daily at bedtime  Qty: 90 tablet, Refills: 3    Comments: Requesting 1 year supply  Associated Diagnoses: Hyperlipidemia, unspecified hyperlipidemia type      sodium chloride (OCEAN) 0 65 % nasal spray 1 spray into each nostril as needed for congestion  Refills: 0    Associated Diagnoses: Complaint of nasal congestion      tamsulosin (FLOMAX) 0 4 mg Take 2 capsules (0 8 mg total) by mouth daily with dinner  Qty: 180 capsule, Refills: 1    Associated Diagnoses: Benign prostatic hyperplasia with lower urinary tract symptoms, symptom details unspecified             No discharge procedures on file      PDMP Review     None          ED Provider  Electronically Signed by           Fidelia Feliz DO  12/20/22 7533

## 2022-12-20 NOTE — ASSESSMENT & PLAN NOTE
Chronic, initially presented on 11/29/22 with cough, SOB, abdominal pain, n/v and was diagnosed with COVID  This positive PCR likely secondary to viral shedding without acute active infection

## 2022-12-20 NOTE — ASSESSMENT & PLAN NOTE
Acute on chronic anemia  Hgb on admission was 5 9, he was typed & crossed and transfused  Transfuse if hgb < 7      Plan   · Repeat H/H 2 hr s/p transfusion  · Monitor vitals & labs  · Monitor for active signs of bleeding  · FOBT  · EPOGEN 8000 U with HD

## 2022-12-20 NOTE — ASSESSMENT & PLAN NOTE
Morning hemoglobin 6; transfusing 2 units PRBC        Plan   · Repeat H/H 2 hr s/p transfusion -> 8 5  · Monitor vitals & labs  · Monitor for active signs of bleeding  · FOBT  · EPOGEN 8000 U with HD  · GI consulted: Appreciate recommendations  · Monitor for signs of GI bleed, monitor hemoglobin, transfuse blood products as needed to keep hemoglobin greater than 7, recommend outpatient capsule endoscopy for further evaluation, notify GI if patient has any signs of active GI bleed

## 2022-12-20 NOTE — ASSESSMENT & PLAN NOTE
Lab Results   Component Value Date    EGFR 14 12/21/2022    EGFR 13 12/20/2022    EGFR 9 11/30/2022    CREATININE 3 65 (H) 12/21/2022    CREATININE 3 90 (H) 12/20/2022    CREATININE 5 20 (H) 11/30/2022     Chronic, stable  Pt is anuric; and has HD on Tu/Th/Sat  EPO 8000 U w/ HD    Plan  Continue HD as per nephrology schedule

## 2022-12-20 NOTE — ASSESSMENT & PLAN NOTE
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

## 2022-12-20 NOTE — CONSULTS
[address] 76 y o  male MRN: [id number]  Unit/Bed#: ED 04 Encounter: [id number]    ASSESSMENT and PLAN:    75-year-old male with a history of end-stage renal disease on hemodialysis, recent COVID-19, chronic anemia, BPH, diabetes who presents for low hemoglobin from his outpatient HD unit showing a hemoglobin of 6 4  Nephrology consult for ESRD management  Anemia  -- Acute on chronic  -- Micera as outpatient, start Epogen as an inpatient  -- Repeat hemoglobin 5 7  -- Check stool occult  -- Plan for blood transfusion    End-stage renal disease  -- In center hemodialysis Tuesday Thursday Saturday at [facility]  -- Access: Right IJ permacath, evaluation for access as an outpatient  -- Last dry weight was challenged to 70 7 kg  -- Currently at 84 kg  -- We will plan for hemodialysis today  -- History of ANCA vasculitis, did not tolerate cyclophosphamide  -- Discussed with Dr Pancho Nunez  -- HD orders have been placed    Blood pressure/volume status  -- History of bilateral pleural effusions during the last admission examines generalized anasarca and volume overloaded  -- Taylor for colloid expansion with blood transfusion  -- Plan for hemodialysis today with ultrafiltration  -- Blood pressure currently acceptable  -- Struve hypertension  -- Albumin 1 6, leading to anasarca    COVID-19  -- Still positive again  -- Saturating well on room air  -- Management as per the primary team    Mineral bone disorder-chronic kidney disease  -- Continue calcium acetate as an outpatient  -- Renal diet    SUMMARY OF RECOMMENDATIONS:    · Plan for hemodialysis today  · HD orders have been placed  · Restart ISAMAR but will do Epogen  · Plan for blood transfusion    HISTORY OF PRESENT ILLNESS:  Requesting Physician: Kylee Myles MD  Reason for Consult: ESRD    Fariba Delacruz is a 76 y o  male who was admitted to [facility] after presenting with low hemoglobin   A renal consultation is requested today for assistance in the management of ESRD  Patient was going to his outpatient HD unit when he was found to have a hemoglobin of 6 4 and was sent to the hospital   He is currently asymptomatic  Repeat hemoglobin done in the emergency room showed 5 7  He was recently discharged from [address] back at the end of November where he was treated for COVID-19  He was also found to have bilateral pleural effusions  He reports no urine output  His blood pressure is acceptable but he examines volume overloaded with third spacing  No chest pain at this active time but he did report some earlier chest discomfort related to excitement  No nausea no vomiting diarrhea  PAST MEDICAL HISTORY:  Past Medical History:   Diagnosis Date   • Anesthesia complication 7997    after colonoscopy , pt was awake but could not control his body and kept falling    • Arthritis    • Bladder calculi    • BPH (benign prostatic hyperplasia)    • Diabetes mellitus ([facility] )    • ESRD (end stage renal disease) on dialysis ([facility] )    • Hearing disorder of both ears     wears bilateral hearing aids   • Hyperlipidemia     controlled and maintained d/t diabetes   • Hypertension    • Kidney stones     Last Assessed:4/3/2017   • Lyme disease     Last Assessed:6/29/2015   • Rupture, bladder, spontaneous     Last Assessed:4/3/2017       PAST SURGICAL HISTORY:  Past Surgical History:   Procedure Laterality Date   • BLADDER REPAIR N/A 12/10/2016    Procedure: REPAIR BLADDER/cysto insertion stent;  Surgeon: Pearson Cranker, MD;  Location: [address] [place] [address];  Service:    • COLONOSCOPY     • CYSTOLITHOTOMY      ANESTHESIA   lower abdomen  ;  Last Assessed:4/3/2017   • IR ASPIRATION ONLY  5/8/2022   • IR BIOPSY KIDNEY RANDOM  3/10/2022   • IR THORACENTESIS  11/30/2022   • IR TUNNELED DIALYSIS CATHETER PLACEMENT  3/8/2022   • OTHER SURGICAL HISTORY      thermal dilation of the prostate x 2 ( in the office)   • TONSILLECTOMY • TRANSURETHRAL RESECTION OF PROSTATE N/A 2016    Procedure:  Cysto, Laser Bladder stone,fulgaration of prostates tissue ;  Surgeon: Elizabeth Bran MD;  Location: [facility];  Service:        ALLERGIES:  Allergies   Allergen Reactions   • Amoxicillin Rash       SOCIAL HISTORY:  Social History     Substance and Sexual Activity   Alcohol Use Never     Social History     Substance and Sexual Activity   Drug Use No     Social History     Tobacco Use   Smoking Status Former   • Types: Cigars   • Quit date:    • Years since quittin 9   Smokeless Tobacco Never       FAMILY HISTORY:  Family History   Problem Relation Age of Onset   • Cancer Mother         breast   • Diabetes Mother    • Cancer Father         prostate w/ bone mets   • Prostate cancer Father    • Alzheimer's disease Sister        MEDICATIONS:    Current Facility-Administered Medications:   •  epoetin ani (EPOGEN,PROCRIT) injection 8,000 Units, 8,000 Units, Intravenous, After Dialysis, Rai Gaspra MD    Current Outpatient Medications:   •  amLODIPine (NORVASC) 10 mg tablet, Take 1 tablet (10 mg total) by mouth daily, Disp: 90 tablet, Rfl: 1  •  calcium acetate (PHOSLO) capsule, Take 1 capsule (667 mg total) by mouth 3 (three) times a day with meals Most days only takes BID, Disp: 60 capsule, Rfl: 1  •  calcium carbonate-vitamin D (OSCAL-D) 500 mg-200 units per tablet, Take 1 tablet by mouth 2 (two) times a day with meals, Disp: 180 tablet, Rfl: 1  •  cloNIDine (CATAPRES) 0 1 mg tablet, Take 1 tablet (0 1 mg total) by mouth every 12 (twelve) hours, Disp: 180 tablet, Rfl: 1  •  dutasteride (AVODART) 0 5 mg capsule, Take 1 capsule (0 5 mg total) by mouth daily, Disp: 90 capsule, Rfl: 1  •  glucose blood test strip, Use 1 each 3 (three) times a day, Disp: 300 each, Rfl: 3  •  insulin aspart protamine-insulin aspart (NovoLOG 70/30 FlexPen ReliOn) 100 Units/mL injection pen, Inject 15 Units under the skin 2 (two) times a day before meals, Disp: 15 mL, Rfl: 0  •  Insulin Pen Needle (B-D UF III MINI PEN NEEDLES) 31G X 5 MM MISC, Use twice daily with insulin pen as directed, Disp: 180 each, Rfl: 3  •  losartan (COZAAR) 50 mg tablet, Take 1 tablet (50 mg total) by mouth 2 (two) times a day, Disp: 180 tablet, Rfl: 1  •  simvastatin (ZOCOR) 40 mg tablet, Take 1 tablet (40 mg total) by mouth daily at bedtime, Disp: 90 tablet, Rfl: 3  •  sodium chloride (OCEAN) 0 65 % nasal spray, 1 spray into each nostril as needed for congestion, Disp: , Rfl: 0  •  tamsulosin (FLOMAX) 0 4 mg, Take 2 capsules (0 8 mg total) by mouth daily with dinner, Disp: 180 capsule, Rfl: 1    REVIEW OF SYSTEMS:  Constitutional: Negative for fatigue, anorexia, fever, chills, diaphoresis  HENT: Negative for postnasal drip  Eyes: Negative for visual disturbance  Respiratory: Negative for cough, shortness of breath and wheezing  Cardiovascular: Negative for chest pain, palpitations but positive leg swelling  Gastrointestinal: Negative for abdominal pain, constipation, diarrhea, nausea and vomiting  Genitourinary: No dysuria, hematuria  Endocrine: Negative for polyuria  Musculoskeletal: Negative for arthralgias, back pain and joint swelling  Skin: Negative for rash  Neurological: Negative for focal weakness, headaches, dizziness  Hematological: Negative for easy bruising or bleeding  Psychiatric/Behavioral: Negative for confusion and sleep disturbance  All the systems were reviewed and were negative except as documented on the HPI  PHYSICAL EXAM:  Current Weight: Weight - Scale: 84 kg (185 lb 3 oz)  First Weight: Weight - Scale: 84 kg (185 lb 3 oz)  Vitals:    12/20/22 1113 12/20/22 1200   BP: 143/64    BP Location: Right arm    Pulse: 104 96   Resp: 20    Temp: 98 2 °F (36 8 °C)    TempSrc: Oral    SpO2: 95% 93%   Weight: 84 kg (185 lb 3 oz)      No intake or output data in the 24 hours ending 12/20/22 1319  Physical Exam  Vitals and nursing note reviewed     Constitutional: General: He is not in acute distress  Appearance: He is well-developed  HENT:      Head: Normocephalic and atraumatic  Mouth/Throat:      Mouth: Mucous membranes are dry  Eyes:      General: No scleral icterus  Conjunctiva/sclera: Conjunctivae normal       Pupils: Pupils are equal, round, and reactive to light  Cardiovascular:      Rate and Rhythm: Normal rate and regular rhythm  Heart sounds: S1 normal and S2 normal  No murmur heard  No friction rub  No gallop  Pulmonary:      Effort: Pulmonary effort is normal  No respiratory distress  Breath sounds: Normal breath sounds  No wheezing or rales  Abdominal:      General: Bowel sounds are normal       Palpations: Abdomen is soft  Tenderness: There is no abdominal tenderness  There is no rebound  Musculoskeletal:         General: Swelling present  Normal range of motion  Cervical back: Normal range of motion and neck supple  Right lower leg: Edema present  Left lower leg: Edema present  Skin:     General: Skin is dry  Coloration: Skin is pale  Findings: No rash  Neurological:      Mental Status: He is alert and oriented to person, place, and time     Psychiatric:         Behavior: Behavior normal            Invasive Devices:      Lab Results:   Results from last 7 days   Lab Units 12/20/22  1217   WBC Thousand/uL 7 38   HEMOGLOBIN g/dL 5 7*   HEMATOCRIT % 19 2*   PLATELETS Thousands/uL 330   POTASSIUM mmol/L 3 9   CHLORIDE mmol/L 98   CO2 mmol/L 32   BUN mg/dL 37*   CREATININE mg/dL 3 90*   CALCIUM mg/dL 7 9*   MAGNESIUM mg/dL 1 9   ALK PHOS U/L 76   ALT U/L 11*   AST U/L 13

## 2022-12-20 NOTE — ASSESSMENT & PLAN NOTE
Stable, albumin 1 6    Plan  · HD (Tu/Th/Sat)   · Follow up with am labs   · IV albumin 25% s/p thoracentesis

## 2022-12-20 NOTE — ASSESSMENT & PLAN NOTE
Stable, nodules may be secondary to inflammatory process    Plan  · Pulmonology consult  · Sed Rate: 46;  7  · 3-month CT follow-up recommended per pulm

## 2022-12-20 NOTE — ASSESSMENT & PLAN NOTE
Quality 226: Preventive Care And Screening: Tobacco Use: Screening And Cessation Intervention: Patient screened for tobacco and never smoked Stable, had HD yesterday and had 3L removed, however he still decreased breath sounds bilaterally  Plan  · HD (Tu/Th/Sat)  · IR consulted for thoracentesis -left-sided on 12/21 and right-sided on 12/22  · Pulmonology consulted  · serum LDH & protein collected to compare with fluid from scheduled thoracentesis  Quality 431: Preventive Care And Screening: Unhealthy Alcohol Use - Screening: Patient screened for unhealthy alcohol use using a single question and scores less than 2 times per year Quality 110: Preventive Care And Screening: Influenza Immunization: Influenza Immunization not Administered because Patient Refused. Quality 111:Pneumonia Vaccination Status For Older Adults: Pneumococcal Vaccination not Administered or Previously Received, Reason not Otherwise Specified Detail Level: Detailed

## 2022-12-21 ENCOUNTER — APPOINTMENT (INPATIENT)
Dept: RADIOLOGY | Facility: HOSPITAL | Age: 77
End: 2022-12-21

## 2022-12-21 ENCOUNTER — APPOINTMENT (OUTPATIENT)
Dept: NON INVASIVE DIAGNOSTICS | Facility: HOSPITAL | Age: 77
End: 2022-12-21

## 2022-12-21 LAB
2HR DELTA HS TROPONIN: -3 NG/L
ABO GROUP BLD BPU: NORMAL
ALBUMIN SERPL BCP-MCNC: 1.6 G/DL (ref 3.5–5)
ALP SERPL-CCNC: 100 U/L (ref 46–116)
ALT SERPL W P-5'-P-CCNC: 9 U/L (ref 12–78)
ANION GAP SERPL CALCULATED.3IONS-SCNC: 9 MMOL/L (ref 4–13)
APPEARANCE FLD: NORMAL
AST SERPL W P-5'-P-CCNC: 18 U/L (ref 5–45)
ATRIAL RATE: 97 BPM
BASOPHILS # BLD AUTO: 0.02 THOUSANDS/ÂΜL (ref 0–0.1)
BASOPHILS NFR BLD AUTO: 0 % (ref 0–1)
BILIRUB DIRECT SERPL-MCNC: 0.19 MG/DL (ref 0–0.2)
BILIRUB SERPL-MCNC: 0.45 MG/DL (ref 0.2–1)
BPU ID: NORMAL
BUN SERPL-MCNC: 34 MG/DL (ref 5–25)
CALCIUM SERPL-MCNC: 7.5 MG/DL (ref 8.3–10.1)
CARDIAC TROPONIN I PNL SERPL HS: 44 NG/L
CARDIAC TROPONIN I PNL SERPL HS: 47 NG/L
CHLORIDE SERPL-SCNC: 98 MMOL/L (ref 96–108)
CO2 SERPL-SCNC: 31 MMOL/L (ref 21–32)
COLOR FLD: NORMAL
CREAT SERPL-MCNC: 3.65 MG/DL (ref 0.6–1.3)
CROSSMATCH: NORMAL
CRP SERPL QL: 193.7 MG/L
EOSINOPHIL # BLD AUTO: 0.08 THOUSAND/ÂΜL (ref 0–0.61)
EOSINOPHIL NFR BLD AUTO: 1 % (ref 0–6)
ERYTHROCYTE [DISTWIDTH] IN BLOOD BY AUTOMATED COUNT: 15.9 % (ref 11.6–15.1)
ERYTHROCYTE [SEDIMENTATION RATE] IN BLOOD: 46 MM/HOUR (ref 0–19)
GFR SERPL CREATININE-BSD FRML MDRD: 14 ML/MIN/1.73SQ M
GLUCOSE FLD-MCNC: 222 MG/DL
GLUCOSE SERPL-MCNC: 161 MG/DL (ref 65–140)
HCT VFR BLD AUTO: 19.9 % (ref 36.5–49.3)
HCT VFR BLD AUTO: 26.8 % (ref 36.5–49.3)
HGB BLD-MCNC: 6 G/DL (ref 12–17)
HGB BLD-MCNC: 8.5 G/DL (ref 12–17)
HISTIOCYTES NFR FLD: 16 %
IMM GRANULOCYTES # BLD AUTO: 0.07 THOUSAND/UL (ref 0–0.2)
IMM GRANULOCYTES NFR BLD AUTO: 1 % (ref 0–2)
LACTATE SERPL-SCNC: 1.6 MMOL/L (ref 0.5–2)
LDH FLD L TO P-CCNC: 217 U/L
LDH SERPL-CCNC: 139 U/L (ref 81–234)
LYMPHOCYTES # BLD AUTO: 0.71 THOUSANDS/ÂΜL (ref 0.6–4.47)
LYMPHOCYTES NFR BLD AUTO: 11 % (ref 14–44)
LYMPHOCYTES NFR BLD AUTO: 42 %
MCH RBC QN AUTO: 27 PG (ref 26.8–34.3)
MCHC RBC AUTO-ENTMCNC: 30.2 G/DL (ref 31.4–37.4)
MCV RBC AUTO: 90 FL (ref 82–98)
MONOCYTES # BLD AUTO: 0.54 THOUSAND/ÂΜL (ref 0.17–1.22)
MONOCYTES NFR BLD AUTO: 8 %
MONOCYTES NFR BLD AUTO: 8 % (ref 4–12)
NEUTROPHILS # BLD AUTO: 5.15 THOUSANDS/ÂΜL (ref 1.85–7.62)
NEUTS SEG NFR BLD AUTO: 34 %
NEUTS SEG NFR BLD AUTO: 79 % (ref 43–75)
NRBC BLD AUTO-RTO: 0 /100 WBCS
P AXIS: 41 DEGREES
PH BODY FLUID: 7.5
PLATELET # BLD AUTO: 298 THOUSANDS/UL (ref 149–390)
PMV BLD AUTO: 9.2 FL (ref 8.9–12.7)
POTASSIUM SERPL-SCNC: 4.6 MMOL/L (ref 3.5–5.3)
PR INTERVAL: 254 MS
PROT FLD-MCNC: 3.4 G/DL
PROT SERPL-MCNC: 6.5 G/DL (ref 6.4–8.4)
PROT SERPL-MCNC: 6.5 G/DL (ref 6.4–8.4)
QRS AXIS: 29 DEGREES
QRSD INTERVAL: 82 MS
QT INTERVAL: 316 MS
QTC INTERVAL: 401 MS
RBC # BLD AUTO: 2.22 MILLION/UL (ref 3.88–5.62)
SITE: NORMAL
SODIUM SERPL-SCNC: 138 MMOL/L (ref 135–147)
T WAVE AXIS: 77 DEGREES
TOTAL CELLS COUNTED SPEC: 100
UNIT DISPENSE STATUS: NORMAL
UNIT PRODUCT CODE: NORMAL
UNIT PRODUCT VOLUME: 350 ML
UNIT RH: NORMAL
VENTRICULAR RATE: 97 BPM
WBC # BLD AUTO: 6.57 THOUSAND/UL (ref 4.31–10.16)
WBC # FLD MANUAL: 435 /UL

## 2022-12-21 PROCEDURE — 5A1D70Z PERFORMANCE OF URINARY FILTRATION, INTERMITTENT, LESS THAN 6 HOURS PER DAY: ICD-10-PCS | Performed by: FAMILY MEDICINE

## 2022-12-21 PROCEDURE — 0W9B3ZZ DRAINAGE OF LEFT PLEURAL CAVITY, PERCUTANEOUS APPROACH: ICD-10-PCS | Performed by: FAMILY MEDICINE

## 2022-12-21 RX ORDER — ACETAMINOPHEN 325 MG/1
650 TABLET ORAL EVERY 6 HOURS PRN
Status: DISCONTINUED | OUTPATIENT
Start: 2022-12-21 | End: 2022-12-23 | Stop reason: HOSPADM

## 2022-12-21 RX ORDER — LIDOCAINE HYDROCHLORIDE 10 MG/ML
INJECTION, SOLUTION EPIDURAL; INFILTRATION; INTRACAUDAL; PERINEURAL AS NEEDED
Status: COMPLETED | OUTPATIENT
Start: 2022-12-21 | End: 2022-12-21

## 2022-12-21 RX ORDER — ALBUMIN (HUMAN) 12.5 G/50ML
12.5 SOLUTION INTRAVENOUS ONCE
Status: COMPLETED | OUTPATIENT
Start: 2022-12-21 | End: 2022-12-21

## 2022-12-21 RX ADMIN — TAMSULOSIN HYDROCHLORIDE 0.8 MG: 0.4 CAPSULE ORAL at 17:22

## 2022-12-21 RX ADMIN — FINASTERIDE 5 MG: 5 TABLET, FILM COATED ORAL at 09:20

## 2022-12-21 RX ADMIN — AMLODIPINE BESYLATE 10 MG: 10 TABLET ORAL at 09:20

## 2022-12-21 RX ADMIN — ACETAMINOPHEN 650 MG: 325 TABLET, FILM COATED ORAL at 19:09

## 2022-12-21 RX ADMIN — ALBUMIN (HUMAN) 12.5 G: 0.25 INJECTION, SOLUTION INTRAVENOUS at 20:18

## 2022-12-21 RX ADMIN — CLONIDINE HYDROCHLORIDE 0.1 MG: 0.1 TABLET ORAL at 09:20

## 2022-12-21 RX ADMIN — LIDOCAINE HYDROCHLORIDE 10 ML: 10 INJECTION, SOLUTION EPIDURAL; INFILTRATION; INTRACAUDAL; PERINEURAL at 14:31

## 2022-12-21 RX ADMIN — PRAVASTATIN SODIUM 80 MG: 80 TABLET ORAL at 17:22

## 2022-12-21 RX ADMIN — LOSARTAN POTASSIUM 50 MG: 50 TABLET, FILM COATED ORAL at 09:20

## 2022-12-21 RX ADMIN — CALCIUM ACETATE 667 MG: 667 CAPSULE ORAL at 17:22

## 2022-12-21 RX ADMIN — CALCIUM ACETATE 667 MG: 667 CAPSULE ORAL at 12:03

## 2022-12-21 RX ADMIN — CALCIUM ACETATE 667 MG: 667 CAPSULE ORAL at 09:20

## 2022-12-21 RX ADMIN — CLONIDINE HYDROCHLORIDE 0.1 MG: 0.1 TABLET ORAL at 20:18

## 2022-12-21 RX ADMIN — LOSARTAN POTASSIUM 50 MG: 50 TABLET, FILM COATED ORAL at 20:18

## 2022-12-21 NOTE — PHYSICAL THERAPY NOTE
PHYSICAL THERAPY     12/21/22 8100   Note Type   Note type Evaluation; Cancelled Session   Cancel Reasons Other  (patient lethargic,? sedated with procedure, nurse aware   will re-attempt at a later time)   [facility] Insurance Number  Lumaryann Essex PT [id number]

## 2022-12-21 NOTE — DISCHARGE INSTRUCTIONS
Thoracentesis   WHAT YOU NEED TO KNOW:   A thoracentesis is a procedure to remove extra fluid or air from between your lungs and your inner chest wall  Air or fluid buildup may make it hard for you to breathe  A thoracentesis allows your lungs to expand fully so you can breathe more easily  DISCHARGE INSTRUCTIONS:     Small amount of shoulder pain and bloody sputum is normal after a Thoracentesis  Rest:  Rest when you feel it is needed  Slowly start to do more each day  Return to your daily activities as directed  Resume your normal diet  Small sips of flat soda will help mild nausea  Do not smoke: If you smoke, it is never too late to quit  Ask for information about how to stop smoking if you need help  Contact Interventional Radiology at [phone] Christophe PATIENTS: Contact Interventional Radiology at [phone]) Duane Crank PATIENTS: Contact Interventional Radiology at [phone]) if:   You have a fever  Your puncture site is red, warm, swollen, or draining pus  You have questions or concerns about your procedure, medicine, or care  Seek care immediately or call 911 if:   Severe chest pain with inspiration and shortness of breath    Large amounts of blood in your sputum    Follow up with your healthcare provider as directed

## 2022-12-21 NOTE — PROGRESS NOTES
NEPHROLOGY PROGRESS NOTE   Darion Flow 76 y o  male MRN: [id number]  Unit/Bed#: [address] [place] [address] Encounter: [id number]  Reason for Consult: ESRD      SUMMARY:    49-year-old male with a history of end-stage renal disease on hemodialysis, recent COVID-19, chronic anemia, BPH, diabetes who presents for low hemoglobin from his outpatient HD unit showing a hemoglobin of 6 4  Nephrology consult for ESRD management      ASSESSMENT and PLAN:    Anemia  -- Acute on chronic  -- Micera as outpatient, start Epogen as an inpatient  -- Repeat hemoglobin 5 7, on admission  -- Stool occult positive  --Currently receiving another blood transfusion this morning after receiving a blood transfusion yesterday  --Hemoglobin still at 6     End-stage renal disease  -- In center hemodialysis Tuesday Thursday Saturday at [facility]  -- Access: Right IJ permacath, evaluation for access as an outpatient  -- Last dry weight was challenged to 70 7 kg  --Admission weight 84 kg  -- History of ANCA vasculitis, did not tolerate cyclophosphamide  --Underwent hemodialysis yesterday, plan for next dialysis tomorrow     Blood pressure/volume status  -- History of bilateral pleural effusions during the last admission examines generalized anasarca and volume overloaded  -- Taylor for colloid expansion with blood transfusion  -- Plan for hemodialysis today with ultrafiltration  -- Blood pressure currently acceptable  -- History of hypertension  -- Albumin 1 6, leading to anasarca     COVID-19  --Saturating well  --Still testing positive concern for viral shedding     Mineral bone disorder-chronic kidney disease  -- Continue calcium acetate as an outpatient  -- Renal diet    Bilateral pleural effusion  -- Patient plan for thoracentesis today      SUBJECTIVE / INTERVAL HISTORY:    Unable to hear me    OBJECTIVE:  Current Weight: Weight - Scale: 84 kg (185 lb 3 oz)  Vitals:    12/20/22 1113 12/21/22 0255 12/21/22 0933 12/21/22 0950   BP:  141/63 131/60 142/65   BP Location:  Right arm     Pulse:  91 84 76   Resp:  20 20 20   Temp:  99 5 °F (37 5 °C) 99 1 °F (37 3 °C) 99 °F (37 2 °C)   TempSrc:  Oral Oral Oral   SpO2:  98% 95% 97%   Weight: 84 kg (185 lb 3 oz)          Intake/Output Summary (Last 24 hours) at 12/21/2022 1000  Last data filed at 12/20/2022 1730  Gross per 24 hour   Intake 795 ml   Output 3500 ml   Net -2705 ml       Review of Systems:    12 point ROS has been reviewed  Physical Exam  Vitals and nursing note reviewed  Exam conducted with a chaperone present  Constitutional:       General: He is not in acute distress  Appearance: He is well-developed  He is ill-appearing  HENT:      Head: Normocephalic and atraumatic  Mouth/Throat:      Mouth: Mucous membranes are dry  Eyes:      General: No scleral icterus  Conjunctiva/sclera: Conjunctivae normal       Pupils: Pupils are equal, round, and reactive to light  Cardiovascular:      Rate and Rhythm: Normal rate and regular rhythm  Heart sounds: S1 normal and S2 normal  No murmur heard  No friction rub  No gallop  Pulmonary:      Effort: Pulmonary effort is normal  No respiratory distress  Breath sounds: Rales present  No wheezing  Abdominal:      General: Bowel sounds are normal       Palpations: Abdomen is soft  Tenderness: There is no abdominal tenderness  There is no rebound  Musculoskeletal:         General: Normal range of motion  Cervical back: Normal range of motion and neck supple  Skin:     General: Skin is dry  Coloration: Skin is pale  Findings: No rash  Neurological:      Mental Status: He is alert and oriented to person, place, and time     Psychiatric:         Behavior: Behavior normal          Medications:    Current Facility-Administered Medications:   •  amLODIPine (NORVASC) tablet 10 mg, 10 mg, Oral, Daily, Michelle Jay MD, 10 mg at 12/21/22 0920  •  calcium acetate (PHOSLO) capsule 667 mg, 667 mg, Oral, TID With Meals, Eun Willard MD, 667 mg at 12/21/22 0920  •  cloNIDine (CATAPRES) tablet 0 1 mg, 0 1 mg, Oral, Q12H [address], Eun Willard MD, 0 1 mg at 12/21/22 0920  •  epoetin ani (EPOGEN,PROCRIT) injection 8,000 Units, 8,000 Units, Intravenous, After Dialysis, Eun Willard MD, 8,000 Units at 12/20/22 1719  •  finasteride (PROSCAR) tablet 5 mg, 5 mg, Oral, Daily, Eun Willard MD, 5 mg at 12/21/22 0920  •  losartan (COZAAR) tablet 50 mg, 50 mg, Oral, BID, Eun Willard MD, 50 mg at 12/21/22 0920  •  pravastatin (PRAVACHOL) tablet 80 mg, 80 mg, Oral, Daily With Elizabeth Reed MD, 80 mg at 12/20/22 1839  •  sodium chloride (OCEAN) 0 65 % nasal spray 1 spray, 1 spray, Each Nare, Q1H PRN, Eun Willard MD  •  tamsulosin (FLOMAX) capsule 0 8 mg, 0 8 mg, Oral, Daily With Elizabeth Reed MD, 0 8 mg at 12/20/22 1839    Laboratory Results:  Results from last 7 days   Lab Units 12/21/22  0523 12/21/22  0444 12/20/22  1929 12/20/22  1217   WBC Thousand/uL 6 57  --   --  7 38   HEMOGLOBIN g/dL 6 0*  --  7 4* 5 7*   HEMATOCRIT % 19 9*  --  23 9* 19 2*   PLATELETS Thousands/uL 298  --   --  330   POTASSIUM mmol/L  --  4 6  --  3 9   CHLORIDE mmol/L  --  98  --  98   CO2 mmol/L  --  31  --  32   BUN mg/dL  --  34*  --  37*   CREATININE mg/dL  --  3 65*  --  3 90*   CALCIUM mg/dL  --  7 5*  --  7 9*   MAGNESIUM mg/dL  --   --   --  1 9       PLEASE NOTE:  This encounter was completed utilizing the [employer] Direct Speech Voice Recognition Software  Grammatical errors, random word insertions, pronoun errors and incomplete sentences are occasional consequences of the system due to software limitations, ambient noise and hardware issues  These may be missed by proof reading prior to affixing electronic signature   Any questions or concerns about the content, text or information contained within the body of this dictation should be directly addressed to the physician for clarification  Please do not hesitate to call me directly if you have any any questions or concerns

## 2022-12-21 NOTE — PLAN OF CARE

## 2022-12-21 NOTE — PROGRESS NOTES
Daily Progress Note - [facility] [address] J Darrion 76 y o  male MRN: [id number]  Unit/Bed#: [address] [place] [address] Encounter: [id number]  Admitting Physician: Beatrice Sanders MD   PCP: Jean Martinez MD  Date of Admission:  12/20/2022 11:11 AM    Assessment and Plan    * Symptomatic anemia  Assessment & Plan  Morning hemoglobin 6; transfusing 2 units PRBC  Plan   · Repeat H/H 2 hr s/p transfusion  · Monitor vitals & labs  · Monitor for active signs of bleeding  · FOBT  · EPOGEN 8000 U with HD  · GI consulted: waiting on recommendations    Volume overload  Assessment & Plan  Stable, albumin still 1 6  Plan:   · HD w/ ultrafiltration (Tu/ThSat)  · Monitor vitals, I/O's, daily weights  · Renal diet   · Bilateral thoracentesis scheduled for today  Pleural effusion  Assessment & Plan  Stable, had HD yesterday and had 3L removed, however he still decreased breath sounds bilaterally  Plan  · HD (Tu/Th/Sat)  · IR consulted for thoracentesis (12/21/22)  · Pulmonology consulted  · serum LDH & protein collected to compare with fluid from scheduled thoracentesis  ESRD on dialysis [facility])  Assessment & Plan  Lab Results   Component Value Date    EGFR 14 12/21/2022    EGFR 13 12/20/2022    EGFR 9 11/30/2022    CREATININE 3 65 (H) 12/21/2022    CREATININE 3 90 (H) 12/20/2022    CREATININE 5 20 (H) 11/30/2022     Chronic, stable  Pt is anuric; and has HD on Tu/Th/Sat  EPO 8000 U w/ HD    Plan  Continue HD as per nephrology schedule  COVID-19  Assessment & Plan  Chronic, initially presented on 11/29/22 with cough, SOB, abdominal pain, n/v and was diagnosed with COVID  This positive PCR likely secondary to viral shedding without acute active infection       Anasarca  Assessment & Plan  Stable, albumin 1 6    Plan  · HD (Tu/Th/Sat)   · Follow up with am labs   · IV albumin 25% s/p thoracentesis      Lung nodule seen on imaging study  Assessment & Plan  Stable, nodules may be secondary to inflammatory process    Plan  · Pulmonology consult  · Sed Rate: 46;  7  · 3-month CT follow-up recommended    ANCA-associated vasculitis  Assessment & Plan  Chronic, stable  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 for which he was previously treated with Cytoxan and subsequently prescribed prednisone taper  Plan  · Continue HD schedule as per nephrology    Anemia due to chronic kidney disease, on chronic dialysis [facility])  Assessment & Plan  Acute on chronic anemia  Hgb on admission was 5 9, he was typed & crossed and transfused  Transfuse if hgb < 7  Plan   · Repeat H/H 2 hr s/p transfusion  · Monitor vitals & labs  · Monitor for active signs of bleeding  · FOBT  · EPOGEN 8000 U with HD    Benign essential hypertension  Assessment & Plan  Chronic, stable continue home medications  Plan  · Continue home medications as follows  · Amlodipine 10 mg daily  · Clonidine 0 1 mg every 12  · Losartan 50 mg, twice daily      VTE Pharmacologic Prophylaxis:   Pharmacologic: low hgb (anticoagulation)  Mechanical VTE Prophylaxis in Place: Yes    Patient Centered Rounds: I have performed bedside rounds with nursing staff today  Discussions with Specialists or Other Care Team Provider: Nephrology, pulmonology, IR    Education and Discussions with Family / Patient:   Patient Information Sharing: With the consent of Edgar Sutherland , their loved ones Blanca Redd, wife) were notified today by inpatient team of the patient’s condition and current plan  All questions answered  Time Spent for Care: 30 minutes  More than 50% of total time spent on counseling and coordination of care as described above      Current Length of Stay: 0 day(s)    Current Patient Status: Observation   Certification Statement: The patient will continue to require additional inpatient hospital stay due to IR thorocentesis    Code Status: Level 1 - Full Code    Subjective:   Patient resting comfortably in bed, he notes that his hearing aid batteries  so he cannot hear  We communicated through written messages  I explained that his hemoglobin was low, and he was going to need some more blood, I explained the risks and benefits and he agreed  Additionally he notes that he does not have any nausea, vomiting, diarrhea, constipation, or pain  He asked about having the fluid from his lungs drained, and I told him that we had put an order in     Objective     Objective:   Vitals:   Temp (24hrs), Av 8 °F (37 1 °C), Min:98 2 °F (36 8 °C), Max:99 8 °F (37 7 °C)    Temp:  [98 2 °F (36 8 °C)-99 8 °F (37 7 °C)] 98 7 °F (37 1 °C)  HR:  [] 81  Resp:  [19-22] 19  BP: (117-190)/(59-77) 140/75  SpO2:  [93 %-100 %] 98 %  Body mass index is 28 16 kg/m²  Input and Output Summary (last 24 hours): Intake/Output Summary (Last 24 hours) at 2022 1313  Last data filed at 2022 1243  Gross per 24 hour   Intake 1016 25 ml   Output 3500 ml   Net -2483 75 ml       Physical Exam:   Physical Exam  Constitutional:       Appearance: Normal appearance  He is normal weight  HENT:      Head: Normocephalic and atraumatic  Right Ear: External ear normal       Left Ear: External ear normal       Ears:      Comments: Difficulty hearing, hearing aid batteries are dead  Nose: Nose normal       Mouth/Throat:      Mouth: Mucous membranes are moist       Pharynx: Oropharynx is clear  Eyes:      Extraocular Movements: Extraocular movements intact  Conjunctiva/sclera: Conjunctivae normal    Cardiovascular:      Rate and Rhythm: Normal rate and regular rhythm  Pulses: Normal pulses  Heart sounds: Normal heart sounds  Pulmonary:      Effort: Pulmonary effort is normal       Breath sounds: Wheezing present  Comments: 3 L, nasal cannula  Decreased breath sounds on bilateral lower lobes  Abdominal:      General: Abdomen is flat   Bowel sounds are normal       Palpations: Abdomen is soft  Tenderness: There is no abdominal tenderness  There is no guarding or rebound  Genitourinary:     Penis: Normal        Comments: No Wall present  Musculoskeletal:         General: Swelling present  Normal range of motion  Cervical back: Normal range of motion  Right lower leg: Edema present  Left lower leg: Edema present  Skin:     General: Skin is warm  Capillary Refill: Capillary refill takes less than 2 seconds  Findings: No bruising or erythema  Neurological:      General: No focal deficit present  Mental Status: He is alert and oriented to person, place, and time  Mental status is at baseline  Psychiatric:         Mood and Affect: Mood normal          Behavior: Behavior normal          Thought Content: Thought content normal          Judgment: Judgment normal          Additional Data:     Labs:  Results from last 7 days   Lab Units 12/21/22  0523   WBC Thousand/uL 6 57   HEMOGLOBIN g/dL 6 0*   HEMATOCRIT % 19 9*   PLATELETS Thousands/uL 298   NEUTROS PCT % 79*   LYMPHS PCT % 11*   MONOS PCT % 8   EOS PCT % 1     Results from last 7 days   Lab Units 12/21/22  0444   POTASSIUM mmol/L 4 6   CHLORIDE mmol/L 98   CO2 mmol/L 31   BUN mg/dL 34*   CREATININE mg/dL 3 65*   CALCIUM mg/dL 7 5*   ALK PHOS U/L 100   ALT U/L 9*   AST U/L 18     Results from last 7 days   Lab Units 12/20/22  1217   INR  1 20*               * I Have Reviewed All Lab Data Listed Above  * Additional Pertinent Lab Tests Reviewed: All Labs For Current Hospital Admission Reviewed    Imaging:    Imaging Reports Reviewed Today Include: All imaging  Imaging Personally Reviewed by Myself Includes:   All imaging    Recent Cultures (last 7 days):           Last 24 Hours Medication List:   Current Facility-Administered Medications   Medication Dose Route Frequency Provider Last Rate   • albumin human  12 5 g Intravenous Once Annita Concepcion MD     • amLODIPine  10 mg Oral Daily Janee Melinda Howe MD     • calcium acetate  667 mg Oral TID With Meals Yohan Barrett MD     • cloNIDine  0 1 mg Oral Q12H [address] Yohan Barrett MD     • epoetin ani  8,000 Units Intravenous After Dialysis Yohan Barrett MD     • finasteride  5 mg Oral Daily Yohan Barrett MD     • losartan  50 mg Oral BID Yohan Barrett MD     • pravastatin  80 mg Oral Daily With Ashtyn Matt MD     • sodium chloride  1 spray Each Nare Q1H PRN Yohan Barrett MD     • tamsulosin  0 8 mg Oral Daily With Ashtyn Matt MD               ** Please Note: Dictation voice to text software may have been used in the creation of this document   **    Yohan Barrett MD  12/21/22  1:13 PM

## 2022-12-21 NOTE — SEDATION DOCUMENTATION
Thoracentesis of the left side completed by Dr Magnolia Pereira  1400 ml of lester fluid removed  Band-aid to site  Specimens sent to lab  Vital signs stable  Patient to return to floor  Report given to RN

## 2022-12-21 NOTE — CONSULTS
Consultation - Pulmonary Medicine   Arben Phoebe Maier 76 y o  male MRN: [id number]  Unit/Bed#: [address] [place] [address] Encounter: [id number]      Assessment:  Bilateral pleural effusions which could be due to fluid overload  Also has some nodular densities which are small in both lungs  Possibly could be due to his ANCA vasculitis  Also has small pericardial effusion  Anemia  Hemoglobin decreased to 5 9  ESRD-ANCA vasculitis  Was tried on cyclophosphamide for an year but this was discontinued by April 2022 due to inability to tolerate  He is not on any immunosuppression currently  Recent COVID infection late November of this year which to be because of nodule infiltrates both lungs which are small  Plan:   Consult IR for thoracentesis  Send for chemistries and cytology  Also would recheck ANCA titers- I did discuss this with family practice residents  Nodular infiltrates in the lungs could be due to recent COVID infection and could consider follow-up CAT scan of chest without contrast in 2 to 3 months      History of Present Illness   Physician Requesting Consult: Nicanor Connell MD  Reason for Consult / Principal Problem: Pleural effusions  Hx and PE limited by: none    HPI: Oscar Arnett is a 76y o  year old male who presented to the ER in outpatient hemodialysis unit here in [facility] as he was found to have significant anemia with hemoglobin of 6 4  PCP done here admission to ER showed WBC  5 7 with hematocrit of 19 2  White count was 7 4 and platelet count 279  He states he usually has some weakness and has been having some shortness of breath with activity  He lives in a [facility] and uses walker at home  He has been on hemodialysis since March of this year  He has had right-sided clavian permacath in place for dialysis access  CT of chest abdomen pelvis was done as part of ER evaluation    There are moderate bilateral pleural effusions and few wall scattered nodular densities in both lungs  Also small pericardial effusion anteriorly  Echocardiogram done in March of this year  Echocardiogram showed normal left ventricular systolic function  No significant valvular heart disease  Recent admission to hospital in late November was found to have COVID infection at that time  He does have ANCA associated vasculitis and did have a kidney biopsy done March 2022 which showed pauci-immune focal necrotizing and crescentic  Glomerulonephritis  He has had blood work previously showing that his c-ANCA level has been elevated as high as 1:160 and external HI-3 antibody elevated as high as 84 4   C-reactive protein level done November 2022 when he had COVID infection was 174  Review of previous notes indicates patient was tried on cyclophosphamide (cytoxan) in April 2022 but had difficulty tolerating it and it was eventually stopped as not been on any immunosuppression since April 2022    Nasal swab done today is still positive  This may be day occurrence of antigen  He did  initially test positive on 11/28/2022    He has diabetes mellitus type 2 and hypertension  He did have previous right-sided ultrasound-guided thoracentesis done on 11/30/2022 with removal of 1 L of lester right pleural fluid  We will total protein level was 2 9 and LDH at time 146  Cytology was negative  There is none at the time  Pleural fluid appears to be transudative  Review of Systems   Constitutional: Negative for chills, fever and unexpected weight change  Some weakness  HENT: Negative for congestion, rhinorrhea and sore throat  Eyes: Negative for discharge and redness  Respiratory: Positive for shortness of breath  Cardiovascular: Negative for chest pain, palpitations and leg swelling  Gastrointestinal: Negative for abdominal distention, abdominal pain and nausea  Endocrine: Negative for polydipsia and polyphagia  Genitourinary: Negative for dysuria     Musculoskeletal: Negative for joint swelling and myalgias  Skin: Negative for rash  Neurological: Negative for light-headedness  Psychiatric/Behavioral: Negative for decreased concentration  Historical Information   Past Medical History:   Diagnosis Date   • Anesthesia complication 7603    after colonoscopy , pt was awake but could not control his body and kept falling    • Arthritis    • Bladder calculi    • BPH (benign prostatic hyperplasia)    • Diabetes mellitus ([facility] [address] )    • Dialysis patient ([facility] [address] ) 3/11/2022   • ESRD (end stage renal disease) on dialysis [facility])    • Hearing disorder of both ears     wears bilateral hearing aids   • Hyperlipidemia     controlled and maintained d/t diabetes   • Hypertension    • Kidney stones     Last Assessed:4/3/2017   • Lyme disease     Last Assessed:2015   • Rupture, bladder, spontaneous     Last Assessed:4/3/2017     Past Surgical History:   Procedure Laterality Date   • BLADDER REPAIR N/A 12/10/2016    Procedure: REPAIR BLADDER/cysto insertion stent;  Surgeon: Andrés Ghosh MD;  Location: [address] [place] [address];  Service:    • COLONOSCOPY     • CYSTOLITHOTOMY      ANESTHESIA   lower abdomen  ;  Last Assessed:4/3/2017   • IR ASPIRATION ONLY  2022   • IR BIOPSY KIDNEY RANDOM  3/10/2022   • IR THORACENTESIS  2022   • IR TUNNELED DIALYSIS CATHETER PLACEMENT  3/8/2022   • OTHER SURGICAL HISTORY      thermal dilation of the prostate x 2 ( in the office)   • TONSILLECTOMY     • TRANSURETHRAL RESECTION OF PROSTATE N/A 2016    Procedure:  Cysto, Laser Bladder stone,fulgaration of prostates tissue ;  Surgeon: Andrés Ghosh MD;  Location: [facility];  Service:      Social History   Social History     Substance and Sexual Activity   Alcohol Use Never     Social History     Substance and Sexual Activity   Drug Use No     Social History     Tobacco Use   Smoking Status Former   • Types: Cigars   • Quit date:    • Years since quittin 9   Smokeless Tobacco Never         Family History: Family History   Problem Relation Age of Onset   • Cancer Mother         breast   • Diabetes Mother    • Cancer Father         prostate w/ bone mets   • Prostate cancer Father    • Alzheimer's disease Sister        Meds/Allergies     Current Facility-Administered Medications:   •  amLODIPine (NORVASC) tablet 10 mg, 10 mg, Oral, Daily, Inocente Felty, MD, 10 mg at 12/20/22 1839  •  calcium acetate (PHOSLO) capsule 667 mg, 667 mg, Oral, TID With Meals, Inocente Felty, MD, 667 mg at 12/20/22 1839  •  cloNIDine (CATAPRES) tablet 0 1 mg, 0 1 mg, Oral, Q12H [facility], Inocente Felty, MD, 0 1 mg at 12/20/22 1839  •  epoetin ani (EPOGEN,PROCRIT) injection 8,000 Units, 8,000 Units, Intravenous, After Dialysis, Inocente Felty, MD, 8,000 Units at 12/20/22 1719  •  finasteride (PROSCAR) tablet 5 mg, 5 mg, Oral, Daily, Inocente Felty, MD, 5 mg at 12/20/22 1839  •  losartan (COZAAR) tablet 50 mg, 50 mg, Oral, BID, Inocente Felty, MD, 50 mg at 12/20/22 1839  •  pravastatin (PRAVACHOL) tablet 80 mg, 80 mg, Oral, Daily With Sophie Chau MD, 80 mg at 12/20/22 1839  •  sodium chloride (OCEAN) 0 65 % nasal spray 1 spray, 1 spray, Each Nare, Q1H PRN, Inocente Felty, MD  •  tamsulosin (FLOMAX) capsule 0 8 mg, 0 8 mg, Oral, Daily With Sophie Chau MD, 0 8 mg at 12/20/22 1839    Prior to Admission medications    Medication Sig Start Date End Date Taking?  Authorizing Provider   amLODIPine (NORVASC) 10 mg tablet Take 1 tablet (10 mg total) by mouth daily 10/17/22  Yes Akira Navarro MD   calcium acetate (PHOSLO) capsule Take 1 capsule (667 mg total) by mouth 3 (three) times a day with meals Most days only takes BID 10/25/22  Yes Akira Navarro MD   calcium carbonate-vitamin D (OSCAL-D) 500 mg-200 units per tablet Take 1 tablet by mouth 2 (two) times a day with meals 10/25/22  Yes Carolyn Walls MD   cloNIDine (CATAPRES) 0 1 mg tablet Take 1 tablet (0 1 mg total) by mouth every 12 (twelve) hours 10/17/22  Yes Rosangela Rivas MD   dutasteride (AVODART) 0 5 mg capsule Take 1 capsule (0 5 mg total) by mouth daily 10/17/22  Yes Rosangela Rivas MD   glucose blood test strip Use 1 each 3 (three) times a day 10/17/22  Yes Rosangela Rivas MD   insulin aspart protamine-insulin aspart (NovoLOG 70/30 FlexPen ReliOn) 100 Units/mL injection pen Inject 15 Units under the skin 2 (two) times a day before meals 10/27/22  Yes Rita Mcpherson MD   Insulin Pen Needle (B-D UF III MINI PEN NEEDLES) 31G X 5 MM MISC Use twice daily with insulin pen as directed 10/17/22  Yes Rosangela Rivas MD   losartan (COZAAR) 50 mg tablet Take 1 tablet (50 mg total) by mouth 2 (two) times a day 10/17/22  Yes Rosangela Rivas MD   simvastatin (ZOCOR) 40 mg tablet Take 1 tablet (40 mg total) by mouth daily at bedtime 10/17/22  Yes Rosangela Rivas MD   sodium chloride (OCEAN) 0 65 % nasal spray 1 spray into each nostril as needed for congestion 8/27/22  Yes Curtis Alcaraz MD   tamsulosin (FLOMAX) 0 4 mg Take 2 capsules (0 8 mg total) by mouth daily with dinner 10/17/22  Yes Rosangela Rivas MD   carboxymethylcellulose 1 % ophthalmic solution Apply 1 drop to eye 2 (two) times a day 6/7/22 8/23/22  Historical Provider, MD       Allergies   Allergen Reactions   • Amoxicillin Rash       Objective   Vitals: Blood pressure 139/63, pulse 94, temperature 99 8 °F (37 7 °C), temperature source Oral, resp  rate 19, weight 84 kg (185 lb 3 oz), SpO2 98 %  ,Body mass index is 28 16 kg/m²  Intake/Output Summary (Last 24 hours) at 12/20/2022 2204  Last data filed at 12/20/2022 1730  Gross per 24 hour   Intake 795 ml   Output 3500 ml   Net -2705 ml     Invasive Devices     Peripheral Intravenous Line  Duration           Peripheral IV 12/20/22 Right Antecubital <1 day          Hemodialysis Catheter  Duration           HD Permanent Double Catheter 287 days                Physical Exam  Vitals reviewed     Constitutional:       General: He is not in acute distress  Appearance: Normal appearance  He is well-developed  Comments: 2 L/min nasal cannula oxygen O2 saturation 98%   HENT:      Head: Normocephalic  Right Ear: External ear normal       Left Ear: External ear normal       Nose: Nose normal       Mouth/Throat:      Mouth: Mucous membranes are moist       Pharynx: Oropharynx is clear  No oropharyngeal exudate  Eyes:      Conjunctiva/sclera: Conjunctivae normal       Pupils: Pupils are equal, round, and reactive to light  Neck:      Vascular: No JVD  Trachea: No tracheal deviation  Cardiovascular:      Rate and Rhythm: Normal rate and regular rhythm  Heart sounds: Normal heart sounds  Pulmonary:      Effort: Pulmonary effort is normal       Comments: Sounds clear anteriorly  Currently is receiving hemodialysis  Abdominal:      General: There is no distension  Palpations: Abdomen is soft  Tenderness: There is no abdominal tenderness  There is no guarding  Musculoskeletal:      Cervical back: Neck supple  Comments: Some edema of lower extremities  Lymphadenopathy:      Cervical: No cervical adenopathy  Skin:     General: Skin is warm and dry  Findings: No rash  Neurological:      Mental Status: He is alert and oriented to person, place, and time  Psychiatric:         Behavior: Behavior normal          Thought Content:  Thought content normal          Lab Results: ABG: No results found for: PHART, MJS1MQL, PO2ART, PMY4STO, L9UCGASZ, BEART, SOURCE, BNP: No results found for: BNP, CBC:   Lab Results   Component Value Date    WBC 7 38 12/20/2022    HGB 7 4 (L) 12/20/2022    HCT 23 9 (L) 12/20/2022    MCV 92 12/20/2022     12/20/2022    MCH 27 3 12/20/2022    MCHC 29 7 (L) 12/20/2022    RDW 15 5 (H) 12/20/2022    MPV 9 3 12/20/2022    NRBC 0 11/30/2022   , CMP:   Lab Results   Component Value Date     07/24/2017    K 3 9 12/20/2022    K 4 2 07/24/2017    CL 98 12/20/2022    CL 104 07/24/2017    CO2 32 12/20/2022    CO2 22 07/24/2017    BUN 37 (H) 12/20/2022    BUN 16 07/24/2017    CREATININE 3 90 (H) 12/20/2022    CREATININE 1 02 07/24/2017    GLUCOSE 106 (H) 07/24/2017    CALCIUM 7 9 (L) 12/20/2022    CALCIUM 9 5 07/24/2017    AST 13 12/20/2022    AST 19 07/24/2017    ALT 11 (L) 12/20/2022    ALT 18 07/24/2017    ALKPHOS 76 12/20/2022    ALKPHOS 77 07/24/2017    PROT 7 2 07/24/2017    BILITOT 0 3 07/24/2017    EGFR 13 12/20/2022   , PT/INR:   Lab Results   Component Value Date    INR 1 20 (H) 12/20/2022   , Troponin: No results found for: TROPONIN    Imaging Studies: I have personally reviewed pertinent reports  and I have personally reviewed pertinent films in PACS    EKG, Pathology, and Other Studies: I have personally reviewed pertinent reports  VTE Prophylaxis: Sequential compression device (Venodyne)     Code Status: Level 1 - Full Code    Counseling/Coordination of Care: Total floor / unit time spent today 30 minutes  Greater than 50% of total time was spent with the patient and / or family counseling and / or coordination of care  A description of the counseling / coordination of care: I viewed CT of chest, history and I discussed case with family practice residents

## 2022-12-22 ENCOUNTER — APPOINTMENT (INPATIENT)
Dept: DIALYSIS | Facility: HOSPITAL | Age: 77
End: 2022-12-22

## 2022-12-22 ENCOUNTER — APPOINTMENT (OUTPATIENT)
Dept: NON INVASIVE DIAGNOSTICS | Facility: HOSPITAL | Age: 77
End: 2022-12-22

## 2022-12-22 LAB
4HR DELTA HS TROPONIN: -15 NG/L
ABO GROUP BLD BPU: NORMAL
ABO GROUP BLD BPU: NORMAL
ANION GAP SERPL CALCULATED.3IONS-SCNC: 9 MMOL/L (ref 4–13)
APPEARANCE FLD: ABNORMAL
BASOPHILS # BLD AUTO: 0.03 THOUSANDS/ÂΜL (ref 0–0.1)
BASOPHILS NFR BLD AUTO: 0 % (ref 0–1)
BPU ID: NORMAL
BPU ID: NORMAL
BUN SERPL-MCNC: 47 MG/DL (ref 5–25)
CALCIUM SERPL-MCNC: 7.4 MG/DL (ref 8.3–10.1)
CARDIAC TROPONIN I PNL SERPL HS: 32 NG/L
CHLORIDE SERPL-SCNC: 97 MMOL/L (ref 96–108)
CO2 SERPL-SCNC: 30 MMOL/L (ref 21–32)
COLOR FLD: ABNORMAL
CREAT SERPL-MCNC: 4.3 MG/DL (ref 0.6–1.3)
CROSSMATCH: NORMAL
CROSSMATCH: NORMAL
EOSINOPHIL # BLD AUTO: 0.06 THOUSAND/ÂΜL (ref 0–0.61)
EOSINOPHIL NFR BLD AUTO: 1 % (ref 0–6)
ERYTHROCYTE [DISTWIDTH] IN BLOOD BY AUTOMATED COUNT: 15.3 % (ref 11.6–15.1)
GFR SERPL CREATININE-BSD FRML MDRD: 12 ML/MIN/1.73SQ M
GLUCOSE FLD-MCNC: 161 MG/DL
GLUCOSE SERPL-MCNC: 178 MG/DL (ref 65–140)
GLUCOSE SERPL-MCNC: 272 MG/DL (ref 65–140)
GLUCOSE SERPL-MCNC: 290 MG/DL (ref 65–140)
HCT VFR BLD AUTO: 26.8 % (ref 36.5–49.3)
HGB BLD-MCNC: 8.5 G/DL (ref 12–17)
HISTIOCYTES NFR FLD: 11 %
IMM GRANULOCYTES # BLD AUTO: 0.09 THOUSAND/UL (ref 0–0.2)
IMM GRANULOCYTES NFR BLD AUTO: 1 % (ref 0–2)
LDH FLD L TO P-CCNC: 213 U/L
LYMPHOCYTES # BLD AUTO: 0.54 THOUSANDS/ÂΜL (ref 0.6–4.47)
LYMPHOCYTES NFR BLD AUTO: 6 % (ref 14–44)
LYMPHOCYTES NFR BLD AUTO: 74 %
MCH RBC QN AUTO: 28 PG (ref 26.8–34.3)
MCHC RBC AUTO-ENTMCNC: 31.7 G/DL (ref 31.4–37.4)
MCV RBC AUTO: 88 FL (ref 82–98)
MONOCYTES # BLD AUTO: 0.68 THOUSAND/ÂΜL (ref 0.17–1.22)
MONOCYTES NFR BLD AUTO: 2 %
MONOCYTES NFR BLD AUTO: 8 % (ref 4–12)
NEUTROPHILS # BLD AUTO: 6.98 THOUSANDS/ÂΜL (ref 1.85–7.62)
NEUTS SEG NFR BLD AUTO: 13 %
NEUTS SEG NFR BLD AUTO: 84 % (ref 43–75)
NRBC BLD AUTO-RTO: 0 /100 WBCS
PH BODY FLUID: 7.6
PLATELET # BLD AUTO: 317 THOUSANDS/UL (ref 149–390)
PMV BLD AUTO: 9.4 FL (ref 8.9–12.7)
POTASSIUM SERPL-SCNC: 4.5 MMOL/L (ref 3.5–5.3)
PROT FLD-MCNC: 3 G/DL
RBC # BLD AUTO: 3.04 MILLION/UL (ref 3.88–5.62)
SITE: ABNORMAL
SODIUM SERPL-SCNC: 136 MMOL/L (ref 135–147)
TOTAL CELLS COUNTED SPEC: 100
UNIT DISPENSE STATUS: NORMAL
UNIT DISPENSE STATUS: NORMAL
UNIT PRODUCT CODE: NORMAL
UNIT PRODUCT CODE: NORMAL
UNIT PRODUCT VOLUME: 350 ML
UNIT PRODUCT VOLUME: 350 ML
UNIT RH: NORMAL
UNIT RH: NORMAL
WBC # BLD AUTO: 8.38 THOUSAND/UL (ref 4.31–10.16)
WBC # FLD MANUAL: 384 /UL

## 2022-12-22 RX ORDER — LIDOCAINE HYDROCHLORIDE 10 MG/ML
INJECTION, SOLUTION EPIDURAL; INFILTRATION; INTRACAUDAL; PERINEURAL AS NEEDED
Status: COMPLETED | OUTPATIENT
Start: 2022-12-22 | End: 2022-12-22

## 2022-12-22 RX ORDER — IPRATROPIUM BROMIDE AND ALBUTEROL SULFATE 2.5; .5 MG/3ML; MG/3ML
3 SOLUTION RESPIRATORY (INHALATION)
Status: DISCONTINUED | OUTPATIENT
Start: 2022-12-22 | End: 2022-12-22

## 2022-12-22 RX ORDER — INSULIN LISPRO 100 [IU]/ML
1-6 INJECTION, SOLUTION INTRAVENOUS; SUBCUTANEOUS
Status: DISCONTINUED | OUTPATIENT
Start: 2022-12-22 | End: 2022-12-23 | Stop reason: HOSPADM

## 2022-12-22 RX ORDER — IPRATROPIUM BROMIDE AND ALBUTEROL SULFATE 2.5; .5 MG/3ML; MG/3ML
SOLUTION RESPIRATORY (INHALATION)
Status: COMPLETED
Start: 2022-12-22 | End: 2022-12-22

## 2022-12-22 RX ORDER — INSULIN GLARGINE 100 [IU]/ML
5 INJECTION, SOLUTION SUBCUTANEOUS
Status: DISCONTINUED | OUTPATIENT
Start: 2022-12-22 | End: 2022-12-23 | Stop reason: HOSPADM

## 2022-12-22 RX ORDER — IPRATROPIUM BROMIDE AND ALBUTEROL SULFATE 2.5; .5 MG/3ML; MG/3ML
3 SOLUTION RESPIRATORY (INHALATION)
Status: DISCONTINUED | OUTPATIENT
Start: 2022-12-22 | End: 2022-12-23 | Stop reason: HOSPADM

## 2022-12-22 RX ORDER — METHYLPREDNISOLONE SODIUM SUCCINATE 40 MG/ML
40 INJECTION, POWDER, LYOPHILIZED, FOR SOLUTION INTRAMUSCULAR; INTRAVENOUS EVERY 12 HOURS SCHEDULED
Status: DISCONTINUED | OUTPATIENT
Start: 2022-12-22 | End: 2022-12-23 | Stop reason: HOSPADM

## 2022-12-22 RX ADMIN — IPRATROPIUM BROMIDE AND ALBUTEROL SULFATE 3 ML: 2.5; .5 SOLUTION RESPIRATORY (INHALATION) at 04:52

## 2022-12-22 RX ADMIN — IPRATROPIUM BROMIDE AND ALBUTEROL SULFATE 3 ML: 2.5; .5 SOLUTION RESPIRATORY (INHALATION) at 20:06

## 2022-12-22 RX ADMIN — ERYTHROPOIETIN 8000 UNITS: 10000 INJECTION, SOLUTION INTRAVENOUS; SUBCUTANEOUS at 12:10

## 2022-12-22 RX ADMIN — INSULIN LISPRO 4 UNITS: 100 INJECTION, SOLUTION INTRAVENOUS; SUBCUTANEOUS at 21:01

## 2022-12-22 RX ADMIN — LIDOCAINE HYDROCHLORIDE 10 ML: 10 INJECTION, SOLUTION EPIDURAL; INFILTRATION; INTRACAUDAL; PERINEURAL at 13:42

## 2022-12-22 RX ADMIN — TAMSULOSIN HYDROCHLORIDE 0.8 MG: 0.4 CAPSULE ORAL at 16:00

## 2022-12-22 RX ADMIN — FINASTERIDE 5 MG: 5 TABLET, FILM COATED ORAL at 08:39

## 2022-12-22 RX ADMIN — INSULIN GLARGINE 5 UNITS: 100 INJECTION, SOLUTION SUBCUTANEOUS at 21:02

## 2022-12-22 RX ADMIN — PRAVASTATIN SODIUM 80 MG: 80 TABLET ORAL at 16:00

## 2022-12-22 RX ADMIN — IPRATROPIUM BROMIDE AND ALBUTEROL SULFATE 3 ML: 2.5; .5 SOLUTION RESPIRATORY (INHALATION) at 16:41

## 2022-12-22 RX ADMIN — AMLODIPINE BESYLATE 10 MG: 10 TABLET ORAL at 08:39

## 2022-12-22 RX ADMIN — CLONIDINE HYDROCHLORIDE 0.1 MG: 0.1 TABLET ORAL at 07:30

## 2022-12-22 RX ADMIN — LOSARTAN POTASSIUM 50 MG: 50 TABLET, FILM COATED ORAL at 21:03

## 2022-12-22 RX ADMIN — INSULIN LISPRO 4 UNITS: 100 INJECTION, SOLUTION INTRAVENOUS; SUBCUTANEOUS at 19:43

## 2022-12-22 RX ADMIN — ACETAMINOPHEN 650 MG: 325 TABLET, FILM COATED ORAL at 21:03

## 2022-12-22 RX ADMIN — LOSARTAN POTASSIUM 50 MG: 50 TABLET, FILM COATED ORAL at 08:39

## 2022-12-22 RX ADMIN — METHYLPREDNISOLONE SODIUM SUCCINATE 40 MG: 40 INJECTION, POWDER, FOR SOLUTION INTRAMUSCULAR; INTRAVENOUS at 16:55

## 2022-12-22 RX ADMIN — ACETAMINOPHEN 650 MG: 325 TABLET, FILM COATED ORAL at 12:30

## 2022-12-22 RX ADMIN — CALCIUM ACETATE 667 MG: 667 CAPSULE ORAL at 12:30

## 2022-12-22 RX ADMIN — CLONIDINE HYDROCHLORIDE 0.1 MG: 0.1 TABLET ORAL at 19:37

## 2022-12-22 RX ADMIN — CALCIUM ACETATE 667 MG: 667 CAPSULE ORAL at 16:00

## 2022-12-22 RX ADMIN — CALCIUM ACETATE 667 MG: 667 CAPSULE ORAL at 07:30

## 2022-12-22 NOTE — PROCEDURES
NEPHROLOGY DIALYSIS PROCEDURE NOTE      Patient seen and examined on Hemodialysis, tolerating procedure well  All documentation, labs, medications were reviewed by myself, and the treatment plan was reviewed with nurse and patient       Seen on Dialysis at : 10:22 AM  Dialysis Access: Right IJ tunneled PermCath  Vitals: 130/72  Dialysis time: 3 hours  Dialyzer: F160  Sodium bath: 137  Potassium bath: 2 K+  Bicarbonate bath: 35  Calcium bath: 2 5  Ultrafiltration: Aim for EDW  Blood flow rate: 400 cc/min  Dialysis flow rate: 1 5 X Qb  Dialysis temperature: 35C  Medications given on HD: Epogen 8K    SUMMARY:     75-year-old male with a history of end-stage renal disease on hemodialysis, recent COVID-19, chronic anemia, BPH, diabetes who presents for low hemoglobin from his outpatient HD unit showing a hemoglobin of 6 4   Nephrology consult for ESRD management      ASSESSMENT and PLAN:     Anemia  -- Acute on chronic  -- Micera as outpatient,Epogen as an inpatient  -- hemoglobin 5 7, on admission  -- Stool occult positive  --Status post multiple unit blood transfusion  --Hemoglobin improved currently at 8 5     End-stage renal disease  -- In center hemodialysis Tuesday Thursday Saturday at [address] [place]  -- Access: Right IJ permacath, evaluation for access as an outpatient  -- Last dry weight was challenged to 70 7 kg  --Admission weight 84 kg  -- History of ANCA vasculitis, did not tolerate cyclophosphamide  --Underwent hemodialysis yesterday, plan for next dialysis tomorrow     Blood pressure/volume status  -- History of bilateral pleural effusions during the last admission examines generalized anasarca and volume overloaded  -- Taylor for colloid expansion with blood transfusion  --Hemodialysis with ultrafiltration today  -- Blood pressure currently acceptable  -- History of hypertension  -- Albumin 1 6, leading to anasarca     COVID-19  --Saturating well  --Still testing positive concern for viral shedding     Mineral bone disorder-chronic kidney disease  -- Continue calcium acetate as an outpatient  -- Renal diet     Bilateral pleural effusion  -- Status post left thoracentesis, 1 4 L was removed yesterday    Review of Systems: The entire 12 point ROS has been reviewed      Physical Exam:    General: Chronically ill-appearing  Skin: No rashes no lesions  CVS: S1-S2 appreciated  Lungs: Coarse breath sounds but better air entry today  Abdomen: Nontender nondistended  Access: Right IJ permacath with no exudate  Extremities: No edema  Neuro: No asterixis          Current Facility-Administered Medications:   •  acetaminophen (TYLENOL) tablet 650 mg, 650 mg, Oral, Q6H PRN, Marlon Jackson MD, 650 mg at 12/21/22 1909  •  amLODIPine (NORVASC) tablet 10 mg, 10 mg, Oral, Daily, Marlon Jackson MD, 10 mg at 12/22/22 2616  •  calcium acetate (PHOSLO) capsule 667 mg, 667 mg, Oral, TID With Meals, Marlon Jackson MD, 667 mg at 12/22/22 0730  •  cloNIDine (CATAPRES) tablet 0 1 mg, 0 1 mg, Oral, Q12H [address], Marlon Jackson MD, 0 1 mg at 12/22/22 0730  •  epoetin ani (EPOGEN,PROCRIT) injection 8,000 Units, 8,000 Units, Intravenous, After Dialysis, Marlon Jackson MD, 8,000 Units at 12/20/22 1719  •  finasteride (PROSCAR) tablet 5 mg, 5 mg, Oral, Daily, Marlon Jackson MD, 5 mg at 12/22/22 5226  •  ipratropium-albuterol (DUO-NEB) 0 5-2 5 mg/3 mL inhalation solution 3 mL, 3 mL, Nebulization, Q6H, Deja Parish MD, 3 mL at 12/22/22 0452  •  losartan (COZAAR) tablet 50 mg, 50 mg, Oral, BID, Marlon Jackson MD, 50 mg at 12/22/22 9644  •  pravastatin (PRAVACHOL) tablet 80 mg, 80 mg, Oral, Daily With Júnior Rainey MD, 80 mg at 12/21/22 1722  •  sodium chloride (OCEAN) 0 65 % nasal spray 1 spray, 1 spray, Each Nare, Q1H PRN, Marlon Jackson MD  •  tamsulosin (FLOMAX) capsule 0 8 mg, 0 8 mg, Oral, Daily With Júnior Rainey MD, 0 8 mg at 12/21/22 06667 [facility]

## 2022-12-22 NOTE — OCCUPATIONAL THERAPY NOTE
OCCUPATIONAL THERAPY       12/22/22 3112   Note Type   Note type Evaluation; Cancelled Session   Cancel Reasons Patient off floor/test   Additional Comments IR   Licensure   [place] License Number  Layneine Maribellmelissa Hall [place] Missy Hira 85, OTR/L, [place] Lic# [id number]

## 2022-12-22 NOTE — PROGRESS NOTES
Daily Progress Note - [facility] [address] J Darrion 76 y o  male MRN: [id number]  Unit/Bed#: [address] [place] [address] Encounter: [id number]  Admitting Physician: Troy Montes MD   PCP: Eun Douglas MD  Date of Admission:  12/20/2022 11:11 AM    Assessment and Plan    * Symptomatic anemia  Assessment & Plan  Morning hemoglobin 6; transfusing 2 units PRBC  Plan   · Repeat H/H 2 hr s/p transfusion -> 8 5  · Monitor vitals & labs  · Monitor for active signs of bleeding  · FOBT  · EPOGEN 8000 U with HD  · GI consulted: Appreciate recommendations  · Monitor for signs of GI bleed, monitor hemoglobin, transfuse blood products as needed to keep hemoglobin greater than 7, recommend outpatient capsule endoscopy for further evaluation, notify GI if patient has any signs of active GI bleed    Pleural effusion  Assessment & Plan  Stable, had HD yesterday and had 3L removed, however he still decreased breath sounds bilaterally  Plan  · HD (Tu/Th/Sat)  · IR consulted for thoracentesis -left-sided on 12/21 and right-sided on 12/22  · Pulmonology consulted  · serum LDH & protein collected to compare with fluid from scheduled thoracentesis  Volume overload  Assessment & Plan  Stable, albumin still 1 6  Plan:   · HD w/ ultrafiltration (Tu/ThSat)  · Monitor vitals, I/O's, daily weights  · Renal diet   · Bilateral thoracentesis    Lung nodule seen on imaging study  Assessment & Plan  Stable, nodules may be secondary to inflammatory process    Plan  · Pulmonology consult  · Sed Rate: 46;  7  · 3-month CT follow-up recommended per pulm    Anasarca  Assessment & Plan  Stable, albumin 1 6    Plan  · HD (Tu/Th/Sat)   · Follow up with am labs   · IV albumin 25% s/p thoracentesis      COVID-19  Assessment & Plan  Chronic, initially presented on 11/29/22 with cough, SOB, abdominal pain, n/v and was diagnosed with COVID   This positive PCR likely secondary to viral shedding without acute active infection  ESRD on dialysis [facility])  Assessment & Plan  Lab Results   Component Value Date    EGFR 14 12/21/2022    EGFR 13 12/20/2022    EGFR 9 11/30/2022    CREATININE 3 65 (H) 12/21/2022    CREATININE 3 90 (H) 12/20/2022    CREATININE 5 20 (H) 11/30/2022     Chronic, stable  Pt is anuric; and has HD on Tu/Th/Sat  EPO 8000 U w/ HD    Plan  Continue HD as per nephrology schedule  ANCA-associated vasculitis  Assessment & Plan  Chronic, stable  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 for which he was previously treated with Cytoxan and subsequently prescribed prednisone taper  Plan  · Continue HD schedule as per nephrology    Anemia due to chronic kidney disease, on chronic dialysis [facility])  Assessment & Plan  Acute on chronic anemia  Hgb on admission was 5 9, he was typed & crossed and transfused  Transfuse if hgb < 7  Plan   · Repeat H/H 2 hr s/p transfusion  · Monitor vitals & labs  · Monitor for active signs of bleeding  · FOBT  · EPOGEN 8000 U with HD    Benign essential hypertension  Assessment & Plan  Chronic, stable continue home medications  Plan  · Continue home medications as follows  · Amlodipine 10 mg daily  · Clonidine 0 1 mg every 12  · Losartan 50 mg, twice daily      VTE Pharmacologic Prophylaxis:   Pharmacologic: low hgb (anticoagulation)  Mechanical VTE Prophylaxis in Place: Yes    Discussions with Specialists or Other Care Team Provider: Nephrology, pulmonology, IR    Education and Discussions with Family / Patient:   Patient Information Sharing: With the consent of Herlene Scheuermann , their loved ones Rodolfo Mckeon, wife) were notified today by inpatient team of the patient’s condition and current plan  All questions answered  Time Spent for Care: 30 minutes  More than 50% of total time spent on counseling and coordination of care as described above      Current Length of Stay: 1 day(s)    Current Patient Status: Inpatient Certification Statement: The patient will continue to require additional inpatient hospital stay due to IR thorocentesis    Code Status: Level 1 - Full Code    Subjective:   Patient seen and examined at bedside this morning  Patient reports mild left chest pain but otherwise doing well with no complaints  Objective     Objective:   Vitals:   Temp (24hrs), Av 1 °F (37 3 °C), Min:98 3 °F (36 8 °C), Max:101 5 °F (38 6 °C)    Temp:  [98 3 °F (36 8 °C)-101 5 °F (38 6 °C)] 98 4 °F (36 9 °C)  HR:  [] 100  Resp:  [18-24] 20  BP: (126-173)/(59-81) 173/76  SpO2:  [92 %-100 %] 94 %  Body mass index is 28 26 kg/m²  Input and Output Summary (last 24 hours): Intake/Output Summary (Last 24 hours) at 2022 1251  Last data filed at 2022 1155  Gross per 24 hour   Intake 810 ml   Output 4900 ml   Net -4090 ml       Physical Exam:   Physical Exam  Constitutional:       Appearance: Normal appearance  He is normal weight  HENT:      Head: Normocephalic and atraumatic  Ears:      Comments: hearing aids in place  Cardiovascular:      Rate and Rhythm: Normal rate and regular rhythm  Pulses: Normal pulses  Heart sounds: Normal heart sounds  Pulmonary:      Effort: Pulmonary effort is normal       Breath sounds: No wheezing  Abdominal:      General: Abdomen is flat  Bowel sounds are normal       Palpations: Abdomen is soft  Tenderness: There is no abdominal tenderness  There is no guarding or rebound  Genitourinary:     Penis: Normal     Musculoskeletal:         General: No swelling  Normal range of motion  Cervical back: Normal range of motion  Right lower leg: Edema present  Left lower leg: Edema present  Skin:     General: Skin is warm  Findings: No bruising or erythema  Neurological:      Mental Status: He is alert and oriented to person, place, and time           Additional Data:     Labs:  Results from last 7 days   Lab Units 22  0927   WBC Thousand/uL 8 38   HEMOGLOBIN g/dL 8 5*   HEMATOCRIT % 26 8*   PLATELETS Thousands/uL 317   NEUTROS PCT % 84*   LYMPHS PCT % 6*   MONOS PCT % 8   EOS PCT % 1     Results from last 7 days   Lab Units 12/22/22  0927 12/21/22  0444   POTASSIUM mmol/L 4 5 4 6   CHLORIDE mmol/L 97 98   CO2 mmol/L 30 31   BUN mg/dL 47* 34*   CREATININE mg/dL 4 30* 3 65*   CALCIUM mg/dL 7 4* 7 5*   ALK PHOS U/L  --  100   ALT U/L  --  9*   AST U/L  --  18     Results from last 7 days   Lab Units 12/20/22  1217   INR  1 20*               * I Have Reviewed All Lab Data Listed Above  * Additional Pertinent Lab Tests Reviewed: All Labs For Current Hospital Admission Reviewed    Imaging:    Imaging Reports Reviewed Today Include: All imaging  Imaging Personally Reviewed by Myself Includes: All imaging    Recent Cultures (last 7 days):     Results from last 7 days   Lab Units 12/21/22  2259 12/21/22  1440   BLOOD CULTURE  Received in Microbiology Lab  Culture in Progress    --    GRAM STAIN RESULT   --  1+ Polys  No bacteria seen       Last 24 Hours Medication List:   Current Facility-Administered Medications   Medication Dose Route Frequency Provider Last Rate   • acetaminophen  650 mg Oral Q6H PRN Maria Victoria Noe MD     • amLODIPine  10 mg Oral Daily Maria Victoria Noe MD     • calcium acetate  667 mg Oral TID With Meals Maria Victoria Noe MD     • cloNIDine  0 1 mg Oral Q12H Dayna Morgan MD     • epoetin ani  8,000 Units Intravenous After Dialysis Maria Victoria Noe MD     • finasteride  5 mg Oral Daily Maria Victoria Noe MD     • ipratropium-albuterol  3 mL Nebulization Q6H Ulises Senior MD     • losartan  50 mg Oral BID Maria Victoria Noe MD     • pravastatin  80 mg Oral Daily With Bryn Deutsch MD     • sodium chloride  1 spray Each Nare Q1H PRN Maria Victoria Noe MD     • tamsulosin  0 8 mg Oral Daily With Bryn Deutsch MD               ** Please Note: Dictation voice to text software may have been used in the creation of this document   Blas Thomas MD  12/22/22  12:51 PM

## 2022-12-22 NOTE — PROGRESS NOTES
Progress Note - Pulmonary   Robby Gautam 76 y o  male MRN: [id number]  Unit/Bed#: [address] [place] [address] Encounter: [id number]    Assessment:  Bilateral pleural effusions possibly due to fluid overload  Also has nodular densities  both lungs which could be due to recent COVID or could be due to to his ANCA vasculitis  Small pericardial effusion  Anemia  Hemoglobin home decreased to 5 9  Had total of 3 units of packed red blood cells transfused since admission  Dorian hemoglobin after transfusion is 8 5 and had EGD and colonoscopy in March with no evidence of bleeding or lesions  Recent COVID 19 infection late November of this year  Plan:  Pleural fluid studies from today's thoracentesis  Will have right thoracentesis done tomorrow  Serum C-reactive protein level elevated at 194  New ANCA and PA-3 antibody pending  Patient was tried on Cytoxan-cyclophosphamide for a brief time when diagnosed with his kidney vasculitis but could not tolerate and this was discontinued in April of this year  End-stage renal disease has been on hemodialysis since March of this year    Subjective:   Has some left pleuritic type chest pain when he takes deep breath  He had left ultrasound-guided thoracentesis today 1 4 L of sanguinous pleural fluid removed  Objective:     Vitals: Blood pressure 128/60, pulse 91, temperature (!) 101 5 °F (38 6 °C), temperature source Oral, resp  rate (!) 24, height 5' 8" (1 727 m), weight 84 kg (185 lb 3 oz), SpO2 96 %  ,Body mass index is 28 16 kg/m²  Intake/Output Summary (Last 24 hours) at 12/21/2022 2043  Last data filed at 12/21/2022 1730  Gross per 24 hour   Intake 591 25 ml   Output 1400 ml   Net -808 75 ml       Physical Exam: Physical Exam  Vitals reviewed  Constitutional:       General: He is not in acute distress  Appearance: He is well-developed  Comments: On 3 L/min his current oxygen O2 saturation is 96%   HENT:      Head: Normocephalic        Right Ear: External ear normal       Left Ear: External ear normal       Nose: Nose normal       Mouth/Throat:      Mouth: Mucous membranes are moist       Pharynx: Oropharynx is clear  No oropharyngeal exudate  Eyes:      Conjunctiva/sclera: Conjunctivae normal       Pupils: Pupils are equal, round, and reactive to light  Neck:      Vascular: No JVD  Trachea: No tracheal deviation  Cardiovascular:      Rate and Rhythm: Normal rate and regular rhythm  Heart sounds: Normal heart sounds  Pulmonary:      Effort: Pulmonary effort is normal       Comments: Sounds decreased right lower lobe  No wheezes or rhonchi  Abdominal:      General: There is no distension  Palpations: Abdomen is soft  Tenderness: There is no abdominal tenderness  There is no guarding  Musculoskeletal:      Cervical back: Neck supple  Comments: Some edema of lower extremities   Lymphadenopathy:      Cervical: No cervical adenopathy  Skin:     General: Skin is warm and dry  Findings: No rash  Neurological:      General: No focal deficit present  Mental Status: He is alert and oriented to person, place, and time  Psychiatric:         Behavior: Behavior normal          Thought Content: Thought content normal           Labs: I have personally reviewed pertinent lab results  , ABG: No results found for: PHART, PYL7WQY, PO2ART, HPN3TFO, A1QRDZHN, BEART, SOURCE, BNP: No results found for: BNP, CBC:   Lab Results   Component Value Date    WBC 6 57 12/21/2022    HGB 8 5 (L) 12/21/2022    HCT 26 8 (L) 12/21/2022    MCV 90 12/21/2022     12/21/2022    MCH 27 0 12/21/2022    MCHC 30 2 (L) 12/21/2022    RDW 15 9 (H) 12/21/2022    MPV 9 2 12/21/2022    NRBC 0 12/21/2022   , CMP:   Lab Results   Component Value Date     07/24/2017    K 4 6 12/21/2022    K 4 2 07/24/2017    CL 98 12/21/2022     07/24/2017    CO2 31 12/21/2022    CO2 22 07/24/2017    BUN 34 (H) 12/21/2022    BUN 16 07/24/2017    CREATININE 3 65 (H) 12/21/2022    CREATININE 1 02 07/24/2017    GLUCOSE 106 (H) 07/24/2017    CALCIUM 7 5 (L) 12/21/2022    CALCIUM 9 5 07/24/2017    AST 18 12/21/2022    AST 19 07/24/2017    ALT 9 (L) 12/21/2022    ALT 18 07/24/2017    ALKPHOS 100 12/21/2022    ALKPHOS 77 07/24/2017    PROT 7 2 07/24/2017    BILITOT 0 3 07/24/2017    EGFR 14 12/21/2022   , PT/INR:   Lab Results   Component Value Date    INR 1 20 (H) 12/20/2022   , Troponin: No results found for: TROPONIN    Imaging and other studies: I have personally reviewed pertinent reports     and I have personally reviewed pertinent films in PACS

## 2022-12-22 NOTE — SEDATION DOCUMENTATION
Thoracentesis completed by Dr Chasity Giraldo  1000ml removed from the right side  Band-aid to site  Specimens sent to lab

## 2022-12-22 NOTE — PHYSICAL THERAPY NOTE
PHYSICAL THERAPY EVALUATION/TREATMENT     12/22/22 4570   Note Type   Note type Evaluation   Pain Assessment   Pain Assessment Tool 0-10   Pain Score 4  (all over Pain as per patient)   Restrictions/Precautions   Other Precautions Chair Alarm; Bed Alarm; Fall Risk;Pain   Home Living   Type of [address] One level;Stairs to enter with rails  (2 stairs to enter)   Home Equipment Walker;Cane   Additional Comments patient using a roller walker at STR prior to admission  Patient patient utilizing a cane prior to admission to rehab   Prior Function   Level of [place] Needs assistance with IADLS;Needs assistance with ADLs   Lives With Spouse   Receives Help From Family; Neighbor  (Neighbor in  home daily assisting patient and wife)   Comments Patient admitted from short-term rehab   General   Additional Pertinent History Chart reviewed, patient admitted with symptomatic anemia    Patient now presents as generally weak and deconditioned with resulting gait dysfunction and will require short-term rehab prior to return home   Family/Caregiver Present No   Cognition   Overall Cognitive Status WFL   Arousal/Participation Cooperative   Orientation Level Oriented X4   Following Commands Follows multistep commands with increased time or repetition   Subjective   Subjective Patient reports generally weak feeling and all over body ache   RLE Assessment   RLE Assessment   (Range of motion within functional limits, strength 3+/5)   LLE Assessment   LLE Assessment   (Range of motion within functional limits, strength 3+/5)   Coordination   Movements are Fluid and Coordinated 0   Bed Mobility   Supine to Sit 4  Minimal assistance   Additional items Assist x 1   Sit to Supine 4  Minimal assistance   Additional items Assist x 1   Transfers   Sit to Stand 3  Moderate assistance   Additional items Assist x 1   Stand to Sit 3  Moderate assistance   Additional items Assist x 1   Ambulation/Elevation   Gait Assistance 3 Moderate assist   Additional items Assist x 1   Assistive Device Rolling walker   Distance 20 feet with change in direction, slow gait patterning with shortened stride length unsteadiness   Balance   Static Sitting Fair   Dynamic Sitting Fair -   Static Standing Fair -   Dynamic Standing Poor +   Ambulatory Poor +   Activity Tolerance   Activity Tolerance Patient limited by fatigue;Treatment limited secondary to medical complications (Comment)   Nurse Made Aware yes   Assessment   Prognosis Good   Problem List Decreased strength;Decreased range of motion;Decreased endurance; Impaired balance;Decreased mobility;Pain; Impaired hearing  (Hard of hearing)   Assessment Patient seen for Physical Therapy evaluation  Patient admitted with Symptomatic anemia  Comorbidities affecting patient's physical performance include:   Personal factors affecting patient at time of initial evaluation include: ambulating with assistive device, inability to ambulate household distances, inability to navigate community distances, inability to navigate level surfaces without external assistance, inability to perform dynamic tasks in community, positive fall history, inability to perform physical activity, inability to perform ADLS and inability to perform IADLS   Prior to admission, patient was requiring assist for ADLS, requiring assist for IADLS, ambulating household distance and in short term rehab  Please find objective findings from Physical Therapy assessment regarding body systems outlined above with impairments and limitations including weakness, decreased ROM, impaired balance, decreased endurance, impaired coordination, gait deviations, pain, decreased activity tolerance, decreased functional mobility tolerance and fall risk  The Barthel Index was used as a functional outcome tool presenting with a score of Barthel Index Score: 45 today indicating marked limitations of functional mobility and ADLS    Patient's clinical presentation is currently unstable/unpredictable   as seen in patient's presentation of vital sign response, changing level of pain, increased fall risk, new onset of impairment of functional mobility, decreased endurance and new onset of weakness  Pt would benefit from continued Physical Therapy treatment to address deficits as defined above and maximize level of functional mobility  As demonstrated by objective findings, the assigned level of complexity for this evaluation is high  The patient's [facility] Basic Mobility Inpatient Short Form Raw Score is 13  A Raw score of less than or equal to 16 suggests the patient may benefit from discharge to post-acute rehabilitation services  Please also refer to the recommendation of the Physical Therapist for safe discharge planning  Goals   Patient Goals To get stronger and get back to independent   STG Expiration Date 12/29/22   Short Term Goal #1 Transfers and gait with roller walker with min assist   Short Term Goal #2 Gait endurance to 50 feet   LTG Expiration Date 01/05/23   Long Term Goal #1 Strength bilateral lower extremities 4/5   Long Term Goal #2 Transfers and gait independently with roller walker for functional household distances   Plan   Treatment/Interventions ADL retraining;Functional transfer training;LE strengthening/ROM; Therapeutic exercise; Endurance training;Patient/family training;Equipment eval/education; Bed mobility;Gait training; Compensatory technique education   PT Frequency Other (Comment)  (5 times a week)   Recommendation   PT Discharge Recommendation Post acute rehabilitation services   [facility] Basic Mobility Inpatient   Turning in Bed Without Bedrails 3   Lying on Back to Sitting on Edge of Flat Bed 3   Moving Bed to Chair 2   Standing Up From Chair 2   Walk in Room 2   Climb 3-5 Stairs 1   Basic Mobility Inpatient Raw Score 13   Basic Mobility Standardized Score 33 99   Highest Level Of Mobility   [facility] Goal 4: Move to chair/commode   [facility] Achieved 6: Walk 10 steps or more   Barthel Index   Feeding 5   Bathing 0   Grooming Score 0   Dressing Score 5   Bladder Score 10   Bowels Score 10   Toilet Use Score 5   Transfers (Bed/Chair) Score 5   Mobility (Level Surface) Score 0   Stairs Score 5   Barthel Index Score 45   Additional Treatment Session   Start Time 1335   End Time 1350   Treatment Assessment S: Patient reports allover body ache O: Bilateral lower extremity exercises completed as listed below A: Patient will benefit from increasing functional mobility and continued physical therapy with progression as tolerated   Exercises   Hamstring Stretch Supine;5 reps;PROM   Quad Sets Supine;5 reps   Hip Flexion Sitting;10 reps   Hip Abduction Sitting;10 reps   Knee AROM Long Arc Quad Sitting;10 reps   Ankle Pumps Sitting;10 reps   Balance training  Sidestepping and backward walking for balance and coordination   Licensure   [place] License Number  Belén Bishop, PT 4 [id number]

## 2022-12-22 NOTE — PLAN OF CARE
Problem: METABOLIC, FLUID AND ELECTROLYTES - ADULT  Goal: Electrolytes maintained within normal limits  Description: INTERVENTIONS:  - Monitor labs and assess patient for signs and symptoms of electrolyte imbalances  - Administer electrolyte replacement as ordered  - Monitor response to electrolyte replacements, including repeat lab results as appropriate  - Instruct patient on fluid and nutrition as appropriate  Outcome: Progressing  Goal: Fluid balance maintained  Description: INTERVENTIONS:  - Monitor labs   - Monitor I/O and WT  - Instruct patient on fluid and nutrition as appropriate  - Assess for signs & symptoms of volume excess or deficit  Outcome: Progressing   Post-Dialysis RN Treatment Note    Blood Pressure:  Pre 165/70 mm/Hg  Post 136/75 mmHg   EDW  70 5 kg    Weight:  Pre 78 7 kg   Post 75 5 kg   Mode of weight measurement: Bed Scale   Volume Removed  3000 ml    Treatment duration 180 minutes    NS given  No    Treatment shortened?  No   Medications given during Rx Epogen 8000 units   Estimated Kt/V  Not Applicable   Access type: Permacath/TDC   Access Issues: No    Report called to primary nurse   Yes

## 2022-12-23 VITALS
HEIGHT: 68 IN | WEIGHT: 185.85 LBS | HEART RATE: 121 BPM | TEMPERATURE: 97.9 F | BODY MASS INDEX: 28.17 KG/M2 | DIASTOLIC BLOOD PRESSURE: 79 MMHG | RESPIRATION RATE: 20 BRPM | OXYGEN SATURATION: 94 % | SYSTOLIC BLOOD PRESSURE: 132 MMHG

## 2022-12-23 LAB
ANION GAP SERPL CALCULATED.3IONS-SCNC: 11 MMOL/L (ref 4–13)
ATRIAL RATE: 90 BPM
BASOPHILS # BLD AUTO: 0.01 THOUSANDS/ÂΜL (ref 0–0.1)
BASOPHILS NFR BLD AUTO: 0 % (ref 0–1)
BUN SERPL-MCNC: 46 MG/DL (ref 5–25)
CALCIUM SERPL-MCNC: 7.7 MG/DL (ref 8.3–10.1)
CHLORIDE SERPL-SCNC: 93 MMOL/L (ref 96–108)
CO2 SERPL-SCNC: 27 MMOL/L (ref 21–32)
CREAT SERPL-MCNC: 4.14 MG/DL (ref 0.6–1.3)
CRP SERPL QL: 241.2 MG/L
EOSINOPHIL # BLD AUTO: 0 THOUSAND/ÂΜL (ref 0–0.61)
EOSINOPHIL NFR BLD AUTO: 0 % (ref 0–6)
ERYTHROCYTE [DISTWIDTH] IN BLOOD BY AUTOMATED COUNT: 14.8 % (ref 11.6–15.1)
GFR SERPL CREATININE-BSD FRML MDRD: 12 ML/MIN/1.73SQ M
GLUCOSE SERPL-MCNC: 241 MG/DL (ref 65–140)
GLUCOSE SERPL-MCNC: 283 MG/DL (ref 65–140)
GLUCOSE SERPL-MCNC: 293 MG/DL (ref 65–140)
GLUCOSE SERPL-MCNC: 349 MG/DL (ref 65–140)
HCT VFR BLD AUTO: 27.5 % (ref 36.5–49.3)
HGB BLD-MCNC: 8.7 G/DL (ref 12–17)
IMM GRANULOCYTES # BLD AUTO: 0.06 THOUSAND/UL (ref 0–0.2)
IMM GRANULOCYTES NFR BLD AUTO: 1 % (ref 0–2)
LYMPHOCYTES # BLD AUTO: 0.2 THOUSANDS/ÂΜL (ref 0.6–4.47)
LYMPHOCYTES NFR BLD AUTO: 3 % (ref 14–44)
MCH RBC QN AUTO: 27.7 PG (ref 26.8–34.3)
MCHC RBC AUTO-ENTMCNC: 31.6 G/DL (ref 31.4–37.4)
MCV RBC AUTO: 88 FL (ref 82–98)
MONOCYTES # BLD AUTO: 0.21 THOUSAND/ÂΜL (ref 0.17–1.22)
MONOCYTES NFR BLD AUTO: 3 % (ref 4–12)
MRSA NOSE QL CULT: NORMAL
NEUTROPHILS # BLD AUTO: 6.2 THOUSANDS/ÂΜL (ref 1.85–7.62)
NEUTS SEG NFR BLD AUTO: 93 % (ref 43–75)
NRBC BLD AUTO-RTO: 0 /100 WBCS
P AXIS: 26 DEGREES
PLATELET # BLD AUTO: 291 THOUSANDS/UL (ref 149–390)
PMV BLD AUTO: 9.3 FL (ref 8.9–12.7)
POTASSIUM SERPL-SCNC: 4.5 MMOL/L (ref 3.5–5.3)
PR INTERVAL: 310 MS
PROTEINASE3 AB SER IA-ACNC: >8 UNITS (ref 0–0.9)
QRS AXIS: 42 DEGREES
QRSD INTERVAL: 86 MS
QT INTERVAL: 324 MS
QTC INTERVAL: 396 MS
RBC # BLD AUTO: 3.14 MILLION/UL (ref 3.88–5.62)
SODIUM SERPL-SCNC: 131 MMOL/L (ref 135–147)
T WAVE AXIS: 57 DEGREES
VENTRICULAR RATE: 90 BPM
WBC # BLD AUTO: 6.68 THOUSAND/UL (ref 4.31–10.16)

## 2022-12-23 RX ORDER — PREDNISONE 10 MG/1
TABLET ORAL
Qty: 100 TABLET | Refills: 0 | Status: ON HOLD | OUTPATIENT
Start: 2022-12-23 | End: 2022-12-25

## 2022-12-23 RX ADMIN — IPRATROPIUM BROMIDE AND ALBUTEROL SULFATE 3 ML: 2.5; .5 SOLUTION RESPIRATORY (INHALATION) at 01:39

## 2022-12-23 RX ADMIN — LOSARTAN POTASSIUM 50 MG: 50 TABLET, FILM COATED ORAL at 08:30

## 2022-12-23 RX ADMIN — CLONIDINE HYDROCHLORIDE 0.1 MG: 0.1 TABLET ORAL at 18:30

## 2022-12-23 RX ADMIN — IPRATROPIUM BROMIDE AND ALBUTEROL SULFATE 3 ML: 2.5; .5 SOLUTION RESPIRATORY (INHALATION) at 07:22

## 2022-12-23 RX ADMIN — CALCIUM ACETATE 667 MG: 667 CAPSULE ORAL at 08:30

## 2022-12-23 RX ADMIN — INSULIN LISPRO 3 UNITS: 100 INJECTION, SOLUTION INTRAVENOUS; SUBCUTANEOUS at 16:30

## 2022-12-23 RX ADMIN — CALCIUM ACETATE 667 MG: 667 CAPSULE ORAL at 12:07

## 2022-12-23 RX ADMIN — CALCIUM ACETATE 667 MG: 667 CAPSULE ORAL at 16:30

## 2022-12-23 RX ADMIN — TAMSULOSIN HYDROCHLORIDE 0.8 MG: 0.4 CAPSULE ORAL at 16:30

## 2022-12-23 RX ADMIN — AMLODIPINE BESYLATE 10 MG: 10 TABLET ORAL at 08:30

## 2022-12-23 RX ADMIN — IPRATROPIUM BROMIDE AND ALBUTEROL SULFATE 3 ML: 2.5; .5 SOLUTION RESPIRATORY (INHALATION) at 13:42

## 2022-12-23 RX ADMIN — PRAVASTATIN SODIUM 80 MG: 80 TABLET ORAL at 16:30

## 2022-12-23 RX ADMIN — INSULIN LISPRO 4 UNITS: 100 INJECTION, SOLUTION INTRAVENOUS; SUBCUTANEOUS at 07:30

## 2022-12-23 RX ADMIN — INSULIN LISPRO 5 UNITS: 100 INJECTION, SOLUTION INTRAVENOUS; SUBCUTANEOUS at 12:06

## 2022-12-23 RX ADMIN — METHYLPREDNISOLONE SODIUM SUCCINATE 40 MG: 40 INJECTION, POWDER, FOR SOLUTION INTRAMUSCULAR; INTRAVENOUS at 08:30

## 2022-12-23 RX ADMIN — FINASTERIDE 5 MG: 5 TABLET, FILM COATED ORAL at 08:30

## 2022-12-23 RX ADMIN — CLONIDINE HYDROCHLORIDE 0.1 MG: 0.1 TABLET ORAL at 06:13

## 2022-12-23 NOTE — [place] UNIVERSAL TRANSFER FORM
[place] UNIVERSAL TRANSFER FORM  (ALL ITEMS MUST BE COMPLETED)    1  TRANSFER FROM: [address]      TRANSFER TO: [facility]     2  DATE OF TRANSFER: 12/23/2022                        TIME OF TRANSFER: 1845    3  PATIENT NAME: Florrie Sledge, Harlon Bence      YOB: 1945                             GENDER: male    4  LANGUAGE:   English    5  PHYSICIAN NAME:  Ted Willoughby MD                   PHONE: [phone]  CODE STATUS: Level 1 - Full Code        Out of Hospital DNR Attached: No    7  :                                      :  Extended Emergency Contact Information  Primary Emergency Contact: [facility]  Address: [address]  Mobile Phone: [phone]  Relation: Spouse  Secondary Emergency Contact: Alexa Angeles  Mobile Phone: [phone]  Relation: Joshua Daily Representative/Proxy:  No           Legal Guardian:  No             NAME OF:           HEALTH CARE REPRESENTATIVE/PROXY:                                         OR           LEGAL GUARDIAN, IF NOT :                                               PHONE:  (Day)           (Night)                        (Cell)    8  REASON FOR TRANSFER: (Must include brief medical history and recent changes in physical function or cognition ) AOx4  Symptomatic Anemia            V/S: /55   Pulse 83   Temp (!) 97 4 °F (36 3 °C)   Resp 20   Ht 5' 8" (1 727 m)   Wt 84 3 kg (185 lb 13 6 oz)   SpO2 93%   BMI 28 26 kg/m²           PAIN: None    9  PRIMARY DIAGNOSIS: Symptomatic anemia      Secondary Diagnosis:         Pacemaker: No      Internal Defib: No          Mental Health Diagnosis (if Applicable):    10  RESTRAINTS: No     11  RESPIRATORY NEEDS: Oxygen Device 4 Liters Chronic ,    12  ISOLATION/PRECAUTION: None    13  ALLERGY: Amoxicillin    14   SENSORY:       Vision Poor and Hearing Poor    15  SKIN CONDITION: Yes:  Pressure    16  DIET: Regular    17  IV ACCESS: OtherHD Catheter     18  PERSONAL ITEMS SENT WITH PATIENT: Glasses and Hearing Aid Left and Right    19  ATTACHED DOCUMENTS: MUST ATTACH CURRENT MEDICATION INFORMATION Face Sheet, MAR, Medication Reconciliation, TAR, POS, Diagnostic Studies, Labs, Respiratory Care, Discharge Summary, PT Note and OT Note    20  AT RISK ALERTS:Falls and Pressure Ulcer        HARM TO: N/A    21  WEIGHT BEARING STATUS:         Left Leg: Limited        Right Leg: Limited    22  MENTAL STATUS:Alert    23  FUNCTION:        Walk: With Help        Transfer: With Help        Toilet: With Help        Feed: Self    24  IMMUNIZATIONS/SCREENING:     Immunization History   Administered Date(s) Administered   • COVID-19 MODERNA VACC 0 25 ML IM BOOSTER 12/03/2021   • COVID-19 PFIZER VACCINE 0 3 ML IM 05/06/2021, 05/29/2021   • Hepatitis B Vaccine (Recombinant), Cpg Adjuvanted 04/14/2022, 05/12/2022, 07/14/2022, 09/15/2022   • Influenza Split High Dose Preservative Free IM 10/11/2013, 09/23/2016, 11/01/2017   • Influenza, high dose seasonal 0 7 mL 12/17/2018, 10/02/2019, 11/09/2020, 09/30/2021, 10/17/2022   • Influenza, seasonal, injectable 11/17/2000, 11/16/2001, 12/06/2002, 10/30/2003, 10/18/2005, 10/20/2006, 10/17/2007, 10/28/2008, 09/15/2009, 09/11/2010, 09/19/2011, 09/26/2012   • Pneumococcal Conjugate 13-Valent 08/05/2015   • Pneumococcal Polysaccharide PPV23 11/16/2001, 09/25/2012   • Tdap 02/14/2018   • Zoster 09/04/2015       25  BOWEL: Continent    26  BLADDER: Continent    27   SENDING FACILITY CONTACT: Татьяна Carreon                   Title: RN         Unit: 2S        Phone: [phone] [address] (if known):        Title:        Unit:         Phone:         FORM PREFILLED BY (if applicable)       Title:       Unit:        Phone:         FORM COMPLETED BY Татьяна Carreon RN      Title: RN      Phone: [phone]

## 2022-12-23 NOTE — OCCUPATIONAL THERAPY NOTE
Occupational Therapy Evaluation/Treatment       12/23/22 1020   Note Type   Note type Evaluation   Pain Assessment   Pain Assessment Tool 0-10   Pain Score No Pain   Restrictions/Precautions   Other Precautions Cognitive; Chair Alarm; Bed Alarm;O2;Fall Risk   Home Living   Type of Home House  (Admit from [facility])   Additional Comments Patient most recently at [address] [place] [address] after recent hospitalization , was getting OT and PT services, assist with ADLs, assist to ambulate with RW   Prior Function   Level of [place] Needs assistance with ADLs; Needs assistance with IADLS   Receives Help From Personal care attendant   General   Additional Pertinent History Patient admitted from [address] [place] [address] for symptomatic anemia   ADL   Eating Assistance 5  Supervision/Setup   Grooming Assistance 5  Supervision/Setup   UB Bathing Assistance 4  Minimal Assistance   LB Bathing Assistance 3  Moderate Assistance   [address] 4  Minimal Assistance   LB Dressing Assistance 3  Moderate Assistance   Toileting Assistance  3  Moderate Assistance   Bed Mobility   Supine to Sit 4  Minimal assistance   Additional items Assist x 1;Verbal cues   Sit to Supine 4  Minimal assistance   Additional items Assist x 1;Verbal cues   Transfers   Sit to Stand 4  Minimal assistance   Additional items Assist x 1   Stand to Sit 4  Minimal assistance   Additional items Assist x 1   Functional Mobility   Functional Mobility 4  Minimal assistance   Additional Comments Patient ambulated few feet in room with RW: SpO2 decreased to 85% with minimal ambulation on 4L O2   Balance   Static Sitting Fair   Dynamic Sitting Fair   Static Standing Fair -   Dynamic Standing Fair -   Activity Tolerance   Activity Tolerance Patient limited by fatigue; Other (Comment)  (Limited by SOB)   RUE Assessment   RUE Assessment WFL   LUE Assessment   LUE Assessment WFL   Cognition   Overall Cognitive Status WFL   Arousal/Participation Alert; Cooperative   Attention Attends with cues to redirect Orientation Level Oriented to person;Oriented to place;Oriented to situation   Following Commands Follows multistep commands with increased time or repetition   Assessment   Limitation Decreased ADL status; Decreased UE strength;Decreased Safe judgement during ADL;Decreased endurance;Decreased self-care trans;Decreased high-level ADLs   Prognosis Good   Assessment Patient evaluated by Occupational Therapy  Patient admitted with Symptomatic anemia  The patients occupational profile, medical and therapy history includes a extensive additional review of physical, cognitive, or psychosocial history related to current functional performance  Comorbidities affecting functional mobility and ADLS include: DM, HTN, BPH, HLD, Lyme disease, kidney stones, ESRD on HD  Prior to admission, patient was requiring assist for functional mobility with RW, requiring assist for ADLS, requiring assist for IADLS and in short term rehab  The evaluation identifies the following performance deficits: weakness, impaired balance, decreased endurance, increased fall risk, new onset of impairment of functional mobility, decreased ADLS, decreased IADLS, decreased activity tolerance, decreased safety awareness and decreased strength, that result in activity limitations and/or participation restrictions  This evaluation requires clinical decision making of high complexity, because the patient presents with comorbidites that affect occupational performance and required significant modification of tasks or assistance with consideration of multiple treatment options  The Barthel Index was used as a functional outcome tool presenting with a score of Barthel Index Score: 50, indicating marked limitations of functional mobility and ADLS  The patient's raw score on the -PAC Daily Activity inpatient short form is 16, standardized score is 35 96, less than 39 4   Patients at this level are likely to benefit from DC to post-acute rehabilitation services  Please refer to the recommendation of the Occupational Therapist for safe DC planning  Patient will benefit from skilled Occupational Therapy services to address above deficits and facilitate a safe return to prior level of function  Goals   Patient Goals "I need to walk more"   STG Time Frame   (1-7 days)   Short Term Goal  Goals established to promote Patient Goals: "I need to walk more":  Eating: independent; Grooming: independent seated; Bathing: min assist; Upper Body Dressing supervision; Lower Body Dressing: min assist; Toileting: min assist; Patient will increase ambulatory standard toilet transfer to min assist with rolling walker to increase performance and safety with ADLS and functional mobility; Patient will increase standing tolerance to 5 minutes during ADL task to decrease assistance level and decrease fall risk; Patient will increase bed mobility to supervision in preparation for ADLS and transfers;  Patient will increase functional mobility to and from bathroom with rolling walker with min assist to increase performance with ADLS and to use a toilet; Patient will tolerate 5 minutes of UE ROM/strengthening to increase general activity tolerance and performance in ADLS/IADLS; Patient will improve functional activity tolerance to 5 minutes of sustained functional tasks to increase participation in basic self-care and decrease assistance level;  Patient will be able to to verbalize understanding and perform energy conservation/proper body mechanics during ADLS and functional mobility at least 50% of the time with moderate cueing to decrease signs of fatigue and increase stamina to return to prior level of function; Patient will increase static/dynamic sitting balance to fair+ to improve the ability to sit at edge of bed or on a chair for ADLS;  Patient will increase static/dynamic standing balance to fair to improve postural stability and decrease fall risk during standing ADLS and transfers  LTG Time Frame   (8-14 days)   Long Term Goal Grooming: independent standing at sink; Bathing: supervision; Upper Body Dressing independent; Lower Body Dressing: supervision; Toileting: supervision; Patient will increase ambulatory standard toilet transfer to supervision with rolling walker to increase performance and safety with ADLS and functional mobility; Patient will increase standing tolerance to 10 minutes during ADL task to decrease assistance level and decrease fall risk; Patient will increase bed mobility to independent in preparation for ADLS and transfers; Patient will increase functional mobility to and from bathroom with rolling walker with supervision to increase performance with ADLS and to use a toilet; Patient will tolerate 10 minutes of UE ROM/strengthening to increase general activity tolerance and performance in ADLS/IADLS; Patient will improve functional activity tolerance to 10 minutes of sustained functional tasks to increase participation in basic self-care and decrease assistance level;  Patient will be able to to verbalize understanding and perform energy conservation/proper body mechanics during ADLS and functional mobility at least 75% of the time with minimal cueing to decrease signs of fatigue and increase stamina to return to prior level of function; Patient will increase static/dynamic sitting balance to good to improve the ability to sit at edge of bed or on a chair for ADLS;  Patient will increase static/dynamic standing balance to fair+ to improve postural stability and decrease fall risk during standing ADLS and transfers  Pt will score >/= 20/24 on AM-PAC Daily Activity Inpatient scale to promote safe independence with ADLs and functional mobility; Pt will score >/= 80/100 on Barthel Index in order to decrease caregiver assistance needed and increase ability to perform ADLs and functional mobility     Plan   Treatment Interventions ADL retraining;Functional transfer training;UE strengthening/ROM; Endurance training;Patient/family training;Equipment evaluation/education; Compensatory technique education;Continued evaluation; Energy conservation; Activityengagement   Goal Expiration Date 01/06/23   OT Frequency 3-5x/wk   Recommendation   OT Discharge Recommendation Post acute rehabilitation services   AM-PAC Daily Activity Inpatient   Lower Body Dressing 2   Bathing 2   Toileting 2   Upper Body Dressing 3   Grooming 3   Eating 4   Daily Activity Raw Score 16   Daily Activity Standardized Score (Calc for Raw Score >=11) 35 96   AM-PAC Applied Cognition Inpatient   Following a Speech/Presentation 3   Understanding Ordinary Conversation 4   Taking Medications 3   Remembering Where Things Are Placed or Put Away 3   Remembering List of 4-5 Errands 3   Taking Care of Complicated Tasks 3   Applied Cognition Raw Score 19   Applied Cognition Standardized Score 39 77   Barthel Index   Feeding 5   Bathing 0   Grooming Score 0   Dressing Score 5   Bladder Score 10   Bowels Score 10   Toilet Use Score 5   Transfers (Bed/Chair) Score 10   Mobility (Level Surface) Score 0   Stairs Score 5   Barthel Index Score 50   Additional Treatment Session   Start Time 1010   End Time 1020   Treatment Assessment S: "I really need to walk more" O: Patient performed lower body dressing while seated in recliner chair: Tonya Velez bilateral slip on shoes with min assist; sit to stand from recliner with min assist; Ambulated short household distance in room x 2 trials with RW and min assist; seated rest break between trials; A: Patient on 4L O2 via nasal cannula during treatment, after ambulation SpO2 decreased to 85%, required seated rest break and increased time to return to >90%; Patient hard of hearing and requires frequent verbal cueing for safety awareness and direction following;  At end of session patient seated in chair with all needs met, chair alarm set; P: STR   End of Consult   Education Provided Yes   Patient Position at End of Consult Bedside chair;Bed/Chair alarm activated; All needs within reach   [employer] Number  Lewsi Skinner, OTR/L [id number]

## 2022-12-23 NOTE — PLAN OF CARE
Problem: METABOLIC, FLUID AND ELECTROLYTES - ADULT  Goal: Electrolytes maintained within normal limits  Description: INTERVENTIONS:  - Monitor labs and assess patient for signs and symptoms of electrolyte imbalances  - Administer electrolyte replacement as ordered  - Monitor response to electrolyte replacements, including repeat lab results as appropriate  - Instruct patient on fluid and nutrition as appropriate  Outcome: Progressing  Goal: Fluid balance maintained  Description: INTERVENTIONS:  - Monitor labs   - Monitor I/O and WT  - Instruct patient on fluid and nutrition as appropriate  - Assess for signs & symptoms of volume excess or deficit  Outcome: Progressing     Problem: Prexisting or High Potential for Compromised Skin Integrity  Goal: Skin integrity is maintained or improved  Description: INTERVENTIONS:  - Identify patients at risk for skin breakdown  - Assess and monitor skin integrity  - Assess and monitor nutrition and hydration status  - Monitor labs   - Assess for incontinence   - Turn and reposition patient  - Assist with mobility/ambulation  - Relieve pressure over bony prominences  - Avoid friction and shearing  - Provide appropriate hygiene as needed including keeping skin clean and dry  - Evaluate need for skin moisturizer/barrier cream  - Collaborate with interdisciplinary team   - Patient/family teaching  - Consider wound care consult   Outcome: Progressing     Problem: MOBILITY - ADULT  Goal: Maintain or return to baseline ADL function  Description: INTERVENTIONS:  -  Assess patient's ability to carry out ADLs; assess patient's baseline for ADL function and identify physical deficits which impact ability to perform ADLs (bathing, care of mouth/teeth, toileting, grooming, dressing, etc )  - Assess/evaluate cause of self-care deficits   - Assess range of motion  - Assess patient's mobility; develop plan if impaired  - Assess patient's need for assistive devices and provide as appropriate  - Encourage maximum independence but intervene and supervise when necessary  - Involve family in performance of ADLs  - Assess for home care needs following discharge   - Consider OT consult to assist with ADL evaluation and planning for discharge  - Provide patient education as appropriate  Outcome: Progressing  Goal: Maintains/Returns to pre admission functional level  Description: INTERVENTIONS:  - Perform BMAT or MOVE assessment daily    - Set and communicate daily mobility goal to care team and patient/family/caregiver  - Collaborate with rehabilitation services on mobility goals if consulted  - Perform Range of Motion 2 times a day  - Reposition patient every 2 hours    - Dangle patient 2 times a day  - Stand patient 2 times a day  - Ambulate patient 2 times a day  - Out of bed to chair 2 times a day   - Out of bed for meals 2 times a day  - Out of bed for toileting  - Record patient progress and toleration of activity level   Outcome: Progressing     Problem: Potential for Falls  Goal: Patient will remain free of falls  Description: INTERVENTIONS:  - Educate patient/family on patient safety including physical limitations  - Instruct patient to call for assistance with activity   - Consult OT/PT to assist with strengthening/mobility   - Keep Call bell within reach  - Keep bed low and locked with side rails adjusted as appropriate  - Keep care items and personal belongings within reach  - Initiate and maintain comfort rounds  - Make Fall Risk Sign visible to staff  - Offer Toileting every 2 Hours, in advance of need  - Initiate/Maintain bed alarm  - Obtain necessary fall risk management equipment: call bell   - Apply yellow socks and bracelet for high fall risk patients  - Consider moving patient to room near nurses station  Outcome: Progressing     Problem: RESPIRATORY - ADULT  Goal: Achieves optimal ventilation and oxygenation  Description: INTERVENTIONS:  - Assess for changes in respiratory status  - Assess for changes in mentation and behavior  - Position to facilitate oxygenation and minimize respiratory effort  - Oxygen administered by appropriate delivery if ordered  - Initiate smoking cessation education as indicated  - Encourage broncho-pulmonary hygiene including cough, deep breathe, Incentive Spirometry  - Assess the need for suctioning and aspirate as needed  - Assess and instruct to report SOB or any respiratory difficulty  - Respiratory Therapy support as indicated  Outcome: Progressing

## 2022-12-23 NOTE — PROGRESS NOTES
NEPHROLOGY PROGRESS NOTE   Bonnie Sharp 76 y o  male MRN: [id number]  Unit/Bed#: [address] [place] [address] Encounter: [id number]  Reason for Consult: ESRD      SUMMARY:    75-year-old male with a history of end-stage renal disease on hemodialysis, recent COVID-19, chronic anemia, BPH, diabetes who presents for low hemoglobin from his outpatient HD unit showing a hemoglobin of 6 4   Nephrology consult for ESRD management      ASSESSMENT and PLAN:     Anemia--improved and stabilized  -- Acute on chronic  -- [place] as outpatient,Epogen as an inpatient  -- hemoglobin 5 7, on admission  -- Stool occult positive  --Status post multiple unit blood transfusion  --Hemoglobin stable at 8 7     End-stage renal disease  -- In center hemodialysis Tuesday Thursday Saturday at [facility]  -- Access: Right IJ permacath, evaluation for access as an outpatient  -- Last dry weight was challenged to 70 7 kg  --Admission weight 84 kg  -- History of ANCA vasculitis, did not tolerate cyclophosphamide  --Underwent hemodialysis yesterday  -- Stable from renal standpoint for discharge  --If the patient is still inpatient we will plan for dialysis tomorrow     Blood pressure/volume status  -- History of bilateral pleural effusions during the last admission examines generalized anasarca and volume overloaded  --Gentle ultrafiltration with dialysis  -- Blood pressure currently acceptable  -- History of hypertension  -- Albumin 1 6, leading to anasarca     COVID-19  --Saturating well  --Still testing positive concern for viral shedding     Mineral bone disorder-chronic kidney disease  -- Continue calcium acetate as an outpatient  -- Renal diet     Bilateral pleural effusion  -- Status post left thoracentesis, 1 4 L was removed     SUBJECTIVE / INTERVAL HISTORY:    No complaints this morning    OBJECTIVE:  Current Weight: Weight - Scale: 84 3 kg (185 lb 13 6 oz)  Vitals:    12/23/22 0613 12/23/22 0614 12/23/22 0724 12/23/22 0820   BP: 120/52 120/52 126/54   BP Location:       Pulse:  78  85   Resp:  20     Temp:    (!) 97 1 °F (36 2 °C)   TempSrc:       SpO2:  92% 94% 91%   Weight:       Height:           Intake/Output Summary (Last 24 hours) at 12/23/2022 1015  Last data filed at 12/22/2022 1347  Gross per 24 hour   Intake 780 ml   Output 4500 ml   Net -3720 ml       Review of Systems:    12 point ROS has been reviewed  Physical Exam  Vitals and nursing note reviewed  Constitutional:       General: He is not in acute distress  Appearance: He is well-developed  He is not diaphoretic  HENT:      Head: Normocephalic and atraumatic  Eyes:      General: No scleral icterus  Pupils: Pupils are equal, round, and reactive to light  Cardiovascular:      Rate and Rhythm: Normal rate and regular rhythm  Heart sounds: Normal heart sounds  No murmur heard  No friction rub  No gallop  Pulmonary:      Effort: Pulmonary effort is normal  No respiratory distress  Breath sounds: Normal breath sounds  No wheezing or rales  Chest:      Chest wall: No tenderness  Abdominal:      General: Bowel sounds are normal  There is no distension  Palpations: Abdomen is soft  Tenderness: There is no abdominal tenderness  There is no rebound  Musculoskeletal:         General: Normal range of motion  Cervical back: Normal range of motion and neck supple  Skin:     General: Skin is dry  Coloration: Skin is pale  Findings: No rash  Neurological:      Mental Status: He is alert and oriented to person, place, and time           Medications:    Current Facility-Administered Medications:   •  acetaminophen (TYLENOL) tablet 650 mg, 650 mg, Oral, Q6H PRN, Alexandra Sahni MD, 650 mg at 12/22/22 2103  •  amLODIPine (NORVASC) tablet 10 mg, 10 mg, Oral, Daily, Alexandra Sahni MD, 10 mg at 12/22/22 8663  •  calcium acetate (PHOSLO) capsule 667 mg, 667 mg, Oral, TID With Meals, Alexandra Sahni MD, 667 mg at 12/22/22 1600  • cloNIDine (CATAPRES) tablet 0 1 mg, 0 1 mg, Oral, Q12H [facility], Berhane Willams MD, 0 1 mg at 12/23/22 6016  •  epoetin ani (EPOGEN,PROCRIT) injection 8,000 Units, 8,000 Units, Intravenous, After Dialysis, Berhane Willams MD, 8,000 Units at 12/22/22 1210  •  finasteride (PROSCAR) tablet 5 mg, 5 mg, Oral, Daily, Berhane Willams MD, 5 mg at 12/22/22 8028  •  insulin glargine (LANTUS) subcutaneous injection 5 Units 0 05 mL, 5 Units, Subcutaneous, HS, Donnella Lanes, MD, 5 Units at 12/22/22 2102  •  insulin lispro (HumaLOG) 100 units/mL subcutaneous injection 1-6 Units, 1-6 Units, Subcutaneous, 4x Daily (AC & HS), 4 Units at 12/22/22 2101 **AND** Fingerstick Glucose (POCT), , , 4x Daily AC and at bedtime, Darek Hillman MD  •  ipratropium-albuterol (DUO-NEB) 0 5-2 5 mg/3 mL inhalation solution 3 mL, 3 mL, Nebulization, Q6H, Christopher Jaime MD, 3 mL at 12/23/22 1006  •  losartan (COZAAR) tablet 50 mg, 50 mg, Oral, BID, Berhane Willams MD, 50 mg at 12/22/22 2103  •  methylPREDNISolone sodium succinate (Solu-MEDROL) injection 40 mg, 40 mg, Intravenous, Q12H [facility], Murali Pabon MD, 40 mg at 12/22/22 1655  •  pravastatin (PRAVACHOL) tablet 80 mg, 80 mg, Oral, Daily With Alka Dove MD, 80 mg at 12/22/22 1600  •  sodium chloride (OCEAN) 0 65 % nasal spray 1 spray, 1 spray, Each Nare, Q1H PRN, Berhane Willams MD  •  tamsulosin (FLOMAX) capsule 0 8 mg, 0 8 mg, Oral, Daily With Alka Dove MD, 0 8 mg at 12/22/22 1600    Laboratory Results:  Results from last 7 days   Lab Units 12/23/22  0511 12/22/22  0927 12/21/22 2024 12/21/22  0523 12/21/22  0444 12/20/22  1929 12/20/22  1217   WBC Thousand/uL 6 68 8 38  --  6 57  --   --  7 38   HEMOGLOBIN g/dL 8 7* 8 5* 8 5* 6 0*  --  7 4* 5 7*   HEMATOCRIT % 27 5* 26 8* 26 8* 19 9*  --  23 9* 19 2*   PLATELETS Thousands/uL 291 317  --  298  --   --  330   POTASSIUM mmol/L 4 5 4 5  --   --  4 6  --  3 9   CHLORIDE mmol/L 93* 97  --   --  98  --  98   CO2 mmol/L 27 30  --   --  31  --  32   BUN mg/dL 46* 47*  --   --  34*  --  37*   CREATININE mg/dL 4 14* 4 30*  --   --  3 65*  --  3 90*   CALCIUM mg/dL 7 7* 7 4*  --   --  7 5*  --  7 9*   MAGNESIUM mg/dL  --   --   --   --   --   --  1 9       PLEASE NOTE:  This encounter was completed utilizing the [employer] Direct Speech Voice Recognition Software  Grammatical errors, random word insertions, pronoun errors and incomplete sentences are occasional consequences of the system due to software limitations, ambient noise and hardware issues  These may be missed by proof reading prior to affixing electronic signature  Any questions or concerns about the content, text or information contained within the body of this dictation should be directly addressed to the physician for clarification  Please do not hesitate to call me directly if you have any any questions or concerns

## 2022-12-23 NOTE — DISCHARGE SUMMARY
Discharge Summary - [facility] Medicine Residency  Patient Information: Vi York 76 y o  male MRN: [id number]  Unit/Bed#: [address] [place] [address] Encounter: [id number]       Patient Care Team:  Admitting Physician: Wesley Hassan MD  Discharging Physician/Practitioner: Wesley Hassan MD   PCP: Elsa Rutherford MD     Admission Date:   Admission Orders (From admission, onward)     Ordered        12/21/22 1458  Inpatient Admission  Once            12/20/22 1306  Place in Observation  Once                      Discharge Date: 12/23/22      Reason for Admission:  Abnormal laboratory test result [R89 9]  Symptomatic anemia [D64 9]  Anemia, unspecified type [D64 9]     Discharge Diagnoses:      Principal Problem:    Symptomatic anemia  Active Problems:    Pleural effusion    Volume overload    Benign essential hypertension    ANCA-associated vasculitis    ESRD on dialysis ([facility] [address] )    COVID-19    Anasarca    Lung nodule seen on imaging study  Resolved Problems:    * No resolved hospital problems  *       Consultations During Hospital Stay:  ·       Pulmonology, IR, nephrology, GI     Procedures Performed:   ·       Bilateral Thorascentesis     Significant Findings / Test Results:   ·      12/22: Cr 4 30 Hgb 8 5  ·      12/21: Cr 3 65, ,  7, tropo wnl  ·      12/20: Hgb 5 7>7 4 (1 U PRBC), PTT 44, PT 15 4, INR 1 2, albumin 1 6,  ALT 11          Ca 9 8, CR 3 9, BUN 39, BNP 9329,    ·     Chest x-ray: Persistent bibasilar infiltrates/atelectasis and pleural effusions  ·     Chest CT: Moderate to large right pleural effusion, large left pleural effusion, new scattered areas a few irregular nodule densities in the right middle lobe, right upper lobe and additional heterogenous groundglass opacities of the left upper lobe  ·     EKG: Sinus rhythm with 1st degree A-V block     Incidental Findings:   ·       Lung Nodules seen on chest CT  ·       Covid +, had covid 1 month ago   Chronic infection vs reinfection     Test Results Pending at Discharge (will require follow up): ·       Thoracentesis/fluid studies    Outpatient follow-up Requested:  ·       GI for pill endoscopy  ·       Pulmonology for lung nodules seen on CT incidentally  F/U in 3 months    Complications:  None    Things to address at first visit after hospitalization     · Patient requires outpatient follow up with pulmonology and rheumatology, have appointments been scheduled? · Patient requires outpatient follow up with GI, has this appointment been scheduled? · Has patient noticed any bleeding? Any symptoms of anemia? · Repeat CBC on discharge? ?  · Follow up thoracentesis labs  · Compliant with steroid taper? Hospital Course:      Jackie Bishop is a 76 y o  male patient who originally presented to the hospital on 12/20/2022 due to symptomatic anemia with Hgb 5 7  Per admission note, Jackie Bishop is a 76 y o  male presents with a hemoglobin of 5 7  PMH of ESRD on HD, recent COVID infection on 11/28/2022, chronic anemia, BPH, DM  Nephrology consultation was requested in the ED for assistance in the management of ESRD on HD with low hemoglobin  He denies any symptoms including nausea, vomiting, dizziness, lightheadedness, diarrhea, or pain  On prior admission he had bilateral pleural effusions in addition to anasarca, for which he was treated with a thoracentesis  Vitals are acceptable, however it is noted that he is third spacing and has volume overload  In the ED, patient was found to have pleural effusions and low hemoglobin level  He received 1pRBCs and was admitted to [facility] floor  Symptomatic anemia: Patient received transfusion in ED, post transfusion H&H stable  Hgb remained stable throughout hospital stay  Patient did receive Epogen with HD to avoid worsening anemia  GI also consulted for suspicion of GI bleed and they recommended outpatient pill endoscopy       Pleural effusion/Anasarca: Patient presenting with volume overload and pleural effusions  Pulmonology consulted 2/2 pleural effusions and nephrology consulted for HD and ESRD management  Patient received bilateral thoracentesis where a total of 2400mL were removed  Serum LDH & protein collected to compare with fluid from scheduled thoracentesis  Also received IV albumin  He did complain of pleuritic chest pain during hospital stay; troponins and EKG were ordered and unremarkable  +COVID-19: Chronic vs reinfection  This positive PCR likely secondary to viral shedding without acute active infection  All other medical conditions were managed per home regimen  On discharge, patient was hemodynamically stable and neurologically at baseline  He will be discharged with prednisone taper with follow-up with pulmonology and rheumatology  Condition at Discharge: stable      Discharge Day Visit / Exam:      Vitals: Blood Pressure: 126/54 (12/23/22 0820)  Pulse: 85 (12/23/22 0820)  Temperature: (!) 97 1 °F (36 2 °C) (12/23/22 0820)  Temp Source: Oral (12/22/22 1900)  Respirations: 20 (12/23/22 6080)  Height: 5' 8" (172 7 cm) (12/21/22 1113)  Weight - Scale: 84 3 kg (185 lb 13 6 oz) (12/22/22 0600)  SpO2: 91 % (12/23/22 0820)    Exam:   Physical Exam  Constitutional:       Appearance: Normal appearance  HENT:      Head: Normocephalic and atraumatic  Cardiovascular:      Rate and Rhythm: Normal rate and regular rhythm  Heart sounds: Normal heart sounds  No murmur heard  Pulmonary:      Effort: No respiratory distress  Breath sounds: Normal breath sounds  No stridor  No wheezing or rhonchi  Abdominal:      Palpations: Abdomen is soft  Tenderness: There is no abdominal tenderness  Musculoskeletal:      Right lower leg: No edema  Left lower leg: No edema  Neurological:      Mental Status: He is alert            Discharge instructions/Information to patient and family:   See after visit summary for information provided to patient and family  Discharge Medications:     Medication List      START taking these medications    • predniSONE 10 mg tablet; Take 4 tablets (40 mg total) by mouth daily for   7 days, THEN 3 tablets (30 mg total) daily for 7 days, THEN 2 tablets (20   mg total) daily for 7 days, THEN 1 tablet (10 mg total) daily ; Start   taking on: December 23, 2022     CONTINUE taking these medications    • amLODIPine 10 mg tablet; Commonly known as: NORVASC; Take 1 tablet (10   mg total) by mouth daily  • B-D UF III MINI PEN NEEDLES 31G X 5 MM Misc; Generic drug: Insulin Pen   Needle; Use twice daily with insulin pen as directed  • calcium acetate capsule; Commonly known as: PHOSLO; Take 1 capsule (667   mg total) by mouth 3 (three) times a day with meals Most days only takes   BID  • calcium carbonate-vitamin D 500 mg-200 units per tablet; Commonly known   as: OSCAL-D; Take 1 tablet by mouth 2 (two) times a day with meals  • cloNIDine 0 1 mg tablet; Commonly known as: CATAPRES; Take 1 tablet (0 1   mg total) by mouth every 12 (twelve) hours  • dutasteride 0 5 mg capsule; Commonly known as: AVODART; Take 1 capsule   (0 5 mg total) by mouth daily  • glucose blood test strip; Use 1 each 3 (three) times a day  • losartan 50 mg tablet; Commonly known as: COZAAR; Take 1 tablet (50 mg   total) by mouth 2 (two) times a day  • NovoLOG 70/30 FlexPen ReliOn 100 Units/mL injection pen; Generic drug:   insulin aspart protamine-insulin aspart; Inject 15 Units under the skin 2   (two) times a day before meals  • simvastatin 40 mg tablet; Commonly known as: ZOCOR;  Take 1 tablet (40 mg   total) by mouth daily at bedtime  • sodium chloride 0 65 % nasal spray; Commonly known as: OCEAN; 1 spray   into each nostril as needed for congestion  • tamsulosin 0 4 mg; Commonly known as: FLOMAX; Take 2 capsules (0 8 mg   total) by mouth daily with dinner        Disposition:   [facility]     For Discharges to   [address] SNF: ·       Not Applicable to this Patient - Not Applicable to this Patient     Discharge Statement:  I spent 30 minutes discharging the patient  This time was spent on the day of discharge  I had direct contact with the patient on the day of discharge  Greater than 50% of the total time was spent examining patient, answering all patient questions, arranging and discussing plan of care with patient as well as directly providing post-discharge instructions  Additional time then spent on discharge activities       ** Please Note: This note has been constructed using a voice recognition system **     Dane Nielsen MD   12/23/22  12:13 PM

## 2022-12-23 NOTE — CASE MANAGEMENT
Case Management Discharge Planning Note    Patient name Madison Mary  [facility] [address] [facility] MRN [id number]  : 1945 Date 2022       Current Admission Date: 2022  Current Admission Diagnosis:Symptomatic anemia   Patient Active Problem List    Diagnosis Date Noted   • Volume overload 2022   • Anasarca 2022   • Lung nodule seen on imaging study 2022   • COVID-19 2022   • Pleural effusion 2022   • SOB (shortness of breath) 2022   • Bronchitis 2022   • Fluid overload 2022   • Hyperphosphatemia associated with renal failure 10/19/2022   • Urinary retention 10/17/2022   • ESRD on dialysis [facility]) 2022   • Hyponatremia 2022   • ANCA-associated vasculitis 2022   • Pulmonary hypertension ([facility] [address] ) 2022   • Dialysis patient ([facility] [address] ) 2022   • Anemia due to chronic kidney disease, on chronic dialysis ([facility] [address] )    • Hemoptysis 2022   • Other proteinuria    • Symptomatic anemia 2022   • Chest pain 2022   • Melena 2022   • Elevated PSA 2021   • Chronic pain of right knee 2021   • Primary osteoarthritis of right knee 2021   • Bladder stones 12/15/2016   • Type 2 diabetes mellitus with chronic kidney disease on chronic dialysis, with long-term current use of insulin ([facility] [address] ) 2016   • Benign colon polyp 2013   • Benign prostatic hyperplasia with lower urinary tract symptoms 2013   • Benign essential hypertension 2013   • Hyperlipidemia 2012   • Vitamin D deficiency 2012      LOS (days): 2  Geometric Mean LOS (GMLOS) (days): 4 30  Days to GMLOS:2 4     OBJECTIVE:  Risk of Unplanned Readmission Score: 54       Current admission status: Inpatient   Preferred Pharmacy:   [facility] [address] [place]  [address] [address]  Phone: [phone] Fax: [phone]    [facility] ([address] [place], [place] - [address] [facility]  [address]  [place] [address]  Phone: [phone] Fax: [phone]    Primary Care Provider: Catalina Laird MD    Primary Insurance: MEDICARE  Secondary Insurance: BLUE CROSS    DISCHARGE DETAILS:    Discharge planning discussed with[de-identified] Jany Garcia     Contacts  Patient Contacts: Jany Garcia  Relationship to Patient[de-identified] Family  Contact Method: Phone  Phone Number: [phone]  Reason/Outcome: Discharge [address]         Is the patient interested in [facility] at discharge?: No     Other Referral/Resources/Interventions Provided:  Interventions: Facility Return, Transportation  Referral Comments: Spoke with patient's spouse Amparo Keane and the plan is for patient to return to [address] [place] [address] when stable for discharge  CM reached out to [facility] at [address] [place] [address] today and cofirmed bed availability for patient today as patient ready to be discharged today  [facility] confirmed bed availability  CM requested wcv transportation via [employer] for 1600, awaiting confirmation  SEAN Pittman made aware       Treatment Team Recommendation: Facility Return     Transport at Discharge : Wheelchair van  Dispatcher Contacted: Yes  Number/Name of Dispatcher: [employer]     ETA of Transport (Date): 12/23/22  ETA of Transport (Time): 1600     Transfer Mode: Wheelchair

## 2022-12-24 LAB
BACTERIA SPEC BFLD CULT: NO GROWTH
GRAM STN SPEC: NORMAL
GRAM STN SPEC: NORMAL

## 2022-12-24 NOTE — PROGRESS NOTES
Progress Note - Pulmonary   Bonnie Sharp 76 y o  male MRN: [id number]  Unit/Bed#: [address] [place] [address] Encounter: [id number]    Assessment:  Bilateral pleural effusion status post right thoracentesis 12/22 with 1 Liter pleural fluid removed and left thoracentesis on 12/21 with 1 4 L aspirated  These are simple exudative pleural effusion as pleural fluid LDH greater than 200 and pleural serum LDH greater than 1 6  Pleural effusions possibly related to his vasculitis  C-reactive protein level 241  Also small pericardial effusion  Recent COVID infection November of this year  Nodular densities in both lungs possibly due to recent COVID infection or due to ANCA vasculitis  Anemia with hemoglobin decreased to 5 9  Had transfusion of 3 units of packed red blood cells hemglobin is 8 7 today  End-stage renal disease  Started hemodialysis March of this year  Patient does have vasculitis involving his kidneys    Plan:  Discussed steroid dosing with residents  Patient was started on methylprednisolone 40 mg IV every 12 hours  With Rx for prednisone 40 mg daily and can decrease dose by 10 mg a week and maintain on maintenance dose of 10 mg daily until reassessed in 3 to 4 weeks  Can recheck C-reactive protein level as outpatient  The ANCA antibody and VT-3 antibody are elevated as before  Patient did not tolerate cyclophosphamide when tried on this in March  There is a stopped in less than 1 month with problems with his significant him he would hold off on trial of other medications such as CellCept for now  Patient did have upper endoscopy and colonoscopy in March of this year which were unremarkable      Subjective:   Feels better today  Not short of breath at rest     Objective:     Vitals: Blood pressure 119/55, pulse 83, temperature (!) 97 4 °F (36 3 °C), resp  rate 20, height 5' 8" (1 727 m), weight 84 3 kg (185 lb 13 6 oz), SpO2 93 %  ,Body mass index is 28 26 kg/m²        Intake/Output Summary (Last 24 hours) at 12/23/2022 1930  Last data filed at 12/23/2022 1501  Gross per 24 hour   Intake 500 ml   Output --   Net 500 ml       Physical Exam: Physical Exam  Vitals reviewed  Constitutional:       General: He is not in acute distress  Appearance: Normal appearance  He is well-developed  Comments: On 4 l/m nc - 95%   HENT:      Head: Normocephalic  Right Ear: External ear normal       Left Ear: External ear normal       Nose: Nose normal       Mouth/Throat:      Mouth: Mucous membranes are moist       Pharynx: Oropharynx is clear  No oropharyngeal exudate  Eyes:      Conjunctiva/sclera: Conjunctivae normal       Pupils: Pupils are equal, round, and reactive to light  Neck:      Vascular: No JVD  Trachea: No tracheal deviation  Cardiovascular:      Rate and Rhythm: Normal rate and regular rhythm  Heart sounds: Normal heart sounds  Pulmonary:      Effort: Pulmonary effort is normal       Comments: Lung sounds clear anteriorly  Abdominal:      General: There is no distension  Palpations: Abdomen is soft  Tenderness: There is no abdominal tenderness  There is no guarding  Musculoskeletal:      Cervical back: Neck supple  Comments: No edema   Lymphadenopathy:      Cervical: No cervical adenopathy  Skin:     General: Skin is warm and dry  Findings: No rash  Neurological:      Mental Status: He is alert and oriented to person, place, and time  Psychiatric:         Behavior: Behavior normal          Thought Content: Thought content normal           Labs: I have personally reviewed pertinent lab results  , ABG: No results found for: PHART, FWQ8DQZ, PO2ART, ZEU8HAE, L4VUABLS, BEART, SOURCE, BNP: No results found for: BNP, CBC:   Lab Results   Component Value Date    WBC 6 68 12/23/2022    HGB 8 7 (L) 12/23/2022    HCT 27 5 (L) 12/23/2022    MCV 88 12/23/2022     12/23/2022    MCH 27 7 12/23/2022    MCHC 31 6 12/23/2022    RDW 14 8 12/23/2022    MPV 9 3 12/23/2022 NRBC 0 12/23/2022   , CMP:   Lab Results   Component Value Date     07/24/2017    K 4 5 12/23/2022    K 4 2 07/24/2017    CL 93 (L) 12/23/2022     07/24/2017    CO2 27 12/23/2022    CO2 22 07/24/2017    BUN 46 (H) 12/23/2022    BUN 16 07/24/2017    CREATININE 4 14 (H) 12/23/2022    CREATININE 1 02 07/24/2017    GLUCOSE 106 (H) 07/24/2017    CALCIUM 7 7 (L) 12/23/2022    CALCIUM 9 5 07/24/2017    AST 18 12/21/2022    AST 19 07/24/2017    ALT 9 (L) 12/21/2022    ALT 18 07/24/2017    ALKPHOS 100 12/21/2022    ALKPHOS 77 07/24/2017    PROT 7 2 07/24/2017    BILITOT 0 3 07/24/2017    EGFR 12 12/23/2022   , PT/INR:   Lab Results   Component Value Date    INR 1 20 (H) 12/20/2022   , Troponin: No results found for: TROPONIN    Imaging and other studies: I have personally reviewed pertinent reports     and I have personally reviewed pertinent films in PACS

## 2022-12-25 PROBLEM — J96.01 ACUTE RESPIRATORY FAILURE WITH HYPOXIA (HCC): Status: ACTIVE | Noted: 2022-01-01

## 2022-12-25 PROBLEM — R65.10 SIRS (SYSTEMIC INFLAMMATORY RESPONSE SYNDROME) (HCC): Status: ACTIVE | Noted: 2022-01-01

## 2022-12-25 LAB
BACTERIA BLD CULT: NORMAL
BACTERIA SPEC BFLD CULT: NO GROWTH
GRAM STN SPEC: NORMAL

## 2022-12-25 NOTE — HEMODIALYSIS
Post-Dialysis RN Treatment Note    Blood Pressure:  Pre 136/65 mm/Hg  Post 129/62 mmHg   EDW  71 kg    Weight:  Pre 80 5 kg   Post 77 2 kg   Mode of weight measurement: Bed Scale   Volume Removed  3500 ml    Treatment duration 180 minutes    NS given  No    Treatment shortened?  No   Medications given during Rx None Reported   Estimated Kt/V  None Reported   Access type: Permacath/TDC   Access Issues: No    Report called to primary nurse   Yes Jackie VILLANUEVA RN

## 2022-12-25 NOTE — PLAN OF CARE
Pt on HD treatment for 3 hours with a UF goal of 3-3 5 L as tolerated  PT is UF only treatment today    Problem: METABOLIC, FLUID AND ELECTROLYTES - ADULT  Goal: Electrolytes maintained within normal limits  Description: INTERVENTIONS:  - Monitor labs and assess patient for signs and symptoms of electrolyte imbalances  - Administer electrolyte replacement as ordered  - Monitor response to electrolyte replacements, including repeat lab results as appropriate  - Instruct patient on fluid and nutrition as appropriate  Outcome: Progressing  Goal: Fluid balance maintained  Description: INTERVENTIONS:  - Monitor labs   - Monitor I/O and WT  - Instruct patient on fluid and nutrition as appropriate  - Assess for signs & symptoms of volume excess or deficit  Outcome: Progressing

## 2022-12-25 NOTE — ASSESSMENT & PLAN NOTE
Lab Results   Component Value Date    EGFR 7 12/25/2022    EGFR 12 12/23/2022    EGFR 12 12/22/2022    CREATININE 6 50 (H) 12/25/2022    CREATININE 4 14 (H) 12/23/2022    CREATININE 4 30 (H) 12/22/2022       · Patient scheduled dialysis Tuesday/Thursday/Saturday however he was unable to make his treatment on Saturday 12/24 due to the transportation bus not picking him up  · Patient started with worsening shortness of breath on 12/25 presented to the emergency room  · Nephrology consulted and will do HD treatment on Sunday 12/25  · Continue PhosLo as ordered

## 2022-12-25 NOTE — LETTER
[address] [place] [address]  [address] [facility] [address]      January 6, 2023    MRN: [id number]     Phone: [phone]     Dear Mr Jordan Antonio recently had a(n)  performed on  at  [address] [place] [address] that was requested by Ju Gutierrez  The study was reviewed by a radiologist, which is a physician who specializes in medical imaging  The radiologist issued a report describing his or her findings  In that report there was a finding that the radiologist felt warranted further discussion with your health care provider and that discussion would be beneficial to you  The results were sent to FERNANDO Gutierrez on    We recommend that you contact Earnstine Goldmann at  or set up an appointment to discuss the results of the imaging test  If you have already heard from Ju Gutierrez regarding the results of your study, you can disregard this letter  This letter is not meant to alarm you, but intended to encourage you to follow-up on your results with the provider that sent you for the imaging study  In addition, we have enclosed answers to frequently asked questions by other patients who have also received a letter to review results with their health care provider (see page two)  Thank you for choosing [facility] [facility] for your medical imaging needs  FREQUENTLY ASKED QUESTIONS    1  Why am I receiving this letter? [facility] requires us to notify patients who have findings on imaging exams that may require more testing or follow-up with a health professional within the next 3 months  2  How serious is the finding on the imaging test?  This letter is sent to all patients who may need follow-up or more testing within the next 3 months  Receiving this letter does not necessarily mean you have a life-threatening imaging finding or disease  Recommendations in the radiologist’s imaging report are general in nature and it is up to your healthcare provider to say whether those recommendations make sense for your situation  You are strongly encouraged to talk to your health care provider about the results and ask whether additional steps need to be taken  3  Where can I get a copy of the final report for my recent radiology exam?  To get a full copy of the report you can access your records online at [email or web address] or please contact [facility] Medical Records Department at [phone] Monday through Friday between 8 am and 6 pm          4  What do I need to do now? Please contact your health care provider who requested the imaging study to discuss what further actions (if any) are needed  You may have already heard from (your ordering provider) in regard to this test in which case you can disregard this letter  NOTICE IN ACCORDANCE WITH THE [place] STATE “PATIENT TEST RESULT INFORMATION ACT OF 2018”    You are receiving this notice as a result of a determination by your diagnostic imaging service that further discussions of your test results are warranted and would be beneficial to you  The complete results of your test or tests have been or will be sent to the health care practitioner that ordered the test or tests  It is recommended that you contact your health care practitioner to discuss your results as soon as possible

## 2022-12-25 NOTE — ASSESSMENT & PLAN NOTE
· Patient presented to the emergency department on 12/25 with worsening shortness of breath and hypoxia after missing his dialysis treatment yesterday due to lack of transportation  · Initially in the emergency room, the patient was placed on BiPAP but he was weaned to mid flow  · Chest x-ray consistent with volume overload  · Nephrology consulted and will do HD today  · Continue to wean oxygen back to baseline as able with oxygen saturation goal of 90% or greater -most likely will improve after dialysis once fluid is removed

## 2022-12-25 NOTE — ASSESSMENT & PLAN NOTE
Lab Results   Component Value Date    EGFR 7 12/25/2022    EGFR 12 12/23/2022    EGFR 12 12/22/2022    CREATININE 6 50 (H) 12/25/2022    CREATININE 4 14 (H) 12/23/2022    CREATININE 4 30 (H) 12/22/2022     · Patient had a recent admission from 12/20-12/23 with hemoglobin 5 7 -received 1 unit packed red blood cells at that time  · Most likely acute on chronic anemia  · Hemoglobin on admission 9 3

## 2022-12-25 NOTE — PROGRESS NOTES
Plan for 3 hours HD today  Will eval tomorrow again for extra UF if needed  Aim for 3 5L on today treatment  Discussed with HD nurse

## 2022-12-25 NOTE — ASSESSMENT & PLAN NOTE
· Sodium 124 on admission in the setting of volume overload  · Continue to monitor and expect to improve after HD

## 2022-12-25 NOTE — H&P
[address] [facility] 1945, 76 y o  male MRN: [id number]  Unit/Bed#: ICU 11 Encounter: [id number]  Primary Care Provider: Peter Yoo MD   Date and time admitted to hospital: 12/25/2022 11:20 AM    * Acute respiratory failure with hypoxia [facility])  Assessment & Plan  · Patient presented to the emergency department on 12/25 with worsening shortness of breath and hypoxia after missing his dialysis treatment yesterday due to lack of transportation  · Initially in the emergency room, the patient was placed on BiPAP but he was weaned to mid flow  · Chest x-ray consistent with volume overload  · Nephrology consulted and will do HD today  · Continue to wean oxygen back to baseline as able with oxygen saturation goal of 90% or greater -most likely will improve after dialysis once fluid is removed    SIRS (systemic inflammatory response syndrome) ([facility])  Assessment & Plan  · Patient presented to the emergency room on 12/25 with SIRS criteria -appears respiratory rate and hypoxia  · Patient does not appear to have a source of infection at this time since he missed dialysis on 12/24 and appears to be volume overloaded  · Received cefepime x1 in the ER  · Will hold on any further antibiotics at this time  · Plan for patient to get HD today 12/25  · Blood cultures and MRSA culture pending    ESRD on dialysis [facility])  Assessment & Plan  Lab Results   Component Value Date    EGFR 7 12/25/2022    EGFR 12 12/23/2022    EGFR 12 12/22/2022    CREATININE 6 50 (H) 12/25/2022    CREATININE 4 14 (H) 12/23/2022    CREATININE 4 30 (H) 12/22/2022       · Patient scheduled dialysis Tuesday/Thursday/Saturday however he was unable to make his treatment on Saturday 12/24 due to the transportation bus not picking him up  · Patient started with worsening shortness of breath on 12/25 presented to the emergency room  · Nephrology consulted and will do HD treatment on Sunday 12/25  · Continue PhosLo as ordered    Type 2 diabetes mellitus with chronic kidney disease on chronic dialysis, with long-term current use of insulin [facility])  Assessment & Plan  Lab Results   Component Value Date    HGBA1C 5 7 (H) 10/19/2022       Recent Labs     12/22/22  2041 12/23/22  0708 12/23/22  1139 12/23/22  1555   POCGLU 272* 293* 349* 241*       Blood Sugar Average: Last 72 hrs:     · Hold home dose Avodart  · Initiate sliding scale coverage    Hyponatremia  Assessment & Plan  · Sodium 124 on admission in the setting of volume overload  · Continue to monitor and expect to improve after HD    Benign essential hypertension  Assessment & Plan  · Continue home dose Norvasc and Catapres    Hyperlipidemia  Assessment & Plan  · Continue home dose Zocor    Anemia due to chronic kidney disease, on chronic dialysis [facility])  Assessment & Plan  Lab Results   Component Value Date    EGFR 7 12/25/2022    EGFR 12 12/23/2022    EGFR 12 12/22/2022    CREATININE 6 50 (H) 12/25/2022    CREATININE 4 14 (H) 12/23/2022    CREATININE 4 30 (H) 12/22/2022     · Patient had a recent admission from 12/20-12/23 with hemoglobin 5 7 -received 1 unit packed red blood cells at that time  · Most likely acute on chronic anemia  · Hemoglobin on admission 9 3    Urinary retention  Assessment & Plan  · Continue home dose Flomax      -------------------------------------------------------------------------------------------------------------  Chief Complaint: Resting shortness of breath after missing scheduled dialysis yesterday    History of Present Illness   Patti Thacker is a 76 y o  male who presents with a past medical history of hypertension, hyperlipidemia, BPH, end-stage renal disease on HD Tuesday/Thursday/Saturday, diabetes, anemia, COVID-19 infection, and recent hospitalization from 12/20-12/23 where he underwent a thoracentesis    On 12/23, the patient was discharged to the rehab facility but was unable to make HD on his scheduled date, Saturday 12/24 due to the bus not picking him up  Therefore this morning, the patient was more short of breath and presented to the emergency room  Initially in the ER he was placed on BiPAP and then transition to mid flow  He did meet SIRS criteria on admission for tachypnea and hypoxia however he does not appear to have any source of infection at this time  Nephrology was notified by the emergency room and plan is for the patient to receive HD treatment this afternoon  We admitted to the stepdown unit due to his acute hypoxia  History obtained from chart review  -------------------------------------------------------------------------------------------------------------  Dispo: Admit to Stepdown Level 1    Code Status: Level 1 - Full Code  --------------------------------------------------------------------------------------------------------------  Review of Systems   Constitutional: Positive for fatigue  HENT: Negative  Eyes: Negative  Respiratory: Positive for shortness of breath  Cardiovascular: Negative  Gastrointestinal: Negative  Endocrine: Negative  Genitourinary: Negative  Musculoskeletal: Negative  Skin: Negative  Allergic/Immunologic: Negative  Neurological: Positive for weakness  Hematological: Negative  Psychiatric/Behavioral: Negative  A 12-point, complete review of systems was reviewed and negative except as stated above     Physical Exam  Vitals and nursing note reviewed  Constitutional:       Appearance: He is ill-appearing and toxic-appearing  HENT:      Head: Normocephalic and atraumatic  Right Ear: Tympanic membrane, ear canal and external ear normal       Left Ear: Tympanic membrane, ear canal and external ear normal       Nose: Nose normal       Mouth/Throat:      Mouth: Mucous membranes are dry  Pharynx: Oropharynx is clear  Eyes:      Extraocular Movements: Extraocular movements intact        Conjunctiva/sclera: Conjunctivae normal       Pupils: Pupils are equal, round, and reactive to light  Cardiovascular:      Rate and Rhythm: Normal rate and regular rhythm  Pulses: Normal pulses  Heart sounds: Normal heart sounds  Pulmonary:      Comments: On 15 L midflow  B/L breath sounds with crackles in the bases   Abdominal:      General: Bowel sounds are normal       Palpations: Abdomen is soft  Genitourinary:     Comments: Oliguric  Musculoskeletal:      Cervical back: Normal range of motion and neck supple  Comments: Trace bilateral lower extremity edema   Skin:     General: Skin is warm and dry  Capillary Refill: Capillary refill takes less than 2 seconds  Neurological:      Mental Status: He is alert and oriented to person, place, and time  Mental status is at baseline  Comments: Hard of hearing   Psychiatric:      Comments: Cooperative       --------------------------------------------------------------------------------------------------------------  Vitals:   Vitals:    12/25/22 1230 12/25/22 1300 12/25/22 1345 12/25/22 1424   BP: 133/68 151/63 122/93 109/55   Pulse: 86 94 91 86   Resp: (!) 32 (!) 30 (!) 34 20   Temp:    98 4 °F (36 9 °C)   TempSrc:    Temporal   SpO2: 100% 97% 91% (!) 81%   Weight:    80 5 kg (177 lb 7 5 oz)   Height:    5' 8" (1 727 m)     Temp  Min: 98 1 °F (36 7 °C)  Max: 98 4 °F (36 9 °C)  IBW (Ideal Body Weight): 68 4 kg  Height: 5' 8" (172 7 cm)  Body mass index is 26 98 kg/m²      Laboratory and Diagnostics:  Results from last 7 days   Lab Units 12/25/22  1146 12/23/22  0511 12/22/22  0927 12/21/22 2024 12/21/22  0523 12/20/22  1929 12/20/22  1217   WBC Thousand/uL 12 12* 6 68 8 38  --  6 57  --  7 38   HEMOGLOBIN g/dL 9 3* 8 7* 8 5* 8 5* 6 0* 7 4* 5 7*   HEMATOCRIT % 28 9* 27 5* 26 8* 26 8* 19 9* 23 9* 19 2*   PLATELETS Thousands/uL 406* 291 317  --  298  --  330   NEUTROS PCT % 90* 93* 84*  --  79*  --   --    MONOS PCT % 6 3* 8  --  8  --   --      Results from last 7 days   Lab Units 12/25/22  1146 12/23/22  0511 12/22/22  0927 12/21/22  0444 12/20/22  1217   SODIUM mmol/L 124* 131* 136 138 138   POTASSIUM mmol/L 5 1 4 5 4 5 4 6 3 9   CHLORIDE mmol/L 85* 93* 97 98 98   CO2 mmol/L 27 27 30 31 32   ANION GAP mmol/L 12 11 9 9 8   BUN mg/dL 107* 46* 47* 34* 37*   CREATININE mg/dL 6 50* 4 14* 4 30* 3 65* 3 90*   CALCIUM mg/dL 8 1* 7 7* 7 4* 7 5* 7 9*   GLUCOSE RANDOM mg/dL 365* 283* 178* 161* 140   ALT U/L 14  --   --  9* 11*   AST U/L 10  --   --  18 13   ALK PHOS U/L 112  --   --  100 76   ALBUMIN g/dL 1 9*  --   --  1 6* 1 6*   TOTAL BILIRUBIN mg/dL 0 37  --   --  0 45 0 36     Results from last 7 days   Lab Units 12/25/22  1146 12/20/22  1217   MAGNESIUM mg/dL 2 2 1 9      Results from last 7 days   Lab Units 12/20/22  1217   INR  1 20*   PTT seconds 44*          Results from last 7 days   Lab Units 12/25/22  1146 12/21/22  2257   LACTIC ACID mmol/L 2 2* 1 6     ABG:  Results from last 7 days   Lab Units 12/25/22  1145   PH ART  7 347*   PCO2 ART mm Hg 47 7*   PO2 ART mm Hg 27 2*   HCO3 ART mmol/L 25 6   BASE EXC ART mmol/L -0 5   ABG SOURCE  Radial, Right     VBG:  Results from last 7 days   Lab Units 12/25/22  1145   ABG SOURCE  Radial, Right           Micro:  Results from last 7 days   Lab Units 12/22/22  1344 12/22/22  0946 12/21/22  2259 12/21/22  1440   BLOOD CULTURE   --   --  No Growth at 72 hrs   --    GRAM STAIN RESULT  No Polys or Bacteria seen  --   --  1+ Polys  No bacteria seen   BODY FLUID CULTURE, STERILE  No growth  --   --  No growth   MRSA CULTURE ONLY   --  No Methicillin Resistant Staphlyococcus aureus (MRSA) isolated  --   --          Imaging: I have personally reviewed pertinent reports  No results found        Historical Information   Past Medical History:   Diagnosis Date   • Anesthesia complication 1705    after colonoscopy , pt was awake but could not control his body and kept falling    • Arthritis    • Bladder calculi    • BPH (benign prostatic hyperplasia)    • Diabetes mellitus ([facility] [address] )    • Dialysis patient ([facility] [address] ) 3/11/2022   • ESRD (end stage renal disease) on dialysis [facility])    • Hearing disorder of both ears     wears bilateral hearing aids   • Hyperlipidemia     controlled and maintained d/t diabetes   • Hypertension    • Kidney stones     Last Assessed:4/3/2017   • Lyme disease     Last Assessed:2015   • Rupture, bladder, spontaneous     Last Assessed:4/3/2017     Past Surgical History:   Procedure Laterality Date   • BLADDER REPAIR N/A 12/10/2016    Procedure: REPAIR BLADDER/cysto insertion stent;  Surgeon: Lucero Maxwell MD;  Location: [address] [place] [address];  Service:    • COLONOSCOPY     • CYSTOLITHOTOMY      ANESTHESIA   lower abdomen  ;  Last Assessed:4/3/2017   • IR ASPIRATION ONLY  2022   • IR BIOPSY KIDNEY RANDOM  3/10/2022   • IR THORACENTESIS  2022   • IR THORACENTESIS  2022   • IR THORACENTESIS  2022   • IR TUNNELED DIALYSIS CATHETER PLACEMENT  3/8/2022   • OTHER SURGICAL HISTORY      thermal dilation of the prostate x 2 ( in the office)   • TONSILLECTOMY     • TRANSURETHRAL RESECTION OF PROSTATE N/A 2016    Procedure:  Cysto, Laser Bladder stone,fulgaration of prostates tissue ;  Surgeon: Lucero Maxwell MD;  Location: [facility];  Service:      Social History   Social History     Substance and Sexual Activity   Alcohol Use Never     Social History     Substance and Sexual Activity   Drug Use No     Social History     Tobacco Use   Smoking Status Former   • Types: Cigars   • Quit date:    • Years since quittin 0   Smokeless Tobacco Never       Family History:   Family History   Problem Relation Age of Onset   • Cancer Mother         breast   • Diabetes Mother    • Cancer Father         prostate w/ bone mets   • Prostate cancer Father    • Alzheimer's disease Sister      I have reviewed this patient's family history and commented on sigificant items within the HPI      Medications:  Current Facility-Administered Medications Medication Dose Route Frequency   • [START ON 12/26/2022] amLODIPine (NORVASC) tablet 10 mg  10 mg Oral Daily   • calcium acetate (PHOSLO) capsule 667 mg  667 mg Oral TID With Meals   • calcium carbonate-vitamin D 500 mg-5 mcg tablet 2 tablet  2 tablet Oral BID With Meals   • cloNIDine (CATAPRES) tablet 0 1 mg  0 1 mg Oral Q12H [facility]   • [START ON 12/26/2022] finasteride (PROSCAR) tablet 5 mg  5 mg Oral Daily   • heparin (porcine) subcutaneous injection 5,000 Units  5,000 Units Subcutaneous Q8H [facility]   • insulin lispro (HumaLOG) 100 units/mL subcutaneous injection 1-6 Units  1-6 Units Subcutaneous TID AC   • insulin lispro (HumaLOG) 100 units/mL subcutaneous injection 1-6 Units  1-6 Units Subcutaneous HS   • losartan (COZAAR) tablet 50 mg  50 mg Oral BID   • pravastatin (PRAVACHOL) tablet 80 mg  80 mg Oral Daily With Dinner   • tamsulosin (FLOMAX) capsule 0 8 mg  0 8 mg Oral Daily With Dinner     Home medications:  Prior to Admission Medications   Prescriptions Last Dose Informant Patient Reported? Taking?    Insulin Pen Needle (B-D UF III MINI PEN NEEDLES) 31G X 5 MM MISC 12/25/2022  No Yes   Sig: Use twice daily with insulin pen as directed   amLODIPine (NORVASC) 10 mg tablet 12/25/2022  No Yes   Sig: Take 1 tablet (10 mg total) by mouth daily   calcium acetate (PHOSLO) capsule 12/25/2022  No Yes   Sig: Take 1 capsule (667 mg total) by mouth 3 (three) times a day with meals Most days only takes BID   calcium carbonate-vitamin D (OSCAL-D) 500 mg-200 units per tablet 12/25/2022  No Yes   Sig: Take 1 tablet by mouth 2 (two) times a day with meals   cloNIDine (CATAPRES) 0 1 mg tablet 12/25/2022  No Yes   Sig: Take 1 tablet (0 1 mg total) by mouth every 12 (twelve) hours   dutasteride (AVODART) 0 5 mg capsule 12/25/2022  No Yes   Sig: Take 1 capsule (0 5 mg total) by mouth daily   glucose blood test strip 12/25/2022  No Yes   Sig: Use 1 each 3 (three) times a day   insulin aspart protamine-insulin aspart (NovoLOG 70/30 FlexPen ReliOn) 100 Units/mL injection pen 2022  No Yes   Sig: Inject 15 Units under the skin 2 (two) times a day before meals   losartan (COZAAR) 50 mg tablet 2022  No Yes   Sig: Take 1 tablet (50 mg total) by mouth 2 (two) times a day   simvastatin (ZOCOR) 40 mg tablet 2022  No Yes   Sig: Take 1 tablet (40 mg total) by mouth daily at bedtime   sodium chloride (OCEAN) 0 65 % nasal spray Past Week  No Yes   Si spray into each nostril as needed for congestion   tamsulosin (FLOMAX) 0 4 mg 2022  No Yes   Sig: Take 2 capsules (0 8 mg total) by mouth daily with dinner      Facility-Administered Medications: None     Allergies: Allergies   Allergen Reactions   • Amoxicillin Rash     ------------------------------------------------------------------------------------------------------------  Anticipated Length of Stay is > 2 midnights    Care Time Delivered:   Upon my evaluation, this patient had a high probability of imminent or life-threatening deterioration due to Acute hypoxic respiratory failure, which required my direct attention, intervention, and personal management  I have personally provided 60 minutes (1345 to [phone]) of critical care time, exclusive of procedures, teaching, family meetings, and any prior time recorded by providers other than myself  FERNANDO Leon        Portions of the record may have been created with voice recognition software  Occasional wrong word or "sound a like" substitutions may have occurred due to the inherent limitations of voice recognition software    Read the chart carefully and recognize, using context, where substitutions have occurred

## 2022-12-25 NOTE — ASSESSMENT & PLAN NOTE
Lab Results   Component Value Date    HGBA1C 5 7 (H) 10/19/2022       Recent Labs     12/22/22  2041 12/23/22  0708 12/23/22  1139 12/23/22  1555   POCGLU 272* 293* 349* 241*       Blood Sugar Average: Last 72 hrs:     · Hold home dose Avodart  · Initiate sliding scale coverage

## 2022-12-25 NOTE — ASSESSMENT & PLAN NOTE
· Patient presented to the emergency room on 12/25 with SIRS criteria -appears respiratory rate and hypoxia  · Patient does not appear to have a source of infection at this time since he missed dialysis on 12/24 and appears to be volume overloaded  · Received cefepime x1 in the ER  · Will hold on any further antibiotics at this time  · Plan for patient to get HD today 12/25  · Blood cultures and MRSA culture pending

## 2022-12-25 NOTE — ED PROVIDER NOTES
History  Chief Complaint   Patient presents with   • Respiratory Distress     Missed dialysis yesterday and sob for 3 hours     75-year-old male with past history of ESRD on Tuesday/Thursday/Saturday dialysis, diabetes, BPH, disease, kidney stones, presents to the ED from nursing home for acute respiratory distress and hypoxia  Patient missed his dialysis yesterday  Patient started to have increased shortness of breath today  Patient noted to have oxygen saturation in the 80s at the nursing home  Patient was placed on nonrebreather mask and brought to the ED for further evaluation  In the ED patient appears to be in moderate respiratory distress  Patient's room air oxygenation was 50%  Patient was recently seen under our emergency department about 12/20/2022 and at that time he tested positive for COVID-19 infection  Prior to Admission Medications   Prescriptions Last Dose Informant Patient Reported? Taking?    Insulin Pen Needle (B-D UF III MINI PEN NEEDLES) 31G X 5 MM MISC 12/25/2022  No Yes   Sig: Use twice daily with insulin pen as directed   amLODIPine (NORVASC) 10 mg tablet 12/25/2022  No Yes   Sig: Take 1 tablet (10 mg total) by mouth daily   calcium acetate (PHOSLO) capsule 12/25/2022  No Yes   Sig: Take 1 capsule (667 mg total) by mouth 3 (three) times a day with meals Most days only takes BID   calcium carbonate-vitamin D (OSCAL-D) 500 mg-200 units per tablet 12/25/2022  No Yes   Sig: Take 1 tablet by mouth 2 (two) times a day with meals   cloNIDine (CATAPRES) 0 1 mg tablet 12/25/2022  No Yes   Sig: Take 1 tablet (0 1 mg total) by mouth every 12 (twelve) hours   dutasteride (AVODART) 0 5 mg capsule 12/25/2022  No Yes   Sig: Take 1 capsule (0 5 mg total) by mouth daily   glucose blood test strip 12/25/2022  No Yes   Sig: Use 1 each 3 (three) times a day   insulin aspart protamine-insulin aspart (NovoLOG 70/30 FlexPen ReliOn) 100 Units/mL injection pen 12/25/2022  No Yes   Sig: Inject 15 Units under the skin 2 (two) times a day before meals   losartan (COZAAR) 50 mg tablet 2022  No Yes   Sig: Take 1 tablet (50 mg total) by mouth 2 (two) times a day   simvastatin (ZOCOR) 40 mg tablet 2022  No Yes   Sig: Take 1 tablet (40 mg total) by mouth daily at bedtime   sodium chloride (OCEAN) 0 65 % nasal spray Past Week  No Yes   Si spray into each nostril as needed for congestion   tamsulosin (FLOMAX) 0 4 mg 2022  No Yes   Sig: Take 2 capsules (0 8 mg total) by mouth daily with dinner      Facility-Administered Medications: None       Past Medical History:   Diagnosis Date   • Anesthesia complication 139    after colonoscopy , pt was awake but could not control his body and kept falling    • Arthritis    • Bladder calculi    • BPH (benign prostatic hyperplasia)    • Diabetes mellitus ([facility] )    • Dialysis patient ([facility] ) 3/11/2022   • ESRD (end stage renal disease) on dialysis [facility])    • Hearing disorder of both ears     wears bilateral hearing aids   • Hyperlipidemia     controlled and maintained d/t diabetes   • Hypertension    • Kidney stones     Last Assessed:4/3/2017   • Lyme disease     Last Assessed:2015   • Rupture, bladder, spontaneous     Last Assessed:4/3/2017       Past Surgical History:   Procedure Laterality Date   • BLADDER REPAIR N/A 12/10/2016    Procedure: REPAIR BLADDER/cysto insertion stent;  Surgeon: Garcia Pascual MD;  Location: [address] [place] [address];  Service:    • COLONOSCOPY     • CYSTOLITHOTOMY      ANESTHESIA   lower abdomen  ;  Last Assessed:4/3/2017   • IR ASPIRATION ONLY  2022   • IR BIOPSY KIDNEY RANDOM  3/10/2022   • IR THORACENTESIS  2022   • IR THORACENTESIS  2022   • IR THORACENTESIS  2022   • IR TUNNELED DIALYSIS CATHETER PLACEMENT  3/8/2022   • OTHER SURGICAL HISTORY      thermal dilation of the prostate x 2 ( in the office)   • TONSILLECTOMY     • TRANSURETHRAL RESECTION OF PROSTATE N/A 2016    Procedure:  Cysto, Laser Bladder stone,fulgaration of prostates tissue ;  Surgeon: Franklyn York MD;  Location: [facility];  Service:        Family History   Problem Relation Age of Onset   • Cancer Mother         breast   • Diabetes Mother    • Cancer Father         prostate w/ bone mets   • Prostate cancer Father    • Alzheimer's disease Sister      I have reviewed and agree with the history as documented  E-Cigarette/Vaping   • E-Cigarette Use Never User      E-Cigarette/Vaping Substances   • Nicotine No    • THC No    • CBD No    • Flavoring No    • Other No    • Unknown No      Social History     Tobacco Use   • Smoking status: Former     Types: Cigars     Quit date:      Years since quittin 0   • Smokeless tobacco: Never   Vaping Use   • Vaping Use: Never used   Substance Use Topics   • Alcohol use: Never   • Drug use: No       Review of Systems   Constitutional: Negative for activity change, fatigue and fever  HENT: Negative for congestion, ear discharge and sore throat  Eyes: Negative for pain and redness  Respiratory: Positive for shortness of breath  Negative for cough, chest tightness and wheezing  Cardiovascular: Negative for chest pain  Gastrointestinal: Negative for abdominal pain, diarrhea, nausea and vomiting  Endocrine: Negative for cold intolerance  Genitourinary: Negative for dysuria and urgency  Musculoskeletal: Negative for arthralgias and back pain  Neurological: Negative for dizziness, weakness and headaches  Psychiatric/Behavioral: Negative for agitation and behavioral problems  Physical Exam  Physical Exam  Vitals and nursing note reviewed  Constitutional:       Appearance: He is well-developed  HENT:      Head: Normocephalic and atraumatic  Nose: Nose normal    Eyes:      Conjunctiva/sclera: Conjunctivae normal    Cardiovascular:      Rate and Rhythm: Normal rate and regular rhythm  Heart sounds: Normal heart sounds     Pulmonary:      Effort: Respiratory distress present  Breath sounds: Rhonchi present  Comments: Moderate respiratory distress noted on exam   Patient is tachypneic  Rhonchorous breath sounds noted bilaterally  Patient is satting 50% on room air  Abdominal:      General: Bowel sounds are normal  There is no distension  Palpations: Abdomen is soft  Tenderness: There is no abdominal tenderness  Musculoskeletal:         General: Normal range of motion  Cervical back: Normal range of motion and neck supple  Skin:     General: Skin is warm  Neurological:      General: No focal deficit present  Mental Status: He is alert and oriented to person, place, and time  Psychiatric:         Mood and Affect: Mood normal          Behavior: Behavior normal          Thought Content:  Thought content normal          Judgment: Judgment normal          Vital Signs  ED Triage Vitals   Temperature Pulse Respirations Blood Pressure SpO2   12/25/22 1129 12/25/22 1126 12/25/22 1126 12/25/22 1129 12/25/22 1126   98 1 °F (36 7 °C) 90 (!) 32 163/71 (!) 50 %      Temp Source Heart Rate Source Patient Position - Orthostatic VS BP Location FiO2 (%)   12/25/22 1424 12/25/22 1424 12/25/22 1645 12/25/22 1645 --   Temporal Monitor Lying Right arm       Pain Score       12/25/22 1126       3           Vitals:    12/25/22 1700 12/25/22 1715 12/25/22 1730 12/25/22 1745   BP: 134/63 119/58 134/65 133/60   Pulse: 86 77 81 74   Patient Position - Orthostatic VS:             Visual Acuity  Visual Acuity    Flowsheet Row Most Recent Value   L Pupil Size (mm) 2   R Pupil Size (mm) 2   L Pupil Shape Round   R Pupil Shape Round          ED Medications  Medications   amLODIPine (NORVASC) tablet 10 mg (has no administration in time range)   calcium acetate (PHOSLO) capsule 667 mg (667 mg Oral Not Given 12/25/22 1634)   calcium carbonate-vitamin D 500 mg-5 mcg tablet 2 tablet (2 tablets Oral Not Given 12/25/22 1634)   cloNIDine (CATAPRES) tablet 0 1 mg (has no administration in time range)   finasteride (PROSCAR) tablet 5 mg (has no administration in time range)   losartan (COZAAR) tablet 50 mg (has no administration in time range)   pravastatin (PRAVACHOL) tablet 80 mg (80 mg Oral Not Given 12/25/22 1635)   tamsulosin (FLOMAX) capsule 0 8 mg (0 8 mg Oral Not Given 12/25/22 1636)   heparin (porcine) subcutaneous injection 5,000 Units (5,000 Units Subcutaneous Given 12/25/22 1500)   insulin lispro (HumaLOG) 100 units/mL subcutaneous injection 1-6 Units (5 Units Subcutaneous Given 12/25/22 1624)   insulin lispro (HumaLOG) 100 units/mL subcutaneous injection 1-6 Units (has no administration in time range)   insulin glargine (LANTUS) subcutaneous injection 15 Units 0 15 mL (has no administration in time range)   cefepime (MAXIPIME) IVPB (premix in dextrose) 2,000 mg 50 mL (2,000 mg Intravenous New Bag 12/25/22 1335)   insulin glargine (LANTUS) subcutaneous injection 15 Units 0 15 mL (15 Units Subcutaneous Given 12/25/22 1746)       Diagnostic Studies  Results Reviewed     Procedure Component Value Units Date/Time    HS Troponin I 4hr [[id number]]  (Normal) Collected: 12/25/22 1642    Lab Status: Final result Specimen: Blood from Central Venous Line Updated: 12/25/22 1716     hs TnI 4hr 13 ng/L      Delta 4hr hsTnI 1 ng/L     Blood culture #1 [[id number]] Collected: 12/25/22 1146    Lab Status: Preliminary result Specimen: Blood from Arm, Left Updated: 12/25/22 1501     Blood Culture Received in Microbiology Lab  Culture in Progress  Blood culture #2 [[id number]] Collected: 12/25/22 1146    Lab Status: Preliminary result Specimen: Blood from Arm, Right Updated: 12/25/22 1501     Blood Culture Received in Microbiology Lab  Culture in Progress      HS Troponin I 2hr [[id number]] Collected: 12/25/22 1333    Lab Status: No result Specimen: Blood from Hand, Left     Lactic acid 2 Hours [[id number]] Collected: 12/25/22 1333    Lab Status: No result Specimen: Blood from Hand, Left Comprehensive metabolic panel [[id number]]  (Abnormal) Collected: 12/25/22 1146    Lab Status: Final result Specimen: Blood from Arm, Left Updated: 12/25/22 1231     Sodium 124 mmol/L      Potassium 5 1 mmol/L      Chloride 85 mmol/L      CO2 27 mmol/L      ANION GAP 12 mmol/L       mg/dL      Creatinine 6 50 mg/dL      Glucose 365 mg/dL      Calcium 8 1 mg/dL      Corrected Calcium 9 8 mg/dL      AST 10 U/L      ALT 14 U/L      Alkaline Phosphatase 112 U/L      Total Protein 7 5 g/dL      Albumin 1 9 g/dL      Total Bilirubin 0 37 mg/dL      eGFR 7 ml/min/1 73sq m     Narrative:      [place] guidelines for Chronic Kidney Disease (CKD):   •  Stage 1 with normal or high GFR (GFR > 90 mL/min/1 73 square meters)  •  Stage 2 Mild CKD (GFR = 60-89 mL/min/1 73 square meters)  •  Stage 3A Moderate CKD (GFR = 45-59 mL/min/1 73 square meters)  •  Stage 3B Moderate CKD (GFR = 30-44 mL/min/1 73 square meters)  •  Stage 4 Severe CKD (GFR = 15-29 mL/min/1 73 square meters)  •  Stage 5 End Stage CKD (GFR <15 mL/min/1 73 square meters)  Note: GFR calculation is accurate only with a steady state creatinine    Magnesium [[id number]]  (Normal) Collected: 12/25/22 1146    Lab Status: Final result Specimen: Blood from Arm, Left Updated: 12/25/22 1231     Magnesium 2 2 mg/dL     Lipase [[id number]]  (Abnormal) Collected: 12/25/22 1146    Lab Status: Final result Specimen: Blood from Arm, Left Updated: 12/25/22 1231     Lipase 51 u/L     NT-BNP PRO [[id number]]  (Abnormal) Collected: 12/25/22 1146    Lab Status: Final result Specimen: Blood from Arm, Left Updated: 12/25/22 1231     NT-proBNP 17,290 pg/mL     Lactic acid [[id number]]  (Abnormal) Collected: 12/25/22 1146    Lab Status: Final result Specimen: Blood from Arm, Left Updated: 12/25/22 1223     LACTIC ACID 2 2 mmol/L     Narrative:      Result may be elevated if tourniquet was used during collection      HS Troponin 0hr (reflex protocol) [[id number]] (Normal) Collected: 12/25/22 1146    Lab Status: Final result Specimen: Blood from Arm, Left Updated: 12/25/22 1218     hs TnI 0hr 12 ng/L     CBC and differential [[id number]]  (Abnormal) Collected: 12/25/22 1146    Lab Status: Final result Specimen: Blood from Arm, Left Updated: 12/25/22 1217     WBC 12 12 Thousand/uL      RBC 3 30 Million/uL      Hemoglobin 9 3 g/dL      Hematocrit 28 9 %      MCV 88 fL      MCH 28 2 pg      MCHC 32 2 g/dL      RDW 14 4 %      MPV 9 2 fL      Platelets 472 Thousands/uL      nRBC 0 /100 WBCs      Neutrophils Relative 90 %      Immat GRANS % 1 %      Lymphocytes Relative 3 %      Monocytes Relative 6 %      Eosinophils Relative 0 %      Basophils Relative 0 %      Neutrophils Absolute 11 05 Thousands/µL      Immature Grans Absolute 0 07 Thousand/uL      Lymphocytes Absolute 0 30 Thousands/µL      Monocytes Absolute 0 68 Thousand/µL      Eosinophils Absolute 0 00 Thousand/µL      Basophils Absolute 0 02 Thousands/µL     Narrative: This is an appended report  These results have been appended to a previously verified report      Blood gas, arterial [[id number]]  (Abnormal) Collected: 12/25/22 1145    Lab Status: Final result Specimen: Blood, Arterial from Radial, Right Updated: 12/25/22 1202     pH, Arterial 7 347     PH ART TC 7 351     pCO2, Arterial 47 7 mm Hg      PCO2 (TC) Arterial 47 1 mm Hg      pO2, Arterial 27 2 mm Hg      PO2 (TC) Arterial 26 6 mm Hg      HCO3, Arterial 25 6 mmol/L      Base Excess, Arterial -0 5 mmol/L      O2 Content, Arterial 8 4 mL/dL      O2 HGB,Arterial  45 1 %      SOURCE Radial, Right     Temperature 98 0 Degrees Fehrenheit     UA w Reflex to Microscopic w Reflex to Culture [[id number]]     Lab Status: No result Specimen: Urine                  XR chest 1 view portable    (Results Pending)              Procedures  ECG 12 Lead Documentation Only    Date/Time: 12/25/2022 11:32 AM  Performed by: Aidan Neves DO  Authorized by: Aidan Neves DO Indications / Diagnosis:  Shortness of breath  ECG reviewed by me, the ED Provider: yes    Patient location:  ED  Previous ECG:     Previous ECG:  Compared to current    Similarity:  No change    Comparison to cardiac monitor: Yes    Interpretation:     Interpretation: abnormal    Comments:      Sinus rhythm, rate 98, normal axis, first-degree AV block, possible left atrial enlargement noted, sinus arrhythmia noted, low amplitude T wave inversions noted in V4 to V6 suggesting lateral ischemia that is essentially unchanged from baseline  CriticalCare Time  Performed by: Robbin Mobley DO  Authorized by: Robbin Mobley DO     Critical care provider statement:     Critical care time (minutes):  65    Critical care time was exclusive of:  Separately billable procedures and treating other patients    Critical care was necessary to treat or prevent imminent or life-threatening deterioration of the following conditions:  Respiratory failure    Critical care was time spent personally by me on the following activities:  Blood draw for specimens, obtaining history from patient or surrogate, development of treatment plan with patient or surrogate, discussions with consultants, evaluation of patient's response to treatment, examination of patient, review of old charts, re-evaluation of patient's condition, ordering and review of laboratory studies, ordering and review of radiographic studies, interpretation of cardiac output measurements and ordering and performing treatments and interventions             ED Course  ED Course as of 12/25/22 1754   Sun Dec 25, 2022   1136 Spoke with nephrologist, Dr Stan Choe, who will come evaluate pt     1251 Icu team notified who will come see pt at bedside  SBIRT 22yo+    Flowsheet Row Most Recent Value   SBIRT (23 yo +)    In order to provide better care to our patients, we are screening all of our patients for alcohol and drug use   Would it be okay to ask you these screening questions? No Filed at: 12/25/2022 1129                    MDM  Number of Diagnoses or Management Options  CHF (congestive heart failure) ([facility] ): new and requires workup  ESRD on dialysis [facility]): new and requires workup  Hyponatremia: new and requires workup  Hypoxia: new and requires workup  Pneumonia: new and requires workup  Respiratory distress: new and requires workup  Diagnosis management comments: Septic work-up, chest x-ray, ABG  Start BiPAP therapy  Plan for admission       Amount and/or Complexity of Data Reviewed  Clinical lab tests: ordered and reviewed  Tests in the radiology section of CPT®: ordered and reviewed  Tests in the medicine section of CPT®: reviewed and ordered  Review and summarize past medical records: yes  Independent visualization of images, tracings, or specimens: yes    Risk of Complications, Morbidity, and/or Mortality  General comments: Patient noted to be tachypneic, in moderate respiratory distress upon initial arrival to the ED  Patient was hypoxic on the monitor  ABG also showed hypoxia  Patient started to improve on BiPAP therapy  Chest x-ray showed diffuse pulmonary congestion as well as possible infiltrate bilaterally  Broad-spectrum IV antibiotic started in the ED  Patient was seen by nephrologist who will arrange for dialysis  Lab work also showed concern for hyponatremia  At this time patient is admitted to stepdown for further evaluation and management  Patient agrees with admission plans          Disposition  Final diagnoses:   Respiratory distress   Hypoxia   ESRD on dialysis [facility])   CHF (congestive heart failure) ([facility] )   Hyponatremia   Pneumonia     Time reflects when diagnosis was documented in both MDM as applicable and the Disposition within this note     Time User Action Codes Description Comment    12/25/2022  1:23 PM Elex Shark Add [R06 03] Respiratory distress     12/25/2022  1:23 PM Elex Shark Add [R09 02] Hypoxia 12/25/2022  1:23 PM Jackelyn Copeland Add [N18 6,  Z99 2] ESRD on dialysis ([place] [address] )     12/25/2022  1:23 PM Eulalia Bowen Add [I50 9] CHF (congestive heart failure) ([facility] )     12/25/2022  1:24 PM Eulalia Bowen Add [E87 1] Hyponatremia     12/25/2022  1:24 PM Jackelyn Copeland Add [J18 9] Pneumonia       ED Disposition     ED Disposition   Admit    Condition   Stable    Date/Time   Sun Dec 25, 2022  1:24 PM    Comment   Case was discussed with Dr Michael Terrell and the patient's admission status was agreed to be Admission Status: inpatient status to the service of Dr Michael Terrell             Follow-up Information    None         Current Discharge Medication List      CONTINUE these medications which have NOT CHANGED    Details   amLODIPine (NORVASC) 10 mg tablet Take 1 tablet (10 mg total) by mouth daily  Qty: 90 tablet, Refills: 1    Associated Diagnoses: Benign essential hypertension      calcium acetate (PHOSLO) capsule Take 1 capsule (667 mg total) by mouth 3 (three) times a day with meals Most days only takes BID  Qty: 60 capsule, Refills: 1    Associated Diagnoses: Hypocalcemia      calcium carbonate-vitamin D (OSCAL-D) 500 mg-200 units per tablet Take 1 tablet by mouth 2 (two) times a day with meals  Qty: 180 tablet, Refills: 1    Associated Diagnoses: Osteopenia of multiple sites      cloNIDine (CATAPRES) 0 1 mg tablet Take 1 tablet (0 1 mg total) by mouth every 12 (twelve) hours  Qty: 180 tablet, Refills: 1    Associated Diagnoses: Benign essential hypertension      dutasteride (AVODART) 0 5 mg capsule Take 1 capsule (0 5 mg total) by mouth daily  Qty: 90 capsule, Refills: 1    Associated Diagnoses: Benign prostatic hyperplasia with lower urinary tract symptoms, symptom details unspecified      glucose blood test strip Use 1 each 3 (three) times a day  Qty: 300 each, Refills: 3    Associated Diagnoses: Type 2 diabetes mellitus with chronic kidney disease on chronic dialysis, with long-term current use of insulin (HCC) insulin aspart protamine-insulin aspart (NovoLOG 70/30 FlexPen ReliOn) 100 Units/mL injection pen Inject 15 Units under the skin 2 (two) times a day before meals  Qty: 15 mL, Refills: 0    Associated Diagnoses: Type 2 diabetes mellitus with chronic kidney disease on chronic dialysis, with long-term current use of insulin ([facility])      Insulin Pen Needle (B-D UF III MINI PEN NEEDLES) 31G X 5 MM MISC Use twice daily with insulin pen as directed  Qty: 180 each, Refills: 3    Associated Diagnoses: Type 2 diabetes mellitus with chronic kidney disease on chronic dialysis, with long-term current use of insulin ([facility])      losartan (COZAAR) 50 mg tablet Take 1 tablet (50 mg total) by mouth 2 (two) times a day  Qty: 180 tablet, Refills: 1    Associated Diagnoses: Benign essential hypertension      simvastatin (ZOCOR) 40 mg tablet Take 1 tablet (40 mg total) by mouth daily at bedtime  Qty: 90 tablet, Refills: 3    Comments: Requesting 1 year supply  Associated Diagnoses: Hyperlipidemia, unspecified hyperlipidemia type      sodium chloride (OCEAN) 0 65 % nasal spray 1 spray into each nostril as needed for congestion  Refills: 0    Associated Diagnoses: Complaint of nasal congestion      tamsulosin (FLOMAX) 0 4 mg Take 2 capsules (0 8 mg total) by mouth daily with dinner  Qty: 180 capsule, Refills: 1    Associated Diagnoses: Benign prostatic hyperplasia with lower urinary tract symptoms, symptom details unspecified             No discharge procedures on file      PDMP Review     None          ED Provider  Electronically Signed by           Henrry Lyons DO  12/25/22 8918

## 2022-12-25 NOTE — CONSULTS
[address] [facility] [address] 76 y o  male MRN: [id number]  Unit/Bed#: ED CT2 Encounter: [id number]    ASSESSMENT and PLAN:    64-year-old male with a history of end-stage renal disease on hemodialysis who missed treatment yesterday due to transport issues, diabetes, hypertension, pleural effusions who was recently discharged from [place] [address]   He presents for shortness of breath  Nephrology consult for ESRD management  Shortness of breath  -- Status post thoracentesis during his last hospitalization back on December 23  -- Initially hypoxic but now saturating 100% on a full facemask  -- Tachypneic  -- Likely some degree of volume overload  -- Missed dialysis yesterday due to transport issues  -- Follow-up chest x-ray  -- May potentially require dialysis this afternoon  Awaiting for final disposition in regards to what floor he will go to    I did discuss the case with the emergency room attending and critical care team   -- proBNP level 17,290    Hyponatremia  -- Likely volume mediated along with hyperglycemia  -- Monitor with ultrafiltration and on dialysis  -- Correction of the hyperglycemia    Anemia--improved and stabilized  -- Acute on chronic  -- [facility] as outpatient,[facility] as an inpatient  --Had a hemoglobin of 5 7 during the last hospitalization  -- Stool occult positive  -- Received multiple blood transfusions during the last hospitalization  -- Globin has improved currently at 9 3     End-stage renal disease  -- In center hemodialysis Tuesday Thursday Saturday at [facility]  -- Access: Right IJ permacath, evaluation for access as an outpatient  -- Last dry weight was challenged to 70 7 kg  --Admission weight 84 kg  -- History of ANCA vasculitis, did not tolerate cyclophosphamide  -- Last dialysis was on Thursday in the hospital  -- Missed dialysis yesterday due to transport issues  -- We may need to do dialysis this afternoon depending on his respiratory status if it worsens  Awaiting final disposition in regards to his bed assignment     Blood pressure/volume status  -- History of bilateral pleural effusions during the last admission examines generalized anasarca and volume overloaded  --Ultrafiltration limited by the blood pressure  -- Blood pressure currently acceptable  -- History of hypertension  -- Albumin 1 6, leading to anasarca  -- Underwent thoracentesis during the last hospitalization     COVID-19     Mineral bone disorder-chronic kidney disease  -- Continue calcium acetate as an outpatient  -- Renal diet     Bilateral pleural effusion  -- Status post left thoracentesis, 1 4 L was removed  during the last hospitalization    SUMMARY OF RECOMMENDATIONS:    Please see above    HISTORY OF PRESENT ILLNESS:  Requesting Physician: Austin Gongora DO  Reason for Consult: ESRD    Manpreet Green is a 76 y o  male who was admitted to [place] [address] after presenting with shortness of breath and respiratory distress  A renal consultation is requested today for assistance in the management of ESRD  She was recently discharged from [place] [address] where he had presented for shortness of breath and found to have acute anemia  His hemoglobin was 5 7 during that hospitalization  He underwent multiple blood transfusions and his hemoglobin stabilized and improved  He also had a pleural effusion which underwent a thoracentesis  He was last dialyzed on Thursday  He was at his nursing home facility and unfortunately there was no transport issues and he did not get dialysis yesterday  He is coming in for shortness of breath      PAST MEDICAL HISTORY:  Past Medical History:   Diagnosis Date   • Anesthesia complication 7041    after colonoscopy , pt was awake but could not control his body and kept falling    • Arthritis    • Bladder calculi    • BPH (benign prostatic hyperplasia)    • Diabetes mellitus ([facility] )    • Dialysis patient ([place] [address] ) 3/11/2022 • ESRD (end stage renal disease) on dialysis [facility])    • Hearing disorder of both ears     wears bilateral hearing aids   • Hyperlipidemia     controlled and maintained d/t diabetes   • Hypertension    • Kidney stones     Last Assessed:4/3/2017   • Lyme disease     Last Assessed:2015   • Rupture, bladder, spontaneous     Last Assessed:4/3/2017       PAST SURGICAL HISTORY:  Past Surgical History:   Procedure Laterality Date   • BLADDER REPAIR N/A 12/10/2016    Procedure: REPAIR BLADDER/cysto insertion stent;  Surgeon: Franklyn York MD;  Location: [address] [place] [address];  Service:    • COLONOSCOPY     • CYSTOLITHOTOMY      ANESTHESIA   lower abdomen  ; Last Assessed:4/3/2017   • IR ASPIRATION ONLY  2022   • IR BIOPSY KIDNEY RANDOM  3/10/2022   • IR THORACENTESIS  2022   • IR THORACENTESIS  2022   • IR THORACENTESIS  2022   • IR TUNNELED DIALYSIS CATHETER PLACEMENT  3/8/2022   • OTHER SURGICAL HISTORY      thermal dilation of the prostate x 2 ( in the office)   • TONSILLECTOMY     • TRANSURETHRAL RESECTION OF PROSTATE N/A 2016    Procedure:  Cysto, Laser Bladder stone,fulgaration of prostates tissue ;  Surgeon: Franklyn York MD;  Location: [facility];  Service:        ALLERGIES:  Allergies   Allergen Reactions   • Amoxicillin Rash       SOCIAL HISTORY:  Social History     Substance and Sexual Activity   Alcohol Use Never     Social History     Substance and Sexual Activity   Drug Use No     Social History     Tobacco Use   Smoking Status Former   • Types: Cigars   • Quit date:    • Years since quittin 0   Smokeless Tobacco Never       FAMILY HISTORY:  Family History   Problem Relation Age of Onset   • Cancer Mother         breast   • Diabetes Mother    • Cancer Father         prostate w/ bone mets   • Prostate cancer Father    • Alzheimer's disease Sister        MEDICATIONS:  No current facility-administered medications for this encounter      Current Outpatient Medications:   • amLODIPine (NORVASC) 10 mg tablet, Take 1 tablet (10 mg total) by mouth daily, Disp: 90 tablet, Rfl: 1  •  calcium acetate (PHOSLO) capsule, Take 1 capsule (667 mg total) by mouth 3 (three) times a day with meals Most days only takes BID, Disp: 60 capsule, Rfl: 1  •  calcium carbonate-vitamin D (OSCAL-D) 500 mg-200 units per tablet, Take 1 tablet by mouth 2 (two) times a day with meals, Disp: 180 tablet, Rfl: 1  •  cloNIDine (CATAPRES) 0 1 mg tablet, Take 1 tablet (0 1 mg total) by mouth every 12 (twelve) hours, Disp: 180 tablet, Rfl: 1  •  dutasteride (AVODART) 0 5 mg capsule, Take 1 capsule (0 5 mg total) by mouth daily, Disp: 90 capsule, Rfl: 1  •  glucose blood test strip, Use 1 each 3 (three) times a day, Disp: 300 each, Rfl: 3  •  insulin aspart protamine-insulin aspart (NovoLOG 70/30 FlexPen ReliOn) 100 Units/mL injection pen, Inject 15 Units under the skin 2 (two) times a day before meals, Disp: 15 mL, Rfl: 0  •  Insulin Pen Needle (B-D UF III MINI PEN NEEDLES) 31G X 5 MM MISC, Use twice daily with insulin pen as directed, Disp: 180 each, Rfl: 3  •  losartan (COZAAR) 50 mg tablet, Take 1 tablet (50 mg total) by mouth 2 (two) times a day, Disp: 180 tablet, Rfl: 1  •  predniSONE 10 mg tablet, Take 4 tablets (40 mg total) by mouth daily for 7 days, THEN 3 tablets (30 mg total) daily for 7 days, THEN 2 tablets (20 mg total) daily for 7 days, THEN 1 tablet (10 mg total) daily  , Disp: 100 tablet, Rfl: 0  •  simvastatin (ZOCOR) 40 mg tablet, Take 1 tablet (40 mg total) by mouth daily at bedtime, Disp: 90 tablet, Rfl: 3  •  sodium chloride (OCEAN) 0 65 % nasal spray, 1 spray into each nostril as needed for congestion, Disp: , Rfl: 0  •  tamsulosin (FLOMAX) 0 4 mg, Take 2 capsules (0 8 mg total) by mouth daily with dinner, Disp: 180 capsule, Rfl: 1    REVIEW OF SYSTEMS:  Constitutional: Negative for fatigue, anorexia, fever, chills, diaphoresis  HENT: Negative for postnasal drip  Eyes: Negative for visual disturbance  Respiratory: Positive for shortness of breath  Cardiovascular: Negative for chest pain, palpitations and leg swelling  Gastrointestinal: Negative for abdominal pain, constipation, diarrhea, nausea and vomiting  Genitourinary: No dysuria, hematuria  Endocrine: Negative for polyuria  Musculoskeletal: Negative for arthralgias, back pain and joint swelling  Skin: Negative for rash  Neurological: Negative for focal weakness, headaches, dizziness  Hematological: Negative for easy bruising or bleeding  Psychiatric/Behavioral: Negative for confusion and sleep disturbance  All the systems were reviewed and were negative except as documented on the HPI  PHYSICAL EXAM:  Current Weight: Weight - Scale: 82 3 kg (181 lb 7 oz)  First Weight: Weight - Scale: 83 9 kg (185 lb)  Vitals:    12/25/22 1129 12/25/22 1149 12/25/22 1204 12/25/22 1230   BP: 163/71   133/68   Pulse: 101   86   Resp: (!) 32   (!) 32   Temp: 98 1 °F (36 7 °C)      SpO2: 92% 96%  100%   Weight:   82 3 kg (181 lb 7 oz)    Height:         No intake or output data in the 24 hours ending 12/25/22 1253  Physical Exam  Vitals and nursing note reviewed  Exam conducted with a chaperone present  Constitutional:       General: He is not in acute distress  Appearance: He is well-developed  He is ill-appearing  HENT:      Head: Normocephalic and atraumatic  Mouth/Throat:      Mouth: Mucous membranes are dry  Eyes:      General: No scleral icterus  Conjunctiva/sclera: Conjunctivae normal       Pupils: Pupils are equal, round, and reactive to light  Cardiovascular:      Rate and Rhythm: Normal rate and regular rhythm  Heart sounds: S1 normal and S2 normal  No murmur heard  No friction rub  No gallop  Pulmonary:      Effort: Respiratory distress present  Breath sounds: Normal breath sounds  No wheezing or rales  Abdominal:      General: Bowel sounds are normal       Palpations: Abdomen is soft  Tenderness: There is no abdominal tenderness  There is no rebound  Musculoskeletal:         General: Swelling present  Normal range of motion  Cervical back: Normal range of motion and neck supple  Right lower leg: Edema present  Left lower leg: Edema present  Skin:     General: Skin is dry  Findings: No rash  Neurological:      Mental Status: He is alert and oriented to person, place, and time  Psychiatric:         Behavior: Behavior normal            Invasive Devices:      Lab Results:   Results from last 7 days   Lab Units 12/25/22  1146 12/23/22  0511 12/22/22  0927 12/21/22  0523 12/21/22  0444 12/20/22  1929 12/20/22  1217   WBC Thousand/uL 12 12* 6 68 8 38   < >  --   --  7 38   HEMOGLOBIN g/dL 9 3* 8 7* 8 5*   < >  --    < > 5 7*   HEMATOCRIT % 28 9* 27 5* 26 8*   < >  --    < > 19 2*   PLATELETS Thousands/uL 406* 291 317   < >  --   --  330   POTASSIUM mmol/L 5 1 4 5 4 5  --  4 6  --  3 9   CHLORIDE mmol/L 85* 93* 97  --  98  --  98   CO2 mmol/L 27 27 30  --  31  --  32   BUN mg/dL 107* 46* 47*  --  34*  --  37*   CREATININE mg/dL 6 50* 4 14* 4 30*  --  3 65*  --  3 90*   CALCIUM mg/dL 8 1* 7 7* 7 4*  --  7 5*  --  7 9*   MAGNESIUM mg/dL 2 2  --   --   --   --   --  1 9   ALK PHOS U/L 112  --   --   --  100  --  76   ALT U/L 14  --   --   --  9*  --  11*   AST U/L 10  --   --   --  18  --  13    < > = values in this interval not displayed

## 2022-12-26 PROBLEM — R04.89 DIFFUSE PULMONARY ALVEOLAR HEMORRHAGE: Status: ACTIVE | Noted: 2022-01-01

## 2022-12-26 NOTE — PROGRESS NOTES
Recommend Nepro at goal rate of 50 mL/hr for a total volume of 1200 mL  This will provide 2160 kcals (2416 kcals with current Propofol), 97 grams Protein and 872 mL free water  Recommend flushes of 50 mL q 6 hrs for a total volume of 1072 mL

## 2022-12-26 NOTE — PROCEDURES
Bronchoscopy    Date/Time: 12/26/2022 4:26 PM  Performed by: Federica Hayden MD  Authorized by: Federica Hayden MD     Patient location:  Bedside  Other Assisting Provider: No    Consent:     Consent obtained:  Verbal    Consent given by:  Spouse    Risks discussed:  Pain, pneumothorax, infection and bleeding    Alternatives discussed:  Observation  Universal protocol:     Procedure explained and questions answered to patient or proxy's satisfaction: yes      Relevant documents present and verified: yes      Required blood products, implants, devices and special equipment available: yes      Immediately prior to procedure a time out was called: yes      Patient identity confirmed:  Arm band  Indications:     Procedure Purpose: diagnostic      Indications: hemoptysis    Sedation:     Sedation type:  Continuous (ICU/vent)  Anesthesia (see MAR for exact dosages): Anesthesia method:  None  Procedure details:     Description:  Cursory examination of bronchial tree reveals moderate amount of bloody secretions emanating from all lobes  Bronchoscope wedged in subsegmental bronchus in right middle lobe for serial lavage  On each lavage, 40-50 mL of saline was instilled and aspirated  Every aspirate was hemorrhagic  Each of three aliquots was neither more or less hemorrhagic than the preceding one  Aspirate did not clear on serial lavage  See included     photograph  No purulent secretions noted  Procedures performed:     Lavage site:  Right Middle Lobe  Post-procedure details:     Chest x-ray performed: no      Patient tolerance of procedure:   Tolerated well, no immediate complications

## 2022-12-26 NOTE — ASSESSMENT & PLAN NOTE
Lab Results   Component Value Date    EGFR 7 12/25/2022    EGFR 12 12/23/2022    EGFR 12 12/22/2022    CREATININE 6 50 (H) 12/25/2022    CREATININE 4 14 (H) 12/23/2022    CREATININE 4 30 (H) 12/22/2022       · Patient scheduled dialysis Tuesday/Thursday/Saturday however he was unable to make his treatment on Saturday 12/24 due to the transportation bus not picking him up  · Patient started with worsening shortness of breath on 12/25 presented to the emergency room  · Nephrology consulted and HD session done on 12/25 for 3 5L  · Continue PhosLo as ordered

## 2022-12-26 NOTE — ASSESSMENT & PLAN NOTE
Lab Results   Component Value Date    HGBA1C 5 7 (H) 10/19/2022       Recent Labs     12/23/22  1139 12/23/22  1555 12/25/22  1621 12/25/22 2115   POCGLU 349* 241* 344* 168*       Blood Sugar Average: Last 72 hrs:  (P) 256   · Hold home dose Avodart  · Continue sliding scale coverage

## 2022-12-26 NOTE — CONSULTS
Consultation - Pulmonary Medicine   Inna Maier 76 y o  male MRN: [id number]  Unit/Bed#: ICU 11 Encounter: [id number]      Assessment and plan:    1  Acute hypoxic respiratory failure with ventilatory support: Currently on mechanical ventilatory support secondary to acute respiratory failure  On FiO2 100% tidal volume 400 PEEP 10  Saturations adequate  Titrate down FiO2 as tolerated  2  History of interstitial lung disease secondary to vasculitis: Currently admitted with bilateral pneumonia consolidation  Worsening interstitial lung disease versus pneumonia  Currently on broad-spectrum antibiotic coverage with cefepime and vancomycin  Imaging showed bilateral consolidation  For bronchoscopy today per ICU team   Look for DAH : Check for PCP in addition to gram stain and culture  Continue with high-dose IV Solu-Medrol for 3 days  We need to exclude infection  Procalcitonin is elevated  3   Suspected pneumonia: She has been immunosuppressed  On broad-spectrum antibiotic coverage with cefepime and vancomycin empirically  Await BAL results  Suggest ID consultation  Check LDH  4   Hydropneumothorax right side on chest x-ray: Reported and texted by the radiologist   The patient is going for CT scan for further evaluation  Discussed with Dr Elinor Condon, ICU attending  This is likely following recent thoracentesis  Closely watch for now  Her overall prognosis is guarded  Continue with supportive measures  Discussed with ICU team     Thank you for allowing me to participate in the care of the patient  History of Present Illness      Physician Requesting Consult: Madison Joshi MD     Reason for Consult / Principal Problem: Respiratory failure; history of vasculitis    Hx and PE limited by: Patient intubated on the ventilator    HPI: Madeleine Jackson is a 76y o  year old male with past medical history of ANCA vasculitis, end-stage renal disease on hemodialysis, hypertension diabetes and recent COVID infection admitted with worsening respiratory failure  Required BiPAP initially subsequently intubated currently on mechanical ventilatory support  Has history of anemia and had underwent thoracentesis on 12/22/2022 which yielded 1 L of fluid  He has been on high-dose IV steroid and broad-spectrum antibiotic coverage for suspected pneumonia with cefepime and vancomycin  Currently requires FiO2 100%  PEEP 10  Undergoing hemodialysis  Potassium was 5 8  Blood cultures have been negative so far  COVID-negative influenza negative and RSV negative  ABG showed a pH of 7 35 with PCO2 of 47 and PO2 of 123  The patient noted to have ANCA vasculitis and was previously on cyclophosphamide which he could not tolerate  Inpatient consult to Pulmonology  Consult performed by: Radha Franklin MD  Consult ordered by: FRENANDO Figueroa          Review of Systems   Unable to perform ROS: Intubated   Constitutional: Negative for fever  Psychiatric/Behavioral: Negative for agitation         Historical Information   Past Medical History:   Diagnosis Date   • Anesthesia complication 2275    after colonoscopy , pt was awake but could not control his body and kept falling    • Arthritis    • Bladder calculi    • BPH (benign prostatic hyperplasia)    • Diabetes mellitus ([facility] )    • Dialysis patient ([facility] ) 3/11/2022   • ESRD (end stage renal disease) on dialysis [facility])    • Hearing disorder of both ears     wears bilateral hearing aids   • Hyperlipidemia     controlled and maintained d/t diabetes   • Hypertension    • Kidney stones     Last Assessed:4/3/2017   • Lyme disease     Last Assessed:6/29/2015   • Rupture, bladder, spontaneous     Last Assessed:4/3/2017     Past Surgical History:   Procedure Laterality Date   • BLADDER REPAIR N/A 12/10/2016    Procedure: REPAIR BLADDER/cysto insertion stent;  Surgeon: Hubert Rasheed MD;  Location: [address] [place] [address];  Service:    • COLONOSCOPY     • CYSTOLITHOTOMY      ANESTHESIA   lower abdomen  ; Last Assessed:4/3/2017   • IR ASPIRATION ONLY  2022   • IR BIOPSY KIDNEY RANDOM  3/10/2022   • IR THORACENTESIS  2022   • IR THORACENTESIS  2022   • IR THORACENTESIS  2022   • IR TUNNELED DIALYSIS CATHETER PLACEMENT  3/8/2022   • OTHER SURGICAL HISTORY      thermal dilation of the prostate x 2 ( in the office)   • TONSILLECTOMY     • TRANSURETHRAL RESECTION OF PROSTATE N/A 2016    Procedure:  Cysto, Laser Bladder stone,fulgaration of prostates tissue ;  Surgeon: Garcia Pascual MD;  Location: [place] [facility];  Service:      Social History   Social History     Substance and Sexual Activity   Alcohol Use Never     Social History     Substance and Sexual Activity   Drug Use No     E-Cigarette/Vaping   • E-Cigarette Use Never User      E-Cigarette/Vaping Substances   • Nicotine No    • THC No    • CBD No    • Flavoring No    • Other No    • Unknown No      Social History     Tobacco Use   Smoking Status Former   • Types: Cigars   • Quit date:    • Years since quittin 0   Smokeless Tobacco Never     Occupational History: Not obtainable at this time    Family History: non-contributory    Meds/Allergies   all current active meds have been reviewed    Allergies   Allergen Reactions   • Amoxicillin Rash       Objective   Vitals: Blood pressure 120/58, pulse 91, temperature 98 2 °F (36 8 °C), temperature source Temporal, resp  rate (!) 26, height 5' 8" (1 727 m), weight 78 5 kg (173 lb 1 oz), SpO2 98 %  ,Body mass index is 26 31 kg/m²      Intake/Output Summary (Last 24 hours) at 2022 1045  Last data filed at 2022 1030  Gross per 24 hour   Intake 831 91 ml   Output 4700 ml   Net -3868 09 ml     Invasive Devices     Peripheral Intravenous Line  Duration           Long-Dwell Peripheral IV (Midline) 22 Left Brachial <1 day    Peripheral IV 22 Left;Proximal;Ventral (anterior) Forearm <1 day          Hemodialysis Catheter Duration           HD Permanent Double Catheter 292 days          Drain  Duration           NG/OG/Enteral Tube Orogastric 16 Fr Center mouth <1 day          Airway  Duration           ETT  Oral 7 5 mm <1 day                Physical Exam  Vitals reviewed  Constitutional:       General: He is not in acute distress  Appearance: He is ill-appearing  HENT:      Head: Normocephalic  Mouth/Throat:      Comments: Orally intubated  Eyes:      General: No scleral icterus  Conjunctiva/sclera: Conjunctivae normal    Cardiovascular:      Rate and Rhythm: Normal rate and regular rhythm  Heart sounds: No murmur heard  Pulmonary:      Effort: Pulmonary effort is normal       Breath sounds: Rales (Occasional crackles bilaterally ) present  Abdominal:      Palpations: Abdomen is soft  Tenderness: There is no abdominal tenderness  Musculoskeletal:      Right lower leg: Edema present  Left lower leg: Edema present  Lymphadenopathy:      Cervical: No cervical adenopathy  Skin:     Findings: No rash  Lab Results: I have personally reviewed pertinent lab results  ABG 7 352 PCO2 48 PO2 123 white cell count 8 5 hemoglobin 7 7 sodium 129 potassium 5 8 creatinine 5 55 procalcitonin 2 77   proBNP 17,290  Imaging Studies: I have personally reviewed pertinent films in PACS  Bilateral consolidative changes  No significant effusion  Reportedly has a right hydropneumothorax  Difficult to visualize on chest X ray  EKG, Pathology, and Other Studies: I have personally reviewed pertinent reports      VTE Prophylaxis: Reason for no pharmacologic prophylaxis Suspected alveolar hemorrhage    Code Status: Level 1 - Full Code  Advance Directive and Living Will:      Power of :    POLST:      None

## 2022-12-26 NOTE — PROGRESS NOTES
Manpreet Green is a 76 y o  male who is currently ordered Vancomycin IV with management by the Pharmacy Consult service  Relevant clinical data and objective / subjective history reviewed  Vancomycin Assessment:  1  Indication and Goal AUC/Trough: Pneumonia (goal -600, trough >10)  2  Clinical Status: stable  3  Micro:   pending  4  Renal Function:  · SCr: 5 55 mg/dL  · CrCl: 10 4 mL/min  · Renal replacement: HD  5  Days of Therapy: 2  6  Current Dose: 2000 mg IV x 1 dose (25 mg/kg LD)  Vancomycin Plan:  1  New Dosing: vancomycin 750mg (10mg/kg) after dialysis on HD days    - Patient received HD today and will receive  Vancomycin 750 mg x 1 dose today   2  Target pre-HD vancomycin random level of 15-20 as a goal  3  Next Level: 12/28/22 at 0600  4  Renal Function Monitoring: Daily BMP and [place] will continue to follow closely for s/sx of nephrotoxicity, infusion reactions and appropriateness of therapy  BMP and CBC will be ordered per protocol  We will continue to follow the patient’s culture results and clinical progress daily      Griselda Hamilton, Pharmacist

## 2022-12-26 NOTE — PROGRESS NOTES
Aparna Mendoza is a 76 y o  male who is currently ordered Vancomycin IV with management by the Pharmacy Consult service  Relevant clinical data and objective / subjective history reviewed  Vancomycin Assessment:  Indication and Goal AUC/Trough: Pneumonia (goal -600, trough >10)  Clinical Status: stable  Micro:     Renal Function:  SCr: 6 5 mg/dL  CrCl: 8 9 mL/min  Renal replacement: HD  Days of Therapy: 1  Current Dose:    Vancomycin Plan:  New Dosing: vancomycin 750mg (10mg/kg) after dialysis on HD days  Target pre-HD vancomycin random level of 15-20 as a goal     Next Level: 12/29 0600  Renal Function Monitoring: Daily BMP and [place] will continue to follow closely for s/sx of nephrotoxicity, infusion reactions and appropriateness of therapy  BMP and CBC will be ordered per protocol  We will continue to follow the patient’s culture results and clinical progress daily      Maddison Powers, Pharmacist

## 2022-12-26 NOTE — PROGRESS NOTES
[address]  Progress Note - Howie Anand 1945, 76 y o  male MRN: [id number]  Unit/Bed#: ICU 11 Encounter: [id number]  Primary Care Provider: Mercy Kurtz MD   Date and time admitted to hospital: 12/25/2022 11:20 AM    * Acute respiratory failure with hypoxia [facility])  Assessment & Plan  · Patient presented to the emergency department on 12/25 with worsening shortness of breath and hypoxia after missing his dialysis treatment yesterday due to lack of transportation  · Initially in the emergency room, the patient was placed on BiPAP but he was weaned to mid flow  · Overnight, became more hypoxic and ultimately required intubation  · Began having hemoptysis and significant blood suctioned post intubation   Concern for alveolar hemorrhage secondary to vasculitis  · Will placed on pulse dose steroids 500mg daily    Diffuse pulmonary alveolar hemorrhage  Assessment & Plan  · Concern for diffuse alveolar hemorrhage secondary to his underlying vasculitis given hemoptysis and worsening infiltrates  · Begin pulse dose steroids  · Hold anticoagulation    SIRS (systemic inflammatory response syndrome) ([address] )  Assessment & Plan  · Patient presented to the emergency room on 12/25 with SIRS criteria -appears respiratory rate and hypoxia  · Patient does not appear to have a source of infection at this time since he missed dialysis on 12/24 and appears to be volume overloaded  · Received cefepime x1 in the ER  · Restarted overnight with cefepime/vanco given worsening hypoxia however low suspicion for infection  · Sputum culture ordered    ESRD on dialysis [facility])  Assessment & Plan  Lab Results   Component Value Date    EGFR 7 12/25/2022    EGFR 12 12/23/2022    EGFR 12 12/22/2022    CREATININE 6 50 (H) 12/25/2022    CREATININE 4 14 (H) 12/23/2022    CREATININE 4 30 (H) 12/22/2022       · Patient scheduled dialysis Tuesday/Thursday/Saturday however he was unable to make his treatment on Saturday 12/24 due to the transportation bus not picking him up  · Patient started with worsening shortness of breath on 12/25 presented to the emergency room  · Nephrology consulted and HD session done on 12/25 for 3 5L  · Continue PhosLo as ordered    Type 2 diabetes mellitus with chronic kidney disease on chronic dialysis, with long-term current use of insulin [facility])  Assessment & Plan  Lab Results   Component Value Date    HGBA1C 5 7 (H) 10/19/2022       Recent Labs     12/23/22  1139 12/23/22  1555 12/25/22  1621 12/25/22  2115   POCGLU 349* 241* 344* 168*       Blood Sugar Average: Last 72 hrs:  (P) 256   · Hold home dose Avodart  · Continue sliding scale coverage    Hyponatremia  Assessment & Plan  · Sodium 124 on admission in the setting of volume overload  · Continue to monitor and expect to improve after HD    Benign essential hypertension  Assessment & Plan  · Continue to hold home dose Norvasc and Catapres    Hyperlipidemia  Assessment & Plan  · Continue home dose Zocor    Anemia due to chronic kidney disease, on chronic dialysis [facility])  Assessment & Plan  Lab Results   Component Value Date    EGFR 7 12/25/2022    EGFR 12 12/23/2022    EGFR 12 12/22/2022    CREATININE 6 50 (H) 12/25/2022    CREATININE 4 14 (H) 12/23/2022    CREATININE 4 30 (H) 12/22/2022     · Patient had a recent admission from 12/20-12/23 with hemoglobin 5 7 -received 1 unit packed red blood cells at that time  · Most likely acute on chronic anemia  · Hemoglobin on admission 9 3    Urinary retention  Assessment & Plan  · Bladder scan        ----------------------------------------------------------------------------------------  HPI/24hr events: required intubation overnight, also having hemoptysis    Patient appropriate for transfer out of the ICU today?: No  Disposition: Continue Critical Care   Code Status: Level 1 - Full Code  ---------------------------------------------------------------------------------------  SUBJECTIVE  intubated    Review of Systems  Review of systems was unable to be performed secondary to intubated  ---------------------------------------------------------------------------------------  OBJECTIVE    Vitals   Vitals:    22 0300 22 0400 22 0417 22 0500   BP: 112/58 118/56  112/55   Pulse: 57 (!) 52  59   Resp: 21 19  20   Temp:  97 8 °F (36 6 °C)     TempSrc:  Temporal     SpO2: 95% 96% 94% 94%   Weight:       Height:         Temp (24hrs), Av 9 °F (36 6 °C), Min:97 3 °F (36 3 °C), Max:98 4 °F (36 9 °C)  Current: Temperature: 97 8 °F (36 6 °C)          Respiratory:  SpO2: SpO2: 94 %, SpO2 Activity: SpO2 Activity: At Rest  Nasal Cannula O2 Flow Rate (L/min): 80 L/min    Invasive/non-invasive ventilation settings   Respiratory    Lab Data (Last 4 hours)    None         O2/Vent Data (Last 4 hours)    None                Physical Exam  Vitals and nursing note reviewed  Constitutional:       General: He is not in acute distress  Appearance: He is ill-appearing  HENT:      Head: Normocephalic and atraumatic  Mouth/Throat:      Mouth: Mucous membranes are moist    Eyes:      Pupils: Pupils are equal, round, and reactive to light  Cardiovascular:      Rate and Rhythm: Normal rate  Pulses: Normal pulses  Heart sounds: No murmur heard  Pulmonary:      Effort: Pulmonary effort is normal       Breath sounds: Rhonchi present  Comments: Suctioning bright red blood   Abdominal:      General: There is no distension  Musculoskeletal:         General: No swelling  Skin:     General: Skin is warm  Neurological:      General: No focal deficit present               Laboratory and Diagnostics:  Results from last 7 days   Lab Units 22  1146 22  0511 22  0927 22  0523 22  19222  1217   WBC Thousand/uL 12 12* 6 68 8 38  --  6 57  --  7 38   HEMOGLOBIN g/dL 9 3* 8 7* 8 5* 8 5* 6 0* 7 4* 5 7*   HEMATOCRIT % 28 9* 27 5* 26 8* 26 8* 19 9* 23 9* 19 2* PLATELETS Thousands/uL 406* 291 317  --  298  --  330   NEUTROS PCT % 90* 93* 84*  --  79*  --   --    MONOS PCT % 6 3* 8  --  8  --   --      Results from last 7 days   Lab Units 12/25/22  1146 12/23/22  0511 12/22/22  0927 12/21/22  0444 12/20/22  1217   SODIUM mmol/L 124* 131* 136 138 138   POTASSIUM mmol/L 5 1 4 5 4 5 4 6 3 9   CHLORIDE mmol/L 85* 93* 97 98 98   CO2 mmol/L 27 27 30 31 32   ANION GAP mmol/L 12 11 9 9 8   BUN mg/dL 107* 46* 47* 34* 37*   CREATININE mg/dL 6 50* 4 14* 4 30* 3 65* 3 90*   CALCIUM mg/dL 8 1* 7 7* 7 4* 7 5* 7 9*   GLUCOSE RANDOM mg/dL 365* 283* 178* 161* 140   ALT U/L 14  --   --  9* 11*   AST U/L 10  --   --  18 13   ALK PHOS U/L 112  --   --  100 76   ALBUMIN g/dL 1 9*  --   --  1 6* 1 6*   TOTAL BILIRUBIN mg/dL 0 37  --   --  0 45 0 36     Results from last 7 days   Lab Units 12/25/22  1146 12/20/22  1217   MAGNESIUM mg/dL 2 2 1 9      Results from last 7 days   Lab Units 12/26/22  0045 12/20/22  1217   INR  1 35* 1 20*   PTT seconds  --  44*          Results from last 7 days   Lab Units 12/25/22  1146 12/21/22  2257   LACTIC ACID mmol/L 2 2* 1 6     ABG:  Results from last 7 days   Lab Units 12/26/22  0052   PH ART  7 352   PCO2 ART mm Hg 48 4*   PO2 ART mm Hg 123 0   HCO3 ART mmol/L 26 2   BASE EXC ART mmol/L 0 5   ABG SOURCE  Radial, Right     VBG:  Results from last 7 days   Lab Units 12/26/22  0052   ABG SOURCE  Radial, Right     Results from last 7 days   Lab Units 12/25/22  1146   PROCALCITONIN ng/ml 1 79*       Micro  Results from last 7 days   Lab Units 12/25/22  1146 12/22/22  1344 12/22/22  0946 12/21/22  2259 12/21/22  1440   BLOOD CULTURE  Received in Microbiology Lab  Culture in Progress  Received in Microbiology Lab  Culture in Progress    --   --  No Growth at 72 hrs   --    GRAM STAIN RESULT   --  No Polys or Bacteria seen  --   --  1+ Polys  No bacteria seen   BODY FLUID CULTURE, STERILE   --  No growth  --   --  No growth   MRSA CULTURE ONLY   --   --  No Methicillin Resistant Staphlyococcus aureus (MRSA) isolated  --   --        EKG:   Imaging: I have personally reviewed pertinent reports  Intake and Output  I/O       12/24 0701 12/25 0700 12/25 0701 12/26 0700    I V  (mL/kg)  500 (6 2)    Total Intake(mL/kg)  500 (6 2)    Other  4000    Total Output  4000    Net  -3500                Height and Weights   Height: 5' 8" (172 7 cm)  IBW (Ideal Body Weight): 68 4 kg  Body mass index is 26 98 kg/m²  Weight (last 2 days)     Date/Time Weight    12/25/22 1424 80 5 (177 47)    12/25/22 1204 82 3 (181 44)    12/25/22 1126 83 9 (185)            Nutrition       Diet Orders   (From admission, onward)             Start     Ordered    12/25/22 1440  Diet Jasson/CHO Controlled; Consistent Carbohydrate Diet Level 2 (5 carb servings/75 grams CHO/meal)  Diet effective now        References:    Nutrtion Support Algorithm Enteral vs  Parenteral   Question Answer Comment   Diet Type Jasson/CHO Controlled    Jasson/CHO Controlled Consistent Carbohydrate Diet Level 2 (5 carb servings/75 grams CHO/meal)    RD to adjust diet per protocol?  Yes        12/25/22 1441                  Active Medications  Scheduled Meds:  Current Facility-Administered Medications   Medication Dose Route Frequency Provider Last Rate   • calcium acetate  667 mg Oral TID With Meals FERNANDO Leon     • calcium carbonate-vitamin D  2 tablet Oral BID With Meals FERNANDO Leon     • cefepime  1,000 mg Intravenous [id number] Cheryle Lopez PA-C     • chlorhexidine  15 mL Mouth/Throat [id number] [facility] Juana Quan PA-C     • fentanyl citrate (PF)  50 mcg Intravenous F3W PRN Juana Quan PA-C     • insulin glargine  15 Units Subcutaneous QAM FERNANDO Leon     • insulin lispro  1-6 Units Subcutaneous TID AC FERNANDO Leon     • insulin lispro  1-6 Units Subcutaneous HS FERNANDO Leon     • lidocaine  1 patch Topical Daily Matias Villasenor     • norepinephrine  1-30 mcg/min Intravenous Titrated Sharon Quan PA-C     • ondansetron  4 mg Intravenous M2I PRN Robert Jamison PA-C     • pravastatin  80 mg Oral Daily With [employer] [place]     • propofol  5-50 mcg/kg/min Intravenous Titrated Sharon Quan PA-C 30 mcg/kg/min (12/26/22 0327)   • [START ON 12/27/2022] vancomycin  10 mg/kg Intravenous Once per day on Tue Thu Sat Robert Jamison PA-C       Continuous Infusions:  norepinephrine, 1-30 mcg/min  propofol, 5-50 mcg/kg/min, Last Rate: 30 mcg/kg/min (12/26/22 0327)      PRN Meds:   fentanyl citrate (PF), 50 mcg, Q1H PRN  ondansetron, 4 mg, Q6H PRN        Invasive Devices Review  Invasive Devices     Peripheral Intravenous Line  Duration           Long-Dwell Peripheral IV (Midline) 12/26/22 Left Brachial <1 day    Peripheral IV 12/25/22 Left;Proximal;Ventral (anterior) Forearm <1 day    Peripheral IV 12/25/22 Left;Ventral (anterior) Wrist <1 day          Hemodialysis Catheter  Duration           HD Permanent Double Catheter 292 days          Drain  Duration           NG/OG/Enteral Tube Orogastric 16 Fr Center mouth <1 day          Airway  Duration           ETT  Oral 7 5 mm <1 day                Rationale for remaining devices: critically ill  ---------------------------------------------------------------------------------------  Advance Directive and Living Will:      Power of :    POLST:    ---------------------------------------------------------------------------------------  Care Time Delivered:   Upon my evaluation, this patient had a high probability of imminent or life-threatening deterioration due to hypoxic respiratory failure, alveolar hemorrhage, which required my direct attention, intervention, and personal management  I have personally provided 30 minutes ([phone]) of critical care time, exclusive of procedures, teaching, family meetings, and any prior time recorded by providers other than myself         Robert Jamison PA-C      Portions of the record may have been created with voice recognition software  Occasional wrong word or "sound a like" substitutions may have occurred due to the inherent limitations of voice recognition software    Read the chart carefully and recognize, using context, where substitutions have occurred h

## 2022-12-26 NOTE — ASSESSMENT & PLAN NOTE
· Patient presented to the emergency department on 12/25 with worsening shortness of breath and hypoxia after missing his dialysis treatment yesterday due to lack of transportation  · Initially in the emergency room, the patient was placed on BiPAP but he was weaned to mid flow  · Overnight, became more hypoxic and ultimately required intubation  · Began having hemoptysis and significant blood suctioned post intubation   Concern for alveolar hemorrhage secondary to vasculitis  · Will placed on pulse dose steroids 500mg daily

## 2022-12-26 NOTE — ASSESSMENT & PLAN NOTE
· Concern for diffuse alveolar hemorrhage secondary to his underlying vasculitis given hemoptysis and worsening infiltrates  · Begin pulse dose steroids  · Hold anticoagulation

## 2022-12-26 NOTE — PLAN OF CARE
Serum potassium is 5 8 on 2 K bath  Will attempt for UF 1 5-2 kg as tlerated  Problem: METABOLIC, FLUID AND ELECTROLYTES - ADULT  Goal: Electrolytes maintained within normal limits  Description: INTERVENTIONS:  - Monitor labs and assess patient for signs and symptoms of electrolyte imbalances  - Administer electrolyte replacement as ordered  - Monitor response to electrolyte replacements, including repeat lab results as appropriate  - Instruct patient on fluid and nutrition as appropriate  Outcome: Progressing  Goal: Fluid balance maintained  Description: INTERVENTIONS:  - Monitor labs   - Monitor I/O and WT  - Instruct patient on fluid and nutrition as appropriate  - Assess for signs & symptoms of volume excess or deficit  Outcome: Progressing   Post-Dialysis RN Treatment Note    Blood Pressure:  Pre 118/58 mm/Hg                                  Post 120/58 mmHg   EDW  71 kg    Weight:  Pre 78 5 kg   Post Not Applicable kg   Mode of weight measurement: Bed Scale   Volume Removed  0 ml    Treatment duration 120 minutes    NS given  No    Treatment shortened?  No   Medications given during Rx Albumin 25 g   Estimated Kt/V  Not Applicable   Access type: Permacath/TDC   Access Issues: No    Report called to primary nurse   Yes  Tapan Dennis RN

## 2022-12-26 NOTE — ASSESSMENT & PLAN NOTE
· Patient presented to the emergency room on 12/25 with SIRS criteria -appears respiratory rate and hypoxia  · Patient does not appear to have a source of infection at this time since he missed dialysis on 12/24 and appears to be volume overloaded  · Received cefepime x1 in the ER  · Restarted overnight with cefepime/vanco given worsening hypoxia however low suspicion for infection  · Sputum culture ordered

## 2022-12-26 NOTE — PROGRESS NOTES
Critical Care Services- Interval Progress Note   Ramón Davidson 76 y o  male MRN: [id number]  Unit/Bed#: ICU 11 Encounter: [id number]  Assessment and Plan  Interval Events:  patient completed dialysis but still very tachypneic and short of breath  Called to bedside to evaluate      • Diagnosis: Acute hypoxic respiratory failure  o Plan: Repeat CXR similar to earlier  Etiology unclear  - Patient still above dry weight  Patchy infiltrates present, ?pneumonia  Continue Cefepime, add vanco  follow-up procal  - Hx of vasculitis, ?vasculitis related infiltrates  Restart steroids  - ?alveolar hemorrhage  Patient was coughing blood-tinged sputum this evening  o Will contact nephrology in AM for another dialysis session  o Did not tolerated BiPAP  Tolerating HFNC currently but still increased work of breathing  Will continue to monitor, discussed with patient and wife, he is full code  Upon my evaluation, this patient had a high probability of imminent or life-threatening deterioration due to hypoxic respiratory failure, which required my direct attention, intervention, and personal management  I have personally provided 40 minutes (2140 to 2220) on 12/25/2022 of critical care time, exclusive of procedures, teaching, family meetings, and any prior time recorded by providers other than myself  Time includes review of imaging/radiology results, monitoring for potential decompensation  Interventions were performed as documented above    -------------------------------------------------------------------------------------------------------------------------------------  Hemodynamic Monitoring:  Blood pressure 120/58, pulse 79, temperature (!) 97 3 °F (36 3 °C), temperature source Temporal, resp  rate (!) 37, height 5' 8" (1 727 m), weight 80 5 kg (177 lb 7 5 oz), SpO2 (!) 85 %          Laboratory   Results from last 7 days   Lab Units 12/25/22  1146 12/23/22  0511 12/22/22  1807 12/21/22  2024 12/21/22  5627 12/20/22  1929 12/20/22  1217   WBC Thousand/uL 12 12* 6 68 8 38  --  6 57  --  7 38   HEMOGLOBIN g/dL 9 3* 8 7* 8 5* 8 5* 6 0* 7 4* 5 7*   HEMATOCRIT % 28 9* 27 5* 26 8* 26 8* 19 9* 23 9* 19 2*   PLATELETS Thousands/uL 406* 291 317  --  298  --  330   NEUTROS PCT % 90* 93* 84*  --  79*  --   --    MONOS PCT % 6 3* 8  --  8  --   --      Results from last 7 days   Lab Units 12/25/22  1146 12/23/22  0511 12/22/22  0927 12/21/22  0444 12/20/22  1217   SODIUM mmol/L 124* 131* 136 138 138   POTASSIUM mmol/L 5 1 4 5 4 5 4 6 3 9   CHLORIDE mmol/L 85* 93* 97 98 98   CO2 mmol/L 27 27 30 31 32   ANION GAP mmol/L 12 11 9 9 8   BUN mg/dL 107* 46* 47* 34* 37*   CREATININE mg/dL 6 50* 4 14* 4 30* 3 65* 3 90*   CALCIUM mg/dL 8 1* 7 7* 7 4* 7 5* 7 9*   GLUCOSE RANDOM mg/dL 365* 283* 178* 161* 140   ALT U/L 14  --   --  9* 11*   AST U/L 10  --   --  18 13   ALK PHOS U/L 112  --   --  100 76   ALBUMIN g/dL 1 9*  --   --  1 6* 1 6*   TOTAL BILIRUBIN mg/dL 0 37  --   --  0 45 0 36     Results from last 7 days   Lab Units 12/25/22  1146 12/20/22  1217   MAGNESIUM mg/dL 2 2 1 9      Results from last 7 days   Lab Units 12/20/22  1217   INR  1 20*   PTT seconds 44*          Results from last 7 days   Lab Units 12/25/22  1146 12/21/22  2257   LACTIC ACID mmol/L 2 2* 1 6     ABG:  Results from last 7 days   Lab Units 12/25/22  1145   PH ART  7 347*   PCO2 ART mm Hg 47 7*   PO2 ART mm Hg 27 2*   HCO3 ART mmol/L 25 6   BASE EXC ART mmol/L -0 5   ABG SOURCE  Radial, Right     VBG:  Results from last 7 days   Lab Units 12/25/22  1145   ABG SOURCE  Radial, Right           Micro  Results from last 7 days   Lab Units 12/25/22  1146 12/22/22  1344 12/22/22  0946 12/21/22  2259 12/21/22  1440   BLOOD CULTURE  Received in Microbiology Lab  Culture in Progress  Received in Microbiology Lab  Culture in Progress    --   --  No Growth at 72 hrs   --    GRAM STAIN RESULT   --  No Polys or Bacteria seen  --   --  1+ Polys  No bacteria seen   BODY FLUID CULTURE, STERILE   --  No growth  --   --  No growth   MRSA CULTURE ONLY   --   --  No Methicillin Resistant Staphlyococcus aureus (MRSA) isolated  --   --        Diagnostic Imaging / Data: I have personally reviewed pertinent reports  and I have personally reviewed pertinent films in PACS    Medications:  Current Facility-Administered Medications   Medication Dose Route Frequency   • [START ON 12/26/2022] amLODIPine (NORVASC) tablet 10 mg  10 mg Oral Daily   • calcium acetate (PHOSLO) capsule 667 mg  667 mg Oral TID With Meals   • calcium carbonate-vitamin D 500 mg-5 mcg tablet 2 tablet  2 tablet Oral BID With Meals   • [START ON 12/26/2022] cefepime (MAXIPIME) IVPB (premix in dextrose) 1,000 mg 50 mL  1,000 mg Intravenous Q24H   • cloNIDine (CATAPRES) tablet 0 1 mg  0 1 mg Oral Q12H [address]   • dexmedeTOMIDine (Precedex) 400 mcg in sodium chloride 0 9 % 100 mL infusion  0 1-0 7 mcg/kg/hr Intravenous Titrated   • [START ON 12/26/2022] finasteride (PROSCAR) tablet 5 mg  5 mg Oral Daily   • heparin (porcine) subcutaneous injection 5,000 Units  5,000 Units Subcutaneous Q8H [address]   • [START ON 12/26/2022] insulin glargine (LANTUS) subcutaneous injection 15 Units 0 15 mL  15 Units Subcutaneous QAM   • insulin lispro (HumaLOG) 100 units/mL subcutaneous injection 1-6 Units  1-6 Units Subcutaneous TID AC   • insulin lispro (HumaLOG) 100 units/mL subcutaneous injection 1-6 Units  1-6 Units Subcutaneous HS   • lidocaine (LIDODERM) 5 % patch 1 patch  1 patch Topical Daily   • losartan (COZAAR) tablet 50 mg  50 mg Oral BID   • ondansetron (ZOFRAN) injection 4 mg  4 mg Intravenous Q6H PRN   • pravastatin (PRAVACHOL) tablet 80 mg  80 mg Oral Daily With [employer]   • [START ON 12/26/2022] predniSONE tablet 40 mg  40 mg Oral Daily   • tamsulosin (FLOMAX) capsule 0 8 mg  0 8 mg Oral Daily With [employer]       SIGNATURE: Linda Rebolledo PA-C    Portions of the record may have been created with voice recognition software    Occasional wrong word or "sound a like" substitutions may have occurred due to the inherent limitations of voice recognition software    Read the chart carefully and recognize, using context, where substitutions have occurred

## 2022-12-26 NOTE — PROGRESS NOTES
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    Plan:  - HD short treatment earlier today  No UF as did not tolerate  - reviewed with Primary team  - CT scan pending  - bronch today  - on pulse dose steroids  - WI-3 remains elevated 12/21    2-shortness of breath    - Status postthoracentesis during prior hospitalization on December 23  - Initially hypoxic and requiring facemask  - Missed dialysis on 12/24 due to transportation issue  - concern for infectious etiology versus noninfectious etiology  - Pulmonary team also on board along with ICU team  - Concern for hydropneumothorax on chest x-ray and therefore patient is undergoing CT scan  - On broad-spectrum antibiotics  - Started on pulse dose steroids due to concern for vasculitis also with hemoptysis    3 hyponatremia volume mediated along with hyperglycemia-    - Monitor with dialysis    4-anemia-on Mircera as outpatient    - On Epogen as inpatient  - hemoglobin to 5 7 during prior hospitalization  - Stool occult positive prior  - Received multiple blood transfusions prior admission    5 - vol overload    - missed HD after discharge  - history of bilateral pl effusions  - albumin 1 6 leading to anasarca  - required thoracentesis prior    6) covid19    - per primaryteam    7 - MBD    - on phos binder    SUBJECTIVE / 24H INTERVAL HISTORY:    Patient intubated      OBJECTIVE:  Current Weight: Weight - Scale: 78 5 kg (173 lb 1 oz)  Vitals:    12/26/22 1130 12/26/22 1140 12/26/22 1200 12/26/22 1341   BP: 105/56 105/56 106/53    BP Location:   Right arm    Pulse: 70 70 64    Resp: (!) 25 (!) 33 18    Temp:   98 4 °F (36 9 °C)    TempSrc:   Temporal    SpO2: 98% 92% 100% 99%   Weight:       Height:           Intake/Output Summary (Last 24 hours) at 12/26/2022 1358  Last data filed at 12/26/2022 1155  Gross per 24 hour   Intake 1161 91 ml   Output 4700 ml   Net -3538 09 ml     General: NAD  Skin: no rash  Eyes: anicteric sclera  ENT: ET tube in place  Neck: supple  Chest: coarse breath sounds bilaterally  CVS: s1s2, no murmur, no gallop, no rub  Abdomen: soft, nl sounds  Extremities: no significant pitting edema LE b/l  : no ruvalcaba  Neuro: Cannot assess  Psych: normal affect    Medications:    Current Facility-Administered Medications:   •  calcium acetate (PHOSLO) capsule 667 mg, 667 mg, Oral, TID With Meals, [employer], CRNP  •  calcium carbonate-vitamin D 500 mg-5 mcg tablet 2 tablet, 2 tablet, Oral, BID With Meals, [employer], CRNP  •  cefepime (MAXIPIME) IVPB (premix in dextrose) 1,000 mg 50 mL, 1,000 mg, Intravenous, J55M, Geraldine Quan PA-C  •  chlorhexidine (PERIDEX) 0 12 % oral rinse 15 mL, 15 mL, Mouth/Throat, J94H [address], Corin Quan PA-C, 15 mL at 12/26/22 0073  •  dexmedeTOMIDine (Precedex) 400 mcg in sodium chloride 0 9 % 100 mL infusion, 0 1-1 2 mcg/kg/hr, Intravenous, Titrated, FERNANDO Valles, Last Rate: 11 8 mL/hr at 12/26/22 1248, 0 6 mcg/kg/hr at 12/26/22 1248  •  fentanyl citrate (PF) 100 MCG/2ML 50 mcg, 50 mcg, Intravenous, N8A PRN, Geraldine Quan PA-C, 50 mcg at 12/26/22 0631  •  insulin glargine (LANTUS) subcutaneous injection 15 Units 0 15 mL, 15 Units, Subcutaneous, QAM, [employer], CRNP  •  insulin lispro (HumaLOG) 100 units/mL subcutaneous injection 1-6 Units, 1-6 Units, Subcutaneous, HS, [employer], CRNP, 1 Units at 12/25/22 2115  •  insulin lispro (HumaLOG) 100 units/mL subcutaneous injection 1-6 Units, 1-6 Units, Subcutaneous, Q6H, 1 Units at 12/26/22 1230 **AND** Fingerstick Glucose (POCT), , , U0D, Sharon Quan PA-C  •  levalbuterol [facility]) inhalation solution 0 63 mg, 0 63 mg, Nebulization, Q6H, Joie Nina V, CRNP, 0 63 mg at 12/26/22 1335  •  lidocaine (LIDODERM) 5 % patch 1 patch, 1 patch, Topical, Daily, Robert Jamison PA-C, 1 patch at 12/26/22 0809  •  [START ON 12/27/2022] methylPREDNISolone sodium succinate (Solu-MEDROL) 500 mg in sodium chloride 0 9 % 250 mL IVPB, 500 mg, Intravenous, Daily, Joie Nina V CRNP  •  omeprazole (PRILOSEC) suspension 2 mg/mL, 20 mg, Per NG Tube, Daily, FERNANDO Valles, 20 mg at 12/26/22 1154  •  ondansetron (ZOFRAN) injection 4 mg, 4 mg, Intravenous, A5P PRN, Sharon Quan PA-C  •  pravastatin (PRAVACHOL) tablet 80 mg, 80 mg, Oral, Daily With Dinner, Braden Carr, CRNP  •  propofol (DIPRIVAN) 1000 mg in 100 mL infusion (premix), 5-50 mcg/kg/min, Intravenous, Titrated, Sharon Quan PA-C, Last Rate: 9 7 mL/hr at 12/26/22 1201, 20 mcg/kg/min at 12/26/22 1201  •  sodium chloride 3 % inhalation solution 4 mL, 4 mL, Nebulization, Q6H, SEYMOUR VallesNP, 4 mL at 12/26/22 1335  •  [START ON 12/27/2022] vancomycin (VANCOCIN) IVPB (premix in dextrose) 750 mg 150 mL, 10 mg/kg, Intravenous, Once per day on Tue Thu Sat, Robert Ingraham, [place]    Laboratory Results:  Results from last 7 days   Lab Units 12/26/22  1142 12/26/22  0616 12/25/22  1146 12/23/22  0511 12/22/22  0927 12/21/22 2024 12/21/22  0523 12/21/22  0444 12/20/22  1929 12/20/22  1217   WBC Thousand/uL  --  8 50 12 12* 6 68 8 38  --  6 57  --   --  7 38   HEMOGLOBIN g/dL  --  7 7* 9 3* 8 7* 8 5* 8 5* 6 0*  --  7 4* 5 7*   HEMATOCRIT %  --  24 7* 28 9* 27 5* 26 8* 26 8* 19 9*  --  23 9* 19 2*   PLATELETS Thousands/uL  --  275 406* 291 317  --  298  --   --  330   POTASSIUM mmol/L 4 4 5 8* 5 1 4 5 4 5  --   -- 4 6  --  3 9   CHLORIDE mmol/L 95* 93* 85* 93* 97  --   --  98  --  98   CO2 mmol/L 26 26 27 27 30  --   --  31  --  32   BUN mg/dL 53* 86* 107* 46* 47*  --   --  34*  --  37*   CREATININE mg/dL 3 58* 5 55* 6 50* 4 14* 4 30*  --   --  3 65*  --  3 90*   CALCIUM mg/dL 7 6* 7 7* 8 1* 7 7* 7 4*  --   --  7 5*  --  7 9*   MAGNESIUM mg/dL  --  2 2 2 2  --   --   --   --   --   --  1 9   PHOSPHORUS mg/dL  --  5 9*  --   --   --   --   --   --   --   --

## 2022-12-26 NOTE — PROCEDURES
Intubation    Date/Time: 12/25/2022 11:34 PM  Performed by: Temo David PA-C  Authorized by: Temo David PA-C     Patient location:  Bedside  Other Assisting Provider: No    Consent:     Consent obtained:  Verbal    Consent given by:  Spouse    Risks discussed:  Aspiration, bleeding, death, brain injury, dental trauma, hypoxia, pneumothorax and laryngeal injury    Alternatives discussed:  No treatment  Universal protocol:     Procedure explained and questions answered to patient or proxy's satisfaction: yes      Relevant documents present and verified: yes      Test results available and properly labeled: yes      Radiology Images displayed and confirmed  If images not available, report reviewed: yes      Required blood products, implants, devices, and special equipment available: yes      Site/side marked: yes      Immediately prior to procedure, a time out was called: yes      Patient identity confirmed:  Verbally with patient  Pre-procedure details:     Patient status:  Awake    Pretreatment medications:  Propofol    Paralytics:  Succinylcholine  Indications:     Indications for intubation: respiratory distress    Procedure details:     Preoxygenation:  Bag valve mask    Intubation method:  Oral    Oral intubation technique: Dye 3  Laryngoscope size: Dye 3  Tube size (mm):  7 5    Tube type:  Cuffed    Number of attempts:  1    Tube visualized through cords: yes    Placement assessment:     ETT to lip:  24    Tube secured with:  ETT chris    Breath sounds:  Equal    Placement verification: chest rise, CXR verification, equal breath sounds and ETCO2 detector      CXR findings:  ETT in proper place  Post-procedure details:     Patient tolerance of procedure:   Tolerated well, no immediate complications normal... well appearing and in no apparent distress.

## 2022-12-27 ENCOUNTER — TRANSITIONAL CARE MANAGEMENT (OUTPATIENT)
Dept: FAMILY MEDICINE CLINIC | Facility: CLINIC | Age: 77
End: 2022-12-27

## 2022-12-27 ENCOUNTER — PATIENT OUTREACH (OUTPATIENT)
Dept: FAMILY MEDICINE CLINIC | Facility: CLINIC | Age: 77
End: 2022-12-27

## 2022-12-27 DIAGNOSIS — Z71.89 COMPLEX CARE COORDINATION: Primary | ICD-10-CM

## 2022-12-27 PROBLEM — R06.02 SOB (SHORTNESS OF BREATH): Status: RESOLVED | Noted: 2022-01-01 | Resolved: 2022-01-01

## 2022-12-27 PROBLEM — N18.6 ESRD ON DIALYSIS (HCC): Chronic | Status: ACTIVE | Noted: 2022-08-02

## 2022-12-27 PROBLEM — I27.20 PULMONARY HYPERTENSION (HCC): Chronic | Status: ACTIVE | Noted: 2022-01-01

## 2022-12-27 PROBLEM — Z99.2 ESRD ON DIALYSIS (HCC): Chronic | Status: ACTIVE | Noted: 2022-01-01

## 2022-12-27 PROBLEM — I77.82 ANCA-ASSOCIATED VASCULITIS: Chronic | Status: ACTIVE | Noted: 2022-01-01

## 2022-12-27 PROBLEM — K92.1 MELENA: Status: RESOLVED | Noted: 2022-01-01 | Resolved: 2022-01-01

## 2022-12-27 PROBLEM — J94.8 HYDROPNEUMOTHORAX: Status: ACTIVE | Noted: 2022-12-27

## 2022-12-27 PROBLEM — R04.2 HEMOPTYSIS: Status: RESOLVED | Noted: 2022-01-01 | Resolved: 2022-01-01

## 2022-12-27 PROBLEM — E87.1 HYPONATREMIA: Status: RESOLVED | Noted: 2022-01-01 | Resolved: 2022-01-01

## 2022-12-27 PROBLEM — R07.9 CHEST PAIN: Status: RESOLVED | Noted: 2022-01-01 | Resolved: 2022-01-01

## 2022-12-27 PROBLEM — J40 BRONCHITIS: Status: RESOLVED | Noted: 2022-11-24 | Resolved: 2022-12-27

## 2022-12-27 LAB
BACTERIA BLD CULT: NORMAL
C-ANCA TITR SER IF: ABNORMAL TITER
MYELOPEROXIDASE AB SER IA-ACNC: <0.2 UNITS (ref 0–0.9)
P-ANCA ATYPICAL TITR SER IF: ABNORMAL TITER
P-ANCA TITR SER IF: ABNORMAL TITER
PROTEINASE3 AB SER IA-ACNC: >8 UNITS (ref 0–0.9)

## 2022-12-27 NOTE — RESPIRATORY THERAPY NOTE
Pt recd on a Ruiz C3 vent setting as per flow sheet all alarms on and function vent working properly vent check   Vent check pt stable  Pt restrain are secure

## 2022-12-27 NOTE — PROGRESS NOTES
[address] 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  -12/26-completed 2-hour dialysis treatment did not tolerate ultrafiltration completed bronchoscopy with  Moderate amount of bloody secretions from all lobes      Plan:  - HD today  Will need shorter treatment due to logistics  Will attempt UF but unclear if pt will tolerate  - treatment for hemoptysis in progress as below  - concern for vasculitis pulmonary related  Rheum has been consulted per review of primary team notes     2-shortness of breath     - Status postthoracentesis during prior hospitalization on December 23  - Initially hypoxic and requiring facemask  - Missed dialysis on 12/24 due to transportation issue  - concern for infectious etiology versus noninfectious etiology  - Pulmonary team also on board along with ICU team  - Concern for hydropneumothorax on chest x-ray and therefore patient is undergoing CT scan  - On broad-spectrum antibiotics  - Started on pulse dose steroids due to concern for vasculitis also with hemoptysis  - Bronchoscopy completed with signs of alveolar hemorrhage  - CT scan mod hydropneumothorax, new compared to prior   Inc in b/l pulm infiltrates     3 hyponatremia volume mediated along with hyperglycemia-     - Monitor with dialysis     4-anemia-on Mircera as outpatient     - restart epogen with foll HD  - hemoglobin to 5 7 during prior hospitalization  - Stool occult positive prior  - Received multiple blood transfusions prior admission     5 - vol overload     - missed HD after discharge  - history of bilateral pl effusions  - albumin 1 6 leading to anasarca  - required thoracentesis prior     6) covid19     - per primary team     7 - MBD     - on phos binder       SUBJECTIVE / 24H INTERVAL HISTORY:  Intubated  Opens eyes       OBJECTIVE:  Current Weight: Weight - Scale: 78 1 kg (172 lb 2 9 oz)  Vitals:    12/27/22 0900 12/27/22 0906 12/27/22 1000 12/27/22 1123   BP: 108/56  106/53    Pulse: 89  70    Resp: (!) 34  18    Temp:       TempSrc:       SpO2: (!) 89% 94% 97% 95%   Weight:       Height:           Intake/Output Summary (Last 24 hours) at 12/27/2022 1200  Last data filed at 12/27/2022 0601  Gross per 24 hour   Intake 1015 68 ml   Output 250 ml   Net 765 68 ml     General: NAD  Skin: no rash  Eyes: anicteric sclera  ENT: ET tube in place  Neck: supple  Chest: coarse breath sounds b/l  CVS: s1s2, no murmur, no gallop, no rub  Abdomen: soft, nontender, nl sounds  Extremities: trace edema  : no ruvalcaba  Neuro: cannot assess  Psych: cannot assess  Medications:    Current Facility-Administered Medications:   •  calcium acetate (PHOSLO) capsule 667 mg, 667 mg, Oral, TID With Meals, [employer], SEYMOURNP, 667 mg at 12/27/22 1119  •  calcium carbonate-vitamin D 500 mg-5 mcg tablet 2 tablet, 2 tablet, Oral, BID With Meals, [employer], CRNP, 2 tablet at 12/27/22 5667  •  cefepime (MAXIPIME) IVPB (premix in dextrose) 1,000 mg 50 mL, 1,000 mg, Intravenous, W41A, Paul tSrong PA-C, Stopped at 12/26/22 1605  •  chlorhexidine (PERIDEX) 0 12 % oral rinse 15 mL, 15 mL, Mouth/Throat, Q15P [address], Corin Candy Quan PA-C, 15 mL at 12/27/22 0802  •  dexmedeTOMIDine (Precedex) 400 mcg in sodium chloride 0 9 % 100 mL infusion, 0 1-1 2 mcg/kg/hr, Intravenous, Titrated, Joie Spironello V, CRNP, Last Rate: 11 8 mL/hr at 12/27/22 0830, 0 6 mcg/kg/hr at 12/27/22 0830  •  fentanyl citrate (PF) 100 MCG/2ML 50 mcg, 50 mcg, Intravenous, Q2L PRN, Pascual Thomson PA-C, 50 mcg at 12/26/22 2136  •  insulin glargine (LANTUS) subcutaneous injection 15 Units 0 15 mL, 15 Units, Subcutaneous, QAM, [employer], FERNANDO, 13 Units at 12/27/22 2960  •  insulin lispro (HumaLOG) 100 units/mL subcutaneous injection 1-6 Units, 1-6 Units, Subcutaneous, Q6H, 1 Units at 12/27/22 0658 **AND** Fingerstick Glucose (POCT), , , C7L, Pascual Thomson PA-C  •  levalbuterol [facility]) inhalation solution 0 63 mg, 0 63 mg, Nebulization, Q6H, FERNANDO Valles, 0 63 mg at 12/27/22 0848  •  lidocaine (LIDODERM) 5 % patch 1 patch, 1 patch, Topical, Daily, Pascual Thomson PA-C, 1 patch at 12/27/22 0175  •  methylPREDNISolone sodium succinate (Solu-MEDROL) 500 mg in sodium chloride 0 9 % 250 mL IVPB, 500 mg, Intravenous, Daily, FERNANDO Valles, Stopped at 12/27/22 0040  •  omeprazole (PRILOSEC) suspension 2 mg/mL, 20 mg, Per NG Tube, Daily, FERNANDO Valles, 20 mg at 12/27/22 0945  •  ondansetron (ZOFRAN) injection 4 mg, 4 mg, Intravenous, H2G PRN, Ned Quan PA-C  •  pravastatin (PRAVACHOL) tablet 80 mg, 80 mg, Oral, Daily With FERNANDO Miranda, 80 mg at 12/26/22 1704  •  propofol (DIPRIVAN) 1000 mg in 100 mL infusion (premix), 5-50 mcg/kg/min, Intravenous, Titrated, [place] [employer] Quan PA-C, Last Rate: 7 2 mL/hr at 12/27/22 0739, 15 mcg/kg/min at 12/27/22 0739  •  sodium chloride 3 % inhalation solution 4 mL, 4 mL, Nebulization, Q6H, FERNANDO Valles, 4 mL at 12/27/22 0850  •  vancomycin (VANCOCIN) IVPB (premix in dextrose) 750 mg 150 mL, 10 mg/kg, Intravenous, Once per day on Tue Thu Pascual Lopez [place], [place]    Laboratory Results:  Results from last 7 days   Lab Units 12/27/22  0645 12/26/22  1142 12/26/22  0616 12/25/22  1146 12/23/22  0511 12/22/22  0927 12/21/22  2024 12/21/22  0523 12/21/22  0444 12/20/22  1929 12/20/22  1217   WBC Thousand/uL 8 19  --  8 50 12 12* 6 68 8 38  --  6 57  --   --  7 38   HEMOGLOBIN g/dL 7 1*  --  7 7* 9 3* 8 7* 8 5* 8 5* 6 0*  --    < > 5 7*   HEMATOCRIT % 23 5*  --  24 7* 28 9* 27 5* 26 8* 26 8* 19 9*  --    < > 19 2*   PLATELETS Thousands/uL 297  --  275 406* 291 317  --  298  --   --  330   POTASSIUM mmol/L 5 4* 4 4 5 8* 5 1 4 5 4 5  --   --  4 6  --  3 9   CHLORIDE mmol/L 95* 95* 93* 85* 93* 97  --   --  98  --  98   CO2 mmol/L 24 26 26 27 27 30  --   --  31  --  32   BUN mg/dL 71* 53* 86* 107* 46* 47*  --   --  34*  --  37*   CREATININE mg/dL 4 77* 3 58* 5 55* 6 50* 4 14* 4 30*  --   --  3 65*  --  3 90*   CALCIUM mg/dL 7 6* 7 6* 7 7* 8 1* 7 7* 7 4*  --   --  7 5*  --  7 9*   MAGNESIUM mg/dL 2 1  --  2 2 2 2  --   --   --   --   --   --  1 9   PHOSPHORUS mg/dL 6 6*  --  5 9*  --   --   --   --   --   --   --   --     < > = values in this interval not displayed

## 2022-12-27 NOTE — BRIEF OP NOTE (RAD/CATH)
INTERVENTIONAL RADIOLOGY PROCEDURE NOTE    Date: 12/27/2022    Procedure:   Procedure Summary     Date:  Room / Location:     Anesthesia Start:  Anesthesia Stop:     Procedure:  Diagnosis:     Scheduled Providers:  Responsible Provider:     Anesthesia Type: Not recorded ASA Status: Not recorded          Preoperative diagnosis:   1  Respiratory distress    2  Hypoxia    3  ESRD on dialysis ([facility] )    4  CHF (congestive heart failure) ([facility] )    5  Hyponatremia    6  Pneumonia    7  Acute respiratory failure with hypoxia (HCC)    8  Diffuse pulmonary alveolar hemorrhage    9  ANCA-associated vasculitis    10  Hydropneumothorax    11  SOB (shortness of breath)         Postoperative diagnosis: Same  Surgeon: Sabiha Parkinson MD     Assistant: None  No qualified resident was available  Blood loss: None    Specimens: None     Findings: left 10 F chest tube placed into left hydropneumothorax  Complications: None immediate      Anesthesia: local

## 2022-12-27 NOTE — TREATMENT PLAN
Renal short note    As day has progressed, plans per rheumatology   - to dose ritux and consider plex    Spoke with rhuem earlier - no need to time ritux around HD  Can be pre or post      Reviewed with our team also, ideally would dose ritux after HD but theoretically no issue to dose before    But given timing of day, pt needs ritux given active pulm issues, we will move HD to tomorrow (unless emergently needs tonight) to allow ritux to be given now  ICU team has requested to use [facility]  Given urgent nature of this treatment, ok to use [facility] as our HD nurse will access and CRRT nurse on floor can deaccess catheter tonight  This is given urgent nature of need of this medication now per review with primary team    Check BMP in evening 8 pm  If K still rising will need HD tonight vs med management  ICU team will call if K above 5 8

## 2022-12-27 NOTE — ASSESSMENT & PLAN NOTE
· Concern for diffuse alveolar hemorrhage secondary to his underlying vasculitis given hemoptysis and worsening infiltrates  · Continue pulse dose steroids  · Hold anticoagulation  · Discussed with rheumatology today regarding rituximab or other agents for vasculitis in addition to the steroids

## 2022-12-27 NOTE — SEDATION DOCUMENTATION
Chest tube placed in the right chest wall by Dr Patrick Rose & tegaderm to site  Vital signs stable  Patient transferred back to ICU

## 2022-12-27 NOTE — PROGRESS NOTES
[facility]  Progress Note - Kishan Escamilla 1945, 76 y o  male MRN: [id number]  Unit/Bed#: ICU 11 Encounter: [id number]  Primary Care Provider: Norvel Curling, MD   Date and time admitted to hospital: 12/25/2022 11:20 AM    * Acute respiratory failure with hypoxia [facility])  Assessment & Plan  · Patient presented to the emergency department on 12/25 with worsening shortness of breath and hypoxia after missing his dialysis treatment yesterday due to lack of transportation  · Initially in the emergency room, the patient was placed on BiPAP but he was weaned to mid flow  · Overnight 12/25, became more hypoxic and ultimately required intubation  · Began having hemoptysis and significant blood suctioned post intubation   Concern for alveolar hemorrhage secondary to vasculitis  · Continue pulse dose steroids 500mg daily    Diffuse pulmonary alveolar hemorrhage  Assessment & Plan  · Concern for diffuse alveolar hemorrhage secondary to his underlying vasculitis given hemoptysis and worsening infiltrates  · Continue pulse dose steroids  · Hold anticoagulation  · Discussed with rheumatology today regarding rituximab or other agents for vasculitis in addition to the steroids    SIRS (systemic inflammatory response syndrome) ([facility] [address] )  Assessment & Plan  · Patient presented to the emergency room on 12/25 with SIRS criteria -increased respiratory rate and hypoxia  · With decompensation on 12/25, antibiotics were started with cefepime/vanco given worsening hypoxia however low suspicion for infection  · Sputum culture ordered    ESRD on dialysis [facility])  Assessment & Plan  Lab Results   Component Value Date    EGFR 15 12/26/2022    EGFR 8 12/26/2022    EGFR 7 12/25/2022    CREATININE 3 58 (H) 12/26/2022    CREATININE 5 55 (H) 12/26/2022    CREATININE 6 50 (H) 12/25/2022       · Patient scheduled dialysis Tuesday/Thursday/Saturday however he was unable to make his treatment on Saturday 12/24 due to the transportation bus not picking him up  · Patient started with worsening shortness of breath on 12/25 presented to the emergency room  · Nephrology consulted and HD session done on 12/25 for 3 5L  · Continue PhosLo as ordered    Type 2 diabetes mellitus with chronic kidney disease on chronic dialysis, with long-term current use of insulin [facility])  Assessment & Plan  Lab Results   Component Value Date    HGBA1C 5 7 (H) 10/19/2022       Recent Labs     12/25/22  2115 12/26/22  0618 12/26/22  1730 12/26/22  2356   POCGLU 168* 143* 167* 176*       Blood Sugar Average: Last 72 hrs:  (P) 199 6   · Hold home dose Avodart  · Continue sliding scale coverage    Hydropneumothorax  Assessment & Plan  · Patient had a thoracentesis during recent admission  · Chest x-ray with hydropneumothorax thought to likely be related to this, possibly ex vacuo  · IR consulted, per note, possible chest tube placement although timing to be determined    Benign essential hypertension  Assessment & Plan  · Continue to hold home dose Norvasc and Catapres    Hyperlipidemia  Assessment & Plan  · Continue home dose Zocor    Anemia due to chronic kidney disease, on chronic dialysis [facility])  Assessment & Plan  Lab Results   Component Value Date    EGFR 15 12/26/2022    EGFR 8 12/26/2022    EGFR 7 12/25/2022    CREATININE 3 58 (H) 12/26/2022    CREATININE 5 55 (H) 12/26/2022    CREATININE 6 50 (H) 12/25/2022     · Patient had a recent admission from 12/20-12/23 with hemoglobin 5 7 -received 1 unit packed red blood cells at that time  · Most likely acute on chronic anemia    Urinary retention  Assessment & Plan  · Bladder scan every shift      ----------------------------------------------------------------------------------------  HPI/24hr events: No acute events overnight    Patient appropriate for transfer out of the ICU today?: No  Disposition: Continue Critical Care   Code Status: Level 1 - Full Code  ---------------------------------------------------------------------------------------  SUBJECTIVE  Intubated and sedated    Review of Systems  Review of systems was unable to be performed secondary to Intubated  ---------------------------------------------------------------------------------------  OBJECTIVE    Vitals   Vitals:    22 0200 22 0233 22 0315 22 0400   BP: (!) 88/46   101/52   Pulse: (!) 48   61   Resp: 22   20   Temp:    (!) 97 3 °F (36 3 °C)   TempSrc:    Temporal   SpO2: 99% 96% 96% 96%   Weight:       Height:         Temp (24hrs), Av 8 °F (36 6 °C), Min:96 3 °F (35 7 °C), Max:98 4 °F (36 9 °C)  Current: Temperature: (!) 97 3 °F (36 3 °C)          Respiratory:  SpO2: SpO2: 96 %, SpO2 Activity: SpO2 Activity: At Rest  Nasal Cannula O2 Flow Rate (L/min): 80 L/min    Invasive/non-invasive ventilation settings   Respiratory    Lab Data (Last 4 hours)    None         O2/Vent Data (Last 4 hours)    None                Physical Exam  Vitals and nursing note reviewed  Constitutional:       Appearance: Normal appearance  He is not ill-appearing  HENT:      Head: Normocephalic and atraumatic  Mouth/Throat:      Mouth: Mucous membranes are moist    Eyes:      Pupils: Pupils are equal, round, and reactive to light  Cardiovascular:      Rate and Rhythm: Normal rate and regular rhythm  Pulses: Normal pulses  Heart sounds: No murmur heard  Pulmonary:      Effort: Pulmonary effort is normal       Comments: Bright red blood with suctioning  Abdominal:      General: Abdomen is flat  Musculoskeletal:         General: No swelling  Skin:     General: Skin is warm  Neurological:      General: No focal deficit present  Mental Status: He is alert               Laboratory and Diagnostics:  Results from last 7 days   Lab Units 22  0616 22  1146 22  0511 22  0927 22  2024 22  0523 22  1929 22  1217   WBC Thousand/uL 8 50 12 12* 6 68 8 38  --  6 57  --  7 38   HEMOGLOBIN g/dL 7 7* 9 3* 8 7* 8 5* 8 5* 6 0* 7 4* 5 7*   HEMATOCRIT % 24 7* 28 9* 27 5* 26 8* 26 8* 19 9* 23 9* 19 2*   PLATELETS Thousands/uL 275 406* 291 317  --  298  --  330   NEUTROS PCT % 95* 90* 93* 84*  --  79*  --   --    MONOS PCT % 2* 6 3* 8  --  8  --   --      Results from last 7 days   Lab Units 12/26/22  1142 12/26/22  0616 12/25/22  1146 12/23/22  0511 12/22/22  0927 12/21/22  0444 12/20/22  1217   SODIUM mmol/L 130* 129* 124* 131* 136 138 138   POTASSIUM mmol/L 4 4 5 8* 5 1 4 5 4 5 4 6 3 9   CHLORIDE mmol/L 95* 93* 85* 93* 97 98 98   CO2 mmol/L 26 26 27 27 30 31 32   ANION GAP mmol/L 9 10 12 11 9 9 8   BUN mg/dL 53* 86* 107* 46* 47* 34* 37*   CREATININE mg/dL 3 58* 5 55* 6 50* 4 14* 4 30* 3 65* 3 90*   CALCIUM mg/dL 7 6* 7 7* 8 1* 7 7* 7 4* 7 5* 7 9*   GLUCOSE RANDOM mg/dL 155* 146* 365* 283* 178* 161* 140   ALT U/L  --   --  14  --   --  9* 11*   AST U/L  --   --  10  --   --  18 13   ALK PHOS U/L  --   --  112  --   --  100 76   ALBUMIN g/dL  --   --  1 9*  --   --  1 6* 1 6*   TOTAL BILIRUBIN mg/dL  --   --  0 37  --   --  0 45 0 36     Results from last 7 days   Lab Units 12/26/22  0616 12/25/22  1146 12/20/22  1217   MAGNESIUM mg/dL 2 2 2 2 1 9   PHOSPHORUS mg/dL 5 9*  --   --       Results from last 7 days   Lab Units 12/26/22  1141 12/26/22  0045 12/20/22  1217   INR   --  1 35* 1 20*   PTT seconds 32  --  44*          Results from last 7 days   Lab Units 12/25/22  1146 12/21/22  2257   LACTIC ACID mmol/L 2 2* 1 6     ABG:  Results from last 7 days   Lab Units 12/26/22  0052   PH ART  7 352   PCO2 ART mm Hg 48 4*   PO2 ART mm Hg 123 0   HCO3 ART mmol/L 26 2   BASE EXC ART mmol/L 0 5   ABG SOURCE  Radial, Right     VBG:  Results from last 7 days   Lab Units 12/26/22  0052   ABG SOURCE  Radial, Right     Results from last 7 days   Lab Units 12/26/22  0616 12/25/22  1146   PROCALCITONIN ng/ml 2 77* 1 79*       Micro  Results from last 7 days   Lab Units 12/26/22  0045 12/25/22  1146 12/22/22  1344 12/22/22  0946 12/21/22  2259 12/21/22  1440   BLOOD CULTURE   --  No Growth at 24 hrs  No Growth at 24 hrs   --   --  No Growth After 4 Days  --    GRAM STAIN RESULT  3+ Polys  No bacteria seen  --  No Polys or Bacteria seen  --   --  1+ Polys  No bacteria seen   BODY FLUID CULTURE, STERILE   --   --  No growth  --   --  No growth   MRSA CULTURE ONLY   --   --   --  No Methicillin Resistant Staphlyococcus aureus (MRSA) isolated  --   --        EKG: Normal sinus rhythm on telemetry  Imaging: I have personally reviewed pertinent reports  Intake and Output  I/O       12/25 0701 12/26 0700 12/26 0701 12/27 0700    I V  (mL/kg) 627 4 (8) 781 9 (10)    NG/GT  90    IV Piggyback  200    Total Intake(mL/kg) 627 4 (8) 1071 9 (13 7)    Emesis/NG output 100 150    Other 4000 600    Total Output 4100 750    Net -3472 6 +321 9                Height and Weights   Height: 5' 8" (172 7 cm)  IBW (Ideal Body Weight): 68 4 kg  Body mass index is 26 31 kg/m²  Weight (last 2 days)     Date/Time Weight    12/26/22 0600 78 5 (173 06)    12/25/22 1424 80 5 (177 47)    12/25/22 1204 82 3 (181 44)    12/25/22 1126 83 9 (185)            Nutrition       Diet Orders   (From admission, onward)             Start     Ordered    12/26/22 0815  Room Service  Once        Question:  Type of Service  Answer:  Room Service - Appropriate with Assistance    12/26/22 0814    12/26/22 0540  Diet NPO  Diet effective now        References:    Nutrtion Support Algorithm Enteral vs  Parenteral   Question Answer Comment   Diet Type NPO    RD to adjust diet per protocol?  Yes        12/26/22 0540                  Active Medications  Scheduled Meds:  Current Facility-Administered Medications   Medication Dose Route Frequency Provider Last Rate   • calcium acetate  667 mg Oral TID With Meals FERNANDO Ta     • calcium carbonate-vitamin D  2 tablet Oral BID With Meals R [address] FERNANDO Roa     • cefepime  1,000 mg Intravenous V54L [place], [place] Stopped (12/26/22 1605)   • chlorhexidine  15 mL Mouth/Throat K25L [address] Trevor Patel PA-C     • dexmedetomidine  0 1-1 2 mcg/kg/hr Intravenous Titrated FERNANDO Valles 0 6 mcg/kg/hr (12/26/22 2340)   • fentanyl citrate (PF)  50 mcg Intravenous G3N PRN Trevor Patel PA-C     • insulin glargine  15 [facility]     • insulin lispro  1-6 Units Subcutaneous X0D Balaji Quan PA-C     • levalbuterol  0 63 mg Nebulization Q6H FERNANDO Valles     • lidocaine  1 patch Topical Daily Balaji Quan PA-C     • methylPREDNISolone sodium succinate  500 mg Intravenous Daily FERNANDO Valles 500 mg (12/26/22 2340)   • omeprazole (PRILOSEC) suspension 2 mg/mL  20 mg Per NG Tube Daily FERNANDO Valles     • ondansetron  4 mg Intravenous G2B PRN Trevor Patel PA-C     • pravastatin  80 mg Oral Daily With [employer]     • propofol  5-50 mcg/kg/min Intravenous Titrated Balaji Quan PA-C 20 mcg/kg/min (12/26/22 2122)   • sodium chloride  4 mL Nebulization Q6H FERNANDO Valles     • vancomycin  10 mg/kg Intravenous Once per day on Tue Thu Sat Trevor Patel PA-C       Continuous Infusions:  dexmedetomidine, 0 1-1 2 mcg/kg/hr, Last Rate: 0 6 mcg/kg/hr (12/26/22 2340)  propofol, 5-50 mcg/kg/min, Last Rate: 20 mcg/kg/min (12/26/22 2122)      PRN Meds:   fentanyl citrate (PF), 50 mcg, Q1H PRN  ondansetron, 4 mg, Q6H PRN        Invasive Devices Review  Invasive Devices     Peripheral Intravenous Line  Duration           Long-Dwell Peripheral IV (Midline) 12/26/22 Left Brachial 1 day    Peripheral IV 12/25/22 Left;Proximal;Ventral (anterior) Forearm 1 day          Hemodialysis Catheter  Duration           HD Permanent Double Catheter 293 days          Drain  Duration           NG/OG/Enteral Tube Orogastric 16 Fr Center mouth 1 day          Airway  Duration           ETT  Oral 7 5 mm 1 day                Rationale for remaining devices:   ---------------------------------------------------------------------------------------  Advance Directive and Living Will:      Power of :    POLST:    ---------------------------------------------------------------------------------------  Care Time Delivered:   Upon my evaluation, this patient had a high probability of imminent or life-threatening deterioration due to Hypoxic respiratory failure, diffuse alveolar hemorrhage, which required my direct attention, intervention, and personal management  I have personally provided 36 minutes ([phone]) of critical care time, exclusive of procedures, teaching, family meetings, and any prior time recorded by providers other than myself  Alisa Chaudhry PA-C      Portions of the record may have been created with voice recognition software  Occasional wrong word or "sound a like" substitutions may have occurred due to the inherent limitations of voice recognition software    Read the chart carefully and recognize, using context, where substitutions have occurred

## 2022-12-27 NOTE — TELEMEDICINE
e-Consult (IPC)  - Interventional Radiology  Gino Dvais 76 y o  male MRN: [id number]  Unit/Bed#: ICU 11 Encounter: [id number]          Interventional Radiology has been consulted to evaluate Gino Davis    We were consulted by ICU concerning this patient with hydropneumothorax  IR Consult-(For Para, Thora, LP, PICC(for GFR>/=45) use the specific ordersets  Consult performed by: Beverley Calderon MD  Consult ordered by: FERNANDO Persaud        12/26/22    Assessment/Recommendation:   Critically ill patient with diffuse bilateral pulmonary opacities  Had right thoracentesis few days ago and subsequent imaging demonstrated small right hydropneumothorax  IR consulted  This is possibly ex vacuo  I have placed an order for chest tube placement  Timing is TBD  This will be reevaluated by the IR physician on site tomorrow  5-10 minutes, >50% of the total time devoted to medical consultative verbal/EMR discussion between providers  Written report will be generated in the EMR  Thank you for allowing Interventional Radiology to participate in the care of Gino Davis  Please don't hesitate to call or TigerText us with any questions       Beverley Calderon MD

## 2022-12-27 NOTE — ASSESSMENT & PLAN NOTE
· Patient had a thoracentesis during recent admission  · Chest x-ray with hydropneumothorax thought to likely be related to this, possibly ex vacuo  · IR consulted, per note, possible chest tube placement although timing to be determined

## 2022-12-27 NOTE — ASSESSMENT & PLAN NOTE
Lab Results   Component Value Date    HGBA1C 5 7 (H) 10/19/2022       Recent Labs     12/25/22  2115 12/26/22  0618 12/26/22  1730 12/26/22  2356   POCGLU 168* 143* 167* 176*       Blood Sugar Average: Last 72 hrs:  (P) 199 6   · Hold home dose Avodart  · Continue sliding scale coverage

## 2022-12-27 NOTE — RESPIRATORY THERAPY NOTE
Patient transported back to ICU post procedure  HR 60 RR 22 SPO2 98%  Patient tolerated transport well  BVM with PEEP valve at bedside

## 2022-12-27 NOTE — PROGRESS NOTES
Rebecca Garcia is a 76 y o  male who is currently ordered Vancomycin IV with management by the Pharmacy Consult service  Relevant clinical data and objective / subjective history reviewed  Vancomycin Assessment:  Indication and Goal AUC/Trough: Pneumonia (goal -600, trough >10)  Clinical Status: stable  Micro:   Pending  Renal Function:  SCr: 3 58 mg/dL  CrCl: 16 1 mL/min  Renal replacement: HD  Days of Therapy: 3  Vancomycin Plan:  New Dosing: vancomycin 750mg (10mg/kg) after dialysis on HD days  Next Level: 12/28/22 at 0600  Renal Function Monitoring: Daily BMP and UOP  Pharmacy will continue to follow closely for s/sx of nephrotoxicity, infusion reactions and appropriateness of therapy  BMP and CBC will be ordered per protocol  We will continue to follow the patient’s culture results and clinical progress daily      Xochitl Robertson, Pharmacist

## 2022-12-27 NOTE — ASSESSMENT & PLAN NOTE
· Patient presented to the emergency room on 12/25 with SIRS criteria -increased respiratory rate and hypoxia  · With decompensation on 12/25, antibiotics were started with cefepime/vanco given worsening hypoxia however low suspicion for infection  · Sputum culture ordered

## 2022-12-27 NOTE — PLAN OF CARE
Problem: MOBILITY - ADULT  Goal: Maintain or return to baseline ADL function  Description: INTERVENTIONS:  -  Assess patient's ability to carry out ADLs; assess patient's baseline for ADL function and identify physical deficits which impact ability to perform ADLs (bathing, care of mouth/teeth, toileting, grooming, dressing, etc )  - Assess/evaluate cause of self-care deficits   - Assess range of motion  - Assess patient's mobility; develop plan if impaired  - Assess patient's need for assistive devices and provide as appropriate  - Encourage maximum independence but intervene and supervise when necessary  - Involve family in performance of ADLs  - Assess for home care needs following discharge   - Consider OT consult to assist with ADL evaluation and planning for discharge  - Provide patient education as appropriate  Outcome: Progressing  Goal: Maintains/Returns to pre admission functional level  Description: INTERVENTIONS:  - Perform BMAT or MOVE assessment daily    - Set and communicate daily mobility goal to care team and patient/family/caregiver  - Collaborate with rehabilitation services on mobility goals if consulted  - Perform Range of Motion 4 times a day  - Reposition patient every 2 hours    - Dangle patient 0 times a day  - Stand patient 0 times a day  - Ambulate patient 0 times a day  - Out of bed to chair 0 times a day   - Out of bed for meals 0 times a day  - Out of bed for toileting  - Record patient progress and toleration of activity level   Outcome: Progressing

## 2022-12-27 NOTE — RESPIRATORY THERAPY NOTE
Pt was transported a [facility] C3 vent to CT Scan for a procedure  HR 68 RR 24 SPO2 97%  Patient tolerated transport without distress  BVM with Peep valve attached at bedside

## 2022-12-27 NOTE — ASSESSMENT & PLAN NOTE
Lab Results   Component Value Date    EGFR 15 12/26/2022    EGFR 8 12/26/2022    EGFR 7 12/25/2022    CREATININE 3 58 (H) 12/26/2022    CREATININE 5 55 (H) 12/26/2022    CREATININE 6 50 (H) 12/25/2022     · Patient had a recent admission from 12/20-12/23 with hemoglobin 5 7 -received 1 unit packed red blood cells at that time  · Most likely acute on chronic anemia

## 2022-12-27 NOTE — ASSESSMENT & PLAN NOTE
· Patient presented to the emergency department on 12/25 with worsening shortness of breath and hypoxia after missing his dialysis treatment yesterday due to lack of transportation  · Initially in the emergency room, the patient was placed on BiPAP but he was weaned to mid flow  · Overnight 12/25, became more hypoxic and ultimately required intubation  · Began having hemoptysis and significant blood suctioned post intubation   Concern for alveolar hemorrhage secondary to vasculitis  · Continue pulse dose steroids 500mg daily

## 2022-12-27 NOTE — PROGRESS NOTES
Progress Note - Pulmonary   Laura Faith 76 y o  male MRN: [id number]  Unit/Bed#: ICU 11 Encounter: [id number]    Assessment:  Acute hypoxemic respiratory failure due to diffuse pulmonary alveolar hemorrhage  Bronchoscopy done yesterday showed bloody secretions  Patient had recent admission and discharged and hemoglobin that time had decreased down to 5 7 and he was transfused 3 units of packed red blood cells  Did have EGD and colonoscopy in March of this year which were unremarkable  Hemoglobin today 7 1 and was 9 3 on 12;25  Vasculitis  CRP increased to 189 today  C-ANCA elevated at 1:160 and PR3 > 8 0  Right hydropneumothorax seen on CT scan 12/26  Patient did have right-sided thoracentesis done 12/22 with 1 L of right pleural fluid removed    Plan:  Concur with pulse dose steroids  I did discuss case with Dr Melissa Curiel  Patient will ventilator support and sedated  Consideration for IV Rituxan      Subjective:   Patient nonverbal as he is intubated and sedated    Objective:     Vitals: Blood pressure 103/53, pulse 62, temperature 97 8 °F (36 6 °C), temperature source Temporal, resp  rate 12, height 5' 8" (1 727 m), weight 78 1 kg (172 lb 2 9 oz), SpO2 98 %  ,Body mass index is 26 18 kg/m²  Intake/Output Summary (Last 24 hours) at 12/27/2022 1724  Last data filed at 12/27/2022 0601  Gross per 24 hour   Intake 775 68 ml   Output 250 ml   Net 525 68 ml       Physical Exam: Physical Exam  Vitals reviewed  Constitutional:       General: He is not in acute distress  Appearance: He is well-developed  Comments: Patient sedated with IV propofol and Precedex  O2 saturation 97% and is on ventilator support  Assist-control mode rate 14, total volume 400 oxygen 55% and PEEP of 10   HENT:      Head: Normocephalic  Right Ear: External ear normal       Left Ear: External ear normal       Nose: Nose normal       Mouth/Throat:      Pharynx: No oropharyngeal exudate     Eyes: Conjunctiva/sclera: Conjunctivae normal       Pupils: Pupils are equal, round, and reactive to light  Neck:      Vascular: No JVD  Trachea: No tracheal deviation  Cardiovascular:      Rate and Rhythm: Normal rate and regular rhythm  Heart sounds: Normal heart sounds  Pulmonary:      Effort: Pulmonary effort is normal       Comments: Lung sounds clear anteriorly  Breath sounds audible bilaterally  Does have right chest tube in place  No air leak  Abdominal:      General: There is no distension  Palpations: Abdomen is soft  Tenderness: There is no abdominal tenderness  There is no guarding  Musculoskeletal:      Cervical back: Neck supple  Comments: No edema   Lymphadenopathy:      Cervical: No cervical adenopathy  Skin:     General: Skin is warm and dry  Findings: No rash  Neurological:      Mental Status: He is alert  Comments: Sedated with IV propofol and Precedex   Psychiatric:         Behavior: Behavior normal          Thought Content: Thought content normal           Labs: I have personally reviewed pertinent lab results  , ABG:   Lab Results   Component Value Date    PHART 7 352 12/26/2022    ZPK0GWC 48 4 (H) 12/26/2022    PO2ART 123 0 12/26/2022    TAY5ASB 26 2 12/26/2022    BEART 0 5 12/26/2022    SOURCE Radial, Right 12/26/2022   , BNP: No results found for: BNP, CBC:   Lab Results   Component Value Date    WBC 8 19 12/27/2022    HGB 7 1 (L) 12/27/2022    HCT 23 5 (L) 12/27/2022    MCV 90 12/27/2022     12/27/2022    MCH 27 3 12/27/2022    MCHC 30 2 (L) 12/27/2022    RDW 14 7 12/27/2022    MPV 9 4 12/27/2022    NRBC 0 12/26/2022   , CMP:   Lab Results   Component Value Date     07/24/2017    K 5 4 (H) 12/27/2022    K 4 2 07/24/2017    CL 95 (L) 12/27/2022     07/24/2017    CO2 24 12/27/2022    CO2 22 07/24/2017    BUN 71 (H) 12/27/2022    BUN 16 07/24/2017    CREATININE 4 77 (H) 12/27/2022    CREATININE 1 02 07/24/2017    GLUCOSE 106 (H) 07/24/2017    CALCIUM 7 6 (L) 12/27/2022    CALCIUM 9 5 07/24/2017    AST 10 12/25/2022    AST 19 07/24/2017    ALT 14 12/25/2022    ALT 18 07/24/2017    ALKPHOS 112 12/25/2022    ALKPHOS 77 07/24/2017    PROT 7 2 07/24/2017    BILITOT 0 3 07/24/2017    EGFR 10 12/27/2022   , PT/INR:   Lab Results   Component Value Date    INR 1 33 (H) 12/27/2022   , Troponin: No results found for: TROPONIN    Imaging and other studies: I have personally reviewed pertinent reports     and I have personally reviewed pertinent films in PACS

## 2022-12-27 NOTE — ASSESSMENT & PLAN NOTE
Lab Results   Component Value Date    EGFR 15 12/26/2022    EGFR 8 12/26/2022    EGFR 7 12/25/2022    CREATININE 3 58 (H) 12/26/2022    CREATININE 5 55 (H) 12/26/2022    CREATININE 6 50 (H) 12/25/2022       · Patient scheduled dialysis Tuesday/Thursday/Saturday however he was unable to make his treatment on Saturday 12/24 due to the transportation bus not picking him up  · Patient started with worsening shortness of breath on 12/25 presented to the emergency room  · Nephrology consulted and HD session done on 12/25 for 3 5L  · Continue PhosLo as ordered

## 2022-12-28 NOTE — PROGRESS NOTES
[address]  Progress Note - Sabiha Middleton 1945, 76 y o  male MRN: [id number]  Unit/Bed#: ICU 11 Encounter: [id number]  Primary Care Provider: Orville Menchaca MD   Date and time admitted to hospital: 12/25/2022 11:20 AM    * Diffuse pulmonary alveolar hemorrhage  Assessment & Plan  · Concern for diffuse alveolar hemorrhage secondary to his underlying vasculitis given hemoptysis and worsening infiltrates  · Continue pulse dose steroids  · Hold anticoagulation  · Discussed with rheumatology today regarding rituximab or other agents for vasculitis in addition to the steroids    Acute respiratory failure with hypoxia [facility])  Assessment & Plan  · Patient presented to the emergency department on 12/25 with worsening shortness of breath and hypoxia after missing his dialysis treatment yesterday due to lack of transportation  · Initially in the emergency room, the patient was placed on BiPAP but he was weaned to mid flow  · Overnight 12/25, became more hypoxic and ultimately required intubation  · Began having hemoptysis and significant blood suctioned post intubation   Concern for alveolar hemorrhage secondary to vasculitis  · Continue pulse dose steroids 500mg daily    SIRS (systemic inflammatory response syndrome) ([facility] [address] )  Assessment & Plan  · Patient presented to the emergency room on 12/25 with SIRS criteria -increased respiratory rate and hypoxia  · With decompensation on 12/25, antibiotics were started with cefepime/vanco given worsening hypoxia however low suspicion for infection  · Sputum culture ordered    Hydropneumothorax  Assessment & Plan  · Patient had a thoracentesis during recent admission  · Chest x-ray with hydropneumothorax thought to likely be related to this, possibly ex vacuo  · IR consulted, per note, possible chest tube placement although timing to be determined    Urinary retention  Assessment & Plan  · Bladder scan every shift    ESRD on dialysis [facility])  Assessment & Plan  Lab Results   Component Value Date    EGFR 8 12/28/2022    EGFR 9 12/27/2022    EGFR 10 12/27/2022    CREATININE 5 65 (H) 12/28/2022    CREATININE 5 37 (H) 12/27/2022    CREATININE 4 77 (H) 12/27/2022       · Patient scheduled dialysis Tuesday/Thursday/Saturday however he was unable to make his treatment on Saturday 12/24 due to the transportation bus not picking him up  · Patient started with worsening shortness of breath on 12/25 presented to the emergency room  · Nephrology consulted and HD session done on 12/25 for 3 5L  · Continue PhosLo as ordered    Anemia due to chronic kidney disease, on chronic dialysis [facility])  Assessment & Plan  Lab Results   Component Value Date    EGFR 8 12/28/2022    EGFR 9 12/27/2022    EGFR 10 12/27/2022    CREATININE 5 65 (H) 12/28/2022    CREATININE 5 37 (H) 12/27/2022    CREATININE 4 77 (H) 12/27/2022     · Patient had a recent admission from 12/20-12/23 with hemoglobin 5 7 -received 1 unit packed red blood cells at that time  · Most likely acute on chronic anemia    Type 2 diabetes mellitus with chronic kidney disease on chronic dialysis, with long-term current use of insulin [facility])  Assessment & Plan  Lab Results   Component Value Date    HGBA1C 5 7 (H) 10/19/2022       Recent Labs     12/27/22  1115 12/27/22  1719 12/28/22  0022 12/28/22  0458   POCGLU 167* 207* 159* 225*       Blood Sugar Average: Last 72 hrs:  (P) 190 [id number]   · Hold home dose Avodart  · Continue sliding scale coverage    Hyperlipidemia  Assessment & Plan  · Continue home dose Zocor    Benign essential hypertension  Assessment & Plan  · Continue to hold home dose Norvasc and Catapres        ----------------------------------------------------------------------------------------  HPI/24hr events: No acute events overnight  Patient continues be hemodynamically stable on mechanical ventilation        Patient appropriate for transfer out of the ICU today?: No  Disposition: Continue Critical Care Code Status: Level 1 - Full Code  ---------------------------------------------------------------------------------------  SUBJECTIVE  intbuated     Review of Systems   Unable to perform ROS: Intubated     Review of systems was unable to be performed secondary to intubated   ---------------------------------------------------------------------------------------  OBJECTIVE    Vitals   Vitals:    22 0746 22 0755 22 0800 22 0910   BP:   (!) 91/47    Pulse:   (!) 45    Resp:   (!) 26    Temp:  (!) 97 1 °F (36 2 °C)     TempSrc:  Temporal     SpO2: 98%  99% 100%   Weight:       Height:         Temp (24hrs), Av 8 °F (36 6 °C), Min:97 1 °F (36 2 °C), Max:98 4 °F (36 9 °C)  Current: Temperature: (!) 97 1 °F (36 2 °C)          Respiratory:  SpO2: SpO2: 100 %  Nasal Cannula O2 Flow Rate (L/min): 80 L/min    Invasive/non-invasive ventilation settings   Respiratory    Lab Data (Last 4 hours)    None         O2/Vent Data (Last 4 hours)       0746  0910         Vent Mode  AC/VC      Resp Rate (BPM) (BPM)  14      Vt (mL) (mL)  400      FIO2 (%) (%)  50      PEEP (cmH2O) (cmH2O)  8      Patient safety screen outcome: Failed       MV  9 36                  Physical Exam  Vitals and nursing note reviewed  Constitutional:       Appearance: He is ill-appearing and toxic-appearing  HENT:      Head: Normocephalic and atraumatic  Right Ear: Tympanic membrane, ear canal and external ear normal       Left Ear: Tympanic membrane, ear canal and external ear normal       Nose: Nose normal       Mouth/Throat:      Mouth: Mucous membranes are dry  Pharynx: Oropharynx is clear  Eyes:      Extraocular Movements: Extraocular movements intact  Conjunctiva/sclera: Conjunctivae normal       Pupils: Pupils are equal, round, and reactive to light  Cardiovascular:      Rate and Rhythm: Normal rate and regular rhythm     Pulmonary:      Comments: ETT on mechanical ventilation   B/L breath sounds diminished   Abdominal:      General: Bowel sounds are normal       Palpations: Abdomen is soft  Genitourinary:     Comments: ruvalcaba  Musculoskeletal:         General: Normal range of motion  Cervical back: Normal range of motion and neck supple  Skin:     General: Skin is warm and dry  Capillary Refill: Capillary refill takes less than 2 seconds     Neurological:      Comments: Intubated    Psychiatric:      Comments: Intubated              Laboratory and Diagnostics:  Results from last 7 days   Lab Units 12/28/22  0500 12/27/22  0645 12/26/22  0616 12/25/22  1146 12/23/22  0511 12/22/22  0927 12/21/22  2024   WBC Thousand/uL 6 49 8 19 8 50 12 12* 6 68 8 38  --    HEMOGLOBIN g/dL 6 7* 7 1* 7 7* 9 3* 8 7* 8 5* 8 5*   HEMATOCRIT % 21 3* 23 5* 24 7* 28 9* 27 5* 26 8* 26 8*   PLATELETS Thousands/uL 269 297 275 406* 291 317  --    NEUTROS PCT %  --   --  95* 90* 93* 84*  --    BANDS PCT %  --  9*  --   --   --   --   --    MONOS PCT %  --   --  2* 6 3* 8  --    MONO PCT %  --  2*  --   --   --   --   --      Results from last 7 days   Lab Units 12/28/22  0500 12/27/22  2118 12/27/22  0645 12/26/22  1142 12/26/22  0616 12/25/22  1146 12/23/22  0511   SODIUM mmol/L 134* 132* 131* 130* 129* 124* 131*   POTASSIUM mmol/L 5 5* 5 3 5 4* 4 4 5 8* 5 1 4 5   CHLORIDE mmol/L 97 96 95* 95* 93* 85* 93*   CO2 mmol/L 22 25 24 26 26 27 27   ANION GAP mmol/L 15* 11 12 9 10 12 11   BUN mg/dL 89* 83* 71* 53* 86* 107* 46*   CREATININE mg/dL 5 65* 5 37* 4 77* 3 58* 5 55* 6 50* 4 14*   CALCIUM mg/dL 7 2* 7 3* 7 6* 7 6* 7 7* 8 1* 7 7*   GLUCOSE RANDOM mg/dL 193* 197* 163* 155* 146* 365* 283*   ALT U/L  --   --   --   --   --  14  --    AST U/L  --   --   --   --   --  10  --    ALK PHOS U/L  --   --   --   --   --  112  --    ALBUMIN g/dL  --   --   --   --   --  1 9*  --    TOTAL BILIRUBIN mg/dL  --   --   --   --   --  0 37  --      Results from last 7 days   Lab Units 12/28/22  9050 12/27/22  0645 12/26/22  3660 12/25/22  1146   MAGNESIUM mg/dL 2 4 2 1 2 2 2 2   PHOSPHORUS mg/dL  --  6 6* 5 9*  --       Results from last 7 days   Lab Units 12/27/22  1042 12/26/22  1141 12/26/22  0045   INR  1 33*  --  1 35*   PTT seconds  --  32  --           Results from last 7 days   Lab Units 12/25/22  1146 12/21/22  2257   LACTIC ACID mmol/L 2 2* 1 6     ABG:  Results from last 7 days   Lab Units 12/26/22  0052   PH ART  7 352   PCO2 ART mm Hg 48 4*   PO2 ART mm Hg 123 0   HCO3 ART mmol/L 26 2   BASE EXC ART mmol/L 0 5   ABG SOURCE  Radial, Right     VBG:  Results from last 7 days   Lab Units 12/26/22  0052   ABG SOURCE  Radial, Right     Results from last 7 days   Lab Units 12/27/22  0645 12/26/22  0616 12/25/22  1146   PROCALCITONIN ng/ml 2 78* 2 77* 1 79*       Micro  Results from last 7 days   Lab Units 12/26/22  1720 12/26/22  0045 12/25/22  1431 12/25/22  1146 12/22/22  1344 12/22/22  0946 12/21/22  2259 12/21/22  1440   BLOOD CULTURE   --   --   --  No Growth at 48 hrs  No Growth at 48 hrs   --   --  No Growth After 5 Days  --    SPUTUM CULTURE   --  Few Colonies of  --   --   --   --   --   --    GRAM STAIN RESULT  2+ Polys*  Rare Budding Yeast with Pseudomycelia* 3+ Polys  No bacteria seen  --   --  No Polys or Bacteria seen  --   --  1+ Polys  No bacteria seen   BODY FLUID CULTURE, STERILE   --   --   --   --  No growth  --   --  No growth   MRSA CULTURE ONLY   --   --  No Methicillin Resistant Staphlyococcus aureus (MRSA) isolated  --   --  No Methicillin Resistant Staphlyococcus aureus (MRSA) isolated  --   --        EKG: NSR alarms   Imaging: I have personally reviewed pertinent reports  XR chest portable    Result Date: 12/27/2022  Impression: No pneumothorax identified  Stable bilateral airspace disease and effusions compared to yesterday    Workstation performed: [id number]     XR chest 1 view portable    Result Date: 12/26/2022  Impression: Moderate right hydropneumothorax, likely ex vacuo after thoracentesis  Persistent extensive bilateral consolidation which could be due to edema or pneumonia superimposed on rounded atelectasis  Workstation performed: [id number]     XR chest portable ICU    Result Date: 12/27/2022  Impression: Right pigtail catheter with small effusions with no pneumothorax  Persistent severe bilateral consolidation  Workstation performed: [id number]     XR chest portable ICU    Result Date: 12/26/2022  Impression: Lines and tubes as above  Moderate right hydropneumothorax, possibly ex vacuo from recent thoracentesis, unchanged but better shown on previous CXR  Persistent severe bilateral consolidation which could be due to edema and/or pneumonia  I notified Terryl Carrel Thachil by secure text on 12/26/2022 at 10:32 AM   He responded immediately  Workstation performed: [id number]     XR chest portable ICU    Result Date: 12/26/2022  Impression: Moderate right hydropneumothorax, unchanged from study earlier today, possibly ex vacuo after recent thoracentesis  Persistent severe bilateral consolidation which could be due to edema and/or pneumonia  I notified Arnold Carrel Thachil by secure text on 12/26/2022 at 10:32 AM   He responded immediately  Workstation performed: [id number]     CTA chest pe study    Result Date: 12/27/2022  Impression: Moderate hydropneumothorax, new compared to the prior  Consider chest tube placement  Marked interval increase in bilateral pulmonary infiltrates as detailed above  Nonspecific pattern, given patient history, considerations include COVID related  With the history of hemoptysis, alveolar hemorrhage can also have this appearance as well  ARDS/fluid overload also remains within the differential as well as other infectious entities  I personally discussed this study with Xochitl Parkinson on 12/27/2022 at 9:16 AM  Workstation performed: [id number]     IR chest tube placement    Result Date: 12/27/2022  Impression: Impression: 1    Successful placement of a therapeutic 10 [facility] all-purpose drainage catheter into the right pleural space under CT guidance  Workstation performed: [id number]     Intake and Output  I/O       12/26 0701 12/27 0700 12/27 0701 12/28 0700 12/28 0701 12/29 0700    I V  (mL/kg) 1025 7 (13 1) 611 5 (7 7)     NG/GT 90      IV Piggyback 450 550     Total Intake(mL/kg) 1565 7 (20) 1161 5 (14 7)     Emesis/NG output 250 200     Other 600      Chest Tube  400     Total Output 850 600     Net +715 7 +561 5                  Height and Weights   Height: 5' 8" (172 7 cm)  IBW (Ideal Body Weight): 68 4 kg  Body mass index is 26 51 kg/m²  Weight (last 2 days)     Date/Time Weight    12/28/22 0400 79 1 (174 38)    12/27/22 0600 78 1 (172 18)    12/26/22 0600 78 5 (173 06)            Nutrition       Diet Orders   (From admission, onward)             Start     Ordered    12/26/22 0815  Room Service  Once        Question:  Type of Service  Answer:  Room Service - Appropriate with Assistance    12/26/22 0814    12/26/22 0540  Diet NPO  Diet effective now        References:    Nutrtion Support Algorithm Enteral vs  Parenteral   Question Answer Comment   Diet Type NPO    RD to adjust diet per protocol?  Yes        12/26/22 0540                  Active Medications  Scheduled Meds:  Current Facility-Administered Medications   Medication Dose Route Frequency Provider Last Rate   • calcium acetate  667 mg Oral TID With Meals Clayton Shape, CRNP     • calcium carbonate-vitamin D  2 tablet Oral BID With Meals Clayton Shape, CRNP     • cefepime  1,000 mg Intravenous H89G Matias Razo Stopped (12/27/22 1707)   • chlorhexidine  15 mL Mouth/Throat G37H [address] Leonora Fox PA-C     • dexmedetomidine  0 1-1 2 mcg/kg/hr Intravenous Titrated SEYMOUR VallesNP 0 4 mcg/kg/hr (12/28/22 0810)   • epoetin ani  4,000 Units Intravenous Once per day on Mon Wed Fri Sheeba Ken MD     • fentanyl citrate (PF)  50 mcg Intravenous Q1H PRN Dionte Childress Christoph Solares PA-C     • insulin glargine  15 Units Subcutaneous QAM FERNANDO Ayala     • insulin lispro  1-6 Units Subcutaneous Y6G Lupis [place], [place]     • levalbuterol  0 63 mg Nebulization Q6H [place] Quant FERNANDO POWELL     • lidocaine  1 patch Topical Daily Mike Santiago [place]     • lidocaine (PF)    PRN Santo Godinez MD     • methylPREDNISolone sodium succinate  1,000 mg Intravenous Daily FERNANDO Travis Stopped (12/28/22 0118)   • omeprazole (PRILOSEC) suspension 2 mg/mL  20 mg Per NG Tube Daily FERNANDO Valles     • ondansetron  4 mg Intravenous Q4Y PRN Mike Love PA-C     • pravastatin  80 mg Oral Daily With ROR MediaFERNANDO     • propofol  5-50 mcg/kg/min Intravenous Titrated Lajune Sprinkle [place], [place] 35 mcg/kg/min (12/28/22 0810)   • sodium chloride  4 mL Nebulization Q6H FERNANDO Valles     • vancomycin  10 mg/kg Intravenous Once per day on Tue Thu Sat Mike Love PA-C 087 mg (12/27/22 1500)     Continuous Infusions:  dexmedetomidine, 0 1-1 2 mcg/kg/hr, Last Rate: 0 4 mcg/kg/hr (12/28/22 0810)  propofol, 5-50 mcg/kg/min, Last Rate: 35 mcg/kg/min (12/28/22 0810)      PRN Meds:   fentanyl citrate (PF), 50 mcg, Q1H PRN  lidocaine (PF), , PRN  ondansetron, 4 mg, Q6H PRN        Invasive Devices Review  Invasive Devices     Peripheral Intravenous Line  Duration           Long-Dwell Peripheral IV (Midline) 12/26/22 Left Brachial 2 days    Peripheral IV 12/25/22 Left;Proximal;Ventral (anterior) Forearm 2 days          Hemodialysis Catheter  Duration           HD Permanent Double Catheter 294 days          Drain  Duration           NG/OG/Enteral Tube Orogastric 16 Fr Center mouth 2 days    Chest Tube Right Pleural 10 Fr  <1 day          Airway  Duration           ETT  Oral 7 5 mm 2 days              :   ---------------------------------------------------------------------------------------  Care Time Delivered:   No Critical Care time spent       [facility], FERNANDO      Portions of the record may have been created with voice recognition software  Occasional wrong word or "sound a like" substitutions may have occurred due to the inherent limitations of voice recognition software    Read the chart carefully and recognize, using context, where substitutions have occurred

## 2022-12-28 NOTE — ASSESSMENT & PLAN NOTE
Lab Results   Component Value Date    HGBA1C 5 7 (H) 10/19/2022       Recent Labs     12/27/22  1115 12/27/22  1719 12/28/22  0022 12/28/22  0458   POCGLU 167* 207* 159* 225*       Blood Sugar Average: Last 72 hrs:  (P) 190 [id number]   · Hold home dose Avodart  · Continue sliding scale coverage

## 2022-12-28 NOTE — PROGRESS NOTES
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   Currently on Solumedrol 1g IV daily x 3 days, then 80 mg daily while intubated, then prednisone 80 mg daily when tolerating PO  Rheumatology following peripherally  Initiated on rituxamab  If no improvement, further consideration for plasmapheresis/plasma exchange per Rheumatology  Hydropneumothorax   S/P thoracentesis on recent admission with possible ex vacuo hydropneumothorax  Continue CT per primary team/while on ventilator  ESRD on HD   Management and HD per Nephrology  D/W primary team and RT  Chief Complaint:    Unable to obtain - patient on ventilator    Subjective:    Patient is sedated on ventilator  Not tachypneic  D/W primary team and RT  Had some peak pressure alarms, but pressures now in the 20s after suctioning with some bloody secretions/clot removal  Started on rituxamab  Chest tube draining serosanguineous drainage  Objective:    Vitals: Blood pressure 110/54, pulse 58, temperature 99 2 °F (37 3 °C), temperature source Temporal, resp  rate (!) 23, height 5' 8" (1 727 m), weight 79 1 kg (174 lb 6 1 oz), SpO2 97 %  AC/VC 14/400/50% +8 PEEP,Body mass index is 26 51 kg/m²        Intake/Output Summary (Last 24 hours) at 12/28/2022 1415  Last data filed at 12/28/2022 1315  Gross per 24 hour   Intake 1561 48 ml   Output 600 ml   Net 961 48 ml       Invasive Devices     Peripheral Intravenous Line  Duration           Long-Dwell Peripheral IV (Midline) 12/26/22 Left Brachial 2 days    Peripheral IV 12/25/22 Left;Proximal;Ventral (anterior) Forearm 2 days          Hemodialysis Catheter Duration           HD Permanent Double Catheter 295 days          Drain  Duration           NG/OG/Enteral Tube Orogastric 16 Fr Center mouth 2 days    Chest Tube Right Pleural 10 Fr  1 day          Airway  Duration           ETT  Oral 7 5 mm 3 days              Physical Exam:    Physical Exam  Vitals reviewed  Constitutional:       General: He is not in acute distress  Appearance: He is not ill-appearing, toxic-appearing or diaphoretic  Interventions: He is sedated and intubated  Eyes:      General: No scleral icterus  Cardiovascular:      Rate and Rhythm: Normal rate and regular rhythm  Heart sounds: S1 normal and S2 normal  No murmur heard  No friction rub  No gallop  Pulmonary:      Effort: Pulmonary effort is normal  No tachypnea, accessory muscle usage or respiratory distress  He is intubated  Breath sounds: Normal breath sounds  No stridor  No decreased breath sounds, wheezing, rhonchi or rales  Comments: Right CT with serosanguineous drainage  No airleak or crepitus  Chest:      Chest wall: No tenderness  Abdominal:      General: Bowel sounds are normal  There is no distension  Palpations: Abdomen is soft  Tenderness: There is no abdominal tenderness  Musculoskeletal:      Cervical back: Neck supple  Right lower leg: No edema  Left lower leg: No edema  Skin:     General: Skin is warm and dry  Neurological:      GCS: GCS eye subscore is 3  GCS verbal subscore is 1  GCS motor subscore is 5         Labs:   CBC:   Lab Results   Component Value Date    WBC 6 49 12/28/2022    HGB 6 7 (LL) 12/28/2022    HCT 21 3 (L) 12/28/2022    MCV 89 12/28/2022     12/28/2022    MCH 27 6 12/28/2022    MCHC 31 0 (L) 12/28/2022    RDW 14 9 12/28/2022    MPV 9 3 12/28/2022   , CMP:   Lab Results   Component Value Date    SODIUM 134 (L) 12/28/2022    K 5 5 (H) 12/28/2022    CL 97 12/28/2022    CO2 22 12/28/2022    BUN 89 (H) 12/28/2022    CREATININE 5 65 (H) 12/28/2022    CALCIUM 7 2 (L) 12/28/2022    EGFR 8 12/28/2022     Quantiferon TB Gold pending    Imaging and other studies: I have personally reviewed pertinent reports   , I have personally reviewed pertinent films in PACS and CXR done today shows continued bilateral pulmonary infiltrates with right pigtail chest tube in place

## 2022-12-28 NOTE — QUICK NOTE
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 with now lung involvement  Have been discussing case with ICU attending Dr Juana Reed and nephrologist Dr Jackelin Lennon  Agree with pulse dosing steroids with 1g IV Solumedrol daily x3 days, then transitioning to 80mg IV Solumedrol daily while patient remains intubated; can transition to prednisone 80mg po daily when patient gets extubated and is tolerating PO  Started induction dosing of rituximab 375 mg/m^2 weekly x4 weeks; received first dose yesterday  Patient to continue HD per Nephrology  If patient's hemoptysis and vent parameters do not improve, can consider plasmapheresis/plasma exchange, which will need to be coordinated around HD and cleared by Nephrology  Patient will need outpatient rheumatology follow-up with Dr Robyn Baeza at [facility]  Please contact her to get scheduled closer to patient's discharge  Please feel free to contact me via [employer] with any questions or concerns this week while patient is admitted      MD [facility]

## 2022-12-28 NOTE — PROGRESS NOTES
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 prior  -12/26-completed 2-hour dialysis treatment did not tolerate ultrafiltration completed bronchoscopy with  Moderate amount of bloody secretions from all lobes  - 12/27 - could not have dialysis to facilitate timing of ritux  Dialysis catheter needed to be emergency accessed after review with primary team  HD and CRRT nurses were able to be present to access and deaccess  - 12/28 - HD     Plan:  - HD today  Will change to MWF schedule for now  -  treatment for hemoptysis in progress as below  - concern for vasculitis pulmonary related  Rheum has been consulted per review of primary team notes  - anemia - transfusion per primary team  - dose vanc per level     2-shortness of breath - concern for vasculitis     - Status postthoracentesis during prior hospitalization on December 23  - Initially hypoxic and requiring facemask  - Missed dialysis on 12/24 due to transportation issue  - concern for infectious etiology versus noninfectious etiology  - Pulmonary team also on board along with ICU team  - Concern for hydropneumothorax on chest x-ray and therefore patient is undergoing CT scan  - On broad-spectrum antibiotics  - Started on pulse dose steroids due to concern for vasculitis also with hemoptysis  - Bronchoscopy completed with signs of alveolar hemorrhage  - CT scan mod hydropneumothorax, new compared to prior   Inc in b/l pulm infiltrates  - 12/27 - started on ritux  Completing -pulse steroids     3 hyponatremia volume mediated along with hyperglycemia-     - Monitor with dialysis     4-anemia-on Mircera as outpatient     - restart epogen with full HD  - hemoglobin to 5 7 during prior hospitalization  - Stool occult positive prior  - Received multiple blood transfusions prior admission  - Hb decreasing 12/28 - per primary team     5 - vol overload     - missed HD after discharge  - history of bilateral pl effusions  - albumin 1 6 leading to anasarca  - required thoracentesis prior     6) covid19     - per primary team     7 - MBD     - on phos binder    8 - hydropneumothorax - s/p chest tube by IR team 12/27       SUBJECTIVE / 24H INTERVAL HISTORY:  Pt intubated       OBJECTIVE:  Current Weight: Weight - Scale: 79 1 kg (174 lb 6 1 oz)  Vitals:    12/28/22 0600 12/28/22 0746 12/28/22 0755 12/28/22 0800   BP: (!) 89/49   (!) 91/47   Pulse: (!) 46   (!) 45   Resp: 18   (!) 26   Temp:   (!) 97 1 °F (36 2 °C)    TempSrc:   Temporal    SpO2: 97% 98%  99%   Weight:       Height:           Intake/Output Summary (Last 24 hours) at 12/28/2022 9219  Last data filed at 12/28/2022 0601  Gross per 24 hour   Intake 1161 48 ml   Output 600 ml   Net 561 48 ml     General: NAD  Skin: no rash  Eyes: anicteric sclera  ENT: ET tube in place  Neck: supple  Chest: coarse breath sounds b/l  CVS: s1s2, no murmur, no gallop, no rub  Abdomen: soft  Extremities: no edema LE b/l  : no ruvalcaba  Neuro: cannot assess  Psych: normal affect    Medications:    Current Facility-Administered Medications:   •  calcium acetate (PHOSLO) capsule 667 mg, 667 mg, Oral, TID With Meals, [employer], CRNP, 667 mg at 12/28/22 0836  •  calcium carbonate-vitamin D 500 mg-5 mcg tablet 2 tablet, 2 tablet, Oral, BID With Meals, [employer], CRNP, 2 tablet at 12/28/22 6741  •  cefepime (MAXIPIME) IVPB (premix in dextrose) 1,000 mg 50 mL, 1,000 mg, Intravenous, M93J, Nonnie Radon ALICIA Quan, Stopped at 12/27/22 1707  •  chlorhexidine (PERIDEX) 0 12 % oral rinse 15 mL, 15 mL, Mouth/Throat, [address], Corin Quan PA-C, 15 mL at 12/28/22 4759  •  dexmedeTOMIDine (Precedex) 400 mcg in sodium chloride 0 9 % 100 mL infusion, 0 1-1 2 mcg/kg/hr, Intravenous, Titrated, SEYMOUR VallesNP, Last Rate: 7 9 mL/hr at 12/28/22 0810, 0 4 mcg/kg/hr at 12/28/22 0810  •  fentanyl citrate (PF) 100 MCG/2ML 50 mcg, 50 mcg, Intravenous, G7A PRN, Luis Alfredo Quan PA-C, 50 mcg at 12/26/22 2136  •  insulin glargine (LANTUS) subcutaneous injection 15 Units 0 15 mL, 15 Units, Subcutaneous, QAM, [employer], SEYMOURNP, 13 Units at 12/28/22 0813  •  insulin lispro (HumaLOG) 100 units/mL subcutaneous injection 1-6 Units, 1-6 Units, Subcutaneous, Q6H, 2 Units at 12/28/22 0557 **AND** Fingerstick Glucose (POCT), , , A3X, Luis Alfredo Quan PA-C  •  levalbuterol [facility]) inhalation solution 0 63 mg, 0 63 mg, Nebulization, Q6H, FERNANDO Valles, 0 63 mg at 12/28/22 0745  •  lidocaine (LIDODERM) 5 % patch 1 patch, 1 patch, Topical, Daily, Gladis Carlisle PA-C, 1 patch at 12/28/22 0800  •  lidocaine (PF) (XYLOCAINE-MPF) 1 % injection, , , PRN, Shonna Mccoy MD, 5 mL at 12/27/22 1349  •  methylPREDNISolone sodium succinate (Solu-MEDROL) 1,000 mg in sodium chloride 0 9 % 250 mL IVPB, 1,000 mg, Intravenous, Daily, FERNANDO Goodman, Stopped at 12/28/22 0118  •  omeprazole (PRILOSEC) suspension 2 mg/mL, 20 mg, Per NG Tube, Daily, FERNANDO Valles, 20 mg at 12/27/22 0945  •  ondansetron (ZOFRAN) injection 4 mg, 4 mg, Intravenous, I7B PRN, Luis Alfredo Quan PA-C  •  pravastatin (PRAVACHOL) tablet 80 mg, 80 mg, Oral, Daily With FERNANDO Cobos, 80 mg at 12/27/22 1630  •  propofol (DIPRIVAN) 1000 mg in 100 mL infusion (premix), 5-50 mcg/kg/min, Intravenous, Titrated, FERNANDO Almeida, Last Rate: 16 9 mL/hr at 12/28/22 0810, 35 mcg/kg/min at 12/28/22 0810  •  sodium chloride 3 % inhalation solution 4 mL, 4 mL, Nebulization, Q6H, Joie Nina V, CRNP, 4 mL at 12/28/22 0745  •  vancomycin (VANCOCIN) IVPB (premix in dextrose) 750 mg 150 mL, 10 mg/kg, Intravenous, Once per day on Tue Thu Sat, Robert Million, ALICIA, Last Rate: 150 mL/hr at 12/27/22 1500, 750 mg at 12/27/22 1500    Laboratory Results:  Results from last 7 days   Lab Units 12/28/22  0500 12/27/22  2118 12/27/22  0645 12/26/22  1142 12/26/22  0616 12/25/22  1146 12/23/22  0511 12/22/22  0927 12/22/22  0927 12/21/22 2024   WBC Thousand/uL 6 49  --  8 19  --  8 50 12 12* 6 68  --  8 38  --    HEMOGLOBIN g/dL 6 7*  --  7 1*  --  7 7* 9 3* 8 7*  --  8 5* 8 5*   HEMATOCRIT % 21 3*  --  23 5*  --  24 7* 28 9* 27 5*  --  26 8* 26 8*   PLATELETS Thousands/uL 269  --  297  --  275 406* 291  --  317  --    POTASSIUM mmol/L 5 5* 5 3 5 4* 4 4 5 8* 5 1 4 5   < > 4 5  --    CHLORIDE mmol/L 97 96 95* 95* 93* 85* 93*   < > 97  --    CO2 mmol/L 22 25 24 26 26 27 27   < > 30  --    BUN mg/dL 89* 83* 71* 53* 86* 107* 46*   < > 47*  --    CREATININE mg/dL 5 65* 5 37* 4 77* 3 58* 5 55* 6 50* 4 14*   < > 4 30*  --    CALCIUM mg/dL 7 2* 7 3* 7 6* 7 6* 7 7* 8 1* 7 7*   < > 7 4*  --    MAGNESIUM mg/dL 2 4  --  2 1  --  2 2 2 2  --   --   --   --    PHOSPHORUS mg/dL  --   --  6 6*  --  5 9*  --   --   --   --   --     < > = values in this interval not displayed

## 2022-12-28 NOTE — ASSESSMENT & PLAN NOTE
Lab Results   Component Value Date    EGFR 8 12/28/2022    EGFR 9 12/27/2022    EGFR 10 12/27/2022    CREATININE 5 65 (H) 12/28/2022    CREATININE 5 37 (H) 12/27/2022    CREATININE 4 77 (H) 12/27/2022       · Patient scheduled dialysis Tuesday/Thursday/Saturday however he was unable to make his treatment on Saturday 12/24 due to the transportation bus not picking him up  · Patient started with worsening shortness of breath on 12/25 presented to the emergency room  · Nephrology consulted and HD session done on 12/25 for 3 5L  · Continue PhosLo as ordered

## 2022-12-28 NOTE — PROGRESS NOTES
Vancomycin Assessment    Val Johnson is a 76 y o  male who is currently receiving vancomycin   for Pneumonia (goal -600, trough >10)     Relevant clinical data and objective history reviewed:  Creatinine   Date Value Ref Range Status   12/28/2022 5 65 (H) 0 60 - 1 30 mg/dL Final     Comment:     Standardized to IDMS reference method   12/27/2022 5 37 (H) 0 60 - 1 30 mg/dL Final     Comment:     Standardized to IDMS reference method   12/27/2022 4 77 (H) 0 60 - 1 30 mg/dL Final     Comment:     Standardized to IDMS reference method   07/24/2017 1 02 0 76 - 1 27 mg/dL Final     Vancomycin Rm   Date Value Ref Range Status   12/28/2022 27 9 (H) 10 0 - 20 0 ug/mL Final     /51   Pulse 57   Temp 97 7 °F (36 5 °C) (Temporal)   Resp (!) 2   Ht 5' 8" (1 727 m)   Wt 79 1 kg (174 lb 6 1 oz)   SpO2 95%   BMI 26 51 kg/m²   I/O last 3 completed shifts: In: 1655 2 [I V :855 2; IV Piggyback:800]  Out: 700 [Emesis/NG output:300; Chest Tube:400]  Lab Results   Component Value Date/Time    BUN 89 (H) 12/28/2022 05:00 AM    BUN 16 07/24/2017 09:27 AM    WBC 6 49 12/28/2022 05:00 AM    WBC 4 9 07/24/2017 09:27 AM    HGB 6 7 (LL) 12/28/2022 05:00 AM    HGB 16 2 07/24/2017 09:27 AM    HCT 21 3 (L) 12/28/2022 05:00 AM    HCT 45 8 07/24/2017 09:27 AM    MCV 89 12/28/2022 05:00 AM    MCV 88 07/24/2017 09:27 AM     12/28/2022 05:00 AM     07/24/2017 09:27 AM     Temp Readings from Last 3 Encounters:   12/28/22 97 7 °F (36 5 °C) (Temporal)   12/23/22 97 9 °F (36 6 °C)   11/30/22 98 1 °F (36 7 °C) (Oral)     Vancomycin Days of Therapy: 4    Assessment/Plan  The patient is currently on vancomycin utilizing scheduled dosing  Baseline risks associated with therapy include: pre-existing renal impairment, concomitant nephrotoxic medications, and advanced age    The patient is receiving 750 mg IV post dialysis on T-T-S with the most recent vancomycin level being  27 9  and will receive one more dose post dialysis tomorrow Dec 29th, then order will be discontinued   Pharmacy will continue to follow closely for s/sx of nephrotoxicity, infusion reactions, and appropriateness of therapy  BMP and CBC will be ordered per protocol  Pharmacy will continue to follow the patient’s culture results and clinical progress daily      Anel Acevedo, Pharmacist

## 2022-12-28 NOTE — PLAN OF CARE
Post-Dialysis RN Treatment Note    Blood Pressure:  Pre: 117/57 mm/Hg  Post:  126/62 mmHg   EDW: 71 0 kg    Weight:  Pre: 79 1kg, Ran even   Mode of weight measurement: N/A   Volume Removed: 300 ml, for PRBC volume   Treatment duration: 180 minutes    NS given  No    Treatment shortened?  No   Medications given during Rx: Epogen   Estimated Kt/V  Not Applicable   Access type: Permacath/TDC   Access Issues: No    Report called to primary nurse   Yes, Nayely Moment    1 unit of PRBCs given throughout treatment        Problem: METABOLIC, FLUID AND ELECTROLYTES - ADULT  Goal: Electrolytes maintained within normal limits  Description: INTERVENTIONS:  - Monitor labs and assess patient for signs and symptoms of electrolyte imbalances  - Administer electrolyte replacement as ordered  - Monitor response to electrolyte replacements, including repeat lab results as appropriate  - Instruct patient on fluid and nutrition as appropriate  Outcome: Progressing  Goal: Fluid balance maintained  Description: INTERVENTIONS:  - Monitor labs   - Monitor I/O and WT  - Instruct patient on fluid and nutrition as appropriate  - Assess for signs & symptoms of volume excess or deficit  Outcome: Progressing

## 2022-12-28 NOTE — ASSESSMENT & PLAN NOTE
Lab Results   Component Value Date    EGFR 8 12/28/2022    EGFR 9 12/27/2022    EGFR 10 12/27/2022    CREATININE 5 65 (H) 12/28/2022    CREATININE 5 37 (H) 12/27/2022    CREATININE 4 77 (H) 12/27/2022     · Patient had a recent admission from 12/20-12/23 with hemoglobin 5 7 -received 1 unit packed red blood cells at that time  · Most likely acute on chronic anemia

## 2022-12-29 PROBLEM — I27.20 PULMONARY HYPERTENSION (HCC): Chronic | Status: RESOLVED | Noted: 2022-03-14 | Resolved: 2022-12-29

## 2022-12-29 NOTE — CASE MANAGEMENT
Case Management Assessment & Discharge Planning Note    Patient name Chicora [facility]  Location ICU 11/ICU 11 MRN [id number]  : 1945 Date 2022       Current Admission Date: 2022  Current Admission Diagnosis:Diffuse pulmonary alveolar hemorrhage   Patient Active Problem List    Diagnosis Date Noted   • Hydropneumothorax 2022   • Diffuse pulmonary alveolar hemorrhage 2022   • Acute respiratory failure with hypoxia ([facility] [address] ) 2022   • SIRS (systemic inflammatory response syndrome) ([facility] [address] [facility] ) 2022   • Volume overload 2022   • Anasarca 2022   • Lung nodule seen on imaging study 2022   • COVID-19 2022   • Pleural effusion 2022   • Fluid overload 2022   • Hyperphosphatemia associated with renal failure 10/19/2022   • Urinary retention 10/17/2022   • ESRD on dialysis [facility]) 2022   • ANCA-associated vasculitis 2022   • Pulmonary hypertension ([facility] [address] [facility] ) 2022   • Dialysis patient ([facility] [address] ) 2022   • Anemia due to chronic kidney disease, on chronic dialysis ([facility] [address] [facility] )    • Other proteinuria    • Symptomatic anemia 2022   • Elevated PSA 2021   • Chronic pain of right knee 2021   • Primary osteoarthritis of right knee 2021   • Bladder stones 12/15/2016   • Type 2 diabetes mellitus with chronic kidney disease on chronic dialysis, with long-term current use of insulin ([facility] [address] [facility] ) 2016   • Benign colon polyp 2013   • Benign prostatic hyperplasia with lower urinary tract symptoms 2013   • Benign essential hypertension 2013   • Hyperlipidemia 2012   • Vitamin D deficiency 2012      LOS (days): 4  Geometric Mean LOS (GMLOS) (days): 5 00  Days to GMLOS:1 1     OBJECTIVE:  PATIENT READMITTED TO HOSPITAL  Risk of Unplanned Readmission Score: 63 24     Current admission status: Inpatient  Referral Reason: Other (Disposition planning)    Preferred Pharmacy:   [facility] [address] [facility] [address]  Phone: [phone] Fax: [phone]    [facility] ([address], [place]  [address]  [address]  [facility] [address]  Phone: [phone] Fax: [phone]    Primary Care Provider: Javier Clifton MD    Primary Insurance: MEDICARE  Secondary Insurance: BLUE CROSS    ASSESSMENT:  [facility], [address] [place] [address] Representative - Spouse   Primary Phone: [phone] (Mobile)               Readmission Root Cause  30 Day Readmission: Yes  Who directed you to return to the hospital?: Other (comment) (facility staff)  Did you understand whom to contact if you had questions or problems? :  (was in rehab facility)  Did you get your prescriptions before you left the hospital?: No  Reason[de-identified]  (went to rehab)  Were you able to get your prescriptions filled when you left the hospital?: Yes  Did you take your medications as prescribed?: Yes  Were you able to get to your follow-up appointments?: No  Reason[de-identified]  (was in rehab)  During previous admission, was a post-acute recommendation made?: Yes  What post-acute resources were offered?: STR (went to STR)  Patient was readmitted due to: Diffuse pulmonary alveolar hemorrhage  Action Plan: medical management    Patient Information  Admitted from[de-identified] Facility [facility])  Mental Status: Intubated  During Assessment patient was accompanied by: Not accompanied during assessment  Assessment information provided by[de-identified] Spouse  Primary Caregiver: Spouse  Caregiver's Name[de-identified] Bonnie Dungting Relationship to Patient[de-identified] Family Member (wife)  Caregiver's Telephone Number[de-identified] [phone]  Support Systems: Spouse/significant other, Family members  [place] of Residence: [address] [place] [address] do you live in?: [place]  Type of Current Residence: Other (Comment) (admitted from [facility], usually lives home with wife)  Living Arrangements: Lives w/ Spouse/significant other (currently in STR facility)    Activities of Daily Living Prior to Admission  Functional Status: Assistance  Completes ADLs independently?: No  Level of ADL dependence: Assistance  Ambulates independently?: No  Level of ambulatory dependence: Assistance  Does patient use assisted devices?: Yes  Assisted Devices (DME) used: [employer], Wheelchair  Does patient currently own DME?: Yes  What DME does the patient currently own?: [facility] [place]  Does patient have a history of Outpatient Therapy (PT/OT)?: No  Does the patient have a history of Short-Term Rehab?: Yes  Does patient have a history of HHC?: No  Does patient currently have [address]?: No     Patient Information Continued  Income Source: Pension/half-way  Does patient have prescription coverage?: Yes  Does patient receive dialysis treatments?: Yes ([facility] TTS 10:00[facility])    Rashid Leanna of Transport to Appts[de-identified] [employer] ([facility])  In the past 12 months, has lack of transportation kept you from medical appointments or from getting medications?: No  In the past 12 months, has lack of transportation kept you from meetings, work, or from getting things needed for daily living?: No  Was application for public transport provided?: N/A      DISCHARGE DETAILS:    Discharge planning discussed with[de-identified] WifeRayshawn [place] of Choice: Yes  Comments - Freedom of Choice: SW spoke with pt's wife to assess needs and discuss plans  Pt is currently in ICU on vent  Pt was readmitted from [facility] where he had been recently transferred for rehab  Per wife pt seemed to like [facility] so she requested pt return to same facility when discharged  Offered to make referral to [facility] as requested  SW offered ongoing support and assistance to wife  SW will follow to monitor progress and assist as needed    CM contacted family/caregiver?: Yes  Were Treatment Team discharge recommendations reviewed with patient/caregiver?: Yes  Did patient/caregiver verbalize understanding of patient care needs?: N/A- going to facility  Were patient/caregiver advised of the risks associated with not following Treatment Team discharge recommendations?: Yes    Contacts  Patient Contacts: Manohar Frias  Relationship to Patient[de-identified] Family (wife)  Contact Method: Phone  Phone Number: [phone]  Reason/Outcome: Discharge [address]         [facility] No    DME Referral Provided  Referral made for DME?: No    Other Referral/Resources/Interventions Provided:  Interventions: [address] Return  Referral Comments: Referral made to [facility] as requested by wife to prepare for eventual return    Treatment Team Recommendation:  (pending progress)  Discharge Destination Plan[de-identified] Facility Return, Short Term Rehab (wife requesting return to [facility] upon discharge)  Transport at Discharge :  (pending)

## 2022-12-29 NOTE — ASSESSMENT & PLAN NOTE
Lab Results   Component Value Date    HGBA1C 5 7 (H) 10/19/2022       Recent Labs     12/28/22  1744 12/28/22  2353 12/29/22  0541 12/29/22  1143   POCGLU 118 128 131 164*       Blood Sugar Average: Last 72 hrs:  (P) 165 [id number]   · Continue sliding scale coverage q6h + Lantus 15u in AM   · Holding home regimen of 15u 70/30 BID   · Goal blood glucose less than 180

## 2022-12-29 NOTE — PROGRESS NOTES
[facility]  Progress Note - Jasmine Polanco 1945, 76 y o  male MRN: [id number]  Unit/Bed#: ICU 11 Encounter: [id number]  Primary Care Provider: Tad Stauffer MD   Date and time admitted to hospital: 12/25/2022 11:20 AM    * Diffuse pulmonary alveolar hemorrhage  Assessment & Plan  · Diffuse alveolar hemorrhage secondary to his underlying ANCA vasculitis given hemoptysis and worsening infiltrates   · Bronch on 12/26 with serial aliquots persistently bloody despite lavage consistent with DAH  · Completed 3 days of pulse dose steroids this morning, transition to solumedrol 80mg daily   · Hold anticoagulation  · Received Rituximab per rheumatology on Tuesday, plan for weekly dosing x 4 weeks   · Appreciate pulmonary and rheumatology input    Acute respiratory failure with hypoxia [facility])  Assessment & Plan  · Patient presented to the emergency department on 12/25 with worsening shortness of breath and hypoxia after missing his dialysis treatment yesterday due to lack of transportation  · Initially in the emergency room, the patient was placed on BiPAP but he was weaned to mid flow  · Overnight 12/25, became more hypoxic and ultimately required intubation  · Began having hemoptysis and significant blood suctioned post intubation   Concern for alveolar hemorrhage secondary to vasculitis-see plan as above   · Vent settings: 14/400/8/55%  · Daily SAT/SBT  · Sedation with propofol and precedex  · Titrate for RAAS -1  · VAP bundles; chlorhexidine, PPI, HOB greater than 30 degrees  · Pulmonary toilet with xopenex/3% NSS nebs, chest PT TID, ETT suctioning PRN   · CXR this morning with persistent extensive B/L patchy pulmonary opacifications     SIRS (systemic inflammatory response syndrome) ([facility] [address] )  Assessment & Plan  · Patient presented to the emergency room on 12/25 with SIRS criteria -increased respiratory rate and hypoxia  · With decompensation on 12/25, antibiotics were started with cefepime/vanco given worsening hypoxia however low suspicion for infection  · BAL with mixed respiratory micheal  · pneumocystis smear negative   · Completing 5 days abx today  · Trend temps and WBC   · procal difficult to interpret in setting of ESRD    Hydropneumothorax  Assessment & Plan  · Patient had a thoracentesis 12/21 (previous admission)  · Chest x-ray with hydropneumothorax thought to likely be related to this, possibly ex vacuo  · S/p R pleural chest tube with IR 12/27  · 350mL serousanguinous drainage x 24 hours  · Lung remains inflated on CXR this morning   · Will try chest tube to H20 seal today and repeat CXR tomorrow morning     Urinary retention  Assessment & Plan  · Bladder scan qshift  · Urinary retention protocol    ANCA-associated vasculitis  Assessment & Plan  · Kidney biopsy on 03/22: 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 for which he was previously treated with Cytoxan (discontinued in April) and subsequently prescribed prednisone taper  · Now with pulmonary involvement, continue rituximab and steroids as above     ESRD on dialysis [facility])  Assessment & Plan  Lab Results   Component Value Date    EGFR 13 12/29/2022    EGFR 8 12/28/2022    EGFR 9 12/27/2022    CREATININE 3 96 (H) 12/29/2022    CREATININE 5 65 (H) 12/28/2022    CREATININE 5 37 (H) 12/27/2022       · Patient scheduled dialysis Tuesday/Thursday/Saturday however he was unable to make his treatment on Saturday 12/24 due to the transportation bus not picking him up  · Patient started with worsening shortness of breath on 12/25 presented to the emergency room  · Nephrology consulted and HD session done on 12/25 for 3 5L  · Continue MWF schedule for now per nephrology  · Continue PhosLo as ordered    Anemia due to chronic kidney disease, on chronic dialysis [facility])  Assessment & Plan  Lab Results   Component Value Date    EGFR 13 12/29/2022    EGFR 8 12/28/2022    EGFR 9 12/27/2022    CREATININE 3 96 (H) 12/29/2022    CREATININE 5 65 (H) 12/28/2022    CREATININE 5 37 (H) 12/27/2022     · Patient had a recent admission from 12/20-12/23 with hemoglobin 5 7 -received 1 unit packed red blood cells at that time  · Most likely acute on chronic anemia in setting of ESRD now with DAH  · Hgb 6 7 yesterday and received 1 PRBC, repeat hgb 9 0 this morning  · Continue to trend daily and transfuse for hgb less than 7 0    Type 2 diabetes mellitus with chronic kidney disease on chronic dialysis, with long-term current use of insulin [facility])  Assessment & Plan  Lab Results   Component Value Date    HGBA1C 5 7 (H) 10/19/2022       Recent Labs     12/28/22  1744 12/28/22  2353 12/29/22  0541 12/29/22  1143   POCGLU 118 128 131 164*       Blood Sugar Average: Last 72 hrs:  (P) 165 [id number]   · Continue sliding scale coverage q6h + Lantus 15u in AM   · Holding home regimen of 15u 70/30 BID   · Goal blood glucose less than 180    Hyperlipidemia  Assessment & Plan  · Continue home statin    Benign essential hypertension  Assessment & Plan  · Continue to hold home dose Norvasc and clonidine     ----------------------------------------------------------------------------------------  HPI/24hr events: Small amount of brown secretions from ETT  Weaning PEEP/fio2 now 8 and 55%  Completed pulse dose steroids at midnight       Patient appropriate for transfer out of the ICU today?: No  Disposition: Continue Critical Care   Code Status: Level 1 - Full Code  ---------------------------------------------------------------------------------------  SUBJECTIVE    Review of Systems  Review of systems was unable to be performed secondary to intubation/sedation  ---------------------------------------------------------------------------------------  OBJECTIVE    Vitals   Vitals:    12/29/22 1200 12/29/22 1300 12/29/22 1319 12/29/22 1400   BP: 128/59 126/60  134/64   BP Location: Right arm      Pulse: 78 72  77   Resp: 21 21  (!) 23   Temp: TempSrc:       SpO2: 95% 96% 96% 96%   Weight:       Height:         Temp (24hrs), Av 3 °F (36 8 °C), Min:97 6 °F (36 4 °C), Max:99 °F (37 2 °C)  Current: Temperature: 98 7 °F (37 1 °C)          Respiratory:  SpO2: SpO2: 96 %, SpO2 Activity: SpO2 Activity: At Rest, SpO2 Device: O2 Device: Ventilator  Nasal Cannula O2 Flow Rate (L/min): 80 L/min    Invasive/non-invasive ventilation settings   Respiratory    Lab Data (Last 4 hours)    None         O2/Vent Data (Last 4 hours)       1142           Vent Mode AC/VC       Resp Rate (BPM) (BPM) 14       Vt (mL) (mL) 400       FIO2 (%) (%) 55       PEEP (cmH2O) (cmH2O) 8       MV 11 1                   Physical Exam  Constitutional:       Appearance: He is ill-appearing  Interventions: He is sedated, intubated and restrained  HENT:      Head: Normocephalic and atraumatic  Eyes:      General: Lids are normal       Extraocular Movements: Extraocular movements intact  Conjunctiva/sclera: Conjunctivae normal    Neck:      Trachea: Trachea normal    Cardiovascular:      Rate and Rhythm: Normal rate and regular rhythm  Pulses: Normal pulses  Radial pulses are 2+ on the right side and 2+ on the left side  Dorsalis pedis pulses are 2+ on the right side and 2+ on the left side  Heart sounds: Normal heart sounds, S1 normal and S2 normal    Pulmonary:      Effort: Pulmonary effort is normal  He is intubated  Breath sounds: Decreased breath sounds and rhonchi present  Comments: Small amount of brown/old blood ETT secretions   Abdominal:      General: Bowel sounds are normal       Palpations: Abdomen is soft  Musculoskeletal:      Cervical back: Full passive range of motion without pain, normal range of motion and neck supple  Comments: LENZ    Skin:     General: Skin is warm and dry  Capillary Refill: Capillary refill takes less than 2 seconds  Coloration: Skin is pale     Neurological:      General: No focal deficit present  GCS: GCS eye subscore is 3  GCS verbal subscore is 1  GCS motor subscore is 5               Laboratory and Diagnostics:  Results from last 7 days   Lab Units 12/29/22  0536 12/28/22  0500 12/27/22  0645 12/26/22  0616 12/25/22  1146 12/23/22  0511   WBC Thousand/uL 15 29* 6 49 8 19 8 50 12 12* 6 68   HEMOGLOBIN g/dL 9 0* 6 7* 7 1* 7 7* 9 3* 8 7*   HEMATOCRIT % 27 8* 21 3* 23 5* 24 7* 28 9* 27 5*   PLATELETS Thousands/uL 290 269 297 275 406* 291   NEUTROS PCT % 95*  --   --  95* 90* 93*   BANDS PCT %  --   --  9*  --   --   --    MONOS PCT % 3*  --   --  2* 6 3*   MONO PCT %  --   --  2*  --   --   --      Results from last 7 days   Lab Units 12/29/22  0536 12/28/22  0500 12/27/22  2118 12/27/22  0645 12/26/22  1142 12/26/22  0616 12/25/22  1146   SODIUM mmol/L 131* 134* 132* 131* 130* 129* 124*   POTASSIUM mmol/L 4 8 5 5* 5 3 5 4* 4 4 5 8* 5 1   CHLORIDE mmol/L 94* 97 96 95* 95* 93* 85*   CO2 mmol/L 24 22 25 24 26 26 27   ANION GAP mmol/L 13 15* 11 12 9 10 12   BUN mg/dL 57* 89* 83* 71* 53* 86* 107*   CREATININE mg/dL 3 96* 5 65* 5 37* 4 77* 3 58* 5 55* 6 50*   CALCIUM mg/dL 7 2* 7 2* 7 3* 7 6* 7 6* 7 7* 8 1*   GLUCOSE RANDOM mg/dL 160* 193* 197* 163* 155* 146* 365*   ALT U/L  --   --   --   --   --   --  14   AST U/L  --   --   --   --   --   --  10   ALK PHOS U/L  --   --   --   --   --   --  112   ALBUMIN g/dL  --   --   --   --   --   --  1 9*   TOTAL BILIRUBIN mg/dL  --   --   --   --   --   --  0 37     Results from last 7 days   Lab Units 12/29/22  0536 12/28/22  0500 12/27/22  0645 12/26/22  0616 12/25/22  1146   MAGNESIUM mg/dL 2 1 2 4 2 1 2 2 2 2   PHOSPHORUS mg/dL 5 8*  --  6 6* 5 9*  --       Results from last 7 days   Lab Units 12/27/22  1042 12/26/22  1141 12/26/22  0045   INR  1 33*  --  1 35*   PTT seconds  --  32  --           Results from last 7 days   Lab Units 12/25/22  1146   LACTIC ACID mmol/L 2 2*     ABG:  Results from last 7 days   Lab Units 12/26/22  0052   PH ART 7 352   PCO2 ART mm Hg 48 4*   PO2 ART mm Hg 123 0   HCO3 ART mmol/L 26 2   BASE EXC ART mmol/L 0 5   ABG SOURCE  Radial, Right     VBG:  Results from last 7 days   Lab Units 12/26/22  0052   ABG SOURCE  Radial, Right     Results from last 7 days   Lab Units 12/27/22  0645 12/26/22  0616 12/25/22  1146   PROCALCITONIN ng/ml 2 78* 2 77* 1 79*       Micro  Results from last 7 days   Lab Units 12/26/22  1720 12/26/22  0045 12/25/22  1431 12/25/22  1146   BLOOD CULTURE   --   --   --  No Growth After 4 Days  No Growth After 4 Days  SPUTUM CULTURE   --  Few Colonies of  --   --    GRAM STAIN RESULT  2+ Polys*  Rare Budding Yeast with Pseudomycelia* 3+ Polys  No bacteria seen  --   --    MRSA CULTURE ONLY   --   --  No Methicillin Resistant Staphlyococcus aureus (MRSA) isolated  --      Imaging:CXR 12/29- persistent extensive B/L patchy pulmonary opacifications  I have personally reviewed pertinent reports  and I have personally reviewed pertinent films in PACS    Intake and Output  I/O       12/27 0701  12/28 0700 12/28 0701  12/29 0700 12/29 0701 12/30 0700    I V  (mL/kg) 611 5 (7 7) 808 8 (10 1)     Blood  350     Other  500     NG/GT   150    IV Piggyback 550 250     Total Intake(mL/kg) 1161 5 (14 7) 1908 8 (23 8) 150 (1 9)    Urine (mL/kg/hr)  0 (0)     Emesis/NG output 200 50     Other  300     Chest Tube 400 350     Total Output 600 700     Net +561 5 +1208 8 +150                 Height and Weights   Height: 5' 8" (172 7 cm)  IBW (Ideal Body Weight): 68 4 kg  Body mass index is 26 85 kg/m²    Weight (last 2 days)     Date/Time Weight    12/29/22 0542 80 1 (176 59)    12/28/22 0400 79 1 (174 38)    12/27/22 0600 78 1 (172 18)            Nutrition       Diet Orders   (From admission, onward)             Start     Ordered    12/29/22 1436  Diet Enteral/Parenteral; Tube Feeding No Oral Diet; Nepro; Continuous; 50; 50; Water; Every 6 hours  Diet effective now        References:    Nutrtion Support Algorithm Enteral vs  Parenteral   Question Answer Comment   Diet Type Enteral/Parenteral    Enteral/Parenteral Tube Feeding No Oral Diet    Tube Feeding Formula: Nepro    Bolus/Cyclic/Continuous Continuous    Tube Feeding Goal Rate (mL/hr): 50    Tube Feeding flush (mL): 50    Water Flush type: Water    Water flush frequency: Every 6 hours    RD to adjust diet per protocol?  Yes        12/29/22 1436    12/26/22 0815  Room Service  Once        Question:  Type of Service  Answer:  Room Service - Appropriate with Assistance    12/26/22 0814                  Active Medications  Scheduled Meds:  Current Facility-Administered Medications   Medication Dose Route Frequency Provider Last Rate   • calcium acetate  667 mg Oral TID With Meals FERNANDO Ta     • calcium carbonate-vitamin D  2 tablet Oral BID With Meals FERNANDO Ta     • cefepime  1,000 mg Intravenous Q24H FERNANDO Ta 1,000 mg (12/28/22 1612)   • chlorhexidine  15 mL Mouth/Throat I24U [facility] Linda Rebolledo PA-C     • dexmedetomidine  0 1-1 2 mcg/kg/hr Intravenous Titrated FERNANDO Valles 0 4 mcg/kg/hr (12/29/22 1506)   • epoetin ani  4,000 Units Intravenous Once per day on Mon Wed Fri Dalia Jasmine MD     • fentanyl citrate (PF)  50 mcg Intravenous P1I PRN Simone Quan PA-C     • insulin glargine  15 Units Subcutaneous QAM FERNANDO Bronson     • insulin lispro  1-6 Units Subcutaneous T6V Simone Quan PA-C     • levalbuterol  0 63 mg Nebulization Q6H FERNANDO Valles     • lidocaine  1 patch Topical Daily [employer] Rebolledo PA-C     • lidocaine (PF)    PRN Bre Ochoa MD     • methylPREDNISolone sodium succinate  80 mg Intravenous Daily FERNANDO Valles     • omeprazole (PRILOSEC) suspension 2 mg/mL  20 mg Per NG Tube Daily FERNANDO Valles     • ondansetron  4 mg Intravenous T8K PRN Simone Quan PA-C     • polyethylene glycol  17 g Oral Daily Othelia Pain Spironello V, CRNP     • pravastatin  80 mg Oral Daily With [employer], SEYMOURNP     • propofol  5-50 mcg/kg/min Intravenous Titrated [facility] 30 mcg/kg/min (12/29/22 1430)   • senna  8 8 mg Oral BID Joie Spironello V, CRNP     • sodium chloride  4 mL Nebulization Q6H Joie Spironello V, CRNP       Continuous Infusions:  dexmedetomidine, 0 1-1 2 mcg/kg/hr, Last Rate: 0 4 mcg/kg/hr (12/29/22 1506)  propofol, 5-50 mcg/kg/min, Last Rate: 30 mcg/kg/min (12/29/22 1430)      PRN Meds:   fentanyl citrate (PF), 50 mcg, Q1H PRN  lidocaine (PF), , PRN  ondansetron, 4 mg, Q6H PRN        Invasive Devices Review  Invasive Devices     Peripheral Intravenous Line  Duration           Long-Dwell Peripheral IV (Midline) 12/26/22 Left Brachial 3 days    Peripheral IV 12/29/22 Dorsal (posterior); Left Hand <1 day          Hemodialysis Catheter  Duration           HD Permanent Double Catheter 296 days          Drain  Duration           NG/OG/Enteral Tube Orogastric 16 Fr Center mouth 3 days    Chest Tube Right Pleural 10 Fr  2 days          Airway  Duration           ETT  Oral 7 5 mm 4 days                Rationale for remaining devices: continue permacath for HD   ---------------------------------------------------------------------------------------  Advance Directive and Living Will:      Power of :    POLST:    ---------------------------------------------------------------------------------------  Care Time Delivered:   No Critical Care time spent       FERNANDO Gutierrez      Portions of the record may have been created with voice recognition software  Occasional wrong word or "sound a like" substitutions may have occurred due to the inherent limitations of voice recognition software    Read the chart carefully and recognize, using context, where substitutions have occurred

## 2022-12-29 NOTE — ASSESSMENT & PLAN NOTE
Lab Results   Component Value Date    EGFR 13 12/29/2022    EGFR 8 12/28/2022    EGFR 9 12/27/2022    CREATININE 3 96 (H) 12/29/2022    CREATININE 5 65 (H) 12/28/2022    CREATININE 5 37 (H) 12/27/2022       · Patient scheduled dialysis Tuesday/Thursday/Saturday however he was unable to make his treatment on Saturday 12/24 due to the transportation bus not picking him up  · Patient started with worsening shortness of breath on 12/25 presented to the emergency room  · Nephrology consulted and HD session done on 12/25 for 3 5L  · Continue MWF schedule for now per nephrology  · Continue PhosLo as ordered

## 2022-12-29 NOTE — ASSESSMENT & PLAN NOTE
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

## 2022-12-29 NOTE — ASSESSMENT & PLAN NOTE
· Diffuse alveolar hemorrhage secondary to his underlying ANCA vasculitis given hemoptysis and worsening infiltrates   · Bronch on 12/26 with serial aliquots persistently bloody despite lavage consistent with DAH  · Completed 3 days of pulse dose steroids this morning, transition to solumedrol 80mg daily   · Hold anticoagulation  · Received Rituximab per rheumatology on Tuesday, plan for weekly dosing x 4 weeks   · Appreciate pulmonary and rheumatology input

## 2022-12-29 NOTE — ASSESSMENT & PLAN NOTE
· Patient presented to the emergency room on 12/25 with SIRS criteria -increased respiratory rate and hypoxia  · With decompensation on 12/25, antibiotics were started with cefepime/vanco given worsening hypoxia however low suspicion for infection  · BAL with mixed respiratory micheal  · pneumocystis smear negative   · Completing 5 days abx today  · Trend temps and WBC   · procal difficult to interpret in setting of ESRD

## 2022-12-29 NOTE — PROGRESS NOTES
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   CXR stable  Currently on Solumedrol 1g IV daily x 3 days, then 80 mg daily while intubated, then prednisone 80 mg daily when tolerating PO  Rheumatology following peripherally  Initiated on rituxamab  If no improvement, further consideration for plasmapheresis/plasma exchange per Rheumatology      Hydropneumothorax              S/P thoracentesis on recent admission with possible ex vacuo hydropneumothorax  Continue CT per primary team/while on ventilator      ESRD on HD              Management and HD per Nephrology  Chief Complaint:    Unable to obtain as patient is on the ventilator    Subjective:    Tabitha Morris is sedated and intubated  No significant overnight events noted  He is on 55% FiO2 with no significant change  Still with intermittent bloody secretions  Objective:    Vitals: Blood pressure 120/59, pulse 75, temperature 98 °F (36 7 °C), temperature source Temporal, resp  rate (!) 25, height 5' 8" (1 727 m), weight 80 1 kg (176 lb 9 4 oz), SpO2 96 %  AC/VC rate 14 tidal volume 400 FiO2 55% +8 PEEP,Body mass index is 26 85 kg/m²  Intake/Output Summary (Last 24 hours) at 12/29/2022 1053  Last data filed at 12/29/2022 0601  Gross per 24 hour   Intake 1908 75 ml   Output 700 ml   Net 1208 75 ml       Invasive Devices     Peripheral Intravenous Line  Duration           Long-Dwell Peripheral IV (Midline) 12/26/22 Left Brachial 3 days    Peripheral IV 12/29/22 Dorsal (posterior); Left Hand <1 day Hemodialysis Catheter  Duration           HD Permanent Double Catheter 295 days          Drain  Duration           NG/OG/Enteral Tube Orogastric 16 Fr Center mouth 3 days    Chest Tube Right Pleural 10 Fr  1 day          Airway  Duration           ETT  Oral 7 5 mm 3 days                Physical Exam:     Physical Exam  Vitals reviewed  Constitutional:       General: He is not in acute distress  Appearance: He is not ill-appearing, toxic-appearing or diaphoretic  Interventions: He is sedated and intubated  HENT:      Head: Normocephalic and atraumatic  Eyes:      General: No scleral icterus  Cardiovascular:      Rate and Rhythm: Normal rate and regular rhythm  Pulses: Normal pulses  Heart sounds: S1 normal and S2 normal  No murmur heard  No friction rub  No gallop  Comments: BUE edema  Pulmonary:      Effort: Pulmonary effort is normal  No tachypnea, accessory muscle usage or respiratory distress  He is intubated  Breath sounds: Normal breath sounds  No stridor  No decreased breath sounds, wheezing, rhonchi or rales  Comments: Chest tube in place with serosanguineous drainage  No air leak  No crepitus  Dressing intact  Chest:      Chest wall: No tenderness  Abdominal:      General: Bowel sounds are normal  There is no distension  Palpations: Abdomen is soft  Tenderness: There is no abdominal tenderness  Musculoskeletal:      Cervical back: Neck supple  Right lower leg: No edema  Left lower leg: No edema  Skin:     General: Skin is warm and dry  Neurological:      GCS: GCS eye subscore is 3  GCS verbal subscore is 1  GCS motor subscore is 5         Labs:   CBC:   Lab Results   Component Value Date    WBC 15 29 (H) 12/29/2022    HGB 9 0 (L) 12/29/2022    HCT 27 8 (L) 12/29/2022    MCV 88 12/29/2022     12/29/2022    MCH 28 5 12/29/2022    MCHC 32 4 12/29/2022    RDW 14 8 12/29/2022    MPV 9 5 12/29/2022    NRBC 0 12/29/2022   , CMP:   Lab Results   Component Value Date    SODIUM 131 (L) 12/29/2022    K 4 8 12/29/2022    CL 94 (L) 12/29/2022    CO2 24 12/29/2022    BUN 57 (H) 12/29/2022    CREATININE 3 96 (H) 12/29/2022    CALCIUM 7 2 (L) 12/29/2022    EGFR 13 12/29/2022     Imaging and other studies: I have personally reviewed pertinent reports   , I have personally reviewed pertinent films in PACS and Chest x-ray shows unchanged bilateral airspace consolidation with pigtail chest tube in place on the right

## 2022-12-29 NOTE — CASE MANAGEMENT
Case Management Progress Note    Patient name She Owens  Location ICU 11/ICU 11 MRN [id number]  : 1945 Date 2022       LOS (days): 4  Geometric Mean LOS (GMLOS) (days): 5 00  Days to GMLOS:0 9        OBJECTIVE:    Current admission status: Inpatient  Preferred Pharmacy:   [facility] [address] [facility] [address], [address] [address]  Phone: [phone] Fax: [phone]    [facility] ([address] [facility],   [address]  [address] [place] [address]  Phone: [phone] Fax: [phone]    Primary Care Provider: Tamela Zapata MD    Primary Insurance: MEDICARE  Secondary Insurance: BLUE CROSS    PROGRESS NOTE:    SW following to monitor needs and assist with planning  Once stable wife requesting return to [facility] for continued rehab  [facility] has accepted pt back in [place]  Facility will be reserved in [place]

## 2022-12-29 NOTE — ASSESSMENT & PLAN NOTE
· Patient presented to the emergency department on 12/25 with worsening shortness of breath and hypoxia after missing his dialysis treatment yesterday due to lack of transportation  · Initially in the emergency room, the patient was placed on BiPAP but he was weaned to mid flow  · Overnight 12/25, became more hypoxic and ultimately required intubation  · Began having hemoptysis and significant blood suctioned post intubation   Concern for alveolar hemorrhage secondary to vasculitis-see plan as above   · Vent settings: 14/400/8/55%  · Daily SAT/SBT  · Sedation with propofol and precedex  · Titrate for RAAS -1  · VAP bundles; chlorhexidine, PPI, HOB greater than 30 degrees  · Pulmonary toilet with xopenex/3% NSS nebs, chest PT TID, ETT suctioning PRN   · CXR this morning with persistent extensive B/L patchy pulmonary opacifications

## 2022-12-29 NOTE — PROGRESS NOTES
-- Patient:  -- MRN: [id number]  -- Aidin Request ID: [id number]  -- Level of care reserved: Zana Douglas  -- Partner Reserved: [facility], Ting Rushing [phone]  -- Clinical needs requested:  -- Geography searched: 10 miles around [facility]  -- Start of Service:  -- Request sent: 11:57am EST on 12/29/2022 by Beatriz Wick  -- Partner reserved: 3:13pm EST on 12/29/2022 by Beatriz Wick  -- Choice list shared: 2:18pm EST on 12/29/2022 by Beatriz Wick

## 2022-12-29 NOTE — PROGRESS NOTES
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  -12/26-completed 2-hour dialysis treatment did not tolerate ultrafiltration completed bronchoscopy with   Moderate amount of bloody secretions from all lobes  - 12/27 - could not have dialysis to facilitate timing of ritux  Dialysis catheter needed to be emergency accessed after review with primary team  HD and CRRT nurses were able to be present to access and deaccess  - 12/28 - HD completed  Net even balance     Plan:  - f/u bladder scans  - HD MWF for now (normally is on TTS)  -  treatment for hemoptysis in progress as below  - concern for vasculitis pulmonary related   Rheum has been consulted  - anemia - transfusion per primary team  - dose vanc per level  - will eventually need PCP prophylaxis  - reviewed with primary team dialysis plans     2-shortness of breath - concern for vasculitis; intubated 12/25     - Status postthoracentesis during prior hospitalization on December 23  - Initially hypoxic and requiring facemask  - Missed dialysis on 12/24 due to transportation issue  - concern for infectious etiology versus noninfectious etiology  - Pulmonary team also on board along with ICU team  - Concern for hydropneumothorax on chest x-ray and therefore patient is undergoing CT scan  - On broad-spectrum antibiotics  - Started on pulse dose steroids due to concern for vasculitis also with hemoptysis  - Bronchoscopy completed with signs of alveolar hemorrhage  - CT scan mod hydropneumothorax, new compared to prior  Inc in b/l pulm infiltrates  - PCP smear negative  - 12/27 - started on ritux  Completing -pulse steroids     3 hyponatremia volume mediated along with hyperglycemia-     - Monitor with dialysis     4-anemia-on Mircera as outpatient     - restart epogen with full HD  - hemoglobin to 5 7 during prior hospitalization  - Stool occult positive prior  - Received multiple blood transfusions prior admission  - Hb decreasing 12/28 - per primary team     5 - vol overload     - missed HD after discharge  - history of bilateral pl effusions  - albumin 1 6 leading to anasarca  - required thoracentesis prior     6) covid19     - per primary team     7 - MBD     - on phos binder     8 - hydropneumothorax - s/p chest tube by IR team 12/27    9- electrolytes - monitor Na for now  131  K stable       10- acid/base - stable       SUBJECTIVE / 24H INTERVAL HISTORY:    Remains intubated    OBJECTIVE:  Current Weight: Weight - Scale: 80 1 kg (176 lb 9 4 oz)  Vitals:    12/29/22 0404 12/29/22 0542 12/29/22 0737 12/29/22 0743   BP:       Pulse:       Resp:       Temp:   98 °F (36 7 °C)    TempSrc:   Temporal    SpO2: 96%   96%   Weight:  80 1 kg (176 lb 9 4 oz)     Height:           Intake/Output Summary (Last 24 hours) at 12/29/2022 9424  Last data filed at 12/29/2022 0601  Gross per 24 hour   Intake 1908 75 ml   Output 700 ml   Net 1208 75 ml     General: NAD  Skin: no rash  Eyes: anicteric sclera  ENT: ET tube in place  Neck: supple  Chest: coarse breath sounds b/l  CVS: s1s2, no murmur, no gallop, no rub  Abdomen: soft, nl sounds  Extremities: 1+ edema LE b/l  : no ruvalcaba  Neuro: intubated  Psych: intubated    Medications:    Current Facility-Administered Medications:   •  calcium acetate (PHOSLO) capsule 667 mg, 667 mg, Oral, TID With Meals, Baker Malinda Dai, CRNP, 667 mg at 12/29/22 0825  •  calcium carbonate-vitamin D 500 mg-5 mcg tablet 2 tablet, 2 tablet, Oral, BID With Meals, [employer], SEYMOURNP, 2 tablet at 12/29/22 0825  •  cefepime (MAXIPIME) IVPB (premix in dextrose) 1,000 mg 50 mL, 1,000 mg, Intravenous, Q24H, [employer], CRNP, Last Rate: 100 mL/hr at 12/28/22 1612, 1,000 mg at 12/28/22 1612  •  chlorhexidine (PERIDEX) 0 12 % oral rinse 15 mL, 15 mL, Mouth/Throat, [address], Inderjit Quan PA-C, 15 mL at 12/29/22 8225  •  dexmedeTOMIDine (Precedex) 400 mcg in sodium chloride 0 9 % 100 mL infusion, 0 1-1 2 mcg/kg/hr, Intravenous, Titrated, SEYMOUR VallesNP, Last Rate: 7 9 mL/hr at 12/29/22 0730, 0 4 mcg/kg/hr at 12/29/22 0730  •  epoetin ani (EPOGEN,PROCRIT) injection 4,000 Units, 4,000 Units, Intravenous, Once per day on Mon Wed Fri, Michael Duran MD, 4,000 Units at 12/28/22 1557  •  fentanyl citrate (PF) 100 MCG/2ML 50 mcg, 50 mcg, Intravenous, R2C PRN, Inderjit Quan PA-C, 50 mcg at 12/26/22 2136  •  insulin glargine (LANTUS) subcutaneous injection 15 Units 0 15 mL, 15 Units, Subcutaneous, QAM, [employer], SEYMOURNP, 13 Units at 12/28/22 0813  •  insulin lispro (HumaLOG) 100 units/mL subcutaneous injection 1-6 Units, 1-6 Units, Subcutaneous, Q6H, 2 Units at 12/28/22 1159 **AND** Fingerstick Glucose (POCT), , , H6L, Temo David PA-C  •  levalbuterol [facility]) inhalation solution 0 63 mg, 0 63 mg, Nebulization, Q6H, Joie Nina V, CRNP, 0 63 mg at 12/29/22 0743  •  lidocaine (LIDODERM) 5 % patch 1 patch, 1 patch, Topical, Daily, Inderjit Quan PA-C, 1 patch at 12/29/22 0830  •  lidocaine (PF) (XYLOCAINE-MPF) 1 % injection, , , PRN, Dylan Jhaveri MD, 5 mL at 12/27/22 1349  •  omeprazole (PRILOSEC) suspension 2 mg/mL, 20 mg, Per NG Tube, Daily, FERNANDO Valles, 20 mg at 12/29/22 0827  •  ondansetron (ZOFRAN) injection 4 mg, 4 mg, Intravenous, H0O PRN, Inderjit Quan PA-C  •  pravastatin (PRAVACHOL) tablet 80 mg, 80 mg, Oral, Daily With Eliza Oshea, CRNP, 80 mg at 12/28/22 1636  •  propofol (DIPRIVAN) 1000 mg in 100 mL infusion (premix), 5-50 mcg/kg/min, Intravenous, Titrated, FERNANDO Ritchie, Last Rate: 16 9 mL/hr at 12/29/22 4767, 35 mcg/kg/min at 12/29/22 5422  •  sodium chloride 3 % inhalation solution 4 mL, 4 mL, Nebulization, Q6H, FERNANDO Valles, 4 mL at 12/29/22 8067  •  vancomycin (VANCOCIN) IVPB (premix in dextrose) 750 mg 150 mL, 10 mg/kg, Intravenous, Once per day on Tue Thu Sat, [facility], [facility], Last Rate: 150 mL/hr at 12/27/22 1500, 750 mg at 12/27/22 1500    Laboratory Results:  Results from last 7 days   Lab Units 12/29/22  0536 12/28/22  0500 12/27/22  2118 12/27/22  0645 12/26/22  1142 12/26/22  0616 12/25/22  1146 12/23/22  0511 12/22/22  0927   WBC Thousand/uL 15 29* 6 49  --  8 19  --  8 50 12 12* 6 68 8 38   HEMOGLOBIN g/dL 9 0* 6 7*  --  7 1*  --  7 7* 9 3* 8 7* 8 5*   HEMATOCRIT % 27 8* 21 3*  --  23 5*  --  24 7* 28 9* 27 5* 26 8*   PLATELETS Thousands/uL 290 269  --  297  --  275 406* 291 317   POTASSIUM mmol/L 4 8 5 5* 5 3 5 4* 4 4 5 8* 5 1 4 5 4 5   CHLORIDE mmol/L 94* 97 96 95* 95* 93* 85* 93* 97   CO2 mmol/L 24 22 25 24 26 26 27 27 30   BUN mg/dL 57* 89* 83* 71* 53* 86* 107* 46* 47*   CREATININE mg/dL 3 96* 5 65* 5 37* 4 77* 3 58* 5 55* 6 50* 4 14* 4 30*   CALCIUM mg/dL 7 2* 7 2* 7 3* 7 6* 7 6* 7 7* 8 1* 7 7* 7 4*   MAGNESIUM mg/dL 2 1 2 4  --  2 1  --  2 2 2 2  --   --    PHOSPHORUS mg/dL 5 8*  --   --  6 6*  --  5 9*  --   --   --

## 2022-12-29 NOTE — ASSESSMENT & PLAN NOTE
Lab Results   Component Value Date    EGFR 13 12/29/2022    EGFR 8 12/28/2022    EGFR 9 12/27/2022    CREATININE 3 96 (H) 12/29/2022    CREATININE 5 65 (H) 12/28/2022    CREATININE 5 37 (H) 12/27/2022     · Patient had a recent admission from 12/20-12/23 with hemoglobin 5 7 -received 1 unit packed red blood cells at that time  · Most likely acute on chronic anemia in setting of ESRD now with DAH  · Hgb 6 7 yesterday and received 1 PRBC, repeat hgb 9 0 this morning  · Continue to trend daily and transfuse for hgb less than 7 0

## 2022-12-29 NOTE — ASSESSMENT & PLAN NOTE
· Patient had a thoracentesis 12/21 (previous admission)  · Chest x-ray with hydropneumothorax thought to likely be related to this, possibly ex vacuo  · S/p R pleural chest tube with IR 12/27  · 350mL serousanguinous drainage x 24 hours  · Lung remains inflated on CXR this morning   · Will try chest tube to H20 seal today and repeat CXR tomorrow morning

## 2022-12-30 NOTE — PROGRESS NOTES
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              Currently on Solumedrol 80 mg daily while intubated, then prednisone 80 mg daily when tolerating PO               Rheumatology following peripherally               Initiated on rituxamab      Hydropneumothorax              S/P thoracentesis on recent admission with possible ex vacuo hydropneumothorax               Continue CT per primary team/while on ventilator      ESRD on HD              Management and HD per Nephrology  Discussed with primary team     Chief Complaint:    Unable to obtain given patient is on ventilator    Subjective:    Overnight events reviewed with primary team   Patient reportedly did well on pressure support yesterday  Stable vent settings today  Minimal pink secretions in suction tubing  Objective:    Vitals: Blood pressure 123/60, pulse 82, temperature 98 4 °F (36 9 °C), temperature source Temporal, resp  rate 20, height 5' 8" (1 727 m), weight 80 kg (176 lb 5 9 oz), SpO2 96 %  AC/VC rate 14 tidal volume 400 55% FiO2 +8 of PEEP,Body mass index is 26 82 kg/m²  Intake/Output Summary (Last 24 hours) at 12/30/2022 0834  Last data filed at 12/30/2022 0730  Gross per 24 hour   Intake 1317 48 ml   Output 210 ml   Net 1107 48 ml       Invasive Devices     Peripheral Intravenous Line  Duration           Long-Dwell Peripheral IV (Midline) 12/26/22 Left Brachial 4 days    Peripheral IV 12/29/22 Dorsal (posterior); Left Hand 1 day          Hemodialysis Catheter  Duration           HD Permanent Double Catheter 296 days Drain  Duration           NG/OG/Enteral Tube Orogastric 16 Fr Center mouth 4 days    Chest Tube Right Pleural 10 Fr  2 days          Airway  Duration           ETT  Oral 7 5 mm 4 days                Physical Exam:     Physical Exam  Vitals reviewed  Constitutional:       General: He is not in acute distress  Appearance: He is not ill-appearing, toxic-appearing or diaphoretic  Interventions: He is sedated and intubated  HENT:      Head: Normocephalic and atraumatic  Eyes:      General: No scleral icterus  Cardiovascular:      Rate and Rhythm: Normal rate and regular rhythm  Heart sounds: S1 normal and S2 normal  No murmur heard  No friction rub  No gallop  Pulmonary:      Effort: Pulmonary effort is normal  No tachypnea, accessory muscle usage or respiratory distress  He is intubated  Breath sounds: No stridor  Rhonchi present  No decreased breath sounds, wheezing or rales  Comments: Right chest tube in place with dressing intact  No crepitus  No airleak  Chest:      Chest wall: No tenderness  Abdominal:      General: Bowel sounds are normal  There is no distension  Palpations: Abdomen is soft  Tenderness: There is no abdominal tenderness  Musculoskeletal:      Cervical back: Neck supple  Right lower leg: No edema  Left lower leg: No edema  Comments: Bilateral upper extremity swelling  Skin:     General: Skin is warm and dry  Neurological:      GCS: GCS eye subscore is 3  GCS verbal subscore is 1  GCS motor subscore is 5         Labs:   CBC:   Lab Results   Component Value Date    WBC 22 55 (H) 12/30/2022    HGB 9 3 (L) 12/30/2022    HCT 29 6 (L) 12/30/2022    MCV 90 12/30/2022     12/30/2022    MCH 28 2 12/30/2022    MCHC 31 4 12/30/2022    RDW 14 7 12/30/2022    MPV 9 2 12/30/2022   , CMP:   Lab Results   Component Value Date    SODIUM 131 (L) 12/30/2022    K 4 8 12/30/2022    CL 93 (L) 12/30/2022    CO2 26 12/30/2022    BUN 80 (H) 12/30/2022    CREATININE 5 02 (H) 12/30/2022    CALCIUM 7 1 (L) 12/30/2022    EGFR 10 12/30/2022     Imaging and other studies: I have personally reviewed pertinent films in PACS and Chest x-ray done this morning shows continued bilateral pulmonary infiltrates, possible slight worsening on the right  Awaiting final radiology

## 2022-12-30 NOTE — ASSESSMENT & PLAN NOTE
Lab Results   Component Value Date    EGFR 13 12/29/2022    EGFR 8 12/28/2022    EGFR 9 12/27/2022    CREATININE 3 96 (H) 12/29/2022    CREATININE 5 65 (H) 12/28/2022    CREATININE 5 37 (H) 12/27/2022     · Patient had a recent admission from 12/20-12/23 with hemoglobin 5 7 -received 1 unit packed red blood cells at that time  · Most likely acute on chronic anemia in setting of ESRD now with DAH  · Received 1 unit of PRBCs   · Continue to trend daily and transfuse for hgb less than 7 0

## 2022-12-30 NOTE — PROGRESS NOTES
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  -12/26-completed 2-hour dialysis treatment did not tolerate ultrafiltration completed bronchoscopy with   Moderate amount of bloody secretions from all lobes  - 12/27 - could not have dialysis to facilitate timing of ritux  Dialysis catheter needed to be emergency accessed after review with primary team  HD and CRRT nurses were able to be present to access and deaccess  - 12/28 - HD completed  Net even balance  - 12/30 -HD with 1 L UF goal     Plan:  - f/u bladder scans  - HD MWF for now (normally is on TTS) - seen on dialysis  Na 137, bciarb 35, F160, temp 35, UF 1 L  -  treatment for hemoptysis in progress as below  - concern for vasculitis pulmonary related  Rheum has been consulted  - anemia - transfusion per primary team  - will eventually need PCP prophylaxis  - reviewed with primary team dialysis plans  - may need to consider other access for ritux infusions  Not ideal to use [facility] each time  - trend phos   - pt will next be evaluated on Monday from renal standpoint   Please call if questions/issues arise over weekend     2-shortness of breath - concern for vasculitis; intubated 12/25     - Status postthoracentesis during prior hospitalization on December 23  - Initially hypoxic and requiring facemask  - Missed dialysis on 12/24 due to transportation issue  - concern for infectious etiology versus noninfectious etiology  - Pulmonary team also on board along with ICU team  - Concern for hydropneumothorax on chest x-ray and therefore patient is undergoing CT scan  - On broad-spectrum antibiotics  - Started on pulse dose steroids due to concern for vasculitis also with hemoptysis  - Bronchoscopy completed with signs of alveolar hemorrhage  - CT scan mod hydropneumothorax, new compared to prior  Inc in b/l pulm infiltrates  - PCP smear negative  - 12/27 - started on ritux  Completing -pulse steroids  - 12/29 - improving hemoptysis      3 hyponatremia volume mediated along with hyperglycemia-     - Monitor with dialysis and BS control     4-anemia-on Mircera as outpatient     - restart epogen with full HD  - hemoglobin to 5 7 during prior hospitalization  - Stool occult positive prior  - Received multiple blood transfusions prior admission  - Hb decreasing 12/28 - per primary team     5 - vol overload     - missed HD after discharge  - history of bilateral pl effusions  - albumin 1 6 leading to anasarca  - required thoracentesis prior     6) covid19     - per primary team     7 - MBD     - on phos binder     8 - hydropneumothorax - s/p chest tube by IR team 12/27     9- electrolytes - hyperphos - trend and cont phos binder     10- acid/base - stable    11) leukocytosis  Mult factorial  On steroids  SUBJECTIVE / 24H INTERVAL HISTORY:    Intubated  Seen on dialysis  SBP 130s       OBJECTIVE:  Current Weight: Weight - Scale: 80 kg (176 lb 5 9 oz)  Vitals:    12/30/22 0817 12/30/22 0830 12/30/22 0845 12/30/22 0900   BP: 123/60 126/59 132/61 134/64   Pulse: 82 79 81 85   Resp: 20 18 20 20   Temp:       TempSrc:       SpO2: 96% 96% 96% 95%   Weight:       Height:           Intake/Output Summary (Last 24 hours) at 12/30/2022 0909  Last data filed at 12/30/2022 6542  Gross per 24 hour   Intake 1517 48 ml   Output 210 ml   Net 1307 48 ml     General: NAD  Skin: no rash  ENT: ET tube in place  Neck: supple  Chest: coarse breath sounds b/l  + chest tube  CVS: s1s2, no murmur, no gallop, no rub  Abdomen: soft, nl sounds  Extremities: anasarca  : no ruvalcaba  Neuro: cannot assess   sedated  Psych: cannot assess    Medications:    Current Facility-Administered Medications:   •  calcium acetate (PHOSLO) capsule 667 mg, 667 mg, Oral, TID With Meals, [employer], FERNANDO, 667 mg at 12/30/22 0900  •  calcium carbonate-vitamin D 500 mg-5 mcg tablet 2 tablet, 2 tablet, Oral, BID With Meals, [employer], FERNANDO, 2 tablet at 12/30/22 0900  •  chlorhexidine (PERIDEX) 0 12 % oral rinse 15 mL, 15 mL, Mouth/Throat, [address]98[address] [facility], Corin Quan PA-C, 15 mL at 12/30/22 9636  •  dexmedeTOMIDine (Precedex) 400 mcg in sodium chloride 0 9 % 100 mL infusion, 0 1-1 2 mcg/kg/hr, Intravenous, Titrated, FERNANDO Valles, Last Rate: 7 9 mL/hr at 12/30/22 0730, 0 4 mcg/kg/hr at 12/30/22 0730  •  epoetin ani (EPOGEN,PROCRIT) injection 4,000 Units, 4,000 Units, Intravenous, Once per day on Mon Wed Fri, Braydon Posada MD, 4,000 Units at 12/30/22 0224  •  fentanyl citrate (PF) 100 MCG/2ML 50 mcg, 50 mcg, Intravenous, N1V PRN, Yoanna Quan PA-C, 50 mcg at 12/26/22 2136  •  insulin glargine (LANTUS) subcutaneous injection 15 Units 0 15 mL, 15 Units, Subcutaneous, QAM, [employer], CRNP, [place] Units at 12/30/22 2766  •  insulin lispro (HumaLOG) 100 units/mL subcutaneous injection 1-6 Units, 1-6 Units, Subcutaneous, Q6H, 2 Units at 12/30/22 0544 **AND** Fingerstick Glucose (POCT), , , I1N, Paul Strong PA-C  •  levalbuterol [facility]) inhalation solution 0 63 mg, 0 63 mg, Nebulization, Q6H, SEYMOUR VallesNP, 0 63 mg at 12/30/22 0736  •  lidocaine (LIDODERM) 5 % patch 1 patch, 1 patch, Topical, Daily, Yoanna Quan PA-C, 1 patch at 12/30/22 4591  •  lidocaine (PF) (XYLOCAINE-MPF) 1 % injection, , , PRN, Tawnya Ely MD, 5 mL at 12/27/22 1349  •  methylPREDNISolone sodium succinate (Solu-MEDROL) injection 80 mg, 80 mg, Intravenous, Daily, Joie Spironello V, CRNP, 80 mg at 12/29/22 2110  •  omeprazole (PRILOSEC) suspension 2 mg/mL, 20 mg, Per NG Tube, Daily, Joie Spironello V, CRNP, 20 mg at 12/30/22 0901  •  ondansetron (ZOFRAN) injection 4 mg, 4 mg, Intravenous, N1W PRN, Jose Miguel Quan PA-C  •  polyethylene glycol (MIRALAX) packet 17 g, 17 g, Oral, Daily, Joie Spironello V, CRNP, 17 g at 12/29/22 1300  •  pravastatin (PRAVACHOL) tablet 80 mg, 80 mg, Oral, Daily With Dinner, [employer], CRNP, 80 mg at 12/29/22 1621  •  propofol (DIPRIVAN) 1000 mg in 100 mL infusion (premix), 5-50 mcg/kg/min, Intravenous, Titrated, Jose Guadalupe Stevens CRNP, Last Rate: 9 7 mL/hr at 12/30/22 0730, 20 mcg/kg/min at 12/30/22 0730  •  senna oral syrup 8 8 mg, 8 8 mg, Oral, BID, Joie Spironello V, CRNP, 8 8 mg at 12/29/22 2214  •  sodium chloride 3 % inhalation solution 4 mL, 4 mL, Nebulization, Q6H, Joie Spironello V, CRNP, 4 mL at 12/30/22 0751    Laboratory Results:  Results from last 7 days   Lab Units 12/30/22  0450 12/29/22  0536 12/28/22  0500 12/27/22  2118 12/27/22  0645 12/26/22  1142 12/26/22  0616 12/25/22  1146   WBC Thousand/uL 22 55* 15 29* 6 49  --  8 19  --  8 50 12 12*   HEMOGLOBIN g/dL 9 3* 9 0* 6 7*  --  7 1*  --  7 7* 9 3*   HEMATOCRIT % 29 6* 27 8* 21 3*  --  23 5*  --  24 7* 28 9*   PLATELETS Thousands/uL 312 290 269  --  297  --  275 406*   POTASSIUM mmol/L 4 8 4 8 5 5* 5 3 5 4* 4 4 5 8* 5 1   CHLORIDE mmol/L 93* 94* 97 96 95* 95* 93* 85*   CO2 mmol/L 26 24 22 25 24 26 26 27   BUN mg/dL 80* 57* 89* 83* 71* 53* 86* 107*   CREATININE mg/dL 5 02* 3 96* 5 65* 5 37* 4 77* 3 58* 5 55* 6 50*   CALCIUM mg/dL 7 1* 7 2* 7 2* 7 3* 7 6* 7 6* 7 7* 8 1*   MAGNESIUM mg/dL 2 3 2 1 2 4  --  2 1  --  2 2 2 2   PHOSPHORUS mg/dL 8 0* 5 8*  --   --  6 6*  --  5 9*  --

## 2022-12-30 NOTE — PROGRESS NOTES
Ma[facility] 43  Progress Note - Alry Dole 1945, 76 y o  male MRN: [id number]  Unit/Bed#: ICU 11 Encounter: [id number]  Primary Care Provider: Lyly Rojas MD   Date and time admitted to hospital: 12/25/2022 11:20 AM    * Diffuse pulmonary alveolar hemorrhage  Assessment & Plan  · Diffuse alveolar hemorrhage secondary to his underlying ANCA vasculitis given hemoptysis and worsening infiltrates   · Bronch on 12/26 with serial aliquots persistently bloody despite lavage consistent with DAH  · Completed 3 days of pulse dose steroids this morning, transition to solumedrol 80mg daily   · Hold anticoagulation  · Received Rituximab per rheumatology on Tuesday, plan for weekly dosing x 4 weeks   · Appreciate pulmonary and rheumatology input    Acute respiratory failure with hypoxia [facility])  Assessment & Plan  · Patient presented to the emergency department on 12/25 with worsening shortness of breath and hypoxia after missing his dialysis treatment yesterday due to lack of transportation  · Initially in the emergency room, the patient was placed on BiPAP but he was weaned to mid flow  · Overnight 12/25, became more hypoxic and ultimately required intubation  · Began having hemoptysis and significant blood suctioned post intubation   Concern for alveolar hemorrhage secondary to vasculitis-see plan as above   · Vent settings: 14/400/55/8  · Daily SAT/SBT  · Sedation with propofol and precedex  · Titrate for RAAS -1  · VAP bundles; chlorhexidine, PPI, HOB greater than 30 degrees  · Pulmonary toilet with xopenex/3% NSS nebs, chest PT TID, ETT suctioning PRN   · CXR this morning with persistent extensive B/L patchy pulmonary opacifications     SIRS (systemic inflammatory response syndrome) ([facility] [address] )  Assessment & Plan  · Patient presented to the emergency room on 12/25 with SIRS criteria -increased respiratory rate and hypoxia  · With decompensation on 12/25, antibiotics were started with cefepime/vanco given worsening hypoxia however low suspicion for infection  · BAL with mixed respiratory micheal  · pneumocystis smear negative   · Completing 5 days abx today  · Trend temps and WBC   · procal difficult to interpret in setting of ESRD    Hydropneumothorax  Assessment & Plan  · Patient had a thoracentesis 12/21 (previous admission)  · Chest x-ray with hydropneumothorax thought to likely be related to this, possibly ex vacuo  · S/p R pleural chest tube with IR 12/27  · 350mL serousanguinous drainage x 24 hours  · Lung remains inflated on CXR this morning   · Continue chest tube to water seal with repeat CXR in the am     Urinary retention  Assessment & Plan  · Bladder scan qshift  · Urinary retention protocol    ANCA-associated vasculitis  Assessment & Plan  · Kidney biopsy on 03/22: 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 for which he was previously treated with Cytoxan (discontinued in April) and subsequently prescribed prednisone taper  · Now with pulmonary involvement, continue rituximab and steroids as above     ESRD on dialysis [facility])  Assessment & Plan  Lab Results   Component Value Date    EGFR 13 12/29/2022    EGFR 8 12/28/2022    EGFR 9 12/27/2022    CREATININE 3 96 (H) 12/29/2022    CREATININE 5 65 (H) 12/28/2022    CREATININE 5 37 (H) 12/27/2022       · Patient scheduled dialysis Tuesday/Thursday/Saturday however he was unable to make his treatment on Saturday 12/24 due to the transportation bus not picking him up  · Patient started with worsening shortness of breath on 12/25 presented to the emergency room  · Nephrology consulted and HD session done on 12/25 for 3 5L  · Continue MWF schedule for now per nephrology  · Continue PhosLo as ordered    Type 2 diabetes mellitus with chronic kidney disease on chronic dialysis, with long-term current use of insulin [facility])  Assessment & Plan  Lab Results   Component Value Date    HGBA1C 5 7 (H) 10/19/2022       Recent Labs 12/29/22  0541 12/29/22  1143 12/29/22  1712 12/30/22  0009   POCGLU 131 164* 148* 167*       Blood Sugar Average: Last 72 hrs:  (P) 045 [id number]   · Continue sliding scale coverage q6h + Lantus 15u in AM   · Holding home regimen of 15u 70/30 BID   · Goal blood glucose less than 180    Anemia due to chronic kidney disease, on chronic dialysis [facility])  Assessment & Plan  Lab Results   Component Value Date    EGFR 13 12/29/2022    EGFR 8 12/28/2022    EGFR 9 12/27/2022    CREATININE 3 96 (H) 12/29/2022    CREATININE 5 65 (H) 12/28/2022    CREATININE 5 37 (H) 12/27/2022     · Patient had a recent admission from 12/20-12/23 with hemoglobin 5 7 -received 1 unit packed red blood cells at that time  · Most likely acute on chronic anemia in setting of ESRD now with DAH  · Received 1 unit of PRBCs   · Continue to trend daily and transfuse for hgb less than 7 0    Benign essential hypertension  Assessment & Plan  · Continue to hold home dose Norvasc and clonidine     Hyperlipidemia  Assessment & Plan  · Continue home statin      ----------------------------------------------------------------------------------------  HPI/24hr events: No acute events overnight  Patient tolerated PS 10/6 during the day    He was rested overnight on ACVC and his chest tube was on water seal      Patient appropriate for transfer out of the ICU today?: No  Disposition: Continue Critical Care   Code Status: Level 1 - Full Code  ---------------------------------------------------------------------------------------  SUBJECTIVE  Intubated     Review of Systems   Unable to perform ROS: Intubated     Review of systems was unable to be performed secondary to intubated   ---------------------------------------------------------------------------------------  OBJECTIVE    Vitals   Vitals:    12/30/22 0200 12/30/22 0300 12/30/22 0500 12/30/22 0543   BP: 137/65 117/55 127/60    Pulse: 88 74 80    Resp: 21 22 21    Temp:       TempSrc:       SpO2: 96% 94% 96%    Weight:    80 kg (176 lb 5 9 oz)   Height:         Temp (24hrs), Av 4 °F (36 9 °C), Min:98 °F (36 7 °C), Max:98 7 °F (37 1 °C)  Current: Temperature: 98 4 °F (36 9 °C)          Respiratory:  SpO2: SpO2: 96 %  Nasal Cannula O2 Flow Rate (L/min): 80 L/min    Invasive/non-invasive ventilation settings   Respiratory    Lab Data (Last 4 hours)    None         O2/Vent Data (Last 4 hours)       0324           Vent Mode AC/VC       Resp Rate (BPM) (BPM) 14       Vt (mL) (mL) 400       FIO2 (%) (%) 55       PEEP (cmH2O) (cmH2O) 8       MV 8 21                   Physical Exam  Vitals and nursing note reviewed  Constitutional:       Comments: B/L wrist restraints    HENT:      Head: Normocephalic and atraumatic  Right Ear: Tympanic membrane, ear canal and external ear normal       Left Ear: Tympanic membrane, ear canal and external ear normal       Nose: Nose normal       Mouth/Throat:      Mouth: Mucous membranes are dry  Pharynx: Oropharynx is clear  Eyes:      Extraocular Movements: Extraocular movements intact  Conjunctiva/sclera: Conjunctivae normal       Pupils: Pupils are equal, round, and reactive to light  Cardiovascular:      Rate and Rhythm: Normal rate and regular rhythm  Pulses: Normal pulses  Heart sounds: Normal heart sounds  Pulmonary:      Comments: ETT on mechanical ventilation  B/L breath sounds diminished   Right chest tube to water seal   Abdominal:      General: Bowel sounds are normal       Palpations: Abdomen is soft  Comments: Tube feeds via OGT    Genitourinary:     Comments: Anuric   Musculoskeletal:         General: Normal range of motion  Cervical back: Normal range of motion and neck supple  Skin:     General: Skin is warm and dry  Capillary Refill: Capillary refill takes less than 2 seconds     Neurological:      Comments: Intubated   Withdraws to painful stimuli    Psychiatric:      Comments: Intubated Laboratory and Diagnostics:  Results from last 7 days   Lab Units 12/30/22  0450 12/29/22  0536 12/28/22  0500 12/27/22  0645 12/26/22  0616 12/25/22  1146   WBC Thousand/uL 22 55* 15 29* 6 49 8 19 8 50 12 12*   HEMOGLOBIN g/dL 9 3* 9 0* 6 7* 7 1* 7 7* 9 3*   HEMATOCRIT % 29 6* 27 8* 21 3* 23 5* 24 7* 28 9*   PLATELETS Thousands/uL 312 290 269 297 275 406*   NEUTROS PCT %  --  95*  --   --  95* 90*   BANDS PCT %  --   --   --  9*  --   --    MONOS PCT %  --  3*  --   --  2* 6   MONO PCT %  --   --   --  2*  --   --      Results from last 7 days   Lab Units 12/30/22  0450 12/29/22  0536 12/28/22  0500 12/27/22  2118 12/27/22  0645 12/26/22  1142 12/26/22  0616 12/25/22  1146   SODIUM mmol/L 131* 131* 134* 132* 131* 130* 129* 124*   POTASSIUM mmol/L 4 8 4 8 5 5* 5 3 5 4* 4 4 5 8* 5 1   CHLORIDE mmol/L 93* 94* 97 96 95* 95* 93* 85*   CO2 mmol/L 26 24 22 25 24 26 26 27   ANION GAP mmol/L 12 13 15* 11 12 9 10 12   BUN mg/dL 80* 57* 89* 83* 71* 53* 86* 107*   CREATININE mg/dL 5 02* 3 96* 5 65* 5 37* 4 77* 3 58* 5 55* 6 50*   CALCIUM mg/dL 7 1* 7 2* 7 2* 7 3* 7 6* 7 6* 7 7* 8 1*   GLUCOSE RANDOM mg/dL 219* 160* 193* 197* 163* 155* 146* 365*   ALT U/L  --   --   --   --   --   --   --  14   AST U/L  --   --   --   --   --   --   --  10   ALK PHOS U/L  --   --   --   --   --   --   --  112   ALBUMIN g/dL  --   --   --   --   --   --   --  1 9*   TOTAL BILIRUBIN mg/dL  --   --   --   --   --   --   --  0 37     Results from last 7 days   Lab Units 12/30/22  0450 12/29/22  0536 12/28/22  0500 12/27/22  0645 12/26/22  0616 12/25/22  1146   MAGNESIUM mg/dL 2 3 2 1 2 4 2 1 2 2 2 2   PHOSPHORUS mg/dL 8 0* 5 8*  --  6 6* 5 9*  --       Results from last 7 days   Lab Units 12/27/22  1042 12/26/22  1141 12/26/22  0045   INR  1 33*  --  1 35*   PTT seconds  --  32  --           Results from last 7 days   Lab Units 12/25/22  1146   LACTIC ACID mmol/L 2 2*     ABG:  Results from last 7 days   Lab Units 12/26/22  0052   PH ART  7 352   PCO2 ART mm Hg 48 4*   PO2 ART mm Hg 123 0   HCO3 ART mmol/L 26 2   BASE EXC ART mmol/L 0 5   ABG SOURCE  Radial, Right     VBG:  Results from last 7 days   Lab Units 12/26/22  0052   ABG SOURCE  Radial, Right     Results from last 7 days   Lab Units 12/27/22  0645 12/26/22  0616 12/25/22  1146   PROCALCITONIN ng/ml 2 78* 2 77* 1 79*       Micro  Results from last 7 days   Lab Units 12/26/22  1720 12/26/22  0045 12/25/22  1431 12/25/22  1146   BLOOD CULTURE   --   --   --  No Growth After 4 Days  No Growth After 4 Days  SPUTUM CULTURE   --  Few Colonies of  --   --    GRAM STAIN RESULT  2+ Polys*  Rare Budding Yeast with Pseudomycelia* 3+ Polys  No bacteria seen  --   --    MRSA CULTURE ONLY   --   --  No Methicillin Resistant Staphlyococcus aureus (MRSA) isolated  --        EKG: NSR alarms on   Imaging: I have personally reviewed pertinent reports  XR chest portable    Result Date: 12/29/2022  Impression: Unchanged extensive bilateral airspace consolidation  Unchanged small bilateral pleural effusions  Workstation performed: [id number]     XR chest portable    Result Date: 12/28/2022  Impression: Right pigtail catheter with small effusions and small medial right pneumothorax  Persistent severe pneumonia  Workstation performed: [id number]     XR chest portable    Result Date: 12/27/2022  Impression: No pneumothorax identified  Stable bilateral airspace disease and effusions compared to yesterday  Workstation performed: [id number]     XR chest 1 view portable    Result Date: 12/26/2022  Impression: Moderate right hydropneumothorax, likely ex vacuo after thoracentesis  Persistent extensive bilateral consolidation which could be due to edema or pneumonia superimposed on rounded atelectasis  Workstation performed: [id number]     XR chest portable ICU    Result Date: 12/27/2022  Impression: Right pigtail catheter with small effusions with no pneumothorax   Persistent severe bilateral consolidation  Workstation performed: [id number]     XR chest portable ICU    Result Date: 12/26/2022  Impression: Lines and tubes as above  Moderate right hydropneumothorax, possibly ex vacuo from recent thoracentesis, unchanged but better shown on previous CXR  Persistent severe bilateral consolidation which could be due to edema and/or pneumonia  I notified Longpercyseun Nick Zaidi by secure text on 12/26/2022 at 10:32 AM   He responded immediately  Workstation performed: [id number]     XR chest portable ICU    Result Date: 12/26/2022  Impression: Moderate right hydropneumothorax, unchanged from study earlier today, possibly ex vacuo after recent thoracentesis  Persistent severe bilateral consolidation which could be due to edema and/or pneumonia  I notified Edweseun Zaidi by secure text on 12/26/2022 at 10:32 AM   He responded immediately  Workstation performed: [id number]     CTA chest pe study    Result Date: 12/27/2022  Impression: Moderate hydropneumothorax, new compared to the prior  Consider chest tube placement  Marked interval increase in bilateral pulmonary infiltrates as detailed above  Nonspecific pattern, given patient history, considerations include COVID related  With the history of hemoptysis, alveolar hemorrhage can also have this appearance as well  ARDS/fluid overload also remains within the differential as well as other infectious entities  I personally discussed this study with Tarah Khan on 12/27/2022 at 9:16 AM  Workstation performed: [id number]     IR chest tube placement    Result Date: 12/27/2022  Impression: Impression: 1  Successful placement of a therapeutic 10 [place] all-purpose drainage catheter into the right pleural space under CT guidance   Workstation performed: [id number]        Intake and Output  I/O       12/28 0701 12/29 0700 12/29 0701 12/30 0700    I V  (mL/kg) 808 8 (10 1) 339 2 (4 2)    Blood 350     Other 500     NG/GT  230    IV Piggyback 250 50    Feedings 25    Total Intake(mL/kg) 1908 8 (23 8) 644 2 (8)    Urine (mL/kg/hr) 0 (0)     Emesis/NG output 50 50    Other 300     Chest Tube 350 150    Total Output 700 200    Net +1208 8 +444 2                Height and Weights   Height: 5' 8" (172 7 cm)  IBW (Ideal Body Weight): 68 4 kg  Body mass index is 26 82 kg/m²  Weight (last 2 days)     Date/Time Weight    12/30/22 0543 80 (176 37)    12/29/22 0542 80 1 (176 59)    12/28/22 0400 79 1 (174 38)            Nutrition       Diet Orders   (From admission, onward)             Start     Ordered    12/29/22 1436  Diet Enteral/Parenteral; Tube Feeding No Oral Diet; Nepro; Continuous; 50; 50; Water; Every 6 hours  Diet effective now        References:    Nutrtion Support Algorithm Enteral vs  Parenteral   Question Answer Comment   Diet Type Enteral/Parenteral    Enteral/Parenteral Tube Feeding No Oral Diet    Tube Feeding Formula: Nepro    Bolus/Cyclic/Continuous Continuous    Tube Feeding Goal Rate (mL/hr): 50    Tube Feeding flush (mL): 50    Water Flush type: Water    Water flush frequency: Every 6 hours    RD to adjust diet per protocol?  Yes        12/29/22 1436    12/26/22 0815  Room Service  Once        Question:  Type of Service  Answer:  Room Service - Appropriate with Assistance    12/26/22 0814                  Active Medications  Scheduled Meds:  Current Facility-Administered Medications   Medication Dose Route Frequency Provider Last Rate   • calcium acetate  667 mg Oral TID With Meals FERNANDO Ann     • calcium carbonate-vitamin D  2 tablet Oral BID With Meals FERNANDO Ann     • chlorhexidine  15 mL Mouth/Throat X27H [address] [place] Prow ALICIA Quan     • dexmedetomidine  0 1-1 2 mcg/kg/hr Intravenous Titrated FERNANDO Valles 0 4 mcg/kg/hr (12/30/22 0430)   • epoetin ani  4,000 Units Intravenous Once per day on Mon Wed Fri Humera Gordon MD     • fentanyl citrate (PF)  50 mcg Intravenous B8I PRN Robert Jamison PA-C     • insulin glargine  15 Units Subcutaneous QAM [facility], CRNP     • insulin lispro  1-6 Units Subcutaneous E7P [place], [place]     • levalbuterol  0 63 mg Nebulization Q6H FERNANDO [place]     • lidocaine  1 patch Topical Daily Matias [place]     • lidocaine (PF)    PRN Edgar Marinelli MD     • methylPREDNISolone sodium succinate  80 mg Intravenous Daily Joie Nicloaso SHERRY CRNP     • omeprazole (PRILOSEC) suspension 2 mg/mL  20 mg Per NG Tube Daily Joie Nicolaso V CRNP     • ondansetron  4 mg Intravenous H0R PRN Jacqui Cordova PA-C     • polyethylene glycol  17 g Oral Daily Joie Nicolaso FERNANDO POWELL     • pravastatin  80 mg Oral Daily With [employer], SEYMOURNP     • propofol  5-50 mcg/kg/min Intravenous Titrated SEYMOUR UptonNP 20 mcg/kg/min (12/30/22 0430)   • senna  8 8 mg Oral BID Joie Spironello VFERNANDO     • sodium chloride  4 mL Nebulization Q6H Joie iNcolaso V, SEYMOURNP       Continuous Infusions:  dexmedetomidine, 0 1-1 2 mcg/kg/hr, Last Rate: 0 4 mcg/kg/hr (12/30/22 0430)  propofol, 5-50 mcg/kg/min, Last Rate: 20 mcg/kg/min (12/30/22 0430)      PRN Meds:   fentanyl citrate (PF), 50 mcg, Q1H PRN  lidocaine (PF), , PRN  ondansetron, 4 mg, Q6H PRN        Invasive Devices Review  Invasive Devices     Peripheral Intravenous Line  Duration           Long-Dwell Peripheral IV (Midline) 12/26/22 Left Brachial 4 days    Peripheral IV 12/29/22 Dorsal (posterior); Left Hand 1 day          Hemodialysis Catheter  Duration           HD Permanent Double Catheter 296 days          Drain  Duration           NG/OG/Enteral Tube Orogastric 16 Fr Center mouth 4 days    Chest Tube Right Pleural 10 Fr  2 days          Airway  Duration           ETT  Oral 7 5 mm 4 days                ---------------------------------------------------------------------------------------  Care Time Delivered:   No Critical Care time spent       [facility], CRNP      Portions of the record may have been created with voice recognition software  Occasional wrong word or "sound a like" substitutions may have occurred due to the inherent limitations of voice recognition software    Read the chart carefully and recognize, using context, where substitutions have occurred

## 2022-12-30 NOTE — ASSESSMENT & PLAN NOTE
Lab Results   Component Value Date    HGBA1C 5 7 (H) 10/19/2022       Recent Labs     12/29/22  0541 12/29/22  1143 12/29/22  1712 12/30/22  0009   POCGLU 131 164* 148* 167*       Blood Sugar Average: Last 72 hrs:  (P) 024 [id number]   · Continue sliding scale coverage q6h + Lantus 15u in AM   · Holding home regimen of 15u 70/30 BID   · Goal blood glucose less than 180

## 2022-12-30 NOTE — PLAN OF CARE
Serum potassium 4 8 on 2 K bath  Will attempt for UF-1 kg as tolerated and keep SBP>105 as per Dr Hatfield Later  Problem: METABOLIC, FLUID AND ELECTROLYTES - ADULT  Goal: Electrolytes maintained within normal limits  Description: INTERVENTIONS:  - Monitor labs and assess patient for signs and symptoms of electrolyte imbalances  - Administer electrolyte replacement as ordered  - Monitor response to electrolyte replacements, including repeat lab results as appropriate  - Instruct patient on fluid and nutrition as appropriate  Outcome: Progressing  Goal: Fluid balance maintained  Description: INTERVENTIONS:  - Monitor labs   - Monitor I/O and WT  - Instruct patient on fluid and nutrition as appropriate  - Assess for signs & symptoms of volume excess or deficit  Outcome: Progressing   Post-Dialysis RN Treatment Note    Blood Pressure:  Pre 123/60 mm/Hg                 Post 123/59 mmHg   EDW  tbd kg    Weight:  Pre 80 kg   Post 79 kg   Mode of weight measurement: Bed Scale   Volume Removed  1000 ml    Treatment duration 180 minutes    NS given  No    Treatment shortened?  No   Medications given during Rx Epogen 4000 units   Estimated Kt/V  None Reported   Access type: Permacath/TDC   Access Issues: No    Report called to primary nurse   Yes Andrés Salinas RN

## 2022-12-30 NOTE — CONSULTS
The patient's vancomycin therapy has been discontinued  Pharmacy will sign off now  Thank you for this consult       Jannet Shen, Pharmacist

## 2022-12-30 NOTE — ASSESSMENT & PLAN NOTE
· Patient presented to the emergency department on 12/25 with worsening shortness of breath and hypoxia after missing his dialysis treatment yesterday due to lack of transportation  · Initially in the emergency room, the patient was placed on BiPAP but he was weaned to mid flow  · Overnight 12/25, became more hypoxic and ultimately required intubation  · Began having hemoptysis and significant blood suctioned post intubation   Concern for alveolar hemorrhage secondary to vasculitis-see plan as above   · Vent settings: 14/400/55/8  · Daily SAT/SBT  · Sedation with propofol and precedex  · Titrate for RAAS -1  · VAP bundles; chlorhexidine, PPI, HOB greater than 30 degrees  · Pulmonary toilet with xopenex/3% NSS nebs, chest PT TID, ETT suctioning PRN   · CXR this morning with persistent extensive B/L patchy pulmonary opacifications

## 2022-12-30 NOTE — ASSESSMENT & PLAN NOTE
· Patient had a thoracentesis 12/21 (previous admission)  · Chest x-ray with hydropneumothorax thought to likely be related to this, possibly ex vacuo  · S/p R pleural chest tube with IR 12/27  · 350mL serousanguinous drainage x 24 hours  · Lung remains inflated on CXR this morning   · Continue chest tube to water seal with repeat CXR in the am

## 2022-12-31 NOTE — RESPIRATORY THERAPY NOTE
Tranported pt  To Cat scan on compatible portable ventilator, sat 98% thoughout, resumed  upon return to ICU to  Previous setting, tolerated well

## 2022-12-31 NOTE — ASSESSMENT & PLAN NOTE
Lab Results   Component Value Date    EGFR 10 12/30/2022    EGFR 13 12/29/2022    EGFR 8 12/28/2022    CREATININE 5 02 (H) 12/30/2022    CREATININE 3 96 (H) 12/29/2022    CREATININE 5 65 (H) 12/28/2022       · Patient scheduled dialysis Tuesday/Thursday/Saturday however he was unable to make his treatment on Saturday 12/24 due to the transportation bus not picking him up  · Patient started with worsening shortness of breath on 12/25 presented to the emergency room  · Nephrology consulted and HD session done on 12/25 for 3 5L  · Continue MWF schedule for now per nephrology  · Continue PhosLo as ordered

## 2022-12-31 NOTE — ASSESSMENT & PLAN NOTE
Lab Results   Component Value Date    EGFR 10 12/30/2022    EGFR 13 12/29/2022    EGFR 8 12/28/2022    CREATININE 5 02 (H) 12/30/2022    CREATININE 3 96 (H) 12/29/2022    CREATININE 5 65 (H) 12/28/2022     · Patient had a recent admission from 12/20-12/23 with hemoglobin 5 7 -received 1 unit packed red blood cells at that time  · Most likely acute on chronic anemia in setting of ESRD now with DAH  · Received 1 unit of PRBCs 12/28 for Hgb 6 7, Hgb now in 9s   · Continue to trend daily and transfuse for hgb less than 7 0

## 2022-12-31 NOTE — PROGRESS NOTES
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  -12/26-completed 2-hour dialysis treatment did not tolerate ultrafiltration completed bronchoscopy with   Moderate amount of bloody secretions from all lobes  - 12/27 - could not have dialysis to facilitate timing of ritux  Dialysis catheter needed to be emergency accessed after review with primary team  HD and CRRT nurses were able to be present to access and deaccess  - 12/28 - HD completed  Net even balance  - 12/30 -HD with 1 L UF goal  Received albumin late evening       Plan:  - resp acidosis per primary team  - f/u bladder scans  - HD MWF for now (normally is on TTS)   -  treatment for hemoptysis in progress as below  - concern for vasculitis pulmonary related  Rheum has been consulted  - anemia - transfusion per primary team  - will eventually need PCP prophylaxis  - reviewed with primary team dialysis plans  - may need to consider other access for ritux infusions  Not ideal to use [facility] each time  - trend phos   - pt will next be evaluated on Monday from renal standpoint   Please call if questions/issues arise over weekend     2-shortness of breath - concern for vasculitis; intubated 12/25     - Status postthoracentesis during prior hospitalization on December 23  - Initially hypoxic and requiring facemask  - Missed dialysis on 12/24 due to transportation issue  - concern for infectious etiology versus noninfectious etiology  - Pulmonary team also on board along with ICU team  - Concern for hydropneumothorax on chest x-ray and therefore patient is undergoing CT scan  - On broad-spectrum antibiotics  - Started on pulse dose steroids due to concern for vasculitis also with hemoptysis  - Bronchoscopy completed with signs of alveolar hemorrhage  - CT scan mod hydropneumothorax, new compared to prior  Inc in b/l pulm infiltrates  - PCP smear negative  - 12/27 - started on ritux  Completing -pulse steroids  - 12/29 - improving hemoptysis   - 12/3- CXR with pneumothorax  Infiltrates  CT placed to water seal     3 hyponatremia volume mediated along with hyperglycemia-improved     - Monitor with dialysis and BS control     4-anemia-on Mircera as outpatient     - restart epogen with full HD  - hemoglobin to 5 7 during prior hospitalization  - Stool occult positive prior  - Received multiple blood transfusions prior admission     5 - vol overload     - missed HD after discharge  - history of bilateral pl effusions  - albumin 1 6 leading to anasarca  - required thoracentesis prior  - vol state overall improved with UF     6) covid19     - per primary team     7 - MBD     - on phos binder     8 - hydropneumothorax - s/p chest tube by IR team 12/27     9- electrolytes - hyperphos - trend and cont phos binder     10- acid/base - stable     11) leukocytosis  Mult factorial  On steroids  SUBJECTIVE / 24H INTERVAL HISTORY:    -103 syst  Afebrile  Intubated       OBJECTIVE:  Current Weight: Weight - Scale: 79 1 kg (174 lb 6 1 oz)  Vitals:    12/31/22 0400 12/31/22 0500 12/31/22 0600 12/31/22 0726   BP: 109/52 103/51 103/53    Pulse: 75 77 71    Resp: 19 17 (!) 23    Temp: 98 1 °F (36 7 °C)      TempSrc:       SpO2: 98% 95% 96% 95%   Weight:   79 1 kg (174 lb 6 1 oz)    Height:           Intake/Output Summary (Last 24 hours) at 12/31/2022 0743  Last data filed at 12/31/2022 0610  Gross per 24 hour   Intake 2181 44 ml   Output 1970 ml Net 211 44 ml     General: NAD  Skin: no rash  Eyes: anicteric sclera  ENT: ET tube in place  Neck: supple  Chest: coarse breath sounds b/l  CVS: s1s2, no murmur, no gallop, no rub  Abdomen: soft, nontender, nl sounds  Extremities: trace anasarca UE  : no ruvalcaba  Neuro: cannot assess  Psych: cannot assess    Medications:    Current Facility-Administered Medications:   •  calcium acetate (PHOSLO) capsule 667 mg, 667 mg, Oral, TID With Meals, [employer], CRNP, 667 mg at 12/31/22 6530  •  calcium carbonate-vitamin D 500 mg-5 mcg tablet 2 tablet, 2 tablet, Oral, BID With Meals, [employer], SEYMOURNP, 2 tablet at 12/31/22 2266  •  cefepime (MAXIPIME) IVPB (premix in dextrose) 1,000 mg 50 mL, 1,000 mg, Intravenous, H07Q, Rashel Munoz PA-C, Stopped at 12/30/22 1700  •  chlorhexidine (PERIDEX) 0 12 % oral rinse 15 mL, 15 mL, Mouth/Throat, H88T [address], Cinderella Stable Kadeem PA-C, 15 mL at 12/30/22 2135  •  dexmedeTOMIDine (Precedex) 400 mcg in sodium chloride 0 9 % 100 mL infusion, 0 1-1 2 mcg/kg/hr, Intravenous, Titrated, Joie Nina V, CRNP, Last Rate: 7 9 mL/hr at 12/30/22 2209, 0 4 mcg/kg/hr at 12/30/22 2209  •  epoetin ani (EPOGEN,PROCRIT) injection 4,000 Units, 4,000 Units, Intravenous, Once per day on Mon Wed Fri, Mee Armenta MD, 4,000 Units at 12/30/22 2503  •  fentanyl citrate (PF) 100 MCG/2ML 50 mcg, 50 mcg, Intravenous, F8E PRN, Cinderella Stable Kadeem, PA-C, 50 mcg at 12/30/22 2201  •  insulin glargine (LANTUS) subcutaneous injection 15 Units 0 15 mL, 15 Units, Subcutaneous, QAM, [employer], CRNP, 13 Units at 12/30/22 0034  •  insulin lispro (HumaLOG) 100 units/mL subcutaneous injection 1-6 Units, 1-6 Units, Subcutaneous, Q6H, 3 Units at 12/31/22 0643 **AND** Fingerstick Glucose (POCT), , , C7C, Cinderella Fer Quan PA-C  •  ipratropium (ATROVENT) 0 02 % inhalation solution 0 5 mg, 0 5 mg, Nebulization, Q6H, FERNANDO Valles, 0 5 mg at 12/31/22 4631  •  levalbuterol (Lyssa Luke) inhalation solution 1 25 mg, 1 25 mg, Nebulization, Q6H, Joie Spironello V, CRNP, 1 25 mg at 12/31/22 5398  •  lidocaine (LIDODERM) 5 % patch 1 patch, 1 patch, Topical, Daily, Cheryle Lopez PA-C, 1 patch at 12/30/22 0084  •  lidocaine (PF) (XYLOCAINE-MPF) 1 % injection, , , PRN, Eun Hernandez MD, 5 mL at 12/27/22 1349  •  methylPREDNISolone sodium succinate (Solu-MEDROL) injection 80 mg, 80 mg, Intravenous, Daily, Joie Spironello V, CRNP, 80 mg at 12/30/22 2135  •  omeprazole (PRILOSEC) suspension 2 mg/mL, 20 mg, Per NG Tube, Daily, Jioe Spironello V, CRNP, 20 mg at 12/30/22 0901  •  ondansetron (ZOFRAN) injection 4 mg, 4 mg, Intravenous, Y1Z PRN, Juana Quan PA-C  •  pravastatin (PRAVACHOL) tablet 80 mg, 80 mg, Oral, Daily With Dinner, [employer], CRNP, 80 mg at 12/30/22 1650  •  propofol (DIPRIVAN) 1000 mg in 100 mL infusion (premix), 5-50 mcg/kg/min, Intravenous, Titrated, Joie Spironello V, CRNP, Last Rate: 9 6 mL/hr at 12/31/22 0610, 20 mcg/kg/min at 12/31/22 0610  •  sodium chloride 3 % inhalation solution 4 mL, 4 mL, Nebulization, Q6H, Joie Spironello V, CRNP, 4 mL at 12/31/22 0726    Laboratory Results:  Results from last 7 days   Lab Units 12/31/22  0634 12/30/22  0450 12/29/22  0536 12/28/22  0500 12/27/22  2118 12/27/22  0645 12/26/22  1142 12/26/22  0616 12/25/22  1146   WBC Thousand/uL 21 51* 22 55* 15 29* 6 49  --  8 19  --  8 50 12 12*   HEMOGLOBIN g/dL 7 9* 9 3* 9 0* 6 7*  --  7 1*  --  7 7* 9 3*   HEMATOCRIT % 25 5* 29 6* 27 8* 21 3*  --  23 5*  --  24 7* 28 9*   PLATELETS Thousands/uL 244 312 290 269  --  297  --  275 406*   POTASSIUM mmol/L 4 5 4 8 4 8 5 5* 5 3 5 4* 4 4 5 8* 5 1   CHLORIDE mmol/L 97 93* 94* 97 96 95* 95* 93* 85*   CO2 mmol/L 28 26 24 22 25 24 26 26 27   BUN mg/dL 60* 80* 57* 89* 83* 71* 53* 86* 107*   CREATININE mg/dL 3 63* 5 02* 3 96* 5 65* 5 37* 4 77* 3 58* 5 55* 6 50*   CALCIUM mg/dL 7 4* 7 1* 7 2* 7 2* 7 3* 7 6* 7 6* 7 7* 8 1* MAGNESIUM mg/dL  --  2 3 2 1 2 4  --  2 1  --  2 2 2 2   PHOSPHORUS mg/dL 6 0* 8 0* 5 8*  --   --  6 6*  --  5 9*  --

## 2022-12-31 NOTE — ASSESSMENT & PLAN NOTE
· Diffuse alveolar hemorrhage secondary to his underlying ANCA vasculitis given hemoptysis and worsening infiltrates   · Bronch on 12/26 with serial aliquots persistently bloody despite lavage consistent with DAH  · Completed 3 days of pulse dose steroids 12/29  · Continue solumedrol 80mg daily   · Hold anticoagulation  · Received Rituximab per rheumatology on Tuesday, plan for weekly dosing x 4 weeks   · Appreciate pulmonary and rheumatology input

## 2022-12-31 NOTE — PROGRESS NOTES
Patient had been weaned completely off sedation at 1700  Patient would open eyes but did not have any extremity movement and did not follow commands  He progressively became more tachypneic and hypoxic requiring increase in fio2 throughout the evening and then increase in PEEP from 8 to 10  Had no change in neuro exam with extended time off sedation  Patient then began having peak pressures of 55 and exhibited vent dysynchrony  He was suctioned and lavaged multiple times for a large amount of tan/bloody secretions, which did clear with suctioning but peak pressures did not improve  Plateau pressures high 40s/low 50s  Patient switched to AC/PC 14/28/10/100% with improvement in peak pressures with high 30s  Patient re-started on precedex 0 4mcg, given 30mcg bolus of propofol, and restarted on propofol gtt at 20mcg/kg/hr  ABG at time of events 7 25/62 1/65 7/26 7/-1  3  Vent settings just changed at this time, therefore will repeat at midnight to assess for improvement  CXR shows R lung re-expanded with R pleural CT to -20cm of suction but worsening of extensive B/L patchy pulmonary infiltrates as compared to CXR this morning  Lines and tubes all stable  Breath sounds course with course rhonchi bilaterally  Given xopenex/atrovent in the event that bronchospasm was contributing to peak pressuring  Patient remains on solumedrol 80mg daily  Repeat sputum culture sent  Has been continued on cefepime, will restart vanco depending on random level  Will consult pharmacy for vanco dosing       Critical care time provided between 1623-8457: 25 minutes

## 2022-12-31 NOTE — RESPIRATORY THERAPY NOTE
Pt recd on a PB vent settingas per flow sheet all alarms on and functional vent working properly  Vent check pt stable  Pt restrain are secure

## 2022-12-31 NOTE — ASSESSMENT & PLAN NOTE
· Patient had a thoracentesis 12/21 (previous admission)  · Chest x-ray with hydropneumothorax thought to likely be related to this, possibly ex vacuo  · S/p R pleural chest tube with IR 12/27  · 160mL serous drainage x 24 hours  · CXR with small R pneumothorax with CT on H20 seal therefore chest tube placed by to -20cm suction, R lung was re-expanded on repeat CXR overnight

## 2022-12-31 NOTE — ASSESSMENT & PLAN NOTE
· Did not follow commands or move extremities after several hours off sedation yesterday  · Encephalopathy possibly in setting of critical illness, lingering sedation effects, delirium, and hypercarbia   · Sedation resumed overnight d/t vent synchrony   · Consider CT head for completion  · Neuro checks q4h, but unable to hold sedation at that time d/t respiratory status  · Delirium precautions; regulate sleep/wake cycle, environmental controls, daily CAM ICU

## 2022-12-31 NOTE — RESPIRATORY THERAPY NOTE
Received vented pt on 400/14/50/+8, increased o2 due to low sat  Verified vent orders  ambu at bedside, alarms set and functioning  Suctioned large amount of thick blood tinged secretions, rn aware  Pt is properly restrained and tolerating well  In no distress at this time

## 2022-12-31 NOTE — PROGRESS NOTES
Mahesh 43  Progress Note - Barbara Hoffman 1945, 76 y o  male MRN: [id number]  Unit/Bed#: ICU 11 Encounter: [id number]  Primary Care Provider: Eunice Escobar MD   Date and time admitted to hospital: 12/25/2022 11:20 AM    * Diffuse pulmonary alveolar hemorrhage  Assessment & Plan  · Diffuse alveolar hemorrhage secondary to his underlying ANCA vasculitis given hemoptysis and worsening infiltrates   · Bronch on 12/26 with serial aliquots persistently bloody despite lavage consistent with DAH  · Completed 3 days of pulse dose steroids 12/29  · Continue solumedrol 80mg daily   · Hold anticoagulation  · Received Rituximab per rheumatology on Tuesday, plan for weekly dosing x 4 weeks   · Appreciate pulmonary and rheumatology input    Acute respiratory failure with hypoxia [facility])  Assessment & Plan  · Patient presented to the emergency department on 12/25 with worsening shortness of breath and hypoxia after missing his dialysis treatment yesterday due to lack of transportation  · Initially in the emergency room, the patient was placed on BiPAP but he was weaned to mid flow  · Overnight 12/25, became more hypoxic and ultimately required intubation  · Began having hemoptysis and significant blood suctioned post intubation  Concern for alveolar hemorrhage secondary to vasculitis-see plan as above   · Worsening hypoxia overnight with peak pressuring on vent  Changed to [facility] and re-sedated  ABG with respiratory acidosis and P:F ratio 65     · Repeat ABG 7 29/55 9/90 2/26 5/-0 6, slightly improved from prior, repeat pending this AM  · Vent settings: AC/PC 14/28/80%/10  · Wean fio2/PEEP for O2 sat 92% or above   · Sedation with propofol and precedex  · Titrate for RAAS -2  · VAP bundle; chlorhexidine, PPI, HOB greater than 30 degrees  · Pulmonary toilet with xopenex/atrovent/3% NSS nebs q6h, chest PT TID, ETT suctioning PRN   · CXR overnight with R lung re-expanded with R pleural CT to -20cm of suction but worsening of extensive B/L patchy pulmonary infiltrates as compared to CXR yesterday AM    SIRS (systemic inflammatory response syndrome) ([facility] [address] )  Assessment & Plan  · Patient presented to the emergency room on 12/25 with SIRS criteria -increased respiratory rate and hypoxia  · With decompensation on 12/25, antibiotics were started with cefepime/vanco given worsening hypoxia however low suspicion for infection  · BAL with mixed respiratory micheal  · pneumocystis smear negative   · Completing 5 days abx 12/29, however 12/30 with thick tan secretions and worsening leukocytosis and was restarted on cefepime   Overnight with worsening hypoxia and vanco resumed  · Repeat sputum cx and MRSA nasal surveillance sent overnight  · Trend temps and WBC   · procal difficult to interpret in setting of ESRD    Hydropneumothorax  Assessment & Plan  · Patient had a thoracentesis 12/21 (previous admission)  · Chest x-ray with hydropneumothorax thought to likely be related to this, possibly ex vacuo  · S/p R pleural chest tube with IR 12/27  · 160mL serous drainage x 24 hours  · CXR with small R pneumothorax with CT on H20 seal therefore chest tube placed by to -20cm suction, R lung was re-expanded on repeat CXR overnight     Urinary retention  Assessment & Plan  · Bladder scan qshift  · Urinary retention protocol    Toxic metabolic encephalopathy  Assessment & Plan  · Did not follow commands or move extremities after several hours off sedation yesterday  · Encephalopathy possibly in setting of critical illness, lingering sedation effects, delirium, and hypercarbia   · Sedation resumed overnight d/t vent synchrony   · Consider CT head for completion  · Neuro checks q4h, but unable to hold sedation at that time d/t respiratory status  · Delirium precautions; regulate sleep/wake cycle, environmental controls, daily CAM ICU     ANCA-associated vasculitis  Assessment & Plan  · Kidney biopsy on 03/22: 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 for which he was previously treated with Cytoxan (discontinued in April) and subsequently prescribed prednisone taper  · Now with pulmonary involvement, continue rituximab and steroids as above     ESRD on dialysis [facility])  Assessment & Plan  Lab Results   Component Value Date    EGFR 10 12/30/2022    EGFR 13 12/29/2022    EGFR 8 12/28/2022    CREATININE 5 02 (H) 12/30/2022    CREATININE 3 96 (H) 12/29/2022    CREATININE 5 65 (H) 12/28/2022       · Patient scheduled dialysis Tuesday/Thursday/Saturday however he was unable to make his treatment on Saturday 12/24 due to the transportation bus not picking him up  · Patient started with worsening shortness of breath on 12/25 presented to the emergency room  · Nephrology consulted and HD session done on 12/25 for 3 5L  · Continue MWF schedule for now per nephrology  · Continue PhosLo as ordered    Type 2 diabetes mellitus with chronic kidney disease on chronic dialysis, with long-term current use of insulin [facility])  Assessment & Plan  Lab Results   Component Value Date    HGBA1C 5 7 (H) 10/19/2022       Recent Labs     12/29/22  1712 12/30/22  0009 12/30/22  1143 12/30/22  1833   POCGLU 148* 167* 182* 232*       Blood Sugar Average: Last 72 hrs:  (P) 626 [id number]   · Continue sliding scale coverage q6h + Lantus 15u in AM   · Holding home regimen of 15u 70/30 BID   · Goal blood glucose less than 180    Anemia due to chronic kidney disease, on chronic dialysis [facility])  Assessment & Plan  Lab Results   Component Value Date    EGFR 10 12/30/2022    EGFR 13 12/29/2022    EGFR 8 12/28/2022    CREATININE 5 02 (H) 12/30/2022    CREATININE 3 96 (H) 12/29/2022    CREATININE 5 65 (H) 12/28/2022     · Patient had a recent admission from 12/20-12/23 with hemoglobin 5 7 -received 1 unit packed red blood cells at that time  · Most likely acute on chronic anemia in setting of ESRD now with DAH  · Received 1 unit of PRBCs 12/28 for Hgb 6 7, Hgb now in 9s   · Continue to trend daily and transfuse for hgb less than 7 0    Benign essential hypertension  Assessment & Plan  · Continue to hold home dose Norvasc and clonidine     Hyperlipidemia  Assessment & Plan  · Continue home statin    ----------------------------------------------------------------------------------------  HPI/24hr events: Patient had been weaned completely off sedation yesterday at 1700  Patient would open eyes but did not have any extremity movement and did not follow commands  He progressively became more tachypneic and hypoxic requiring increase in fio2 throughout the evening and then increase in PEEP from 8 to 10  Had no change in neuro exam with extended time off sedation  Patient then began having peak pressures of 55 and exhibited vent dysynchrony  He was suctioned and lavaged multiple times for a large amount of tan/bloody secretions, which did clear with suctioning but peak pressures did not improve  Plateau pressures high 40s/low 50s  Patient switched to AC/PC 14/28/10/100% with improvement in peak pressures with high 30s  Patient re-started on precedex 0 4mcg, given 30mcg bolus of propofol, and restarted on propofol gtt at 20mcg/kg/hr  ABG at time of events 7 25/62 1/65 7/26 7/-1  3  CXR shows R lung re-expanded with R pleural CT to -20cm of suction but worsening of extensive B/L patchy pulmonary infiltrates as compared to CXR this morning  Given xopenex/atrovent in the event that bronchospasm was contributing to peak pressuring  Repeat sputum culture and MRSA nasal surveillance sent  Vanco started  Patient remains on cefepime  Episode of hypotension with SBP 80s likely secondary to sedation, patient given 25g 25% albumin with improvement in BP   Repeat ABG 7 29/55 9/90 2/26 5/-0 6, AM ABG pending         Patient appropriate for transfer out of the ICU today?: No  Disposition: Continue Critical Care   Code Status: Level 1 - Full Code  ---------------------------------------------------------------------------------------  SUBJECTIVE    Review of Systems  Review of systems was unable to be performed secondary to encephalopathy/intubation/sedation  ---------------------------------------------------------------------------------------  OBJECTIVE    Vitals   Vitals:    22 0003 22 0100 22 0200 22 0300   BP:  115/56 121/59 100/53   Pulse:  90 91 78   Resp:  21 18 (!) 28   Temp:       TempSrc:       SpO2:  99% 97% 98%   Weight:       Height: 5' 8" (1 727 m)        Temp (24hrs), Av °F (36 7 °C), Min:96 9 °F (36 1 °C), Max:98 5 °F (36 9 °C)  Current: Temperature: 98 5 °F (36 9 °C)          Respiratory:  SpO2: SpO2: 98 %, SpO2 Activity: SpO2 Activity: At Rest, SpO2 Device: O2 Device: Ventilator  Nasal Cannula O2 Flow Rate (L/min): 80 L/min    Invasive/non-invasive ventilation settings   Respiratory    Lab Data (Last 4 hours)       0013            pH, Arterial       7 292             pCO2, Arterial       56 1             pO2, Arterial       90 2             HCO3, Arterial       26 5             Base Excess, Arterial       -0 6                  O2/Vent Data        0003   Most Recent         Vent Mode AC/PC        Resp Rate (BPM) (BPM) 14        Pressure Control (cmH2O) (cm) 28        Insp Time (sec) (sec) 0 8        FiO2 (%) (%) 100        PEEP (cmH2O) (cmH2O) 10        MV 10 5                    Physical Exam  Constitutional:       Appearance: He is ill-appearing  Interventions: He is sedated, intubated and restrained  HENT:      Head: Normocephalic and atraumatic  Eyes:      General: Lids are normal       Extraocular Movements: Extraocular movements intact  Conjunctiva/sclera: Conjunctivae normal    Neck:      Trachea: Trachea normal    Cardiovascular:      Rate and Rhythm: Normal rate and regular rhythm  Pulses: Normal pulses             Radial pulses are 2+ on the right side and 2+ on the left side  Dorsalis pedis pulses are 2+ on the right side and 2+ on the left side  Heart sounds: Normal heart sounds, S1 normal and S2 normal    Pulmonary:      Effort: Tachypnea present  He is intubated  Breath sounds: Decreased breath sounds and rhonchi present  Abdominal:      General: Bowel sounds are decreased  Palpations: Abdomen is soft  Musculoskeletal:      Cervical back: Neck supple  Comments: No extremity movement    Skin:     General: Skin is warm and dry  Capillary Refill: Capillary refill takes less than 2 seconds  Coloration: Skin is pale  Neurological:      GCS: GCS eye subscore is 3  GCS verbal subscore is 1  GCS motor subscore is 4               Laboratory and Diagnostics:  Results from last 7 days   Lab Units 12/30/22  0450 12/29/22  0536 12/28/22  0500 12/27/22  0645 12/26/22  0616 12/25/22  1146   WBC Thousand/uL 22 55* 15 29* 6 49 8 19 8 50 12 12*   HEMOGLOBIN g/dL 9 3* 9 0* 6 7* 7 1* 7 7* 9 3*   HEMATOCRIT % 29 6* 27 8* 21 3* 23 5* 24 7* 28 9*   PLATELETS Thousands/uL 312 290 269 297 275 406*   NEUTROS PCT %  --  95*  --   --  95* 90*   BANDS PCT %  --   --   --  9*  --   --    MONOS PCT %  --  3*  --   --  2* 6   MONO PCT %  --   --   --  2*  --   --      Results from last 7 days   Lab Units 12/30/22  0450 12/29/22  0536 12/28/22  0500 12/27/22  2118 12/27/22  0645 12/26/22  1142 12/26/22  0616 12/25/22  1146   SODIUM mmol/L 131* 131* 134* 132* 131* 130* 129* 124*   POTASSIUM mmol/L 4 8 4 8 5 5* 5 3 5 4* 4 4 5 8* 5 1   CHLORIDE mmol/L 93* 94* 97 96 95* 95* 93* 85*   CO2 mmol/L 26 24 22 25 24 26 26 27   ANION GAP mmol/L 12 13 15* 11 12 9 10 12   BUN mg/dL 80* 57* 89* 83* 71* 53* 86* 107*   CREATININE mg/dL 5 02* 3 96* 5 65* 5 37* 4 77* 3 58* 5 55* 6 50*   CALCIUM mg/dL 7 1* 7 2* 7 2* 7 3* 7 6* 7 6* 7 7* 8 1*   GLUCOSE RANDOM mg/dL 219* 160* 193* 197* 163* 155* 146* 365*   ALT U/L  --   --   --   --   --   --   --  14   AST U/L  --   --   --   -- --   --   --  10   ALK PHOS U/L  --   --   --   --   --   --   --  112   ALBUMIN g/dL  --   --   --   --   --   --   --  1 9*   TOTAL BILIRUBIN mg/dL  --   --   --   --   --   --   --  0 37     Results from last 7 days   Lab Units 12/30/22  0450 12/29/22  0536 12/28/22  0500 12/27/22  0645 12/26/22  0616 12/25/22  1146   MAGNESIUM mg/dL 2 3 2 1 2 4 2 1 2 2 2 2   PHOSPHORUS mg/dL 8 0* 5 8*  --  6 6* 5 9*  --       Results from last 7 days   Lab Units 12/27/22  1042 12/26/22  1141 12/26/22  0045   INR  1 33*  --  1 35*   PTT seconds  --  32  --           Results from last 7 days   Lab Units 12/25/22  1146   LACTIC ACID mmol/L 2 2*     ABG:  Results from last 7 days   Lab Units 12/31/22  0013   PH ART  7 292*   PCO2 ART mm Hg 56 1*   PO2 ART mm Hg 90 2   HCO3 ART mmol/L 26 5   BASE EXC ART mmol/L -0 6   ABG SOURCE  Radial, Right     VBG:  Results from last 7 days   Lab Units 12/31/22  0013   ABG SOURCE  Radial, Right     Results from last 7 days   Lab Units 12/30/22  0450 12/27/22  0645 12/26/22  0616 12/25/22  1146   PROCALCITONIN ng/ml 2 89* 2 78* 2 77* 1 79*       Micro  Results from last 7 days   Lab Units 12/26/22  1720 12/26/22  0045 12/25/22  1431 12/25/22  1146   BLOOD CULTURE   --   --   --  No Growth After 5 Days  No Growth After 5 Days     SPUTUM CULTURE   --  Few Colonies of  --   --    GRAM STAIN RESULT  2+ Polys*  Rare Budding Yeast with Pseudomycelia* 3+ Polys  No bacteria seen  --   --    MRSA CULTURE ONLY   --   --  No Methicillin Resistant Staphlyococcus aureus (MRSA) isolated  --      Imaging CXR last evening with R lung re-expanded with R pleural CT to -20cm of suction but worsening of extensive B/L patchy pulmonary infiltrates as compared to CXR yesterday morning: I have personally reviewed pertinent films in PACS    Intake and Output  I/O       12/29 0701 12/30 0700 12/30 0701 12/31 0700    I V  (mL/kg) 572 5 (7 2) 651 3 (8 1)    Blood      Other  100    NG/ 270    IV Piggyback 50 50 Feedings 335     Total Intake(mL/kg) 1287 5 (16 1) 1071 3 (13 4)    Urine (mL/kg/hr)      Emesis/NG output 50     Other  1600    Stool 0 0    Chest Tube 160 100    Total Output 210 1700    Net +1077 5 -628 7          Unmeasured Stool Occurrence 2 x 3 x          Height and Weights   Height: 5' 8" (172 7 cm)  IBW (Ideal Body Weight): 68 4 kg  Body mass index is 26 82 kg/m²  Weight (last 2 days)     Date/Time Weight    12/30/22 0543 80 (176 37)    12/29/22 0542 80 1 (176 59)            Nutrition       Diet Orders   (From admission, onward)             Start     Ordered    12/29/22 1436  Diet Enteral/Parenteral; Tube Feeding No Oral Diet; Nepro; Continuous; 50; 50; Water; Every 6 hours  Diet effective now        References:    Nutrtion Support Algorithm Enteral vs  Parenteral   Question Answer Comment   Diet Type Enteral/Parenteral    Enteral/Parenteral Tube Feeding No Oral Diet    Tube Feeding Formula: Nepro    Bolus/Cyclic/Continuous Continuous    Tube Feeding Goal Rate (mL/hr): 50    Tube Feeding flush (mL): 50    Water Flush type: Water    Water flush frequency: Every 6 hours    RD to adjust diet per protocol?  Yes        12/29/22 1436    12/26/22 0815  Room Service  Once        Question:  Type of Service  Answer:  Room Service - Appropriate with Assistance    12/26/22 0814                  Active Medications  Scheduled Meds:  Current Facility-Administered Medications   Medication Dose Route Frequency Provider Last Rate   • calcium acetate  667 mg Oral TID With Meals FERNANDO Esteban     • calcium carbonate-vitamin D  2 tablet Oral BID With Meals FERNANDO Esteban     • cefepime  1,000 mg Intravenous G54H Cresencio Espino PA-C Stopped (12/30/22 1700)   • chlorhexidine  15 mL Mouth/Throat A03U [facility] Cresencio Espino PA-C     • dexmedetomidine  0 1-1 2 mcg/kg/hr Intravenous Titrated FERNANDO Valles 0 2 mcg/kg/hr (12/30/22 2139)   • epoetin ani  4,000 Units Intravenous Once per day on Mon Wed Luis Tucker MD     • fentanyl citrate (PF)  50 mcg Intravenous O2X PRN Marce Segovia PA-C     • insulin glargine  15 Units Subcutaneous QAM Enmanuel Fajardo Itapebí, CRNP     • insulin lispro  1-6 Units Subcutaneous U1V Corinconrado Quan PA-C     • ipratropium  0 5 mg Nebulization Q6H Joie Spironello V, CRNP     • levalbuterol  1 25 mg Nebulization Q6H Joie Spironello V, CRNP     • lidocaine  1 patch Topical Daily Marce Segovia PA-C     • lidocaine (PF)    PRN Ferd Aschoff, MD     • methylPREDNISolone sodium succinate  80 mg Intravenous Daily Joie Spironello V, CRNP     • omeprazole (PRILOSEC) suspension 2 mg/mL  20 mg Per NG Tube Daily Joie Spironello V, CRNP     • ondansetron  4 mg Intravenous G4K PRN Marce Segovia PA-C     • pravastatin  80 mg Oral Daily With [employer], CRNP     • propofol  5-50 mcg/kg/min Intravenous Titrated Joie Spironello V, CRNP 20 mcg/kg/min (12/30/22 2222)   • sodium chloride  4 mL Nebulization Q6H Joie Spironello V, CRNP       Continuous Infusions:  dexmedetomidine, 0 1-1 2 mcg/kg/hr, Last Rate: 0 2 mcg/kg/hr (12/30/22 2139)  propofol, 5-50 mcg/kg/min, Last Rate: 20 mcg/kg/min (12/30/22 2222)      PRN Meds:   fentanyl citrate (PF), 50 mcg, Q1H PRN  lidocaine (PF), , PRN  ondansetron, 4 mg, Q6H PRN        Invasive Devices Review  Invasive Devices     Peripheral Intravenous Line  Duration           Long-Dwell Peripheral IV (Midline) 12/26/22 Left Brachial 5 days    Peripheral IV 12/29/22 Dorsal (posterior); Left Hand 1 day          Hemodialysis Catheter  Duration           HD Permanent Double Catheter 297 days          Drain  Duration           NG/OG/Enteral Tube Orogastric 16 Fr Center mouth 5 days    Chest Tube Right Pleural 10 Fr  3 days          Airway  Duration           ETT  Oral 7 5 mm 5 days                Rationale for remaining devices: continue permacath for HD, continue chest tube for pneumothorax resolution ---------------------------------------------------------------------------------------  Advance Directive and Living Will:      Power of :    POLST:    ---------------------------------------------------------------------------------------  Care Time Delivered:   No Critical Care time spent       FERNANDO Ivy      Portions of the record may have been created with voice recognition software  Occasional wrong word or "sound a like" substitutions may have occurred due to the inherent limitations of voice recognition software    Read the chart carefully and recognize, using context, where substitutions have occurred

## 2022-12-31 NOTE — ASSESSMENT & PLAN NOTE
· Patient presented to the emergency department on 12/25 with worsening shortness of breath and hypoxia after missing his dialysis treatment yesterday due to lack of transportation  · Initially in the emergency room, the patient was placed on BiPAP but he was weaned to mid flow  · Overnight 12/25, became more hypoxic and ultimately required intubation  · Began having hemoptysis and significant blood suctioned post intubation  Concern for alveolar hemorrhage secondary to vasculitis-see plan as above   · Worsening hypoxia overnight with peak pressuring on vent  Changed to [facility] and re-sedated  ABG with respiratory acidosis and P:F ratio 65     · Repeat ABG 7 29/55 9/90 2/26 5/-0 6, slightly improved from prior, repeat pending this AM  · Vent settings: AC/PC 14/28/80%/10  · Wean fio2/PEEP for O2 sat 92% or above   · Sedation with propofol and precedex  · Titrate for RAAS -2  · VAP bundle; chlorhexidine, PPI, HOB greater than 30 degrees  · Pulmonary toilet with xopenex/atrovent/3% NSS nebs q6h, chest PT TID, ETT suctioning PRN   · CXR overnight with R lung re-expanded with R pleural CT to -20cm of suction but worsening of extensive B/L patchy pulmonary infiltrates as compared to CXR yesterday AM

## 2022-12-31 NOTE — ASSESSMENT & PLAN NOTE
Lab Results   Component Value Date    HGBA1C 5 7 (H) 10/19/2022       Recent Labs     12/29/22  1712 12/30/22  0009 12/30/22  1143 12/30/22  1833   POCGLU 148* 167* 182* 232*       Blood Sugar Average: Last 72 hrs:  (P) 042 [id number]   · Continue sliding scale coverage q6h + Lantus 15u in AM   · Holding home regimen of 15u 70/30 BID   · Goal blood glucose less than 180

## 2022-12-31 NOTE — ASSESSMENT & PLAN NOTE
· Patient presented to the emergency room on 12/25 with SIRS criteria -increased respiratory rate and hypoxia  · With decompensation on 12/25, antibiotics were started with cefepime/vanco given worsening hypoxia however low suspicion for infection  · BAL with mixed respiratory micheal  · pneumocystis smear negative   · Completing 5 days abx 12/29, however 12/30 with thick tan secretions and worsening leukocytosis and was restarted on cefepime   Overnight with worsening hypoxia and vanco resumed  · Repeat sputum cx and MRSA nasal surveillance sent overnight  · Trend temps and WBC   · procal difficult to interpret in setting of ESRD

## 2023-01-01 ENCOUNTER — APPOINTMENT (INPATIENT)
Dept: DIALYSIS | Facility: HOSPITAL | Age: 78
End: 2023-01-01
Attending: INTERNAL MEDICINE

## 2023-01-01 ENCOUNTER — APPOINTMENT (OUTPATIENT)
Dept: PERIOP | Facility: HOSPITAL | Age: 78
End: 2023-01-01
Attending: ANESTHESIOLOGY

## 2023-01-01 ENCOUNTER — APPOINTMENT (INPATIENT)
Dept: RADIOLOGY | Facility: HOSPITAL | Age: 78
End: 2023-01-01

## 2023-01-01 ENCOUNTER — PATIENT OUTREACH (OUTPATIENT)
Dept: FAMILY MEDICINE CLINIC | Facility: CLINIC | Age: 78
End: 2023-01-01

## 2023-01-01 ENCOUNTER — HOSPITAL ENCOUNTER (OUTPATIENT)
Dept: TISSUE PROCUREMENT | Facility: HOSPITAL | Age: 78
Setting detail: OUTPATIENT SURGERY
Discharge: HOME | End: 2023-01-09

## 2023-01-01 ENCOUNTER — APPOINTMENT (INPATIENT)
Dept: DIALYSIS | Facility: HOSPITAL | Age: 78
End: 2023-01-01

## 2023-01-01 VITALS
HEART RATE: 85 BPM | SYSTOLIC BLOOD PRESSURE: 111 MMHG | TEMPERATURE: 97.6 F | WEIGHT: 178.79 LBS | DIASTOLIC BLOOD PRESSURE: 78 MMHG | BODY MASS INDEX: 27.1 KG/M2 | HEIGHT: 68 IN | RESPIRATION RATE: 10 BRPM | OXYGEN SATURATION: 27 %

## 2023-01-01 PROBLEM — I48.92 ATRIAL FLUTTER (HCC): Status: ACTIVE | Noted: 2023-01-01

## 2023-01-01 LAB
ABO GROUP BLD BPU: NORMAL
ALBUMIN SERPL BCP-MCNC: 2 G/DL (ref 3.5–5)
ALBUMIN SERPL BCP-MCNC: 2.1 G/DL (ref 3.5–5)
ALBUMIN SERPL BCP-MCNC: 2.3 G/DL (ref 3.5–5)
ALP SERPL-CCNC: 71 U/L (ref 46–116)
ALP SERPL-CCNC: 81 U/L (ref 46–116)
ALP SERPL-CCNC: 82 U/L (ref 46–116)
ALT SERPL W P-5'-P-CCNC: 17 U/L (ref 12–78)
ALT SERPL W P-5'-P-CCNC: 18 U/L (ref 12–78)
ALT SERPL W P-5'-P-CCNC: 20 U/L (ref 12–78)
ANION GAP SERPL CALCULATED.3IONS-SCNC: 11 MMOL/L (ref 4–13)
ANION GAP SERPL CALCULATED.3IONS-SCNC: 12 MMOL/L (ref 4–13)
ANION GAP SERPL CALCULATED.3IONS-SCNC: 13 MMOL/L (ref 4–13)
ANION GAP SERPL CALCULATED.3IONS-SCNC: 17 MMOL/L (ref 4–13)
ANION GAP SERPL CALCULATED.3IONS-SCNC: 8 MMOL/L (ref 4–13)
ANION GAP SERPL CALCULATED.3IONS-SCNC: 9 MMOL/L (ref 4–13)
ANISOCYTOSIS BLD QL SMEAR: PRESENT
ARTERIAL PATENCY WRIST A: YES
AST SERPL W P-5'-P-CCNC: 11 U/L (ref 5–45)
AST SERPL W P-5'-P-CCNC: 12 U/L (ref 5–45)
AST SERPL W P-5'-P-CCNC: 8 U/L (ref 5–45)
BACTERIA SPT RESP CULT: ABNORMAL
BACTERIA SPT RESP CULT: ABNORMAL
BASE EXCESS BLDA CALC-SCNC: -0.4 MMOL/L
BASE EXCESS BLDA CALC-SCNC: 2.6 MMOL/L
BASE EXCESS BLDA CALC-SCNC: 6 MMOL/L
BASO STIPL BLD QL SMEAR: PRESENT
BASOPHILS # BLD MANUAL: 0 THOUSAND/UL (ref 0–0.1)
BASOPHILS NFR MAR MANUAL: 0 % (ref 0–1)
BILIRUB SERPL-MCNC: 0.49 MG/DL (ref 0.2–1)
BILIRUB SERPL-MCNC: 0.53 MG/DL (ref 0.2–1)
BILIRUB SERPL-MCNC: 0.69 MG/DL (ref 0.2–1)
BODY TEMPERATURE: 96.6 DEGREES FEHRENHEIT
BODY TEMPERATURE: 98 DEGREES FEHRENHEIT
BODY TEMPERATURE: 98.2 DEGREES FEHRENHEIT
BPU ID: NORMAL
BUN SERPL-MCNC: 111 MG/DL (ref 5–25)
BUN SERPL-MCNC: 136 MG/DL (ref 5–25)
BUN SERPL-MCNC: 65 MG/DL (ref 5–25)
BUN SERPL-MCNC: 66 MG/DL (ref 5–25)
BUN SERPL-MCNC: 72 MG/DL (ref 5–25)
BUN SERPL-MCNC: 82 MG/DL (ref 5–25)
BUN SERPL-MCNC: 86 MG/DL (ref 5–25)
BUN SERPL-MCNC: 92 MG/DL (ref 5–25)
CA-I BLD-SCNC: 1 MMOL/L (ref 1.12–1.32)
CA-I BLD-SCNC: 1.01 MMOL/L (ref 1.12–1.32)
CA-I BLD-SCNC: 1.03 MMOL/L (ref 1.12–1.32)
CALCIUM ALBUM COR SERPL-MCNC: 8.9 MG/DL (ref 8.3–10.1)
CALCIUM ALBUM COR SERPL-MCNC: 9.1 MG/DL (ref 8.3–10.1)
CALCIUM ALBUM COR SERPL-MCNC: 9.2 MG/DL (ref 8.3–10.1)
CALCIUM SERPL-MCNC: 7.2 MG/DL (ref 8.3–10.1)
CALCIUM SERPL-MCNC: 7.3 MG/DL (ref 8.3–10.1)
CALCIUM SERPL-MCNC: 7.5 MG/DL (ref 8.3–10.1)
CALCIUM SERPL-MCNC: 7.6 MG/DL (ref 8.3–10.1)
CALCIUM SERPL-MCNC: 7.8 MG/DL (ref 8.3–10.1)
CALCIUM SERPL-MCNC: 8.1 MG/DL (ref 8.3–10.1)
CALCIUM SERPL-MCNC: 8.1 MG/DL (ref 8.3–10.1)
CALCIUM SERPL-MCNC: 8.2 MG/DL (ref 8.3–10.1)
CHLORIDE SERPL-SCNC: 90 MMOL/L (ref 96–108)
CHLORIDE SERPL-SCNC: 92 MMOL/L (ref 96–108)
CHLORIDE SERPL-SCNC: 92 MMOL/L (ref 96–108)
CHLORIDE SERPL-SCNC: 93 MMOL/L (ref 96–108)
CHLORIDE SERPL-SCNC: 94 MMOL/L (ref 96–108)
CHLORIDE SERPL-SCNC: 96 MMOL/L (ref 96–108)
CO2 SERPL-SCNC: 24 MMOL/L (ref 21–32)
CO2 SERPL-SCNC: 25 MMOL/L (ref 21–32)
CO2 SERPL-SCNC: 27 MMOL/L (ref 21–32)
CO2 SERPL-SCNC: 29 MMOL/L (ref 21–32)
CO2 SERPL-SCNC: 29 MMOL/L (ref 21–32)
CO2 SERPL-SCNC: 32 MMOL/L (ref 21–32)
CO2 SERPL-SCNC: 32 MMOL/L (ref 21–32)
CO2 SERPL-SCNC: 34 MMOL/L (ref 21–32)
CREAT SERPL-MCNC: 2.9 MG/DL (ref 0.6–1.3)
CREAT SERPL-MCNC: 3.18 MG/DL (ref 0.6–1.3)
CREAT SERPL-MCNC: 3.26 MG/DL (ref 0.6–1.3)
CREAT SERPL-MCNC: 3.77 MG/DL (ref 0.6–1.3)
CREAT SERPL-MCNC: 4.03 MG/DL (ref 0.6–1.3)
CREAT SERPL-MCNC: 4.11 MG/DL (ref 0.6–1.3)
CREAT SERPL-MCNC: 4.39 MG/DL (ref 0.6–1.3)
CREAT SERPL-MCNC: 4.78 MG/DL (ref 0.6–1.3)
CROSSMATCH: NORMAL
CRP SERPL QL: 102.1 MG/L
CRP SERPL QL: 98.6 MG/L
EOSINOPHIL # BLD MANUAL: 0 THOUSAND/UL (ref 0–0.4)
EOSINOPHIL NFR BLD MANUAL: 0 % (ref 0–6)
ERYTHROCYTE [DISTWIDTH] IN BLOOD BY AUTOMATED COUNT: 14.6 % (ref 11.6–15.1)
ERYTHROCYTE [DISTWIDTH] IN BLOOD BY AUTOMATED COUNT: 14.7 % (ref 11.6–15.1)
ERYTHROCYTE [DISTWIDTH] IN BLOOD BY AUTOMATED COUNT: 14.8 % (ref 11.6–15.1)
ERYTHROCYTE [DISTWIDTH] IN BLOOD BY AUTOMATED COUNT: 14.8 % (ref 11.6–15.1)
ERYTHROCYTE [DISTWIDTH] IN BLOOD BY AUTOMATED COUNT: 14.9 % (ref 11.6–15.1)
ERYTHROCYTE [DISTWIDTH] IN BLOOD BY AUTOMATED COUNT: 15.1 % (ref 11.6–15.1)
ERYTHROCYTE [DISTWIDTH] IN BLOOD BY AUTOMATED COUNT: 15.5 % (ref 11.6–15.1)
GFR SERPL CREATININE-BSD FRML MDRD: 10 ML/MIN/1.73SQ M
GFR SERPL CREATININE-BSD FRML MDRD: 11 ML/MIN/1.73SQ M
GFR SERPL CREATININE-BSD FRML MDRD: 12 ML/MIN/1.73SQ M
GFR SERPL CREATININE-BSD FRML MDRD: 13 ML/MIN/1.73SQ M
GFR SERPL CREATININE-BSD FRML MDRD: 14 ML/MIN/1.73SQ M
GFR SERPL CREATININE-BSD FRML MDRD: 17 ML/MIN/1.73SQ M
GFR SERPL CREATININE-BSD FRML MDRD: 17 ML/MIN/1.73SQ M
GFR SERPL CREATININE-BSD FRML MDRD: 19 ML/MIN/1.73SQ M
GLUCOSE SERPL-MCNC: 108 MG/DL (ref 65–140)
GLUCOSE SERPL-MCNC: 109 MG/DL (ref 65–140)
GLUCOSE SERPL-MCNC: 114 MG/DL (ref 65–140)
GLUCOSE SERPL-MCNC: 118 MG/DL (ref 65–140)
GLUCOSE SERPL-MCNC: 121 MG/DL (ref 65–140)
GLUCOSE SERPL-MCNC: 123 MG/DL (ref 65–140)
GLUCOSE SERPL-MCNC: 133 MG/DL (ref 65–140)
GLUCOSE SERPL-MCNC: 137 MG/DL (ref 65–140)
GLUCOSE SERPL-MCNC: 144 MG/DL (ref 65–140)
GLUCOSE SERPL-MCNC: 144 MG/DL (ref 65–140)
GLUCOSE SERPL-MCNC: 158 MG/DL (ref 65–140)
GLUCOSE SERPL-MCNC: 163 MG/DL (ref 65–140)
GLUCOSE SERPL-MCNC: 166 MG/DL (ref 65–140)
GLUCOSE SERPL-MCNC: 167 MG/DL (ref 65–140)
GLUCOSE SERPL-MCNC: 170 MG/DL (ref 65–140)
GLUCOSE SERPL-MCNC: 171 MG/DL (ref 65–140)
GLUCOSE SERPL-MCNC: 172 MG/DL (ref 65–140)
GLUCOSE SERPL-MCNC: 175 MG/DL (ref 65–140)
GLUCOSE SERPL-MCNC: 184 MG/DL (ref 65–140)
GLUCOSE SERPL-MCNC: 207 MG/DL (ref 65–140)
GLUCOSE SERPL-MCNC: 208 MG/DL (ref 65–140)
GLUCOSE SERPL-MCNC: 209 MG/DL (ref 65–140)
GLUCOSE SERPL-MCNC: 214 MG/DL (ref 65–140)
GLUCOSE SERPL-MCNC: 216 MG/DL (ref 65–140)
GLUCOSE SERPL-MCNC: 226 MG/DL (ref 65–140)
GLUCOSE SERPL-MCNC: 231 MG/DL (ref 65–140)
GLUCOSE SERPL-MCNC: 236 MG/DL (ref 65–140)
GLUCOSE SERPL-MCNC: 238 MG/DL (ref 65–140)
GLUCOSE SERPL-MCNC: 240 MG/DL (ref 65–140)
GLUCOSE SERPL-MCNC: 254 MG/DL (ref 65–140)
GLUCOSE SERPL-MCNC: 260 MG/DL (ref 65–140)
GLUCOSE SERPL-MCNC: 270 MG/DL (ref 65–140)
GLUCOSE SERPL-MCNC: 271 MG/DL (ref 65–140)
GLUCOSE SERPL-MCNC: 284 MG/DL (ref 65–140)
GLUCOSE SERPL-MCNC: 287 MG/DL (ref 65–140)
GLUCOSE SERPL-MCNC: 294 MG/DL (ref 65–140)
GLUCOSE SERPL-MCNC: 312 MG/DL (ref 65–140)
GLUCOSE SERPL-MCNC: 315 MG/DL (ref 65–140)
GLUCOSE SERPL-MCNC: 315 MG/DL (ref 65–140)
GLUCOSE SERPL-MCNC: 327 MG/DL (ref 65–140)
GLUCOSE SERPL-MCNC: 346 MG/DL (ref 65–140)
GRAM STN SPEC: ABNORMAL
HCO3 BLDA-SCNC: 26.1 MMOL/L (ref 22–28)
HCO3 BLDA-SCNC: 29.3 MMOL/L (ref 22–28)
HCO3 BLDA-SCNC: 32.2 MMOL/L (ref 22–28)
HCT VFR BLD AUTO: 20.9 % (ref 36.5–49.3)
HCT VFR BLD AUTO: 23.6 % (ref 36.5–49.3)
HCT VFR BLD AUTO: 24.9 % (ref 36.5–49.3)
HCT VFR BLD AUTO: 25 % (ref 36.5–49.3)
HCT VFR BLD AUTO: 25.5 % (ref 36.5–49.3)
HCT VFR BLD AUTO: 26.7 % (ref 36.5–49.3)
HCT VFR BLD AUTO: 26.8 % (ref 36.5–49.3)
HCT VFR BLD AUTO: 27.8 % (ref 36.5–49.3)
HCT VFR BLD AUTO: 28.2 % (ref 36.5–49.3)
HGB BLD-MCNC: 6.6 G/DL (ref 12–17)
HGB BLD-MCNC: 7.3 G/DL (ref 12–17)
HGB BLD-MCNC: 7.9 G/DL (ref 12–17)
HGB BLD-MCNC: 8.4 G/DL (ref 12–17)
HGB BLD-MCNC: 8.4 G/DL (ref 12–17)
HGB BLD-MCNC: 8.5 G/DL (ref 12–17)
HGB BLD-MCNC: 9 G/DL (ref 12–17)
HOROWITZ INDEX BLDA+IHG-RTO: 40 MM[HG]
HOROWITZ INDEX BLDA+IHG-RTO: 50 MM[HG]
HOROWITZ INDEX BLDA+IHG-RTO: 80 MM[HG]
I-TIME: 0.8
LG PLATELETS BLD QL SMEAR: PRESENT
LYMPHOCYTES # BLD AUTO: 0 % (ref 14–44)
LYMPHOCYTES # BLD AUTO: 0 THOUSAND/UL (ref 0.6–4.47)
LYMPHOCYTES # BLD AUTO: 0.24 THOUSAND/UL (ref 0.6–4.47)
LYMPHOCYTES # BLD AUTO: 0.26 THOUSAND/UL (ref 0.6–4.47)
LYMPHOCYTES # BLD AUTO: 0.28 THOUSAND/UL (ref 0.6–4.47)
LYMPHOCYTES # BLD AUTO: 1 % (ref 14–44)
MAGNESIUM SERPL-MCNC: 1.9 MG/DL (ref 1.6–2.6)
MAGNESIUM SERPL-MCNC: 2 MG/DL (ref 1.6–2.6)
MAGNESIUM SERPL-MCNC: 2 MG/DL (ref 1.6–2.6)
MAGNESIUM SERPL-MCNC: 2.1 MG/DL (ref 1.6–2.6)
MAGNESIUM SERPL-MCNC: 2.2 MG/DL (ref 1.6–2.6)
MAGNESIUM SERPL-MCNC: 2.3 MG/DL (ref 1.6–2.6)
MAGNESIUM SERPL-MCNC: 2.5 MG/DL (ref 1.6–2.6)
MCH RBC QN AUTO: 27.5 PG (ref 26.8–34.3)
MCH RBC QN AUTO: 28.1 PG (ref 26.8–34.3)
MCH RBC QN AUTO: 28.2 PG (ref 26.8–34.3)
MCH RBC QN AUTO: 28.3 PG (ref 26.8–34.3)
MCH RBC QN AUTO: 28.5 PG (ref 26.8–34.3)
MCH RBC QN AUTO: 28.6 PG (ref 26.8–34.3)
MCH RBC QN AUTO: 28.6 PG (ref 26.8–34.3)
MCHC RBC AUTO-ENTMCNC: 30.6 G/DL (ref 31.4–37.4)
MCHC RBC AUTO-ENTMCNC: 30.9 G/DL (ref 31.4–37.4)
MCHC RBC AUTO-ENTMCNC: 31 G/DL (ref 31.4–37.4)
MCHC RBC AUTO-ENTMCNC: 31.3 G/DL (ref 31.4–37.4)
MCHC RBC AUTO-ENTMCNC: 31.5 G/DL (ref 31.4–37.4)
MCHC RBC AUTO-ENTMCNC: 31.6 G/DL (ref 31.4–37.4)
MCHC RBC AUTO-ENTMCNC: 31.7 G/DL (ref 31.4–37.4)
MCHC RBC AUTO-ENTMCNC: 31.9 G/DL (ref 31.4–37.4)
MCHC RBC AUTO-ENTMCNC: 32.1 G/DL (ref 31.4–37.4)
MCV RBC AUTO: 89 FL (ref 82–98)
MCV RBC AUTO: 90 FL (ref 82–98)
MCV RBC AUTO: 90 FL (ref 82–98)
MCV RBC AUTO: 91 FL (ref 82–98)
MCV RBC AUTO: 91 FL (ref 82–98)
MCV RBC AUTO: 92 FL (ref 82–98)
METAMYELOCYTES NFR BLD MANUAL: 1 % (ref 0–1)
MONOCYTES # BLD AUTO: 0 THOUSAND/UL (ref 0–1.22)
MONOCYTES # BLD AUTO: 0.28 THOUSAND/UL (ref 0–1.22)
MONOCYTES # BLD AUTO: 0.28 THOUSAND/UL (ref 0–1.22)
MONOCYTES # BLD AUTO: 0.71 THOUSAND/UL (ref 0–1.22)
MONOCYTES NFR BLD: 0 % (ref 4–12)
MONOCYTES NFR BLD: 1 % (ref 4–12)
MONOCYTES NFR BLD: 1 % (ref 4–12)
MONOCYTES NFR BLD: 3 % (ref 4–12)
MRSA NOSE QL CULT: NORMAL
MYELOCYTES NFR BLD MANUAL: 1 % (ref 0–1)
MYELOCYTES NFR BLD MANUAL: 1 % (ref 0–1)
NEUTROPHILS # BLD MANUAL: 22.59 THOUSAND/UL (ref 1.85–7.62)
NEUTROPHILS # BLD MANUAL: 25.81 THOUSAND/UL (ref 1.85–7.62)
NEUTROPHILS # BLD MANUAL: 27.08 THOUSAND/UL (ref 1.85–7.62)
NEUTROPHILS # BLD MANUAL: 28.12 THOUSAND/UL (ref 1.85–7.62)
NEUTS BAND NFR BLD MANUAL: 5 % (ref 0–8)
NEUTS BAND NFR BLD MANUAL: 6 % (ref 0–8)
NEUTS BAND NFR BLD MANUAL: 6 % (ref 0–8)
NEUTS BAND NFR BLD MANUAL: 9 % (ref 0–8)
NEUTS SEG NFR BLD AUTO: 89 % (ref 43–75)
NEUTS SEG NFR BLD AUTO: 90 % (ref 43–75)
NEUTS SEG NFR BLD AUTO: 91 % (ref 43–75)
NEUTS SEG NFR BLD AUTO: 93 % (ref 43–75)
NRBC BLD AUTO-RTO: 0 /100 WBCS
O2 CT BLDA-SCNC: 11.8 ML/DL (ref 16–23)
O2 CT BLDA-SCNC: 12.2 ML/DL (ref 16–23)
O2 CT BLDA-SCNC: 12.4 ML/DL (ref 16–23)
OXYHGB MFR BLDA: 93.9 % (ref 94–97)
OXYHGB MFR BLDA: 94 % (ref 94–97)
OXYHGB MFR BLDA: 94.3 % (ref 94–97)
PCO2 BLDA: 52.7 MM HG (ref 36–44)
PCO2 BLDA: 56.3 MM HG (ref 36–44)
PCO2 BLDA: 57.5 MM HG (ref 36–44)
PCO2 TEMP ADJ BLDA: 50.2 MM HG (ref 36–44)
PCO2 TEMP ADJ BLDA: 55.6 MM HG (ref 36–44)
PCO2 TEMP ADJ BLDA: 57 MM HG (ref 36–44)
PEEP RESPIRATORY: 10 CM[H2O]
PEEP RESPIRATORY: 8 CM[H2O]
PEEP RESPIRATORY: 8 CM[H2O]
PH BLD: 7.33 [PH] (ref 7.35–7.45)
PH BLD: 7.33 [PH] (ref 7.35–7.45)
PH BLD: 7.38 [PH] (ref 7.35–7.45)
PH BLDA: 7.31 [PH] (ref 7.35–7.45)
PH BLDA: 7.33 [PH] (ref 7.35–7.45)
PH BLDA: 7.38 [PH] (ref 7.35–7.45)
PHOSPHATE SERPL-MCNC: 4.9 MG/DL (ref 2.3–4.1)
PHOSPHATE SERPL-MCNC: 4.9 MG/DL (ref 2.3–4.1)
PHOSPHATE SERPL-MCNC: 5.6 MG/DL (ref 2.3–4.1)
PHOSPHATE SERPL-MCNC: 5.7 MG/DL (ref 2.3–4.1)
PHOSPHATE SERPL-MCNC: 5.9 MG/DL (ref 2.3–4.1)
PHOSPHATE SERPL-MCNC: 5.9 MG/DL (ref 2.3–4.1)
PHOSPHATE SERPL-MCNC: 6 MG/DL (ref 2.3–4.1)
PHOSPHATE SERPL-MCNC: 6.2 MG/DL (ref 2.3–4.1)
PHOSPHATE SERPL-MCNC: 6.2 MG/DL (ref 2.3–4.1)
PLATELET # BLD AUTO: 159 THOUSANDS/UL (ref 149–390)
PLATELET # BLD AUTO: 166 THOUSANDS/UL (ref 149–390)
PLATELET # BLD AUTO: 208 THOUSANDS/UL (ref 149–390)
PLATELET # BLD AUTO: 209 THOUSANDS/UL (ref 149–390)
PLATELET # BLD AUTO: 211 THOUSANDS/UL (ref 149–390)
PLATELET # BLD AUTO: 214 THOUSANDS/UL (ref 149–390)
PLATELET # BLD AUTO: 225 THOUSANDS/UL (ref 149–390)
PLATELET # BLD AUTO: 229 THOUSANDS/UL (ref 149–390)
PLATELET # BLD AUTO: 241 THOUSANDS/UL (ref 149–390)
PLATELET BLD QL SMEAR: ADEQUATE
PMV BLD AUTO: 10.2 FL (ref 8.9–12.7)
PMV BLD AUTO: 10.3 FL (ref 8.9–12.7)
PMV BLD AUTO: 10.9 FL (ref 8.9–12.7)
PMV BLD AUTO: 11 FL (ref 8.9–12.7)
PMV BLD AUTO: 11.2 FL (ref 8.9–12.7)
PMV BLD AUTO: 11.4 FL (ref 8.9–12.7)
PO2 BLD: 70.8 MM HG (ref 75–129)
PO2 BLD: 75.3 MM HG (ref 75–129)
PO2 BLD: 78.1 MM HG (ref 75–129)
PO2 BLDA: 76.2 MM HG (ref 75–129)
PO2 BLDA: 76.8 MM HG (ref 75–129)
PO2 BLDA: 79.1 MM HG (ref 75–129)
POIKILOCYTOSIS BLD QL SMEAR: PRESENT
POLYCHROMASIA BLD QL SMEAR: PRESENT
POTASSIUM SERPL-SCNC: 4.2 MMOL/L (ref 3.5–5.3)
POTASSIUM SERPL-SCNC: 4.2 MMOL/L (ref 3.5–5.3)
POTASSIUM SERPL-SCNC: 4.4 MMOL/L (ref 3.5–5.3)
POTASSIUM SERPL-SCNC: 4.5 MMOL/L (ref 3.5–5.3)
POTASSIUM SERPL-SCNC: 4.6 MMOL/L (ref 3.5–5.3)
POTASSIUM SERPL-SCNC: 5.1 MMOL/L (ref 3.5–5.3)
POTASSIUM SERPL-SCNC: 5.1 MMOL/L (ref 3.5–5.3)
POTASSIUM SERPL-SCNC: 5.6 MMOL/L (ref 3.5–5.3)
PRESSURE CONTROL: 22
PRESSURE CONTROL: 22
PRESSURE CONTROL: 28
PROCALCITONIN SERPL-MCNC: 4.17 NG/ML
PROCALCITONIN SERPL-MCNC: 4.18 NG/ML
PROT SERPL-MCNC: 5.1 G/DL (ref 6.4–8.4)
PROT SERPL-MCNC: 5.2 G/DL (ref 6.4–8.4)
PROT SERPL-MCNC: 5.6 G/DL (ref 6.4–8.4)
RBC # BLD AUTO: 2.35 MILLION/UL (ref 3.88–5.62)
RBC # BLD AUTO: 2.59 MILLION/UL (ref 3.88–5.62)
RBC # BLD AUTO: 2.79 MILLION/UL (ref 3.88–5.62)
RBC # BLD AUTO: 2.81 MILLION/UL (ref 3.88–5.62)
RBC # BLD AUTO: 2.87 MILLION/UL (ref 3.88–5.62)
RBC # BLD AUTO: 2.94 MILLION/UL (ref 3.88–5.62)
RBC # BLD AUTO: 2.99 MILLION/UL (ref 3.88–5.62)
RBC # BLD AUTO: 3.03 MILLION/UL (ref 3.88–5.62)
RBC # BLD AUTO: 3.15 MILLION/UL (ref 3.88–5.62)
RBC MORPH BLD: PRESENT
ROULEAUX BLD QL SMEAR: PRESENT
SODIUM SERPL-SCNC: 130 MMOL/L (ref 135–147)
SODIUM SERPL-SCNC: 130 MMOL/L (ref 135–147)
SODIUM SERPL-SCNC: 131 MMOL/L (ref 135–147)
SODIUM SERPL-SCNC: 132 MMOL/L (ref 135–147)
SODIUM SERPL-SCNC: 133 MMOL/L (ref 135–147)
SODIUM SERPL-SCNC: 135 MMOL/L (ref 135–147)
SODIUM SERPL-SCNC: 136 MMOL/L (ref 135–147)
SODIUM SERPL-SCNC: 136 MMOL/L (ref 135–147)
SPECIMEN SOURCE: ABNORMAL
UNIT DISPENSE STATUS: NORMAL
UNIT PRODUCT CODE: NORMAL
UNIT PRODUCT VOLUME: 194 ML
UNIT PRODUCT VOLUME: 196 ML
UNIT PRODUCT VOLUME: 200 ML
UNIT PRODUCT VOLUME: 201 ML
UNIT PRODUCT VOLUME: 205 ML
UNIT PRODUCT VOLUME: 206 ML
UNIT PRODUCT VOLUME: 207 ML
UNIT PRODUCT VOLUME: 208 ML
UNIT PRODUCT VOLUME: 208 ML
UNIT PRODUCT VOLUME: 215 ML
UNIT PRODUCT VOLUME: 256 ML
UNIT PRODUCT VOLUME: 265 ML
UNIT PRODUCT VOLUME: 266 ML
UNIT PRODUCT VOLUME: 270 ML
UNIT PRODUCT VOLUME: 275 ML
UNIT PRODUCT VOLUME: 286 ML
UNIT PRODUCT VOLUME: 289 ML
UNIT PRODUCT VOLUME: 290 ML
UNIT PRODUCT VOLUME: 290 ML
UNIT PRODUCT VOLUME: 291 ML
UNIT PRODUCT VOLUME: 294 ML
UNIT PRODUCT VOLUME: 295 ML
UNIT PRODUCT VOLUME: 298 ML
UNIT PRODUCT VOLUME: 299 ML
UNIT PRODUCT VOLUME: 299 ML
UNIT PRODUCT VOLUME: 300 ML
UNIT PRODUCT VOLUME: 300 ML
UNIT PRODUCT VOLUME: 302 ML
UNIT PRODUCT VOLUME: 305 ML
UNIT PRODUCT VOLUME: 306 ML
UNIT PRODUCT VOLUME: 306 ML
UNIT PRODUCT VOLUME: 308 ML
UNIT PRODUCT VOLUME: 308 ML
UNIT PRODUCT VOLUME: 309 ML
UNIT PRODUCT VOLUME: 312 ML
UNIT PRODUCT VOLUME: 312 ML
UNIT PRODUCT VOLUME: 315 ML
UNIT PRODUCT VOLUME: 316 ML
UNIT PRODUCT VOLUME: 316 ML
UNIT PRODUCT VOLUME: 317 ML
UNIT PRODUCT VOLUME: 317 ML
UNIT PRODUCT VOLUME: 318 ML
UNIT PRODUCT VOLUME: 318 ML
UNIT PRODUCT VOLUME: 320 ML
UNIT PRODUCT VOLUME: 322 ML
UNIT PRODUCT VOLUME: 322 ML
UNIT PRODUCT VOLUME: 324 ML
UNIT PRODUCT VOLUME: 330 ML
UNIT PRODUCT VOLUME: 332 ML
UNIT PRODUCT VOLUME: 334 ML
UNIT PRODUCT VOLUME: 335 ML
UNIT PRODUCT VOLUME: 335 ML
UNIT PRODUCT VOLUME: 341 ML
UNIT PRODUCT VOLUME: 344 ML
UNIT PRODUCT VOLUME: 347 ML
UNIT PRODUCT VOLUME: 350 ML
UNIT PRODUCT VOLUME: 350 ML
UNIT PRODUCT VOLUME: 351 ML
UNIT RH: NORMAL
VANCOMYCIN SERPL-MCNC: 20.4 UG/ML (ref 10–20)
VANCOMYCIN SERPL-MCNC: 25 UG/ML (ref 10–20)
VENT AC: 22
VENT AC: 22
VENT AC: 26
VENT- AC: AC
WBC # BLD AUTO: 22.64 THOUSAND/UL (ref 4.31–10.16)
WBC # BLD AUTO: 23.78 THOUSAND/UL (ref 4.31–10.16)
WBC # BLD AUTO: 25.23 THOUSAND/UL (ref 4.31–10.16)
WBC # BLD AUTO: 26.34 THOUSAND/UL (ref 4.31–10.16)
WBC # BLD AUTO: 27.92 THOUSAND/UL (ref 4.31–10.16)
WBC # BLD AUTO: 28.4 THOUSAND/UL (ref 4.31–10.16)
WBC # BLD AUTO: 30.29 THOUSAND/UL (ref 4.31–10.16)
WBC # BLD AUTO: 37.38 THOUSAND/UL (ref 4.31–10.16)
WBC # BLD AUTO: 39.42 THOUSAND/UL (ref 4.31–10.16)

## 2023-01-01 PROCEDURE — 0BC68ZZ EXTIRPATION OF MATTER FROM RIGHT LOWER LOBE BRONCHUS, VIA NATURAL OR ARTIFICIAL OPENING ENDOSCOPIC: ICD-10-PCS | Performed by: ANESTHESIOLOGY

## 2023-01-01 PROCEDURE — 0B9B8ZZ DRAINAGE OF LEFT LOWER LOBE BRONCHUS, VIA NATURAL OR ARTIFICIAL OPENING ENDOSCOPIC: ICD-10-PCS | Performed by: ANESTHESIOLOGY

## 2023-01-01 PROCEDURE — 0BCB8ZZ EXTIRPATION OF MATTER FROM LEFT LOWER LOBE BRONCHUS, VIA NATURAL OR ARTIFICIAL OPENING ENDOSCOPIC: ICD-10-PCS | Performed by: ANESTHESIOLOGY

## 2023-01-01 PROCEDURE — 0B958ZZ DRAINAGE OF RIGHT MIDDLE LOBE BRONCHUS, VIA NATURAL OR ARTIFICIAL OPENING ENDOSCOPIC: ICD-10-PCS | Performed by: ANESTHESIOLOGY

## 2023-01-01 PROCEDURE — 30233N1 TRANSFUSION OF NONAUTOLOGOUS RED BLOOD CELLS INTO PERIPHERAL VEIN, PERCUTANEOUS APPROACH: ICD-10-PCS | Performed by: ANESTHESIOLOGY

## 2023-01-01 RX ORDER — INSULIN GLARGINE 100 [IU]/ML
20 INJECTION, SOLUTION SUBCUTANEOUS EVERY MORNING
Status: DISCONTINUED | OUTPATIENT
Start: 2023-01-01 | End: 2023-01-01

## 2023-01-01 RX ORDER — ACETAMINOPHEN 160 MG/5ML
650 SUSPENSION, ORAL (FINAL DOSE FORM) ORAL ONCE
Status: COMPLETED | OUTPATIENT
Start: 2023-01-01 | End: 2023-01-01

## 2023-01-01 RX ORDER — MORPHINE SULFATE 4 MG/ML
4 INJECTION, SOLUTION INTRAMUSCULAR; INTRAVENOUS ONCE
Status: COMPLETED | OUTPATIENT
Start: 2023-01-01 | End: 2023-01-01

## 2023-01-01 RX ORDER — WATER 1000 ML/1000ML
INJECTION, SOLUTION INTRAVENOUS
Status: COMPLETED
Start: 2023-01-01 | End: 2023-01-01

## 2023-01-01 RX ORDER — ALBUMIN (HUMAN) 12.5 G/50ML
25 SOLUTION INTRAVENOUS ONCE
Status: COMPLETED | OUTPATIENT
Start: 2023-01-01 | End: 2023-01-01

## 2023-01-01 RX ORDER — CALCIUM GLUCONATE 20 MG/ML
2 INJECTION, SOLUTION INTRAVENOUS ONCE
Status: COMPLETED | OUTPATIENT
Start: 2023-01-01 | End: 2023-01-01

## 2023-01-01 RX ORDER — DIPHENHYDRAMINE HYDROCHLORIDE 50 MG/ML
50 INJECTION INTRAMUSCULAR; INTRAVENOUS ONCE
Status: COMPLETED | OUTPATIENT
Start: 2023-01-01 | End: 2023-01-01

## 2023-01-01 RX ORDER — METOCLOPRAMIDE HYDROCHLORIDE 5 MG/5ML
5 SOLUTION ORAL 2 TIMES DAILY
Status: DISCONTINUED | OUTPATIENT
Start: 2023-01-01 | End: 2023-01-01

## 2023-01-01 RX ORDER — DIGOXIN 0.25 MG/ML
250 INJECTION INTRAMUSCULAR; INTRAVENOUS ONCE
Status: COMPLETED | OUTPATIENT
Start: 2023-01-01 | End: 2023-01-01

## 2023-01-01 RX ORDER — INSULIN LISPRO 100 [IU]/ML
2-12 INJECTION, SOLUTION INTRAVENOUS; SUBCUTANEOUS EVERY 6 HOURS SCHEDULED
Status: DISCONTINUED | OUTPATIENT
Start: 2023-01-01 | End: 2023-01-01

## 2023-01-01 RX ORDER — SACCHAROMYCES BOULARDII 250 MG
250 CAPSULE ORAL 2 TIMES DAILY
Status: DISCONTINUED | OUTPATIENT
Start: 2023-01-01 | End: 2023-01-01

## 2023-01-01 RX ORDER — LORAZEPAM 2 MG/ML
1 INJECTION INTRAMUSCULAR
Status: DISCONTINUED | OUTPATIENT
Start: 2023-01-01 | End: 2023-01-01 | Stop reason: HOSPADM

## 2023-01-01 RX ORDER — DIPHENHYDRAMINE HYDROCHLORIDE 50 MG/ML
50 INJECTION INTRAMUSCULAR; INTRAVENOUS ONCE AS NEEDED
Status: COMPLETED | OUTPATIENT
Start: 2023-01-01 | End: 2023-01-01

## 2023-01-01 RX ORDER — SULFAMETHOXAZOLE AND TRIMETHOPRIM 200; 40 MG/5ML; MG/5ML
80 SUSPENSION ORAL 3 TIMES WEEKLY
Status: DISCONTINUED | OUTPATIENT
Start: 2023-01-01 | End: 2023-01-01

## 2023-01-01 RX ORDER — ACETAMINOPHEN 160 MG/5ML
650 SUSPENSION, ORAL (FINAL DOSE FORM) ORAL EVERY 4 HOURS PRN
Status: DISCONTINUED | OUTPATIENT
Start: 2023-01-01 | End: 2023-01-01 | Stop reason: HOSPADM

## 2023-01-01 RX ORDER — AMOXICILLIN 250 MG
2 CAPSULE ORAL
Status: DISCONTINUED | OUTPATIENT
Start: 2023-01-01 | End: 2023-01-01

## 2023-01-01 RX ORDER — PROPOFOL 10 MG/ML
5-50 INJECTION, EMULSION INTRAVENOUS
Status: DISCONTINUED | OUTPATIENT
Start: 2023-01-01 | End: 2023-01-01

## 2023-01-01 RX ORDER — METOPROLOL TARTRATE 5 MG/5ML
5 INJECTION INTRAVENOUS ONCE
Status: COMPLETED | OUTPATIENT
Start: 2023-01-01 | End: 2023-01-01

## 2023-01-01 RX ORDER — CALCIUM GLUCONATE 20 MG/ML
2 INJECTION, SOLUTION INTRAVENOUS ONCE AS NEEDED
Status: COMPLETED | OUTPATIENT
Start: 2023-01-01 | End: 2023-01-01

## 2023-01-01 RX ORDER — INSULIN GLARGINE 100 [IU]/ML
35 INJECTION, SOLUTION SUBCUTANEOUS EVERY MORNING
Status: DISCONTINUED | OUTPATIENT
Start: 2023-01-01 | End: 2023-01-01

## 2023-01-01 RX ORDER — INSULIN LISPRO 100 [IU]/ML
4-20 INJECTION, SOLUTION INTRAVENOUS; SUBCUTANEOUS EVERY 6 HOURS SCHEDULED
Status: DISCONTINUED | OUTPATIENT
Start: 2023-01-01 | End: 2023-01-01

## 2023-01-01 RX ORDER — INSULIN GLARGINE 100 [IU]/ML
30 INJECTION, SOLUTION SUBCUTANEOUS EVERY MORNING
Status: DISCONTINUED | OUTPATIENT
Start: 2023-01-01 | End: 2023-01-01

## 2023-01-01 RX ORDER — INSULIN GLARGINE 100 [IU]/ML
42 INJECTION, SOLUTION SUBCUTANEOUS EVERY MORNING
Status: DISCONTINUED | OUTPATIENT
Start: 2023-01-01 | End: 2023-01-01

## 2023-01-01 RX ORDER — GLYCOPYRROLATE 0.2 MG/ML
0.1 INJECTION INTRAMUSCULAR; INTRAVENOUS EVERY 4 HOURS PRN
Status: DISCONTINUED | OUTPATIENT
Start: 2023-01-01 | End: 2023-01-01 | Stop reason: HOSPADM

## 2023-01-01 RX ORDER — METOPROLOL TARTRATE 5 MG/5ML
5 INJECTION INTRAVENOUS ONCE
Status: DISCONTINUED | OUTPATIENT
Start: 2023-01-01 | End: 2023-01-01

## 2023-01-01 RX ORDER — CEFEPIME HYDROCHLORIDE 1 G/50ML
1000 INJECTION, SOLUTION INTRAVENOUS EVERY 24 HOURS
Status: COMPLETED | OUTPATIENT
Start: 2023-01-01 | End: 2023-01-01

## 2023-01-01 RX ORDER — VECURONIUM BROMIDE 1 MG/ML
10 INJECTION, POWDER, LYOPHILIZED, FOR SOLUTION INTRAVENOUS ONCE
Status: COMPLETED | OUTPATIENT
Start: 2023-01-01 | End: 2023-01-01

## 2023-01-01 RX ORDER — CALCIUM ACETATE 667 MG/1
1334 CAPSULE ORAL
Status: DISCONTINUED | OUTPATIENT
Start: 2023-01-01 | End: 2023-01-01

## 2023-01-01 RX ORDER — HEPARIN SODIUM 5000 [USP'U]/ML
5000 INJECTION, SOLUTION INTRAVENOUS; SUBCUTANEOUS EVERY 8 HOURS SCHEDULED
Status: DISCONTINUED | OUTPATIENT
Start: 2023-01-01 | End: 2023-01-01

## 2023-01-01 RX ORDER — SODIUM CHLORIDE 9 MG/ML
20 INJECTION, SOLUTION INTRAVENOUS ONCE
Status: COMPLETED | OUTPATIENT
Start: 2023-01-01 | End: 2023-01-01

## 2023-01-01 RX ADMIN — FENTANYL CITRATE 50 MCG: 50 INJECTION INTRAMUSCULAR; INTRAVENOUS at 20:35

## 2023-01-01 RX ADMIN — SODIUM CHLORIDE SOLN NEBU 3% 4 ML: 3 NEBU SOLN at 07:57

## 2023-01-01 RX ADMIN — DEXMEDETOMIDINE 0.4 MCG/KG/HR: 100 INJECTION, SOLUTION, CONCENTRATE INTRAVENOUS at 21:58

## 2023-01-01 RX ADMIN — MORPHINE SULFATE 2 MG: 2 INJECTION, SOLUTION INTRAMUSCULAR; INTRAVENOUS at 10:25

## 2023-01-01 RX ADMIN — METOPROLOL TARTRATE 25 MG: 25 TABLET, FILM COATED ORAL at 08:39

## 2023-01-01 RX ADMIN — IPRATROPIUM BROMIDE 0.5 MG: 0.5 SOLUTION RESPIRATORY (INHALATION) at 07:57

## 2023-01-01 RX ADMIN — SODIUM CHLORIDE SOLN NEBU 3% 4 ML: 3 NEBU SOLN at 20:46

## 2023-01-01 RX ADMIN — INSULIN GLARGINE 42 UNITS: 100 INJECTION, SOLUTION SUBCUTANEOUS at 08:39

## 2023-01-01 RX ADMIN — LEVALBUTEROL HYDROCHLORIDE 1.25 MG: 1.25 SOLUTION, CONCENTRATE RESPIRATORY (INHALATION) at 07:57

## 2023-01-01 RX ADMIN — LEVALBUTEROL HYDROCHLORIDE 1.25 MG: 1.25 SOLUTION, CONCENTRATE RESPIRATORY (INHALATION) at 20:57

## 2023-01-01 RX ADMIN — CALCIUM ACETATE 667 MG: 667 CAPSULE ORAL at 13:15

## 2023-01-01 RX ADMIN — IPRATROPIUM BROMIDE 0.5 MG: 0.5 SOLUTION RESPIRATORY (INHALATION) at 14:10

## 2023-01-01 RX ADMIN — Medication 2 TABLET: at 07:02

## 2023-01-01 RX ADMIN — EPOETIN ALFA 4000 UNITS: 4000 SOLUTION INTRAVENOUS; SUBCUTANEOUS at 15:24

## 2023-01-01 RX ADMIN — METOCLOPRAMIDE HYDROCHLORIDE 5 MG: 5 SOLUTION ORAL at 21:17

## 2023-01-01 RX ADMIN — LEVALBUTEROL HYDROCHLORIDE 1.25 MG: 1.25 SOLUTION, CONCENTRATE RESPIRATORY (INHALATION) at 13:44

## 2023-01-01 RX ADMIN — CHLORHEXIDINE GLUCONATE 15 ML: 1.2 SOLUTION ORAL at 20:41

## 2023-01-01 RX ADMIN — IPRATROPIUM BROMIDE 0.5 MG: 0.5 SOLUTION RESPIRATORY (INHALATION) at 02:07

## 2023-01-01 RX ADMIN — FENTANYL CITRATE 50 MCG: 50 INJECTION INTRAMUSCULAR; INTRAVENOUS at 15:05

## 2023-01-01 RX ADMIN — AMIODARONE HYDROCHLORIDE 1 MG/MIN: 50 INJECTION, SOLUTION INTRAVENOUS at 22:44

## 2023-01-01 RX ADMIN — INSULIN GLARGINE 35 UNITS: 100 INJECTION, SOLUTION SUBCUTANEOUS at 08:49

## 2023-01-01 RX ADMIN — Medication 250 MG: at 17:52

## 2023-01-01 RX ADMIN — CALCIUM ACETATE 1334 MG: 667 CAPSULE ORAL at 16:46

## 2023-01-01 RX ADMIN — INSULIN GLARGINE 20 UNITS: 100 INJECTION, SOLUTION SUBCUTANEOUS at 08:47

## 2023-01-01 RX ADMIN — Medication 250 MG: at 08:35

## 2023-01-01 RX ADMIN — HEPARIN SODIUM 5000 UNITS: 5000 INJECTION INTRAVENOUS; SUBCUTANEOUS at 21:30

## 2023-01-01 RX ADMIN — CHLORHEXIDINE GLUCONATE 15 ML: 1.2 SOLUTION ORAL at 20:08

## 2023-01-01 RX ADMIN — IPRATROPIUM BROMIDE 0.5 MG: 0.5 SOLUTION RESPIRATORY (INHALATION) at 07:54

## 2023-01-01 RX ADMIN — IPRATROPIUM BROMIDE 0.5 MG: 0.5 SOLUTION RESPIRATORY (INHALATION) at 03:00

## 2023-01-01 RX ADMIN — IPRATROPIUM BROMIDE 0.5 MG: 0.5 SOLUTION RESPIRATORY (INHALATION) at 07:44

## 2023-01-01 RX ADMIN — IPRATROPIUM BROMIDE 0.5 MG: 0.5 SOLUTION RESPIRATORY (INHALATION) at 19:58

## 2023-01-01 RX ADMIN — IPRATROPIUM BROMIDE 0.5 MG: 0.5 SOLUTION RESPIRATORY (INHALATION) at 20:46

## 2023-01-01 RX ADMIN — SODIUM CHLORIDE SOLN NEBU 3% 4 ML: 3 NEBU SOLN at 20:02

## 2023-01-01 RX ADMIN — IPRATROPIUM BROMIDE 0.5 MG: 0.5 SOLUTION RESPIRATORY (INHALATION) at 20:57

## 2023-01-01 RX ADMIN — CHLORHEXIDINE GLUCONATE 15 ML: 1.2 SOLUTION ORAL at 08:01

## 2023-01-01 RX ADMIN — Medication 250 MG: at 08:48

## 2023-01-01 RX ADMIN — VANCOMYCIN HYDROCHLORIDE 750 MG: 750 INJECTION, SOLUTION INTRAVENOUS at 16:06

## 2023-01-01 RX ADMIN — INSULIN LISPRO 12 UNITS: 100 INJECTION, SOLUTION INTRAVENOUS; SUBCUTANEOUS at 12:16

## 2023-01-01 RX ADMIN — SODIUM CHLORIDE SOLN NEBU 3% 4 ML: 3 NEBU SOLN at 20:57

## 2023-01-01 RX ADMIN — DEXMEDETOMIDINE 0.4 MCG/KG/HR: 100 INJECTION, SOLUTION, CONCENTRATE INTRAVENOUS at 16:06

## 2023-01-01 RX ADMIN — SODIUM CHLORIDE SOLN NEBU 3% 4 ML: 3 NEBU SOLN at 08:25

## 2023-01-01 RX ADMIN — HEPARIN SODIUM 5000 UNITS: 5000 INJECTION INTRAVENOUS; SUBCUTANEOUS at 05:49

## 2023-01-01 RX ADMIN — METOPROLOL TARTRATE 25 MG: 25 TABLET, FILM COATED ORAL at 21:31

## 2023-01-01 RX ADMIN — Medication 2 TABLET: at 06:46

## 2023-01-01 RX ADMIN — AMIODARONE HYDROCHLORIDE 1 MG/MIN: 50 INJECTION, SOLUTION INTRAVENOUS at 11:29

## 2023-01-01 RX ADMIN — PROPOFOL 20 MCG/KG/MIN: 10 INJECTION, EMULSION INTRAVENOUS at 16:38

## 2023-01-01 RX ADMIN — Medication 250 MG: at 17:17

## 2023-01-01 RX ADMIN — CALCIUM ACETATE 1334 MG: 667 CAPSULE ORAL at 12:10

## 2023-01-01 RX ADMIN — DIPHENHYDRAMINE HYDROCHLORIDE 25 MG: 12.5 LIQUID ORAL at 10:24

## 2023-01-01 RX ADMIN — Medication 2 TABLET: at 07:39

## 2023-01-01 RX ADMIN — DEXMEDETOMIDINE 0.4 MCG/KG/HR: 100 INJECTION, SOLUTION, CONCENTRATE INTRAVENOUS at 06:39

## 2023-01-01 RX ADMIN — Medication 2 TABLET: at 07:59

## 2023-01-01 RX ADMIN — METOPROLOL TARTRATE 5 MG: 5 INJECTION INTRAVENOUS at 20:37

## 2023-01-01 RX ADMIN — HEPARIN SODIUM 5000 UNITS: 5000 INJECTION INTRAVENOUS; SUBCUTANEOUS at 15:05

## 2023-01-01 RX ADMIN — AMIODARONE HYDROCHLORIDE 150 MG: 50 INJECTION, SOLUTION INTRAVENOUS at 11:18

## 2023-01-01 RX ADMIN — LEVALBUTEROL HYDROCHLORIDE 1.25 MG: 1.25 SOLUTION, CONCENTRATE RESPIRATORY (INHALATION) at 08:25

## 2023-01-01 RX ADMIN — Medication 250 MG: at 21:45

## 2023-01-01 RX ADMIN — Medication 20 MG: at 09:50

## 2023-01-01 RX ADMIN — CALCIUM ACETATE 667 MG: 667 CAPSULE ORAL at 08:47

## 2023-01-01 RX ADMIN — CHLORHEXIDINE GLUCONATE 15 ML: 1.2 SOLUTION ORAL at 08:39

## 2023-01-01 RX ADMIN — LEVALBUTEROL HYDROCHLORIDE 1.25 MG: 1.25 SOLUTION, CONCENTRATE RESPIRATORY (INHALATION) at 07:54

## 2023-01-01 RX ADMIN — Medication 250 MG: at 09:55

## 2023-01-01 RX ADMIN — CHLORHEXIDINE GLUCONATE 15 ML: 1.2 SOLUTION ORAL at 21:45

## 2023-01-01 RX ADMIN — Medication 250 MG: at 08:29

## 2023-01-01 RX ADMIN — HEPARIN SODIUM 5000 UNITS: 5000 INJECTION INTRAVENOUS; SUBCUTANEOUS at 06:19

## 2023-01-01 RX ADMIN — METHYLPREDNISOLONE SODIUM SUCCINATE 80 MG: 125 INJECTION, POWDER, FOR SOLUTION INTRAMUSCULAR; INTRAVENOUS at 21:46

## 2023-01-01 RX ADMIN — METOPROLOL TARTRATE 25 MG: 25 TABLET, FILM COATED ORAL at 10:26

## 2023-01-01 RX ADMIN — SODIUM CHLORIDE SOLN NEBU 3% 4 ML: 3 NEBU SOLN at 01:27

## 2023-01-01 RX ADMIN — METHYLPREDNISOLONE SODIUM SUCCINATE 80 MG: 125 INJECTION, POWDER, FOR SOLUTION INTRAMUSCULAR; INTRAVENOUS at 21:25

## 2023-01-01 RX ADMIN — SODIUM CHLORIDE SOLN NEBU 3% 4 ML: 3 NEBU SOLN at 07:59

## 2023-01-01 RX ADMIN — PROPOFOL 20 MCG/KG/MIN: 10 INJECTION, EMULSION INTRAVENOUS at 20:42

## 2023-01-01 RX ADMIN — PROPOFOL 15 MCG/KG/MIN: 10 INJECTION, EMULSION INTRAVENOUS at 07:33

## 2023-01-01 RX ADMIN — IPRATROPIUM BROMIDE 0.5 MG: 0.5 SOLUTION RESPIRATORY (INHALATION) at 13:58

## 2023-01-01 RX ADMIN — Medication 2 TABLET: at 16:57

## 2023-01-01 RX ADMIN — FENTANYL CITRATE 50 MCG: 50 INJECTION INTRAMUSCULAR; INTRAVENOUS at 19:16

## 2023-01-01 RX ADMIN — Medication 250 MG: at 16:57

## 2023-01-01 RX ADMIN — CALCIUM GLUCONATE 2 G: 20 INJECTION, SOLUTION INTRAVENOUS at 15:21

## 2023-01-01 RX ADMIN — SODIUM CHLORIDE SOLN NEBU 3% 4 ML: 3 NEBU SOLN at 14:10

## 2023-01-01 RX ADMIN — CHLORHEXIDINE GLUCONATE 15 ML: 1.2 SOLUTION ORAL at 20:00

## 2023-01-01 RX ADMIN — Medication 2 TABLET: at 16:46

## 2023-01-01 RX ADMIN — CEFEPIME HYDROCHLORIDE 1000 MG: 1 INJECTION, SOLUTION INTRAVENOUS at 11:24

## 2023-01-01 RX ADMIN — LEVALBUTEROL HYDROCHLORIDE 1.25 MG: 1.25 SOLUTION, CONCENTRATE RESPIRATORY (INHALATION) at 03:08

## 2023-01-01 RX ADMIN — PRAVASTATIN SODIUM 80 MG: 80 TABLET ORAL at 16:46

## 2023-01-01 RX ADMIN — AMIODARONE HYDROCHLORIDE 1 MG/MIN: 50 INJECTION, SOLUTION INTRAVENOUS at 02:20

## 2023-01-01 RX ADMIN — PROPOFOL 20 MCG/KG/MIN: 10 INJECTION, EMULSION INTRAVENOUS at 22:23

## 2023-01-01 RX ADMIN — METOPROLOL TARTRATE 5 MG: 5 INJECTION INTRAVENOUS at 13:32

## 2023-01-01 RX ADMIN — LEVALBUTEROL HYDROCHLORIDE 1.25 MG: 1.25 SOLUTION, CONCENTRATE RESPIRATORY (INHALATION) at 08:12

## 2023-01-01 RX ADMIN — CALCIUM ACETATE 667 MG: 667 CAPSULE ORAL at 07:02

## 2023-01-01 RX ADMIN — IPRATROPIUM BROMIDE 0.5 MG: 0.5 SOLUTION RESPIRATORY (INHALATION) at 02:57

## 2023-01-01 RX ADMIN — METHYLPREDNISOLONE SODIUM SUCCINATE 80 MG: 125 INJECTION, POWDER, FOR SOLUTION INTRAMUSCULAR; INTRAVENOUS at 21:45

## 2023-01-01 RX ADMIN — Medication 2 TABLET: at 08:34

## 2023-01-01 RX ADMIN — INSULIN LISPRO 4 UNITS: 100 INJECTION, SOLUTION INTRAVENOUS; SUBCUTANEOUS at 23:39

## 2023-01-01 RX ADMIN — SODIUM CHLORIDE SOLN NEBU 3% 4 ML: 3 NEBU SOLN at 02:57

## 2023-01-01 RX ADMIN — Medication 2 TABLET: at 16:06

## 2023-01-01 RX ADMIN — METOCLOPRAMIDE HYDROCHLORIDE 5 MG: 5 SOLUTION ORAL at 11:59

## 2023-01-01 RX ADMIN — CALCIUM ACETATE 667 MG: 667 CAPSULE ORAL at 11:14

## 2023-01-01 RX ADMIN — PROPOFOL 20 MCG/KG/MIN: 10 INJECTION, EMULSION INTRAVENOUS at 09:37

## 2023-01-01 RX ADMIN — DIGOXIN 250 MCG: 0.25 INJECTION INTRAMUSCULAR; INTRAVENOUS at 04:37

## 2023-01-01 RX ADMIN — ACETAMINOPHEN 650 MG: 650 SUSPENSION ORAL at 10:23

## 2023-01-01 RX ADMIN — CHLORHEXIDINE GLUCONATE 15 ML: 1.2 SOLUTION ORAL at 08:34

## 2023-01-01 RX ADMIN — LEVALBUTEROL HYDROCHLORIDE 1.25 MG: 1.25 SOLUTION, CONCENTRATE RESPIRATORY (INHALATION) at 02:57

## 2023-01-01 RX ADMIN — CALCIUM ACETATE 667 MG: 667 CAPSULE ORAL at 17:56

## 2023-01-01 RX ADMIN — INSULIN LISPRO 8 UNITS: 100 INJECTION, SOLUTION INTRAVENOUS; SUBCUTANEOUS at 06:42

## 2023-01-01 RX ADMIN — IPRATROPIUM BROMIDE 0.5 MG: 0.5 SOLUTION RESPIRATORY (INHALATION) at 03:08

## 2023-01-01 RX ADMIN — IPRATROPIUM BROMIDE 0.5 MG: 0.5 SOLUTION RESPIRATORY (INHALATION) at 01:35

## 2023-01-01 RX ADMIN — ACETAMINOPHEN 650 MG: 650 SUSPENSION ORAL at 08:43

## 2023-01-01 RX ADMIN — PROPOFOL 15 MCG/KG/MIN: 10 INJECTION, EMULSION INTRAVENOUS at 16:05

## 2023-01-01 RX ADMIN — METOPROLOL TARTRATE 5 MG: 5 INJECTION INTRAVENOUS at 15:34

## 2023-01-01 RX ADMIN — INSULIN LISPRO 8 UNITS: 100 INJECTION, SOLUTION INTRAVENOUS; SUBCUTANEOUS at 23:59

## 2023-01-01 RX ADMIN — DEXMEDETOMIDINE 0.4 MCG/KG/HR: 100 INJECTION, SOLUTION, CONCENTRATE INTRAVENOUS at 17:28

## 2023-01-01 RX ADMIN — CALCIUM ACETATE 667 MG: 667 CAPSULE ORAL at 07:59

## 2023-01-01 RX ADMIN — PROPOFOL 30 MCG/KG/MIN: 10 INJECTION, EMULSION INTRAVENOUS at 00:20

## 2023-01-01 RX ADMIN — Medication 20 MG: at 08:30

## 2023-01-01 RX ADMIN — INSULIN LISPRO 4 UNITS: 100 INJECTION, SOLUTION INTRAVENOUS; SUBCUTANEOUS at 11:58

## 2023-01-01 RX ADMIN — DEXMEDETOMIDINE 0.4 MCG/KG/HR: 100 INJECTION, SOLUTION, CONCENTRATE INTRAVENOUS at 18:20

## 2023-01-01 RX ADMIN — DIPHENHYDRAMINE HYDROCHLORIDE 25 MG: 12.5 LIQUID ORAL at 10:23

## 2023-01-01 RX ADMIN — SODIUM CHLORIDE SOLN NEBU 3% 4 ML: 3 NEBU SOLN at 03:12

## 2023-01-01 RX ADMIN — CALCIUM ACETATE 1334 MG: 667 CAPSULE ORAL at 07:39

## 2023-01-01 RX ADMIN — Medication 2 TABLET: at 08:47

## 2023-01-01 RX ADMIN — HEPARIN SODIUM 5000 UNITS: 5000 INJECTION INTRAVENOUS; SUBCUTANEOUS at 11:48

## 2023-01-01 RX ADMIN — DEXMEDETOMIDINE 0.4 MCG/KG/HR: 100 INJECTION, SOLUTION, CONCENTRATE INTRAVENOUS at 13:15

## 2023-01-01 RX ADMIN — Medication 20 MG: at 08:56

## 2023-01-01 RX ADMIN — INSULIN LISPRO 4 UNITS: 100 INJECTION, SOLUTION INTRAVENOUS; SUBCUTANEOUS at 00:47

## 2023-01-01 RX ADMIN — LEVALBUTEROL HYDROCHLORIDE 1.25 MG: 1.25 SOLUTION, CONCENTRATE RESPIRATORY (INHALATION) at 03:26

## 2023-01-01 RX ADMIN — CEFEPIME HYDROCHLORIDE 1000 MG: 1 INJECTION, SOLUTION INTRAVENOUS at 11:36

## 2023-01-01 RX ADMIN — INSULIN LISPRO 8 UNITS: 100 INJECTION, SOLUTION INTRAVENOUS; SUBCUTANEOUS at 18:30

## 2023-01-01 RX ADMIN — Medication 20 MG: at 09:26

## 2023-01-01 RX ADMIN — SODIUM CHLORIDE SOLN NEBU 3% 4 ML: 3 NEBU SOLN at 14:43

## 2023-01-01 RX ADMIN — PRAVASTATIN SODIUM 80 MG: 80 TABLET ORAL at 16:21

## 2023-01-01 RX ADMIN — PROPOFOL 20 MCG/KG/MIN: 10 INJECTION, EMULSION INTRAVENOUS at 14:34

## 2023-01-01 RX ADMIN — IPRATROPIUM BROMIDE 0.5 MG: 0.5 SOLUTION RESPIRATORY (INHALATION) at 08:12

## 2023-01-01 RX ADMIN — PROPOFOL 10 MCG/KG/MIN: 10 INJECTION, EMULSION INTRAVENOUS at 17:36

## 2023-01-01 RX ADMIN — LEVALBUTEROL HYDROCHLORIDE 1.25 MG: 1.25 SOLUTION, CONCENTRATE RESPIRATORY (INHALATION) at 07:49

## 2023-01-01 RX ADMIN — CALCIUM ACETATE 667 MG: 667 CAPSULE ORAL at 08:00

## 2023-01-01 RX ADMIN — DEXMEDETOMIDINE 0.4 MCG/KG/HR: 100 INJECTION, SOLUTION, CONCENTRATE INTRAVENOUS at 05:11

## 2023-01-01 RX ADMIN — SULFAMETHOXAZOLE AND TRIMETHOPRIM 80 MG OF TRIMETHOPRIM: 200; 40 SUSPENSION ORAL at 14:57

## 2023-01-01 RX ADMIN — IPRATROPIUM BROMIDE 0.5 MG: 0.5 SOLUTION RESPIRATORY (INHALATION) at 13:38

## 2023-01-01 RX ADMIN — METOPROLOL TARTRATE 5 MG: 5 INJECTION INTRAVENOUS at 19:47

## 2023-01-01 RX ADMIN — SODIUM CHLORIDE SOLN NEBU 3% 4 ML: 3 NEBU SOLN at 03:00

## 2023-01-01 RX ADMIN — Medication 250 MG: at 17:56

## 2023-01-01 RX ADMIN — SODIUM CHLORIDE SOLN NEBU 3% 4 ML: 3 NEBU SOLN at 08:55

## 2023-01-01 RX ADMIN — IPRATROPIUM BROMIDE 0.5 MG: 0.5 SOLUTION RESPIRATORY (INHALATION) at 13:12

## 2023-01-01 RX ADMIN — CALCIUM GLUCONATE 2 G: 20 INJECTION, SOLUTION INTRAVENOUS at 08:43

## 2023-01-01 RX ADMIN — VECURONIUM BROMIDE 10 MG: 1 INJECTION, POWDER, LYOPHILIZED, FOR SOLUTION INTRAVENOUS at 09:31

## 2023-01-01 RX ADMIN — LEVALBUTEROL HYDROCHLORIDE 1.25 MG: 1.25 SOLUTION, CONCENTRATE RESPIRATORY (INHALATION) at 08:55

## 2023-01-01 RX ADMIN — METHYLPREDNISOLONE SODIUM SUCCINATE 80 MG: 125 INJECTION, POWDER, FOR SOLUTION INTRAMUSCULAR; INTRAVENOUS at 21:31

## 2023-01-01 RX ADMIN — LEVALBUTEROL HYDROCHLORIDE 1.25 MG: 1.25 SOLUTION, CONCENTRATE RESPIRATORY (INHALATION) at 03:12

## 2023-01-01 RX ADMIN — IPRATROPIUM BROMIDE 0.5 MG: 0.5 SOLUTION RESPIRATORY (INHALATION) at 13:15

## 2023-01-01 RX ADMIN — ACETAMINOPHEN 650 MG: 650 SUSPENSION ORAL at 15:24

## 2023-01-01 RX ADMIN — INSULIN LISPRO 8 UNITS: 100 INJECTION, SOLUTION INTRAVENOUS; SUBCUTANEOUS at 06:47

## 2023-01-01 RX ADMIN — INSULIN GLARGINE 42 UNITS: 100 INJECTION, SOLUTION SUBCUTANEOUS at 08:37

## 2023-01-01 RX ADMIN — METOPROLOL TARTRATE 25 MG: 25 TABLET, FILM COATED ORAL at 08:47

## 2023-01-01 RX ADMIN — LEVALBUTEROL HYDROCHLORIDE 1.25 MG: 1.25 SOLUTION, CONCENTRATE RESPIRATORY (INHALATION) at 07:58

## 2023-01-01 RX ADMIN — Medication 20 MG: at 08:33

## 2023-01-01 RX ADMIN — INSULIN LISPRO 4 UNITS: 100 INJECTION, SOLUTION INTRAVENOUS; SUBCUTANEOUS at 11:25

## 2023-01-01 RX ADMIN — IPRATROPIUM BROMIDE 0.5 MG: 0.5 SOLUTION RESPIRATORY (INHALATION) at 01:27

## 2023-01-01 RX ADMIN — CALCIUM ACETATE 667 MG: 667 CAPSULE ORAL at 06:52

## 2023-01-01 RX ADMIN — Medication 250 MG: at 08:33

## 2023-01-01 RX ADMIN — INSULIN GLARGINE 42 UNITS: 100 INJECTION, SOLUTION SUBCUTANEOUS at 08:47

## 2023-01-01 RX ADMIN — PROPOFOL 20 MCG/KG/MIN: 10 INJECTION, EMULSION INTRAVENOUS at 06:39

## 2023-01-01 RX ADMIN — CHLORHEXIDINE GLUCONATE 15 ML: 1.2 SOLUTION ORAL at 20:37

## 2023-01-01 RX ADMIN — IPRATROPIUM BROMIDE 0.5 MG: 0.5 SOLUTION RESPIRATORY (INHALATION) at 08:25

## 2023-01-01 RX ADMIN — FENTANYL CITRATE 50 MCG: 50 INJECTION INTRAMUSCULAR; INTRAVENOUS at 16:46

## 2023-01-01 RX ADMIN — SODIUM CHLORIDE SOLN NEBU 3% 4 ML: 3 NEBU SOLN at 02:06

## 2023-01-01 RX ADMIN — IPRATROPIUM BROMIDE 0.5 MG: 0.5 SOLUTION RESPIRATORY (INHALATION) at 02:37

## 2023-01-01 RX ADMIN — FENTANYL CITRATE 50 MCG: 50 INJECTION INTRAMUSCULAR; INTRAVENOUS at 09:00

## 2023-01-01 RX ADMIN — PROPOFOL 20 MCG/KG/MIN: 10 INJECTION, EMULSION INTRAVENOUS at 05:39

## 2023-01-01 RX ADMIN — DEXMEDETOMIDINE 0.4 MCG/KG/HR: 100 INJECTION, SOLUTION, CONCENTRATE INTRAVENOUS at 05:39

## 2023-01-01 RX ADMIN — LIDOCAINE 5% 1 PATCH: 700 PATCH TOPICAL at 08:02

## 2023-01-01 RX ADMIN — PROPOFOL 15 MCG/KG/MIN: 10 INJECTION, EMULSION INTRAVENOUS at 04:06

## 2023-01-01 RX ADMIN — Medication 2 TABLET: at 16:31

## 2023-01-01 RX ADMIN — Medication 20 MG: at 09:01

## 2023-01-01 RX ADMIN — METHYLPREDNISOLONE SODIUM SUCCINATE 80 MG: 125 INJECTION, POWDER, FOR SOLUTION INTRAMUSCULAR; INTRAVENOUS at 21:06

## 2023-01-01 RX ADMIN — CHLORHEXIDINE GLUCONATE 15 ML: 1.2 SOLUTION ORAL at 09:49

## 2023-01-01 RX ADMIN — IPRATROPIUM BROMIDE 0.5 MG: 0.5 SOLUTION RESPIRATORY (INHALATION) at 20:14

## 2023-01-01 RX ADMIN — DEXMEDETOMIDINE 0.4 MCG/KG/HR: 100 INJECTION, SOLUTION, CONCENTRATE INTRAVENOUS at 09:43

## 2023-01-01 RX ADMIN — SODIUM CHLORIDE SOLN NEBU 3% 4 ML: 3 NEBU SOLN at 07:48

## 2023-01-01 RX ADMIN — SODIUM CHLORIDE SOLN NEBU 3% 4 ML: 3 NEBU SOLN at 13:58

## 2023-01-01 RX ADMIN — AMIODARONE HYDROCHLORIDE 1 MG/MIN: 50 INJECTION, SOLUTION INTRAVENOUS at 08:20

## 2023-01-01 RX ADMIN — SODIUM CHLORIDE SOLN NEBU 3% 4 ML: 3 NEBU SOLN at 19:54

## 2023-01-01 RX ADMIN — Medication 2 TABLET: at 17:11

## 2023-01-01 RX ADMIN — INSULIN LISPRO 12 UNITS: 100 INJECTION, SOLUTION INTRAVENOUS; SUBCUTANEOUS at 23:51

## 2023-01-01 RX ADMIN — INSULIN LISPRO 8 UNITS: 100 INJECTION, SOLUTION INTRAVENOUS; SUBCUTANEOUS at 05:49

## 2023-01-01 RX ADMIN — ERYTHROPOIETIN 8000 UNITS: 10000 INJECTION, SOLUTION INTRAVENOUS; SUBCUTANEOUS at 13:29

## 2023-01-01 RX ADMIN — INSULIN LISPRO 4 UNITS: 100 INJECTION, SOLUTION INTRAVENOUS; SUBCUTANEOUS at 06:12

## 2023-01-01 RX ADMIN — Medication 2 TABLET: at 16:21

## 2023-01-01 RX ADMIN — Medication 2 TABLET: at 06:51

## 2023-01-01 RX ADMIN — Medication 20 MG: at 10:39

## 2023-01-01 RX ADMIN — METHYLPREDNISOLONE SODIUM SUCCINATE 80 MG: 125 INJECTION, POWDER, FOR SOLUTION INTRAMUSCULAR; INTRAVENOUS at 21:17

## 2023-01-01 RX ADMIN — INSULIN LISPRO 4 UNITS: 100 INJECTION, SOLUTION INTRAVENOUS; SUBCUTANEOUS at 12:18

## 2023-01-01 RX ADMIN — INSULIN LISPRO 4 UNITS: 100 INJECTION, SOLUTION INTRAVENOUS; SUBCUTANEOUS at 17:50

## 2023-01-01 RX ADMIN — LEVALBUTEROL HYDROCHLORIDE 1.25 MG: 1.25 SOLUTION, CONCENTRATE RESPIRATORY (INHALATION) at 13:21

## 2023-01-01 RX ADMIN — SODIUM CHLORIDE SOLN NEBU 3% 4 ML: 3 NEBU SOLN at 19:45

## 2023-01-01 RX ADMIN — Medication 2 TABLET: at 06:30

## 2023-01-01 RX ADMIN — CALCIUM ACETATE 667 MG: 667 CAPSULE ORAL at 17:17

## 2023-01-01 RX ADMIN — LEVALBUTEROL HYDROCHLORIDE 1.25 MG: 1.25 SOLUTION, CONCENTRATE RESPIRATORY (INHALATION) at 13:58

## 2023-01-01 RX ADMIN — LEVALBUTEROL HYDROCHLORIDE 1.25 MG: 1.25 SOLUTION, CONCENTRATE RESPIRATORY (INHALATION) at 20:14

## 2023-01-01 RX ADMIN — INSULIN LISPRO 4 UNITS: 100 INJECTION, SOLUTION INTRAVENOUS; SUBCUTANEOUS at 11:49

## 2023-01-01 RX ADMIN — SODIUM CHLORIDE SOLN NEBU 3% 4 ML: 3 NEBU SOLN at 13:12

## 2023-01-01 RX ADMIN — INSULIN LISPRO 8 UNITS: 100 INJECTION, SOLUTION INTRAVENOUS; SUBCUTANEOUS at 05:51

## 2023-01-01 RX ADMIN — HEPARIN SODIUM 5000 UNITS: 5000 INJECTION INTRAVENOUS; SUBCUTANEOUS at 21:46

## 2023-01-01 RX ADMIN — CALCIUM ACETATE 1334 MG: 667 CAPSULE ORAL at 16:31

## 2023-01-01 RX ADMIN — PRAVASTATIN SODIUM 80 MG: 80 TABLET ORAL at 17:11

## 2023-01-01 RX ADMIN — INSULIN LISPRO 4 UNITS: 100 INJECTION, SOLUTION INTRAVENOUS; SUBCUTANEOUS at 17:28

## 2023-01-01 RX ADMIN — METOPROLOL TARTRATE 25 MG: 25 TABLET, FILM COATED ORAL at 21:30

## 2023-01-01 RX ADMIN — INSULIN LISPRO 8 UNITS: 100 INJECTION, SOLUTION INTRAVENOUS; SUBCUTANEOUS at 17:31

## 2023-01-01 RX ADMIN — LEVALBUTEROL HYDROCHLORIDE 1.25 MG: 1.25 SOLUTION, CONCENTRATE RESPIRATORY (INHALATION) at 13:38

## 2023-01-01 RX ADMIN — LEVALBUTEROL HYDROCHLORIDE 1.25 MG: 1.25 SOLUTION, CONCENTRATE RESPIRATORY (INHALATION) at 02:37

## 2023-01-01 RX ADMIN — DEXTROSE 150 MG: 50 INJECTION, SOLUTION INTRAVENOUS at 22:30

## 2023-01-01 RX ADMIN — DIPHENHYDRAMINE HYDROCHLORIDE 50 MG: 50 INJECTION, SOLUTION INTRAMUSCULAR; INTRAVENOUS at 08:43

## 2023-01-01 RX ADMIN — SODIUM CHLORIDE SOLN NEBU 3% 4 ML: 3 NEBU SOLN at 01:34

## 2023-01-01 RX ADMIN — INSULIN LISPRO 4 UNITS: 100 INJECTION, SOLUTION INTRAVENOUS; SUBCUTANEOUS at 06:31

## 2023-01-01 RX ADMIN — ALBUMIN (HUMAN) 25 G: 0.25 INJECTION, SOLUTION INTRAVENOUS at 05:13

## 2023-01-01 RX ADMIN — RITUXIMAB 700 MG: 10 INJECTION, SOLUTION INTRAVENOUS at 13:11

## 2023-01-01 RX ADMIN — CHLORHEXIDINE GLUCONATE 15 ML: 1.2 SOLUTION ORAL at 08:47

## 2023-01-01 RX ADMIN — DEXMEDETOMIDINE 0.4 MCG/KG/HR: 100 INJECTION, SOLUTION, CONCENTRATE INTRAVENOUS at 07:53

## 2023-01-01 RX ADMIN — IPRATROPIUM BROMIDE 0.5 MG: 0.5 SOLUTION RESPIRATORY (INHALATION) at 07:48

## 2023-01-01 RX ADMIN — SODIUM CHLORIDE SOLN NEBU 3% 4 ML: 3 NEBU SOLN at 07:44

## 2023-01-01 RX ADMIN — METOPROLOL TARTRATE 25 MG: 25 TABLET, FILM COATED ORAL at 08:34

## 2023-01-01 RX ADMIN — SODIUM CHLORIDE SOLN NEBU 3% 4 ML: 3 NEBU SOLN at 13:15

## 2023-01-01 RX ADMIN — IPRATROPIUM BROMIDE 0.5 MG: 0.5 SOLUTION RESPIRATORY (INHALATION) at 13:21

## 2023-01-01 RX ADMIN — PRAVASTATIN SODIUM 80 MG: 80 TABLET ORAL at 16:31

## 2023-01-01 RX ADMIN — LEVALBUTEROL HYDROCHLORIDE 1.25 MG: 1.25 SOLUTION, CONCENTRATE RESPIRATORY (INHALATION) at 02:07

## 2023-01-01 RX ADMIN — SODIUM CHLORIDE SOLN NEBU 3% 4 ML: 3 NEBU SOLN at 20:18

## 2023-01-01 RX ADMIN — LEVALBUTEROL HYDROCHLORIDE 1.25 MG: 1.25 SOLUTION, CONCENTRATE RESPIRATORY (INHALATION) at 01:27

## 2023-01-01 RX ADMIN — CALCIUM ACETATE 667 MG: 667 CAPSULE ORAL at 12:16

## 2023-01-01 RX ADMIN — Medication 250 MG: at 08:39

## 2023-01-01 RX ADMIN — IPRATROPIUM BROMIDE 0.5 MG: 0.5 SOLUTION RESPIRATORY (INHALATION) at 19:54

## 2023-01-01 RX ADMIN — IPRATROPIUM BROMIDE 0.5 MG: 0.5 SOLUTION RESPIRATORY (INHALATION) at 03:12

## 2023-01-01 RX ADMIN — METOPROLOL TARTRATE 25 MG: 25 TABLET, FILM COATED ORAL at 08:29

## 2023-01-01 RX ADMIN — CALCIUM GLUCONATE 2 G: 20 INJECTION, SOLUTION INTRAVENOUS at 10:17

## 2023-01-01 RX ADMIN — LEVALBUTEROL HYDROCHLORIDE 1.25 MG: 1.25 SOLUTION, CONCENTRATE RESPIRATORY (INHALATION) at 20:02

## 2023-01-01 RX ADMIN — INSULIN LISPRO 12 UNITS: 100 INJECTION, SOLUTION INTRAVENOUS; SUBCUTANEOUS at 11:59

## 2023-01-01 RX ADMIN — PROPOFOL 10 MCG/KG/MIN: 10 INJECTION, EMULSION INTRAVENOUS at 08:37

## 2023-01-01 RX ADMIN — ERYTHROPOIETIN 8000 UNITS: 10000 INJECTION, SOLUTION INTRAVENOUS; SUBCUTANEOUS at 17:46

## 2023-01-01 RX ADMIN — SODIUM CHLORIDE SOLN NEBU 3% 4 ML: 3 NEBU SOLN at 13:44

## 2023-01-01 RX ADMIN — CALCIUM GLUCONATE 2 G: 20 INJECTION, SOLUTION INTRAVENOUS at 10:24

## 2023-01-01 RX ADMIN — PRAVASTATIN SODIUM 80 MG: 80 TABLET ORAL at 17:56

## 2023-01-01 RX ADMIN — Medication 20 MG: at 10:08

## 2023-01-01 RX ADMIN — SODIUM CHLORIDE 20 ML/HR: 0.9 INJECTION, SOLUTION INTRAVENOUS at 13:11

## 2023-01-01 RX ADMIN — SODIUM CHLORIDE SOLN NEBU 3% 4 ML: 3 NEBU SOLN at 19:47

## 2023-01-01 RX ADMIN — PROPOFOL 20 MCG/KG/MIN: 10 INJECTION, EMULSION INTRAVENOUS at 07:53

## 2023-01-01 RX ADMIN — PROPOFOL 10 MCG/KG/MIN: 10 INJECTION, EMULSION INTRAVENOUS at 05:11

## 2023-01-01 RX ADMIN — CEFEPIME HYDROCHLORIDE 1000 MG: 1 INJECTION, SOLUTION INTRAVENOUS at 11:32

## 2023-01-01 RX ADMIN — LEVALBUTEROL HYDROCHLORIDE 1.25 MG: 1.25 SOLUTION, CONCENTRATE RESPIRATORY (INHALATION) at 19:58

## 2023-01-01 RX ADMIN — CALCIUM ACETATE 667 MG: 667 CAPSULE ORAL at 12:12

## 2023-01-01 RX ADMIN — METOPROLOL TARTRATE 25 MG: 25 TABLET, FILM COATED ORAL at 09:55

## 2023-01-01 RX ADMIN — INSULIN LISPRO 4 UNITS: 100 INJECTION, SOLUTION INTRAVENOUS; SUBCUTANEOUS at 06:15

## 2023-01-01 RX ADMIN — IPRATROPIUM BROMIDE 0.5 MG: 0.5 SOLUTION RESPIRATORY (INHALATION) at 14:43

## 2023-01-01 RX ADMIN — METOPROLOL TARTRATE 25 MG: 25 TABLET, FILM COATED ORAL at 21:29

## 2023-01-01 RX ADMIN — Medication 2 TABLET: at 10:05

## 2023-01-01 RX ADMIN — LIDOCAINE 5% 1 PATCH: 700 PATCH TOPICAL at 08:48

## 2023-01-01 RX ADMIN — PRAVASTATIN SODIUM 80 MG: 80 TABLET ORAL at 16:57

## 2023-01-01 RX ADMIN — SODIUM CHLORIDE SOLN NEBU 3% 4 ML: 3 NEBU SOLN at 03:26

## 2023-01-01 RX ADMIN — Medication 2 TABLET: at 17:56

## 2023-01-01 RX ADMIN — WATER 10 ML: 1 INJECTION, SOLUTION INTRAMUSCULAR; INTRAVENOUS; SUBCUTANEOUS at 09:30

## 2023-01-01 RX ADMIN — SODIUM CHLORIDE SOLN NEBU 3% 4 ML: 3 NEBU SOLN at 08:12

## 2023-01-01 RX ADMIN — MORPHINE SULFATE 2 MG: 2 INJECTION, SOLUTION INTRAMUSCULAR; INTRAVENOUS at 10:09

## 2023-01-01 RX ADMIN — PROPOFOL 20 MCG/KG/MIN: 10 INJECTION, EMULSION INTRAVENOUS at 15:03

## 2023-01-01 RX ADMIN — CALCIUM ACETATE 667 MG: 667 CAPSULE ORAL at 17:11

## 2023-01-01 RX ADMIN — CHLORHEXIDINE GLUCONATE 15 ML: 1.2 SOLUTION ORAL at 08:29

## 2023-01-01 RX ADMIN — CALCIUM ACETATE 1334 MG: 667 CAPSULE ORAL at 11:48

## 2023-01-01 RX ADMIN — PROPOFOL 20 MCG/KG/MIN: 10 INJECTION, EMULSION INTRAVENOUS at 00:39

## 2023-01-01 RX ADMIN — CALCIUM ACETATE 667 MG: 667 CAPSULE ORAL at 11:59

## 2023-01-01 RX ADMIN — IPRATROPIUM BROMIDE 0.5 MG: 0.5 SOLUTION RESPIRATORY (INHALATION) at 08:55

## 2023-01-01 RX ADMIN — CALCIUM ACETATE 667 MG: 667 CAPSULE ORAL at 16:21

## 2023-01-01 RX ADMIN — IPRATROPIUM BROMIDE 0.5 MG: 0.5 SOLUTION RESPIRATORY (INHALATION) at 03:26

## 2023-01-01 RX ADMIN — INSULIN LISPRO 4 UNITS: 100 INJECTION, SOLUTION INTRAVENOUS; SUBCUTANEOUS at 23:58

## 2023-01-01 RX ADMIN — INSULIN GLARGINE 42 UNITS: 100 INJECTION, SOLUTION SUBCUTANEOUS at 09:48

## 2023-01-01 RX ADMIN — CHLORHEXIDINE GLUCONATE 15 ML: 1.2 SOLUTION ORAL at 21:30

## 2023-01-01 RX ADMIN — IPRATROPIUM BROMIDE 0.5 MG: 0.5 SOLUTION RESPIRATORY (INHALATION) at 20:02

## 2023-01-01 RX ADMIN — INSULIN LISPRO 12 UNITS: 100 INJECTION, SOLUTION INTRAVENOUS; SUBCUTANEOUS at 13:12

## 2023-01-01 RX ADMIN — FENTANYL CITRATE 50 MCG: 50 INJECTION INTRAMUSCULAR; INTRAVENOUS at 06:20

## 2023-01-01 RX ADMIN — SODIUM CHLORIDE SOLN NEBU 3% 4 ML: 3 NEBU SOLN at 13:39

## 2023-01-01 RX ADMIN — INSULIN LISPRO 12 UNITS: 100 INJECTION, SOLUTION INTRAVENOUS; SUBCUTANEOUS at 05:02

## 2023-01-01 RX ADMIN — LEVALBUTEROL HYDROCHLORIDE 1.25 MG: 1.25 SOLUTION, CONCENTRATE RESPIRATORY (INHALATION) at 13:12

## 2023-01-01 RX ADMIN — LEVALBUTEROL HYDROCHLORIDE 1.25 MG: 1.25 SOLUTION, CONCENTRATE RESPIRATORY (INHALATION) at 19:54

## 2023-01-01 RX ADMIN — Medication 2 TABLET: at 17:17

## 2023-01-01 RX ADMIN — Medication 20 MG: at 08:49

## 2023-01-01 RX ADMIN — CALCIUM ACETATE 667 MG: 667 CAPSULE ORAL at 06:46

## 2023-01-01 RX ADMIN — LEVALBUTEROL HYDROCHLORIDE 1.25 MG: 1.25 SOLUTION, CONCENTRATE RESPIRATORY (INHALATION) at 20:46

## 2023-01-01 RX ADMIN — PRAVASTATIN SODIUM 80 MG: 80 TABLET ORAL at 16:07

## 2023-01-01 RX ADMIN — CHLORHEXIDINE GLUCONATE 15 ML: 1.2 SOLUTION ORAL at 21:25

## 2023-01-01 RX ADMIN — SODIUM CHLORIDE SOLN NEBU 3% 4 ML: 3 NEBU SOLN at 19:58

## 2023-01-01 RX ADMIN — IPRATROPIUM BROMIDE 0.5 MG: 0.5 SOLUTION RESPIRATORY (INHALATION) at 13:44

## 2023-01-01 RX ADMIN — LEVALBUTEROL HYDROCHLORIDE 1.25 MG: 1.25 SOLUTION, CONCENTRATE RESPIRATORY (INHALATION) at 07:44

## 2023-01-01 RX ADMIN — FENTANYL CITRATE 50 MCG: 50 INJECTION INTRAMUSCULAR; INTRAVENOUS at 17:52

## 2023-01-01 RX ADMIN — CALCIUM ACETATE 1334 MG: 667 CAPSULE ORAL at 06:31

## 2023-01-01 RX ADMIN — IPRATROPIUM BROMIDE 0.5 MG: 0.5 SOLUTION RESPIRATORY (INHALATION) at 07:59

## 2023-01-01 RX ADMIN — Medication 250 MG: at 17:03

## 2023-01-01 RX ADMIN — LEVALBUTEROL HYDROCHLORIDE 1.25 MG: 1.25 SOLUTION, CONCENTRATE RESPIRATORY (INHALATION) at 03:00

## 2023-01-01 RX ADMIN — CHLORHEXIDINE GLUCONATE 15 ML: 1.2 SOLUTION ORAL at 08:33

## 2023-01-01 RX ADMIN — SODIUM CHLORIDE SOLN NEBU 3% 4 ML: 3 NEBU SOLN at 07:53

## 2023-01-01 RX ADMIN — CALCIUM ACETATE 667 MG: 667 CAPSULE ORAL at 16:57

## 2023-01-01 RX ADMIN — LEVALBUTEROL HYDROCHLORIDE 1.25 MG: 1.25 SOLUTION, CONCENTRATE RESPIRATORY (INHALATION) at 14:43

## 2023-01-01 RX ADMIN — DEXMEDETOMIDINE 0.4 MCG/KG/HR: 100 INJECTION, SOLUTION, CONCENTRATE INTRAVENOUS at 21:22

## 2023-01-01 RX ADMIN — AMIODARONE HYDROCHLORIDE 1 MG/MIN: 50 INJECTION, SOLUTION INTRAVENOUS at 17:17

## 2023-01-01 RX ADMIN — INSULIN LISPRO 8 UNITS: 100 INJECTION, SOLUTION INTRAVENOUS; SUBCUTANEOUS at 05:28

## 2023-01-01 RX ADMIN — METHYLPREDNISOLONE SODIUM SUCCINATE 80 MG: 125 INJECTION, POWDER, FOR SOLUTION INTRAMUSCULAR; INTRAVENOUS at 21:30

## 2023-01-01 RX ADMIN — FENTANYL CITRATE 50 MCG: 50 INJECTION INTRAMUSCULAR; INTRAVENOUS at 00:34

## 2023-01-01 RX ADMIN — DEXMEDETOMIDINE 0.4 MCG/KG/HR: 100 INJECTION, SOLUTION, CONCENTRATE INTRAVENOUS at 07:28

## 2023-01-01 RX ADMIN — SODIUM CHLORIDE SOLN NEBU 3% 4 ML: 3 NEBU SOLN at 02:41

## 2023-01-01 RX ADMIN — SODIUM CHLORIDE SOLN NEBU 3% 4 ML: 3 NEBU SOLN at 13:20

## 2023-01-01 RX ADMIN — CALCIUM ACETATE 667 MG: 667 CAPSULE ORAL at 12:19

## 2023-01-01 RX ADMIN — INSULIN GLARGINE 30 UNITS: 100 INJECTION, SOLUTION SUBCUTANEOUS at 08:01

## 2023-01-01 RX ADMIN — PRAVASTATIN SODIUM 80 MG: 80 TABLET ORAL at 17:17

## 2023-01-01 RX ADMIN — LEVALBUTEROL HYDROCHLORIDE 1.25 MG: 1.25 SOLUTION, CONCENTRATE RESPIRATORY (INHALATION) at 14:10

## 2023-01-01 RX ADMIN — METHYLPREDNISOLONE SODIUM SUCCINATE 80 MG: 125 INJECTION, POWDER, FOR SOLUTION INTRAMUSCULAR; INTRAVENOUS at 22:07

## 2023-01-01 RX ADMIN — CALCIUM ACETATE 667 MG: 667 CAPSULE ORAL at 16:07

## 2023-01-01 RX ADMIN — PROPOFOL 15 MCG/KG/MIN: 10 INJECTION, EMULSION INTRAVENOUS at 03:09

## 2023-01-01 RX ADMIN — ACETAMINOPHEN 650 MG: 650 SUSPENSION ORAL at 10:17

## 2023-01-01 RX ADMIN — SODIUM CHLORIDE SOLN NEBU 3% 4 ML: 3 NEBU SOLN at 03:08

## 2023-01-01 RX ADMIN — MORPHINE SULFATE 4 MG: 4 INJECTION INTRAVENOUS at 09:29

## 2023-01-01 RX ADMIN — CHLORHEXIDINE GLUCONATE 15 ML: 1.2 SOLUTION ORAL at 08:48

## 2023-01-01 RX ADMIN — Medication 250 MG: at 08:47

## 2023-01-01 RX ADMIN — METOPROLOL TARTRATE 25 MG: 25 TABLET, FILM COATED ORAL at 20:41

## 2023-01-01 RX ADMIN — METOPROLOL TARTRATE 25 MG: 25 TABLET, FILM COATED ORAL at 21:46

## 2023-01-01 RX ADMIN — LEVALBUTEROL HYDROCHLORIDE 1.25 MG: 1.25 SOLUTION, CONCENTRATE RESPIRATORY (INHALATION) at 13:15

## 2023-01-01 RX ADMIN — LEVALBUTEROL HYDROCHLORIDE 1.25 MG: 1.25 SOLUTION, CONCENTRATE RESPIRATORY (INHALATION) at 01:35

## 2023-01-01 RX ADMIN — DIPHENHYDRAMINE HYDROCHLORIDE 50 MG: 50 INJECTION, SOLUTION INTRAMUSCULAR; INTRAVENOUS at 15:24

## 2023-01-01 NOTE — RESPIRATORY THERAPY NOTE
RT Ventilator Management Note  Girtha  76 y o  male MRN: [id number]  Unit/Bed#: ICU 11 Encounter: [id number]      Daily Screen       12/31/2022  0726 1/1/2023  0744          Patient safety screen outcome[de-identified] Failed Failed      Not Ready for Weaning due to[de-identified] FiO2 >60%;PEEP > 8cmH2O Underline problem not resolved              Physical Exam:   Assessment Type: Assess only  General Appearance: Sedated  Respiratory Pattern: Assisted  Chest Assessment: Chest expansion symmetrical  Bilateral Breath Sounds: Coarse, Rhonchi  Suction: ET Tube      Resp Comments: patient resting comfortable wean as tolerated

## 2023-01-01 NOTE — ASSESSMENT & PLAN NOTE
Lab Results   Component Value Date    HGBA1C 5 7 (H) 10/19/2022       Recent Labs     12/31/22  0643 12/31/22  1152 12/31/22  1757 01/01/23  0024   POCGLU 255* 288* 254* 284*       Blood Sugar Average: Last 72 hrs:  (P) 203 [id number]   · Continue sliding scale coverage q6h + Lantus   Increased from 15u to 20u this AM  · Increase SSI to algorithm 4  · Holding home regimen of 15u 70/30 BID   · Goal blood glucose less than 180

## 2023-01-01 NOTE — RESPIRATORY THERAPY NOTE
Pt sat dropped as low as in the 20s, exchanged tube using bougie to 25cm, confirmed placement with xray and pulled back to 24cm  Pt sat at 93%

## 2023-01-01 NOTE — ASSESSMENT & PLAN NOTE
· Diffuse alveolar hemorrhage secondary to his underlying ANCA vasculitis given hemoptysis and worsening infiltrates   · Bronch on 12/26 with serial aliquots persistently bloody despite lavage consistent with DAH  · Completed 3 days of pulse dose steroids 12/29  · Continue solumedrol 80mg daily   · Hold anticoagulation  · Received Rituximab per rheumatology on Tuesday, plan for weekly dosing x 4 weeks  · Started on plasmapheresis overnight 12/31, plan for daily x 5 days   · Appreciate pulmonary and rheumatology input

## 2023-01-01 NOTE — RESPIRATORY THERAPY NOTE
Received patient on ventilator tolerating well  Pt properly restrained  Verified vent orders to match settings  ambu at bedside, alarms set and functioning  Pt in no distress at this time

## 2023-01-01 NOTE — ASSESSMENT & PLAN NOTE
Lab Results   Component Value Date    EGFR 14 12/31/2022    EGFR 10 12/30/2022    EGFR 13 12/29/2022    CREATININE 3 63 (H) 12/31/2022    CREATININE 5 02 (H) 12/30/2022    CREATININE 3 96 (H) 12/29/2022     · Patient had a recent admission from 12/20-12/23 with hemoglobin 5 7 -received 1 unit packed red blood cells at that time  · Most likely acute on chronic anemia in setting of ESRD now with DAH  · Received 1 unit of PRBCs 12/28 for Hgb 6 7, Hgb now in 9s   · Continue to trend daily and transfuse for hgb less than 7 0

## 2023-01-01 NOTE — ASSESSMENT & PLAN NOTE
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

## 2023-01-01 NOTE — PROGRESS NOTES
Ma[facility] 43  Progress Note - Dossie Radha 1945, 76 y o  male MRN: [id number]  Unit/Bed#: ICU 11 Encounter: [id number]  Primary Care Provider: Chance Conner MD   Date and time admitted to hospital: 12/25/2022 11:20 AM    * Diffuse pulmonary alveolar hemorrhage  Assessment & Plan  · Diffuse alveolar hemorrhage secondary to his underlying ANCA vasculitis given hemoptysis and worsening infiltrates   · Bronch on 12/26 with serial aliquots persistently bloody despite lavage consistent with DAH  · Completed 3 days of pulse dose steroids 12/29  · Continue solumedrol 80mg daily   · Hold anticoagulation  · Received Rituximab per rheumatology on Tuesday, plan for weekly dosing x 4 weeks  · Started on plasmapheresis overnight 12/31, plan for daily x 5 days   · Appreciate pulmonary and rheumatology input    Acute respiratory failure with hypoxia [facility])  Assessment & Plan  · Patient presented to the emergency department on 12/25 with worsening shortness of breath and hypoxia after missing his dialysis treatment yesterday due to lack of transportation  · Initially in the emergency room, the patient was placed on BiPAP but he was weaned to mid flow  · Overnight 12/25, became more hypoxic and ultimately required intubation  · Began having hemoptysis and significant blood suctioned post intubation  Concern for alveolar hemorrhage secondary to vasculitis-see plan as above   · Worsening hypoxia 12/30-12/31 overnight with peak pressuring on vent  Changed to [facility] and re-sedated  ABG with respiratory acidosis and P:F ratio 65  · CXR 12/30 PM with R lung re-expanded with R pleural CT to -20cm of suction but worsening of extensive B/L patchy pulmonary infiltrates as compared to prior  · Episode of acute hypoxia again last evening 12/31 with SpO2 down to 60's, not improved with saline lavage and suctioning, paralytic, or bagging   Was difficult to bag and had persistent air leak despite ETT repositioning and re-inflation of balloon  SpO2 dipped to 28% briefly  Required ETT exchange  Now with #8 ETT  SpO2 improved after additional lavage and suctioning  · Consider bronch today for secretion management given overnight plugging  · AM ABG 7 33/57/79/29  PF ratio 98  · Current Vent settings: AC/PC 26/28/80%/10  · Wean fio2/PEEP for O2 sat 92% or above   · Sedation with propofol and precedex  · Titrate for RAAS -2  · VAP bundle; chlorhexidine, PPI, HOB greater than 30 degrees  · Pulmonary toilet with xopenex/atrovent/3% NSS nebs q6h, chest PT TID, ETT suctioning PRN    SIRS (systemic inflammatory response syndrome) ([facility] [address] )  Assessment & Plan  · Patient presented to the emergency room on 12/25 with SIRS criteria -increased respiratory rate and hypoxia  · With decompensation on 12/25, antibiotics were started with cefepime/vanco given worsening hypoxia however low suspicion for infection  · BAL with mixed respiratory micheal  · pneumocystis smear negative   · Completing 5 days abx 12/29, however 12/30 with thick tan secretions and worsening leukocytosis and was restarted on cefepime   Vanco resumed 12/31 due to hypoxia  · Repeat sputum cx and MRSA nasal surveillance pending   · Trend temps and WBC   · procal difficult to interpret in setting of ESRD  · Became hypotensive this AM and tachycardic     Hydropneumothorax  Assessment & Plan  · Patient had a thoracentesis 12/21 (previous admission)  · Chest x-ray with hydropneumothorax thought to likely be related to this, possibly ex vacuo  · S/p R pleural chest tube with IR 12/27  · 200mL serous drainage x 24 hours  · CXR with small R pneumothorax with CT on H20 seal therefore chest tube placed by to -20cm suction, R lung was re-expanded on repeat CXR 12/30  · CXR repeated overnight 12/31 in setting of hypoxic episode, no pneumothorax     Urinary retention  Assessment & Plan  · Bladder scan qshift  · Urinary retention protocol    Toxic metabolic encephalopathy  Assessment & Plan  · Did not follow commands or move extremities after several hours off sedation 12/30  · Encephalopathy possibly in setting of critical illness, lingering sedation effects, delirium, and hypercarbia   · Sedation resumed 12/30 evening d/t vent dyssynchrony   · CT head 12/31 without intracranial abnormality   · Neuro checks q4h, currently unable to hold sedation d/t respiratory status  · Delirium precautions; regulate sleep/wake cycle, environmental controls, daily CAM ICU     ANCA-associated vasculitis  Assessment & Plan  · Kidney biopsy on 03/22: 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 for which he was previously treated with Cytoxan (discontinued in April) and subsequently prescribed prednisone taper  · Now with pulmonary involvement, continue rituximab and steroids as above   · Initiated plasmapheresis 12/31- plan for daily x5 days    ESRD on dialysis [facility])  Assessment & Plan  Lab Results   Component Value Date    EGFR 14 12/31/2022    EGFR 10 12/30/2022    EGFR 13 12/29/2022    CREATININE 3 63 (H) 12/31/2022    CREATININE 5 02 (H) 12/30/2022    CREATININE 3 96 (H) 12/29/2022       · Patient scheduled dialysis Tuesday/Thursday/Saturday however he was unable to make his treatment on Saturday 12/24 due to the transportation bus not picking him up  · Patient started with worsening shortness of breath on 12/25 presented to the emergency room  · Nephrology consulted and HD session done on 12/25 for 3 5L  · Continue MWF schedule for now per nephrology  · Continue PhosLo as ordered    Type 2 diabetes mellitus with chronic kidney disease on chronic dialysis, with long-term current use of insulin [facility])  Assessment & Plan  Lab Results   Component Value Date    HGBA1C 5 7 (H) 10/19/2022       Recent Labs     12/31/22  0643 12/31/22  1152 12/31/22  1757 01/01/23  0024   POCGLU 255* 288* 254* 284*       Blood Sugar Average: Last 72 hrs:  (P) 203 [id number]   · Continue sliding scale coverage q6h + Lantus  Increased from 15u to 20u this AM  · Increase SSI to algorithm 4  · Holding home regimen of 15u 70/30 BID   · Goal blood glucose less than 180    Anemia due to chronic kidney disease, on chronic dialysis [facility])  Assessment & Plan  Lab Results   Component Value Date    EGFR 14 12/31/2022    EGFR 10 12/30/2022    EGFR 13 12/29/2022    CREATININE 3 63 (H) 12/31/2022    CREATININE 5 02 (H) 12/30/2022    CREATININE 3 96 (H) 12/29/2022     · Patient had a recent admission from 12/20-12/23 with hemoglobin 5 7 -received 1 unit packed red blood cells at that time  · Most likely acute on chronic anemia in setting of ESRD now with DAH  · Received 1 unit of PRBCs 12/28 for Hgb 6 7, Hgb now in 9s   · Continue to trend daily and transfuse for hgb less than 7 0    Benign essential hypertension  Assessment & Plan  · Continue to hold home dose Norvasc and clonidine     Hyperlipidemia  Assessment & Plan  · Continue home statin      ----------------------------------------------------------------------------------------  HPI/24hr events: Has abrupt desaturation to 60's after turning last evening  Required paralytic/sedation for vent synchrony, multiple saline lavages and suction for large amount of thick, chunky, bloody sputum  Remained difficult to bag with large air leak from ETT  ETT exchanged and new #8 tube placed  SpO2 remained stable the remainder of the night  Started on first round of plasmapheresis overnight, tolerated well  No other acute changes       Patient appropriate for transfer out of the ICU today?: No  Disposition: Continue Critical Care   Code Status: Level 1 - Full Code  ---------------------------------------------------------------------------------------  SUBJECTIVE  JAMA    Review of Systems   Unable to perform ROS: Intubated     Review of systems was unable to be performed secondary to ETT  ---------------------------------------------------------------------------------------  OBJECTIVE    Vitals Vitals:    23 0045 23 0100 23 0110 23 0540   BP: 116/57 110/54 126/59    Pulse: 91 87 85    Resp: (!) 29 (!) 26 (!) 30    Temp: 98 °F (36 7 °C) 98 2 °F (36 8 °C) 98 2 °F (36 8 °C)    TempSrc:       SpO2: 96% 97% 97%    Weight:    78 6 kg (173 lb 4 5 oz)   Height:         Temp (24hrs), Av 2 °F (36 8 °C), Min:98 °F (36 7 °C), Max:98 8 °F (37 1 °C)  Current: Temperature: 98 2 °F (36 8 °C)          Respiratory:  SpO2: SpO2: 97 %, SpO2 Activity: SpO2 Activity: At Rest, SpO2 Device: O2 Device: Ventilator  Nasal Cannula O2 Flow Rate (L/min): 80 L/min    Invasive/non-invasive ventilation settings   Respiratory    Lab Data (Last 4 hours)       0443            pH, Arterial       7 325             pCO2, Arterial       57 5             pO2, Arterial       79 1             HCO3, Arterial       29 3             Base Excess, Arterial       2 6                  O2/Vent Data        0405   Most Recent         Vent Mode AC/PC  AC/PC      Resp Rate (BPM) (BPM) 26  26      Pressure Control (cmH2O) (cm) 28  28      Insp Time (sec) (sec) 0 8  0 8      FiO2 (%) (%) 80  80      PEEP (cmH2O) (cmH2O) 10  10      MV 9 08  9 08                  Physical Exam  Vitals and nursing note reviewed  Constitutional:       General: He is not in acute distress  Appearance: He is ill-appearing and toxic-appearing  Interventions: He is sedated, intubated and restrained  HENT:      Head: Normocephalic  Nose: Nose normal       Mouth/Throat:      Mouth: Mucous membranes are moist    Eyes:      General: Lids are normal       Pupils: Pupils are equal, round, and reactive to light  Cardiovascular:      Rate and Rhythm: Regular rhythm  Tachycardia present  Pulses:           Radial pulses are 2+ on the right side and 2+ on the left side  Dorsalis pedis pulses are 1+ on the right side and 1+ on the left side  Heart sounds: Normal heart sounds     Pulmonary:      Effort: Tachypnea (rate set to 26) present  He is intubated  Breath sounds: Rhonchi present  Comments: ETT in place, thick dark bloody secretions persist  Chest:      Comments: R  Pigtail Chest tube in place- draining serosang   Abdominal:      Palpations: Abdomen is soft  Musculoskeletal:      Right lower le+ Edema present  Left lower le+ Edema present  Comments: +2 anasarca     Skin:     General: Skin is warm and dry  Neurological:      GCS: GCS eye subscore is 1  GCS verbal subscore is 1  GCS motor subscore is 1  Comments: Received paralytic and versed overnight  Remains unresponsive  Propofol decreased to 20                Laboratory and Diagnostics:  Results from last 7 days   Lab Units 23  0450 22  6289 22  0450 22  0536 22  0500 22  0645 22  0616 22  1146   WBC Thousand/uL 23 78* 21 51* 22 55* 15 29* 6 49 8 19 8 50 12 12*   HEMOGLOBIN g/dL 7 9* 7 9* 9 3* 9 0* 6 7* 7 1* 7 7* 9 3*   HEMATOCRIT % 24 9* 25 5* 29 6* 27 8* 21 3* 23 5* 24 7* 28 9*   PLATELETS Thousands/uL 208 244 312 290 269 297 275 406*   NEUTROS PCT %  --  95*  --  95*  --   --  95* 90*   BANDS PCT % 5  --   --   --   --  9*  --   --    MONOS PCT %  --  2*  --  3*  --   --  2* 6   MONO PCT % 3*  --   --   --   --  2*  --   --      Results from last 7 days   Lab Units 23  0450 22  0634 22  0450 22  0536 22  0500 22  2118 22  0645 22  0616 22  1146   SODIUM mmol/L 135 136 131* 131* 134* 132* 131*   < > 124*   POTASSIUM mmol/L 4 5 4 5 4 8 4 8 5 5* 5 3 5 4*   < > 5 1   CHLORIDE mmol/L 94* 97 93* 94* 97 96 95*   < > 85*   CO2 mmol/L 29 28 26 24 22 25 24   < > 27   ANION GAP mmol/L 12 11 12 13 15* 11 12   < > 12   BUN mg/dL 72* 60* 80* 57* 89* 83* 71*   < > 107*   CREATININE mg/dL 4 03* 3 63* 5 02* 3 96* 5 65* 5 37* 4 77*   < > 6 50*   CALCIUM mg/dL 7 6* 7 4* 7 1* 7 2* 7 2* 7 3* 7 6*   < > 8 1*   GLUCOSE RANDOM mg/dL 312* 239* 219* 160* 193* 197* 163*   < > 365*   ALT U/L 17  --   --   --   --   --   --   --  14   AST U/L 12  --   --   --   --   --   --   --  10   ALK PHOS U/L 82  --   --   --   --   --   --   --  112   ALBUMIN g/dL 2 1*  --   --   --   --   --   --   --  1 9*   TOTAL BILIRUBIN mg/dL 0 69  --   --   --   --   --   --   --  0 37    < > = values in this interval not displayed  Results from last 7 days   Lab Units 01/01/23  0450 12/31/22  0634 12/30/22  0450 12/29/22  0536 12/28/22  0500 12/27/22  0645 12/26/22  0616 12/25/22  1146   MAGNESIUM mg/dL 2 1  --  2 3 2 1 2 4 2 1 2 2 2 2   PHOSPHORUS mg/dL 6 2* 6 0* 8 0* 5 8*  --  6 6* 5 9*  --       Results from last 7 days   Lab Units 12/27/22  1042 12/26/22  1141 12/26/22  0045   INR  1 33*  --  1 35*   PTT seconds  --  32  --           Results from last 7 days   Lab Units 12/25/22  1146   LACTIC ACID mmol/L 2 2*     ABG:  Results from last 7 days   Lab Units 01/01/23  0443   PH ART  7 325*   PCO2 ART mm Hg 57 5*   PO2 ART mm Hg 79 1   HCO3 ART mmol/L 29 3*   BASE EXC ART mmol/L 2 6   ABG SOURCE  Radial, Right     VBG:  Results from last 7 days   Lab Units 01/01/23  0443   ABG SOURCE  Radial, Right     Results from last 7 days   Lab Units 12/31/22  1503 12/30/22  0450 12/27/22  0645 12/26/22  0616 12/25/22  1146   PROCALCITONIN ng/ml 10 77* 2 89* 2 78* 2 77* 1 79*       Micro  Results from last 7 days   Lab Units 12/30/22  2319 12/26/22  1720 12/26/22  0045 12/25/22  1431 12/25/22  1146   BLOOD CULTURE   --   --   --   --  No Growth After 5 Days  No Growth After 5 Days  SPUTUM CULTURE   --   --  Few Colonies of  --   --    GRAM STAIN RESULT  Rare Polys*  1+ Gram positive cocci in pairs*  Rare Gram positive cocci in clusters*  Rare Budding yeast* 2+ Polys*  Rare Budding Yeast with Pseudomycelia* 3+ Polys  No bacteria seen  --   --    MRSA CULTURE ONLY   --   --   --  No Methicillin Resistant Staphlyococcus aureus (MRSA) isolated  --        EKG: ST on monitor, rate 125     Imaging: I have personally reviewed pertinent films in PACS - no new images this AM    Intake and Output  I/O       12/30 0701 12/31 0700 12/31 0701 01/01 0700    I V  (mL/kg) 821 4 (10 4)     Blood  3780    Other 100     NG/ 200    IV Piggyback 650 100    Feedings 320 700    Total Intake(mL/kg) 2211 4 (28) 4780 (60 4)    Urine (mL/kg/hr)  0 (0)    Other 1600     Stool 0 0    Chest Tube 370 200    Total Output 1970 200    Net +241 4 +4580          Unmeasured Stool Occurrence 3 x 1 x          Height and Weights   Height: 5' 8" (172 7 cm)  IBW (Ideal Body Weight): 68 4 kg  Body mass index is 26 35 kg/m²  Weight (last 2 days)     Date/Time Weight    01/01/23 0540 78 6 (173 28)    12/31/22 0600 79 1 (174 38)    12/30/22 0543 80 (176 37)            Nutrition       Diet Orders   (From admission, onward)             Start     Ordered    12/29/22 1436  Diet Enteral/Parenteral; Tube Feeding No Oral Diet; Nepro; Continuous; 50; 50; Water; Every 6 hours  Diet effective now        References:    Nutrtion Support Algorithm Enteral vs  Parenteral   Question Answer Comment   Diet Type Enteral/Parenteral    Enteral/Parenteral Tube Feeding No Oral Diet    Tube Feeding Formula: Nepro    Bolus/Cyclic/Continuous Continuous    Tube Feeding Goal Rate (mL/hr): 50    Tube Feeding flush (mL): 50    Water Flush type: Water    Water flush frequency: Every 6 hours    RD to adjust diet per protocol?  Yes        12/29/22 1436    12/26/22 0815  Room Service  Once        Question:  Type of Service  Answer:  Room Service - Appropriate with Assistance    12/26/22 0814                  Active Medications  Scheduled Meds:  Current Facility-Administered Medications   Medication Dose Route Frequency Provider Last Rate   • calcium acetate  667 mg Oral TID With Meals FERNANDO Hopper     • calcium carbonate-vitamin D  2 tablet Oral BID With Meals FERNANDO Hopper     • chlorhexidine  15 mL Mouth/Throat S12G ALICIA Quinonez     • dexmedetomidine  0 1-1 2 mcg/kg/hr Intravenous Titrated Joie Spirosharono V, CRNP 0 4 mcg/kg/hr (01/01/23 0539)   • epoetin ani  4,000 Units Intravenous Once per day on Mon Wed Fri Humera Gordon MD     • fentanyl citrate (PF)  50 mcg Intravenous X0I PRN Sharon Quan PA-C     • insulin glargine  20 Units Subcutaneous QAM FERNANDO Gastelum     • insulin lispro  2-12 Units Subcutaneous Q6H [facility] FERNANDO Gastelum     • ipratropium  0 5 mg Nebulization Q6H Joie Nicolaso SEYMOUR POWELLNP     • levalbuterol  1 25 mg Nebulization Q6H Joie Nicolaso V, SEYMOURNP     • lidocaine  1 patch Topical Daily Robert Jamison PA-C     • lidocaine (PF)    PRN Beena Chawla MD     • methylPREDNISolone sodium succinate  80 mg Intravenous Daily SEYMOUR VallesNP     • omeprazole (PRILOSEC) suspension 2 mg/mL  20 mg Per NG Tube Daily Joie Nicolaso SEYMOUR POWELLNP     • ondansetron  4 mg Intravenous I1W PRN Robert Jamison PA-C     • pravastatin  80 mg Oral Daily With AtmailSEYMOURNP     • propofol  5-50 mcg/kg/min Intravenous Titrated Joie Nicolaso V, CRNP 20 mcg/kg/min (01/01/23 0539)   • sodium chloride  4 mL Nebulization Q6H Joie Nicolaso V CRNP     • [START ON 1/2/2023] vancomycin  10 mg/kg Intravenous Once per day on Mon Wed Fri FERNANDO Valles       Continuous Infusions:  dexmedetomidine, 0 1-1 2 mcg/kg/hr, Last Rate: 0 4 mcg/kg/hr (01/01/23 0539)  propofol, 5-50 mcg/kg/min, Last Rate: 20 mcg/kg/min (01/01/23 0539)      PRN Meds:   fentanyl citrate (PF), 50 mcg, Q1H PRN  lidocaine (PF), , PRN  ondansetron, 4 mg, Q6H PRN        Invasive Devices Review  Invasive Devices     Peripheral Intravenous Line  Duration           Long-Dwell Peripheral IV (Midline) 12/26/22 Left Brachial 6 days    Peripheral IV 12/29/22 Dorsal (posterior); Left Hand 3 days          Hemodialysis Catheter  Duration           HD Permanent Double Catheter 298 days          Drain  Duration NG/OG/Enteral Tube Orogastric 16 Fr Center mouth 6 days    Chest Tube Right Pleural 10 Fr  4 days          Airway  Duration           ETT  Cuffed; Hi-Lo 8 mm <1 day                Rationale for remaining devices: Critical illness  ---------------------------------------------------------------------------------------  Advance Directive and Living Will:      Power of :    POLST:    ---------------------------------------------------------------------------------------  Care Time Delivered:   Upon my evaluation, this patient had a high probability of imminent or life-threatening deterioration due to acute respiratory failure, which required my direct attention, intervention, and personal management  I have personally provided an aggregate of 45 minutes (0450 to 0542) of critical care time, exclusive of procedures, teaching, family meetings, and any prior time recorded by providers other than myself  FERNANDO Cadet      Portions of the record may have been created with voice recognition software  Occasional wrong word or "sound a like" substitutions may have occurred due to the inherent limitations of voice recognition software    Read the chart carefully and recognize, using context, where substitutions have occurred

## 2023-01-01 NOTE — ASSESSMENT & PLAN NOTE
· Did not follow commands or move extremities after several hours off sedation 12/30  · Encephalopathy possibly in setting of critical illness, lingering sedation effects, delirium, and hypercarbia   · Sedation resumed 12/30 evening d/t vent dyssynchrony   · CT head 12/31 without intracranial abnormality   · Neuro checks q4h, currently unable to hold sedation d/t respiratory status  · Delirium precautions; regulate sleep/wake cycle, environmental controls, daily CAM ICU

## 2023-01-01 NOTE — PROGRESS NOTES
Laura Cuevas is a 76 y o  male who is currently ordered Vancomycin IV with management by the Pharmacy Consult service  Relevant clinical data and objective / subjective history reviewed  Vancomycin Assessment:  Indication and Goal AUC/Trough: Pneumonia (goal -600, trough >10)  Clinical Status: re-new on vancomycin  Micro: -    Renal Function:  SCr: 3 63 mg/dL  CrCl: 15 8 mL/min  Renal replacement: HD  Days of Therapy: 1  Current Dose: 2000mg IV once as loading dose    Vancomycin Plan:  New Dosing: vancomycin 750mg (10mg/kg) after dialysis on HD days  Estimated AUC: 400-600 mcg*hr/mL  Estimated Trough: >10 mcg/mL  Next Level: 01/06/2023 0600  Renal Function Monitoring: Daily BMP and [address] will continue to follow closely for s/sx of nephrotoxicity, infusion reactions and appropriateness of therapy  BMP and CBC will be ordered per protocol  We will continue to follow the patient’s culture results and clinical progress daily      [address] , [address], Pharmacist

## 2023-01-01 NOTE — ASSESSMENT & PLAN NOTE
· Patient had a thoracentesis 12/21 (previous admission)  · Chest x-ray with hydropneumothorax thought to likely be related to this, possibly ex vacuo  · S/p R pleural chest tube with IR 12/27  · 200mL serous drainage x 24 hours  · CXR with small R pneumothorax with CT on H20 seal therefore chest tube placed by to -20cm suction, R lung was re-expanded on repeat CXR 12/30  · CXR repeated overnight 12/31 in setting of hypoxic episode, no pneumothorax

## 2023-01-01 NOTE — PROGRESS NOTES
Critical Care Services- Interval Progress Note   Manpreet Green 76 y o  male MRN: [id number]  Unit/Bed#: ICU 11 Encounter: [id number]  Assessment and Plan  Interval Events:  Called by primary RN after patient abruptly desaturated after turning  SpO2 was down to 60's on exam  Placed on 100% on vent without improvement  Chest tube and ETT appear in satisfactory/unchanged position  Lungs severely diminished throughout all lung fields  Patient was removed from the ventilator and bagged on 100%  Was difficult to bag/fighting BVM  Was given total of 100mg propofol and vecuronium 10mg without improvement in O2 saturation or lung compliance  Saline lavage and suctioning completed and productive for large amount of thick, chunky, bloody secretions  Continued to drop SpO2 to as low as 28% despite above interventions  Audible air leak noted with each bagging  Glidescope was utilized to evaluate tube positioning  Visualized ETT balloon just below cords with visible bubbling/air leak through cords  Tube was advanced to 25cm at lip with brief improvement in SpO2 to 80's, then again desaturated to 70's  Audible air leak continued despite re-inflation of ETT balloon  ETT was exchanged over tube exchanger and new #8 ETT was placed  SpO2 immediately improved from 80's to 100% with bagging  CXR obtained  No visible pneumothorax, interval worsening in bilateral diffuse opacifications, and ETT 2cm above brice on my read  ETT pulled back by 1cm to [email or web address] and secured in tube chris  Required one additional lavage and suctioning post CXR due to desaturation to mid 80's, now holding at 93-95% on ACPC 26/28/100/10       • Diagnosis: Acute respiratory failure with hypoxia  Plan: S/p vecuronium, increased sedation for vent synchrony  Vent settings adjusted to 26/28/100/10  Wean FiO2 for goal SpO2 >92%  Surveillance CXR as needed  Continue with PLEX as previously planned  Continue scheduled chest PT, suctioning overnight to maintain airway patency  Continue paralytic/sedation overnight to aid vent synchrony and tolerance of plasmapheresis          Upon my evaluation, this patient had a high probability of imminent or life-threatening deterioration due to acute respiratory failure with hypoxia, which required my direct attention, intervention, and personal management  I have personally provided an aggregate of 50 minutes (2125 to 2235) on 12/31/2022 of critical care time, exclusive of procedures, teaching, family meetings, and any prior time recorded by providers other than myself  Time includes review of imaging/radiology results, monitoring for potential decompensation    Interventions were performed as documented above    -------------------------------------------------------------------------------------------------------------------------------------  Hemodynamic Monitoring:  Vital Signs:   HR: 97   BP: 167/68  MAP: 98  RR: 28  SpO2: 65% on 100% oxygen  Cardiac Monitoring:   Telemetry rhythm: NSR    Laboratory   Results from last 7 days   Lab Units 12/31/22  0634 12/30/22  0450 12/29/22  0536 12/28/22  0500 12/27/22  0645 12/26/22  0616 12/25/22  1146   WBC Thousand/uL 21 51* 22 55* 15 29* 6 49 8 19 8 50 12 12*   HEMOGLOBIN g/dL 7 9* 9 3* 9 0* 6 7* 7 1* 7 7* 9 3*   HEMATOCRIT % 25 5* 29 6* 27 8* 21 3* 23 5* 24 7* 28 9*   PLATELETS Thousands/uL 244 312 290 269 297 275 406*   NEUTROS PCT % 95*  --  95*  --   --  95* 90*   BANDS PCT %  --   --   --   --  9*  --   --    MONOS PCT % 2*  --  3*  --   --  2* 6   MONO PCT %  --   --   --   --  2*  --   --      Results from last 7 days   Lab Units 12/31/22  0634 12/30/22  0450 12/29/22  0536 12/28/22  0500 12/27/22  2118 12/27/22  0645 12/26/22  1142 12/26/22  0616 12/25/22  1146   SODIUM mmol/L 136 131* 131* 134* 132* 131* 130*   < > 124*   POTASSIUM mmol/L 4 5 4 8 4 8 5 5* 5 3 5 4* 4 4   < > 5 1   CHLORIDE mmol/L 97 93* 94* 97 96 95* 95*   < > 85*   CO2 mmol/L 28 26 24 22 25 24 26   < > 27   ANION GAP mmol/L 11 12 13 15* 11 12 9   < > 12   BUN mg/dL 60* 80* 57* 89* 83* 71* 53*   < > 107*   CREATININE mg/dL 3 63* 5 02* 3 96* 5 65* 5 37* 4 77* 3 58*   < > 6 50*   CALCIUM mg/dL 7 4* 7 1* 7 2* 7 2* 7 3* 7 6* 7 6*   < > 8 1*   GLUCOSE RANDOM mg/dL 239* 219* 160* 193* 197* 163* 155*   < > 365*   ALT U/L  --   --   --   --   --   --   --   --  14   AST U/L  --   --   --   --   --   --   --   --  10   ALK PHOS U/L  --   --   --   --   --   --   --   --  112   ALBUMIN g/dL  --   --   --   --   --   --   --   --  1 9*   TOTAL BILIRUBIN mg/dL  --   --   --   --   --   --   --   --  0 37    < > = values in this interval not displayed  Results from last 7 days   Lab Units 12/31/22  0634 12/30/22  0450 12/29/22  0536 12/28/22  0500 12/27/22  0645 12/26/22  0616 12/25/22  1146   MAGNESIUM mg/dL  --  2 3 2 1 2 4 2 1 2 2 2 2   PHOSPHORUS mg/dL 6 0* 8 0* 5 8*  --  6 6* 5 9*  --       Results from last 7 days   Lab Units 12/27/22  1042 12/26/22  1141 12/26/22  0045   INR  1 33*  --  1 35*   PTT seconds  --  32  --           Results from last 7 days   Lab Units 12/25/22  1146   LACTIC ACID mmol/L 2 2*     ABG:  Results from last 7 days   Lab Units 12/31/22  0516   PH ART  7 281*   PCO2 ART mm Hg 63 9*   PO2 ART mm Hg 77 2   HCO3 ART mmol/L 29 4*   BASE EXC ART mmol/L 1 9   ABG SOURCE  Radial, Right     VBG:  Results from last 7 days   Lab Units 12/31/22  0516   ABG SOURCE  Radial, Right     Results from last 7 days   Lab Units 12/31/22  1503 12/30/22  0450 12/27/22  0645 12/26/22  0616 12/25/22  1146   PROCALCITONIN ng/ml 10 77* 2 89* 2 78* 2 77* 1 79*       Micro  Results from last 7 days   Lab Units 12/30/22  2319 12/26/22  1720 12/26/22  0045 12/25/22  1431 12/25/22  1146   BLOOD CULTURE   --   --   --   --  No Growth After 5 Days  No Growth After 5 Days     SPUTUM CULTURE   --   --  Few Colonies of  --   --    GRAM STAIN RESULT  Rare Polys*  1+ Gram positive cocci in pairs*  Rare Gram positive cocci in clusters* Rare Budding yeast* 2+ Polys*  Rare Budding Yeast with Pseudomycelia* 3+ Polys  No bacteria seen  --   --    MRSA CULTURE ONLY   --   --   --  No Methicillin Resistant Staphlyococcus aureus (MRSA) isolated  --        Diagnostic Imaging / Data: I have personally reviewed pertinent reports  12/31 CXR: worsening of bilateral diffuse consolidations, ETT 2cm above brice, no pneumothorax, R  CT and R  Tunneled HD catheter unchanged on my read  Formal report pending  Awaiting STAT read       Medications:  Current Facility-Administered Medications   Medication Dose Route Frequency   • calcium acetate (PHOSLO) capsule 667 mg  667 mg Oral TID With Meals   • calcium carbonate-vitamin D 500 mg-5 mcg tablet 2 tablet  2 tablet Oral BID With Meals   • calcium gluconate 1 g in sodium chloride 0 9% 50 mL (premix)  1 g Intravenous Once   • chlorhexidine (PERIDEX) 0 12 % oral rinse 15 mL  15 mL Mouth/Throat Q12H [address]   • dexmedeTOMIDine (Precedex) 400 mcg in sodium chloride 0 9 % 100 mL infusion  0 1-1 2 mcg/kg/hr Intravenous Titrated   • epoetin ani (EPOGEN,PROCRIT) injection 4,000 Units  4,000 Units Intravenous Once per day on Mon Wed Fri   • fentanyl citrate (PF) 100 MCG/2ML 50 mcg  50 mcg Intravenous Q1H PRN   • insulin glargine (LANTUS) subcutaneous injection 15 Units 0 15 mL  15 Units Subcutaneous QAM   • insulin lispro (HumaLOG) 100 units/mL subcutaneous injection 1-6 Units  1-6 Units Subcutaneous Q6H   • ipratropium (ATROVENT) 0 02 % inhalation solution 0 5 mg  0 5 mg Nebulization Q6H   • levalbuterol (XOPENEX) inhalation solution 1 25 mg  1 25 mg Nebulization Q6H   • lidocaine (LIDODERM) 5 % patch 1 patch  1 patch Topical Daily   • lidocaine (PF) (XYLOCAINE-MPF) 1 % injection    PRN   • methylPREDNISolone sodium succinate (Solu-MEDROL) injection 80 mg  80 mg Intravenous Daily   • midazolam (VERSED) injection 2 mg  2 mg Intravenous Once   • norepinephrine (LEVOPHED) 1 mg/mL injection **ADS Override Pull**       • omeprazole (PRILOSEC) suspension 2 mg/mL  20 mg Per NG Tube Daily   • ondansetron (ZOFRAN) injection 4 mg  4 mg Intravenous Q6H PRN   • pravastatin (PRAVACHOL) tablet 80 mg  80 mg Oral Daily With Dinner   • propofol (DIPRIVAN) 1000 mg in 100 mL infusion (premix)  5-50 mcg/kg/min Intravenous Titrated   • ROCuronium (ZEMURON) injection 79 mg  1 mg/kg Intravenous Once   • sodium chloride 3 % inhalation solution 4 mL  4 mL Nebulization Q6H   • vecuronium (NORCURON) injection 10 mg  10 mg Intravenous Once       SIGNATURE: FERNANDO Cunningham    Portions of the record may have been created with voice recognition software  Occasional wrong word or "sound a like" substitutions may have occurred due to the inherent limitations of voice recognition software    Read the chart carefully and recognize, using context, where substitutions have occurred

## 2023-01-01 NOTE — ASSESSMENT & PLAN NOTE
Lab Results   Component Value Date    EGFR 14 12/31/2022    EGFR 10 12/30/2022    EGFR 13 12/29/2022    CREATININE 3 63 (H) 12/31/2022    CREATININE 5 02 (H) 12/30/2022    CREATININE 3 96 (H) 12/29/2022       · Patient scheduled dialysis Tuesday/Thursday/Saturday however he was unable to make his treatment on Saturday 12/24 due to the transportation bus not picking him up  · Patient started with worsening shortness of breath on 12/25 presented to the emergency room  · Nephrology consulted and HD session done on 12/25 for 3 5L  · Continue MWF schedule for now per nephrology  · Continue PhosLo as ordered

## 2023-01-01 NOTE — ASSESSMENT & PLAN NOTE
· Patient presented to the emergency department on 12/25 with worsening shortness of breath and hypoxia after missing his dialysis treatment yesterday due to lack of transportation  · Initially in the emergency room, the patient was placed on BiPAP but he was weaned to mid flow  · Overnight 12/25, became more hypoxic and ultimately required intubation  · Began having hemoptysis and significant blood suctioned post intubation  Concern for alveolar hemorrhage secondary to vasculitis-see plan as above   · Worsening hypoxia 12/30-12/31 overnight with peak pressuring on vent  Changed to [facility] and re-sedated  ABG with respiratory acidosis and P:F ratio 65  · CXR 12/30 PM with R lung re-expanded with R pleural CT to -20cm of suction but worsening of extensive B/L patchy pulmonary infiltrates as compared to prior  · Episode of acute hypoxia again last evening 12/31 with SpO2 down to 60's, not improved with saline lavage and suctioning, paralytic, or bagging  Was difficult to bag and had persistent air leak despite ETT repositioning and re-inflation of balloon  SpO2 dipped to 28% briefly  Required ETT exchange  Now with #8 ETT  SpO2 improved after additional lavage and suctioning  · Consider bronch today for secretion management given overnight plugging  · AM ABG 7 33/57/79/29   PF ratio 98  · Current Vent settings: AC/PC 26/28/80%/10  · Wean fio2/PEEP for O2 sat 92% or above   · Sedation with propofol and precedex  · Titrate for RAAS -2  · VAP bundle; chlorhexidine, PPI, HOB greater than 30 degrees  · Pulmonary toilet with xopenex/atrovent/3% NSS nebs q6h, chest PT TID, ETT suctioning PRN

## 2023-01-01 NOTE — PROGRESS NOTES
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  -12/26-completed 2-hour dialysis treatment did not tolerate ultrafiltration completed bronchoscopy with   Moderate amount of bloody secretions from all lobes  - 12/27 - could not have dialysis to facilitate timing of ritux  Dialysis catheter needed to be emergency accessed after review with primary team  HD and CRRT nurses were able to be present to access and deaccess  - 12/28 - HD completed  Net even balance  - 12/30 -HD with 1 L UF goal  Received albumin late evening  -12/31- Worsening respiratory status  Antibiotics were expanded  Started on plasma exchange  - 1/1-no urgent indication for hemodialysis currently  Patient to receive plasma exchange     Plan:  - Plasma exchange per primary team  - resp acidosis per primary team  - f/u bladder scans  - HD MWF for now (normally is on TTS)-does not urgently require dialysis today but will need evaluation for IHD versus CRRT tomorrow  Message left on dialysis machine  -  treatment for hemoptysis in progress as below  - concern for vasculitis pulmonary related  Rheum has been consulted-rituximab and plasma exchange on board  - Chest x-ray from today reviewed personally with the attending  Patchy infiltrates bilaterally right greater than left    - Awaiting bronchoscopy  - anemia - transfusion per primary team  - will eventually need PCP prophylaxis  - reviewed with primary team dialysis plans  - trend phos  - Now with tachycardia and hypotension  Issues with ventilator and respiratory status which are actively being adjusted by primary team at bedside     2-shortness of breath - concern for vasculitis; intubated 12/25     - Status postthoracentesis during prior hospitalization on December 23  - Initially hypoxic and requiring facemask  - Missed dialysis on 12/24 due to transportation issue  - concern for infectious etiology versus noninfectious etiology  - Pulmonary team also on board along with ICU team  - Concern for hydropneumothorax on chest x-ray and therefore patient is undergoing CT scan  - On broad-spectrum antibiotics  - Started on pulse dose steroids due to concern for vasculitis also with hemoptysis  - Bronchoscopy completed with signs of alveolar hemorrhage  - CT scan mod hydropneumothorax, new compared to prior  Inc in b/l pulm infiltrates  - PCP smear negative  - 12/27 - started on ritux  Completing -pulse steroids  - 12/29 - improving hemoptysis   - 12/30- CXR with pneumothorax  Infiltrates  CT placed to water seal  12/31-worsening respiratory status-started on plasma exchange  - 1/1-continued on plasma exchange     3 hyponatremia volume mediated along with hyperglycemia-improved     - Monitor with dialysis and BS control     4-anemia-on Mircera as outpatient     - restart epogen with full HD  - hemoglobin to 5 7 during prior hospitalization  - Stool occult positive prior  - Received multiple blood transfusions prior admission     5 - vol overload     - missed HD after discharge  - history of bilateral pl effusions  - albumin 1 6 leading to anasarca  - required thoracentesis prior  - vol state overall improved with UF     6) covid19     - per primary team     7 - MBD     - on phos binder     8 - hydropneumothorax - s/p chest tube by IR team 12/27     9- electrolytes - hyperphos - trend and cont phos binder     10- acid/base - stable     11) leukocytosis   Mult factorial  On steroids  SUBJECTIVE / 24H INTERVAL HISTORY:    Intubated  Air leak on ventilator  difficulty with ventilator and oxygenating  OBJECTIVE:  Current Weight: Weight - Scale: 78 6 kg (173 lb 4 5 oz)  Vitals:    01/01/23 0500 01/01/23 0540 01/01/23 0600 01/01/23 0715   BP: (!) 86/51  94/57    Pulse: (!) 125  (!) 124 (!) 126   Resp: (!) 30  (!) 28 (!) 32   Temp:    97 8 °F (36 6 °C)   TempSrc:    Temporal   SpO2: 96%  98% 96%   Weight:  78 6 kg (173 lb 4 5 oz)     Height:           Intake/Output Summary (Last 24 hours) at 1/1/2023 0753  Last data filed at 1/1/2023 5032  Gross per 24 hour   Intake 5647 44 ml   Output 320 ml   Net 5327 44 ml     General: NAD  Skin: no rash  Eyes: anicteric sclera  ENT: ET tube in place  Neck: supple  Chest: CTA b equal chest wall rise bilaterally  Audible air leak noted    Abdomen: soft  Extremities: no significant edema LE b/l  : no ruvalcaba  Neuro: Cannot assess  Psych: Cannot assess    Medications:    Current Facility-Administered Medications:   •  calcium acetate (PHOSLO) capsule 667 mg, 667 mg, Oral, TID With Meals, [employer], FERNANDO, 667 mg at 01/01/23 8975  •  calcium carbonate-vitamin D 500 mg-5 mcg tablet 2 tablet, 2 tablet, Oral, BID With Meals, [employer], FERNANDO, 2 tablet at 01/01/23 6606  •  chlorhexidine (PERIDEX) 0 12 % oral rinse 15 mL, 15 mL, Mouth/Throat, P77J [facility], Inderjit Quan PA-C, 15 mL at 12/31/22 2106  •  dexmedeTOMIDine (Precedex) 400 mcg in sodium chloride 0 9 % 100 mL infusion, 0 1-1 2 mcg/kg/hr, Intravenous, Titrated, SEYMOUR VallesNP, Last Rate: 7 9 mL/hr at 01/01/23 0705, 0 4 mcg/kg/hr at 01/01/23 0705  •  epoetin ani (EPOGEN,PROCRIT) injection 4,000 Units, 4,000 Units, Intravenous, Once per day on Mon Wed Fri, Michael Duran MD, 4,000 Units at 12/30/22 8098  •  fentanyl citrate (PF) 100 MCG/2ML 50 mcg, 50 mcg, Intravenous, D3K PRN, Inderjit Quan PA-C, 50 mcg at 12/30/22 2201  •  insulin glargine (LANTUS) subcutaneous injection 20 Units 0 2 mL, 20 Units, Subcutaneous, QAM, Rodger Peel, CRNP  •  insulin lispro (HumaLOG) 100 units/mL subcutaneous injection 2-12 Units, 2-12 Units, Subcutaneous, Q6H [address], 8 Units at 01/01/23 0551 **AND** Fingerstick Glucose (POCT), , , Q6H, Rodger Peel, CRNP  •  ipratropium (ATROVENT) 0 02 % inhalation solution 0 5 mg, 0 5 mg, Nebulization, Q6H, Joie Nina V, CRNP, 0 5 mg at 01/01/23 0744  •  levalbuterol (XOPENEX) inhalation solution 1 25 mg, 1 25 mg, Nebulization, Q6H, Joie Nina V, CRNP, 1 25 mg at 01/01/23 0744  •  lidocaine (LIDODERM) 5 % patch 1 patch, 1 patch, Topical, Daily, Trevor Patel PA-C, 1 patch at 12/31/22 0827  •  lidocaine (PF) (XYLOCAINE-MPF) 1 % injection, , , PRN, Mag Franklin MD, 5 mL at 12/27/22 1349  •  methylPREDNISolone sodium succinate (Solu-MEDROL) injection 80 mg, 80 mg, Intravenous, Daily, FERNANDO Valles, 80 mg at 12/31/22 2106  •  omeprazole (PRILOSEC) suspension 2 mg/mL, 20 mg, Per NG Tube, Daily, FERNANDO Valles, 20 mg at 12/31/22 4033  •  ondansetron (ZOFRAN) injection 4 mg, 4 mg, Intravenous, A8J PRN, Balaji Quan PA-C  •  pravastatin (PRAVACHOL) tablet 80 mg, 80 mg, Oral, Daily With FERNANDO Desai, 80 mg at 12/31/22 1649  •  propofol (DIPRIVAN) 1000 mg in 100 mL infusion (premix), 5-50 mcg/kg/min, Intravenous, Titrated, FERNANDO Valles, Last Rate: 9 6 mL/hr at 01/01/23 0705, 20 mcg/kg/min at 01/01/23 0705  •  sodium chloride 3 % inhalation solution 4 mL, 4 mL, Nebulization, Q6H, FERNANDO Valles, 4 mL at 01/01/23 0744  •  [START ON 1/2/2023] vancomycin (VANCOCIN) IVPB (premix in dextrose) 750 mg 150 mL, 10 mg/kg, Intravenous, Once per day on Mon Wed Fri, FERNANDO Osie    Laboratory Results:  Results from last 7 days   Lab Units 01/01/23  0450 12/31/22  6989 12/30/22  0450 12/29/22  0536 12/28/22  0500 12/27/22  2118 12/27/22  0645 12/26/22  1142 12/26/22  0616 12/25/22  1146   WBC Thousand/uL 23 78* 21 51* 22 55* 15 29* 6 49  --  8 19  --  8 50 12 12*   HEMOGLOBIN g/dL 7 9* 7 9* 9 3* 9 0* 6 7*  --  7 1*  --  7 7* 9 3*   HEMATOCRIT % 24 9* 25 5* 29 6* 27 8* 21 3*  --  23 5*  --  24 7* 28 9*   PLATELETS Thousands/uL 208 244 312 290 269  --  297  --  275 406*   POTASSIUM mmol/L 4 5 4 5 4 8 4 8 5 5* 5 3 5 4*   < > 5 8* 5 1   CHLORIDE mmol/L 94* 97 93* 94* 97 96 95*   < > 93* 85*   CO2 mmol/L 29 28 26 24 22 25 24   < > 26 27   BUN mg/dL 72* 60* 80* 57* 89* 83* 71*   < > 86* 107*   CREATININE mg/dL 4 03* 3 63* 5 02* 3 96* 5 65* 5 37* 4 77*   < > 5 55* 6 50*   CALCIUM mg/dL 7 6* 7 4* 7 1* 7 2* 7 2* 7 3* 7 6*   < > 7 7* 8 1*   MAGNESIUM mg/dL 2 1  --  2 3 2 1 2 4  --  2 1  --  2 2 2 2   PHOSPHORUS mg/dL 6 2* 6 0* 8 0* 5 8*  --   --  6 6*  --  5 9*  --     < > = values in this interval not displayed

## 2023-01-01 NOTE — PROCEDURES
Intubation    Date/Time: 12/31/2022 9:50 PM  Performed by: FERNANDO Trammell  Authorized by: FERNANDO rTammell     Patient location:  Bedside  Other Assisting Provider: No    Consent:     Consent obtained:  Emergent situation  Universal protocol:     Patient identity confirmed: Anonymous protocol, patient vented/unresponsive  Pre-procedure details:     Patient status:  Unresponsive    Pretreatment medications:  Propofol    Paralytics:  Vecuronium  Indications:     Indications for intubation: respiratory failure, hypoxemia and pulmonary toilet    Procedure details:     Preoxygenation:  Bag valve mask    CPR in progress: no      Intubation method:  Oral    Oral intubation technique:  Fiber optic and glidescope    Laryngoscope blade: Mac 3    Tube size (mm):  8 0    Tube type:  Cuffed and hi-lo    Number of attempts:  1    Cricoid pressure: no      Tube visualized through cords: yes    Placement assessment:     ETT to lip:  25    Tube secured with:  ETT chris    Breath sounds:  Reduced on left    Placement verification: CXR verification, direct visualization and fiberoptic scope      CXR findings:  ETT in proper place (ETT was deep/2cm above brice at 25cm  Pulled back to 24cm @lip and secured)  Post-procedure details:     Patient tolerance of procedure: Tolerated well, no immediate complications  Comments:      Patient had experienced persistent hypoxia despite repositioning/troubleshooting of current #7 5 ETT and balloon  New #8 ETT placed over tube exchanger  See interval progress note for details

## 2023-01-01 NOTE — NURSING NOTE
13 units of FFP infused during plasma phoresis by Allyson Che in fusionist  Epic times only when blood product checked by 2 RNs  Please look at transfusionist documentation for actual infusion times

## 2023-01-01 NOTE — PROGRESS NOTES
Jasmine Polanco is a 76 y o  male who is currently ordered Vancomycin IV with management by the Pharmacy Consult service  Relevant clinical data and objective / subjective history reviewed  Vancomycin Assessment:  Indication and Goal AUC/Trough: Pneumonia (goal -600, trough >10), -600, trough >10  Clinical Status: improving  Micro:     Renal Function:  SCr: 4 03 mg/dL  CrCl: 14 4 mL/min  Renal replacement: HD  Days of Therapy: 3  Current Dose: 2000mg IV once as loading dose (resumed therapy)  Vancomycin Plan:  New Dosinmg (10mg/kg) IV after HD  Estimated AUC: 400-600 mcg*hr/mL  Estimated Trough: >10 mcg/mL  Next Level: changed to 2023 @0600  Renal Function Monitoring: Daily BMP and UOP  Pharmacy will continue to follow closely for s/sx of nephrotoxicity, infusion reactions and appropriateness of therapy  BMP and CBC will be ordered per protocol  We will continue to follow the patient’s culture results and clinical progress daily      [address] , [address], Pharmacist

## 2023-01-01 NOTE — PROCEDURES
Bronchoscopy (Bedside)    Date/Time: 1/1/2023 1:35 PM  Performed by: Burke Quispe MD  Authorized by: Burke Quispe MD     Patient location:  Bedside  Other Assisting Provider: No    Consent:     Consent obtained:  Verbal    Consent given by:  Spouse    Risks discussed:  Death, pneumothorax, infection and bleeding    Alternatives discussed:  No treatment  Universal protocol:     Procedure explained and questions answered to patient or proxy's satisfaction: yes      Required blood products, implants, devices and special equipment available: yes      Immediately prior to procedure a time out was called: yes      Patient identity confirmed:  Arm band  Indications:     Procedure Purpose: therapeutic      Indications comment:  Mucus plugging  Sedation:     Sedation type:  Continuous (ICU/vent)  Anesthesia (see MAR for exact dosages): Anesthesia method:  None  Procedure details:     Description:  Ambu bronchoscope advanced down ETT without resistance  Tip of ETT was measured to be 2 cm above brice and was subsequently withdrawn to 4 cm  Right upper lobe was clear of secretions  Thick mucoid plug was seen extending from right middle lobe into BI, and was lavaged and aspirated  Additional mucoid plugs found in segmental and subsegmental bronchi in right lower lobe  Multiple tenacious mucoid plugs also aspirated from multiple subsegmental bronchi in left lower lobe  To mobilize secretions in both right and left lower lobes, multiple 10 mL aliquots of saline were instilled and then suctioned  Secretions were not bloody  Post-procedure details:     Chest x-ray performed: no      Patient tolerance of procedure:   Tolerated well, no immediate complications

## 2023-01-01 NOTE — ASSESSMENT & PLAN NOTE
· Patient presented to the emergency room on 12/25 with SIRS criteria -increased respiratory rate and hypoxia  · With decompensation on 12/25, antibiotics were started with cefepime/vanco given worsening hypoxia however low suspicion for infection  · BAL with mixed respiratory micheal  · pneumocystis smear negative   · Completing 5 days abx 12/29, however 12/30 with thick tan secretions and worsening leukocytosis and was restarted on cefepime   Tia Median resumed 12/31 due to hypoxia  · Repeat sputum cx and MRSA nasal surveillance pending   · Trend temps and WBC   · procal difficult to interpret in setting of ESRD  · Became hypotensive this AM and tachycardic

## 2023-01-02 PROBLEM — R19.7 DIARRHEA: Status: ACTIVE | Noted: 2023-01-02

## 2023-01-02 NOTE — ASSESSMENT & PLAN NOTE
Lab Results   Component Value Date    EGFR 13 01/01/2023    EGFR 14 12/31/2022    EGFR 10 12/30/2022    CREATININE 4 03 (H) 01/01/2023    CREATININE 3 63 (H) 12/31/2022    CREATININE 5 02 (H) 12/30/2022       · Patient scheduled dialysis Tuesday/Thursday/Saturday however he was unable to make his treatment on Saturday 12/24 due to the transportation bus not picking him up  · Patient started with worsening shortness of breath on 12/25 presented to the emergency room  · Nephrology consulted and HD session done on 12/25 for 3 5L  · Continue MWF schedule for now per nephrology  · Continue PhosLo as ordered  · Trend BMP

## 2023-01-02 NOTE — PLAN OF CARE
Pt on HD for 3 hours with a uf goal of 0 5 L as tolerated  Pt on a 3 k 2 5 mary bath for a potassium of 4 2 on 01/02/23  Pt currently intubated and sedated    Problem: METABOLIC, FLUID AND ELECTROLYTES - ADULT  Goal: Electrolytes maintained within normal limits  Description: INTERVENTIONS:  - Monitor labs and assess patient for signs and symptoms of electrolyte imbalances  - Administer electrolyte replacement as ordered  - Monitor response to electrolyte replacements, including repeat lab results as appropriate  - Instruct patient on fluid and nutrition as appropriate  Outcome: Progressing  Goal: Fluid balance maintained  Description: INTERVENTIONS:  - Monitor labs   - Monitor I/O and WT  - Instruct patient on fluid and nutrition as appropriate  - Assess for signs & symptoms of volume excess or deficit  Outcome: Progressing

## 2023-01-02 NOTE — RESPIRATORY THERAPY NOTE
RT Ventilator Management Note  Everardo Kirk 76 y o  male MRN: [id number]  Unit/Bed#: ICU 11 Encounter: [id number]      Daily Screen       12/31/2022  0726 1/1/2023  0744          Patient safety screen outcome[de-identified] Failed Failed      Not Ready for Weaning due to[de-identified] FiO2 >60%;PEEP > 8cmH2O Underline problem not resolved              Physical Exam:   Assessment Type: Pre-treatment  General Appearance: Sedated  Respiratory Pattern: Assisted  Chest Assessment: Chest expansion symmetrical  Bilateral Breath Sounds: Diminished, Coarse  Cough: None  Suction: ET Tube  O2 Device: ventilator      Resp Comments: pt on ventilator (u/a to give xopenex and atrovent due to increased HR)

## 2023-01-02 NOTE — ASSESSMENT & PLAN NOTE
Lab Results   Component Value Date    EGFR 13 01/01/2023    EGFR 14 12/31/2022    EGFR 10 12/30/2022    CREATININE 4 03 (H) 01/01/2023    CREATININE 3 63 (H) 12/31/2022    CREATININE 5 02 (H) 12/30/2022     · Patient had a recent admission from 12/20-12/23 with hemoglobin 5 7 -received 1 unit packed red blood cells at that time  · Most likely acute on chronic anemia in setting of ESRD now with DAH  · Received 1 unit of PRBCs 12/28 for Hgb 6 7   AM Hgb 6 6, transfuse 1 unit PRBCs  · Continue to trend daily and transfuse for hgb less than 7 0

## 2023-01-02 NOTE — ASSESSMENT & PLAN NOTE
· Rhythm deteriorated to Aflutter 2:1 block yesterday with rate in 140s  · Received metoprolol x 3 total doses yesterday with minimal improvement to 130s  · Given amio bolus and started on drip overnight  · Unable to anticoagulate due to alveolar hemorrhage  · Continuous cardiac monitoring  · Consider addition of digoxin today if not improved with amio loading

## 2023-01-02 NOTE — PROGRESS NOTES
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 given hemoptysis and worsening infiltrates   · Bronch on 12/26 with serial aliquots persistently bloody despite lavage consistent with DAH  · Completed 3 days of pulse dose steroids 12/29  · Continue solumedrol 80mg daily   · Hold anticoagulation  · Received Rituximab per rheumatology on Tuesday, plan for weekly dosing x 4 weeks  · Plasmapheresis day 3/5 today  · S/p bronchoscopy for pulmonary toilet 1/1  Multiple tenacious mucoid plugs removed after lavage and suctioning from RML, RLL and LLL  · Appreciate pulmonary and rheumatology input    Acute respiratory failure with hypoxia [facility])  Assessment & Plan  · Patient presented to the emergency department on 12/25 with worsening shortness of breath and hypoxia after missing his dialysis treatment yesterday due to lack of transportation  · Initially in the emergency room, the patient was placed on BiPAP but he was weaned to mid flow  · Overnight 12/25, became more hypoxic and ultimately required intubation  · Began having hemoptysis and significant blood suctioned post intubation  Concern for alveolar hemorrhage secondary to vasculitis-see plan as above   · Worsening hypoxia 12/30-12/31 overnight with peak pressuring on vent  Changed to [facility] and re-sedated  ABG with respiratory acidosis and P:F ratio 65     · CXR 12/30 PM with R lung re-expanded with R pleural CT to -20cm of suction but worsening of extensive B/L patchy pulmonary infiltrates as compared to prior  · Episode of acute hypoxia again last evening 12/31 with SpO2 down to 60's, not improved with saline lavage and suctioning, paralytic, or bagging  Was difficult to bag and had persistent air leak despite ETT repositioning and re-inflation of balloon  SpO2 dipped to 28% briefly  Required ETT exchange  Now with #8 ETT  SpO2 improved after additional lavage and suctioning  · 1/1 ABG 7 33/57/79/29  PF ratio 98  · S/p bronch 1/1-  thick mucoid plugs removed from RML and b/l lower lobes  · Current Vent settings: AC/PC 22/22/60%/8  · Wean fio2/PEEP for O2 sat 92% or above   · Sedation with propofol and precedex  · Titrate for RAAS -2  · VAP bundle; chlorhexidine, PPI, HOB greater than 30 degrees  · Pulmonary toilet with xopenex/atrovent/3% NSS nebs q6h, chest PT TID, ETT suctioning PRN    SIRS (systemic inflammatory response syndrome) ([facility] [address] )  Assessment & Plan  · Patient presented to the emergency room on 12/25 with SIRS criteria- increased respiratory rate and hypoxia  · With decompensation on 12/25, antibiotics were started with cefepime/vanco given worsening hypoxia however low suspicion for infection  · BAL with mixed respiratory micheal  · pneumocystis smear negative   · Completing 5 days abx 12/29, however 12/30 with thick tan secretions and worsening leukocytosis and was restarted on cefepime   Vanco resumed 12/31 due to hypoxia  · Repeat sputum cx and MRSA nasal surveillance pending   · Trend temps and WBC   · procal difficult to interpret in setting of ESRD      Hydropneumothorax  Assessment & Plan  · Patient had a thoracentesis 12/21 (previous admission)  · Chest x-ray with hydropneumothorax thought to likely be related to this, possibly ex vacuo  · S/p R pleural chest tube with IR 12/27  · 200mL serous drainage x 24 hours  · CXR with small R pneumothorax with CT on H20 seal therefore chest tube placed by to -20cm suction, R lung was re-expanded on repeat CXR 12/30  · CXR repeated overnight 12/31 in setting of hypoxic episode, no pneumothorax     Urinary retention  Assessment & Plan  · Bladder scan qshift  · Urinary retention protocol    Toxic metabolic encephalopathy  Assessment & Plan  · Did not follow commands or move extremities after several hours off sedation 12/30  · Encephalopathy possibly in setting of critical illness, lingering sedation effects, delirium, and hypercarbia   · Sedation resumed 12/30 evening d/t vent dyssynchrony   · CT head 12/31 without intracranial abnormality   · Neuro checks q4h, currently unable to hold sedation d/t respiratory status  · Delirium precautions; regulate sleep/wake cycle, environmental controls, daily CAM ICU   · Propofol now weaned down to 15, still comfortable on vent   Withdraws to painful stimuli    Atrial flutter (HCC)  Assessment & Plan  · Rhythm deteriorated to Aflutter 2:1 block yesterday with rate in 140s  · Received metoprolol x 3 total doses yesterday with minimal improvement to 130s  · Given amio bolus and started on drip overnight  · Unable to anticoagulate due to alveolar hemorrhage  · Continuous cardiac monitoring  · Consider addition of digoxin today if not improved with amio loading       ANCA-associated vasculitis  Assessment & Plan  · Kidney biopsy on 03/22: 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 for which he was previously treated with Cytoxan (discontinued in April) and subsequently prescribed prednisone taper  · Now with pulmonary involvement, continue rituximab and steroids as above   · Plasmapheresis D3/5    ESRD on dialysis [facility])  Assessment & Plan  Lab Results   Component Value Date    EGFR 13 01/01/2023    EGFR 14 12/31/2022    EGFR 10 12/30/2022    CREATININE 4 03 (H) 01/01/2023    CREATININE 3 63 (H) 12/31/2022    CREATININE 5 02 (H) 12/30/2022       · Patient scheduled dialysis Tuesday/Thursday/Saturday however he was unable to make his treatment on Saturday 12/24 due to the transportation bus not picking him up  · Patient started with worsening shortness of breath on 12/25 presented to the emergency room  · Nephrology consulted and HD session done on 12/25 for 3 5L  · Continue MWF schedule for now per nephrology  · Continue PhosLo as ordered  · Trend BMP    Type 2 diabetes mellitus with chronic kidney disease on chronic dialysis, with long-term current use of insulin [facility])  Assessment & Plan  Lab Results   Component Value Date    HGBA1C 5 7 (H) 10/19/2022       Recent Labs     01/01/23  0551 01/01/23  1124 01/01/23  1830 01/01/23  2350   POCGLU 346* 240* 315* 271*       Blood Sugar Average: Last 72 hrs:  (P) 676 [id number]   · Continue sliding scale coverage q6h  Increase Lantus to 30 units from 20 as BG remains uncontrolled  · Increase SSI to algorithm 5  · Holding home regimen of 15u 70/30 BID   · Goal blood glucose less than 180    Diarrhea  Assessment & Plan  · Worsening diarrhea 1/1-1/2 after initiation of Reglan for reported high gastric residuals  · Highest residual last 24h is 100ml  · Stop Reglan  · Monitor stool output with FMS  · Add florastor  · Consider banana flakes if not improving    Anemia due to chronic kidney disease, on chronic dialysis [facility])  Assessment & Plan  Lab Results   Component Value Date    EGFR 13 01/01/2023    EGFR 14 12/31/2022    EGFR 10 12/30/2022    CREATININE 4 03 (H) 01/01/2023    CREATININE 3 63 (H) 12/31/2022    CREATININE 5 02 (H) 12/30/2022     · Patient had a recent admission from 12/20-12/23 with hemoglobin 5 7 -received 1 unit packed red blood cells at that time  · Most likely acute on chronic anemia in setting of ESRD now with DAH  · Received 1 unit of PRBCs 12/28 for Hgb 6 7   AM Hgb 6 6, transfuse 1 unit PRBCs  · Continue to trend daily and transfuse for hgb less than 7 0    Benign essential hypertension  Assessment & Plan  · Continue to hold home dose Norvasc and clonidine     Hyperlipidemia  Assessment & Plan  · Continue home statin      ----------------------------------------------------------------------------------------  HPI/24hr events: Cardiac rhythm deteriorated to 2:1 atrial flutter yesterday with rates in the 140s  Received 3 total doses of metoprolol 5mg without improvement  Was given amiodarone bolus and started on drip  Rhythm improved overnight, now aflutter with rates of 90-120s  Hgb 6 6 this AM  One unit PRBCs ordered  Is scheduled for third plasmapheresis treatment this AM  No other acute events  Patient appropriate for transfer out of the ICU today?: No  Disposition: Continue Critical Care   Code Status: Level 1 - Full Code  ---------------------------------------------------------------------------------------  SUBJECTIVE  JAMA    Review of Systems   Unable to perform ROS: Intubated     Review of systems was unable to be performed secondary to ETT  ---------------------------------------------------------------------------------------  OBJECTIVE    Vitals   Vitals:    23 0000 23 0030 23 0100 23 0304   BP: 117/64 121/67 123/70    BP Location:       Pulse: (!) 138 (!) 138 (!) 138 90   Resp:  22 22   Temp: 97 8 °F (36 6 °C)      TempSrc: Temporal      SpO2: 97% 98% 98% 98%   Weight:       Height:         Temp (24hrs), Av 3 °F (36 8 °C), Min:97 8 °F (36 6 °C), Max:98 9 °F (37 2 °C)  Current: Temperature: 97 8 °F (36 6 °C)          Respiratory:  SpO2: SpO2: 98 %, SpO2 Activity: SpO2 Activity: At Rest, SpO2 Device: O2 Device: Ventilator  Nasal Cannula O2 Flow Rate (L/min): 80 L/min    Invasive/non-invasive ventilation settings   Respiratory    Lab Data (Last 4 hours)    None         O2/Vent Data (Last 4 hours)       0304           Vent Mode AC/PC       Resp Rate (BPM) (BPM) 22       Pressure Control (cmH2O) (cm) 22       Insp Time (sec) (sec) 1       FiO2 (%) (%) 60       PEEP (cmH2O) (cmH2O) 8       MV 9 77                   Physical Exam  Vitals and nursing note reviewed  Constitutional:       General: He is not in acute distress  Appearance: He is ill-appearing and toxic-appearing        Interventions: He is sedated, intubated and restrained  HENT:      Head: Normocephalic  Nose: Nose normal       Mouth/Throat:      Mouth: Mucous membranes are moist    Eyes:      General: Lids are normal       Pupils: Pupils are equal, round, and reactive to light  Cardiovascular:      Rate and Rhythm: Normal rate  Rhythm irregular  Pulses:           Radial pulses are 2+ on the right side and 2+ on the left side  Dorsalis pedis pulses are detected w/ Doppler on the right side and 1+ on the left side  Heart sounds: Normal heart sounds  Pulmonary:      Effort: Pulmonary effort is normal  No respiratory distress  He is intubated  Breath sounds: Rhonchi present  Comments: Small tan/pink from ETT  R  CT pigtail in place and draining serosang drainage  Abdominal:      General: Bowel sounds are normal  There is no distension  Palpations: Abdomen is soft  Tenderness: There is no abdominal tenderness  Comments: TF running via OGT  Minimal residuals  Brown liquid stool multiple times last evening  FMS now in place and patent for the same  Genitourinary:     Comments: No urine observed  Bladder scan with [email or web address] urine  Musculoskeletal:      Right lower le+ Edema present  Left lower le+ Edema present  Comments: +2 anasarca   Skin:     General: Skin is warm and dry  Coloration: Skin is pale  Neurological:      General: No focal deficit present  GCS: GCS eye subscore is 1  GCS verbal subscore is 1  GCS motor subscore is 4  Comments:  Withdraws x4 extremities             Laboratory and Diagnostics:  Results from last 7 days   Lab Units 23  0501 23  0450 22  0634 22  0450 22  0536 22  0500 22  0645 22  0616   WBC Thousand/uL 22 64* 23 78* 21 51* 22 55* 15 29* 6 49 8 19 8 50   HEMOGLOBIN g/dL 6 6* 7 9* 7 9* 9 3* 9 0* 6 7* 7 1* 7 7*   HEMATOCRIT % 20 9* 24 9* 25 5* 29 6* 27 8* 21 3* 23 5* 24 7*   PLATELETS Thousands/uL 211 208 244 312 290 269 297 275   NEUTROS PCT %  --   --  95*  --  95*  --   --  95*   BANDS PCT %  --  5  --   --   --   --  9*  --    MONOS PCT %  --   --  2*  --  3*  --   --  2*   MONO PCT %  --  3*  --   --   --   --  2*  --      Results from last 7 days   Lab Units 01/02/23  0501 01/01/23 0450 12/31/22  0634 12/30/22  0450 12/29/22  0536 12/28/22  0500 12/27/22  2118   SODIUM mmol/L 133* 135 136 131* 131* 134* 132*   POTASSIUM mmol/L 4 2 4 5 4 5 4 8 4 8 5 5* 5 3   CHLORIDE mmol/L 92* 94* 97 93* 94* 97 96   CO2 mmol/L 32 29 28 26 24 22 25   ANION GAP mmol/L 9 12 11 12 13 15* 11   BUN mg/dL 92* 72* 60* 80* 57* 89* 83*   CREATININE mg/dL 4 39* 4 03* 3 63* 5 02* 3 96* 5 65* 5 37*   CALCIUM mg/dL 7 5* 7 6* 7 4* 7 1* 7 2* 7 2* 7 3*   GLUCOSE RANDOM mg/dL 294* 312* 239* 219* 160* 193* 197*   ALT U/L  --  17  --   --   --   --   --    AST U/L  --  12  --   --   --   --   --    ALK PHOS U/L  --  82  --   --   --   --   --    ALBUMIN g/dL  --  2 1*  --   --   --   --   --    TOTAL BILIRUBIN mg/dL  --  0 69  --   --   --   --   --      Results from last 7 days   Lab Units 01/02/23  0501 01/01/23 0450 12/31/22  0634 12/30/22  0450 12/29/22  0536 12/28/22  0500 12/27/22  0645 12/26/22  0616   MAGNESIUM mg/dL 2 1 2 1  --  2 3 2 1 2 4 2 1 2 2   PHOSPHORUS mg/dL 5 7* 6 2* 6 0* 8 0* 5 8*  --  6 6* 5 9*      Results from last 7 days   Lab Units 12/27/22  1042 12/26/22  1141   INR  1 33*  --    PTT seconds  --  32              ABG:  Results from last 7 days   Lab Units 01/01/23  0443   PH ART  7 325*   PCO2 ART mm Hg 57 5*   PO2 ART mm Hg 79 1   HCO3 ART mmol/L 29 3*   BASE EXC ART mmol/L 2 6   ABG SOURCE  Radial, Right     VBG:  Results from last 7 days   Lab Units 01/01/23  0443   ABG SOURCE  Radial, Right     Results from last 7 days   Lab Units 12/31/22  1503 12/30/22  0450 12/27/22  0645 12/26/22  0616   PROCALCITONIN ng/ml 10 77* 2 89* 2 78* 2 77*       Micro  Results from last 7 days   Lab Units 12/30/22  2319 12/26/22  1720 SPUTUM CULTURE  Culture too young- will reincubate  --    GRAM STAIN RESULT  Rare Polys*  1+ Gram positive cocci in pairs*  Rare Gram positive cocci in clusters*  Rare Budding yeast* 2+ Polys*  Rare Budding Yeast with Pseudomycelia*   MRSA CULTURE ONLY  No Methicillin Resistant Staphlyococcus aureus (MRSA) isolated  --        EKG: Aflutter on monitor, rate 90-120s  Imaging: I have personally reviewed pertinent reports  and I have personally reviewed pertinent films in PACS    1/1 CXR:     Small right effusion with no pneumothorax      Persistent extensive bilateral consolidation      Intake and Output  I/O       12/31 0701  01/01 0700 01/01 0701  01/02 0700    I V  (mL/kg) 447 4 (5 7) 341 7 (4 3)    Blood 3780 4135    NG/ 290    IV Piggyback 200 250    Feedings 995 600    Total Intake(mL/kg) 5647 4 (71 9) 5616 7 (71 5)    Urine (mL/kg/hr) 0 (0)     Stool 0 0    Chest Tube 320 140    Total Output 320 140    Net +5327 4 +5476 7          Unmeasured Stool Occurrence 1 x 2 x          Height and Weights   Height: 5' 8" (172 7 cm)  IBW (Ideal Body Weight): 68 4 kg  Body mass index is 26 35 kg/m²  Weight (last 2 days)     Date/Time Weight    01/01/23 0540 78 6 (173 28)    12/31/22 0600 79 1 (174 38)            Nutrition       Diet Orders   (From admission, onward)             Start     Ordered    12/29/22 1436  Diet Enteral/Parenteral; Tube Feeding No Oral Diet; Nepro; Continuous; 50; 50; Water; Every 6 hours  Diet effective now        References:    Nutrtion Support Algorithm Enteral vs  Parenteral   Question Answer Comment   Diet Type Enteral/Parenteral    Enteral/Parenteral Tube Feeding No Oral Diet    Tube Feeding Formula: Nepro    Bolus/Cyclic/Continuous Continuous    Tube Feeding Goal Rate (mL/hr): 50    Tube Feeding flush (mL): 50    Water Flush type: Water    Water flush frequency: Every 6 hours    RD to adjust diet per protocol?  Yes        12/29/22 1436    12/26/22 0815  Room Service  Once Question:  Type of Service  Answer:  Room Service - Appropriate with Assistance    12/26/22 0814                  Active Medications  Scheduled Meds:  Current Facility-Administered Medications   Medication Dose Route Frequency Provider Last Rate   • amiodarone  0 5 mg/min Intravenous Continuous Evmaricarmen Davis CRNP 0 5 mg/min (01/02/23 0445)   • calcium acetate  667 mg Oral TID With Meals FERNANDO Serrato     • calcium carbonate-vitamin D  2 tablet Oral BID With Meals FERNANDO Serrato     • calcium gluconate  2 g Intravenous Once Sidney Davis CRNP     • cefepime  1,000 mg Intravenous Q24H FERNANDO Valerio Stopped (01/01/23 1203)   • chlorhexidine  15 mL Mouth/Throat F59J [address] Alisa Chaudhry PA-C     • dexmedetomidine  0 1-1 2 mcg/kg/hr Intravenous Titrated SEYMOUR VallesNP 0 4 mcg/kg/hr (01/01/23 1820)   • epoetin ani  4,000 Units Intravenous Once per day on Mon Wed Fri Amilcar Godinez MD     • fentanyl citrate (PF)  50 mcg Intravenous U6U PRN Alisa Chaudhry PA-C     • insulin glargine  30 Units Subcutaneous QAM Sidney Davis CRNP     • insulin lispro  4-20 Units Subcutaneous Q6H [address] Jorgea Ryan CRNP     • ipratropium  0 5 mg Nebulization Q6H FERNANDO Valles     • levalbuterol  1 25 mg Nebulization Q6H FERNANDO Valles     • lidocaine  1 patch Topical Daily Alisa Chaudhry PA-C     • lidocaine (PF)    PRN Dyan Cruz MD     • methylPREDNISolone sodium succinate  80 mg Intravenous Daily FERNANDO Valles     • omeprazole (PRILOSEC) suspension 2 mg/mL  20 mg Per NG Tube Daily FERNANDO Valles     • ondansetron  4 mg Intravenous S4R PRN Alisa Chaudhry PA-C     • pravastatin  80 mg Oral Daily With [employer]fit[employer]ricksFERNANDO     • propofol  5-50 mcg/kg/min Intravenous Titrated Joie Spirosharnoo SHERRY CRNP 15 mcg/kg/min (01/02/23 0309)   • senna-docusate sodium  2 tablet Oral HS Grundy Kotyk, CRNP     • sodium chloride  4 mL Nebulization Q6H Joie Spironello V, CRNP     • vancomycin  10 mg/kg Intravenous Once per day on Mon Wed Fri Joie Spironello V, CRNP       Continuous Infusions:  amiodarone, 0 5 mg/min, Last Rate: 0 5 mg/min (01/02/23 2343)  dexmedetomidine, 0 1-1 2 mcg/kg/hr, Last Rate: 0 4 mcg/kg/hr (01/01/23 1820)  propofol, 5-50 mcg/kg/min, Last Rate: 15 mcg/kg/min (01/02/23 0306)      PRN Meds:   fentanyl citrate (PF), 50 mcg, Q1H PRN  lidocaine (PF), , PRN  ondansetron, 4 mg, Q6H PRN        Invasive Devices Review  Invasive Devices     Peripheral Intravenous Line  Duration           Long-Dwell Peripheral IV (Midline) 12/26/22 Left Brachial 7 days    Peripheral IV 12/29/22 Dorsal (posterior); Left Hand 4 days    Peripheral IV 01/01/23 Right Wrist <1 day    Peripheral IV 01/02/23 Right;Upper;Ventral (anterior) Arm <1 day          Hemodialysis Catheter  Duration           HD Permanent Double Catheter 299 days          Drain  Duration           NG/OG/Enteral Tube Orogastric 16 Fr Center mouth 7 days    Chest Tube Right Pleural 10 Fr  5 days    Rectal Tube With balloon <1 day          Airway  Duration           ETT  Cuffed; Hi-Lo 8 mm 1 day                Rationale for remaining devices: Critical illness  ---------------------------------------------------------------------------------------  Advance Directive and Living Will:      Power of :    POLST:    ---------------------------------------------------------------------------------------  Care Time Delivered:   Upon my evaluation, this patient had a high probability of imminent or life-threatening deterioration due to atrial flutter, acute respiratory failure with hypoxia, which required my direct attention, intervention, and personal management    I have personally provided an aggregate of 30 minutes (0200 to ) of critical care time, exclusive of procedures, teaching, family meetings, and any prior time recorded by providers other than myself  FERNANDO Martinez      Portions of the record may have been created with voice recognition software  Occasional wrong word or "sound a like" substitutions may have occurred due to the inherent limitations of voice recognition software    Read the chart carefully and recognize, using context, where substitutions have occurred

## 2023-01-02 NOTE — ASSESSMENT & PLAN NOTE
· Patient presented to the emergency department on 12/25 with worsening shortness of breath and hypoxia after missing his dialysis treatment yesterday due to lack of transportation  · Initially in the emergency room, the patient was placed on BiPAP but he was weaned to mid flow  · Overnight 12/25, became more hypoxic and ultimately required intubation  · Began having hemoptysis and significant blood suctioned post intubation  Concern for alveolar hemorrhage secondary to vasculitis-see plan as above   · Worsening hypoxia 12/30-12/31 overnight with peak pressuring on vent  Changed to [facility] and re-sedated  ABG with respiratory acidosis and P:F ratio 65  · CXR 12/30 PM with R lung re-expanded with R pleural CT to -20cm of suction but worsening of extensive B/L patchy pulmonary infiltrates as compared to prior  · Episode of acute hypoxia again last evening 12/31 with SpO2 down to 60's, not improved with saline lavage and suctioning, paralytic, or bagging  Was difficult to bag and had persistent air leak despite ETT repositioning and re-inflation of balloon  SpO2 dipped to 28% briefly  Required ETT exchange  Now with #8 ETT  SpO2 improved after additional lavage and suctioning  · 1/1 ABG 7 33/57/79/29   PF ratio 98  · S/p bronch 1/1-  thick mucoid plugs removed from RML and b/l lower lobes  · Current Vent settings: AC/PC 22/22/60%/8  · Wean fio2/PEEP for O2 sat 92% or above   · Sedation with propofol and precedex  · Titrate for RAAS -2  · VAP bundle; chlorhexidine, PPI, HOB greater than 30 degrees  · Pulmonary toilet with xopenex/atrovent/3% NSS nebs q6h, chest PT TID, ETT suctioning PRN

## 2023-01-02 NOTE — ASSESSMENT & PLAN NOTE
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

## 2023-01-02 NOTE — HEMODIALYSIS
Post-Dialysis RN Treatment Note    Blood Pressure:  Pre 155/73 mm/Hg  Post 138/73 mmHg   EDW  71 kg    Weight:  Pre 81 7 kg   Post 81 2 kg   Mode of weight measurement: Bed Scale   Volume Removed  500 ml    Treatment duration 180 minutes    NS given  No    Treatment shortened?  No   Medications given during Rx Epogen   Estimated Kt/V  None Reported   Access type: Permacath/TDC   Access Issues: No    Report called to primary nurse   Yes Torin Christina RN

## 2023-01-02 NOTE — PROGRESS NOTES
Manpreet Green is a 76 y o  male who is currently ordered Vancomycin IV with management by the Pharmacy Consult service  Relevant clinical data and objective / subjective history reviewed  Vancomycin Assessment:  Indication and Goal AUC/Trough: Pneumonia (goal -600, trough >10), -600, trough >10  Clinical Status:   Micro:   pending  Renal Function:  SCr: 4 39 mg/dL  CrCl: 13 1 mL/min  Renal replacement: HD  Days of Therapy: 4  Current Dose: 2000mg IV once as loading dose (resumed therapy)  Vancomycin Plan:  New Dosinmg (10mg/kg) IV after HD  Estimated AUC: 400-600 mcg*hr/mL  Estimated Trough: >10 mcg/mL  Next Level: 2023 @0600  Renal Function Monitoring: Daily BMP and UOP  Another random vancomycin was ordered to see prior-HD vanco level on 2023  After HD today, the dose will be given as planned  Pharmacy will continue to follow closely for s/sx of nephrotoxicity, infusion reactions and appropriateness of therapy  BMP and CBC will be ordered per protocol  We will continue to follow the patient’s culture results and clinical progress daily      [address] , [address], Pharmacist

## 2023-01-02 NOTE — ASSESSMENT & PLAN NOTE
· Patient presented to the emergency room on 12/25 with SIRS criteria- increased respiratory rate and hypoxia  · With decompensation on 12/25, antibiotics were started with cefepime/vanco given worsening hypoxia however low suspicion for infection  · BAL with mixed respiratory micheal  · pneumocystis smear negative   · Completing 5 days abx 12/29, however 12/30 with thick tan secretions and worsening leukocytosis and was restarted on cefepime   Anola Quinn resumed 12/31 due to hypoxia  · Repeat sputum cx and MRSA nasal surveillance pending   · Trend temps and WBC   · procal difficult to interpret in setting of ESRD

## 2023-01-02 NOTE — ASSESSMENT & PLAN NOTE
· Did not follow commands or move extremities after several hours off sedation 12/30  · Encephalopathy possibly in setting of critical illness, lingering sedation effects, delirium, and hypercarbia   · Sedation resumed 12/30 evening d/t vent dyssynchrony   · CT head 12/31 without intracranial abnormality   · Neuro checks q4h, currently unable to hold sedation d/t respiratory status  · Delirium precautions; regulate sleep/wake cycle, environmental controls, daily CAM ICU   · Propofol now weaned down to 15, still comfortable on vent   Withdraws to painful stimuli

## 2023-01-02 NOTE — PROGRESS NOTES
[address] 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  -12/26-completed 2-hour dialysis treatment did not tolerate ultrafiltration completed bronchoscopy with   Moderate amount of bloody secretions from all lobes  - 12/27 - could not have dialysis to facilitate timing of ritux  Dialysis catheter needed to be emergency accessed after review with primary team  HD and CRRT nurses were able to be present to access and deaccess  - 12/28 - HD completed  Net even balance  - 12/30 -HD with 1 L UF goal  Received albumin late evening  -12/31- Worsening respiratory status  Antibiotics were expanded  Started on plasma exchange  - 1/1-no urgent indication for hemodialysis currently  Patient to receive plasma exchange  Status post bronchoscopy with thick mucous plug and right middle lobe  Additional mucous plugs in right lower lobe  Secretions were not bloody  - 1/2- dialysis planned  Plasma exchange planned today     Plan:  - Plasma exchange per primary team  - Dialysis today  - resp acidosis per primary team  - f/u bladder scans  - Receiving transfusion this morning  - On amiodarone per primary team  - Diarrhea management and evaluation per primary team  - HD MWF for now (normally is on TTS)  -  treatment for hemoptysis in progress as below  - concern for vasculitis pulmonary related   Rheum has been consulted-rituximab and plasma exchange on board  - anemia - transfusion per primary team  - will eventually need PCP prophylaxis  - reviewed with primary team dialysis plans  - trend phos  - replete calcium if needed, especially with PLEX     2-shortness of breath - concern for vasculitis; intubated 12/25     - Status postthoracentesis during prior hospitalization on December 23  - Initially hypoxic and requiring facemask  - Missed dialysis on 12/24 due to transportation issue  - concern for infectious etiology versus noninfectious etiology  - Pulmonary team also on board along with ICU team  - Concern for hydropneumothorax on chest x-ray and therefore patient is undergoing CT scan  - On broad-spectrum antibiotics  - Started on pulse dose steroids due to concern for vasculitis also with hemoptysis  - Bronchoscopy completed with signs of alveolar hemorrhage  - CT scan mod hydropneumothorax, new compared to prior  Inc in b/l pulm infiltrates  - PCP smear negative  - 12/27 - started on ritux  Completing -pulse steroids  - 12/29 - improving hemoptysis   - 12/30- CXR with pneumothorax  Infiltrates   CT placed to water seal  12/31-worsening respiratory status-started on plasma exchange  - 1/1-continued on plasma exchange     3 hyponatremia volume mediated along with hyperglycemia-improved     - Monitor with dialysis and BS control     4-anemia-on Mircera as outpatient     - restart epogen with full HD  - hemoglobin to 5 7 during prior hospitalization  - Stool occult positive prior  - Received multiple blood transfusions prior admission  - receiving transfusion this admission - 1/2     5 - vol overload     - missed HD after discharge  - history of bilateral pl effusions  - albumin 1 6 leading to anasarca  - required thoracentesis prior  - vol state overall improved with UF     6) covid19     - per primary team     7 - MBD     - on phos binder     8 - hydropneumothorax - s/p chest tube by IR team 12/27     9- electrolytes - hyperphos - trend and cont phos binder     10- acid/base - stable     11) leukocytosis  Mult factorial  On steroids      12) a flutter - started amio     SUBJECTIVE / 24H INTERVAL HISTORY:    Blood pressures 683Y systolic  Had episodes of atrial flutter overnight      OBJECTIVE:  Current Weight: Weight - Scale: 81 7 kg (180 lb 1 9 oz)  Vitals:    23 0625 23 0630 23 0700 23 0800   BP:  124/70 127/58    BP Location:   Left arm    Pulse:  90 (!) 129    Resp: 22 22 15    Temp: (!) 96 8 °F (36 °C)  98 2 °F (36 8 °C)    TempSrc: Temporal  Temporal    SpO2:  100% 97% 97%   Weight:       Height:           Intake/Output Summary (Last 24 hours) at 2023 2632  Last data filed at 2023 0601  Gross per 24 hour   Intake 6742 61 ml   Output 270 ml   Net 6472 61 ml     General: NAD  Skin: no rash  Eyes: anicteric sclera  ENT: ET tube in place  Neck: supple  Chest coarse breath sounds bilaterally  CVS: s1s2, no murmur, no gallop, no rub  Abdomen: soft, nontender, nl sounds  Extremities: no edema LE b/l  : no ruvalcaba  Neuro: Cannot assess  Psych: Cannot assess    Medications:    Current Facility-Administered Medications:   •  acetaminophen (TYLENOL) oral suspension 650 mg, 650 mg, Oral, Q4H PRN, FERNANDO Dunaway  •  [] amiodarone (CORDARONE) 900 mg in dextrose 5 % 500 mL infusion, 1 mg/min, Intravenous, Continuous, Stopped at 23 **FOLLOWED BY** amiodarone (CORDARONE) 900 mg in dextrose 5 % 500 mL infusion, 0 5 mg/min, Intravenous, Continuous, Melodi Dance, CRNP, Last Rate: 16 7 mL/hr at 23, 0 5 mg/min at 23  •  calcium acetate (PHOSLO) capsule 667 mg, 667 mg, Oral, TID With Meals, [employer], CRNP, 667 mg at 23 8806  •  calcium carbonate-vitamin D 500 mg-5 mcg tablet 2 tablet, 2 tablet, Oral, BID With Meals, [employer], CRNP, 2 tablet at 23 4695  •  calcium gluconate 2 g in sodium chloride 0 9% 100 mL (premix), 2 g, Intravenous, Once, Melodi Dance, CRNP  •  calcium gluconate 2 g in sodium chloride 0 9% 100 mL (premix), 2 g, Intravenous, Once PRN, FERNANDO Rincon  •  cefepime (MAXIPIME) IVPB (premix in dextrose) 1,000 mg 50 mL, 1,000 mg, Intravenous, Q24H, FERNANDO Rincon, Stopped at 01/01/23 1203  •  chlorhexidine (PERIDEX) 0 12 % oral rinse 15 mL, 15 mL, Mouth/Throat, [address], Corin Candy Quan PA-C, 15 mL at 01/02/23 0801  •  dexmedeTOMIDine (Precedex) 400 mcg in sodium chloride 0 9 % 100 mL infusion, 0 1-1 2 mcg/kg/hr, Intravenous, Titrated, FERNANDO Valles, Last Rate: 7 9 mL/hr at 01/02/23 0728, 0 4 mcg/kg/hr at 01/02/23 9120  •  diphenhydrAMINE (BENADRYL) injection 50 mg, 50 mg, Intravenous, Once PRN, FERNANDO Rincon  •  epoetin ani (EPOGEN,PROCRIT) injection 4,000 Units, 4,000 Units, Intravenous, Once per day on Mon Wed Fri, Daniela Henao MD, 4,000 Units at 12/30/22 0338  •  fentanyl citrate (PF) 100 MCG/2ML 50 mcg, 50 mcg, Intravenous, V1A PRN, Renetta Quan PA-C, 50 mcg at 12/30/22 2201  •  insulin glargine (LANTUS) subcutaneous injection 30 Units 0 3 mL, 30 Units, Subcutaneous, QAM, FERNANDO Kohli, 30 Units at 01/02/23 0801  •  insulin lispro (HumaLOG) 100 units/mL subcutaneous injection 4-20 Units, 4-20 Units, Subcutaneous, Q6H [address], 12 Units at 01/02/23 0502 **AND** Fingerstick Glucose (POCT), , , Q6H, FERNANDO Kohli  •  ipratropium (ATROVENT) 0 02 % inhalation solution 0 5 mg, 0 5 mg, Nebulization, Q6H, FERNANDO Valles, 0 5 mg at 01/02/23 0754  •  levalbuterol (XOPENEX) inhalation solution 1 25 mg, 1 25 mg, Nebulization, Q6H, FERNANDO Valles, 1 25 mg at 01/02/23 0754  •  lidocaine (LIDODERM) 5 % patch 1 patch, 1 patch, Topical, Daily, Renetta Quan PA-C, 1 patch at 01/02/23 0802  •  lidocaine (PF) (XYLOCAINE-MPF) 1 % injection, , , PRN, Franchesca Ma MD, 5 mL at 12/27/22 1349  •  methylPREDNISolone sodium succinate (Solu-MEDROL) injection 80 mg, 80 mg, Intravenous, Daily, FERNANDO Valles, 80 mg at 01/01/23 2117  •  omeprazole (PRILOSEC) suspension 2 mg/mL, 20 mg, Per NG Tube, Daily, FERNANDO Valles, 20 mg at 01/01/23 1008  •  ondansetron (ZOFRAN) injection 4 mg, 4 mg, Intravenous, L6U PRN, Yoanna Quan PA-C  •  pravastatin (PRAVACHOL) tablet 80 mg, 80 mg, Oral, Daily With Dinner, [employer], FERNANDO, 80 mg at 01/01/23 1711  •  propofol (DIPRIVAN) 1000 mg in 100 mL infusion (premix), 5-50 mcg/kg/min, Intravenous, Titrated, FERNANDO Valles, Last Rate: 7 2 mL/hr at 01/02/23 0733, 15 mcg/kg/min at 01/02/23 0847  •  senna-docusate sodium (SENOKOT S) 8 6-50 mg per tablet 2 tablet, 2 tablet, Oral, HS, FERNANDO Chawla  •  sodium chloride 3 % inhalation solution 4 mL, 4 mL, Nebulization, Q6H, FERNANDO Valles, 4 mL at 01/02/23 0753  •  vancomycin (VANCOCIN) IVPB (premix in dextrose) 750 mg 150 mL, 10 mg/kg, Intravenous, Once per day on Mon Wed Fri, FERNANDO Osei    Laboratory Results:  Results from last 7 days   Lab Units 01/02/23  0501 01/01/23  0450 12/31/22  0634 12/30/22  0450 12/29/22  0536 12/28/22  0500 12/27/22  2118 12/27/22  0645   WBC Thousand/uL 22 64* 23 78* 21 51* 22 55* 15 29* 6 49  --  8 19   HEMOGLOBIN g/dL 6 6* 7 9* 7 9* 9 3* 9 0* 6 7*  --  7 1*   HEMATOCRIT % 20 9* 24 9* 25 5* 29 6* 27 8* 21 3*  --  23 5*   PLATELETS Thousands/uL 211 208 244 312 290 269  --  297   POTASSIUM mmol/L 4 2 4 5 4 5 4 8 4 8 5 5* 5 3 5 4*   CHLORIDE mmol/L 92* 94* 97 93* 94* 97 96 95*   CO2 mmol/L 32 29 28 26 24 22 25 24   BUN mg/dL 92* 72* 60* 80* 57* 89* 83* 71*   CREATININE mg/dL 4 39* 4 03* 3 63* 5 02* 3 96* 5 65* 5 37* 4 77*   CALCIUM mg/dL 7 5* 7 6* 7 4* 7 1* 7 2* 7 2* 7 3* 7 6*   MAGNESIUM mg/dL 2 1 2 1  --  2 3 2 1 2 4  --  2 1   PHOSPHORUS mg/dL 5 7* 6 2* 6 0* 8 0* 5 8*  --   --  6 6*

## 2023-01-02 NOTE — ASSESSMENT & PLAN NOTE
Lab Results   Component Value Date    HGBA1C 5 7 (H) 10/19/2022       Recent Labs     01/01/23  0551 01/01/23  1124 01/01/23  1830 01/01/23  2350   POCGLU 346* 240* 315* 271*       Blood Sugar Average: Last 72 hrs:  (P) 496 [id number]   · Continue sliding scale coverage q6h   Increase Lantus to 30 units from 20 as BG remains uncontrolled  · Increase SSI to algorithm 5  · Holding home regimen of 15u 70/30 BID   · Goal blood glucose less than 180

## 2023-01-02 NOTE — ASSESSMENT & PLAN NOTE
· Diffuse alveolar hemorrhage secondary to his underlying ANCA vasculitis given hemoptysis and worsening infiltrates   · Bronch on 12/26 with serial aliquots persistently bloody despite lavage consistent with DAH  · Completed 3 days of pulse dose steroids 12/29  · Continue solumedrol 80mg daily   · Hold anticoagulation  · Received Rituximab per rheumatology on Tuesday, plan for weekly dosing x 4 weeks  · Plasmapheresis day 3/5 today  · S/p bronchoscopy for pulmonary toilet 1/1   Multiple tenacious mucoid plugs removed after lavage and suctioning from RML, RLL and LLL  · Appreciate pulmonary and rheumatology input

## 2023-01-02 NOTE — ASSESSMENT & PLAN NOTE
· Worsening diarrhea 1/1-1/2 after initiation of Reglan for reported high gastric residuals  · Highest residual last 24h is 100ml  · Stop Reglan  · Monitor stool output with FMS  · Add florastor  · Consider banana flakes if not improving

## 2023-01-02 NOTE — NURSING NOTE
Plasmaphoresis performed by Abdirashid Schmitz including administration of 12 units of FFP as ordered  Epic documentation times only when blood products checked by 2 RNs  Please see transfusionist documentation for actual infusion times

## 2023-01-03 ENCOUNTER — PATIENT OUTREACH (OUTPATIENT)
Dept: FAMILY MEDICINE CLINIC | Facility: CLINIC | Age: 78
End: 2023-01-03

## 2023-01-03 NOTE — ASSESSMENT & PLAN NOTE
· Worsening diarrhea 1/1-1/2 after initiation of Reglan for reported high gastric residuals  · Reglan discontinued, residuals remain <100  · Monitor stool output with FMS  · Add florastor  · Consider banana flakes if not improving

## 2023-01-03 NOTE — ASSESSMENT & PLAN NOTE
· Patient presented to the emergency department on 12/25 with worsening shortness of breath and hypoxia after missing his dialysis treatment yesterday due to lack of transportation  · Initially in the emergency room, the patient was placed on BiPAP but he was weaned to mid flow  · Overnight 12/25, became more hypoxic and ultimately required intubation  · Began having hemoptysis and significant blood suctioned post intubation  Concern for alveolar hemorrhage secondary to vasculitis-see plan as above   · Worsening hypoxia 12/30-12/31 overnight with peak pressuring on vent  Changed to [facility] and re-sedated  ABG with respiratory acidosis and P:F ratio 65  · CXR 12/30 PM with R lung re-expanded with R pleural CT to -20cm of suction but worsening of extensive B/L patchy pulmonary infiltrates as compared to prior  · Episode of acute hypoxia again 12/31 with SpO2 down to 60's, not improved with saline lavage and suctioning, paralytic, or bagging  Was difficult to bag and had persistent air leak despite ETT repositioning and re-inflation of balloon  SpO2 dipped to 28% briefly  Required ETT exchange  Now with #8 ETT  SpO2 improved after additional lavage and suctioning  · 1/1 ABG 7 33/57/79/29   PF ratio 98  · S/p bronch 1/1-  thick mucoid plugs removed from RML and b/l lower lobes  · Current Vent settings: AC/PC 22/22/60%/8  · Wean fio2/PEEP for O2 sat 92% or above   · Sedation with propofol and precedex  · Titrate for RAAS -2  · VAP bundle; chlorhexidine, PPI, HOB greater than 30 degrees  · Pulmonary toilet with xopenex/atrovent/3% NSS nebs q6h, chest PT TID, ETT suctioning PRN

## 2023-01-03 NOTE — PROGRESS NOTES
The patient's vancomycin therapy has been discontinued  Pharmacy will sign off now  Thank you for this consult        Loretta Addison, Pharmacist

## 2023-01-03 NOTE — ASSESSMENT & PLAN NOTE
· Patient had a thoracentesis 12/21 (previous admission)  · Chest x-ray with hydropneumothorax thought to likely be related to this, possibly ex vacuo  · S/p R pleural chest tube with IR 12/27  · 200mL serous drainage x 24 hours  · CXR with small R pneumothorax with CT on H20 seal therefore chest tube placed by to -20cm suction, R lung was re-expanded on repeat CXR 12/30  · CXR repeated overnight 12/31 in setting of hypoxic episode, no pneumothorax   · CT output dropping off   20ml yesterday, none this AM   Consider trial on water seal today

## 2023-01-03 NOTE — PROGRESS NOTES
Mahesh 43  Progress Note - Lary Dole 1945, 76 y o  male MRN: [id number]  Unit/Bed#: ICU 11 Encounter: [id number]  Primary Care Provider: Lyly Rojas MD   Date and time admitted to hospital: 12/25/2022 11:20 AM    * Diffuse pulmonary alveolar hemorrhage  Assessment & Plan  · Diffuse alveolar hemorrhage secondary to underlying ANCA vasculitis given hemoptysis and worsening infiltrates   · Bronch on 12/26 with serial aliquots persistently bloody despite lavage consistent with DAH  · Completed 3 days of pulse dose steroids 12/29  · Continue solumedrol 80mg daily   · Hold anticoagulation  · Received Rituximab per rheumatology on Tuesday, plan for weekly dosing x 4 weeks  · Plasmapheresis day 4/5 today  · S/p bronchoscopy for pulmonary toilet 1/1  Multiple tenacious mucoid plugs removed after lavage and suctioning from RML, RLL and LLL  · Continues with thick, bloody secretions via ETT  · Required 1u PRBC transfusion 1/2 for Hgb 6 6  See anemia as outlined  · Appreciate pulmonary and rheumatology input    Acute respiratory failure with hypoxia [facility])  Assessment & Plan  · Patient presented to the emergency department on 12/25 with worsening shortness of breath and hypoxia after missing his dialysis treatment yesterday due to lack of transportation  · Initially in the emergency room, the patient was placed on BiPAP but he was weaned to mid flow  · Overnight 12/25, became more hypoxic and ultimately required intubation  · Began having hemoptysis and significant blood suctioned post intubation  Concern for alveolar hemorrhage secondary to vasculitis-see plan as above   · Worsening hypoxia 12/30-12/31 overnight with peak pressuring on vent  Changed to [facility] and re-sedated  ABG with respiratory acidosis and P:F ratio 65     · CXR 12/30 PM with R lung re-expanded with R pleural CT to -20cm of suction but worsening of extensive B/L patchy pulmonary infiltrates as compared to prior  · Episode of acute hypoxia again 12/31 with SpO2 down to 60's, not improved with saline lavage and suctioning, paralytic, or bagging  Was difficult to bag and had persistent air leak despite ETT repositioning and re-inflation of balloon  SpO2 dipped to 28% briefly  Required ETT exchange  Now with #8 ETT  SpO2 improved after additional lavage and suctioning  · 1/1 ABG 7 33/57/79/29  PF ratio 98  · S/p bronch 1/1-  thick mucoid plugs removed from RML and b/l lower lobes  · Current Vent settings: AC/PC 22/22/60%/8  · Wean fio2/PEEP for O2 sat 92% or above   · Sedation with propofol and precedex  · Titrate for RAAS -2  · VAP bundle; chlorhexidine, PPI, HOB greater than 30 degrees  · Pulmonary toilet with xopenex/atrovent/3% NSS nebs q6h, chest PT TID, ETT suctioning PRN    SIRS (systemic inflammatory response syndrome) ([facility] [address] )  Assessment & Plan  · Patient presented to the emergency room on 12/25 with SIRS criteria- increased respiratory rate and hypoxia  · With decompensation on 12/25, antibiotics were started with cefepime/vanco given worsening hypoxia however low suspicion for infection  · BAL with mixed respiratory micheal  · pneumocystis smear negative   · Completing 5 days abx 12/29, however 12/30 with thick tan secretions and worsening leukocytosis and was restarted on cefepime, will complete day 10/10 today  Vanco resumed 12/31 due to hypoxia  · Repeat sputum cx and MRSA nasal surveillance pending- no significant growth to date  MRSA negative  Can likely stop vanco today as well     · Trend temps and WBC   · procal difficult to interpret in setting of ESRD      Hydropneumothorax  Assessment & Plan  · Patient had a thoracentesis 12/21 (previous admission)  · Chest x-ray with hydropneumothorax thought to likely be related to this, possibly ex vacuo  · S/p R pleural chest tube with IR 12/27  · 200mL serous drainage x 24 hours  · CXR with small R pneumothorax with CT on H20 seal therefore chest tube placed by to -20cm suction, R lung was re-expanded on repeat CXR 12/30  · CXR repeated overnight 12/31 in setting of hypoxic episode, no pneumothorax   · CT output dropping off  20ml yesterday, none this AM   Consider trial on water seal today     Urinary retention  Assessment & Plan  · Bladder scan qshift  · Urinary retention protocol    Toxic metabolic encephalopathy  Assessment & Plan  · Did not follow commands or move extremities after several hours off sedation 12/30  · Encephalopathy possibly in setting of critical illness, lingering sedation effects, delirium, and hypercarbia   · Sedation resumed 12/30 evening d/t vent dyssynchrony   · CT head 12/31 without intracranial abnormality   · Neuro checks q4h, previously unable to hold sedation d/t respiratory status  · Propofol weaned down to 10 last evening, still comfortable on vent  Consider holding propofol today for sedation holiday  · Delirium precautions; regulate sleep/wake cycle, environmental controls, daily CAM ICU    Atrial flutter ([facility] [address] )  Assessment & Plan  · Rhythm deteriorated to Aflutter 2:1 block yesterday with rate in 140s  · Received metoprolol x 3 total doses yesterday with minimal improvement to 130s  · Given amio bolus and started on drip overnight  · Unable to anticoagulate due to alveolar hemorrhage  · Continuous cardiac monitoring  · HR improved to 90's briefly yesterday morning, then increased to 130's   S/p additional amio bolus and gtt at 1mg/hr   · Consider digoxin load today if remains uncontrolled      ANCA-associated vasculitis  Assessment & Plan  · Kidney biopsy on 03/22: 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)  Assessment & Plan  Lab Results   Component Value Date    EGFR 11 01/02/2023    EGFR 13 01/01/2023    EGFR 14 12/31/2022    CREATININE 4 39 (H) 01/02/2023    CREATININE 4 03 (H) 01/01/2023    CREATININE 3 63 (H) 12/31/2022       · Patient scheduled dialysis Tuesday/Thursday/Saturday however he was unable to make his treatment on Saturday 12/24 due to the transportation bus not picking him up  · Patient started with worsening shortness of breath on 12/25 presented to the emergency room  · Nephrology consulted and HD session done on 12/25 for 3 5L  · Continue MWF schedule for now per nephrology  · Continue PhosLo as ordered  · Trend BMP    Type 2 diabetes mellitus with chronic kidney disease on chronic dialysis, with long-term current use of insulin [facility])  Assessment & Plan  Lab Results   Component Value Date    HGBA1C 5 7 (H) 10/19/2022       Recent Labs     01/02/23  0500 01/02/23  1214 01/02/23  1712 01/02/23  2358   POCGLU 327* 315* 163* 226*       Blood Sugar Average: Last 72 hrs:  (P) 377 [id number]   · Continue SSI algorithm 5 q6h   Lantus 35 units daily  · Holding home regimen of 15u 70/30 BID   · Goal blood glucose less than 180    Diarrhea  Assessment & Plan  · Worsening diarrhea 1/1-1/2 after initiation of Reglan for reported high gastric residuals  · Reglan discontinued, residuals remain <100  · Monitor stool output with FMS  · Add florastor  · Consider banana flakes if not improving    Anemia due to chronic kidney disease, on chronic dialysis [facility])  Assessment & Plan  Lab Results   Component Value Date    EGFR 11 01/02/2023    EGFR 13 01/01/2023    EGFR 14 12/31/2022    CREATININE 4 39 (H) 01/02/2023    CREATININE 4 03 (H) 01/01/2023    CREATININE 3 63 (H) 12/31/2022     · Patient had a recent admission from 12/20-12/23 with hemoglobin 5 7 -received 1 unit packed red blood cells at that time  · Most likely acute on chronic anemia in setting of ESRD now with DAH  · Received 1 unit of PRBCs 12/28 and again 1/2 for Hgb <7  · Continue to trend daily and transfuse for hgb less than 7 0    Benign essential hypertension  Assessment & Plan  · Continue to hold home dose Norvasc and clonidine     Hyperlipidemia  Assessment & Plan  · Continue home statin      ----------------------------------------------------------------------------------------  HPI/24hr events: Completed third session of plasmapheresis yesterday, scheduled for treatment again today  Also completed iHD yesterday  Remains encephalopathic on precedex and propofol  Prop weaned down to 10 overnight  Grimaces, does not yet withdraw to painful stimuli  Oxygenation has remained stable x 48h on current vent settings  Consider sedation holiday today though does continues with large amount of thick, bloody sputum via ETT  HR remains elevated to 130s with uncontrolled aflutter on monitor  S/p additional amio bolus plus rate increase to 1mg/hr yesterday  HR not improved overnight  Will initiate digoxin today  No other acute changes        Patient appropriate for transfer out of the ICU today?: No  Disposition: Continue Critical Care   Code Status: Level 1 - Full Code  ---------------------------------------------------------------------------------------  SUBJECTIVE  JAMA/ETT    Review of Systems   Unable to perform ROS: Intubated     Review of systems was unable to be performed secondary to ETT  ---------------------------------------------------------------------------------------  OBJECTIVE    Vitals   Vitals:    23 2255 23 2300 23 0000 23 0317   BP:  154/67     BP Location:       Pulse: (!) 139 (!) 136 (!) 134 (!) 127   Resp: (!) 27 (!) 23 (!) 23 (!) 27   Temp:   98 6 °F (37 °C)    TempSrc:       SpO2: 95% 95% 94% 97%   Weight:       Height:         Temp (24hrs), Av 3 °F (36 8 °C), Min:96 8 °F (36 °C), Max:98 6 °F (37 °C)  Current: Temperature: 98 6 °F (37 °C)          Respiratory:  SpO2: SpO2: 97 %, SpO2 Activity: SpO2 Activity: At Rest, SpO2 Device: O2 Device: Ventilator  Nasal Cannula O2 Flow Rate (L/min): 80 L/min    Invasive/non-invasive ventilation settings   Respiratory    Lab Data (Last 4 hours)    None         O2/Vent Data (Last 4 hours)       0317           Vent Mode AC/PC       Resp Rate (BPM) (BPM) 22       Pressure Control (cmH2O) (cm) 22       Insp Time (sec) (sec) 1       FiO2 (%) (%) 60       PEEP (cmH2O) (cmH2O) 8       MV 8 57                   Physical Exam  Vitals and nursing note reviewed  Constitutional:       General: He is not in acute distress  Appearance: He is ill-appearing and toxic-appearing  Comments: Grimaces during exam but does not move extremities with noxious stimuli  HENT:      Head: Normocephalic  Nose: Nose normal       Mouth/Throat:      Mouth: Mucous membranes are moist    Eyes:      General: Lids are normal       Pupils: Pupils are equal, round, and reactive to light  Cardiovascular:      Rate and Rhythm: Tachycardia present  Rhythm irregular  Pulses:           Radial pulses are 2+ on the right side and 2+ on the left side  Dorsalis pedis pulses are detected w/ Doppler on the right side and 1+ on the left side  Heart sounds: Normal heart sounds  Pulmonary:      Effort: Pulmonary effort is normal  Tachypnea present  Breath sounds: Decreased breath sounds and rhonchi present  No wheezing  Chest:      Comments: R  Pigtail CT in place, draining serosang drainage  Abdominal:      General: Bowel sounds are normal  There is no distension  Palpations: Abdomen is soft  Tenderness: There is no abdominal tenderness  Comments: FMS in place, draining liquid brown stool  OGT with TF running  Genitourinary:     Comments: No urine visualized  Musculoskeletal:      Cervical back: Neck supple  Right lower le+ Edema present  Left lower le+ Edema present  Comments: +2 anasarca   Skin:     General: Skin is warm and dry  Coloration: Skin is pale  Neurological:      GCS: GCS eye subscore is 1   GCS verbal subscore is 1  GCS motor subscore is 1  Comments: Evaluated with propofol and precedex infusing  Prop down-titrated overnight               Laboratory and Diagnostics:  Results from last 7 days   Lab Units 01/02/23  0501 01/01/23  0450 12/31/22  0634 12/30/22  0450 12/29/22  0536 12/28/22  0500 12/27/22  0645   WBC Thousand/uL 22 64* 23 78* 21 51* 22 55* 15 29* 6 49 8 19   HEMOGLOBIN g/dL 6 6* 7 9* 7 9* 9 3* 9 0* 6 7* 7 1*   HEMATOCRIT % 20 9* 24 9* 25 5* 29 6* 27 8* 21 3* 23 5*   PLATELETS Thousands/uL 211 208 244 312 290 269 297   NEUTROS PCT %  --   --  95*  --  95*  --   --    BANDS PCT %  --  5  --   --   --   --  9*   MONOS PCT %  --   --  2*  --  3*  --   --    MONO PCT %  --  3*  --   --   --   --  2*     Results from last 7 days   Lab Units 01/02/23  0501 01/01/23  0450 12/31/22  0634 12/30/22  0450 12/29/22  0536 12/28/22  0500 12/27/22  2118   SODIUM mmol/L 133* 135 136 131* 131* 134* 132*   POTASSIUM mmol/L 4 2 4 5 4 5 4 8 4 8 5 5* 5 3   CHLORIDE mmol/L 92* 94* 97 93* 94* 97 96   CO2 mmol/L 32 29 28 26 24 22 25   ANION GAP mmol/L 9 12 11 12 13 15* 11   BUN mg/dL 92* 72* 60* 80* 57* 89* 83*   CREATININE mg/dL 4 39* 4 03* 3 63* 5 02* 3 96* 5 65* 5 37*   CALCIUM mg/dL 7 5* 7 6* 7 4* 7 1* 7 2* 7 2* 7 3*   GLUCOSE RANDOM mg/dL 294* 312* 239* 219* 160* 193* 197*   ALT U/L  --  17  --   --   --   --   --    AST U/L  --  12  --   --   --   --   --    ALK PHOS U/L  --  82  --   --   --   --   --    ALBUMIN g/dL  --  2 1*  --   --   --   --   --    TOTAL BILIRUBIN mg/dL  --  0 69  --   --   --   --   --      Results from last 7 days   Lab Units 01/02/23  0501 01/01/23  0450 12/31/22  0634 12/30/22  0450 12/29/22  0536 12/28/22  0500 12/27/22  0645   MAGNESIUM mg/dL 2 1 2 1  --  2 3 2 1 2 4 2 1   PHOSPHORUS mg/dL 5 7* 6 2* 6 0* 8 0* 5 8*  --  6 6*      Results from last 7 days   Lab Units 12/27/22  1042   INR  1 33*              ABG:  Results from last 7 days   Lab Units 01/01/23  0443   PH ART  7 325* PCO2 ART mm Hg 57 5*   PO2 ART mm Hg 79 1   HCO3 ART mmol/L 29 3*   BASE EXC ART mmol/L 2 6   ABG SOURCE  Radial, Right     VBG:  Results from last 7 days   Lab Units 01/01/23  0443   ABG SOURCE  Radial, Right     Results from last 7 days   Lab Units 12/31/22  1503 12/30/22  0450 12/27/22  0645   PROCALCITONIN ng/ml 10 77* 2 89* 2 78*       Micro  Results from last 7 days   Lab Units 12/30/22  2319   SPUTUM CULTURE  2+ Growth of Candida albicans*  2+ Growth of   GRAM STAIN RESULT  Rare Polys*  1+ Gram positive cocci in pairs*  Rare Gram positive cocci in clusters*  Rare Budding yeast*   MRSA CULTURE ONLY  No Methicillin Resistant Staphlyococcus aureus (MRSA) isolated       EKG: Aflutter on monitor, rate 130s  Imaging: No new imaging    Intake and Output  I/O       01/01 0701  01/02 0700 01/02 0701  01/03 0700    I V  (mL/kg) 867 6 (10 6) 796 1 (9 7)    Blood 4135 4122    NG/ 135    IV Piggyback 250     Feedings 1200 700    Total Intake(mL/kg) 6742 6 (82 5) 5753 1 (70 4)    Urine (mL/kg/hr) 0 (0) 0 (0)    Other  1000    Stool 100 400    Chest Tube 170 20    Total Output 270 1420    Net +6472 6 +4333 1          Unmeasured Stool Occurrence 3 x           Height and Weights   Height: 5' 8" (172 7 cm)  IBW (Ideal Body Weight): 68 4 kg  Body mass index is 27 39 kg/m²    Weight (last 2 days)     Date/Time Weight    01/02/23 0600 81 7 (180 12)    01/01/23 0540 78 6 (173 28)            Nutrition       Diet Orders   (From admission, onward)             Start     Ordered    12/29/22 1436  Diet Enteral/Parenteral; Tube Feeding No Oral Diet; Nepro; Continuous; 50; 50; Water; Every 6 hours  Diet effective now        References:    Nutrtion Support Algorithm Enteral vs  Parenteral   Question Answer Comment   Diet Type Enteral/Parenteral    Enteral/Parenteral Tube Feeding No Oral Diet    Tube Feeding Formula: Nepro    Bolus/Cyclic/Continuous Continuous    Tube Feeding Goal Rate (mL/hr): 50    Tube Feeding flush (mL): 50 Water Flush type: Water    Water flush frequency: Every 6 hours    RD to adjust diet per protocol?  Yes        12/29/22 1436    12/26/22 0815  Room Service  Once        Question:  Type of Service  Answer:  Room Service - Appropriate with Assistance    12/26/22 0814                  Active Medications  Scheduled Meds:  Current Facility-Administered Medications   Medication Dose Route Frequency Provider Last Rate   • acetaminophen  650 mg Oral Q4H PRN Mehdi SEYMOUR YadavNP     • acetaminophen  650 mg Oral Once Mehdi FERNANDO Yadav     • amiodarone  1 mg/min Intravenous Continuous Mehdi Vicenta, CRNP 1 mg/min (01/03/23 0220)   • calcium acetate  667 mg Oral TID With Meals FERNANDO Hopper     • calcium carbonate-vitamin D  2 tablet Oral BID With Meals FERNANDO Hopper     • calcium gluconate  2 g Intravenous Once PRN FERNANDO Chaparro     • cefepime  1,000 mg Intravenous Q24H Mehdi Yadav CRNP Stopped (01/02/23 1900)   • chlorhexidine  15 mL Mouth/Throat H48D [facility] Karl Mcgowan PA-C     • dexmedetomidine  0 1-0 7 mcg/kg/hr Intravenous Titrated FERNANDO Walters 0 4 mcg/kg/hr (01/02/23 2040)   • diphenhydrAMINE  25 mg Oral Once FERNANDO Chaparro     • epoetin ani  4,000 Units Intravenous Once per day on Mon Wed Fri Shannen Diallo MD     • fentanyl citrate (PF)  50 mcg Intravenous G8Q PRN Karl Mcgowan PA-C     • insulin glargine  35 Units Subcutaneous QAM FERNANDO Walters     • insulin lispro  4-20 Units Subcutaneous Q6H [facility] FERNANDO Walters     • ipratropium  0 5 mg Nebulization Q6H FERNANDO Valles     • levalbuterol  1 25 mg Nebulization Q6H FERNANDO Valles     • lidocaine (PF)    PRN Mariano Stoner MD     • methylPREDNISolone sodium succinate  80 mg Intravenous Daily FERNANDO Valles     • omeprazole (PRILOSEC) suspension 2 mg/mL  20 mg Per NG Tube Daily FERNANDO Valles     • ondansetron  4 mg Intravenous R1B PRN Abraham Cam PA-C     • pravastatin  80 mg Oral Daily With [facility] [place]     • propofol  5-50 mcg/kg/min Intravenous Titrated SEYMOUR MartinezNP 10 mcg/kg/min (01/02/23 2030)   • riTUXimab (RITUXAN) subsequent titrated chemo infusion  700 mg Intravenous Once Zaira Gunning, CRNP     • saccharomyces boulardii  250 mg Oral BID LuanaSEYMOUR YangNP     • senna-docusate sodium  2 tablet Oral HS Zaira Gunning, CRNP     • sodium chloride  20 mL/hr Intravenous Once Zaira Gunning, CRNP     • sodium chloride  4 mL Nebulization Q6H Joie Spironello V, CRNP     • vancomycin  10 mg/kg Intravenous Once per day on Mon Wed Fri Joie Spironello V CRNP Stopped (01/02/23 1900)     Continuous Infusions:  amiodarone, 1 mg/min, Last Rate: 1 mg/min (01/03/23 0220)  dexmedetomidine, 0 1-0 7 mcg/kg/hr, Last Rate: 0 4 mcg/kg/hr (01/02/23 2040)  propofol, 5-50 mcg/kg/min, Last Rate: 10 mcg/kg/min (01/02/23 2030)      PRN Meds:   acetaminophen, 650 mg, Q4H PRN  calcium gluconate, 2 g, Once PRN  fentanyl citrate (PF), 50 mcg, Q1H PRN  lidocaine (PF), , PRN  ondansetron, 4 mg, Q6H PRN        Invasive Devices Review  Invasive Devices     Peripheral Intravenous Line  Duration           Long-Dwell Peripheral IV (Midline) 12/26/22 Left Brachial 8 days    Peripheral IV 01/01/23 Right Wrist 1 day    Peripheral IV 01/02/23 Right;Upper;Ventral (anterior) Arm 1 day          Hemodialysis Catheter  Duration           HD Permanent Double Catheter 300 days          Drain  Duration           NG/OG/Enteral Tube Orogastric 16 Fr Center mouth 8 days    Chest Tube Right Pleural 10 Fr  6 days    Rectal Tube With balloon 1 day          Airway  Duration           ETT  Cuffed; Hi-Lo 8 mm 2 days                Rationale for remaining devices: Critical illness  ---------------------------------------------------------------------------------------  Advance Directive and Living Will:      Power of : POLST:    ---------------------------------------------------------------------------------------  Care Time Delivered:   Upon my evaluation, this patient had a high probability of imminent or life-threatening deterioration due to atrial flutter with RVR, respiratory failure, diffuse alveolar hemorrhage, encephalopathy which required my direct attention, intervention, and personal management  I have personally provided an aggregate of 35 minutes (0100 to 0405) of critical care time, exclusive of procedures, teaching, family meetings, and any prior time recorded by providers other than myself  FERNANDO Singleton      Portions of the record may have been created with voice recognition software  Occasional wrong word or "sound a like" substitutions may have occurred due to the inherent limitations of voice recognition software    Read the chart carefully and recognize, using context, where substitutions have occurred

## 2023-01-03 NOTE — ASSESSMENT & PLAN NOTE
· Patient presented to the emergency room on 12/25 with SIRS criteria- increased respiratory rate and hypoxia  · With decompensation on 12/25, antibiotics were started with cefepime/vanco given worsening hypoxia however low suspicion for infection  · BAL with mixed respiratory micheal  · pneumocystis smear negative   · Completing 5 days abx 12/29, however 12/30 with thick tan secretions and worsening leukocytosis and was restarted on cefepime, will complete day 10/10 today  Vanco resumed 12/31 due to hypoxia  · Repeat sputum cx and MRSA nasal surveillance pending- no significant growth to date  MRSA negative  Can likely stop vanco today as well     · Trend temps and WBC   · procal difficult to interpret in setting of ESRD

## 2023-01-03 NOTE — ASSESSMENT & PLAN NOTE
Lab Results   Component Value Date    EGFR 11 01/02/2023    EGFR 13 01/01/2023    EGFR 14 12/31/2022    CREATININE 4 39 (H) 01/02/2023    CREATININE 4 03 (H) 01/01/2023    CREATININE 3 63 (H) 12/31/2022       · Patient scheduled dialysis Tuesday/Thursday/Saturday however he was unable to make his treatment on Saturday 12/24 due to the transportation bus not picking him up  · Patient started with worsening shortness of breath on 12/25 presented to the emergency room  · Nephrology consulted and HD session done on 12/25 for 3 5L  · Continue MWF schedule for now per nephrology  · Continue PhosLo as ordered  · Trend BMP

## 2023-01-03 NOTE — ASSESSMENT & PLAN NOTE
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

## 2023-01-03 NOTE — ASSESSMENT & PLAN NOTE
Lab Results   Component Value Date    EGFR 11 01/02/2023    EGFR 13 01/01/2023    EGFR 14 12/31/2022    CREATININE 4 39 (H) 01/02/2023    CREATININE 4 03 (H) 01/01/2023    CREATININE 3 63 (H) 12/31/2022     · Patient had a recent admission from 12/20-12/23 with hemoglobin 5 7 -received 1 unit packed red blood cells at that time  · Most likely acute on chronic anemia in setting of ESRD now with DAH  · Received 1 unit of PRBCs 12/28 and again 1/2 for Hgb <7  · Continue to trend daily and transfuse for hgb less than 7 0

## 2023-01-03 NOTE — ASSESSMENT & PLAN NOTE
· Diffuse alveolar hemorrhage secondary to underlying ANCA vasculitis given hemoptysis and worsening infiltrates   · Bronch on 12/26 with serial aliquots persistently bloody despite lavage consistent with DAH  · Completed 3 days of pulse dose steroids 12/29  · Continue solumedrol 80mg daily   · Hold anticoagulation  · Received Rituximab per rheumatology on Tuesday, plan for weekly dosing x 4 weeks  · Plasmapheresis day 4/5 today  · S/p bronchoscopy for pulmonary toilet 1/1  Multiple tenacious mucoid plugs removed after lavage and suctioning from RML, RLL and LLL  · Continues with thick, bloody secretions via ETT  · Required 1u PRBC transfusion 1/2 for Hgb 6 6   See anemia as outlined  · Appreciate pulmonary and rheumatology input

## 2023-01-03 NOTE — PROGRESS NOTES
Galileo Llanos is a 76 y o  male who is currently ordered Vancomycin IV with management by the Pharmacy Consult service  Relevant clinical data and objective / subjective history reviewed  Vancomycin Assessment:  Indication and Goal AUC/Trough: Pneumonia (goal -600, trough >10), -600, trough >10  Clinical Status: improving  Micro:   Renal Function:  SCr: 3 18 mg/dL  CrCl: 19 7 mL/min  Renal replacement: HD MWF  Days of Therapy: 5  Current Dose: 750mg (10mg/kg) IV after HD  Vancomycin Plan:  Estimated AUC: 400-600 mcg*hr/mL  Estimated Trough: >10 mcg/mL with target pre-HD level of 15-20mcg/mL  Next Level: 01/04/23 WED @0600   Renal Function Monitoring: Daily BMP and [place] will continue to follow closely for s/sx of nephrotoxicity, infusion reactions and appropriateness of therapy  BMP and CBC will be ordered per protocol  We will continue to follow the patient’s culture results and clinical progress daily      [address] , [address], Pharmacist

## 2023-01-03 NOTE — ACP (ADVANCE CARE PLANNING)
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, aflutter, hydropneumothorax, and encephalopathy  Due to the severity of the patient's acute condition and/or the extent of chronic conditions, additional conversations pertaining to advanced care planning were required  Today's discussion, which was held over the phone, included patient's wife Shonda Tan, and it was established that all stake holders understood the rationale for the advanced care planning  The patient was unable to participate in the discussion due to encephalopathy/sedation/intubation  Summary of Discussion:  I discussed with patient's wife Shonda Tan patient's multiple medical co-morbidities, now complicated by acute respiratory failure with hypoxia and hypercapnia in setting of DAH from new pulmonary involvement of ANCA vasculitis, for which he has undergone pulse dose steroids and is now receiving D4/5 of plasmapheresis, and dose 2/4 rituximab today  Unfortunately patient has failed to make significant progress since his admission on 12/25 and is unable to wean from the vent  This is vent day #10  We discussed trach/PEG option, but I expressed that this likely would not provide patient a bridge to recovery in the setting of his multiple medical co-morbidities  I explained to Shonda Tan what these procedures were and she is unsure if she would want this for patient    I expressed that despite our best efforts and multiple interventions that patient may ultimately succumb to these disease processes  She expresses that she has been feeling that patient may not make it through this  She lives locally but it is difficult for her to travel and she has not been able to come in to see patient  She does not have any other family, but has a neighbor who she is close with and who helps talk her through decision making  We talked about code status change including; Level 2 DNAR in which we would not attempt to restart patient's heart if it should stop and we also discussed what a transition to Level 4 Comfort Care would entail  She is considering all the information provided and is going to think about how to proceed  All questions have been answered to her satisfaction  Total time spent, (10) minutes (1510 to 1520)        CODE STATUS: FULL CODE - Level 1  POA:   wife Kristian  POLST:  none       SIGNATURE: FERNANDO David  DATE: January 3, 2023  TIME: 3:27 PM

## 2023-01-03 NOTE — PROGRESS NOTES
[address] [facility] NOTE   Kishan Escamilla 76 y o  male MRN: [id number]  Unit/Bed#: ICU 11 Encounter: [id number]  Reason for Consult: ESRD on HD    ASSESSMENT and PLAN:  1  ESRD on HD [facility]):  · HD dependent since March 10, 2022  · Primary disease is ANCA vasculitis  · Next HD is tomorrow  2  Access:   · R IJ permcath  3  VDRF, alveolar hemorrhage  · 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  · Currently being treated with PLEX, steroids  · S/p pulse steroids and now on Solumedrol 80 mg OD  · S/p Rituximab on 12/27/22 dose 1/4  · PR3 Ab was 84 4 on 3/14/22 and down to 7 0 on 8/24/22  · PR3 Ab >8 0 on 12/21/22  4  Hypertension:  · EDW is 70 7 kg    · BP currently acceptable  · Meds: none  · Clonidine, Losartan, Amlodipine on hold  5  Anemia:  · Hgb up to 9 0 today  · S/p PRBC 1/2/22  · On Mircera as an outpatient  · Increase Epogen to 8000 units with HD      6  Leukocytosis:  · Currently on vancomycin and cefepime for possible pneumonia  · WBC count up to 39K - etiology? 7  Mineral and bone disease:  · Phos is at goal    · Currently on Nephro TF  8  Pneumothorax s/p R chest tube placement  · Defer to crit care  DISPOSITION:  · HD tomorrow  · For Rituximab today  · Continue PLEX  · Will eventually need PCP prophylaxis  · Increase Epogen to 8000 units with HD  The above plan was discussed with CC team      SUBJECTIVE / 24H INTERVAL HISTORY:  Continues to have bloody secretions on ETT suctioning  Remains vent dependent - FiO2 50%, PEEP 8  Tolerated HD yesterday   cc       OBJECTIVE:  Current Weight: Weight - Scale: 83 8 kg (184 lb 11 9 oz)  Vitals:    01/03/23 0500 01/03/23 0559 01/03/23 0600 01/03/23 0749   BP: 161/89  132/85    Pulse: (!) 122  (!) 117    Resp: 22  (!) 24    Temp:       TempSrc:       SpO2: 96%  98% 96%   Weight:  83 8 kg (184 lb 11 9 oz)     Height:           Intake/Output Summary (Last 24 hours) at 1/3/2023 0818  Last data filed at 1/3/2023 0600  Gross per 24 hour   Intake 6811 14 ml   Output 1585 ml   Net 5226 14 ml     General: critically ill appearing on the mechanical ventilator, comfortable  Skin: warm, dry, good turgor  Eyes: closed  ENT: ETT in place  Neck: supple  Chest/Lungs: equal chest expansion, coarse breath sounds  (+) chest tube  CVS: distinct heart sounds, tachy, irregular rhythm, no rub  Abdomen: soft, non-distended, normoactive bowel sounds  Extremities: no edema of legs  (+) UE edema  : no ruvalcaba catheter  Neuro: sedated on the mechanical ventilator  Psych: could not evaluate       Medications:    Current Facility-Administered Medications:   •  acetaminophen (TYLENOL) oral suspension 650 mg, 650 mg, Oral, Q4H PRN, FERNANDO Dunaway, 650 mg at 01/02/23 6434  •  acetaminophen (TYLENOL) oral suspension 650 mg, 650 mg, Oral, Once, FERNANDO Schmitz  •  amiodarone (CORDARONE) 900 mg in dextrose 5 % 500 mL infusion, 1 mg/min, Intravenous, Continuous, FERNANDO Dunaway, Last Rate: 33 3 mL/hr at 01/03/23 0220, 1 mg/min at 01/03/23 0220  •  calcium acetate (PHOSLO) capsule 667 mg, 667 mg, Oral, TID With Meals, [employer], CRNP, 667 mg at 01/03/23 4220  •  calcium carbonate-vitamin D 500 mg-5 mcg tablet 2 tablet, 2 tablet, Oral, BID With Meals, [employer], CRNP, 2 tablet at 01/03/23 2005  •  calcium gluconate 2 g in sodium chloride 0 9% 100 mL (premix), 2 g, Intravenous, Once PRN, FERNANDO Schmitz  •  calcium gluconate 2 g in sodium chloride 0 9% 100 mL (premix), 2 g, Intravenous, Once, FERNANDO Valles  •  cefepime (MAXIPIME) IVPB (premix in dextrose) 1,000 mg 50 mL, 1,000 mg, Intravenous, Q24H, FERNANDO Dunaway, Stopped at 01/02/23 1900  •  chlorhexidine (PERIDEX) 0 12 % oral rinse 15 mL, 15 mL, Mouth/Throat, K17F [address], Corin Candy Quan PA-C, 15 mL at 01/02/23 2000  •  dexmedeTOMIDine (Precedex) 400 mcg in sodium chloride 0 9 % 100 mL infusion, 0 1-0 7 mcg/kg/hr, Intravenous, Titrated, FERNANDO Kohli, Last Rate: 7 9 mL/hr at 01/03/23 0511, 0 4 mcg/kg/hr at 01/03/23 0511  •  diphenhydrAMINE (BENADRYL) oral liquid 25 mg, 25 mg, Oral, Once, FERNANDO Rincon  •  epoetin ani (EPOGEN,PROCRIT) injection 4,000 Units, 4,000 Units, Intravenous, Once per day on Mon Wed Fri, Daniela Henao MD, 4,000 Units at 01/02/23 1524  •  fentanyl citrate (PF) 100 MCG/2ML 50 mcg, 50 mcg, Intravenous, F2T PRN, Renetta Quan PA-C, 50 mcg at 12/30/22 2201  •  insulin glargine (LANTUS) subcutaneous injection 35 Units 0 35 mL, 35 Units, Subcutaneous, QAM, FERNANDO Kohli  •  insulin lispro (HumaLOG) 100 units/mL subcutaneous injection 4-20 Units, 4-20 Units, Subcutaneous, Q6H [address], 8 Units at 01/03/23 0528 **AND** Fingerstick Glucose (POCT), , , Q6H, FERNANDO Kohli  •  ipratropium (ATROVENT) 0 02 % inhalation solution 0 5 mg, 0 5 mg, Nebulization, Q6H, FERNANDO Valles, 0 5 mg at 01/03/23 0748  •  levalbuterol (XOPENEX) inhalation solution 1 25 mg, 1 25 mg, Nebulization, Q6H, FERNANDO Valles, 1 25 mg at 01/03/23 0749  •  lidocaine (PF) (XYLOCAINE-MPF) 1 % injection, , , PRN, Franchesca Ma MD, 5 mL at 12/27/22 1349  •  methylPREDNISolone sodium succinate (Solu-MEDROL) injection 80 mg, 80 mg, Intravenous, Daily, FERNANDO Valles, 80 mg at 01/02/23 2145  •  omeprazole (PRILOSEC) suspension 2 mg/mL, 20 mg, Per NG Tube, Daily, FERNANDO Valles, 20 mg at 01/02/23 1019  •  ondansetron (ZOFRAN) injection 4 mg, 4 mg, Intravenous, I9F PRN, Renetta Quan PA-C  •  pravastatin (PRAVACHOL) tablet 80 mg, 80 mg, Oral, Daily With FERNANDO Quiroz, 80 mg at 01/02/23 1607  •  propofol (DIPRIVAN) 1000 mg in 100 mL infusion (premix), 5-50 mcg/kg/min, Intravenous, Titrated, FERNANDO Kohli, Last Rate: 4 8 mL/hr at 01/03/23 0511, 10 mcg/kg/min at 01/03/23 0511  •  riTUXimab (RITUXAN) 700 mg in sodium chloride 0 9 % 280 mL subsequent titrated chemo infusion, 700 mg, Intravenous, Once, FERNANDO Lr  •  saccharomyces boulardii (FLORASTOR) capsule 250 mg, 250 mg, Oral, BID, FERNANDO Bergman, 250 mg at 01/02/23 2145  •  senna-docusate sodium (SENOKOT S) 8 6-50 mg per tablet 2 tablet, 2 tablet, Oral, HS, FERNANDO Lr  •  sodium chloride 0 9 % infusion, 20 mL/hr, Intravenous, Once, FERNANDO Lr  •  sodium chloride 3 % inhalation solution 4 mL, 4 mL, Nebulization, Q6H, FERNANDO Valles, 4 mL at 01/03/23 0748  •  vancomycin (VANCOCIN) IVPB (premix in dextrose) 750 mg 150 mL, 10 mg/kg, Intravenous, Once per day on Mon Wed Fri, FERNANDO Osei, Stopped at 01/02/23 1900    Laboratory Results:  Results from last 7 days   Lab Units 01/03/23  0529 01/02/23  0501 01/01/23  0450 12/31/22  0634 12/30/22  0450 12/29/22  0536 12/28/22  0500   WBC Thousand/uL 39 42* 22 64* 23 78* 21 51* 22 55* 15 29* 6 49   HEMOGLOBIN g/dL 9 0* 6 6* 7 9* 7 9* 9 3* 9 0* 6 7*   HEMATOCRIT % 28 2* 20 9* 24 9* 25 5* 29 6* 27 8* 21 3*   PLATELETS Thousands/uL 229 211 208 244 312 290 269   POTASSIUM mmol/L 4 4 4 2 4 5 4 5 4 8 4 8 5 5*   CHLORIDE mmol/L 96 92* 94* 97 93* 94* 97   CO2 mmol/L 32 32 29 28 26 24 22   BUN mg/dL 66* 92* 72* 60* 80* 57* 89*   CREATININE mg/dL 3 18* 4 39* 4 03* 3 63* 5 02* 3 96* 5 65*   CALCIUM mg/dL 7 2* 7 5* 7 6* 7 4* 7 1* 7 2* 7 2*   MAGNESIUM mg/dL 2 0 2 1 2 1  --  2 3 2 1 2 4   PHOSPHORUS mg/dL 4 9* 5 7* 6 2* 6 0* 8 0* 5 8*  --

## 2023-01-03 NOTE — PLAN OF CARE
Problem: MOBILITY - ADULT  Goal: Maintain or return to baseline ADL function  Description: INTERVENTIONS:  -  Assess patient's ability to carry out ADLs; assess patient's baseline for ADL function and identify physical deficits which impact ability to perform ADLs (bathing, care of mouth/teeth, toileting, grooming, dressing, etc )  - Assess/evaluate cause of self-care deficits   - Assess range of motion  - Assess patient's mobility; develop plan if impaired  - Assess patient's need for assistive devices and provide as appropriate  - Encourage maximum independence but intervene and supervise when necessary  - Involve family in performance of ADLs  - Assess for home care needs following discharge   - Consider OT consult to assist with ADL evaluation and planning for discharge  - Provide patient education as appropriate  Outcome: Progressing  Goal: Maintains/Returns to pre admission functional level  Description: INTERVENTIONS:  - Perform BMAT or MOVE assessment daily    - Set and communicate daily mobility goal to care team and patient/family/caregiver  - Collaborate with rehabilitation services on mobility goals if consulted  - Perform Range of Motion 8 times a day  - Reposition patient every 2 hours    - Dangle patient 0 times a day  - Stand patient 0 times a day  - Ambulate patient 0 times a day  - Out of bed to chair 0 times a day   - Out of bed for meals 0 times a day  - Out of bed for toileting  - Record patient progress and toleration of activity level   Outcome: Progressing

## 2023-01-03 NOTE — ASSESSMENT & PLAN NOTE
· Rhythm deteriorated to Aflutter 2:1 block yesterday with rate in 140s  · Received metoprolol x 3 total doses yesterday with minimal improvement to 130s  · Given amio bolus and started on drip overnight  · Unable to anticoagulate due to alveolar hemorrhage  · Continuous cardiac monitoring  · HR improved to 90's briefly yesterday morning, then increased to 130's   S/p additional amio bolus and gtt at 1mg/hr   · Consider digoxin load today if remains uncontrolled

## 2023-01-03 NOTE — ASSESSMENT & PLAN NOTE
· Did not follow commands or move extremities after several hours off sedation 12/30  · Encephalopathy possibly in setting of critical illness, lingering sedation effects, delirium, and hypercarbia   · Sedation resumed 12/30 evening d/t vent dyssynchrony   · CT head 12/31 without intracranial abnormality   · Neuro checks q4h, previously unable to hold sedation d/t respiratory status  · Propofol weaned down to 10 last evening, still comfortable on vent   Consider holding propofol today for sedation holiday  · Delirium precautions; regulate sleep/wake cycle, environmental controls, daily CAM ICU

## 2023-01-03 NOTE — ASSESSMENT & PLAN NOTE
Lab Results   Component Value Date    HGBA1C 5 7 (H) 10/19/2022       Recent Labs     01/02/23  0500 01/02/23  1214 01/02/23  1712 01/02/23  2358   POCGLU 327* 315* 163* 226*       Blood Sugar Average: Last 72 hrs:  (P) 208 [id number]   · Continue SSI algorithm 5 q6h   Lantus 35 units daily  · Holding home regimen of 15u 70/30 BID   · Goal blood glucose less than 180

## 2023-01-04 NOTE — QUICK NOTE
Wife Radha Bassett called for update, received voicemail, left message for her to return call at her convenience

## 2023-01-04 NOTE — ASSESSMENT & PLAN NOTE
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

## 2023-01-04 NOTE — ASSESSMENT & PLAN NOTE
· Diffuse alveolar hemorrhage secondary to underlying ANCA vasculitis given hemoptysis and worsening infiltrates   · Bronch on 12/26 with serial aliquots persistently bloody despite lavage consistent with DAH  · Completed 3 days of pulse dose steroids 12/29  · Continue solumedrol 80mg daily   · Hold anticoagulation  · Received Rituximab per rheumatology  · Yesterday received Dose 2/4, weekly on Tuesdays  · Plasmapheresis day 5/5 today  · S/p bronchoscopy for pulmonary toilet 1/1  Multiple tenacious mucoid plugs removed after lavage and suctioning from RML, RLL and LLL  · Continues with thick, brown secretions from ETT  · Required 1u PRBC transfusion 1/2 for Hgb 6 6   See anemia as outlined  · Appreciate pulmonary and rheumatology input

## 2023-01-04 NOTE — ASSESSMENT & PLAN NOTE
· Worsening diarrhea 1/1-1/2 after initiation of Reglan for reported high gastric residuals  · Reglan discontinued, residuals remain <100  · Monitor stool output with FMS  · Continue florastor  · Start banana flakes  · All stool softeners have been discontinued

## 2023-01-04 NOTE — RESPIRATORY THERAPY NOTE
RT Ventilator Management Note  Aparna [place] 76 y o  male MRN: [id number]  Unit/Bed#: ICU 11 Encounter: [id number]      Daily Screen       1/1/2023  0744 1/3/2023  1118          Patient safety screen outcome[de-identified] Failed Failed      Not Ready for Weaning due to[de-identified] Underline problem not resolved Underline problem not resolved              Physical Exam:   Assessment Type: Pre-treatment  General Appearance: Sedated  Respiratory Pattern: Assisted  Chest Assessment: Chest expansion symmetrical  Bilateral Breath Sounds: Diminished, Coarse  Cough: None  Suction: Oral, ET Tube  O2 Device: ventilator      Resp Comments: restraints are secure

## 2023-01-04 NOTE — PROGRESS NOTES
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 given hemoptysis and worsening infiltrates   · Bronch on 12/26 with serial aliquots persistently bloody despite lavage consistent with DAH  · Completed 3 days of pulse dose steroids 12/29  · Continue solumedrol 80mg daily   · Hold anticoagulation  · Received Rituximab per rheumatology  · Yesterday received Dose 2/4, weekly on Tuesdays  · Plasmapheresis day 5/5 today  · S/p bronchoscopy for pulmonary toilet 1/1  Multiple tenacious mucoid plugs removed after lavage and suctioning from RML, RLL and LLL  · Continues with thick, brown secretions from ETT  · Required 1u PRBC transfusion 1/2 for Hgb 6 6  See anemia as outlined  · Appreciate pulmonary and rheumatology input    Acute respiratory failure with hypoxia [facility])  Assessment & Plan  · Patient presented to the emergency department on 12/25 with worsening shortness of breath and hypoxia after missing his dialysis treatment 12/24 due to lack of transportation  · Initially in the emergency room, the patient was placed on BiPAP but he was weaned to mid flow   · Overnight 12/25, became more hypoxic and ultimately required intubation  · Began having hemoptysis and significant blood suctioned post intubation  Concern for alveolar hemorrhage secondary to vasculitis-see plan as above   · Worsening hypoxia 12/30-12/31 overnight with peak pressuring on vent  Changed to [facility] and re-sedated  ABG with respiratory acidosis and P:F ratio 65     · CXR 12/30 PM with R lung re-expanded with R pleural CT to -20cm of suction but worsening of extensive B/L patchy pulmonary infiltrates as compared to prior  · Episode of acute hypoxia again 12/31 with SpO2 down to 60's, not improved with saline lavage and suctioning, paralytic, or bagging  Was difficult to bag and had persistent air leak despite ETT repositioning and re-inflation of balloon  SpO2 dipped to 28% briefly  Required ETT exchange  Now with #8 ETT  SpO2 improved after additional lavage and suctioning  · 1/1 ABG 7 33/57/79/29  PF ratio 98  · S/p bronch 1/1-  thick mucoid plugs removed from RML and b/l lower lobes  · Current Vent settings: AC/PC 22/22/50%/8  · Wean fio2/PEEP for O2 sat 92% or above   · Sedation with propofol and precedex  · Titrate for RAAS -1  · VAP bundle; chlorhexidine, PPI, HOB greater than 30 degrees  · Pulmonary toilet with xopenex/atrovent/3% NSS nebs q6h, chest PT TID, ETT suctioning PRN  · CXR this morning with persistent extensive B/L pulmonary opacifications and dense RLL consolidation which appears worse as compared to prior imaging     SIRS (systemic inflammatory response syndrome) ([facility] [address] )  Assessment & Plan  · Patient presented to the emergency room on 12/25 with SIRS criteria- increased respiratory rate and hypoxia  · With decompensation on 12/25, antibiotics were started with cefepime/vanco given worsening hypoxia however low suspicion for infection  · BAL with mixed respiratory micheal  · pneumocystis smear negative   · Completing 5 days abx 12/29, however 12/30 with thick tan secretions and worsening leukocytosis and was restarted on cefepime  · Abx stopped 1/3  ·  MRSA negative     · Sputum cx on 12/26 and 12/30 with candida   · Trend temps and WBC   · procal difficult to interpret in setting of ESRD      Hydropneumothorax  Assessment & Plan  · Patient had a thoracentesis 12/21 (previous admission)  · Chest x-ray with hydropneumothorax thought to likely be related to this, possibly ex vacuo  · S/p R pleural chest tube with IR 12/27  · 220mL serous drainage x 24 hours  · CXR with small R pneumothorax with CT on H20 seal therefore chest tube placed by to -20cm suction, R lung was re-expanded on repeat CXR 12/30  · CXR repeated overnight 12/31 in setting of hypoxic episode, no pneumothorax       Urinary retention  Assessment & Plan  · Bladder scan qshift  · Urinary retention protocol    Toxic metabolic encephalopathy  Assessment & Plan  · Did not follow commands or move extremities after several hours off sedation 12/30  · Encephalopathy possibly in setting of critical illness, sedation effects, delirium, and hypercarbia   · Sedation resumed 12/30 evening d/t vent dyssynchrony   · CT head 12/31 without intracranial abnormality   · Neuro checks q4h  · Delirium precautions; regulate sleep/wake cycle, environmental controls, daily CAM ICU    Atrial flutter (HCC)  Assessment & Plan  · Rhythm deteriorated to Aflutter 2:1 block with rates in 140s on 1/2  · No response to IV metoprolol  · Given amio bolus and started on drip  · Unable to anticoagulate due to alveolar hemorrhage  · Continuous cardiac monitoring  · Received 250mcg IV Dig x 1 yesterday AM  · Continues on Amio gtt, will lower to 0 5mcg/min today  · Start metoprolol 25mg BID         ANCA-associated vasculitis  Assessment & Plan  · Kidney biopsy on 03/22: 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)  Assessment & Plan  Lab Results   Component Value Date    EGFR 14 01/04/2023    EGFR 17 01/03/2023    EGFR 11 01/02/2023    CREATININE 3 77 (H) 01/04/2023    CREATININE 3 18 (H) 01/03/2023    CREATININE 4 39 (H) 01/02/2023       · Patient scheduled dialysis Tuesday/Thursday/Saturday however he was unable to make his treatment on Saturday 12/24 due to the transportation bus not picking him up  · Patient started with worsening shortness of breath on 12/25 presented to the emergency room  · Nephrology consulted and HD session done on 12/25 for 3 5L  · Continue MWF schedule for now per nephrology  · Continue PhosLo as ordered  · Trend BMP    Type 2 diabetes mellitus with chronic kidney disease on chronic dialysis, with long-term current use of insulin [facility])  Assessment & Plan  Lab Results   Component Value Date    HGBA1C 5 7 (H) 10/19/2022       Recent Labs     01/03/23  1123 01/03/23  1730 01/03/23  2355 01/04/23  0642   POCGLU 270* 254* 208* 231*       Blood Sugar Average: Last 72 hrs:  (P) 265   · Continue SSI algorithm 5 q6h   Increase Lantus to 42 units for persistent hyperglycemia   · Holding home regimen of 15u 70/30 BID   · Goal blood glucose less than 180    Diarrhea  Assessment & Plan  · Worsening diarrhea 1/1-1/2 after initiation of Reglan for reported high gastric residuals  · Reglan discontinued, residuals remain <100  · Monitor stool output with FMS  · Continue florastor  · Start banana flakes  · All stool softeners have been discontinued    Anemia due to chronic kidney disease, on chronic dialysis [facility])  Assessment & Plan  Lab Results   Component Value Date    EGFR 14 01/04/2023    EGFR 17 01/03/2023    EGFR 11 01/02/2023    CREATININE 3 77 (H) 01/04/2023    CREATININE 3 18 (H) 01/03/2023    CREATININE 4 39 (H) 01/02/2023     · Patient had a recent admission from 12/20-12/23 with hemoglobin 5 7 -received 1 unit packed red blood cells at that time  · Most likely acute on chronic anemia in setting of ESRD now with DAH  · Received 1 unit of PRBCs 12/28 and again 1/2 for Hgb <7  · Hgb 7 9 this morning  · Continue to trend daily and transfuse for hgb less than 7 0    Benign essential hypertension  Assessment & Plan  · Continue to hold home dose Norvasc and clonidine     Hyperlipidemia  Assessment & Plan  · Continue home statin      ----------------------------------------------------------------------------------------  HPI/24hr events: Plasmaphresis D5/5 planned for today  Received 2nd dose of Rituximab yesterday  No acute overnight issues  Patient appropriate for transfer out of the ICU today?: No  Disposition: Continue Critical Care   Code Status: Level 1 - Full Code  ---------------------------------------------------------------------------------------  SUBJECTIVE    Review of Systems  Review of systems was unable to be performed secondary to encephalopathy/sedation/intubation  ---------------------------------------------------------------------------------------  OBJECTIVE    Vitals   Vitals:    23 1055 23 1058 23 1100 23 1131   BP: 147/65  142/65    BP Location:       Pulse: 61 61 61    Resp: 12 19 14    Temp: 97 6 °F (36 4 °C) 97 6 °F (36 4 °C) 97 9 °F (36 6 °C)    TempSrc:       SpO2:   99% 99%   Weight:       Height:         Temp (24hrs), Av 5 °F (36 4 °C), Min:97 °F (36 1 °C), Max:98 2 °F (36 8 °C)  Current: Temperature: 97 9 °F (36 6 °C)          Respiratory:  SpO2: SpO2: 99 %, SpO2 Activity: SpO2 Activity: At Rest, SpO2 Device: O2 Device: Ventilator  Nasal Cannula O2 Flow Rate (L/min): 80 L/min    Invasive/non-invasive ventilation settings   Respiratory    Lab Data (Last 4 hours)    None         O2/Vent Data (Last 4 hours)       1131           Vent Mode AC/PC       Patient safety screen outcome: Failed       Resp Rate (BPM) (BPM) 22       Pressure Control (cmH2O) (cm) 22       Insp Time (sec) (sec) 1       FiO2 (%) (%) 50       PEEP (cmH2O) (cmH2O) 8       MV 9 6                   Physical Exam  Constitutional:       Appearance: He is ill-appearing  Interventions: He is sedated, intubated and restrained  HENT:      Head: Normocephalic and atraumatic  Eyes:      General: Lids are normal       Extraocular Movements: Extraocular movements intact        Conjunctiva/sclera: Conjunctivae normal    Neck:      Trachea: Trachea normal    Cardiovascular:      Rate and Rhythm: Normal rate and regular rhythm  Pulses:           Radial pulses are 2+ on the right side and 2+ on the left side  Dorsalis pedis pulses are 2+ on the right side and 2+ on the left side  Heart sounds: Normal heart sounds, S1 normal and S2 normal    Pulmonary:      Effort: He is intubated  Breath sounds: Rhonchi and rales present  Abdominal:      General: Bowel sounds are normal       Palpations: Abdomen is soft  Musculoskeletal:      Cervical back: Full passive range of motion without pain, normal range of motion and neck supple  Comments: Moving all extremities to noxious stimuli    Skin:     General: Skin is warm and dry  Capillary Refill: Capillary refill takes less than 2 seconds  Neurological:      GCS: GCS eye subscore is 3  GCS verbal subscore is 1  GCS motor subscore is 5               Laboratory and Diagnostics:  Results from last 7 days   Lab Units 01/04/23  1102 01/03/23  0529 01/02/23  0501 01/01/23  0450 12/31/22  0634 12/30/22  0450 12/29/22  0536   WBC Thousand/uL 27 92* 39 42* 22 64* 23 78* 21 51* 22 55* 15 29*   HEMOGLOBIN g/dL 7 9* 9 0* 6 6* 7 9* 7 9* 9 3* 9 0*   HEMATOCRIT % 25 0* 28 2* 20 9* 24 9* 25 5* 29 6* 27 8*   PLATELETS Thousands/uL 209 229 211 208 244 312 290   NEUTROS PCT %  --   --   --   --  95*  --  95*   BANDS PCT % 6  --   --  5  --   --   --    MONOS PCT %  --   --   --   --  2*  --  3*   MONO PCT % 1*  --   --  3*  --   --   --      Results from last 7 days   Lab Units 01/04/23  0650 01/03/23  0529 01/02/23  0501 01/01/23  0450 12/31/22  0634 12/30/22  0450 12/29/22  0536   SODIUM mmol/L 136 136 133* 135 136 131* 131*   POTASSIUM mmol/L 4 6 4 4 4 2 4 5 4 5 4 8 4 8   CHLORIDE mmol/L 94* 96 92* 94* 97 93* 94*   CO2 mmol/L 34* 32 32 29 28 26 24   ANION GAP mmol/L 8 8 9 12 11 12 13   BUN mg/dL 86* 66* 92* 72* 60* 80* 57*   CREATININE mg/dL 3 77* 3 18* 4 39* 4 03* 3 63* 5 02* 3 96*   CALCIUM mg/dL 7 3* 7 2* 7 5* 7 6* 7 4* 7 1* 7 2*   GLUCOSE RANDOM mg/dL 214* 209* 294* 312* 239* 219* 160*   ALT U/L 18  --   --  17  --   --   --    AST U/L 8  --   --  12  --   --   --    ALK PHOS U/L 71  --   --  82  --   --   --    ALBUMIN g/dL 2 0*  --   --  2 1*  --   --   --    TOTAL BILIRUBIN mg/dL 0 53  --   --  0 69  --   --   --      Results from last 7 days   Lab Units 01/04/23  0650 01/03/23  0529 01/02/23  0501 01/01/23  0450 12/31/22  0634 12/30/22  0450 12/29/22  0536   MAGNESIUM mg/dL 2 1 2 0 2 1 2 1  --  2 3 2 1   PHOSPHORUS mg/dL 5 9* 4 9* 5 7* 6 2* 6 0* 8 0* 5 8*                   ABG:  Results from last 7 days   Lab Units 01/04/23  0534   PH ART  7 375   PCO2 ART mm Hg 56 3*   PO2 ART mm Hg 76 8   HCO3 ART mmol/L 32 2*   BASE EXC ART mmol/L 6 0   ABG SOURCE  Radial, Right     VBG:  Results from last 7 days   Lab Units 01/04/23  0534   ABG SOURCE  Radial, Right     Results from last 7 days   Lab Units 01/04/23  0650 12/31/22  1503 12/30/22  0450   PROCALCITONIN ng/ml 4 18* 10 77* 2 89*       Micro  Results from last 7 days   Lab Units 12/30/22  2319   SPUTUM CULTURE  2+ Growth of Candida albicans*  2+ Growth of   GRAM STAIN RESULT  Rare Polys*  1+ Gram positive cocci in pairs*  Rare Gram positive cocci in clusters*  Rare Budding yeast*   MRSA CULTURE ONLY  No Methicillin Resistant Staphlyococcus aureus (MRSA) isolated     Imaging:CXR this morning with persistent extensive B/L pulmonary opacifications and dense RLL consolidation which appears worse as compared to prior imaging  I have personally reviewed pertinent reports        Intake and Output  I/O       01/02 0701 01/03 0700 01/03 0701 01/04 0700 01/04 0701 01/05 0700    I V  (mL/kg) 1766 1 (21 1) 1244 8 (14 7)     Blood 4122 3751     NG/ 400 105    IV Piggyback  430     Feedings 1073 1122     Total Intake(mL/kg) 7196 1 (85 9) 6947 8 (82) 105 (1 2)    Urine (mL/kg/hr) 0 (0) 0 (0)     Other 1000      Stool 525 625     Chest Tube 60 220     Total Output 1585 845     Net +5611 1 +6102 8 +105 Height and Weights   Height: 5' 8" (172 7 cm)  IBW (Ideal Body Weight): 68 4 kg  Body mass index is 28 39 kg/m²  Weight (last 2 days)     Date/Time Weight    01/04/23 0600 84 7 (186 73)    01/03/23 0559 83 8 (184 75)    01/02/23 0600 81 7 (180 12)            Nutrition       Diet Orders   (From admission, onward)             Start     Ordered    01/04/23 1234  Diet Enteral/Parenteral; Tube Feeding No Oral Diet; Nepro; Continuous; 50; Banatrol Plus Banana Flakes - One Packet Daily; 50; Water; Every 6 hours  Diet effective now        References:    Nutrtion Support Algorithm Enteral vs  Parenteral   Question Answer Comment   Diet Type Enteral/Parenteral    Enteral/Parenteral Tube Feeding No Oral Diet    Tube Feeding Formula: Nepro    Bolus/Cyclic/Continuous Continuous    Tube Feeding Goal Rate (mL/hr): 50    Banatrol Plus Banana Flakes Banatrol Plus Banana Flakes - One Packet Daily    Tube Feeding flush (mL): 50    Water Flush type: Water    Water flush frequency: Every 6 hours    RD to adjust diet per protocol?  Yes        01/04/23 1233    12/26/22 0815  Room Service  Once        Question:  Type of Service  Answer:  Room Service - Appropriate with Assistance    12/26/22 0814                  Active Medications  Scheduled Meds:  Current Facility-Administered Medications   Medication Dose Route Frequency Provider Last Rate   • acetaminophen  650 mg Oral Q4H PRN FERNANDO Rosales     • amiodarone  0 5 mg/min Intravenous Continuous FERNANDO Valles 0 5 mg/min (01/04/23 0851)   • calcium acetate  667 mg Oral TID With Meals FERNANDO Leon     • calcium carbonate-vitamin D  2 tablet Oral BID With Meals FERNANDO Leon     • chlorhexidine  15 mL Mouth/Throat S54U [facility] Juana Quan PA-C     • dexmedetomidine  0 1-0 7 mcg/kg/hr Intravenous Titrated FERNANDO Soto 0 4 mcg/kg/hr (01/04/23 0753)   • epoetin ani  8,000 Units Intravenous Once per day on Mon Wed Fri Mesfin Guerrier, MD     • fentanyl citrate (PF)  50 mcg Intravenous W1X PRN Ghaazl Powers PA-C     • insulin glargine  42 Units Subcutaneous QAM FERNANDO Valles     • insulin lispro  4-20 Units Subcutaneous Q6H [facility] FERNANDO Plunkett     • ipratropium  0 5 mg Nebulization Q6H SEYMOUR VallesNP     • levalbuterol  1 25 mg Nebulization Q6H Joie Nina V CRNP     • lidocaine (PF)    PRN Chance Valentine MD     • methylPREDNISolone sodium succinate  80 mg Intravenous Daily FERNANDO Valles     • metoprolol tartrate  25 mg Per NG Tube Q12H [facility] Joie FERNANDO Rodriguez     • omeprazole (PRILOSEC) suspension 2 mg/mL  20 mg Per NG Tube Daily SEYMOUR VallesNP     • ondansetron  4 mg Intravenous X0G PRN Ghazal Powers PA-C     • pravastatin  80 mg Oral Daily With [employer]eralSEYMOURNP     • propofol  5-50 mcg/kg/min Intravenous Titrated SEYMOUR PlunkettNP 20 mcg/kg/min (01/04/23 0753)   • saccharomyces boulardii  250 mg Oral BID FERNANDO Plunkett     • sodium chloride  4 mL Nebulization Q6H FERNANDO Valles       Continuous Infusions:  amiodarone, 0 5 mg/min, Last Rate: 0 5 mg/min (01/04/23 0851)  dexmedetomidine, 0 1-0 7 mcg/kg/hr, Last Rate: 0 4 mcg/kg/hr (01/04/23 0753)  propofol, 5-50 mcg/kg/min, Last Rate: 20 mcg/kg/min (01/04/23 0753)      PRN Meds:   acetaminophen, 650 mg, Q4H PRN  fentanyl citrate (PF), 50 mcg, Q1H PRN  lidocaine (PF), , PRN  ondansetron, 4 mg, Q6H PRN        Invasive Devices Review  Invasive Devices     Peripheral Intravenous Line  Duration           Long-Dwell Peripheral IV (Midline) 12/26/22 Left Brachial 9 days    Peripheral IV 01/01/23 Right Wrist 3 days    Peripheral IV 01/02/23 Right;Upper;Ventral (anterior) Arm 2 days          Hemodialysis Catheter  Duration           HD Permanent Double Catheter 302 days          Drain  Duration           NG/OG/Enteral Tube Orogastric 16 Fr Center mouth 9 days    Chest Tube Right Pleural 10 Fr  7 days    Rectal Tube With balloon 2 days          Airway  Duration           ETT  Cuffed; Hi-Lo 8 mm 3 days                Rationale for remaining devices: continue permacath for HD   ---------------------------------------------------------------------------------------  Advance Directive and Living Will:      Power of :    POLST:    ---------------------------------------------------------------------------------------  Care Time Delivered:   No Critical Care time spent       FERNANDO Liu      Portions of the record may have been created with voice recognition software  Occasional wrong word or "sound a like" substitutions may have occurred due to the inherent limitations of voice recognition software    Read the chart carefully and recognize, using context, where substitutions have occurred

## 2023-01-04 NOTE — ASSESSMENT & PLAN NOTE
Lab Results   Component Value Date    HGBA1C 5 7 (H) 10/19/2022       Recent Labs     01/03/23  1123 01/03/23  1730 01/03/23  2355 01/04/23  0642   POCGLU 270* 254* 208* 231*       Blood Sugar Average: Last 72 hrs:  (P) 265   · Continue SSI algorithm 5 q6h   Increase Lantus to 42 units for persistent hyperglycemia   · Holding home regimen of 15u 70/30 BID   · Goal blood glucose less than 180

## 2023-01-04 NOTE — NURSING NOTE
12 units of FFP administered by plasmapharesis RN Ivar Oas  All blood products checked by 2 RN's  Please refer to tranfusionist documentation for actual infusion times

## 2023-01-04 NOTE — ASSESSMENT & PLAN NOTE
· Rhythm deteriorated to Aflutter 2:1 block with rates in 140s on 1/2  · No response to IV metoprolol  · Given amio bolus and started on drip  · Unable to anticoagulate due to alveolar hemorrhage  · Continuous cardiac monitoring  · Received 250mcg IV Dig x 1 yesterday AM  · Continues on Amio gtt, will lower to 0 5mcg/min today  · Start metoprolol 25mg BID

## 2023-01-04 NOTE — ASSESSMENT & PLAN NOTE
· Patient presented to the emergency department on 12/25 with worsening shortness of breath and hypoxia after missing his dialysis treatment 12/24 due to lack of transportation  · Initially in the emergency room, the patient was placed on BiPAP but he was weaned to mid flow   · Overnight 12/25, became more hypoxic and ultimately required intubation  · Began having hemoptysis and significant blood suctioned post intubation  Concern for alveolar hemorrhage secondary to vasculitis-see plan as above   · Worsening hypoxia 12/30-12/31 overnight with peak pressuring on vent  Changed to [facility] and re-sedated  ABG with respiratory acidosis and P:F ratio 65  · CXR 12/30 PM with R lung re-expanded with R pleural CT to -20cm of suction but worsening of extensive B/L patchy pulmonary infiltrates as compared to prior  · Episode of acute hypoxia again 12/31 with SpO2 down to 60's, not improved with saline lavage and suctioning, paralytic, or bagging  Was difficult to bag and had persistent air leak despite ETT repositioning and re-inflation of balloon  SpO2 dipped to 28% briefly  Required ETT exchange  Now with #8 ETT  SpO2 improved after additional lavage and suctioning  · 1/1 ABG 7 33/57/79/29   PF ratio 98  · S/p bronch 1/1-  thick mucoid plugs removed from RML and b/l lower lobes  · Current Vent settings: AC/PC 22/22/50%/8  · Wean fio2/PEEP for O2 sat 92% or above   · Sedation with propofol and precedex  · Titrate for RAAS -1  · VAP bundle; chlorhexidine, PPI, HOB greater than 30 degrees  · Pulmonary toilet with xopenex/atrovent/3% NSS nebs q6h, chest PT TID, ETT suctioning PRN  · CXR this morning with persistent extensive B/L pulmonary opacifications and dense RLL consolidation which appears worse as compared to prior imaging

## 2023-01-04 NOTE — ASSESSMENT & PLAN NOTE
Lab Results   Component Value Date    EGFR 14 01/04/2023    EGFR 17 01/03/2023    EGFR 11 01/02/2023    CREATININE 3 77 (H) 01/04/2023    CREATININE 3 18 (H) 01/03/2023    CREATININE 4 39 (H) 01/02/2023       · Patient scheduled dialysis Tuesday/Thursday/Saturday however he was unable to make his treatment on Saturday 12/24 due to the transportation bus not picking him up  · Patient started with worsening shortness of breath on 12/25 presented to the emergency room  · Nephrology consulted and HD session done on 12/25 for 3 5L  · Continue MWF schedule for now per nephrology  · Continue PhosLo as ordered  · Trend BMP

## 2023-01-04 NOTE — NURSING NOTE
Plasmaphoresis performed by Roddy Giles including administration of 15 units of FFP as ordered  Epic documentation times only when blood products checked by 2 RNs  Please see transfusionist documentation for actual infusion times

## 2023-01-04 NOTE — ASSESSMENT & PLAN NOTE
Lab Results   Component Value Date    EGFR 14 01/04/2023    EGFR 17 01/03/2023    EGFR 11 01/02/2023    CREATININE 3 77 (H) 01/04/2023    CREATININE 3 18 (H) 01/03/2023    CREATININE 4 39 (H) 01/02/2023     · Patient had a recent admission from 12/20-12/23 with hemoglobin 5 7 -received 1 unit packed red blood cells at that time  · Most likely acute on chronic anemia in setting of ESRD now with DAH  · Received 1 unit of PRBCs 12/28 and again 1/2 for Hgb <7  · Hgb 7 9 this morning  · Continue to trend daily and transfuse for hgb less than 7 0

## 2023-01-04 NOTE — ASSESSMENT & PLAN NOTE
· Patient had a thoracentesis 12/21 (previous admission)  · Chest x-ray with hydropneumothorax thought to likely be related to this, possibly ex vacuo  · S/p R pleural chest tube with IR 12/27  · 220mL serous drainage x 24 hours  · CXR with small R pneumothorax with CT on H20 seal therefore chest tube placed by to -20cm suction, R lung was re-expanded on repeat CXR 12/30  · CXR repeated overnight 12/31 in setting of hypoxic episode, no pneumothorax

## 2023-01-04 NOTE — PLAN OF CARE
Goal is to UF 3-4 L as tolerated on a 2K bath for a serum K result of 4 6 from 1/4/23  Dr Valerie Bradshaw is aware of bath, goal, and   Problem: METABOLIC, FLUID AND ELECTROLYTES - ADULT  Goal: Electrolytes maintained within normal limits  Description: INTERVENTIONS:  - Monitor labs and assess patient for signs and symptoms of electrolyte imbalances  - Administer electrolyte replacement as ordered  - Monitor response to electrolyte replacements, including repeat lab results as appropriate  - Instruct patient on fluid and nutrition as appropriate  Outcome: Progressing  Goal: Fluid balance maintained  Description: INTERVENTIONS:  - Monitor labs   - Monitor I/O and WT  - Instruct patient on fluid and nutrition as appropriate  - Assess for signs & symptoms of volume excess or deficit  Outcome: Progressing     Post-Dialysis RN Treatment Note    Blood Pressure:  Pre 181/77  mm/Hg  Post 127/58 mmHg   EDW  71 kg    Weight:  Pre 84 7 kg   Post 80 7 kg   Mode of weight measurement: Bed Scale   Volume Removed  4000 ml net    Treatment duration 180 minutes    NS given  No    Treatment shortened?  No   Medications given during Rx Epogen 8000 units   Estimated Kt/V  Not Applicable   Access type: Permacath/TDC   Access Issues: No    Report called to primary nurse   Yes Tay Daniel, RN

## 2023-01-04 NOTE — ASSESSMENT & PLAN NOTE
· Patient presented to the emergency room on 12/25 with SIRS criteria- increased respiratory rate and hypoxia  · With decompensation on 12/25, antibiotics were started with cefepime/vanco given worsening hypoxia however low suspicion for infection  · BAL with mixed respiratory micheal  · pneumocystis smear negative   · Completing 5 days abx 12/29, however 12/30 with thick tan secretions and worsening leukocytosis and was restarted on cefepime  · Abx stopped 1/3  ·  MRSA negative     · Sputum cx on 12/26 and 12/30 with candida   · Trend temps and WBC   · procal difficult to interpret in setting of ESRD

## 2023-01-04 NOTE — ASSESSMENT & PLAN NOTE
· Did not follow commands or move extremities after several hours off sedation 12/30  · Encephalopathy possibly in setting of critical illness, sedation effects, delirium, and hypercarbia   · Sedation resumed 12/30 evening d/t vent dyssynchrony   · CT head 12/31 without intracranial abnormality   · Neuro checks q4h  · Delirium precautions; regulate sleep/wake cycle, environmental controls, daily CAM ICU

## 2023-01-04 NOTE — RESPIRATORY THERAPY NOTE
Pt recd on a  vent setting as per flow sheet all alarms on and functional vent workin properly   Pt restrain are secure

## 2023-01-04 NOTE — PROGRESS NOTES
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  · Plan for HD today  2  Access:   · R IJ permcath  3  VDRF, alveolar hemorrhage  · 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  · Currently being treated with PLEX, Rituximab, steroids  · S/p pulse steroids and now on Solumedrol 80 mg OD  · S/p Rituximab on 12/27/22, 1/3/23 (Completed 2/4 doses)  · PR3 Ab was 84 4 on 3/14/22 and down to 7 0 on 8/24/22  · PR3 Ab >8 0 on 12/21/22  · For PLEX today - last exchange  4  Hypertension:  · EDW is 70 7 kg  He is above EDW  · BP is high  · Meds: none  · Clonidine, Losartan, Amlodipine on hold  · Consider adding Metoprolol 25 mg BID  · Will plan for 3-4 kg UF on HD today  5  Anemia:  · Hgb below goal    · S/p PRBC 1/2/22  · On Mircera as an outpatient  · Continue Epogen 8000 units with HD - increased 1/3/23  6  Leukocytosis:  · WBC count up to 39K - etiology? · Defer to CC  7  Mineral and bone disease:  · Phos is at goal    · Currently on Nephro TF  8  Pneumothorax s/p R chest tube placement  · Defer to crit care  DISPOSITION:  · HD today  · Consider adding Metoprolol 25 mg BID  · Plan for 3-4 kg UF on HD today  · For PLEX today  · Will eventually need PCP prophylaxis  The above plan was discussed with CC team      SUBJECTIVE / 24H INTERVAL HISTORY:  No new acute events  Still with pink secretions on ETT suctioning  Remains on vent  PEEP 8 and FiO2 50%       OBJECTIVE:  Current Weight: Weight - Scale: 84 7 kg (186 lb 11 7 oz)  Vitals:    01/04/23 0500 01/04/23 0600 01/04/23 0757 01/04/23 0758   BP: 154/66 145/66     Pulse: 77 68 66    Resp: (!) 27 (!) 29 (!) 29    Temp:       TempSrc:       SpO2: 97% 97% 98% 98% Weight:  84 7 kg (186 lb 11 7 oz)     Height:           Intake/Output Summary (Last 24 hours) at 1/4/2023 0842  Last data filed at 1/4/2023 0650  Gross per 24 hour   Intake 6947 81 ml   Output 845 ml   Net 6102 81 ml     General: critically ill appearing on the mechanical ventilator, comfortable  Skin: warm, dry, good turgor  Eyes: closed  ENT: ETT in place  Neck: supple  Chest/Lungs: equal chest expansion, coarse breath sounds  Chest tube in place  CVS: distinct heart sounds, tachy, irregular rhythm, no rub  Abdomen: soft, non-distended, normoactive bowel sounds  Extremities: (+) LE and UE edema  : no ruvalcaba catheter  Neuro: sedated on the mechanical ventilator  Psych: could not evaluate       Medications:    Current Facility-Administered Medications:   •  acetaminophen (TYLENOL) oral suspension 650 mg, 650 mg, Oral, Q4H PRN, FERNANDO Hull, 650 mg at 01/02/23 0798  •  amiodarone (CORDARONE) 900 mg in dextrose 5 % 500 mL infusion, 1 mg/min, Intravenous, Continuous, FERNANDO Hull, Last Rate: 33 3 mL/hr at 01/04/23 0820, 1 mg/min at 01/04/23 0820  •  calcium acetate (PHOSLO) capsule 667 mg, 667 mg, Oral, TID With Meals, [employer], CRNP, 667 mg at 01/04/23 1225  •  calcium carbonate-vitamin D 500 mg-5 mcg tablet 2 tablet, 2 tablet, Oral, BID With Meals, [employer], CRNP, 2 tablet at 01/04/23 6893  •  calcium gluconate 2 g in sodium chloride 0 9% 100 mL (premix), 2 g, Intravenous, Once PRN, FERNANDO Williamson  •  chlorhexidine (PERIDEX) 0 12 % oral rinse 15 mL, 15 mL, Mouth/Throat, Y[address], Corin Quan PA-C, 15 mL at 01/04/23 8331  •  dexmedeTOMIDine (Precedex) 400 mcg in sodium chloride 0 9 % 100 mL infusion, 0 1-0 7 mcg/kg/hr, Intravenous, Titrated, FERNANDO Stout, Last Rate: 7 9 mL/hr at 01/04/23 0753, 0 4 mcg/kg/hr at 01/04/23 0753  •  epoetin ani (EPOGEN,PROCRIT) injection 8,000 Units, 8,000 Units, Intravenous, Once per day on Mon Wed Fri, Isidro Oumar Jarvis MD  •  fentanyl citrate (PF) 100 MCG/2ML 50 mcg, 50 mcg, Intravenous, M3Z PRN, Dorrie Gitelman, PA-C, 50 mcg at 12/30/22 2201  •  insulin glargine (LANTUS) subcutaneous injection 42 Units 0 42 mL, 42 Units, Subcutaneous, QAM, Joie Nina V, CRNP, 42 Units at 01/04/23 0839  •  insulin lispro (HumaLOG) 100 units/mL subcutaneous injection 4-20 Units, 4-20 Units, Subcutaneous, Q6H [facility], 8 Units at 01/04/23 0642 **AND** Fingerstick Glucose (POCT), , , Q6H, FERNANDO Lee  •  ipratropium (ATROVENT) 0 02 % inhalation solution 0 5 mg, 0 5 mg, Nebulization, Q6H, Joie Nicolaso V, CRNP, 0 5 mg at 01/04/23 0757  •  levalbuterol (XOPENEX) inhalation solution 1 25 mg, 1 25 mg, Nebulization, Q6H, Joie Nina V, SEYMOURNP, 1 25 mg at 01/04/23 0757  •  lidocaine (PF) (XYLOCAINE-MPF) 1 % injection, , , PRN, Calista Meigs, MD, 5 mL at 12/27/22 1349  •  methylPREDNISolone sodium succinate (Solu-MEDROL) injection 80 mg, 80 mg, Intravenous, Daily, FERNANDO Valles, 80 mg at 01/03/23 2207  •  omeprazole (PRILOSEC) suspension 2 mg/mL, 20 mg, Per NG Tube, Daily, FERNANDO Valles, 20 mg at 01/04/23 0358  •  ondansetron (ZOFRAN) injection 4 mg, 4 mg, Intravenous, T6M PRN, Eulogio Quan PA-C  •  pravastatin (PRAVACHOL) tablet 80 mg, 80 mg, Oral, Daily With FERNANDO Sifuentes, 80 mg at 01/03/23 1717  •  propofol (DIPRIVAN) 1000 mg in 100 mL infusion (premix), 5-50 mcg/kg/min, Intravenous, Titrated, FERNANDO Lee, Last Rate: 9 6 mL/hr at 01/04/23 0753, 20 mcg/kg/min at 01/04/23 0753  •  saccharomyces boulardii (FLORASTOR) capsule 250 mg, 250 mg, Oral, BID, FERNANDO Lee, 250 mg at 01/04/23 6346  •  sodium chloride 3 % inhalation solution 4 mL, 4 mL, Nebulization, Q6H, FERNANDO Valles, 4 mL at 01/04/23 0757    Laboratory Results:  Results from last 7 days   Lab Units 01/04/23  0650 01/03/23  0529 01/02/23  0501 01/01/23  0459 12/31/22  0634 12/30/22  0450 12/29/22  0536   WBC Thousand/uL  --  39 42* 22 64* 23 78* 21 51* 22 55* 15 29*   HEMOGLOBIN g/dL  --  9 0* 6 6* 7 9* 7 9* 9 3* 9 0*   HEMATOCRIT %  --  28 2* 20 9* 24 9* 25 5* 29 6* 27 8*   PLATELETS Thousands/uL  --  229 211 208 244 312 290   POTASSIUM mmol/L 4 6 4 4 4 2 4 5 4 5 4 8 4 8   CHLORIDE mmol/L 94* 96 92* 94* 97 93* 94*   CO2 mmol/L 34* 32 32 29 28 26 24   BUN mg/dL 86* 66* 92* 72* 60* 80* 57*   CREATININE mg/dL 3 77* 3 18* 4 39* 4 03* 3 63* 5 02* 3 96*   CALCIUM mg/dL 7 3* 7 2* 7 5* 7 6* 7 4* 7 1* 7 2*   MAGNESIUM mg/dL 2 1 2 0 2 1 2 1  --  2 3 2 1   PHOSPHORUS mg/dL 5 9* 4 9* 5 7* 6 2* 6 0* 8 0* 5 8*

## 2023-01-05 PROBLEM — R33.9 URINARY RETENTION: Status: RESOLVED | Noted: 2022-01-01 | Resolved: 2023-01-01

## 2023-01-05 NOTE — ASSESSMENT & PLAN NOTE
Lab Results   Component Value Date    HGBA1C 5 7 (H) 10/19/2022       Recent Labs     01/04/23  0642 01/04/23  1311 01/04/23  1750 01/04/23  2338   POCGLU 231* 287* 133 166*       Blood Sugar Average: Last 72 hrs:  (P) 671 [id number]   · Continue SSI algorithm 5 q6h, Lantus to 42 units qAM   · Holding home regimen of 15u 70/30 BID   · Goal blood glucose less than 180

## 2023-01-05 NOTE — ASSESSMENT & PLAN NOTE
· Did not follow commands or move extremities after several hours off sedation 12/30  · Encephalopathy possibly in setting of critical illness, sedation effects, delirium, and hypercarbia   · Sedation resumed 12/30 evening d/t vent dyssynchrony, will attempt to wean sedation today for neuro exam   · CT head 12/31 without intracranial abnormality   · Neuro checks q4h  · Delirium precautions; regulate sleep/wake cycle, environmental controls, daily CAM ICU

## 2023-01-05 NOTE — ASSESSMENT & PLAN NOTE
· Patient presented to the emergency department on 12/25 with worsening shortness of breath and hypoxia after missing his dialysis treatment 12/24 due to lack of transportation  · Initially in the emergency room, the patient was placed on BiPAP but he was weaned to mid flow   · Overnight 12/25, became more hypoxic and ultimately required intubation  · Began having hemoptysis and significant blood suctioned post intubation  Concern for alveolar hemorrhage secondary to vasculitis-see plan as above   · Worsening hypoxia 12/30-12/31 overnight with peak pressuring on vent  Changed to [facility] and re-sedated  ABG with respiratory acidosis and P:F ratio 65  · CXR 12/30 PM with R lung re-expanded with R pleural CT to -20cm of suction but worsening of extensive B/L patchy pulmonary infiltrates as compared to prior  · Episode of acute hypoxia again 12/31 with SpO2 down to 60's, not improved with saline lavage and suctioning, paralytic, or bagging  Was difficult to bag and had persistent air leak despite ETT repositioning and re-inflation of balloon  SpO2 dipped to 28% briefly  Required ETT exchange  Now with #8 ETT  SpO2 improved after additional lavage and suctioning  · 1/1 ABG 7 33/57/79/29   PF ratio 98  · S/p bronch 1/1-  thick mucoid plugs removed from RML and b/l lower lobes  · Current Vent settings: AC/PC 22/22/50%/8  · Wean fio2/PEEP for O2 sat 92% or above   · Failed SBT d/t low vT and tachypnea   · Vent day #12  · Sedation with propofol and precedex  · Titrate for RAAS -1  · VAP bundle; chlorhexidine, PPI, HOB greater than 30 degrees  · Pulmonary toilet with xopenex/atrovent/3% NSS nebs q6h, chest PT TID, ETT suctioning PRN  · CXR 1/4 persistent extensive B/L pulmonary opacifications and dense RLL consolidation which appears worse as compared to prior imaging

## 2023-01-05 NOTE — QUICK NOTE
Wife Skylar Cervantes and neighbor/friend Shi visited at bedside and updated on patient's clinical status  We discussed ANCA vasculitis with pulmonary involvement/DAH and treatments patient has received including steroids, plasmapheresis, rituximab, and bronch x 2  We discussed inability to wean from the ventilator and overall very tenuous prognosis  They both express understanding of this  Skylar Cervantes has been offered information again on Level 2 DNAR status and she is taking time to consider this  All questions have been answered to their satisfaction

## 2023-01-05 NOTE — ASSESSMENT & PLAN NOTE
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

## 2023-01-05 NOTE — HEMODIALYSIS
Post-Dialysis RN Treatment Note    Blood Pressure:  Pre 156/70 mm/Hg  Post 137/63 mmHg   EDW  71 kg    Weight:  Pre 82 2 kg   Post 80 2 kg   Mode of weight measurement: Bed Scale   Volume Removed  2000 ml    Treatment duration 120 minutes    NS given  No    Treatment shortened?  No   Medications given during Rx None Reported   Estimated Kt/V  None Reported   Access type: Permacath/TDC   Access Issues: No    Report called to primary nurse   Yes Hortencia Simon RN

## 2023-01-05 NOTE — ASSESSMENT & PLAN NOTE
Lab Results   Component Value Date    HGBA1C 5 1 05/08/2022       Recent Labs     06/06/22  0002 06/06/22  0704 06/06/22  1113 06/06/22  1550   POCGLU 75 102 140 145*       Blood Sugar Average: Last 72 hrs:  (P) 150   Trend noted, appetite fair  Continue insulin 70/30 20 units b i d  Davin Blancoze   Blood sugars stable  Continue Humalog sliding scale  Monitor blood glucose trend PEC called and faxed into . Spoke with Joana. PEC scanned into patients chart.

## 2023-01-05 NOTE — ASSESSMENT & PLAN NOTE
· Worsening diarrhea 1/1-1/2 after initiation of Reglan for reported high gastric residuals  · Reglan discontinued, residuals remain <100  · Monitor stool output with FMS  · Continue florastor and banana flakes  · All stool softeners have been discontinued

## 2023-01-05 NOTE — ASSESSMENT & PLAN NOTE
Lab Results   Component Value Date    EGFR 19 01/05/2023    EGFR 14 01/04/2023    EGFR 17 01/03/2023    CREATININE 2 90 (H) 01/05/2023    CREATININE 3 77 (H) 01/04/2023    CREATININE 3 18 (H) 01/03/2023       · Patient scheduled dialysis Tuesday/Thursday/Saturday however he was unable to make his treatment on Saturday 12/24 due to the transportation bus not picking him up  · Patient started with worsening shortness of breath on 12/25 presented to the emergency room  · HD session done on 12/25 for 3 5L  · Continue MWF schedule for now per nephrology  · Continue PhosLo as ordered  · Last dialysis session yesterday with 4 5L removed, patient tolerated well  · Trend BMP  · Nephrology following, appreciate recs

## 2023-01-05 NOTE — ASSESSMENT & PLAN NOTE
· Patient had a thoracentesis 12/21 (previous admission)  · Chest x-ray with hydropneumothorax thought to likely be related to this, possibly ex vacuo  · S/p R pleural chest tube with IR 12/27  · 220mL serous drainage x 24 hours  · CXR with small R pneumothorax with CT on H20 seal therefore chest tube placed by to -20cm suction, R lung was re-expanded on repeat CXR 12/30  · Subsequent CXRs with no pneumo, continue CT to -20cm suction

## 2023-01-05 NOTE — ASSESSMENT & PLAN NOTE
· Rhythm deteriorated to Aflutter 2:1 block with rates in 140s on 1/2  · No response to IV metoprolol  · Given amio bolus and started on drip  · Unable to anticoagulate due to alveolar hemorrhage  · Continuous cardiac monitoring  · Received 250mcg IV Dig x 1 1/3  · D/C Amio gtt today, start PO amio   · Continue metoprolol 25mg BID (started 1/4)

## 2023-01-05 NOTE — ASSESSMENT & PLAN NOTE
· Patient presented to the emergency room on 12/25 with SIRS criteria- increased respiratory rate and hypoxia  · With decompensation on 12/25, antibiotics were started with cefepime/vanco given worsening hypoxia however low suspicion for infection  · BAL with mixed respiratory micheal  · pneumocystis smear negative   · Completing 5 days abx 12/29, however 12/30 with thick tan secretions and worsening leukocytosis and was restarted on cefepime  · Abx stopped again on  1/3  · MRSA negative  · Sputum cx on 12/26 and 12/30 with candida   · Trend temps and WBC   · Persistent leukocytosis of unclear etiology, steroid induced?    · procal difficult to interpret in setting of ESRD

## 2023-01-05 NOTE — PROGRESS NOTES
Spiritual Care Progress Note    2023  Patient: Alisson Red : 1945  Admission Date & Time: 2022 1120  MRN: [id number] [facility]: [id number]     attempted to visit patient during unit rounds per LOS  Patient unavailable, no family at bedside  Spiritual care will remain available as needed       23 1500   Clinical Encounter Type   Visited With Patient not available   Routine Visit Introduction   Referral From Other (Comment)  (LOS)

## 2023-01-05 NOTE — PROGRESS NOTES
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  · Completed HD yesterday  · Plan for 2 hour isolated UF today for fluid removal      2  Access:   · R IJ permcath  3  VDRF, alveolar hemorrhage  · 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  · Currently being treated with Rituximab and steroids  · S/p PLEX and pulse steroids  · Now on Solumedrol 80 mg OD  · S/p Rituximab on 12/27/22, 1/3/23 (Completed 2/4 doses)  · PR3 Ab was 84 4 on 3/14/22 and down to 7 0 on 8/24/22  · PR3 Ab >8 0 on 12/21/22  4  Volume, hypertension:  · EDW is 70 7 kg  He is above EDW  · BP is high  · Meds: Metoprolol 25 mg BID  · Clonidine, Losartan, Amlodipine on hold  · Suspect fluid overload - plan for 2 hour UF today  5  Anemia:  · Hgb below goal but stable  · S/p PRBC 1/2/22  · On Mircera as an outpatient  · Continue Epogen 8000 units with HD - increased 1/3/23  6  Leukocytosis:  · WBC back up to 37 K  · Defer to CC  7  Mineral and bone disease:  · Phos is at goal    · Currently on Nephro TF  8  Pneumothorax s/p R chest tube placement  · Defer to crit care  DISPOSITION:  · 2 hour isolated UF today  · Plan for 2 kg UF  · Regular HD tomorrow  Discussed with CC team      SUBJECTIVE / 24H INTERVAL HISTORY:  Tolerated HD yesterday with 4 kg UF  Remains vent dependent - FiO2 50% and PEEP of 8  HR better controlled       OBJECTIVE:  Current Weight: Weight - Scale: 82 2 kg (181 lb 3 5 oz)  Vitals:    01/05/23 0500 01/05/23 0759 01/05/23 0800 01/05/23 0819   BP: 154/70      Pulse: 84      Resp: 22      Temp:       TempSrc:       SpO2: 96% 97% 95% 95%   Weight:       Height:           Intake/Output Summary (Last 24 hours) at 1/5/2023 0940  Last data filed at 1/5/2023 0600  Gross per 24 hour   Intake 8075 ml   Output 5270 ml   Net 2805 ml     General: critically ill appearing on the mechanical ventilator, comfortable  Skin: warm, dry, good turgor  Eyes: closed  ENT: ETT in place  Neck: supple  Chest/Lungs: equal chest expansion, coarse breath sounds  CVS: distinct heart sounds, normal rate, irregular rhythm, no rub  Abdomen: soft, non-distended, normoactive bowel sounds  Extremities: (+) UE and LE edema  : no ruvalcaba catheter  Neuro: sedated on the mechanical ventilator  Psych: could not evaluate       Medications:    Current Facility-Administered Medications:   •  acetaminophen (TYLENOL) oral suspension 650 mg, 650 mg, Oral, Q4H PRN, FERNANDO Lr, 650 mg at 01/04/23 1017  •  amiodarone (CORDARONE) 900 mg in dextrose 5 % 500 mL infusion, 0 5 mg/min, Intravenous, Continuous, FERNANDO Valles, Last Rate: 16 7 mL/hr at 01/04/23 0851, 0 5 mg/min at 01/04/23 0851  •  calcium acetate (PHOSLO) capsule 667 mg, 667 mg, Oral, TID With Meals, [employer], FERNANDO, 667 mg at 01/04/23 1756  •  calcium carbonate-vitamin D 500 mg-5 mcg tablet 2 tablet, 2 tablet, Oral, BID With Meals, [employer], FERNANDO, 2 tablet at 01/04/23 1756  •  chlorhexidine (PERIDEX) 0 12 % oral rinse 15 mL, 15 mL, Mouth/Throat, [address], Corin Candy Quan PA-C, 15 mL at 01/04/23 2130  •  dexmedeTOMIDine (Precedex) 400 mcg in sodium chloride 0 9 % 100 mL infusion, 0 1-0 7 mcg/kg/hr, Intravenous, Titrated, FERNANDO Bergman, Last Rate: 7 9 mL/hr at 01/04/23 2122, 0 4 mcg/kg/hr at 01/04/23 2122  •  epoetin ani (EPOGEN,PROCRIT) injection 8,000 Units, 8,000 Units, Intravenous, Once per day on Mon Wed Fri, Isidro Zheng MD, 8,000 Units at 01/04/23 1746  •  fentanyl citrate (PF) 100 MCG/2ML 50 mcg, 50 mcg, Intravenous, Q1Y PRN, Ned Quan PA-C, 50 mcg at 12/30/22 2201  •  insulin glargine (LANTUS) subcutaneous injection 42 Units 0 42 mL, 42 Units, Subcutaneous, QAM, Joie Spironello V, CRNP, 42 Units at 01/04/23 9256  •  insulin lispro (HumaLOG) 100 units/mL subcutaneous injection 4-20 Units, 4-20 Units, Subcutaneous, Q6H [address], 4 Units at 01/05/23 0615 **AND** Fingerstick Glucose (POCT), , , Q6H, Sameul Reining, CRNP  •  ipratropium (ATROVENT) 0 02 % inhalation solution 0 5 mg, 0 5 mg, Nebulization, Q6H, Joie Spironello V, CRNP, 0 5 mg at 01/05/23 0759  •  levalbuterol (XOPENEX) inhalation solution 1 25 mg, 1 25 mg, Nebulization, Q6H, Joie Fajardonello V, CRNP, 1 25 mg at 01/05/23 0758  •  lidocaine (PF) (XYLOCAINE-MPF) 1 % injection, , , PRN, Edgar Marinelli MD, 5 mL at 12/27/22 1349  •  methylPREDNISolone sodium succinate (Solu-MEDROL) injection 80 mg, 80 mg, Intravenous, Daily, Joie Nicolaso V, CRNP, 80 mg at 01/04/23 2131  •  metoprolol tartrate (LOPRESSOR) tablet 25 mg, 25 mg, Per NG Tube, Q12H [address], Joie Spironello V, CRNP, 25 mg at 01/04/23 2131  •  omeprazole (PRILOSEC) suspension 2 mg/mL, 20 mg, Per NG Tube, Daily, Joie Fajardonello V, CRNP, 20 mg at 01/04/23 2379  •  ondansetron (ZOFRAN) injection 4 mg, 4 mg, Intravenous, P7A PRN, Mingo Quan PA-C  •  pravastatin (PRAVACHOL) tablet 80 mg, 80 mg, Oral, Daily With Anahi Segura, CRNP, 80 mg at 01/04/23 1756  •  propofol (DIPRIVAN) 1000 mg in 100 mL infusion (premix), 5-50 mcg/kg/min, Intravenous, Titrated, Sameul Reining, CRNP, Last Rate: 9 6 mL/hr at 01/05/23 0937, 20 mcg/kg/min at 01/05/23 8333  •  saccharomyces boulardii (FLORASTOR) capsule 250 mg, 250 mg, Oral, BID, Sameul Reining, CRNP, 250 mg at 01/04/23 4806  •  sodium chloride 3 % inhalation solution 4 mL, 4 mL, Nebulization, Q6H, Joie Fajardonello V, CRNP, 4 mL at 01/05/23 0759    Laboratory Results:  Results from last 7 days   Lab Units 01/05/23  0535 01/04/23  1102 01/04/23  0650 01/03/23  0529 01/02/23  0501 01/01/23  0450 12/31/22  0634 12/30/22  0450   WBC Thousand/uL 37 38* 27 92*  -- 39 42* 22 64* 23 78* 21 51* 22 55*   HEMOGLOBIN g/dL 8 5* 7 9*  --  9 0* 6 6* 7 9* 7 9* 9 3*   HEMATOCRIT % 27 8* 25 0*  --  28 2* 20 9* 24 9* 25 5* 29 6*   PLATELETS Thousands/uL 159 209  --  229 211 208 244 312   POTASSIUM mmol/L 4 2  --  4 6 4 4 4 2 4 5 4 5 4 8   CHLORIDE mmol/L 94*  --  94* 96 92* 94* 97 93*   CO2 mmol/L 29  --  34* 32 32 29 28 26   BUN mg/dL 65*  --  86* 66* 92* 72* 60* 80*   CREATININE mg/dL 2 90*  --  3 77* 3 18* 4 39* 4 03* 3 63* 5 02*   CALCIUM mg/dL 7 8*  --  7 3* 7 2* 7 5* 7 6* 7 4* 7 1*   MAGNESIUM mg/dL 2 0  --  2 1 2 0 2 1 2 1  --  2 3   PHOSPHORUS mg/dL 4 9*  --  5 9* 4 9* 5 7* 6 2* 6 0* 8 0*

## 2023-01-05 NOTE — PROGRESS NOTES
Patient's neighbor Miriam Hendrix visited and updated myself and ICU team on patient and wife Sanjuana's current home situation  Miriam Hendrix states that Kristian typically does not leave the house and the house is a hoarder home with stuff piled so high they can hardly move through the home, they are unable to use the kitchen to cook, and unable to use the shower to bathe  She says there are multiple living cats, and at least 2 dead cats and that there is cat urine and feces all over the home  She states they have heat and running water but the water is only trickling out and is reddish brown in appearance  Miriam Hendrix visits for about 1 hour a day to help and also brings them groceries as needed  There are bed bugs and flies throughout the home  Miriam Hendrix states she has developed respiratory issues since starting to help them in June and now wears a gas mask when going into the home  Prior to this admission patient was living in [facility] as he was unable to return home after his last hospital admission  I am extremely concerned for patient's wife at this time, and for patient should he be able to recover and return to his home at any point  I have reported my concerns to [facility] and spoke to [facility]  I was told that patient's living situation has been reported in the past  [facility] is going to notify her supervisor and will provide update to the critical care team, ICU phone number was provided  We explained to Miriam Hendrix that patient remains in critical condition and she is going to urge Kristian to come to the hospital to visit with her this afternoon

## 2023-01-05 NOTE — ASSESSMENT & PLAN NOTE
· Patient had a recent admission from 12/20-12/23 with hemoglobin 5 7 -received 1 unit packed red blood cells at that time  · Most likely acute on chronic anemia in setting of ESRD now with DAH  · Received 1 unit of PRBCs 12/28 and again 1/2 for Hgb <7  · Hgb stable at 8 5 this morning  · Continue to trend daily and transfuse for hgb less than 7 0

## 2023-01-05 NOTE — RESPIRATORY THERAPY NOTE
Pt recd on a  vent setting as per flow sheetm all alarms on and functional  Vent working properly  Pt restrains are secure

## 2023-01-05 NOTE — PLAN OF CARE
Pt on HD treatment for UF only of 2 L as tolerated     Problem: METABOLIC, FLUID AND ELECTROLYTES - ADULT  Goal: Electrolytes maintained within normal limits  Description: INTERVENTIONS:  - Monitor labs and assess patient for signs and symptoms of electrolyte imbalances  - Administer electrolyte replacement as ordered  - Monitor response to electrolyte replacements, including repeat lab results as appropriate  - Instruct patient on fluid and nutrition as appropriate  Outcome: Progressing  Goal: Fluid balance maintained  Description: INTERVENTIONS:  - Monitor labs   - Monitor I/O and WT  - Instruct patient on fluid and nutrition as appropriate  - Assess for signs & symptoms of volume excess or deficit  Outcome: Progressing

## 2023-01-05 NOTE — ASSESSMENT & PLAN NOTE
· Diffuse alveolar hemorrhage secondary to underlying ANCA vasculitis given hemoptysis and worsening infiltrates   · Bronch on 12/26 with serial aliquots persistently bloody despite lavage consistent with DAH  · Completed 3 days of pulse dose steroids 12/29  · Continue solumedrol 80mg daily   · Hold anticoagulation  · Receiving Rituximab per rheumatology  · Dose 2 of 4 on 1/3, weekly on Tuesdays  · Completed 5/5 days of Plasmapheresis on 1/4  · S/p bronchoscopy for pulmonary toilet 1/1  Multiple tenacious mucoid plugs removed after lavage and suctioning from RML, RLL and LLL  · Continues with thick, brown secretions from ETT  · Required 1u PRBC transfusion 1/2 for Hgb 6 6   See anemia as outlined  · Appreciate pulmonary and rheumatology input

## 2023-01-05 NOTE — PROGRESS NOTES
[address]  Progress Note - Jasmine Polanco 1945, 76 y o  male MRN: [id number]  Unit/Bed#: ICU 11 Encounter: [id number]  Primary Care Provider: Tad Stauffer MD   Date and time admitted to hospital: 12/25/2022 11:20 AM    * Diffuse pulmonary alveolar hemorrhage  Assessment & Plan  · Diffuse alveolar hemorrhage secondary to underlying ANCA vasculitis given hemoptysis and worsening infiltrates   · Bronch on 12/26 with serial aliquots persistently bloody despite lavage consistent with DAH  · Completed 3 days of pulse dose steroids 12/29  · Continue solumedrol 80mg daily   · Hold anticoagulation  · Receiving Rituximab per rheumatology  · Dose 2 of 4 on 1/3, weekly on Tuesdays  · Completed 5/5 days of Plasmapheresis on 1/4  · S/p bronchoscopy for pulmonary toilet 1/1  Multiple tenacious mucoid plugs removed after lavage and suctioning from RML, RLL and LLL  · Continues with thick, brown secretions from ETT  · Required 1u PRBC transfusion 1/2 for Hgb 6 6  See anemia as outlined  · Appreciate pulmonary and rheumatology input    Acute respiratory failure with hypoxia [facility])  Assessment & Plan  · Patient presented to the emergency department on 12/25 with worsening shortness of breath and hypoxia after missing his dialysis treatment 12/24 due to lack of transportation  · Initially in the emergency room, the patient was placed on BiPAP but he was weaned to mid flow   · Overnight 12/25, became more hypoxic and ultimately required intubation  · Began having hemoptysis and significant blood suctioned post intubation  Concern for alveolar hemorrhage secondary to vasculitis-see plan as above   · Worsening hypoxia 12/30-12/31 overnight with peak pressuring on vent  Changed to [facility] and re-sedated  ABG with respiratory acidosis and P:F ratio 65     · CXR 12/30 PM with R lung re-expanded with R pleural CT to -20cm of suction but worsening of extensive B/L patchy pulmonary infiltrates as compared to prior  · Episode of acute hypoxia again 12/31 with SpO2 down to 60's, not improved with saline lavage and suctioning, paralytic, or bagging  Was difficult to bag and had persistent air leak despite ETT repositioning and re-inflation of balloon  SpO2 dipped to 28% briefly  Required ETT exchange  Now with #8 ETT  SpO2 improved after additional lavage and suctioning  · 1/1 ABG 7 33/57/79/29  PF ratio 98  · S/p bronch 1/1-  thick mucoid plugs removed from RML and b/l lower lobes  · Current Vent settings: AC/PC 22/22/50%/8  · Wean fio2/PEEP for O2 sat 92% or above   · Failed SBT d/t low vT and tachypnea   · Vent day #12  · Sedation with propofol and precedex  · Titrate for RAAS -1  · VAP bundle; chlorhexidine, PPI, HOB greater than 30 degrees  · Pulmonary toilet with xopenex/atrovent/3% NSS nebs q6h, chest PT TID, ETT suctioning PRN  · CXR 1/4 persistent extensive B/L pulmonary opacifications and dense RLL consolidation which appears worse as compared to prior imaging     SIRS (systemic inflammatory response syndrome) ([facility] [address] )  Assessment & Plan  · Patient presented to the emergency room on 12/25 with SIRS criteria- increased respiratory rate and hypoxia  · With decompensation on 12/25, antibiotics were started with cefepime/vanco given worsening hypoxia however low suspicion for infection  · BAL with mixed respiratory micheal  · pneumocystis smear negative   · Completing 5 days abx 12/29, however 12/30 with thick tan secretions and worsening leukocytosis and was restarted on cefepime  · Abx stopped again on  1/3  · MRSA negative  · Sputum cx on 12/26 and 12/30 with candida   · Trend temps and WBC   · Persistent leukocytosis of unclear etiology, steroid induced?    · procal difficult to interpret in setting of ESRD      Hydropneumothorax  Assessment & Plan  · Patient had a thoracentesis 12/21 (previous admission)  · Chest x-ray with hydropneumothorax thought to likely be related to this, possibly ex vacuo  · S/p R pleural chest tube with IR 12/27  · 220mL serous drainage x 24 hours  · CXR with small R pneumothorax with CT on H20 seal therefore chest tube placed by to -20cm suction, R lung was re-expanded on repeat CXR 12/30  · Subsequent CXRs with no pneumo, continue CT to -20cm suction       Toxic metabolic encephalopathy  Assessment & Plan  · Did not follow commands or move extremities after several hours off sedation 12/30  · Encephalopathy possibly in setting of critical illness, sedation effects, delirium, and hypercarbia   · Sedation resumed 12/30 evening d/t vent dyssynchrony, will attempt to wean sedation today for neuro exam   · CT head 12/31 without intracranial abnormality   · Neuro checks q4h  · Delirium precautions; regulate sleep/wake cycle, environmental controls, daily CAM ICU    Urinary retention-resolved as of 1/5/2023  Assessment & Plan  · Bladder scan qshift  · Urinary retention protocol    Atrial flutter (HCC)  Assessment & Plan  · Rhythm deteriorated to Aflutter 2:1 block with rates in 140s on 1/2  · No response to IV metoprolol  · Given amio bolus and started on drip  · Unable to anticoagulate due to alveolar hemorrhage  · Continuous cardiac monitoring  · Received 250mcg IV Dig x 1 1/3  · D/C Amio gtt today, start PO amio   · Continue metoprolol 25mg BID (started 1/4)        ANCA-associated vasculitis  Assessment & Plan  · Kidney biopsy on 03/22: 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)  Assessment & Plan  Lab Results   Component Value Date    EGFR 19 01/05/2023    EGFR 14 01/04/2023    EGFR 17 01/03/2023    CREATININE 2 90 (H) 01/05/2023    CREATININE 3 77 (H) 01/04/2023    CREATININE 3 18 (H) 01/03/2023       · Patient scheduled dialysis Tuesday/Thursday/Saturday however he was unable to make his treatment on Saturday 12/24 due to the transportation bus not picking him up  · Patient started with worsening shortness of breath on 12/25 presented to the emergency room  · HD session done on 12/25 for 3 5L  · Continue MWF schedule for now per nephrology  · Continue PhosLo as ordered  · Last dialysis session yesterday with 4 5L removed, patient tolerated well  · Trend BMP  · Nephrology following, appreciate recs    Type 2 diabetes mellitus with chronic kidney disease on chronic dialysis, with long-term current use of insulin [facility])  Assessment & Plan  Lab Results   Component Value Date    HGBA1C 5 7 (H) 10/19/2022       Recent Labs     01/04/23  0642 01/04/23  1311 01/04/23  1750 01/04/23  2338   POCGLU 231* 287* 133 166*       Blood Sugar Average: Last 72 hrs:  (P) 081 [id number]   · Continue SSI algorithm 5 q6h, Lantus to 42 units qAM   · Holding home regimen of 15u 70/30 BID   · Goal blood glucose less than 180    Diarrhea  Assessment & Plan  · Worsening diarrhea 1/1-1/2 after initiation of Reglan for reported high gastric residuals  · Reglan discontinued, residuals remain <100  · Monitor stool output with FMS  · Continue florastor and banana flakes  · All stool softeners have been discontinued    Anemia due to chronic kidney disease, on chronic dialysis ([facility] [address] )  Assessment & Plan  · Patient had a recent admission from 12/20-12/23 with hemoglobin 5 7 -received 1 unit packed red blood cells at that time  · Most likely acute on chronic anemia in setting of ESRD now with DAH  · Received 1 unit of PRBCs 12/28 and again 1/2 for Hgb <7  · Hgb stable at 8 5 this morning  · Continue to trend daily and transfuse for hgb less than 7 0    Benign essential hypertension  Assessment & Plan  · Continue to hold home dose Norvasc and clonidine   · Continue metoprolol-started 1/4    Hyperlipidemia  Assessment & Plan  · Continue home statin    ----------------------------------------------------------------------------------------  HPI/24hr events: Completed 5 days of plasmapheresis yesterday  HD for 4 5L yesterday  Remains unable to wean from the vent  Patient appropriate for transfer out of the ICU today?: No  Disposition: Continue Critical Care   Code Status: Level 1 - Full Code  ---------------------------------------------------------------------------------------  SUBJECTIVE    Review of Systems  Review of systems was unable to be performed secondary to encephalopathy  ---------------------------------------------------------------------------------------  OBJECTIVE    Vitals   Vitals:    23 0400 23 0500 23 0759 23 0800   BP: 162/70 154/70     Pulse: 85 84     Resp: 22 22     Temp: 98 3 °F (36 8 °C)      TempSrc: Temporal      SpO2: 95% 96% 97% 95%   Weight: 82 2 kg (181 lb 3 5 oz)      Height:         Temp (24hrs), Av 6 °F (36 4 °C), Min:97 °F (36 1 °C), Max:98 5 °F (36 9 °C)  Current: Temperature: 98 3 °F (36 8 °C)          Respiratory:  SpO2: SpO2: 95 %, SpO2 Activity: SpO2 Activity: At Rest, SpO2 Device: O2 Device: Ventilator  Nasal Cannula O2 Flow Rate (L/min): 80 L/min    Invasive/non-invasive ventilation settings   Respiratory    Lab Data (Last 4 hours)    None         O2/Vent Data (Last 4 hours)       0800           Vent Mode AC/PC       Patient safety screen outcome: Failed       Resp Rate (BPM) (BPM) 22       Pressure Control (cmH2O) (cm) 22       Insp Time (sec) (sec) 1       FiO2 (%) (%) 50       PEEP (cmH2O) (cmH2O) 8       MV 10 3                   Physical Exam  Constitutional:       Appearance: He is ill-appearing  Interventions: He is sedated, intubated and restrained  HENT:      Head: Normocephalic and atraumatic  Eyes:      General: Lids are normal       Extraocular Movements: Extraocular movements intact        Conjunctiva/sclera: Conjunctivae normal    Neck: Trachea: Trachea normal    Cardiovascular:      Rate and Rhythm: Normal rate  Rhythm regularly irregular  Pulses:           Radial pulses are 2+ on the right side and 2+ on the left side  Dorsalis pedis pulses are 1+ on the right side and 1+ on the left side  Heart sounds: Normal heart sounds, S1 normal and S2 normal       Comments: Aflutter rate 60-70s  B/L UE with +2 pitting edema   Pulmonary:      Effort: Pulmonary effort is normal  He is intubated  Breath sounds: Rhonchi present  Comments: Thick brown/bloody ETT secretions   Abdominal:      General: Bowel sounds are normal       Palpations: Abdomen is soft  Musculoskeletal:      Cervical back: Neck supple  Comments: Normal ROM    Skin:     General: Skin is warm and dry  Capillary Refill: Capillary refill takes less than 2 seconds  Coloration: Skin is pale  Neurological:      GCS: GCS eye subscore is 3  GCS verbal subscore is 1  GCS motor subscore is 5               Laboratory and Diagnostics:  Results from last 7 days   Lab Units 01/05/23  0535 01/04/23  1102 01/03/23  0529 01/02/23  0501 01/01/23  0450 12/31/22  0634 12/30/22  0450   WBC Thousand/uL 37 38* 27 92* 39 42* 22 64* 23 78* 21 51* 22 55*   HEMOGLOBIN g/dL 8 5* 7 9* 9 0* 6 6* 7 9* 7 9* 9 3*   HEMATOCRIT % 27 8* 25 0* 28 2* 20 9* 24 9* 25 5* 29 6*   PLATELETS Thousands/uL 159 209 229 211 208 244 312   NEUTROS PCT %  --   --   --   --   --  95*  --    BANDS PCT %  --  6  --   --  5  --   --    MONOS PCT %  --   --   --   --   --  2*  --    MONO PCT %  --  1*  --   --  3*  --   --      Results from last 7 days   Lab Units 01/05/23  0535 01/04/23  0650 01/03/23  0529 01/02/23  0501 01/01/23  0450 12/31/22  0634 12/30/22  0450   SODIUM mmol/L 131* 136 136 133* 135 136 131*   POTASSIUM mmol/L 4 2 4 6 4 4 4 2 4 5 4 5 4 8   CHLORIDE mmol/L 94* 94* 96 92* 94* 97 93*   CO2 mmol/L 29 34* 32 32 29 28 26   ANION GAP mmol/L 8 8 8 9 12 11 12   BUN mg/dL 65* 86* 66* 92* 72* 60* 80*   CREATININE mg/dL 2 90* 3 77* 3 18* 4 39* 4 03* 3 63* 5 02*   CALCIUM mg/dL 7 8* 7 3* 7 2* 7 5* 7 6* 7 4* 7 1*   GLUCOSE RANDOM mg/dL 184* 214* 209* 294* 312* 239* 219*   ALT U/L 20 18  --   --  17  --   --    AST U/L 11 8  --   --  12  --   --    ALK PHOS U/L 81 71  --   --  82  --   --    ALBUMIN g/dL 2 3* 2 0*  --   --  2 1*  --   --    TOTAL BILIRUBIN mg/dL 0 49 0 53  --   --  0 69  --   --      Results from last 7 days   Lab Units 01/05/23  0535 01/04/23  0650 01/03/23  0529 01/02/23  0501 01/01/23  0450 12/31/22  0634 12/30/22  0450   MAGNESIUM mg/dL 2 0 2 1 2 0 2 1 2 1  --  2 3   PHOSPHORUS mg/dL 4 9* 5 9* 4 9* 5 7* 6 2* 6 0* 8 0*                   ABG:  Results from last 7 days   Lab Units 01/04/23  0534   PH ART  7 375   PCO2 ART mm Hg 56 3*   PO2 ART mm Hg 76 8   HCO3 ART mmol/L 32 2*   BASE EXC ART mmol/L 6 0   ABG SOURCE  Radial, Right     VBG:  Results from last 7 days   Lab Units 01/04/23  0534   ABG SOURCE  Radial, Right     Results from last 7 days   Lab Units 01/04/23  0650 12/31/22  1503 12/30/22  0450   PROCALCITONIN ng/ml 4 18* 10 77* 2 89*       Micro  Results from last 7 days   Lab Units 12/30/22  2319   SPUTUM CULTURE  2+ Growth of Candida albicans*  2+ Growth of   GRAM STAIN RESULT  Rare Polys*  1+ Gram positive cocci in pairs*  Rare Gram positive cocci in clusters*  Rare Budding yeast*   MRSA CULTURE ONLY  No Methicillin Resistant Staphlyococcus aureus (MRSA) isolated       No new imaging     Intake and Output  I/O       01/03 0701 01/04 0700 01/04 0701 01/05 0700 01/05 0701 01/06 0700    I V  (mL/kg) 1244 8 (14 7) 500 (6 1)     Blood 3751 6225     NG/ 355     IV Piggyback 430      Feedings 1122 1100     Total Intake(mL/kg) 6947 8 (82) 8180 (99 5)     Urine (mL/kg/hr) 0 (0) 0 (0)     Other  4500     Stool 625 600     Chest Tube 220 170     Total Output 845 5270     Net +6102 8 +2910                  Height and Weights   Height: 5' 8" (172 7 cm)  IBW (Ideal Body Weight): 68 4 kg  Body mass index is 27 55 kg/m²  Weight (last 2 days)     Date/Time Weight    01/05/23 0400 82 2 (181 22)    01/04/23 1745 80 7 (177 91)    01/04/23 1445 84 7 (186 73)    01/04/23 0600 84 7 (186 73)    01/03/23 0559 83 8 (184 75)            Nutrition       Diet Orders   (From admission, onward)             Start     Ordered    01/04/23 1234  Diet Enteral/Parenteral; Tube Feeding No Oral Diet; Nepro; Continuous; 50; Banatrol Plus Banana Flakes - One Packet Daily; 50; Water; Every 6 hours  Diet effective now        References:    Nutrtion Support Algorithm Enteral vs  Parenteral   Question Answer Comment   Diet Type Enteral/Parenteral    Enteral/Parenteral Tube Feeding No Oral Diet    Tube Feeding Formula: Nepro    Bolus/Cyclic/Continuous Continuous    Tube Feeding Goal Rate (mL/hr): 50    Banatrol Plus Banana Flakes Banatrol Plus Banana Flakes - One Packet Daily    Tube Feeding flush (mL): 50    Water Flush type: Water    Water flush frequency: Every 6 hours    RD to adjust diet per protocol?  Yes        01/04/23 1233    12/26/22 0815  Room Service  Once        Question:  Type of Service  Answer:  Room Service - Appropriate with Assistance    12/26/22 0814                  Active Medications  Scheduled Meds:  Current Facility-Administered Medications   Medication Dose Route Frequency Provider Last Rate   • acetaminophen  650 mg Oral Q4H PRN FERNANDO Schmitz     • amiodarone  0 5 mg/min Intravenous Continuous Joie Spironello VSEYMOURNP 0 5 mg/min (01/04/23 0851)   • calcium acetate  667 mg Oral TID With [employer], CRNP     • calcium carbonate-vitamin D  2 tablet Oral BID With Meals [employer], CRNP     • chlorhexidine  15 mL Mouth/Throat O96I [address] Inderjit Quan PA-C     • dexmedetomidine  0 1-0 7 mcg/kg/hr Intravenous Titrated Patyodi Dance, CRNP 0 4 mcg/kg/hr (01/04/23 2122)   • epoetin ani  8,000 Units Intravenous Once per day on Mon Wed Fri Sylvester Zhou MD     • fentanyl citrate (PF)  50 mcg Intravenous S3N PRN Reyna Hobsno PA-C     • insulin glargine  42 Units Subcutaneous QAM Joie Spironello V, CRNP     • insulin lispro  4-20 Units Subcutaneous Q6H [address] SEYMOUR McmullenNP     • ipratropium  0 5 mg Nebulization Q6H Joie Spironello V, CRNP     • levalbuterol  1 25 mg Nebulization Q6H Joie Spironello V, CRNP     • lidocaine (PF)    PRN Tenzin Duran MD     • methylPREDNISolone sodium succinate  80 mg Intravenous Daily Joie Spironello V, CRNP     • metoprolol tartrate  25 mg Per NG Tube Q12H [address] Joie Spironello V, CRNP     • omeprazole (PRILOSEC) suspension 2 mg/mL  20 mg Per NG Tube Daily Joie Spironello V, CRNP     • ondansetron  4 mg Intravenous C5E PRN Reyna Hobson PA-C     • pravastatin  80 mg Oral Daily With [employer], SEYMOURNP     • propofol  5-50 mcg/kg/min Intravenous Titrated SEYMOUR McmullenNP 20 mcg/kg/min (01/05/23 0039)   • saccharomyces boulardii  250 mg Oral BID SEYMOUR McmullenNP     • sodium chloride  4 mL Nebulization Q6H Joie Spironello V, CRNP       Continuous Infusions:  amiodarone, 0 5 mg/min, Last Rate: 0 5 mg/min (01/04/23 0851)  dexmedetomidine, 0 1-0 7 mcg/kg/hr, Last Rate: 0 4 mcg/kg/hr (01/04/23 2122)  propofol, 5-50 mcg/kg/min, Last Rate: 20 mcg/kg/min (01/05/23 0039)      PRN Meds:   acetaminophen, 650 mg, Q4H PRN  fentanyl citrate (PF), 50 mcg, Q1H PRN  lidocaine (PF), , PRN  ondansetron, 4 mg, Q6H PRN        Invasive Devices Review  Invasive Devices     Peripheral Intravenous Line  Duration           Long-Dwell Peripheral IV (Midline) 12/26/22 Left Brachial 10 days    Peripheral IV 01/02/23 Right;Upper;Ventral (anterior) Arm 3 days    Peripheral IV 01/05/23 Left Wrist <1 day          Hemodialysis Catheter  Duration           HD Permanent Double Catheter 302 days          Drain  Duration           NG/OG/Enteral Tube Orogastric 16 Fr Center mouth 10 days    Chest Tube Right Pleural 10 Fr  8 days    Rectal Tube With balloon 3 days          Airway  Duration           ETT  Cuffed; Hi-Lo 8 mm 4 days                Rationale for remaining devices: continue permacath for HD  ---------------------------------------------------------------------------------------  Advance Directive and Living Will:      Power of :    POLST:    ---------------------------------------------------------------------------------------  Care Time Delivered:   No Critical Care time spent       FERNANDO Jade      Portions of the record may have been created with voice recognition software  Occasional wrong word or "sound a like" substitutions may have occurred due to the inherent limitations of voice recognition software    Read the chart carefully and recognize, using context, where substitutions have occurred

## 2023-01-06 NOTE — ASSESSMENT & PLAN NOTE
Lab Results   Component Value Date    HGBA1C 5 7 (H) 10/19/2022       Recent Labs     01/05/23  1156 01/05/23  1734 01/05/23  1745 01/06/23  0002   POCGLU 170* 114 108 137       Blood Sugar Average: Last 72 hrs:  (P) 627 [id number]   · Continue SSI algorithm 5 q6h, Lantus to 42 units qAM   · Holding home regimen of 15u 70/30 BID   · Goal blood glucose less than 180

## 2023-01-06 NOTE — ASSESSMENT & PLAN NOTE
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  If they have any questions or difficulties they will call the office. Bill For Surgical Tray: no Punch Size In Mm: 4 X Depth Of Punch In Cm (Optional): 0 Notification Instructions: Patient will be notified of biopsy results. However, patient instructed to call the office if not contacted within 2 weeks. Biopsy Type: H and E Suture Removal: 14 days Wound Care: Petrolatum Dressing: bandage Post-Care Instructions: I reviewed with the patient in detail post-care instructions. Patient is to keep the biopsy site dry overnight, and then apply bacitracin twice daily until healed. Patient may apply hydrogen peroxide soaks to remove any crusting. Consent: Written consent was obtained and risks were reviewed including but not limited to scarring, infection, bleeding, scabbing, incomplete removal, nerve damage and allergy to anesthesia. Anesthesia Volume In Cc: 0.5 Anesthesia Type: 1% lidocaine with epinephrine Detail Level: Detailed Lab: -123

## 2023-01-06 NOTE — PROGRESS NOTES
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 given hemoptysis and worsening infiltrates   · Bronch on 12/26 with serial aliquots persistently bloody despite lavage consistent with DAH  · Completed 3 days of pulse dose steroids 12/29  · Continue solumedrol 80mg daily   · Hold anticoagulation  · Receiving Rituximab per rheumatology  · Dose 2 of 4 on 1/3, weekly on Tuesdays  · Completed 5/5 days of Plasmapheresis on 1/4  · S/p bronchoscopy for pulmonary toilet 1/1  Multiple tenacious mucoid plugs removed after lavage and suctioning from RML, RLL and LLL  · Continues with thick, brown secretions from ETT  · Required 1u PRBC transfusion 1/2 for Hgb 6 6  See anemia as outlined  · Appreciate pulmonary and rheumatology input    Acute respiratory failure with hypoxia [facility])  Assessment & Plan  · Patient presented to the emergency department on 12/25 with worsening shortness of breath and hypoxia after missing his dialysis treatment 12/24 due to lack of transportation  · Initially in the emergency room, the patient was placed on BiPAP but he was weaned to mid flow   · Overnight 12/25, became more hypoxic and ultimately required intubation  · Began having hemoptysis and significant blood suctioned post intubation  Concern for alveolar hemorrhage secondary to vasculitis-see plan as above   · Worsening hypoxia 12/30-12/31 overnight with peak pressuring on vent  Changed to [facility] and re-sedated  ABG with respiratory acidosis and P:F ratio 65     · CXR 12/30 PM with R lung re-expanded with R pleural CT to -20cm of suction but worsening of extensive B/L patchy pulmonary infiltrates as compared to prior  · Episode of acute hypoxia again 12/31 with SpO2 down to 60's, not improved with saline lavage and suctioning, paralytic, or bagging  Was difficult to bag and had persistent air leak despite ETT repositioning and re-inflation of balloon  SpO2 dipped to 28% briefly  Required ETT exchange  Now with #8 ETT  SpO2 improved after additional lavage and suctioning  · 1/1 ABG 7 33/57/79/29  PF ratio 98  · S/p bronch 1/1-  thick mucoid plugs removed from RML and b/l lower lobes  · Current Vent settings: AC/PC 22/22/40%/8  · Wean fio2/PEEP for O2 sat 92% or above   · Failed SBT d/t low vT and tachypnea   · Vent day #13  · Sedation with propofol and precedex  · Titrate for RAAS -1  · VAP bundle; chlorhexidine, PPI, HOB greater than 30 degrees  · Pulmonary toilet with xopenex/atrovent/3% NSS nebs q6h, chest PT TID, ETT suctioning PRN  · CXR 1/4 persistent extensive B/L pulmonary opacifications and dense RLL consolidation which appears worse as compared to prior imaging     SIRS (systemic inflammatory response syndrome) ([facility] [address] )  Assessment & Plan  · Patient presented to the emergency room on 12/25 with SIRS criteria- increased respiratory rate and hypoxia  · With decompensation on 12/25, antibiotics were started with cefepime/vanco given worsening hypoxia however low suspicion for infection  · BAL with mixed respiratory micheal  · pneumocystis smear negative   · Completing 5 days abx 12/29, however 12/30 with thick tan secretions and worsening leukocytosis and was restarted on cefepime  · Abx stopped again on  1/3  · MRSA negative  · Sputum cx on 12/26 and 12/30 with candida   · Trend temps and WBC   · Persistent leukocytosis of unclear etiology, steroid induced?    · procal difficult to interpret in setting of ESRD      Hydropneumothorax  Assessment & Plan  · Patient had a thoracentesis 12/21 (previous admission)  · Chest x-ray with hydropneumothorax thought to likely be related to this, possibly ex vacuo  · S/p R pleural chest tube with IR 12/27  · 220mL serous drainage x 24 hours  · CXR with small R pneumothorax with CT on H20 seal therefore chest tube placed by to -20cm suction, R lung was re-expanded on repeat CXR 12/30  · Subsequent CXRs with no pneumo, continue CT to -20cm suction       Toxic metabolic encephalopathy  Assessment & Plan  · Did not follow commands or move extremities after several hours off sedation 12/30  · Encephalopathy possibly in setting of critical illness, sedation effects, delirium, and hypercarbia   · Sedation resumed 12/30 evening d/t vent dyssynchrony, will attempt to wean sedation today for neuro exam   · CT head 12/31 without intracranial abnormality   · Neuro checks q4h  · Delirium precautions; regulate sleep/wake cycle, environmental controls, daily CAM ICU    Atrial flutter ([facility] [address] )  Assessment & Plan  · Rhythm deteriorated to Aflutter 2:1 block with rates in 140s on 1/2  · No response to IV metoprolol  · Given amio bolus and started on drip  · Unable to anticoagulate due to alveolar hemorrhage  · Continuous cardiac monitoring  · Received 250mcg IV Dig x 1 1/3  · D/C Amio gtt today, start PO amio   · Continue metoprolol 25mg BID (started 1/4)        ANCA-associated vasculitis  Assessment & Plan  · Kidney biopsy on 03/22: 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 for which he was previously treated with Cytoxan (discontinued in April) and subsequently prescribed prednisone taper  · Now with pulmonary involvement, continue rituximab and steroids as above   · Plasmapheresis 5/5 treatments completed 1/4     ESRD on dialysis [facility])  Assessment & Plan  Lab Results   Component Value Date    EGFR 19 01/05/2023    EGFR 14 01/04/2023    EGFR 17 01/03/2023    CREATININE 2 90 (H) 01/05/2023    CREATININE 3 77 (H) 01/04/2023    CREATININE 3 18 (H) 01/03/2023       · Patient scheduled dialysis Tuesday/Thursday/Saturday however he was unable to make his treatment on Saturday 12/24 due to the transportation bus not picking him up  · Patient started with worsening shortness of breath on 12/25 presented to the emergency room  · HD session done on 12/25 for 3 5L  · Continue MWF schedule for now per nephrology  · Continue PhosLo as ordered  · Last dialysis session yesterday with 4 5L removed, patient tolerated well  · Trend BMP  · Nephrology following, appreciate recs    Type 2 diabetes mellitus with chronic kidney disease on chronic dialysis, with long-term current use of insulin [facility])  Assessment & Plan  Lab Results   Component Value Date    HGBA1C 5 7 (H) 10/19/2022       Recent Labs     01/05/23  1156 01/05/23  1734 01/05/23  1745 01/06/23  0002   POCGLU 170* 114 108 137       Blood Sugar Average: Last 72 hrs:  (P) 803 [id number]   · Continue SSI algorithm 5 q6h, Lantus to 42 units qAM   · Holding home regimen of 15u 70/30 BID   · Goal blood glucose less than 180    Diarrhea  Assessment & Plan  · Worsening diarrhea 1/1-1/2 after initiation of Reglan for reported high gastric residuals  · Reglan discontinued, residuals remain <100  · Monitor stool output with FMS  · Continue florastor and banana flakes  · All stool softeners have been discontinued    Anemia due to chronic kidney disease, on chronic dialysis ([facility] [address] )  Assessment & Plan  · Patient had a recent admission from 12/20-12/23 with hemoglobin 5 7 -received 1 unit packed red blood cells at that time  · Most likely acute on chronic anemia in setting of ESRD now with DAH  · Received 1 unit of PRBCs 12/28 and again 1/2 for Hgb <7  · Hgb stable at 8 5 this morning  · Continue to trend daily and transfuse for hgb less than 7 0    Benign essential hypertension  Assessment & Plan  · Continue to hold home dose Norvasc and clonidine   · Continue metoprolol-started 1/4    Hyperlipidemia  Assessment & Plan  · Continue home statin      ----------------------------------------------------------------------------------------  HPI/24hr events: No acute events overnight  Patient remained hemodynamically stable mechanical ventilation  He was on Precedex and propofol infusions for sedation  Patient appropriate for transfer out of the ICU today?: No  Disposition: Continue Critical Care   Code Status: Level 1 - Full Code  ---------------------------------------------------------------------------------------  SUBJECTIVE  Intubated     Review of Systems   Unable to perform ROS: Intubated     Review of systems was unable to be performed secondary to intubation   ---------------------------------------------------------------------------------------  OBJECTIVE    Vitals   Vitals:    23 0241 23 0300 23 0400 23 0410   BP:  140/65 116/58    Pulse:  94 66    Resp:  22 22    Temp:   98 3 °F (36 8 °C)    TempSrc:   Temporal    SpO2: 96% 96% 97% 98%   Weight:       Height:         Temp (24hrs), Av 2 °F (36 8 °C), Min:97 6 °F (36 4 °C), Max:98 6 °F (37 °C)  Current: Temperature: 98 3 °F (36 8 °C)          Respiratory:  SpO2: SpO2: 98 %  Nasal Cannula O2 Flow Rate (L/min): 80 L/min    Invasive/non-invasive ventilation settings   Respiratory    Lab Data (Last 4 hours)      427            pH, Arterial       7 313             pCO2, Arterial       52 7             pO2, Arterial       76 2             HCO3, Arterial       26 1             Base Excess, Arterial       -0 4                  O2/Vent Data       410   Most Recent         Vent Mode AC/PC  AC/PC      Resp Rate (BPM) (BPM) 22  22      Pressure Control (cmH2O) (cm) 22  22      Insp Time (sec) (sec) 1  1      FiO2 (%) (%) 40  40      PEEP (cmH2O) (cmH2O) 8  8      MV 8 28  8 28                  Physical Exam  Vitals and nursing note reviewed  Constitutional:       Comments: B/L wrist restraints    HENT:      Head: Normocephalic and atraumatic        Right Ear: Tympanic membrane, ear canal and external ear normal       Left Ear: Tympanic membrane, ear canal and external ear normal  Nose: Nose normal       Mouth/Throat:      Mouth: Mucous membranes are dry  Pharynx: Oropharynx is clear  Eyes:      Extraocular Movements: Extraocular movements intact  Conjunctiva/sclera: Conjunctivae normal       Comments: B/L pupils 2 mm sluggish but reactive    Cardiovascular:      Rate and Rhythm: Normal rate  Rhythm irregular  Pulses: Normal pulses  Heart sounds: Normal heart sounds  Comments: Aflutter   Pulmonary:      Comments: ETT on mechanical ventilation   Abdominal:      General: Bowel sounds are normal       Palpations: Abdomen is soft  Genitourinary:     Comments: No void - due to HD   Musculoskeletal:         General: Normal range of motion  Cervical back: Normal range of motion and neck supple  Skin:     General: Skin is warm and dry  Capillary Refill: Capillary refill takes less than 2 seconds     Neurological:      Comments: Does not follow commands  Withdraws to painful stimuli    Psychiatric:      Comments: Intubated              Laboratory and Diagnostics:  Results from last 7 days   Lab Units 01/06/23  0511 01/05/23  0535 01/04/23  1102 01/03/23  0529 01/02/23  0501 01/01/23  0450 12/31/22  0634   WBC Thousand/uL 26 34* 37 38* 27 92* 39 42* 22 64* 23 78* 21 51*   HEMOGLOBIN g/dL 7 9* 8 5* 7 9* 9 0* 6 6* 7 9* 7 9*   HEMATOCRIT % 25 5* 27 8* 25 0* 28 2* 20 9* 24 9* 25 5*   PLATELETS Thousands/uL 166 159 209 229 211 208 244   NEUTROS PCT %  --   --   --   --   --   --  95*   BANDS PCT % 9*  --  6  --   --  5  --    MONOS PCT %  --   --   --   --   --   --  2*   MONO PCT % 0*  --  1*  --   --  3*  --      Results from last 7 days   Lab Units 01/05/23  0535 01/04/23  0650 01/03/23  0529 01/02/23  0501 01/01/23  0450 12/31/22  0634   SODIUM mmol/L 131* 136 136 133* 135 136   POTASSIUM mmol/L 4 2 4 6 4 4 4 2 4 5 4 5   CHLORIDE mmol/L 94* 94* 96 92* 94* 97   CO2 mmol/L 29 34* 32 32 29 28   ANION GAP mmol/L 8 8 8 9 12 11   BUN mg/dL 65* 86* 66* 92* 72* 60* CREATININE mg/dL 2 90* 3 77* 3 18* 4 39* 4 03* 3 63*   CALCIUM mg/dL 7 8* 7 3* 7 2* 7 5* 7 6* 7 4*   GLUCOSE RANDOM mg/dL 184* 214* 209* 294* 312* 239*   ALT U/L 20 18  --   --  17  --    AST U/L 11 8  --   --  12  --    ALK PHOS U/L 81 71  --   --  82  --    ALBUMIN g/dL 2 3* 2 0*  --   --  2 1*  --    TOTAL BILIRUBIN mg/dL 0 49 0 53  --   --  0 69  --      Results from last 7 days   Lab Units 01/06/23  0511 01/05/23  0535 01/04/23  0650 01/03/23  0529 01/02/23  0501 01/01/23  0450 12/31/22  0634   MAGNESIUM mg/dL 1 9 2 0 2 1 2 0 2 1 2 1  --    PHOSPHORUS mg/dL 6 0* 4 9* 5 9* 4 9* 5 7* 6 2* 6 0*                   ABG:  Results from last 7 days   Lab Units 01/06/23  0427   PH ART  7 313*   PCO2 ART mm Hg 52 7*   PO2 ART mm Hg 76 2   HCO3 ART mmol/L 26 1   BASE EXC ART mmol/L -0 4   ABG SOURCE  Radial, Right     VBG:  Results from last 7 days   Lab Units 01/06/23  0427   ABG SOURCE  Radial, Right     Results from last 7 days   Lab Units 01/04/23  0650 12/31/22  1503   PROCALCITONIN ng/ml 4 18* 10 77*       Micro  Results from last 7 days   Lab Units 12/30/22  2319   SPUTUM CULTURE  2+ Growth of Candida albicans*  2+ Growth of   GRAM STAIN RESULT  Rare Polys*  1+ Gram positive cocci in pairs*  Rare Gram positive cocci in clusters*  Rare Budding yeast*   MRSA CULTURE ONLY  No Methicillin Resistant Staphlyococcus aureus (MRSA) isolated         Imaging: I have personally reviewed pertinent reports  XR chest portable    Result Date: 12/30/2022  Impression: Small right basilar hydropneumothorax with right chest tube to waterseal by history  Additional lines and tubes as above  Diffuse bilateral airspace disease, unchanged  The study was marked in [facility] for immediate notification  Workstation performed: [id number]     XR chest portable    Result Date: 12/29/2022  Impression: Unchanged extensive bilateral airspace consolidation  Unchanged small bilateral pleural effusions   Workstation performed: [id number]     XR chest portable    Result Date: 12/28/2022  Impression: Right pigtail catheter with small effusions and small medial right pneumothorax  Persistent severe pneumonia  Workstation performed: [id number]     XR chest portable    Result Date: 12/27/2022  Impression: No pneumothorax identified  Stable bilateral airspace disease and effusions compared to yesterday  Workstation performed: [id number]     XR chest 1 view portable    Result Date: 12/26/2022  Impression: Moderate right hydropneumothorax, likely ex vacuo after thoracentesis  Persistent extensive bilateral consolidation which could be due to edema or pneumonia superimposed on rounded atelectasis  Workstation performed: [id number]     XR chest portable ICU    Result Date: 12/31/2022  Impression: Redemonstration of subtle linear lucency adjacent to the left heart border, question trace pneumomediastinum or trace left pneumothorax  Right pigtail catheter with small right pleural effusion  The previous small right basilar pneumothorax is not visible  Persistent bilateral pneumonia  Workstation performed: [id number]     XR chest portable ICU    Result Date: 12/27/2022  Impression: Right pigtail catheter with small effusions with no pneumothorax  Persistent severe bilateral consolidation  Workstation performed: [id number]     XR chest portable ICU    Result Date: 12/26/2022  Impression: Lines and tubes as above  Moderate right hydropneumothorax, possibly ex vacuo from recent thoracentesis, unchanged but better shown on previous CXR  Persistent severe bilateral consolidation which could be due to edema and/or pneumonia  I notified Juan C Zaidi by secure text on 12/26/2022 at 10:32 AM   He responded immediately  Workstation performed: [id number]     XR chest portable ICU    Result Date: 12/26/2022  Impression: Moderate right hydropneumothorax, unchanged from study earlier today, possibly ex vacuo after recent thoracentesis   Persistent severe bilateral consolidation which could be due to edema and/or pneumonia  I notified Herman Zaidi by secure text on 12/26/2022 at 10:32 AM   He responded immediately  Workstation performed: [id number]     CTA chest pe study    Result Date: 12/27/2022  Impression: Moderate hydropneumothorax, new compared to the prior  Consider chest tube placement  Marked interval increase in bilateral pulmonary infiltrates as detailed above  Nonspecific pattern, given patient history, considerations include COVID related  With the history of hemoptysis, alveolar hemorrhage can also have this appearance as well  ARDS/fluid overload also remains within the differential as well as other infectious entities  I personally discussed this study with Mark Fitzpatrick on 12/27/2022 at 9:16 AM  Workstation performed: [id number]     IR chest tube placement    Result Date: 12/27/2022  Impression: Impression: 1  Successful placement of a therapeutic 10 [place] all-purpose drainage catheter into the right pleural space under CT guidance  Workstation performed: [id number]     Intake and Output  I/O       01/04 0701 01/05 0700 01/05 0701 01/06 0700    I V  (mL/kg) 1325 8 (16 1) 737 3 (9)    Blood 6225     NG/ 175    Feedings 1100 850    Total Intake(mL/kg) 9005 8 (109 6) 1762 3 (21 4)    Urine (mL/kg/hr) 0 (0)     Other 4500 2000    Stool 600 150    Chest Tube 170 45    Total Output 5270 2195    Net +3735 8 -432 7                Height and Weights   Height: 5' 8" (172 7 cm)  IBW (Ideal Body Weight): 68 4 kg  Body mass index is 27 55 kg/m²    Weight (last 2 days)     Date/Time Weight    01/05/23 0400 82 2 (181 22)    01/04/23 1745 80 7 (177 91)    01/04/23 1445 84 7 (186 73)    01/04/23 0600 84 7 (186 73)            Nutrition       Diet Orders   (From admission, onward)             Start     Ordered    01/04/23 1234  Diet Enteral/Parenteral; Tube Feeding No Oral Diet; Nepro; Continuous; 50; Banatrol Plus Banana Flakes - One Packet Daily; 50; Water; Every 6 hours  Diet effective now        References:    Nutrtion Support Algorithm Enteral vs  Parenteral   Question Answer Comment   Diet Type Enteral/Parenteral    Enteral/Parenteral Tube Feeding No Oral Diet    Tube Feeding Formula: Nepro    Bolus/Cyclic/Continuous Continuous    Tube Feeding Goal Rate (mL/hr): 50    Banatrol Plus Banana Flakes Banatrol Plus Banana Flakes - One Packet Daily    Tube Feeding flush (mL): 50    Water Flush type: Water    Water flush frequency: Every 6 hours    RD to adjust diet per protocol?  Yes        01/04/23 1233    12/26/22 0815  Room Service  Once        Question:  Type of Service  Answer:  Room Service - Appropriate with Assistance    12/26/22 0814                  Active Medications  Scheduled Meds:  Current Facility-Administered Medications   Medication Dose Route Frequency Provider Last Rate   • acetaminophen  650 mg Oral Q4H PRN FERNANDO Nguyen     • calcium acetate  667 mg Oral TID With Meals BaronFERNANDO Avila     • calcium carbonate-vitamin D  2 tablet Oral BID With Meals FERNANDO Pruitt     • chlorhexidine  15 mL Mouth/Throat P24G [address] Williamjeannette Quan PA-C     • dexmedetomidine  0 1-0 7 mcg/kg/hr Intravenous Titrated FERNANDO Martinez 0 4 mcg/kg/hr (01/05/23 2158)   • epoetin ani  8,000 Units Intravenous Once per day on Mon Wed Fri Sanju Yoo MD     • fentanyl citrate (PF)  50 mcg Intravenous L7R PRN Abraham Cam PA-C     • insulin glargine  42 Units Subcutaneous QAM FERNANDO Valles     • insulin lispro  4-20 Units Subcutaneous Q6H [address] FERNANDO Mratinez     • ipratropium  0 5 mg Nebulization Q6H FERNANDO Valles     • levalbuterol  1 25 mg Nebulization Q6H FERNANDO Valles     • lidocaine (PF)    PRN Wellington Tobar MD     • methylPREDNISolone sodium succinate  80 mg Intravenous Daily FERNANDO Valles     • metoprolol tartrate  25 mg Per NG Tube Q12H [address] Linda Pineda FERNANDO Rodriguez     • omeprazole (PRILOSEC) suspension 2 mg/mL  20 mg Per NG Tube Daily FERNANDO Valles     • ondansetron  4 mg Intravenous D5I PRN Richmond Mojica PA-C     • pravastatin  80 mg Oral Daily With [employer] IndustriesFERNANDO     • propofol  5-50 mcg/kg/min Intravenous Titrated FRENANDO Chapman 15 mcg/kg/min (01/06/23 0406)   • saccharomyces boulardii  250 mg Oral BID FERNANDO Chapman     • sodium chloride  4 mL Nebulization Q6H FERNANDO Valles     • sulfamethoxazole-trimethoprim  80 mg of trimethoprim Per NG Tube Once per day on Mon Wed Fri FERNANDO Valles       Continuous Infusions:  dexmedetomidine, 0 1-0 7 mcg/kg/hr, Last Rate: 0 4 mcg/kg/hr (01/05/23 2158)  propofol, 5-50 mcg/kg/min, Last Rate: 15 mcg/kg/min (01/06/23 0406)      PRN Meds:   acetaminophen, 650 mg, Q4H PRN  fentanyl citrate (PF), 50 mcg, Q1H PRN  lidocaine (PF), , PRN  ondansetron, 4 mg, Q6H PRN        Invasive Devices Review  Invasive Devices     Peripheral Intravenous Line  Duration           Long-Dwell Peripheral IV (Midline) 12/26/22 Left Brachial 11 days    Peripheral IV 01/05/23 Left Wrist 1 day    Peripheral IV 01/05/23 Right Forearm <1 day          Hemodialysis Catheter  Duration           HD Permanent Double Catheter 303 days          Drain  Duration           NG/OG/Enteral Tube Orogastric 16 Fr Center mouth 11 days    Chest Tube Right Pleural 10 Fr  9 days    Rectal Tube With balloon 4 days          Airway  Duration           ETT  Cuffed; Hi-Lo 8 mm 5 days                ---------------------------------------------------------------------------------------  Care Time Delivered:   No Critical Care time spent       [facility]      Portions of the record may have been created with voice recognition software  Occasional wrong word or "sound a like" substitutions may have occurred due to the inherent limitations of voice recognition software    Read the chart carefully and recognize, using context, where substitutions have occurred

## 2023-01-06 NOTE — PROGRESS NOTES
NEPHROLOGY PROGRESS NOTE   Lary Camp 76 y o  male MRN: [id number]  Unit/Bed#: ICU 11 Encounter: [id number]  Reason for Consult: ESRD on HD    ASSESSMENT/PLAN:  ESRD on HD TTS @[facility] :  -Primary diseases ANCA vasculitis  HD dependent since 3/10/2022  -Last full treatment 1/4/23  -s/p extra 2 hr UF treatment 1/5/23 for UF  UF 2L  -EDW: 70 7 kg  -next treatment today  Will plan to continue Monday, Wednesday, Friday schedule during hospitalization  -will d/w Dr Maricruz Quan    Access: R IJV PermCath  -site clear  -continue to use for hemodialysis access    HTN/volume status:  -BP acceptable  -clinically, examines near euvolemic  -maintain goal BP <140/90  -home medications: Amlodipine 10 mg daily, clonidine 0 1 mg every 12 hours, losartan 50 mg twice daily  -current medications: Metoprolol 25 mg twice daily  -continue UF with dialysis as BP tolerates  -continue to monitor    Anemia in CKD:  -Hbg 7 9, below goal   Goal Hgb >10  -s/p pRBC 1/2/22  -On Mircera as outpatient  -ISAMAR 8000 units with HD  Dose increased 1/3/2023  -continue to monitor  -transfuse for Hgb <7 0    Bone mineral disease of renal origin:  -Hyperphosphatemia: Phosphorus 6 0  On PhosLo 667 mg 3 times daily  Currently on Nepro TF  Continue to monitor  -continue to monitor phosphorus, PTH, calcium, magnesium as outpatient    Acute hypoxic respiratory failure/VDRF with alveolar hemorrhage:  -Felt secondary to ANCA vasculitis  -Currently being treated with rituximab and steroids  -s/p PLEX and pulse steroids  -On Solu-Medrol 80 mg daily  -S/p rituximab 12/27/2022, 1/3/2023  completed 2/4 doses  -PR3 Ab >8 0 on 12/21/22  -Remains on vent  Just placed on PS  - Management per primary team    R hydropneumothorax:  -s/p left tube thoracostomy  - Management per primary team    Leukocytosis:  - Improving    WBC 26K today  - On steroids  - Continue to monitor  - Management per primary team      Nephrology Disposition Recommendations  Not medically stable for discharge from a Nephrology Standpoint Patient continues to require treatment for Other comorbidities, including respiratory failure     SUBJECTIVE:  Patient remains sedated and on vent  Opens eyes to voice but does not follow commands  Currently on pressure support  Tolerated extra UF treatment yesterday with 2 L removed  Volume status stable today  Hemodynamic stable  I/O +100  Drips: Precedex, diprivan    A complete review of systems was unable to be performed due to patient intubated and sedated       OBJECTIVE:  Current Weight: Weight - Scale: 82 3 kg (181 lb 7 oz)  Vitals:    01/06/23 0600 01/06/23 0700 01/06/23 0800 01/06/23 0813   BP: 118/56 144/65 151/69    BP Location:   Right arm    Pulse: 65 71 86    Resp: 22 (!) 25 20    Temp:   97 7 °F (36 5 °C)    TempSrc:   Temporal    SpO2: 98% 98% 98% 98%   Weight:       Height:           Intake/Output Summary (Last 24 hours) at 1/6/2023 0855  Last data filed at 1/6/2023 0600  Gross per 24 hour   Intake 2386 72 ml   Output 2260 ml   Net 126 72 ml     General: Opens eyes to voice but does not follow commands  Sedated on vent appears comfortable and in no acute distress  Nontoxic  Skin:  No rash, warm, good skin turgor   Eyes:  PERRL, EOMI, sclerae nonicteric   no periorbital edema   ENT:  Moist mucous membranes  Neck:  Trachea midline, symmetric  No JVD  Chest:  Clear to auscultation bilaterally without wheezes, crackles or rhonchi  Decreased breath sounds right base  CVS:  Regular rate and rhythm without murmur, gallop or rub  S1 and S2 identified and normal   No S3, S4    Abdomen:  Soft, nontender, nondistended without masses  Normal bowel sounds x 4 quadrants  No bruit  Extremities:  Warm, pink, motor and sensory intact and well perfused  No cyanosis, pallor  No BLE edema  Trace bilateral upper extremity edema  Neuro: Opens eyes to voice but does not follow commands    Grossly intact  Psych: Sedated on vent  Access: R IJV permcath in place with site clear       Medications:    Current Facility-Administered Medications:   •  acetaminophen (TYLENOL) oral suspension 650 mg, 650 mg, Oral, Q4H PRN, FERNANDO John, 650 mg at 01/04/23 1017  •  calcium acetate (PHOSLO) capsule 667 mg, 667 mg, Oral, TID With Meals, FERNANDO Serrato, 667 mg at 01/06/23 2888  •  calcium carbonate-vitamin D 500 mg-5 mcg tablet 2 tablet, 2 tablet, Oral, BID With Meals, FERNANDO Serrato, 2 tablet at 01/06/23 0847  •  chlorhexidine (PERIDEX) 0 12 % oral rinse 15 mL, 15 mL, Mouth/Throat, K[address], Corin Quan PA-C, 15 mL at 01/06/23 0847  •  dexmedeTOMIDine (Precedex) 400 mcg in sodium chloride 0 9 % 100 mL infusion, 0 1-0 7 mcg/kg/hr, Intravenous, Titrated, FERNANDO Anand, Last Rate: 7 9 mL/hr at 01/05/23 2158, 0 4 mcg/kg/hr at 01/05/23 2158  •  epoetin ani (EPOGEN,PROCRIT) injection 8,000 Units, 8,000 Units, Intravenous, Once per day on Mon Wed Fri, Isidro Harmon MD, 8,000 Units at 01/04/23 1746  •  fentanyl citrate (PF) 100 MCG/2ML 50 mcg, 50 mcg, Intravenous, Z7Y PRN, Jorge Quan PA-C, 50 mcg at 12/30/22 2201  •  insulin glargine (LANTUS) subcutaneous injection 42 Units 0 42 mL, 42 Units, Subcutaneous, QAM, FERNANDO Valles, 42 Units at 01/06/23 0847  •  insulin lispro (HumaLOG) 100 units/mL subcutaneous injection 4-20 Units, 4-20 Units, Subcutaneous, Q6H [address], 4 Units at 01/06/23 0612 **AND** Fingerstick Glucose (POCT), , , Q6H, FERNANDO Anand  •  ipratropium (ATROVENT) 0 02 % inhalation solution 0 5 mg, 0 5 mg, Nebulization, Q6H, Joie Spironello V, CRNP, 0 5 mg at 01/06/23 8767  •  levalbuterol (XOPENEX) inhalation solution 1 25 mg, 1 25 mg, Nebulization, Q6H, Joie Spironello V, CRNP, 1 25 mg at 01/06/23 2489  •  lidocaine (PF) (XYLOCAINE-MPF) 1 % injection, , , PRN, Dyan Cruz MD, 5 mL at 12/27/22 1349  •  methylPREDNISolone sodium succinate (Solu-MEDROL) injection 80 mg, 80 mg, Intravenous, Daily, Joie Nicolaso V, CRNP, 80 mg at 01/05/23 2106  •  metoprolol tartrate (LOPRESSOR) tablet 25 mg, 25 mg, Per NG Tube, Q12H [facility], Joie Fajardonello V, CRNP, 25 mg at 01/06/23 0847  •  omeprazole (PRILOSEC) suspension 2 mg/mL, 20 mg, Per NG Tube, Daily, Joie Fajardonello V, CRNP, 20 mg at 01/05/23 0950  •  ondansetron (ZOFRAN) injection 4 mg, 4 mg, Intravenous, K2U PRN, Judy Quan PA-C  •  pravastatin (PRAVACHOL) tablet 80 mg, 80 mg, Oral, Daily With Jeet Velez, SEYMOURNP, 80 mg at 01/05/23 1621  •  propofol (DIPRIVAN) 1000 mg in 100 mL infusion (premix), 5-50 mcg/kg/min, Intravenous, Titrated, FERNANDO Capps, Last Rate: 4 8 mL/hr at 01/06/23 0620, 10 mcg/kg/min at 01/06/23 0620  •  saccharomyces boulardii (FLORASTOR) capsule 250 mg, 250 mg, Oral, BID, SEYMOUR CappsNP, 250 mg at 01/06/23 0847  •  sodium chloride 3 % inhalation solution 4 mL, 4 mL, Nebulization, Q6H, Joie Nicolaso V, FERNANDO, 4 mL at 01/06/23 5179  •  sulfamethoxazole-trimethoprim (BACTRIM) oral suspension 80 mg of trimethoprim, 80 mg of trimethoprim, Per NG Tube, Once per day on Mon Wed Fri, Joie Zuniga rapids V, CRNP    Laboratory Results:  Results from last 7 days   Lab Units 01/06/23  0511 01/05/23  0535 01/04/23  1102 01/04/23  0650 01/03/23  0529 01/02/23  0501 01/01/23  0450 12/31/22  0634   WBC Thousand/uL 26 34* 37 38* 27 92*  --  39 42* 22 64* 23 78* 21 51*   HEMOGLOBIN g/dL 7 9* 8 5* 7 9*  --  9 0* 6 6* 7 9* 7 9*   HEMATOCRIT % 25 5* 27 8* 25 0*  --  28 2* 20 9* 24 9* 25 5*   PLATELETS Thousands/uL 166 159 209  --  229 211 208 244   SODIUM mmol/L  --  131*  --  136 136 133* 135 136   POTASSIUM mmol/L  --  4 2  --  4 6 4 4 4 2 4 5 4 5   CHLORIDE mmol/L  --  94*  --  94* 96 92* 94* 97   CO2 mmol/L  --  29  --  34* 32 32 29 28   BUN mg/dL  --  65*  --  86* 66* 92* 72* 60*   CREATININE mg/dL  --  2 90*  --  3 77* 3 18* 4 39* 4 03* 3 63*   CALCIUM mg/dL  --  7 8*  --  7 3* 7 2* 7 5* 7 6* 7 4*   MAGNESIUM mg/dL 1 9 2 0  --  2 1 2 0 2 1 2 1  --    PHOSPHORUS mg/dL 6 0* 4 9*  --  5 9* 4 9* 5 7* 6 2* 6 0*   Imaging Study:  CXR1/6/23:  Imaging reviewed with R sided patchy infiltrates  R chest tube in place with lung expanded  I have personally reviewed the blood work as stated above and in my note  I have personally reviewed CXR imaging studies  I have personally reviewed internal Medicine, co-consultants and previous nephrology notes

## 2023-01-06 NOTE — PLAN OF CARE
Post-Dialysis RN Treatment Note    Blood Pressure:  Pre: 145/70 mm/Hg  Post: 134/60 mmHg   EDW: 71 0 kg    Weight:  Pre: 82 3 kg   Post: 79 7 kg   Mode of weight measurement: Bed Scale   Volume Removed: 3600 ml    Treatment duration: 180 minutes    NS given  No    Treatment shortened?  No   Medications given during Rx: Epogen   Estimated Kt/V  Not Applicable   Access type: Permacath/TDC   Access Issues: Yes, describe: Lines reversed     Report called to primary nurse   Yes, Shelby    Problem: METABOLIC, FLUID AND ELECTROLYTES - ADULT  Goal: Electrolytes maintained within normal limits  Description: INTERVENTIONS:  - Monitor labs and assess patient for signs and symptoms of electrolyte imbalances  - Administer electrolyte replacement as ordered  - Monitor response to electrolyte replacements, including repeat lab results as appropriate  - Instruct patient on fluid and nutrition as appropriate  Outcome: Progressing  Goal: Fluid balance maintained  Description: INTERVENTIONS:  - Monitor labs   - Monitor I/O and WT  - Instruct patient on fluid and nutrition as appropriate  - Assess for signs & symptoms of volume excess or deficit  Outcome: Progressing

## 2023-01-06 NOTE — ASSESSMENT & PLAN NOTE
· Patient presented to the emergency department on 12/25 with worsening shortness of breath and hypoxia after missing his dialysis treatment 12/24 due to lack of transportation  · Initially in the emergency room, the patient was placed on BiPAP but he was weaned to mid flow   · Overnight 12/25, became more hypoxic and ultimately required intubation  · Began having hemoptysis and significant blood suctioned post intubation  Concern for alveolar hemorrhage secondary to vasculitis-see plan as above   · Worsening hypoxia 12/30-12/31 overnight with peak pressuring on vent  [facility] to [facility] and re-sedated  ABG with respiratory acidosis and P:F ratio 65  · CXR 12/30 PM with R lung re-expanded with R pleural CT to -20cm of suction but worsening of extensive B/L patchy pulmonary infiltrates as compared to prior  · Episode of acute hypoxia again 12/31 with SpO2 down to 60's, not improved with saline lavage and suctioning, paralytic, or bagging  Was difficult to bag and had persistent air leak despite ETT repositioning and re-inflation of balloon  SpO2 dipped to 28% briefly  Required ETT exchange  Now with #8 ETT  SpO2 improved after additional lavage and suctioning  · 1/1 ABG 7 33/57/79/29   PF ratio 98  · S/p bronch 1/1-  thick mucoid plugs removed from RML and b/l lower lobes  · Current Vent settings: AC/PC 22/22/40%/8  · Wean fio2/PEEP for O2 sat 92% or above   · Failed SBT d/t low vT and tachypnea   · Vent day #13  · Sedation with propofol and precedex  · Titrate for RAAS -1  · VAP bundle; chlorhexidine, PPI, HOB greater than 30 degrees  · Pulmonary toilet with xopenex/atrovent/3% NSS nebs q6h, chest PT TID, ETT suctioning PRN  · CXR 1/4 persistent extensive B/L pulmonary opacifications and dense RLL consolidation which appears worse as compared to prior imaging

## 2023-01-07 NOTE — ASSESSMENT & PLAN NOTE
· Patient presented to the emergency department on 12/25 with worsening shortness of breath and hypoxia after missing his dialysis treatment 12/24 due to lack of transportation  · Initially in the emergency room, the patient was placed on BiPAP but he was weaned to mid flow   · Overnight 12/25, became more hypoxic and ultimately required intubation  · Began having hemoptysis and significant blood suctioned post intubation  Concern for alveolar hemorrhage secondary to vasculitis-see plan as above   · Worsening hypoxia 12/30-12/31 overnight with peak pressuring on vent  Changed to [facility] and re-sedated  ABG with respiratory acidosis and P:F ratio 65  · CXR 12/30 PM with R lung re-expanded with R pleural CT to -20cm of suction but worsening of extensive B/L patchy pulmonary infiltrates as compared to prior  · Episode of acute hypoxia again 12/31 with SpO2 down to 60's, not improved with saline lavage and suctioning, paralytic, or bagging  Was difficult to bag and had persistent air leak despite ETT repositioning and re-inflation of balloon  SpO2 dipped to 28% briefly  Required ETT exchange  Now with #8 ETT  SpO2 improved after additional lavage and suctioning  · 1/1 ABG 7 33/57/79/29   PF ratio 98  · S/p bronch 1/1-  thick mucoid plugs removed from RML and b/l lower lobes  · Current Vent settings: AC/PC 22/22/40%/8  · Wean fio2/PEEP for O2 sat 92% or above   · Failed SBT d/t low vT and tachypnea   · Vent day #14  · Sedation with propofol and precedex  · Titrate for RAAS -1  · VAP bundle; chlorhexidine, PPI, HOB greater than 30 degrees  · Pulmonary toilet with xopenex/atrovent/3% NSS nebs q6h, chest PT TID, ETT suctioning PRN  · CXR 1/4 persistent extensive B/L pulmonary opacifications and dense RLL consolidation which appears worse as compared to prior imaging

## 2023-01-07 NOTE — ASSESSMENT & PLAN NOTE
· Rhythm deteriorated to Aflutter 2:1 block with rates in 140s on 1/2  · No response to IV metoprolol  · Given amio bolus and started on drip - transitioned to PO   · Unable to anticoagulate due to alveolar hemorrhage  · Continuous cardiac monitoring  · Received 250mcg IV Dig x 1 1/3   · Continue metoprolol 25mg BID (started 1/4)

## 2023-01-07 NOTE — ASSESSMENT & PLAN NOTE
Lab Results   Component Value Date    HGBA1C 5 7 (H) 10/19/2022       Recent Labs     01/06/23  0611 01/06/23  1217 01/06/23  1743 01/07/23  0002   POCGLU 172* 171* 123 144*       Blood Sugar Average: Last 72 hrs:  (P) 163   · Continue SSI algorithm 5 q6h, Lantus to 42 units qAM   · Holding home regimen of 15u 70/30 BID   · Goal blood glucose less than 180

## 2023-01-07 NOTE — QUICK NOTE
Talked with the patient's wife, Louis Peguero, over the phone  Updated her on patient's current clinical status  Discussed need for possible tracheostomy pending clinical course  She seemed hesitant of the idea of pursuing a tracheostomy  Encouraged her to think further about it and re-discuss with the team in the coming days  All questions answered to her satisfaction      Taylor Meng PA-C

## 2023-01-07 NOTE — RESPIRATORY THERAPY NOTE
RT Ventilator Management Note  Galileo Llanos 76 y o  male MRN: [id number]  Unit/Bed#: ICU 11 Encounter: [id number]      Daily Screen       1/4/2023  1131 1/5/2023  0800          Patient safety screen outcome[de-identified] Failed Failed      Not Ready for Weaning due to[de-identified] Underline problem not resolved Underline problem not resolved              Physical Exam:   Assessment Type: Pre-treatment  General Appearance: Alert, Awake  Respiratory Pattern: Assisted, Tachypneic  Chest Assessment: Chest expansion symmetrical  Bilateral Breath Sounds: Diminished, Coarse  Location Specific: No  Cough: Non-productive  O2 Device: VENT      Resp Comments: patient tried on PSV became tachypneic place back on mandatory settings

## 2023-01-07 NOTE — PROGRESS NOTES
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 given hemoptysis and worsening infiltrates   · Bronch on 12/26 with serial aliquots persistently bloody despite lavage consistent with DAH  · Completed 3 days of pulse dose steroids 12/29  · Continue solumedrol 80mg daily   · Hold anticoagulation  · Receiving Rituximab per rheumatology  · Dose 2 of 4 on 1/3, weekly on Tuesdays  · Completed 5/5 days of Plasmapheresis on 1/4  · S/p bronchoscopy for pulmonary toilet 1/1  Multiple tenacious mucoid plugs removed after lavage and suctioning from RML, RLL and LLL  · Continues with thick, brown secretions from ETT  · Required 1u PRBC transfusion 1/2 for Hgb 6 6  See anemia as outlined  · Appreciate pulmonary and rheumatology input    Acute respiratory failure with hypoxia [facility])  Assessment & Plan  · Patient presented to the emergency department on 12/25 with worsening shortness of breath and hypoxia after missing his dialysis treatment 12/24 due to lack of transportation  · Initially in the emergency room, the patient was placed on BiPAP but he was weaned to mid flow   · Overnight 12/25, became more hypoxic and ultimately required intubation  · Began having hemoptysis and significant blood suctioned post intubation  Concern for alveolar hemorrhage secondary to vasculitis-see plan as above   · Worsening hypoxia 12/30-12/31 overnight with peak pressuring on vent  Changed to [facility] and re-sedated  ABG with respiratory acidosis and P:F ratio 65     · CXR 12/30 PM with R lung re-expanded with R pleural CT to -20cm of suction but worsening of extensive B/L patchy pulmonary infiltrates as compared to prior  · Episode of acute hypoxia again 12/31 with SpO2 down to 60's, not improved with saline lavage and suctioning, paralytic, or bagging  Was difficult to bag and had persistent air leak despite ETT repositioning and re-inflation of balloon  SpO2 dipped to 28% briefly  Required ETT exchange  Now with #8 ETT  SpO2 improved after additional lavage and suctioning  · 1/1 ABG 7 33/57/79/29  PF ratio 98  · S/p bronch 1/1-  thick mucoid plugs removed from RML and b/l lower lobes  · Current Vent settings: AC/PC 22/22/40%/8  · Wean fio2/PEEP for O2 sat 92% or above   · Failed SBT d/t low vT and tachypnea   · Vent day #14  · Sedation with propofol and precedex  · Titrate for RAAS -1  · VAP bundle; chlorhexidine, PPI, HOB greater than 30 degrees  · Pulmonary toilet with xopenex/atrovent/3% NSS nebs q6h, chest PT TID, ETT suctioning PRN  · CXR 1/4 persistent extensive B/L pulmonary opacifications and dense RLL consolidation which appears worse as compared to prior imaging     SIRS (systemic inflammatory response syndrome) ([facility] [address] )  Assessment & Plan  · Patient presented to the emergency room on 12/25 with SIRS criteria- increased respiratory rate and hypoxia  · With decompensation on 12/25, antibiotics were started with cefepime/vanco given worsening hypoxia however low suspicion for infection  · BAL with mixed respiratory micheal  · pneumocystis smear negative   · Completing 5 days abx 12/29, however 12/30 with thick tan secretions and worsening leukocytosis and was restarted on cefepime  · Abx stopped again on  1/3  · MRSA negative  · Sputum cx on 12/26 and 12/30 with candida   · Trend temps and WBC   · Persistent leukocytosis of unclear etiology, steroid induced?    · procal difficult to interpret in setting of ESRD      Hydropneumothorax  Assessment & Plan  · Patient had a thoracentesis 12/21 (previous admission)  · Chest x-ray with hydropneumothorax thought to likely be related to this, possibly ex vacuo  · S/p R pleural chest tube with IR 12/27  · 220mL serous drainage x 24 hours  · CXR with small R pneumothorax with CT on H20 seal therefore chest tube placed by to -20cm suction, R lung was re-expanded on repeat CXR 12/30  · Subsequent CXRs with no pneumo, continue CT to -20cm suction       Toxic metabolic encephalopathy  Assessment & Plan  · Did not follow commands or move extremities after several hours off sedation 12/30  · Encephalopathy possibly in setting of critical illness, sedation effects, delirium, and hypercarbia   · Sedation resumed 12/30 evening d/t vent dyssynchrony, will attempt to wean sedation today for neuro exam   · CT head 12/31 without intracranial abnormality   · Neuro checks q4h  · Delirium precautions; regulate sleep/wake cycle, environmental controls, daily CAM ICU    ANCA-associated vasculitis  Assessment & Plan  · Kidney biopsy on 03/22: 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)  Assessment & Plan  Lab Results   Component Value Date    EGFR 19 01/05/2023    EGFR 14 01/04/2023    EGFR 17 01/03/2023    CREATININE 2 90 (H) 01/05/2023    CREATININE 3 77 (H) 01/04/2023    CREATININE 3 18 (H) 01/03/2023       · Patient scheduled dialysis Tuesday/Thursday/Saturday however he was unable to make his treatment on Saturday 12/24 due to the transportation bus not picking him up  · Patient started with worsening shortness of breath on 12/25 presented to the emergency room  · HD session done on 12/25 for 3 5L  · Continue MWF schedule for now per nephrology  · Continue PhosLo as ordered  · Last dialysis session yesterday with 4 5L removed, patient tolerated well  · Trend BMP  · Nephrology following, appreciate recs    Type 2 diabetes mellitus with chronic kidney disease on chronic dialysis, with long-term current use of insulin [facility])  Assessment & Plan  Lab Results   Component Value Date    HGBA1C 5 7 (H) 10/19/2022       Recent Labs     01/06/23  0611 01/06/23  1217 01/06/23  1743 01/07/23  0002   POCGLU 172* 171* 123 144*       Blood Sugar Average: Last 72 hrs:  (P) 163   · Continue SSI algorithm 5 q6h, Lantus to 42 units qAM   · Holding home regimen of 15u 70/30 BID   · Goal blood glucose less than 180    Anemia due to chronic kidney disease, on chronic dialysis [facility])  Assessment & Plan  · Patient had a recent admission from 12/20-12/23 with hemoglobin 5 7 -received 1 unit packed red blood cells at that time  · Most likely acute on chronic anemia in setting of ESRD now with DAH  · Received 1 unit of PRBCs 12/28 and again 1/2 for Hgb <7  · Hgb stable at 8 5 this morning  · Continue to trend daily and transfuse for hgb less than 7 0    Atrial flutter (HCC)  Assessment & Plan  · Rhythm deteriorated to Aflutter 2:1 block with rates in 140s on 1/2  · No response to IV metoprolol  · Given amio bolus and started on drip - transitioned to PO   · Unable to anticoagulate due to alveolar hemorrhage  · Continuous cardiac monitoring  · Received 250mcg IV Dig x 1 1/3   · Continue metoprolol 25mg BID (started 1/4)        Benign essential hypertension  Assessment & Plan  · Continue to hold home dose Norvasc and clonidine   · Continue metoprolol-started 1/4    Diarrhea  Assessment & Plan  · Worsening diarrhea 1/1-1/2 after initiation of Reglan for reported high gastric residuals  · Reglan discontinued, residuals remain <100  · Monitor stool output with FMS  · Continue florastor and banana flakes  · All stool softeners have been discontinued    Hyperlipidemia  Assessment & Plan  · Continue home statin      ----------------------------------------------------------------------------------------  HPI/24hr events: No acute events overnight    Patient remained hemodynamically stable on mechanical ventilation  Patient appropriate for transfer out of the ICU today?: No  Disposition: Continue Critical Care   Code Status: Level 1 - Full Code  ---------------------------------------------------------------------------------------  SUBJECTIVE  Intubated     Review of Systems   Unable to perform ROS: Intubated     Review of systems was unable to be performed secondary to intubation   ---------------------------------------------------------------------------------------  OBJECTIVE    Vitals   Vitals:    23 0100 23 0200 23 0300 23 0311   BP: 139/64 145/76 156/78    Pulse: 88 (!) 121 (!) 121    Resp: 18 13 18    Temp:       TempSrc:       SpO2: 95% 96% 96% 96%   Weight:       Height:         Temp (24hrs), Av °F (36 7 °C), Min:97 6 °F (36 4 °C), Max:98 5 °F (36 9 °C)  Current: Temperature: 98 3 °F (36 8 °C)          Respiratory:  SpO2: SpO2: 96 %  Nasal Cannula O2 Flow Rate (L/min): 80 L/min    Invasive/non-invasive ventilation settings   Respiratory    Lab Data (Last 4 hours)    None         O2/Vent Data (Last 4 hours)      311           Vent Mode AC/PC       Resp Rate (BPM) (BPM) 22       Pressure Control (cmH2O) (cm) 22       Insp Time (sec) (sec) 1       FiO2 (%) (%) 40       PEEP (cmH2O) (cmH2O) 8       MV 8 71                   Physical Exam  Vitals and nursing note reviewed  Constitutional:       Comments: B/L wrist restraints    HENT:      Head: Normocephalic and atraumatic  Right Ear: Tympanic membrane, ear canal and external ear normal       Left Ear: Tympanic membrane, ear canal and external ear normal       Nose: Nose normal       Mouth/Throat:      Mouth: Mucous membranes are dry  Pharynx: Oropharynx is clear  Eyes:      Extraocular Movements: Extraocular movements intact  Conjunctiva/sclera: Conjunctivae normal       Pupils: Pupils are equal, round, and reactive to light     Cardiovascular: Rate and Rhythm: Regular rhythm  Tachycardia present  Pulses: Normal pulses  Heart sounds: Normal heart sounds  Pulmonary:      Comments: ETT on mechanical ventilation   B/L breath sounds coarse and diminished  R chest tube   Abdominal:      General: Bowel sounds are normal       Palpations: Abdomen is soft  Comments: Tube feeds via OGT   Genitourinary:     Comments: Anuric   Musculoskeletal:         General: Normal range of motion  Cervical back: Normal range of motion and neck supple  Skin:     General: Skin is warm and dry  Capillary Refill: Capillary refill takes less than 2 seconds  Neurological:      Comments: Withdraws to painful stimuli  Does not follow commands    Psychiatric:      Comments:  On sedation              Laboratory and Diagnostics:  Results from last 7 days   Lab Units 01/06/23  0511 01/05/23  0535 01/04/23  1102 01/03/23  0529 01/02/23  0501 01/01/23  0450 12/31/22  0634   WBC Thousand/uL 26 34* 37 38* 27 92* 39 42* 22 64* 23 78* 21 51*   HEMOGLOBIN g/dL 7 9* 8 5* 7 9* 9 0* 6 6* 7 9* 7 9*   HEMATOCRIT % 25 5* 27 8* 25 0* 28 2* 20 9* 24 9* 25 5*   PLATELETS Thousands/uL 166 159 209 229 211 208 244   NEUTROS PCT %  --   --   --   --   --   --  95*   BANDS PCT % 9*  --  6  --   --  5  --    MONOS PCT %  --   --   --   --   --   --  2*   MONO PCT % 0*  --  1*  --   --  3*  --      Results from last 7 days   Lab Units 01/05/23  0535 01/04/23  0650 01/03/23  0529 01/02/23  0501 01/01/23  0450 12/31/22  0634   SODIUM mmol/L 131* 136 136 133* 135 136   POTASSIUM mmol/L 4 2 4 6 4 4 4 2 4 5 4 5   CHLORIDE mmol/L 94* 94* 96 92* 94* 97   CO2 mmol/L 29 34* 32 32 29 28   ANION GAP mmol/L 8 8 8 9 12 11   BUN mg/dL 65* 86* 66* 92* 72* 60*   CREATININE mg/dL 2 90* 3 77* 3 18* 4 39* 4 03* 3 63*   CALCIUM mg/dL 7 8* 7 3* 7 2* 7 5* 7 6* 7 4*   GLUCOSE RANDOM mg/dL 184* 214* 209* 294* 312* 239*   ALT U/L 20 18  --   --  17  --    AST U/L 11 8  --   --  12  --    ALK PHOS U/L 81 71  -- --  82  --    ALBUMIN g/dL 2 3* 2 0*  --   --  2 1*  --    TOTAL BILIRUBIN mg/dL 0 49 0 53  --   --  0 69  --      Results from last 7 days   Lab Units 01/06/23  0511 01/05/23  0535 01/04/23  0650 01/03/23  0529 01/02/23  0501 01/01/23  0450 12/31/22  0634   MAGNESIUM mg/dL 1 9 2 0 2 1 2 0 2 1 2 1  --    PHOSPHORUS mg/dL 6 0* 4 9* 5 9* 4 9* 5 7* 6 2* 6 0*                   ABG:  Results from last 7 days   Lab Units 01/06/23  0427   PH ART  7 313*   PCO2 ART mm Hg 52 7*   PO2 ART mm Hg 76 2   HCO3 ART mmol/L 26 1   BASE EXC ART mmol/L -0 4   ABG SOURCE  Radial, Right     VBG:  Results from last 7 days   Lab Units 01/06/23  0427   ABG SOURCE  Radial, Right     Results from last 7 days   Lab Units 01/06/23  0511 01/04/23  0650 12/31/22  1503   PROCALCITONIN ng/ml 4 17* 4 18* 10 77*       Micro          Imaging: I have personally reviewed pertinent reports  XR chest portable    Result Date: 12/30/2022  Impression: Small right basilar hydropneumothorax with right chest tube to waterseal by history  Additional lines and tubes as above  Diffuse bilateral airspace disease, unchanged  The study was marked in [facility] for immediate notification  Workstation performed: [id number]     XR chest portable    Result Date: 12/29/2022  Impression: Unchanged extensive bilateral airspace consolidation  Unchanged small bilateral pleural effusions  Workstation performed: [id number]     XR chest portable    Result Date: 12/28/2022  Impression: Right pigtail catheter with small effusions and small medial right pneumothorax  Persistent severe pneumonia  Workstation performed: [id number]     XR chest portable    Result Date: 12/27/2022  Impression: No pneumothorax identified  Stable bilateral airspace disease and effusions compared to yesterday  Workstation performed: [id number]     XR chest 1 view portable    Result Date: 12/26/2022  Impression: Moderate right hydropneumothorax, likely ex vacuo after thoracentesis   Persistent extensive bilateral consolidation which could be due to edema or pneumonia superimposed on rounded atelectasis  Workstation performed: [id number]     XR chest portable ICU    Result Date: 12/31/2022  Impression: Redemonstration of subtle linear lucency adjacent to the left heart border, question trace pneumomediastinum or trace left pneumothorax  Right pigtail catheter with small right pleural effusion  The previous small right basilar pneumothorax is not visible  Persistent bilateral pneumonia  Workstation performed: [id number]     XR chest portable ICU    Result Date: 12/27/2022  Impression: Right pigtail catheter with small effusions with no pneumothorax  Persistent severe bilateral consolidation  Workstation performed: [id number]     XR chest portable ICU    Result Date: 12/26/2022  Impression: Lines and tubes as above  Moderate right hydropneumothorax, possibly ex vacuo from recent thoracentesis, unchanged but better shown on previous CXR  Persistent severe bilateral consolidation which could be due to edema and/or pneumonia  I notified Ebenezer Zaidi by secure text on 12/26/2022 at 10:32 AM   He responded immediately  Workstation performed: [id number]     XR chest portable ICU    Result Date: 12/26/2022  Impression: Moderate right hydropneumothorax, unchanged from study earlier today, possibly ex vacuo after recent thoracentesis  Persistent severe bilateral consolidation which could be due to edema and/or pneumonia  I notified Ebenezer Zaidi by secure text on 12/26/2022 at 10:32 AM   He responded immediately  Workstation performed: [id number]     CTA chest pe study    Result Date: 12/27/2022  Impression: Moderate hydropneumothorax, new compared to the prior  Consider chest tube placement  Marked interval increase in bilateral pulmonary infiltrates as detailed above  Nonspecific pattern, given patient history, considerations include COVID related    With the history of hemoptysis, alveolar hemorrhage can also have this appearance as well  ARDS/fluid overload also remains within the differential as well as other infectious entities  I personally discussed this study with Paris Badillo on 12/27/2022 at 9:16 AM  Workstation performed: [id number]     IR chest tube placement    Result Date: 12/27/2022  Impression: Impression: 1  Successful placement of a therapeutic 10 [place] all-purpose drainage catheter into the right pleural space under CT guidance  Workstation performed: [id number]     Intake and Output  I/O       01/05 0701 01/06 0700 01/06 0701 01/07 0700    I V  (mL/kg) 1036 7 (12 6) 587 6 (7 1)    Other  700    NG/ 465    Feedings 1150 375    Total Intake(mL/kg) 2411 7 (29 3) 2127 6 (25 9)    Urine (mL/kg/hr)  0 (0)    Other 2000 4000    Stool 175     Chest Tube 85     Total Output 2260 4000    Net +151 7 -1872 4                Height and Weights   Height: 5' 8" (172 7 cm)  IBW (Ideal Body Weight): 68 4 kg  Body mass index is 27 59 kg/m²  Weight (last 2 days)     Date/Time Weight    01/06/23 0557 82 3 (181 44)    01/05/23 0400 82 2 (181 22)            Nutrition       Diet Orders   (From admission, onward)             Start     Ordered    01/04/23 1234  Diet Enteral/Parenteral; Tube Feeding No Oral Diet; Nepro; Continuous; 50; Banatrol Plus Banana Flakes - One Packet Daily; 50; Water; Every 6 hours  Diet effective now        References:    Nutrtion Support Algorithm Enteral vs  Parenteral   Question Answer Comment   Diet Type Enteral/Parenteral    Enteral/Parenteral Tube Feeding No Oral Diet    Tube Feeding Formula: Nepro    Bolus/Cyclic/Continuous Continuous    Tube Feeding Goal Rate (mL/hr): 50    Banatrol Plus Banana Flakes Banatrol Plus Banana Flakes - One Packet Daily    Tube Feeding flush (mL): 50    Water Flush type: Water    Water flush frequency: Every 6 hours    RD to adjust diet per protocol?  Yes        01/04/23 1233    12/26/22 0815  Room Service  Once        Question:  Type of Service  Answer:  Room Service - Appropriate with Assistance    12/26/22 0814                  Active Medications  Scheduled Meds:  Current Facility-Administered Medications   Medication Dose Route Frequency Provider Last Rate   • acetaminophen  650 mg Oral Q4H PRN FERNANDO Kate     • calcium acetate  667 mg Oral TID With [employer], CRNP     • calcium carbonate-vitamin D  2 tablet Oral BID With Meals [employer], CRNP     • chlorhexidine  15 mL Mouth/Throat M78U [address] Gladis Carlisle PA-C     • epoetin ani  8,000 Units Intravenous Once per day on Mon Wed Fri Manan Chung MD     • fentanyl citrate (PF)  50 mcg Intravenous D0J PRN Gladis Carlisle PA-C     • insulin glargine  42 Units Subcutaneous QAM FERNANDO Valles     • insulin lispro  4-20 Units Subcutaneous Q6H [address] FERNANDO Estrella     • ipratropium  0 5 mg Nebulization Q6H FERNANDO Valles     • levalbuterol  1 25 mg Nebulization Q6H FERNANDO Valles     • lidocaine (PF)    PRN Shonna Mccoy MD     • methylPREDNISolone sodium succinate  80 mg Intravenous Daily FERNANDO Valles     • metoprolol tartrate  25 mg Per NG Tube Q12H [address] FERNANDO Valles     • omeprazole (PRILOSEC) suspension 2 mg/mL  20 mg Per NG Tube Daily FERNANDO Valles     • ondansetron  4 mg Intravenous J6Y PRN Gladis Carlisle PA-C     • pravastatin  80 mg Oral Daily With FERNANDO Cobos     • propofol  5-50 mcg/kg/min Intravenous Titrated SEYMOUR EstrellaNP 10 mcg/kg/min (01/06/23 7876)   • saccharomyces boulardii  250 mg Oral BID FERNANDO Estrella     • sodium chloride  4 mL Nebulization Q6H FERNANDO Valles     • sulfamethoxazole-trimethoprim  80 mg of trimethoprim Per NG Tube Once per day on Mon Wed Fri FERNANDO Valles       Continuous Infusions:  propofol, 5-50 mcg/kg/min, Last Rate: 10 mcg/kg/min (01/06/23 0196)      PRN Meds: acetaminophen, 650 mg, Q4H PRN  fentanyl citrate (PF), 50 mcg, Q1H PRN  lidocaine (PF), , PRN  ondansetron, 4 mg, Q6H PRN        Invasive Devices Review  Invasive Devices     Peripheral Intravenous Line  Duration           Long-Dwell Peripheral IV (Midline) 12/26/22 Left Brachial 12 days    Peripheral IV 01/05/23 Left Wrist 2 days    Peripheral IV 01/05/23 Right Forearm 1 day          Hemodialysis Catheter  Duration           HD Permanent Double Catheter 304 days          Drain  Duration           NG/OG/Enteral Tube Orogastric 16 Fr Center mouth 12 days    Chest Tube Right Pleural 10 Fr  10 days    Rectal Tube With balloon 5 days          Airway  Duration           ETT  Cuffed; Hi-Lo 8 mm 6 days              ---------------------------------------------------------------------------------------  Care Time Delivered:   No Critical Care time spent       [facility] Hospital[facility]NANDO      Portions of the record may have been created with voice recognition software  Occasional wrong word or "sound a like" substitutions may have occurred due to the inherent limitations of voice recognition software    Read the chart carefully and recognize, using context, where substitutions have occurred

## 2023-01-07 NOTE — PROGRESS NOTES
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  · Completed HD yesterday  · Next HD is Monday, January 9, 2023      2  Access:   · R IJ permcath  3  VDRF, alveolar hemorrhage  · 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  · Currently being treated with Rituximab and steroids  · S/p PLEX and pulse steroids  · Now on Solumedrol 80 mg IV OD  · S/p Rituximab on 12/27/22, 1/3/23 (Completed 2/4 doses)  · PR3 Ab was 84 4 on 3/14/22 and down to 7 0 on 8/24/22  · PR3 Ab >8 0 on 12/21/22  4  Volume, hypertension:  · EDW is 70 7 kg  He is above EDW  · BP was high but better with UF - s/p extra tx on 1/5/23  · Meds: Metoprolol 25 mg BID  · Clonidine, Losartan, Amlodipine on hold  · Consider increasing Metoprolol to 50 mg BID  5  Anemia:  · Hgb below goal  · S/p PRBC 1/2/22  · On Mircera as an outpatient  · Continue Epogen 8000 units with HD - increased 1/3/23  6  Leukocytosis:  · Defer to CC  7  Mineral and bone disease:  · Phos is high  · Increase Phoslo to 2 tabs TID  · Currently on Nephro TF  8  Prophylaxis:  · On Bactrim for PCP prophylaxis  · On omeprazole for PUD prophylaxis  9  Pneumothorax s/p R chest tube placement  · Defer to crit care  DISPOSITION:  · HD on Monday  · Increase PhosLo to 2 tabs TID  · Consider increasing Metoprolol to 50 mg BID  Discussed with CC team      SUBJECTIVE / 24H INTERVAL HISTORY:  No new events overnight  Tolerated 3 6 kg UF yesterday       OBJECTIVE:  Current Weight: Weight - Scale: 79 6 kg (175 lb 7 8 oz)  Vitals:    01/07/23 0311 01/07/23 0400 01/07/23 0500 01/07/23 0600   BP:  154/73 161/73 146/74   Pulse:  (!) 121 (!) 119 (!) 120   Resp:  22 22 (!) 24   Temp:  98 °F (36 7 °C) TempSrc:  Temporal     SpO2: 96% 96% 94% 94%   Weight:    79 6 kg (175 lb 7 8 oz)   Height:           Intake/Output Summary (Last 24 hours) at 1/7/2023 0734  Last data filed at 1/7/2023 0601  Gross per 24 hour   Intake 2964 79 ml   Output 4040 ml   Net -1075 21 ml     General: critically ill appearing on the mechanical ventilator, comfortable  Skin: warm, dry, good turgor  Eyes: closed  ENT: ETT in place  Neck: supple  Chest/Lungs: equal chest expansion, coarse breath sounds  CVS: distinct heart sounds, normal rate, regular rhythm, no rub  Abdomen: soft, non-distended, normoactive bowel sounds  Extremities: (+) UE and LE edema  LE edema improved  : no ruvalcaba catheter  Neuro: not following commands   Psych: could not evaluate       Medications:    Current Facility-Administered Medications:   •  acetaminophen (TYLENOL) oral suspension 650 mg, 650 mg, Oral, Q4H PRN, FERNANDO Mackay, 650 mg at 01/04/23 1017  •  calcium acetate (PHOSLO) capsule 667 mg, 667 mg, Oral, TID With Meals, [employer], CRNP, 667 mg at 01/06/23 1657  •  calcium carbonate-vitamin D 500 mg-5 mcg tablet 2 tablet, 2 tablet, Oral, BID With Meals, [employer], CRNP, 2 tablet at 01/06/23 1657  •  chlorhexidine (PERIDEX) 0 12 % oral rinse 15 mL, 15 mL, Mouth/Throat, [address] [facility], Andrzej Quan PA-C, 15 mL at 01/06/23 2125  •  epoetin ani (EPOGEN,PROCRIT) injection 8,000 Units, 8,000 Units, Intravenous, Once per day on Mon Wed Fri, Isidro Mathew MD, 8,000 Units at 01/06/23 1329  •  fentanyl citrate (PF) 100 MCG/2ML 50 mcg, 50 mcg, Intravenous, R9N PRN, Andrzej Quan PA-C, 50 mcg at 12/30/22 2201  •  insulin glargine (LANTUS) subcutaneous injection 42 Units 0 42 mL, 42 Units, Subcutaneous, Joie PINZON CRNP, 42 Units at 01/06/23 0847  •  insulin lispro (HumaLOG) 100 units/mL subcutaneous injection 4-20 Units, 4-20 Units, Subcutaneous, Q6H [facility], 8 Units at 01/07/23 0647 **AND** Fingerstick Glucose (POCT), , , Q6H, FERNANDO Perez  •  ipratropium (ATROVENT) 0 02 % inhalation solution 0 5 mg, 0 5 mg, Nebulization, Q6H, Joie Nina V, FERNANDO, 0 5 mg at 01/07/23 0308  •  levalbuterol (Jewell Chidi) inhalation solution 1 25 mg, 1 25 mg, Nebulization, Q6H, Joie Nina V, FERNANDO, 1 25 mg at 01/07/23 0308  •  lidocaine (PF) (XYLOCAINE-MPF) 1 % injection, , , PRN, Tawnya Ely MD, 5 mL at 12/27/22 1349  •  methylPREDNISolone sodium succinate (Solu-MEDROL) injection 80 mg, 80 mg, Intravenous, Daily, FERNANDO Valles, 80 mg at 01/06/23 2125  •  metoprolol tartrate (LOPRESSOR) tablet 25 mg, 25 mg, Per NG Tube, Q12H [address], FERNANDO Valles, 25 mg at 01/06/23 2129  •  omeprazole (PRILOSEC) suspension 2 mg/mL, 20 mg, Per NG Tube, Daily, FERNANDO Valles, 20 mg at 01/06/23 0901  •  ondansetron (ZOFRAN) injection 4 mg, 4 mg, Intravenous, U9C PRN, Yaonna Quan PA-C  •  pravastatin (PRAVACHOL) tablet 80 mg, 80 mg, Oral, Daily With FERNANDO Fox, 80 mg at 01/06/23 1657  •  propofol (DIPRIVAN) 1000 mg in 100 mL infusion (premix), 5-50 mcg/kg/min, Intravenous, Titrated, FERNANDO Perez, Last Rate: 4 8 mL/hr at 01/06/23 1736, 10 mcg/kg/min at 01/06/23 1736  •  saccharomyces boulardii (FLORASTOR) capsule 250 mg, 250 mg, Oral, BID, FERNANDO Perez, 250 mg at 01/06/23 1657  •  sodium chloride 3 % inhalation solution 4 mL, 4 mL, Nebulization, Q6H, FERNANDO Valles, 4 mL at 01/07/23 0308  •  sulfamethoxazole-trimethoprim (BACTRIM) oral suspension 80 mg of trimethoprim, 80 mg of trimethoprim, Per NG Tube, Once per day on Mon Wed Fri, FERNANDO Valles, 80 mg of trimethoprim at 01/06/23 1457    Laboratory Results:  Results from last 7 days   Lab Units 01/07/23  0626 01/06/23  0511 01/05/23  0535 01/04/23  1102 01/04/23  0650 01/03/23  0529 01/02/23  0501 01/01/23  0450   WBC Thousand/uL  --  26 34* 37 38* 27 92*  --  39 42* 22 64* 23 78*   HEMOGLOBIN g/dL  --  7 9* 8 5* 7 9*  --  9 0* 6 6* 7 9*   HEMATOCRIT %  --  25 5* 27 8* 25 0*  --  28 2* 20 9* 24 9*   PLATELETS Thousands/uL  --  166 159 209  --  229 211 208   POTASSIUM mmol/L 5 1  --  4 2  --  4 6 4 4 4 2 4 5   CHLORIDE mmol/L 93*  --  94*  --  94* 96 92* 94*   CO2 mmol/L 24  --  29  --  34* 32 32 29   BUN mg/dL 82*  --  65*  --  86* 66* 92* 72*   CREATININE mg/dL 3 26*  --  2 90*  --  3 77* 3 18* 4 39* 4 03*   CALCIUM mg/dL 8 1*  --  7 8*  --  7 3* 7 2* 7 5* 7 6*   MAGNESIUM mg/dL 2 3 1 9 2 0  --  2 1 2 0 2 1 2 1   PHOSPHORUS mg/dL 5 6* 6 0* 4 9*  --  5 9* 4 9* 5 7* 6 2*

## 2023-01-08 NOTE — ASSESSMENT & PLAN NOTE
Lab Results   Component Value Date    HGBA1C 5 7 (H) 10/19/2022       Recent Labs     01/07/23  0646 01/07/23  1129 01/07/23  1731 01/07/23  2359   POCGLU 238* 144* 158* 118       Blood Sugar Average: Last 72 hrs:  (P) 149 75   · Continue SSI algorithm 5 q6h, Lantus to 42 units qAM   · Holding home regimen of 15u 70/30 BID   · Goal blood glucose less than 180

## 2023-01-08 NOTE — RESPIRATORY THERAPY NOTE
RT Ventilator Management Note  Hardy Zelaya 76 y o  male MRN: [id number]  Unit/Bed#: ICU 11 Encounter: [id number]      Daily Screen         1/5/2023  0800 1/8/2023  0757          Patient safety screen outcome[de-identified] Failed Failed      Not Ready for Weaning due to[de-identified] Underline problem not resolved Underline problem not resolved                Physical Exam:   Assessment Type: Assess only  General Appearance: Sedated  Respiratory Pattern: Assisted  Chest Assessment: Chest expansion symmetrical  Bilateral Breath Sounds: Diminished, Coarse  Suction: Oral, ET Tube      Resp Comments: patient tried on PSV became tachyneic place back on mandatory mode

## 2023-01-08 NOTE — PROGRESS NOTES
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 given hemoptysis and worsening infiltrates   · Bronch on 12/26 with serial aliquots persistently bloody despite lavage consistent with DAH  · Completed 3 days of pulse dose steroids 12/29  · Continue solumedrol 80mg daily   · Hold anticoagulation  · Receiving Rituximab per rheumatology  · Dose 2 of 4 on 1/3, weekly on Tuesdays  · Completed 5/5 days of Plasmapheresis on 1/4  · S/p bronchoscopy for pulmonary toilet 1/1  Multiple tenacious mucoid plugs removed after lavage and suctioning from RML, RLL and LLL  · Continues with thick, brown secretions from ETT  · Required 1u PRBC transfusion 1/2 for Hgb 6 6  See anemia as outlined  · Appreciate pulmonary and rheumatology input    Acute respiratory failure with hypoxia [facility])  Assessment & Plan  · Patient presented to the emergency department on 12/25 with worsening shortness of breath and hypoxia after missing his dialysis treatment 12/24 due to lack of transportation  · Initially in the emergency room, the patient was placed on BiPAP but he was weaned to mid flow   · Overnight 12/25, became more hypoxic and ultimately required intubation  · Began having hemoptysis and significant blood suctioned post intubation  Concern for alveolar hemorrhage secondary to vasculitis-see plan as above   · Worsening hypoxia 12/30-12/31 overnight with peak pressuring on vent  Changed to [facility]"" and re-sedated  ABG with respiratory acidosis and P:F ratio 65     · CXR 12/30 PM with R lung re-expanded with R pleural CT to -20cm of suction but worsening of extensive B/L patchy pulmonary infiltrates as compared to prior  · Episode of acute hypoxia again 12/31 with SpO2 down to 60's, not improved with saline lavage and suctioning, paralytic, or bagging  Was difficult to bag and had persistent air leak despite ETT repositioning and re-inflation of balloon  SpO2 dipped to 28% briefly  Required ETT exchange  Now with #8 ETT  SpO2 improved after additional lavage and suctioning  · 1/1 ABG 7 33/57/79/29  PF ratio 98  · S/p bronch 1/1-  thick mucoid plugs removed from RML and b/l lower lobes  · Current Vent settings: AC/PC 22/22/40%/8  · Wean fio2/PEEP for O2 sat 92% or above   · Failed SBT d/t low vT and tachypnea   · Vent day #15  · VAP bundle; chlorhexidine, PPI, HOB greater than 30 degrees  · Pulmonary toilet with xopenex/atrovent/3% NSS nebs q6h, chest PT TID, ETT suctioning PRN  · CXR 1/4 persistent extensive B/L pulmonary opacifications and dense RLL consolidation which appears worse as compared to prior imaging     SIRS (systemic inflammatory response syndrome) ([facility] [address] )  Assessment & Plan  · Patient presented to the emergency room on 12/25 with SIRS criteria- increased respiratory rate and hypoxia  · With decompensation on 12/25, antibiotics were started with cefepime/vanco given worsening hypoxia however low suspicion for infection  · BAL with mixed respiratory micheal  · pneumocystis smear negative   · Completing 5 days abx 12/29, however 12/30 with thick tan secretions and worsening leukocytosis and was restarted on cefepime  · Abx stopped again on  1/3  · MRSA negative  · Sputum cx on 12/26 and 12/30 with candida   · Trend temps and WBC   · Persistent leukocytosis of unclear etiology, steroid induced?    · procal difficult to interpret in setting of ESRD      Hydropneumothorax  Assessment & Plan  · Patient had a thoracentesis 12/21 (previous admission)  · Chest x-ray with hydropneumothorax thought to likely be related to this, possibly ex vacuo  · S/p R pleural chest tube with IR 12/27  · 220mL serous drainage x 24 hours  · CXR with small R pneumothorax with CT on H20 seal therefore chest tube placed by to -20cm suction, R lung was re-expanded on repeat CXR 12/30  · Subsequent CXRs with no pneumo, continue CT to -20cm suction       Toxic metabolic encephalopathy  Assessment & Plan  · Did not follow commands or move extremities after several hours off sedation 12/30  · Encephalopathy possibly in setting of critical illness, sedation effects, delirium, and hypercarbia   · Sedation resumed 12/30 evening d/t vent dyssynchrony, will attempt to wean sedation today for neuro exam   · CT head 12/31 without intracranial abnormality   · Neuro checks q4h  · Delirium precautions; regulate sleep/wake cycle, environmental controls, daily CAM ICU    ANCA-associated vasculitis  Assessment & Plan  · Kidney biopsy on 03/22: 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 for which he was previously treated with Cytoxan (discontinued in April) and subsequently prescribed prednisone taper  · Now with pulmonary involvement, continue rituximab and steroids as above   · Plasmapheresis 5/5 treatments completed 1/4     ESRD on dialysis [facility])  Assessment & Plan  Lab Results   Component Value Date    EGFR 17 01/07/2023    EGFR 19 01/05/2023    EGFR 14 01/04/2023    CREATININE 3 26 (H) 01/07/2023    CREATININE 2 90 (H) 01/05/2023    CREATININE 3 77 (H) 01/04/2023       · Patient scheduled dialysis Tuesday/Thursday/Saturday however he was unable to make his treatment on Saturday 12/24 due to the transportation bus not picking him up  · Patient started with worsening shortness of breath on 12/25 presented to the emergency room  · HD session done on 12/25 for 3 5L  · Continue MWF schedule for now per nephrology  · Continue PhosLo as ordered  · Last dialysis session yesterday with 4 5L removed, patient tolerated well  · Trend BMP  · Nephrology following, appreciate recs    Type 2 diabetes mellitus with chronic kidney disease on chronic dialysis, with long-term current use of insulin [facility])  Assessment & Plan  Lab Results   Component Value Date    HGBA1C 5 7 (H) 10/19/2022       Recent Labs     01/07/23  0646 01/07/23  1129 01/07/23  1731 01/07/23  2359   POCGLU 238* 144* 158* 118       Blood Sugar Average: Last 72 hrs:  (P) 149 75   · Continue SSI algorithm 5 q6h, Lantus to 42 units qAM   · Holding home regimen of 15u 70/30 BID   · Goal blood glucose less than 180    Anemia due to chronic kidney disease, on chronic dialysis [facility])  Assessment & Plan  · Patient had a recent admission from 12/20-12/23 with hemoglobin 5 7 -received 1 unit packed red blood cells at that time  · Most likely acute on chronic anemia in setting of ESRD now with DAH  · Received 1 unit of PRBCs 12/28 and again 1/2 for Hgb <7  · Continue to trend daily and transfuse for hgb less than 7 0    Atrial flutter (HCC)  Assessment & Plan  · Rhythm deteriorated to Aflutter 2:1 block with rates in 140s on 1/2  · No response to IV metoprolol  · Given amio bolus and started on drip - transitioned to PO   · Unable to anticoagulate due to alveolar hemorrhage  · Continuous cardiac monitoring  · Received 250mcg IV Dig x 1 1/3   · Continue metoprolol 25mg BID (started 1/4)        Benign essential hypertension  Assessment & Plan  · Continue to hold home dose Norvasc and clonidine   · Continue metoprolol-started 1/4    Diarrhea  Assessment & Plan  · Worsening diarrhea 1/1-1/2 after initiation of Reglan for reported high gastric residuals  · Reglan discontinued, residuals remain <100  · Monitor stool output with FMS  · Continue florastor and banana flakes  · All stool softeners have been discontinued    Hyperlipidemia  Assessment & Plan  · Continue home statin      ----------------------------------------------------------------------------------------  HPI/24hr events: No acute events overnight  Patient remained off of sedation on mechanical ventilation        Patient appropriate for transfer out of the ICU today?: No  Disposition: Continue Critical Care   Code Status: Level 1 - Full Code  ---------------------------------------------------------------------------------------  SUBJECTIVE  Intubated     Review of Systems   Unable to perform ROS: Intubated     Review of systems was unable to be performed secondary to intubation   ---------------------------------------------------------------------------------------  OBJECTIVE    Vitals   Vitals:    23 2300 23 2328 23 0000 23 0137   BP: 143/69  144/63    BP Location:       Pulse: 87  87    Resp: (!) 24  22    Temp:   (!) 97 3 °F (36 3 °C)    TempSrc:       SpO2: 96% 96% 96% 98%   Weight:       Height:         Temp (24hrs), Av 9 °F (36 6 °C), Min:97 3 °F (36 3 °C), Max:98 2 °F (36 8 °C)  Current: Temperature: (!) 97 3 °F (36 3 °C)          Respiratory:  SpO2: SpO2: 98 %  Nasal Cannula O2 Flow Rate (L/min): 80 L/min    Invasive/non-invasive ventilation settings   Respiratory    Lab Data (Last 4 hours)    None         O2/Vent Data (Last 4 hours)    None                Physical Exam  Constitutional:       Appearance: He is ill-appearing and toxic-appearing  Comments: B/L wrist restraints    HENT:      Head: Normocephalic and atraumatic  Right Ear: Tympanic membrane, ear canal and external ear normal       Left Ear: Tympanic membrane, ear canal and external ear normal       Nose: Nose normal       Mouth/Throat:      Mouth: Mucous membranes are dry  Pharynx: Oropharynx is clear  Eyes:      Extraocular Movements: Extraocular movements intact  Conjunctiva/sclera: Conjunctivae normal       Pupils: Pupils are equal, round, and reactive to light  Cardiovascular:      Rate and Rhythm: Normal rate and regular rhythm  Pulses: Normal pulses  Heart sounds: Normal heart sounds     Pulmonary:      Comments: ETT on mechanical ventilation   R CT on suction   Abdominal:      General: Bowel sounds are normal  Palpations: Abdomen is soft  Comments: Tube feeds via OGT    Genitourinary:     Comments: Anuric on HD   Musculoskeletal:         General: Normal range of motion  Cervical back: Normal range of motion and neck supple  Skin:     General: Skin is warm and dry  Capillary Refill: Capillary refill takes less than 2 seconds     Neurological:      Comments: Off of sedation  Does not follow commands  Withdraws to painful stimuli    Psychiatric:      Comments: Intubated              Laboratory and Diagnostics:  Results from last 7 days   Lab Units 01/08/23  0437 01/07/23  0744 01/06/23  0511 01/05/23  0535 01/04/23  1102 01/03/23  0529 01/02/23  0501   WBC Thousand/uL 30 29* 25 23* 26 34* 37 38* 27 92* 39 42* 22 64*   HEMOGLOBIN g/dL 8 4* 8 4* 7 9* 8 5* 7 9* 9 0* 6 6*   HEMATOCRIT % 26 7* 26 8* 25 5* 27 8* 25 0* 28 2* 20 9*   PLATELETS Thousands/uL 241 214 166 159 209 229 211   BANDS PCT %  --   --  9*  --  6  --   --    MONO PCT %  --   --  0*  --  1*  --   --      Results from last 7 days   Lab Units 01/08/23  0437 01/07/23  0626 01/05/23  0535 01/04/23  0650 01/03/23  0529 01/02/23  0501   SODIUM mmol/L 130* 130* 131* 136 136 133*   POTASSIUM mmol/L 5 1 5 1 4 2 4 6 4 4 4 2   CHLORIDE mmol/L 92* 93* 94* 94* 96 92*   CO2 mmol/L 27 24 29 34* 32 32   ANION GAP mmol/L 11 13 8 8 8 9   BUN mg/dL 111* 82* 65* 86* 66* 92*   CREATININE mg/dL 4 11* 3 26* 2 90* 3 77* 3 18* 4 39*   CALCIUM mg/dL 8 2* 8 1* 7 8* 7 3* 7 2* 7 5*   GLUCOSE RANDOM mg/dL 175* 216* 184* 214* 209* 294*   ALT U/L  --   --  20 18  --   --    AST U/L  --   --  11 8  --   --    ALK PHOS U/L  --   --  81 71  --   --    ALBUMIN g/dL  --   --  2 3* 2 0*  --   --    TOTAL BILIRUBIN mg/dL  --   --  0 49 0 53  --   --      Results from last 7 days   Lab Units 01/08/23  0437 01/07/23  0626 01/06/23  0511 01/05/23  0535 01/04/23  0650 01/03/23  0529 01/02/23  0501   MAGNESIUM mg/dL 2 2 2 3 1 9 2 0 2 1 2 0 2 1   PHOSPHORUS mg/dL 5 9* 5 6* 6 0* 4 9* 5 9* 4  9* 5 7*                   ABG:  Results from last 7 days   Lab Units 01/06/23  0427   PH ART  7 313*   PCO2 ART mm Hg 52 7*   PO2 ART mm Hg 76 2   HCO3 ART mmol/L 26 1   BASE EXC ART mmol/L -0 4   ABG SOURCE  Radial, Right     VBG:  Results from last 7 days   Lab Units 01/06/23  0427   ABG SOURCE  Radial, Right     Results from last 7 days   Lab Units 01/06/23  0511 01/04/23  0650   PROCALCITONIN ng/ml 4 17* 4 18*       Micro        EKG: NSR HR 86  Imaging: I have personally reviewed pertinent reports  XR chest portable    Result Date: 12/30/2022  Impression: Small right basilar hydropneumothorax with right chest tube to waterseal by history  Additional lines and tubes as above  Diffuse bilateral airspace disease, unchanged  The study was marked in [facility] for immediate notification  Workstation performed: [id number]     XR chest portable    Result Date: 12/29/2022  Impression: Unchanged extensive bilateral airspace consolidation  Unchanged small bilateral pleural effusions  Workstation performed: [id number]     XR chest portable    Result Date: 12/28/2022  Impression: Right pigtail catheter with small effusions and small medial right pneumothorax  Persistent severe pneumonia  Workstation performed: [id number]     XR chest portable    Result Date: 12/27/2022  Impression: No pneumothorax identified  Stable bilateral airspace disease and effusions compared to yesterday  Workstation performed: [id number]     XR chest 1 view portable    Result Date: 12/26/2022  Impression: Moderate right hydropneumothorax, likely ex vacuo after thoracentesis  Persistent extensive bilateral consolidation which could be due to edema or pneumonia superimposed on rounded atelectasis  Workstation performed: [id number]     XR chest portable ICU    Result Date: 12/31/2022  Impression: Redemonstration of subtle linear lucency adjacent to the left heart border, question trace pneumomediastinum or trace left pneumothorax   Right pigtail catheter with small right pleural effusion  The previous small right basilar pneumothorax is not visible  Persistent bilateral pneumonia  Workstation performed: [id number]     XR chest portable ICU    Result Date: 12/27/2022  Impression: Right pigtail catheter with small effusions with no pneumothorax  Persistent severe bilateral consolidation  Workstation performed: [id number]     XR chest portable ICU    Result Date: 12/26/2022  Impression: Lines and tubes as above  Moderate right hydropneumothorax, possibly ex vacuo from recent thoracentesis, unchanged but better shown on previous CXR  Persistent severe bilateral consolidation which could be due to edema and/or pneumonia  I notified Tj Zaidi by secure text on 12/26/2022 at 10:32 AM   He responded immediately  Workstation performed: [id number]     XR chest portable ICU    Result Date: 12/26/2022  Impression: Moderate right hydropneumothorax, unchanged from study earlier today, possibly ex vacuo after recent thoracentesis  Persistent severe bilateral consolidation which could be due to edema and/or pneumonia  I notified Tj Zaidi by secure text on 12/26/2022 at 10:32 AM   He responded immediately  Workstation performed: [id number]     CTA chest pe study    Result Date: 12/27/2022  Impression: Moderate hydropneumothorax, new compared to the prior  Consider chest tube placement  Marked interval increase in bilateral pulmonary infiltrates as detailed above  Nonspecific pattern, given patient history, considerations include COVID related  With the history of hemoptysis, alveolar hemorrhage can also have this appearance as well  ARDS/fluid overload also remains within the differential as well as other infectious entities  I personally discussed this study with Angélica Hoover on 12/27/2022 at 9:16 AM  Workstation performed: [id number]     IR chest tube placement    Result Date: 12/27/2022  Impression: Impression: 1    Successful placement of a therapeutic 10 [facility] all-purpose drainage catheter into the right pleural space under CT guidance  Workstation performed: [id number]     Intake and Output  I/O       01/06 0701 01/07 0700 01/07 0701 01/08 0700    I V  (mL/kg) 774 8 (9 7) 27 5 (0 3)    Other 700     NG/ 180    Feedings 975 100    Total Intake(mL/kg) 2964 8 (37 2) 307 5 (3 9)    Urine (mL/kg/hr) 0 (0)     Other 4000     Stool 20 50    Chest Tube 20 100    Total Output 4040 150    Net -1075 2 +157 5                Height and Weights   Height: 5' 8" (172 7 cm)  IBW (Ideal Body Weight): 68 4 kg  Body mass index is 26 68 kg/m²  Weight (last 2 days)     Date/Time Weight    01/07/23 0600 79 6 (175 49)    01/06/23 0557 82 3 (181 44)            Nutrition       Diet Orders   (From admission, onward)             Start     Ordered    01/04/23 1234  Diet Enteral/Parenteral; Tube Feeding No Oral Diet; Nepro; Continuous; 50; Banatrol Plus Banana Flakes - One Packet Daily; 50; Water; Every 6 hours  Diet effective now        References:    Nutrtion Support Algorithm Enteral vs  Parenteral   Question Answer Comment   Diet Type Enteral/Parenteral    Enteral/Parenteral Tube Feeding No Oral Diet    Tube Feeding Formula: Nepro    Bolus/Cyclic/Continuous Continuous    Tube Feeding Goal Rate (mL/hr): 50    Banatrol Plus Banana Flakes Banatrol Plus Banana Flakes - One Packet Daily    Tube Feeding flush (mL): 50    Water Flush type: Water    Water flush frequency: Every 6 hours    RD to adjust diet per protocol?  Yes        01/04/23 1233    12/26/22 0815  Room Service  Once        Question:  Type of Service  Answer:  Room Service - Appropriate with Assistance    12/26/22 0814                  Active Medications  Scheduled Meds:  Current Facility-Administered Medications   Medication Dose Route Frequency Provider Last Rate   • acetaminophen  650 mg Oral Q4H PRN FERNANDO Johnson     • calcium acetate  1,334 mg Oral TID With Meals Mayelin Dejesus MD     • calcium carbonate-vitamin D  2 tablet Oral BID With Meals FERNANDO Bunch     • chlorhexidine  15 mL Mouth/Throat K73K [address] Fabby Mireles PA-C     • epoetin ani  8,000 Units Intravenous Once per day on Mon Wed Fri Christopher Shelby MD     • fentanyl citrate (PF)  50 mcg Intravenous S5C PRN Fabby Mireles PA-C     • heparin (porcine)  5,000 Units Subcutaneous [facility] Angelique Dumont PA-C     • insulin glargine  42 Units Subcutaneous QAM FERNANDO Valles     • insulin lispro  4-20 Units Subcutaneous Q6H [address] FERNANDO Latham     • ipratropium  0 5 mg Nebulization Q6H FERNANDO Valles     • levalbuterol  1 25 mg Nebulization Q6H FERNANDO Valles     • lidocaine (PF)    PRN Moni Colón MD     • methylPREDNISolone sodium succinate  80 mg Intravenous Daily FERNANDO Valles     • metoprolol tartrate  25 mg Per NG Tube Q12H [address] FERNANDO Valles     • omeprazole (PRILOSEC) suspension 2 mg/mL  20 mg Per NG Tube Daily FERNANDO Valles     • ondansetron  4 mg Intravenous F4D PRN Fabby Mireles PA-C     • pravastatin  80 mg Oral Daily [email or web address]     • propofol  5-50 mcg/kg/min Intravenous Titrated FERNANDO Latham Stopped (01/07/23 1145)   • saccharomyces boulardii  250 mg Oral BID FERNANDO Latham     • sodium chloride  4 mL Nebulization Q6H FERNANDO Valles     • sulfamethoxazole-trimethoprim  80 mg of trimethoprim Per NG Tube Once per day on Mon Wed Fri FERNANDO Valles       Continuous Infusions:  propofol, 5-50 mcg/kg/min, Last Rate: Stopped (01/07/23 1145)      PRN Meds:   acetaminophen, 650 mg, Q4H PRN  fentanyl citrate (PF), 50 mcg, Q1H PRN  lidocaine (PF), , PRN  ondansetron, 4 mg, Q6H PRN        Invasive Devices Review  Invasive Devices     Peripheral Intravenous Line  Duration           Long-Dwell Peripheral IV (Midline) 12/26/22 Left Brachial 13 days    Peripheral IV 01/05/23 Left Wrist 3 days    Peripheral IV 01/05/23 Right Forearm 2 days          Hemodialysis Catheter  Duration           HD Permanent Double Catheter 305 days          Drain  Duration           NG/OG/Enteral Tube Orogastric 16 Fr Center mouth 13 days    Chest Tube Right Pleural 10 Fr  11 days    Rectal Tube With balloon 6 days          Airway  Duration           ETT  Cuffed; Hi-Lo 8 mm 7 days              ---------------------------------------------------------------------------------------  Care Time Delivered:   No Critical Care time spent       FERNANDO Beckman      Portions of the record may have been created with voice recognition software  Occasional wrong word or "sound a like" substitutions may have occurred due to the inherent limitations of voice recognition software    Read the chart carefully and recognize, using context, where substitutions have occurred

## 2023-01-08 NOTE — ACP (ADVANCE CARE PLANNING)
Spoke with both patient's wife Howie Alvarez and friend Stephanie Boateng via phone  We discussed patient's failure to progress despite multiple treatment modalities  He is on Vent day #15 and remains unable to tolerate weaning from the vent d/t tachypnea and low vT, he is encephalopathic and does not follow commands, and continues to have copious amounts of thick secretions  Howie Alvarez states that Hoang Garcia would not have wanted a trach and a PEG  We discussed process of transitioning [place] to comfort care and Howie Alvarez expresses wishes to proceed with this  She feels it would be too difficult for her to be at the bedside during this process and is content that she got to see Hoang Jose and say goodbye during her visit on Thursday  Stephanie Boateng wishes to be present when Hoang Garcia is transitioned to comfort care and Howie Alvarez is aware and okay with this  Stephanie Boateng will be able to come tomorrow at 9am and at that time we will call Howie Alvarez again and confirm her wishes and then proceed with compassionate extubation

## 2023-01-08 NOTE — ASSESSMENT & PLAN NOTE
· Patient had a recent admission from 12/20-12/23 with hemoglobin 5 7 -received 1 unit packed red blood cells at that time  · Most likely acute on chronic anemia in setting of ESRD now with DAH  · Received 1 unit of PRBCs 12/28 and again 1/2 for Hgb <7  · Continue to trend daily and transfuse for hgb less than 7 0

## 2023-01-08 NOTE — NURSING NOTE
Arnie Topton with [employer] notified of plan to transition patient to comfort care tomorrow at 9 am  Will notify with any changes in patient's condition prior to tomorrow

## 2023-01-08 NOTE — ASSESSMENT & PLAN NOTE
· Patient presented to the emergency department on 12/25 with worsening shortness of breath and hypoxia after missing his dialysis treatment 12/24 due to lack of transportation  · Initially in the emergency room, the patient was placed on BiPAP but he was weaned to mid flow   · Overnight 12/25, became more hypoxic and ultimately required intubation  · Began having hemoptysis and significant blood suctioned post intubation  Concern for alveolar hemorrhage secondary to vasculitis-see plan as above   · Worsening hypoxia 12/30-12/31 overnight with peak pressuring on vent  Changed to [facility] and re-sedated  ABG with respiratory acidosis and P:F ratio 65  · CXR 12/30 PM with R lung re-expanded with R pleural CT to -20cm of suction but worsening of extensive B/L patchy pulmonary infiltrates as compared to prior  · Episode of acute hypoxia again 12/31 with SpO2 down to 60's, not improved with saline lavage and suctioning, paralytic, or bagging  Was difficult to bag and had persistent air leak despite ETT repositioning and re-inflation of balloon  SpO2 dipped to 28% briefly  Required ETT exchange  Now with #8 ETT  SpO2 improved after additional lavage and suctioning  · 1/1 ABG 7 33/57/79/29   PF ratio 98  · S/p bronch 1/1-  thick mucoid plugs removed from RML and b/l lower lobes  · Current Vent settings: AC/PC 22/22/40%/8  · Wean fio2/PEEP for O2 sat 92% or above   · Failed SBT d/t low vT and tachypnea   · Vent day #15  · VAP bundle; chlorhexidine, PPI, HOB greater than 30 degrees  · Pulmonary toilet with xopenex/atrovent/3% NSS nebs q6h, chest PT TID, ETT suctioning PRN  · CXR 1/4 persistent extensive B/L pulmonary opacifications and dense RLL consolidation which appears worse as compared to prior imaging

## 2023-01-08 NOTE — PROGRESS NOTES
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  · Next HD is tomorrow  2  Access:   · R IJ permcath  3  VDRF, alveolar hemorrhage  · 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  · Currently being treated with Rituximab and steroids  · S/p PLEX and pulse steroids  · Now on Solumedrol 80 mg IV OD  · S/p Rituximab on 12/27/22, 1/3/23 (Completed 2/4 doses)  · PR3 Ab was 84 4 on 3/14/22 and down to 7 0 on 8/24/22  · PR3 Ab >8 0 on 12/21/22  4  Volume, hypertension:  · EDW is 70 7 kg  He is above EDW  · BP was high but better with UF - s/p extra tx on 1/5/23  · Meds: Metoprolol 25 mg BID  · Clonidine, Losartan, Amlodipine on hold  · Consider increasing Metoprolol to 50 mg BID  · UF on HD tomorrow  5  Anemia:  · Hgb below goal but stable  · S/p PRBC 1/2/22  · On Mircera as an outpatient  · Continue Epogen 8000 units with HD - increased 1/3/23  6  Leukocytosis:  · Defer to CC  · WBC remains at 30K    7  Mineral and bone disease:  · Phos is high  · Phoslo increased to 2 tabs TID on 1/7/23  · Currently on Nephro TF  8  Prophylaxis:  · On Bactrim for PCP prophylaxis  · On omeprazole for PUD prophylaxis  9  Pneumothorax s/p R chest tube placement  · Defer to crit care  DISPOSITION:  · HD tomorrow  · Consider increasing Metoprolol to 50 mg BID if remains tachy  · Bygget 64 discussion if cant wean from vent  Discussed with CC team      SUBJECTIVE / 24H INTERVAL HISTORY:  No new acute events over the past 24 hours  Remains ventilator dependent  Not following commands per report  Intermittent tachy       OBJECTIVE:  Current Weight: Weight - Scale: 80 2 kg (176 lb 12 9 oz)  Vitals:    01/08/23 0500 01/08/23 0600 01/08/23 0700 01/08/23 0735   BP: 160/68 146/72 125/58 117/58   BP Location:    Right arm   Pulse: 97 (!) 118 86 86   Resp: (!) 25 (!) 26 (!) 23 (!) 24   Temp:    (!) 97 4 °F (36 3 °C)   TempSrc:    Temporal   SpO2: 96% 98% 99% 99%   Weight:  80 2 kg (176 lb 12 9 oz)     Height:           Intake/Output Summary (Last 24 hours) at 1/8/2023 0747  Last data filed at 1/8/2023 0622  Gross per 24 hour   Intake 907 52 ml   Output 150 ml   Net 757 52 ml     General: critically ill appearing on the mechanical ventilator, comfortable  Skin: warm, dry, good turgor  Eyes: closed  ENT: ETT in place  Neck: supple  Chest/Lungs: equal chest expansion, coarse breath sounds  CVS: distinct heart sounds, tachy, irregular rhythm, no rub  Abdomen: soft, non-distended, normoactive bowel sounds  Extremities: (+) UE and LE edema  : no ruvalcaba catheter  Neuro: sedated on the mechanical ventilator  Psych: could not evaluate       Medications:    Current Facility-Administered Medications:   •  acetaminophen (TYLENOL) oral suspension 650 mg, 650 mg, Oral, Q4H PRN, FERNANDO Mantilla, 650 mg at 01/04/23 1017  •  calcium acetate (PHOSLO) capsule 1,334 mg, 1,334 mg, Oral, TID With Meals, Azra Jones MD, 1,334 mg at 01/08/23 0631  •  calcium carbonate-vitamin D 500 mg-5 mcg tablet 2 tablet, 2 tablet, Oral, BID With Meals, [employer], CRNP, 2 tablet at 01/08/23 0630  •  chlorhexidine (PERIDEX) 0 12 % oral rinse 15 mL, 15 mL, Mouth/Throat, F93W [facility], Eulogio Quan PA-C, 15 mL at 01/07/23 2037  •  epoetin ani (EPOGEN,PROCRIT) injection 8,000 Units, 8,000 Units, Intravenous, Once per day on Mon Wed Fri, Isidro Jarvis MD, 8,000 Units at 01/06/23 1329  •  fentanyl citrate (PF) 100 MCG/2ML 50 mcg, 50 mcg, Intravenous, V4K PRN, Dorrie Gitelman, PA-C, 50 mcg at 01/08/23 0632  •  heparin (porcine) subcutaneous injection 5,000 Units, 5,000 Units, Subcutaneous, Q8H [facility], Betito Siegel PA-C, 5,000 Units at 01/08/23 6315  • insulin glargine (LANTUS) subcutaneous injection 42 Units 0 42 mL, 42 Units, Subcutaneous, QAM, FERNANDO Valles, 42 Units at 23 0837  •  insulin lispro (HumaLOG) 100 units/mL subcutaneous injection 4-20 Units, 4-20 Units, Subcutaneous, Q6H [facility], 4 Units at 23 0631 **AND** Fingerstick Glucose (POCT), , , Q6H, Leafy Winburne, CRNP  •  ipratropium (ATROVENT) 0 02 % inhalation solution 0 5 mg, 0 5 mg, Nebulization, Q6H, Joie Nina V, FERNANDO, 0 5 mg at 23 0135  •  levalbuterol (XOPENEX) inhalation solution 1 25 mg, 1 25 mg, Nebulization, Q6H, FERNANDO Valles, 1 25 mg at 23 0135  •  lidocaine (PF) (XYLOCAINE-MPF) 1 % injection, , , PRN, Alfonso Hedrick MD, 5 mL at 22 1349  •  methylPREDNISolone sodium succinate (Solu-MEDROL) injection 80 mg, 80 mg, Intravenous, Daily, FERNANDO Valles, 80 mg at 23 2130  •  metoprolol tartrate (LOPRESSOR) tablet 25 mg, 25 mg, Per NG Tube, Q12H [facility], FERNANDO Valles, 25 mg at 23 213  •  omeprazole (PRILOSEC) suspension 2 mg/mL, 20 mg, Per NG Tube, Daily, FERNANDO Valles, 20 mg at 23 0856  •  ondansetron (ZOFRAN) injection 4 mg, 4 mg, Intravenous, G8K PRN, Kaden Quan PA-C  •  pravastatin (PRAVACHOL) tablet 80 mg, 80 mg, Oral, Daily With FERNANDO Poe, 80 mg at 23 1646  •  saccharomyces boulardii (FLORASTOR) capsule 250 mg, 250 mg, Oral, BID, FERNANDO Smith, 250 mg at 23 1752  •  sodium chloride 3 % inhalation solution 4 mL, 4 mL, Nebulization, Q6H, FERNANDO Valles, 4 mL at 23 0134  •  sulfamethoxazole-trimethoprim (BACTRIM) oral suspension 80 mg of trimethoprim, 80 mg of trimethoprim, Per NG Tube, Once per day on , FERNANDO Valles, 80 mg of trimethoprim at 23 1457    Laboratory Results:  Results from last 7 days   Lab Units 23  0437 23  0744 23  0626 23  1645 01/05/23  0535 01/04/23  1102 01/04/23  0650 01/03/23  0529 01/02/23  0501   WBC Thousand/uL 30 29* 25 23*  --  26 34* 37 38* 27 92*  --  39 42* 22 64*   HEMOGLOBIN g/dL 8 4* 8 4*  --  7 9* 8 5* 7 9*  --  9 0* 6 6*   HEMATOCRIT % 26 7* 26 8*  --  25 5* 27 8* 25 0*  --  28 2* 20 9*   PLATELETS Thousands/uL 241 214  --  166 159 209  --  229 211   POTASSIUM mmol/L 5 1  --  5 1  --  4 2  --  4 6 4 4 4 2   CHLORIDE mmol/L 92*  --  93*  --  94*  --  94* 96 92*   CO2 mmol/L 27  --  24  --  29  --  34* 32 32   BUN mg/dL 111*  --  82*  --  65*  --  86* 66* 92*   CREATININE mg/dL 4 11*  --  3 26*  --  2 90*  --  3 77* 3 18* 4 39*   CALCIUM mg/dL 8 2*  --  8 1*  --  7 8*  --  7 3* 7 2* 7 5*   MAGNESIUM mg/dL 2 2  --  2 3 1 9 2 0  --  2 1 2 0 2 1   PHOSPHORUS mg/dL 5 9*  --  5 6* 6 0* 4 9*  --  5 9* 4 9* 5 7*

## 2023-01-08 NOTE — ASSESSMENT & PLAN NOTE
Lab Results   Component Value Date    EGFR 17 01/07/2023    EGFR 19 01/05/2023    EGFR 14 01/04/2023    CREATININE 3 26 (H) 01/07/2023    CREATININE 2 90 (H) 01/05/2023    CREATININE 3 77 (H) 01/04/2023       · Patient scheduled dialysis Tuesday/Thursday/Saturday however he was unable to make his treatment on Saturday 12/24 due to the transportation bus not picking him up  · Patient started with worsening shortness of breath on 12/25 presented to the emergency room  · HD session done on 12/25 for 3 5L  · Continue MWF schedule for now per nephrology  · Continue PhosLo as ordered  · Last dialysis session yesterday with 4 5L removed, patient tolerated well  · Trend BMP  · Nephrology following, appreciate recs

## 2023-01-09 NOTE — PROGRESS NOTES
Spiritual Care Progress Note    2023  Patient: Ramón Davidson : 1945  Admission Date & Time: 2022 1120  MRN: [id number] [facility]: [id number]     visited patient per referral from ICU RN  Patient recently on comfort measures only  Patient's friend and neighbor Johnathon Mehta at bedside and requested prayer   provided prayer, emotional support, resources on grief, and facilitated storytelling about patient  Johnathon Mehta thanked  for visiting  Spiritual care will remain available               Chaplaincy Interventions Utilized:   Empowerment: Facilitated group experience, Normalized experience of patient/family, and Provided chaplaincy education    Exploration: Explored emotional needs & resources, Explored spiritual needs & resources, and Facilitated life review    Collaboration: Consulted with interdisciplinary team    Relationship Building: Cultivated a relationship of care and support, Listened empathically, and Hospitality    Ritual: Provided prayer and Provided Protestant resources      Chaplaincy Outcomes Achieved:  Catharsis      Spiritual Coping Strategies Utilized:   Spiritual practices, Spiritual comfort, and Spiritual meaning     23 1000   Clinical Encounter Type   Visited With Patient and family together   Routine Visit Follow-up   Crisis Visit Critical Care;Patient actively dying   Referral From Nurse   Latter day Encounters   Latter day Needs Prayer;Literature

## 2023-01-09 NOTE — ACP (ADVANCE CARE PLANNING)
Critical Care Advanced Care Planning Note  Rebecca Garcia 76 y o  male MRN: [id number]  Unit/Bed#: ICU 11 Encounter: [id number]    Rebecca Garcia is a 76 y o  male requiring critical care evaluation and advanced care planning  The patient has chronic comorbidities, including but not limited to hypoxic respiratory failure, vasculitis, which is now further complicated by the following acute conditions: pulmonary vasculitis, hypoxic respiratory failure  Due to the severity of the patient's acute condition and/or the extent of chronic conditions, additional conversations pertaining to advanced care planning were required  Today's discussion, which was held over the phone, included spouse Gloria Diallo, and it was established that all stake holders understood the rationale for the advanced care planning  The patient was unable to participate in the discussion due to intubated  Summary of Discussion:  Previously CC team discussed with wife regarding goals of care for patient since he was unable to wean from ventilator  She had tentatively planned on compassionately extubating Justyna Hodge today to comfort measures with a friend at bedside  We re-discussed this and confirmed this plan for today with Clary Ramires (friend) at baseline  Total time spent, (20) minutes (5650 to 0965)        CODE STATUS: COMFORT CARE - Level 4  POA:    POLST:        SIGNATURE: Hayder Cortes PA-C  DATE: January 9, 2023  TIME: 11:32 AM

## 2023-01-09 NOTE — ASSESSMENT & PLAN NOTE
Lab Results   Component Value Date    EGFR 12 01/08/2023    EGFR 17 01/07/2023    EGFR 19 01/05/2023    CREATININE 4 11 (H) 01/08/2023    CREATININE 3 26 (H) 01/07/2023    CREATININE 2 90 (H) 01/05/2023       · Patient scheduled dialysis Tuesday/Thursday/Saturday however he was unable to make his treatment on Saturday 12/24 due to the transportation bus not picking him up  · Patient started with worsening shortness of breath on 12/25 presented to the emergency room  · HD session done on 12/25 for 3 5L  · Continue MWF schedule for now per nephrology  · Continue PhosLo as ordered  · Last dialysis session yesterday with 4 5L removed, patient tolerated well  · Trend BMP  · Nephrology following, appreciate recs

## 2023-01-09 NOTE — ASSESSMENT & PLAN NOTE
· Patient presented to the emergency department on 12/25 with worsening shortness of breath and hypoxia after missing his dialysis treatment 12/24 due to lack of transportation  · Initially in the emergency room, the patient was placed on BiPAP but he was weaned to mid flow   · Overnight 12/25, became more hypoxic and ultimately required intubation  · Began having hemoptysis and significant blood suctioned post intubation  Concern for alveolar hemorrhage secondary to vasculitis-see plan as above   · Worsening hypoxia 12/30-12/31 overnight with peak pressuring on vent  Changed to [facility] and re-sedated  ABG with respiratory acidosis and P:F ratio 65  · CXR 12/30 PM with R lung re-expanded with R pleural CT to -20cm of suction but worsening of extensive B/L patchy pulmonary infiltrates as compared to prior  · Episode of acute hypoxia again 12/31 with SpO2 down to 60's, not improved with saline lavage and suctioning, paralytic, or bagging  Was difficult to bag and had persistent air leak despite ETT repositioning and re-inflation of balloon  SpO2 dipped to 28% briefly  Required ETT exchange  Now with #8 ETT  SpO2 improved after additional lavage and suctioning  · 1/1 ABG 7 33/57/79/29   PF ratio 98  · S/p bronch 1/1-  thick mucoid plugs removed from RML and b/l lower lobes  · Current Vent settings: AC/PC 22/22/40%/8  · Wean fio2/PEEP for O2 sat 92% or above   · Failed SBT d/t low vT and tachypnea   · Vent day #16  · VAP bundle; chlorhexidine, PPI, HOB greater than 30 degrees  · Pulmonary toilet with xopenex/atrovent/3% NSS nebs q6h, chest PT TID, ETT suctioning PRN  · CXR 1/4 persistent extensive B/L pulmonary opacifications and dense RLL consolidation which appears worse as compared to prior imaging

## 2023-01-09 NOTE — ASSESSMENT & PLAN NOTE
Lab Results   Component Value Date    HGBA1C 5 7 (H) 10/19/2022       Recent Labs     01/07/23  2359 01/08/23  1149 01/08/23  1755 01/08/23  2343   POCGLU 118 167* 109 121       Blood Sugar Average: Last 72 hrs:  (P) 273 [id number]   · Continue SSI algorithm 5 q6h, Lantus to 42 units qAM   · Holding home regimen of 15u 70/30 BID   · Goal blood glucose less than 180

## 2023-01-09 NOTE — DISCHARGE SUMMARY
Discharge Summary - Bella Forbes 76 y o  male MRN: [id number]    Unit/Bed#: ICU 11 Encounter: [id number] PCP: Javier Clifton MD    Admission Date:   Admission Orders (From admission, onward)     Ordered        22 1324  INPATIENT ADMISSION  Once                        Admitting Diagnosis: CHF (congestive heart failure) ([facility] ) [I50 9]  Hyponatremia [E87 1]  Pneumonia [J18 9]  Respiratory distress [R06 03]  Hypoxia [R09 02]  ESRD on dialysis ([facility] [address] ) [N18 6, Z99 2]    HPI: "77 y/o M with ANCA vasculitis, ESRD on IHD (T, TH, Sat), HTN, T2 DM, and recent Covid 19 infection (2022) was brought in from [facility] today with SOB that started this morning  Patient was just admitted to [address] from - for anemia  Patient was discharged to [address] [place] [address] on 22  He was scheduled to have IHD yesterday however missed his session due to lack of transport at his nursing facility  On arrival to the ED, SpO2 was reported to be in the 50s on RA  Patient initially placed on BiPAP but transitioned to midflow  CXR showed b/l pleural effusions with pulmonary edema  Patient had a right thoracentesis by IR on 22 with removal of ~1L of fluid  Patient was admitted to ICU as level 1 stepdown "    Procedures Performed:   Orders Placed This Encounter   Procedures   • Critical Care   • ED ECG Documentation Only   • Intubation   • Intubation       Summary of Hospital Course: Patient was ultimately intubated for worsening diffuse alveolar hemorrhage  He was treated with high dose steroids, plasmapheresis, and Rituximab  The [address] did improve, however, patient was ultimately difficult to wean from the ventilator  After multiple goals of care conversations regarding his failure to wean from the ventilator, spouse elected to purse comfort care  Patient was compasionately extubated with a friend at bedside      Significant Findings, Care, Treatment and Services Provided:     Complications:     Disposition:      Final Diagnosis: pulmonary vasculitis    Medical Problems     Resolved Problems  Date Reviewed: 2023          Resolved    Pulmonary hypertension ([facility] [address] ) 2022     Resolved by  FERNANDO Aguilera    Hyponatremia 2022     Resolved by  Stanley Dakins, PA-C    Urinary retention 2023     Resolved by  FERNANDO Aguilera          Condition at Time of Death: critically ill    Date, Time and Cause of Death    Date of Death: 23  Time of Death: 10:52 AM  Preliminary Cause of Death: Pulmonary vasculitis ([facility] [address] )         Death Note:    INPATIENT DEATH NOTE  [place] Newer 76 y o  male MRN: [id number]  Unit/Bed#: ICU 11 Encounter: [id number]    Date, Time and Cause of Death    Date of Death: 23  Time of Death: 10:52 AM  Preliminary Cause of Death: [facility] vascul[facility]          Patient's Information  Pronounced by: ROSEANN Quan  Did the patient's death occur in the ED?: No  Did the patient's death occur in the OR?: No  Did the patient's death occur less than 10 days post-op?: No  Did the patient's death occur within 24 hours of admission?: No  Was code status DNR at the time of death?: Yes (comfort care)    PHYSICAL EXAM:  Unresponsive to noxious stimuli, Spontaneous respirations absent, Breath sounds absent, Carotid pulse absent, Heart sounds absent, Pupillary light reflex absent and Corneal blink reflex absent    Medical Examiner notification criteria:  NONE APPLICABLE   Medical Examiner's office notified?:  No, does not meet ME notification criteria   Medical Examiner accepted case?:  No  Name of Medical Examiner:     Family Notification  Was the family notified?: Yes  Date Notified: 23  Time Notified: 8131  Notified by: ROSEANN Quan   Relationship to Patient: Spouse  Family Notification Route: Telephone  Was the family told to contact a  home?: Yes    Autopsy Options:  Post-mortem examination declined by next of kin    Primary Service Attending Physician notified?: yes - Attending:  Sylvie Cuevas DO    Physician/Resident responsible for completing Discharge Summary:  Travis Brooke

## 2023-01-09 NOTE — CLINICAL RISK MANAGEMENT
Progress Note - Death in Restraints   Ed Diana 76 y o  male MRN: [id number]  Unit/Bed#: ICU 11 Encounter: [id number]      Patient  within 24 hours of restraint  with Soft restraint right wrist and Soft restraint left wrist  Death unrelated to use of restraints  This situation was tracked internally  CMS and [facility]  notification not required

## 2023-01-09 NOTE — PROGRESS NOTES
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 given hemoptysis and worsening infiltrates   · Bronch on 12/26 with serial aliquots persistently bloody despite lavage consistent with DAH  · Completed 3 days of pulse dose steroids 12/29  · Continue solumedrol 80mg daily   · Hold anticoagulation  · Receiving Rituximab per rheumatology  · Dose 2 of 4 on 1/3, weekly on Tuesdays  · Completed 5/5 days of Plasmapheresis on 1/4  · S/p bronchoscopy for pulmonary toilet 1/1  Multiple tenacious mucoid plugs removed after lavage and suctioning from RML, RLL and LLL  · Continues with thick, brown secretions from ETT  · Required 1u PRBC transfusion 1/2 for Hgb 6 6  See anemia as outlined  · Appreciate pulmonary and rheumatology input    Acute respiratory failure with hypoxia [facility])  Assessment & Plan  · Patient presented to the emergency department on 12/25 with worsening shortness of breath and hypoxia after missing his dialysis treatment 12/24 due to lack of transportation  · Initially in the emergency room, the patient was placed on BiPAP but he was weaned to mid flow   · Overnight 12/25, became more hypoxic and ultimately required intubation  · Began having hemoptysis and significant blood suctioned post intubation  Concern for alveolar hemorrhage secondary to vasculitis-see plan as above   · Worsening hypoxia 12/30-12/31 overnight with peak pressuring on vent  Changed to [facility] and re-sedated  ABG with respiratory acidosis and P:F ratio 65     · CXR 12/30 PM with R lung re-expanded with R pleural CT to -20cm of suction but worsening of extensive B/L patchy pulmonary infiltrates as compared to prior  · Episode of acute hypoxia again 12/31 with SpO2 down to 60's, not improved with saline lavage and suctioning, paralytic, or bagging  Was difficult to bag and had persistent air leak despite ETT repositioning and re-inflation of balloon  SpO2 dipped to 28% briefly  Required ETT exchange  Now with #8 ETT  SpO2 improved after additional lavage and suctioning  · 1/1 ABG 7 33/57/79/29  PF ratio 98  · S/p bronch 1/1-  thick mucoid plugs removed from RML and b/l lower lobes  · Current Vent settings: AC/PC 22/22/40%/8  · Wean fio2/PEEP for O2 sat 92% or above   · Failed SBT d/t low vT and tachypnea   · Vent day #16  · VAP bundle; chlorhexidine, PPI, HOB greater than 30 degrees  · Pulmonary toilet with xopenex/atrovent/3% NSS nebs q6h, chest PT TID, ETT suctioning PRN  · CXR 1/4 persistent extensive B/L pulmonary opacifications and dense RLL consolidation which appears worse as compared to prior imaging     SIRS (systemic inflammatory response syndrome) ([facility] [address] )  Assessment & Plan  · Patient presented to the emergency room on 12/25 with SIRS criteria- increased respiratory rate and hypoxia  · With decompensation on 12/25, antibiotics were started with cefepime/vanco given worsening hypoxia however low suspicion for infection  · BAL with mixed respiratory micheal  · pneumocystis smear negative   · Completing 5 days abx 12/29, however 12/30 with thick tan secretions and worsening leukocytosis and was restarted on cefepime  · Abx stopped again on  1/3  · MRSA negative  · Sputum cx on 12/26 and 12/30 with candida   · Trend temps and WBC   · Persistent leukocytosis of unclear etiology, steroid induced?    · procal difficult to interpret in setting of ESRD      Hydropneumothorax  Assessment & Plan  · Patient had a thoracentesis 12/21 (previous admission)  · Chest x-ray with hydropneumothorax thought to likely be related to this, possibly ex vacuo  · S/p R pleural chest tube with IR 12/27  · 220mL serous drainage x 24 hours  · CXR with small R pneumothorax with CT on H20 seal therefore chest tube placed by to -20cm suction, R lung was re-expanded on repeat CXR 12/30  · Subsequent CXRs with no pneumo, continue CT to -20cm suction       Toxic metabolic encephalopathy  Assessment & Plan  · Did not follow commands or move extremities after several hours off sedation 12/30  · Encephalopathy possibly in setting of critical illness, sedation effects, delirium, and hypercarbia   · Sedation resumed 12/30 evening d/t vent dyssynchrony, will attempt to wean sedation today for neuro exam   · CT head 12/31 without intracranial abnormality   · Neuro checks q4h  · Delirium precautions; regulate sleep/wake cycle, environmental controls, daily CAM ICU    ANCA-associated vasculitis  Assessment & Plan  · Kidney biopsy on 03/22: 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 for which he was previously treated with Cytoxan (discontinued in April) and subsequently prescribed prednisone taper  · Now with pulmonary involvement, continue rituximab and steroids as above   · Plasmapheresis 5/5 treatments completed 1/4     ESRD on dialysis [facility])  Assessment & Plan  Lab Results   Component Value Date    EGFR 12 01/08/2023    EGFR 17 01/07/2023    EGFR 19 01/05/2023    CREATININE 4 11 (H) 01/08/2023    CREATININE 3 26 (H) 01/07/2023    CREATININE 2 90 (H) 01/05/2023       · Patient scheduled dialysis Tuesday/Thursday/Saturday however he was unable to make his treatment on Saturday 12/24 due to the transportation bus not picking him up  · Patient started with worsening shortness of breath on 12/25 presented to the emergency room  · HD session done on 12/25 for 3 5L  · Continue MWF schedule for now per nephrology  · Continue PhosLo as ordered  · Last dialysis session yesterday with 4 5L removed, patient tolerated well  · Trend BMP  · Nephrology following, appreciate recs    Type 2 diabetes mellitus with chronic kidney disease on chronic dialysis, with long-term current use of insulin [facility])  Assessment & Plan  Lab Results   Component Value Date    HGBA1C 5 7 (H) 10/19/2022       Recent Labs     01/07/23  2359 01/08/23  1149 01/08/23  1755 01/08/23  2343   POCGLU 118 167* 109 121       Blood Sugar Average: Last 72 hrs:  (P) 122 [id number]   · Continue SSI algorithm 5 q6h, Lantus to 42 units qAM   · Holding home regimen of 15u 70/30 BID   · Goal blood glucose less than 180    Anemia due to chronic kidney disease, on chronic dialysis [facility])  Assessment & Plan  · Patient had a recent admission from 12/20-12/23 with hemoglobin 5 7 -received 1 unit packed red blood cells at that time  · Most likely acute on chronic anemia in setting of ESRD now with DAH  · Received 1 unit of PRBCs 12/28 and again 1/2 for Hgb <7  · Continue to trend daily and transfuse for hgb less than 7 0    Atrial flutter (HCC)  Assessment & Plan  · Rhythm deteriorated to Aflutter 2:1 block with rates in 140s on 1/2  · No response to IV metoprolol  · Given amio bolus and started on drip - transitioned to PO   · Unable to anticoagulate due to alveolar hemorrhage  · Continuous cardiac monitoring  · Received 250mcg IV Dig x 1 1/3   · Continue metoprolol 25mg BID (started 1/4)        Benign essential hypertension  Assessment & Plan  · Continue to hold home dose Norvasc and clonidine   · Continue metoprolol-started 1/4    Diarrhea  Assessment & Plan  · Worsening diarrhea 1/1-1/2 after initiation of Reglan for reported high gastric residuals  · Reglan discontinued, residuals remain <100  · Monitor stool output with FMS  · Continue florastor and banana flakes  · All stool softeners have been discontinued    Hyperlipidemia  Assessment & Plan  · Continue home statin      ----------------------------------------------------------------------------------------  HPI/24hr events: No acute events overnight  Patient remained hemodynamically stable on mechanical ventilation    He continues to not follow commands off of sedation  Patient appropriate for transfer out of the ICU today?: No  Disposition: Continue Critical Care   Code Status: Level 1 - Full Code  ---------------------------------------------------------------------------------------  SUBJECTIVE  Intubated     Review of Systems   Unable to perform ROS: Intubated     Review of systems was unable to be performed secondary to intubation   ---------------------------------------------------------------------------------------  OBJECTIVE    Vitals   Vitals:    23 0127 23 0200 23 0300 23 0450   BP:  132/69 136/68    Pulse:  (!) 116 (!) 117    Resp:  (!) 23 22    Temp:       TempSrc:       SpO2: 97% 96% 96% 98%   Weight:       Height:         Temp (24hrs), Av 7 °F (36 5 °C), Min:97 2 °F (36 2 °C), Max:98 °F (36 7 °C)  Current: Temperature: (!) 97 2 °F (36 2 °C)          Respiratory:  SpO2: SpO2: 98 %  Nasal Cannula O2 Flow Rate (L/min): 80 L/min    Invasive/non-invasive ventilation settings   Respiratory    Lab Data (Last 4 hours)    None         O2/Vent Data (Last 4 hours)      450           Vent Mode AC/PC       Resp Rate (BPM) (BPM) 22       Pressure Control (cmH2O) (cm) 22       Insp Time (sec) (sec) 1       FiO2 (%) (%) 40       PEEP (cmH2O) (cmH2O) 6       MV 10 1                   Physical Exam  Vitals and nursing note reviewed  Constitutional:       Appearance: He is ill-appearing and toxic-appearing  Comments: B/L wrist restraints    HENT:      Head: Normocephalic and atraumatic  Right Ear: Tympanic membrane, ear canal and external ear normal       Left Ear: Tympanic membrane, ear canal and external ear normal       Nose: Nose normal       Mouth/Throat:      Mouth: Mucous membranes are dry  Pharynx: Oropharynx is clear  Eyes:      Extraocular Movements: Extraocular movements intact        Conjunctiva/sclera: Conjunctivae normal       Pupils: Pupils are equal, round, and reactive to light  Cardiovascular:      Rate and Rhythm: Normal rate and regular rhythm  Pulses: Normal pulses  Heart sounds: Normal heart sounds  Pulmonary:      Comments: ETT on mechanical ventilation  B/L breath sounds coarse and diminished  Right chest tube to suction   Abdominal:      Comments: Tube feeds via OGT   Fecal tube    Genitourinary:     Comments: Urinary catheter   Musculoskeletal:         General: Normal range of motion  Cervical back: Normal range of motion and neck supple  Skin:     General: Skin is warm and dry  Capillary Refill: Capillary refill takes less than 2 seconds  Neurological:      Comments:  Withdraws to painful stimuli  Does not follow commands    Psychiatric:      Comments: intubated             Laboratory and Diagnostics:  Results from last 7 days   Lab Units 01/08/23  0437 01/07/23  0744 01/06/23  0511 01/05/23  0535 01/04/23  1102 01/03/23  0529   WBC Thousand/uL 30 29* 25 23* 26 34* 37 38* 27 92* 39 42*   HEMOGLOBIN g/dL 8 4* 8 4* 7 9* 8 5* 7 9* 9 0*   HEMATOCRIT % 26 7* 26 8* 25 5* 27 8* 25 0* 28 2*   PLATELETS Thousands/uL 241 214 166 159 209 229   BANDS PCT %  --   --  9*  --  6  --    MONO PCT %  --   --  0*  --  1*  --      Results from last 7 days   Lab Units 01/08/23  0437 01/07/23  0626 01/05/23  0535 01/04/23  0650 01/03/23  0529   SODIUM mmol/L 130* 130* 131* 136 136   POTASSIUM mmol/L 5 1 5 1 4 2 4 6 4 4   CHLORIDE mmol/L 92* 93* 94* 94* 96   CO2 mmol/L 27 24 29 34* 32   ANION GAP mmol/L 11 13 8 8 8   BUN mg/dL 111* 82* 65* 86* 66*   CREATININE mg/dL 4 11* 3 26* 2 90* 3 77* 3 18*   CALCIUM mg/dL 8 2* 8 1* 7 8* 7 3* 7 2*   GLUCOSE RANDOM mg/dL 175* 216* 184* 214* 209*   ALT U/L  --   --  20 18  --    AST U/L  --   --  11 8  --    ALK PHOS U/L  --   --  81 71  --    ALBUMIN g/dL  --   --  2 3* 2 0*  --    TOTAL BILIRUBIN mg/dL  --   --  0 49 0 53  --      Results from last 7 days   Lab Units 01/08/23  0437 01/07/23  4069 01/06/23  0511 01/05/23  0535 01/04/23  0650 01/03/23  0529   MAGNESIUM mg/dL 2 2 2 3 1 9 2 0 2 1 2 0   PHOSPHORUS mg/dL 5 9* 5 6* 6 0* 4 9* 5 9* 4 9*                   ABG:  Results from last 7 days   Lab Units 01/06/23  0427   PH ART  7 313*   PCO2 ART mm Hg 52 7*   PO2 ART mm Hg 76 2   HCO3 ART mmol/L 26 1   BASE EXC ART mmol/L -0 4   ABG SOURCE  Radial, Right     VBG:  Results from last 7 days   Lab Units 01/06/23  0427   ABG SOURCE  Radial, Right     Results from last 7 days   Lab Units 01/06/23  0511 01/04/23  0650   PROCALCITONIN ng/ml 4 17* 4 18*       Micro        EKG: NSR HR 86 alarms on   Imaging: I have personally reviewed pertinent reports  XR chest portable    Result Date: 12/30/2022  Impression: Small right basilar hydropneumothorax with right chest tube to waterseal by history  Additional lines and tubes as above  Diffuse bilateral airspace disease, unchanged  The study was marked in [facility] for immediate notification  Workstation performed: [id number]     XR chest portable    Result Date: 12/29/2022  Impression: Unchanged extensive bilateral airspace consolidation  Unchanged small bilateral pleural effusions  Workstation performed: [id number]     XR chest portable    Result Date: 12/28/2022  Impression: Right pigtail catheter with small effusions and small medial right pneumothorax  Persistent severe pneumonia  Workstation performed: [id number]     XR chest portable    Result Date: 12/27/2022  Impression: No pneumothorax identified  Stable bilateral airspace disease and effusions compared to yesterday  Workstation performed: [id number]     XR chest 1 view portable    Result Date: 12/26/2022  Impression: Moderate right hydropneumothorax, likely ex vacuo after thoracentesis  Persistent extensive bilateral consolidation which could be due to edema or pneumonia superimposed on rounded atelectasis   Workstation performed: [id number]     XR chest portable ICU    Result Date: 12/31/2022  Impression: Redemonstration of subtle linear lucency adjacent to the left heart border, question trace pneumomediastinum or trace left pneumothorax  Right pigtail catheter with small right pleural effusion  The previous small right basilar pneumothorax is not visible  Persistent bilateral pneumonia  Workstation performed: [id number]     XR chest portable ICU    Result Date: 12/27/2022  Impression: Right pigtail catheter with small effusions with no pneumothorax  Persistent severe bilateral consolidation  Workstation performed: [id number]     XR chest portable ICU    Result Date: 12/26/2022  Impression: Lines and tubes as above  Moderate right hydropneumothorax, possibly ex vacuo from recent thoracentesis, unchanged but better shown on previous CXR  Persistent severe bilateral consolidation which could be due to edema and/or pneumonia  I notified Saturnino Zaidi by secure text on 12/26/2022 at 10:32 AM   He responded immediately  Workstation performed: [id number]     XR chest portable ICU    Result Date: 12/26/2022  Impression: Moderate right hydropneumothorax, unchanged from study earlier today, possibly ex vacuo after recent thoracentesis  Persistent severe bilateral consolidation which could be due to edema and/or pneumonia  I notified Saturnino Zaidi by secure text on 12/26/2022 at 10:32 AM   He responded immediately  Workstation performed: [id number]     CTA chest pe study    Result Date: 12/27/2022  Impression: Moderate hydropneumothorax, new compared to the prior  Consider chest tube placement  Marked interval increase in bilateral pulmonary infiltrates as detailed above  Nonspecific pattern, given patient history, considerations include COVID related  With the history of hemoptysis, alveolar hemorrhage can also have this appearance as well  ARDS/fluid overload also remains within the differential as well as other infectious entities    I personally discussed this study with Ann Marie Rodriguez on 12/27/2022 at 9:16 AM  Workstation performed: [id number]     IR chest tube placement    Result Date: 12/27/2022  Impression: Impression: 1  Successful placement of a therapeutic 10 [place] all-purpose drainage catheter into the right pleural space under CT guidance  Workstation performed: [id number]     Intake and Output  I/O       01/07 0701 01/08 0700 01/08 0701 01/09 0700    I V  (mL/kg) 27 5 (0 3) 20 (0 2)    NG/ 190    Feedings 600 500    Total Intake(mL/kg) 907 5 (11 3) 710 (8 9)    Stool 50 0    Chest Tube 100 0    Total Output 150 0    Net +757 5 +710                Height and Weights   Height: 5' 8" (172 7 cm)  IBW (Ideal Body Weight): 68 4 kg  Body mass index is 26 88 kg/m²  Weight (last 2 days)     Date/Time Weight    01/08/23 0600 80 2 (176 81)    01/07/23 0600 79 6 (175 49)            Nutrition       Diet Orders   (From admission, onward)             Start     Ordered    01/04/23 1234  Diet Enteral/Parenteral; Tube Feeding No Oral Diet; Nepro; Continuous; 50; Banatrol Plus Banana Flakes - One Packet Daily; 50; Water; Every 6 hours  Diet effective now        References:    Nutrtion Support Algorithm Enteral vs  Parenteral   Question Answer Comment   Diet Type Enteral/Parenteral    Enteral/Parenteral Tube Feeding No Oral Diet    Tube Feeding Formula: Nepro    Bolus/Cyclic/Continuous Continuous    Tube Feeding Goal Rate (mL/hr): 50    Banatrol Plus Banana Flakes Banatrol Plus Banana Flakes - One Packet Daily    Tube Feeding flush (mL): 50    Water Flush type: Water    Water flush frequency: Every 6 hours    RD to adjust diet per protocol?  Yes        01/04/23 1233    12/26/22 0815  Room Service  Once        Question:  Type of Service  Answer:  Room Service - Appropriate with Assistance    12/26/22 0814                  Active Medications  Scheduled Meds:  Current Facility-Administered Medications   Medication Dose Route Frequency Provider Last Rate   • acetaminophen  650 mg Oral Q4H PRN FERNANDO Salazar     • calcium acetate  1,334 mg Oral TID With Meals Christopher Shelby MD     • calcium carbonate-vitamin D  2 tablet Oral BID With Meals FERNANDO Bunch     • chlorhexidine  15 mL Mouth/Throat [id number] [address] Matias Schmitz     • epoetin ani  8,000 Units Intravenous Once per day on Mon Wed Fri Christopher Shelby MD     • fentanyl citrate (PF)  50 mcg Intravenous M6W PRN Fabby Mireles PA-C     • heparin (porcine)  5,000 Units Subcutaneous [facility] Angelique Dumont PA-C     • insulin glargine  42 Units Subcutaneous QAM FERNANDO Valles     • insulin lispro  4-20 Units Subcutaneous Q6H [address] FERNANDO Latham     • ipratropium  0 5 mg Nebulization Q6H FERNANDO Valles     • levalbuterol  1 25 mg Nebulization Q6H FERNANDO Valles     • lidocaine (PF)    PRN Moni Colón MD     • methylPREDNISolone sodium succinate  80 mg Intravenous Daily FERNANDO Valles     • metoprolol tartrate  25 mg Per NG Tube Q12H [address] FERNANDO Valles     • omeprazole (PRILOSEC) suspension 2 mg/mL  20 mg Per NG Tube Daily FERNANDO Valles     • ondansetron  4 mg Intravenous I4C PRN Fabby Mireles PA-C     • pravastatin  80 mg Oral Daily With [email or web address]     • saccharomyces boulardii  250 mg Oral BID FERNANDO Latham     • sodium chloride  4 mL Nebulization Q6H FERNANDO Valles     • sulfamethoxazole-trimethoprim  80 mg of trimethoprim Per NG Tube Once per day on Mon Wed Fri FERNANDO Valles       Continuous Infusions:     PRN Meds:   acetaminophen, 650 mg, Q4H PRN  fentanyl citrate (PF), 50 mcg, Q1H PRN  lidocaine (PF), , PRN  ondansetron, 4 mg, Q6H PRN        Invasive Devices Review  Invasive Devices     Peripheral Intravenous Line  Duration           Long-Dwell Peripheral IV (Midline) 12/26/22 Left Brachial 14 days    Peripheral IV 01/05/23 Left Wrist 4 days    Peripheral IV 01/05/23 Right Forearm 3 days          Hemodialysis Catheter Duration           HD Permanent Double Catheter 306 days          Drain  Duration           NG/OG/Enteral Tube Orogastric 16 Fr Center mouth 14 days    Chest Tube Right Pleural 10 Fr  12 days    Rectal Tube With balloon 7 days          Airway  Duration           ETT  Cuffed; Hi-Lo 8 mm 8 days                ---------------------------------------------------------------------------------------  Care Time Delivered:   No Critical Care time spent       [facility] Hospital[facility]NANDO      Portions of the record may have been created with voice recognition software  Occasional wrong word or "sound a like" substitutions may have occurred due to the inherent limitations of voice recognition software    Read the chart carefully and recognize, using context, where substitutions have occurred

## 2023-01-09 NOTE — PROGRESS NOTES
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  -Access: Right IJ PermCath  -Plan: Was plan for hemodialysis treatment today but on hold as family considering comfort care and possible terminal extubation  Clinically euvolemic    Volume status  -Appears euvolemic    CKD/MBD  -Hyperphosphatemia: Continue PhosLo dose was increased to 2 tablet 3 times daily on January 7  Monitor PTH as outpatient    Blood pressure  -Primary hypertension-blood pressure stable  Status post extra treatment with UF on January 5  -Continue current treatment with metoprolol    Electrolytes:   -Hyponatremia/hyperkalemia  Avoid free water flushes  If no plan for comfort care, will need hemodialysis treatment as potassium level 5 6  Discussed with ICU advanced practitioner earlier today  Anemia due to ESRD  -Goal hemoglobin:  10-11 grams/deciliter  -Current hemoglobin: Hemoglobin below goal at 7 3 g/dl  -Status post PRBC on January 2  -On Mircera as outpatient  -Plan: Currently on Epogen 8000 units with HD and dose was increased on January 3  Continue at current dose with HD    Ventilator dependent respiratory failure/alveolar hemorrhage  -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  Bronchoscopy was consistent with diffuse alveolar hemorrhage  FL-3 antibody was 84 4 on March 14 and was down to 7 0 on August 24  Now again more than 8 0 on December 21  -Status post plasma exchange and also pulse steroid per rheumatology  -Currently on Solu-Medrol IV 80 mg daily  -Status post rituximab on 12/27 and January 3    Plan for 4 doses total during the first cycle  -On Bactrim for PCP prophylaxis and omeprazole    Pneumothorax status post right chest tube placement, management per critical care    Discussed with ICU advanced practitioner    SUBJECTIVE:  No new complaints  Noted plan for comfort care     OBJECTIVE:  Current Weight: Weight - Scale: 81 1 kg (178 lb 12 7 oz)  Vitals:    01/09/23 0856   BP:    Pulse:    Resp:    Temp:    SpO2: 98%   /68   Pulse (!) 117   Temp 97 6 °F (36 4 °C) (Temporal)   Resp 22   Ht 5' 8" (1 727 m)   Wt 81 1 kg (178 lb 12 7 oz)   SpO2 98%   BMI 27 19 kg/m²       Intake/Output Summary (Last 24 hours) at 1/9/2023 0913  Last data filed at 1/9/2023 0601  Gross per 24 hour   Intake 1420 ml   Output 30 ml   Net 1390 ml       Physical Exam  General:  Ill looking, intubated   Head: normocephalic, atraumatic  Eyes: Conjunctivae pink,  Sclera anicteric  ENT: Intubated  Neck: supple   Chest: Clear to Auscultation both lungs,  no crackles or wheezing  CVS: S1 & S2 present, normal rate, regular rhythm, no murmur  Abdomen: soft, non-tender, non-distended, Bowel sounds normoactive  Extremities: no edema of  legs  Neuro: Sedated, intubated  Skin: no rash, warm and dry  Psych: sedated  Unable to assess        Medications:    Current Facility-Administered Medications:   •  acetaminophen (TYLENOL) oral suspension 650 mg, 650 mg, Oral, Q4H PRN, FERNANDO Whitmore, 650 mg at 01/04/23 1017  •  calcium acetate (PHOSLO) capsule 1,334 mg, 1,334 mg, Oral, TID With Meals, Cam Humhprey MD, 1,334 mg at 01/09/23 6011  •  calcium carbonate-vitamin D 500 mg-5 mcg tablet 2 tablet, 2 tablet, Oral, BID With Meals, [employer], CRNP, 2 tablet at 01/09/23 5011  •  chlorhexidine (PERIDEX) 0 12 % oral rinse 15 mL, 15 mL, Mouth/Throat, G42U [address], Jose Miguel Quan PA-C, 15 mL at 01/09/23 9522  •  epoetin ani (EPOGEN,PROCRIT) injection 8,000 Units, 8,000 Units, Intravenous, Once per day on Mon Wed Fri, Isidro Melgar MD, 8,000 Units at 01/06/23 1329  •  fentanyl citrate (PF) 100 MCG/2ML 50 mcg, 50 mcg, Intravenous, X3Q PRN, Jose Miguel Quan PA-C, 50 mcg at 01/08/23 2035  •  heparin (porcine) subcutaneous injection 5,000 Units, 5,000 Units, Subcutaneous, Q8H [facility], Justina Del Cid PA-C, 5,000 Units at 01/09/23 0549  •  insulin glargine (LANTUS) subcutaneous injection 42 Units 0 42 mL, 42 Units, Subcutaneous, QAM, Joie Nicolaso V, CRNP, 42 Units at 01/08/23 0839  •  insulin lispro (HumaLOG) 100 units/mL subcutaneous injection 4-20 Units, 4-20 Units, Subcutaneous, Q6H [facility], 8 Units at 01/09/23 0549 **AND** Fingerstick Glucose (POCT), , , Q6H, FERNANDO Lea  •  ipratropium (ATROVENT) 0 02 % inhalation solution 0 5 mg, 0 5 mg, Nebulization, Q6H, Joie Nicolaso V, CRNP, 0 5 mg at 01/09/23 0855  •  levalbuterol (XOPENEX) inhalation solution 1 25 mg, 1 25 mg, Nebulization, Q6H, Joie Nicolaso V, CRNP, 1 25 mg at 01/09/23 0855  •  lidocaine (PF) (XYLOCAINE-MPF) 1 % injection, , , PRN, Diogo Bremudez MD, 5 mL at 12/27/22 1349  •  methylPREDNISolone sodium succinate (Solu-MEDROL) injection 80 mg, 80 mg, Intravenous, Daily, Joie Nina V, CRNP, 80 mg at 01/08/23 2146  •  metoprolol tartrate (LOPRESSOR) tablet 25 mg, 25 mg, Per NG Tube, Q12H [facility], Joie Nicolaso V, CRNP, 25 mg at 01/09/23 5862  •  omeprazole (PRILOSEC) suspension 2 mg/mL, 20 mg, Per NG Tube, Daily, Joie Nicolaso V, CRNP, 20 mg at 01/09/23 0830  •  ondansetron (ZOFRAN) injection 4 mg, 4 mg, Intravenous, M7K PRN, Moody Quan PA-C  •  pravastatin (PRAVACHOL) tablet 80 mg, 80 mg, Oral, Daily With FERNANDO Lal, 80 mg at 01/08/23 1631  •  saccharomyces boulardii (FLORASTOR) capsule 250 mg, 250 mg, Oral, BID, FERNANDO Lea, 250 mg at 01/09/23 7881  •  sodium chloride 3 % inhalation solution 4 mL, 4 mL, Nebulization, Q6H, FERNANDO Valles, 4 mL at 01/09/23 7232  •  sulfamethoxazole-trimethoprim (BACTRIM) oral suspension 80 mg of trimethoprim, 80 mg of trimethoprim, Per NG Tube, Once per day on Mon Wed Fri, Joie Nina V CRNP, 80 mg of trimethoprim at 01/06/23 1457    Invasive Devices:        Lab Results:   Results from last 7 days   Lab Units 01/09/23  0549 01/08/23  0437 01/07/23  0744 01/07/23  0626 01/06/23  0511 01/05/23  0535 01/04/23  1102 01/04/23  0650   WBC Thousand/uL 28 40* 30 29* 25 23*  --    < > 37 38*   < >  --    HEMOGLOBIN g/dL 7 3* 8 4* 8 4*  --    < > 8 5*   < >  --    HEMATOCRIT % 23 6* 26 7* 26 8*  --    < > 27 8*   < >  --    PLATELETS Thousands/uL 225 241 214  --    < > 159   < >  --    POTASSIUM mmol/L 5 6* 5 1  --  5 1  --  4 2  --  4 6   CHLORIDE mmol/L 90* 92*  --  93*  --  94*  --  94*   CO2 mmol/L 25 27  --  24  --  29  --  34*   BUN mg/dL 136* 111*  --  82*  --  65*  --  86*   CREATININE mg/dL 4 78* 4 11*  --  3 26*  --  2 90*  --  3 77*   CALCIUM mg/dL 8 1* 8 2*  --  8 1*  --  7 8*  --  7 3*   MAGNESIUM mg/dL 2 5 2 2  --  2 3   < > 2 0  --  2 1   PHOSPHORUS mg/dL 6 2* 5 9*  --  5 6*   < > 4 9*  --  5 9*   ALK PHOS U/L  --   --   --   --   --  81  --  71   ALT U/L  --   --   --   --   --  20  --  18   AST U/L  --   --   --   --   --  11  --  8    < > = values in this interval not displayed  Previous work up:         Portions of the record may have been created with voice recognition software  Occasional wrong word or "sound a like" substitutions may have occurred due to the inherent limitations of voice recognition software  Read the chart carefully and recognize, using context, where substitutions have occurred  If you have any questions, please contact the dictating provider

## 2023-01-09 NOTE — DEATH NOTE
INPATIENT DEATH NOTE  Barbara Hoffman 76 y o  male MRN: [id number]  Unit/Bed#: ICU 11 Encounter: [id number]    Date, Time and Cause of Death    Date of Death: 23  Time of Death: 10:52 AM  Preliminary Cause of Death: Pulmonary vasculitis ([address] )          Patient's Information  Pronounced by: ROSEANN Quan  Did the patient's death occur in the ED?: No  Did the patient's death occur in the OR?: No  Did the patient's death occur less than 10 days post-op?: No  Did the patient's death occur within 24 hours of admission?: No  Was code status DNR at the time of death?: Yes (comfort care)    PHYSICAL EXAM:  Unresponsive to noxious stimuli, Spontaneous respirations absent, Breath sounds absent, Carotid pulse absent, Heart sounds absent, Pupillary light reflex absent and Corneal blink reflex absent    Medical Examiner notification criteria:  NONE APPLICABLE   Medical Examiner's office notified?:  No, does not meet ME notification criteria   Medical Examiner accepted case?:  No  Name of Medical Examiner:     Family Notification  Was the family notified?: Yes  Date Notified: 23  Time Notified:   Notified by: ROSEANN Quan   Relationship to Patient: Spouse  Family Notification Route: Telephone  Was the family told to contact a  home?: Yes    Autopsy Options:  Post-mortem examination declined by next of kin    Primary Service Attending Physician notified?:  yes - Attending:  Akira Joseph, DO    Physician/Resident responsible for completing Discharge Summary:  Rafael Rushing

## 2023-11-08 NOTE — CONSULTS
Physician Requesting Consult: Nicanor Connell MD    Reason for Consult / Principal Problem: Anemia    Assessment/Plan:  24-year-old male with a past medical history of end-stage renal disease on dialysis, recent COVID infection on 11/28/2022, chronic anemia, BPH, diabetes mellitus, hypertension, and hyperlipidemia who presented to [place] [address] from outpatient hemodialysis center with a hemoglobin 6 4      1  Anemia  Patient has history of chronic anemia  Anemia most likely due to end-stage renal disease  Patient currently having no signs of active GI bleed  Patient denies bright red blood in stool, bright red blood from rectal area, or black tarry stool  Tolerating diet  Denies nausea or vomiting  Denies abdominal pain  Abdomen exam benign  Hemoglobin 5 7 on admission  Hemoglobin 6 0 this AM   Stool for occult blood positive patient does have internal hemorrhoids per previous colonoscopy  -Epogen being started by nephrology  -Monitored for signs of GI bleed  -Monitor hemoglobin  -Transfuse blood products as needed to keep hemoglobin greater than 7  -Diet as tolerated  -Continue supportive care  -Recommend outpatient capsule endoscopy for further evaluation  -Patient had recent EGD and colonoscopy due to hemoglobin of 5 8, which were unrevealing for any source of bleed   -Call GI if patient has any signs of active GI bleed  2  Covid-19  Patient has history of COVID-19 diagnosed 11/28  Patient still testing positive for COVID-19 concern for viral shredding   -Will follow as needed  HPI: Oscar Arnett is a 76 y o  male  Symptomatic anemia  This is 24-year-old male with a past medical history of end-stage renal disease on dialysis, recent COVID infection on 11/28/2022, chronic anemia, BPH, diabetes mellitus, hypertension, and hyperlipidemia who presented to [place] [address] from outpatient hemodialysis center with a hemoglobin 6 4   Repeat hemoglobin 5 7 in the emergency room  Patient was asymptomatic  CT chest, abdomen, and pelvis showed bilateral pleural effusions  Bilateral inguinal hernias without obstruction  Small to moderate pericardial effusion increased from previous study  Small amount of ascites and anasarca correlate with volume overload  Scattered areas of few irregular nodules densities in right middle lobe, right upper lobe with additional heterogenous groundglass opacity left upper lobe  This may be inflammatory or infectious  Patient denies nausea, vomiting, acid reflux, heartburn, epigastric or abdominal pain  Patient denies blood in stool, blood from rectal area, or black tarry stool  Patient denied lightheadedness, dizziness, shortness of breath, or chest pain  Patient denies any signs of GI bleed  Patient had a recent EGD and colonoscopy done 3/2022 for anemia with hemoglobin 6 1  EGD done 3/11/2022 showed no blood or bleeding source identified  Slight nodularity in the duodenal bulb 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  Colonoscopy done 314/2020 showed no bleeding or blood source identified  Protruding internal hemorrhoids  Colon polyps which were removed and few scattered diverticula in sigmoid colon  Biopsies were all benign  Allergies:    Allergies   Allergen Reactions   • Amoxicillin Rash       Medications:  Current Facility-Administered Medications:   •  albumin human (FLEXBUMIN) 25 % injection 12 5 g, 12 5 g, Intravenous, Once, Carli Briones MD  •  amLODIPine (NORVASC) tablet 10 mg, 10 mg, Oral, Daily, Carli Briones MD, 10 mg at 12/21/22 0920  •  calcium acetate (PHOSLO) capsule 667 mg, 667 mg, Oral, TID With Meals, Carli Briones MD, 667 mg at 12/21/22 1203  •  cloNIDine (CATAPRES) tablet 0 1 mg, 0 1 mg, Oral, Q12H [address], Carli Briones MD, 0 1 mg at 12/21/22 0920  •  epoetin nai (EPOGEN,PROCRIT) injection 8,000 Units, 8,000 Units, Intravenous, After Dialysis, Carli Briones MD, 8,000 Units at 12/20/22 1719  •  finasteride (PROSCAR) tablet 5 mg, 5 mg, Oral, Daily, Jennifer Reyes MD, 5 mg at 12/21/22 0920  •  losartan (COZAAR) tablet 50 mg, 50 mg, Oral, BID, Jennifer Reyes MD, 50 mg at 12/21/22 0920  •  pravastatin (PRAVACHOL) tablet 80 mg, 80 mg, Oral, Daily With Misael Baker MD, 80 mg at 12/20/22 1839  •  sodium chloride (OCEAN) 0 65 % nasal spray 1 spray, 1 spray, Each Nare, Q1H PRN, Jennifer Reyes MD  •  tamsulosin (FLOMAX) capsule 0 8 mg, 0 8 mg, Oral, Daily With Misael Baker MD, 0 8 mg at 12/20/22 1839    Past Medical history:  Past Medical History:   Diagnosis Date   • Anesthesia complication 4491    after colonoscopy , pt was awake but could not control his body and kept falling    • Arthritis    • Bladder calculi    • BPH (benign prostatic hyperplasia)    • Diabetes mellitus ([facility] [address] )    • Dialysis patient ([facility] [address] ) 3/11/2022   • ESRD (end stage renal disease) on dialysis [facility])    • Hearing disorder of both ears     wears bilateral hearing aids   • Hyperlipidemia     controlled and maintained d/t diabetes   • Hypertension    • Kidney stones     Last Assessed:4/3/2017   • Lyme disease     Last Assessed:6/29/2015   • Rupture, bladder, spontaneous     Last Assessed:4/3/2017       Past Surgical History:   Past Surgical History:   Procedure Laterality Date   • BLADDER REPAIR N/A 12/10/2016    Procedure: REPAIR BLADDER/cysto insertion stent;  Surgeon: Alisha Nunes MD;  Location: [address] [place] [address];  Service:    • COLONOSCOPY     • CYSTOLITHOTOMY      ANESTHESIA   lower abdomen  ;  Last Assessed:4/3/2017   • IR ASPIRATION ONLY  5/8/2022   • IR BIOPSY KIDNEY RANDOM  3/10/2022   • IR THORACENTESIS  11/30/2022   • IR TUNNELED DIALYSIS CATHETER PLACEMENT  3/8/2022   • OTHER SURGICAL HISTORY      thermal dilation of the prostate x 2 ( in the office)   • TONSILLECTOMY     • TRANSURETHRAL RESECTION OF PROSTATE N/A 12/8/2016    Procedure:  Cysto, Laser Bladder stone,fulgaration of prostates tissue ;  Surgeon: Heladio Watson MD;  Location: [facility];  Service:        Social history:   Social History     Tobacco Use   • Smoking status: Former     Types: Cigars     Quit date:      Years since quittin 9   • Smokeless tobacco: Never   Vaping Use   • Vaping Use: Never used   Substance Use Topics   • Alcohol use: Never   • Drug use: No       Family history:   Family History   Problem Relation Age of Onset   • Cancer Mother         breast   • Diabetes Mother    • Cancer Father         prostate w/ bone mets   • Prostate cancer Father    • Alzheimer's disease Sister         Review of Systems: Review of Systems   All other systems reviewed and are negative  Physical Exam: Physical Exam  Vitals and nursing note reviewed  Constitutional:       Appearance: He is ill-appearing  HENT:      Head: Normocephalic and atraumatic  Cardiovascular:      Rate and Rhythm: Normal rate and regular rhythm  Pulses: Normal pulses  Heart sounds: Normal heart sounds  Pulmonary:      Effort: Pulmonary effort is normal  No respiratory distress  Breath sounds: Normal breath sounds  No stridor  No wheezing, rhonchi or rales  Abdominal:      General: Bowel sounds are normal  There is no distension  Palpations: Abdomen is soft  There is no mass  Tenderness: There is no abdominal tenderness  There is no guarding  Hernia: No hernia is present  Musculoskeletal:      Cervical back: Normal range of motion and neck supple  Right lower leg: No edema  Left lower leg: No edema  Skin:     General: Skin is warm and dry  Capillary Refill: Capillary refill takes less than 2 seconds  Coloration: Skin is pale  Skin is not jaundiced  Neurological:      Mental Status: He is alert and oriented to person, place, and time             Lab Results:   Recent Results (from the past 24 hour(s))   HS Troponin I 2hr    Collection Time: 22  2:58 PM Result Value Ref Range    hs TnI 2hr 12 "Refer to ACS Flowchart"- see link ng/L    Delta 2hr hsTnI 1 <20 ng/L   HS Troponin I 4hr    Collection Time: 12/20/22  4:18 PM   Result Value Ref Range    hs TnI 4hr 14 "Refer to ACS Flowchart"- see link ng/L    Delta 4hr hsTnI 3 <20 ng/L   Hemoglobin and hematocrit, blood    Collection Time: 12/20/22  7:29 PM   Result Value Ref Range    Hemoglobin 7 4 (L) 12 0 - 17 0 g/dL    Hematocrit 23 9 (L) 36 5 - 49 3 %   Protein, total    Collection Time: 12/21/22  4:44 AM   Result Value Ref Range    Total Protein 6 5 6 4 - 8 4 g/dL   Lactate dehydrogenase    Collection Time: 12/21/22  4:44 AM   Result Value Ref Range     81 - 234 U/L   C-reactive protein    Collection Time: 12/21/22  4:44 AM   Result Value Ref Range     7 (H) <3 0 mg/L   Basic metabolic panel    Collection Time: 12/21/22  4:44 AM   Result Value Ref Range    Sodium 138 135 - 147 mmol/L    Potassium 4 6 3 5 - 5 3 mmol/L    Chloride 98 96 - 108 mmol/L    CO2 31 21 - 32 mmol/L    ANION GAP 9 4 - 13 mmol/L    BUN 34 (H) 5 - 25 mg/dL    Creatinine 3 65 (H) 0 60 - 1 30 mg/dL    Glucose 161 (H) 65 - 140 mg/dL    Calcium 7 5 (L) 8 3 - 10 1 mg/dL    eGFR 14 ml/min/1 73sq m   Hepatic function panel    Collection Time: 12/21/22  4:44 AM   Result Value Ref Range    Total Bilirubin 0 45 0 20 - 1 00 mg/dL    Bilirubin, Direct 0 19 0 00 - 0 20 mg/dL    Alkaline Phosphatase 100 46 - 116 U/L    AST 18 5 - 45 U/L    ALT 9 (L) 12 - 78 U/L    Total Protein 6 5 6 4 - 8 4 g/dL    Albumin 1 6 (L) 3 5 - 5 0 g/dL   Sedimentation rate, automated    Collection Time: 12/21/22  5:23 AM   Result Value Ref Range    Sed Rate 46 (H) 0 - 19 mm/hour   CBC and differential    Collection Time: 12/21/22  5:23 AM   Result Value Ref Range    WBC 6 57 4 31 - 10 16 Thousand/uL    RBC 2 22 (L) 3 88 - 5 62 Million/uL    Hemoglobin 6 0 (LL) 12 0 - 17 0 g/dL    Hematocrit 19 9 (L) 36 5 - 49 3 %    MCV 90 82 - 98 fL    MCH 27 0 26 8 - 34 3 pg    MCHC 30 2 (L) 31 4 - 37 4 g/dL    RDW 15 9 (H) 11 6 - 15 1 %    MPV 9 2 8 9 - 12 7 fL    Platelets 142 633 - 674 Thousands/uL    nRBC 0 /100 WBCs    Neutrophils Relative 79 (H) 43 - 75 %    Immat GRANS % 1 0 - 2 %    Lymphocytes Relative 11 (L) 14 - 44 %    Monocytes Relative 8 4 - 12 %    Eosinophils Relative 1 0 - 6 %    Basophils Relative 0 0 - 1 %    Neutrophils Absolute 5 15 1 85 - 7 62 Thousands/µL    Immature Grans Absolute 0 07 0 00 - 0 20 Thousand/uL    Lymphocytes Absolute 0 71 0 60 - 4 47 Thousands/µL    Monocytes Absolute 0 54 0 17 - 1 22 Thousand/µL    Eosinophils Absolute 0 08 0 00 - 0 61 Thousand/µL    Basophils Absolute 0 02 0 00 - 0 10 Thousands/µL   Prepare Leukoreduced RBC: 1 Units    Collection Time: 12/21/22  6:15 AM   Result Value Ref Range    Unit Product Code O3875B15     Unit Number [id number]     Unit ABO A     Unit RH POS     Crossmatch Compatible     Unit Dispense Status Presumed Trans     Unit Product Volume 350 mL   Prepare Leukoreduced RBC: 2 Units, Leukoreduced    Collection Time: 12/21/22  1:40 PM   Result Value Ref Range    Unit Product Code I4760V29     Unit Number [id number]     Unit ABO A     Unit RH POS     Crossmatch Compatible     Unit Dispense Status Issued     Unit Product Volume 350 mL    Unit Product Code Y5689T15     Unit Number [id number]     Unit ABO A     Unit RH POS     Crossmatch Compatible     Unit Dispense Status Issued     Unit Product Volume 350 mL           Imaging Studies: CTA head and neck with and without contrast    Result Date: 11/28/2022  Narrative: CTA NECK AND BRAIN WITH AND WITHOUT CONTRAST INDICATION: blurry vision COMPARISON:   None  TECHNIQUE:  Routine CT imaging of the Brain without contrast   Post contrast imaging was performed after administration of iodinated contrast through the neck and brain  Post contrast axial 0 625 mm images timed to opacify the arterial system  3D rendering was performed on an independent workstation     MIP reconstructions performed  Coronal reconstructions were performed of the noncontrast portion of the brain  Radiation dose length product (DLP) for this visit:  1285 mGy-cm   This examination, like all CT scans performed in the [facility], was performed utilizing techniques to minimize radiation dose exposure, including the use of iterative reconstruction and automated exposure control  IV Contrast:  85 mL of iohexol (OMNIPAQUE)  IMAGE QUALITY:   Diagnostic FINDINGS: NONCONTRAST BRAIN PARENCHYMA:  No intracranial mass, mass effect or midline shift  No CT signs of acute infarction  No acute parenchymal hemorrhage  VENTRICLES AND EXTRA-AXIAL SPACES:  Normal for the patient's age  VISUALIZED ORBITS AND PARANASAL SINUSES:  Unremarkable  CERVICAL VASCULATURE AORTIC ARCH AND GREAT VESSELS:  Normal aortic arch and great vessel origins  Normal visualized subclavian vessels  RIGHT VERTEBRAL ARTERY CERVICAL SEGMENT:  Mild stenosis at the origin  The vessel is normal in caliber throughout the neck  LEFT VERTEBRAL ARTERY CERVICAL SEGMENT:  Normal origin  The vessel is normal in caliber throughout the neck  RIGHT EXTRACRANIAL CAROTID SEGMENT:  Mild atherosclerotic calcification of the carotid bifurcation and proximal cervical internal carotid artery  There is mild, less than 20% narrowing of the distal aspect of the internal carotid artery bulb  No ulceration present  LEFT EXTRACRANIAL CAROTID SEGMENT:  Mild atherosclerotic disease of the distal common carotid artery and proximal cervical internal carotid artery without significant stenosis compared to the more distal ICA  NASCET criteria was used to determine the degree of internal carotid artery diameter stenosis  INTRACRANIAL VASCULATURE INTERNAL CAROTID ARTERIES:  Normal enhancement of the intracranial portions of the internal carotid arteries  Normal ophthalmic artery origins  Normal ICA terminus   ANTERIOR CIRCULATION:  Symmetric A1 segments and No anterior cerebral arteries with normal enhancement  Normal anterior communicating artery  MIDDLE CEREBRAL ARTERY CIRCULATION:  M1 segment and middle cerebral artery branches demonstrate normal enhancement bilaterally  DISTAL VERTEBRAL ARTERIES:  Normal distal vertebral arteries  Posterior inferior cerebellar artery origins are normal  Normal vertebral basilar junction  BASILAR ARTERY:  Basilar artery is normal in caliber  Normal superior cerebellar arteries  POSTERIOR CEREBRAL ARTERIES: Both posterior cerebral arteries arises from the basilar tip  Both arteries demonstrate normal enhancement  Normal posterior communicating arteries  VENOUS STRUCTURES:  Normal  NON VASCULAR ANATOMY BONY STRUCTURES:  No acute osseous abnormality  SOFT TISSUES OF THE NECK:  Unremarkable  THORACIC INLET:  Large effusions are seen within the posterior aspect of the upper chest      Impression: CT brain: No acute intracranial abnormality  CT angiography: Minor atherosclerotic change of the right proximal cervical internal carotid artery  Otherwise unremarkable intracranial and cervical vasculature  Other findings: Large pleural effusions are seen layering posteriorly within the upper chest  Workstation performed: [id number]     CT chest abdomen pelvis wo contrast    Result Date: 12/20/2022  Narrative: CT CHEST, ABDOMEN AND PELVIS WITHOUT IV CONTRAST INDICATION:   pleural effusion, fluid overload  COMPARISON:  None  TECHNIQUE: CT examination of the chest, abdomen and pelvis was performed without intravenous contrast  Axial, sagittal, and coronal 2D reformatted images were created from the source data and submitted for interpretation  Radiation dose length product (DLP) for this visit:  538 65 mGy-cm   This examination, like all CT scans performed in the [facility], was performed utilizing techniques to minimize radiation dose exposure, including the use of iterative  reconstruction and automated exposure control  Enteric contrast was administered  FINDINGS: CHEST LUNGS: There are few scattered  irregular nodular appearing densities in the right lung in the right middle lobe, in the posterior aspect of the right upper lobe, image 144 series 602 and image 29 series 2  These measure 8 mm, 6 mm in the right middle and right upper A patchy opacity seen in the left upper lobe with groundglass density and heterogeneity without solid component, seen in image 22 series 2  Lingular region density seen with associated left basilar consolidation, image 34 series 2 PLEURA:  Moderate right effusion and moderate to large left effusion HEART/GREAT VESSELS: Heart is unremarkable for patient's age  No thoracic aortic aneurysm  Pericardial effusion seen MEDIASTINUM AND ROMAINE:  Upper right paratracheal lymph node seen measuring about 8 mm Lower right paratracheal lymph node seen measuring about 9 mm Mild esophageal wall thickening seen CHEST WALL AND LOWER NECK:  Right-sided line seen with tip in the right atrium ABDOMEN LIVER/BILIARY TREE:  Unenhanced liver appear unremarkable GALLBLADDER:  Cholelithiasis is seen SPLEEN:  Unremarkable  PANCREAS:  Unremarkable  ADRENAL GLANDS:  Unremarkable  KIDNEYS/URETERS:  A hazy infiltration in the both adrenal pelvic area is infiltration both renal sinuses Mild prominence of the both renal pelvises and ureters without obstruction STOMACH AND BOWEL:  No abnormal dilation of bowel loops seen No bowel wall thickening No pneumatosis APPENDIX:  No findings to suggest appendicitis  ABDOMINOPELVIC CAVITY:     Small amount of ascites seen No pneumoperitoneum  No lymphadenopathy  VESSELS:  Unremarkable for patient's age  PELVIS REPRODUCTIVE ORGANS:  Unremarkable for patient's age  URINARY BLADDER:  Unremarkable   ABDOMINAL WALL/INGUINAL REGIONS:  Edema seen in the lateral wall Inguinal hernia seen containing small bowel loops without evidence of bowel obstruction A left inguinal hernia seen containing small amount of fluid and mild protrusion of the colonic loop within the hernia sac and no bowel obstruction OSSEOUS STRUCTURES:  No acute compression collapse vertebra No gross lytic lesion     Impression: Moderate to large right effusion Large left effusion  There are new scattered areas of few irregular nodular densities in the right middle lobe, right upper lobe with additional heterogenous groundglass opacity left upper lobe  The may be inflammatory or infectious  Short interval follow-up suggested at 3  months Bibasilar consolidations with overlying effusion may be due to compressive atelectasis  Bilateral inguinal hernias without obstruction Small amount of ascites and anasarca, correlate with volume overload Small to moderate pericardial effusion, increased from the previous study The study was marked in EPIC for immediate notification  Workstation performed: [id number]     XR chest 1 view portable    Result Date: 12/20/2022  Narrative: CHEST INDICATION:   edema and hgb 6 4  COMPARISON:  11/28/2022 EXAM PERFORMED/VIEWS:  XR CHEST PORTABLE 50 FINDINGS: Persistent bibasal atelectasis/infiltrates and pleural effusions Right IJ dialysis catheter remains with tip at cavoatrial junction Cardiomediastinal silhouette remains enlarged Osseous structures appear within normal limits for patient age  Impression: Persistent bibasal infiltrate/atelectasis and pleural effusions Workstation performed: [id number]     XR chest 1 view portable    Result Date: 11/28/2022  Narrative: CHEST INDICATION:   sob  COMPARISON:  Chest radiograph November 23, 2022  Correlation with chest CT June 1, 2022 EXAM PERFORMED/VIEWS:  XR CHEST PORTABLE FINDINGS:  Tip of right central venous catheter projects over the right atrium  Cardiomediastinal silhouette appears unremarkable  Layering bilateral pleural effusions with adjacent opacities, which are not significantly changed since November 23, 2022   No pneumothorax Osseous structures appear within normal limits for patient age  Impression: Bilateral pleural effusions with adjacent atelectasis  Infection is to be excluded on clinical grounds Workstation performed: [id number]     XR chest 1 view portable    Result Date: 11/23/2022  Narrative: CHEST INDICATION:   cough, hemoptysis, shortness of breath  COMPARISON:  Chest radiograph dated 10/24/2022  EXAM PERFORMED/VIEWS:  XR CHEST PORTABLE FINDINGS:  Stably positioned right-sided dialysis catheter  Cardiomediastinal silhouette appears unremarkable  Bilateral lower lobe airspace consolidation, not significantly changed  Small bilateral pleural effusions, not significantly changed  No pneumothorax  Osseous structures appear within normal limits for patient age  Impression: Bilateral lower lobe airspace consolidation and small bilateral pleural effusions, not significantly changed  Workstation performed: [id number]     IR IN-Patient Thoracentesis    Result Date: 11/30/2022  Narrative: Ultrasound-guided right thoracentesis Clinical History: Shortness of breath   Technique: The patient was brought to the interventional radiology area and placed in the sitting position on the side of a stretcher  The right chest was then examined with ultrasound and the skin was marked  The skin was then prepped, and draped in usual sterile fashion  Lidocaine was administered to the skin and a small skin incision was made  Under direct ultrasound guidance, a 5 [employer] Caitlin Yueh multisidehole catheter was advanced into the pleural space  1000 cc of lester pleural fluid was aspirated  The patient tolerated the procedure well and suffered no complications  Impression: Impression: 1  Successful ultrasound-guided thoracentesis yielding 1000 cc of lester right pleural fluid  Specimen was sent for analysis as per the clinical service     Workstation performed: [id number]       Problem List:   Patient Active Problem List   Diagnosis   • Bladder stones   • Benign colon polyp   • Benign essential hypertension   • Benign prostatic hyperplasia with lower urinary tract symptoms   • Hyperlipidemia   • Type 2 diabetes mellitus with chronic kidney disease on chronic dialysis, with long-term current use of insulin (HCC)   • Vitamin D deficiency   • Chronic pain of right knee   • Primary osteoarthritis of right knee   • Elevated PSA   • Symptomatic anemia   • Chest pain   • Melena   • Other proteinuria   • Hemoptysis   • Anemia due to chronic kidney disease, on chronic dialysis ([facility] )   • Dialysis patient ([place] [address] )   • Pulmonary hypertension ([place] [address] )   • ANCA-associated vasculitis   • Hyponatremia   • ESRD on dialysis ([facility] )   • Urinary retention   • Hyperphosphatemia associated with renal failure   • Fluid overload   • Bronchitis   • COVID-19   • Pleural effusion   • SOB (shortness of breath)   • Volume overload   • Anasarca   • Lung nodule seen on imaging study         FERNANDO Kirkland      Please Note: "This note has been constructed using a voice recognition system  Therefore there may be syntax, spelling, and/or grammatical errors   Please call if you have any questions  "**

## 2024-11-04 NOTE — PROGRESS NOTES
Chief Complaint   Patient presents with    Transition of Care Management     TCM Call (since 4/17/2022)     Date and time call was made  5/11/2022  8:49 AM    Hospital care reviewed  Records reviewed    Patient was hospitialized at  [facility]    Date of Admission  05/05/22    Date of discharge  05/10/22    Diagnosis  sepsis , fluid around kidney    Disposition  Home    Were the patients medications reviewed and updated  Yes    Current Symptoms  None      TCM Call (since 4/17/2022)     Post hospital issues  None    Should patient be enrolled in anticoag monitoring? Yes    Scheduled for follow up? Yes    Referrals needed  urologist Dr Rachael Aburto    Did you obtain your prescribed medications  Yes    Do you need help managing your prescriptions or medications  Yes    Why type of assitance do you need  e    Is transportation to your appointment needed  No    I have advised the patient to call PCP with any new or worsening symptoms  jaun saha /sarah 5/11/2022    Living Arrangements  Spouse or Significiant other    Are you recieving any outpatient services  No    Are you recieving home care services  No    Are you using any community resources  No    Current waiver services  No    Have you fallen in the last 12 months  No           Patient ID: Debby Garcia is a 64 y o  male  HPI   pt was hospitalized for sepsis and back pain  -  Resolved -  Has DM2, 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  -  Under nephrologist care     The following portions of the patient's history were reviewed and updated as appropriate: allergies, current medications, past family history, past medical history, past social history, past surgical history and problem list     Review of Systems   Constitutional: Positive for chills and fatigue  Negative for activity change and appetite change  HENT: Negative  Respiratory: Negative  Gastrointestinal: Negative  Genitourinary: Negative  Neurological: Negative  Psychiatric/Behavioral: Negative  Current Outpatient Medications   Medication Sig Dispense Refill    calcium acetate (PHOSLO) capsule Take by mouth Three times a day      calcium carbonate-vitamin D (OSCAL-D) 500 mg-200 units per tablet Take 1 tablet by mouth daily with breakfast 30 tablet 0    dutasteride (AVODART) 0 5 mg capsule Take 0 5 mg by mouth daily      enalapril (VASOTEC) 10 mg tablet Take 10 mg by mouth in the morning   glucose blood test strip 1 each by Other route 3 (three) times a day 300 each 3    HumaLOG Mix 75/25 KwikPen (75-25) 100 units/mL injection pen Inject 25 Units under the skin 2 (two) times a day with meals (Patient taking differently: Inject 35-40 Units under the skin 2 (two) times a day with meals) 90 mL 0    Insulin Pen Needle (B-D UF III MINI PEN NEEDLES) 31G X 5 MM MISC Use twice daily with insulin pen as directed 180 each 3    simvastatin (ZOCOR) 40 mg tablet Take 1 tablet (40 mg total) by mouth daily at bedtime 90 tablet 3    tamsulosin (FLOMAX) 0 4 mg TAKE 1 CAPSULE BY MOUTH TWO TIMES DAILY 180 capsule 1    acetaminophen (TYLENOL) 325 mg tablet Take 2 tablets (650 mg total) by mouth every 6 (six) hours as needed for mild pain, headaches or fever  0    calcium acetate (PHOSLO) capsule Take 667 mg by mouth in the morning and 667 mg at noon and 667 mg in the evening  Take with meals   latanoprost (XALATAN) 0 005 % ophthalmic solution Administer 1 drop to both eyes daily at bedtime       No current facility-administered medications for this visit  Objective:    /60   Pulse 76   Temp 98 1 °F (36 7 °C)   Resp 18   Ht 5' 8" (1 727 m)   Wt 82 1 kg (181 lb)   BMI 27 52 kg/m²        Physical Exam  Constitutional:       General: He is not in acute distress  Appearance: He is ill-appearing (chronically ill)  Cardiovascular:      Rate and Rhythm: Normal rate and regular rhythm     Pulmonary:      Effort: Pulmonary effort is normal  No respiratory distress  Breath sounds: No wheezing, rhonchi or rales  Abdominal:      Palpations: Abdomen is soft  Tenderness: There is no abdominal tenderness  Skin:     Coloration: Skin is pale  Neurological:      General: No focal deficit present  Mental Status: He is alert and oriented to person, place, and time  Labs in chart were reviewed  Assessment/Plan:         Diagnoses and all orders for this visit:    Chronic fatigue    Anemia due to chronic kidney disease, on chronic dialysis [facility]    Dialysis patient ([facility] [address] )    Other orders  -     enalapril (VASOTEC) 10 mg tablet; Take 10 mg by mouth in the morning          F/u with nephrologist     rto in 1 m                     Tia Wilson MD Quality 137: Melanoma: Continuity Of Care - Recall System: Patient information entered into a recall system that includes: target date for the next exam specified AND a process to follow up with patients regarding missed or unscheduled appointments Quality 226: Preventive Care And Screening: Tobacco Use: Screening And Cessation Intervention: Patient screened for tobacco use and is an ex/non-smoker Detail Level: Simple